# Patient Record
Sex: MALE | Race: BLACK OR AFRICAN AMERICAN | NOT HISPANIC OR LATINO | Employment: FULL TIME | URBAN - METROPOLITAN AREA
[De-identification: names, ages, dates, MRNs, and addresses within clinical notes are randomized per-mention and may not be internally consistent; named-entity substitution may affect disease eponyms.]

---

## 2017-02-03 ENCOUNTER — HISTORICAL (OUTPATIENT)
Dept: ADMINISTRATIVE | Facility: HOSPITAL | Age: 57
End: 2017-02-03

## 2017-02-03 LAB
ALBUMIN SERPL-MCNC: 4.3 G/DL (ref 3.1–4.7)
ALP SERPL-CCNC: 52 IU/L (ref 40–104)
ALT (SGPT): 11 IU/L (ref 3–33)
AST SERPL-CCNC: 15 IU/L (ref 10–40)
BASOPHILS NFR BLD: 0.1 K/UL (ref 0–0.2)
BASOPHILS NFR BLD: 1 %
BILIRUB SERPL-MCNC: 0.9 MG/DL (ref 0.3–1)
BUN SERPL-MCNC: 23 MG/DL (ref 8–20)
CALCIUM SERPL-MCNC: 8.9 MG/DL (ref 7.7–10.4)
CHLORIDE: 109 MMOL/L (ref 98–110)
CO2 SERPL-SCNC: 26.5 MMOL/L (ref 22.8–31.6)
CREATININE: 1.65 MG/DL (ref 0.6–1.4)
EOSINOPHIL NFR BLD: 0.1 K/UL (ref 0–0.7)
EOSINOPHIL NFR BLD: 1.9 %
ERYTHROCYTE [DISTWIDTH] IN BLOOD BY AUTOMATED COUNT: 23.3 % (ref 11.7–14.9)
GLUCOSE: 103 MG/DL (ref 70–99)
GRAN #: 4.2 K/UL (ref 1.4–6.5)
GRAN%: 62.8 %
HCT VFR BLD AUTO: 24 % (ref 39–55)
HGB BLD-MCNC: 8.2 G/DL (ref 14–16)
IMMATURE GRANS (ABS): 0 K/UL (ref 0–1)
IMMATURE GRANULOCYTES: 0.3 %
LYMPH #: 1.5 K/UL (ref 1.2–3.4)
LYMPH%: 21.7 %
MCH RBC QN AUTO: 30.1 PG (ref 25–35)
MCHC RBC AUTO-ENTMCNC: 34.2 G/DL (ref 31–36)
MCV RBC AUTO: 88.2 FL (ref 80–100)
MONO #: 0.8 K/UL (ref 0.1–0.6)
MONO%: 12.3 %
NUCLEATED RBCS: 0 %
PLATELET # BLD AUTO: 492 K/UL (ref 140–440)
PMV BLD AUTO: 10.3 FL (ref 8.8–12.7)
POTASSIUM SERPL-SCNC: 4.6 MMOL/L (ref 3.5–5)
PROT SERPL-MCNC: 7.4 G/DL (ref 6–8.2)
RBC # BLD AUTO: 2.72 M/UL (ref 4.3–5.9)
SODIUM: 141 MMOL/L (ref 134–144)
WBC # BLD AUTO: 6.7 K/UL (ref 5–10)

## 2017-03-07 ENCOUNTER — HISTORICAL (OUTPATIENT)
Dept: ADMINISTRATIVE | Facility: HOSPITAL | Age: 57
End: 2017-03-07

## 2017-03-07 LAB
ALBUMIN SERPL-MCNC: 4 G/DL (ref 3.1–4.7)
ALP SERPL-CCNC: 57 IU/L (ref 40–104)
ALT (SGPT): 12 IU/L (ref 3–33)
AST SERPL-CCNC: 18 IU/L (ref 10–40)
BILIRUB SERPL-MCNC: 1.1 MG/DL (ref 0.3–1)
BUN SERPL-MCNC: 19 MG/DL (ref 8–20)
CALCIUM SERPL-MCNC: 8.6 MG/DL (ref 7.7–10.4)
CHLORIDE: 106 MMOL/L (ref 98–110)
CO2 SERPL-SCNC: 25.8 MMOL/L (ref 22.8–31.6)
CREATININE: 1.5 MG/DL (ref 0.6–1.4)
GLUCOSE: 101 MG/DL (ref 70–99)
HCT VFR BLD AUTO: 22.3 % (ref 39–55)
HGB BLD-MCNC: 7.7 G/DL (ref 14–16)
MCH RBC QN AUTO: 31.6 PG (ref 25–35)
MCHC RBC AUTO-ENTMCNC: 34.5 G/DL (ref 31–36)
MCV RBC AUTO: 91.4 FL (ref 80–100)
NUCLEATED RBCS: 0 %
PLATELET # BLD AUTO: 475 K/UL (ref 140–440)
POTASSIUM SERPL-SCNC: 4.9 MMOL/L (ref 3.5–5)
PROT SERPL-MCNC: 7 G/DL (ref 6–8.2)
RBC # BLD AUTO: 2.44 M/UL (ref 4.3–5.9)
SODIUM: 138 MMOL/L (ref 134–144)
WBC # BLD AUTO: 8.6 K/UL (ref 5–10)

## 2017-03-22 ENCOUNTER — DOCUMENTATION ONLY (OUTPATIENT)
Dept: FAMILY MEDICINE | Facility: CLINIC | Age: 57
End: 2017-03-22

## 2017-03-22 NOTE — PROGRESS NOTES
Pre-Visit Chart Review  For Appointment Scheduled on 3-    Health Maintenance Due   Topic Date Due    Influenza Vaccine  08/01/2016

## 2017-04-14 ENCOUNTER — HISTORICAL (OUTPATIENT)
Dept: ADMINISTRATIVE | Facility: HOSPITAL | Age: 57
End: 2017-04-14

## 2017-04-14 LAB
ALBUMIN SERPL-MCNC: 4.2 G/DL (ref 3.1–4.7)
ALP SERPL-CCNC: 60 IU/L (ref 40–104)
ALT (SGPT): 12 IU/L (ref 3–33)
AST SERPL-CCNC: 18 IU/L (ref 10–40)
BILIRUB SERPL-MCNC: 0.9 MG/DL (ref 0.3–1)
BUN SERPL-MCNC: 22 MG/DL (ref 8–20)
CALCIUM SERPL-MCNC: 8.5 MG/DL (ref 7.7–10.4)
CHLORIDE: 105 MMOL/L (ref 98–110)
CO2 SERPL-SCNC: 27.6 MMOL/L (ref 22.8–31.6)
CREATININE: 1.51 MG/DL (ref 0.6–1.4)
GLUCOSE: 106 MG/DL (ref 70–99)
HCT VFR BLD AUTO: 24.3 % (ref 39–55)
HGB BLD-MCNC: 8.3 G/DL (ref 14–16)
MCH RBC QN AUTO: 31.3 PG (ref 25–35)
MCHC RBC AUTO-ENTMCNC: 34.2 G/DL (ref 31–36)
MCV RBC AUTO: 91.7 FL (ref 80–100)
NUCLEATED RBCS: 1 %
PLATELET # BLD AUTO: 413 K/UL (ref 140–440)
POTASSIUM SERPL-SCNC: 4.9 MMOL/L (ref 3.5–5)
PROT SERPL-MCNC: 7 G/DL (ref 6–8.2)
RBC # BLD AUTO: 2.65 M/UL (ref 4.3–5.9)
SODIUM: 138 MMOL/L (ref 134–144)
WBC # BLD AUTO: 3.5 K/UL (ref 5–10)

## 2017-05-12 LAB
ALBUMIN SERPL-MCNC: 4.1 G/DL (ref 3.1–4.7)
ALP SERPL-CCNC: 53 IU/L (ref 40–104)
ALT (SGPT): 13 IU/L (ref 3–33)
AST SERPL-CCNC: 19 IU/L (ref 10–40)
BILIRUB SERPL-MCNC: 0.9 MG/DL (ref 0.3–1)
BUN SERPL-MCNC: 24 MG/DL (ref 8–20)
CALCIUM SERPL-MCNC: 8.6 MG/DL (ref 7.7–10.4)
CHLORIDE: 108 MMOL/L (ref 98–110)
CO2 SERPL-SCNC: 28.9 MMOL/L (ref 22.8–31.6)
CREATININE: 1.51 MG/DL (ref 0.6–1.4)
GLUCOSE: 106 MG/DL (ref 70–99)
HCT VFR BLD AUTO: 24.5 % (ref 39–55)
HGB BLD-MCNC: 8.3 G/DL (ref 14–16)
MCH RBC QN AUTO: 31.3 PG (ref 25–35)
MCHC RBC AUTO-ENTMCNC: 33.9 G/DL (ref 31–36)
MCV RBC AUTO: 92.5 FL (ref 80–100)
NUCLEATED RBCS: 0 %
PLATELET # BLD AUTO: 483 K/UL (ref 140–440)
POTASSIUM SERPL-SCNC: 5 MMOL/L (ref 3.5–5)
PROT SERPL-MCNC: 7.3 G/DL (ref 6–8.2)
RBC # BLD AUTO: 2.65 M/UL (ref 4.3–5.9)
SODIUM: 143 MMOL/L (ref 134–144)
WBC # BLD AUTO: 5.3 K/UL (ref 5–10)

## 2017-05-12 RX ORDER — NAPROXEN 500 MG/1
TABLET ORAL
Qty: 30 TABLET | Refills: 2 | Status: SHIPPED | OUTPATIENT
Start: 2017-05-12 | End: 2017-07-13 | Stop reason: SDUPTHER

## 2017-05-12 RX ORDER — NAPROXEN 500 MG/1
500 TABLET ORAL 2 TIMES DAILY
COMMUNITY
End: 2017-05-12 | Stop reason: SDUPTHER

## 2017-05-12 RX ORDER — NAPROXEN 500 MG/1
500 TABLET ORAL 2 TIMES DAILY
Qty: 60 TABLET | Refills: 2 | Status: SHIPPED | OUTPATIENT
Start: 2017-05-12 | End: 2017-12-28 | Stop reason: SDUPTHER

## 2017-06-08 LAB
ALBUMIN SERPL-MCNC: 4.2 G/DL (ref 3.1–4.7)
ALP SERPL-CCNC: 53 IU/L (ref 40–104)
ALT (SGPT): 16 IU/L (ref 3–33)
AST SERPL-CCNC: 19 IU/L (ref 10–40)
BILIRUB SERPL-MCNC: 0.9 MG/DL (ref 0.3–1)
BUN SERPL-MCNC: 19 MG/DL (ref 8–20)
CALCIUM SERPL-MCNC: 8.8 MG/DL (ref 7.7–10.4)
CHLORIDE: 107 MMOL/L (ref 98–110)
CO2 SERPL-SCNC: 26.6 MMOL/L (ref 22.8–31.6)
CREATININE: 1.47 MG/DL (ref 0.6–1.4)
GLUCOSE: 98 MG/DL (ref 70–99)
HCT VFR BLD AUTO: 24 % (ref 39–55)
HGB BLD-MCNC: 8.3 G/DL (ref 14–16)
MCH RBC QN AUTO: 31.2 PG (ref 25–35)
MCHC RBC AUTO-ENTMCNC: 34.6 G/DL (ref 31–36)
MCV RBC AUTO: 90.2 FL (ref 80–100)
NUCLEATED RBCS: 0 %
PLATELET # BLD AUTO: 482 K/UL (ref 140–440)
POTASSIUM SERPL-SCNC: 4.5 MMOL/L (ref 3.5–5)
PROT SERPL-MCNC: 7.3 G/DL (ref 6–8.2)
RBC # BLD AUTO: 2.66 M/UL (ref 4.3–5.9)
SODIUM: 141 MMOL/L (ref 134–144)
WBC # BLD AUTO: 7.1 K/UL (ref 5–10)

## 2017-06-29 ENCOUNTER — OFFICE VISIT (OUTPATIENT)
Dept: HEMATOLOGY/ONCOLOGY | Facility: CLINIC | Age: 57
End: 2017-06-29
Payer: MEDICARE

## 2017-06-29 VITALS
HEART RATE: 62 BPM | TEMPERATURE: 98 F | WEIGHT: 209.31 LBS | SYSTOLIC BLOOD PRESSURE: 164 MMHG | RESPIRATION RATE: 18 BRPM | BODY MASS INDEX: 30.91 KG/M2 | DIASTOLIC BLOOD PRESSURE: 89 MMHG

## 2017-06-29 DIAGNOSIS — F10.11 H/O ETOH ABUSE: ICD-10-CM

## 2017-06-29 DIAGNOSIS — Z87.891 FORMER SMOKER: ICD-10-CM

## 2017-06-29 DIAGNOSIS — D57.1 HB-SS DISEASE WITHOUT CRISIS: ICD-10-CM

## 2017-06-29 PROCEDURE — 99213 OFFICE O/P EST LOW 20 MIN: CPT | Mod: ,,, | Performed by: INTERNAL MEDICINE

## 2017-06-29 RX ORDER — PHENOL/SODIUM PHENOLATE
20 AEROSOL, SPRAY (ML) MUCOUS MEMBRANE DAILY
COMMUNITY
Start: 2017-04-05 | End: 2017-10-03 | Stop reason: SDUPTHER

## 2017-06-29 RX ORDER — FLUOXETINE 20 MG/1
TABLET ORAL
COMMUNITY
Start: 2017-04-05 | End: 2017-07-02 | Stop reason: SDUPTHER

## 2017-06-29 NOTE — LETTER
June 29, 2017      Camryn Thomas MD  [address] [address]           [facility]  [address]  [address]  [facility] [address]  Phone: [phone]  Fax: [phone]          Patient: Rick Butler   MR Number: [id number]   YOB: 1960   Date of Visit: 6/29/2017       Dear Dr. Camryn Thomas:    Thank you for referring Rick Butler to me for evaluation. Attached you will find relevant portions of my assessment and plan of care.    If you have questions, please do not hesitate to call me. I look forward to following Rick Butler along with you.    Sincerely,    Jose Tello MD    Enclosure  CC:  No Recipients    If you would like to receive this communication electronically, please contact [email or web address] or [phone] to request more information on [employer] Link access.    For providers and/or their staff who would like to refer a patient to [facility], please contact us through our one-stop-shop provider referral line, [facility], at [phone].    If you feel you have received this communication in error or would no longer like to receive these types of communications, please e-mail [email or web address]

## 2017-06-29 NOTE — PROGRESS NOTES
[facility]            PROGRESS NOTE      Subjective:       Patient ID:   NAME: Rick Butler : 1960     57 y.o. male    Referring Doc: Camryn Thomas MD  Other Physicians:    Chief Complaint:  Sickle cell trait    History of Present Illness:     Patient returns today for a regularly scheduled follow-up visit.  The patient is doing ok with no new issues. He sees Dr Thomas on  for a follow-up; no pain crisis; he has been sober for over a year now; he has been off tobacco too; no CP, SOB, HA's or N/V; occasional fatigue            ROS:   GEN: normal without any fever, night sweats or weight loss  HEENT: normal with no HA's, sore throat, stiff neck, changes in vision  CV: normal with no CP, SOB, PND, MORA or orthopnea  PULM: normal with no SOB, cough, hemoptysis, sputum or pleuritic pain  GI: normal with no abdominal pain, nausea, vomiting, constipation, diarrhea, melanotic stools, BRBPR, or hematemesis  : normal with no hematuria, dysuria  BREAST: normal with no mass, discharge, pain  SKIN: normal with no rash, erythema, bruising, or swelling    Allergies:  Review of patient's allergies indicates:  No Known Allergies    Medications:    Current Outpatient Prescriptions:     fluoxetine 20 MG tablet, Take by mouth., Disp: , Rfl:     naproxen (EC NAPROSYN) 500 MG EC tablet, Take 1 tablet (500 mg total) by mouth 2 (two) times daily., Disp: 60 tablet, Rfl: 2    omeprazole 20 mg TbEC, Take by mouth., Disp: , Rfl:     buPROPion (WELLBUTRIN XL) 150 MG TB24 tablet, Take 2 tablets (300 mg total) by mouth once daily. Start with 1 tablet QD for 3 days then increase to 2 daily, Disp: 60 tablet, Rfl: 2    naproxen (EC NAPROSYN) 500 MG EC tablet, TAKE 1 TABLET TWICE A DAY, Disp: 30 tablet, Rfl: 2    PMHx/PSHx Updates:  See patient's last visit with me on 17.  See H&P on 2011      Pathology:  none        Objective:     Vitals:  Blood pressure (!) 164/89, pulse 62, temperature 98.1 °F (36.7 °C),  temperature source Oral, resp. rate 18, weight 94.9 kg (209 lb 4.8 oz).    Physical Examination:   GEN: no apparent distress, comfortable; AAOx3  HEAD: atraumatic and normocephalic  EYES: no pallor, no icterus, PERRLA  ENT: OMM, no pharyngeal erythema, external ears WNL; no nasal discharge; no thrush  NECK: no masses, thyroid normal, trachea midline, no LAD/LN's, supple  CV: RRR with no murmur; normal pulse; normal S1 and S2; no pedal edema  CHEST: Normal respiratory effort; CTAB; normal breath sounds; no wheeze or crackles  ABDOM: nontender and nondistended; soft; normal bowel sounds; no rebound/guarding  MUSC/Skeletal: ROM normal; no crepitus; joints normal; no deformities or arthropathy  EXTREM: no clubbing, cyanosis, inflammation or swelling  SKIN: no rashes, lesions, ulcers, petechiae or subcutaneous nodules  : no jiang  NEURO: grossly intact; motor/sensory WNL; AAOx3; no tremors  PSYCH: normal mood, affect and behavior  LYMPH: normal cervical, supraclavicular, axillary and groin LN's            Labs:   Lab Results   Component Value Date    WBC 7.1 06/08/2017    HGB 8.3 (L) 06/08/2017    HCT 24.0 (L) 06/08/2017    MCV 90.2 06/08/2017     (H) 06/08/2017    CMP  Sodium   Date Value Ref Range Status   06/08/2017 141 134 - 144 mmol/L      Potassium   Date Value Ref Range Status   06/08/2017 4.5 3.5 - 5.0 mmol/L      Chloride   Date Value Ref Range Status   06/08/2017 107 98 - 110 mmol/L      CO2   Date Value Ref Range Status   06/08/2017 26.6 22.8 - 31.6 mmol/L      Glucose   Date Value Ref Range Status   06/08/2017 98 70 - 99 mg/dL      BUN, Bld   Date Value Ref Range Status   06/08/2017 19 8 - 20 mg/dL      Creatinine   Date Value Ref Range Status   06/08/2017 1.47 (H) 0.60 - 1.40 mg/dL      Calcium   Date Value Ref Range Status   06/08/2017 8.8 7.7 - 10.4 mg/dL      Total Protein   Date Value Ref Range Status   06/08/2017 7.3 6.0 - 8.2 g/dL      Albumin   Date Value Ref Range Status   06/08/2017 4.2 3.1  - 4.7 g/dL      Total Bilirubin   Date Value Ref Range Status   06/08/2017 0.9 0.3 - 1.0 mg/dL      Alkaline Phosphatase   Date Value Ref Range Status   06/08/2017 53 40 - 104 IU/L      AST   Date Value Ref Range Status   06/08/2017 19 10 - 40 IU/L      ALT   Date Value Ref Range Status   06/08/2015 36 10 - 44 U/L Final     Anion Gap   Date Value Ref Range Status   06/08/2015 5 (L) 8 - 16 mmol/L Final     eGFR if    Date Value Ref Range Status   06/08/2015 >60.0 >60 mL/min/1.73 m^2 Final     eGFR if non    Date Value Ref Range Status   06/08/2015 >60.0 >60 mL/min/1.73 m^2 Final     Comment:     Calculation used to obtain the estimated glomerular filtration  rate (eGFR) is the CKD-EPI equation. Since race is unknown   in our information system, the eGFR values for   -American and Non--American patients are given   for each creatinine result.       I have reviewed all available lab results and radiology reports.    Radiology/Diagnostic Studies:        Assessment/Plan:   (1) 57 y.o. male with diagnosis of sickle cell anemia with borderline microcytosis  - latest hgb on 6/8 was 8.3 and within his usual range      (2) Alcohol abuse issues in past - he has been sober for over a year    (3) former smoker    PLAN;  1. Check labs monthly  2. RTC 4-6 months  3. Fax note to Dr Thomas  4. F/u with PCP  5. Encouraged sobriety    I spent over 15 mins with the patient, of which over half was face to face with the patient. Reviewing materials, labs, reports and studies. Making treatment and analytical decisions. Ordering necessary labs, tests and studies.          Discussion:     I have explained all of the above in detail and the patient understands all of the current recommendation(s). I have answered all of their questions to the best of my ability and to their complete satisfaction.   The patient is to continue with the current management plan.    RTC in  4-6  months        Electronically signed by Jose Tello MD

## 2017-07-03 RX ORDER — FLUOXETINE 20 MG/1
TABLET ORAL
Qty: 30 TABLET | Refills: 5 | Status: SHIPPED | OUTPATIENT
Start: 2017-07-03 | End: 2018-08-31

## 2017-07-19 ENCOUNTER — OFFICE VISIT (OUTPATIENT)
Dept: FAMILY MEDICINE | Facility: CLINIC | Age: 57
End: 2017-07-19
Payer: MEDICARE

## 2017-07-19 VITALS
WEIGHT: 213 LBS | SYSTOLIC BLOOD PRESSURE: 139 MMHG | BODY MASS INDEX: 31.55 KG/M2 | OXYGEN SATURATION: 99 % | HEART RATE: 69 BPM | DIASTOLIC BLOOD PRESSURE: 76 MMHG | HEIGHT: 69 IN

## 2017-07-19 DIAGNOSIS — F32.A CHRONIC DEPRESSION: Primary | ICD-10-CM

## 2017-07-19 DIAGNOSIS — G47.00 INSOMNIA, UNSPECIFIED TYPE: ICD-10-CM

## 2017-07-19 PROBLEM — K57.30 DIVERTICULOSIS OF SIGMOID COLON: Status: ACTIVE | Noted: 2017-07-19

## 2017-07-19 PROBLEM — R74.8 ABNORMAL LIVER ENZYMES: Status: ACTIVE | Noted: 2017-07-19

## 2017-07-19 PROBLEM — K70.0 ALCOHOL INDUCED FATTY LIVER: Status: ACTIVE | Noted: 2017-07-19

## 2017-07-19 PROBLEM — R77.0 ABNORMALITY OF ALBUMIN: Status: ACTIVE | Noted: 2017-07-19

## 2017-07-19 PROBLEM — G89.29 CHRONIC LOW BACK PAIN: Status: ACTIVE | Noted: 2017-07-19

## 2017-07-19 PROBLEM — F10.20 CONTINUOUS CHRONIC ALCOHOLISM: Status: ACTIVE | Noted: 2017-07-19

## 2017-07-19 PROBLEM — M54.50 CHRONIC LOW BACK PAIN: Status: ACTIVE | Noted: 2017-07-19

## 2017-07-19 PROBLEM — D64.9 CHRONIC ANEMIA: Status: ACTIVE | Noted: 2017-07-19

## 2017-07-19 PROCEDURE — 99214 OFFICE O/P EST MOD 30 MIN: CPT | Mod: ,,, | Performed by: FAMILY MEDICINE

## 2017-07-19 RX ORDER — TRAZODONE HYDROCHLORIDE 50 MG/1
50 TABLET ORAL NIGHTLY
Qty: 90 TABLET | Refills: 1 | Status: SHIPPED | OUTPATIENT
Start: 2017-07-19 | End: 2017-09-11 | Stop reason: SDUPTHER

## 2017-07-19 NOTE — PROGRESS NOTES
Subjective:       Patient ID:  Rick Butler is a 57 y.o. male with multiple medical diagnoses as listed in the medical history and problem list that presents for Anxiety and Insomnia  .      Chief Complaint: Anxiety and Insomnia        SUBJECTIVE  Rick Butler is a 57 y.o. male who comes in today to discuss poor sleep.  The patient has complaints of difficulty falling asleep once awakened.  Their normal bedtime is 2200.  The patient  has difficulty falling asleep.    They have no hypnagogic hallucinations, sleep paralysis, or restlessness.  The symptoms have been present since several months ago      Anxiety   Presents for follow-up visit. Symptoms include depressed mood and nervous/anxious behavior. Patient reports no chest pain, dizziness, palpitations, shortness of breath or suicidal ideas. Symptoms occur most days. The severity of symptoms is moderate. The quality of sleep is poor. Nighttime awakenings: one to two.     Compliance with medications is %.     Review of Systems   Constitutional: Negative for activity change, appetite change, chills, fatigue and fever.   HENT: Negative for congestion, ear discharge, ear pain, hearing loss and sore throat.    Eyes: Negative for discharge, redness and itching.   Respiratory: Negative for apnea, chest tightness, shortness of breath and wheezing.    Cardiovascular: Negative for chest pain, palpitations and leg swelling.   Gastrointestinal: Negative for abdominal distention and abdominal pain.   Endocrine: Negative for cold intolerance and polydipsia.   Genitourinary: Negative for dysuria.   Musculoskeletal: Negative for arthralgias and gait problem.   Skin: Negative for color change, pallor and rash.   Neurological: Negative for dizziness and headaches.   Hematological: Negative for adenopathy.   Psychiatric/Behavioral: Negative for agitation and suicidal ideas. The patient is nervous/anxious.        Past Medical History:   Diagnosis Date    Depression     Former  smoker 6/29/2017    H/O ETOH abuse 6/29/2017    Sickle cell anemia 6/29/2017     History reviewed. No pertinent surgical history.  Family History   Problem Relation Age of Onset    Arthritis Mother     Depression Mother     Heart disease Mother     Hypertension Mother     Heart attack Father 59     Social History     Social History    Marital status:      Spouse name: N/A    Number of children: N/A    Years of education: N/A     Occupational History    [facility] [employer]     Social History Main Topics    Smoking status: Former Smoker     Quit date: 1/1/1999    Smokeless tobacco: Never Used    Alcohol use No      Comment: quit 6/15/16, in AA    Drug use: No    Sexual activity: Not Asked     Other Topics Concern    None     Social History Narrative    None     Current Outpatient Prescriptions   Medication Sig Dispense Refill    naproxen (EC NAPROSYN) 500 MG EC tablet Take 1 tablet (500 mg total) by mouth 2 (two) times daily. 60 tablet 2    omeprazole 20 mg TbEC Take 20 mg by mouth Daily.       trazodone (DESYREL) 50 MG tablet Take 1 tablet (50 mg total) by mouth every evening. 90 tablet 1     No current facility-administered medications for this visit.      Review of patient's allergies indicates:  No Known Allergies  Objective:      Vitals:    07/19/17 1522   BP: 139/76   Pulse: 69     Physical Exam   Constitutional: He appears well-developed and well-nourished.   HENT:   Head: Normocephalic and atraumatic.   Right Ear: External ear normal.   Left Ear: External ear normal.   Eyes: EOM are normal. Pupils are equal, round, and reactive to light.   Neck: Normal range of motion. No tracheal deviation present. No thyromegaly present.   Cardiovascular: Exam reveals no gallop and no friction rub.    No murmur heard.  Pulmonary/Chest: No respiratory distress. He has no wheezes.   Abdominal: Soft. Bowel sounds are normal. He exhibits no distension. There is no tenderness.   Musculoskeletal:  He exhibits no edema or tenderness.   Neurological: No cranial nerve deficit.   Skin: No rash noted. No erythema.   Psychiatric: His mood appears anxious. He exhibits a depressed mood. He expresses no homicidal and no suicidal ideation. He expresses no suicidal plans and no homicidal plans.       Assessment:       1. Chronic depression    2. Insomnia, unspecified type        Plan:       Chronic depression  Comments:  add trazodone.      Insomnia, unspecified type  -     trazodone (DESYREL) 50 MG tablet; Take 1 tablet (50 mg total) by mouth every evening.  Dispense: 90 tablet; Refill: 1    Patient completed the SERGE questionnaire with score >10 . on SSRI daily and  add trazodone 2/2 insomnia. Patient was encouraged to avoid abrupt cessation of medication and was educated on the potential medication side effects. Exercise was encouraged as an adjunctive treatment. Will follow-up 8 weeks.  Advice pt to go to ER if having SI or HI.     Return in about 2 months (around 9/19/2017) for anxiety, depression and insomnia.      7/19/2017 Camryn Thomas M.D.

## 2017-07-20 RX ORDER — NAPROXEN 500 MG/1
TABLET ORAL
Qty: 30 TABLET | Refills: 2 | Status: SHIPPED | OUTPATIENT
Start: 2017-07-20 | End: 2017-09-23 | Stop reason: SDUPTHER

## 2017-08-01 ENCOUNTER — TELEPHONE (OUTPATIENT)
Dept: HEMATOLOGY/ONCOLOGY | Facility: CLINIC | Age: 57
End: 2017-08-01

## 2017-08-01 LAB
CBC, PLATELET CT, AND DIFF: NORMAL (ref 23.9–?)
HCT VFR BLD AUTO: 23.9 % (ref 39–55)
HGB BLD-MCNC: 8.3 G/DL (ref 14–16)
MCH RBC QN AUTO: 32 PG (ref 25–35)
MCHC RBC AUTO-ENTMCNC: 34.7 G/DL (ref 31–36)
MCV RBC AUTO: 92.3 FL (ref 80–100)
NUCLEATED RBCS: 0 %
PLATELET # BLD AUTO: 482 K/UL (ref 140–440)
RBC # BLD AUTO: 2.59 M/UL (ref 4.3–5.9)
WBC # BLD AUTO: 7 K/UL (ref 5–10)

## 2017-08-01 NOTE — TELEPHONE ENCOUNTER
----- Message from Cheri Johnson sent at 8/1/2017 10:38 AM CDT -----  Patient called in requesting BW results done at [facility]. They results are in the lab tab. Please call patient. Thanks

## 2017-08-01 NOTE — TELEPHONE ENCOUNTER
Spoke  to patient. He does not feel bad so at this time he does not think he needs a transfusion. He will let us know if he starts to feel worse and he will recheck his labs in 1 month as ordered.

## 2017-09-05 ENCOUNTER — TELEPHONE (OUTPATIENT)
Dept: HEMATOLOGY/ONCOLOGY | Facility: CLINIC | Age: 57
End: 2017-09-05

## 2017-09-05 NOTE — TELEPHONE ENCOUNTER
Pt states he feels fine no s/s of anemia. Will have monthly labs drawn and will reassess at that time.

## 2017-09-05 NOTE — TELEPHONE ENCOUNTER
----- Message from Jose Tello MD sent at 9/2/2017  4:57 PM CDT -----  Call patient and see how he is feeling; if he is having fatigue or other symptoms then set him up with 2 units of blood

## 2017-09-05 NOTE — TELEPHONE ENCOUNTER
----- Message from Cheri Johnson sent at 9/5/2017  9:54 AM CDT -----  Patient called in stating that he would like to go ahead with the transfusion. He would like to set it up for Monday earliest possible time. He stated that he will do his BW on Friday that way we can have it back by Monday. Please call patient. Thanks

## 2017-09-05 NOTE — TELEPHONE ENCOUNTER
Spoke with pt states he would like to come in on 9/10 for t&M and transfusion on 9/11/17 will fax order to scheduling. Spoke with poornima pt notified understanding verbalized.

## 2017-09-11 DIAGNOSIS — G47.00 INSOMNIA, UNSPECIFIED TYPE: ICD-10-CM

## 2017-09-11 RX ORDER — TRAZODONE HYDROCHLORIDE 50 MG/1
50 TABLET ORAL NIGHTLY
Qty: 90 TABLET | Refills: 1 | Status: SHIPPED | OUTPATIENT
Start: 2017-09-11 | End: 2017-10-09 | Stop reason: SDUPTHER

## 2017-09-11 NOTE — TELEPHONE ENCOUNTER
Pt. states that Dr. Thomas told him he could take 1-1/2 tabs if 1 tab didn't work so he has been taking 1-1/2 tabs.

## 2017-09-25 RX ORDER — NAPROXEN 500 MG/1
TABLET ORAL
Qty: 30 TABLET | Refills: 2 | Status: SHIPPED | OUTPATIENT
Start: 2017-09-25 | End: 2017-10-09 | Stop reason: SDUPTHER

## 2017-10-03 RX ORDER — OMEPRAZOLE 20 MG/1
CAPSULE, DELAYED RELEASE ORAL
Qty: 30 CAPSULE | Refills: 0 | Status: SHIPPED | OUTPATIENT
Start: 2017-10-03 | End: 2017-11-03 | Stop reason: SDUPTHER

## 2017-10-09 ENCOUNTER — OFFICE VISIT (OUTPATIENT)
Dept: FAMILY MEDICINE | Facility: CLINIC | Age: 57
End: 2017-10-09
Payer: MEDICARE

## 2017-10-09 VITALS
DIASTOLIC BLOOD PRESSURE: 82 MMHG | RESPIRATION RATE: 16 BRPM | BODY MASS INDEX: 32.14 KG/M2 | OXYGEN SATURATION: 98 % | WEIGHT: 217 LBS | HEART RATE: 86 BPM | SYSTOLIC BLOOD PRESSURE: 138 MMHG | HEIGHT: 69 IN

## 2017-10-09 DIAGNOSIS — Z12.5 SCREENING FOR PROSTATE CANCER: ICD-10-CM

## 2017-10-09 DIAGNOSIS — G47.00 INSOMNIA, UNSPECIFIED TYPE: ICD-10-CM

## 2017-10-09 DIAGNOSIS — I10 ESSENTIAL HYPERTENSION: Primary | ICD-10-CM

## 2017-10-09 DIAGNOSIS — D53.9 MACROCYTIC ANEMIA: ICD-10-CM

## 2017-10-09 DIAGNOSIS — E78.5 DYSLIPIDEMIA: ICD-10-CM

## 2017-10-09 PROBLEM — D57.3 SICKLE CELL TRAIT: Status: ACTIVE | Noted: 2017-10-09

## 2017-10-09 PROBLEM — E55.9 UNSPECIFIED VITAMIN D DEFICIENCY: Status: ACTIVE | Noted: 2017-10-09

## 2017-10-09 PROCEDURE — 99214 OFFICE O/P EST MOD 30 MIN: CPT | Mod: ,,, | Performed by: INTERNAL MEDICINE

## 2017-10-09 PROCEDURE — G0008 ADMIN INFLUENZA VIRUS VAC: HCPCS | Mod: ,,, | Performed by: INTERNAL MEDICINE

## 2017-10-09 PROCEDURE — 90686 IIV4 VACC NO PRSV 0.5 ML IM: CPT | Mod: ,,, | Performed by: INTERNAL MEDICINE

## 2017-10-09 RX ORDER — TRAZODONE HYDROCHLORIDE 50 MG/1
50 TABLET ORAL NIGHTLY
Qty: 90 TABLET | Refills: 1 | Status: SHIPPED | OUTPATIENT
Start: 2017-10-09 | End: 2017-12-28 | Stop reason: SDUPTHER

## 2017-10-09 RX ORDER — LOSARTAN POTASSIUM 50 MG/1
50 TABLET ORAL DAILY
Qty: 90 TABLET | Refills: 3 | Status: SHIPPED | OUTPATIENT
Start: 2017-10-09 | End: 2017-12-28 | Stop reason: SDUPTHER

## 2017-10-09 NOTE — PATIENT INSTRUCTIONS
"  Vitamin B-12 and Folate  Does this test have other names?  Cobalamin, Cbl, folic acid, FA  What is this test?  This test measures the levels of vitamin B-12 and folate in your blood.  Your body needs vitamin B-12, also called cobalamin, and folate, also called folic acid, to function normally. Both nutrients play important roles in creating red blood cells and making DNA and RNA to help build cells. B-12 also helps your nervous system function as it should.  B-12 is found in fortified cereals and animal products like fish, meat, eggs, and dairy. It's also in dietary supplements, as cyanocobalamin.  Folate is found naturally in green leafy vegetables, fruits, and beans. It's also added to enriched cereals and grains.  People who have pernicious anemia may have low B-12 levels because this condition prevents them from absorbing B-12. A low folate level can also be the result of a poor diet or alcohol abuse.  Why do I need this test?  You may have this test if you are taking medicines that could interfere with how your body absorbs B-12 or folate. You may also have this test if you have a disease or condition that could lead to B-12 deficiency. Symptoms of B-12 deficiency include:  · Fatigue  · Headaches  · Numbness or "pins and needles" in the hands and feet  · Difficulty walking  · Memory loss  · Difficulty thinking normally  · Changes in mood  Symptoms of having too little folate are diarrhea, weight loss, and other vague symptoms that could be caused by many other conditions.  It's important that women who are pregnant, thinking of becoming pregnant, or breastfeeding have enough folate.  This test measures both vitamin B-12 and folate, but either of these nutrients can be measured separately in different lab tests. Your healthcare provider may order this combined test if you have a condition for which it's important to know both levels.  What other tests might I have along with this test?  Your healthcare " provider may also order these tests:  · Homocysteine and methylmalonate, or methylmalonic acid. These substances can build up in your body if you have a B-12 or folate deficiency.  · Red blood cell  · Pernicious anemia. This includes measuring levels of gastrin, pepsinogen I, pepsinogen II, and antibodies against a substance called intrinsic factor.  What do my test results mean?  Many things may affect your lab test results. These include the method each lab uses to do the test. Even if your test results are different from the normal value, you may not have a problem. To learn what the results mean for you, talk with your healthcare provider.  Results are given in nanograms per milliliter (ng/mL). A normal range for vitamin B-12 is 190 to 900 ng/mL.  If your B-12 results are low, it may mean you have:  · Pernicious anemia  · Stomach issues, such as lack of stomach acid, that make it difficult to absorb the vitamin  · Folate deficiency  Your diet can also lead to B-12 deficiency. Some vegetarians or vegans who don't eat eggs or other dairy products may develop this deficiency.  Your B-12 results may also be higher or lower if you have had recent nuclear medicine studies using radiation. They can mean that you have:  · Severe liver disease  · Chronic granulocytic leukemia  If the test is done on your blood plasma, a normal range for folate is 2 to 10 ng/mL. If the test is done on red blood cells, a normal range is 140 to 960 ng/mL.  If your folate results are low, it may mean you have:  · A diet that doesn't provide enough folate  · Difficulty absorbing the nutrient  · Alcohol abuse  · B-12 deficiency  · Hemolytic anemia  How is this test done?  The test requires a blood sample, which is drawn through a needle from a vein in your arm.  Does this test pose any risks?  Taking a blood sample with a needle carries risks that include bleeding, infection, bruising, or feeling dizzy. When the needle pricks your arm, you may  feel a slight stinging sensation or pain. Afterward, the site may be slightly sore.  What might affect my test results?  Being pregnant can affect your results, as can eating foods that are high or low in folate. Alcohol use can also affect your results. Certain medicines can also affect your results. These include:  · Birth control pills  · Methotrexate  · Trimethoprim  · Phenytoin  · Azulfidine  How do I get ready for this test?  Tell your healthcare provider about health conditions you have, and how much alcohol you drink regularly. In addition, be sure your provider knows about all medicines, herbs, vitamins, and supplements you are taking. This includes medicines that don't need a prescription and any illicit drugs you may use.  © 7908-7217 [employer]. [address] [place] [address], [place] [address]. All rights reserved. This information is not intended as a substitute for professional medical care. Always follow your healthcare professional's instructions.

## 2017-10-09 NOTE — PROGRESS NOTES
Subjective:       Patient ID: Rick Butler is a 57 y.o. male.    Chief Complaint: Hospital Follow Up (Dr Thomas pt; had transfusion at outpt); Hypertension; Anxiety; and Depression    Patient just had a blood transfusion at the beginning of this month with two units PRBC. He is monitored monthly with CBCs by Dr. Mondragon. He has sickle cell trait but it apparently affects him and has caused him to be moderately anemic where he requires a few units of blood a couple times a year.     While he was at the transfusion center his blood pressure was quite high 155/90 and was repeated a few times during the 5-6 hour transfusion scan. He also has been taking at home since that elevation. At home is getting similar readings anywhere from 150 to 160 systolic. An 85 to 100 diastolic.      Review of Systems   Constitutional: Negative for activity change, diaphoresis, fatigue and fever.   HENT: Negative for congestion, ear pain, postnasal drip, sinus pressure, sore throat and trouble swallowing.    Eyes: Negative for pain.   Respiratory: Negative for cough, chest tightness, shortness of breath and wheezing.    Cardiovascular: Negative for chest pain, palpitations and leg swelling.   Gastrointestinal: Negative for abdominal distention, abdominal pain, blood in stool, constipation and diarrhea.   Endocrine: Negative for cold intolerance and heat intolerance.   Genitourinary: Negative for decreased urine volume, difficulty urinating, dysuria, flank pain, frequency and hematuria.   Musculoskeletal: Negative for arthralgias, back pain, joint swelling, myalgias and neck pain.   Skin: Negative for pallor, rash and wound.   Neurological: Negative for tremors, syncope, weakness, light-headedness, numbness and headaches.   Hematological: Negative for adenopathy.   Psychiatric/Behavioral: Negative for confusion, decreased concentration, hallucinations, self-injury, sleep disturbance and suicidal ideas. The patient is not nervous/anxious.         Past Medical History:   Diagnosis Date    Depression     Former smoker 6/29/2017    H/O ETOH abuse 6/29/2017    Sickle cell anemia 6/29/2017       No past surgical history on file.    Family History   Problem Relation Age of Onset    Arthritis Mother     Depression Mother     Heart disease Mother     Hypertension Mother     Heart attack Father 59       Social History     Social History    Marital status:      Spouse name: N/A    Number of children: N/A    Years of education: N/A     Occupational History    [employer]     Social History Main Topics    Smoking status: Former Smoker     Quit date: 1/1/1999    Smokeless tobacco: Never Used    Alcohol use No      Comment: quit 6/15/16, in AA    Drug use: No    Sexual activity: Not Asked     Other Topics Concern    None     Social History Narrative    None       Current Outpatient Prescriptions   Medication Sig Dispense Refill    naproxen (EC NAPROSYN) 500 MG EC tablet Take 1 tablet (500 mg total) by mouth 2 (two) times daily. 60 tablet 2    omeprazole (PRILOSEC) 20 MG capsule TAKE ONE CAPSULE BY MOUTH EVERY DAY 30 capsule 0    trazodone (DESYREL) 50 MG tablet Take 1 tablet (50 mg total) by mouth every evening. 90 tablet 1    losartan (COZAAR) 50 MG tablet Take 1 tablet (50 mg total) by mouth once daily. 90 tablet 3     No current facility-administered medications for this visit.        Review of patient's allergies indicates:  No Known Allergies  Objective:      Physical Exam   Constitutional: He is oriented to person, place, and time. He appears well-developed and well-nourished. No distress.   HENT:   Head: Normocephalic and atraumatic.   Right Ear: External ear normal.   Left Ear: External ear normal.   Nose: Nose normal.   Mouth/Throat: Oropharynx is clear and moist.   Eyes: Conjunctivae and EOM are normal. Right eye exhibits no discharge. Left eye exhibits no discharge. No scleral icterus.   Neck: Normal range of  motion. Neck supple. No JVD present. No tracheal deviation present. No thyromegaly present.   Cardiovascular: Normal rate, regular rhythm, normal heart sounds and intact distal pulses.  Exam reveals no gallop.    No murmur heard.  Pulmonary/Chest: Effort normal and breath sounds normal. No respiratory distress. He has no wheezes. He has no rales.   Abdominal: Soft. Bowel sounds are normal. He exhibits no distension and no mass. There is no tenderness.   Musculoskeletal: Normal range of motion. He exhibits no edema or tenderness.   Lymphadenopathy:     He has no cervical adenopathy.   Neurological: He is alert and oriented to person, place, and time.   Skin: Skin is warm and dry. No rash noted. He is not diaphoretic. No erythema.   Psychiatric: He has a normal mood and affect. His behavior is normal. Judgment and thought content normal.       Lab Results   Component Value Date    WBC 8.5 10/02/2017    HGB 9.3 (L) 10/02/2017    HCT 27.0 (L) 10/02/2017     (H) 10/02/2017    CHOL 118 (L) 06/08/2015    TRIG 26 (L) 06/08/2015    HDL 72 06/08/2015    ALT 36 06/08/2015    AST 21 10/02/2017     10/02/2017    K 4.4 10/02/2017     10/02/2017    CREATININE 1.45 (H) 10/02/2017    BUN 14 10/02/2017    CO2 26.5 10/02/2017    TSH 3.692 06/08/2015    PSA 0.23 06/08/2015       Assessment:       1. Essential hypertension    2. Macrocytic anemia    3. Chronic bilateral low back pain without sciatica    4. Screening for prostate cancer    5. Dyslipidemia    6. Insomnia, unspecified type        Plan:       Essential hypertension  -     losartan (COZAAR) 50 MG tablet; Take 1 tablet (50 mg total) by mouth once daily.  Dispense: 90 tablet; Refill: 3  -     T4, free; Future; Expected date: 10/09/2017  -     TSH; Future; Expected date: 10/09/2017  -     Comprehensive metabolic panel; Future; Expected date: 10/09/2017    Macrocytic anemia  -     Methylmalonic acid, serum; Future; Expected date: 10/09/2017  -      Homocysteine, serum; Future; Expected date: 10/09/2017  -     CBC auto differential; Future; Expected date: 10/09/2017    Chronic bilateral low back pain without sciatica    Screening for prostate cancer  -     PSA, Screening; Future; Expected date: 10/09/2017    Dyslipidemia  -     Lipid panel; Future; Expected date: 10/09/2017    Insomnia, unspecified type  -     trazodone (DESYREL) 50 MG tablet; Take 1 tablet (50 mg total) by mouth every evening.  Dispense: 90 tablet; Refill: 1    Other orders  -     Influenza - Quadrivalent (3 years & older) (PF)

## 2017-11-03 LAB
COMPLEXED PSA SERPL-MCNC: 0.3 NG/ML (ref 0–3)
T4 FREE SP9 P CHAL SERPL-SCNC: 0.85 NG/DL (ref 0.45–1.27)
TSH SERPL DL<=0.005 MIU/L-ACNC: 2.01 ULU/ML (ref 0.3–5.6)

## 2017-11-03 RX ORDER — OMEPRAZOLE 20 MG/1
CAPSULE, DELAYED RELEASE ORAL
Qty: 30 CAPSULE | Refills: 0 | OUTPATIENT
Start: 2017-11-03

## 2017-11-03 RX ORDER — OMEPRAZOLE 20 MG/1
CAPSULE, DELAYED RELEASE ORAL
Qty: 30 CAPSULE | Refills: 4 | Status: SHIPPED | OUTPATIENT
Start: 2017-11-03 | End: 2017-12-28 | Stop reason: SDUPTHER

## 2017-11-05 LAB — HOMOCYSTEINE: 14.5 UMOL/L (ref 0–15)

## 2017-11-13 RX ORDER — NAPROXEN 500 MG/1
TABLET ORAL
Qty: 30 TABLET | Refills: 2 | Status: SHIPPED | OUTPATIENT
Start: 2017-11-13 | End: 2018-01-29

## 2017-11-14 ENCOUNTER — TELEPHONE (OUTPATIENT)
Dept: FAMILY MEDICINE | Facility: CLINIC | Age: 57
End: 2017-11-14

## 2017-12-11 ENCOUNTER — TELEPHONE (OUTPATIENT)
Dept: ADMINISTRATIVE | Facility: HOSPITAL | Age: 57
End: 2017-12-11

## 2017-12-11 NOTE — TELEPHONE ENCOUNTER
Received call from Namrata in [facility] lab. States pt's Hct is 23.9.  aware. No new orders at this time.

## 2017-12-28 DIAGNOSIS — I10 ESSENTIAL HYPERTENSION: ICD-10-CM

## 2017-12-28 DIAGNOSIS — G47.00 INSOMNIA, UNSPECIFIED TYPE: ICD-10-CM

## 2017-12-28 RX ORDER — OMEPRAZOLE 20 MG/1
20 CAPSULE, DELAYED RELEASE ORAL DAILY
Qty: 30 CAPSULE | Refills: 6 | Status: SHIPPED | OUTPATIENT
Start: 2017-12-28 | End: 2018-12-30 | Stop reason: SDUPTHER

## 2017-12-28 RX ORDER — LOSARTAN POTASSIUM 50 MG/1
50 TABLET ORAL DAILY
Qty: 90 TABLET | Refills: 1 | Status: SHIPPED | OUTPATIENT
Start: 2017-12-28 | End: 2018-01-29

## 2017-12-28 RX ORDER — NAPROXEN 500 MG/1
500 TABLET ORAL 2 TIMES DAILY
Qty: 60 TABLET | Refills: 6 | Status: SHIPPED | OUTPATIENT
Start: 2017-12-28 | End: 2018-01-29

## 2017-12-28 RX ORDER — TRAZODONE HYDROCHLORIDE 50 MG/1
50 TABLET ORAL NIGHTLY
Qty: 90 TABLET | Refills: 1 | Status: SHIPPED | OUTPATIENT
Start: 2017-12-28 | End: 2018-01-29

## 2017-12-28 NOTE — TELEPHONE ENCOUNTER
Pt called and states he needs refill on all his medications before the 1st of the year. Please refill and send to pharmacy.  Trazadone he will  when ready.

## 2018-01-29 ENCOUNTER — OFFICE VISIT (OUTPATIENT)
Dept: HEMATOLOGY/ONCOLOGY | Facility: CLINIC | Age: 58
End: 2018-01-29
Payer: COMMERCIAL

## 2018-01-29 ENCOUNTER — OFFICE VISIT (OUTPATIENT)
Dept: FAMILY MEDICINE | Facility: CLINIC | Age: 58
End: 2018-01-29
Payer: COMMERCIAL

## 2018-01-29 VITALS
BODY MASS INDEX: 32.29 KG/M2 | WEIGHT: 218 LBS | SYSTOLIC BLOOD PRESSURE: 147 MMHG | RESPIRATION RATE: 18 BRPM | HEART RATE: 70 BPM | TEMPERATURE: 98 F | DIASTOLIC BLOOD PRESSURE: 93 MMHG | HEIGHT: 69 IN

## 2018-01-29 VITALS
WEIGHT: 217.88 LBS | RESPIRATION RATE: 16 BRPM | SYSTOLIC BLOOD PRESSURE: 150 MMHG | HEIGHT: 69 IN | DIASTOLIC BLOOD PRESSURE: 90 MMHG | OXYGEN SATURATION: 98 % | HEART RATE: 70 BPM | BODY MASS INDEX: 32.27 KG/M2

## 2018-01-29 DIAGNOSIS — D57.1 HB-SS DISEASE WITHOUT CRISIS: ICD-10-CM

## 2018-01-29 DIAGNOSIS — M70.41 PREPATELLAR BURSITIS, RIGHT KNEE: ICD-10-CM

## 2018-01-29 DIAGNOSIS — M54.50 CHRONIC BILATERAL LOW BACK PAIN WITHOUT SCIATICA: Primary | ICD-10-CM

## 2018-01-29 DIAGNOSIS — D53.9 MACROCYTIC ANEMIA: ICD-10-CM

## 2018-01-29 DIAGNOSIS — F10.11 H/O ETOH ABUSE: Primary | ICD-10-CM

## 2018-01-29 DIAGNOSIS — G89.29 CHRONIC BILATERAL LOW BACK PAIN WITHOUT SCIATICA: Primary | ICD-10-CM

## 2018-01-29 DIAGNOSIS — I10 ESSENTIAL HYPERTENSION: ICD-10-CM

## 2018-01-29 DIAGNOSIS — Z87.891 FORMER SMOKER: ICD-10-CM

## 2018-01-29 DIAGNOSIS — M25.561 ACUTE PAIN OF RIGHT KNEE: ICD-10-CM

## 2018-01-29 DIAGNOSIS — D64.9 CHRONIC ANEMIA: ICD-10-CM

## 2018-01-29 DIAGNOSIS — K70.0 ALCOHOL INDUCED FATTY LIVER: ICD-10-CM

## 2018-01-29 DIAGNOSIS — R79.89 ELEVATED SERUM CREATININE: ICD-10-CM

## 2018-01-29 DIAGNOSIS — F51.01 PRIMARY INSOMNIA: ICD-10-CM

## 2018-01-29 DIAGNOSIS — D57.3 SICKLE CELL TRAIT: ICD-10-CM

## 2018-01-29 LAB
ALBUMIN SERPL-MCNC: 4.8 G/DL (ref 3.1–4.7)
ALP SERPL-CCNC: 55 IU/L (ref 40–104)
ALT (SGPT): 13 IU/L (ref 3–33)
AST SERPL-CCNC: 21 IU/L (ref 10–40)
BASOPHILS NFR BLD: 0.1 K/UL (ref 0–0.2)
BASOPHILS NFR BLD: 0.4 %
BILIRUB SERPL-MCNC: 0.9 MG/DL (ref 0.3–1)
BUN SERPL-MCNC: 13 MG/DL (ref 8–20)
CALCIUM SERPL-MCNC: 9.1 MG/DL (ref 7.7–10.4)
CHLORIDE: 100 MMOL/L (ref 98–110)
CO2 SERPL-SCNC: 25.6 MMOL/L (ref 22.8–31.6)
CREATININE: 1.39 MG/DL (ref 0.6–1.4)
EOSINOPHIL NFR BLD: 0 %
EOSINOPHIL NFR BLD: 0 K/UL (ref 0–0.7)
ERYTHROCYTE [DISTWIDTH] IN BLOOD BY AUTOMATED COUNT: 25.4 % (ref 11.7–14.9)
GLUCOSE: 118 MG/DL (ref 70–99)
GRAN #: 15.1 K/UL (ref 1.4–6.5)
GRAN%: 92.4 %
HCT VFR BLD AUTO: 26.8 % (ref 39–55)
HGB BLD-MCNC: 9.2 G/DL (ref 14–16)
IMMATURE GRANS (ABS): 0.3 K/UL (ref 0–1)
IMMATURE GRANULOCYTES: 1.8 %
LYMPH #: 0.8 K/UL (ref 1.2–3.4)
LYMPH%: 4.7 %
MCH RBC QN AUTO: 31.3 PG (ref 25–35)
MCHC RBC AUTO-ENTMCNC: 34.3 G/DL (ref 31–36)
MCV RBC AUTO: 91.2 FL (ref 80–100)
MONO #: 0.1 K/UL (ref 0.1–0.6)
MONO%: 0.7 %
NUCLEATED RBCS: 0 %
PLATELET # BLD AUTO: 671 K/UL (ref 140–440)
PMV BLD AUTO: 10.5 FL (ref 8.8–12.7)
POTASSIUM SERPL-SCNC: 4.3 MMOL/L (ref 3.5–5)
PROT SERPL-MCNC: 8.3 G/DL (ref 6–8.2)
RBC # BLD AUTO: 2.94 M/UL (ref 4.3–5.9)
SODIUM: 135 MMOL/L (ref 134–144)
WBC # BLD AUTO: 16.4 K/UL (ref 5–10)

## 2018-01-29 PROCEDURE — 99215 OFFICE O/P EST HI 40 MIN: CPT | Mod: 25,,, | Performed by: INTERNAL MEDICINE

## 2018-01-29 PROCEDURE — 96372 THER/PROPH/DIAG INJ SC/IM: CPT | Mod: ,,, | Performed by: INTERNAL MEDICINE

## 2018-01-29 PROCEDURE — 99213 OFFICE O/P EST LOW 20 MIN: CPT | Mod: ,,, | Performed by: INTERNAL MEDICINE

## 2018-01-29 RX ORDER — KETOROLAC TROMETHAMINE 30 MG/ML
60 INJECTION, SOLUTION INTRAMUSCULAR; INTRAVENOUS
Status: COMPLETED | OUTPATIENT
Start: 2018-01-29 | End: 2018-01-29

## 2018-01-29 RX ORDER — METHOCARBAMOL 750 MG/1
750 TABLET, FILM COATED ORAL 2 TIMES DAILY PRN
Qty: 60 TABLET | Refills: 1 | Status: SHIPPED | OUTPATIENT
Start: 2018-01-29 | End: 2018-02-20

## 2018-01-29 RX ORDER — LOSARTAN POTASSIUM AND HYDROCHLOROTHIAZIDE 12.5; 1 MG/1; MG/1
1 TABLET ORAL DAILY
Qty: 90 TABLET | Refills: 3 | Status: SHIPPED | OUTPATIENT
Start: 2018-01-29 | End: 2018-08-20

## 2018-01-29 RX ORDER — DEXAMETHASONE SODIUM PHOSPHATE 4 MG/ML
8 INJECTION, SOLUTION INTRA-ARTICULAR; INTRALESIONAL; INTRAMUSCULAR; INTRAVENOUS; SOFT TISSUE ONCE
Status: COMPLETED | OUTPATIENT
Start: 2018-01-29 | End: 2018-01-29

## 2018-01-29 RX ORDER — METHYLPREDNISOLONE 4 MG/1
TABLET ORAL
Qty: 1 PACKAGE | Refills: 0 | Status: SHIPPED | OUTPATIENT
Start: 2018-01-29 | End: 2018-02-19

## 2018-01-29 RX ORDER — TRAZODONE HYDROCHLORIDE 100 MG/1
100 TABLET ORAL NIGHTLY
Qty: 30 TABLET | Refills: 11 | Status: SHIPPED | OUTPATIENT
Start: 2018-01-29 | End: 2018-10-08

## 2018-01-29 RX ADMIN — DEXAMETHASONE SODIUM PHOSPHATE 8 MG: 4 INJECTION, SOLUTION INTRA-ARTICULAR; INTRALESIONAL; INTRAMUSCULAR; INTRAVENOUS; SOFT TISSUE at 10:01

## 2018-01-29 RX ADMIN — KETOROLAC TROMETHAMINE 60 MG: 30 INJECTION, SOLUTION INTRAMUSCULAR; INTRAVENOUS at 10:01

## 2018-01-29 NOTE — PROGRESS NOTES
[facility] Hematology/Oncology            PROGRESS NOTE      Subjective:       Patient ID:   NAME: Rick Butler : 1960     57 y.o. male    Referring Doc: Jesica Cadena  Other Physicians:    Chief Complaint:  Sickle cell trait    History of Present Illness:     Patient returns today for a regularly scheduled follow-up visit.  The patient is doing ok with no new issues. He sees Dr Jesica Cadena today; no pain crisis; he has been sober for over a year now; he has been off tobacco too; no CP, SOB, HA's or N/V; occasional fatigue            ROS:   GEN: normal without any fever, night sweats or weight loss  HEENT: normal with no HA's, sore throat, stiff neck, changes in vision  CV: normal with no CP, SOB, PND, MORA or orthopnea  PULM: normal with no SOB, cough, hemoptysis, sputum or pleuritic pain  GI: normal with no abdominal pain, nausea, vomiting, constipation, diarrhea, melanotic stools, BRBPR, or hematemesis  : normal with no hematuria, dysuria  BREAST: normal with no mass, discharge, pain  SKIN: normal with no rash, erythema, bruising, or swelling    Allergies:  Review of patient's allergies indicates:  No Known Allergies    Medications:    Current Outpatient Prescriptions:     losartan (COZAAR) 50 MG tablet, Take 1 tablet (50 mg total) by mouth once daily., Disp: 90 tablet, Rfl: 1    naproxen (EC NAPROSYN) 500 MG EC tablet, TAKE 1 TABLET TWICE A DAY, Disp: 30 tablet, Rfl: 2    naproxen (EC NAPROSYN) 500 MG EC tablet, Take 1 tablet (500 mg total) by mouth 2 (two) times daily., Disp: 60 tablet, Rfl: 6    omeprazole (PRILOSEC) 20 MG capsule, Take 1 capsule (20 mg total) by mouth once daily., Disp: 30 capsule, Rfl: 6    traZODone (DESYREL) 50 MG tablet, Take 1 tablet (50 mg total) by mouth every evening., Disp: 90 tablet, Rfl: 1    PMHx/PSHx Updates:  See patient's last visit with me on 17.  See H&P on 2011      Pathology:  none        Objective:     Vitals:  Blood pressure (!) 147/93, pulse 70,  "temperature 98.3 °F (36.8 °C), resp. rate 18, height 5' 9" (1.753 m), weight 98.9 kg (218 lb).    Physical Examination:   GEN: no apparent distress, comfortable; AAOx3  HEAD: atraumatic and normocephalic  EYES: no pallor, no icterus, PERRLA  ENT: OMM, no pharyngeal erythema, external ears WNL; no nasal discharge; no thrush  NECK: no masses, thyroid normal, trachea midline, no LAD/LN's, supple  CV: RRR with no murmur; normal pulse; normal S1 and S2; no pedal edema  CHEST: Normal respiratory effort; CTAB; normal breath sounds; no wheeze or crackles  ABDOM: nontender and nondistended; soft; normal bowel sounds; no rebound/guarding  MUSC/Skeletal: ROM normal; no crepitus; joints normal; no deformities or arthropathy  EXTREM: no clubbing, cyanosis, inflammation or swelling  SKIN: no rashes, lesions, ulcers, petechiae or subcutaneous nodules  : no jiang  NEURO: grossly intact; motor/sensory WNL; AAOx3; no tremors  PSYCH: normal mood, affect and behavior  LYMPH: normal cervical, supraclavicular, axillary and groin LN's            Labs:   Lab Results   Component Value Date    WBC 7.8 12/11/2017    HGB 8.2 (L) 12/11/2017    HCT 23.5 (LL) 12/11/2017    MCV 91.4 12/11/2017     (H) 12/11/2017    CMP  Sodium   Date Value Ref Range Status   12/11/2017 141 134 - 144 mmol/L      Potassium   Date Value Ref Range Status   12/11/2017 4.5 3.5 - 5.0 mmol/L      Chloride   Date Value Ref Range Status   12/11/2017 109 98 - 110 mmol/L      CO2   Date Value Ref Range Status   12/11/2017 27.0 22.8 - 31.6 mmol/L      Glucose   Date Value Ref Range Status   12/11/2017 110 (H) 70 - 99 mg/dL      BUN, Bld   Date Value Ref Range Status   12/11/2017 15 8 - 20 mg/dL      Creatinine   Date Value Ref Range Status   12/11/2017 1.47 (H) 0.60 - 1.40 mg/dL      Calcium   Date Value Ref Range Status   12/11/2017 8.6 7.7 - 10.4 mg/dL      Total Protein   Date Value Ref Range Status   12/11/2017 7.2 6.0 - 8.2 g/dL      Albumin   Date Value Ref " Range Status   12/11/2017 4.2 3.1 - 4.7 g/dL      Total Bilirubin   Date Value Ref Range Status   12/11/2017 0.9 0.3 - 1.0 mg/dL      Alkaline Phosphatase   Date Value Ref Range Status   12/11/2017 46 40 - 104 IU/L      AST   Date Value Ref Range Status   12/11/2017 18 10 - 40 IU/L      ALT   Date Value Ref Range Status   06/08/2015 36 10 - 44 U/L Final     Anion Gap   Date Value Ref Range Status   06/08/2015 5 (L) 8 - 16 mmol/L Final     eGFR if    Date Value Ref Range Status   06/08/2015 >60.0 >60 mL/min/1.73 m^2 Final     eGFR if non    Date Value Ref Range Status   06/08/2015 >60.0 >60 mL/min/1.73 m^2 Final     Comment:     Calculation used to obtain the estimated glomerular filtration  rate (eGFR) is the CKD-EPI equation. Since race is unknown   in our information system, the eGFR values for   -American and Non--American patients are given   for each creatinine result.       I have reviewed all available lab results and radiology reports.    Radiology/Diagnostic Studies:        Assessment/Plan:   (1) 57 y.o. male with diagnosis of sickle cell anemia with borderline microcytosis  - latest hgb on 12/11/2017 was 8.2 and within his usual range, but he was feeling bad so he received blood transfusion  - he is due for labs today      (2) Alcohol abuse issues in past - he has been sober for over 2 years now    (3) former smoker    PLAN;  1. Check labs monthly- encouraged compliance  2. RTC 4-6 months  3. Fax note to Dr Thomas  4. F/u with PCP  5. Encouraged sobriety    I spent over 15 mins with the patient, of which over half was face to face with the patient. Reviewing materials, labs, reports and studies. Making treatment and analytical decisions. Ordering necessary labs, tests and studies.          Discussion:     I have explained all of the above in detail and the patient understands all of the current recommendation(s). I have answered all of their questions to the best  of my ability and to their complete satisfaction.   The patient is to continue with the current management plan.    RTC in  4-6 months        Electronically signed by Jose Tello MD

## 2018-01-29 NOTE — LETTER
January 29, 2018      Jesica Fonseca MD  [address]  [address]  [place] [address]           [facility] Hematology Oncology  [address]  [address]  [place] [address]  Phone: [phone]  Fax: [phone]          Patient: Rick Butler   MR Number: [id number]   YOB: 1960   Date of Visit: 1/29/2018       Dear Dr. Jesica Fonseca:    Thank you for referring Rick Butler to me for evaluation. Attached you will find relevant portions of my assessment and plan of care.    If you have questions, please do not hesitate to call me. I look forward to following Rick Butler along with you.    Sincerely,    Jose Tello MD    Enclosure  CC:  No Recipients    If you would like to receive this communication electronically, please contact [email or web address] or [phone] to request more information on [employer] access.    For providers and/or their staff who would like to refer a patient to [facility], please contact us through our one-stop-shop provider referral line, [facility], at [phone].    If you feel you have received this communication in error or would no longer like to receive these types of communications, please e-mail [email or web address]

## 2018-01-29 NOTE — PROGRESS NOTES
Subjective:       Patient ID: Rick Butler is a 57 y.o. male.    Chief Complaint: Follow-up (htn)    57-year-old gentleman who is here for a few issues one is his chronic low back pain that he takes Naprosyn for twice a day and occasionally takes an extra couple ibuprofen as well. 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 His last MRI was years prior. The pain does not radiate down his legs. He used to years ago down the right leg now just settled in his back and when he stops moving most of the sitting or lying down. He is also complaining about more of a subacute/acute knee pain which is uncomfortable for him to actually kneel on the kneecap itself. He hasn't noticed much swelling or discoloration. Again he is taken anti-inflammatories pretty regularly every day. His creatinine is slightly elevated as well and this dates back many years but his GFR is normal.   He also required a blood transfusion for sickle cell trait last month and nurse's notes his blood pressure to be elevated and so he started checking it since then and his average range is 150 systolic and 95 diastolic.      Review of Systems   Constitutional: Negative for activity change, diaphoresis, fatigue and fever.   HENT: Negative for congestion, ear pain, postnasal drip, sinus pressure, sore throat and trouble swallowing.    Eyes: Negative for pain.   Respiratory: Negative for cough, chest tightness, shortness of breath and wheezing.    Cardiovascular: Negative for chest pain, palpitations and leg swelling.   Gastrointestinal: Negative for abdominal distention, abdominal pain, blood in stool, constipation and diarrhea.   Endocrine: Negative for cold intolerance and heat intolerance.   Genitourinary: Negative for decreased urine volume, difficulty urinating, dysuria, flank pain, frequency and hematuria.   Musculoskeletal:  Positive for back pain and joint swelling. Negative for arthralgias, myalgias and neck pain.        As per history of present illness with right knee pain and lumbar pain.   Skin: Negative for pallor, rash and wound.   Neurological: Negative for tremors, syncope, weakness, light-headedness, numbness and headaches.   Hematological: Negative for adenopathy.   Psychiatric/Behavioral: Negative for confusion, decreased concentration, hallucinations, self-injury, sleep disturbance and suicidal ideas. The patient is not nervous/anxious.        Past Medical History:   Diagnosis Date    Depression     Former smoker 6/29/2017    H/O ETOH abuse 6/29/2017    Sickle cell anemia 6/29/2017       No past surgical history on file.    Family History   Problem Relation Age of Onset    Arthritis Mother     Depression Mother     Heart disease Mother     Hypertension Mother     Heart attack Father 59       Social History     Social History    Marital status:      Spouse name: N/A    Number of children: N/A    Years of education: N/A     Occupational History    [employer]     Social History Main Topics    Smoking status: Former Smoker     Quit date: 1/1/1999    Smokeless tobacco: Never Used    Alcohol use No      Comment: quit 6/15/16, in AA    Drug use: No    Sexual activity: Not Asked     Other Topics Concern    None     Social History Narrative    None       Current Outpatient Prescriptions   Medication Sig Dispense Refill    losartan-hydrochlorothiazide 100-12.5 mg (HYZAAR) 100-12.5 mg Tab Take 1 tablet by mouth once daily. 90 tablet 3    methocarbamol (ROBAXIN) 750 MG Tab Take 1 tablet (750 mg total) by mouth 2 (two) times daily as needed. 60 tablet 1    methylPREDNISolone (MEDROL DOSEPACK) 4 mg tablet use as directed 1 Package 0    omeprazole (PRILOSEC) 20 MG capsule Take 1 capsule (20 mg total) by mouth once daily. 30 capsule 6    traZODone (DESYREL) 100 MG tablet Take 1 tablet (100 mg  total) by mouth every evening. 30 tablet 11     No current facility-administered medications for this visit.        Review of patient's allergies indicates:  No Known Allergies  Objective:      Physical Exam   Constitutional: He is oriented to person, place, and time. He appears well-developed and well-nourished. No distress.   HENT:   Head: Normocephalic and atraumatic.   Right Ear: External ear normal.   Left Ear: External ear normal.   Nose: Nose normal.   Mouth/Throat: Oropharynx is clear and moist.   Eyes: Conjunctivae and EOM are normal. Right eye exhibits no discharge. Left eye exhibits no discharge. No scleral icterus.   Neck: Normal range of motion. Neck supple. No JVD present. No tracheal deviation present. No thyromegaly present.   Cardiovascular: Normal rate, regular rhythm, normal heart sounds and intact distal pulses.  Exam reveals no gallop.    No murmur heard.  Pulmonary/Chest: Effort normal and breath sounds normal. No respiratory distress. He has no wheezes. He has no rales.   Abdominal: Soft. Bowel sounds are normal. He exhibits no distension and no mass. There is no tenderness.   Musculoskeletal: He exhibits tenderness. He exhibits no edema.        Right knee: He exhibits swelling.        Lumbar back: He exhibits decreased range of motion, tenderness, pain and spasm. He exhibits no bony tenderness.   Lymphadenopathy:     He has no cervical adenopathy.   Neurological: He is alert and oriented to person, place, and time.   Skin: Skin is warm and dry. No rash noted. He is not diaphoretic. No erythema.   Psychiatric: He has a normal mood and affect. His behavior is normal. Judgment and thought content normal.       Assessment:       1. Chronic bilateral low back pain without sciatica    2. Acute pain of right knee    3. Prepatellar bursitis, right knee    4. Essential hypertension    5. Elevated serum creatinine    6. Primary insomnia    7. Hb-SS disease without crisis        Plan:     Chronic  bilateral low back pain without sciatica  -     MRI Lumbar Spine Without Contrast; Future; Expected date: 01/29/2018  -     X-Ray Lumbar Spine Ap Lateral w/Flex Ext  -     dexamethasone injection 8 mg; Inject 2 mLs (8 mg total) into the muscle once.  -     ketorolac injection 60 mg; Inject 2 mLs (60 mg total) into the muscle one time.  -     methocarbamol (ROBAXIN) 750 MG Tab; Take 1 tablet (750 mg total) by mouth 2 (two) times daily as needed.  Dispense: 60 tablet; Refill: 1  -     Ambulatory referral to Pain Clinic    Acute pain of right knee  -     X-Ray Knee Complete 4 Or More Views Right; Future; Expected date: 01/29/2018  -     dexamethasone injection 8 mg; Inject 2 mLs (8 mg total) into the muscle once.  -     ketorolac injection 60 mg; Inject 2 mLs (60 mg total) into the muscle one time.  -     Ambulatory referral to Orthopedics    Prepatellar bursitis, right knee  -     methylPREDNISolone (MEDROL DOSEPACK) 4 mg tablet; use as directed  Dispense: 1 Package; Refill: 0  -     Ambulatory referral to Orthopedics    Essential hypertension  -     losartan-hydrochlorothiazide 100-12.5 mg (HYZAAR) 100-12.5 mg Tab; Take 1 tablet by mouth once daily.  Dispense: 90 tablet; Refill: 3    Elevated serum creatinine    Primary insomnia  -     traZODone (DESYREL) 100 MG tablet; Take 1 tablet (100 mg total) by mouth every evening.  Dispense: 30 tablet; Refill: 11    Hb-SS disease without crisis    Time spent with the patient was 40 min and 50 percent of that was in face to face contact

## 2018-01-29 NOTE — PATIENT INSTRUCTIONS
Back Care Tips    Caring for your back  These are things you can do to prevent a recurrence of acute back pain and to reduce symptoms from chronic back pain:  · Maintain a healthy weight. If you are overweight, losing weight will help most types of back pain.  · Exercise is an important part of recovery from most types of back pain. The muscles behind and in front of the spine support the back. This means strengthening both the back muscles and the abdominal muscles will provide better support for your spine.   · Swimming and brisk walking are good overall exercises to improve your fitness level.  · Practice safe lifting methods (below).  · Practice good posture when sitting, standing and walking. Avoid prolonged sitting. This puts more stress on the lower back than standing or walking.  · Wear quality shoes with sufficient arch support. Foot and ankle alignment can affect back symptoms. Women should avoid wearing high heels.  · Therapeutic massage can help relax the back muscles without stretching them.  · During the first 24 to 72 hours after an acute injury or flare-up of chronic back pain, apply an ice pack to the painful area for 20 minutes and then remove it for 20 minutes, over a period of 60 to 90 minutes, or several times a day. As a safety precaution, do not use a heating pad at bedtime. Sleeping on a heating pad can lead to skin burns or tissue damage.  · You can alternate ice and heat therapies.  Medications  Talk to your healthcare provider before using medicines, especially if you have other medical problems or are taking other medicines.  · You may use acetaminophen or ibuprofen to control pain, unless your healthcare provider prescribed other pain medicine. If you have chronic conditions like diabetes, liver or kidney disease, stomach ulcers, or gastrointestinal bleeding, or are taking blood thinners, talk with your healthcare provider before taking any medicines.  · Be careful if you are given  prescription pain medicines, narcotics, or medicine for muscle spasm. They can cause drowsiness, affect your coordination, reflexes, and judgment. Do not drive or operate heavy machinery while taking these types of medicines. Take prescription pain medicine only as prescribed by your healthcare provider.  Lumbar stretch  Here is a simple stretching exercise that will help relax muscle spasm and keep your back more limber. If exercise makes your back pain worse, dont do it.  · Lie on your back with your knees bent and both feet on the ground.  · Slowly raise your left knee to your chest as you flatten your lower back against the floor. Hold for 5 seconds.  · Relax and repeat the exercise with your right knee.  · Do 10 of these exercises for each leg.  Safe lifting method  · Dont bend over at the waist to lift an object off the floor.  Instead, bend your knees and hips in a squat.   · Keep your back and head upright  · Hold the object close to your body, directly in front of you.  · Straighten your legs to lift the object.   · Lower the object to the floor in the reverse fashion.  · If you must slide something across the floor, push it.  Posture tips  Sitting  Sit in chairs with straight backs or low-back support. Keep your knees lower than your hips, with your feet flat on the floor.  When driving, sit up straight. Adjust the seat forward so you are not leaning toward the steering wheel.  A small pillow or rolled towel behind your lower back may help if you are driving long distances.   Standing  When standing for long periods, shift most of your weight to one leg at a time. Alternate legs every few minutes.   Sleeping  The best way to sleep is on your side with your knees bent. Put a low pillow under your head to support your neck in a neutral spine position. Avoid thick pillows that bend your neck to one side. Put a pillow between your legs to further relax your lower back. If you sleep on your back, put pillows  under your knees to support your legs in a slightly flexed position. Use a firm mattress. If your mattress sags, replace it, or use a 1/2-inch plywood board under the mattress to add support.  Follow-up care  Follow up with your healthcare provider, or as advised.  If X-rays, a CT scan or an MRI scan were taken, they will be reviewed by a radiologist. You will be notified of any new findings that may affect your care.  Call 911  Seek emergency medical care if any of the following occur:  · Trouble breathing  · Confusion  · Very drowsy  · Fainting or loss of consciousness  · Rapid or very slow heart rate  · Loss of  bowel or bladder control  When to seek medical care  Call your healthcare provider if any of the following occur:  · Pain becomes worse or spreads to your arms or legs  · Weakness or numbness in one or both arms or legs  · Numbness in the groin area  Date Last Reviewed: 6/1/2016  © 8838-1276 [employer]. [address] [place] [address], [place] [address]. All rights reserved. This information is not intended as a substitute for professional medical care. Always follow your healthcare professional's instructions.

## 2018-01-31 ENCOUNTER — TELEPHONE (OUTPATIENT)
Dept: FAMILY MEDICINE | Facility: CLINIC | Age: 58
End: 2018-01-31

## 2018-01-31 NOTE — TELEPHONE ENCOUNTER
----- Message from Jesica Fonseca MD sent at 1/30/2018  9:42 AM CST -----  Osteoarthritis at the two lower lumbar levels - keep ov with dr harmon   Knee looks good, no fractures - dr graves (ortho) saw

## 2018-02-14 ENCOUNTER — TELEPHONE (OUTPATIENT)
Dept: FAMILY MEDICINE | Facility: CLINIC | Age: 58
End: 2018-02-14

## 2018-02-14 ENCOUNTER — TELEPHONE (OUTPATIENT)
Dept: HEMATOLOGY/ONCOLOGY | Facility: CLINIC | Age: 58
End: 2018-02-14

## 2018-02-14 DIAGNOSIS — D72.810 LYMPHOCYTOPENIA: Primary | ICD-10-CM

## 2018-02-14 DIAGNOSIS — D61.82 ANEMIA ASSOCIATED WITH BONE MARROW INFILTRATION: ICD-10-CM

## 2018-02-14 DIAGNOSIS — E83.52 HYPERCALCEMIA: ICD-10-CM

## 2018-02-14 DIAGNOSIS — D75.9 BONE MARROW DISORDER: ICD-10-CM

## 2018-02-14 DIAGNOSIS — R73.9 HYPERGLYCEMIA: ICD-10-CM

## 2018-02-14 NOTE — TELEPHONE ENCOUNTER
----- Message from Jesica Fonseca MD sent at 2/14/2018  7:33 AM CST -----  Abnormal bone marrow signal throughout from thoracolumbar vertebral bodies - we need dr delacruz input (his hematologist) to see if this is related to his sickle cell trait.   In the interim I want to do some additional xrays and blood test. - can we get him in for an office visit asap

## 2018-02-14 NOTE — TELEPHONE ENCOUNTER
Pt notified.  He has appt with you on Tues 2/20 @ 3:40pm    Please place order for labs &  Xrays. Pt to  at 4:30pm today. thx

## 2018-02-14 NOTE — TELEPHONE ENCOUNTER
Notified pt the test reflects something with the bone marrow and may be related to the sickle cell. Pt is doing labs and xrays ordered by Dr Fonseca and has f/u appt on Tues 2/20 at 3:40pm

## 2018-02-14 NOTE — TELEPHONE ENCOUNTER
Jose,can you look at this pt's MRI of the lumbar spine?  I will order an spep, myeloma bone series and leukemia lymphoma screen - any other thought?  And see if you can get him into your office after the tests unless you just want to do a bone marrow?

## 2018-02-14 NOTE — TELEPHONE ENCOUNTER
Ok , thank you candelaria, do you still want me to order labs and a myeloma bone xray series?  He is seeing me on Tuesday

## 2018-02-16 LAB — HBA1C MFR BLD: 5.6 % (ref 3.1–6.5)

## 2018-02-17 LAB
ALBUMIN SERPL-MCNC: 3.9 G/DL (ref 2.9–4.4)
ALBUMIN/GLOB SERPL ELPH: 1.3 {RATIO} (ref 0.7–1.7)
ALPHA 1 GLOBULIN/PROTEIN TOTAL: 0.2 G/DL (ref 0–0.4)
ALPHA 2 GLOBULIN/PROTEIN TOTAL: 0.6 G/DL (ref 0.4–1)
B-GLOBULIN FLD ELPH-MCNC: 0.9 G/DL (ref 0.7–1.3)
GAMMA GLOB FLD ELPH-MCNC: 1.4 G/DL (ref 0.4–1.8)
GLOBULIN SER CALC-MCNC: 3.1 G/DL (ref 2.2–3.9)
Lab: NORMAL
M PROTEIN MFR UR ELPH: NORMAL G/DL
PROT SERPL-MCNC: 7 G/DL (ref 6–8.5)

## 2018-02-19 ENCOUNTER — OFFICE VISIT (OUTPATIENT)
Dept: HEMATOLOGY/ONCOLOGY | Facility: CLINIC | Age: 58
End: 2018-02-19
Payer: COMMERCIAL

## 2018-02-19 VITALS
SYSTOLIC BLOOD PRESSURE: 164 MMHG | TEMPERATURE: 98 F | RESPIRATION RATE: 18 BRPM | WEIGHT: 218.38 LBS | HEIGHT: 69 IN | HEART RATE: 64 BPM | DIASTOLIC BLOOD PRESSURE: 89 MMHG | BODY MASS INDEX: 32.35 KG/M2

## 2018-02-19 DIAGNOSIS — E55.9 VITAMIN D DEFICIENCY: ICD-10-CM

## 2018-02-19 DIAGNOSIS — D53.9 MACROCYTIC ANEMIA: ICD-10-CM

## 2018-02-19 DIAGNOSIS — D64.9 CHRONIC ANEMIA: ICD-10-CM

## 2018-02-19 DIAGNOSIS — D57.1 HB-SS DISEASE WITHOUT CRISIS: ICD-10-CM

## 2018-02-19 DIAGNOSIS — F10.11 H/O ETOH ABUSE: Primary | ICD-10-CM

## 2018-02-19 DIAGNOSIS — D57.3 SICKLE CELL TRAIT: ICD-10-CM

## 2018-02-19 LAB — FLOW CYTOMETRY ANTIBODIES ANALYZED: NORMAL

## 2018-02-19 PROCEDURE — 3008F BODY MASS INDEX DOCD: CPT | Mod: ,,, | Performed by: INTERNAL MEDICINE

## 2018-02-19 PROCEDURE — 99213 OFFICE O/P EST LOW 20 MIN: CPT | Mod: ,,, | Performed by: INTERNAL MEDICINE

## 2018-02-19 NOTE — LETTER
February 19, 2018      Jesica Fonseca MD  [address]  [address]  [place] [address]           [facility]  [address]  [address]  [place] [address]  Phone: [phone]  Fax: [phone]          Patient: Rick Butler   MR Number: [id number]   YOB: 1960   Date of Visit: 2/19/2018       Dear Dr. Jesica Fonseca:    Thank you for referring Rick Butler to me for evaluation. Attached you will find relevant portions of my assessment and plan of care.    If you have questions, please do not hesitate to call me. I look forward to following Rick Butler along with you.    Sincerely,    Jose Tello MD    Enclosure  CC:  No Recipients    If you would like to receive this communication electronically, please contact [email or web address] or [phone] to request more information on [employer] access.    For providers and/or their staff who would like to refer a patient to [facility], please contact us through our one-stop-shop provider referral line, [facility], at [phone].    If you feel you have received this communication in error or would no longer like to receive these types of communications, please e-mail [email or web address]

## 2018-02-19 NOTE — PROGRESS NOTES
[facility]            PROGRESS NOTE      Subjective:       Patient ID:   NAME: Rick Butler : 1960     57 y.o. male    Referring Doc: Jesica Cadena  Other Physicians:    Chief Complaint:  Sickle cell trait    History of Present Illness:     Patient returns today for a regularly scheduled follow-up visit.  The patient is doing ok overall. He sees Dr Jesica Cadena tomorrow; no pain crisis; he has been sober for over a year now; he has been off tobacco too; no CP, SOB, HA's or N/V; occasional fatigue; he has had some chronic back pains and had recent MRI of the back.             ROS:   GEN: normal without any fever, night sweats or weight loss  HEENT: normal with no HA's, sore throat, stiff neck, changes in vision  CV: normal with no CP, SOB, PND, MORA or orthopnea  PULM: normal with no SOB, cough, hemoptysis, sputum or pleuritic pain  GI: normal with no abdominal pain, nausea, vomiting, constipation, diarrhea, melanotic stools, BRBPR, or hematemesis  : normal with no hematuria, dysuria  BREAST: normal with no mass, discharge, pain  SKIN: normal with no rash, erythema, bruising, or swelling    Allergies:  Review of patient's allergies indicates:  No Known Allergies    Medications:    Current Outpatient Prescriptions:     losartan-hydrochlorothiazide 100-12.5 mg (HYZAAR) 100-12.5 mg Tab, Take 1 tablet by mouth once daily., Disp: 90 tablet, Rfl: 3    methocarbamol (ROBAXIN) 750 MG Tab, Take 1 tablet (750 mg total) by mouth 2 (two) times daily as needed., Disp: 60 tablet, Rfl: 1    methylPREDNISolone (MEDROL DOSEPACK) 4 mg tablet, use as directed, Disp: 1 Package, Rfl: 0    omeprazole (PRILOSEC) 20 MG capsule, Take 1 capsule (20 mg total) by mouth once daily., Disp: 30 capsule, Rfl: 6    traZODone (DESYREL) 100 MG tablet, Take 1 tablet (100 mg total) by mouth every evening., Disp: 30 tablet, Rfl: 11    PMHx/PSHx Updates:  See patient's last visit with me on 17.  See H&P on  7/9/2011      Pathology:  none        Objective:     Vitals:  There were no vitals taken for this visit.    Physical Examination:   GEN: no apparent distress, comfortable; AAOx3  HEAD: atraumatic and normocephalic  EYES: no pallor, no icterus, PERRLA  ENT: OMM, no pharyngeal erythema, external ears WNL; no nasal discharge; no thrush  NECK: no masses, thyroid normal, trachea midline, no LAD/LN's, supple  CV: RRR with no murmur; normal pulse; normal S1 and S2; no pedal edema  CHEST: Normal respiratory effort; CTAB; normal breath sounds; no wheeze or crackles  ABDOM: nontender and nondistended; soft; normal bowel sounds; no rebound/guarding  MUSC/Skeletal: ROM normal; no crepitus; joints normal; no deformities or arthropathy  EXTREM: no clubbing, cyanosis, inflammation or swelling  SKIN: no rashes, lesions, ulcers, petechiae or subcutaneous nodules  : no jiang  NEURO: grossly intact; motor/sensory WNL; AAOx3; no tremors  PSYCH: normal mood, affect and behavior  LYMPH: normal cervical, supraclavicular, axillary and groin LN's            Labs:     1/29/2018  Lab Results   Component Value Date    WBC 16.4 (H) 01/29/2018    HGB 9.2 (L) 01/29/2018    HCT 26.8 (L) 01/29/2018    MCV 91.2 01/29/2018     (H) 01/29/2018    CMP  Sodium   Date Value Ref Range Status   01/29/2018 135 134 - 144 mmol/L      Potassium   Date Value Ref Range Status   01/29/2018 4.3 3.5 - 5.0 mmol/L      Chloride   Date Value Ref Range Status   01/29/2018 100 98 - 110 mmol/L      CO2   Date Value Ref Range Status   01/29/2018 25.6 22.8 - 31.6 mmol/L      Glucose   Date Value Ref Range Status   01/29/2018 118 (H) 70 - 99 mg/dL      BUN, Bld   Date Value Ref Range Status   01/29/2018 13 8 - 20 mg/dL      Creatinine   Date Value Ref Range Status   01/29/2018 1.39 0.60 - 1.40 mg/dL      Calcium   Date Value Ref Range Status   01/29/2018 9.1 7.7 - 10.4 mg/dL      Total Protein   Date Value Ref Range Status   02/15/2018 7.0 6.0 - 8.5 g/dL       Albumin   Date Value Ref Range Status   02/15/2018 3.9 2.9 - 4.4 g/dL      Total Bilirubin   Date Value Ref Range Status   01/29/2018 0.9 0.3 - 1.0 mg/dL      Alkaline Phosphatase   Date Value Ref Range Status   01/29/2018 55 40 - 104 IU/L      AST   Date Value Ref Range Status   01/29/2018 21 10 - 40 IU/L      ALT   Date Value Ref Range Status   06/08/2015 36 10 - 44 U/L Final     Anion Gap   Date Value Ref Range Status   06/08/2015 5 (L) 8 - 16 mmol/L Final     eGFR if    Date Value Ref Range Status   06/08/2015 >60.0 >60 mL/min/1.73 m^2 Final     eGFR if non    Date Value Ref Range Status   06/08/2015 >60.0 >60 mL/min/1.73 m^2 Final     Comment:     Calculation used to obtain the estimated glomerular filtration  rate (eGFR) is the CKD-EPI equation. Since race is unknown   in our information system, the eGFR values for   -American and Non--American patients are given   for each creatinine result.       I have reviewed all available lab results and radiology reports.    Radiology/Diagnostic Studies:    2/15/2018 Xray Survey:   IMPRESSION: No significant abnormality seen. No evidence of osteoblastic or  osteoclastic lesions identified    MRI L-spine 2/12/2018  IMPRESSION:    1. Prominent low signal intensity throughout the bone marrow on T1 and  T2-weighted imaging. Marrow infiltrative process including malignancy such as  metastatic disease or lymphoma/leukemia is a consideration along with multiple  myeloma or malignant process. Benign etiology such as osteopetrosis or  regenerative red marrow are also considerations.    2. Multilevel lumbar degenerative changes, most prominent at L4-L5 and L5-S1 as  described.    Assessment/Plan:   (1) 57 y.o. male with diagnosis of sickle cell anemia with borderline microcytosis  - latest hgb on 1/29/2018 and adequate at 9.2 and within his usual range  - he is not symptomatic at this time      (2) Alcohol abuse issues in past -  he has been sober for over 2 years now    (3) former smoker    (4) chronic back issues - recent Xrays and MRI - suspect the findings on the MRI are possibly due to his sickle cell;   - SPEP was negative for M-protein  - PSA was 0.3 in Sept 2017      PLAN;  1. Check labs monthly- encouraged compliance  2. RTC 3-4 months  3. F/U and Fax note to Dr Jesica Fonseca  4. Recommend consideration for neurosurgeon evaluation  5. Encouraged continued sobriety    I spent over 15 mins with the patient, of which over half was face to face with the patient. Reviewing materials, labs, reports and studies. Making treatment and analytical decisions. Ordering necessary labs, tests and studies.          Discussion:     I have explained all of the above in detail and the patient understands all of the current recommendation(s). I have answered all of their questions to the best of my ability and to their complete satisfaction.   The patient is to continue with the current management plan.    RTC in  4-6 months        Electronically signed by Jose Tello MD

## 2018-02-20 ENCOUNTER — OFFICE VISIT (OUTPATIENT)
Dept: FAMILY MEDICINE | Facility: CLINIC | Age: 58
End: 2018-02-20
Payer: COMMERCIAL

## 2018-02-20 VITALS
SYSTOLIC BLOOD PRESSURE: 112 MMHG | HEIGHT: 69 IN | RESPIRATION RATE: 16 BRPM | DIASTOLIC BLOOD PRESSURE: 80 MMHG | HEART RATE: 92 BPM | BODY MASS INDEX: 31.96 KG/M2 | WEIGHT: 215.81 LBS | OXYGEN SATURATION: 98 %

## 2018-02-20 DIAGNOSIS — M47.816 LUMBAR SPONDYLOSIS: Primary | ICD-10-CM

## 2018-02-20 DIAGNOSIS — D57.3 SICKLE CELL TRAIT: ICD-10-CM

## 2018-02-20 DIAGNOSIS — R79.89 ELEVATED SERUM CREATININE: ICD-10-CM

## 2018-02-20 DIAGNOSIS — D57.1 HB-SS DISEASE WITHOUT CRISIS: ICD-10-CM

## 2018-02-20 DIAGNOSIS — E53.8 FOLIC ACID DEFICIENCY: ICD-10-CM

## 2018-02-20 PROCEDURE — 99214 OFFICE O/P EST MOD 30 MIN: CPT | Mod: ,,, | Performed by: INTERNAL MEDICINE

## 2018-02-20 PROCEDURE — 3008F BODY MASS INDEX DOCD: CPT | Mod: ,,, | Performed by: INTERNAL MEDICINE

## 2018-02-20 RX ORDER — FOLIC ACID 1 MG/1
1 TABLET ORAL DAILY
Qty: 90 TABLET | Refills: 3 | Status: SHIPPED | OUTPATIENT
Start: 2018-02-20 | End: 2019-04-01 | Stop reason: SDUPTHER

## 2018-02-20 RX ORDER — TRAMADOL HYDROCHLORIDE 50 MG/1
50 TABLET ORAL NIGHTLY
Qty: 30 TABLET | Refills: 2 | Status: SHIPPED | OUTPATIENT
Start: 2018-02-20 | End: 2018-03-02

## 2018-02-20 RX ORDER — TIZANIDINE 4 MG/1
4 TABLET ORAL 2 TIMES DAILY PRN
Qty: 60 TABLET | Refills: 4 | Status: SHIPPED | OUTPATIENT
Start: 2018-02-20 | End: 2018-03-02

## 2018-02-20 NOTE — PROGRESS NOTES
Subjective:       Patient ID: Rick Butler is a 57 y.o. male.    Chief Complaint: Follow-up (MRI & XRAY RESULTS)    57-year-old here for follow-up on x-rays and lab tests were done in response to an MRI of the lumbar spine for chronic low back pain. In the thoracic spine there was an infiltrative process in the bone marrow that could be consistent with leukemia/lymphoma, metastatic process or other infiltrative diseases. He did see his hematologist today and is a history of sickle cell trait which aren't a little more like sickle cell anemia where he requires approximately 2 transfusions a year. His baseline hemoglobin is about 9.0. The Robaxin muscle relaxer at 750 twice a day is not making him sleepy but is not really helping much. His creatinine has been very minimally elevated and therefore we eliminated nonsteroidal anti-inflammatories. He is sleeping better with trazodone but his back still hurts at night he has an appointment with pain anesthesiology in 2 weeks. His SPEP, leukemia lymphoma screen all negative; however his ESR was elevated at 30.      Review of Systems   Constitutional: Negative for activity change, diaphoresis, fatigue and fever.   HENT: Negative for congestion, ear pain, postnasal drip, sinus pressure, sore throat and trouble swallowing.    Eyes: Negative for pain.   Respiratory: Negative for cough, chest tightness, shortness of breath and wheezing.    Cardiovascular: Negative for chest pain, palpitations and leg swelling.   Gastrointestinal: Negative for abdominal distention, abdominal pain, blood in stool, constipation and diarrhea.   Endocrine: Negative for cold intolerance and heat intolerance.   Genitourinary: Negative for decreased urine volume, difficulty urinating, dysuria, flank pain, frequency and hematuria.   Musculoskeletal: Positive for back pain. Negative for arthralgias, joint swelling, myalgias and neck pain.   Skin: Negative for pallor, rash and wound.   Neurological:  Negative for tremors, syncope, weakness, light-headedness, numbness and headaches.   Hematological: Negative for adenopathy.   Psychiatric/Behavioral: Positive for sleep disturbance. Negative for confusion, decreased concentration, hallucinations, self-injury and suicidal ideas. The patient is not nervous/anxious.        Past Medical History:   Diagnosis Date    Depression     Former smoker 6/29/2017    H/O ETOH abuse 6/29/2017    Sickle cell anemia 6/29/2017       History reviewed. No pertinent surgical history.    Family History   Problem Relation Age of Onset    Arthritis Mother     Depression Mother     Heart disease Mother     Hypertension Mother     Heart attack Father 59       Social History     Social History    Marital status:      Spouse name: N/A    Number of children: N/A    Years of education: N/A     Occupational History    [employer]     Social History Main Topics    Smoking status: Former Smoker     Quit date: 1/1/1999    Smokeless tobacco: Never Used    Alcohol use No      Comment: quit 6/15/16, in AA    Drug use: No    Sexual activity: Not Asked     Other Topics Concern    None     Social History Narrative    None       Current Outpatient Prescriptions   Medication Sig Dispense Refill    losartan-hydrochlorothiazide 100-12.5 mg (HYZAAR) 100-12.5 mg Tab Take 1 tablet by mouth once daily. 90 tablet 3    multivitamin capsule Take 1 capsule by mouth once daily.      omeprazole (PRILOSEC) 20 MG capsule Take 1 capsule (20 mg total) by mouth once daily. 30 capsule 6    traZODone (DESYREL) 100 MG tablet Take 1 tablet (100 mg total) by mouth every evening. 30 tablet 11    folic acid (FOLVITE) 1 MG tablet Take 1 tablet (1 mg total) by mouth once daily. 90 tablet 3    tiZANidine (ZANAFLEX) 4 MG tablet Take 1 tablet (4 mg total) by mouth 2 (two) times daily as needed. 60 tablet 4    traMADol (ULTRAM) 50 mg tablet Take 1 tablet (50 mg total) by mouth every  evening. 30 tablet 2     No current facility-administered medications for this visit.        Review of patient's allergies indicates:  No Known Allergies  Objective:      Physical Exam   Constitutional: He is oriented to person, place, and time. He appears well-developed and well-nourished. No distress.   HENT:   Head: Normocephalic and atraumatic.   Right Ear: External ear normal.   Left Ear: External ear normal.   Nose: Nose normal.   Mouth/Throat: Oropharynx is clear and moist.   Eyes: Conjunctivae and EOM are normal. Right eye exhibits no discharge. Left eye exhibits no discharge. No scleral icterus.   Neck: Normal range of motion. Neck supple. No JVD present. No tracheal deviation present. No thyromegaly present.   Cardiovascular: Normal rate, regular rhythm, normal heart sounds and intact distal pulses.  Exam reveals no gallop.    No murmur heard.  Pulmonary/Chest: Effort normal and breath sounds normal. No respiratory distress. He has no wheezes. He has no rales.   Abdominal: Soft. Bowel sounds are normal. He exhibits no distension and no mass. There is no tenderness.   Musculoskeletal: Normal range of motion. He exhibits no edema or tenderness.   Lymphadenopathy:     He has no cervical adenopathy.   Neurological: He is alert and oriented to person, place, and time.   Skin: Skin is warm and dry. No rash noted. He is not diaphoretic. No erythema.   Psychiatric: He has a normal mood and affect. His behavior is normal. Judgment and thought content normal.       Assessment:       1. Lumbar spondylosis    2. Folic acid deficiency    3. Elevated serum creatinine    4. 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  Dispense: 60 tablet; Refill: 4  -     traMADol (ULTRAM) 50 mg tablet; Take 1 tablet (50 mg total) by mouth every evening.  Dispense: 30 tablet; Refill:  2    Folic acid deficiency  -     folic acid (FOLVITE) 1 MG tablet; Take 1 tablet (1 mg total) by mouth once daily.  Dispense: 90 tablet; Refill: 3    Elevated serum creatinine-despite the fact that his creatinine was normal on last blood draw we will continue to hold ibuprofen or any other anti-inflammatories.    Sickle cell trait-followed by  with every 2 month blood draws.  Hb-SS disease without crisis  Opiates prescribed  Diagnosis:   Estimated length of time:   Risk and benefits discussed.  Non-narcotic alternatives discussed.

## 2018-02-20 NOTE — PATIENT INSTRUCTIONS
Back Care Tips    Caring for your back  These are things you can do to prevent a recurrence of acute back pain and to reduce symptoms from chronic back pain:  · Maintain a healthy weight. If you are overweight, losing weight will help most types of back pain.  · Exercise is an important part of recovery from most types of back pain. The muscles behind and in front of the spine support the back. This means strengthening both the back muscles and the abdominal muscles will provide better support for your spine.   · Swimming and brisk walking are good overall exercises to improve your fitness level.  · Practice safe lifting methods (below).  · Practice good posture when sitting, standing and walking. Avoid prolonged sitting. This puts more stress on the lower back than standing or walking.  · Wear quality shoes with sufficient arch support. Foot and ankle alignment can affect back symptoms. Women should avoid wearing high heels.  · Therapeutic massage can help relax the back muscles without stretching them.  · During the first 24 to 72 hours after an acute injury or flare-up of chronic back pain, apply an ice pack to the painful area for 20 minutes and then remove it for 20 minutes, over a period of 60 to 90 minutes, or several times a day. As a safety precaution, do not use a heating pad at bedtime. Sleeping on a heating pad can lead to skin burns or tissue damage.  · You can alternate ice and heat therapies.  Medications  Talk to your healthcare provider before using medicines, especially if you have other medical problems or are taking other medicines.  · You may use acetaminophen or ibuprofen to control pain, unless your healthcare provider prescribed other pain medicine. If you have chronic conditions like diabetes, liver or kidney disease, stomach ulcers, or gastrointestinal bleeding, or are taking blood thinners, talk with your healthcare provider before taking any medicines.  · Be careful if you are given  prescription pain medicines, narcotics, or medicine for muscle spasm. They can cause drowsiness, affect your coordination, reflexes, and judgment. Do not drive or operate heavy machinery while taking these types of medicines. Take prescription pain medicine only as prescribed by your healthcare provider.  Lumbar stretch  Here is a simple stretching exercise that will help relax muscle spasm and keep your back more limber. If exercise makes your back pain worse, dont do it.  · Lie on your back with your knees bent and both feet on the ground.  · Slowly raise your left knee to your chest as you flatten your lower back against the floor. Hold for 5 seconds.  · Relax and repeat the exercise with your right knee.  · Do 10 of these exercises for each leg.  Safe lifting method  · Dont bend over at the waist to lift an object off the floor.  Instead, bend your knees and hips in a squat.   · Keep your back and head upright  · Hold the object close to your body, directly in front of you.  · Straighten your legs to lift the object.   · Lower the object to the floor in the reverse fashion.  · If you must slide something across the floor, push it.  Posture tips  Sitting  Sit in chairs with straight backs or low-back support. Keep your knees lower than your hips, with your feet flat on the floor.  When driving, sit up straight. Adjust the seat forward so you are not leaning toward the steering wheel.  A small pillow or rolled towel behind your lower back may help if you are driving long distances.   Standing  When standing for long periods, shift most of your weight to one leg at a time. Alternate legs every few minutes.   Sleeping  The best way to sleep is on your side with your knees bent. Put a low pillow under your head to support your neck in a neutral spine position. Avoid thick pillows that bend your neck to one side. Put a pillow between your legs to further relax your lower back. If you sleep on your back, put pillows  under your knees to support your legs in a slightly flexed position. Use a firm mattress. If your mattress sags, replace it, or use a 1/2-inch plywood board under the mattress to add support.  Follow-up care  Follow up with your healthcare provider, or as advised.  If X-rays, a CT scan or an MRI scan were taken, they will be reviewed by a radiologist. You will be notified of any new findings that may affect your care.  Call 911  Seek emergency medical care if any of the following occur:  · Trouble breathing  · Confusion  · Very drowsy  · Fainting or loss of consciousness  · Rapid or very slow heart rate  · Loss of  bowel or bladder control  When to seek medical care  Call your healthcare provider if any of the following occur:  · Pain becomes worse or spreads to your arms or legs  · Weakness or numbness in one or both arms or legs  · Numbness in the groin area  Date Last Reviewed: 6/1/2016  © 0283-0281 [email or web address] [address] [place] [address], [place] [address]. All rights reserved. This information is not intended as a substitute for professional medical care. Always follow your healthcare professional's instructions.

## 2018-03-12 ENCOUNTER — OFFICE VISIT (OUTPATIENT)
Dept: PAIN MEDICINE | Facility: CLINIC | Age: 58
End: 2018-03-12
Payer: COMMERCIAL

## 2018-03-12 VITALS
WEIGHT: 215 LBS | BODY MASS INDEX: 31.84 KG/M2 | HEIGHT: 69 IN | DIASTOLIC BLOOD PRESSURE: 75 MMHG | SYSTOLIC BLOOD PRESSURE: 117 MMHG | HEART RATE: 82 BPM

## 2018-03-12 DIAGNOSIS — M47.896 OTHER SPONDYLOSIS, LUMBAR REGION: Primary | ICD-10-CM

## 2018-03-12 DIAGNOSIS — M51.36 DDD (DEGENERATIVE DISC DISEASE), LUMBAR: ICD-10-CM

## 2018-03-12 PROCEDURE — 99244 OFF/OP CNSLTJ NEW/EST MOD 40: CPT | Mod: S$GLB,,, | Performed by: ANESTHESIOLOGY

## 2018-03-12 PROCEDURE — 99999 PR PBB SHADOW E&M-EST. PATIENT-LVL III: CPT | Mod: PBBFAC,,, | Performed by: ANESTHESIOLOGY

## 2018-03-12 RX ORDER — TRAMADOL HYDROCHLORIDE 50 MG/1
50 TABLET ORAL EVERY 6 HOURS PRN
COMMUNITY
End: 2018-08-20 | Stop reason: SDUPTHER

## 2018-03-12 RX ORDER — TIZANIDINE 4 MG/1
4 TABLET ORAL EVERY 6 HOURS PRN
COMMUNITY
End: 2018-05-29 | Stop reason: SDUPTHER

## 2018-03-12 NOTE — LETTER
March 12, 2018      Jesica Fonseca MD  [address]  [address]  [place] [address]           [place] [facility]  [address] [place] [address] [address]  [place] [address]  Phone: [phone]          Patient: Rick Butler   MR Number: [id number]   YOB: 1960   Date of Visit: 3/12/2018       Dear Dr. Jesica Fonseca:    Thank you for referring Rick Butler to me for evaluation. Attached you will find relevant portions of my assessment and plan of care.    If you have questions, please do not hesitate to call me. I look forward to following Rick Butler along with you.    Sincerely,    Nura Heredia MD    Enclosure  CC:  No Recipients    If you would like to receive this communication electronically, please contact [email or web address] or [phone] to request more information on [employer] access.    For providers and/or their staff who would like to refer a patient to [facility], please contact us through our one-stop-shop provider referral line, [facility], at [phone].    If you feel you have received this communication in error or would no longer like to receive these types of communications, please e-mail [email or web address]

## 2018-03-12 NOTE — PROGRESS NOTES
This note was completed with dictation software and grammatical errors may exist.    Referring Physician: Jesica Miranda MD    PCP: Jesica Miranda MD      CC: low back pain    HPI:   Rick Butler is a 57 y.o. male referred to us for lower back pain.  Pain has been present for many years but has gradually worsened over past 6 months.  No recent traumatic incident.  He presents with constant aching, deep pain in his lower back.  Pain does not radiate.  Pain worsens with prolonged sitting, standing and getting up.  Pain improves with rest.  He has had physical therapy in past with minimal benefit.  He performs daily exercises with mild benefits.  He recently had x-rays and MRIs of his lumbar spine.  He denies any weakness.  No bowel bladder changes.  He rates his pain 4/10 today, 8/10 at worse.    ROS:  CONSTITUTIONAL: No fevers, chills, night sweats, wt. loss, appetite changes  SKIN: no rashes or itching  ENT: No headaches, head trauma, vision changes, or eye pain  LYMPH NODES: None noticed   CV: No chest pain, palpitations.   RESP: No shortness of breath, dyspnea on exertion, cough, wheezing, or hemoptysis  GI: No nausea, emesis, diarrhea, constipation, melena, hematochezia, pain.    : No dysuria, hematuria, urgency, or frequency   HEME: No easy bruising, bleeding problems  PSYCHIATRIC: No depression, anxiety, psychosis, hallucinations.  NEURO: No seizures, memory loss, dizziness or difficulty sleeping  MSK: + History of present illness      Past Medical History:   Diagnosis Date    Depression     Former smoker 6/29/2017    H/O ETOH abuse 6/29/2017    Sickle cell anemia 6/29/2017     History reviewed. No pertinent surgical history.  Family History   Problem Relation Age of Onset    Arthritis Mother     Depression Mother     Heart disease Mother     Hypertension Mother     Heart attack Father 59     Social History     Social History    Marital status:      Spouse name: N/A    Number of  "children: N/A    Years of education: N/A     Occupational History    [employer]     Social History Main Topics    Smoking status: Former Smoker     Quit date: 1/1/1999    Smokeless tobacco: Never Used    Alcohol use No      Comment: quit 6/15/16, in AA    Drug use: No    Sexual activity: Not Asked     Other Topics Concern    None     Social History Narrative    None         Medications/Allergies: See med card    Vitals:    03/12/18 0821   BP: 117/75   Pulse: 82   Weight: 97.5 kg (215 lb)   Height: 5' 9" (1.753 m)   PainSc:   4   PainLoc: Back         Physical exam:    GENERAL: A and O x3, the patient appears well groomed and is in no acute distress.  Skin: No rashes or obvious lesions  HEENT: normocephalic, atraumatic  CARDIOVASCULAR:  Palpable peripheral pulses  LUNGS: easy work of breathing  ABDOMEN: soft, nontender   UPPER EXTREMITIES: Normal alignment, normal range of motion, no atrophy, no skin changes,  hair growth and nail growth normal and equal bilaterally. No swelling, no tenderness.    LOWER EXTREMITIES:  Normal alignment, normal range of motion, no atrophy, no skin changes,  hair growth and nail growth normal and equal bilaterally. No swelling, no tenderness.    LUMBAR SPINE  Lumbar spine: ROM is full with flexion extension and oblique extension with moderate increased pain.    Aris's test causes no increased pain on either side.    Supine straight leg raise is negative bilaterally.    Internal and external rotation of the hip causes no increased pain on either side.  Myofascial exam: No tenderness to palpation across lumbar paraspinous muscles.      MENTAL STATUS: normal orientation, speech, language, and fund of knowledge for social situation.  Emotional state appropriate.    CRANIAL NERVES:  II:  PERRL bilaterally,   III,IV,VI: EOMI.    V:  Facial sensation equal bilaterally  VII:  Facial motor function normal.  VIII:  Hearing equal to finger rub bilaterally  IX/X: Gag " normal, palate symmetric  XI:  Shoulder shrug equal, head turn equal  XII:  Tongue midline without fasciculations      MOTOR: Tone and bulk: normal bilateral upper and lower Strength: normal   Delt Bi Tri WE WF     R 5 5 5 5 5 5   L 5 5 5 5 5 5     IP ADD ABD Quad TA Gas HAM  R 5 5 5 5 5 5 5  L 5 5 5 5 5 5 5    SENSATION: Light touch and pinprick intact bilaterally  REFLEXES: normal, symmetric, nonbrisk.  Toes down, no clonus. No hoffmans.  GAIT: normal rise, base, steps, and arm swing.        Imaging:  MRI L-spine 2/2018  At L3-L4, circumferential disc bulge causes minor central canal without neural  foramen narrowing.    At L4-L5, minor circumferential disc bulge and moderate bilateral facet joint  osteoarthrosis causes no central canal or neural foramen narrowing.    At L5-S1, circumferential disc bulge and mild bilateral facet joint  osteoarthrosis causes no central canal but mild bilateral neural foramen  narrowing.    Assessment:  Patient referred for lower back pain  1. Other spondylosis, lumbar region    2. DDD (degenerative disc disease), lumbar          Plan:  - I have stressed the importance of physical activity and exercise to improve overall health  - Reviewed MRI results  - I believe his low back pain maybe due to facet arthropathy and have recommended lumbar medial branch blocks as a diagnostic procedure.  If successful, would proceed with radiofrequency ablation.  He'll contact us if he wishes to proceed with procedure  - Follow-up as needed      Thank you for referring this interesting patient, and I look forward to continuing to collaborate in his care.

## 2018-03-19 ENCOUNTER — TELEPHONE (OUTPATIENT)
Dept: PAIN MEDICINE | Facility: CLINIC | Age: 58
End: 2018-03-19

## 2018-03-19 NOTE — TELEPHONE ENCOUNTER
----- Message from Erin Corbett sent at 3/19/2018  8:48 AM CDT -----  Contact: self  Patient called regarding scheduling procedure, also wants to make sure insurance company is covered by it. Please contact [phone] (home)

## 2018-03-20 ENCOUNTER — TELEPHONE (OUTPATIENT)
Dept: PAIN MEDICINE | Facility: CLINIC | Age: 58
End: 2018-03-20

## 2018-03-20 NOTE — TELEPHONE ENCOUNTER
----- Message from Stacey Morse sent at 3/20/2018  7:28 AM CDT -----  Contact: self  Patient [phone] is wanting to change his procedure from Monday 04 23 18 to Friday 04 20 18/please call patient to discuss

## 2018-03-21 ENCOUNTER — TELEPHONE (OUTPATIENT)
Dept: PAIN MEDICINE | Facility: CLINIC | Age: 58
End: 2018-03-21

## 2018-03-21 NOTE — TELEPHONE ENCOUNTER
----- Message from Katelyn Loera sent at 3/21/2018 11:43 AM CDT -----  Contact: self  Patient returning missed call. Please call his cell number [phone]

## 2018-04-09 DIAGNOSIS — M47.816 LUMBAR FACET ARTHROPATHY: Primary | ICD-10-CM

## 2018-04-20 ENCOUNTER — SURGERY (OUTPATIENT)
Age: 58
End: 2018-04-20

## 2018-04-20 ENCOUNTER — HOSPITAL ENCOUNTER (OUTPATIENT)
Facility: AMBULARY SURGERY CENTER | Age: 58
Discharge: HOME | End: 2018-04-20
Attending: ANESTHESIOLOGY | Admitting: ANESTHESIOLOGY
Payer: COMMERCIAL

## 2018-04-20 DIAGNOSIS — M47.896 OTHER SPONDYLOSIS, LUMBAR REGION: Primary | ICD-10-CM

## 2018-04-20 PROCEDURE — 64494 INJ PARAVERT F JNT L/S 2 LEV: CPT | Mod: LT | Performed by: ANESTHESIOLOGY

## 2018-04-20 PROCEDURE — 64494 INJ PARAVERT F JNT L/S 2 LEV: CPT | Mod: 50,,, | Performed by: ANESTHESIOLOGY

## 2018-04-20 PROCEDURE — 99152 MOD SED SAME PHYS/QHP 5/>YRS: CPT | Mod: ,,, | Performed by: ANESTHESIOLOGY

## 2018-04-20 PROCEDURE — 64493 INJ PARAVERT F JNT L/S 1 LEV: CPT | Mod: RT | Performed by: ANESTHESIOLOGY

## 2018-04-20 PROCEDURE — 64493 INJ PARAVERT F JNT L/S 1 LEV: CPT | Mod: 50,,, | Performed by: ANESTHESIOLOGY

## 2018-04-20 PROCEDURE — 64495 INJ PARAVERT F JNT L/S 3 LEV: CPT | Mod: LT | Performed by: ANESTHESIOLOGY

## 2018-04-20 RX ORDER — BUPIVACAINE HYDROCHLORIDE 5 MG/ML
INJECTION, SOLUTION EPIDURAL; INTRACAUDAL
Status: DISCONTINUED | OUTPATIENT
Start: 2018-04-20 | End: 2018-04-20 | Stop reason: HOSPADM

## 2018-04-20 RX ORDER — SODIUM CHLORIDE, SODIUM LACTATE, POTASSIUM CHLORIDE, CALCIUM CHLORIDE 600; 310; 30; 20 MG/100ML; MG/100ML; MG/100ML; MG/100ML
INJECTION, SOLUTION INTRAVENOUS ONCE AS NEEDED
Status: COMPLETED | OUTPATIENT
Start: 2018-04-20 | End: 2018-04-20

## 2018-04-20 RX ORDER — MIDAZOLAM HYDROCHLORIDE 2 MG/2ML
INJECTION, SOLUTION INTRAMUSCULAR; INTRAVENOUS
Status: DISCONTINUED | OUTPATIENT
Start: 2018-04-20 | End: 2018-04-20 | Stop reason: HOSPADM

## 2018-04-20 RX ORDER — MIDAZOLAM HYDROCHLORIDE 1 MG/ML
INJECTION INTRAMUSCULAR; INTRAVENOUS
Status: DISCONTINUED
Start: 2018-04-20 | End: 2018-04-20 | Stop reason: HOSPADM

## 2018-04-20 RX ORDER — BUPIVACAINE HYDROCHLORIDE 5 MG/ML
INJECTION, SOLUTION EPIDURAL; INTRACAUDAL
Status: DISCONTINUED
Start: 2018-04-20 | End: 2018-04-20 | Stop reason: HOSPADM

## 2018-04-20 RX ADMIN — MIDAZOLAM HYDROCHLORIDE 2 MG: 2 INJECTION, SOLUTION INTRAMUSCULAR; INTRAVENOUS at 01:04

## 2018-04-20 RX ADMIN — BUPIVACAINE HYDROCHLORIDE 5 ML: 5 INJECTION, SOLUTION EPIDURAL; INTRACAUDAL at 01:04

## 2018-04-20 RX ADMIN — SODIUM CHLORIDE, SODIUM LACTATE, POTASSIUM CHLORIDE, CALCIUM CHLORIDE: 600; 310; 30; 20 INJECTION, SOLUTION INTRAVENOUS at 12:04

## 2018-04-20 NOTE — DISCHARGE SUMMARY
[facility]  Discharge Note  Short Stay    Admit Date: 4/20/2018    Discharge Date and Time: 4/20/2018    Attending Physician: Nura Heredia MD     Discharge Provider: Nura Heredia    Diagnoses:  Active Hospital Problems    Diagnosis  POA    *Other spondylosis, lumbar region [M47.896]  Yes      Resolved Hospital Problems    Diagnosis Date Resolved POA   No resolved problems to display.       Hospital Course: Lumbar MBB  Discharged Condition: Good    Final Diagnoses:   Active Hospital Problems    Diagnosis  POA    *Other spondylosis, lumbar region [M47.896]  Yes      Resolved Hospital Problems    Diagnosis Date Resolved POA   No resolved problems to display.       Disposition: Home or Self Care    Follow up/Patient Instructions:    Medications:  Reconciled Home Medications:      Medication List      CONTINUE taking these medications    folic acid 1 MG tablet  Commonly known as:  FOLVITE  Take 1 tablet (1 mg total) by mouth once daily.     losartan-hydrochlorothiazide 100-12.5 mg 100-12.5 mg Tab  Commonly known as:  HYZAAR  Take 1 tablet by mouth once daily.     multivitamin capsule  Take 1 capsule by mouth once daily.     omeprazole 20 MG capsule  Commonly known as:  PRILOSEC  Take 1 capsule (20 mg total) by mouth once daily.     tiZANidine 4 MG tablet  Commonly known as:  ZANAFLEX  Take 4 mg by mouth every 6 (six) hours as needed.     traMADol 50 mg tablet  Commonly known as:  ULTRAM  Take 50 mg by mouth every 6 (six) hours as needed for Pain.     traZODone 100 MG tablet  Commonly known as:  DESYREL  Take 1 tablet (100 mg total) by mouth every evening.        STOP taking these medications    FLUoxetine 20 MG tablet            Discharge Procedure Orders  Call MD for:  temperature >100.4     Call MD for:  persistent nausea and vomiting or diarrhea     Call MD for:  severe uncontrolled pain     Call MD for:  redness, tenderness, or signs of infection (pain, swelling, redness, odor or green/yellow discharge  around incision site)     Call MD for:  difficulty breathing or increased cough     Call MD for:  severe persistent headache          Follow up with MD in 2-3 weeks    Discharge Procedure Orders (must include Diet, Follow-up, Activity):    Discharge Procedure Orders (must include Diet, Follow-up, Activity)  Call MD for:  temperature >100.4     Call MD for:  persistent nausea and vomiting or diarrhea     Call MD for:  severe uncontrolled pain     Call MD for:  redness, tenderness, or signs of infection (pain, swelling, redness, odor or green/yellow discharge around incision site)     Call MD for:  difficulty breathing or increased cough     Call MD for:  severe persistent headache

## 2018-04-20 NOTE — OP NOTE
PROCEDURE DATE: 4/20/2018    PROCEDURE:  Bilateral L3,4,5 medial branch nerve blocks    DIAGNOSIS:  Other lumbar spondylosis    Post Op diagnosis: Same    PHYSICIAN: Nura Heredia MD    MEDICATIONS INJECTED: 0.5% bupivicaine, 0.5 ml at each level    SEDATION MEDICATIONS:IV Versed    LOCAL ANESTHETIC USED: None    ESTIMATED BLOOD LOSS:  None    COMPLICATIONS:  None    TECHNIQUE: A time out was taken to identify the patient, procedure and side of the procedure. The patient was placed in a prone position, then prepped and draped in the usual sterile fashion using ChloraPrep and sterile towels.  The levels were determined under fluoroscopic guidance and then marked.  A 25-gauge 3.5 inch needle was introduced to the anatomic location of the L3,4,5 medial branch nerves on the bilateral side. The above medication was then injected. The patient tolerated the procedure well.     The patient was monitored after the procedure. Patient was given pain diary to record pain levels at home.     If found to have greater than a 50% recovery and so will be scheduled for a radiofrequency ablation of the corresponding nerves.  Patient was given post procedure and discharge instructions to follow at home.  The patient was discharged in a stable condition.

## 2018-04-20 NOTE — PLAN OF CARE
Patient brought to car via wheelchair. His wife is driving him home. His pain diary was reviewed and sent home with him. He ate an entire blueberry muffin while in recovery.

## 2018-04-20 NOTE — H&P
"CC: low back pain    HPI: The patient is a 58 y.o. male with a history of lumbar spondylosis here for lumbar MBB. There are no major changes in history and physical from 3/12/18 by myself.    Past Medical History:   Diagnosis Date    Depression     Former smoker 6/29/2017    H/O ETOH abuse 6/29/2017    Sickle cell anemia 6/29/2017       History reviewed. No pertinent surgical history.    Family History   Problem Relation Age of Onset    Arthritis Mother     Depression Mother     Heart disease Mother     Hypertension Mother     Heart attack Father 59       Social History     Social History    Marital status:      Spouse name: N/A    Number of children: N/A    Years of education: N/A     Occupational History    [employer] [employer]     Social History Main Topics    Smoking status: Former Smoker     Quit date: 1/1/1999    Smokeless tobacco: Never Used    Alcohol use No      Comment: quit 6/15/16, in AA    Drug use: No    Sexual activity: Not Asked     Other Topics Concern    None     Social History Narrative    None       Current Facility-Administered Medications   Medication Dose Route Frequency Provider Last Rate Last Dose    lactated ringers infusion   Intravenous Once PRN Nura Heredia MD           Review of patient's allergies indicates:  No Known Allergies    Vitals:    04/19/18 0557   Weight: 97.5 kg (215 lb)   Height: 5' 9" (1.753 m)       REVIEW OF SYSTEMS:     GENERAL: No weight loss, malaise or fevers.  HEENT:  No recent changes in vision or hearing  NECK: Negative for lumps, no difficulty with swallowing.  RESPIRATORY: Negative for cough, wheezing or shortness of breath, patient denies any recent URI.  CARDIOVASCULAR: Negative for chest pain, leg swelling or palpitations.  GI: Negative for abdominal discomfort, blood in stools or black stools or change in bowel habits.  MUSCULOSKELETAL: See HPI.  SKIN: Negative for lesions, rash, and itching.  PSYCH: No suicidal or homicidal " ideations, no current mood disturbances.  HEMATOLOGY/LYMPHOLOGY: Negative for prolonged bleeding, bruising easily or swollen nodes. Patient is not currently taking any anti-coagulants  ENDO: No history of diabetes or thyroid dysfunction  NEURO: No history of syncope, paralysis, seizures or tremors.All other reviewed and negative other than HPI.    Physical exam:  Gen: A and O x3, pleasant, well-groomed  Skin: No rashes or obvious lesions  HEENT: PERRLA, no obvious deformities on ears or in canals. No thyroid masses, trachea midline, no palpable lymph nodes in neck, axilla.  CVS: Regular rate and rhythm, normal S1 and S2, no murmurs.  Resp: Clear to auscultation bilaterally.  Abdomen: Soft, NT/ND, normal bowel sounds present.  Musculoskeletal/Neuro: Moving all extremities    Assessment:  Other spondylosis, lumbar region  -     Place in Outpatient; Standing  -     Vital signs; Standing  -     Activity as tolerated; Standing  -     Insert peripheral IV; Standing  -     Verify informed consent; Standing  -     Notify physician ; Standing  -     Notify physician ; Standing  -     Notify physician (specify); Standing  -     Diet NPO; Standing    Other orders  -     lactated ringers infusion; Inject into the vein once as needed (IV Sedation).  -     IP VTE LOW RISK PATIENT; Standing          PLAN: Lumbar MBB      This patient has been cleared for surgery in an ambulatory surgical facility    ASA 3,  Mallampatti Score 3  No history of anesthetic complications  Plan for RN IV sedation

## 2018-04-23 ENCOUNTER — TELEPHONE (OUTPATIENT)
Dept: PAIN MEDICINE | Facility: CLINIC | Age: 58
End: 2018-04-23

## 2018-04-23 VITALS
DIASTOLIC BLOOD PRESSURE: 97 MMHG | TEMPERATURE: 99 F | BODY MASS INDEX: 31.84 KG/M2 | RESPIRATION RATE: 17 BRPM | OXYGEN SATURATION: 100 % | WEIGHT: 215 LBS | SYSTOLIC BLOOD PRESSURE: 167 MMHG | HEIGHT: 69 IN | HEART RATE: 61 BPM

## 2018-05-14 ENCOUNTER — TELEPHONE (OUTPATIENT)
Dept: PAIN MEDICINE | Facility: CLINIC | Age: 58
End: 2018-05-14

## 2018-05-14 DIAGNOSIS — M47.896 OTHER SPONDYLOSIS, LUMBAR REGION: Primary | ICD-10-CM

## 2018-05-14 NOTE — TELEPHONE ENCOUNTER
----- Message from Erin Moya sent at 5/14/2018  9:03 AM CDT -----  Patient is calling to confirm a procedure he states is supposed to be scheduled for 05/25/18, please call [phone]

## 2018-05-21 ENCOUNTER — TELEPHONE (OUTPATIENT)
Dept: HEMATOLOGY/ONCOLOGY | Facility: CLINIC | Age: 58
End: 2018-05-21

## 2018-05-21 NOTE — TELEPHONE ENCOUNTER
"----- Message from Cheri Johnson sent at 5/21/2018  9:25 AM CDT -----  Patient called in stating that he is feeling a bit "whoozy" and he thinks he may need a blood transfusion. He said that the last time he had his BW done was last month. He would like to be transfused for Monday 5/28 if possible? Please advise and contact patient at [phone]. Thanks       Spoke to patient today. He will go get labs drawn today and call me back in the morning for the results and to see if he needs a transfusion.   "

## 2018-05-21 NOTE — TELEPHONE ENCOUNTER
"----- Message from Cheri Johnson sent at 5/21/2018  9:25 AM CDT -----  Patient called in stating that he is feeling a bit "whoozy" and he thinks he may need a blood transfusion. He said that the last time he had his BW done was last month. He would like to be transfused for Monday 5/28 if possible? Please advise and contact patient at [phone]. Thanks   "

## 2018-05-22 ENCOUNTER — TELEPHONE (OUTPATIENT)
Dept: HEMATOLOGY/ONCOLOGY | Facility: CLINIC | Age: 58
End: 2018-05-22

## 2018-05-22 NOTE — TELEPHONE ENCOUNTER
Patient is going to type and cross 5/27 and transfuse 1 unit on 5/28/18. Copy of labs and orders put up front for him to  per his request

## 2018-05-25 ENCOUNTER — HOSPITAL ENCOUNTER (OUTPATIENT)
Facility: AMBULARY SURGERY CENTER | Age: 58
Discharge: HOME | End: 2018-05-25
Attending: ANESTHESIOLOGY | Admitting: ANESTHESIOLOGY
Payer: COMMERCIAL

## 2018-05-25 ENCOUNTER — SURGERY (OUTPATIENT)
Age: 58
End: 2018-05-25

## 2018-05-25 DIAGNOSIS — M47.896 OTHER SPONDYLOSIS, LUMBAR REGION: Primary | ICD-10-CM

## 2018-05-25 PROCEDURE — 99152 MOD SED SAME PHYS/QHP 5/>YRS: CPT | Mod: ,,, | Performed by: ANESTHESIOLOGY

## 2018-05-25 PROCEDURE — 64493 INJ PARAVERT F JNT L/S 1 LEV: CPT | Mod: 50,,, | Performed by: ANESTHESIOLOGY

## 2018-05-25 PROCEDURE — 64493 INJ PARAVERT F JNT L/S 1 LEV: CPT | Mod: RT | Performed by: ANESTHESIOLOGY

## 2018-05-25 PROCEDURE — 64495 INJ PARAVERT F JNT L/S 3 LEV: CPT | Mod: LT | Performed by: ANESTHESIOLOGY

## 2018-05-25 PROCEDURE — 64494 INJ PARAVERT F JNT L/S 2 LEV: CPT | Mod: 50,,, | Performed by: ANESTHESIOLOGY

## 2018-05-25 PROCEDURE — 64494 INJ PARAVERT F JNT L/S 2 LEV: CPT | Mod: LT | Performed by: ANESTHESIOLOGY

## 2018-05-25 RX ORDER — SODIUM CHLORIDE, SODIUM LACTATE, POTASSIUM CHLORIDE, CALCIUM CHLORIDE 600; 310; 30; 20 MG/100ML; MG/100ML; MG/100ML; MG/100ML
INJECTION, SOLUTION INTRAVENOUS ONCE AS NEEDED
Status: COMPLETED | OUTPATIENT
Start: 2018-05-25 | End: 2018-05-25

## 2018-05-25 RX ORDER — BUPIVACAINE HYDROCHLORIDE 5 MG/ML
INJECTION, SOLUTION EPIDURAL; INTRACAUDAL
Status: DISCONTINUED | OUTPATIENT
Start: 2018-05-25 | End: 2018-05-25 | Stop reason: HOSPADM

## 2018-05-25 RX ORDER — MIDAZOLAM HYDROCHLORIDE 2 MG/2ML
INJECTION, SOLUTION INTRAMUSCULAR; INTRAVENOUS
Status: DISCONTINUED | OUTPATIENT
Start: 2018-05-25 | End: 2018-05-25 | Stop reason: HOSPADM

## 2018-05-25 RX ORDER — MIDAZOLAM HYDROCHLORIDE 1 MG/ML
INJECTION INTRAMUSCULAR; INTRAVENOUS
Status: DISCONTINUED
Start: 2018-05-25 | End: 2018-05-25 | Stop reason: HOSPADM

## 2018-05-25 RX ADMIN — MIDAZOLAM HYDROCHLORIDE 2 MG: 2 INJECTION, SOLUTION INTRAMUSCULAR; INTRAVENOUS at 12:05

## 2018-05-25 RX ADMIN — BUPIVACAINE HYDROCHLORIDE 5 ML: 5 INJECTION, SOLUTION EPIDURAL; INTRACAUDAL at 12:05

## 2018-05-25 RX ADMIN — SODIUM CHLORIDE, SODIUM LACTATE, POTASSIUM CHLORIDE, CALCIUM CHLORIDE: 600; 310; 30; 20 INJECTION, SOLUTION INTRAVENOUS at 12:05

## 2018-05-25 NOTE — PLAN OF CARE
Patient sitting in chair and states he is ready to go home; he denies pain, nausea or any weakness. Patient's spouse chairside and states she is ready to take the patient home. Patient's spouse states she is driving the patient home.

## 2018-05-25 NOTE — DISCHARGE INSTRUCTIONS
Recovery After Procedural Sedation (Adult)  You have been given medicine by vein to make you sleep during your surgery. This may have included both a pain medicine and sleeping medicine. Most of the effects have worn off. But you may still have some drowsiness for the next 6 to 8 hours.  Home care  Follow these guidelines when you get home:  · For the next 8 hours, you should be watched by a responsible adult. This person should make sure your condition is not getting worse.  · Don't drink any alcohol for the next 24 hours.  · Don't drive, operate dangerous machinery, or make important business or personal decisions during the next 24 hours.  Note: Your healthcare provider may tell you not to take any medicine by mouth for pain or sleep in the next 4 hours. These medicines may react with the medicines you were given in the hospital. This could cause a much stronger response than usual.  Follow-up care  Follow up with your healthcare provider if you are not alert and back to your usual level of activity within 12 hours.  When to seek medical advice  Call your healthcare provider right away if any of these occur:  · Drowsiness gets worse  · Weakness or dizziness gets worse  · Repeated vomiting  · You can't be awakened   Date Last Reviewed: 10/18/2016  © 5628-7530 [employer]. [address] [place] [address], [place] [address]. All rights reserved. This information is not intended as a substitute for professional medical care. Always follow your healthcare professional's instructions.      Nerve Block  This was a test, not a treatment. Your pain may return.  Keep your pain journal Dr office will call to check your pain levels within 3 days   Perform activities that normally cause you pain during this testing period    Home care instructions  You may apply ice pack to the injection site for 2 days at 20 minute intervals,apply for 20 minutes remove for 20 minutes , NO HEAT for 2 days  You may take a shower but no  tub baths or pool or swimming in any body of water, no saunas or heating pads  for 2 days  Resume Aspirin, Plavix, or Coumadin the day after the procedure unless otherwise instructed.  Do not drive for 24 hr      SEEK  IMMEDIATE MEDICAL HELP FOR:  Severe increase in your usual pain or appearance of new pain  Prolonged  ( more than 8 hours)or increasing weakness or numbness in the legs or arms  Drainage, redness, active bleeding, or increased swelling at the injection site  Temperature over 100.0 degrees F.  Headache that increases when your head is upright and decreases when you lie flat  Shortness of breath, chest pain, or problems breathing

## 2018-05-25 NOTE — H&P
"CC: low back pain    HPI: The patient is a 58 y.o. male with a history of lumbar spondylosis here for lumbar MBB. There are no major changes in history and physical from 3/12/18 by myself.    Past Medical History:   Diagnosis Date    Depression     Former smoker 6/29/2017    H/O ETOH abuse 6/29/2017    Sickle cell anemia 6/29/2017       No past surgical history on file.    Family History   Problem Relation Age of Onset    Arthritis Mother     Depression Mother     Heart disease Mother     Hypertension Mother     Heart attack Father 59       Social History     Social History    Marital status:      Spouse name: N/A    Number of children: N/A    Years of education: N/A     Occupational History    [employer]     Social History Main Topics    Smoking status: Former Smoker     Quit date: 1/1/1999    Smokeless tobacco: Never Used    Alcohol use No      Comment: quit 6/15/16, in AA    Drug use: No    Sexual activity: Not on file     Other Topics Concern    Not on file     Social History Narrative    No narrative on file       Current Facility-Administered Medications   Medication Dose Route Frequency Provider Last Rate Last Dose    lactated ringers infusion   Intravenous Once PRN Nura Heredia MD           Review of patient's allergies indicates:  No Known Allergies    Vitals:    05/23/18 0914   Weight: 97.5 kg (215 lb)   Height: 5' 9" (1.753 m)       REVIEW OF SYSTEMS:     GENERAL: No weight loss, malaise or fevers.  HEENT:  No recent changes in vision or hearing  NECK: Negative for lumps, no difficulty with swallowing.  RESPIRATORY: Negative for cough, wheezing or shortness of breath, patient denies any recent URI.  CARDIOVASCULAR: Negative for chest pain, leg swelling or palpitations.  GI: Negative for abdominal discomfort, blood in stools or black stools or change in bowel habits.  MUSCULOSKELETAL: See HPI.  SKIN: Negative for lesions, rash, and itching.  PSYCH: No suicidal or " homicidal ideations, no current mood disturbances.  HEMATOLOGY/LYMPHOLOGY: Negative for prolonged bleeding, bruising easily or swollen nodes. Patient is not currently taking any anti-coagulants  ENDO: No history of diabetes or thyroid dysfunction  NEURO: No history of syncope, paralysis, seizures or tremors.All other reviewed and negative other than HPI.    Physical exam:  Gen: A and O x3, pleasant, well-groomed  Skin: No rashes or obvious lesions  HEENT: PERRLA, no obvious deformities on ears or in canals. No thyroid masses, trachea midline, no palpable lymph nodes in neck, axilla.  CVS: Regular rate and rhythm, normal S1 and S2, no murmurs.  Resp: Clear to auscultation bilaterally.  Abdomen: Soft, NT/ND, normal bowel sounds present.  Musculoskeletal/Neuro: Moving all extremities    Assessment:  Other spondylosis, lumbar region  -     Case Request Operating Room: BLOCK-NERVE-MEDIAL BRANCH-LUMBAR  -     Place in Outpatient; Standing  -     Vital signs; Standing  -     Activity as tolerated; Standing  -     Insert peripheral IV; Standing  -     Verify informed consent; Standing  -     Notify physician ; Standing  -     Notify physician ; Standing  -     Notify physician (specify); Standing  -     Diet NPO; Standing    Other orders  -     lactated ringers infusion; Inject into the vein once as needed (IV Sedation).  -     IP VTE LOW RISK PATIENT; Standing          PLAN: Lumbar MBB      This patient has been cleared for surgery in an ambulatory surgical facility    ASA 3,  Mallampatti Score 3  No history of anesthetic complications  Plan for RN IV sedation

## 2018-05-25 NOTE — OP NOTE
PROCEDURE DATE: 5/25/2018    PROCEDURE:  Bilateral L3,4,5 medial branch nerve blocks    DIAGNOSIS:  Other lumbar spondylosis    Post Op diagnosis: Same    PHYSICIAN: Nura Heredia MD    MEDICATIONS INJECTED: 0.5% bupivicaine, 0.5 ml at each level    SEDATION MEDICATIONS:RN IV Versed    LOCAL ANESTHETIC USED: None    ESTIMATED BLOOD LOSS:  None    COMPLICATIONS:  None    TECHNIQUE: A time out was taken to identify the patient, procedure and side of the procedure. The patient was placed in a prone position, then prepped and draped in the usual sterile fashion using ChloraPrep and sterile towels.  The levels were determined under fluoroscopic guidance and then marked.  A 25-gauge 3.5 inch needle was introduced to the anatomic location of the L3,4,5 medial branch nerves on the bilateral side. The above medication was then injected. The patient tolerated the procedure well.     The patient was monitored after the procedure. Patient was given pain diary to record pain levels at home.     If found to have greater than a 50% recovery and so will be scheduled for a radiofrequency ablation of the corresponding nerves.  Patient was given post procedure and discharge instructions to follow at home.  The patient was discharged in a stable condition.

## 2018-05-25 NOTE — DISCHARGE SUMMARY
[facility]  Discharge Note  Short Stay    Admit Date: 5/25/2018    Discharge Date and Time: 5/25/2018    Attending Physician: Nura Heredia MD     Discharge Provider: Nura Heredia    Diagnoses:  Active Hospital Problems    Diagnosis  POA    *Other spondylosis, lumbar region [M47.896]  Yes      Resolved Hospital Problems    Diagnosis Date Resolved POA   No resolved problems to display.       Hospital Course: Lumbar MBB  Discharged Condition: Good    Final Diagnoses:   Active Hospital Problems    Diagnosis  POA    *Other spondylosis, lumbar region [M47.896]  Yes      Resolved Hospital Problems    Diagnosis Date Resolved POA   No resolved problems to display.       Disposition: Home or Self Care    Follow up/Patient Instructions:    Medications:  Reconciled Home Medications:      Medication List      CONTINUE taking these medications    folic acid 1 MG tablet  Commonly known as:  FOLVITE  Take 1 tablet (1 mg total) by mouth once daily.     losartan-hydrochlorothiazide 100-12.5 mg 100-12.5 mg Tab  Commonly known as:  HYZAAR  Take 1 tablet by mouth once daily.     multivitamin capsule  Take 1 capsule by mouth once daily.     omeprazole 20 MG capsule  Commonly known as:  PRILOSEC  Take 1 capsule (20 mg total) by mouth once daily.     tiZANidine 4 MG tablet  Commonly known as:  ZANAFLEX  Take 4 mg by mouth every 6 (six) hours as needed.     traMADol 50 mg tablet  Commonly known as:  ULTRAM  Take 50 mg by mouth every 6 (six) hours as needed for Pain.     traZODone 100 MG tablet  Commonly known as:  DESYREL  Take 1 tablet (100 mg total) by mouth every evening.        STOP taking these medications    FLUoxetine 20 MG tablet            Discharge Procedure Orders  Call MD for:  temperature >100.4     Call MD for:  persistent nausea and vomiting or diarrhea     Call MD for:  severe uncontrolled pain     Call MD for:  redness, tenderness, or signs of infection (pain, swelling, redness, odor or green/yellow discharge  around incision site)     Call MD for:  difficulty breathing or increased cough     Call MD for:  severe persistent headache          Follow up with MD in 2-3 weeks    Discharge Procedure Orders (must include Diet, Follow-up, Activity):    Discharge Procedure Orders (must include Diet, Follow-up, Activity)  Call MD for:  temperature >100.4     Call MD for:  persistent nausea and vomiting or diarrhea     Call MD for:  severe uncontrolled pain     Call MD for:  redness, tenderness, or signs of infection (pain, swelling, redness, odor or green/yellow discharge around incision site)     Call MD for:  difficulty breathing or increased cough     Call MD for:  severe persistent headache

## 2018-05-28 ENCOUNTER — TELEPHONE (OUTPATIENT)
Dept: PAIN MEDICINE | Facility: CLINIC | Age: 58
End: 2018-05-28

## 2018-05-28 VITALS
BODY MASS INDEX: 31.84 KG/M2 | WEIGHT: 215 LBS | HEART RATE: 63 BPM | DIASTOLIC BLOOD PRESSURE: 88 MMHG | RESPIRATION RATE: 18 BRPM | HEIGHT: 69 IN | TEMPERATURE: 98 F | OXYGEN SATURATION: 96 % | SYSTOLIC BLOOD PRESSURE: 143 MMHG

## 2018-05-28 NOTE — TELEPHONE ENCOUNTER
Patient reports 80% or greater decrease in pain. Will proceed with RF to corresponding nerves. Patient scheduled on 7/20/18 per his request. Instructions given. Patient accepted and voiced understanding.

## 2018-05-29 NOTE — TELEPHONE ENCOUNTER
Pt last seen by Dr Fonseca on 2/20/18 & f/u due 8/20/18.  Zanaflex last filled by Dr Fonseca on 2/20/18 for Dx: Lumbar Spondylosis  Refill request is for 90 day supply

## 2018-05-31 RX ORDER — TIZANIDINE 4 MG/1
4 TABLET ORAL EVERY 12 HOURS PRN
Qty: 180 TABLET | Refills: 0 | Status: SHIPPED | OUTPATIENT
Start: 2018-05-31 | End: 2019-09-30

## 2018-07-09 DIAGNOSIS — M47.896 OTHER SPONDYLOSIS, LUMBAR REGION: Primary | ICD-10-CM

## 2018-07-18 ENCOUNTER — TELEPHONE (OUTPATIENT)
Dept: PAIN MEDICINE | Facility: CLINIC | Age: 58
End: 2018-07-18

## 2018-07-18 NOTE — TELEPHONE ENCOUNTER
----- Message from Joyce Villegas RT sent at 7/18/2018  3:45 PM CDT -----  Contact: pt    Called pod pt , thinks he had a missed call from your office, thanks.

## 2018-07-18 NOTE — TELEPHONE ENCOUNTER
Called pt. Informed him missed call was from surgery center calling for pre op. Provided pt with phone number to the surgery center.

## 2018-07-20 ENCOUNTER — HOSPITAL ENCOUNTER (OUTPATIENT)
Facility: AMBULARY SURGERY CENTER | Age: 58
Discharge: HOME | End: 2018-07-20
Attending: ANESTHESIOLOGY | Admitting: ANESTHESIOLOGY
Payer: COMMERCIAL

## 2018-07-20 ENCOUNTER — SURGERY (OUTPATIENT)
Age: 58
End: 2018-07-20

## 2018-07-20 DIAGNOSIS — M47.896 OTHER SPONDYLOSIS, LUMBAR REGION: Primary | ICD-10-CM

## 2018-07-20 PROCEDURE — 99152 MOD SED SAME PHYS/QHP 5/>YRS: CPT | Mod: ,,, | Performed by: ANESTHESIOLOGY

## 2018-07-20 PROCEDURE — 64635 DESTROY LUMB/SAC FACET JNT: CPT | Mod: LT | Performed by: ANESTHESIOLOGY

## 2018-07-20 PROCEDURE — 64636 DESTROY L/S FACET JNT ADDL: CPT | Mod: LT | Performed by: ANESTHESIOLOGY

## 2018-07-20 PROCEDURE — 64635 DESTROY LUMB/SAC FACET JNT: CPT | Mod: 50,,, | Performed by: ANESTHESIOLOGY

## 2018-07-20 PROCEDURE — 64636 DESTROY L/S FACET JNT ADDL: CPT | Mod: 50,,, | Performed by: ANESTHESIOLOGY

## 2018-07-20 RX ORDER — LIDOCAINE HYDROCHLORIDE 20 MG/ML
INJECTION, SOLUTION EPIDURAL; INFILTRATION; INTRACAUDAL; PERINEURAL
Status: DISCONTINUED | OUTPATIENT
Start: 2018-07-20 | End: 2018-07-20 | Stop reason: HOSPADM

## 2018-07-20 RX ORDER — METHYLPREDNISOLONE ACETATE 80 MG/ML
INJECTION, SUSPENSION INTRA-ARTICULAR; INTRALESIONAL; INTRAMUSCULAR; SOFT TISSUE
Status: DISCONTINUED | OUTPATIENT
Start: 2018-07-20 | End: 2018-07-20 | Stop reason: HOSPADM

## 2018-07-20 RX ORDER — FENTANYL CITRATE 50 UG/ML
INJECTION, SOLUTION INTRAMUSCULAR; INTRAVENOUS
Status: DISCONTINUED | OUTPATIENT
Start: 2018-07-20 | End: 2018-07-20 | Stop reason: HOSPADM

## 2018-07-20 RX ORDER — FENTANYL CITRATE 50 UG/ML
INJECTION, SOLUTION INTRAMUSCULAR; INTRAVENOUS
Status: DISCONTINUED
Start: 2018-07-20 | End: 2018-07-20 | Stop reason: HOSPADM

## 2018-07-20 RX ORDER — MIDAZOLAM HYDROCHLORIDE 1 MG/ML
INJECTION INTRAMUSCULAR; INTRAVENOUS
Status: DISCONTINUED
Start: 2018-07-20 | End: 2018-07-20 | Stop reason: HOSPADM

## 2018-07-20 RX ORDER — METHYLPREDNISOLONE ACETATE 80 MG/ML
INJECTION, SUSPENSION INTRA-ARTICULAR; INTRALESIONAL; INTRAMUSCULAR; SOFT TISSUE
Status: DISCONTINUED
Start: 2018-07-20 | End: 2018-07-20 | Stop reason: HOSPADM

## 2018-07-20 RX ORDER — BUPIVACAINE HYDROCHLORIDE 2.5 MG/ML
INJECTION, SOLUTION EPIDURAL; INFILTRATION; INTRACAUDAL
Status: DISCONTINUED
Start: 2018-07-20 | End: 2018-07-20 | Stop reason: HOSPADM

## 2018-07-20 RX ORDER — MIDAZOLAM HYDROCHLORIDE 2 MG/2ML
INJECTION, SOLUTION INTRAMUSCULAR; INTRAVENOUS
Status: DISCONTINUED | OUTPATIENT
Start: 2018-07-20 | End: 2018-07-20 | Stop reason: HOSPADM

## 2018-07-20 RX ORDER — SODIUM CHLORIDE, SODIUM LACTATE, POTASSIUM CHLORIDE, CALCIUM CHLORIDE 600; 310; 30; 20 MG/100ML; MG/100ML; MG/100ML; MG/100ML
INJECTION, SOLUTION INTRAVENOUS ONCE AS NEEDED
Status: COMPLETED | OUTPATIENT
Start: 2018-07-20 | End: 2018-07-20

## 2018-07-20 RX ORDER — BUPIVACAINE HYDROCHLORIDE 2.5 MG/ML
INJECTION, SOLUTION EPIDURAL; INFILTRATION; INTRACAUDAL
Status: DISCONTINUED | OUTPATIENT
Start: 2018-07-20 | End: 2018-07-20 | Stop reason: HOSPADM

## 2018-07-20 RX ORDER — LIDOCAINE HYDROCHLORIDE 20 MG/ML
INJECTION, SOLUTION EPIDURAL; INFILTRATION; INTRACAUDAL; PERINEURAL
Status: DISCONTINUED
Start: 2018-07-20 | End: 2018-07-20 | Stop reason: HOSPADM

## 2018-07-20 RX ORDER — LIDOCAINE HYDROCHLORIDE 10 MG/ML
INJECTION, SOLUTION EPIDURAL; INFILTRATION; INTRACAUDAL; PERINEURAL
Status: DISCONTINUED | OUTPATIENT
Start: 2018-07-20 | End: 2018-07-20 | Stop reason: HOSPADM

## 2018-07-20 RX ADMIN — MIDAZOLAM HYDROCHLORIDE 2 MG: 2 INJECTION, SOLUTION INTRAMUSCULAR; INTRAVENOUS at 01:07

## 2018-07-20 RX ADMIN — BUPIVACAINE HYDROCHLORIDE 6 ML: 2.5 INJECTION, SOLUTION EPIDURAL; INFILTRATION; INTRACAUDAL at 01:07

## 2018-07-20 RX ADMIN — METHYLPREDNISOLONE ACETATE 80 MG: 80 INJECTION, SUSPENSION INTRA-ARTICULAR; INTRALESIONAL; INTRAMUSCULAR; SOFT TISSUE at 01:07

## 2018-07-20 RX ADMIN — LIDOCAINE HYDROCHLORIDE 6 ML: 20 INJECTION, SOLUTION EPIDURAL; INFILTRATION; INTRACAUDAL; PERINEURAL at 01:07

## 2018-07-20 RX ADMIN — LIDOCAINE HYDROCHLORIDE 10 ML: 10 INJECTION, SOLUTION EPIDURAL; INFILTRATION; INTRACAUDAL; PERINEURAL at 01:07

## 2018-07-20 RX ADMIN — FENTANYL CITRATE 50 MCG: 50 INJECTION, SOLUTION INTRAMUSCULAR; INTRAVENOUS at 01:07

## 2018-07-20 RX ADMIN — FENTANYL CITRATE 25 MCG: 50 INJECTION, SOLUTION INTRAMUSCULAR; INTRAVENOUS at 01:07

## 2018-07-20 RX ADMIN — SODIUM CHLORIDE, SODIUM LACTATE, POTASSIUM CHLORIDE, CALCIUM CHLORIDE: 600; 310; 30; 20 INJECTION, SOLUTION INTRAVENOUS at 12:07

## 2018-07-20 NOTE — DISCHARGE SUMMARY
[facility]  Discharge Note  Short Stay    Admit Date: 7/20/2018    Discharge Date and Time: 7/20/2018    Attending Physician: Nura Heredia MD     Discharge Provider: Nura Heredia    Diagnoses:  Active Hospital Problems    Diagnosis  POA    *Other spondylosis, lumbar region [M47.896]  Yes      Resolved Hospital Problems    Diagnosis Date Resolved POA   No resolved problems to display.       Hospital Course: Lumbar MB RFA  Discharged Condition: Good    Final Diagnoses:   Active Hospital Problems    Diagnosis  POA    *Other spondylosis, lumbar region [M47.896]  Yes      Resolved Hospital Problems    Diagnosis Date Resolved POA   No resolved problems to display.       Disposition: Home or Self Care    Follow up/Patient Instructions:    Medications:  Reconciled Home Medications:      Medication List      CONTINUE taking these medications    folic acid 1 MG tablet  Commonly known as:  FOLVITE  Take 1 tablet (1 mg total) by mouth once daily.     losartan-hydrochlorothiazide 100-12.5 mg 100-12.5 mg Tab  Commonly known as:  HYZAAR  Take 1 tablet by mouth once daily.     multivitamin capsule  Take 1 capsule by mouth once daily.     omeprazole 20 MG capsule  Commonly known as:  PRILOSEC  Take 1 capsule (20 mg total) by mouth once daily.     tiZANidine 4 MG tablet  Commonly known as:  ZANAFLEX  Take 1 tablet (4 mg total) by mouth every 12 (twelve) hours as needed.     traMADol 50 mg tablet  Commonly known as:  ULTRAM  Take 50 mg by mouth every 6 (six) hours as needed for Pain.     traZODone 100 MG tablet  Commonly known as:  DESYREL  Take 1 tablet (100 mg total) by mouth every evening.        STOP taking these medications    FLUoxetine 20 MG tablet            Discharge Procedure Orders  Call MD for:  temperature >100.4     Call MD for:  persistent nausea and vomiting or diarrhea     Call MD for:  severe uncontrolled pain     Call MD for:  redness, tenderness, or signs of infection (pain, swelling, redness, odor or  green/yellow discharge around incision site)     Call MD for:  difficulty breathing or increased cough     Call MD for:  severe persistent headache          Follow up with MD in 2-3 weeks    Discharge Procedure Orders (must include Diet, Follow-up, Activity):    Discharge Procedure Orders (must include Diet, Follow-up, Activity)  Call MD for:  temperature >100.4     Call MD for:  persistent nausea and vomiting or diarrhea     Call MD for:  severe uncontrolled pain     Call MD for:  redness, tenderness, or signs of infection (pain, swelling, redness, odor or green/yellow discharge around incision site)     Call MD for:  difficulty breathing or increased cough     Call MD for:  severe persistent headache

## 2018-07-20 NOTE — OP NOTE
PROCEDURE DATE: 7/20/2018    PROCEDURE:  Radiofrequency ablation of the L3,4,5 medial branch nerves on the bilateral-side utilizing fluoroscopy    DIAGNOSIS:  Other lumbar spondylosis  Post op Diagnosis: Same    PHYSICIAN: Nura Heredia MD    MEDICATIONS INJECTED:  From a mixture of 6ml of 0.25% bupivicaine and 80mg of methylprednisone,  1ml of this solution was injected at each level.    LOCAL ANESTHETIC USED: Lidocaine 1%, 2 ml given at each site.    SEDATION MEDICATIONS: RN IV sedation    ESTIMATED BLOOD LOSS:  None    COMPLICATIONS:  None    TECHNIQUE:  A time out was taken to identify patient and procedure side prior to starting the procedure. Laying in a prone position, the patient was prepped and draped in the usual sterile fashion using ChloraPrep and sterile towels.  The levels were determined under fluoroscopic guidance and then marked.  Local anesthetic was given by raising a wheal at the skin over each site and then infiltrated approximately 2cm deeper.  A 20-gauge  100 mm RF needle was introduced to the anatomic location of the bilateral L3,4,5 medial branch nerves.  Motor stimulation up to 2 Volts at each level confirmed no motor nerve involvement.  Impedance was less than 800 ohms at each level.  1ml of 2% lidocaine was instilled prior to lesioning.  Ablation was performed per level utilizing radiofrequency generator 80°C for 60 seconds.  Needles were then rotated 90° and a second lesion was performed.  The above noted medication was then injected slowly. The patient tolerated the procedure well.     The patient was monitored after the procedure.  Patient was given post procedure and discharge instructions to follow at home.  The patient was discharged in a stable condition

## 2018-07-20 NOTE — H&P
"CC: low back pain    HPI: The patient is a 58 y.o. male with a history of lumbar spondylosis here for lumbar MB RFa. There are no major changes in history and physical from 3/12/2018 by myself.    Past Medical History:   Diagnosis Date    Depression     Former smoker 6/29/2017    H/O ETOH abuse 6/29/2017    Sickle cell anemia 6/29/2017       Past Surgical History:   Procedure Laterality Date    INJECTION OF ANESTHETIC AGENT AROUND MEDIAL BRANCH NERVES INNERVATING LUMBAR FACET JOINT Bilateral 5/25/2018    Procedure: BLOCK-NERVE-MEDIAL BRANCH-LUMBAR;  Surgeon: Nura Heredia MD;  Location: [facility];  Service: Pain Management;  Laterality: Bilateral;  L3, 4, 5       Family History   Problem Relation Age of Onset    Arthritis Mother     Depression Mother     Heart disease Mother     Hypertension Mother     Heart attack Father 59       Social History     Social History    Marital status:      Spouse name: N/A    Number of children: N/A    Years of education: N/A     Occupational History    [employer]     Social History Main Topics    Smoking status: Former Smoker     Quit date: 1/1/1999    Smokeless tobacco: Never Used    Alcohol use No      Comment: quit 6/15/16, in AA    Drug use: No    Sexual activity: Not on file     Other Topics Concern    Not on file     Social History Narrative    No narrative on file       Current Facility-Administered Medications   Medication Dose Route Frequency Provider Last Rate Last Dose    lactated ringers infusion   Intravenous Once PRN Nura Heredia MD           Review of patient's allergies indicates:  No Known Allergies    Vitals:    07/19/18 0607   Weight: 97.5 kg (215 lb)   Height: 5' 9" (1.753 m)       REVIEW OF SYSTEMS:     GENERAL: No weight loss, malaise or fevers.  HEENT:  No recent changes in vision or hearing  NECK: Negative for lumps, no difficulty with swallowing.  RESPIRATORY: Negative for cough, wheezing or shortness of breath, patient " denies any recent URI.  CARDIOVASCULAR: Negative for chest pain, leg swelling or palpitations.  GI: Negative for abdominal discomfort, blood in stools or black stools or change in bowel habits.  MUSCULOSKELETAL: See HPI.  SKIN: Negative for lesions, rash, and itching.  PSYCH: No suicidal or homicidal ideations, no current mood disturbances.  HEMATOLOGY/LYMPHOLOGY: Negative for prolonged bleeding, bruising easily or swollen nodes. Patient is not currently taking any anti-coagulants  ENDO: No history of diabetes or thyroid dysfunction  NEURO: No history of syncope, paralysis, seizures or tremors.All other reviewed and negative other than HPI.    Physical exam:  Gen: A and O x3, pleasant, well-groomed  Skin: No rashes or obvious lesions  HEENT: PERRLA, no obvious deformities on ears or in canals. No thyroid masses, trachea midline, no palpable lymph nodes in neck, axilla.  CVS: Regular rate and rhythm, normal S1 and S2, no murmurs.  Resp: Clear to auscultation bilaterally.  Abdomen: Soft, NT/ND, normal bowel sounds present.  Musculoskeletal/Neuro: Moving all extremities    Assessment:  Other spondylosis, lumbar region  -     Case Request Operating Room: RADIOFREQUENCY ABLATION, NERVE, MEDIAL BRANCH, LUMBAR, 1 LEVEL  -     Place in Outpatient; Standing  -     Vital signs; Standing  -     Insert peripheral IV; Standing  -     Verify informed consent; Standing  -     Notify physician ; Standing  -     Notify physician ; Standing  -     Notify physician (specify); Standing  -     Diet NPO; Standing    Other orders  -     lactated ringers infusion; Inject into the vein once as needed (IV Sedation).  -     IP VTE LOW RISK PATIENT; Standing          PLAN: Lumbar MB RFA      This patient has been cleared for surgery in an ambulatory surgical facility    ASA 3,  Mallampatti Score 3  No history of anesthetic complications  Plan for RN IV sedation

## 2018-07-20 NOTE — PLAN OF CARE
Dc criteria met. Denies pain. Full strength noted to BLE. Denies numbness.  Home via private vehicle with wife.

## 2018-07-23 VITALS
HEIGHT: 69 IN | RESPIRATION RATE: 18 BRPM | SYSTOLIC BLOOD PRESSURE: 147 MMHG | DIASTOLIC BLOOD PRESSURE: 89 MMHG | TEMPERATURE: 98 F | WEIGHT: 215 LBS | BODY MASS INDEX: 31.84 KG/M2 | HEART RATE: 72 BPM | OXYGEN SATURATION: 98 %

## 2018-08-15 LAB
ALBUMIN SERPL-MCNC: 4.5 G/DL (ref 3.1–4.7)
ALP SERPL-CCNC: 50 IU/L (ref 40–104)
ALT (SGPT): 15 IU/L (ref 3–33)
AST SERPL-CCNC: 23 IU/L (ref 10–40)
BASOPHILS NFR BLD: 0.1 K/UL (ref 0–0.2)
BASOPHILS NFR BLD: 1.3 %
BILIRUB SERPL-MCNC: 0.7 MG/DL (ref 0.3–1)
BUN SERPL-MCNC: 21 MG/DL (ref 8–20)
CALCIUM SERPL-MCNC: 8.8 MG/DL (ref 7.7–10.4)
CHLORIDE: 108 MMOL/L (ref 98–110)
CO2 SERPL-SCNC: 25.3 MMOL/L (ref 22.8–31.6)
CREATININE: 1.63 MG/DL (ref 0.6–1.4)
EOSINOPHIL NFR BLD: 0.1 K/UL (ref 0–0.7)
EOSINOPHIL NFR BLD: 0.9 %
ERYTHROCYTE [DISTWIDTH] IN BLOOD BY AUTOMATED COUNT: 27.7 % (ref 11.7–14.9)
GLUCOSE: 80 MG/DL (ref 70–99)
GRAN #: 8.2 K/UL (ref 1.4–6.5)
GRAN%: 73 %
HCT VFR BLD AUTO: 24.3 % (ref 39–55)
HGB BLD-MCNC: 8.4 G/DL (ref 14–16)
IMMATURE GRANS (ABS): 0.1 K/UL (ref 0–1)
IMMATURE GRANULOCYTES: 0.4 %
LYMPH #: 1.8 K/UL (ref 1.2–3.4)
LYMPH%: 16.4 %
MCH RBC QN AUTO: 32.2 PG (ref 25–35)
MCHC RBC AUTO-ENTMCNC: 34.6 G/DL (ref 31–36)
MCV RBC AUTO: 93.1 FL (ref 80–100)
MONO #: 0.9 K/UL (ref 0.1–0.6)
MONO%: 8 %
NUCLEATED RBCS: 0 %
PLATELET # BLD AUTO: 570 K/UL (ref 140–440)
PMV BLD AUTO: 11.1 FL (ref 8.8–12.7)
POTASSIUM SERPL-SCNC: 3.8 MMOL/L (ref 3.5–5)
PROT SERPL-MCNC: 7.6 G/DL (ref 6–8.2)
RBC # BLD AUTO: 2.61 M/UL (ref 4.3–5.9)
SODIUM: 141 MMOL/L (ref 134–144)
WBC # BLD AUTO: 11.2 K/UL (ref 5–10)

## 2018-08-16 ENCOUNTER — TELEPHONE (OUTPATIENT)
Dept: HEMATOLOGY/ONCOLOGY | Facility: CLINIC | Age: 58
End: 2018-08-16

## 2018-08-16 NOTE — TELEPHONE ENCOUNTER
Sent to janice brown       ----- Message from Jose Tello MD sent at 8/16/2018  9:47 AM CDT -----  Does he have nephrologist? If not, refer to Tg/Javier etc

## 2018-08-19 ENCOUNTER — TELEPHONE (OUTPATIENT)
Dept: FAMILY MEDICINE | Facility: CLINIC | Age: 58
End: 2018-08-19

## 2018-08-19 NOTE — TELEPHONE ENCOUNTER
----- Message from Ryder Velásquez RN sent at 8/16/2018 10:40 AM CDT -----  nubia wanted you to see

## 2018-08-20 ENCOUNTER — OFFICE VISIT (OUTPATIENT)
Dept: HEMATOLOGY/ONCOLOGY | Facility: CLINIC | Age: 58
End: 2018-08-20
Payer: COMMERCIAL

## 2018-08-20 ENCOUNTER — OFFICE VISIT (OUTPATIENT)
Dept: FAMILY MEDICINE | Facility: CLINIC | Age: 58
End: 2018-08-20
Payer: COMMERCIAL

## 2018-08-20 VITALS
RESPIRATION RATE: 16 BRPM | WEIGHT: 217.63 LBS | OXYGEN SATURATION: 98 % | HEART RATE: 74 BPM | DIASTOLIC BLOOD PRESSURE: 80 MMHG | HEIGHT: 69 IN | BODY MASS INDEX: 32.24 KG/M2 | SYSTOLIC BLOOD PRESSURE: 130 MMHG

## 2018-08-20 VITALS
WEIGHT: 218.63 LBS | HEART RATE: 67 BPM | SYSTOLIC BLOOD PRESSURE: 127 MMHG | DIASTOLIC BLOOD PRESSURE: 62 MMHG | TEMPERATURE: 99 F | BODY MASS INDEX: 30.61 KG/M2 | HEIGHT: 71 IN

## 2018-08-20 DIAGNOSIS — N18.3 ACUTE RENAL FAILURE SUPERIMPOSED ON STAGE 3 CHRONIC KIDNEY DISEASE, UNSPECIFIED ACUTE RENAL FAILURE TYPE: Primary | ICD-10-CM

## 2018-08-20 DIAGNOSIS — D57.1 HB-SS DISEASE WITHOUT CRISIS: ICD-10-CM

## 2018-08-20 DIAGNOSIS — M47.896 OTHER SPONDYLOSIS, LUMBAR REGION: ICD-10-CM

## 2018-08-20 DIAGNOSIS — N17.9 ACUTE RENAL FAILURE SUPERIMPOSED ON STAGE 3 CHRONIC KIDNEY DISEASE, UNSPECIFIED ACUTE RENAL FAILURE TYPE: Primary | ICD-10-CM

## 2018-08-20 DIAGNOSIS — E78.5 DYSLIPIDEMIA: ICD-10-CM

## 2018-08-20 DIAGNOSIS — Z87.891 FORMER SMOKER: ICD-10-CM

## 2018-08-20 DIAGNOSIS — D72.829 LEUKOCYTOSIS, UNSPECIFIED TYPE: ICD-10-CM

## 2018-08-20 DIAGNOSIS — R35.1 BPH ASSOCIATED WITH NOCTURIA: ICD-10-CM

## 2018-08-20 DIAGNOSIS — E53.8 FOLIC ACID DEFICIENCY: ICD-10-CM

## 2018-08-20 DIAGNOSIS — F10.11 H/O ETOH ABUSE: Primary | ICD-10-CM

## 2018-08-20 DIAGNOSIS — D64.9 CHRONIC ANEMIA: ICD-10-CM

## 2018-08-20 DIAGNOSIS — Z23 NEED FOR PNEUMOCOCCAL VACCINATION: ICD-10-CM

## 2018-08-20 DIAGNOSIS — N40.1 BPH ASSOCIATED WITH NOCTURIA: ICD-10-CM

## 2018-08-20 DIAGNOSIS — F10.20 CONTINUOUS CHRONIC ALCOHOLISM: ICD-10-CM

## 2018-08-20 DIAGNOSIS — D57.3 SICKLE CELL TRAIT: ICD-10-CM

## 2018-08-20 DIAGNOSIS — D53.9 MACROCYTIC ANEMIA: ICD-10-CM

## 2018-08-20 DIAGNOSIS — Z12.5 SCREENING FOR PROSTATE CANCER: ICD-10-CM

## 2018-08-20 DIAGNOSIS — K70.0 ALCOHOL INDUCED FATTY LIVER: ICD-10-CM

## 2018-08-20 DIAGNOSIS — D69.6 THROMBOCYTOPENIA: ICD-10-CM

## 2018-08-20 DIAGNOSIS — I10 ESSENTIAL HYPERTENSION: ICD-10-CM

## 2018-08-20 PROBLEM — N18.30 ACUTE RENAL FAILURE SUPERIMPOSED ON STAGE 3 CHRONIC KIDNEY DISEASE: Status: ACTIVE | Noted: 2018-08-20

## 2018-08-20 PROBLEM — R74.8 ABNORMAL LIVER ENZYMES: Status: RESOLVED | Noted: 2017-07-19 | Resolved: 2018-08-20

## 2018-08-20 PROCEDURE — 3078F DIAST BP <80 MM HG: CPT | Mod: ,,, | Performed by: INTERNAL MEDICINE

## 2018-08-20 PROCEDURE — 99214 OFFICE O/P EST MOD 30 MIN: CPT | Mod: 25,,, | Performed by: INTERNAL MEDICINE

## 2018-08-20 PROCEDURE — 3008F BODY MASS INDEX DOCD: CPT | Mod: ,,, | Performed by: INTERNAL MEDICINE

## 2018-08-20 PROCEDURE — 3079F DIAST BP 80-89 MM HG: CPT | Mod: ,,, | Performed by: INTERNAL MEDICINE

## 2018-08-20 PROCEDURE — 90732 PPSV23 VACC 2 YRS+ SUBQ/IM: CPT | Mod: ,,, | Performed by: INTERNAL MEDICINE

## 2018-08-20 PROCEDURE — 3075F SYST BP GE 130 - 139MM HG: CPT | Mod: ,,, | Performed by: INTERNAL MEDICINE

## 2018-08-20 PROCEDURE — 90471 IMMUNIZATION ADMIN: CPT | Mod: ,,, | Performed by: INTERNAL MEDICINE

## 2018-08-20 PROCEDURE — 3074F SYST BP LT 130 MM HG: CPT | Mod: ,,, | Performed by: INTERNAL MEDICINE

## 2018-08-20 PROCEDURE — 99213 OFFICE O/P EST LOW 20 MIN: CPT | Mod: ,,, | Performed by: INTERNAL MEDICINE

## 2018-08-20 RX ORDER — TAMSULOSIN HYDROCHLORIDE 0.4 MG/1
0.4 CAPSULE ORAL DAILY
Qty: 30 CAPSULE | Refills: 11 | Status: SHIPPED | OUTPATIENT
Start: 2018-08-20 | End: 2019-08-18 | Stop reason: SDUPTHER

## 2018-08-20 RX ORDER — LOSARTAN POTASSIUM 100 MG/1
100 TABLET ORAL DAILY
Qty: 90 TABLET | Refills: 3 | Status: SHIPPED | OUTPATIENT
Start: 2018-08-20 | End: 2018-11-05

## 2018-08-20 RX ORDER — TRAMADOL HYDROCHLORIDE 50 MG/1
50 TABLET ORAL EVERY 12 HOURS PRN
Qty: 45 TABLET | Refills: 3 | Status: SHIPPED | OUTPATIENT
Start: 2018-08-20 | End: 2019-02-28 | Stop reason: SDUPTHER

## 2018-08-20 NOTE — PROGRESS NOTES
pnuemovax 23 administered without difficulty. Tolerated well, notified pt to wait 15 mins prior to leaving for monitoring for interactions.

## 2018-08-20 NOTE — PATIENT INSTRUCTIONS
Renal Insufficiency  Your kidneys remove waste products and extra water from your body. When your kidneys dont work as they should, waste products build up in your blood. The early stage of this process is called renal insufficiency. If renal insufficiency gets worse, you can develop chronic renal failure. This allows extra water, waste, and toxic substances to build up in your body. This can become life threatening. You may need dialysis or a kidney transplant. The most serious form of renal insufficiency is end-stage renal disease.  Diabetes is the main cause of renal insufficiency.  Other causes include:  · High blood pressure  · Hardening of the arteries  · Lupus  · Inflammation of the blood vessels (vasculitis)  · Viral or bacterial infection  Some over-the-counter (OTC) pain medicines can cause renal failure if you take them for a long time. These include aspirin, ibuprofen, naproxen, and other nonsteroidal anti-inflammatory drugs (NSAIDs).  Home care  Follow these tips when caring for yourself at home:  · If you have diabetes, talk with your healthcare provider about controlling your blood sugar. Ask if you need to make any changes to your diet, lifestyle, or medicines.  · If you have high blood pressure:  ¨ Take your prescribed medicine. Your goal is to lower your blood pressure to less than 130/80, or as recommended by your provider.  ¨ Start a regular exercise program that you enjoy. Check with your healthcare provider to be sure your planned exercise program is right for you.  ¨ Cut back on the amount of salt (sodium) you eat. Your healthcare provider can tell you how much salt each day is safe for you.  · If you are overweight, talk with your healthcare provider about a weight loss plan.  · If you smoke, quit. Smoking makes kidney disease worse. Talk with your healthcare provider about ways to help you quit. For more information,  visit:  ¨ smokefree.gov/sites/default/files/pdf/clearing-the-air-accessible.pdf  ¨ www.smokefree.gov  ¨ www.cancer.org/healthy/stayawayfromtobacco/guidetoquittingsmoking/  · Talk with your healthcare provider about any restrictions you should make in your diet. In general, you should limit the amount of protein, salt, potassium, and phosphorus. Dont drink too many fluids. Dont add salt at the table, and stay away from salty foods. You may need a calcium supplement to help prevent osteoporosis.  · Talk with your healthcare provider about any medicines you are taking to find out if they need to be reduced or stopped.  · Dont take the following OTC medicines, or talk with your healthcare provider before you take them:  ¨ Aspirin, ibuprofen, naproxen, and other NSAIDs. You may be able to use these for a short time to help with fever or pain.  ¨ Laxatives and antacids with magnesium or aluminum  ¨ Phospho soda enemas with phosphorus  ¨ Certain stomach acid-blocking medicine such as cimetidine or ranitidine  ¨ Decongestants with pseudoephedrine  ¨ Herbal supplements  Follow-up care  Follow up with your healthcare provider, or as advised.  Contact one of the following for more information:  · American Association of Kidney Patients www.aakp.org  · National Kidney Foundation www.kidney.org  · American Kidney Fund www.kidneyfund.org  · National Kidney Disease Education Program www.nkdep.nih.gov  Call 911  Call 911 if any of the following occur:  · Severe weakness, dizziness, fainting, drowsiness, or confusion  · Chest pain or shortness of breath  · Heart beating fast, slowly, or irregularly  When to seek medical advice  Call your healthcare provider right away if any of these occur:  · Nausea or vomiting  · Fever of 100.4°F (38°C) or higher, or as directed by your healthcare provider  · Unexpected weight gain or swelling in the legs, ankles, or around your eyes  · You dont urinate as much as normal, or you arent able to  urinate  Date Last Reviewed: 10/1/2016  © 5944-8450 [employer]. [address] [place] [address], [place] [address]. All rights reserved. This information is not intended as a substitute for professional medical care. Always follow your healthcare professional's instructions.

## 2018-08-20 NOTE — PROGRESS NOTES
Subjective:       Patient ID: Rick Butler is a 58 y.o. male.    Chief Complaint: Follow-up (6 month f/u) and Hypertension    58-year-old gentleman who has a history of sickle cell trait and is transfusion dependent. His last transfusion was one unit was 2 months prior. His recent hemoglobin is 8.4. On his recent CBC he also has an elevated white blood cell count without a shift of 11.8. His platelet count is also elevated at 550K. most importantly his hematologist wanted him to see me for an elevated creatinine within normal blood urea nitrogen. The creatinine has been intermittently elevated over the past year it is the highest now at 1.68. Patient has no urinary complaints except he does urinate twice in the evening. He has chronic back pain but more recently had a rhizotomy to his lumbar spine which is given him complete relief with the pain that used to radiate down his back and originated in the lumbar area. He still has upper mid thoracic bilateral back discomfort where his kidneys are located. He has a history of hypertension and is on losartan with 12.5 mg of HCTZ. He does not take any NSAIDs over-the-counter especially now since his low back pain is in remission.      Review of Systems   Constitutional: Negative for activity change, diaphoresis, fatigue and fever.   HENT: Negative for congestion, ear pain, postnasal drip, sinus pressure, sore throat and trouble swallowing.    Eyes: Negative for pain.   Respiratory: Negative for cough, chest tightness, shortness of breath and wheezing.    Cardiovascular: Negative for chest pain, palpitations and leg swelling.   Gastrointestinal: Negative for abdominal distention, abdominal pain, blood in stool, constipation and diarrhea.   Endocrine: Negative for cold intolerance and heat intolerance.   Genitourinary: Negative for decreased urine volume, difficulty urinating, dysuria, flank pain, frequency and hematuria.   Musculoskeletal: Positive for back pain and gait  problem. Negative for arthralgias, joint swelling, myalgias and neck pain.   Skin: Negative for pallor, rash and wound.   Neurological: Negative for tremors, syncope, weakness, light-headedness, numbness and headaches.   Hematological: Negative for adenopathy.   Psychiatric/Behavioral: Negative for confusion, decreased concentration, hallucinations, self-injury, sleep disturbance and suicidal ideas. The patient is not nervous/anxious.        Objective:      Physical Exam   Constitutional: He is oriented to person, place, and time. He appears well-developed and well-nourished. No distress.   HENT:   Head: Normocephalic and atraumatic.   Right Ear: External ear normal.   Left Ear: External ear normal.   Nose: Nose normal.   Mouth/Throat: Oropharynx is clear and moist.   Eyes: Conjunctivae and EOM are normal. Right eye exhibits no discharge. Left eye exhibits no discharge. No scleral icterus.   Neck: Normal range of motion. Neck supple. No JVD present. No tracheal deviation present. No thyromegaly present.   Cardiovascular: Normal rate, regular rhythm, normal heart sounds and intact distal pulses. Exam reveals no gallop.   No murmur heard.  Pulmonary/Chest: Effort normal and breath sounds normal. No respiratory distress. He has no wheezes. He has no rales.   Abdominal: Soft. Bowel sounds are normal. He exhibits no distension and no mass. There is no tenderness.   Musculoskeletal: Normal range of motion. He exhibits no edema or tenderness.   Lymphadenopathy:     He has no cervical adenopathy.   Neurological: He is alert and oriented to person, place, and time.   Skin: Skin is warm and dry. No rash noted. He is not diaphoretic. No erythema.   Psychiatric: He has a normal mood and affect. His behavior is normal. Judgment and thought content normal.       Past Medical History:   Diagnosis Date    Depression     Former smoker 6/29/2017    H/O ETOH abuse 6/29/2017    Sickle cell anemia 6/29/2017       History reviewed.  No pertinent surgical history.    Family History   Problem Relation Age of Onset    Arthritis Mother     Depression Mother     Heart disease Mother     Hypertension Mother     Heart attack Father 59       Social History     Socioeconomic History    Marital status:      Spouse name: None    Number of children: None    Years of education: None    Highest education level: None   Social Needs    Financial resource strain: None    Food insecurity - worry: None    Food insecurity - inability: None    Transportation needs - medical: None    Transportation needs - non-medical: None   Occupational History    Occupation: Prep Cook     Employer: [employer]   Tobacco Use    Smoking status: Former Smoker     Last attempt to quit: 1999     Years since quittin.6    Smokeless tobacco: Never Used   Substance and Sexual Activity    Alcohol use: No     Comment: quit 6/15/16, in AA    Drug use: No    Sexual activity: None   Other Topics Concern    None   Social History Narrative    None       Current Outpatient Medications   Medication Sig Dispense Refill    folic acid (FOLVITE) 1 MG tablet Take 1 tablet (1 mg total) by mouth once daily. 90 tablet 3    multivitamin capsule Take 1 capsule by mouth once daily.      multivitamin capsule Take 1 capsule by mouth once daily.      omeprazole (PRILOSEC) 20 MG capsule Take 1 capsule (20 mg total) by mouth once daily. 30 capsule 6    tiZANidine (ZANAFLEX) 4 MG tablet Take 1 tablet (4 mg total) by mouth every 12 (twelve) hours as needed. 180 tablet 0    traZODone (DESYREL) 100 MG tablet Take 1 tablet (100 mg total) by mouth every evening. 30 tablet 11    losartan (COZAAR) 100 MG tablet Take 1 tablet (100 mg total) by mouth once daily. 90 tablet 3    tamsulosin (FLOMAX) 0.4 mg Cap Take 1 capsule (0.4 mg total) by mouth once daily. 30 capsule 11    traMADol (ULTRAM) 50 mg tablet Take 1 tablet (50 mg total) by mouth every 12 (twelve) hours as needed  for Pain. 45 tablet 3     No current facility-administered medications for this visit.        Review of patient's allergies indicates:  No Known Allergies    Lab Results   Component Value Date    WBC 11.2 (H) 08/15/2018    HGB 8.4 (L) 08/15/2018    HCT 24.3 (L) 08/15/2018     (H) 08/15/2018    CHOL 118 (L) 06/08/2015    TRIG 26 (L) 06/08/2015    HDL 72 06/08/2015    ALT 36 06/08/2015    AST 23 08/15/2018     08/15/2018    K 3.8 08/15/2018     08/15/2018    CREATININE 1.63 (H) 08/15/2018    BUN 21 (H) 08/15/2018    CO2 25.3 08/15/2018    TSH 2.01 11/03/2017    PSA 0.3 11/03/2017    HGBA1C 5.6 02/15/2018     Assessment:       1. Acute renal failure superimposed on stage 3 chronic kidney disease, unspecified acute renal failure type    2. BPH associated with nocturia    3. Folic acid deficiency    4. Chronic anemia    5. Essential hypertension    6. Screening for prostate cancer    7. Sickle cell trait    8. Dyslipidemia    9. Thrombocytopenia    10. Leukocytosis, unspecified type    11. Other spondylosis, lumbar region    12. Need for pneumococcal vaccination        Plan:       Acute renal failure superimposed on stage 3 chronic kidney disease, unspecified acute renal failure type  -     US Retroperitoneal Complete (Kidney and; Future; Expected date: 08/20/2018  -     Urinalysis, Complete with Reflex To Culture; Future; Expected date: 08/21/2018  -     Microalbumin/creatinine urine ratio; Future; Expected date: 08/20/2018    BPH associated with nocturia  -     tamsulosin (FLOMAX) 0.4 mg Cap; Take 1 capsule (0.4 mg total) by mouth once daily.  Dispense: 30 capsule; Refill: 11    Folic acid deficiency-continue folic acid 1 mg    Chronic anemia  -     CBC auto differential; Future; Expected date: 08/20/2018  -     Ferritin; Future; Expected date: 08/20/2018  -     Iron and TIBC; Future; Expected date: 08/20/2018    Essential hypertension  -     losartan (COZAAR) 100 MG tablet; Take 1 tablet (100 mg  total) by mouth once daily.  Dispense: 90 tablet; Refill: 3  -     Comprehensive metabolic panel; Future; Expected date: 08/20/2018  -     TSH; Future; Expected date: 08/20/2018  -     T4, free; Future; Expected date: 08/20/2018    Screening for prostate cancer  -     PSA, Screening; Future; Expected date: 08/20/2018    Sickle cell trait  -     US Abdomen Complete    Dyslipidemia  -     Lipid panel; Future; Expected date: 08/20/2018    Thrombocytopenia  -     US Abdomen Complete    Leukocytosis, unspecified type  -     Manual Differential; Future; Expected date: 08/20/2018  -     US Abdomen Complete    Other spondylosis, lumbar region  -     traMADol (ULTRAM) 50 mg tablet; Take 1 tablet (50 mg total) by mouth every 12 (twelve) hours as needed for Pain.  Dispense: 45 tablet; Refill: 3    Need for pneumococcal vaccination  -     Pneumococcal Polysaccharide Vaccine (23 Valent) (SQ/IM)

## 2018-08-20 NOTE — PROGRESS NOTES
[facility] Hematology/Oncology            PROGRESS NOTE      Subjective:       Patient ID:   NAME: Rick Butler : 1960     58 y.o. male    Referring Doc: Jesica Fonseca  Other Physicians:    Chief Complaint:  Sickle cell trait    History of Present Illness:     Patient returns today for a regularly scheduled follow-up visit.  The patient is doing ok overall. He is followed by Dr Jesica Fonseca and saw her today; no pain crisis; he has been sober for over a year now; he has been off tobacco too; no CP, SOB, HA's or N/V; occasional fatigue; he has had some chronic back pains and saw Dr Heredia and had a nerve burning procedure and has been doing much better          ROS:   GEN: normal without any fever, night sweats or weight loss  HEENT: normal with no HA's, sore throat, stiff neck, changes in vision  CV: normal with no CP, SOB, PND, MORA or orthopnea  PULM: normal with no SOB, cough, hemoptysis, sputum or pleuritic pain  GI: normal with no abdominal pain, nausea, vomiting, constipation, diarrhea, melanotic stools, BRBPR, or hematemesis  : normal with no hematuria, dysuria  BREAST: normal with no mass, discharge, pain  SKIN: normal with no rash, erythema, bruising, or swelling    Allergies:  Review of patient's allergies indicates:  No Known Allergies    Medications:    Current Outpatient Medications:     folic acid (FOLVITE) 1 MG tablet, Take 1 tablet (1 mg total) by mouth once daily., Disp: 90 tablet, Rfl: 3    losartan (COZAAR) 100 MG tablet, Take 1 tablet (100 mg total) by mouth once daily., Disp: 90 tablet, Rfl: 3    multivitamin capsule, Take 1 capsule by mouth once daily., Disp: , Rfl:     multivitamin capsule, Take 1 capsule by mouth once daily., Disp: , Rfl:     omeprazole (PRILOSEC) 20 MG capsule, Take 1 capsule (20 mg total) by mouth once daily., Disp: 30 capsule, Rfl: 6    tamsulosin (FLOMAX) 0.4 mg Cap, Take 1 capsule (0.4 mg total) by mouth once daily., Disp: 30 capsule, Rfl: 11    tiZANidine  "(ZANAFLEX) 4 MG tablet, Take 1 tablet (4 mg total) by mouth every 12 (twelve) hours as needed., Disp: 180 tablet, Rfl: 0    traMADol (ULTRAM) 50 mg tablet, Take 1 tablet (50 mg total) by mouth every 12 (twelve) hours as needed for Pain., Disp: 45 tablet, Rfl: 3    traZODone (DESYREL) 100 MG tablet, Take 1 tablet (100 mg total) by mouth every evening., Disp: 30 tablet, Rfl: 11    PMHx/PSHx Updates:  See patient's last visit with me on 2/19/2018  See H&P on 7/9/2011      Pathology:  none        Objective:     Vitals:  Blood pressure 127/62, pulse 67, temperature 98.5 °F (36.9 °C), height 5' 10.5" (1.791 m), weight 99.2 kg (218 lb 9.6 oz).    Physical Examination:   GEN: no apparent distress, comfortable; AAOx3  HEAD: atraumatic and normocephalic  EYES: no pallor, no icterus, PERRLA  ENT: OMM, no pharyngeal erythema, external ears WNL; no nasal discharge; no thrush  NECK: no masses, thyroid normal, trachea midline, no LAD/LN's, supple  CV: RRR with no murmur; normal pulse; normal S1 and S2; no pedal edema  CHEST: Normal respiratory effort; CTAB; normal breath sounds; no wheeze or crackles  ABDOM: nontender and nondistended; soft; normal bowel sounds; no rebound/guarding  MUSC/Skeletal: ROM normal; no crepitus; joints normal; no deformities or arthropathy  EXTREM: no clubbing, cyanosis, inflammation or swelling  SKIN: no rashes, lesions, ulcers, petechiae or subcutaneous nodules  : no jiang  NEURO: grossly intact; motor/sensory WNL; AAOx3; no tremors  PSYCH: normal mood, affect and behavior  LYMPH: normal cervical, supraclavicular, axillary and groin LN's            Labs:     8/15/2018  Lab Results   Component Value Date    WBC 11.2 (H) 08/15/2018    HGB 8.4 (L) 08/15/2018    HCT 24.3 (L) 08/15/2018    MCV 93.1 08/15/2018     (H) 08/15/2018    CMP  Sodium   Date Value Ref Range Status   08/15/2018 141 134 - 144 mmol/L      Potassium   Date Value Ref Range Status   08/15/2018 3.8 3.5 - 5.0 mmol/L      Chloride "   Date Value Ref Range Status   08/15/2018 108 98 - 110 mmol/L      CO2   Date Value Ref Range Status   08/15/2018 25.3 22.8 - 31.6 mmol/L      Glucose   Date Value Ref Range Status   08/15/2018 80 70 - 99 mg/dL      BUN, Bld   Date Value Ref Range Status   08/15/2018 21 (H) 8 - 20 mg/dL      Creatinine   Date Value Ref Range Status   08/15/2018 1.63 (H) 0.60 - 1.40 mg/dL      Calcium   Date Value Ref Range Status   08/15/2018 8.8 7.7 - 10.4 mg/dL      Total Protein   Date Value Ref Range Status   08/15/2018 7.6 6.0 - 8.2 g/dL      Albumin   Date Value Ref Range Status   08/15/2018 4.5 3.1 - 4.7 g/dL      Total Bilirubin   Date Value Ref Range Status   08/15/2018 0.7 0.3 - 1.0 mg/dL      Alkaline Phosphatase   Date Value Ref Range Status   08/15/2018 50 40 - 104 IU/L      AST   Date Value Ref Range Status   08/15/2018 23 10 - 40 IU/L      ALT   Date Value Ref Range Status   06/08/2015 36 10 - 44 U/L Final     Anion Gap   Date Value Ref Range Status   06/08/2015 5 (L) 8 - 16 mmol/L Final     eGFR if    Date Value Ref Range Status   06/08/2015 >60.0 >60 mL/min/1.73 m^2 Final     eGFR if non    Date Value Ref Range Status   06/08/2015 >60.0 >60 mL/min/1.73 m^2 Final     Comment:     Calculation used to obtain the estimated glomerular filtration  rate (eGFR) is the CKD-EPI equation. Since race is unknown   in our information system, the eGFR values for   -American and Non--American patients are given   for each creatinine result.       I have reviewed all available lab results and radiology reports.    Radiology/Diagnostic Studies:    2/15/2018 Xray Survey:   IMPRESSION: No significant abnormality seen. No evidence of osteoblastic or  osteoclastic lesions identified    MRI L-spine 2/12/2018  IMPRESSION:    1. Prominent low signal intensity throughout the bone marrow on T1 and  T2-weighted imaging. Marrow infiltrative process including malignancy such as  metastatic disease  "or lymphoma/leukemia is a consideration along with multiple  myeloma or malignant process. Benign etiology such as osteopetrosis or  regenerative red marrow are also considerations.    2. Multilevel lumbar degenerative changes, most prominent at L4-L5 and L5-S1 as  described.    Assessment/Plan:   (1) 58 y.o. male with diagnosis of sickle cell anemia with borderline microcytosis  - latest hgb on 8/15/2018 and adequate at 8.4 and within his usual range  - he is not symptomatic at this time      (2) Alcohol abuse issues in past - he has been sober for over 3 years now    (3) former smoker    (4) chronic back issues - recent Xrays and MRI - suspect the findings on the MRI are possibly due to his sickle cell;   - SPEP was previously negative for M-protein  - PSA was 0.3 in Sept 2017  - recommended neurosurgery evaluation previously  - he saw Dr Nura HEREDIA and had a nerve "burning" procedure and his pain has improved    (5) elevated creatinine - Dr Fonseca ordered renal US        1. H/O ETOH abuse     2. Continuous chronic alcoholism     3. 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 Macrocytic anemia     6. Chronic anemia     7. Alcohol induced fatty liver     8. Former smoker       PLAN;  1. Check labs monthly- encouraged compliance  2. RTC 4-6 months  3. F/U and Fax note to Dr Jesica Fonseca, Dr Nura Heredia  4. I previously recommended that he see a Neurosurgery for evaluation of the MRI findings  5. Encouraged continued sobriety    I spent over 15 mins with the patient, of which over half was face to face with the patient. Reviewing materials, labs, reports and studies. Making treatment and analytical decisions. Ordering necessary labs, tests and studies.          Discussion:     I have explained all of the above in detail and the patient understands all of the current recommendation(s). I have answered all of their questions to the best of my ability and to their complete satisfaction.   The patient is to " continue with the current management plan.            Electronically signed by Jose Tello MD

## 2018-08-20 NOTE — LETTER
August 20, 2018      Jesica Fonseca MD  [address]  [address]  [place] [address]           [facility] - Hematology Oncology  [address]  [address]  [place] [address]  Phone: [phone]  Fax: [phone]          Patient: Rick Butler   MR Number: [id number]   YOB: 1960   Date of Visit: 8/20/2018       Dear Dr. Jesica Fonseca:    Thank you for referring Rick Butler to me for evaluation. Attached you will find relevant portions of my assessment and plan of care.    If you have questions, please do not hesitate to call me. I look forward to following Rick Butler along with you.    Sincerely,    Jose Tello MD    Enclosure  CC:  No Recipients    If you would like to receive this communication electronically, please contact [email or web address] or [phone] to request more information on [employer] access.    For providers and/or their staff who would like to refer a patient to [facility], please contact us through our one-stop-shop provider referral line, [facility], at [phone].    If you feel you have received this communication in error or would no longer like to receive these types of communications, please e-mail [email or web address]

## 2018-08-23 ENCOUNTER — TELEPHONE (OUTPATIENT)
Dept: FAMILY MEDICINE | Facility: CLINIC | Age: 58
End: 2018-08-23

## 2018-08-23 NOTE — TELEPHONE ENCOUNTER
Pt states he has not gotten a call from [facility] for the Viagra.  I can't find a script that we should have faxed. Can you write one and I'll fax it. thx.

## 2018-08-28 RX ORDER — SILDENAFIL 25 MG/1
25 TABLET, FILM COATED ORAL DAILY PRN
Qty: 30 TABLET | Refills: 3 | Status: CANCELLED | OUTPATIENT
Start: 2018-08-28

## 2018-08-28 RX ORDER — SILDENAFIL 100 MG/1
100 TABLET, FILM COATED ORAL DAILY PRN
Qty: 10 TABLET | Refills: 6 | Status: SHIPPED | OUTPATIENT
Start: 2018-08-28 | End: 2019-09-30 | Stop reason: SDUPTHER

## 2018-08-28 RX ORDER — SILDENAFIL 25 MG/1
25 TABLET, FILM COATED ORAL DAILY PRN
COMMUNITY
End: 2018-08-28

## 2018-08-28 NOTE — TELEPHONE ENCOUNTER
Pt called and requests prescription for viagra. States you discussed in his last office visit. Not sure which dosage you want to prescribe. I chose the 25mg. You may want to change to the 50mg.  [facility] pharmacy is already set up in his profile for escribing.

## 2018-08-31 ENCOUNTER — OFFICE VISIT (OUTPATIENT)
Dept: PAIN MEDICINE | Facility: CLINIC | Age: 58
End: 2018-08-31
Payer: COMMERCIAL

## 2018-08-31 VITALS
WEIGHT: 218 LBS | DIASTOLIC BLOOD PRESSURE: 88 MMHG | HEART RATE: 66 BPM | HEIGHT: 70 IN | BODY MASS INDEX: 31.21 KG/M2 | SYSTOLIC BLOOD PRESSURE: 152 MMHG

## 2018-08-31 DIAGNOSIS — M51.36 DDD (DEGENERATIVE DISC DISEASE), LUMBAR: ICD-10-CM

## 2018-08-31 DIAGNOSIS — M79.18 MYOFASCIAL PAIN: ICD-10-CM

## 2018-08-31 DIAGNOSIS — M47.816 LUMBAR FACET ARTHROPATHY: Primary | ICD-10-CM

## 2018-08-31 PROCEDURE — 3008F BODY MASS INDEX DOCD: CPT | Mod: CPTII,S$GLB,, | Performed by: PHYSICIAN ASSISTANT

## 2018-08-31 PROCEDURE — 3079F DIAST BP 80-89 MM HG: CPT | Mod: CPTII,S$GLB,, | Performed by: PHYSICIAN ASSISTANT

## 2018-08-31 PROCEDURE — 99214 OFFICE O/P EST MOD 30 MIN: CPT | Mod: S$GLB,,, | Performed by: PHYSICIAN ASSISTANT

## 2018-08-31 PROCEDURE — 99999 PR PBB SHADOW E&M-EST. PATIENT-LVL IV: CPT | Mod: PBBFAC,,, | Performed by: PHYSICIAN ASSISTANT

## 2018-08-31 PROCEDURE — 3077F SYST BP >= 140 MM HG: CPT | Mod: CPTII,S$GLB,, | Performed by: PHYSICIAN ASSISTANT

## 2018-08-31 NOTE — PATIENT INSTRUCTIONS
Exercises To Strengthen Your Lower Back  Strong lower-back and abdominal muscles work together to support your spine. These exercises will help strengthen the muscles of the lower back. It is important that you begin exercising slowly and increase levels gradually.  Always begin any exercise program with stretching. If you feel pain while doing any of these exercises, stop and talk to your doctor about a more specific exercise program that suits your condition better.  Low Back Stretch  · Lie on your back with your knees bent and both feet on the ground.  ·   Slowly raise your left knee to your chest as you flatten your lower back against the floor. Hold for 5 seconds.  · Relax and repeat the exercise with your right knee.  · Do 10 of these exercises for each leg.  · Repeat hugging both knees to your chest at the same time.  Building Lower Back Strength  1. Kneeling Lumbar Extension: Begin on your hands and knees. Simultaneously raise and straighten your right arm and left leg until they are parallel to the ground. Hold for 2 seconds and come back slowly to a starting position. Repeat with left arm and right leg, alternating 10 times.  2. Prone Lumbar Extension: Lie face down, arms extended overhead, palms on the floor. Simultaneously raise your right arm and left leg as high as comfortably possible. Hold for 10 seconds and slowly return to start. Repeat with left arm and right leg, alternating 10 times. Gradually build up to 20 times. (Advanced: Repeat this exercise raising both arms and both legs a few inches off the floor at the same time. Hold for 5 seconds and release.)  3. Pelvic Tilt: Lie on the floor on your back with your knees bent at 90 degrees. Your feet should be flat on the floor. Inhale, exhale, then slowly contract your abdominal muscles bringing your navel toward your spine. Let your pelvis rock back until your lower back is flat on the floor. Hold for 10 seconds while breathing  smoothly.  4. Abdominal Crunch: Perform a Pelvic Tilt (above) flattening your lower back against the floor. Holding the tension in your abdominal muscles, take another breath and raise your shoulder blades off the ground (this is not a full sit-up). Keep your head in line with your body (dont bend your neck forward). Hold for 2 seconds, then slowly lower.      Back Exercises: Abdominal Lift  The Abdominal Lift strengthens your lower abdominal muscles, helping you keep your pelvis and back stable.  · Lie on the floor with both knees bent. Put your feet flat on the floor and your arms by your sides. Tighten your abdominal muscles.  · Lift one bent knee and move it toward your upper body. Keep your abdominal muscles tight and your back flat on the floor. Hold for 10 seconds.  · Repeat 3 times. Then, switch legs.         Back Exercises: Bridge  The Bridge exercise strengthens your abdominal, buttocks, and hamstring muscles. This helps keep your back stable and aligned when you walk.  · Lie on the floor with your back and palms flat. Bend your knees. Keep your feet flat on the floor.  · Contract your abdominal and buttocks muscles. Slowly lift your buttocks off the floor until there is a straight line from your knees to your shoulders.  · Hold for 5  seconds. Repeat 10 times.          Back Exercises: Hip Lift        To start, lie on your back with your knees bent and feet flat on the floor. Dont press your neck or lower back to the floor. Breathe deeply. You should feel comfortable and relaxed in this position.  · Slowly raise your hips upward.  · Tighten your abdomen and buttocks. Be careful not to arch your back.  · Hold for 5 seconds. Lower your hips to the floor.  · Repeat 10 times.  For your safety, check with your healthcare provider before starting an exercise program.       Back Exercises: Hip Rotator Stretch        To start, lie on your back with your knees bent and feet flat on the floor. Dont press your  neck or lower back to the floor. Breathe deeply. You should feel comfortable and relaxed in this position.  · Rest your right ankle on your left knee.  · Place a towel behind your left thigh and use it to pull the knee toward your chest. Feel the stretch in your buttocks.  · Hold for 30-60 seconds. Release.  · Repeat 2 times.  · Switch legs.   For your safety, check with your healthcare provider before starting an exercise program.       Back Exercises: Knee Lift        To start, lie on your back with your knees bent and feet flat on the floor. Dont press your neck or lower back to the floor. Breathe deeply. You should feel comfortable and relaxed in this position.  · Lift one bent knee and move it toward your upper body. Keep your abdominal muscles tight and your back flat on the floor.  · Hold for 10 seconds. Return to start position.  · Repeat 3 times.  · Switch legs.        Back Exercises: Leg Pull        To start, lie on your back with your knees bent and feet flat on the floor. Dont press your neck or lower back to the floor. Breathe deeply. You should feel comfortable and relaxed in this position.  · Pull one knee to your chest.  · Hold for 30-60 seconds. Return to starting position.  · Repeat 2 times.  · Switch legs.  · For a double leg pull, pull both legs to your chest at the same time. Repeat 2 times.  For your safety, check with your healthcare provider before starting an exercise program.       Back Exercises: Leg Reach        Do this exercise on your hands and knees. Keep your knees under your hips and your hands under your shoulders. Keep your spine in a neutral position (not arched or sagging). Be sure to maintain your necks natural curve.  · Extend one leg straight back. Dont arch your back or let your head or body sag.  · Hold for 5 seconds. Return to starting position.  · Repeat 5 times.  · Switch legs.       Back Exercises: Lower Back Rotation  To start, lie on your back with your knees bent  and feet flat on the floor. Dont press your neck or lower back to the floor. Breathe deeply. You should feel comfortable and relaxed in this position.  · Drop both knees to one side. Turn your head to the other side. Keep your shoulders flat on the floor.  · Hold for 20 seconds.  · Slowly switch sides.  · Repeat 2 times.                                   Back Exercises: Lower Back Stretch                        To start, sit in a chair with your feet flat on the floor. Shift your weight slightly forward to avoid rounding your back. Relax, and keep your ears, shoulders, and hips aligned.  · Sit with your feet well apart.  · Bend forward and touch the floor with the backs of your hands. Relax and let your body drop.  · Hold for 20 seconds. Return to starting position.  · Repeat 2 times.       Back Exercises: Partial Curl-Ups        To start, lie on your back with your knees bent and feet flat on the floor. Dont press your neck or lower back to the floor. Breathe deeply. You should feel comfortable and relaxed in this position.  · Cross your arms loosely.  · Tighten your abdomen and curl prison up, keeping your head in line with your shoulders.  · Hold for 5 seconds. Uncurl to lie down.  · Repeat 5 times.       Back Exercises: Pelvic Tilt  To start, lie on your back with your knees bent and feet flat on the floor. Dont press your neck or lower back to the floor. Breathe deeply. You should feel comfortable and relaxed in this position.  · Tighten your abdomen and buttocks, and press your lower back toward the floor. This should be a small, subtle movement.  · Hold for 5 seconds. Release.  · Repeat 5 times.                           Back Exercises: Seated Rotation      To start, sit in a chair with your feet flat on the floor. Shift your weight slightly forward to avoid rounding your back. Relax, and keep your ears, shoulders, and hips aligned.  · Fold your arms, elbows just below shoulder height.  · Turn from the  waist with hips forward. Turn your head last.  · Hold for a count of 5. Return to starting position.  · Repeat 5 times on one side. Then switch sides.          Back Exercises: Side Stretch  To start, sit in a chair with your feet flat on the floor. Shift your weight slightly forward to avoid rounding your back. Relax. Keep your ears, shoulders, and hips aligned.  · Stretch your right arm overhead.  · Slowly bend to the left. Dont twist your torso.  · Hold for 20 seconds. Return to starting position.  · Repeat 2 times. Then, switch to the other side.      © 0533-1712 [employer]. [address] [place] [address], [place] [address]. All rights reserved. This information is not intended as a substitute for professional medical care. Always follow your healthcare professional's instructions.

## 2018-08-31 NOTE — PROGRESS NOTES
Referring Physician: No ref. provider found    PCP: Jesica Miranda MD      CC: low back pain    Interval History:  Rick Butler is a 58 y.o. male with chronic low back pain who presents today for f/u s/p lumbar MB RFA bilaterally at L3, 4, 5. Reports 80% improvment in low back pain. He c/o pain in mid back that occurs after resting. He does not have pain when he is active. Pain occurs after prolonged sitting when he tries to get up. Pain is aching in nature. Denies any LE weakness or b/b changes. Pain today is rated 2/10.  Pt has been seen in the clinic before, however pt is new to me.     History below per Dr. Heredia    HPI:   Rick Butler is a 58 y.o. male referred to us for lower back pain.  Pain has been present for many years but has gradually worsened over past 6 months.  No recent traumatic incident.  He presents with constant aching, deep pain in his lower back.  Pain does not radiate.  Pain worsens with prolonged sitting, standing and getting up.  Pain improves with rest.  He has had physical therapy in past with minimal benefit.  He performs daily exercises with mild benefits.  He recently had x-rays and MRIs of his lumbar spine.  He denies any weakness.  No bowel bladder changes.  He rates his pain 4/10 today, 8/10 at worse.    ROS:  CONSTITUTIONAL: No fevers, chills, night sweats, wt. loss, appetite changes  SKIN: no rashes or itching  ENT: No headaches, head trauma, vision changes, or eye pain  LYMPH NODES: None noticed   CV: No chest pain, palpitations.   RESP: No shortness of breath, dyspnea on exertion, cough, wheezing, or hemoptysis  GI: No nausea, emesis, diarrhea, constipation, melena, hematochezia, pain.    : No dysuria, hematuria, urgency, or frequency   HEME: No easy bruising, bleeding problems  PSYCHIATRIC: No depression, anxiety, psychosis, hallucinations.  NEURO: No seizures, memory loss, dizziness or difficulty sleeping  MSK: + History of present illness      Past Medical History:  "  Diagnosis Date    Depression     Former smoker 2017    H/O ETOH abuse 2017    Sickle cell anemia 2017     History reviewed. No pertinent surgical history.  Family History   Problem Relation Age of Onset    Arthritis Mother     Depression Mother     Heart disease Mother     Hypertension Mother     Heart attack Father 59     Social History     Socioeconomic History    Marital status:      Spouse name: None    Number of children: None    Years of education: None    Highest education level: None   Social Needs    Financial resource strain: None    Food insecurity - worry: None    Food insecurity - inability: None    Transportation needs - medical: None    Transportation needs - non-medical: None   Occupational History    Occupation: Prep Cook     Employer: [employer]   Tobacco Use    Smoking status: Former Smoker     Last attempt to quit: 1999     Years since quittin.6    Smokeless tobacco: Never Used   Substance and Sexual Activity    Alcohol use: No     Comment: quit 6/15/16, in [facility]    Drug use: No    Sexual activity: None   Other Topics Concern    None   Social History Narrative    None         Medications/Allergies: See med card    Vitals:    18 1121   BP: (!) 152/88   Pulse: 66   Weight: 98.9 kg (218 lb)   Height: 5' 10" (1.778 m)   PainSc:   2   PainLoc: Back       Physical exam:    GENERAL: A and O x3, the patient appears well groomed and is in no acute distress.  Skin: No rashes or obvious lesions  HEENT: normocephalic, atraumatic  CARDIOVASCULAR:  RRR  LUNGS: non labored breathing  ABDOMEN: soft, nontender   UPPER EXTREMITIES: Normal alignment, normal range of motion, no atrophy, no skin changes,  hair growth and nail growth normal and equal bilaterally. No swelling, no tenderness.    LOWER EXTREMITIES:  Normal alignment, normal range of motion, no atrophy, no skin changes,  hair growth and nail growth normal and equal bilaterally. No swelling, " no tenderness.    LUMBAR SPINE  Lumbar spine: ROM is full with flexion extension and oblique extension without increased pain.    Aris's test causes no increased pain on either side.    Supine straight leg raise is negative bilaterally.    Internal and external rotation of the hip causes no increased pain on either side.  Myofascial exam: No tenderness to palpation across lumbar paraspinous muscles. TTP right thoracic paraspinous muscles.       MENTAL STATUS: normal orientation, speech, language, and fund of knowledge for social situation.  Emotional state appropriate.    CRANIAL NERVES:  II:  PERRL bilaterally,   III,IV,VI: EOMI.    V:  Facial sensation equal bilaterally  VII:  Facial motor function normal.  VIII:  Hearing equal to finger rub bilaterally  IX/X: Gag normal, palate symmetric  XI:  Shoulder shrug equal, head turn equal  XII:  Tongue midline without fasciculations      MOTOR: Tone and bulk: normal bilateral upper and lower Strength: normal   Delt Bi Tri WE WF     R 5 5 5 5 5 5   L 5 5 5 5 5 5     IP ADD ABD Quad TA Gas HAM  R 5 5 5 5 5 5 5  L 5 5 5 5 5 5 5    SENSATION: Light touch and pinprick intact bilaterally  REFLEXES: normal, symmetric, nonbrisk.  Toes down, no clonus. No hoffmans.  GAIT: normal rise, base, steps, and arm swing.        Imaging:  MRI L-spine 2/2018  At L3-L4, circumferential disc bulge causes minor central canal without neural  foramen narrowing.    At L4-L5, minor circumferential disc bulge and moderate bilateral facet joint  osteoarthrosis causes no central canal or neural foramen narrowing.    At L5-S1, circumferential disc bulge and mild bilateral facet joint  osteoarthrosis causes no central canal but mild bilateral neural foramen  narrowing.    Assessment:  Rick Butler is a 58 y.o. male with back pain  1. Lumbar facet arthropathy    2. DDD (degenerative disc disease), lumbar    3. Myofascial pain      Plan:  1.  I have stressed the importance of physical activity and  exercise to improve overall health. Home exercises provided  2. Monitor progress and repeat lumbar MB RFA as indicated  3. F/u prn

## 2018-09-04 ENCOUNTER — TELEPHONE (OUTPATIENT)
Dept: FAMILY MEDICINE | Facility: CLINIC | Age: 58
End: 2018-09-04

## 2018-09-04 NOTE — TELEPHONE ENCOUNTER
Pt. Called to inform of lab results and if pharmacy called about prescription. LM to call facility back at his earliest convenience.

## 2018-09-04 NOTE — TELEPHONE ENCOUNTER
----- Message from Jesica Fonseca MD sent at 9/1/2018  4:14 PM CDT -----  Mild enlargement of the spleen which is not bad and some fatty liver otherwise negative     Did the pharmacy in [facility] call him for the sidenifil ?

## 2018-09-12 LAB
ALBUMIN SERPL-MCNC: 4.5 G/DL (ref 3.1–4.7)
ALP SERPL-CCNC: 46 IU/L (ref 40–104)
ALT (SGPT): 13 IU/L (ref 3–33)
AST SERPL-CCNC: 17 IU/L (ref 10–40)
BASOPHILS NFR BLD: 0.1 K/UL (ref 0–0.2)
BASOPHILS NFR BLD: 0.9 %
BILIRUB SERPL-MCNC: 1.1 MG/DL (ref 0.3–1)
BUN SERPL-MCNC: 15 MG/DL (ref 8–20)
CALCIUM SERPL-MCNC: 9.3 MG/DL (ref 7.7–10.4)
CHLORIDE: 107 MMOL/L (ref 98–110)
CO2 SERPL-SCNC: 26.6 MMOL/L (ref 22.8–31.6)
CREATININE: 1.37 MG/DL (ref 0.6–1.4)
EOSINOPHIL NFR BLD: 0.2 K/UL (ref 0–0.7)
EOSINOPHIL NFR BLD: 1.5 %
ERYTHROCYTE [DISTWIDTH] IN BLOOD BY AUTOMATED COUNT: 26.8 % (ref 11.7–14.9)
GLUCOSE: 94 MG/DL (ref 70–99)
GRAN #: 7.5 K/UL (ref 1.4–6.5)
GRAN%: 70.1 %
HCT VFR BLD AUTO: 25.6 % (ref 39–55)
HGB BLD-MCNC: 8.7 G/DL (ref 14–16)
IMMATURE GRANS (ABS): 0 K/UL (ref 0–1)
IMMATURE GRANULOCYTES: 0.2 %
LYMPH #: 1.6 K/UL (ref 1.2–3.4)
LYMPH%: 15 %
MCH RBC QN AUTO: 31.8 PG (ref 25–35)
MCHC RBC AUTO-ENTMCNC: 34 G/DL (ref 31–36)
MCV RBC AUTO: 93.4 FL (ref 80–100)
MONO #: 1.3 K/UL (ref 0.1–0.6)
MONO%: 12.3 %
NUCLEATED RBCS: 0 %
PLATELET # BLD AUTO: 540 K/UL (ref 140–440)
PMV BLD AUTO: 10.6 FL (ref 8.8–12.7)
POTASSIUM SERPL-SCNC: 4.4 MMOL/L (ref 3.5–5)
PROT SERPL-MCNC: 7.8 G/DL (ref 6–8.2)
RBC # BLD AUTO: 2.74 M/UL (ref 4.3–5.9)
SODIUM: 139 MMOL/L (ref 134–144)
WBC # BLD AUTO: 10.6 K/UL (ref 5–10)

## 2018-09-13 ENCOUNTER — TELEPHONE (OUTPATIENT)
Dept: FAMILY MEDICINE | Facility: CLINIC | Age: 58
End: 2018-09-13

## 2018-09-13 NOTE — TELEPHONE ENCOUNTER
----- Message from Jesica Fonseca MD sent at 9/12/2018  5:48 PM CDT -----  Kidney function is back to top normal - continue to avoid NSAIDS  Also there are some tests pending but he is still anemia (hemoglobin - 8.7, better than last month) and it could be a combo of iron def and folic acid deficiency. Is he still on folic acid 1 mg ?  Last checked in November of last year. We can order a recheck in november. Keep ov next month 10/8

## 2018-10-05 LAB
% SATURATION: 48 %
ALBUMIN SERPL-MCNC: 4.3 G/DL (ref 3.1–4.7)
ALP SERPL-CCNC: 46 IU/L (ref 40–104)
ALT (SGPT): 13 IU/L (ref 3–33)
AST SERPL-CCNC: 19 IU/L (ref 10–40)
BASOPHILS NFR BLD: 0.1 K/UL (ref 0–0.2)
BASOPHILS NFR BLD: 0.9 %
BILIRUB SERPL-MCNC: 1 MG/DL (ref 0.3–1)
BUN SERPL-MCNC: 13 MG/DL (ref 8–20)
CALCIUM SERPL-MCNC: 8.8 MG/DL (ref 7.7–10.4)
CHLORIDE: 106 MMOL/L (ref 98–110)
CO2 SERPL-SCNC: 28.2 MMOL/L (ref 22.8–31.6)
COMPLEXED PSA SERPL-MCNC: 0.3 NG/ML (ref 0–3)
CREATININE: 1.49 MG/DL (ref 0.6–1.4)
EOSINOPHIL NFR BLD: 0.1 K/UL (ref 0–0.7)
EOSINOPHIL NFR BLD: 1.1 %
ERYTHROCYTE [DISTWIDTH] IN BLOOD BY AUTOMATED COUNT: 27 % (ref 11.7–14.9)
FERRITIN SERPL-MCNC: 1077 NG/ML (ref 37–201)
GLUCOSE: 92 MG/DL (ref 70–99)
GRAN #: 5.6 K/UL (ref 1.4–6.5)
GRAN%: 68.3 %
HCT VFR BLD AUTO: 23.5 % (ref 39–55)
HGB BLD-MCNC: 7.9 G/DL (ref 14–16)
IMMATURE GRANS (ABS): 0 K/UL (ref 0–1)
IMMATURE GRANULOCYTES: 0.4 %
IRON: 88 MCG/DL (ref 32–176)
LYMPH #: 1.6 K/UL (ref 1.2–3.4)
LYMPH%: 20.2 %
MCH RBC QN AUTO: 31.3 PG (ref 25–35)
MCHC RBC AUTO-ENTMCNC: 33.6 G/DL (ref 31–36)
MCV RBC AUTO: 93.3 FL (ref 80–100)
MONO #: 0.7 K/UL (ref 0.1–0.6)
MONO%: 9.1 %
NUCLEATED RBCS: 0 %
PLATELET # BLD AUTO: 544 K/UL (ref 140–440)
PMV BLD AUTO: 10.6 FL (ref 8.8–12.7)
POTASSIUM SERPL-SCNC: 4.7 MMOL/L (ref 3.5–5)
PROT SERPL-MCNC: 7.2 G/DL (ref 6–8.2)
RBC # BLD AUTO: 2.52 M/UL (ref 4.3–5.9)
SODIUM: 141 MMOL/L (ref 134–144)
T4 FREE SP9 P CHAL SERPL-SCNC: 0.69 NG/DL (ref 0.45–1.27)
TOTAL IRON BINDING CAPACITY: 185 MCG/DL (ref 177–435)
TSH SERPL DL<=0.005 MIU/L-ACNC: 1.96 ULU/ML (ref 0.3–5.6)
WBC # BLD AUTO: 8.1 K/UL (ref 5–10)

## 2018-10-08 ENCOUNTER — TELEPHONE (OUTPATIENT)
Dept: FAMILY MEDICINE | Facility: CLINIC | Age: 58
End: 2018-10-08

## 2018-10-08 ENCOUNTER — OFFICE VISIT (OUTPATIENT)
Dept: FAMILY MEDICINE | Facility: CLINIC | Age: 58
End: 2018-10-08
Payer: COMMERCIAL

## 2018-10-08 VITALS
BODY MASS INDEX: 30.92 KG/M2 | SYSTOLIC BLOOD PRESSURE: 134 MMHG | HEART RATE: 71 BPM | RESPIRATION RATE: 16 BRPM | DIASTOLIC BLOOD PRESSURE: 78 MMHG | OXYGEN SATURATION: 98 % | WEIGHT: 216 LBS | TEMPERATURE: 99 F | HEIGHT: 70 IN

## 2018-10-08 DIAGNOSIS — N18.31 CKD STAGE G3A/A1, GFR 45-59 AND ALBUMIN CREATININE RATIO <30 MG/G: ICD-10-CM

## 2018-10-08 DIAGNOSIS — F51.01 PRIMARY INSOMNIA: ICD-10-CM

## 2018-10-08 DIAGNOSIS — D64.9 CHRONIC ANEMIA: ICD-10-CM

## 2018-10-08 DIAGNOSIS — I10 ESSENTIAL HYPERTENSION: Primary | ICD-10-CM

## 2018-10-08 DIAGNOSIS — M54.50 CHRONIC BILATERAL LOW BACK PAIN WITHOUT SCIATICA: ICD-10-CM

## 2018-10-08 DIAGNOSIS — K63.5 POLYP OF COLON, UNSPECIFIED PART OF COLON, UNSPECIFIED TYPE: ICD-10-CM

## 2018-10-08 DIAGNOSIS — D75.839 THROMBOCYTOSIS: ICD-10-CM

## 2018-10-08 DIAGNOSIS — G89.29 CHRONIC BILATERAL LOW BACK PAIN WITHOUT SCIATICA: ICD-10-CM

## 2018-10-08 PROBLEM — D72.829 LEUKOCYTOSIS: Status: RESOLVED | Noted: 2018-08-20 | Resolved: 2018-10-08

## 2018-10-08 PROCEDURE — 3078F DIAST BP <80 MM HG: CPT | Mod: ,,, | Performed by: INTERNAL MEDICINE

## 2018-10-08 PROCEDURE — 3075F SYST BP GE 130 - 139MM HG: CPT | Mod: ,,, | Performed by: INTERNAL MEDICINE

## 2018-10-08 PROCEDURE — 90686 IIV4 VACC NO PRSV 0.5 ML IM: CPT | Mod: ,,, | Performed by: INTERNAL MEDICINE

## 2018-10-08 PROCEDURE — 90471 IMMUNIZATION ADMIN: CPT | Mod: ,,, | Performed by: INTERNAL MEDICINE

## 2018-10-08 PROCEDURE — 3008F BODY MASS INDEX DOCD: CPT | Mod: ,,, | Performed by: INTERNAL MEDICINE

## 2018-10-08 PROCEDURE — 99214 OFFICE O/P EST MOD 30 MIN: CPT | Mod: 25,,, | Performed by: INTERNAL MEDICINE

## 2018-10-08 RX ORDER — TRAZODONE HYDROCHLORIDE 150 MG/1
150 TABLET ORAL NIGHTLY
Qty: 30 TABLET | Refills: 11 | Status: SHIPPED | OUTPATIENT
Start: 2018-10-08 | End: 2019-09-30 | Stop reason: SDUPTHER

## 2018-10-08 NOTE — TELEPHONE ENCOUNTER
Jose    Last hemoglobvin 7.9 on labs I did , he has iron overload too    I will see him today, any suggestions?    janice

## 2018-10-08 NOTE — PATIENT INSTRUCTIONS
Iron (Blood)  Does this test have other names?  Serum Fe  What is this test?  This test measures the level of iron in your blood.  Iron is an essential trace element in your blood. It helps your body make healthy red blood cells. Red blood cells carry oxygen throughout your bloodstream.  Having too little or too much iron can lead to health problems. Too little iron in your body can cause a condition called anemia. When this happens, your blood doesn't have enough iron to make the number of red blood cells needed to provide the amount of oxygen your body needs.  Iron-deficiency anemia is most often caused by blood loss, such as after an injury or surgery, or because of heavy menstrual bleeding.  Too much iron can be caused by hemochromatosis. This is a genetic condition that causes your blood to absorb too much iron.  Why do I need this test?  You may need this test if your healthcare provider suspects that you have too much or too little iron in your blood. Common symptoms of anemia include:  · Headache  · Weakness  · Fatigue  · Irritability  · Trouble exercising because of shortness of breath  Less common symptoms of anemia are brittle nails, restless leg syndrome, and a sore tongue.  Signs and symptoms of too much iron include liver problems, weakness, fatigue, darkening of the skin, and joint pain.  What other tests might I have along with this test?  Your healthcare provider may also order a total iron binding capacity test to measure the level of transferrin in your blood. Transferrin is a protein that carries iron from your digestive system to the cells in your body that need it.  Your healthcare provider may also measure your level of ferritin, another protein that helps store iron in your body. He or she may also order a complete blood count, or CBC, to get the full picture of the parts of your blood.  What do my test results mean?  Many things may affect your lab test results. These include the method  each lab uses to do the test. Even if your test results are different from the normal value, you may not have a problem. To learn what the results mean for you, talk with your healthcare provider.  Results are given in micrograms per deciliter (mcg/dL). Normal ranges of iron in the blood are 60 to 170 mcg/dL.  If your results are lower, it means you may have iron-deficiency anemia. Your healthcare provider will confirm this with other tests.  If your results are higher, it means you may have hemochromatosis.  Too much iron in the blood can also be from taking too many iron supplements or iron-enriched multivitamins.  How is this test done?  The test requires a blood sample, which is drawn through a needle from a vein in your arm.  Does this test pose any risks?  Taking a blood sample with a needle carries risks that include bleeding, infection, bruising, or feeling dizzy. When the needle pricks your arm, you may feel a slight stinging sensation or pain. Afterward, the site may be slightly sore.  What might affect my test results?  Other factors aren't likely to affect your results.  How do I get ready for this test?  You don't need to prepare for this test.  © 3673-8517 [employer]. [address] [place] [address], [place] [address]. All rights reserved. This information is not intended as a substitute for professional medical care. Always follow your healthcare professional's instructions.

## 2018-10-08 NOTE — TELEPHONE ENCOUNTER
----- Message from Anna Marie Mendez sent at 10/5/2018  3:37 PM CDT -----  LAB, [facility], MANUAL DIFF, 10/05/18

## 2018-10-08 NOTE — PROGRESS NOTES
Subjective:       Patient ID: Rick Butler is a 58 y.o. male.    Chief Complaint: Hypertension (lab review) and Insomnia    58-year-old gentleman with chronic anemia who requires recurrent blood transfusions as monitored very closely by hematology also has fairly new onset mild chronic kidney disease from unknown etiology, possibly NSAIDs. His last hemoglobin on recent blood work was 7.9 which is a little lower than his normal. He is asymptomatic at this time and has no symptoms of shortness of breath with exertion, chest pain or even overall worsening of generalized fatigue. He is having a problem sleeping however and he is on trazodone 100 mg and is interested in increased. This time his blood work showed a normal white blood cell count with a relatively normal differential. He also is having much improvement in nocturia on his Flomax. His blood pressure has been well controlled and he checks it at home as well as. He is also due for another colonoscopy for colon polyps. We did discuss iron chelation therapy as his ferritin was over 1000. I personally conferenced with his hematologist.      Review of Systems   Constitutional: Negative for activity change, diaphoresis, fatigue and fever.   HENT: Negative for congestion, ear pain, postnasal drip, sinus pressure, sore throat and trouble swallowing.    Eyes: Negative for pain.   Respiratory: Negative for cough, chest tightness, shortness of breath and wheezing.    Cardiovascular: Negative for chest pain, palpitations and leg swelling.   Gastrointestinal: Negative for abdominal distention, abdominal pain, blood in stool, constipation and diarrhea.   Endocrine: Negative for cold intolerance and heat intolerance.   Genitourinary: Negative for decreased urine volume, difficulty urinating, dysuria, flank pain, frequency and hematuria.   Musculoskeletal: Negative for arthralgias, back pain, joint swelling, myalgias and neck pain.   Skin: Negative for pallor, rash and wound.    Neurological: Negative for tremors, syncope, weakness, light-headedness, numbness and headaches.   Hematological: Negative for adenopathy.   Psychiatric/Behavioral: Negative for confusion, decreased concentration, hallucinations, self-injury, sleep disturbance and suicidal ideas. The patient is not nervous/anxious.        Past Medical History:   Diagnosis Date    Depression     Former smoker 6/29/2017    H/O ETOH abuse 6/29/2017    Sickle cell anemia 6/29/2017       Past Surgical History:   Procedure Laterality Date    BLOCK-NERVE-MEDIAL BRANCH-LUMBAR Bilateral 5/25/2018    Performed by Nura Heredia MD at [facility] OR    BLOCK-NERVE-MEDIAL BRANCH-LUMBAR Bilateral 4/20/2018    Performed by [facility]ia [facility] at [facility] OR    INJECTION OF ANESTHETIC AGENT AROUND MEDIAL BRANCH NERVES INNERVATING LUMBAR FACET JOINT Bilateral 5/25/2018    Procedure: BLOCK-NERVE-MEDIAL BRANCH-LUMBAR;  Surgeon: Nura Heredia MD;  Location: [facility] OR;  Service: Pain Management;  Laterality: Bilateral;  L3, 4, 5    RADIOFREQUENCY ABLATION OF LUMBAR MEDIAL BRANCH NERVE AT SINGLE LEVEL Bilateral 7/20/2018    Procedure: RADIOFREQUENCY ABLATION, NERVE, MEDIAL BRANCH, LUMBAR, 1 LEVEL;  Surgeon: Nura Heredia MD;  Location: [facility] OR;  Service: Pain Management;  Laterality: Bilateral;  L3, 4, 5 - Burned at 80 degrees C.  for 75 seconds x 2 each site    RADIOFREQUENCY ABLATION, NERVE, MEDIAL BRANCH, LUMBAR, 1 LEVEL Bilateral 7/20/2018    Performed by Nura Heredia MD at [facility] OR       Family History   Problem Relation Age of Onset    Arthritis Mother     Depression Mother     Heart disease Mother     Hypertension Mother     Heart attack Father 59       Social History     Socioeconomic History    Marital status:      Spouse name: None    Number of children: None    Years of education: None    Highest education level: None   Social Needs    Financial resource strain: None    Food insecurity - worry: None    Food insecurity - inability: None     Transportation needs - medical: None    Transportation needs - non-medical: None   Occupational History    Occupation: Prep Cook     Employer: [employer]   Tobacco Use    Smoking status: Former Smoker     Last attempt to quit: 1999     Years since quittin.7    Smokeless tobacco: Never Used   Substance and Sexual Activity    Alcohol use: No     Comment: quit 6/15/16, in AA    Drug use: No    Sexual activity: None   Other Topics Concern    None   Social History Narrative    None       Current Outpatient Medications   Medication Sig Dispense Refill    folic acid (FOLVITE) 1 MG tablet Take 1 tablet (1 mg total) by mouth once daily. 90 tablet 3    losartan (COZAAR) 100 MG tablet Take 1 tablet (100 mg total) by mouth once daily. 90 tablet 3    multivitamin capsule Take 1 capsule by mouth once daily.      omeprazole (PRILOSEC) 20 MG capsule Take 1 capsule (20 mg total) by mouth once daily. 30 capsule 6    sildenafil (VIAGRA) 100 MG tablet Take 1 tablet (100 mg total) by mouth daily as needed for Erectile Dysfunction. 10 tablet 6    tamsulosin (FLOMAX) 0.4 mg Cap Take 1 capsule (0.4 mg total) by mouth once daily. 30 capsule 11    tiZANidine (ZANAFLEX) 4 MG tablet Take 1 tablet (4 mg total) by mouth every 12 (twelve) hours as needed. 180 tablet 0    traMADol (ULTRAM) 50 mg tablet Take 1 tablet (50 mg total) by mouth every 12 (twelve) hours as needed for Pain. 45 tablet 3    traZODone (DESYREL) 150 MG tablet Take 1 tablet (150 mg total) by mouth nightly. 30 tablet 11     No current facility-administered medications for this visit.        Review of patient's allergies indicates:  No Known Allergies  Objective:      Physical Exam   Constitutional: He is oriented to person, place, and time. He appears well-developed and well-nourished. No distress.   HENT:   Head: Normocephalic and atraumatic.   Right Ear: External ear normal.   Left Ear: External ear normal.   Nose: Nose normal.   Mouth/Throat:  Oropharynx is clear and moist.   Eyes: Conjunctivae and EOM are normal. Right eye exhibits no discharge. Left eye exhibits no discharge. No scleral icterus.   Neck: Normal range of motion. Neck supple. No JVD present. No tracheal deviation present. No thyromegaly present.   Cardiovascular: Normal rate, regular rhythm, normal heart sounds and intact distal pulses. Exam reveals no gallop.   No murmur heard.  Pulmonary/Chest: Effort normal and breath sounds normal. No respiratory distress. He has no wheezes. He has no rales.   Abdominal: Soft. Bowel sounds are normal. He exhibits no distension and no mass. There is no tenderness.   Musculoskeletal: Normal range of motion. He exhibits no edema or tenderness.   Lymphadenopathy:     He has no cervical adenopathy.   Neurological: He is alert and oriented to person, place, and time.   Skin: Skin is warm and dry. No rash noted. He is not diaphoretic. No erythema.   Psychiatric: He has a normal mood and affect. His behavior is normal. Judgment and thought content normal.        Lab Results   Component Value Date    WBC 8.1 10/05/2018    HGB 7.9 (LL) 10/05/2018    HCT 23.5 (LL) 10/05/2018     (H) 10/05/2018    CHOL 118 (L) 06/08/2015    TRIG 26 (L) 06/08/2015    HDL 72 06/08/2015    ALT 36 06/08/2015    AST 19 10/05/2018     10/05/2018    K 4.7 10/05/2018     10/05/2018    CREATININE 1.49 (H) 10/05/2018    BUN 13 10/05/2018    CO2 28.2 10/05/2018    TSH 1.96 10/05/2018    PSA 0.3 10/05/2018    HGBA1C 5.6 02/15/2018       Assessment:       1. Essential hypertension    2. CKD stage G3a/A1, GFR 45-59 and albumin creatinine ratio <30 mg/g    3. Chronic anemia    4. Primary insomnia    5. Chronic bilateral low back pain without sciatica    6. Polyp of colon, unspecified part of colon, unspecified type    7. Thrombocytosis        Plan:       Essential hypertension-Controlled on above med regimen to include an ARB/ACE    CKD stage G3a/A1, GFR 45-59 and albumin  creatinine ratio <30 mg/g- avoid nsaids - recheck every 4 months  -     Microalbumin/creatinine urine ratio; Future; Expected date: 10/08/2018    Chronic anemia- etiology unknown - form of myelodysplasia. Patient at present not symptomatic as per history of present illness. She does not want to repeat the reticulocyte count until he actually feels symptoms as he kind of knows when he is anemic enough to require a blood transfusion.  White blood cell count has returned to normal but his platelet count continues to be elevated.-thrombocytosis - ESSENSTIAL?  -     CBC auto differential; Future; Expected date: 10/08/2018    Primary insomnia  -     traZODone (DESYREL) 150 MG tablet; Take 1 tablet (150 mg total) by mouth nightly.  Dispense: 30 tablet; Refill: 11    Chronic bilateral low back pain without sciatica-avoid NSAIDs and continue as a trauma    Polyp of colon, unspecified part of colon, unspecified type  -     Ambulatory referral to Gastroenterology    Other orders  -     Influenza - Quadrivalent (3 years & older) (PF)

## 2018-10-09 NOTE — TELEPHONE ENCOUNTER
I saw him today and he is asymptomatic. I gave him another lab sheet to recheck a CBC and he would like to recheck it when he starts to feel bad as he knows when he is anemic and needs a transfusion. I might need your help in setting up the iron chelate or however. Maybe I could call Rosalia someone else in your office to help reset it up and that time comes.  Thanks oJse

## 2018-10-26 LAB
ALBUMIN SERPL-MCNC: 4.3 G/DL (ref 3.1–4.7)
ALP SERPL-CCNC: 54 IU/L (ref 40–104)
ALT (SGPT): 14 IU/L (ref 3–33)
AST SERPL-CCNC: 20 IU/L (ref 10–40)
BASOPHILS NFR BLD: 0.1 K/UL (ref 0–0.2)
BASOPHILS NFR BLD: 1.1 %
BILIRUB SERPL-MCNC: 1 MG/DL (ref 0.3–1)
BUN SERPL-MCNC: 14 MG/DL (ref 8–20)
CALCIUM SERPL-MCNC: 9 MG/DL (ref 7.7–10.4)
CHLORIDE: 105 MMOL/L (ref 98–110)
CO2 SERPL-SCNC: 27 MMOL/L (ref 22.8–31.6)
CREATININE RANDOM URINE: 87 MG/DL
CREATININE: 1.35 MG/DL (ref 0.6–1.4)
EOSINOPHIL NFR BLD: 0.1 K/UL (ref 0–0.7)
EOSINOPHIL NFR BLD: 1.2 %
ERYTHROCYTE [DISTWIDTH] IN BLOOD BY AUTOMATED COUNT: 26.8 % (ref 11.7–14.9)
GLUCOSE: 97 MG/DL (ref 70–99)
GRAN #: 5.1 K/UL (ref 1.4–6.5)
GRAN%: 60.4 %
HCT VFR BLD AUTO: 26.2 % (ref 39–55)
HGB BLD-MCNC: 8.9 G/DL (ref 14–16)
IMMATURE GRANS (ABS): 0 K/UL (ref 0–1)
IMMATURE GRANULOCYTES: 0.2 %
LYMPH #: 2.1 K/UL (ref 1.2–3.4)
LYMPH%: 24.8 %
MCH RBC QN AUTO: 31.2 PG (ref 25–35)
MCHC RBC AUTO-ENTMCNC: 34 G/DL (ref 31–36)
MCV RBC AUTO: 91.9 FL (ref 80–100)
MICROALBUM.,U,RANDOM: 68.6 MCG/ML (ref 0–19.9)
MICROALBUMIN/CREATININE RATIO: 79 (ref 0–30)
MONO #: 1 K/UL (ref 0.1–0.6)
MONO%: 12.3 %
NUCLEATED RBCS: 0 %
PLATELET # BLD AUTO: 570 K/UL (ref 140–440)
PMV BLD AUTO: 10.2 FL (ref 8.8–12.7)
POTASSIUM SERPL-SCNC: 4.4 MMOL/L (ref 3.5–5)
PROT SERPL-MCNC: 7.7 G/DL (ref 6–8.2)
RBC # BLD AUTO: 2.85 M/UL (ref 4.3–5.9)
SODIUM: 137 MMOL/L (ref 134–144)
WBC # BLD AUTO: 8.4 K/UL (ref 5–10)

## 2018-10-29 ENCOUNTER — TELEPHONE (OUTPATIENT)
Dept: HEMATOLOGY/ONCOLOGY | Facility: CLINIC | Age: 58
End: 2018-10-29

## 2018-10-29 NOTE — TELEPHONE ENCOUNTER
Called patient left message that his HGB was 8.9 did not need a transfusion and that nubia wants to hold off as long as he can because the more often he is more likely to have sicle cell crisis

## 2018-10-31 ENCOUNTER — TELEPHONE (OUTPATIENT)
Dept: FAMILY MEDICINE | Facility: CLINIC | Age: 58
End: 2018-10-31

## 2018-10-31 NOTE — TELEPHONE ENCOUNTER
----- Message from Jesica Fonseca MD sent at 10/30/2018  5:39 PM CDT -----  Still spilling protein in his urine - keep ov next week to discuss

## 2018-11-05 ENCOUNTER — OFFICE VISIT (OUTPATIENT)
Dept: FAMILY MEDICINE | Facility: CLINIC | Age: 58
End: 2018-11-05
Payer: COMMERCIAL

## 2018-11-05 VITALS
HEART RATE: 86 BPM | SYSTOLIC BLOOD PRESSURE: 136 MMHG | OXYGEN SATURATION: 98 % | DIASTOLIC BLOOD PRESSURE: 80 MMHG | RESPIRATION RATE: 16 BRPM | WEIGHT: 215.81 LBS | HEIGHT: 70 IN | BODY MASS INDEX: 30.9 KG/M2

## 2018-11-05 DIAGNOSIS — N40.1 BPH ASSOCIATED WITH NOCTURIA: ICD-10-CM

## 2018-11-05 DIAGNOSIS — R80.9 PROTEINURIA, UNSPECIFIED TYPE: ICD-10-CM

## 2018-11-05 DIAGNOSIS — D75.839 THROMBOCYTOSIS: ICD-10-CM

## 2018-11-05 DIAGNOSIS — R35.1 BPH ASSOCIATED WITH NOCTURIA: ICD-10-CM

## 2018-11-05 DIAGNOSIS — G89.29 CHRONIC BILATERAL LOW BACK PAIN WITHOUT SCIATICA: ICD-10-CM

## 2018-11-05 DIAGNOSIS — N18.2 CHRONIC KIDNEY INSUFFICIENCY, STAGE 2 (MILD): Primary | ICD-10-CM

## 2018-11-05 DIAGNOSIS — D64.9 NORMOCHROMIC ANEMIA: ICD-10-CM

## 2018-11-05 DIAGNOSIS — I10 ESSENTIAL HYPERTENSION: ICD-10-CM

## 2018-11-05 DIAGNOSIS — M54.50 CHRONIC BILATERAL LOW BACK PAIN WITHOUT SCIATICA: ICD-10-CM

## 2018-11-05 DIAGNOSIS — D72.821 MONOCYTOSIS: ICD-10-CM

## 2018-11-05 DIAGNOSIS — R93.7 ABNORMAL MRI, SPINE: ICD-10-CM

## 2018-11-05 PROCEDURE — 99214 OFFICE O/P EST MOD 30 MIN: CPT | Mod: ,,, | Performed by: INTERNAL MEDICINE

## 2018-11-05 PROCEDURE — 3008F BODY MASS INDEX DOCD: CPT | Mod: ,,, | Performed by: INTERNAL MEDICINE

## 2018-11-05 PROCEDURE — 3079F DIAST BP 80-89 MM HG: CPT | Mod: ,,, | Performed by: INTERNAL MEDICINE

## 2018-11-05 PROCEDURE — 3075F SYST BP GE 130 - 139MM HG: CPT | Mod: ,,, | Performed by: INTERNAL MEDICINE

## 2018-11-05 RX ORDER — LOSARTAN POTASSIUM 25 MG/1
25 TABLET ORAL DAILY
Qty: 90 TABLET | Refills: 3 | Status: SHIPPED | OUTPATIENT
Start: 2018-11-05 | End: 2019-04-24

## 2018-11-05 NOTE — PATIENT INSTRUCTIONS
BPH (Enlarged Prostate)  The prostate is a gland at the base of the bladder. As some men get older, the prostate may get bigger in size. This problem is called benign prostatic hyperplasia (BPH). BPH puts pressure on the urethra. This is the tube that carries urine from the bladder to the penis. It may interfere with the flow of urine. It may also keep the bladder from emptying fully.    Symptoms of BPH include trouble starting urination and feeling as though the bladder isnt emptying all the way. It also includes a weak urine stream, dribbling and leaking of urine, and frequent and urgent urination (especially at night). BPH can increase the risk of urinary infections. It can also block off urine flow completely. If this occurs, a thin tube (catheter) may be passed into the bladder to help drain urine.  If symptoms are mild, no treatment may be needed right now. If symptoms are more severe, treatment is likely needed. The goal of treatment is to improve urine flow and reduce symptoms. Treatments can include medicine and procedures. Your healthcare provider will discuss treatment options with you as needed.  Home care  The following guidelines will help you care for yourself at home:  · Urinate as soon as you feel the urge. Don't try to hold your urine.  · Don't limit your fluid intake during the day. Drink 6 to 8 glasses of water or liquids a day. This prevents bacteria from building up in the bladder.  · Avoid drinking fluids after dinner to help reduce urination during the night.  · Avoid medicines that can worsen your symptoms. These include certain cold and allergy medicines and antidepressants. Diuretics used for high blood pressure can also worsen symptoms. Talk to your doctor about the medicines you take. Other choices may work better for you.  Prostate cancer screening  BPH does not increase the risk of prostate cancer. But because prostate cancer is a common cancer in men, screening is sometimes  recommended. This may help detect the cancer in its early stages when treatment is most effective. Factors that can increase the risk of prostate cancer include being -American or having a father or brother who had prostate cancer. A high-fat diet may also increase the risk of prostate cancer. Talk to your healthcare provider to see whether you should be screened for prostate cancer.  Follow-up care  Follow up with your healthcare provider, or as advised  To learn more, go to:  · National Kidney & Urologic Diseases Information Clearinghouse  kidney.niddk.nih.gov, 468.458.6259  When to seek medical advice  Call your healthcare provider right away if any of these occur:  · Fever of 100.4°F (38.0°C) or higher, or as advised  · Unable to pass urine for 8 hours  · Increasing pressure or pain in your bladder (lower abdomen)  · Blood in the urine  · Increasing low back pain, not related to injury  · Symptoms of urinary infection (increased urge to urinate, burning when passing urine, foul-smelling urine)  Date Last Reviewed: 7/1/2016 © 2000-2017 [employer]. [address] [place] [address], [place] [address]. All rights reserved. This information is not intended as a substitute for professional medical care. Always follow your healthcare professional's instructions.

## 2018-11-05 NOTE — PROGRESS NOTES
Subjective:       Patient ID: Rick Butler is a 58 y.o. male.    Chief Complaint: Follow-up (3 wk f/u) and Anemia    58-year-old gentleman here for 3 week follow-up after repeat blood work and urine tests. He has intermittent renal insufficiency and symptoms of BPH so on last visit we placed him on Flomax to see if that might improve his frequency and quantity of urination as well as possibly his kidney function. His kidney function shows a GFR of 66 today which is up from 46 a month ago. However his microalbumin to creatinine ratio is elevated. He is also monitored by hematology for anemia which is normocytic if not borderline macrocytic and he has thrombocytosis with elevated iron levels. On recent abdominal ultrasound his kidneys look normal his spleen was mildly enlarged other organs were within normal limits except his liver did show fatty infiltration which was diffuse and similar to ultrasound dating back from 2016. Also the patient misunderstood when I started the Flomax that he should discontinue his losartan which he has done and has not been on it for 3 weeks. He's had no symptoms of headaches but normally does not check his blood pressure and today it is within normal limits. His last hemoglobin was 8.9 and he has a checked every month and this year has had 2 blood transfusions. He really knows when he needs one as he feels very fatigued and weak. He does have low back pain and an abnormal MRI of his lumbar spine with increased mild marrow signal throughout the bone marrow which could indicate an infiltrative process such as malignancy. He also has disc and foraminal disease at L5/S1 and L4/S2 and has completed rhizotomy this summer without much relief in his spine.      Review of Systems   Constitutional: Negative for activity change, diaphoresis, fatigue and fever.   HENT: Negative for congestion, ear pain, postnasal drip, sinus pressure, sore throat and trouble swallowing.    Eyes: Negative for pain.    Respiratory: Negative for cough, chest tightness, shortness of breath and wheezing.    Cardiovascular: Negative for chest pain, palpitations and leg swelling.   Gastrointestinal: Negative for abdominal distention, abdominal pain, blood in stool, constipation and diarrhea.   Endocrine: Negative for cold intolerance and heat intolerance.   Genitourinary: Negative for decreased urine volume, difficulty urinating, dysuria, flank pain, frequency and hematuria.   Musculoskeletal: Positive for back pain. Negative for arthralgias, joint swelling, myalgias and neck pain.   Skin: Negative for pallor, rash and wound.   Neurological: Negative for tremors, syncope, weakness, light-headedness, numbness and headaches.   Hematological: Negative for adenopathy.   Psychiatric/Behavioral: Negative for confusion, decreased concentration, hallucinations, self-injury, sleep disturbance and suicidal ideas. The patient is not nervous/anxious.        Past Medical History:   Diagnosis Date    Depression     Former smoker 6/29/2017    H/O ETOH abuse 6/29/2017    Sickle cell anemia 6/29/2017       Past Surgical History:   Procedure Laterality Date    BLOCK-NERVE-MEDIAL BRANCH-LUMBAR Bilateral 5/25/2018    Performed [facility]ia [facility] at [facility] OR    BLOCK-NERVE-MEDIAL BRANCH-LUMBAR Bilateral 4/20/2018    [facility] at [facility]    INJECTION OF ANESTHETIC AGENT AROUND MEDIAL BRANCH NERVES INNERVATING LUMBAR FACET JOINT Bilateral 5/25/2018    Procedure: BLOCK-NERVE-MEDIAL BRANCH-LUMBAR;  Surgeon: Nura Heredia MD;  Location: [facility] OR;  Service: Pain Management;  Laterality: Bilateral;  L3, 4, 5    RADIOFREQUENCY ABLATION OF LUMBAR MEDIAL BRANCH NERVE AT SINGLE LEVEL Bilateral 7/20/2018    Procedure: RADIOFREQUENCY ABLATION, NERVE, MEDIAL BRANCH, LUMBAR, 1 LEVEL;  Surgeon: Nura Heredia MD;  Location: [facility] OR;  Service: Pain Management;  Laterality: Bilateral;  L3, 4, 5 - Burned at 80 degrees C.  for 75 seconds x 2 each site     RADIOFREQUENCY ABLATION, NERVE, MEDIAL BRANCH, LUMBAR, 1 LEVEL Bilateral 2018    [facility]       Family History   Problem Relation Age of Onset    Arthritis Mother     Depression Mother     Heart disease Mother     Hypertension Mother     Heart attack Father 59       Social History     Socioeconomic History    Marital status:      Spouse name: None    Number of children: None    Years of education: None    Highest education level: None   Social Needs    Financial resource strain: None    Food insecurity - worry: None    Food insecurity - inability: None    Transportation needs - medical: None    Transportation needs - non-medical: None   Occupational History    Occupation: Prep Cook     Employer: [email or web address]   Tobacco Use    Smoking status: Former Smoker     Last attempt to quit: 1999     Years since quittin.8    Smokeless tobacco: Never Used   Substance and Sexual Activity    Alcohol use: No     Comment: quit 6/15/16, in AA    Drug use: No    Sexual activity: None   Other Topics Concern    None   Social History Narrative    None       Current Outpatient Medications   Medication Sig Dispense Refill    folic acid (FOLVITE) 1 MG tablet Take 1 tablet (1 mg total) by mouth once daily. 90 tablet 3    multivitamin capsule Take 1 capsule by mouth once daily.      omeprazole (PRILOSEC) 20 MG capsule Take 1 capsule (20 mg total) by mouth once daily. 30 capsule 6    sildenafil (VIAGRA) 100 MG tablet Take 1 tablet (100 mg total) by mouth daily as needed for Erectile Dysfunction. 10 tablet 6    tamsulosin (FLOMAX) 0.4 mg Cap Take 1 capsule (0.4 mg total) by mouth once daily. 30 capsule 11    tiZANidine (ZANAFLEX) 4 MG tablet Take 1 tablet (4 mg total) by mouth every 12 (twelve) hours as needed. 180 tablet 0    traMADol (ULTRAM) 50 mg tablet Take 1 tablet (50 mg total) by mouth every 12 (twelve) hours as needed for Pain. 45 tablet 3    traZODone  (DESYREL) 150 MG tablet Take 1 tablet (150 mg total) by mouth nightly. 30 tablet 11    losartan (COZAAR) 25 MG tablet Take 1 tablet (25 mg total) by mouth once daily. 90 tablet 3     No current facility-administered medications for this visit.        Review of patient's allergies indicates:  No Known Allergies  Objective:      Physical Exam   Constitutional: He is oriented to person, place, and time. He appears well-developed and well-nourished. No distress.   HENT:   Head: Normocephalic and atraumatic.   Right Ear: External ear normal.   Left Ear: External ear normal.   Nose: Nose normal.   Mouth/Throat: Oropharynx is clear and moist.   Eyes: Conjunctivae and EOM are normal. Right eye exhibits no discharge. Left eye exhibits no discharge. No scleral icterus.   Neck: Normal range of motion. Neck supple. No JVD present. No tracheal deviation present. No thyromegaly present.   Cardiovascular: Normal rate, regular rhythm, normal heart sounds and intact distal pulses. Exam reveals no gallop.   No murmur heard.  Pulmonary/Chest: Effort normal and breath sounds normal. No respiratory distress. He has no wheezes. He has no rales.   Abdominal: Soft. Bowel sounds are normal. He exhibits no distension and no mass. There is no tenderness.   Musculoskeletal: Normal range of motion. He exhibits no edema.        Lumbar back: He exhibits tenderness. He exhibits no bony tenderness, no swelling, no edema and no deformity.   Lymphadenopathy:     He has no cervical adenopathy.   Neurological: He is alert and oriented to person, place, and time.   Skin: Skin is warm and dry. No rash noted. He is not diaphoretic. No erythema.   Psychiatric: He has a normal mood and affect. His behavior is normal. Judgment and thought content normal.       Lab Results   Component Value Date    WBC 8.4 10/26/2018    HGB 8.9 (L) 10/26/2018    HCT 26.2 (L) 10/26/2018     (H) 10/26/2018    CHOL 118 (L) 06/08/2015    TRIG 26 (L) 06/08/2015    HDL 72  06/08/2015    ALT 36 06/08/2015    AST 20 10/26/2018     10/26/2018    K 4.4 10/26/2018     10/26/2018    CREATININE 1.35 10/26/2018    BUN 14 10/26/2018    CO2 27.0 10/26/2018    TSH 1.96 10/05/2018    PSA 0.3 10/05/2018    HGBA1C 5.6 02/15/2018       Assessment:       1. Chronic kidney insufficiency, stage 2 (mild)    2. Proteinuria, unspecified type    3. Normochromic anemia    4. Thrombocytosis    5. Monocytosis    6. Chronic bilateral low back pain without sciatica    7. Essential hypertension    8. Abnormal MRI, spine- 2/18 abn bone marrow signal    9. BPH associated with nocturia        Plan:       Chronic kidney insufficiency, stage 2 (mild)  -     Ambulatory referral to Nephrology    Proteinuria, unspecified type  -     Urinalysis w/reflex to Microscopic  -     Ambulatory referral to Nephrology    Normochromic anemia- SST plus folic def?  Likely CLL  -     Hemoglobinopathy Evaluation; Future; Expected date: 11/05/2018  -     Flow Cytometry Analysis (Peripheral Blood); Future; Expected date: 11/05/2018    Will need to touch base with hematology    Thrombocytosis  -     Hemoglobinopathy Evaluation; Future; Expected date: 11/05/2018  -     Flow Cytometry Analysis (Peripheral Blood); Future; Expected date: 11/05/2018    Monocytosis  -     Flow Cytometry Analysis (Peripheral Blood); Future; Expected date: 11/05/2018    Chronic bilateral low back pain without sciatica with Abnormal MRI, spine- 2/18 abn bone marrow signal  -     Ambulatory referral to Pain Clinic    Essential hypertension  -     losartan (COZAAR) 25 MG tablet; Take 1 tablet (25 mg total) by mouth once daily.  Dispense: 90 tablet; Refill: 3    BPH associated with nocturia improved with flomax

## 2018-11-07 LAB — FLOW CYTOMETRY ANTIBODIES ANALYZED: NORMAL

## 2018-11-08 ENCOUNTER — TELEPHONE (OUTPATIENT)
Dept: FAMILY MEDICINE | Facility: CLINIC | Age: 58
End: 2018-11-08

## 2018-11-08 NOTE — TELEPHONE ENCOUNTER
Please look in opal for his leukemia/lymphoma serum blood screen from 2 days ago and from February 2018. Print them out and put them on my desk please and thank you

## 2018-11-12 ENCOUNTER — TELEPHONE (OUTPATIENT)
Dept: FAMILY MEDICINE | Facility: CLINIC | Age: 58
End: 2018-11-12

## 2018-11-12 NOTE — TELEPHONE ENCOUNTER
----- Message from Jesica Fonseca MD sent at 11/11/2018 11:10 AM CST -----  He definitely has sickle trait and the leukemia/lymphoma screen is negative. We will discuss in full on follow-up next month.

## 2018-11-16 ENCOUNTER — TELEPHONE (OUTPATIENT)
Dept: FAMILY MEDICINE | Facility: CLINIC | Age: 58
End: 2018-11-16

## 2018-11-18 ENCOUNTER — TELEPHONE (OUTPATIENT)
Dept: FAMILY MEDICINE | Facility: CLINIC | Age: 58
End: 2018-11-18

## 2018-11-18 DIAGNOSIS — D64.9 ANEMIA, UNSPECIFIED TYPE: Primary | ICD-10-CM

## 2018-11-18 NOTE — TELEPHONE ENCOUNTER
Jose,   I am unclear why mr perez is so anemic with thrombocytosis without iron deficiency     I ran a hemoglobin electrophoresis again and a leukemia/lymphoma screen and the electrophoresis simply showed the SST as we knew and the LLS was negative    Would a bone marrow help?

## 2018-11-25 NOTE — TELEPHONE ENCOUNTER
Good point  He just had a colonoscopy and does have extensive diverticulosis - I can repeat iron studies and stool cards for occult blood and if he has evidence of bleeding then I am thinking we forego it?    He will see you on 12/17 too    Guadalupe , can you call him to  the lab slip to include stool sample lab ?

## 2018-11-27 LAB — FERRITIN SERPL-MCNC: 1221 NG/ML (ref 37–201)

## 2018-12-02 ENCOUNTER — TELEPHONE (OUTPATIENT)
Dept: FAMILY MEDICINE | Facility: CLINIC | Age: 58
End: 2018-12-02

## 2018-12-02 NOTE — TELEPHONE ENCOUNTER
Iron stores are very high so definitely no need to take iron or he will become overloaded. His stool cards are negative for occult blood and he will follow-up with Dr. Tello on the 17th to discuss a bone marrow

## 2019-01-04 RX ORDER — OMEPRAZOLE 20 MG/1
20 CAPSULE, DELAYED RELEASE ORAL DAILY
Qty: 30 CAPSULE | Refills: 0 | Status: SHIPPED | OUTPATIENT
Start: 2019-01-04 | End: 2019-04-09 | Stop reason: SDUPTHER

## 2019-01-11 ENCOUNTER — TELEPHONE (OUTPATIENT)
Dept: HEMATOLOGY/ONCOLOGY | Facility: CLINIC | Age: 59
End: 2019-01-11

## 2019-01-13 PROBLEM — N18.2 ANEMIA, CHRONIC RENAL FAILURE, STAGE 2 (MILD): Status: ACTIVE | Noted: 2019-01-13

## 2019-01-13 PROBLEM — D63.1 ANEMIA, CHRONIC RENAL FAILURE, STAGE 2 (MILD): Status: ACTIVE | Noted: 2019-01-13

## 2019-01-13 PROBLEM — D64.89 ANEMIA DUE TO MULTIPLE MECHANISMS: Status: ACTIVE | Noted: 2019-01-13

## 2019-01-14 ENCOUNTER — TELEPHONE (OUTPATIENT)
Dept: HEMATOLOGY/ONCOLOGY | Facility: CLINIC | Age: 59
End: 2019-01-14

## 2019-01-14 LAB
ALBUMIN SERPL-MCNC: 4.3 G/DL (ref 3.1–4.7)
ALP SERPL-CCNC: 46 IU/L (ref 40–104)
ALT (SGPT): 10 IU/L (ref 3–33)
AST SERPL-CCNC: 18 IU/L (ref 10–40)
BASOPHILS NFR BLD: 0.1 K/UL (ref 0–0.2)
BASOPHILS NFR BLD: 0.7 %
BILIRUB SERPL-MCNC: 1.4 MG/DL (ref 0.3–1)
BUN SERPL-MCNC: 13 MG/DL (ref 8–20)
CALCIUM SERPL-MCNC: 9.1 MG/DL (ref 7.7–10.4)
CHLORIDE: 105 MMOL/L (ref 98–110)
CO2 SERPL-SCNC: 25.8 MMOL/L (ref 22.8–31.6)
CREATININE: 1.41 MG/DL (ref 0.6–1.4)
EOSINOPHIL NFR BLD: 0.1 K/UL (ref 0–0.7)
EOSINOPHIL NFR BLD: 1.1 %
ERYTHROCYTE [DISTWIDTH] IN BLOOD BY AUTOMATED COUNT: 29 % (ref 11.7–14.9)
GLUCOSE: 96 MG/DL (ref 70–99)
GRAN #: 4.4 K/UL (ref 1.4–6.5)
GRAN%: 61.1 %
HCT VFR BLD AUTO: 18.9 % (ref 39–55)
HGB BLD-MCNC: 6.2 G/DL (ref 14–16)
IMMATURE GRANS (ABS): 0 K/UL (ref 0–1)
IMMATURE GRANULOCYTES: 0.4 %
LYMPH #: 1.7 K/UL (ref 1.2–3.4)
LYMPH%: 23.8 %
MCH RBC QN AUTO: 31.3 PG (ref 25–35)
MCHC RBC AUTO-ENTMCNC: 32.8 G/DL (ref 31–36)
MCV RBC AUTO: 95.5 FL (ref 80–100)
MONO #: 0.9 K/UL (ref 0.1–0.6)
MONO%: 12.9 %
NUCLEATED RBCS: 0 %
PLATELET # BLD AUTO: 551 K/UL (ref 140–440)
PMV BLD AUTO: 10.3 FL (ref 8.8–12.7)
POTASSIUM SERPL-SCNC: 4.5 MMOL/L (ref 3.5–5)
PROT SERPL-MCNC: 7.7 G/DL (ref 6–8.2)
RBC # BLD AUTO: 1.98 M/UL (ref 4.3–5.9)
SODIUM: 140 MMOL/L (ref 134–144)
WBC # BLD AUTO: 7.1 K/UL (ref 5–10)

## 2019-01-15 ENCOUNTER — TELEPHONE (OUTPATIENT)
Dept: HEMATOLOGY/ONCOLOGY | Facility: CLINIC | Age: 59
End: 2019-01-15

## 2019-01-15 NOTE — TELEPHONE ENCOUNTER
Yes blood was set up for patient       ----- Message from Jose Tello MD sent at 1/15/2019  8:19 AM CST -----  Did we set him up with blood?

## 2019-02-16 RX ORDER — OMEPRAZOLE 20 MG/1
CAPSULE, DELAYED RELEASE ORAL
Qty: 30 CAPSULE | Refills: 0 | OUTPATIENT
Start: 2019-02-16

## 2019-02-28 DIAGNOSIS — M47.896 OTHER SPONDYLOSIS, LUMBAR REGION: ICD-10-CM

## 2019-03-06 ENCOUNTER — TELEPHONE (OUTPATIENT)
Dept: FAMILY MEDICINE | Facility: CLINIC | Age: 59
End: 2019-03-06

## 2019-03-06 DIAGNOSIS — D64.9 CHRONIC ANEMIA: Primary | ICD-10-CM

## 2019-03-06 RX ORDER — TRAMADOL HYDROCHLORIDE 50 MG/1
TABLET ORAL
Qty: 45 TABLET | Refills: 2 | Status: SHIPPED | OUTPATIENT
Start: 2019-03-06 | End: 2019-09-30 | Stop reason: SDUPTHER

## 2019-03-07 ENCOUNTER — PATIENT MESSAGE (OUTPATIENT)
Dept: FAMILY MEDICINE | Facility: CLINIC | Age: 59
End: 2019-03-07

## 2019-03-10 NOTE — TELEPHONE ENCOUNTER
Guadalupe, I need a posttransfusion CBC so I will order it with other labs if necessary and the patient to come in for follow-up if needed.

## 2019-03-12 NOTE — TELEPHONE ENCOUNTER
So sad. Let him know if there is anything we can do and of course if he has to cancel , no worries

## 2019-03-12 NOTE — TELEPHONE ENCOUNTER
Spoke with Mr Butler this morning. He is in middle of bringing his daughter home on hospice as she is in end stages of sickle cell.  He made appt with you for Monday, 4/1/19 @ 10:20am and will  lab orders & get done week before the appt.

## 2019-03-25 ENCOUNTER — TELEPHONE (OUTPATIENT)
Dept: FAMILY MEDICINE | Facility: CLINIC | Age: 59
End: 2019-03-25

## 2019-03-25 LAB
% SATURATION: 69 %
ALBUMIN SERPL-MCNC: 4.1 G/DL (ref 3.1–4.7)
ALP SERPL-CCNC: 52 IU/L (ref 40–104)
ALT (SGPT): 12 IU/L (ref 3–33)
AST SERPL-CCNC: 19 IU/L (ref 10–40)
BASOPHILS NFR BLD: 0.1 K/UL (ref 0–0.2)
BASOPHILS NFR BLD: 0.7 %
BILIRUB SERPL-MCNC: 1.2 MG/DL (ref 0.3–1)
BUN SERPL-MCNC: 24 MG/DL (ref 8–20)
CALCIUM SERPL-MCNC: 8.7 MG/DL (ref 7.7–10.4)
CHLORIDE: 105 MMOL/L (ref 98–110)
CO2 SERPL-SCNC: 26.1 MMOL/L (ref 22.8–31.6)
CREATININE: 1.79 MG/DL (ref 0.6–1.4)
EOSINOPHIL NFR BLD: 0.1 K/UL (ref 0–0.7)
EOSINOPHIL NFR BLD: 0.8 %
ERYTHROCYTE [DISTWIDTH] IN BLOOD BY AUTOMATED COUNT: 31.8 % (ref 11.7–14.9)
FERRITIN SERPL-MCNC: 958 NG/ML (ref 37–201)
GLUCOSE: 104 MG/DL (ref 70–99)
GRAN #: 5 K/UL (ref 1.4–6.5)
GRAN%: 67.6 %
HCT VFR BLD AUTO: 15.1 % (ref 39–55)
HGB BLD-MCNC: 5 G/DL (ref 14–16)
IMMATURE GRANS (ABS): 0 K/UL (ref 0–1)
IMMATURE GRANULOCYTES: 0.5 %
IRON: 165 MCG/DL (ref 32–176)
LYMPH #: 1.3 K/UL (ref 1.2–3.4)
LYMPH%: 17.8 %
MCH RBC QN AUTO: 32.1 PG (ref 25–35)
MCHC RBC AUTO-ENTMCNC: 33.1 G/DL (ref 31–36)
MCV RBC AUTO: 96.8 FL (ref 80–100)
MONO #: 0.9 K/UL (ref 0.1–0.6)
MONO%: 12.6 %
NUCLEATED RBCS: 1 %
PLATELET # BLD AUTO: 480 K/UL (ref 140–440)
PMV BLD AUTO: 9.7 FL (ref 8.8–12.7)
POTASSIUM SERPL-SCNC: 4.2 MMOL/L (ref 3.5–5)
PROT SERPL-MCNC: 7.2 G/DL (ref 6–8.2)
RBC # BLD AUTO: 1.56 M/UL (ref 4.3–5.9)
SODIUM: 140 MMOL/L (ref 134–144)
TOTAL IRON BINDING CAPACITY: 240 MCG/DL (ref 177–435)
WBC # BLD AUTO: 7.4 K/UL (ref 5–10)

## 2019-03-25 NOTE — TELEPHONE ENCOUNTER
"Spoke with patient to notify of critical lab H&H.  Advised patient to go to the hospital with critical lab values for blood transfusion immediately.  Patient states he "feel's fine." Patient states he will go to the hospital tonight for treatment.  Called the [facility] ER and spoke with Tyrone to notify of incoming patient.  "

## 2019-03-26 ENCOUNTER — TELEPHONE (OUTPATIENT)
Dept: FAMILY MEDICINE | Facility: CLINIC | Age: 59
End: 2019-03-26

## 2019-03-26 DIAGNOSIS — D61.9 ANEMIA DUE TO BONE MARROW FAILURE, UNSPECIFIED BONE MARROW FAILURE TYPE: Primary | ICD-10-CM

## 2019-03-27 ENCOUNTER — TELEPHONE (OUTPATIENT)
Dept: FAMILY MEDICINE | Facility: CLINIC | Age: 59
End: 2019-03-27

## 2019-03-27 LAB — HOMOCYSTEINE: 16.1 UMOL/L (ref 0–15)

## 2019-04-01 ENCOUNTER — OFFICE VISIT (OUTPATIENT)
Dept: FAMILY MEDICINE | Facility: CLINIC | Age: 59
End: 2019-04-01
Payer: COMMERCIAL

## 2019-04-01 VITALS
SYSTOLIC BLOOD PRESSURE: 124 MMHG | TEMPERATURE: 98 F | HEART RATE: 92 BPM | BODY MASS INDEX: 30.64 KG/M2 | HEIGHT: 70 IN | OXYGEN SATURATION: 97 % | DIASTOLIC BLOOD PRESSURE: 60 MMHG | WEIGHT: 214 LBS | RESPIRATION RATE: 16 BRPM

## 2019-04-01 DIAGNOSIS — E53.8 FOLIC ACID DEFICIENCY: ICD-10-CM

## 2019-04-01 DIAGNOSIS — D57.1 HB-SS DISEASE WITHOUT CRISIS: ICD-10-CM

## 2019-04-01 DIAGNOSIS — N18.30 CKD (CHRONIC KIDNEY DISEASE) STAGE 3, GFR 30-59 ML/MIN: Primary | ICD-10-CM

## 2019-04-01 DIAGNOSIS — I10 ESSENTIAL HYPERTENSION: ICD-10-CM

## 2019-04-01 PROCEDURE — 3074F PR MOST RECENT SYSTOLIC BLOOD PRESSURE < 130 MM HG: ICD-10-PCS | Mod: ,,, | Performed by: INTERNAL MEDICINE

## 2019-04-01 PROCEDURE — 3074F SYST BP LT 130 MM HG: CPT | Mod: ,,, | Performed by: INTERNAL MEDICINE

## 2019-04-01 PROCEDURE — 99214 PR OFFICE/OUTPT VISIT, EST, LEVL IV, 30-39 MIN: ICD-10-PCS | Mod: ,,, | Performed by: INTERNAL MEDICINE

## 2019-04-01 PROCEDURE — 3008F BODY MASS INDEX DOCD: CPT | Mod: ,,, | Performed by: INTERNAL MEDICINE

## 2019-04-01 PROCEDURE — 99214 OFFICE O/P EST MOD 30 MIN: CPT | Mod: ,,, | Performed by: INTERNAL MEDICINE

## 2019-04-01 PROCEDURE — 3008F PR BODY MASS INDEX (BMI) DOCUMENTED: ICD-10-PCS | Mod: ,,, | Performed by: INTERNAL MEDICINE

## 2019-04-01 PROCEDURE — 3078F PR MOST RECENT DIASTOLIC BLOOD PRESSURE < 80 MM HG: ICD-10-PCS | Mod: ,,, | Performed by: INTERNAL MEDICINE

## 2019-04-01 PROCEDURE — 3078F DIAST BP <80 MM HG: CPT | Mod: ,,, | Performed by: INTERNAL MEDICINE

## 2019-04-01 RX ORDER — FOLIC ACID 1 MG/1
2 TABLET ORAL DAILY
Qty: 60 TABLET | Refills: 3 | Status: SHIPPED | OUTPATIENT
Start: 2019-04-01 | End: 2019-04-17

## 2019-04-01 NOTE — PATIENT INSTRUCTIONS
Diet for Chronic Kidney Disease  Following a special diet when you have kidney disease can help you stay as healthy as possible. Your healthcare provider or dietitian should make a special diet plan just for you.    Eating right  Here are some good eating rules to follow:  · Protein. Eating protein is important for your body. But too much protein can put a strain on your kidneys. Eating less protein may slow the progression of chronic kidney disease. Foods high in protein include meat, fish, eggs, cheese, and other dairy products. A registered dietitian can help you plan a diet that has the right amount of protein for you.  · Sodium. Having too much salt in your diet can make your body hold onto (retain) water. Ask your provider or dietitian how much sodium per day you are allowed. This will help you avoid fluid buildup in your body (fluid retention). It can also help control high blood pressure. Learn to read food labels to know how much sodium is in one serving. Foods high in salt include processed meats, canned and boxed foods, sauces, salted chips and snacks, pickled foods, frozen dinners, and restaurant and fast food.  · Fluids. If you have advanced kidney disease, you will need to limit the water and fluids you drink. If you dont, then too much water will build up in your body. The exact amount of fluid you can drink depends on how well your kidneys are working. Ask your provider how much water you can safely drink each day.  · Potassium. In advanced kidney disease, your potassium level can go dangerously high. This affects your heart. It can cause an irregular heartbeat (arrhythmia). Ask your provider or dietitian if you should limit potassium in your diet. Foods high in potassium include dairy products (milk, yogurt, cheese), dried beans, bananas, oranges, potatoes, tomatoes, spinach, cantaloupe, honeydew melon, dried fruits, and nuts.   · Calcium. Calcium is important to build strong bones. But foods  high in calcium are also high in phosphorus, which can take calcium from your bones. Limiting foods high in phosphorus will help keep calcium in your bones. Ask your provider how much calcium you should get each day.  · Phosphorus. In advanced kidney disease, your phosphorus level can go dangerously high. This affects many systems in the body and can damage your heart. Limit your intake of phosphorus-rich foods. These include dried beans and peas, nuts, peanut butter, cocoa, beer, cola drinks, and dairy products.  Date Last Reviewed: 8/1/2016  © 5986-6480 [employer]. [address] [place] [address], [place] [address]. All rights reserved. This information is not intended as a substitute for professional medical care. Always follow your healthcare professional's instructions.

## 2019-04-01 NOTE — PROGRESS NOTES
Subjective:       Patient ID: Rick Butler is a 58 y.o. male.    Chief Complaint: Hospital Follow Up (s/p transfusion)    58-year-old gentleman is here for follow-up after having lab drawn that showed a hemoglobin of 5.0. He has sickle cell trait but over the past couple of years is acting more like sickle cell disease but without the syndrome and arthralgias. He has been under enormous amount of stress lately due to his daughter having to move in with hospice do not only to sickle cell disease but also cardiomyopathy with cirrhosis secondary to the sickle cell disease. She does have a teenage daughter as well. Patient found out about the severity of her illness back in December and stopped getting his monthly CBC and CMP's. 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 With a hemoglobin of 5.0 last week his creatinine gone up to 1.79 with a clearance of 45 cc/min. She has no flank pain or blood in his urine. He did get two units of PRBCs and feels much better this week. He is due to see hematology at the end of the month. We did discuss diet and the fact that he drinks a lot of coffee and skips breakfast. His other lab work was reviewed with the patient as well. Of concern is the sudden drop in H and H in January as compared to his norm. He did have a negative lymphoma/leukemia screen last year and an abdominal ultrasound showed no kidney abnormalities but a mild enlarged spleen and some fatty infiltration.     Review of Systems   Constitutional: Positive for fatigue. Negative for activity change, diaphoresis and fever.   HENT: Negative for congestion, ear pain, postnasal drip, sinus pressure, sore throat and trouble swallowing.    Eyes: Negative for pain.   Respiratory: Negative for cough, chest tightness, shortness of breath and wheezing.    Cardiovascular: Negative for chest pain, palpitations and leg swelling.   Gastrointestinal: Negative for abdominal distention, abdominal pain,  blood in stool, constipation and diarrhea.   Endocrine: Positive for polyuria. Negative for cold intolerance and heat intolerance.   Genitourinary: Negative for decreased urine volume, difficulty urinating, dysuria, flank pain, frequency and hematuria.   Musculoskeletal: Negative for arthralgias, back pain, joint swelling, myalgias and neck pain.   Skin: Negative for pallor, rash and wound.   Neurological: Negative for tremors, syncope, weakness, light-headedness, numbness and headaches.   Hematological: Negative for adenopathy.   Psychiatric/Behavioral: Negative for confusion, decreased concentration, hallucinations, self-injury, sleep disturbance and suicidal ideas. The patient is not nervous/anxious.        Past Medical History:   Diagnosis Date    Anemia due to multiple mechanisms 1/13/2019    Anemia, chronic renal failure, stage 2 (mild) 1/13/2019    Depression     Former smoker 6/29/2017    H/O ETOH abuse 6/29/2017    Sickle cell anemia 6/29/2017       Past Surgical History:   Procedure Laterality Date    BLOCK-NERVE-MEDIAL BRANCH-LUMBAR Bilateral 5/25/2018    [facility]    BLOCK-NERVE-MEDIAL BRANCH-LUMBAR Bilateral 4/20/2018    [facility]    RADIOFREQUENCY ABLATION, NERVE, MEDIAL BRANCH, LUMBAR, 1 LEVEL Bilateral 7/20/2018    Per[facility] at [facility] OR       Family History   Problem Relation Age of Onset    Arthritis Mother     Depression Mother     Heart disease Mother     Hypertension Mother     Heart attack Father 59       Social History     Socioeconomic History    Marital status:      Spouse name: Not on file    Number of children: Not on file    Years of education: Not on file    Highest education level: Not on file   Occupational History    Occupation: Prep Cook     Employer: [employer]   Social Needs    Financial resource strain: Not on file    Food insecurity:     Worry: Not on file     Inability: Not  on file    Transportation needs:     Medical: Not on file     Non-medical: Not on file   Tobacco Use    Smoking status: Former Smoker     Last attempt to quit: 1999     Years since quittin.2    Smokeless tobacco: Never Used   Substance and Sexual Activity    Alcohol use: No     Comment: quit 6/15/16, in AA    Drug use: No    Sexual activity: Not on file   Lifestyle    Physical activity:     Days per week: Not on file     Minutes per session: Not on file    Stress: Not on file   Relationships    Social connections:     Talks on phone: Not on file     Gets together: Not on file     Attends Advent service: Not on file     Active member of club or organization: Not on file     Attends meetings of clubs or organizations: Not on file     Relationship status: Not on file   Other Topics Concern    Not on file   Social History Narrative    Not on file       Current Outpatient Medications   Medication Sig Dispense Refill    folic acid (FOLVITE) 1 MG tablet Take 2 tablets (2 mg total) by mouth once daily. 60 tablet 3    losartan (COZAAR) 25 MG tablet Take 1 tablet (25 mg total) by mouth once daily. 90 tablet 3    multivitamin capsule Take 1 capsule by mouth once daily.      omeprazole (PRILOSEC) 20 MG capsule TAKE 1 CAPSULE (20 MG TOTAL) BY MOUTH ONCE DAILY. 30 capsule 0    sildenafil (VIAGRA) 100 MG tablet Take 1 tablet (100 mg total) by mouth daily as needed for Erectile Dysfunction. 10 tablet 6    tamsulosin (FLOMAX) 0.4 mg Cap Take 1 capsule (0.4 mg total) by mouth once daily. 30 capsule 11    tiZANidine (ZANAFLEX) 4 MG tablet Take 1 tablet (4 mg total) by mouth every 12 (twelve) hours as needed. 180 tablet 0    traMADol (ULTRAM) 50 mg tablet TAKE 1 TABLET BY MOUTH EVERY 12 HOURS AS NEEDED FOR PAIN 45 tablet 2    traZODone (DESYREL) 150 MG tablet Take 1 tablet (150 mg total) by mouth nightly. 30 tablet 11     No current facility-administered medications for this visit.        Review of  patient's allergies indicates:  No Known Allergies  Objective:      Physical Exam   Constitutional: He is oriented to person, place, and time. He appears well-developed and well-nourished. No distress.   HENT:   Head: Normocephalic and atraumatic.   Right Ear: External ear normal.   Left Ear: External ear normal.   Nose: Nose normal.   Mouth/Throat: Oropharynx is clear and moist.   Eyes: Conjunctivae and EOM are normal. Right eye exhibits no discharge. Left eye exhibits no discharge. No scleral icterus.   Neck: Normal range of motion. Neck supple. No JVD present. No tracheal deviation present. No thyromegaly present.   Cardiovascular: Normal rate, regular rhythm, normal heart sounds and intact distal pulses. Exam reveals no gallop.   No murmur heard.  Pulmonary/Chest: Effort normal and breath sounds normal. No respiratory distress. He has no wheezes. He has no rales.   Abdominal: Soft. Bowel sounds are normal. He exhibits no distension and no mass. There is no tenderness.   Musculoskeletal: Normal range of motion. He exhibits no edema or tenderness.   Lymphadenopathy:     He has no cervical adenopathy.   Neurological: He is alert and oriented to person, place, and time.   Skin: Skin is warm and dry. No rash noted. He is not diaphoretic. No erythema.   Psychiatric: He has a normal mood and affect. His behavior is normal. Judgment and thought content normal.       Lab Results   Component Value Date    WBC 7.4 03/25/2019    HGB 5.0 (LL) 03/25/2019    HCT 15.1 (LL) 03/25/2019     (H) 03/25/2019    CHOL 118 (L) 06/08/2015    TRIG 26 (L) 06/08/2015    HDL 72 06/08/2015    ALT 36 06/08/2015    AST 19 03/25/2019     03/25/2019    K 4.2 03/25/2019     03/25/2019    CREATININE 1.79 (H) 03/25/2019    BUN 24 (H) 03/25/2019    CO2 26.1 03/25/2019    TSH 1.96 10/05/2018    PSA 0.3 10/05/2018    HGBA1C 5.6 02/15/2018       Assessment:       1. CKD (chronic kidney disease) stage 3, GFR 30-59 ml/min    2. Folic  acid deficiency    3. Hb-SS disease without crisis    4. Essential hypertension        Plan:       CKD (chronic kidney disease) stage 3, GFR 30-59 ml/min- with proteinuria  -     Ambulatory referral to Nutrition Services  -     Comprehensive metabolic panel; Future; Expected date: 04/01/2019    Folic acid deficiency  -     folic acid (FOLVITE) 1 MG tablet; Take 2 tablets (2 mg total) by mouth once daily.  Dispense: 60 tablet; Refill: 3    Hb-SS disease without crisis  -     Ambulatory referral to Nutrition Services  -     CBC auto differential; Future; Expected date: 04/01/2019  -     Type & Screen; Future; Expected date: 04/01/2019    Essential hypertension   Controlled on above med regimen to include an ARB/ACE

## 2019-04-03 ENCOUNTER — TELEPHONE (OUTPATIENT)
Dept: FAMILY MEDICINE | Facility: CLINIC | Age: 59
End: 2019-04-03

## 2019-04-03 NOTE — TELEPHONE ENCOUNTER
Martin at Dr Mas's office called to notify us that Pt had appt back in January with Dr Mas, cancelled and rescheduled for 4/8/19. He called and stated he could not make that appt.  Pt was rescheduled at Dr Mas's earliest available in June.  Pt was offered sooner appt's with 2 other 's in the office, pt declined and wants only to see Dr Mas.  She just wanted to inform us

## 2019-04-08 RX ORDER — OMEPRAZOLE 20 MG/1
CAPSULE, DELAYED RELEASE ORAL
Qty: 30 CAPSULE | Refills: 0 | OUTPATIENT
Start: 2019-04-08

## 2019-04-09 RX ORDER — OMEPRAZOLE 20 MG/1
20 CAPSULE, DELAYED RELEASE ORAL DAILY
Qty: 90 CAPSULE | Refills: 3 | Status: SHIPPED | OUTPATIENT
Start: 2019-04-09 | End: 2019-09-30 | Stop reason: SDUPTHER

## 2019-04-17 DIAGNOSIS — E53.8 FOLIC ACID DEFICIENCY: ICD-10-CM

## 2019-04-17 RX ORDER — FOLIC ACID 1 MG/1
2 TABLET ORAL DAILY
Qty: 180 TABLET | Refills: 3 | Status: SHIPPED | OUTPATIENT
Start: 2019-04-17 | End: 2019-09-30 | Stop reason: SDUPTHER

## 2019-04-23 LAB
ALBUMIN SERPL-MCNC: 4.4 G/DL (ref 3.1–4.7)
ALP SERPL-CCNC: 44 IU/L (ref 40–104)
ALT (SGPT): 14 IU/L (ref 3–33)
AST SERPL-CCNC: 22 IU/L (ref 10–40)
BASOPHILS NFR BLD: 0.1 K/UL (ref 0–0.2)
BASOPHILS NFR BLD: 0.7 %
BILIRUB SERPL-MCNC: 1.2 MG/DL (ref 0.3–1)
BUN SERPL-MCNC: 31 MG/DL (ref 8–20)
CALCIUM SERPL-MCNC: 8.7 MG/DL (ref 7.7–10.4)
CHLORIDE: 105 MMOL/L (ref 98–110)
CO2 SERPL-SCNC: 23.5 MMOL/L (ref 22.8–31.6)
CREATININE: 2.39 MG/DL (ref 0.6–1.4)
EOSINOPHIL NFR BLD: 0 K/UL (ref 0–0.7)
EOSINOPHIL NFR BLD: 0.4 %
ERYTHROCYTE [DISTWIDTH] IN BLOOD BY AUTOMATED COUNT: 25.2 % (ref 11.7–14.9)
GLUCOSE: 108 MG/DL (ref 70–99)
GRAN #: 5 K/UL (ref 1.4–6.5)
GRAN%: 65.4 %
HCT VFR BLD AUTO: 18.3 % (ref 39–55)
HGB BLD-MCNC: 6.1 G/DL (ref 14–16)
IMMATURE GRANS (ABS): 0 K/UL (ref 0–1)
IMMATURE GRANULOCYTES: 0.3 %
LYMPH #: 1.5 K/UL (ref 1.2–3.4)
LYMPH%: 20.1 %
MCH RBC QN AUTO: 31.8 PG (ref 25–35)
MCHC RBC AUTO-ENTMCNC: 33.3 G/DL (ref 31–36)
MCV RBC AUTO: 95.3 FL (ref 80–100)
MONO #: 1 K/UL (ref 0.1–0.6)
MONO%: 13.1 %
NUCLEATED RBCS: 0 %
PLATELET # BLD AUTO: 600 K/UL (ref 140–440)
PMV BLD AUTO: 10.5 FL (ref 8.8–12.7)
POTASSIUM SERPL-SCNC: 4.4 MMOL/L (ref 3.5–5)
PROT SERPL-MCNC: 7.7 G/DL (ref 6–8.2)
RBC # BLD AUTO: 1.92 M/UL (ref 4.3–5.9)
SODIUM: 139 MMOL/L (ref 134–144)
WBC # BLD AUTO: 7.6 K/UL (ref 5–10)

## 2019-04-24 ENCOUNTER — OFFICE VISIT (OUTPATIENT)
Dept: HEMATOLOGY/ONCOLOGY | Facility: CLINIC | Age: 59
End: 2019-04-24
Payer: COMMERCIAL

## 2019-04-24 ENCOUNTER — TELEPHONE (OUTPATIENT)
Dept: FAMILY MEDICINE | Facility: CLINIC | Age: 59
End: 2019-04-24

## 2019-04-24 ENCOUNTER — OFFICE VISIT (OUTPATIENT)
Dept: FAMILY MEDICINE | Facility: CLINIC | Age: 59
End: 2019-04-24
Payer: COMMERCIAL

## 2019-04-24 VITALS
HEART RATE: 87 BPM | RESPIRATION RATE: 20 BRPM | DIASTOLIC BLOOD PRESSURE: 76 MMHG | SYSTOLIC BLOOD PRESSURE: 131 MMHG | TEMPERATURE: 99 F | WEIGHT: 209.63 LBS | BODY MASS INDEX: 30.07 KG/M2

## 2019-04-24 VITALS
WEIGHT: 209 LBS | HEIGHT: 70 IN | SYSTOLIC BLOOD PRESSURE: 122 MMHG | HEART RATE: 86 BPM | TEMPERATURE: 98 F | DIASTOLIC BLOOD PRESSURE: 60 MMHG | BODY MASS INDEX: 29.92 KG/M2 | OXYGEN SATURATION: 99 % | RESPIRATION RATE: 16 BRPM

## 2019-04-24 DIAGNOSIS — D72.821 MONOCYTOSIS: ICD-10-CM

## 2019-04-24 DIAGNOSIS — D61.9 ANEMIA DUE TO BONE MARROW FAILURE, UNSPECIFIED BONE MARROW FAILURE TYPE: ICD-10-CM

## 2019-04-24 DIAGNOSIS — D75.839 THROMBOCYTOSIS: ICD-10-CM

## 2019-04-24 DIAGNOSIS — Z87.891 FORMER SMOKER: ICD-10-CM

## 2019-04-24 DIAGNOSIS — E53.8 FOLIC ACID DEFICIENCY: ICD-10-CM

## 2019-04-24 DIAGNOSIS — D57.3 SICKLE CELL TRAIT: ICD-10-CM

## 2019-04-24 DIAGNOSIS — N18.2 ANEMIA, CHRONIC RENAL FAILURE, STAGE 2 (MILD): ICD-10-CM

## 2019-04-24 DIAGNOSIS — N18.30 CKD (CHRONIC KIDNEY DISEASE) STAGE 3, GFR 30-59 ML/MIN: Primary | ICD-10-CM

## 2019-04-24 DIAGNOSIS — D64.89 ANEMIA DUE TO MULTIPLE MECHANISMS: ICD-10-CM

## 2019-04-24 DIAGNOSIS — D64.9 NORMOCHROMIC ANEMIA: Primary | ICD-10-CM

## 2019-04-24 DIAGNOSIS — D57.1 HB-SS DISEASE WITHOUT CRISIS: ICD-10-CM

## 2019-04-24 DIAGNOSIS — D64.9 CHRONIC ANEMIA: ICD-10-CM

## 2019-04-24 DIAGNOSIS — D53.9 MACROCYTIC ANEMIA: ICD-10-CM

## 2019-04-24 DIAGNOSIS — N17.0 ACUTE RENAL FAILURE WITH ACUTE TUBULAR NECROSIS SUPERIMPOSED ON STAGE 3 CHRONIC KIDNEY DISEASE: ICD-10-CM

## 2019-04-24 DIAGNOSIS — D63.1 ANEMIA, CHRONIC RENAL FAILURE, STAGE 2 (MILD): ICD-10-CM

## 2019-04-24 DIAGNOSIS — D57.3 SICKLE CELL TRAIT SYNDROME: ICD-10-CM

## 2019-04-24 DIAGNOSIS — E55.9 VITAMIN D DEFICIENCY: ICD-10-CM

## 2019-04-24 DIAGNOSIS — R80.9 PROTEINURIA, UNSPECIFIED TYPE: ICD-10-CM

## 2019-04-24 DIAGNOSIS — N18.30 ACUTE RENAL FAILURE WITH ACUTE TUBULAR NECROSIS SUPERIMPOSED ON STAGE 3 CHRONIC KIDNEY DISEASE: ICD-10-CM

## 2019-04-24 PROBLEM — N28.9 ACUTE KIDNEY INSUFFICIENCY: Status: ACTIVE | Noted: 2019-04-24

## 2019-04-24 LAB
ALBUMIN SERPL-MCNC: 4.3 G/DL (ref 3.1–4.7)
ALP SERPL-CCNC: 41 IU/L (ref 40–104)
ALT (SGPT): 13 IU/L (ref 3–33)
AST SERPL-CCNC: 22 IU/L (ref 10–40)
BASOPHILS NFR BLD: 0 K/UL (ref 0–0.2)
BASOPHILS NFR BLD: 0.6 %
BILIRUB SERPL-MCNC: 1.2 MG/DL (ref 0.3–1)
BUN SERPL-MCNC: 28 MG/DL (ref 8–20)
CALCIUM SERPL-MCNC: 9 MG/DL (ref 7.7–10.4)
CHLORIDE: 104 MMOL/L (ref 98–110)
CO2 SERPL-SCNC: 23.6 MMOL/L (ref 22.8–31.6)
CREATININE RANDOM URINE: 87 MG/DL
CREATININE: 1.74 MG/DL (ref 0.6–1.4)
EOSINOPHIL NFR BLD: 0 K/UL (ref 0–0.7)
EOSINOPHIL NFR BLD: 0.2 %
ERYTHROCYTE [DISTWIDTH] IN BLOOD BY AUTOMATED COUNT: 24.7 % (ref 11.7–14.9)
FOLATE SERPL-MCNC: >24.8 NG/ML (ref 2.2–11.2)
GLUCOSE: 97 MG/DL (ref 70–99)
GRAN #: 4.2 K/UL (ref 1.4–6.5)
GRAN%: 65.1 %
HCT VFR BLD AUTO: 19.6 % (ref 39–55)
HGB BLD-MCNC: 6.4 G/DL (ref 14–16)
IMMATURE GRANS (ABS): 0 K/UL (ref 0–1)
IMMATURE GRANULOCYTES: 0.2 %
LDH SERPL L TO P-CCNC: 152 IU/L (ref 100–194)
LYMPH #: 1.4 K/UL (ref 1.2–3.4)
LYMPH%: 21.7 %
MCH RBC QN AUTO: 32 PG (ref 25–35)
MCHC RBC AUTO-ENTMCNC: 32.7 G/DL (ref 31–36)
MCV RBC AUTO: 98 FL (ref 80–100)
MICROALBUM.,U,RANDOM: 8.8 MCG/ML (ref 0–19.9)
MICROALBUMIN/CREATININE RATIO: 10 (ref 0–30)
MONO #: 0.8 K/UL (ref 0.1–0.6)
MONO%: 12.2 %
NUCLEATED RBCS: 0 %
PLATELET # BLD AUTO: 603 K/UL (ref 140–440)
PMV BLD AUTO: 10.3 FL (ref 8.8–12.7)
POTASSIUM SERPL-SCNC: 4.5 MMOL/L (ref 3.5–5)
PROT SERPL-MCNC: 7.5 G/DL (ref 6–8.2)
RBC # BLD AUTO: 2 M/UL (ref 4.3–5.9)
SODIUM: 140 MMOL/L (ref 134–144)
VITAMIN B12: 725 PG/ML (ref 62–940)
WBC # BLD AUTO: 6.4 K/UL (ref 5–10)

## 2019-04-24 PROCEDURE — 3078F DIAST BP <80 MM HG: CPT | Mod: ,,, | Performed by: INTERNAL MEDICINE

## 2019-04-24 PROCEDURE — 3008F BODY MASS INDEX DOCD: CPT | Mod: ,,, | Performed by: INTERNAL MEDICINE

## 2019-04-24 PROCEDURE — 99213 PR OFFICE/OUTPT VISIT, EST, LEVL III, 20-29 MIN: ICD-10-PCS | Mod: ,,, | Performed by: INTERNAL MEDICINE

## 2019-04-24 PROCEDURE — 3008F PR BODY MASS INDEX (BMI) DOCUMENTED: ICD-10-PCS | Mod: ,,, | Performed by: INTERNAL MEDICINE

## 2019-04-24 PROCEDURE — 3075F PR MOST RECENT SYSTOLIC BLOOD PRESS GE 130-139MM HG: ICD-10-PCS | Mod: ,,, | Performed by: INTERNAL MEDICINE

## 2019-04-24 PROCEDURE — 99214 PR OFFICE/OUTPT VISIT, EST, LEVL IV, 30-39 MIN: ICD-10-PCS | Mod: ,,, | Performed by: INTERNAL MEDICINE

## 2019-04-24 PROCEDURE — 99214 OFFICE O/P EST MOD 30 MIN: CPT | Mod: ,,, | Performed by: INTERNAL MEDICINE

## 2019-04-24 PROCEDURE — 99213 OFFICE O/P EST LOW 20 MIN: CPT | Mod: ,,, | Performed by: INTERNAL MEDICINE

## 2019-04-24 PROCEDURE — 3078F PR MOST RECENT DIASTOLIC BLOOD PRESSURE < 80 MM HG: ICD-10-PCS | Mod: ,,, | Performed by: INTERNAL MEDICINE

## 2019-04-24 PROCEDURE — 3074F SYST BP LT 130 MM HG: CPT | Mod: ,,, | Performed by: INTERNAL MEDICINE

## 2019-04-24 PROCEDURE — 3074F PR MOST RECENT SYSTOLIC BLOOD PRESSURE < 130 MM HG: ICD-10-PCS | Mod: ,,, | Performed by: INTERNAL MEDICINE

## 2019-04-24 PROCEDURE — 3075F SYST BP GE 130 - 139MM HG: CPT | Mod: ,,, | Performed by: INTERNAL MEDICINE

## 2019-04-24 NOTE — PROGRESS NOTES
[facility] Hematology/Oncology            PROGRESS NOTE      Subjective:       Patient ID:   NAME: Rick Butler : 1960     59 y.o. male    Referring Doc: Jesica Fonseca  Other Physicians: Getachew    Chief Complaint:  Sickle cell trait/anemia f/u    History of Present Illness:     Patient returns today for a regularly scheduled follow-up visit.  The patient is doing ok overall. He is followed by Dr Jesica Fonseca and saw her earlier today; no pain crisis; he has been sober for over 3 years now; he has been off tobacco too; no CP, SOB, HA's or N/V; occasional fatigue; he was transfused blood earlier last month; he has some renal issues and is seeing Dr Mas on Monday; he has missed several follow-up visits with my clinic and had not been checking his labs monthly. He has not had any recent pain crisis. He denies having any fatigue or breathing difficulties. He denies any blood in the stool, dark stools or hematuria.               ROS:   GEN: normal without any fever, night sweats or weight loss  HEENT: normal with no HA's, sore throat, stiff neck, changes in vision  CV: normal with no CP, SOB, PND, MORA or orthopnea  PULM: normal with no SOB, cough, hemoptysis, sputum or pleuritic pain  GI: normal with no abdominal pain, nausea, vomiting, constipation, diarrhea, melanotic stools, BRBPR, or hematemesis  : normal with no hematuria, dysuria  BREAST: normal with no mass, discharge, pain  SKIN: normal with no rash, erythema, bruising, or swelling    Allergies:  Review of patient's allergies indicates:  No Known Allergies    Medications:    Current Outpatient Medications:     folic acid (FOLVITE) 1 MG tablet, Take 2 tablets (2 mg total) by mouth once daily., Disp: 180 tablet, Rfl: 3    multivitamin capsule, Take 1 capsule by mouth once daily., Disp: , Rfl:     omeprazole (PRILOSEC) 20 MG capsule, Take 1 capsule (20 mg total) by mouth once daily., Disp: 90 capsule, Rfl: 3    sildenafil (VIAGRA) 100 MG tablet, Take  1 tablet (100 mg total) by mouth daily as needed for Erectile Dysfunction., Disp: 10 tablet, Rfl: 6    tamsulosin (FLOMAX) 0.4 mg Cap, Take 1 capsule (0.4 mg total) by mouth once daily., Disp: 30 capsule, Rfl: 11    tiZANidine (ZANAFLEX) 4 MG tablet, Take 1 tablet (4 mg total) by mouth every 12 (twelve) hours as needed., Disp: 180 tablet, Rfl: 0    traMADol (ULTRAM) 50 mg tablet, TAKE 1 TABLET BY MOUTH EVERY 12 HOURS AS NEEDED FOR PAIN, Disp: 45 tablet, Rfl: 2    traZODone (DESYREL) 150 MG tablet, Take 1 tablet (150 mg total) by mouth nightly., Disp: 30 tablet, Rfl: 11    PMHx/PSHx Updates:  See patient's last visit with me on 8/20/2018  See H&P on 7/9/2011      Pathology:  none        Objective:     Vitals:  Blood pressure 131/76, pulse 87, temperature 98.5 °F (36.9 °C), temperature source Oral, resp. rate 20, weight 95.1 kg (209 lb 9.6 oz).    Physical Examination:   GEN: no apparent distress, comfortable; AAOx3  HEAD: atraumatic and normocephalic  EYES: no pallor, no icterus, PERRLA  ENT: OMM, no pharyngeal erythema, external ears WNL; no nasal discharge; no thrush  NECK: no masses, thyroid normal, trachea midline, no LAD/LN's, supple  CV: RRR with no murmur; normal pulse; normal S1 and S2; no pedal edema  CHEST: Normal respiratory effort; CTAB; normal breath sounds; no wheeze or crackles  ABDOM: nontender and nondistended; soft; normal bowel sounds; no rebound/guarding  MUSC/Skeletal: ROM normal; no crepitus; joints normal; no deformities or arthropathy  EXTREM: no clubbing, cyanosis, inflammation or swelling  SKIN: no rashes, lesions, ulcers, petechiae or subcutaneous nodules  : no jiang  NEURO: grossly intact; motor/sensory WNL; AAOx3; no tremors  PSYCH: normal mood, affect and behavior  LYMPH: normal cervical, supraclavicular, axillary and groin LN's            Labs:     4/23/2019  Lab Results   Component Value Date    WBC 7.6 04/23/2019    HGB 6.1 (LL) 04/23/2019    HCT 18.3 (LL) 04/23/2019    MCV 95.3  04/23/2019     (H) 04/23/2019    CMP  Sodium   Date Value Ref Range Status   04/23/2019 139 134 - 144 mmol/L      Potassium   Date Value Ref Range Status   04/23/2019 4.4 3.5 - 5.0 mmol/L      Chloride   Date Value Ref Range Status   04/23/2019 105 98 - 110 mmol/L      CO2   Date Value Ref Range Status   04/23/2019 23.5 22.8 - 31.6 mmol/L      Glucose   Date Value Ref Range Status   04/23/2019 108 (H) 70 - 99 mg/dL      BUN, Bld   Date Value Ref Range Status   04/23/2019 31 (H) 8 - 20 mg/dL      Creatinine   Date Value Ref Range Status   04/23/2019 2.39 (H) 0.60 - 1.40 mg/dL      Calcium   Date Value Ref Range Status   04/23/2019 8.7 7.7 - 10.4 mg/dL      Total Protein   Date Value Ref Range Status   04/23/2019 7.7 6.0 - 8.2 g/dL      Albumin   Date Value Ref Range Status   04/23/2019 4.4 3.1 - 4.7 g/dL      Total Bilirubin   Date Value Ref Range Status   04/23/2019 1.2 (H) 0.3 - 1.0 mg/dL      Alkaline Phosphatase   Date Value Ref Range Status   04/23/2019 44 40 - 104 IU/L      AST   Date Value Ref Range Status   04/23/2019 22 10 - 40 IU/L      ALT   Date Value Ref Range Status   06/08/2015 36 10 - 44 U/L Final     Anion Gap   Date Value Ref Range Status   06/08/2015 5 (L) 8 - 16 mmol/L Final     eGFR if    Date Value Ref Range Status   06/08/2015 >60.0 >60 mL/min/1.73 m^2 Final     eGFR if non    Date Value Ref Range Status   06/08/2015 >60.0 >60 mL/min/1.73 m^2 Final     Comment:     Calculation used to obtain the estimated glomerular filtration  rate (eGFR) is the CKD-EPI equation. Since race is unknown   in our information system, the eGFR values for   -American and Non--American patients are given   for each creatinine result.         3/25/2019  Lab Results   Component Value Date    IRON 165 03/25/2019    TIBC 240 03/25/2019    FERRITIN 958 (H) 03/25/2019       I have reviewed all available lab results and radiology reports.    Radiology/Diagnostic  "Studies:    2/15/2018 Xray Survey:   IMPRESSION: No significant abnormality seen. No evidence of osteoblastic or  osteoclastic lesions identified    MRI L-spine 2/12/2018  IMPRESSION:    1. Prominent low signal intensity throughout the bone marrow on T1 and  T2-weighted imaging. Marrow infiltrative process including malignancy such as  metastatic disease or lymphoma/leukemia is a consideration along with multiple  myeloma or malignant process. Benign etiology such as osteopetrosis or  regenerative red marrow are also considerations.    2. Multilevel lumbar degenerative changes, most prominent at L4-L5 and L5-S1 as  described.    Assessment/Plan:   (1) 59 y.o. male with diagnosis of sickle cell anemia with borderline microcytosis  - latest hgb was 6.1 and a little lower than his usual range  - however, he is not symptomatic at this time  - he probably has a multifactorial anemia process with underlying sickle cell, anemia of chronic disorders and anemia of chronic renal. I can not rule out an underlying GI bleeding process.   - iron panel is adequate (no deficiency)  - total bilirubin is only minimally elevated    (2) Alcohol abuse issues in past - he has been sober for over 3 years now    (3) former smoker    (4) chronic back issues - recent Xrays and MRI - suspect the findings on the MRI are possibly due to his sickle cell;   - SPEP was previously negative for M-protein  - PSA was 0.3 in Sept 2017  - recommended neurosurgery evaluation previously  - he saw Dr Nura VUONG and had a nerve "burning" procedure and his pain has improved    (5) elevated creatinine - he is seeing Dr Chilel with nephrology this coming Monday        1. Normochromic anemia     2. Hb-SS disease without crisis     3. Sickle cell trait-splenome     4. Thrombocytosis     5. Monocytosis     6. Macrocytic anemia     7. Chronic anemia     8. Anemia, chronic renal failure, stage 2 (mild)     9. Anemia due to multiple mechanisms     10. Anemia due to bone " marrow failure, unspecified bone marrow failure type     11. Vitamin D deficiency     12. Folic acid deficiency     13. Former smoker       PLAN;  1. Hold off on transfusing at this time since he is asymptomatic  2. Check Retic and erytropoetin levels  3. Check stools for blood and refer to GI  4. Check labs every 2 weeks- encouraged compliance  5. To see Dr Mas next week with nephrology  6. F/U and Fax note to Dr Jesica Fonseca, Dr Nura Heredia, Tg, Dennis  7. Encouraged continued sobriety  8. RTC 6-8 weeks    I spent over 15 mins with the patient, of which over half was face to face with the patient. Reviewing materials, labs, reports and studies. Making treatment and analytical decisions. Ordering necessary labs, tests and studies.          Discussion:     I have explained all of the above in detail and the patient understands all of the current recommendation(s). I have answered all of their questions to the best of my ability and to their complete satisfaction.   The patient is to continue with the current management plan.            Electronically signed by Jose Tello MD

## 2019-04-24 NOTE — LETTER
April 24, 2019      No Recipients           [facility] - Hematology Oncology  [address]  [address]  [place] [address]  Phone: [phone]  Fax: [phone]          Patient: Rick Butler   MR Number: [id number]   YOB: 1960   Date of Visit: 4/24/2019       Dear Dr. Camryn Thomas:    Thank you for referring Rick Butler to me for evaluation. Attached you will find relevant portions of my assessment and plan of care.    If you have questions, please do not hesitate to call me. I look forward to following Rick Butler along with you.    Sincerely,    Jose Tello MD    Enclosure  CC:  No Recipients    If you would like to receive this communication electronically, please contact [email or web address] or [phone] to request more information on [employer] Link access.    For providers and/or their staff who would like to refer a patient to [facility], please contact us through our one-stop-shop provider referral line, [facility], at [phone].    If you feel you have received this communication in error or would no longer like to receive these types of communications, please e-mail [email or web address]

## 2019-04-24 NOTE — TELEPHONE ENCOUNTER
----- Message from Jesica Fonseca MD sent at 4/23/2019  6:13 PM CDT -----  Patient's kidneys are failing - he is going to need to go to be admitted - hemoglobin 6.1 as well  Needs to go to the er to get admitted  Can he come in today to discuss?

## 2019-04-24 NOTE — PROGRESS NOTES
Subjective:       Patient ID: Rick Butler is a 59 y.o. male.    Chief Complaint: Follow-up (lab review)    59-year-old gentleman who is here for very abnormal blood work. Patient has a history of chronic severe anemia that was stable up until about 3 months ago when his hemoglobin went down to 6.1 in January. He received 1 unit of blood and then two months later his hemoglobin continued to be low at 5.1. At that time he received 2 units packed red blood cells. His anemia is chronic a posttransfusion H&H is not done. At the time he had 2 units of packed red blood cells his BUN/creatinine had risen to 24 and 1.79. On recheck a month later his hemoglobin remained at 6.0 as BUN/creatinine have gone up further to 31 and 2.39 giving him a creatinine clearance of 34. The patient last summer when his creatinine was 1.44 he had a renal ultrasound which was fairly unremarkable except that he did have some mild splenomegaly and some fatty infiltration of the liver. He also had some microalbuminuria he was placed on losartan 25 mg and remains on that today. He also has been on Flomax for symptoms of BPH. He has chronic low back pain but nothing increased in the past few weeks. He denies any blood in his urine he denies any decrease in the volume of urination or frequency. He also has an elevation in homocystine and has been on folic acid 2 mg for some time now. He has sickle cell trait and this was proven by hemoglobin electrophoresis in the summer and he had a negative leukemia/lymphoma screen. As far as his severe anemia he is essentially asymptomatic.    Review of Systems   Constitutional: Positive for fatigue. Negative for activity change, diaphoresis and fever.   HENT: Negative for congestion, ear pain, postnasal drip, sinus pressure, sore throat and trouble swallowing.    Eyes: Negative for pain.   Respiratory: Negative for cough, chest tightness, shortness of breath and wheezing.    Cardiovascular: Negative for chest  pain, palpitations and leg swelling.   Gastrointestinal: Negative for abdominal distention, abdominal pain, blood in stool, constipation and diarrhea.   Endocrine: Negative for cold intolerance and heat intolerance.   Genitourinary: Negative for decreased urine volume, difficulty urinating, dysuria, flank pain, frequency and hematuria.   Musculoskeletal: Positive for back pain. Negative for arthralgias, joint swelling, myalgias and neck pain.   Skin: Negative for pallor, rash and wound.   Neurological: Negative for tremors, syncope, weakness, light-headedness, numbness and headaches.   Hematological: Negative for adenopathy.   Psychiatric/Behavioral: Negative for confusion, decreased concentration, hallucinations, self-injury, sleep disturbance and suicidal ideas. The patient is not nervous/anxious.        Past Medical History:   Diagnosis Date    Anemia due to multiple mechanisms 1/13/2019    Anemia, chronic renal failure, stage 2 (mild) 1/13/2019    Depression     Former smoker 6/29/2017    H/O ETOH abuse 6/29/2017    Sickle cell anemia 6/29/2017       Past Surgical History:   Procedure Laterality Date    BLOCK-NERVE-MEDIAL BRANCH-LUMBAR Bilateral 5/25/2018    Performed by [facility]ia [facility] at [facility]    BLOCK-NERVE-MEDIAL BRANCH-LUMBAR Bilateral 4/20/2018    Performed [facility]    RADIOFREQUENCY ABLATION, NERVE, MEDIAL BRANCH, LUMBAR, 1 LEVEL Bilateral 7/20/2018    Performed by [facility]ia [facility] at [facility]       Family History   Problem Relation Age of Onset    Arthritis Mother     Depression Mother     Heart disease Mother     Hypertension Mother     Heart attack Father 59       Social History     Socioeconomic History    Marital status:      Spouse name: Not on file    Number of children: Not on file    Years of education: Not on file    Highest education level: Not on file   Occupational History    Occupation: Prep Cook     Employer: [employer]   Social Needs     Financial resource strain: Not on file    Food insecurity:     Worry: Not on file     Inability: Not on file    Transportation needs:     Medical: Not on file     Non-medical: Not on file   Tobacco Use    Smoking status: Former Smoker     Last attempt to quit: 1999     Years since quittin.3    Smokeless tobacco: Never Used   Substance and Sexual Activity    Alcohol use: No     Comment: quit 6/15/16, in AA    Drug use: No    Sexual activity: Yes   Lifestyle    Physical activity:     Days per week: Not on file     Minutes per session: Not on file    Stress: Very much   Relationships    Social connections:     Talks on phone: Not on file     Gets together: Not on file     Attends Hinduism service: Not on file     Active member of club or organization: Not on file     Attends meetings of clubs or organizations: Not on file     Relationship status: Not on file   Other Topics Concern    Not on file   Social History Narrative    Not on file       Current Outpatient Medications   Medication Sig Dispense Refill    folic acid (FOLVITE) 1 MG tablet Take 2 tablets (2 mg total) by mouth once daily. 180 tablet 3    multivitamin capsule Take 1 capsule by mouth once daily.      omeprazole (PRILOSEC) 20 MG capsule Take 1 capsule (20 mg total) by mouth once daily. 90 capsule 3    sildenafil (VIAGRA) 100 MG tablet Take 1 tablet (100 mg total) by mouth daily as needed for Erectile Dysfunction. 10 tablet 6    tamsulosin (FLOMAX) 0.4 mg Cap Take 1 capsule (0.4 mg total) by mouth once daily. 30 capsule 11    tiZANidine (ZANAFLEX) 4 MG tablet Take 1 tablet (4 mg total) by mouth every 12 (twelve) hours as needed. 180 tablet 0    traMADol (ULTRAM) 50 mg tablet TAKE 1 TABLET BY MOUTH EVERY 12 HOURS AS NEEDED FOR PAIN 45 tablet 2    traZODone (DESYREL) 150 MG tablet Take 1 tablet (150 mg total) by mouth nightly. 30 tablet 11     No current facility-administered medications for this visit.        Review of  patient's allergies indicates:  No Known Allergies  Objective:      Physical Exam   Constitutional: He is oriented to person, place, and time. He appears well-developed and well-nourished. No distress.   HENT:   Head: Normocephalic and atraumatic.   Right Ear: External ear normal.   Left Ear: External ear normal.   Nose: Nose normal.   Mouth/Throat: Oropharynx is clear and moist.   Eyes: Conjunctivae and EOM are normal. Right eye exhibits no discharge. Left eye exhibits no discharge. No scleral icterus.   Neck: Normal range of motion. Neck supple. No JVD present. No tracheal deviation present. No thyromegaly present.   Cardiovascular: Normal rate, regular rhythm, normal heart sounds and intact distal pulses. Exam reveals no gallop.   No murmur heard.  Pulmonary/Chest: Effort normal and breath sounds normal. No respiratory distress. He has no wheezes. He has no rales.   Abdominal: Soft. Bowel sounds are normal. He exhibits no distension and no mass. There is no tenderness.   Musculoskeletal: Normal range of motion. He exhibits no edema or tenderness.   Lymphadenopathy:     He has no cervical adenopathy.   Neurological: He is alert and oriented to person, place, and time.   Skin: Skin is warm and dry. No rash noted. He is not diaphoretic. No erythema.   Psychiatric: He has a normal mood and affect. His behavior is normal. Judgment and thought content normal.       Lab Results   Component Value Date    WBC 6.4 04/24/2019    HGB 6.4 (LL) 04/24/2019    HCT 19.6 (LL) 04/24/2019     (H) 04/24/2019    CHOL 118 (L) 06/08/2015    TRIG 26 (L) 06/08/2015    HDL 72 06/08/2015    ALT 36 06/08/2015    AST 22 04/24/2019     04/24/2019    K 4.5 04/24/2019     04/24/2019    CREATININE 1.74 (H) 04/24/2019    BUN 28 (H) 04/24/2019    CO2 23.6 04/24/2019    TSH 1.96 10/05/2018    PSA 0.3 10/05/2018    HGBA1C 5.6 02/15/2018       Assessment:       1. CKD (chronic kidney disease) stage 3, GFR 30-59 ml/min    2.  Proteinuria, unspecified type    3. Chronic anemia    4. Thrombocytosis    5. Anemia due to bone marrow failure, unspecified bone marrow failure type    6. Acute renal failure with acute tubular necrosis superimposed on stage 3 chronic kidney disease        Plan:       CKD (chronic kidney disease) stage 3, GFR 30-59 ml/min  -     Ultrasound renal artery stenosis hypertension limited ([facility] Only); Future  -     US Retroperitoneal Complete (Kidney and; Future; Expected date: 04/24/2019  -     Basic metabolic panel; Future; Expected date: 04/24/2019    Acute renal failure with acute tubular necrosis superimposed on stage 3 chronic kidney disease    Anemia due to bone marrow failure, unspecified bone marrow failure type  -     CBC auto differential; Future; Expected date: 04/24/2019  -     Reticulocytes; Future; Expected date: 04/24/2019    Sickle cell trait syndrome- converted to sickle cell     Thrombocytosis- without iron deficiency ? Need to recheck iron studies; recent colonoscopy negative and no new complaints - leukemia????    Proteinuria, unspecified type  -     Ultrasound renal artery stenosis hypertension limited ([facility] Only); Future  -     US Retroperitoneal Complete (Kidney and; Future; Expected date: 04/24/2019  -     Urinalysis w/reflex to Microscopic; Future; Expected date: 04/24/2019  -     Microalbumin/creatinine urine ratio; Future; Expected date: 04/24/2019  -     Zamora's Stain, Urine Random    Other orders  -     Urinalysis

## 2019-04-29 ENCOUNTER — TELEPHONE (OUTPATIENT)
Dept: FAMILY MEDICINE | Facility: CLINIC | Age: 59
End: 2019-04-29

## 2019-06-26 LAB
ALBUMIN SERPL-MCNC: 4.4 G/DL (ref 3.1–4.7)
ALP SERPL-CCNC: 50 IU/L (ref 40–104)
ALT (SGPT): 14 IU/L (ref 3–33)
AST SERPL-CCNC: 19 IU/L (ref 10–40)
BASOPHILS NFR BLD: 0 K/UL (ref 0–0.2)
BASOPHILS NFR BLD: 0.4 %
BILIRUB SERPL-MCNC: 1.2 MG/DL (ref 0.3–1)
BUN SERPL-MCNC: 23 MG/DL (ref 8–20)
CALCIUM SERPL-MCNC: 9 MG/DL (ref 7.7–10.4)
CHLORIDE: 105 MMOL/L (ref 98–110)
CO2 SERPL-SCNC: 24.3 MMOL/L (ref 22.8–31.6)
CREATININE: 1.62 MG/DL (ref 0.6–1.4)
EOSINOPHIL NFR BLD: 0.1 K/UL (ref 0–0.7)
EOSINOPHIL NFR BLD: 0.5 %
ERYTHROCYTE [DISTWIDTH] IN BLOOD BY AUTOMATED COUNT: 27.9 % (ref 11.7–14.9)
GLUCOSE: 114 MG/DL (ref 70–99)
GRAN #: 8 K/UL (ref 1.4–6.5)
GRAN%: 73.5 %
HCT VFR BLD AUTO: 16.6 % (ref 39–55)
HGB BLD-MCNC: 5.6 G/DL (ref 14–16)
IMMATURE GRANS (ABS): 0.1 K/UL (ref 0–1)
IMMATURE GRANULOCYTES: 0.5 %
LYMPH #: 1.2 K/UL (ref 1.2–3.4)
LYMPH%: 11 %
MCH RBC QN AUTO: 34.8 PG (ref 25–35)
MCHC RBC AUTO-ENTMCNC: 33.7 G/DL (ref 31–36)
MCV RBC AUTO: 103.1 FL (ref 80–100)
MONO #: 1.5 K/UL (ref 0.1–0.6)
MONO%: 14.1 %
NUCLEATED RBCS: 0 %
PLATELET # BLD AUTO: 504 K/UL (ref 140–440)
PMV BLD AUTO: 10 FL (ref 8.8–12.7)
POTASSIUM SERPL-SCNC: 4.6 MMOL/L (ref 3.5–5)
PROT SERPL-MCNC: 7.7 G/DL (ref 6–8.2)
RBC # BLD AUTO: 1.61 M/UL (ref 4.3–5.9)
SODIUM: 138 MMOL/L (ref 134–144)
WBC # BLD AUTO: 10.9 K/UL (ref 5–10)

## 2019-08-18 DIAGNOSIS — R35.1 BPH ASSOCIATED WITH NOCTURIA: ICD-10-CM

## 2019-08-18 DIAGNOSIS — N40.1 BPH ASSOCIATED WITH NOCTURIA: ICD-10-CM

## 2019-08-18 RX ORDER — TAMSULOSIN HYDROCHLORIDE 0.4 MG/1
0.4 CAPSULE ORAL DAILY
Qty: 30 CAPSULE | Refills: 11 | Status: SHIPPED | OUTPATIENT
Start: 2019-08-18 | End: 2019-09-30

## 2019-08-20 ENCOUNTER — LAB VISIT (OUTPATIENT)
Dept: LAB | Facility: HOSPITAL | Age: 59
End: 2019-08-20
Attending: INTERNAL MEDICINE
Payer: COMMERCIAL

## 2019-08-20 ENCOUNTER — TELEPHONE (OUTPATIENT)
Dept: HEMATOLOGY/ONCOLOGY | Facility: CLINIC | Age: 59
End: 2019-08-20

## 2019-08-20 DIAGNOSIS — D63.1 ANEMIA, CHRONIC RENAL FAILURE, STAGE 2 (MILD): ICD-10-CM

## 2019-08-20 DIAGNOSIS — D64.9 NORMOCHROMIC ANEMIA: ICD-10-CM

## 2019-08-20 DIAGNOSIS — D53.9 MACROCYTIC ANEMIA: ICD-10-CM

## 2019-08-20 DIAGNOSIS — N18.2 ANEMIA, CHRONIC RENAL FAILURE, STAGE 2 (MILD): ICD-10-CM

## 2019-08-20 DIAGNOSIS — D75.839 THROMBOCYTOSIS: ICD-10-CM

## 2019-08-20 DIAGNOSIS — D64.9 CHRONIC ANEMIA: ICD-10-CM

## 2019-08-20 DIAGNOSIS — D61.9 ANEMIA DUE TO BONE MARROW FAILURE, UNSPECIFIED BONE MARROW FAILURE TYPE: ICD-10-CM

## 2019-08-20 DIAGNOSIS — D72.821 MONOCYTOSIS: ICD-10-CM

## 2019-08-20 DIAGNOSIS — D57.1 HB-SS DISEASE WITHOUT CRISIS: ICD-10-CM

## 2019-08-20 DIAGNOSIS — D57.3 SICKLE CELL TRAIT: ICD-10-CM

## 2019-08-20 DIAGNOSIS — D64.89 ANEMIA DUE TO MULTIPLE MECHANISMS: ICD-10-CM

## 2019-08-20 LAB
ALBUMIN SERPL BCP-MCNC: 4.2 G/DL (ref 3.5–5.2)
ALP SERPL-CCNC: 41 U/L (ref 55–135)
ALT SERPL W/O P-5'-P-CCNC: 13 U/L (ref 10–44)
ANION GAP SERPL CALC-SCNC: 8 MMOL/L (ref 8–16)
AST SERPL-CCNC: 17 U/L (ref 10–40)
BASOPHILS # BLD AUTO: 0.03 K/UL (ref 0–0.2)
BASOPHILS NFR BLD: 0.5 % (ref 0–1.9)
BILIRUB SERPL-MCNC: 0.8 MG/DL (ref 0.1–1)
BUN SERPL-MCNC: 17 MG/DL (ref 6–20)
CALCIUM SERPL-MCNC: 8.7 MG/DL (ref 8.7–10.5)
CHLORIDE SERPL-SCNC: 107 MMOL/L (ref 95–110)
CO2 SERPL-SCNC: 24 MMOL/L (ref 23–29)
CREAT SERPL-MCNC: 1.2 MG/DL (ref 0.5–1.4)
DIFFERENTIAL METHOD: ABNORMAL
EOSINOPHIL # BLD AUTO: 0.1 K/UL (ref 0–0.5)
EOSINOPHIL NFR BLD: 1.3 % (ref 0–8)
ERYTHROCYTE [DISTWIDTH] IN BLOOD BY AUTOMATED COUNT: 28.3 % (ref 11.5–14.5)
EST. GFR  (AFRICAN AMERICAN): >60 ML/MIN/1.73 M^2
EST. GFR  (NON AFRICAN AMERICAN): >60 ML/MIN/1.73 M^2
GLUCOSE SERPL-MCNC: 88 MG/DL (ref 70–110)
HCT VFR BLD AUTO: 18.8 % (ref 40–54)
HGB BLD-MCNC: 6.1 G/DL (ref 14–18)
IMM GRANULOCYTES # BLD AUTO: 0.02 K/UL (ref 0–0.04)
IMM GRANULOCYTES NFR BLD AUTO: 0.4 % (ref 0–0.5)
LYMPHOCYTES # BLD AUTO: 1.5 K/UL (ref 1–4.8)
LYMPHOCYTES NFR BLD: 27 % (ref 18–48)
MCH RBC QN AUTO: 32.8 PG (ref 27–31)
MCHC RBC AUTO-ENTMCNC: 32.4 G/DL (ref 32–36)
MCV RBC AUTO: 101 FL (ref 82–98)
MONOCYTES # BLD AUTO: 0.6 K/UL (ref 0.3–1)
MONOCYTES NFR BLD: 11.3 % (ref 4–15)
NEUTROPHILS # BLD AUTO: 3.3 K/UL (ref 1.8–7.7)
NEUTROPHILS NFR BLD: 59.5 % (ref 38–73)
NRBC BLD-RTO: 0 /100 WBC
PLATELET # BLD AUTO: 440 K/UL (ref 150–350)
PMV BLD AUTO: 10.3 FL (ref 9.2–12.9)
POTASSIUM SERPL-SCNC: 4 MMOL/L (ref 3.5–5.1)
PROT SERPL-MCNC: 7.2 G/DL (ref 6–8.4)
PTH-INTACT SERPL-MCNC: 139.7 PG/ML (ref 9–77)
RBC # BLD AUTO: 1.86 M/UL (ref 4.6–6.2)
SODIUM SERPL-SCNC: 139 MMOL/L (ref 136–145)
WBC # BLD AUTO: 5.56 K/UL (ref 3.9–12.7)

## 2019-08-20 PROCEDURE — 86038 ANTINUCLEAR ANTIBODIES: CPT

## 2019-08-20 PROCEDURE — 85025 COMPLETE CBC W/AUTO DIFF WBC: CPT

## 2019-08-20 PROCEDURE — 83970 ASSAY OF PARATHORMONE: CPT

## 2019-08-20 PROCEDURE — 36415 COLL VENOUS BLD VENIPUNCTURE: CPT

## 2019-08-20 PROCEDURE — 83516 IMMUNOASSAY NONANTIBODY: CPT

## 2019-08-20 PROCEDURE — 80053 COMPREHEN METABOLIC PANEL: CPT

## 2019-08-20 PROCEDURE — 84165 PROTEIN E-PHORESIS SERUM: CPT

## 2019-08-20 PROCEDURE — 86256 FLUORESCENT ANTIBODY TITER: CPT | Mod: 59

## 2019-08-20 PROCEDURE — 86160 COMPLEMENT ANTIGEN: CPT | Mod: 59

## 2019-08-20 NOTE — TELEPHONE ENCOUNTER
Critical results called in from [facility] labs.    Jesica lehman/ the labs ext. [phone]      Hemaglobin 6.0     Hematocrit 18.8

## 2019-08-22 ENCOUNTER — TELEPHONE (OUTPATIENT)
Dept: HEMATOLOGY/ONCOLOGY | Facility: CLINIC | Age: 59
End: 2019-08-22

## 2019-08-22 ENCOUNTER — LAB VISIT (OUTPATIENT)
Dept: LAB | Facility: HOSPITAL | Age: 59
End: 2019-08-22
Attending: INTERNAL MEDICINE
Payer: COMMERCIAL

## 2019-08-22 DIAGNOSIS — N18.2 ANEMIA, CHRONIC RENAL FAILURE, STAGE 2 (MILD): Primary | ICD-10-CM

## 2019-08-22 DIAGNOSIS — D64.9 NORMOCHROMIC ANEMIA: Primary | ICD-10-CM

## 2019-08-22 DIAGNOSIS — D64.9 ANEMIA, UNSPECIFIED: Primary | ICD-10-CM

## 2019-08-22 DIAGNOSIS — D63.1 ANEMIA, CHRONIC RENAL FAILURE, STAGE 2 (MILD): Primary | ICD-10-CM

## 2019-08-22 DIAGNOSIS — N18.2 ANEMIA, CHRONIC RENAL FAILURE, STAGE 2 (MILD): ICD-10-CM

## 2019-08-22 DIAGNOSIS — D63.1 ANEMIA OF CHRONIC RENAL FAILURE: ICD-10-CM

## 2019-08-22 DIAGNOSIS — N18.2 CHRONIC KIDNEY DISEASE, STAGE II (MILD): ICD-10-CM

## 2019-08-22 DIAGNOSIS — D63.1 ANEMIA, CHRONIC RENAL FAILURE, STAGE 2 (MILD): ICD-10-CM

## 2019-08-22 DIAGNOSIS — N18.9 ANEMIA OF CHRONIC RENAL FAILURE: ICD-10-CM

## 2019-08-22 LAB
ABO + RH BLD: NORMAL
ANA TITR SER IF: NEGATIVE {TITER}
BLD GP AB SCN CELLS X3 SERPL QL: NORMAL
C-ANCA TITR SER IF: NORMAL TITER
C3 SERPL-MCNC: 71 MG/DL (ref 82–167)
C4 SERPL-MCNC: 24 MG/DL (ref 14–44)
P-ANCA ATYPICAL TITR SER IF: NORMAL TITER
P-ANCA TITR SER IF: NORMAL TITER

## 2019-08-22 PROCEDURE — 86850 RBC ANTIBODY SCREEN: CPT

## 2019-08-22 PROCEDURE — 36415 COLL VENOUS BLD VENIPUNCTURE: CPT

## 2019-08-22 RX ORDER — DIPHENHYDRAMINE HYDROCHLORIDE 50 MG/ML
25 INJECTION INTRAMUSCULAR; INTRAVENOUS
Status: CANCELLED | OUTPATIENT
Start: 2019-08-22

## 2019-08-22 RX ORDER — FUROSEMIDE 10 MG/ML
20 INJECTION INTRAMUSCULAR; INTRAVENOUS
Status: CANCELLED | OUTPATIENT
Start: 2019-08-22

## 2019-08-22 RX ORDER — HYDROCODONE BITARTRATE AND ACETAMINOPHEN 500; 5 MG/1; MG/1
TABLET ORAL ONCE
Status: CANCELLED | OUTPATIENT
Start: 2019-08-22 | End: 2019-08-22

## 2019-08-22 RX ORDER — ACETAMINOPHEN 325 MG/1
650 TABLET ORAL
Status: CANCELLED | OUTPATIENT
Start: 2019-08-22

## 2019-08-22 NOTE — TELEPHONE ENCOUNTER
Got patient set up to get blood transfusion   08/23/2019    ----- Message from Jose Tello MD sent at 8/21/2019 10:38 AM CDT -----  Needs two units of blood

## 2019-08-22 NOTE — TELEPHONE ENCOUNTER
Patient has been set up for blood transfusion     ----- Message from Keyonna Galloway sent at 8/21/2019  4:45 PM CDT -----  Pt called in reference to the transfusion..the patient has to be at work for 8am.  If you call earlier, call on the home #.    CB# [phone] cell   Or  home before 8am

## 2019-08-23 ENCOUNTER — INFUSION (OUTPATIENT)
Dept: INFUSION THERAPY | Facility: HOSPITAL | Age: 59
End: 2019-08-23
Attending: INTERNAL MEDICINE
Payer: COMMERCIAL

## 2019-08-23 VITALS
OXYGEN SATURATION: 98 % | RESPIRATION RATE: 16 BRPM | HEART RATE: 89 BPM | DIASTOLIC BLOOD PRESSURE: 91 MMHG | TEMPERATURE: 98 F | SYSTOLIC BLOOD PRESSURE: 139 MMHG

## 2019-08-23 DIAGNOSIS — N18.2 ANEMIA, CHRONIC RENAL FAILURE, STAGE 2 (MILD): ICD-10-CM

## 2019-08-23 DIAGNOSIS — D64.9 NORMOCHROMIC ANEMIA: ICD-10-CM

## 2019-08-23 DIAGNOSIS — D63.1 ANEMIA, CHRONIC RENAL FAILURE, STAGE 2 (MILD): ICD-10-CM

## 2019-08-23 LAB
ALBUMIN SERPL ELPH-MCNC: 3.9 G/DL (ref 2.9–4.4)
ALBUMIN/GLOB SERPL: 1.3 {RATIO} (ref 0.7–1.7)
ALPHA1 GLOB SERPL ELPH-MCNC: 0.2 G/DL (ref 0–0.4)
ALPHA2 GLOB SERPL ELPH-MCNC: 0.5 G/DL (ref 0.4–1)
B-GLOBULIN SERPL ELPH-MCNC: 0.7 G/DL (ref 0.7–1.3)
BLD PROD TYP BPU: NORMAL
BLD PROD TYP BPU: NORMAL
BLOOD UNIT EXPIRATION DATE: NORMAL
BLOOD UNIT EXPIRATION DATE: NORMAL
BLOOD UNIT TYPE CODE: 5100
BLOOD UNIT TYPE CODE: 5100
BLOOD UNIT TYPE: NORMAL
BLOOD UNIT TYPE: NORMAL
CODING SYSTEM: NORMAL
CODING SYSTEM: NORMAL
DISPENSE STATUS: NORMAL
DISPENSE STATUS: NORMAL
GAMMA GLOB SERPL ELPH-MCNC: 1.4 G/DL (ref 0.4–1.8)
GBM IGG SER-ACNC: 4 UNITS (ref 0–20)
GLOBULIN SER CALC-MCNC: 3 G/DL (ref 2.2–3.9)
LABORATORY COMMENT REPORT: NORMAL
M PROTEIN SERPL ELPH-MCNC: NORMAL G/DL
NUM UNITS TRANS PACKED RBC: NORMAL
NUM UNITS TRANS PACKED RBC: NORMAL
PROT SERPL-MCNC: 6.9 G/DL (ref 6–8.5)

## 2019-08-23 PROCEDURE — 25000003 PHARM REV CODE 250: Performed by: INTERNAL MEDICINE

## 2019-08-23 PROCEDURE — 96374 THER/PROPH/DIAG INJ IV PUSH: CPT

## 2019-08-23 PROCEDURE — P9016 RBC LEUKOCYTES REDUCED: HCPCS

## 2019-08-23 PROCEDURE — 36430 TRANSFUSION BLD/BLD COMPNT: CPT

## 2019-08-23 PROCEDURE — 86920 COMPATIBILITY TEST SPIN: CPT

## 2019-08-23 PROCEDURE — 63600175 PHARM REV CODE 636 W HCPCS: Performed by: INTERNAL MEDICINE

## 2019-08-23 RX ORDER — FUROSEMIDE 10 MG/ML
20 INJECTION INTRAMUSCULAR; INTRAVENOUS
Status: DISCONTINUED | OUTPATIENT
Start: 2019-08-23 | End: 2019-08-23

## 2019-08-23 RX ORDER — DIPHENHYDRAMINE HYDROCHLORIDE 50 MG/ML
25 INJECTION INTRAMUSCULAR; INTRAVENOUS
Status: COMPLETED | OUTPATIENT
Start: 2019-08-23 | End: 2019-08-23

## 2019-08-23 RX ORDER — HYDROCODONE BITARTRATE AND ACETAMINOPHEN 500; 5 MG/1; MG/1
TABLET ORAL ONCE
Status: COMPLETED | OUTPATIENT
Start: 2019-08-23 | End: 2019-08-23

## 2019-08-23 RX ORDER — ACETAMINOPHEN 325 MG/1
650 TABLET ORAL
Status: COMPLETED | OUTPATIENT
Start: 2019-08-23 | End: 2019-08-23

## 2019-08-23 RX ORDER — FUROSEMIDE 10 MG/ML
20 INJECTION INTRAMUSCULAR; INTRAVENOUS
Status: DISCONTINUED | OUTPATIENT
Start: 2019-08-23 | End: 2020-08-11

## 2019-08-23 RX ADMIN — DIPHENHYDRAMINE HYDROCHLORIDE 25 MG: 50 INJECTION INTRAMUSCULAR; INTRAVENOUS at 06:08

## 2019-08-23 RX ADMIN — HYDROCODONE BITARTRATE AND ACETAMINOPHEN: 500; 5 TABLET ORAL at 06:08

## 2019-08-23 RX ADMIN — FUROSEMIDE 20 MG: 10 INJECTION, SOLUTION INTRAMUSCULAR; INTRAVENOUS at 09:08

## 2019-08-23 RX ADMIN — ACETAMINOPHEN 650 MG: 325 TABLET ORAL at 06:08

## 2019-08-23 NOTE — PATIENT INSTRUCTIONS
Pt instructed to follow up with Dr. Tello. Instructed to go to ER for any serious symptoms such as chest pain or shortness of breath or to notify Dr. Tello of any problems or concerns.

## 2019-08-24 ENCOUNTER — APPOINTMENT (OUTPATIENT)
Dept: LAB | Facility: HOSPITAL | Age: 59
End: 2019-08-24
Attending: INTERNAL MEDICINE
Payer: COMMERCIAL

## 2019-08-24 LAB
CREAT CL/1.73 SQ M 12H UR+SERPL-ARVRAT: 91 ML/MIN (ref 70–110)
CREAT SERPL-MCNC: 1.2 MG/DL (ref 0.5–1.4)
CREAT UR-MCNC: 45 MG/DL (ref 23–375)
CREATININE, URINE (MG/SPEC): 1575 MG/SPEC
PROT 24H UR-MRATE: NORMAL MG/SPEC (ref 0–100)
PROT UR-MCNC: <6 MG/DL (ref 0–15)
URINE COLLECTION DURATION: 24 HR
URINE COLLECTION DURATION: 24 HR
URINE VOLUME: 3500 ML
URINE VOLUME: 3500 ML

## 2019-08-24 PROCEDURE — 36415 COLL VENOUS BLD VENIPUNCTURE: CPT

## 2019-08-24 PROCEDURE — 82575 CREATININE CLEARANCE TEST: CPT

## 2019-08-24 PROCEDURE — 84156 ASSAY OF PROTEIN URINE: CPT

## 2019-09-05 ENCOUNTER — LAB VISIT (OUTPATIENT)
Dept: LAB | Facility: HOSPITAL | Age: 59
End: 2019-09-05
Attending: INTERNAL MEDICINE
Payer: COMMERCIAL

## 2019-09-05 DIAGNOSIS — D57.1 HB-SS DISEASE WITHOUT CRISIS: ICD-10-CM

## 2019-09-05 DIAGNOSIS — D47.3 THROMBOCYTHEMIA, ESSENTIAL: ICD-10-CM

## 2019-09-05 DIAGNOSIS — D53.9 SIMPLE CHRONIC ANEMIA: ICD-10-CM

## 2019-09-05 DIAGNOSIS — D72.821 REACTIVE MONOCYTOSIS: ICD-10-CM

## 2019-09-05 DIAGNOSIS — D64.9 ANEMIA, UNSPECIFIED: Primary | ICD-10-CM

## 2019-09-05 DIAGNOSIS — D57.3 SICKLE-CELL TRAIT: ICD-10-CM

## 2019-09-05 LAB
ALBUMIN SERPL BCP-MCNC: 4.5 G/DL (ref 3.5–5.2)
ALP SERPL-CCNC: 53 U/L (ref 55–135)
ALT SERPL W/O P-5'-P-CCNC: 16 U/L (ref 10–44)
ANION GAP SERPL CALC-SCNC: 8 MMOL/L (ref 8–16)
AST SERPL-CCNC: 20 U/L (ref 10–40)
BASOPHILS # BLD AUTO: 0.03 K/UL (ref 0–0.2)
BASOPHILS NFR BLD: 0.4 % (ref 0–1.9)
BILIRUB SERPL-MCNC: 1 MG/DL (ref 0.1–1)
BUN SERPL-MCNC: 19 MG/DL (ref 6–20)
CALCIUM SERPL-MCNC: 8.7 MG/DL (ref 8.7–10.5)
CHLORIDE SERPL-SCNC: 107 MMOL/L (ref 95–110)
CO2 SERPL-SCNC: 26 MMOL/L (ref 23–29)
CREAT SERPL-MCNC: 1.5 MG/DL (ref 0.5–1.4)
DIFFERENTIAL METHOD: ABNORMAL
EOSINOPHIL # BLD AUTO: 0 K/UL (ref 0–0.5)
EOSINOPHIL NFR BLD: 0.6 % (ref 0–8)
ERYTHROCYTE [DISTWIDTH] IN BLOOD BY AUTOMATED COUNT: 25.8 % (ref 11.5–14.5)
EST. GFR  (AFRICAN AMERICAN): 58.1 ML/MIN/1.73 M^2
EST. GFR  (NON AFRICAN AMERICAN): 50.2 ML/MIN/1.73 M^2
FOLATE SERPL-MCNC: >24.8 NG/ML (ref 4–24)
GLUCOSE SERPL-MCNC: 94 MG/DL (ref 70–110)
HCT VFR BLD AUTO: 21.2 % (ref 40–54)
HGB BLD-MCNC: 6.9 G/DL (ref 14–18)
IMM GRANULOCYTES # BLD AUTO: 0.01 K/UL (ref 0–0.04)
IMM GRANULOCYTES NFR BLD AUTO: 0.1 % (ref 0–0.5)
LYMPHOCYTES # BLD AUTO: 1.5 K/UL (ref 1–4.8)
LYMPHOCYTES NFR BLD: 20.9 % (ref 18–48)
MCH RBC QN AUTO: 31.5 PG (ref 27–31)
MCHC RBC AUTO-ENTMCNC: 32.5 G/DL (ref 32–36)
MCV RBC AUTO: 97 FL (ref 82–98)
MONOCYTES # BLD AUTO: 1 K/UL (ref 0.3–1)
MONOCYTES NFR BLD: 14.3 % (ref 4–15)
NEUTROPHILS # BLD AUTO: 4.6 K/UL (ref 1.8–7.7)
NEUTROPHILS NFR BLD: 63.7 % (ref 38–73)
NRBC BLD-RTO: 0 /100 WBC
PLATELET # BLD AUTO: 485 K/UL (ref 150–350)
PMV BLD AUTO: 10.7 FL (ref 9.2–12.9)
POTASSIUM SERPL-SCNC: 3.8 MMOL/L (ref 3.5–5.1)
PROT SERPL-MCNC: 7.4 G/DL (ref 6–8.4)
RBC # BLD AUTO: 2.19 M/UL (ref 4.6–6.2)
SODIUM SERPL-SCNC: 141 MMOL/L (ref 136–145)
WBC # BLD AUTO: 7.19 K/UL (ref 3.9–12.7)

## 2019-09-05 PROCEDURE — 82746 ASSAY OF FOLIC ACID SERUM: CPT

## 2019-09-05 PROCEDURE — 36415 COLL VENOUS BLD VENIPUNCTURE: CPT

## 2019-09-05 PROCEDURE — 80053 COMPREHEN METABOLIC PANEL: CPT

## 2019-09-05 PROCEDURE — 85025 COMPLETE CBC W/AUTO DIFF WBC: CPT

## 2019-09-06 ENCOUNTER — TELEPHONE (OUTPATIENT)
Dept: HEMATOLOGY/ONCOLOGY | Facility: CLINIC | Age: 59
End: 2019-09-06

## 2019-09-06 DIAGNOSIS — N18.2 ANEMIA, CHRONIC RENAL FAILURE, STAGE 2 (MILD): Primary | ICD-10-CM

## 2019-09-06 DIAGNOSIS — D63.1 ANEMIA, CHRONIC RENAL FAILURE, STAGE 2 (MILD): Primary | ICD-10-CM

## 2019-09-06 DIAGNOSIS — D64.9 NORMOCHROMIC ANEMIA: Primary | ICD-10-CM

## 2019-09-06 RX ORDER — FUROSEMIDE 10 MG/ML
20 INJECTION INTRAMUSCULAR; INTRAVENOUS
Status: CANCELLED | OUTPATIENT
Start: 2019-09-06

## 2019-09-06 RX ORDER — ACETAMINOPHEN 325 MG/1
650 TABLET ORAL
Status: CANCELLED | OUTPATIENT
Start: 2019-09-06

## 2019-09-06 RX ORDER — HYDROCODONE BITARTRATE AND ACETAMINOPHEN 500; 5 MG/1; MG/1
TABLET ORAL ONCE
Status: CANCELLED | OUTPATIENT
Start: 2019-09-06 | End: 2019-09-06

## 2019-09-06 RX ORDER — DIPHENHYDRAMINE HYDROCHLORIDE 50 MG/ML
25 INJECTION INTRAMUSCULAR; INTRAVENOUS
Status: CANCELLED | OUTPATIENT
Start: 2019-09-06

## 2019-09-06 NOTE — TELEPHONE ENCOUNTER
Spoke with patient about necessary blood transfusion. Pt would like to get a type and screen Monday, and have his transfusion Tuesday 9/10/19.

## 2019-09-06 NOTE — TELEPHONE ENCOUNTER
Pt called back and request type and screen Tuesday 9/10 and PRBC infusion Wednesday 9/11.  Scheduling called and confirmed pt is scheduled.

## 2019-09-12 ENCOUNTER — LAB VISIT (OUTPATIENT)
Dept: LAB | Facility: HOSPITAL | Age: 59
End: 2019-09-12
Attending: INTERNAL MEDICINE
Payer: COMMERCIAL

## 2019-09-12 DIAGNOSIS — D63.1 ANEMIA OF CHRONIC RENAL FAILURE: ICD-10-CM

## 2019-09-12 DIAGNOSIS — N18.2 CHRONIC KIDNEY DISEASE, STAGE II (MILD): Primary | ICD-10-CM

## 2019-09-12 DIAGNOSIS — D64.9 ANEMIA, UNSPECIFIED: ICD-10-CM

## 2019-09-12 DIAGNOSIS — N18.9 ANEMIA OF CHRONIC RENAL FAILURE: ICD-10-CM

## 2019-09-12 LAB
ABO + RH BLD: NORMAL
BLD GP AB SCN CELLS X3 SERPL QL: NORMAL

## 2019-09-12 PROCEDURE — 86901 BLOOD TYPING SEROLOGIC RH(D): CPT

## 2019-09-12 PROCEDURE — 36415 COLL VENOUS BLD VENIPUNCTURE: CPT

## 2019-09-13 ENCOUNTER — INFUSION (OUTPATIENT)
Dept: INFUSION THERAPY | Facility: HOSPITAL | Age: 59
End: 2019-09-13
Attending: INTERNAL MEDICINE
Payer: COMMERCIAL

## 2019-09-13 VITALS
OXYGEN SATURATION: 98 % | HEART RATE: 74 BPM | TEMPERATURE: 98 F | SYSTOLIC BLOOD PRESSURE: 134 MMHG | RESPIRATION RATE: 16 BRPM | DIASTOLIC BLOOD PRESSURE: 80 MMHG

## 2019-09-13 DIAGNOSIS — D64.9 NORMOCHROMIC ANEMIA: ICD-10-CM

## 2019-09-13 LAB
BLD PROD TYP BPU: NORMAL
BLD PROD TYP BPU: NORMAL
BLOOD UNIT EXPIRATION DATE: NORMAL
BLOOD UNIT EXPIRATION DATE: NORMAL
BLOOD UNIT TYPE CODE: 5100
BLOOD UNIT TYPE CODE: 5100
BLOOD UNIT TYPE: NORMAL
BLOOD UNIT TYPE: NORMAL
CODING SYSTEM: NORMAL
CODING SYSTEM: NORMAL
DISPENSE STATUS: NORMAL
DISPENSE STATUS: NORMAL
NUM UNITS TRANS PACKED RBC: NORMAL
NUM UNITS TRANS PACKED RBC: NORMAL

## 2019-09-13 PROCEDURE — 86920 COMPATIBILITY TEST SPIN: CPT

## 2019-09-13 PROCEDURE — 63600175 PHARM REV CODE 636 W HCPCS: Performed by: INTERNAL MEDICINE

## 2019-09-13 PROCEDURE — P9016 RBC LEUKOCYTES REDUCED: HCPCS

## 2019-09-13 PROCEDURE — 36430 TRANSFUSION BLD/BLD COMPNT: CPT

## 2019-09-13 PROCEDURE — 96374 THER/PROPH/DIAG INJ IV PUSH: CPT

## 2019-09-13 PROCEDURE — 25000003 PHARM REV CODE 250: Performed by: INTERNAL MEDICINE

## 2019-09-13 RX ORDER — ACETAMINOPHEN 325 MG/1
650 TABLET ORAL
Status: COMPLETED | OUTPATIENT
Start: 2019-09-13 | End: 2019-09-13

## 2019-09-13 RX ORDER — FUROSEMIDE 10 MG/ML
20 INJECTION INTRAMUSCULAR; INTRAVENOUS
Status: DISCONTINUED | OUTPATIENT
Start: 2019-09-13 | End: 2020-08-11

## 2019-09-13 RX ORDER — FUROSEMIDE 10 MG/ML
20 INJECTION INTRAMUSCULAR; INTRAVENOUS
Status: COMPLETED | OUTPATIENT
Start: 2019-09-13 | End: 2019-09-13

## 2019-09-13 RX ORDER — HYDROCODONE BITARTRATE AND ACETAMINOPHEN 500; 5 MG/1; MG/1
TABLET ORAL ONCE
Status: DISCONTINUED | OUTPATIENT
Start: 2019-09-13 | End: 2020-08-11

## 2019-09-13 RX ORDER — DIPHENHYDRAMINE HYDROCHLORIDE 50 MG/ML
25 INJECTION INTRAMUSCULAR; INTRAVENOUS
Status: COMPLETED | OUTPATIENT
Start: 2019-09-13 | End: 2019-09-13

## 2019-09-13 RX ADMIN — DIPHENHYDRAMINE HYDROCHLORIDE 25 MG: 50 INJECTION INTRAMUSCULAR; INTRAVENOUS at 06:09

## 2019-09-13 RX ADMIN — FUROSEMIDE 20 MG: 10 INJECTION, SOLUTION INTRAMUSCULAR; INTRAVENOUS at 09:09

## 2019-09-13 RX ADMIN — ACETAMINOPHEN 650 MG: 325 TABLET ORAL at 06:09

## 2019-09-25 NOTE — PROGRESS NOTES
[facility] Hematology/Oncology            PROGRESS NOTE      Subjective:       Patient ID:   NAME: Rick Butler : 1960     59 y.o. male    Referring Doc: Jesica Fonseca  Other Physicians: Samuel Collins    Chief Complaint:  Sickle cell trait/anemia f/u    History of Present Illness:     Patient returns today for a regularly scheduled follow-up visit.  The patient is doing ok overall. He is here with his wife. He has some chronic fatigue. He saw Dr Chilel with nephrology since last visit.     no pain crisis; he has been sober for over 3 years now; he has been off tobacco too; no CP, SOB, HA's or N/V; occasional fatigue;      He has not had any recent pain crisis. He denies having any fatigue or breathing difficulties. He denies any blood in the stool, dark stools or hematuria.       He required blood on 2019; he saw Dr Collins and they are doing a scope on Oct 16th.         ROS:   GEN: normal without any fever, night sweats or weight loss  HEENT: normal with no HA's, sore throat, stiff neck, changes in vision  CV: normal with no CP, SOB, PND, MORA or orthopnea  PULM: normal with no SOB, cough, hemoptysis, sputum or pleuritic pain  GI: normal with no abdominal pain, nausea, vomiting, constipation, diarrhea, melanotic stools, BRBPR, or hematemesis  : normal with no hematuria, dysuria  BREAST: normal with no mass, discharge, pain  SKIN: normal with no rash, erythema, bruising, or swelling    Allergies:  Review of patient's allergies indicates:  No Known Allergies    Medications:    Current Outpatient Medications:     folic acid (FOLVITE) 1 MG tablet, Take 2 tablets (2 mg total) by mouth once daily., Disp: 180 tablet, Rfl: 3    multivitamin capsule, Take 1 capsule by mouth once daily., Disp: , Rfl:     omeprazole (PRILOSEC) 20 MG capsule, Take 1 capsule (20 mg total) by mouth once daily., Disp: 90 capsule, Rfl: 3    tamsulosin (FLOMAX) 0.4 mg Cap, TAKE 1 CAPSULE (0.4 MG TOTAL) BY MOUTH ONCE DAILY., Disp: 30  capsule, Rfl: 11    tiZANidine (ZANAFLEX) 4 MG tablet, Take 1 tablet (4 mg total) by mouth every 12 (twelve) hours as needed., Disp: 180 tablet, Rfl: 0    traMADol (ULTRAM) 50 mg tablet, TAKE 1 TABLET BY MOUTH EVERY 12 HOURS AS NEEDED FOR PAIN, Disp: 45 tablet, Rfl: 2    traZODone (DESYREL) 150 MG tablet, Take 1 tablet (150 mg total) by mouth nightly., Disp: 30 tablet, Rfl: 11    sildenafil (VIAGRA) 100 MG tablet, Take 1 tablet (100 mg total) by mouth daily as needed for Erectile Dysfunction., Disp: 10 tablet, Rfl: 6    Current Facility-Administered Medications:     0.9%  NaCl infusion (for blood administration), , Intravenous, Once, Jose Tello MD    furosemide injection 20 mg, 20 mg, Intravenous, PRN, Jose Tello MD    furosemide injection 20 mg, 20 mg, Intravenous, PRN, Jose Tello MD    PMHx/PSHx Updates:  See patient's last visit with me on 4/24/2019  See H&P on 7/9/2011      Pathology:  none        Objective:     Vitals:  Blood pressure 130/73, pulse 90, temperature 99.1 °F (37.3 °C), temperature source Oral, resp. rate 20, weight 94.2 kg (207 lb 11.2 oz).    Physical Examination:   GEN: no apparent distress, comfortable; AAOx3  HEAD: atraumatic and normocephalic  EYES: no pallor, no icterus, PERRLA  ENT: OMM, no pharyngeal erythema, external ears WNL; no nasal discharge; no thrush  NECK: no masses, thyroid normal, trachea midline, no LAD/LN's, supple  CV: RRR with no murmur; normal pulse; normal S1 and S2; no pedal edema  CHEST: Normal respiratory effort; CTAB; normal breath sounds; no wheeze or crackles  ABDOM: nontender and nondistended; soft; normal bowel sounds; no rebound/guarding  MUSC/Skeletal: ROM normal; no crepitus; joints normal; no deformities or arthropathy  EXTREM: no clubbing, cyanosis, inflammation or swelling  SKIN: no rashes, lesions, ulcers, petechiae or subcutaneous nodules  : no jiang  NEURO: grossly intact; motor/sensory WNL; AAOx3; no tremors  PSYCH:  "normal mood, affect and behavior  LYMPH: normal cervical, supraclavicular, axillary and groin LN's            Labs:     No recent labs since getting blood transfusion      I have reviewed all available lab results and radiology reports.    Radiology/Diagnostic Studies:    2/15/2018 Xray Survey:   IMPRESSION: No significant abnormality seen. No evidence of osteoblastic or  osteoclastic lesions identified    MRI L-spine 2/12/2018  IMPRESSION:    1. Prominent low signal intensity throughout the bone marrow on T1 and  T2-weighted imaging. Marrow infiltrative process including malignancy such as  metastatic disease or lymphoma/leukemia is a consideration along with multiple  myeloma or malignant process. Benign etiology such as osteopetrosis or  regenerative red marrow are also considerations.    2. Multilevel lumbar degenerative changes, most prominent at L4-L5 and L5-S1 as  described.    Assessment/Plan:   (1) 59 y.o. male with diagnosis of sickle cell anemia with borderline microcytosis  - latest hgb was 6.9 and he had blood transfusion  - today, he is not symptomatic at this time  - he probably has a multifactorial anemia process with underlying sickle cell, anemia of chronic disorders and anemia of chronic renal. I can not rule out an underlying GI bleeding process.   - iron panel is adequate (no deficiency)  - total bilirubin is only minimally elevated    (2) Alcohol abuse issues in past - he has been sober for over 3 years now    (3) former smoker    (4) chronic back issues - prior Xrays and MRI - suspect the findings on the MRI are possibly due to his sickle cell;   - SPEP was previously negative for M-protein  - PSA was 0.3 in Sept 2017  - recommended neurosurgery evaluation previously  - he saw Dr Nura VUONG and had a nerve "burning" procedure and his pain has improved    (5) CRI - elevated creatinine - he saw Dr Chilel with nephrology          1. H/O ETOH abuse     2. Thrombocytopenia     3. Anemia due to bone " marrow failure, unspecified bone marrow failure type     4. Anemia due to multiple mechanisms     5. Anemia, chronic renal failure, stage 2 (mild)     6. Chronic anemia     7. Macrocytic anemia     8. Monocytosis     9. Hb-SS disease without crisis     10. Normochromic anemia     11. Sickle cell trait-splenome     12. Thrombocytosis     13. Folic acid deficiency       PLAN;  1. Hold off on transfusing at this time as long as he is asymptomatic  2. proceed with GI evaluation and endoscopies  3.  F/u with nephrology as directed by them  4. Check labs every 2 weeks- encouraged compliance  5.  Encouraged continued sobriety    RTC 2 months  Fax note to Dennis Fonseca Tveit    I spent over 15 mins with the patient, of which over half was face to face with the patient. Reviewing materials, labs, reports and studies. Making treatment and analytical decisions. Ordering necessary labs, tests and studies.          Discussion:     I have explained all of the above in detail and the patient understands all of the current recommendation(s). I have answered all of their questions to the best of my ability and to their complete satisfaction.   The patient is to continue with the current management plan.            Electronically signed by Jose Tello MD

## 2019-09-26 ENCOUNTER — OFFICE VISIT (OUTPATIENT)
Dept: HEMATOLOGY/ONCOLOGY | Facility: CLINIC | Age: 59
End: 2019-09-26
Payer: COMMERCIAL

## 2019-09-26 VITALS
SYSTOLIC BLOOD PRESSURE: 130 MMHG | RESPIRATION RATE: 20 BRPM | DIASTOLIC BLOOD PRESSURE: 73 MMHG | HEART RATE: 90 BPM | BODY MASS INDEX: 29.8 KG/M2 | TEMPERATURE: 99 F | WEIGHT: 207.69 LBS

## 2019-09-26 DIAGNOSIS — D64.89 ANEMIA DUE TO MULTIPLE MECHANISMS: ICD-10-CM

## 2019-09-26 DIAGNOSIS — D57.3 SICKLE CELL TRAIT: ICD-10-CM

## 2019-09-26 DIAGNOSIS — D69.6 THROMBOCYTOPENIA: ICD-10-CM

## 2019-09-26 DIAGNOSIS — N18.2 ANEMIA, CHRONIC RENAL FAILURE, STAGE 2 (MILD): ICD-10-CM

## 2019-09-26 DIAGNOSIS — D57.1 HB-SS DISEASE WITHOUT CRISIS: ICD-10-CM

## 2019-09-26 DIAGNOSIS — D75.839 THROMBOCYTOSIS: ICD-10-CM

## 2019-09-26 DIAGNOSIS — D72.821 MONOCYTOSIS: ICD-10-CM

## 2019-09-26 DIAGNOSIS — D53.9 MACROCYTIC ANEMIA: ICD-10-CM

## 2019-09-26 DIAGNOSIS — F10.11 H/O ETOH ABUSE: Primary | ICD-10-CM

## 2019-09-26 DIAGNOSIS — E53.8 FOLIC ACID DEFICIENCY: ICD-10-CM

## 2019-09-26 DIAGNOSIS — D64.9 NORMOCHROMIC ANEMIA: ICD-10-CM

## 2019-09-26 DIAGNOSIS — D63.1 ANEMIA, CHRONIC RENAL FAILURE, STAGE 2 (MILD): ICD-10-CM

## 2019-09-26 DIAGNOSIS — D64.9 CHRONIC ANEMIA: ICD-10-CM

## 2019-09-26 DIAGNOSIS — D61.9 ANEMIA DUE TO BONE MARROW FAILURE, UNSPECIFIED BONE MARROW FAILURE TYPE: ICD-10-CM

## 2019-09-26 PROCEDURE — 99213 PR OFFICE/OUTPT VISIT, EST, LEVL III, 20-29 MIN: ICD-10-PCS | Mod: S$GLB,,, | Performed by: INTERNAL MEDICINE

## 2019-09-26 PROCEDURE — 99213 OFFICE O/P EST LOW 20 MIN: CPT | Mod: S$GLB,,, | Performed by: INTERNAL MEDICINE

## 2019-09-26 PROCEDURE — 3008F PR BODY MASS INDEX (BMI) DOCUMENTED: ICD-10-PCS | Mod: S$GLB,,, | Performed by: INTERNAL MEDICINE

## 2019-09-26 PROCEDURE — 3078F PR MOST RECENT DIASTOLIC BLOOD PRESSURE < 80 MM HG: ICD-10-PCS | Mod: S$GLB,,, | Performed by: INTERNAL MEDICINE

## 2019-09-26 PROCEDURE — 3075F SYST BP GE 130 - 139MM HG: CPT | Mod: S$GLB,,, | Performed by: INTERNAL MEDICINE

## 2019-09-26 PROCEDURE — 3078F DIAST BP <80 MM HG: CPT | Mod: S$GLB,,, | Performed by: INTERNAL MEDICINE

## 2019-09-26 PROCEDURE — 3075F PR MOST RECENT SYSTOLIC BLOOD PRESS GE 130-139MM HG: ICD-10-PCS | Mod: S$GLB,,, | Performed by: INTERNAL MEDICINE

## 2019-09-26 PROCEDURE — 3008F BODY MASS INDEX DOCD: CPT | Mod: S$GLB,,, | Performed by: INTERNAL MEDICINE

## 2019-09-30 ENCOUNTER — OFFICE VISIT (OUTPATIENT)
Dept: FAMILY MEDICINE | Facility: CLINIC | Age: 59
End: 2019-09-30
Payer: COMMERCIAL

## 2019-09-30 ENCOUNTER — LAB VISIT (OUTPATIENT)
Dept: LAB | Facility: HOSPITAL | Age: 59
End: 2019-09-30
Attending: INTERNAL MEDICINE
Payer: COMMERCIAL

## 2019-09-30 VITALS
OXYGEN SATURATION: 98 % | HEART RATE: 83 BPM | BODY MASS INDEX: 29.63 KG/M2 | HEIGHT: 70 IN | RESPIRATION RATE: 17 BRPM | DIASTOLIC BLOOD PRESSURE: 60 MMHG | WEIGHT: 207 LBS | SYSTOLIC BLOOD PRESSURE: 132 MMHG | TEMPERATURE: 99 F

## 2019-09-30 DIAGNOSIS — R53.83 FATIGUE, UNSPECIFIED TYPE: ICD-10-CM

## 2019-09-30 DIAGNOSIS — D57.1 HB-SS DISEASE WITHOUT CRISIS: ICD-10-CM

## 2019-09-30 DIAGNOSIS — D57.3 SICKLE CELL TRAIT: ICD-10-CM

## 2019-09-30 DIAGNOSIS — D64.9 NORMOCHROMIC ANEMIA: ICD-10-CM

## 2019-09-30 DIAGNOSIS — F51.01 PRIMARY INSOMNIA: ICD-10-CM

## 2019-09-30 DIAGNOSIS — M47.896 OTHER SPONDYLOSIS, LUMBAR REGION: ICD-10-CM

## 2019-09-30 DIAGNOSIS — Z12.5 PROSTATE CANCER SCREENING: ICD-10-CM

## 2019-09-30 DIAGNOSIS — D64.9 CHRONIC ANEMIA: ICD-10-CM

## 2019-09-30 DIAGNOSIS — D53.9 MACROCYTIC ANEMIA: ICD-10-CM

## 2019-09-30 DIAGNOSIS — E53.8 FOLIC ACID DEFICIENCY: ICD-10-CM

## 2019-09-30 DIAGNOSIS — D63.1 ANEMIA, CHRONIC RENAL FAILURE, STAGE 2 (MILD): ICD-10-CM

## 2019-09-30 DIAGNOSIS — D75.839 THROMBOCYTOSIS: ICD-10-CM

## 2019-09-30 DIAGNOSIS — N18.2 ANEMIA, CHRONIC RENAL FAILURE, STAGE 2 (MILD): ICD-10-CM

## 2019-09-30 DIAGNOSIS — N18.30 CKD (CHRONIC KIDNEY DISEASE) STAGE 3, GFR 30-59 ML/MIN: ICD-10-CM

## 2019-09-30 DIAGNOSIS — D57.3 SICKLE CELL TRAIT SYNDROME: Primary | ICD-10-CM

## 2019-09-30 DIAGNOSIS — D64.89 ANEMIA DUE TO MULTIPLE MECHANISMS: ICD-10-CM

## 2019-09-30 DIAGNOSIS — D72.821 MONOCYTOSIS: ICD-10-CM

## 2019-09-30 DIAGNOSIS — D61.9 ANEMIA DUE TO BONE MARROW FAILURE, UNSPECIFIED BONE MARROW FAILURE TYPE: ICD-10-CM

## 2019-09-30 LAB
ALBUMIN SERPL BCP-MCNC: 4.2 G/DL (ref 3.5–5.2)
ALP SERPL-CCNC: 49 U/L (ref 55–135)
ALT SERPL W/O P-5'-P-CCNC: 15 U/L (ref 10–44)
ANION GAP SERPL CALC-SCNC: 8 MMOL/L (ref 8–16)
AST SERPL-CCNC: 24 U/L (ref 10–40)
BASOPHILS # BLD AUTO: 0.04 K/UL (ref 0–0.2)
BASOPHILS NFR BLD: 0.5 % (ref 0–1.9)
BILIRUB SERPL-MCNC: 1.1 MG/DL (ref 0.1–1)
BUN SERPL-MCNC: 17 MG/DL (ref 6–20)
CALCIUM SERPL-MCNC: 8.8 MG/DL (ref 8.7–10.5)
CHLORIDE SERPL-SCNC: 107 MMOL/L (ref 95–110)
CO2 SERPL-SCNC: 26 MMOL/L (ref 23–29)
COMPLEXED PSA SERPL-MCNC: 0.28 NG/ML (ref 0–4)
CREAT SERPL-MCNC: 1.4 MG/DL (ref 0.5–1.4)
DIFFERENTIAL METHOD: ABNORMAL
EOSINOPHIL # BLD AUTO: 0.1 K/UL (ref 0–0.5)
EOSINOPHIL NFR BLD: 0.6 % (ref 0–8)
ERYTHROCYTE [DISTWIDTH] IN BLOOD BY AUTOMATED COUNT: 23.9 % (ref 11.5–14.5)
EST. GFR  (AFRICAN AMERICAN): >60 ML/MIN/1.73 M^2
EST. GFR  (NON AFRICAN AMERICAN): 54.6 ML/MIN/1.73 M^2
FERRITIN SERPL-MCNC: 891 NG/ML (ref 20–300)
FOLATE SERPL-MCNC: >24.8 NG/ML (ref 4–24)
GLUCOSE SERPL-MCNC: 101 MG/DL (ref 70–110)
HCT VFR BLD AUTO: 18.7 % (ref 40–54)
HGB BLD-MCNC: 6.1 G/DL (ref 14–18)
IMM GRANULOCYTES # BLD AUTO: 0.04 K/UL (ref 0–0.04)
IMM GRANULOCYTES NFR BLD AUTO: 0.5 % (ref 0–0.5)
IRON SERPL-MCNC: 205 UG/DL (ref 45–160)
LYMPHOCYTES # BLD AUTO: 1.6 K/UL (ref 1–4.8)
LYMPHOCYTES NFR BLD: 19.7 % (ref 18–48)
MCH RBC QN AUTO: 30.7 PG (ref 27–31)
MCHC RBC AUTO-ENTMCNC: 32.6 G/DL (ref 32–36)
MCV RBC AUTO: 94 FL (ref 82–98)
MONOCYTES # BLD AUTO: 1.2 K/UL (ref 0.3–1)
MONOCYTES NFR BLD: 14.6 % (ref 4–15)
NEUTROPHILS # BLD AUTO: 5.1 K/UL (ref 1.8–7.7)
NEUTROPHILS NFR BLD: 64.1 % (ref 38–73)
NRBC BLD-RTO: 0 /100 WBC
PLATELET # BLD AUTO: 567 K/UL (ref 150–350)
PMV BLD AUTO: 9.9 FL (ref 9.2–12.9)
POTASSIUM SERPL-SCNC: 4.4 MMOL/L (ref 3.5–5.1)
PROT SERPL-MCNC: 7.5 G/DL (ref 6–8.4)
RBC # BLD AUTO: 1.99 M/UL (ref 4.6–6.2)
SATURATED IRON: 96 % (ref 20–50)
SODIUM SERPL-SCNC: 141 MMOL/L (ref 136–145)
TOTAL IRON BINDING CAPACITY: 213 UG/DL (ref 250–450)
TRANSFERRIN SERPL-MCNC: 152 MG/DL (ref 200–375)
TSH SERPL DL<=0.005 MIU/L-ACNC: 3.09 UIU/ML (ref 0.34–5.6)
VIT B12 SERPL-MCNC: 792 PG/ML (ref 210–950)
WBC # BLD AUTO: 7.97 K/UL (ref 3.9–12.7)

## 2019-09-30 PROCEDURE — 3008F PR BODY MASS INDEX (BMI) DOCUMENTED: ICD-10-PCS | Mod: S$GLB,,, | Performed by: INTERNAL MEDICINE

## 2019-09-30 PROCEDURE — 82607 VITAMIN B-12: CPT

## 2019-09-30 PROCEDURE — 99214 PR OFFICE/OUTPT VISIT, EST, LEVL IV, 30-39 MIN: ICD-10-PCS | Mod: 25,S$GLB,, | Performed by: INTERNAL MEDICINE

## 2019-09-30 PROCEDURE — 80053 COMPREHEN METABOLIC PANEL: CPT

## 2019-09-30 PROCEDURE — 3075F PR MOST RECENT SYSTOLIC BLOOD PRESS GE 130-139MM HG: ICD-10-PCS | Mod: S$GLB,,, | Performed by: INTERNAL MEDICINE

## 2019-09-30 PROCEDURE — 90472 PNEUMOCOCCAL CONJUGATE VACCINE 13-VALENT LESS THAN 5YO & GREATER THAN: ICD-10-PCS | Mod: S$GLB,,, | Performed by: INTERNAL MEDICINE

## 2019-09-30 PROCEDURE — 90471 IMMUNIZATION ADMIN: CPT | Mod: S$GLB,,, | Performed by: INTERNAL MEDICINE

## 2019-09-30 PROCEDURE — 3078F PR MOST RECENT DIASTOLIC BLOOD PRESSURE < 80 MM HG: ICD-10-PCS | Mod: S$GLB,,, | Performed by: INTERNAL MEDICINE

## 2019-09-30 PROCEDURE — 36415 COLL VENOUS BLD VENIPUNCTURE: CPT

## 2019-09-30 PROCEDURE — 82746 ASSAY OF FOLIC ACID SERUM: CPT

## 2019-09-30 PROCEDURE — 3008F BODY MASS INDEX DOCD: CPT | Mod: S$GLB,,, | Performed by: INTERNAL MEDICINE

## 2019-09-30 PROCEDURE — 83540 ASSAY OF IRON: CPT

## 2019-09-30 PROCEDURE — 84443 ASSAY THYROID STIM HORMONE: CPT

## 2019-09-30 PROCEDURE — 99214 OFFICE O/P EST MOD 30 MIN: CPT | Mod: 25,S$GLB,, | Performed by: INTERNAL MEDICINE

## 2019-09-30 PROCEDURE — 90472 IMMUNIZATION ADMIN EACH ADD: CPT | Mod: S$GLB,,, | Performed by: INTERNAL MEDICINE

## 2019-09-30 PROCEDURE — 84153 ASSAY OF PSA TOTAL: CPT

## 2019-09-30 PROCEDURE — 90471 FLU VACCINE (QUAD) GREATER THAN OR EQUAL TO 3YO PRESERVATIVE FREE IM: ICD-10-PCS | Mod: S$GLB,,, | Performed by: INTERNAL MEDICINE

## 2019-09-30 PROCEDURE — 3078F DIAST BP <80 MM HG: CPT | Mod: S$GLB,,, | Performed by: INTERNAL MEDICINE

## 2019-09-30 PROCEDURE — 90686 IIV4 VACC NO PRSV 0.5 ML IM: CPT | Mod: S$GLB,,, | Performed by: INTERNAL MEDICINE

## 2019-09-30 PROCEDURE — 90670 PNEUMOCOCCAL CONJUGATE VACCINE 13-VALENT LESS THAN 5YO & GREATER THAN: ICD-10-PCS | Mod: S$GLB,,, | Performed by: INTERNAL MEDICINE

## 2019-09-30 PROCEDURE — 82728 ASSAY OF FERRITIN: CPT

## 2019-09-30 PROCEDURE — 90686 FLU VACCINE (QUAD) GREATER THAN OR EQUAL TO 3YO PRESERVATIVE FREE IM: ICD-10-PCS | Mod: S$GLB,,, | Performed by: INTERNAL MEDICINE

## 2019-09-30 PROCEDURE — 3075F SYST BP GE 130 - 139MM HG: CPT | Mod: S$GLB,,, | Performed by: INTERNAL MEDICINE

## 2019-09-30 PROCEDURE — 85025 COMPLETE CBC W/AUTO DIFF WBC: CPT

## 2019-09-30 PROCEDURE — 90670 PCV13 VACCINE IM: CPT | Mod: S$GLB,,, | Performed by: INTERNAL MEDICINE

## 2019-09-30 RX ORDER — FOLIC ACID 1 MG/1
2 TABLET ORAL DAILY
Qty: 180 TABLET | Refills: 3 | Status: SHIPPED | OUTPATIENT
Start: 2019-09-30 | End: 2020-02-11 | Stop reason: SDUPTHER

## 2019-09-30 RX ORDER — OMEPRAZOLE 20 MG/1
20 CAPSULE, DELAYED RELEASE ORAL DAILY
Qty: 90 CAPSULE | Refills: 3 | Status: SHIPPED | OUTPATIENT
Start: 2019-09-30 | End: 2020-02-11 | Stop reason: SDUPTHER

## 2019-09-30 RX ORDER — TRAZODONE HYDROCHLORIDE 150 MG/1
150 TABLET ORAL NIGHTLY
Qty: 90 TABLET | Refills: 2 | Status: SHIPPED | OUTPATIENT
Start: 2019-09-30 | End: 2020-02-11

## 2019-09-30 RX ORDER — SILDENAFIL 100 MG/1
100 TABLET, FILM COATED ORAL DAILY PRN
Qty: 10 TABLET | Refills: 6 | Status: SHIPPED | OUTPATIENT
Start: 2019-09-30 | End: 2023-04-10

## 2019-09-30 RX ORDER — TRAMADOL HYDROCHLORIDE 50 MG/1
TABLET ORAL
Qty: 45 TABLET | Refills: 5 | Status: SHIPPED | OUTPATIENT
Start: 2019-09-30 | End: 2019-10-21 | Stop reason: SDUPTHER

## 2019-10-01 ENCOUNTER — TELEPHONE (OUTPATIENT)
Dept: HEMATOLOGY/ONCOLOGY | Facility: CLINIC | Age: 59
End: 2019-10-01

## 2019-10-01 DIAGNOSIS — D64.9 CHRONIC ANEMIA: Primary | ICD-10-CM

## 2019-10-01 DIAGNOSIS — D63.1 ANEMIA, CHRONIC RENAL FAILURE, STAGE 2 (MILD): ICD-10-CM

## 2019-10-01 DIAGNOSIS — N18.2 ANEMIA, CHRONIC RENAL FAILURE, STAGE 2 (MILD): ICD-10-CM

## 2019-10-01 DIAGNOSIS — D64.89 ANEMIA DUE TO MULTIPLE MECHANISMS: ICD-10-CM

## 2019-10-01 RX ORDER — HYDROCODONE BITARTRATE AND ACETAMINOPHEN 500; 5 MG/1; MG/1
TABLET ORAL ONCE
Status: CANCELLED | OUTPATIENT
Start: 2019-10-01 | End: 2019-10-01

## 2019-10-01 RX ORDER — FUROSEMIDE 10 MG/ML
20 INJECTION INTRAMUSCULAR; INTRAVENOUS
Status: CANCELLED | OUTPATIENT
Start: 2019-10-01

## 2019-10-01 RX ORDER — DIPHENHYDRAMINE HYDROCHLORIDE 50 MG/ML
25 INJECTION INTRAMUSCULAR; INTRAVENOUS
Status: CANCELLED | OUTPATIENT
Start: 2019-10-01

## 2019-10-01 RX ORDER — ACETAMINOPHEN 325 MG/1
650 TABLET ORAL
Status: CANCELLED | OUTPATIENT
Start: 2019-10-01

## 2019-10-01 NOTE — PROGRESS NOTES
Subjective:       Patient ID: Rick Butler is a 59 y.o. male.    Chief Complaint: Follow-up and Hypertension    59-year-old gentleman with severe anemia originally felt to be due to sickle cell trait when he had a rapid decline in hemoglobin about 8 months ago from his baseline of 8 half to 9 down to 5 and half to 6 and other workup was done and he also has some chronic renal insufficiency due to unknown causes.  He was diagnosed with an elevation homocystine.  We did place him on omeprazole a few months ago prophylactically but he has no signs of bright red blood per rectum, melenic stools or any epigastric discomfort.  His hematologist did give him 2 units of blood earlier this month but his hemoglobin still remains in the low 6 area.  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  He is set up to see Gastroenterology for an upper and lower endoscopy to assure there is no secondary source of bleeding is side sickle cell trait which presumably is acting like sickle cell disease and his age of 59.  He did just loses daughter to sickle cell disease in this past year.  A hemoglobin electrophoresis was performed and he does have a sickle cell hemoglobin mild heels in the range of 42% and normal hemoglobin a mild acute olds of approximately 57%.  Therefore by the simple blood tests he still continues with the diagnosis of sickle cell trait.  He also has seen nephrology in these knots feeling excessive amount of protein in the urine and he no longer requires any antihypertensives which was a diagnosis he carried up until last year with the serendipitous drop in his hemoglobin.    Review of Systems   Constitutional: Positive for fatigue. Negative for activity change, diaphoresis and fever.   HENT: Negative for congestion, ear pain, postnasal drip, sinus pressure, sore throat and trouble swallowing.    Eyes: Negative for pain.   Respiratory: Negative for cough, chest tightness, shortness of breath  and wheezing.    Cardiovascular: Negative for chest pain, palpitations and leg swelling.   Gastrointestinal: Negative for abdominal distention, abdominal pain, blood in stool, constipation and diarrhea.   Endocrine: Negative for cold intolerance and heat intolerance.   Genitourinary: Negative for decreased urine volume, difficulty urinating, dysuria, flank pain, frequency and hematuria.   Musculoskeletal: Negative for arthralgias, back pain, joint swelling, myalgias and neck pain.   Skin: Negative for pallor, rash and wound.   Neurological: Negative for tremors, syncope, weakness, light-headedness, numbness and headaches.   Hematological: Negative for adenopathy.   Psychiatric/Behavioral: Negative for confusion, decreased concentration, hallucinations, self-injury, sleep disturbance and suicidal ideas. The patient is not nervous/anxious.        Past Medical History:   Diagnosis Date    Anemia due to multiple mechanisms 1/13/2019    Anemia, chronic renal failure, stage 2 (mild) 1/13/2019    Depression     Former smoker 6/29/2017    H/O ETOH abuse 6/29/2017    Sickle cell anemia 6/29/2017       Past Surgical History:   Procedure Laterality Date    INJECTION OF ANESTHETIC AGENT AROUND MEDIAL BRANCH NERVES INNERVATING LUMBAR FACET JOINT Bilateral 5/25/2018    Procedure: BLOCK-NERVE-MEDIAL BRANCH-LUMBAR;  Surgeon: Nura Heredia MD;  Location: [facility] OR;  Service: Pain Management;  Laterality: Bilateral;  L3, 4, 5    RADIOFREQUENCY ABLATION OF LUMBAR MEDIAL BRANCH NERVE AT SINGLE LEVEL Bilateral 7/20/2018    Procedure: RADIOFREQUENCY ABLATION, NERVE, MEDIAL BRANCH, LUMBAR, 1 LEVEL;  Surgeon: Nura Heredia MD;  Location: [facility] OR;  Service: Pain Management;  Laterality: Bilateral;  L3, 4, 5 - Burned at 80 degrees C.  for 75 seconds x 2 each site       Family History   Problem Relation Age of Onset    Arthritis Mother     Depression Mother     Heart disease Mother     Hypertension Mother     Heart attack Father 59        Social History     Socioeconomic History    Marital status:      Spouse name: Not on file    Number of children: Not on file    Years of education: Not on file    Highest education level: Not on file   Occupational History    Occupation: Prep Cook     Employer: [employer]   Social Needs    Financial resource strain: Not on file    Food insecurity:     Worry: Not on file     Inability: Not on file    Transportation needs:     Medical: Not on file     Non-medical: Not on file   Tobacco Use    Smoking status: Former Smoker     Last attempt to quit: 1999     Years since quittin.7    Smokeless tobacco: Never Used   Substance and Sexual Activity    Alcohol use: No     Comment: quit 6/15/16, in AA    Drug use: No    Sexual activity: Yes     Partners: Female   Lifestyle    Physical activity:     Days per week: Not on file     Minutes per session: Not on file    Stress: Very much   Relationships    Social connections:     Talks on phone: Not on file     Gets together: Not on file     Attends [employer] service: Not on file     Active member of club or organization: Not on file     Attends meetings of clubs or organizations: Not on file     Relationship status: Not on file   Other Topics Concern    Not on file   Social History Narrative    Not on file       Current Outpatient Medications   Medication Sig Dispense Refill    folic acid (FOLVITE) 1 MG tablet Take 2 tablets (2 mg total) by mouth once daily. 180 tablet 3    multivitamin capsule Take 1 capsule by mouth once daily.      omeprazole (PRILOSEC) 20 MG capsule Take 1 capsule (20 mg total) by mouth once daily. 90 capsule 3    sildenafil (VIAGRA) 100 MG tablet Take 1 tablet (100 mg total) by mouth daily as needed for Erectile Dysfunction. 10 tablet 6    traMADol (ULTRAM) 50 mg tablet TAKE 1 TABLET BY MOUTH EVERY 12 HOURS AS NEEDED FOR PAIN 45 tablet 5    traZODone (DESYREL) 150 MG tablet Take 1 tablet (150 mg total) by mouth  nightly. 90 tablet 2     Current Facility-Administered Medications   Medication Dose Route Frequency Provider Last Rate Last Dose    0.9%  NaCl infusion (for blood administration)   Intravenous Once Jose Tello MD        0.9%  NaCl infusion (for blood administration)   Intravenous Once Jose Tello MD        0.9%  NaCl infusion (for blood administration)   Intravenous Once Jose Tello MD        diphenhydrAMINE injection 25 mg  25 mg Intravenous PRN Jose Tello MD        furosemide injection 20 mg  20 mg Intravenous PRN Jose Tello MD        furosemide injection 20 mg  20 mg Intravenous PRN Jose Tello MD        furosemide injection 20 mg  20 mg Intravenous PRN Jose Tello MD        furosemide injection 20 mg  20 mg Intravenous PRN Jose Tello MD   20 mg at 10/04/19 0915       Review of patient's allergies indicates:  No Known Allergies  Objective:      Physical Exam   Constitutional: He is oriented to person, place, and time. He appears well-developed and well-nourished. No distress.   HENT:   Head: Normocephalic and atraumatic.   Right Ear: External ear normal.   Left Ear: External ear normal.   Nose: Nose normal.   Mouth/Throat: Oropharynx is clear and moist.   Eyes: Conjunctivae and EOM are normal. Right eye exhibits no discharge. Left eye exhibits no discharge. No scleral icterus.   Neck: Normal range of motion. Neck supple. No JVD present. No tracheal deviation present. No thyromegaly present.   Cardiovascular: Normal rate, regular rhythm, normal heart sounds and intact distal pulses. Exam reveals no gallop.   No murmur heard.  Pulmonary/Chest: Effort normal and breath sounds normal. No respiratory distress. He has no wheezes. He has no rales.   Abdominal: Soft. Bowel sounds are normal. He exhibits no distension and no mass. There is no tenderness.   Musculoskeletal: Normal range of motion. He exhibits no edema or tenderness.    Lymphadenopathy:     He has no cervical adenopathy.   Neurological: He is alert and oriented to person, place, and time.   Skin: Skin is warm and dry. No rash noted. He is not diaphoretic. No erythema.   Psychiatric: He has a normal mood and affect. His behavior is normal. Judgment and thought content normal.       Lab Results   Component Value Date    WBC 7.97 09/30/2019    HGB 6.1 (LL) 09/30/2019    HCT 18.7 (LL) 09/30/2019     (H) 09/30/2019    CHOL 118 (L) 06/08/2015    TRIG 26 (L) 06/08/2015    HDL 72 06/08/2015    ALT 15 09/30/2019    AST 24 09/30/2019     09/30/2019    K 4.4 09/30/2019     09/30/2019    CREATININE 1.4 09/30/2019    BUN 17 09/30/2019    CO2 26 09/30/2019    TSH 3.090 09/30/2019    PSA 0.28 09/30/2019    HGBA1C 5.6 02/15/2018       Assessment:       1. Sickle cell trait syndrome    2. CKD (chronic kidney disease) stage 3, GFR 30-59 ml/min    3. Other spondylosis, lumbar region    4. Primary insomnia    5. Folic acid deficiency    6. Chronic anemia    7. Fatigue, unspecified type    8. Prostate cancer screening        Plan:       Sickle cell trait syndrome   Noted and with recent hemoglobin electrophoresis done within the year    CKD (chronic kidney disease) stage 3, GFR 30-59 ml/min   Stable; avoid NSAIDS; follows with nephrology    Other spondylosis, lumbar region  -     traMADol (ULTRAM) 50 mg tablet; TAKE 1 TABLET BY MOUTH EVERY 12 HOURS AS NEEDED FOR PAIN  Dispense: 45 tablet; Refill: 5    Primary insomnia  -     traZODone (DESYREL) 150 MG tablet; Take 1 tablet (150 mg total) by mouth nightly.  Dispense: 90 tablet; Refill: 2    Folic acid deficiency  -     folic acid (FOLVITE) 1 MG tablet; Take 2 tablets (2 mg total) by mouth once daily.  Dispense: 180 tablet; Refill: 3    Chronic anemia   -     omeprazole (PRILOSEC) 20 MG capsule; Take 1 capsule (20 mg total) by mouth once daily.  Dispense: 90 capsule; Refill: 3   Is to have complete gastro work up as anemia to the  severe degree is a mystery     Fatigue, unspecified type  -     TSH; Future; Expected date: 09/30/2019    Prostate cancer screening  -     PSA, Screening; Future; Expected date: 09/30/2019    Other orders  -     Influenza - Quadrivalent (PF)  -     Pneumococcal Conjugate Vaccine (13 Valent) (IM)

## 2019-10-03 ENCOUNTER — LAB VISIT (OUTPATIENT)
Dept: LAB | Facility: HOSPITAL | Age: 59
End: 2019-10-03
Attending: INTERNAL MEDICINE
Payer: COMMERCIAL

## 2019-10-03 DIAGNOSIS — D63.1 ANEMIA OF CHRONIC RENAL FAILURE: ICD-10-CM

## 2019-10-03 DIAGNOSIS — D63.1 ANEMIA, CHRONIC RENAL FAILURE, STAGE 2 (MILD): Primary | ICD-10-CM

## 2019-10-03 DIAGNOSIS — N18.9 ANEMIA OF CHRONIC RENAL FAILURE: ICD-10-CM

## 2019-10-03 DIAGNOSIS — N18.2 ANEMIA, CHRONIC RENAL FAILURE, STAGE 2 (MILD): Primary | ICD-10-CM

## 2019-10-03 DIAGNOSIS — D64.9 ANEMIA, UNSPECIFIED: Primary | ICD-10-CM

## 2019-10-03 DIAGNOSIS — N18.2 CHRONIC KIDNEY DISEASE, STAGE II (MILD): ICD-10-CM

## 2019-10-03 LAB
ABO + RH BLD: NORMAL
BLD GP AB SCN CELLS X3 SERPL QL: NORMAL

## 2019-10-03 PROCEDURE — 86850 RBC ANTIBODY SCREEN: CPT

## 2019-10-03 PROCEDURE — 86920 COMPATIBILITY TEST SPIN: CPT

## 2019-10-03 PROCEDURE — 36415 COLL VENOUS BLD VENIPUNCTURE: CPT

## 2019-10-04 ENCOUNTER — INFUSION (OUTPATIENT)
Dept: INFUSION THERAPY | Facility: HOSPITAL | Age: 59
End: 2019-10-04
Attending: INTERNAL MEDICINE
Payer: COMMERCIAL

## 2019-10-04 VITALS
HEART RATE: 74 BPM | RESPIRATION RATE: 16 BRPM | TEMPERATURE: 99 F | DIASTOLIC BLOOD PRESSURE: 87 MMHG | OXYGEN SATURATION: 99 % | SYSTOLIC BLOOD PRESSURE: 142 MMHG

## 2019-10-04 DIAGNOSIS — D64.9 NORMOCHROMIC ANEMIA: ICD-10-CM

## 2019-10-04 DIAGNOSIS — D63.1 ANEMIA, CHRONIC RENAL FAILURE, STAGE 2 (MILD): ICD-10-CM

## 2019-10-04 DIAGNOSIS — N18.2 ANEMIA, CHRONIC RENAL FAILURE, STAGE 2 (MILD): ICD-10-CM

## 2019-10-04 DIAGNOSIS — D64.9 CHRONIC ANEMIA: ICD-10-CM

## 2019-10-04 DIAGNOSIS — D64.89 ANEMIA DUE TO MULTIPLE MECHANISMS: ICD-10-CM

## 2019-10-04 LAB
BLD PROD TYP BPU: NORMAL
BLD PROD TYP BPU: NORMAL
BLOOD UNIT EXPIRATION DATE: NORMAL
BLOOD UNIT EXPIRATION DATE: NORMAL
BLOOD UNIT TYPE CODE: 9500
BLOOD UNIT TYPE CODE: 9500
BLOOD UNIT TYPE: NORMAL
BLOOD UNIT TYPE: NORMAL
CODING SYSTEM: NORMAL
CODING SYSTEM: NORMAL
DISPENSE STATUS: NORMAL
DISPENSE STATUS: NORMAL
NUM UNITS TRANS PACKED RBC: NORMAL
NUM UNITS TRANS PACKED RBC: NORMAL

## 2019-10-04 PROCEDURE — 36430 TRANSFUSION BLD/BLD COMPNT: CPT

## 2019-10-04 PROCEDURE — 63600175 PHARM REV CODE 636 W HCPCS: Performed by: INTERNAL MEDICINE

## 2019-10-04 PROCEDURE — 86985 SPLIT BLOOD OR PRODUCTS: CPT

## 2019-10-04 PROCEDURE — P9040 RBC LEUKOREDUCED IRRADIATED: HCPCS

## 2019-10-04 PROCEDURE — 25000003 PHARM REV CODE 250: Performed by: INTERNAL MEDICINE

## 2019-10-04 PROCEDURE — P9011 BLOOD SPLIT UNIT: HCPCS

## 2019-10-04 RX ORDER — FUROSEMIDE 10 MG/ML
20 INJECTION INTRAMUSCULAR; INTRAVENOUS
Status: DISCONTINUED | OUTPATIENT
Start: 2019-10-04 | End: 2020-08-11

## 2019-10-04 RX ORDER — DIPHENHYDRAMINE HYDROCHLORIDE 50 MG/ML
25 INJECTION INTRAMUSCULAR; INTRAVENOUS
Status: DISCONTINUED | OUTPATIENT
Start: 2019-10-04 | End: 2019-10-04 | Stop reason: SDUPTHER

## 2019-10-04 RX ORDER — HYDROCODONE BITARTRATE AND ACETAMINOPHEN 500; 5 MG/1; MG/1
TABLET ORAL ONCE
Status: DISCONTINUED | OUTPATIENT
Start: 2019-10-04 | End: 2020-08-11

## 2019-10-04 RX ORDER — DIPHENHYDRAMINE HYDROCHLORIDE 50 MG/ML
25 INJECTION INTRAMUSCULAR; INTRAVENOUS
Status: DISCONTINUED | OUTPATIENT
Start: 2019-10-04 | End: 2020-08-11

## 2019-10-04 RX ORDER — DIPHENHYDRAMINE HCL 25 MG
25 CAPSULE ORAL ONCE
Status: COMPLETED | OUTPATIENT
Start: 2019-10-04 | End: 2019-10-04

## 2019-10-04 RX ORDER — ACETAMINOPHEN 325 MG/1
650 TABLET ORAL
Status: COMPLETED | OUTPATIENT
Start: 2019-10-04 | End: 2019-10-04

## 2019-10-04 RX ORDER — ACETAMINOPHEN 325 MG/1
650 TABLET ORAL
Status: DISCONTINUED | OUTPATIENT
Start: 2019-10-04 | End: 2019-10-04 | Stop reason: SDUPTHER

## 2019-10-04 RX ADMIN — DIPHENHYDRAMINE HYDROCHLORIDE 25 MG: 25 CAPSULE ORAL at 06:10

## 2019-10-04 RX ADMIN — ACETAMINOPHEN 650 MG: 325 TABLET ORAL at 06:10

## 2019-10-04 RX ADMIN — FUROSEMIDE 20 MG: 10 INJECTION, SOLUTION INTRAMUSCULAR; INTRAVENOUS at 09:10

## 2019-10-16 ENCOUNTER — HOSPITAL ENCOUNTER (OUTPATIENT)
Facility: HOSPITAL | Age: 59
Discharge: HOME | End: 2019-10-16
Attending: INTERNAL MEDICINE | Admitting: INTERNAL MEDICINE
Payer: COMMERCIAL

## 2019-10-16 VITALS
TEMPERATURE: 98 F | SYSTOLIC BLOOD PRESSURE: 136 MMHG | BODY MASS INDEX: 30.06 KG/M2 | WEIGHT: 210 LBS | RESPIRATION RATE: 18 BRPM | OXYGEN SATURATION: 97 % | HEIGHT: 70 IN | DIASTOLIC BLOOD PRESSURE: 88 MMHG | HEART RATE: 77 BPM

## 2019-10-16 DIAGNOSIS — D64.9 ANEMIA: ICD-10-CM

## 2019-10-16 PROCEDURE — 27200043 HC FORCEPS, BIOPSY: Performed by: INTERNAL MEDICINE

## 2019-10-16 PROCEDURE — 99153 MOD SED SAME PHYS/QHP EA: CPT | Performed by: INTERNAL MEDICINE

## 2019-10-16 PROCEDURE — 44361 SMALL BOWEL ENDOSCOPY/BIOPSY: CPT | Performed by: INTERNAL MEDICINE

## 2019-10-16 PROCEDURE — 43239 EGD BIOPSY SINGLE/MULTIPLE: CPT | Performed by: INTERNAL MEDICINE

## 2019-10-16 PROCEDURE — 63600175 PHARM REV CODE 636 W HCPCS: Performed by: INTERNAL MEDICINE

## 2019-10-16 PROCEDURE — 99152 MOD SED SAME PHYS/QHP 5/>YRS: CPT | Mod: 59 | Performed by: INTERNAL MEDICINE

## 2019-10-16 PROCEDURE — 44360 SMALL BOWEL ENDOSCOPY: CPT | Performed by: INTERNAL MEDICINE

## 2019-10-16 RX ORDER — FENTANYL CITRATE 50 UG/ML
INJECTION, SOLUTION INTRAMUSCULAR; INTRAVENOUS
Status: DISCONTINUED | OUTPATIENT
Start: 2019-10-16 | End: 2019-10-16 | Stop reason: HOSPADM

## 2019-10-16 RX ORDER — SODIUM CHLORIDE 9 MG/ML
INJECTION, SOLUTION INTRAVENOUS CONTINUOUS
Status: DISCONTINUED | OUTPATIENT
Start: 2019-10-16 | End: 2019-10-16 | Stop reason: HOSPADM

## 2019-10-16 RX ORDER — DIAZEPAM 10 MG/2ML
INJECTION INTRAMUSCULAR
Status: DISCONTINUED | OUTPATIENT
Start: 2019-10-16 | End: 2019-10-16 | Stop reason: HOSPADM

## 2019-10-16 RX ORDER — SODIUM CHLORIDE 0.9 % (FLUSH) 0.9 %
2 SYRINGE (ML) INJECTION
Status: DISCONTINUED | OUTPATIENT
Start: 2019-10-16 | End: 2019-10-16 | Stop reason: HOSPADM

## 2019-10-16 RX ORDER — MIDAZOLAM HYDROCHLORIDE 1 MG/ML
INJECTION INTRAMUSCULAR; INTRAVENOUS
Status: DISCONTINUED | OUTPATIENT
Start: 2019-10-16 | End: 2019-10-16 | Stop reason: HOSPADM

## 2019-10-16 RX ADMIN — FENTANYL CITRATE 25 MCG: 50 INJECTION INTRAMUSCULAR; INTRAVENOUS at 08:10

## 2019-10-16 RX ADMIN — DIAZEPAM 5 MG: 5 INJECTION, SOLUTION INTRAMUSCULAR; INTRAVENOUS at 08:10

## 2019-10-16 RX ADMIN — MIDAZOLAM HYDROCHLORIDE 1 MG: 1 INJECTION, SOLUTION INTRAMUSCULAR; INTRAVENOUS at 08:10

## 2019-10-16 RX ADMIN — MIDAZOLAM HYDROCHLORIDE 2 MG: 1 INJECTION, SOLUTION INTRAMUSCULAR; INTRAVENOUS at 08:10

## 2019-10-16 RX ADMIN — SODIUM CHLORIDE: 0.9 INJECTION, SOLUTION INTRAVENOUS at 08:10

## 2019-10-16 RX ADMIN — FENTANYL CITRATE 50 MCG: 50 INJECTION INTRAMUSCULAR; INTRAVENOUS at 08:10

## 2019-10-16 NOTE — [employer] PATIENT INSTRUCTIONS
Discharge Summary/Instructions after an Endoscopic Procedure  Patient Name: Rick Butler  Patient MRN: [id number]  Patient YOB: 1960 Wednesday, October 16, 2019  Rob Collins MD  RESTRICTIONS:  During your procedure today, you received medications for sedation.  These   medications may affect your judgment, balance and coordination.  Therefore,   for 24 hours, you have the following restrictions:   - DO NOT drive a car, operate machinery, make legal/financial decisions,   sign important papers or drink alcohol.    ACTIVITY:  Today: no heavy lifting, straining or running due to procedural   sedation/anesthesia.  The following day: return to full activity including work.  DIET:  Eat and drink normally unless instructed otherwise.     TREATMENT FOR COMMON SIDE EFFECTS:  - Mild abdominal pain, nausea, belching, bloating or excessive gas:  rest,   eat lightly and use a heating pad.  - Sore Throat: treat with throat lozenges and/or gargle with warm salt   water.  - Because air was used during the procedure, expelling large amounts of air   from your rectum or belching is normal.  - If a bowel prep was taken, you may not have a bowel movement for 1-3 days.    This is normal.  SYMPTOMS TO WATCH FOR AND REPORT TO YOUR PHYSICIAN:  1. Abdominal pain or bloating, other than gas cramps.  2. Chest pain.  3. Back pain.  4. Signs of infection such as: chills or fever occurring within 24 hours   after the procedure.  5. Rectal bleeding, which would show as bright red, maroon, or black stools.   (A tablespoon of blood from the rectum is not serious, especially if   hemorrhoids are present.)  6. Vomiting.  7. Weakness or dizziness.  GO DIRECTLY TO THE NEAREST EMERGENCY ROOM IF YOU HAVE ANY OF THE FOLLOWING:      Difficulty breathing              Chills and/or fever over 101 F   Persistent vomiting and/or vomiting blood   Severe abdominal pain   Severe chest pain   Black, tarry stools   Bleeding- more than one  tablespoon   Any other symptom or condition that you feel may need urgent attention  Your doctor recommends these additional instructions:  If any biopsies were taken, your doctors clinic will contact you in 1 to 2   weeks with any results.  - Discharge patient to home (with escort).   - Patient has a contact number available for emergencies.  The signs and   symptoms of potential delayed complications were discussed with the   patient.  Return to normal activities tomorrow.  Written discharge   instructions were provided to the patient.   - Resume previous diet.   -Recommend daily PPI/reflux control  - no evidence of erosive disease  For questions, problems or results please call your physician - Rob Collins MD at Work:  [phone].  [facility], EMERGENCY ROOM PHONE NUMBER: [phone]  IF A COMPLICATION OR EMERGENCY SITUATION ARISES AND YOU ARE UNABLE TO REACH   YOUR PHYSICIAN - GO DIRECTLY TO THE EMERGENCY ROOM.  MD Rob Castro MD  10/16/2019 9:05:26 AM  This report has been verified and signed electronically.  [employer]

## 2019-10-21 DIAGNOSIS — M47.896 OTHER SPONDYLOSIS, LUMBAR REGION: ICD-10-CM

## 2019-10-22 ENCOUNTER — LAB VISIT (OUTPATIENT)
Dept: LAB | Facility: HOSPITAL | Age: 59
End: 2019-10-22
Attending: INTERNAL MEDICINE
Payer: COMMERCIAL

## 2019-10-22 DIAGNOSIS — D63.1 ANEMIA, CHRONIC RENAL FAILURE, STAGE 2 (MILD): ICD-10-CM

## 2019-10-22 DIAGNOSIS — D64.9 CHRONIC ANEMIA: ICD-10-CM

## 2019-10-22 DIAGNOSIS — D57.1 HB-SS DISEASE WITHOUT CRISIS: ICD-10-CM

## 2019-10-22 DIAGNOSIS — D61.9 ANEMIA DUE TO BONE MARROW FAILURE, UNSPECIFIED BONE MARROW FAILURE TYPE: ICD-10-CM

## 2019-10-22 DIAGNOSIS — D64.89 ANEMIA DUE TO MULTIPLE MECHANISMS: ICD-10-CM

## 2019-10-22 DIAGNOSIS — D57.3 SICKLE CELL TRAIT: ICD-10-CM

## 2019-10-22 DIAGNOSIS — N18.2 ANEMIA, CHRONIC RENAL FAILURE, STAGE 2 (MILD): ICD-10-CM

## 2019-10-22 DIAGNOSIS — E53.8 FOLIC ACID DEFICIENCY: ICD-10-CM

## 2019-10-22 DIAGNOSIS — D75.839 THROMBOCYTOSIS: ICD-10-CM

## 2019-10-22 DIAGNOSIS — D64.9 NORMOCHROMIC ANEMIA: ICD-10-CM

## 2019-10-22 DIAGNOSIS — D72.821 MONOCYTOSIS: ICD-10-CM

## 2019-10-22 DIAGNOSIS — D53.9 MACROCYTIC ANEMIA: ICD-10-CM

## 2019-10-22 LAB
ALBUMIN SERPL BCP-MCNC: 4.3 G/DL (ref 3.5–5.2)
ALP SERPL-CCNC: 50 U/L (ref 55–135)
ALT SERPL W/O P-5'-P-CCNC: 14 U/L (ref 10–44)
ANION GAP SERPL CALC-SCNC: 7 MMOL/L (ref 8–16)
AST SERPL-CCNC: 21 U/L (ref 10–40)
BILIRUB SERPL-MCNC: 0.9 MG/DL (ref 0.1–1)
BUN SERPL-MCNC: 22 MG/DL (ref 6–20)
CALCIUM SERPL-MCNC: 8.3 MG/DL (ref 8.7–10.5)
CHLORIDE SERPL-SCNC: 112 MMOL/L (ref 95–110)
CO2 SERPL-SCNC: 23 MMOL/L (ref 23–29)
CREAT SERPL-MCNC: 1.4 MG/DL (ref 0.5–1.4)
EST. GFR  (AFRICAN AMERICAN): >60 ML/MIN/1.73 M^2
EST. GFR  (NON AFRICAN AMERICAN): 54.6 ML/MIN/1.73 M^2
GLUCOSE SERPL-MCNC: 117 MG/DL (ref 70–110)
POTASSIUM SERPL-SCNC: 3.9 MMOL/L (ref 3.5–5.1)
PROT SERPL-MCNC: 7.2 G/DL (ref 6–8.4)
SODIUM SERPL-SCNC: 142 MMOL/L (ref 136–145)

## 2019-10-22 PROCEDURE — 36415 COLL VENOUS BLD VENIPUNCTURE: CPT

## 2019-10-22 PROCEDURE — 80053 COMPREHEN METABOLIC PANEL: CPT

## 2019-10-22 PROCEDURE — 85025 COMPLETE CBC W/AUTO DIFF WBC: CPT

## 2019-10-22 RX ORDER — TRAMADOL HYDROCHLORIDE 50 MG/1
TABLET ORAL
Qty: 45 TABLET | Refills: 5 | Status: SHIPPED | OUTPATIENT
Start: 2019-10-22 | End: 2020-02-11

## 2019-10-22 NOTE — TELEPHONE ENCOUNTER
Pt notified of endoscopy results.  He has not heard from Dr. Collins's office, therefore he has not been treated for the bacteria.

## 2019-10-23 ENCOUNTER — TELEPHONE (OUTPATIENT)
Dept: HEMATOLOGY/ONCOLOGY | Facility: CLINIC | Age: 59
End: 2019-10-23

## 2019-10-23 ENCOUNTER — APPOINTMENT (OUTPATIENT)
Dept: LAB | Facility: HOSPITAL | Age: 59
End: 2019-10-23
Attending: INTERNAL MEDICINE
Payer: COMMERCIAL

## 2019-10-23 DIAGNOSIS — D64.9 CHRONIC ANEMIA: Primary | ICD-10-CM

## 2019-10-23 LAB
BASOPHILS # BLD AUTO: 0.04 K/UL (ref 0–0.2)
BASOPHILS NFR BLD: 0.6 % (ref 0–1.9)
DIFFERENTIAL METHOD: ABNORMAL
EOSINOPHIL # BLD AUTO: 0.1 K/UL (ref 0–0.5)
EOSINOPHIL NFR BLD: 0.9 % (ref 0–8)
ERYTHROCYTE [DISTWIDTH] IN BLOOD BY AUTOMATED COUNT: 24.4 % (ref 11.5–14.5)
HCT VFR BLD AUTO: 18.2 % (ref 40–54)
HGB BLD-MCNC: 6 G/DL (ref 14–18)
IMM GRANULOCYTES # BLD AUTO: 0.03 K/UL (ref 0–0.04)
IMM GRANULOCYTES NFR BLD AUTO: 0.4 % (ref 0–0.5)
LYMPHOCYTES # BLD AUTO: 1.5 K/UL (ref 1–4.8)
LYMPHOCYTES NFR BLD: 21.7 % (ref 18–48)
MCH RBC QN AUTO: 30.5 PG (ref 27–31)
MCHC RBC AUTO-ENTMCNC: 33 G/DL (ref 32–36)
MCV RBC AUTO: 92 FL (ref 82–98)
MONOCYTES # BLD AUTO: 0.8 K/UL (ref 0.3–1)
MONOCYTES NFR BLD: 12.5 % (ref 4–15)
NEUTROPHILS # BLD AUTO: 4.3 K/UL (ref 1.8–7.7)
NEUTROPHILS NFR BLD: 63.9 % (ref 38–73)
NRBC BLD-RTO: 0 /100 WBC
PLATELET # BLD AUTO: 492 K/UL (ref 150–350)
PMV BLD AUTO: 10.3 FL (ref 9.2–12.9)
RBC # BLD AUTO: 1.97 M/UL (ref 4.6–6.2)
WBC # BLD AUTO: 6.72 K/UL (ref 3.9–12.7)

## 2019-10-23 PROCEDURE — 86920 COMPATIBILITY TEST SPIN: CPT

## 2019-10-23 RX ORDER — FUROSEMIDE 10 MG/ML
20 INJECTION INTRAMUSCULAR; INTRAVENOUS DAILY
Status: CANCELLED | OUTPATIENT
Start: 2019-10-25 | End: 2019-10-26

## 2019-10-23 RX ORDER — DIPHENHYDRAMINE HYDROCHLORIDE 50 MG/ML
25 INJECTION INTRAMUSCULAR; INTRAVENOUS ONCE
Status: CANCELLED | OUTPATIENT
Start: 2019-10-25

## 2019-10-23 RX ORDER — HYDROCODONE BITARTRATE AND ACETAMINOPHEN 500; 5 MG/1; MG/1
TABLET ORAL ONCE
Status: CANCELLED | OUTPATIENT
Start: 2019-10-23 | End: 2019-10-23

## 2019-10-23 RX ORDER — ACETAMINOPHEN 325 MG/1
650 TABLET ORAL ONCE
Status: CANCELLED | OUTPATIENT
Start: 2019-10-25

## 2019-10-23 NOTE — TELEPHONE ENCOUNTER
----- Message from Jose Tello MD sent at 10/23/2019 11:54 AM CDT -----  Set up two units of blood if he is feeling bad        Spoke to Rick. He is getting transfusion Friday 10/25/19

## 2019-10-24 ENCOUNTER — LAB VISIT (OUTPATIENT)
Dept: LAB | Facility: HOSPITAL | Age: 59
End: 2019-10-24
Attending: INTERNAL MEDICINE
Payer: COMMERCIAL

## 2019-10-24 DIAGNOSIS — D63.1 ANEMIA OF CHRONIC RENAL FAILURE: ICD-10-CM

## 2019-10-24 DIAGNOSIS — N18.2 CHRONIC KIDNEY DISEASE, STAGE II (MILD): Primary | ICD-10-CM

## 2019-10-24 DIAGNOSIS — N18.9 ANEMIA OF CHRONIC RENAL FAILURE: ICD-10-CM

## 2019-10-24 LAB
ABO + RH BLD: NORMAL
BLD GP AB SCN CELLS X3 SERPL QL: NORMAL

## 2019-10-24 PROCEDURE — 36415 COLL VENOUS BLD VENIPUNCTURE: CPT

## 2019-10-24 PROCEDURE — 86901 BLOOD TYPING SEROLOGIC RH(D): CPT

## 2019-10-25 ENCOUNTER — INFUSION (OUTPATIENT)
Dept: INFUSION THERAPY | Facility: HOSPITAL | Age: 59
End: 2019-10-25
Attending: INTERNAL MEDICINE
Payer: COMMERCIAL

## 2019-10-25 VITALS
SYSTOLIC BLOOD PRESSURE: 140 MMHG | TEMPERATURE: 98 F | HEART RATE: 82 BPM | OXYGEN SATURATION: 98 % | RESPIRATION RATE: 18 BRPM | DIASTOLIC BLOOD PRESSURE: 79 MMHG

## 2019-10-25 DIAGNOSIS — D64.9 CHRONIC ANEMIA: ICD-10-CM

## 2019-10-25 LAB
BLD PROD TYP BPU: NORMAL
BLD PROD TYP BPU: NORMAL
BLOOD UNIT EXPIRATION DATE: NORMAL
BLOOD UNIT EXPIRATION DATE: NORMAL
BLOOD UNIT TYPE CODE: 5100
BLOOD UNIT TYPE CODE: 9500
BLOOD UNIT TYPE: NORMAL
BLOOD UNIT TYPE: NORMAL
CODING SYSTEM: NORMAL
CODING SYSTEM: NORMAL
DISPENSE STATUS: NORMAL
DISPENSE STATUS: NORMAL
NUM UNITS TRANS PACKED RBC: NORMAL
NUM UNITS TRANS PACKED RBC: NORMAL

## 2019-10-25 PROCEDURE — P9016 RBC LEUKOCYTES REDUCED: HCPCS

## 2019-10-25 PROCEDURE — 63600175 PHARM REV CODE 636 W HCPCS: Performed by: INTERNAL MEDICINE

## 2019-10-25 PROCEDURE — 96375 TX/PRO/DX INJ NEW DRUG ADDON: CPT

## 2019-10-25 PROCEDURE — 36430 TRANSFUSION BLD/BLD COMPNT: CPT

## 2019-10-25 PROCEDURE — 96374 THER/PROPH/DIAG INJ IV PUSH: CPT

## 2019-10-25 PROCEDURE — 25000003 PHARM REV CODE 250: Performed by: INTERNAL MEDICINE

## 2019-10-25 RX ORDER — HYDROCODONE BITARTRATE AND ACETAMINOPHEN 500; 5 MG/1; MG/1
TABLET ORAL ONCE
Status: DISCONTINUED | OUTPATIENT
Start: 2019-10-25 | End: 2020-08-11

## 2019-10-25 RX ORDER — ACETAMINOPHEN 325 MG/1
650 TABLET ORAL ONCE
Status: COMPLETED | OUTPATIENT
Start: 2019-10-25 | End: 2019-10-25

## 2019-10-25 RX ORDER — FUROSEMIDE 10 MG/ML
20 INJECTION INTRAMUSCULAR; INTRAVENOUS DAILY
Status: COMPLETED | OUTPATIENT
Start: 2019-10-25 | End: 2019-10-25

## 2019-10-25 RX ORDER — DIPHENHYDRAMINE HYDROCHLORIDE 50 MG/ML
25 INJECTION INTRAMUSCULAR; INTRAVENOUS ONCE
Status: COMPLETED | OUTPATIENT
Start: 2019-10-25 | End: 2019-10-25

## 2019-10-25 RX ADMIN — DIPHENHYDRAMINE HYDROCHLORIDE 25 MG: 50 INJECTION INTRAMUSCULAR; INTRAVENOUS at 06:10

## 2019-10-25 RX ADMIN — FUROSEMIDE 20 MG: 10 INJECTION, SOLUTION INTRAMUSCULAR; INTRAVENOUS at 10:10

## 2019-10-25 RX ADMIN — ACETAMINOPHEN 650 MG: 325 TABLET ORAL at 06:10

## 2019-10-25 NOTE — PROGRESS NOTES
Second unit PRBC's infusing without difficulty; pt awake and watching TV; no complaints; VSS; no apparent distress.

## 2019-11-17 NOTE — PROGRESS NOTES
[facility]  Hematology/Oncology       PROGRESS NOTE      Subjective:       Patient ID:   NAME: Rick Butler : 1960     59 y.o. male    Referring Doc: Jesica Fonseca  Other Physicians: Samuel Collins    Chief Complaint:  Sickle cell trait/anemia f/u    History of Present Illness:     Patient returns today for a regularly scheduled follow-up visit.  The patient is doing ok overall. He is here with his wife. He has some chronic fatigue.     no pain crisis; he has been sober for over 3 years now; he has been off tobacco too; no CP, SOB, HA's or N/V; occasional fatigue;      He has not had any recent pain crisis. He denies having any fatigue or breathing difficulties. He denies any blood in the stool, dark stools or hematuria.       He required blood on 10/23/2019; he previously saw Dr Collins and he had a scope on Oct 16th. He was found to have gastritis and required antibiotics for 10 days.         ROS:   GEN: normal without any fever, night sweats or weight loss  HEENT: normal with no HA's, sore throat, stiff neck, changes in vision  CV: normal with no CP, SOB, PND, MORA or orthopnea  PULM: normal with no SOB, cough, hemoptysis, sputum or pleuritic pain  GI: normal with no abdominal pain, nausea, vomiting, constipation, diarrhea, melanotic stools, BRBPR, or hematemesis  : normal with no hematuria, dysuria  BREAST: normal with no mass, discharge, pain  SKIN: normal with no rash, erythema, bruising, or swelling    Allergies:  Review of patient's allergies indicates:  No Known Allergies    Medications:    Current Outpatient Medications:     folic acid (FOLVITE) 1 MG tablet, Take 2 tablets (2 mg total) by mouth once daily., Disp: 180 tablet, Rfl: 3    multivitamin capsule, Take 1 capsule by mouth once daily., Disp: , Rfl:     omeprazole (PRILOSEC) 20 MG capsule, Take 1 capsule (20 mg total) by mouth once daily., Disp: 90 capsule, Rfl: 3    sildenafil (VIAGRA) 100 MG tablet, Take 1 tablet (100 mg total) by mouth  daily as needed for Erectile Dysfunction., Disp: 10 tablet, Rfl: 6    traMADol (ULTRAM) 50 mg tablet, TAKE 1 TABLET BY MOUTH EVERY 12 HOURS AS NEEDED FOR PAIN\, Disp: 45 tablet, Rfl: 5    traZODone (DESYREL) 150 MG tablet, Take 1 tablet (150 mg total) by mouth nightly., Disp: 90 tablet, Rfl: 2    Current Facility-Administered Medications:     0.9%  NaCl infusion (for blood administration), , Intravenous, Once, Jose Tello MD    0.9%  NaCl infusion (for blood administration), , Intravenous, Once, Jose Tello MD    0.9%  NaCl infusion (for blood administration), , Intravenous, Once, Jose Tello MD    0.9%  NaCl infusion (for blood administration), , Intravenous, Once, Moses Fonseca MD    diphenhydrAMINE injection 25 mg, 25 mg, Intravenous, PRN, Jose Tello MD    furosemide injection 20 mg, 20 mg, Intravenous, PRN, Jose Tello MD    furosemide injection 20 mg, 20 mg, Intravenous, PRN, Jose Tello MD    furosemide injection 20 mg, 20 mg, Intravenous, PRN, Jose Tello MD    furosemide injection 20 mg, 20 mg, Intravenous, PRN, Jose Tello MD, 20 mg at 10/04/19 0915    PMHx/PSHx Updates:  See patient's last visit with me on 9/26/2019  See H&P on 7/9/2011      Pathology:  none        Objective:     Vitals:  Blood pressure 138/76, pulse 85, temperature 98.2 °F (36.8 °C), resp. rate 18, weight 96.5 kg (212 lb 11.2 oz).    Physical Examination:   GEN: no apparent distress, comfortable; AAOx3  HEAD: atraumatic and normocephalic  EYES: no pallor, no icterus, PERRLA  ENT: OMM, no pharyngeal erythema, external ears WNL; no nasal discharge; no thrush  NECK: no masses, thyroid normal, trachea midline, no LAD/LN's, supple  CV: RRR with no murmur; normal pulse; normal S1 and S2; no pedal edema  CHEST: Normal respiratory effort; CTAB; normal breath sounds; no wheeze or crackles  ABDOM: nontender and nondistended; soft; normal bowel sounds; no  rebound/guarding  MUSC/Skeletal: ROM normal; no crepitus; joints normal; no deformities or arthropathy  EXTREM: no clubbing, cyanosis, inflammation or swelling  SKIN: no rashes, lesions, ulcers, petechiae or subcutaneous nodules  : no jiang  NEURO: grossly intact; motor/sensory WNL; AAOx3; no tremors  PSYCH: normal mood, affect and behavior  LYMPH: normal cervical, supraclavicular, axillary and groin LN's            Labs:     No recent labs since getting blood transfusion    10/22/2019   Lab Results   Component Value Date    WBC 6.72 10/22/2019    HGB 6.0 (LL) 10/22/2019    HCT 18.2 (LL) 10/22/2019    MCV 92 10/22/2019     (H) 10/22/2019         I have reviewed all available lab results and radiology reports.    Radiology/Diagnostic Studies:    2/15/2018 Xray Survey:   IMPRESSION: No significant abnormality seen. No evidence of osteoblastic or  osteoclastic lesions identified    MRI L-spine 2/12/2018  IMPRESSION:    1. Prominent low signal intensity throughout the bone marrow on T1 and  T2-weighted imaging. Marrow infiltrative process including malignancy such as  metastatic disease or lymphoma/leukemia is a consideration along with multiple  myeloma or malignant process. Benign etiology such as osteopetrosis or  regenerative red marrow are also considerations.    2. Multilevel lumbar degenerative changes, most prominent at L4-L5 and L5-S1 as  described.    Assessment/Plan:   (1) 59 y.o. male with diagnosis of sickle cell anemia with borderline microcytosis  - latest hgb was 6.0 and he had blood transfusion  - today, he is not symptomatic at this time  - he probably has a multifactorial anemia process with underlying sickle cell, anemia of chronic disorders and anemia of chronic renal. I can not rule out an underlying GI bleeding process.   - iron panel is adequate (no deficiency)  - total bilirubin is only minimally elevated  - he required transfusion again on 10/23    (2) Alcohol abuse issues in past - he  "has been sober for over 3 years now    (3) former smoker    (4) chronic back issues - prior Xrays and MRI - suspect the findings on the MRI are possibly due to his sickle cell;   - SPEP was previously negative for M-protein  - PSA was 0.3 in Sept 2017  - recommended neurosurgery evaluation previously  - he saw Dr Nura VUONG and had a nerve "burning" procedure and his pain has improved    (5) CRI - elevated creatinine - he saw Dr Chilel with nephrology      (6) H pylori gastritis - s/p recent endoscopy with Dr Collins and antibotics x 10 days        1. Anemia, unspecified type     2. Anemia due to multiple mechanisms     3. Anemia, chronic renal failure, stage 2 (mild)     4. Chronic anemia     5. Macrocytic anemia     6. Monocytosis     7. Normochromic anemia     8. Hb-SS disease without crisis     9. Sickle cell trait syndrome     10. Thrombocytosis     11. Vitamin D deficiency     12. Folic acid deficiency     13. Former smoker     14. Thrombocytopenia       PLAN;  1. Hold off on transfusing at this time as long as he is asymptomatic  2.  proceed with GI f/u  3.  F/u with nephrology as directed by them  4. Check labs every 2 weeks- encouraged compliance  5.  Encouraged continued sobriety  6. Discussed getting bone marrow biopsy with the patient and he wants to think about it    RTC 2-3 months  Fax note to Dennis Fonseca Tveit    I spent over 15 mins with the patient, of which over half was face to face with the patient. Reviewing materials, labs, reports and studies. Making treatment and analytical decisions. Ordering necessary labs, tests and studies.          Discussion:     I have explained all of the above in detail and the patient understands all of the current recommendation(s). I have answered all of their questions to the best of my ability and to their complete satisfaction.   The patient is to continue with the current management plan.            Electronically signed by Jose Tello MD      "

## 2019-11-18 ENCOUNTER — OFFICE VISIT (OUTPATIENT)
Dept: HEMATOLOGY/ONCOLOGY | Facility: CLINIC | Age: 59
End: 2019-11-18
Payer: COMMERCIAL

## 2019-11-18 VITALS
RESPIRATION RATE: 18 BRPM | TEMPERATURE: 98 F | SYSTOLIC BLOOD PRESSURE: 138 MMHG | HEART RATE: 85 BPM | WEIGHT: 212.69 LBS | BODY MASS INDEX: 30.52 KG/M2 | DIASTOLIC BLOOD PRESSURE: 76 MMHG

## 2019-11-18 DIAGNOSIS — D64.89 ANEMIA DUE TO MULTIPLE MECHANISMS: ICD-10-CM

## 2019-11-18 DIAGNOSIS — D57.3 SICKLE CELL TRAIT SYNDROME: ICD-10-CM

## 2019-11-18 DIAGNOSIS — D64.9 CHRONIC ANEMIA: ICD-10-CM

## 2019-11-18 DIAGNOSIS — D53.9 MACROCYTIC ANEMIA: ICD-10-CM

## 2019-11-18 DIAGNOSIS — D63.1 ANEMIA, CHRONIC RENAL FAILURE, STAGE 2 (MILD): ICD-10-CM

## 2019-11-18 DIAGNOSIS — D64.9 NORMOCHROMIC ANEMIA: ICD-10-CM

## 2019-11-18 DIAGNOSIS — D75.839 THROMBOCYTOSIS: ICD-10-CM

## 2019-11-18 DIAGNOSIS — D69.6 THROMBOCYTOPENIA: ICD-10-CM

## 2019-11-18 DIAGNOSIS — N18.2 ANEMIA, CHRONIC RENAL FAILURE, STAGE 2 (MILD): ICD-10-CM

## 2019-11-18 DIAGNOSIS — D57.1 HB-SS DISEASE WITHOUT CRISIS: ICD-10-CM

## 2019-11-18 DIAGNOSIS — E53.8 FOLIC ACID DEFICIENCY: ICD-10-CM

## 2019-11-18 DIAGNOSIS — D64.9 ANEMIA, UNSPECIFIED TYPE: Primary | ICD-10-CM

## 2019-11-18 DIAGNOSIS — Z87.891 FORMER SMOKER: ICD-10-CM

## 2019-11-18 DIAGNOSIS — E55.9 VITAMIN D DEFICIENCY: ICD-10-CM

## 2019-11-18 DIAGNOSIS — D72.821 MONOCYTOSIS: ICD-10-CM

## 2019-11-18 PROCEDURE — 3075F PR MOST RECENT SYSTOLIC BLOOD PRESS GE 130-139MM HG: ICD-10-PCS | Mod: S$GLB,,, | Performed by: INTERNAL MEDICINE

## 2019-11-18 PROCEDURE — 3078F DIAST BP <80 MM HG: CPT | Mod: S$GLB,,, | Performed by: INTERNAL MEDICINE

## 2019-11-18 PROCEDURE — 3008F BODY MASS INDEX DOCD: CPT | Mod: S$GLB,,, | Performed by: INTERNAL MEDICINE

## 2019-11-18 PROCEDURE — 3008F PR BODY MASS INDEX (BMI) DOCUMENTED: ICD-10-PCS | Mod: S$GLB,,, | Performed by: INTERNAL MEDICINE

## 2019-11-18 PROCEDURE — 3075F SYST BP GE 130 - 139MM HG: CPT | Mod: S$GLB,,, | Performed by: INTERNAL MEDICINE

## 2019-11-18 PROCEDURE — 99213 PR OFFICE/OUTPT VISIT, EST, LEVL III, 20-29 MIN: ICD-10-PCS | Mod: S$GLB,,, | Performed by: INTERNAL MEDICINE

## 2019-11-18 PROCEDURE — 99213 OFFICE O/P EST LOW 20 MIN: CPT | Mod: S$GLB,,, | Performed by: INTERNAL MEDICINE

## 2019-11-18 PROCEDURE — 3078F PR MOST RECENT DIASTOLIC BLOOD PRESSURE < 80 MM HG: ICD-10-PCS | Mod: S$GLB,,, | Performed by: INTERNAL MEDICINE

## 2019-11-20 ENCOUNTER — TELEPHONE (OUTPATIENT)
Dept: RADIOLOGY | Facility: HOSPITAL | Age: 59
End: 2019-11-20

## 2019-12-19 ENCOUNTER — LAB VISIT (OUTPATIENT)
Dept: LAB | Facility: HOSPITAL | Age: 59
End: 2019-12-19
Attending: INTERNAL MEDICINE
Payer: COMMERCIAL

## 2019-12-19 DIAGNOSIS — D57.3 SICKLE-CELL TRAIT: ICD-10-CM

## 2019-12-19 DIAGNOSIS — D53.9 SIMPLE CHRONIC ANEMIA: ICD-10-CM

## 2019-12-19 DIAGNOSIS — N18.2 CHRONIC KIDNEY DISEASE, STAGE II (MILD): ICD-10-CM

## 2019-12-19 DIAGNOSIS — N18.9 ANEMIA OF CHRONIC RENAL FAILURE: ICD-10-CM

## 2019-12-19 DIAGNOSIS — D72.821 REACTIVE MONOCYTOSIS: ICD-10-CM

## 2019-12-19 DIAGNOSIS — D63.1 ANEMIA OF CHRONIC RENAL FAILURE: ICD-10-CM

## 2019-12-19 DIAGNOSIS — D64.9 ANEMIA, UNSPECIFIED: ICD-10-CM

## 2019-12-19 DIAGNOSIS — D61.9 APLASTIC ANEMIA, UNSPECIFIED: Primary | ICD-10-CM

## 2019-12-19 DIAGNOSIS — D57.1 HB-SS DISEASE WITHOUT CRISIS: ICD-10-CM

## 2019-12-19 LAB
ALBUMIN SERPL BCP-MCNC: 4.4 G/DL (ref 3.5–5.2)
ALP SERPL-CCNC: 44 U/L (ref 55–135)
ALT SERPL W/O P-5'-P-CCNC: 21 U/L (ref 10–44)
ANION GAP SERPL CALC-SCNC: 4 MMOL/L (ref 8–16)
AST SERPL-CCNC: 27 U/L (ref 10–40)
BASOPHILS # BLD AUTO: 0.05 K/UL (ref 0–0.2)
BASOPHILS NFR BLD: 0.8 % (ref 0–1.9)
BILIRUB SERPL-MCNC: 1.2 MG/DL (ref 0.1–1)
BUN SERPL-MCNC: 17 MG/DL (ref 6–20)
CALCIUM SERPL-MCNC: 8.4 MG/DL (ref 8.7–10.5)
CHLORIDE SERPL-SCNC: 105 MMOL/L (ref 95–110)
CO2 SERPL-SCNC: 28 MMOL/L (ref 23–29)
CREAT SERPL-MCNC: 1.7 MG/DL (ref 0.5–1.4)
DIFFERENTIAL METHOD: ABNORMAL
EOSINOPHIL # BLD AUTO: 0.1 K/UL (ref 0–0.5)
EOSINOPHIL NFR BLD: 1.4 % (ref 0–8)
ERYTHROCYTE [DISTWIDTH] IN BLOOD BY AUTOMATED COUNT: 29.1 % (ref 11.5–14.5)
EST. GFR  (AFRICAN AMERICAN): 49.9 ML/MIN/1.73 M^2
EST. GFR  (NON AFRICAN AMERICAN): 43.2 ML/MIN/1.73 M^2
FERRITIN SERPL-MCNC: 674 NG/ML (ref 20–300)
FOLATE SERPL-MCNC: >24.8 NG/ML (ref 4–24)
GLUCOSE SERPL-MCNC: 101 MG/DL (ref 70–110)
HCT VFR BLD AUTO: 15.3 % (ref 40–54)
HGB BLD-MCNC: 5 G/DL (ref 14–18)
IMM GRANULOCYTES # BLD AUTO: 0.04 K/UL (ref 0–0.04)
IMM GRANULOCYTES NFR BLD AUTO: 0.6 % (ref 0–0.5)
IRON SERPL-MCNC: 151 UG/DL (ref 45–160)
LYMPHOCYTES # BLD AUTO: 1.5 K/UL (ref 1–4.8)
LYMPHOCYTES NFR BLD: 22.7 % (ref 18–48)
MCH RBC QN AUTO: 32.3 PG (ref 27–31)
MCHC RBC AUTO-ENTMCNC: 32.7 G/DL (ref 32–36)
MCV RBC AUTO: 99 FL (ref 82–98)
MONOCYTES # BLD AUTO: 0.9 K/UL (ref 0.3–1)
MONOCYTES NFR BLD: 14.1 % (ref 4–15)
NEUTROPHILS # BLD AUTO: 3.9 K/UL (ref 1.8–7.7)
NEUTROPHILS NFR BLD: 60.4 % (ref 38–73)
NRBC BLD-RTO: 1 /100 WBC
PLATELET # BLD AUTO: 556 K/UL (ref 150–350)
PMV BLD AUTO: 10.4 FL (ref 9.2–12.9)
POTASSIUM SERPL-SCNC: 4 MMOL/L (ref 3.5–5.1)
PROT SERPL-MCNC: 7.5 G/DL (ref 6–8.4)
RBC # BLD AUTO: 1.55 M/UL (ref 4.6–6.2)
SATURATED IRON: 72 % (ref 20–50)
SODIUM SERPL-SCNC: 137 MMOL/L (ref 136–145)
TOTAL IRON BINDING CAPACITY: 209 UG/DL (ref 250–450)
TRANSFERRIN SERPL-MCNC: 149 MG/DL (ref 200–375)
VIT B12 SERPL-MCNC: 709 PG/ML (ref 210–950)
WBC # BLD AUTO: 6.39 K/UL (ref 3.9–12.7)

## 2019-12-19 PROCEDURE — 83540 ASSAY OF IRON: CPT

## 2019-12-19 PROCEDURE — 82746 ASSAY OF FOLIC ACID SERUM: CPT

## 2019-12-19 PROCEDURE — 85025 COMPLETE CBC W/AUTO DIFF WBC: CPT

## 2019-12-19 PROCEDURE — 82607 VITAMIN B-12: CPT

## 2019-12-19 PROCEDURE — 36415 COLL VENOUS BLD VENIPUNCTURE: CPT

## 2019-12-19 PROCEDURE — 82728 ASSAY OF FERRITIN: CPT

## 2019-12-19 PROCEDURE — 80053 COMPREHEN METABOLIC PANEL: CPT

## 2019-12-20 ENCOUNTER — TELEPHONE (OUTPATIENT)
Dept: HEMATOLOGY/ONCOLOGY | Facility: CLINIC | Age: 59
End: 2019-12-20

## 2019-12-20 ENCOUNTER — HOSPITAL ENCOUNTER (OUTPATIENT)
Facility: HOSPITAL | Age: 59
Discharge: HOME | End: 2019-12-20
Attending: INTERNAL MEDICINE | Admitting: INTERNAL MEDICINE
Payer: COMMERCIAL

## 2019-12-20 VITALS
TEMPERATURE: 98 F | HEIGHT: 70 IN | WEIGHT: 213 LBS | RESPIRATION RATE: 16 BRPM | SYSTOLIC BLOOD PRESSURE: 125 MMHG | HEART RATE: 75 BPM | BODY MASS INDEX: 30.49 KG/M2 | DIASTOLIC BLOOD PRESSURE: 68 MMHG | OXYGEN SATURATION: 100 %

## 2019-12-20 DIAGNOSIS — D64.89 ANEMIA DUE TO MULTIPLE MECHANISMS: ICD-10-CM

## 2019-12-20 DIAGNOSIS — D72.821 MONOCYTOSIS: ICD-10-CM

## 2019-12-20 DIAGNOSIS — D64.9 CHRONIC ANEMIA: ICD-10-CM

## 2019-12-20 DIAGNOSIS — N18.2 ANEMIA, CHRONIC RENAL FAILURE, STAGE 2 (MILD): ICD-10-CM

## 2019-12-20 DIAGNOSIS — D63.1 ANEMIA, CHRONIC RENAL FAILURE, STAGE 2 (MILD): ICD-10-CM

## 2019-12-20 DIAGNOSIS — D64.9 ANEMIA, UNSPECIFIED TYPE: ICD-10-CM

## 2019-12-20 DIAGNOSIS — D53.9 MACROCYTIC ANEMIA: ICD-10-CM

## 2019-12-20 DIAGNOSIS — D57.3 SICKLE CELL TRAIT SYNDROME: ICD-10-CM

## 2019-12-20 DIAGNOSIS — D64.9 ANEMIA, UNSPECIFIED TYPE: Primary | ICD-10-CM

## 2019-12-20 LAB
ABO + RH BLD: NORMAL
APTT PPP: 128.4 SEC (ref 23.6–33.3)
BASOPHILS # BLD AUTO: 0.06 K/UL (ref 0–0.2)
BASOPHILS NFR BLD: 0.9 % (ref 0–1.9)
BLD GP AB SCN CELLS X3 SERPL QL: NORMAL
BLD PROD TYP BPU: NORMAL
BLD PROD TYP BPU: NORMAL
BLOOD UNIT EXPIRATION DATE: NORMAL
BLOOD UNIT EXPIRATION DATE: NORMAL
BLOOD UNIT TYPE CODE: 9500
BLOOD UNIT TYPE CODE: 9500
BLOOD UNIT TYPE: NORMAL
BLOOD UNIT TYPE: NORMAL
CODING SYSTEM: NORMAL
CODING SYSTEM: NORMAL
DIFFERENTIAL METHOD: ABNORMAL
DISPENSE STATUS: NORMAL
DISPENSE STATUS: NORMAL
EOSINOPHIL # BLD AUTO: 0.1 K/UL (ref 0–0.5)
EOSINOPHIL NFR BLD: 1.7 % (ref 0–8)
ERYTHROCYTE [DISTWIDTH] IN BLOOD BY AUTOMATED COUNT: 29.6 % (ref 11.5–14.5)
HCT VFR BLD AUTO: 16.7 % (ref 40–54)
HGB BLD-MCNC: 5.5 G/DL (ref 14–18)
IMM GRANULOCYTES # BLD AUTO: 0.05 K/UL (ref 0–0.04)
IMM GRANULOCYTES NFR BLD AUTO: 0.8 % (ref 0–0.5)
INR PPP: 1.3
LYMPHOCYTES # BLD AUTO: 1.7 K/UL (ref 1–4.8)
LYMPHOCYTES NFR BLD: 26.6 % (ref 18–48)
MCH RBC QN AUTO: 32.5 PG (ref 27–31)
MCHC RBC AUTO-ENTMCNC: 32.9 G/DL (ref 32–36)
MCV RBC AUTO: 99 FL (ref 82–98)
MONOCYTES # BLD AUTO: 0.7 K/UL (ref 0.3–1)
MONOCYTES NFR BLD: 11.1 % (ref 4–15)
NEUTROPHILS # BLD AUTO: 3.8 K/UL (ref 1.8–7.7)
NEUTROPHILS NFR BLD: 58.9 % (ref 38–73)
NRBC BLD-RTO: 1 /100 WBC
NUM UNITS TRANS PACKED RBC: NORMAL
NUM UNITS TRANS PACKED RBC: NORMAL
PLATELET # BLD AUTO: 574 K/UL (ref 150–350)
PMV BLD AUTO: 10 FL (ref 9.2–12.9)
PROTHROMBIN TIME: 15.9 SEC (ref 10.6–14.8)
RBC # BLD AUTO: 1.69 M/UL (ref 4.6–6.2)
WBC # BLD AUTO: 6.4 K/UL (ref 3.9–12.7)

## 2019-12-20 PROCEDURE — 86850 RBC ANTIBODY SCREEN: CPT

## 2019-12-20 PROCEDURE — 85610 PROTHROMBIN TIME: CPT

## 2019-12-20 PROCEDURE — P9040 RBC LEUKOREDUCED IRRADIATED: HCPCS

## 2019-12-20 PROCEDURE — 63600175 PHARM REV CODE 636 W HCPCS: Performed by: RADIOLOGY

## 2019-12-20 PROCEDURE — 85025 COMPLETE CBC W/AUTO DIFF WBC: CPT

## 2019-12-20 PROCEDURE — 85730 THROMBOPLASTIN TIME PARTIAL: CPT

## 2019-12-20 PROCEDURE — P9016 RBC LEUKOCYTES REDUCED: HCPCS | Mod: 59

## 2019-12-20 PROCEDURE — 86920 COMPATIBILITY TEST SPIN: CPT

## 2019-12-20 PROCEDURE — 63600175 PHARM REV CODE 636 W HCPCS: Performed by: INTERNAL MEDICINE

## 2019-12-20 PROCEDURE — 86902 BLOOD TYPE ANTIGEN DONOR EA: CPT

## 2019-12-20 PROCEDURE — 25000003 PHARM REV CODE 250: Performed by: INTERNAL MEDICINE

## 2019-12-20 PROCEDURE — 36430 TRANSFUSION BLD/BLD COMPNT: CPT | Mod: 59

## 2019-12-20 PROCEDURE — 71000016 HC POSTOP RECOV ADDL HR

## 2019-12-20 PROCEDURE — 71000015 HC POSTOP RECOV 1ST HR

## 2019-12-20 RX ORDER — FUROSEMIDE 10 MG/ML
10 INJECTION INTRAMUSCULAR; INTRAVENOUS ONCE
Status: COMPLETED | OUTPATIENT
Start: 2019-12-20 | End: 2019-12-20

## 2019-12-20 RX ORDER — ACETAMINOPHEN 325 MG/1
325 TABLET ORAL ONCE
Status: CANCELLED | OUTPATIENT
Start: 2019-12-20

## 2019-12-20 RX ORDER — HYDROCODONE BITARTRATE AND ACETAMINOPHEN 500; 5 MG/1; MG/1
TABLET ORAL ONCE
Status: CANCELLED | OUTPATIENT
Start: 2019-12-20 | End: 2019-12-20

## 2019-12-20 RX ORDER — FUROSEMIDE 10 MG/ML
10 INJECTION INTRAMUSCULAR; INTRAVENOUS ONCE
Status: CANCELLED | OUTPATIENT
Start: 2019-12-20

## 2019-12-20 RX ORDER — ACETAMINOPHEN 325 MG/1
325 TABLET ORAL ONCE
Status: COMPLETED | OUTPATIENT
Start: 2019-12-20 | End: 2019-12-20

## 2019-12-20 RX ORDER — HYDROCODONE BITARTRATE AND ACETAMINOPHEN 5; 325 MG/1; MG/1
1 TABLET ORAL EVERY 4 HOURS PRN
Status: CANCELLED | OUTPATIENT
Start: 2019-12-20

## 2019-12-20 RX ORDER — HYDROCODONE BITARTRATE AND ACETAMINOPHEN 500; 5 MG/1; MG/1
TABLET ORAL ONCE
Status: COMPLETED | OUTPATIENT
Start: 2019-12-20 | End: 2019-12-20

## 2019-12-20 RX ORDER — DIPHENHYDRAMINE HCL 25 MG
25 CAPSULE ORAL ONCE
Status: CANCELLED | OUTPATIENT
Start: 2019-12-20

## 2019-12-20 RX ORDER — DIPHENHYDRAMINE HCL 25 MG
25 CAPSULE ORAL ONCE
Status: COMPLETED | OUTPATIENT
Start: 2019-12-20 | End: 2019-12-20

## 2019-12-20 RX ORDER — FENTANYL CITRATE 50 UG/ML
INJECTION, SOLUTION INTRAMUSCULAR; INTRAVENOUS CODE/TRAUMA/SEDATION MEDICATION
Status: DISCONTINUED | OUTPATIENT
Start: 2019-12-20 | End: 2019-12-20 | Stop reason: HOSPADM

## 2019-12-20 RX ORDER — MIDAZOLAM HYDROCHLORIDE 1 MG/ML
INJECTION INTRAMUSCULAR; INTRAVENOUS CODE/TRAUMA/SEDATION MEDICATION
Status: DISCONTINUED | OUTPATIENT
Start: 2019-12-20 | End: 2019-12-20 | Stop reason: HOSPADM

## 2019-12-20 RX ORDER — SODIUM CHLORIDE 9 MG/ML
INJECTION, SOLUTION INTRAVENOUS
Status: DISCONTINUED | OUTPATIENT
Start: 2019-12-20 | End: 2019-12-20 | Stop reason: HOSPADM

## 2019-12-20 RX ADMIN — FENTANYL CITRATE 25 MCG: 50 INJECTION INTRAMUSCULAR; INTRAVENOUS at 08:12

## 2019-12-20 RX ADMIN — MIDAZOLAM HYDROCHLORIDE 2 MG: 1 INJECTION, SOLUTION INTRAMUSCULAR; INTRAVENOUS at 08:12

## 2019-12-20 RX ADMIN — DIPHENHYDRAMINE HCL 25 MG: 25 TABLET ORAL at 10:12

## 2019-12-20 RX ADMIN — FUROSEMIDE 10 MG: 10 INJECTION, SOLUTION INTRAMUSCULAR; INTRAVENOUS at 01:12

## 2019-12-20 RX ADMIN — SODIUM CHLORIDE 500 ML: 0.9 INJECTION, SOLUTION INTRAVENOUS at 08:12

## 2019-12-20 RX ADMIN — ACETAMINOPHEN 325 MG: 325 TABLET ORAL at 10:12

## 2019-12-20 RX ADMIN — FUROSEMIDE 10 MG: 10 INJECTION, SOLUTION INTRAMUSCULAR; INTRAVENOUS at 04:12

## 2019-12-20 RX ADMIN — FENTANYL CITRATE 50 MCG: 50 INJECTION INTRAMUSCULAR; INTRAVENOUS at 08:12

## 2019-12-20 RX ADMIN — SODIUM CHLORIDE: 0.9 INJECTION, SOLUTION INTRAVENOUS at 10:12

## 2019-12-20 NOTE — PLAN OF CARE
Procedure discussed with pt as well as possible blood transfusion today. Pt agreeable to all. Opportunity given for questions.

## 2019-12-20 NOTE — SEDATION DOCUMENTATION
Bone marrow bx completed.  Pt luis well.  A,a,o x3.  Skin w/d/p. resp even and unlabored.  Pt assisted to transport stretcher.  Pt to asu via stretcher with RN.

## 2019-12-20 NOTE — TELEPHONE ENCOUNTER
----- Message from Jose Tello MD sent at 12/20/2019  8:31 AM CST -----  He is getting BM biopsy today - set him up for two units of blood afterwards too

## 2019-12-20 NOTE — TELEPHONE ENCOUNTER
Sent an order for 2 units PRBC's with Tylenol 325 mg PO and Benadry 25 mg IV prior to PRBC's.  Lasix 10 mg IV after each unit of PRBC's.

## 2019-12-23 ENCOUNTER — TELEPHONE (OUTPATIENT)
Dept: HEMATOLOGY/ONCOLOGY | Facility: CLINIC | Age: 59
End: 2019-12-23

## 2019-12-23 NOTE — TELEPHONE ENCOUNTER
Called to find out if the paperwork that was dropped off is for only one day or is it to cover her if she needs to transport her  to his visits and f/u appts.    Nelly called me back to make sure that the dates are good for 6 months intermitted leave.    completed FMLA and placed for Dr. Avery.

## 2020-01-02 ENCOUNTER — TELEPHONE (OUTPATIENT)
Dept: HEMATOLOGY/ONCOLOGY | Facility: CLINIC | Age: 60
End: 2020-01-02

## 2020-01-02 NOTE — TELEPHONE ENCOUNTER
----- Message from Callie Mejia sent at 1/2/2020 10:28 AM CST -----  The patient would like a call back with the results of his bone marrow biopsy. Please call him back at [phone].

## 2020-01-02 NOTE — TELEPHONE ENCOUNTER
Instructed the patient that I would have to get with Dr. Tello about his bone marrow biopsy and let him know tomorrow.

## 2020-01-07 ENCOUNTER — TELEPHONE (OUTPATIENT)
Dept: HEMATOLOGY/ONCOLOGY | Facility: CLINIC | Age: 60
End: 2020-01-07

## 2020-01-07 NOTE — TELEPHONE ENCOUNTER
----- Message from Callie Mejia sent at 1/6/2020  3:17 PM CST -----  The patient would like a call back with the results of his bone marrow biopsy. Please call him back at [phone].

## 2020-01-07 NOTE — TELEPHONE ENCOUNTER
Called the patient and instructed him that Dr. Tello is not seeing anything specific on the bone marrow biopsy. He can make an appointment to speak to him to go over the results.

## 2020-01-22 NOTE — PROGRESS NOTES
[facility]    Hematology/Oncology            PROGRESS NOTE      Subjective:       Patient ID:   NAME: Rick Butler : 1960     59 y.o. male    Referring Doc: Jesica Fonseca  Other Physicians: Samuel Collins    Chief Complaint:  Sickle cell trait/anemia f/u    History of Present Illness:     Patient returns today for a regularly scheduled follow-up visit.  The patient is doing ok overall. He is here with his wife. He has some chronic fatigue.     no pain crisis; he has been sober for over 3 years now; he has been off tobacco too; no CP, SOB, HA's or N/V; occasional fatigue;      He has not had any recent pain crisis. He denies having any fatigue or breathing difficulties. He denies any blood in the stool, dark stools or hematuria.       He required blood again in Dec 2019 and also had bone marrow biopsy on 2019      ROS:   GEN: normal without any fever, night sweats or weight loss  HEENT: normal with no HA's, sore throat, stiff neck, changes in vision  CV: normal with no CP, SOB, PND, MORA or orthopnea  PULM: normal with no SOB, cough, hemoptysis, sputum or pleuritic pain  GI: normal with no abdominal pain, nausea, vomiting, constipation, diarrhea, melanotic stools, BRBPR, or hematemesis  : normal with no hematuria, dysuria  BREAST: normal with no mass, discharge, pain  SKIN: normal with no rash, erythema, bruising, or swelling    Allergies:  Review of patient's allergies indicates:  No Known Allergies    Medications:    Current Outpatient Medications:     folic acid (FOLVITE) 1 MG tablet, Take 2 tablets (2 mg total) by mouth once daily., Disp: 180 tablet, Rfl: 3    multivitamin capsule, Take 1 capsule by mouth once daily., Disp: , Rfl:     omeprazole (PRILOSEC) 20 MG capsule, Take 1 capsule (20 mg total) by mouth once daily., Disp: 90 capsule, Rfl: 3    sildenafil (VIAGRA) 100 MG tablet, Take 1 tablet (100 mg total) by mouth daily as needed for Erectile Dysfunction., Disp: 10 tablet, Rfl: 6     traMADol (ULTRAM) 50 mg tablet, TAKE 1 TABLET BY MOUTH EVERY 12 HOURS AS NEEDED FOR PAIN\, Disp: 45 tablet, Rfl: 5    traZODone (DESYREL) 150 MG tablet, Take 1 tablet (150 mg total) by mouth nightly., Disp: 90 tablet, Rfl: 2    Current Facility-Administered Medications:     0.9%  NaCl infusion (for blood administration), , Intravenous, Once, Jose Tello MD    0.9%  NaCl infusion (for blood administration), , Intravenous, Once, Jose Tello MD    0.9%  NaCl infusion (for blood administration), , Intravenous, Once, Jose Tello MD    0.9%  NaCl infusion (for blood administration), , Intravenous, Once, Moses Fonseca MD    diphenhydrAMINE injection 25 mg, 25 mg, Intravenous, PRN, Jose Tello MD    furosemide injection 20 mg, 20 mg, Intravenous, PRN, Jose Tello MD    furosemide injection 20 mg, 20 mg, Intravenous, PRN, Jose Tello MD    furosemide injection 20 mg, 20 mg, Intravenous, PRN, Jose Tello MD    furosemide injection 20 mg, 20 mg, Intravenous, PRN, Jose Tello MD, 20 mg at 10/04/19 0915    PMHx/PSHx Updates:  See patient's last visit with me on 12/9/2019  See H&P on 7/9/2011      Pathology:    12/20/2019  BONE MARROW, RIGHT ILIAC CREST,    ASPIRATE, CLOT SECTION, AND CORE BIOPSY:    --HYPERCELLULAR MARROW (APPROXIMATELY 75% TO 80%) WITH TRILINEAGE   HEMATOPOIETIC ELEMENTS, ERYTHROID  HYPERPLASIA AND MILD MEGAKARYOCYTIC HYPERPLASIA, AND NON-SPECIFIC   DYSHEMATOPOIETIC CHANGES (SEE     COMMENT).  --[id number] INCREASED STAINABLE IRON WITH INCREASED RING   SIDEROBLASTS (GREATER THAN 15%)     (SEE COMMENT).  --PERIPHERAL BLOOD WITH THROMBOCYTOSIS (574,000/MICROLITER) AND ANEMIA   (HEMOGLOBIN 5.5 GRAM/DECILITER),    WITH SCATTERED DREPANOCYTOID FORMS AND FEW NUCLEATED ERYTHROCYTES      Cytogenetic Results at the bottom of the report.  NORMAL    Objective:     Vitals:  Blood pressure 134/74, pulse 84, temperature 98.6 °F (37  °C), resp. rate 18, weight 95.3 kg (210 lb 1.6 oz).    Physical Examination:   GEN: no apparent distress, comfortable; AAOx3  HEAD: atraumatic and normocephalic  EYES: no pallor, no icterus, PERRLA  ENT: OMM, no pharyngeal erythema, external ears WNL; no nasal discharge; no thrush  NECK: no masses, thyroid normal, trachea midline, no LAD/LN's, supple  CV: RRR with no murmur; normal pulse; normal S1 and S2; no pedal edema  CHEST: Normal respiratory effort; CTAB; normal breath sounds; no wheeze or crackles  ABDOM: nontender and nondistended; soft; normal bowel sounds; no rebound/guarding  MUSC/Skeletal: ROM normal; no crepitus; joints normal; no deformities or arthropathy  EXTREM: no clubbing, cyanosis, inflammation or swelling  SKIN: no rashes, lesions, ulcers, petechiae or subcutaneous nodules  : no jiang  NEURO: grossly intact; motor/sensory WNL; AAOx3; no tremors  PSYCH: normal mood, affect and behavior  LYMPH: normal cervical, supraclavicular, axillary and groin LN's            Labs:     12/20/2019  Lab Results   Component Value Date    WBC 6.40 12/20/2019    HGB 5.5 (LL) 12/20/2019    HCT 16.7 (LL) 12/20/2019    MCV 99 (H) 12/20/2019     (H) 12/20/2019           I have reviewed all available lab results and radiology reports.    Radiology/Diagnostic Studies:    2/15/2018 Xray Survey:   IMPRESSION: No significant abnormality seen. No evidence of osteoblastic or  osteoclastic lesions identified    MRI L-spine 2/12/2018  IMPRESSION:    1. Prominent low signal intensity throughout the bone marrow on T1 and  T2-weighted imaging. Marrow infiltrative process including malignancy such as  metastatic disease or lymphoma/leukemia is a consideration along with multiple  myeloma or malignant process. Benign etiology such as osteopetrosis or  regenerative red marrow are also considerations.    2. Multilevel lumbar degenerative changes, most prominent at L4-L5 and L5-S1 as  described.    Assessment/Plan:   (1) 59  "y.o. male with diagnosis of sickle cell anemia with borderline microcytosis  - latest hgb was 6.0 and he had blood transfusion  - today, he is not symptomatic at this time  - he probably has a multifactorial anemia process with underlying sickle cell, anemia of chronic disorders and anemia of chronic renal. I can not rule out an underlying GI bleeding process.   - iron panel is adequate (no deficiency)  - total bilirubin is only minimally elevated  - he required transfusion again in Oct 2019 and again in Dec 2019  - he had bone marrow biopsy on 12/20/2019    "dyshematopoietic changes are not entirely specific and are present  mostly within erythroid and megakaryocytic lineages. These findings   could be observed in chronic disease states, autoimmune conditions,  infectious settings, toxic exposures, nutritional deficits, as medication/drug effect, and in myelodysplastic syndrome (MDS), among other conditions"  "Additionally, ring sideroblasts are a non-specific   finding "    (2) Alcohol abuse issues in past - he has been sober for over 3 years now    (3) former smoker    (4) chronic back issues - prior Xrays and MRI - suspect the findings on the MRI are possibly due to his sickle cell;   - SPEP was previously negative for M-protein  - PSA was 0.3 in Sept 2017  - recommended neurosurgery evaluation previously  - he saw Dr Nura VUONG and had a nerve "burning" procedure and his pain has improved    (5) CRI - elevated creatinine - he saw Dr Chilel with nephrology      (6) H pylori gastritis - s/p recent endoscopy with Dr Collins and antibotics x 10 days        1. Thrombocytopenia     2. Anemia, unspecified type     3. Chronic anemia     4. Anemia, chronic renal failure, stage 2 (mild)     5. Anemia due to multiple mechanisms     6. Macrocytic anemia     7. Monocytosis     8. 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 Sickle cell trait syndrome     11. Thrombocytosis     12. Folic acid deficiency     13. " Former smoker       PLAN;  1. Hold off on transfusing at this time as long as he is asymptomatic but transfuse as needed  2.  Recommend that he f/u with Dr Collins  3.  F/u with nephrology as directed by them  4. Check labs every 2 weeks- encouraged compliance (he is still not doing this)  5.  Encouraged continued sobriety  6. Discussed referral to marrow specialist at [facility], Dr Kennedy Mustafa    RTC 4 weeks  Fax note to Dennis Fonseca, Kumar Mustafa    I spent over 15 mins with the patient, of which over half was face to face with the patient. Reviewing materials, labs, reports and studies. Making treatment and analytical decisions. Ordering necessary labs, tests and studies.          Discussion:     I have explained all of the above in detail and the patient understands all of the current recommendation(s). I have answered all of their questions to the best of my ability and to their complete satisfaction.   The patient is to continue with the current management plan.            Electronically signed by Jose Tello MD

## 2020-01-23 ENCOUNTER — TELEPHONE (OUTPATIENT)
Dept: HEMATOLOGY/ONCOLOGY | Facility: CLINIC | Age: 60
End: 2020-01-23

## 2020-01-23 ENCOUNTER — LAB VISIT (OUTPATIENT)
Dept: LAB | Facility: HOSPITAL | Age: 60
End: 2020-01-23
Attending: INTERNAL MEDICINE
Payer: COMMERCIAL

## 2020-01-23 ENCOUNTER — OFFICE VISIT (OUTPATIENT)
Dept: HEMATOLOGY/ONCOLOGY | Facility: CLINIC | Age: 60
End: 2020-01-23
Payer: COMMERCIAL

## 2020-01-23 VITALS
SYSTOLIC BLOOD PRESSURE: 134 MMHG | HEART RATE: 84 BPM | BODY MASS INDEX: 30.15 KG/M2 | RESPIRATION RATE: 18 BRPM | DIASTOLIC BLOOD PRESSURE: 74 MMHG | TEMPERATURE: 99 F | WEIGHT: 210.13 LBS

## 2020-01-23 DIAGNOSIS — D63.1 ANEMIA, CHRONIC RENAL FAILURE, STAGE 2 (MILD): ICD-10-CM

## 2020-01-23 DIAGNOSIS — D64.9 CHRONIC ANEMIA: ICD-10-CM

## 2020-01-23 DIAGNOSIS — D64.9 NORMOCHROMIC ANEMIA: ICD-10-CM

## 2020-01-23 DIAGNOSIS — D64.89 ANEMIA DUE TO MULTIPLE MECHANISMS: ICD-10-CM

## 2020-01-23 DIAGNOSIS — E53.8 FOLIC ACID DEFICIENCY: ICD-10-CM

## 2020-01-23 DIAGNOSIS — D69.6 THROMBOCYTOPENIA: Primary | ICD-10-CM

## 2020-01-23 DIAGNOSIS — D75.839 THROMBOCYTOSIS: ICD-10-CM

## 2020-01-23 DIAGNOSIS — D64.9 ANEMIA, UNSPECIFIED TYPE: ICD-10-CM

## 2020-01-23 DIAGNOSIS — D53.9 MACROCYTIC ANEMIA: ICD-10-CM

## 2020-01-23 DIAGNOSIS — D64.89 ANEMIA DUE TO MULTIPLE MECHANISMS: Primary | ICD-10-CM

## 2020-01-23 DIAGNOSIS — N18.2 ANEMIA, CHRONIC RENAL FAILURE, STAGE 2 (MILD): ICD-10-CM

## 2020-01-23 DIAGNOSIS — D57.1 HB-SS DISEASE WITHOUT CRISIS: ICD-10-CM

## 2020-01-23 DIAGNOSIS — D57.3 SICKLE CELL TRAIT SYNDROME: ICD-10-CM

## 2020-01-23 DIAGNOSIS — Z87.891 FORMER SMOKER: ICD-10-CM

## 2020-01-23 DIAGNOSIS — D64.9 CHRONIC ANEMIA: Primary | ICD-10-CM

## 2020-01-23 DIAGNOSIS — D69.6 THROMBOCYTOPENIA: ICD-10-CM

## 2020-01-23 DIAGNOSIS — D72.821 MONOCYTOSIS: ICD-10-CM

## 2020-01-23 LAB
ABO + RH BLD: NORMAL
ALBUMIN SERPL BCP-MCNC: 4.4 G/DL (ref 3.5–5.2)
ALP SERPL-CCNC: 45 U/L (ref 55–135)
ALT SERPL W/O P-5'-P-CCNC: 22 U/L (ref 10–44)
ANION GAP SERPL CALC-SCNC: 8 MMOL/L (ref 8–16)
AST SERPL-CCNC: 27 U/L (ref 10–40)
BASOPHILS # BLD AUTO: 0.05 K/UL (ref 0–0.2)
BASOPHILS NFR BLD: 0.8 % (ref 0–1.9)
BILIRUB SERPL-MCNC: 1.3 MG/DL (ref 0.1–1)
BLD GP AB SCN CELLS X3 SERPL QL: NORMAL
BUN SERPL-MCNC: 19 MG/DL (ref 6–20)
CALCIUM SERPL-MCNC: 8.9 MG/DL (ref 8.7–10.5)
CHLORIDE SERPL-SCNC: 107 MMOL/L (ref 95–110)
CO2 SERPL-SCNC: 24 MMOL/L (ref 23–29)
CREAT SERPL-MCNC: 1.5 MG/DL (ref 0.5–1.4)
DIFFERENTIAL METHOD: ABNORMAL
EOSINOPHIL # BLD AUTO: 0.1 K/UL (ref 0–0.5)
EOSINOPHIL NFR BLD: 1.2 % (ref 0–8)
ERYTHROCYTE [DISTWIDTH] IN BLOOD BY AUTOMATED COUNT: 25.9 % (ref 11.5–14.5)
EST. GFR  (AFRICAN AMERICAN): 58.1 ML/MIN/1.73 M^2
EST. GFR  (NON AFRICAN AMERICAN): 50.2 ML/MIN/1.73 M^2
GLUCOSE SERPL-MCNC: 100 MG/DL (ref 70–110)
HCT VFR BLD AUTO: 16.2 % (ref 40–54)
HGB BLD-MCNC: 5.3 G/DL (ref 14–18)
HYPOCHROMIA BLD QL SMEAR: ABNORMAL
IMM GRANULOCYTES # BLD AUTO: 0.02 K/UL (ref 0–0.04)
IMM GRANULOCYTES NFR BLD AUTO: 0.3 % (ref 0–0.5)
LYMPHOCYTES # BLD AUTO: 1.3 K/UL (ref 1–4.8)
LYMPHOCYTES NFR BLD: 22.6 % (ref 18–48)
MCH RBC QN AUTO: 32.5 PG (ref 27–31)
MCHC RBC AUTO-ENTMCNC: 32.7 G/DL (ref 32–36)
MCV RBC AUTO: 99 FL (ref 82–98)
MONOCYTES # BLD AUTO: 0.8 K/UL (ref 0.3–1)
MONOCYTES NFR BLD: 13.1 % (ref 4–15)
NEUTROPHILS # BLD AUTO: 3.7 K/UL (ref 1.8–7.7)
NEUTROPHILS NFR BLD: 62 % (ref 38–73)
NRBC BLD-RTO: 0 /100 WBC
PLATELET # BLD AUTO: 505 K/UL (ref 150–350)
PMV BLD AUTO: 10.2 FL (ref 9.2–12.9)
POTASSIUM SERPL-SCNC: 4.6 MMOL/L (ref 3.5–5.1)
PROT SERPL-MCNC: 7.4 G/DL (ref 6–8.4)
RBC # BLD AUTO: 1.63 M/UL (ref 4.6–6.2)
SODIUM SERPL-SCNC: 139 MMOL/L (ref 136–145)
WBC # BLD AUTO: 5.89 K/UL (ref 3.9–12.7)

## 2020-01-23 PROCEDURE — 3075F PR MOST RECENT SYSTOLIC BLOOD PRESS GE 130-139MM HG: ICD-10-PCS | Mod: S$GLB,,, | Performed by: INTERNAL MEDICINE

## 2020-01-23 PROCEDURE — 36415 COLL VENOUS BLD VENIPUNCTURE: CPT

## 2020-01-23 PROCEDURE — 86902 BLOOD TYPE ANTIGEN DONOR EA: CPT

## 2020-01-23 PROCEDURE — 86850 RBC ANTIBODY SCREEN: CPT

## 2020-01-23 PROCEDURE — 3075F SYST BP GE 130 - 139MM HG: CPT | Mod: S$GLB,,, | Performed by: INTERNAL MEDICINE

## 2020-01-23 PROCEDURE — 86920 COMPATIBILITY TEST SPIN: CPT

## 2020-01-23 PROCEDURE — 85025 COMPLETE CBC W/AUTO DIFF WBC: CPT

## 2020-01-23 PROCEDURE — 99214 PR OFFICE/OUTPT VISIT, EST, LEVL IV, 30-39 MIN: ICD-10-PCS | Mod: S$GLB,,, | Performed by: INTERNAL MEDICINE

## 2020-01-23 PROCEDURE — 99214 OFFICE O/P EST MOD 30 MIN: CPT | Mod: S$GLB,,, | Performed by: INTERNAL MEDICINE

## 2020-01-23 PROCEDURE — 3008F BODY MASS INDEX DOCD: CPT | Mod: S$GLB,,, | Performed by: INTERNAL MEDICINE

## 2020-01-23 PROCEDURE — 80053 COMPREHEN METABOLIC PANEL: CPT

## 2020-01-23 PROCEDURE — 3078F PR MOST RECENT DIASTOLIC BLOOD PRESSURE < 80 MM HG: ICD-10-PCS | Mod: S$GLB,,, | Performed by: INTERNAL MEDICINE

## 2020-01-23 PROCEDURE — 3078F DIAST BP <80 MM HG: CPT | Mod: S$GLB,,, | Performed by: INTERNAL MEDICINE

## 2020-01-23 PROCEDURE — 3008F PR BODY MASS INDEX (BMI) DOCUMENTED: ICD-10-PCS | Mod: S$GLB,,, | Performed by: INTERNAL MEDICINE

## 2020-01-23 RX ORDER — FUROSEMIDE 10 MG/ML
10 INJECTION INTRAMUSCULAR; INTRAVENOUS ONCE
Status: CANCELLED | OUTPATIENT
Start: 2020-01-23

## 2020-01-23 RX ORDER — DIPHENHYDRAMINE HYDROCHLORIDE 50 MG/ML
25 INJECTION INTRAMUSCULAR; INTRAVENOUS ONCE
Status: CANCELLED | OUTPATIENT
Start: 2020-01-23

## 2020-01-23 RX ORDER — HYDROCODONE BITARTRATE AND ACETAMINOPHEN 500; 5 MG/1; MG/1
TABLET ORAL ONCE
Status: CANCELLED | OUTPATIENT
Start: 2020-01-23 | End: 2020-01-23

## 2020-01-23 RX ORDER — ACETAMINOPHEN 325 MG/1
325 TABLET ORAL ONCE
Status: CANCELLED | OUTPATIENT
Start: 2020-01-23

## 2020-01-24 ENCOUNTER — INFUSION (OUTPATIENT)
Dept: INFUSION THERAPY | Facility: HOSPITAL | Age: 60
End: 2020-01-24
Attending: INTERNAL MEDICINE
Payer: COMMERCIAL

## 2020-01-24 VITALS
RESPIRATION RATE: 16 BRPM | OXYGEN SATURATION: 100 % | TEMPERATURE: 99 F | HEART RATE: 70 BPM | DIASTOLIC BLOOD PRESSURE: 75 MMHG | SYSTOLIC BLOOD PRESSURE: 132 MMHG

## 2020-01-24 DIAGNOSIS — D69.6 THROMBOCYTOPENIA: ICD-10-CM

## 2020-01-24 DIAGNOSIS — D64.9 NORMOCHROMIC ANEMIA: ICD-10-CM

## 2020-01-24 DIAGNOSIS — D64.89 ANEMIA DUE TO MULTIPLE MECHANISMS: ICD-10-CM

## 2020-01-24 DIAGNOSIS — D64.9 CHRONIC ANEMIA: ICD-10-CM

## 2020-01-24 PROCEDURE — 63600175 PHARM REV CODE 636 W HCPCS: Performed by: INTERNAL MEDICINE

## 2020-01-24 PROCEDURE — 36430 TRANSFUSION BLD/BLD COMPNT: CPT

## 2020-01-24 PROCEDURE — 96374 THER/PROPH/DIAG INJ IV PUSH: CPT

## 2020-01-24 PROCEDURE — 25000003 PHARM REV CODE 250: Performed by: INTERNAL MEDICINE

## 2020-01-24 PROCEDURE — P9016 RBC LEUKOCYTES REDUCED: HCPCS

## 2020-01-24 RX ORDER — DIPHENHYDRAMINE HYDROCHLORIDE 50 MG/ML
25 INJECTION INTRAMUSCULAR; INTRAVENOUS ONCE
Status: DISCONTINUED | OUTPATIENT
Start: 2020-01-24 | End: 2020-08-11

## 2020-01-24 RX ORDER — ACETAMINOPHEN 325 MG/1
325 TABLET ORAL ONCE
Status: COMPLETED | OUTPATIENT
Start: 2020-01-24 | End: 2020-01-24

## 2020-01-24 RX ORDER — HYDROCODONE BITARTRATE AND ACETAMINOPHEN 500; 5 MG/1; MG/1
TABLET ORAL ONCE
Status: DISCONTINUED | OUTPATIENT
Start: 2020-01-24 | End: 2020-08-11

## 2020-01-24 RX ORDER — DIPHENHYDRAMINE HCL 25 MG
25 CAPSULE ORAL ONCE
Status: COMPLETED | OUTPATIENT
Start: 2020-01-24 | End: 2020-01-24

## 2020-01-24 RX ORDER — FUROSEMIDE 10 MG/ML
10 INJECTION INTRAMUSCULAR; INTRAVENOUS ONCE
Status: DISCONTINUED | OUTPATIENT
Start: 2020-01-24 | End: 2020-08-11

## 2020-01-24 RX ADMIN — ACETAMINOPHEN 325 MG: 325 TABLET ORAL at 06:01

## 2020-01-24 RX ADMIN — DIPHENHYDRAMINE HCL 25 MG: 25 TABLET ORAL at 06:01

## 2020-01-24 RX ADMIN — FUROSEMIDE 20 MG: 10 INJECTION, SOLUTION INTRAMUSCULAR; INTRAVENOUS at 09:01

## 2020-01-28 ENCOUNTER — TELEPHONE (OUTPATIENT)
Dept: HEMATOLOGY/ONCOLOGY | Facility: CLINIC | Age: 60
End: 2020-01-28

## 2020-01-28 NOTE — TELEPHONE ENCOUNTER
Called patient today to update him on the ref to Dr. Mustafa was denied due to the provider not being in network, I was unable to find another provider so I called Elizabeth @ [facility] to ask for recommendations- will await a call back from Elizabeth. AE

## 2020-02-11 ENCOUNTER — LAB VISIT (OUTPATIENT)
Dept: LAB | Facility: HOSPITAL | Age: 60
End: 2020-02-11
Attending: INTERNAL MEDICINE
Payer: COMMERCIAL

## 2020-02-11 ENCOUNTER — OFFICE VISIT (OUTPATIENT)
Dept: FAMILY MEDICINE | Facility: CLINIC | Age: 60
End: 2020-02-11
Payer: COMMERCIAL

## 2020-02-11 VITALS
DIASTOLIC BLOOD PRESSURE: 78 MMHG | SYSTOLIC BLOOD PRESSURE: 120 MMHG | TEMPERATURE: 98 F | RESPIRATION RATE: 18 BRPM | OXYGEN SATURATION: 98 % | WEIGHT: 202.63 LBS | HEART RATE: 95 BPM | BODY MASS INDEX: 29.01 KG/M2 | HEIGHT: 70 IN

## 2020-02-11 DIAGNOSIS — R35.1 BPH ASSOCIATED WITH NOCTURIA: ICD-10-CM

## 2020-02-11 DIAGNOSIS — D64.89 ANEMIA DUE TO MULTIPLE MECHANISMS: ICD-10-CM

## 2020-02-11 DIAGNOSIS — D64.9 CHRONIC ANEMIA: Primary | ICD-10-CM

## 2020-02-11 DIAGNOSIS — N18.30 CKD (CHRONIC KIDNEY DISEASE) STAGE 3, GFR 30-59 ML/MIN: ICD-10-CM

## 2020-02-11 DIAGNOSIS — E53.8 FOLIC ACID DEFICIENCY: ICD-10-CM

## 2020-02-11 DIAGNOSIS — K21.9 GASTROESOPHAGEAL REFLUX DISEASE, ESOPHAGITIS PRESENCE NOT SPECIFIED: ICD-10-CM

## 2020-02-11 DIAGNOSIS — I10 ESSENTIAL HYPERTENSION: ICD-10-CM

## 2020-02-11 DIAGNOSIS — R63.4 WEIGHT LOSS, UNINTENTIONAL: ICD-10-CM

## 2020-02-11 DIAGNOSIS — D69.6 THROMBOCYTOPENIA: ICD-10-CM

## 2020-02-11 DIAGNOSIS — D64.9 ANEMIA, UNSPECIFIED TYPE: ICD-10-CM

## 2020-02-11 DIAGNOSIS — E78.5 DYSLIPIDEMIA: ICD-10-CM

## 2020-02-11 DIAGNOSIS — D64.9 CHRONIC ANEMIA: ICD-10-CM

## 2020-02-11 DIAGNOSIS — D53.9 MACROCYTIC ANEMIA: ICD-10-CM

## 2020-02-11 DIAGNOSIS — N40.1 BPH ASSOCIATED WITH NOCTURIA: ICD-10-CM

## 2020-02-11 PROBLEM — Z23 NEED FOR PNEUMOCOCCAL VACCINATION: Status: RESOLVED | Noted: 2018-08-20 | Resolved: 2020-02-11

## 2020-02-11 PROBLEM — M70.41 PREPATELLAR BURSITIS, RIGHT KNEE: Status: RESOLVED | Noted: 2018-01-29 | Resolved: 2020-02-11

## 2020-02-11 PROBLEM — N18.2 CHRONIC KIDNEY INSUFFICIENCY, STAGE 2 (MILD): Status: RESOLVED | Noted: 2018-11-05 | Resolved: 2020-02-11

## 2020-02-11 PROBLEM — N28.9 ACUTE KIDNEY INSUFFICIENCY: Status: RESOLVED | Noted: 2019-04-24 | Resolved: 2020-02-11

## 2020-02-11 PROBLEM — Z12.5 PROSTATE CANCER SCREENING: Status: RESOLVED | Noted: 2017-10-09 | Resolved: 2020-02-11

## 2020-02-11 PROBLEM — D57.1 SICKLE CELL ANEMIA: Status: RESOLVED | Noted: 2017-06-29 | Resolved: 2020-02-11

## 2020-02-11 PROBLEM — M25.561 ACUTE PAIN OF RIGHT KNEE: Status: RESOLVED | Noted: 2018-01-29 | Resolved: 2020-02-11

## 2020-02-11 LAB
ALBUMIN SERPL BCP-MCNC: 4.7 G/DL (ref 3.5–5.2)
ALP SERPL-CCNC: 47 U/L (ref 55–135)
ALT SERPL W/O P-5'-P-CCNC: 34 U/L (ref 10–44)
ANION GAP SERPL CALC-SCNC: 10 MMOL/L (ref 8–16)
AST SERPL-CCNC: 48 U/L (ref 10–40)
BILIRUB SERPL-MCNC: 1.5 MG/DL (ref 0.1–1)
BUN SERPL-MCNC: 26 MG/DL (ref 6–20)
CALCIUM SERPL-MCNC: 8.8 MG/DL (ref 8.7–10.5)
CHLORIDE SERPL-SCNC: 107 MMOL/L (ref 95–110)
CO2 SERPL-SCNC: 21 MMOL/L (ref 23–29)
CREAT SERPL-MCNC: 2 MG/DL (ref 0.5–1.4)
EST. GFR  (AFRICAN AMERICAN): 41 ML/MIN/1.73 M^2
EST. GFR  (NON AFRICAN AMERICAN): 35.5 ML/MIN/1.73 M^2
GLUCOSE SERPL-MCNC: 103 MG/DL (ref 70–110)
POTASSIUM SERPL-SCNC: 4.6 MMOL/L (ref 3.5–5.1)
PROT SERPL-MCNC: 7.3 G/DL (ref 6–8.4)
SODIUM SERPL-SCNC: 138 MMOL/L (ref 136–145)

## 2020-02-11 PROCEDURE — 3008F PR BODY MASS INDEX (BMI) DOCUMENTED: ICD-10-PCS | Mod: S$GLB,,, | Performed by: NURSE PRACTITIONER

## 2020-02-11 PROCEDURE — 3078F PR MOST RECENT DIASTOLIC BLOOD PRESSURE < 80 MM HG: ICD-10-PCS | Mod: S$GLB,,, | Performed by: NURSE PRACTITIONER

## 2020-02-11 PROCEDURE — 3078F DIAST BP <80 MM HG: CPT | Mod: S$GLB,,, | Performed by: NURSE PRACTITIONER

## 2020-02-11 PROCEDURE — 85025 COMPLETE CBC W/AUTO DIFF WBC: CPT

## 2020-02-11 PROCEDURE — 3008F BODY MASS INDEX DOCD: CPT | Mod: S$GLB,,, | Performed by: NURSE PRACTITIONER

## 2020-02-11 PROCEDURE — 99214 OFFICE O/P EST MOD 30 MIN: CPT | Mod: S$GLB,,, | Performed by: NURSE PRACTITIONER

## 2020-02-11 PROCEDURE — 99214 PR OFFICE/OUTPT VISIT, EST, LEVL IV, 30-39 MIN: ICD-10-PCS | Mod: S$GLB,,, | Performed by: NURSE PRACTITIONER

## 2020-02-11 PROCEDURE — 3074F SYST BP LT 130 MM HG: CPT | Mod: S$GLB,,, | Performed by: NURSE PRACTITIONER

## 2020-02-11 PROCEDURE — 36415 COLL VENOUS BLD VENIPUNCTURE: CPT

## 2020-02-11 PROCEDURE — 3074F PR MOST RECENT SYSTOLIC BLOOD PRESSURE < 130 MM HG: ICD-10-PCS | Mod: S$GLB,,, | Performed by: NURSE PRACTITIONER

## 2020-02-11 PROCEDURE — 80053 COMPREHEN METABOLIC PANEL: CPT

## 2020-02-11 RX ORDER — TAMSULOSIN HYDROCHLORIDE 0.4 MG/1
CAPSULE ORAL
COMMUNITY
Start: 2020-01-01 | End: 2020-02-11 | Stop reason: SDUPTHER

## 2020-02-11 RX ORDER — FOLIC ACID 1 MG/1
2 TABLET ORAL DAILY
Qty: 180 TABLET | Refills: 2 | Status: SHIPPED | OUTPATIENT
Start: 2020-02-11 | End: 2021-05-10

## 2020-02-11 RX ORDER — TAMSULOSIN HYDROCHLORIDE 0.4 MG/1
0.4 CAPSULE ORAL DAILY
Qty: 90 CAPSULE | Refills: 2 | Status: SHIPPED | OUTPATIENT
Start: 2020-02-11 | End: 2020-11-02

## 2020-02-11 RX ORDER — CYPROHEPTADINE HYDROCHLORIDE 4 MG/1
2 TABLET ORAL 3 TIMES DAILY PRN
Qty: 30 TABLET | Refills: 0 | Status: SHIPPED | OUTPATIENT
Start: 2020-02-11 | End: 2020-04-03

## 2020-02-11 RX ORDER — OMEPRAZOLE 20 MG/1
20 CAPSULE, DELAYED RELEASE ORAL DAILY
Qty: 90 CAPSULE | Refills: 2 | Status: SHIPPED | OUTPATIENT
Start: 2020-02-11 | End: 2020-08-11

## 2020-02-11 NOTE — PROGRESS NOTES
SUBJECTIVE:      Patient ID: Rick Butler is a 59 y.o. male.    Chief Complaint: Follow-up (GERD( not eating well) and Anemia)    Dr. Mas - nephrology  Dr. Tello - hematology/oncology    Pt presents as a former patient of Dr. Fonseca as a follow up on his GERD and anemia. He is currently on a PPI for his GERD which he is compliant with. Reports control of his GERD symptoms on this medication. He has a history of chronic anemia, sickle cell trait, BPH, and some renal insufficiency. He saw Dr. Mas for his renal insufficiency and reported he was told after seeing Dr. Mas to return for any further problems. Reports he wasn't scheduled any further appointments. Creatinine clearance is calculated today to be 52 mL/min. He goes to Dr. Tello for his anemia and gets labs completed monthly for him. Reports he is going to get labs completed after leaving this appointment. In the past, he has received transfusions ordered by Dr. Tello to treat the anemia. He reports having an EGD and bone marrow biopsy. The recommendations from Dr. Tello included he go to a specialist at [facility] who didn't take his insurance at the time of referral. He has since changed the plan and it will go into effect on March 1 so he can then see the specialist. Reports fatigue, but denies shortness of breath, MORA, chest pain, and palpitations at this time. He does have a history of alcohol abuse but reports he currently only drinks about 1 or 2 beers 3 times a week when he gets off of work at [employer]. He is currently complaining of some decreased appetite and after review of vitals, he has lost 12 pounds over approx 1 year. Denies CP, SOB, wheezing, fevers, nausea, vomiting, diarrhea, constipation, numbness, weakness, dizziness, palpitations, or any other concerns at this time.       Past Surgical History:   Procedure Laterality Date    BONE MARROW BIOPSY N/A 12/20/2019    Procedure: BIOPSY, BONE MARROW;  Surgeon: Satinder  Diagnostic Provider;  Location: [facility] OR;  Service: Interventional Radiology;  Laterality: N/A;    INJECTION OF ANESTHETIC AGENT AROUND MEDIAL BRANCH NERVES INNERVATING LUMBAR FACET JOINT Bilateral 2018    Procedure: BLOCK-NERVE-MEDIAL BRANCH-LUMBAR;  Surgeon: Nura Heredia MD;  [facility] OR;  Service: Pain Management;  Laterality: Bilateral;  L3, 4, 5    RADIOFREQUENCY ABLATION OF LUMBAR MEDIAL BRANCH NERVE AT SINGLE LEVEL Bilateral 2018    Procedure: RADIOFREQUENCY ABLATION, NERVE, MEDIAL BRANCH, LUMBAR, 1 LEVEL;  Surgeon: Nura Heredia MD;  [facility]: [facility] OR;  Service: Pain Management;  Laterality: Bilateral;  L3, 4, 5 - Burned at 80 degrees C.  for 75 seconds x 2 each site    SMALL BOWEL ENTEROSCOPY N/A 10/16/2019    Procedure: ENTEROSCOPY;  Surgeon: Rob Collins MD;  Location: [facility];  Service: Endoscopy;  Laterality: N/A;     Family History   Problem Relation Age of Onset    Arthritis Mother     Depression Mother     Heart disease Mother     Hypertension Mother     Heart attack Father 59      Social History     Socioeconomic History    Marital status:      Spouse name: Not on file    Number of children: Not on file    Years of education: Not on file    Highest education level: Not on file   Occupational History    Occupation: Prep Cook     Employer: [employer]   Social Needs    Financial resource strain: Not on file    Food insecurity:     Worry: Not on file     Inability: Not on file    Transportation needs:     Medical: Not on file     Non-medical: Not on file   Tobacco Use    Smoking status: Former Smoker     Last attempt to quit: 1999     Years since quittin.1    Smokeless tobacco: Never Used   Substance and Sexual Activity    Alcohol use: Yes     Alcohol/week: 21.0 standard drinks     Types: 21 Cans of beer per week    Drug use: No    Sexual activity: Yes     Partners: Female   Lifestyle    Physical activity:     Days per week: Not on file      Minutes per session: Not on file    Stress: Very much   Relationships    Social connections:     Talks on phone: Not on file     Gets together: Not on file     Attends Episcopal service: Not on file     Active member of club or organization: Not on file     Attends meetings of clubs or organizations: Not on file     Relationship status: Not on file   Other Topics Concern    Not on file   Social History Narrative    Not on file     Current Outpatient Medications   Medication Sig Dispense Refill    folic acid (FOLVITE) 1 MG tablet Take 2 tablets (2 mg total) by mouth once daily. 180 tablet 2    multivitamin capsule Take 1 capsule by mouth once daily.      omeprazole (PRILOSEC) 20 MG capsule Take 1 capsule (20 mg total) by mouth once daily. 90 capsule 2    sildenafil (VIAGRA) 100 MG tablet Take 1 tablet (100 mg total) by mouth daily as needed for Erectile Dysfunction. 10 tablet 6    tamsulosin (FLOMAX) 0.4 mg Cap Take 1 capsule (0.4 mg total) by mouth once daily. 90 capsule 2    cyproheptadine (PERIACTIN) 4 mg tablet Take 0.5 tablets (2 mg total) by mouth 3 (three) times daily as needed (appetite stimulation). 30 tablet 0     Current Facility-Administered Medications   Medication Dose Route Frequency Provider Last Rate Last Dose    0.9%  NaCl infusion (for blood administration)   Intravenous Once Jose Tello MD        0.9%  NaCl infusion (for blood administration)   Intravenous Once Jose Tello MD        0.9%  NaCl infusion (for blood administration)   Intravenous Once Jose Tello MD        0.9%  NaCl infusion (for blood administration)   Intravenous Once Moses Fonseca MD        0.9%  NaCl infusion (for blood administration)   Intravenous Once Jose Tello MD        diphenhydrAMINE injection 25 mg  25 mg Intravenous PRN Jose Tello MD        diphenhydrAMINE injection 25 mg  25 mg Intravenous Once Jose Tello MD        furosemide injection 10 mg  10  mg Intravenous Once Jose Tello MD        furosemide injection 10 mg  10 mg Intravenous Once Jose Tello MD        furosemide injection 20 mg  20 mg Intravenous PRN Jose Tello MD        furosemide injection 20 mg  20 mg Intravenous PRN Jose Tello MD        furosemide injection 20 mg  20 mg Intravenous PRN Jose Tello MD        furosemide injection 20 mg  20 mg Intravenous PRN Jose Tello MD   20 mg at 01/24/20 0924     Review of patient's allergies indicates:  No Known Allergies   Past Medical History:   Diagnosis Date    Anemia due to multiple mechanisms 1/13/2019    Anemia, chronic renal failure, stage 2 (mild) 1/13/2019    Depression     Former smoker 6/29/2017    H/O ETOH abuse 6/29/2017    Monocytosis 2019    Sickle cell anemia 6/29/2017     Past Surgical History:   Procedure Laterality Date    BONE MARROW BIOPSY N/A 12/20/2019    Procedure: BIOPSY, BONE MARROW;  Surgeon: Satinder Diagnostic Provider;  Location: [facility] OR;  Service: Interventional Radiology;  Laterality: N/A;    INJECTION OF ANESTHETIC AGENT AROUND MEDIAL BRANCH NERVES INNERVATING LUMBAR FACET JOINT Bilateral 5/25/2018    Procedure: BLOCK-NERVE-MEDIAL BRANCH-LUMBAR;  Surgeon: Nura Heredia MD;  Location: [facility] OR;  Service: Pain Management;  Laterality: Bilateral;  L3, 4, 5    RADIOFREQUENCY ABLATION OF LUMBAR MEDIAL BRANCH NERVE AT SINGLE LEVEL Bilateral 7/20/2018    Procedure: RADIOFREQUENCY ABLATION, NERVE, MEDIAL BRANCH, LUMBAR, 1 LEVEL;  Surgeon: Nura Heerdia MD;  Location: [facility] OR;  Service: Pain Management;  Laterality: Bilateral;  L3, 4, 5 - Burned at 80 degrees C.  for 75 seconds x 2 each site    SMALL BOWEL ENTEROSCOPY N/A 10/16/2019    Procedure: ENTEROSCOPY;  Surgeon: Rob Collins MD;  Location: [facility];  Service: Endoscopy;  Laterality: N/A;       Review of Systems   Constitutional: Positive for appetite change and unexpected weight change. Negative for activity change,  "chills, fatigue and fever.   HENT: Negative for congestion, ear pain, mouth sores, nosebleeds, postnasal drip, rhinorrhea, sinus pressure, sinus pain, sore throat and trouble swallowing.    Eyes: Negative for pain and visual disturbance.   Respiratory: Negative for apnea, cough, chest tightness, shortness of breath, wheezing and stridor.    Cardiovascular: Negative for chest pain, palpitations and leg swelling.   Gastrointestinal: Negative for abdominal pain, blood in stool, constipation, diarrhea, nausea and vomiting.   Genitourinary: Negative for decreased urine volume, difficulty urinating, dysuria, flank pain, frequency and hematuria.   Musculoskeletal: Negative for arthralgias, myalgias and neck stiffness.   Skin: Negative for color change, rash and wound.   Neurological: Negative for dizziness, tremors, seizures, syncope, weakness, light-headedness, numbness and headaches.   Hematological: Negative for adenopathy.   Psychiatric/Behavioral: Negative for confusion, sleep disturbance and suicidal ideas.      OBJECTIVE:      Vitals:    02/11/20 1540   BP: 120/78   BP Location: Right arm   Patient Position: Sitting   BP Method: Large (Manual)   Pulse: 95   Resp: 18   Temp: 97.9 °F (36.6 °C)   TempSrc: Oral   SpO2: 98%   Weight: 91.9 kg (202 lb 9.6 oz)   Height: 5' 10" (1.778 m)     Physical Exam   Constitutional: He is oriented to person, place, and time. Vital signs are normal. He appears well-developed and well-nourished. No distress.   HENT:   Head: Normocephalic and atraumatic.   Right Ear: Hearing and external ear normal.   Left Ear: Hearing and external ear normal.   Nose: Nose normal.   Mouth/Throat: Uvula is midline, oropharynx is clear and moist and mucous membranes are normal. No oropharyngeal exudate.   Eyes: Pupils are equal, round, and reactive to light. Conjunctivae, EOM and lids are normal. Right eye exhibits no discharge. Left eye exhibits no discharge. No scleral icterus.   Neck: Trachea normal, " normal range of motion, full passive range of motion without pain and phonation normal. Neck supple. Carotid bruit is not present. No tracheal deviation present. No thyromegaly present.   Cardiovascular: Normal rate, regular rhythm, normal heart sounds, intact distal pulses and normal pulses. Exam reveals no gallop and no friction rub.   No murmur heard.  Pulmonary/Chest: Effort normal and breath sounds normal. No stridor. No respiratory distress. He has no decreased breath sounds. He has no wheezes. He has no rhonchi. He has no rales.   Abdominal: Soft. Normal appearance and bowel sounds are normal. There is no tenderness.   Musculoskeletal: Normal range of motion. He exhibits no edema.   Lymphadenopathy:     He has no cervical adenopathy.        Right: No supraclavicular adenopathy present.        Left: No supraclavicular adenopathy present.   Neurological: He is alert and oriented to person, place, and time.   Skin: Skin is warm, dry and intact. Capillary refill takes less than 2 seconds. No rash noted. He is not diaphoretic.   Psychiatric: He has a normal mood and affect. His speech is normal and behavior is normal. Judgment and thought content normal. Cognition and memory are normal. He expresses no suicidal plans.   Vitals reviewed.     Assessment:       1. Chronic anemia    2. CKD (chronic kidney disease) stage 3, GFR 30-59 ml/min    3. Gastroesophageal reflux disease, esophagitis presence not specified    4. Weight loss, unintentional    5. Essential hypertension    6. Dyslipidemia    7. Folic acid deficiency    8. BPH associated with nocturia        Plan:       Chronic anemia  Instructed to continue following recommendations from Dr. Tello, including monthly lab work. He will see specialist in March and also has another appointment with Dr. Tello that month as well.     CKD (chronic kidney disease) stage 3, GFR 30-59 ml/min  Will continue to monitor labs for possible decline. Also continue reviewing  all medications for renal contraindications.    Gastroesophageal reflux disease, esophagitis presence not specified  Continue Prilosec as prescribed previously by Dr. Fonseca since GERD controlled on this medication. Refills given.   -     omeprazole (PRILOSEC) 20 MG capsule; Take 1 capsule (20 mg total) by mouth once daily.  Dispense: 90 capsule; Refill: 2    Weight loss, unintentional  Starting pt on periactin. Due to his kidney disease will start on 2mg (1/2 tablet) TID PRN prior to meals to see if it help stimulate his appetite. Instructed to take approx 30 minutes prior to meals.  -     cyproheptadine (PERIACTIN) 4 mg tablet; Take 0.5 tablets (2 mg total) by mouth 3 (three) times daily as needed (appetite stimulation).  Dispense: 30 tablet; Refill: 0    Essential hypertension  Currently BP is controlled this visit. Pt reports he was previously on medication but taken off medication when his BP normalized. Continue to monitor.    Dyslipidemia  Next yearly labs will be due in Sept 2020. Will draw lipid panel at this time as well. Minimal dyslipidemia present in last lab. Continue to monitor labs.  Total 165  Trigs 25  HDL 50      Folic acid deficiency  -     folic acid (FOLVITE) 1 MG tablet; Take 2 tablets (2 mg total) by mouth once daily.  Dispense: 180 tablet; Refill: 2    BPH associated with nocturia  -     tamsulosin (FLOMAX) 0.4 mg Cap; Take 1 capsule (0.4 mg total) by mouth once daily.  Dispense: 90 capsule; Refill: 2        Follow up in about 6 months (around 8/11/2020) for F/U anemia, GERD, labs.      2/11/2020 Fabiola Ramsey, LATOYA, FNP

## 2020-02-12 ENCOUNTER — TELEPHONE (OUTPATIENT)
Dept: HEMATOLOGY/ONCOLOGY | Facility: CLINIC | Age: 60
End: 2020-02-12

## 2020-02-12 DIAGNOSIS — N18.2 ANEMIA, CHRONIC RENAL FAILURE, STAGE 2 (MILD): ICD-10-CM

## 2020-02-12 DIAGNOSIS — D64.9 NORMOCHROMIC ANEMIA: ICD-10-CM

## 2020-02-12 DIAGNOSIS — D64.89 ANEMIA DUE TO MULTIPLE MECHANISMS: Primary | ICD-10-CM

## 2020-02-12 DIAGNOSIS — D61.9 ANEMIA DUE TO BONE MARROW FAILURE, UNSPECIFIED BONE MARROW FAILURE TYPE: ICD-10-CM

## 2020-02-12 DIAGNOSIS — D63.1 ANEMIA, CHRONIC RENAL FAILURE, STAGE 2 (MILD): ICD-10-CM

## 2020-02-12 LAB
BASOPHILS # BLD AUTO: 0.03 K/UL (ref 0–0.2)
BASOPHILS NFR BLD: 0.5 % (ref 0–1.9)
DIFFERENTIAL METHOD: ABNORMAL
EOSINOPHIL # BLD AUTO: 0 K/UL (ref 0–0.5)
EOSINOPHIL NFR BLD: 0.4 % (ref 0–8)
ERYTHROCYTE [DISTWIDTH] IN BLOOD BY AUTOMATED COUNT: 22.1 % (ref 11.5–14.5)
HCT VFR BLD AUTO: 18.2 % (ref 40–54)
HGB BLD-MCNC: 6 G/DL (ref 14–18)
IMM GRANULOCYTES # BLD AUTO: 0.02 K/UL (ref 0–0.04)
IMM GRANULOCYTES NFR BLD AUTO: 0.4 % (ref 0–0.5)
LYMPHOCYTES # BLD AUTO: 1.1 K/UL (ref 1–4.8)
LYMPHOCYTES NFR BLD: 20.4 % (ref 18–48)
MCH RBC QN AUTO: 32.1 PG (ref 27–31)
MCHC RBC AUTO-ENTMCNC: 33 G/DL (ref 32–36)
MCV RBC AUTO: 97 FL (ref 82–98)
MONOCYTES # BLD AUTO: 0.8 K/UL (ref 0.3–1)
MONOCYTES NFR BLD: 14.5 % (ref 4–15)
NEUTROPHILS # BLD AUTO: 3.6 K/UL (ref 1.8–7.7)
NEUTROPHILS NFR BLD: 63.8 % (ref 38–73)
NRBC BLD-RTO: 0 /100 WBC
PLATELET # BLD AUTO: 516 K/UL (ref 150–350)
PMV BLD AUTO: 10.4 FL (ref 9.2–12.9)
RBC # BLD AUTO: 1.87 M/UL (ref 4.6–6.2)
WBC # BLD AUTO: 5.58 K/UL (ref 3.9–12.7)

## 2020-02-12 RX ORDER — ACETAMINOPHEN 325 MG/1
325 TABLET ORAL ONCE
Status: CANCELLED | OUTPATIENT
Start: 2020-02-14

## 2020-02-12 RX ORDER — DIPHENHYDRAMINE HYDROCHLORIDE 50 MG/ML
25 INJECTION INTRAMUSCULAR; INTRAVENOUS ONCE
Status: CANCELLED | OUTPATIENT
Start: 2020-02-12

## 2020-02-12 RX ORDER — HYDROCODONE BITARTRATE AND ACETAMINOPHEN 500; 5 MG/1; MG/1
TABLET ORAL ONCE
Status: CANCELLED | OUTPATIENT
Start: 2020-02-12 | End: 2020-02-12

## 2020-02-12 NOTE — TELEPHONE ENCOUNTER
----- Message from Fabiola Pace, Patient Care Assistant sent at 2/12/2020 10:46 AM CST -----  Patient called in to get his lab results. He can be reached at [phone]

## 2020-02-12 NOTE — TELEPHONE ENCOUNTER
Called the patient with his Hgb 6.0.  Instructed him that he needs 2 units of PRBC's.  He requested to be T&M on Thursday and receive his blood on Friday.  Will call scheduling to set it up.

## 2020-02-13 ENCOUNTER — LAB VISIT (OUTPATIENT)
Dept: LAB | Facility: HOSPITAL | Age: 60
End: 2020-02-13
Attending: INTERNAL MEDICINE
Payer: COMMERCIAL

## 2020-02-13 DIAGNOSIS — D63.1 ANEMIA, CHRONIC RENAL FAILURE, STAGE 2 (MILD): ICD-10-CM

## 2020-02-13 DIAGNOSIS — D64.9 NORMOCHROMIC ANEMIA: ICD-10-CM

## 2020-02-13 DIAGNOSIS — D64.89 ANEMIA DUE TO MULTIPLE MECHANISMS: Primary | ICD-10-CM

## 2020-02-13 DIAGNOSIS — N18.2 ANEMIA, CHRONIC RENAL FAILURE, STAGE 2 (MILD): ICD-10-CM

## 2020-02-13 DIAGNOSIS — D61.9 ANEMIA DUE TO BONE MARROW FAILURE, UNSPECIFIED BONE MARROW FAILURE TYPE: ICD-10-CM

## 2020-02-13 DIAGNOSIS — D64.89 ANEMIA DUE TO MULTIPLE MECHANISMS: ICD-10-CM

## 2020-02-13 LAB
ABO + RH BLD: NORMAL
BLD GP AB SCN CELLS X3 SERPL QL: NORMAL

## 2020-02-13 PROCEDURE — 86850 RBC ANTIBODY SCREEN: CPT

## 2020-02-13 PROCEDURE — 36415 COLL VENOUS BLD VENIPUNCTURE: CPT

## 2020-02-13 PROCEDURE — 86920 COMPATIBILITY TEST SPIN: CPT

## 2020-02-14 ENCOUNTER — INFUSION (OUTPATIENT)
Dept: INFUSION THERAPY | Facility: HOSPITAL | Age: 60
End: 2020-02-14
Attending: INTERNAL MEDICINE
Payer: COMMERCIAL

## 2020-02-14 VITALS
OXYGEN SATURATION: 100 % | RESPIRATION RATE: 16 BRPM | SYSTOLIC BLOOD PRESSURE: 142 MMHG | TEMPERATURE: 98 F | HEART RATE: 85 BPM | DIASTOLIC BLOOD PRESSURE: 80 MMHG

## 2020-02-14 DIAGNOSIS — D63.1 ANEMIA, CHRONIC RENAL FAILURE, STAGE 2 (MILD): ICD-10-CM

## 2020-02-14 DIAGNOSIS — D64.9 NORMOCHROMIC ANEMIA: ICD-10-CM

## 2020-02-14 DIAGNOSIS — D61.9 ANEMIA DUE TO BONE MARROW FAILURE, UNSPECIFIED BONE MARROW FAILURE TYPE: ICD-10-CM

## 2020-02-14 DIAGNOSIS — D64.89 ANEMIA DUE TO MULTIPLE MECHANISMS: ICD-10-CM

## 2020-02-14 DIAGNOSIS — N18.2 ANEMIA, CHRONIC RENAL FAILURE, STAGE 2 (MILD): ICD-10-CM

## 2020-02-14 PROCEDURE — 25000003 PHARM REV CODE 250: Performed by: INTERNAL MEDICINE

## 2020-02-14 PROCEDURE — 96375 TX/PRO/DX INJ NEW DRUG ADDON: CPT

## 2020-02-14 PROCEDURE — 96374 THER/PROPH/DIAG INJ IV PUSH: CPT

## 2020-02-14 PROCEDURE — P9016 RBC LEUKOCYTES REDUCED: HCPCS

## 2020-02-14 PROCEDURE — 63600175 PHARM REV CODE 636 W HCPCS: Performed by: INTERNAL MEDICINE

## 2020-02-14 PROCEDURE — 96376 TX/PRO/DX INJ SAME DRUG ADON: CPT

## 2020-02-14 PROCEDURE — 36430 TRANSFUSION BLD/BLD COMPNT: CPT

## 2020-02-14 RX ORDER — FUROSEMIDE 10 MG/ML
10 INJECTION INTRAMUSCULAR; INTRAVENOUS ONCE
Status: COMPLETED | OUTPATIENT
Start: 2020-02-14 | End: 2020-02-14

## 2020-02-14 RX ORDER — DIPHENHYDRAMINE HYDROCHLORIDE 50 MG/ML
25 INJECTION INTRAMUSCULAR; INTRAVENOUS ONCE
Status: COMPLETED | OUTPATIENT
Start: 2020-02-14 | End: 2020-02-14

## 2020-02-14 RX ORDER — ACETAMINOPHEN 325 MG/1
325 TABLET ORAL ONCE
Status: COMPLETED | OUTPATIENT
Start: 2020-02-14 | End: 2020-02-14

## 2020-02-14 RX ORDER — HYDROCODONE BITARTRATE AND ACETAMINOPHEN 500; 5 MG/1; MG/1
TABLET ORAL ONCE
Status: COMPLETED | OUTPATIENT
Start: 2020-02-14 | End: 2020-02-14

## 2020-02-14 RX ADMIN — FUROSEMIDE 10 MG: 10 INJECTION, SOLUTION INTRAMUSCULAR; INTRAVENOUS at 09:02

## 2020-02-14 RX ADMIN — SODIUM CHLORIDE: 0.9 INJECTION, SOLUTION INTRAVENOUS at 05:02

## 2020-02-14 RX ADMIN — FUROSEMIDE 10 MG: 10 INJECTION, SOLUTION INTRAMUSCULAR; INTRAVENOUS at 12:02

## 2020-02-14 RX ADMIN — ACETAMINOPHEN 325 MG: 325 TABLET ORAL at 05:02

## 2020-02-14 RX ADMIN — DIPHENHYDRAMINE HYDROCHLORIDE 25 MG: 50 INJECTION INTRAMUSCULAR; INTRAVENOUS at 05:02

## 2020-02-14 NOTE — PATIENT INSTRUCTIONS
Follow up with Dr. Tello as directed.   Go to ER for any chest pain, shortness of breath or other concerning symptoms.

## 2020-02-19 ENCOUNTER — TELEPHONE (OUTPATIENT)
Dept: HEMATOLOGY/ONCOLOGY | Facility: CLINIC | Age: 60
End: 2020-02-19

## 2020-02-19 NOTE — TELEPHONE ENCOUNTER
Called the patient about his BUN is 26 and creatinine is 2. Instructed the patient that Dr. Tello wants Dr. Mas to monitor his labs for his kidney function. Instructed him that Jinny has made an appointment with Dr. Barajas on March 18, 2020 @ 7808.

## 2020-02-27 ENCOUNTER — LAB VISIT (OUTPATIENT)
Dept: LAB | Facility: HOSPITAL | Age: 60
End: 2020-02-27
Attending: NURSE PRACTITIONER
Payer: COMMERCIAL

## 2020-02-27 DIAGNOSIS — N18.9 CHRONIC KIDNEY DISEASE, UNSPECIFIED: Primary | ICD-10-CM

## 2020-02-27 LAB
ALBUMIN SERPL BCP-MCNC: 4.6 G/DL (ref 3.5–5.2)
ALBUMIN SERPL BCP-MCNC: 4.6 G/DL (ref 3.5–5.2)
ALP SERPL-CCNC: 46 U/L (ref 55–135)
ALT SERPL W/O P-5'-P-CCNC: 25 U/L (ref 10–44)
ANION GAP SERPL CALC-SCNC: 8 MMOL/L (ref 8–16)
ANION GAP SERPL CALC-SCNC: 8 MMOL/L (ref 8–16)
AST SERPL-CCNC: 32 U/L (ref 10–40)
BACTERIA #/AREA URNS HPF: NEGATIVE /HPF
BASOPHILS # BLD AUTO: 0.06 K/UL (ref 0–0.2)
BASOPHILS NFR BLD: 1 % (ref 0–1.9)
BILIRUB SERPL-MCNC: 1.1 MG/DL (ref 0.1–1)
BUN SERPL-MCNC: 24 MG/DL (ref 6–20)
BUN SERPL-MCNC: 24 MG/DL (ref 6–20)
CALCIUM SERPL-MCNC: 8.5 MG/DL (ref 8.7–10.5)
CALCIUM SERPL-MCNC: 8.5 MG/DL (ref 8.7–10.5)
CHLORIDE SERPL-SCNC: 108 MMOL/L (ref 95–110)
CHLORIDE SERPL-SCNC: 108 MMOL/L (ref 95–110)
CO2 SERPL-SCNC: 24 MMOL/L (ref 23–29)
CO2 SERPL-SCNC: 24 MMOL/L (ref 23–29)
CREAT SERPL-MCNC: 1.6 MG/DL (ref 0.5–1.4)
CREAT SERPL-MCNC: 1.6 MG/DL (ref 0.5–1.4)
DIFFERENTIAL METHOD: ABNORMAL
EOSINOPHIL # BLD AUTO: 0.1 K/UL (ref 0–0.5)
EOSINOPHIL NFR BLD: 1.2 % (ref 0–8)
ERYTHROCYTE [DISTWIDTH] IN BLOOD BY AUTOMATED COUNT: 19.4 % (ref 11.5–14.5)
EST. GFR  (AFRICAN AMERICAN): 53.7 ML/MIN/1.73 M^2
EST. GFR  (AFRICAN AMERICAN): 53.7 ML/MIN/1.73 M^2
EST. GFR  (NON AFRICAN AMERICAN): 46.5 ML/MIN/1.73 M^2
EST. GFR  (NON AFRICAN AMERICAN): 46.5 ML/MIN/1.73 M^2
GLUCOSE SERPL-MCNC: 88 MG/DL (ref 70–110)
GLUCOSE SERPL-MCNC: 88 MG/DL (ref 70–110)
HCT VFR BLD AUTO: 19.3 % (ref 40–54)
HGB BLD-MCNC: 6.4 G/DL (ref 14–18)
HYALINE CASTS #/AREA URNS LPF: 1 /LPF
IMM GRANULOCYTES # BLD AUTO: 0.01 K/UL (ref 0–0.04)
IMM GRANULOCYTES NFR BLD AUTO: 0.2 % (ref 0–0.5)
LYMPHOCYTES # BLD AUTO: 1.3 K/UL (ref 1–4.8)
LYMPHOCYTES NFR BLD: 22.4 % (ref 18–48)
MCH RBC QN AUTO: 32.3 PG (ref 27–31)
MCHC RBC AUTO-ENTMCNC: 33.2 G/DL (ref 32–36)
MCV RBC AUTO: 98 FL (ref 82–98)
MICROSCOPIC COMMENT: NORMAL
MONOCYTES # BLD AUTO: 0.8 K/UL (ref 0.3–1)
MONOCYTES NFR BLD: 13.3 % (ref 4–15)
NEUTROPHILS # BLD AUTO: 3.5 K/UL (ref 1.8–7.7)
NEUTROPHILS NFR BLD: 61.9 % (ref 38–73)
NRBC BLD-RTO: 0 /100 WBC
PHOSPHATE SERPL-MCNC: 3.2 MG/DL (ref 2.7–4.5)
PLATELET # BLD AUTO: 425 K/UL (ref 150–350)
PMV BLD AUTO: 10.8 FL (ref 9.2–12.9)
POTASSIUM SERPL-SCNC: 4.5 MMOL/L (ref 3.5–5.1)
POTASSIUM SERPL-SCNC: 4.5 MMOL/L (ref 3.5–5.1)
PROT SERPL-MCNC: 7.3 G/DL (ref 6–8.4)
RBC # BLD AUTO: 1.98 M/UL (ref 4.6–6.2)
RBC #/AREA URNS HPF: 1 /HPF (ref 0–4)
SODIUM SERPL-SCNC: 140 MMOL/L (ref 136–145)
SODIUM SERPL-SCNC: 140 MMOL/L (ref 136–145)
SQUAMOUS #/AREA URNS HPF: 0 /HPF
WBC # BLD AUTO: 5.72 K/UL (ref 3.9–12.7)
WBC #/AREA URNS HPF: 1 /HPF (ref 0–5)

## 2020-02-27 PROCEDURE — 36415 COLL VENOUS BLD VENIPUNCTURE: CPT

## 2020-02-27 PROCEDURE — 85025 COMPLETE CBC W/AUTO DIFF WBC: CPT

## 2020-02-27 PROCEDURE — 84100 ASSAY OF PHOSPHORUS: CPT

## 2020-02-27 PROCEDURE — 80053 COMPREHEN METABOLIC PANEL: CPT

## 2020-02-27 PROCEDURE — 81001 URINALYSIS AUTO W/SCOPE: CPT

## 2020-02-28 ENCOUNTER — TELEPHONE (OUTPATIENT)
Dept: HEMATOLOGY/ONCOLOGY | Facility: CLINIC | Age: 60
End: 2020-02-28

## 2020-02-28 DIAGNOSIS — D61.9 ANEMIA DUE TO BONE MARROW FAILURE, UNSPECIFIED BONE MARROW FAILURE TYPE: ICD-10-CM

## 2020-02-28 DIAGNOSIS — D69.6 THROMBOCYTOPENIA: ICD-10-CM

## 2020-02-28 DIAGNOSIS — D64.89 ANEMIA DUE TO MULTIPLE MECHANISMS: Primary | ICD-10-CM

## 2020-02-28 DIAGNOSIS — D63.1 ANEMIA, CHRONIC RENAL FAILURE, STAGE 2 (MILD): ICD-10-CM

## 2020-02-28 DIAGNOSIS — N18.2 ANEMIA, CHRONIC RENAL FAILURE, STAGE 2 (MILD): ICD-10-CM

## 2020-02-28 DIAGNOSIS — D64.9 NORMOCHROMIC ANEMIA: ICD-10-CM

## 2020-02-28 RX ORDER — ACETAMINOPHEN 325 MG/1
325 TABLET ORAL ONCE
Status: CANCELLED | OUTPATIENT
Start: 2020-02-28

## 2020-02-28 RX ORDER — DIPHENHYDRAMINE HYDROCHLORIDE 50 MG/ML
25 INJECTION INTRAMUSCULAR; INTRAVENOUS ONCE
Status: CANCELLED | OUTPATIENT
Start: 2020-02-28

## 2020-02-28 RX ORDER — HYDROCODONE BITARTRATE AND ACETAMINOPHEN 500; 5 MG/1; MG/1
TABLET ORAL ONCE
Status: CANCELLED | OUTPATIENT
Start: 2020-02-28 | End: 2020-02-28

## 2020-03-05 ENCOUNTER — LAB VISIT (OUTPATIENT)
Dept: LAB | Facility: HOSPITAL | Age: 60
End: 2020-03-05
Attending: INTERNAL MEDICINE
Payer: COMMERCIAL

## 2020-03-05 DIAGNOSIS — D64.89 ANEMIA DUE TO MULTIPLE MECHANISMS: ICD-10-CM

## 2020-03-05 DIAGNOSIS — D64.9 CHRONIC ANEMIA: ICD-10-CM

## 2020-03-05 LAB
ABO + RH BLD: NORMAL
BLD GP AB SCN CELLS X3 SERPL QL: NORMAL

## 2020-03-05 PROCEDURE — 86901 BLOOD TYPING SEROLOGIC RH(D): CPT

## 2020-03-05 PROCEDURE — 36415 COLL VENOUS BLD VENIPUNCTURE: CPT

## 2020-03-06 ENCOUNTER — INFUSION (OUTPATIENT)
Dept: INFUSION THERAPY | Facility: HOSPITAL | Age: 60
End: 2020-03-06
Attending: INTERNAL MEDICINE
Payer: COMMERCIAL

## 2020-03-06 VITALS
HEART RATE: 79 BPM | TEMPERATURE: 98 F | OXYGEN SATURATION: 100 % | RESPIRATION RATE: 16 BRPM | SYSTOLIC BLOOD PRESSURE: 136 MMHG | DIASTOLIC BLOOD PRESSURE: 75 MMHG

## 2020-03-06 DIAGNOSIS — D63.1 ANEMIA, CHRONIC RENAL FAILURE, STAGE 2 (MILD): ICD-10-CM

## 2020-03-06 DIAGNOSIS — N18.2 ANEMIA, CHRONIC RENAL FAILURE, STAGE 2 (MILD): ICD-10-CM

## 2020-03-06 DIAGNOSIS — D69.6 THROMBOCYTOPENIA: ICD-10-CM

## 2020-03-06 DIAGNOSIS — D64.9 NORMOCHROMIC ANEMIA: ICD-10-CM

## 2020-03-06 DIAGNOSIS — D61.9 ANEMIA DUE TO BONE MARROW FAILURE, UNSPECIFIED BONE MARROW FAILURE TYPE: ICD-10-CM

## 2020-03-06 DIAGNOSIS — D64.89 ANEMIA DUE TO MULTIPLE MECHANISMS: ICD-10-CM

## 2020-03-06 PROCEDURE — 96376 TX/PRO/DX INJ SAME DRUG ADON: CPT

## 2020-03-06 PROCEDURE — 36430 TRANSFUSION BLD/BLD COMPNT: CPT

## 2020-03-06 PROCEDURE — 96375 TX/PRO/DX INJ NEW DRUG ADDON: CPT

## 2020-03-06 PROCEDURE — 25000003 PHARM REV CODE 250: Performed by: INTERNAL MEDICINE

## 2020-03-06 PROCEDURE — P9016 RBC LEUKOCYTES REDUCED: HCPCS

## 2020-03-06 PROCEDURE — 63600175 PHARM REV CODE 636 W HCPCS: Performed by: INTERNAL MEDICINE

## 2020-03-06 PROCEDURE — 86920 COMPATIBILITY TEST SPIN: CPT

## 2020-03-06 PROCEDURE — 96374 THER/PROPH/DIAG INJ IV PUSH: CPT

## 2020-03-06 RX ORDER — HYDROCODONE BITARTRATE AND ACETAMINOPHEN 500; 5 MG/1; MG/1
TABLET ORAL ONCE
Status: COMPLETED | OUTPATIENT
Start: 2020-03-06 | End: 2020-03-06

## 2020-03-06 RX ORDER — DIPHENHYDRAMINE HYDROCHLORIDE 50 MG/ML
25 INJECTION INTRAMUSCULAR; INTRAVENOUS ONCE
Status: COMPLETED | OUTPATIENT
Start: 2020-03-06 | End: 2020-03-06

## 2020-03-06 RX ORDER — FUROSEMIDE 10 MG/ML
10 INJECTION INTRAMUSCULAR; INTRAVENOUS ONCE
Status: COMPLETED | OUTPATIENT
Start: 2020-03-06 | End: 2020-03-06

## 2020-03-06 RX ORDER — ACETAMINOPHEN 325 MG/1
325 TABLET ORAL ONCE
Status: COMPLETED | OUTPATIENT
Start: 2020-03-06 | End: 2020-03-06

## 2020-03-06 RX ADMIN — SODIUM CHLORIDE: 0.9 INJECTION, SOLUTION INTRAVENOUS at 06:03

## 2020-03-06 RX ADMIN — ACETAMINOPHEN 325 MG: 325 TABLET ORAL at 05:03

## 2020-03-06 RX ADMIN — FUROSEMIDE 10 MG: 10 INJECTION, SOLUTION INTRAMUSCULAR; INTRAVENOUS at 08:03

## 2020-03-06 RX ADMIN — FUROSEMIDE 10 MG: 10 INJECTION, SOLUTION INTRAMUSCULAR; INTRAVENOUS at 11:03

## 2020-03-06 RX ADMIN — DIPHENHYDRAMINE HYDROCHLORIDE 25 MG: 50 INJECTION INTRAMUSCULAR; INTRAVENOUS at 05:03

## 2020-03-06 NOTE — PATIENT INSTRUCTIONS
Follow up with Dr. Tello as directed. Go to ER for any chest pain, shortness of breath or any other concerning symptoms.

## 2020-03-16 ENCOUNTER — TELEPHONE (OUTPATIENT)
Dept: HEMATOLOGY/ONCOLOGY | Facility: CLINIC | Age: 60
End: 2020-03-16

## 2020-03-16 NOTE — TELEPHONE ENCOUNTER
----- Message from Callie Mejia sent at 3/16/2020 12:14 PM CDT -----  The patient said he is receiving blood transfusions monthly and he wants to know if he should be concerned about being around other people at work. He is having no symptoms at this time. Please call him back at [phone].

## 2020-03-16 NOTE — TELEPHONE ENCOUNTER
Called the patient and he instructed me that he is worried about receiving his blood transfusions.  I instructed him that we are taking precautions and are screening the patients very well.

## 2020-03-23 ENCOUNTER — LAB VISIT (OUTPATIENT)
Dept: LAB | Facility: HOSPITAL | Age: 60
End: 2020-03-23
Attending: INTERNAL MEDICINE
Payer: COMMERCIAL

## 2020-03-23 DIAGNOSIS — D53.9 MACROCYTIC ANEMIA: ICD-10-CM

## 2020-03-23 DIAGNOSIS — D64.89 ANEMIA DUE TO MULTIPLE MECHANISMS: ICD-10-CM

## 2020-03-23 DIAGNOSIS — D64.9 ANEMIA, UNSPECIFIED TYPE: ICD-10-CM

## 2020-03-23 DIAGNOSIS — D64.9 CHRONIC ANEMIA: ICD-10-CM

## 2020-03-23 DIAGNOSIS — D69.6 THROMBOCYTOPENIA: ICD-10-CM

## 2020-03-23 LAB
ALBUMIN SERPL BCP-MCNC: 4.2 G/DL (ref 3.5–5.2)
ALP SERPL-CCNC: 66 U/L (ref 55–135)
ALT SERPL W/O P-5'-P-CCNC: 50 U/L (ref 10–44)
ANION GAP SERPL CALC-SCNC: 6 MMOL/L (ref 8–16)
AST SERPL-CCNC: 82 U/L (ref 10–40)
BASOPHILS # BLD AUTO: 0.05 K/UL (ref 0–0.2)
BASOPHILS NFR BLD: 1.4 % (ref 0–1.9)
BILIRUB SERPL-MCNC: 0.9 MG/DL (ref 0.1–1)
BUN SERPL-MCNC: 24 MG/DL (ref 6–20)
CALCIUM SERPL-MCNC: 8.6 MG/DL (ref 8.7–10.5)
CHLORIDE SERPL-SCNC: 109 MMOL/L (ref 95–110)
CO2 SERPL-SCNC: 25 MMOL/L (ref 23–29)
CREAT SERPL-MCNC: 1.4 MG/DL (ref 0.5–1.4)
DIFFERENTIAL METHOD: ABNORMAL
EOSINOPHIL # BLD AUTO: 0.1 K/UL (ref 0–0.5)
EOSINOPHIL NFR BLD: 2.2 % (ref 0–8)
ERYTHROCYTE [DISTWIDTH] IN BLOOD BY AUTOMATED COUNT: 18.2 % (ref 11.5–14.5)
EST. GFR  (AFRICAN AMERICAN): >60 ML/MIN/1.73 M^2
EST. GFR  (NON AFRICAN AMERICAN): 54.6 ML/MIN/1.73 M^2
GLUCOSE SERPL-MCNC: 113 MG/DL (ref 70–110)
HCT VFR BLD AUTO: 22.7 % (ref 40–54)
HGB BLD-MCNC: 7.4 G/DL (ref 14–18)
IMM GRANULOCYTES # BLD AUTO: 0.01 K/UL (ref 0–0.04)
IMM GRANULOCYTES NFR BLD AUTO: 0.3 % (ref 0–0.5)
LYMPHOCYTES # BLD AUTO: 1.2 K/UL (ref 1–4.8)
LYMPHOCYTES NFR BLD: 34.5 % (ref 18–48)
MCH RBC QN AUTO: 31 PG (ref 27–31)
MCHC RBC AUTO-ENTMCNC: 32.6 G/DL (ref 32–36)
MCV RBC AUTO: 95 FL (ref 82–98)
MONOCYTES # BLD AUTO: 0.5 K/UL (ref 0.3–1)
MONOCYTES NFR BLD: 14.5 % (ref 4–15)
NEUTROPHILS # BLD AUTO: 1.7 K/UL (ref 1.8–7.7)
NEUTROPHILS NFR BLD: 47.1 % (ref 38–73)
NRBC BLD-RTO: 0 /100 WBC
PLATELET # BLD AUTO: 576 K/UL (ref 150–350)
PMV BLD AUTO: 10.4 FL (ref 9.2–12.9)
POTASSIUM SERPL-SCNC: 3.8 MMOL/L (ref 3.5–5.1)
PROT SERPL-MCNC: 7.4 G/DL (ref 6–8.4)
RBC # BLD AUTO: 2.39 M/UL (ref 4.6–6.2)
SODIUM SERPL-SCNC: 140 MMOL/L (ref 136–145)
WBC # BLD AUTO: 3.59 K/UL (ref 3.9–12.7)

## 2020-03-23 PROCEDURE — 80053 COMPREHEN METABOLIC PANEL: CPT

## 2020-03-23 PROCEDURE — 85025 COMPLETE CBC W/AUTO DIFF WBC: CPT

## 2020-03-23 PROCEDURE — 36415 COLL VENOUS BLD VENIPUNCTURE: CPT

## 2020-03-24 ENCOUNTER — TELEPHONE (OUTPATIENT)
Dept: HEMATOLOGY/ONCOLOGY | Facility: CLINIC | Age: 60
End: 2020-03-24

## 2020-03-24 NOTE — TELEPHONE ENCOUNTER
Called the patient and instructed him that his Hgb is 7.4. Patient stated that he is feeling fine and does not want a blood transfusion at this time.  I instructed him that if he starts to feel bad to call me and we will set up a transfusion.

## 2020-03-24 NOTE — TELEPHONE ENCOUNTER
----- Message from Fabiola Pace, Patient Care Assistant sent at 3/23/2020  2:28 PM CDT -----  Patient called in for lab Results. He can be reached at [phone]

## 2020-04-01 ENCOUNTER — LAB VISIT (OUTPATIENT)
Dept: LAB | Facility: HOSPITAL | Age: 60
End: 2020-04-01
Attending: INTERNAL MEDICINE
Payer: COMMERCIAL

## 2020-04-01 ENCOUNTER — TELEPHONE (OUTPATIENT)
Dept: HEMATOLOGY/ONCOLOGY | Facility: CLINIC | Age: 60
End: 2020-04-01

## 2020-04-01 DIAGNOSIS — D69.6 THROMBOCYTOPENIA: ICD-10-CM

## 2020-04-01 DIAGNOSIS — D64.89 ANEMIA DUE TO MULTIPLE MECHANISMS: ICD-10-CM

## 2020-04-01 DIAGNOSIS — D64.89 ANEMIA DUE TO MULTIPLE MECHANISMS: Primary | ICD-10-CM

## 2020-04-01 DIAGNOSIS — D63.1 ANEMIA, CHRONIC RENAL FAILURE, STAGE 2 (MILD): ICD-10-CM

## 2020-04-01 DIAGNOSIS — D64.9 ANEMIA, UNSPECIFIED TYPE: ICD-10-CM

## 2020-04-01 DIAGNOSIS — D64.9 CHRONIC ANEMIA: ICD-10-CM

## 2020-04-01 DIAGNOSIS — N18.2 ANEMIA, CHRONIC RENAL FAILURE, STAGE 2 (MILD): ICD-10-CM

## 2020-04-01 DIAGNOSIS — D53.9 MACROCYTIC ANEMIA: ICD-10-CM

## 2020-04-01 DIAGNOSIS — D64.9 NORMOCHROMIC ANEMIA: ICD-10-CM

## 2020-04-01 DIAGNOSIS — D64.9 ANEMIA, UNSPECIFIED: Primary | ICD-10-CM

## 2020-04-01 LAB
ABO + RH BLD: NORMAL
ALBUMIN SERPL BCP-MCNC: 4.1 G/DL (ref 3.5–5.2)
ALP SERPL-CCNC: 81 U/L (ref 55–135)
ALT SERPL W/O P-5'-P-CCNC: 99 U/L (ref 10–44)
ANION GAP SERPL CALC-SCNC: 14 MMOL/L (ref 8–16)
AST SERPL-CCNC: 168 U/L (ref 10–40)
BASOPHILS # BLD AUTO: 0.05 K/UL (ref 0–0.2)
BASOPHILS NFR BLD: 0.9 % (ref 0–1.9)
BILIRUB SERPL-MCNC: 1 MG/DL (ref 0.1–1)
BLD GP AB SCN CELLS X3 SERPL QL: NORMAL
BUN SERPL-MCNC: 24 MG/DL (ref 6–20)
CALCIUM SERPL-MCNC: 9 MG/DL (ref 8.7–10.5)
CHLORIDE SERPL-SCNC: 105 MMOL/L (ref 95–110)
CO2 SERPL-SCNC: 19 MMOL/L (ref 23–29)
CREAT SERPL-MCNC: 1.3 MG/DL (ref 0.5–1.4)
DIFFERENTIAL METHOD: ABNORMAL
EOSINOPHIL # BLD AUTO: 0.1 K/UL (ref 0–0.5)
EOSINOPHIL NFR BLD: 1.4 % (ref 0–8)
ERYTHROCYTE [DISTWIDTH] IN BLOOD BY AUTOMATED COUNT: 18.6 % (ref 11.5–14.5)
EST. GFR  (AFRICAN AMERICAN): >60 ML/MIN/1.73 M^2
EST. GFR  (NON AFRICAN AMERICAN): 59.7 ML/MIN/1.73 M^2
GLUCOSE SERPL-MCNC: 86 MG/DL (ref 70–110)
HCT VFR BLD AUTO: 18.2 % (ref 40–54)
HGB BLD-MCNC: 6.1 G/DL (ref 14–18)
IMM GRANULOCYTES # BLD AUTO: 0.01 K/UL (ref 0–0.04)
IMM GRANULOCYTES NFR BLD AUTO: 0.2 % (ref 0–0.5)
LYMPHOCYTES # BLD AUTO: 2.1 K/UL (ref 1–4.8)
LYMPHOCYTES NFR BLD: 38.2 % (ref 18–48)
MCH RBC QN AUTO: 31.3 PG (ref 27–31)
MCHC RBC AUTO-ENTMCNC: 33.5 G/DL (ref 32–36)
MCV RBC AUTO: 93 FL (ref 82–98)
MONOCYTES # BLD AUTO: 0.9 K/UL (ref 0.3–1)
MONOCYTES NFR BLD: 15.6 % (ref 4–15)
NEUTROPHILS # BLD AUTO: 2.4 K/UL (ref 1.8–7.7)
NEUTROPHILS NFR BLD: 43.7 % (ref 38–73)
NRBC BLD-RTO: 0 /100 WBC
PLATELET # BLD AUTO: 529 K/UL (ref 150–350)
PMV BLD AUTO: 10.5 FL (ref 9.2–12.9)
POTASSIUM SERPL-SCNC: 4.1 MMOL/L (ref 3.5–5.1)
PROT SERPL-MCNC: 7.6 G/DL (ref 6–8.4)
RBC # BLD AUTO: 1.95 M/UL (ref 4.6–6.2)
SODIUM SERPL-SCNC: 138 MMOL/L (ref 136–145)
WBC # BLD AUTO: 5.57 K/UL (ref 3.9–12.7)

## 2020-04-01 PROCEDURE — 85025 COMPLETE CBC W/AUTO DIFF WBC: CPT

## 2020-04-01 PROCEDURE — 86902 BLOOD TYPE ANTIGEN DONOR EA: CPT

## 2020-04-01 PROCEDURE — 36415 COLL VENOUS BLD VENIPUNCTURE: CPT

## 2020-04-01 PROCEDURE — 86901 BLOOD TYPING SEROLOGIC RH(D): CPT

## 2020-04-01 PROCEDURE — 80053 COMPREHEN METABOLIC PANEL: CPT

## 2020-04-01 RX ORDER — HYDROCODONE BITARTRATE AND ACETAMINOPHEN 500; 5 MG/1; MG/1
TABLET ORAL ONCE
Status: CANCELLED | OUTPATIENT
Start: 2020-04-01 | End: 2020-04-01

## 2020-04-01 RX ORDER — ACETAMINOPHEN 325 MG/1
325 TABLET ORAL ONCE
Status: CANCELLED | OUTPATIENT
Start: 2020-04-01

## 2020-04-01 RX ORDER — DIPHENHYDRAMINE HYDROCHLORIDE 50 MG/ML
25 INJECTION INTRAMUSCULAR; INTRAVENOUS ONCE
Status: CANCELLED | OUTPATIENT
Start: 2020-04-01

## 2020-04-01 RX ORDER — FUROSEMIDE 10 MG/ML
10 INJECTION INTRAMUSCULAR; INTRAVENOUS ONCE
Status: CANCELLED | OUTPATIENT
Start: 2020-04-01

## 2020-04-01 NOTE — TELEPHONE ENCOUNTER
Called the patient and instructed him that his hgb is 6.1.  He wants to receive the 2 units of PRBC's.  Ordered 2 units of PRBC's with 10 mg of lasix after each one.

## 2020-04-01 NOTE — TELEPHONE ENCOUNTER
----- Message from Jose Tello MD sent at 4/1/2020  1:19 PM CDT -----  Set up 1-2 units blood if he feels bad

## 2020-04-02 ENCOUNTER — INFUSION (OUTPATIENT)
Dept: INFUSION THERAPY | Facility: HOSPITAL | Age: 60
End: 2020-04-02
Attending: INTERNAL MEDICINE
Payer: COMMERCIAL

## 2020-04-02 VITALS
DIASTOLIC BLOOD PRESSURE: 95 MMHG | HEART RATE: 94 BPM | SYSTOLIC BLOOD PRESSURE: 122 MMHG | RESPIRATION RATE: 16 BRPM | TEMPERATURE: 99 F | OXYGEN SATURATION: 99 %

## 2020-04-02 DIAGNOSIS — D64.9 NORMOCHROMIC ANEMIA: ICD-10-CM

## 2020-04-02 DIAGNOSIS — F51.01 PRIMARY INSOMNIA: Primary | ICD-10-CM

## 2020-04-02 DIAGNOSIS — N18.2 ANEMIA, CHRONIC RENAL FAILURE, STAGE 2 (MILD): ICD-10-CM

## 2020-04-02 DIAGNOSIS — D63.1 ANEMIA, CHRONIC RENAL FAILURE, STAGE 2 (MILD): ICD-10-CM

## 2020-04-02 DIAGNOSIS — R63.4 WEIGHT LOSS, UNINTENTIONAL: ICD-10-CM

## 2020-04-02 DIAGNOSIS — D64.89 ANEMIA DUE TO MULTIPLE MECHANISMS: ICD-10-CM

## 2020-04-02 PROCEDURE — 96375 TX/PRO/DX INJ NEW DRUG ADDON: CPT

## 2020-04-02 PROCEDURE — 86920 COMPATIBILITY TEST SPIN: CPT

## 2020-04-02 PROCEDURE — P9016 RBC LEUKOCYTES REDUCED: HCPCS

## 2020-04-02 PROCEDURE — 25000003 PHARM REV CODE 250: Performed by: INTERNAL MEDICINE

## 2020-04-02 PROCEDURE — P9040 RBC LEUKOREDUCED IRRADIATED: HCPCS

## 2020-04-02 PROCEDURE — 63600175 PHARM REV CODE 636 W HCPCS: Performed by: INTERNAL MEDICINE

## 2020-04-02 PROCEDURE — 96376 TX/PRO/DX INJ SAME DRUG ADON: CPT

## 2020-04-02 PROCEDURE — 36430 TRANSFUSION BLD/BLD COMPNT: CPT

## 2020-04-02 PROCEDURE — 96374 THER/PROPH/DIAG INJ IV PUSH: CPT

## 2020-04-02 RX ORDER — HYDROCODONE BITARTRATE AND ACETAMINOPHEN 500; 5 MG/1; MG/1
TABLET ORAL
Status: DISCONTINUED | OUTPATIENT
Start: 2020-04-02 | End: 2020-08-11

## 2020-04-02 RX ORDER — DIPHENHYDRAMINE HYDROCHLORIDE 50 MG/ML
25 INJECTION INTRAMUSCULAR; INTRAVENOUS ONCE
Status: COMPLETED | OUTPATIENT
Start: 2020-04-02 | End: 2020-04-02

## 2020-04-02 RX ORDER — ACETAMINOPHEN 325 MG/1
325 TABLET ORAL ONCE
Status: COMPLETED | OUTPATIENT
Start: 2020-04-02 | End: 2020-04-02

## 2020-04-02 RX ORDER — HYDROCODONE BITARTRATE AND ACETAMINOPHEN 500; 5 MG/1; MG/1
TABLET ORAL ONCE
Status: COMPLETED | OUTPATIENT
Start: 2020-04-02 | End: 2020-04-02

## 2020-04-02 RX ORDER — FUROSEMIDE 10 MG/ML
10 INJECTION INTRAMUSCULAR; INTRAVENOUS ONCE
Status: COMPLETED | OUTPATIENT
Start: 2020-04-02 | End: 2020-04-02

## 2020-04-02 RX ADMIN — DIPHENHYDRAMINE HYDROCHLORIDE 25 MG: 50 INJECTION INTRAMUSCULAR; INTRAVENOUS at 06:04

## 2020-04-02 RX ADMIN — FUROSEMIDE 10 MG: 10 INJECTION, SOLUTION INTRAMUSCULAR; INTRAVENOUS at 12:04

## 2020-04-02 RX ADMIN — SODIUM CHLORIDE: 0.9 INJECTION, SOLUTION INTRAVENOUS at 06:04

## 2020-04-02 RX ADMIN — FUROSEMIDE 10 MG: 10 INJECTION, SOLUTION INTRAMUSCULAR; INTRAVENOUS at 09:04

## 2020-04-02 RX ADMIN — ACETAMINOPHEN 325 MG: 325 TABLET ORAL at 06:04

## 2020-04-02 NOTE — PATIENT INSTRUCTIONS
Follow up with Dr. Tello as directed. Go to ER for chest pain, shortness of breath or other concerning symptoms.

## 2020-04-03 RX ORDER — TRAZODONE HYDROCHLORIDE 50 MG/1
50 TABLET ORAL NIGHTLY PRN
Qty: 30 TABLET | Refills: 5 | Status: SHIPPED | OUTPATIENT
Start: 2020-04-03 | End: 2020-07-12

## 2020-04-03 RX ORDER — CYPROHEPTADINE HYDROCHLORIDE 4 MG/1
2 TABLET ORAL 3 TIMES DAILY PRN
Qty: 30 TABLET | Refills: 3 | Status: SHIPPED | OUTPATIENT
Start: 2020-04-03 | End: 2020-06-22

## 2020-04-03 NOTE — TELEPHONE ENCOUNTER
Spoke to patient and gave instructions and recommendations per provider. Patient verbally stated that he understood.        Will request records from Dr. Mas on Monday while in the office.

## 2020-04-03 NOTE — TELEPHONE ENCOUNTER
Patient stated that he had just completed the last of his Trazodone in Feb. Prior to his appointment with you.

## 2020-04-03 NOTE — TELEPHONE ENCOUNTER
Refilling Periactin at same dosage. He is to take 30 minutes prior to meal three times daily for appetite stimulation. Would like to see if more consistent use will help while also taking into consideration his renal insufficiency. Please instruct patient to include smaller meals throughout the day to help him gain weight. Will restart Trazodone at low dose since he stopped the medication a couple months ago to help with insomnia. He can take 30 minutes to 1 hour before bedtime. Please get records from Dr. Mas. He should have had an appointment on 3/18/2020 per one of the notes in Epic.

## 2020-04-03 NOTE — TELEPHONE ENCOUNTER
Please reach out to pt. Is he still taking this medication? Is it working for him? Looks like a computer generated refill request.

## 2020-04-03 NOTE — TELEPHONE ENCOUNTER
Spoke to patient and he stated that he is in need of the medication but states it's not working as well because he's still not hungry. He also asked about getting back on Trazodone.

## 2020-04-03 NOTE — TELEPHONE ENCOUNTER
When was the last time he took the Trazodone? I don't see it on his current list of meds but Dr. Fonseca had him on it at some point.

## 2020-04-08 PROCEDURE — 86902 BLOOD TYPE ANTIGEN DONOR EA: CPT

## 2020-04-15 ENCOUNTER — LAB VISIT (OUTPATIENT)
Dept: LAB | Facility: HOSPITAL | Age: 60
End: 2020-04-15
Attending: INTERNAL MEDICINE
Payer: COMMERCIAL

## 2020-04-15 ENCOUNTER — TELEPHONE (OUTPATIENT)
Dept: HEMATOLOGY/ONCOLOGY | Facility: CLINIC | Age: 60
End: 2020-04-15

## 2020-04-15 DIAGNOSIS — D53.9 MACROCYTIC ANEMIA: ICD-10-CM

## 2020-04-15 DIAGNOSIS — D63.1 ANEMIA, CHRONIC RENAL FAILURE, STAGE 2 (MILD): ICD-10-CM

## 2020-04-15 DIAGNOSIS — D64.89 ANEMIA DUE TO MULTIPLE MECHANISMS: Primary | ICD-10-CM

## 2020-04-15 DIAGNOSIS — N18.9 CHRONIC KIDNEY DISEASE, UNSPECIFIED: Primary | ICD-10-CM

## 2020-04-15 DIAGNOSIS — D61.9 ANEMIA DUE TO BONE MARROW FAILURE, UNSPECIFIED BONE MARROW FAILURE TYPE: ICD-10-CM

## 2020-04-15 DIAGNOSIS — N18.2 ANEMIA, CHRONIC RENAL FAILURE, STAGE 2 (MILD): ICD-10-CM

## 2020-04-15 LAB
ALBUMIN SERPL BCP-MCNC: 4 G/DL (ref 3.5–5.2)
ALBUMIN SERPL BCP-MCNC: 4 G/DL (ref 3.5–5.2)
ALP SERPL-CCNC: 84 U/L (ref 55–135)
ALT SERPL W/O P-5'-P-CCNC: 97 U/L (ref 10–44)
ANION GAP SERPL CALC-SCNC: 9 MMOL/L (ref 8–16)
ANION GAP SERPL CALC-SCNC: 9 MMOL/L (ref 8–16)
AST SERPL-CCNC: 114 U/L (ref 10–40)
BASOPHILS # BLD AUTO: 0.04 K/UL (ref 0–0.2)
BASOPHILS NFR BLD: 1 % (ref 0–1.9)
BILIRUB SERPL-MCNC: 0.7 MG/DL (ref 0.1–1)
BILIRUB UR QL STRIP: NEGATIVE
BUN SERPL-MCNC: 26 MG/DL (ref 6–20)
BUN SERPL-MCNC: 26 MG/DL (ref 6–20)
CALCIUM SERPL-MCNC: 8.8 MG/DL (ref 8.7–10.5)
CALCIUM SERPL-MCNC: 8.8 MG/DL (ref 8.7–10.5)
CHLORIDE SERPL-SCNC: 105 MMOL/L (ref 95–110)
CHLORIDE SERPL-SCNC: 105 MMOL/L (ref 95–110)
CLARITY UR: CLEAR
CO2 SERPL-SCNC: 23 MMOL/L (ref 23–29)
CO2 SERPL-SCNC: 23 MMOL/L (ref 23–29)
COLOR UR: YELLOW
CREAT SERPL-MCNC: 1.8 MG/DL (ref 0.5–1.4)
CREAT SERPL-MCNC: 1.8 MG/DL (ref 0.5–1.4)
DIFFERENTIAL METHOD: ABNORMAL
EOSINOPHIL # BLD AUTO: 0 K/UL (ref 0–0.5)
EOSINOPHIL NFR BLD: 1 % (ref 0–8)
ERYTHROCYTE [DISTWIDTH] IN BLOOD BY AUTOMATED COUNT: 16.8 % (ref 11.5–14.5)
EST. GFR  (AFRICAN AMERICAN): 46.6 ML/MIN/1.73 M^2
EST. GFR  (AFRICAN AMERICAN): 46.6 ML/MIN/1.73 M^2
EST. GFR  (NON AFRICAN AMERICAN): 40.3 ML/MIN/1.73 M^2
EST. GFR  (NON AFRICAN AMERICAN): 40.3 ML/MIN/1.73 M^2
GLUCOSE SERPL-MCNC: 127 MG/DL (ref 70–110)
GLUCOSE SERPL-MCNC: 127 MG/DL (ref 70–110)
GLUCOSE UR QL STRIP: NEGATIVE
HCT VFR BLD AUTO: 19.5 % (ref 40–54)
HGB BLD-MCNC: 6.3 G/DL (ref 14–18)
HGB UR QL STRIP: NEGATIVE
IMM GRANULOCYTES # BLD AUTO: 0.01 K/UL (ref 0–0.04)
IMM GRANULOCYTES NFR BLD AUTO: 0.2 % (ref 0–0.5)
KETONES UR QL STRIP: NEGATIVE
LEUKOCYTE ESTERASE UR QL STRIP: NEGATIVE
LYMPHOCYTES # BLD AUTO: 1.1 K/UL (ref 1–4.8)
LYMPHOCYTES NFR BLD: 26.9 % (ref 18–48)
MCH RBC QN AUTO: 31 PG (ref 27–31)
MCHC RBC AUTO-ENTMCNC: 32.3 G/DL (ref 32–36)
MCV RBC AUTO: 96 FL (ref 82–98)
MONOCYTES # BLD AUTO: 0.7 K/UL (ref 0.3–1)
MONOCYTES NFR BLD: 16.4 % (ref 4–15)
NEUTROPHILS # BLD AUTO: 2.2 K/UL (ref 1.8–7.7)
NEUTROPHILS NFR BLD: 54.5 % (ref 38–73)
NITRITE UR QL STRIP: NEGATIVE
NRBC BLD-RTO: 0 /100 WBC
PH UR STRIP: 5 [PH] (ref 5–8)
PHOSPHATE SERPL-MCNC: 5.5 MG/DL (ref 2.7–4.5)
PLATELET # BLD AUTO: 483 K/UL (ref 150–350)
PMV BLD AUTO: 10.2 FL (ref 9.2–12.9)
POTASSIUM SERPL-SCNC: 4.1 MMOL/L (ref 3.5–5.1)
POTASSIUM SERPL-SCNC: 4.1 MMOL/L (ref 3.5–5.1)
PROT SERPL-MCNC: 7.4 G/DL (ref 6–8.4)
PROT UR QL STRIP: NEGATIVE
RBC # BLD AUTO: 2.03 M/UL (ref 4.6–6.2)
SODIUM SERPL-SCNC: 137 MMOL/L (ref 136–145)
SODIUM SERPL-SCNC: 137 MMOL/L (ref 136–145)
SP GR UR STRIP: 1.01 (ref 1–1.03)
URN SPEC COLLECT METH UR: NORMAL
UROBILINOGEN UR STRIP-ACNC: NEGATIVE EU/DL
WBC # BLD AUTO: 4.02 K/UL (ref 3.9–12.7)

## 2020-04-15 PROCEDURE — 85025 COMPLETE CBC W/AUTO DIFF WBC: CPT

## 2020-04-15 PROCEDURE — 36415 COLL VENOUS BLD VENIPUNCTURE: CPT

## 2020-04-15 PROCEDURE — 80053 COMPREHEN METABOLIC PANEL: CPT

## 2020-04-15 PROCEDURE — 81003 URINALYSIS AUTO W/O SCOPE: CPT

## 2020-04-15 PROCEDURE — 84100 ASSAY OF PHOSPHORUS: CPT

## 2020-04-15 RX ORDER — DIPHENHYDRAMINE HYDROCHLORIDE 50 MG/ML
25 INJECTION INTRAMUSCULAR; INTRAVENOUS ONCE
Status: CANCELLED | OUTPATIENT
Start: 2020-04-15

## 2020-04-15 RX ORDER — FUROSEMIDE 10 MG/ML
10 INJECTION INTRAMUSCULAR; INTRAVENOUS ONCE
Status: CANCELLED | OUTPATIENT
Start: 2020-04-15

## 2020-04-15 RX ORDER — ACETAMINOPHEN 325 MG/1
325 TABLET ORAL ONCE
Status: CANCELLED | OUTPATIENT
Start: 2020-04-15

## 2020-04-15 RX ORDER — HYDROCODONE BITARTRATE AND ACETAMINOPHEN 500; 5 MG/1; MG/1
TABLET ORAL ONCE
Status: CANCELLED | OUTPATIENT
Start: 2020-04-15 | End: 2020-04-15

## 2020-04-15 NOTE — TELEPHONE ENCOUNTER
I called the patient and he instructed me that his blood counts are low.  He wants to get T&M on Monday and receive his blood on Tuesday.  I instructed that the hospital will call him with a time for Tuesday.

## 2020-04-15 NOTE — TELEPHONE ENCOUNTER
----- Message from Fabiola Pace, Patient Care Assistant sent at 4/15/2020 11:58 AM CDT -----  Patient called in stating he need his lab results and he has questions about his labs. He can be reached at [phone]

## 2020-04-20 ENCOUNTER — LAB VISIT (OUTPATIENT)
Dept: LAB | Facility: HOSPITAL | Age: 60
End: 2020-04-20
Attending: INTERNAL MEDICINE
Payer: COMMERCIAL

## 2020-04-20 DIAGNOSIS — D53.9 MACROCYTIC ANEMIA: ICD-10-CM

## 2020-04-20 DIAGNOSIS — D64.89 ANEMIA DUE TO MULTIPLE MECHANISMS: ICD-10-CM

## 2020-04-20 DIAGNOSIS — N18.2 ANEMIA, CHRONIC RENAL FAILURE, STAGE 2 (MILD): ICD-10-CM

## 2020-04-20 DIAGNOSIS — D63.1 ANEMIA, CHRONIC RENAL FAILURE, STAGE 2 (MILD): ICD-10-CM

## 2020-04-20 DIAGNOSIS — D61.9 ANEMIA DUE TO BONE MARROW FAILURE, UNSPECIFIED BONE MARROW FAILURE TYPE: ICD-10-CM

## 2020-04-20 LAB
ABO + RH BLD: NORMAL
BLD GP AB SCN CELLS X3 SERPL QL: NORMAL

## 2020-04-20 PROCEDURE — 86850 RBC ANTIBODY SCREEN: CPT

## 2020-04-20 PROCEDURE — 36415 COLL VENOUS BLD VENIPUNCTURE: CPT

## 2020-04-20 PROCEDURE — 86920 COMPATIBILITY TEST SPIN: CPT

## 2020-04-21 ENCOUNTER — INFUSION (OUTPATIENT)
Dept: INFUSION THERAPY | Facility: HOSPITAL | Age: 60
End: 2020-04-21
Attending: INTERNAL MEDICINE
Payer: COMMERCIAL

## 2020-04-21 VITALS
OXYGEN SATURATION: 100 % | TEMPERATURE: 99 F | DIASTOLIC BLOOD PRESSURE: 80 MMHG | RESPIRATION RATE: 16 BRPM | SYSTOLIC BLOOD PRESSURE: 145 MMHG | HEART RATE: 98 BPM

## 2020-04-21 DIAGNOSIS — D64.9 NORMOCHROMIC ANEMIA: ICD-10-CM

## 2020-04-21 DIAGNOSIS — D53.9 MACROCYTIC ANEMIA: ICD-10-CM

## 2020-04-21 DIAGNOSIS — D61.9 ANEMIA DUE TO BONE MARROW FAILURE, UNSPECIFIED BONE MARROW FAILURE TYPE: ICD-10-CM

## 2020-04-21 DIAGNOSIS — N18.2 ANEMIA, CHRONIC RENAL FAILURE, STAGE 2 (MILD): ICD-10-CM

## 2020-04-21 DIAGNOSIS — D63.1 ANEMIA, CHRONIC RENAL FAILURE, STAGE 2 (MILD): ICD-10-CM

## 2020-04-21 DIAGNOSIS — D64.89 ANEMIA DUE TO MULTIPLE MECHANISMS: ICD-10-CM

## 2020-04-21 PROCEDURE — 63600175 PHARM REV CODE 636 W HCPCS: Performed by: INTERNAL MEDICINE

## 2020-04-21 PROCEDURE — 96374 THER/PROPH/DIAG INJ IV PUSH: CPT

## 2020-04-21 PROCEDURE — 96376 TX/PRO/DX INJ SAME DRUG ADON: CPT

## 2020-04-21 PROCEDURE — P9016 RBC LEUKOCYTES REDUCED: HCPCS

## 2020-04-21 PROCEDURE — 25000003 PHARM REV CODE 250: Performed by: INTERNAL MEDICINE

## 2020-04-21 PROCEDURE — 36430 TRANSFUSION BLD/BLD COMPNT: CPT

## 2020-04-21 PROCEDURE — 96375 TX/PRO/DX INJ NEW DRUG ADDON: CPT

## 2020-04-21 RX ORDER — ACETAMINOPHEN 325 MG/1
325 TABLET ORAL ONCE
Status: COMPLETED | OUTPATIENT
Start: 2020-04-21 | End: 2020-04-21

## 2020-04-21 RX ORDER — FUROSEMIDE 10 MG/ML
10 INJECTION INTRAMUSCULAR; INTRAVENOUS ONCE
Status: COMPLETED | OUTPATIENT
Start: 2020-04-21 | End: 2020-04-21

## 2020-04-21 RX ORDER — HYDROCODONE BITARTRATE AND ACETAMINOPHEN 500; 5 MG/1; MG/1
TABLET ORAL ONCE
Status: COMPLETED | OUTPATIENT
Start: 2020-04-21 | End: 2020-04-21

## 2020-04-21 RX ORDER — DIPHENHYDRAMINE HYDROCHLORIDE 50 MG/ML
25 INJECTION INTRAMUSCULAR; INTRAVENOUS ONCE
Status: COMPLETED | OUTPATIENT
Start: 2020-04-21 | End: 2020-04-21

## 2020-04-21 RX ADMIN — SODIUM CHLORIDE: 0.9 INJECTION, SOLUTION INTRAVENOUS at 06:04

## 2020-04-21 RX ADMIN — ACETAMINOPHEN 325 MG: 325 TABLET ORAL at 06:04

## 2020-04-21 RX ADMIN — FUROSEMIDE 10 MG: 10 INJECTION, SOLUTION INTRAMUSCULAR; INTRAVENOUS at 09:04

## 2020-04-21 RX ADMIN — FUROSEMIDE 10 MG: 10 INJECTION, SOLUTION INTRAMUSCULAR; INTRAVENOUS at 12:04

## 2020-04-21 RX ADMIN — DIPHENHYDRAMINE HYDROCHLORIDE 25 MG: 50 INJECTION INTRAMUSCULAR; INTRAVENOUS at 06:04

## 2020-04-21 NOTE — PATIENT INSTRUCTIONS
Follow up with Dr. Tello as directed. Go to ER for any chest pain or shortness of breath or other concerning symptoms.

## 2020-04-23 ENCOUNTER — TELEPHONE (OUTPATIENT)
Dept: FAMILY MEDICINE | Facility: CLINIC | Age: 60
End: 2020-04-23

## 2020-04-23 DIAGNOSIS — R63.4 UNINTENTIONAL WEIGHT LOSS: Primary | ICD-10-CM

## 2020-04-23 NOTE — TELEPHONE ENCOUNTER
"Pt called stating that he believes Periactin medication is not working, he has continue to lose weight. Pt says he is going to a specialist at [facility] for anemia. Also wanted to know if he needs to come in for an appointment, pt researched online and thinks "THC" will work.   "

## 2020-04-24 NOTE — TELEPHONE ENCOUNTER
"Patient notified and verbalized understanding in regards to being unable to order the "THC" and the referral that was generated for the nutrition to help with the weight loss. Contact information was given to the patient to schedule an appointment  "

## 2020-05-05 DIAGNOSIS — D64.9 CHRONIC ANEMIA: ICD-10-CM

## 2020-05-05 DIAGNOSIS — R63.4 UNINTENTIONAL WEIGHT LOSS: Primary | ICD-10-CM

## 2020-05-07 ENCOUNTER — CLINICAL SUPPORT (OUTPATIENT)
Dept: HEMATOLOGY/ONCOLOGY | Facility: CLINIC | Age: 60
End: 2020-05-07
Payer: COMMERCIAL

## 2020-05-07 DIAGNOSIS — D64.9 CHRONIC ANEMIA: ICD-10-CM

## 2020-05-07 DIAGNOSIS — R63.4 UNINTENTIONAL WEIGHT LOSS: ICD-10-CM

## 2020-05-07 NOTE — PROGRESS NOTES
NUTRITION CONSULT    Received referral from DEVIKA Rosenbaum for Mr. Butler re: unintentional wt loss. Nutrition consult completed over the phone today due to COVID-19 precautions. Pt's rx for Periactin was increased to 2mg TID with meals to help stimulate appetite. Pt reports that he does feel an improvement in his appetite & has begun to put some wt back on. He reports weighing 190# in April- now up to 213# on home scale. He consumes smaller, but more frequent meals throughout the day. He reports eliminating alcoholic beverages from his diet- now consumes either water or green tea throughout the day.    Plan: Encouraged pt to stick to prescribed dosage of appetite stimulant. Continue with smaller, more frequent meals with calorie + protein-dense foods. Encouraged pt continue to limit alcohol intake & focus on ensuring adequate hydration via water or sugar-free electrolyte drinks. Provided RD contact info- encouraged pt to call with any future questions/concerns.

## 2020-05-18 ENCOUNTER — TELEPHONE (OUTPATIENT)
Dept: HEMATOLOGY/ONCOLOGY | Facility: CLINIC | Age: 60
End: 2020-05-18

## 2020-05-19 ENCOUNTER — LAB VISIT (OUTPATIENT)
Dept: LAB | Facility: HOSPITAL | Age: 60
End: 2020-05-19
Attending: INTERNAL MEDICINE
Payer: COMMERCIAL

## 2020-05-19 DIAGNOSIS — N18.9 CHRONIC KIDNEY DISEASE, UNSPECIFIED: ICD-10-CM

## 2020-05-19 DIAGNOSIS — R94.4 NONSPECIFIC ABNORMAL RESULTS OF KIDNEY FUNCTION STUDY: Primary | ICD-10-CM

## 2020-05-19 LAB
ALBUMIN SERPL BCP-MCNC: 4 G/DL (ref 3.5–5.2)
ALP SERPL-CCNC: 55 U/L (ref 55–135)
ALT SERPL W/O P-5'-P-CCNC: 21 U/L (ref 10–44)
ANION GAP SERPL CALC-SCNC: 3 MMOL/L (ref 8–16)
AST SERPL-CCNC: 25 U/L (ref 10–40)
BASOPHILS # BLD AUTO: 0.08 K/UL (ref 0–0.2)
BASOPHILS NFR BLD: 1.7 % (ref 0–1.9)
BILIRUB SERPL-MCNC: 0.8 MG/DL (ref 0.1–1)
BILIRUB UR QL STRIP: NEGATIVE
BUN SERPL-MCNC: 21 MG/DL (ref 6–20)
CALCIUM SERPL-MCNC: 9.1 MG/DL (ref 8.7–10.5)
CHLORIDE SERPL-SCNC: 107 MMOL/L (ref 95–110)
CLARITY UR: CLEAR
CO2 SERPL-SCNC: 30 MMOL/L (ref 23–29)
COLOR UR: YELLOW
CREAT SERPL-MCNC: 1.4 MG/DL (ref 0.5–1.4)
CREAT UR-MCNC: 86 MG/DL (ref 23–375)
DIFFERENTIAL METHOD: ABNORMAL
EOSINOPHIL # BLD AUTO: 0.1 K/UL (ref 0–0.5)
EOSINOPHIL NFR BLD: 2.3 % (ref 0–8)
ERYTHROCYTE [DISTWIDTH] IN BLOOD BY AUTOMATED COUNT: 21.2 % (ref 11.5–14.5)
EST. GFR  (AFRICAN AMERICAN): >60 ML/MIN/1.73 M^2
EST. GFR  (NON AFRICAN AMERICAN): 54.2 ML/MIN/1.73 M^2
GLUCOSE SERPL-MCNC: 88 MG/DL (ref 70–110)
GLUCOSE UR QL STRIP: NEGATIVE
HCT VFR BLD AUTO: 25.8 % (ref 40–54)
HGB BLD-MCNC: 8.2 G/DL (ref 14–18)
HGB UR QL STRIP: NEGATIVE
IMM GRANULOCYTES # BLD AUTO: 0.01 K/UL (ref 0–0.04)
IMM GRANULOCYTES NFR BLD AUTO: 0.2 % (ref 0–0.5)
KETONES UR QL STRIP: NEGATIVE
LEUKOCYTE ESTERASE UR QL STRIP: NEGATIVE
LYMPHOCYTES # BLD AUTO: 1.6 K/UL (ref 1–4.8)
LYMPHOCYTES NFR BLD: 33.5 % (ref 18–48)
MCH RBC QN AUTO: 31.9 PG (ref 27–31)
MCHC RBC AUTO-ENTMCNC: 31.8 G/DL (ref 32–36)
MCV RBC AUTO: 100 FL (ref 82–98)
MONOCYTES # BLD AUTO: 0.7 K/UL (ref 0.3–1)
MONOCYTES NFR BLD: 13.8 % (ref 4–15)
NEUTROPHILS # BLD AUTO: 2.3 K/UL (ref 1.8–7.7)
NEUTROPHILS NFR BLD: 48.5 % (ref 38–73)
NITRITE UR QL STRIP: NEGATIVE
NRBC BLD-RTO: 0 /100 WBC
PH UR STRIP: 7 [PH] (ref 5–8)
PLATELET # BLD AUTO: 432 K/UL (ref 150–350)
PMV BLD AUTO: 10.6 FL (ref 9.2–12.9)
POTASSIUM SERPL-SCNC: 4.4 MMOL/L (ref 3.5–5.1)
PROT SERPL-MCNC: 7.4 G/DL (ref 6–8.4)
PROT UR QL STRIP: NEGATIVE
PROT UR-MCNC: 10 MG/DL (ref 6–15)
PROT/CREAT UR: 0.12 MG/G{CREAT} (ref 0–0.2)
RBC # BLD AUTO: 2.57 M/UL (ref 4.6–6.2)
SODIUM SERPL-SCNC: 140 MMOL/L (ref 136–145)
SP GR UR STRIP: 1.01 (ref 1–1.03)
URN SPEC COLLECT METH UR: ABNORMAL
UROBILINOGEN UR STRIP-ACNC: ABNORMAL EU/DL
WBC # BLD AUTO: 4.8 K/UL (ref 3.9–12.7)

## 2020-05-19 PROCEDURE — 84156 ASSAY OF PROTEIN URINE: CPT

## 2020-05-19 PROCEDURE — 80053 COMPREHEN METABOLIC PANEL: CPT

## 2020-05-19 PROCEDURE — 36415 COLL VENOUS BLD VENIPUNCTURE: CPT

## 2020-05-19 PROCEDURE — 81003 URINALYSIS AUTO W/O SCOPE: CPT

## 2020-05-19 PROCEDURE — 85025 COMPLETE CBC W/AUTO DIFF WBC: CPT

## 2020-05-21 ENCOUNTER — LAB VISIT (OUTPATIENT)
Dept: LAB | Facility: HOSPITAL | Age: 60
End: 2020-05-21
Attending: INTERNAL MEDICINE
Payer: COMMERCIAL

## 2020-05-21 DIAGNOSIS — R94.4 NONSPECIFIC ABNORMAL RESULTS OF KIDNEY FUNCTION STUDY: Primary | ICD-10-CM

## 2020-05-21 DIAGNOSIS — N18.9 CHRONIC KIDNEY DISEASE, UNSPECIFIED: ICD-10-CM

## 2020-05-21 LAB
CREAT CL/1.73 SQ M 12H UR+SERPL-ARVRAT: 83 ML/MIN (ref 70–110)
CREAT SERPL-MCNC: 1.4 MG/DL (ref 0.5–1.4)
CREAT UR-MCNC: 60 MG/DL (ref 23–375)
CREATININE, URINE (MG/SPEC): 1680 MG/SPEC
URINE COLLECTION DURATION: 24 HR
URINE VOLUME: 2800 ML

## 2020-05-21 PROCEDURE — 36415 COLL VENOUS BLD VENIPUNCTURE: CPT

## 2020-05-21 PROCEDURE — 82575 CREATININE CLEARANCE TEST: CPT

## 2020-05-27 ENCOUNTER — TELEPHONE (OUTPATIENT)
Dept: HEMATOLOGY/ONCOLOGY | Facility: CLINIC | Age: 60
End: 2020-05-27

## 2020-05-27 NOTE — TELEPHONE ENCOUNTER
----- Message from Fabiola Pace, Patient Care Assistant sent at 5/27/2020 11:22 AM CDT -----  Lois From [facility] Left a VM  stating she need to speak to someone regarding this patient . She can be reached at [phone]

## 2020-05-27 NOTE — TELEPHONE ENCOUNTER
Called Lois from [facility] and she instructed me that we can keep the PA but we just need to switch from pharmacy to buy and bill the drug.  We can call [facility] @ [phone].  Gave the message to Néstor.

## 2020-05-29 ENCOUNTER — CLINICAL SUPPORT (OUTPATIENT)
Dept: HEMATOLOGY/ONCOLOGY | Facility: CLINIC | Age: 60
End: 2020-05-29
Payer: COMMERCIAL

## 2020-05-29 DIAGNOSIS — D64.9 CHRONIC ANEMIA: ICD-10-CM

## 2020-05-29 DIAGNOSIS — D64.89 ANEMIA DUE TO MULTIPLE MECHANISMS: ICD-10-CM

## 2020-05-29 DIAGNOSIS — D63.1 ANEMIA, CHRONIC RENAL FAILURE, STAGE 2 (MILD): ICD-10-CM

## 2020-05-29 DIAGNOSIS — D64.9 ANEMIA, UNSPECIFIED TYPE: ICD-10-CM

## 2020-05-29 DIAGNOSIS — N18.2 ANEMIA, CHRONIC RENAL FAILURE, STAGE 2 (MILD): ICD-10-CM

## 2020-05-29 PROCEDURE — 96372 THER/PROPH/DIAG INJ SC/IM: CPT | Mod: S$GLB,,, | Performed by: INTERNAL MEDICINE

## 2020-05-29 PROCEDURE — 96372 PR INJECTION,THERAP/PROPH/DIAG2ST, IM OR SUBCUT: ICD-10-PCS | Mod: S$GLB,,, | Performed by: INTERNAL MEDICINE

## 2020-06-01 PROBLEM — D46.1: Status: ACTIVE | Noted: 2020-06-01

## 2020-06-01 NOTE — PROGRESS NOTES
[facility] Hematology/Oncology               PROGRESS NOTE      Subjective:       Patient ID:   NAME: Rick Butler : 1960     60 y.o. male    Referring Doc: Braulio (new PCP)  Other Physicians: Getachew; Alan Mustafa    Chief Complaint:  Sickle cell trait/anemia/RARS  f/u    History of Present Illness:     Patient returns today for a regularly scheduled follow-up visit.  The patient is doing ok overall. He is here by himself today. He has chronic fatigue but is feeling better overall.     no pain crisis; he has been sober for over 3-4 years now; he has been off tobacco too; no CP, SOB, HA's or N/V; occasional fatigue;      He has not had any recent pain crisis. He denies having any fatigue or breathing difficulties. He denies any blood in the stool, dark stools or hematuria.       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 He is now on procrit weekly. He sees Dr Mustafa again on 2020      ROS:   GEN: normal without any fever, night sweats or weight loss  HEENT: normal with no HA's, sore throat, stiff neck, changes in vision  CV: normal with no CP, SOB, PND, MORA or orthopnea  PULM: normal with no SOB, cough, hemoptysis, sputum or pleuritic pain  GI: normal with no abdominal pain, nausea, vomiting, constipation, diarrhea, melanotic stools, BRBPR, or hematemesis  : normal with no hematuria, dysuria  BREAST: normal with no mass, discharge, pain  SKIN: normal with no rash, erythema, bruising, or swelling    Allergies:  Review of patient's allergies indicates:  No Known Allergies    Medications:    Current Outpatient Medications:     cyproheptadine (PERIACTIN) 4 mg tablet, Take 0.5 tablets (2 mg total) by mouth 3 (three) times daily as needed (appetite stimulation)., Disp: 30 tablet, Rfl: 3    folic acid (FOLVITE) 1 MG tablet, Take 2 tablets (2 mg total)  by mouth once daily., Disp: 180 tablet, Rfl: 2    multivitamin capsule, Take 1 capsule by mouth once daily., Disp: , Rfl:     omeprazole (PRILOSEC) 20 MG capsule, Take 1 capsule (20 mg total) by mouth once daily., Disp: 90 capsule, Rfl: 2    sildenafil (VIAGRA) 100 MG tablet, Take 1 tablet (100 mg total) by mouth daily as needed for Erectile Dysfunction., Disp: 10 tablet, Rfl: 6    tamsulosin (FLOMAX) 0.4 mg Cap, Take 1 capsule (0.4 mg total) by mouth once daily., Disp: 90 capsule, Rfl: 2    traZODone (DESYREL) 50 MG tablet, Take 1 tablet (50 mg total) by mouth nightly as needed for Insomnia., Disp: 30 tablet, Rfl: 5    Current Facility-Administered Medications:     0.9%  NaCl infusion (for blood administration), , Intravenous, Once, Jose Tello MD    0.9%  NaCl infusion (for blood administration), , Intravenous, Once, Jose Tello MD    0.9%  NaCl infusion (for blood administration), , Intravenous, Once, Jose Tello MD    0.9%  NaCl infusion (for blood administration), , Intravenous, Once, Moses Fonseca MD    0.9%  NaCl infusion (for blood administration), , Intravenous, Once, Jose Tello MD    0.9%  NaCl infusion (for blood administration), , Intravenous, Q24H PRN, Jose Tello MD    diphenhydrAMINE injection 25 mg, 25 mg, Intravenous, PRN, Jose Tello MD    diphenhydrAMINE injection 25 mg, 25 mg, Intravenous, Once, Jose Tello MD    furosemide injection 10 mg, 10 mg, Intravenous, Once, Jose Tello MD    furosemide injection 10 mg, 10 mg, Intravenous, Once, Jose Tello MD    furosemide injection 20 mg, 20 mg, Intravenous, PRN, Jose Tello MD    furosemide injection 20 mg, 20 mg, Intravenous, PRN, Jose Tello MD    furosemide injection 20 mg, 20 mg, Intravenous, PRN, Jose Tello MD    furosemide injection 20 mg, 20 mg, Intravenous, PRN, Jose Tello MD, 20 mg at 01/24/20 0924    PMHx/PSHx  Updates:  See patient's last visit with me on 1/23/2020  See H&P on 7/9/2011      Pathology:    12/20/2019  BONE MARROW, RIGHT ILIAC CREST,    ASPIRATE, CLOT SECTION, AND CORE BIOPSY:    --HYPERCELLULAR MARROW (APPROXIMATELY 75% TO 80%) WITH TRILINEAGE   HEMATOPOIETIC ELEMENTS, ERYTHROID  HYPERPLASIA AND MILD MEGAKARYOCYTIC HYPERPLASIA, AND NON-SPECIFIC   DYSHEMATOPOIETIC CHANGES (SEE     COMMENT).  --[id number] INCREASED STAINABLE IRON WITH INCREASED RING   SIDEROBLASTS (GREATER THAN 15%)     (SEE COMMENT).  --PERIPHERAL BLOOD WITH THROMBOCYTOSIS (574,000/MICROLITER) AND ANEMIA   (HEMOGLOBIN 5.5 GRAM/DECILITER),    WITH SCATTERED DREPANOCYTOID FORMS AND FEW NUCLEATED ERYTHROCYTES      Cytogenetic Results at the bottom of the report.  NORMAL    Objective:     Vitals:  Blood pressure (!) 140/89, pulse 85, temperature 97.9 °F (36.6 °C), resp. rate 20, weight 99.5 kg (219 lb 4.8 oz).    Physical Examination:   GEN: no apparent distress, comfortable; AAOx3  HEAD: atraumatic and normocephalic  EYES: no pallor, no icterus, PERRLA  ENT: OMM, no pharyngeal erythema, external ears WNL; no nasal discharge; no thrush  NECK: no masses, thyroid normal, trachea midline, no LAD/LN's, supple  CV: RRR with no murmur; normal pulse; normal S1 and S2; no pedal edema  CHEST: Normal respiratory effort; CTAB; normal breath sounds; no wheeze or crackles  ABDOM: nontender and nondistended; soft; normal bowel sounds; no rebound/guarding  MUSC/Skeletal: ROM normal; no crepitus; joints normal; no deformities or arthropathy  EXTREM: no clubbing, cyanosis, inflammation or swelling  SKIN: no rashes, lesions, ulcers, petechiae or subcutaneous nodules  : no jiang  NEURO: grossly intact; motor/sensory WNL; AAOx3; no tremors  PSYCH: normal mood, affect and behavior  LYMPH: normal cervical, supraclavicular, axillary and groin LN's            Labs:     5/19/2020  Lab Results   Component Value Date    WBC 4.80 05/19/2020    HGB 8.2 (L)  05/19/2020    HCT 25.8 (L) 05/19/2020     (H) 05/19/2020     (H) 05/19/2020       CMP  Sodium   Date Value Ref Range Status   05/19/2020 140 136 - 145 mmol/L Final   06/26/2019 138 134 - 144 mmol/L      Potassium   Date Value Ref Range Status   05/19/2020 4.4 3.5 - 5.1 mmol/L Final     Chloride   Date Value Ref Range Status   05/19/2020 107 95 - 110 mmol/L Final   06/26/2019 105 98 - 110 mmol/L      CO2   Date Value Ref Range Status   05/19/2020 30 (H) 23 - 29 mmol/L Final     Glucose   Date Value Ref Range Status   05/19/2020 88 70 - 110 mg/dL Final   06/26/2019 114 (H) 70 - 99 mg/dL      BUN, Bld   Date Value Ref Range Status   05/19/2020 21 (H) 6 - 20 mg/dL Final     Creatinine   Date Value Ref Range Status   05/20/2020 1.4 0.5 - 1.4 mg/dL Final   06/26/2019 1.62 (H) 0.60 - 1.40 mg/dL      Calcium   Date Value Ref Range Status   05/19/2020 9.1 8.7 - 10.5 mg/dL Final     Total Protein   Date Value Ref Range Status   05/19/2020 7.4 6.0 - 8.4 g/dL Final     Albumin   Date Value Ref Range Status   05/19/2020 4.0 3.5 - 5.2 g/dL Final   06/26/2019 4.4 3.1 - 4.7 g/dL      Total Bilirubin   Date Value Ref Range Status   05/19/2020 0.8 0.1 - 1.0 mg/dL Final     Comment:     For infants and newborns, interpretation of results should be based  on gestational age, weight and in agreement with clinical  observations.  Premature Infant recommended reference ranges:  Up to 24 hours.............<8.0 mg/dL  Up to 48 hours............<12.0 mg/dL  3-5 days..................<15.0 mg/dL  6-29 days.................<15.0 mg/dL       Alkaline Phosphatase   Date Value Ref Range Status   05/19/2020 55 55 - 135 U/L Final     AST   Date Value Ref Range Status   05/19/2020 25 10 - 40 U/L Final     ALT   Date Value Ref Range Status   05/19/2020 21 10 - 44 U/L Final     Anion Gap   Date Value Ref Range Status   05/19/2020 3 (L) 8 - 16 mmol/L Final     eGFR if    Date Value Ref Range Status   05/19/2020 >60.0 >60  "mL/min/1.73 m^2 Final     eGFR if non    Date Value Ref Range Status   05/19/2020 54.2 (A) >60 mL/min/1.73 m^2 Final     Comment:     Calculation used to obtain the estimated glomerular filtration  rate (eGFR) is the CKD-EPI equation.                I have reviewed all available lab results and radiology reports.    Radiology/Diagnostic Studies:    2/15/2018 Xray Survey:   IMPRESSION: No significant abnormality seen. No evidence of osteoblastic or  osteoclastic lesions identified    MRI L-spine 2/12/2018  IMPRESSION:    1. Prominent low signal intensity throughout the bone marrow on T1 and  T2-weighted imaging. Marrow infiltrative process including malignancy such as  metastatic disease or lymphoma/leukemia is a consideration along with multiple  myeloma or malignant process. Benign etiology such as osteopetrosis or  regenerative red marrow are also considerations.    2. Multilevel lumbar degenerative changes, most prominent at L4-L5 and L5-S1 as  described.    Assessment/Plan:   (1) 60 y.o. male with diagnosis of sickle cell anemia with borderline microcytosis  - latest hgb was 6.0 and he had blood transfusion  - today, he is not symptomatic at this time  - he probably has a multifactorial anemia process with underlying sickle cell, anemia of chronic disorders and anemia of chronic renal. I can not rule out an underlying GI bleeding process.   - iron panel is adequate (no deficiency)  - total bilirubin is only minimally elevated  - he required transfusion again in Oct 2019 and again in Dec 2019  - he had bone marrow biopsy on 12/20/2019    "dyshematopoietic changes are not entirely specific and are present  mostly within erythroid and megakaryocytic lineages. These findings   could be observed in chronic disease states, autoimmune conditions,  infectious settings, toxic exposures, nutritional deficits, as medication/drug effect, and in myelodysplastic syndrome (MDS), among other " "conditions"  "Additionally, ring sideroblasts are a non-specific   finding "    Patient was referred to see Dr Mustafa at [facility] and had repeat BM biopsy with diagnosis made of RARS  - he is now on procrit per directives of Dr Mustafa    (2) Alcohol abuse issues in past - he has been sober for over 3 years now    (3) former smoker    (4) chronic back issues - prior Xrays and MRI - suspect the findings on the MRI are possibly due to his sickle cell;   - SPEP was previously negative for M-protein  - PSA was 0.3 in Sept 2017  - recommended neurosurgery evaluation previously  - he saw Dr Nura VUONG and had a nerve "burning" procedure and his pain has improved    (5) CRI - elevated creatinine - he is followed Dr Chilel with nephrology      (6) H pylori gastritis - s/p recent endoscopy with Dr Collins and antibotics x 10 days        1. H/O ETOH abuse     2. Anemia due to multiple mechanisms     3. Anemia, unspecified type     4. Chronic anemia     5. Anemia, chronic renal failure, stage 2 (mild)     6. Sickle cell trait syndrome     7. Normochromic anemia     8. Monocytosis     9. Macrocytic anemia     10. Thrombocytosis     11. Former smoker     12. RARS (refractory anemia with ringed sideroblasts)       PLAN;  1. Hold off on transfusing at this time as long as he is asymptomatic but transfuse as needed  2.  Recommend that he f/u with Dr Collins  3.  F/u with nephrology as directed by them  4. Check labs every 2 weeks- encouraged compliance 5. Procrit as per Dr Mustafa's directives  6.  Encouraged continued sobriety  7. F/u with Dr Mustafa on July 2nd       RTC 8 weeks  Fax note to [facility]; [place]    Total Time spent on patient:    I spent over 25 mins of time with the patient. Reviewed results of the recently ordered labs, tests, reports and studies; made directives with regards to the results. Over half of this time was spent couseling and coordinating care, making treatment and analytical decisions; ordering " necessary labs, tests and studies; and discussing treatment options and setting up treatment plan(s) if indicated.            Discussion:       Pathology Discussion:    I reviewed and discussed the pathology report(s) and radiograph reports (if available) in as simple to understand and/or laymen's terms to the best of my ability. I had an indepth conversation with the patient and went over the patient's individual diagnosis based on the information that was currently available. I discussed the TNM staging process with regard to the patient's particular cancer type, and the calculated stage based on the currently available TNM data and literature. I discussed the available prognostic data with regard to the current staging information and how it relates to the prognosis of their particular neoplastic process.          COVID-19 Discussion:    I had long discussion with patient and any applicable family about the COVID-19 coronavirus epidemic and the recommended precautions with regard to cancer and/or hematology patients. I have re-iterated the CDC recommendations for adequate hand washing, use of hand -like products, and coughing into elbow, etc. In addition, especially for our patients who are on chemotherapy and/or our otherwise immunocompromised patients, I have recommended avoidance of crowds, including movie theaters, restaurants, churches, etc. I have recommended avoidance of any sick or symptomatic family members and/or friends. I have also recommended avoidance of any raw and unwashed food products, and general avoidance of food items that have not been prepared by themselves. The patient has been asked to call us immediately with any symptom developments, issues, questions or other general concerns.     I have explained all of the above in detail and the patient understands all of the current recommendation(s). I have answered all of their questions to the best of my ability and to their complete  satisfaction.   The patient is to continue with the current management plan.            Electronically signed by Jose Tello MD

## 2020-06-02 ENCOUNTER — OFFICE VISIT (OUTPATIENT)
Dept: HEMATOLOGY/ONCOLOGY | Facility: CLINIC | Age: 60
End: 2020-06-02
Payer: COMMERCIAL

## 2020-06-02 VITALS
DIASTOLIC BLOOD PRESSURE: 89 MMHG | WEIGHT: 219.31 LBS | HEART RATE: 85 BPM | TEMPERATURE: 98 F | SYSTOLIC BLOOD PRESSURE: 140 MMHG | RESPIRATION RATE: 20 BRPM | BODY MASS INDEX: 31.47 KG/M2

## 2020-06-02 DIAGNOSIS — D63.1 ANEMIA, CHRONIC RENAL FAILURE, STAGE 2 (MILD): ICD-10-CM

## 2020-06-02 DIAGNOSIS — D46.1 RARS (REFRACTORY ANEMIA WITH RINGED SIDEROBLASTS): ICD-10-CM

## 2020-06-02 DIAGNOSIS — D72.821 MONOCYTOSIS: ICD-10-CM

## 2020-06-02 DIAGNOSIS — N18.2 ANEMIA, CHRONIC RENAL FAILURE, STAGE 2 (MILD): ICD-10-CM

## 2020-06-02 DIAGNOSIS — D64.89 ANEMIA DUE TO MULTIPLE MECHANISMS: ICD-10-CM

## 2020-06-02 DIAGNOSIS — D53.9 MACROCYTIC ANEMIA: ICD-10-CM

## 2020-06-02 DIAGNOSIS — D75.839 THROMBOCYTOSIS: ICD-10-CM

## 2020-06-02 DIAGNOSIS — D57.3 SICKLE CELL TRAIT SYNDROME: ICD-10-CM

## 2020-06-02 DIAGNOSIS — D64.9 ANEMIA, UNSPECIFIED TYPE: ICD-10-CM

## 2020-06-02 DIAGNOSIS — Z87.891 FORMER SMOKER: ICD-10-CM

## 2020-06-02 DIAGNOSIS — D64.9 CHRONIC ANEMIA: ICD-10-CM

## 2020-06-02 DIAGNOSIS — D64.9 NORMOCHROMIC ANEMIA: ICD-10-CM

## 2020-06-02 DIAGNOSIS — F10.11 H/O ETOH ABUSE: Primary | ICD-10-CM

## 2020-06-02 PROCEDURE — 99214 PR OFFICE/OUTPT VISIT, EST, LEVL IV, 30-39 MIN: ICD-10-PCS | Mod: S$GLB,,, | Performed by: INTERNAL MEDICINE

## 2020-06-02 PROCEDURE — 3079F PR MOST RECENT DIASTOLIC BLOOD PRESSURE 80-89 MM HG: ICD-10-PCS | Mod: S$GLB,,, | Performed by: INTERNAL MEDICINE

## 2020-06-02 PROCEDURE — 3008F PR BODY MASS INDEX (BMI) DOCUMENTED: ICD-10-PCS | Mod: S$GLB,,, | Performed by: INTERNAL MEDICINE

## 2020-06-02 PROCEDURE — 3079F DIAST BP 80-89 MM HG: CPT | Mod: S$GLB,,, | Performed by: INTERNAL MEDICINE

## 2020-06-02 PROCEDURE — 99214 OFFICE O/P EST MOD 30 MIN: CPT | Mod: S$GLB,,, | Performed by: INTERNAL MEDICINE

## 2020-06-02 PROCEDURE — 3077F SYST BP >= 140 MM HG: CPT | Mod: S$GLB,,, | Performed by: INTERNAL MEDICINE

## 2020-06-02 PROCEDURE — 3008F BODY MASS INDEX DOCD: CPT | Mod: S$GLB,,, | Performed by: INTERNAL MEDICINE

## 2020-06-02 PROCEDURE — 3077F PR MOST RECENT SYSTOLIC BLOOD PRESSURE >= 140 MM HG: ICD-10-PCS | Mod: S$GLB,,, | Performed by: INTERNAL MEDICINE

## 2020-06-04 ENCOUNTER — CLINICAL SUPPORT (OUTPATIENT)
Dept: HEMATOLOGY/ONCOLOGY | Facility: CLINIC | Age: 60
End: 2020-06-04
Payer: COMMERCIAL

## 2020-06-04 DIAGNOSIS — D53.9 MACROCYTIC ANEMIA: ICD-10-CM

## 2020-06-04 DIAGNOSIS — D64.89 ANEMIA DUE TO MULTIPLE MECHANISMS: ICD-10-CM

## 2020-06-04 DIAGNOSIS — D63.1 ANEMIA, CHRONIC RENAL FAILURE, STAGE 2 (MILD): ICD-10-CM

## 2020-06-04 DIAGNOSIS — D64.89 ANEMIA DUE TO MULTIPLE MECHANISMS: Primary | ICD-10-CM

## 2020-06-04 DIAGNOSIS — N18.2 ANEMIA, CHRONIC RENAL FAILURE, STAGE 2 (MILD): ICD-10-CM

## 2020-06-04 PROCEDURE — 96372 PR INJECTION,THERAP/PROPH/DIAG2ST, IM OR SUBCUT: ICD-10-PCS | Mod: S$GLB,,, | Performed by: INTERNAL MEDICINE

## 2020-06-04 PROCEDURE — 96372 THER/PROPH/DIAG INJ SC/IM: CPT | Mod: S$GLB,,, | Performed by: INTERNAL MEDICINE

## 2020-06-04 NOTE — PROGRESS NOTES
Procrit 40,000 units given SQ.  Patient brought his own medication.   Quality 110: Preventive Care And Screening: Influenza Immunization: Influenza Immunization previously received during influenza season Detail Level: Detailed Quality 131: Pain Assessment And Follow-Up: Pain assessment using a standardized tool is documented as negative, no follow-up plan required

## 2020-06-18 ENCOUNTER — CLINICAL SUPPORT (OUTPATIENT)
Dept: HEMATOLOGY/ONCOLOGY | Facility: CLINIC | Age: 60
End: 2020-06-18
Payer: COMMERCIAL

## 2020-06-18 DIAGNOSIS — D64.89 ANEMIA DUE TO MULTIPLE MECHANISMS: ICD-10-CM

## 2020-06-18 DIAGNOSIS — N18.2 ANEMIA, CHRONIC RENAL FAILURE, STAGE 2 (MILD): ICD-10-CM

## 2020-06-18 DIAGNOSIS — D63.1 ANEMIA, CHRONIC RENAL FAILURE, STAGE 2 (MILD): ICD-10-CM

## 2020-06-18 PROCEDURE — 96372 PR INJECTION,THERAP/PROPH/DIAG2ST, IM OR SUBCUT: ICD-10-PCS | Mod: S$GLB,,, | Performed by: INTERNAL MEDICINE

## 2020-06-18 PROCEDURE — 96372 THER/PROPH/DIAG INJ SC/IM: CPT | Mod: S$GLB,,, | Performed by: INTERNAL MEDICINE

## 2020-06-24 ENCOUNTER — LAB VISIT (OUTPATIENT)
Dept: LAB | Facility: HOSPITAL | Age: 60
End: 2020-06-24
Attending: INTERNAL MEDICINE
Payer: COMMERCIAL

## 2020-06-24 DIAGNOSIS — D53.9 MACROCYTIC ANEMIA: ICD-10-CM

## 2020-06-24 DIAGNOSIS — D63.1 ANEMIA, CHRONIC RENAL FAILURE, STAGE 2 (MILD): ICD-10-CM

## 2020-06-24 DIAGNOSIS — D64.89 ANEMIA DUE TO MULTIPLE MECHANISMS: ICD-10-CM

## 2020-06-24 DIAGNOSIS — N18.2 ANEMIA, CHRONIC RENAL FAILURE, STAGE 2 (MILD): ICD-10-CM

## 2020-06-24 LAB
HCT VFR BLD AUTO: 27.2 % (ref 40–54)
HGB BLD-MCNC: 9.1 G/DL (ref 14–18)

## 2020-06-24 PROCEDURE — 36415 COLL VENOUS BLD VENIPUNCTURE: CPT

## 2020-06-24 PROCEDURE — 85018 HEMOGLOBIN: CPT

## 2020-06-24 PROCEDURE — 85014 HEMATOCRIT: CPT

## 2020-06-25 ENCOUNTER — INFUSION (OUTPATIENT)
Dept: INFUSION THERAPY | Facility: HOSPITAL | Age: 60
End: 2020-06-25
Attending: INTERNAL MEDICINE
Payer: COMMERCIAL

## 2020-06-25 VITALS
DIASTOLIC BLOOD PRESSURE: 83 MMHG | HEART RATE: 84 BPM | BODY MASS INDEX: 32.73 KG/M2 | TEMPERATURE: 98 F | RESPIRATION RATE: 18 BRPM | SYSTOLIC BLOOD PRESSURE: 135 MMHG | WEIGHT: 228.13 LBS

## 2020-06-25 DIAGNOSIS — D64.9 NORMOCHROMIC ANEMIA: ICD-10-CM

## 2020-06-25 DIAGNOSIS — D64.9 CHRONIC ANEMIA: ICD-10-CM

## 2020-06-25 DIAGNOSIS — N18.30 CKD (CHRONIC KIDNEY DISEASE) STAGE 3, GFR 30-59 ML/MIN: ICD-10-CM

## 2020-06-25 DIAGNOSIS — D64.89 ANEMIA DUE TO MULTIPLE MECHANISMS: Primary | ICD-10-CM

## 2020-06-25 PROCEDURE — 63600175 PHARM REV CODE 636 W HCPCS: Performed by: INTERNAL MEDICINE

## 2020-06-25 PROCEDURE — 96372 THER/PROPH/DIAG INJ SC/IM: CPT

## 2020-06-25 RX ADMIN — EPOETIN ALFA-EPBX 5200 UNITS: 10000 INJECTION, SOLUTION INTRAVENOUS; SUBCUTANEOUS at 03:06

## 2020-07-02 ENCOUNTER — INFUSION (OUTPATIENT)
Dept: INFUSION THERAPY | Facility: HOSPITAL | Age: 60
End: 2020-07-02
Attending: INTERNAL MEDICINE
Payer: COMMERCIAL

## 2020-07-02 VITALS
DIASTOLIC BLOOD PRESSURE: 83 MMHG | TEMPERATURE: 97 F | SYSTOLIC BLOOD PRESSURE: 137 MMHG | WEIGHT: 229.31 LBS | HEART RATE: 91 BPM | BODY MASS INDEX: 32.9 KG/M2 | RESPIRATION RATE: 18 BRPM

## 2020-07-02 DIAGNOSIS — N18.30 CKD (CHRONIC KIDNEY DISEASE) STAGE 3, GFR 30-59 ML/MIN: ICD-10-CM

## 2020-07-02 DIAGNOSIS — D64.9 CHRONIC ANEMIA: ICD-10-CM

## 2020-07-02 DIAGNOSIS — D64.9 NORMOCHROMIC ANEMIA: ICD-10-CM

## 2020-07-02 DIAGNOSIS — D64.89 ANEMIA DUE TO MULTIPLE MECHANISMS: Primary | ICD-10-CM

## 2020-07-02 PROCEDURE — 63600175 PHARM REV CODE 636 W HCPCS: Mod: JW | Performed by: INTERNAL MEDICINE

## 2020-07-02 PROCEDURE — 96372 THER/PROPH/DIAG INJ SC/IM: CPT

## 2020-07-02 RX ADMIN — EPOETIN ALFA-EPBX 5200 UNITS: 10000 INJECTION, SOLUTION INTRAVENOUS; SUBCUTANEOUS at 03:07

## 2020-07-08 ENCOUNTER — LAB VISIT (OUTPATIENT)
Dept: LAB | Facility: HOSPITAL | Age: 60
End: 2020-07-08
Attending: INTERNAL MEDICINE
Payer: COMMERCIAL

## 2020-07-08 DIAGNOSIS — N18.2 ANEMIA, CHRONIC RENAL FAILURE, STAGE 2 (MILD): ICD-10-CM

## 2020-07-08 DIAGNOSIS — D57.3 SICKLE CELL TRAIT SYNDROME: ICD-10-CM

## 2020-07-08 DIAGNOSIS — D64.89 ANEMIA DUE TO MULTIPLE MECHANISMS: ICD-10-CM

## 2020-07-08 DIAGNOSIS — D64.9 NORMOCHROMIC ANEMIA: ICD-10-CM

## 2020-07-08 DIAGNOSIS — D46.1 RARS (REFRACTORY ANEMIA WITH RINGED SIDEROBLASTS): ICD-10-CM

## 2020-07-08 DIAGNOSIS — D63.1 ANEMIA, CHRONIC RENAL FAILURE, STAGE 2 (MILD): ICD-10-CM

## 2020-07-08 DIAGNOSIS — D64.9 ANEMIA, UNSPECIFIED TYPE: ICD-10-CM

## 2020-07-08 DIAGNOSIS — D64.9 CHRONIC ANEMIA: ICD-10-CM

## 2020-07-08 LAB
ALBUMIN SERPL BCP-MCNC: 4 G/DL (ref 3.5–5.2)
ALP SERPL-CCNC: 55 U/L (ref 55–135)
ALT SERPL W/O P-5'-P-CCNC: 22 U/L (ref 10–44)
ANION GAP SERPL CALC-SCNC: 9 MMOL/L (ref 8–16)
AST SERPL-CCNC: 23 U/L (ref 10–40)
BASOPHILS # BLD AUTO: 0.08 K/UL (ref 0–0.2)
BASOPHILS NFR BLD: 0.7 % (ref 0–1.9)
BILIRUB SERPL-MCNC: 0.7 MG/DL (ref 0.1–1)
BUN SERPL-MCNC: 22 MG/DL (ref 6–20)
CALCIUM SERPL-MCNC: 8.9 MG/DL (ref 8.7–10.5)
CHLORIDE SERPL-SCNC: 107 MMOL/L (ref 95–110)
CO2 SERPL-SCNC: 27 MMOL/L (ref 23–29)
CREAT SERPL-MCNC: 1.4 MG/DL (ref 0.5–1.4)
DIFFERENTIAL METHOD: ABNORMAL
EOSINOPHIL # BLD AUTO: 0.1 K/UL (ref 0–0.5)
EOSINOPHIL NFR BLD: 1.1 % (ref 0–8)
ERYTHROCYTE [DISTWIDTH] IN BLOOD BY AUTOMATED COUNT: 25.2 % (ref 11.5–14.5)
EST. GFR  (AFRICAN AMERICAN): >60 ML/MIN/1.73 M^2
EST. GFR  (NON AFRICAN AMERICAN): 54.2 ML/MIN/1.73 M^2
FERRITIN SERPL-MCNC: 1396 NG/ML (ref 20–300)
GLUCOSE SERPL-MCNC: 112 MG/DL (ref 70–110)
HCT VFR BLD AUTO: 27.3 % (ref 40–54)
HGB BLD-MCNC: 9 G/DL (ref 14–18)
IMM GRANULOCYTES # BLD AUTO: 0.04 K/UL (ref 0–0.04)
IMM GRANULOCYTES NFR BLD AUTO: 0.4 % (ref 0–0.5)
IRON SERPL-MCNC: 60 UG/DL (ref 45–160)
LYMPHOCYTES # BLD AUTO: 1.7 K/UL (ref 1–4.8)
LYMPHOCYTES NFR BLD: 15.4 % (ref 18–48)
MCH RBC QN AUTO: 31.7 PG (ref 27–31)
MCHC RBC AUTO-ENTMCNC: 33 G/DL (ref 32–36)
MCV RBC AUTO: 96 FL (ref 82–98)
MONOCYTES # BLD AUTO: 0.8 K/UL (ref 0.3–1)
MONOCYTES NFR BLD: 7.5 % (ref 4–15)
NEUTROPHILS # BLD AUTO: 8 K/UL (ref 1.8–7.7)
NEUTROPHILS NFR BLD: 74.9 % (ref 38–73)
NRBC BLD-RTO: 0 /100 WBC
PLATELET # BLD AUTO: 411 K/UL (ref 150–350)
PMV BLD AUTO: 10.9 FL (ref 9.2–12.9)
POTASSIUM SERPL-SCNC: 4.2 MMOL/L (ref 3.5–5.1)
PROT SERPL-MCNC: 7.1 G/DL (ref 6–8.4)
RBC # BLD AUTO: 2.84 M/UL (ref 4.6–6.2)
SATURATED IRON: 30 % (ref 20–50)
SODIUM SERPL-SCNC: 143 MMOL/L (ref 136–145)
TOTAL IRON BINDING CAPACITY: 202 UG/DL (ref 250–450)
TRANSFERRIN SERPL-MCNC: 144 MG/DL (ref 200–375)
WBC # BLD AUTO: 10.72 K/UL (ref 3.9–12.7)

## 2020-07-08 PROCEDURE — 80053 COMPREHEN METABOLIC PANEL: CPT

## 2020-07-08 PROCEDURE — 85025 COMPLETE CBC W/AUTO DIFF WBC: CPT

## 2020-07-08 PROCEDURE — 83540 ASSAY OF IRON: CPT

## 2020-07-08 PROCEDURE — 82728 ASSAY OF FERRITIN: CPT

## 2020-07-08 PROCEDURE — 36415 COLL VENOUS BLD VENIPUNCTURE: CPT

## 2020-07-09 ENCOUNTER — INFUSION (OUTPATIENT)
Dept: INFUSION THERAPY | Facility: HOSPITAL | Age: 60
End: 2020-07-09
Attending: INTERNAL MEDICINE
Payer: COMMERCIAL

## 2020-07-09 VITALS
TEMPERATURE: 97 F | RESPIRATION RATE: 16 BRPM | HEART RATE: 87 BPM | WEIGHT: 227.06 LBS | HEIGHT: 71 IN | SYSTOLIC BLOOD PRESSURE: 134 MMHG | DIASTOLIC BLOOD PRESSURE: 73 MMHG | BODY MASS INDEX: 31.79 KG/M2

## 2020-07-09 DIAGNOSIS — D64.89 ANEMIA DUE TO MULTIPLE MECHANISMS: Primary | ICD-10-CM

## 2020-07-09 DIAGNOSIS — D64.9 CHRONIC ANEMIA: ICD-10-CM

## 2020-07-09 DIAGNOSIS — N18.30 CKD (CHRONIC KIDNEY DISEASE) STAGE 3, GFR 30-59 ML/MIN: ICD-10-CM

## 2020-07-09 DIAGNOSIS — D64.9 NORMOCHROMIC ANEMIA: ICD-10-CM

## 2020-07-09 PROCEDURE — 96372 THER/PROPH/DIAG INJ SC/IM: CPT

## 2020-07-09 PROCEDURE — 63600175 PHARM REV CODE 636 W HCPCS: Performed by: INTERNAL MEDICINE

## 2020-07-09 RX ADMIN — EPOETIN ALFA-EPBX 5200 UNITS: 10000 INJECTION, SOLUTION INTRAVENOUS; SUBCUTANEOUS at 03:07

## 2020-07-16 ENCOUNTER — INFUSION (OUTPATIENT)
Dept: INFUSION THERAPY | Facility: HOSPITAL | Age: 60
End: 2020-07-16
Attending: INTERNAL MEDICINE
Payer: COMMERCIAL

## 2020-07-16 VITALS
TEMPERATURE: 98 F | WEIGHT: 231.88 LBS | HEIGHT: 71 IN | BODY MASS INDEX: 32.46 KG/M2 | HEART RATE: 86 BPM | OXYGEN SATURATION: 98 % | DIASTOLIC BLOOD PRESSURE: 79 MMHG | RESPIRATION RATE: 19 BRPM | SYSTOLIC BLOOD PRESSURE: 146 MMHG

## 2020-07-16 DIAGNOSIS — D64.9 NORMOCHROMIC ANEMIA: ICD-10-CM

## 2020-07-16 DIAGNOSIS — D64.9 CHRONIC ANEMIA: ICD-10-CM

## 2020-07-16 DIAGNOSIS — D64.89 ANEMIA DUE TO MULTIPLE MECHANISMS: Primary | ICD-10-CM

## 2020-07-16 DIAGNOSIS — N18.30 CKD (CHRONIC KIDNEY DISEASE) STAGE 3, GFR 30-59 ML/MIN: ICD-10-CM

## 2020-07-16 PROCEDURE — 96372 THER/PROPH/DIAG INJ SC/IM: CPT

## 2020-07-16 PROCEDURE — 63600175 PHARM REV CODE 636 W HCPCS: Performed by: INTERNAL MEDICINE

## 2020-07-16 RX ADMIN — EPOETIN ALFA-EPBX 5200 UNITS: 10000 INJECTION, SOLUTION INTRAVENOUS; SUBCUTANEOUS at 03:07

## 2020-07-22 ENCOUNTER — LAB VISIT (OUTPATIENT)
Dept: LAB | Facility: HOSPITAL | Age: 60
End: 2020-07-22
Attending: INTERNAL MEDICINE
Payer: COMMERCIAL

## 2020-07-22 DIAGNOSIS — N18.2 ANEMIA, CHRONIC RENAL FAILURE, STAGE 2 (MILD): ICD-10-CM

## 2020-07-22 DIAGNOSIS — D64.89 ANEMIA DUE TO MULTIPLE MECHANISMS: ICD-10-CM

## 2020-07-22 DIAGNOSIS — D53.9 MACROCYTIC ANEMIA: ICD-10-CM

## 2020-07-22 DIAGNOSIS — D63.1 ANEMIA, CHRONIC RENAL FAILURE, STAGE 2 (MILD): ICD-10-CM

## 2020-07-22 LAB
HCT VFR BLD AUTO: 27.3 % (ref 40–54)
HGB BLD-MCNC: 8.8 G/DL (ref 14–18)

## 2020-07-22 PROCEDURE — 85014 HEMATOCRIT: CPT

## 2020-07-22 PROCEDURE — 85018 HEMOGLOBIN: CPT

## 2020-07-22 PROCEDURE — 36415 COLL VENOUS BLD VENIPUNCTURE: CPT

## 2020-07-23 DIAGNOSIS — Z03.818 ENCNTR FOR OBS FOR SUSP EXPSR TO OTH BIOLG AGENTS RULED OUT: Primary | ICD-10-CM

## 2020-07-23 NOTE — PROGRESS NOTES
Patient called requesting to be tested for COVID he has a coworker that has tested positive and he is due to come for Procrit today. Orders placed and patient will go to [facility] to have his test and we will reschedule procrit to next week.

## 2020-07-24 ENCOUNTER — APPOINTMENT (OUTPATIENT)
Dept: INFUSION THERAPY | Facility: HOSPITAL | Age: 60
End: 2020-07-24
Attending: NURSE PRACTITIONER
Payer: COMMERCIAL

## 2020-07-24 ENCOUNTER — TELEPHONE (OUTPATIENT)
Dept: HEMATOLOGY/ONCOLOGY | Facility: CLINIC | Age: 60
End: 2020-07-24

## 2020-07-24 DIAGNOSIS — Z03.818 ENCOUNTER FOR OBSERVATION FOR SUSPECTED EXPOSURE TO OTHER BIOLOGICAL AGENTS RULED OUT: Primary | ICD-10-CM

## 2020-07-24 PROCEDURE — U0003 INFECTIOUS AGENT DETECTION BY NUCLEIC ACID (DNA OR RNA); SEVERE ACUTE RESPIRATORY SYNDROME CORONAVIRUS 2 (SARS-COV-2) (CORONAVIRUS DISEASE [COVID-19]), AMPLIFIED PROBE TECHNIQUE, MAKING USE OF HIGH THROUGHPUT TECHNOLOGIES AS DESCRIBED BY CMS-2020-01-R: HCPCS

## 2020-07-24 NOTE — TELEPHONE ENCOUNTER
----- Message from Callie Mejia sent at 7/24/2020 10:29 AM CDT -----  The patient would like to speak to you about his Procrit shots. He said he had his Covid test done today. Please call him back at [phone].

## 2020-07-24 NOTE — TELEPHONE ENCOUNTER
Discussed with the patient he had his covid test today. He had exposure to covid at work and requested to be tested for covid. I notified him I will call if it is positive. He will keep his labs on Wednesday and  appt and procrit on Thursday.

## 2020-07-25 LAB — SARS-COV-2 RNA RESP QL NAA+PROBE: NOT DETECTED

## 2020-07-29 NOTE — PROGRESS NOTES
[facility] Hematology/Oncology               PROGRESS NOTE      Subjective:       Patient ID:   NAME: Rick Butler : 1960     60 y.o. male    Referring Doc: Braulio (new PCP)  Other Physicians: Getachew; Alan Mustafa    Chief Complaint:  Sickle cell trait/anemia/RARS  f/u    History of Present Illness:     Patient returns today for a regularly scheduled follow-up visit.  The patient is doing ok overall. He is here by himself today. He has chronic fatigue but is feeling better overall.     no pain crisis; he has been sober for over 4 years now; he has been off tobacco too; no CP, SOB, HA's or N/V; occasional fatigue;      He has not had any recent pain crisis. He denies having any fatigue or breathing difficulties. He denies any blood in the stool, dark stools or hematuria.       He required blood again in Dec 2019 and also had bone marrow biopsy on 2019    He has since seen Dr Mustafa at 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 He is now on procrit weekly. He sees Dr Mustafa again in 2021.     Discussed covid19 precautions      ROS:   GEN: normal without any fever, night sweats or weight loss  HEENT: normal with no HA's, sore throat, stiff neck, changes in vision  CV: normal with no CP, SOB, PND, MORA or orthopnea  PULM: normal with no SOB, cough, hemoptysis, sputum or pleuritic pain  GI: normal with no abdominal pain, nausea, vomiting, constipation, diarrhea, melanotic stools, BRBPR, or hematemesis  : normal with no hematuria, dysuria  BREAST: normal with no mass, discharge, pain  SKIN: normal with no rash, erythema, bruising, or swelling    Allergies:  Review of patient's allergies indicates:  No Known Allergies    Medications:    Current Outpatient Medications:     cyproheptadine (PERIACTIN) 4 mg tablet, TAKE 1/2 TABLET BY MOUTH 3 (THREE) TIMES DAILY AS NEEDED (APPETITE STIMULATION)., Disp: 30 tablet, Rfl: 3    folic acid (FOLVITE) 1 MG tablet, Take 2 tablets  (2 mg total) by mouth once daily., Disp: 180 tablet, Rfl: 2    multivitamin capsule, Take 1 capsule by mouth once daily., Disp: , Rfl:     omeprazole (PRILOSEC) 20 MG capsule, Take 1 capsule (20 mg total) by mouth once daily., Disp: 90 capsule, Rfl: 2    sildenafil (VIAGRA) 100 MG tablet, Take 1 tablet (100 mg total) by mouth daily as needed for Erectile Dysfunction., Disp: 10 tablet, Rfl: 6    tamsulosin (FLOMAX) 0.4 mg Cap, Take 1 capsule (0.4 mg total) by mouth once daily., Disp: 90 capsule, Rfl: 2    traZODone (DESYREL) 50 MG tablet, TAKE 1 TABLET (50 MG TOTAL) BY MOUTH NIGHTLY AS NEEDED FOR INSOMNIA., Disp: 90 tablet, Rfl: 1    Current Facility-Administered Medications:     0.9%  NaCl infusion (for blood administration), , Intravenous, Once, Jose Tello MD    0.9%  NaCl infusion (for blood administration), , Intravenous, Once, Jose Tello MD    0.9%  NaCl infusion (for blood administration), , Intravenous, Once, Jose Tello MD    0.9%  NaCl infusion (for blood administration), , Intravenous, Once, Moses Fonseca MD    0.9%  NaCl infusion (for blood administration), , Intravenous, Once, Jose Tello MD    0.9%  NaCl infusion (for blood administration), , Intravenous, Q24H PRN, Jose Tello MD    diphenhydrAMINE injection 25 mg, 25 mg, Intravenous, PRN, Jose Tello MD    diphenhydrAMINE injection 25 mg, 25 mg, Intravenous, Once, Jose Tello MD    furosemide injection 10 mg, 10 mg, Intravenous, Once, Jose Tello MD    furosemide injection 10 mg, 10 mg, Intravenous, Once, Jose Tello MD    furosemide injection 20 mg, 20 mg, Intravenous, PRN, Jose Tello MD    furosemide injection 20 mg, 20 mg, Intravenous, PRN, Jose Tello MD    furosemide injection 20 mg, 20 mg, Intravenous, PRN, Jose Tello MD    furosemide injection 20 mg, 20 mg, Intravenous, PRN, Jose Tello MD, 20 mg at 01/24/20  "0924    PMHx/PSHx Updates:  See patient's last visit with me on 6/2/2020  See H&P on 7/9/2011      Pathology:    12/20/2019  BONE MARROW, RIGHT ILIAC CREST,    ASPIRATE, CLOT SECTION, AND CORE BIOPSY:    --HYPERCELLULAR MARROW (APPROXIMATELY 75% TO 80%) WITH TRILINEAGE   HEMATOPOIETIC ELEMENTS, ERYTHROID  HYPERPLASIA AND MILD MEGAKARYOCYTIC HYPERPLASIA, AND NON-SPECIFIC   DYSHEMATOPOIETIC CHANGES (SEE     COMMENT).  --[id number] INCREASED STAINABLE IRON WITH INCREASED RING   SIDEROBLASTS (GREATER THAN 15%)     (SEE COMMENT).  --PERIPHERAL BLOOD WITH THROMBOCYTOSIS (574,000/MICROLITER) AND ANEMIA   (HEMOGLOBIN 5.5 GRAM/DECILITER),    WITH SCATTERED DREPANOCYTOID FORMS AND FEW NUCLEATED ERYTHROCYTES      Cytogenetic Results at the bottom of the report.  NORMAL    Objective:     Vitals:  Blood pressure (!) 129/92, pulse 102, temperature 97.7 °F (36.5 °C), resp. rate 20, height 5' 10.5" (1.791 m), weight 104.8 kg (231 lb 1.6 oz).    Physical Examination:   GEN: no apparent distress, comfortable; AAOx3  HEAD: atraumatic and normocephalic  EYES: no pallor, no icterus, PERRLA  ENT: OMM, no pharyngeal erythema, external ears WNL; no nasal discharge; no thrush  NECK: no masses, thyroid normal, trachea midline, no LAD/LN's, supple  CV: RRR with no murmur; normal pulse; normal S1 and S2; no pedal edema  CHEST: Normal respiratory effort; CTAB; normal breath sounds; no wheeze or crackles  ABDOM: nontender and nondistended; soft; normal bowel sounds; no rebound/guarding  MUSC/Skeletal: ROM normal; no crepitus; joints normal; no deformities or arthropathy  EXTREM: no clubbing, cyanosis, inflammation or swelling  SKIN: no rashes, lesions, ulcers, petechiae or subcutaneous nodules  : no jiang  NEURO: grossly intact; motor/sensory WNL; AAOx3; no tremors  PSYCH: normal mood, affect and behavior  LYMPH: normal cervical, supraclavicular, axillary and groin LN's            Labs:     Lab Results   Component Value Date    WBC " 10.72 07/08/2020    HGB 8.8 (L) 07/22/2020    HCT 27.3 (L) 07/22/2020    MCV 96 07/08/2020     (H) 07/08/2020           CMP  Sodium   Date Value Ref Range Status   07/08/2020 143 136 - 145 mmol/L Final   06/26/2019 138 134 - 144 mmol/L      Potassium   Date Value Ref Range Status   07/08/2020 4.2 3.5 - 5.1 mmol/L Final     Chloride   Date Value Ref Range Status   07/08/2020 107 95 - 110 mmol/L Final   06/26/2019 105 98 - 110 mmol/L      CO2   Date Value Ref Range Status   07/08/2020 27 23 - 29 mmol/L Final     Glucose   Date Value Ref Range Status   07/08/2020 112 (H) 70 - 110 mg/dL Final   06/26/2019 114 (H) 70 - 99 mg/dL      BUN, Bld   Date Value Ref Range Status   07/08/2020 22 (H) 6 - 20 mg/dL Final     Creatinine   Date Value Ref Range Status   07/08/2020 1.4 0.5 - 1.4 mg/dL Final   06/26/2019 1.62 (H) 0.60 - 1.40 mg/dL      Calcium   Date Value Ref Range Status   07/08/2020 8.9 8.7 - 10.5 mg/dL Final     Total Protein   Date Value Ref Range Status   07/08/2020 7.1 6.0 - 8.4 g/dL Final     Albumin   Date Value Ref Range Status   07/08/2020 4.0 3.5 - 5.2 g/dL Final   06/26/2019 4.4 3.1 - 4.7 g/dL      Total Bilirubin   Date Value Ref Range Status   07/08/2020 0.7 0.1 - 1.0 mg/dL Final     Comment:     For infants and newborns, interpretation of results should be based  on gestational age, weight and in agreement with clinical  observations.  Premature Infant recommended reference ranges:  Up to 24 hours.............<8.0 mg/dL  Up to 48 hours............<12.0 mg/dL  3-5 days..................<15.0 mg/dL  6-29 days.................<15.0 mg/dL       Alkaline Phosphatase   Date Value Ref Range Status   07/08/2020 55 55 - 135 U/L Final     AST   Date Value Ref Range Status   07/08/2020 23 10 - 40 U/L Final     ALT   Date Value Ref Range Status   07/08/2020 22 10 - 44 U/L Final     Anion Gap   Date Value Ref Range Status   07/08/2020 9 8 - 16 mmol/L Final     eGFR if    Date Value Ref Range  "Status   07/08/2020 >60.0 >60 mL/min/1.73 m^2 Final     eGFR if non    Date Value Ref Range Status   07/08/2020 54.2 (A) >60 mL/min/1.73 m^2 Final     Comment:     Calculation used to obtain the estimated glomerular filtration  rate (eGFR) is the CKD-EPI equation.                I have reviewed all available lab results and radiology reports.    Radiology/Diagnostic Studies:    2/15/2018 Xray Survey:   IMPRESSION: No significant abnormality seen. No evidence of osteoblastic or  osteoclastic lesions identified    MRI L-spine 2/12/2018  IMPRESSION:    1. Prominent low signal intensity throughout the bone marrow on T1 and  T2-weighted imaging. Marrow infiltrative process including malignancy such as  metastatic disease or lymphoma/leukemia is a consideration along with multiple  myeloma or malignant process. Benign etiology such as osteopetrosis or  regenerative red marrow are also considerations.    2. Multilevel lumbar degenerative changes, most prominent at L4-L5 and L5-S1 as  described.    Assessment/Plan:   (1) 60 y.o. male with diagnosis of sickle cell anemia with borderline microcytosis  - latest hgb was 6.0 and he had blood transfusion  - today, he is not symptomatic at this time  - he probably has a multifactorial anemia process with underlying sickle cell, anemia of chronic disorders and anemia of chronic renal. I can not rule out an underlying GI bleeding process.   - iron panel is adequate (no deficiency)  - total bilirubin is only minimally elevated  - he required transfusion again in Oct 2019 and again in Dec 2019  - he had bone marrow biopsy on 12/20/2019    "dyshematopoietic changes are not entirely specific and are present  mostly within erythroid and megakaryocytic lineages. These findings   could be observed in chronic disease states, autoimmune conditions,  infectious settings, toxic exposures, nutritional deficits, as medication/drug effect, and in myelodysplastic syndrome (MDS), " "among other conditions"  "Additionally, ring sideroblasts are a non-specific   finding "    Patient was referred to see Dr Mustafa at [facility] and had repeat BM biopsy with diagnosis made of RARS  - he is now on procrit per directives of Dr Mustafa    7/30/2020:   he recently saw Dr Mustafa  And sees him again in Jan 2021  - he is doing well with procrit and is feeling much better  - he has not required any transfusions for some time time    (2) Alcohol abuse issues in past - he has been sober for over 3 years now    (3) former smoker    (4) chronic back issues - prior Xrays and MRI - suspect the findings on the MRI are possibly due to his sickle cell;   - SPEP was previously negative for M-protein  - PSA was 0.3 in Sept 2017  - recommended neurosurgery evaluation previously  - he saw Dr Nura VUONG and had a nerve "burning" procedure and his pain has improved    (5) CRI - elevated creatinine - he is followed Dr Chilel with nephrology      (6) H pylori gastritis - s/p recent endoscopy with Dr Collins and antibotics x 10 days        1. Chronic anemia     2. Anemia, chronic renal failure, stage 2 (mild)     3. Anemia due to multiple mechanisms     4. Anemia, unspecified type     5. Sickle cell trait syndrome     6. Thrombocytosis     7. RARS (refractory anemia with ringed sideroblasts)     8. Normochromic anemia     9. Monocytosis     10. Macrocytic anemia     11. Folic acid deficiency     12. Former smoker       PLAN;  1. Continue with the procrit  2.  Recommend that he f/u with Dr Collins  3.  F/u with nephrology as directed by them  4. Check labs every 2 weeks- encouraged compliance 5. Procrit as per Dr Mustafa's directives  6.  Encouraged continued sobriety  7. F/u with Dr Mustafa on Jan 2021       RTC 8 weeks  Fax note to [facility]; [place]    Total Time spent on patient:    I spent over 25 mins of time with the patient. Reviewed results of the recently ordered labs, tests, reports and studies; made directives with " regards to the results. Over half of this time was spent couseling and coordinating care, making treatment and analytical decisions; ordering necessary labs, tests and studies; and discussing treatment options and setting up treatment plan(s) if indicated.            Discussion:       Pathology Discussion:    I reviewed and discussed the pathology report(s) and radiograph reports (if available) in as simple to understand and/or laymen's terms to the best of my ability. I had an indepth conversation with the patient and went over the patient's individual diagnosis based on the information that was currently available. I discussed the TNM staging process with regard to the patient's particular cancer type, and the calculated stage based on the currently available TNM data and literature. I discussed the available prognostic data with regard to the current staging information and how it relates to the prognosis of their particular neoplastic process.          COVID-19 Discussion:    I had long discussion with patient and any applicable family about the COVID-19 coronavirus epidemic and the recommended precautions with regard to cancer and/or hematology patients. I have re-iterated the CDC recommendations for adequate hand washing, use of hand -like products, and coughing into elbow, etc. In addition, especially for our patients who are on chemotherapy and/or our otherwise immunocompromised patients, I have recommended avoidance of crowds, including movie theaters, restaurants, churches, etc. I have recommended avoidance of any sick or symptomatic family members and/or friends. I have also recommended avoidance of any raw and unwashed food products, and general avoidance of food items that have not been prepared by themselves. The patient has been asked to call us immediately with any symptom developments, issues, questions or other general concerns.     I have explained all of the above in detail and the  patient understands all of the current recommendation(s). I have answered all of their questions to the best of my ability and to their complete satisfaction.   The patient is to continue with the current management plan.            Electronically signed by Jose Tello MD

## 2020-07-30 ENCOUNTER — INFUSION (OUTPATIENT)
Dept: INFUSION THERAPY | Facility: HOSPITAL | Age: 60
End: 2020-07-30
Attending: INTERNAL MEDICINE
Payer: COMMERCIAL

## 2020-07-30 ENCOUNTER — OFFICE VISIT (OUTPATIENT)
Dept: HEMATOLOGY/ONCOLOGY | Facility: CLINIC | Age: 60
End: 2020-07-30
Payer: COMMERCIAL

## 2020-07-30 VITALS
DIASTOLIC BLOOD PRESSURE: 92 MMHG | HEART RATE: 102 BPM | BODY MASS INDEX: 32.36 KG/M2 | WEIGHT: 231.13 LBS | TEMPERATURE: 98 F | HEIGHT: 71 IN | RESPIRATION RATE: 20 BRPM | SYSTOLIC BLOOD PRESSURE: 129 MMHG

## 2020-07-30 DIAGNOSIS — D64.9 NORMOCHROMIC ANEMIA: ICD-10-CM

## 2020-07-30 DIAGNOSIS — D53.9 MACROCYTIC ANEMIA: ICD-10-CM

## 2020-07-30 DIAGNOSIS — D63.1 ANEMIA, CHRONIC RENAL FAILURE, STAGE 2 (MILD): ICD-10-CM

## 2020-07-30 DIAGNOSIS — D72.821 MONOCYTOSIS: ICD-10-CM

## 2020-07-30 DIAGNOSIS — D75.839 THROMBOCYTOSIS: ICD-10-CM

## 2020-07-30 DIAGNOSIS — D64.9 ANEMIA, UNSPECIFIED TYPE: ICD-10-CM

## 2020-07-30 DIAGNOSIS — D57.3 SICKLE CELL TRAIT SYNDROME: ICD-10-CM

## 2020-07-30 DIAGNOSIS — Z87.891 FORMER SMOKER: ICD-10-CM

## 2020-07-30 DIAGNOSIS — N18.30 CKD (CHRONIC KIDNEY DISEASE) STAGE 3, GFR 30-59 ML/MIN: ICD-10-CM

## 2020-07-30 DIAGNOSIS — N18.2 ANEMIA, CHRONIC RENAL FAILURE, STAGE 2 (MILD): ICD-10-CM

## 2020-07-30 DIAGNOSIS — E53.8 FOLIC ACID DEFICIENCY: ICD-10-CM

## 2020-07-30 DIAGNOSIS — D64.89 ANEMIA DUE TO MULTIPLE MECHANISMS: ICD-10-CM

## 2020-07-30 DIAGNOSIS — D46.1 RARS (REFRACTORY ANEMIA WITH RINGED SIDEROBLASTS): ICD-10-CM

## 2020-07-30 DIAGNOSIS — D64.9 CHRONIC ANEMIA: ICD-10-CM

## 2020-07-30 DIAGNOSIS — D64.9 CHRONIC ANEMIA: Primary | ICD-10-CM

## 2020-07-30 DIAGNOSIS — D64.89 ANEMIA DUE TO MULTIPLE MECHANISMS: Primary | ICD-10-CM

## 2020-07-30 PROCEDURE — 99214 OFFICE O/P EST MOD 30 MIN: CPT | Mod: S$GLB,,, | Performed by: INTERNAL MEDICINE

## 2020-07-30 PROCEDURE — 3074F PR MOST RECENT SYSTOLIC BLOOD PRESSURE < 130 MM HG: ICD-10-PCS | Mod: S$GLB,,, | Performed by: INTERNAL MEDICINE

## 2020-07-30 PROCEDURE — 3080F DIAST BP >= 90 MM HG: CPT | Mod: S$GLB,,, | Performed by: INTERNAL MEDICINE

## 2020-07-30 PROCEDURE — 63600175 PHARM REV CODE 636 W HCPCS: Performed by: INTERNAL MEDICINE

## 2020-07-30 PROCEDURE — 96372 THER/PROPH/DIAG INJ SC/IM: CPT

## 2020-07-30 PROCEDURE — 3074F SYST BP LT 130 MM HG: CPT | Mod: S$GLB,,, | Performed by: INTERNAL MEDICINE

## 2020-07-30 PROCEDURE — 3008F BODY MASS INDEX DOCD: CPT | Mod: S$GLB,,, | Performed by: INTERNAL MEDICINE

## 2020-07-30 PROCEDURE — 99214 PR OFFICE/OUTPT VISIT, EST, LEVL IV, 30-39 MIN: ICD-10-PCS | Mod: S$GLB,,, | Performed by: INTERNAL MEDICINE

## 2020-07-30 PROCEDURE — 3080F PR MOST RECENT DIASTOLIC BLOOD PRESSURE >= 90 MM HG: ICD-10-PCS | Mod: S$GLB,,, | Performed by: INTERNAL MEDICINE

## 2020-07-30 PROCEDURE — 3008F PR BODY MASS INDEX (BMI) DOCUMENTED: ICD-10-PCS | Mod: S$GLB,,, | Performed by: INTERNAL MEDICINE

## 2020-07-30 RX ADMIN — EPOETIN ALFA-EPBX 5000 UNITS: 3000 INJECTION, SOLUTION INTRAVENOUS; SUBCUTANEOUS at 03:07

## 2020-08-05 ENCOUNTER — LAB VISIT (OUTPATIENT)
Dept: LAB | Facility: HOSPITAL | Age: 60
End: 2020-08-05
Attending: INTERNAL MEDICINE
Payer: COMMERCIAL

## 2020-08-05 DIAGNOSIS — D57.3 SICKLE CELL TRAIT SYNDROME: ICD-10-CM

## 2020-08-05 DIAGNOSIS — D64.9 ANEMIA, UNSPECIFIED TYPE: ICD-10-CM

## 2020-08-05 DIAGNOSIS — D46.1 RARS (REFRACTORY ANEMIA WITH RINGED SIDEROBLASTS): ICD-10-CM

## 2020-08-05 DIAGNOSIS — D63.1 ANEMIA, CHRONIC RENAL FAILURE, STAGE 2 (MILD): ICD-10-CM

## 2020-08-05 DIAGNOSIS — D64.89 ANEMIA DUE TO MULTIPLE MECHANISMS: ICD-10-CM

## 2020-08-05 DIAGNOSIS — D64.9 CHRONIC ANEMIA: ICD-10-CM

## 2020-08-05 DIAGNOSIS — D53.9 MACROCYTIC ANEMIA: ICD-10-CM

## 2020-08-05 DIAGNOSIS — D64.9 NORMOCHROMIC ANEMIA: ICD-10-CM

## 2020-08-05 DIAGNOSIS — N18.2 ANEMIA, CHRONIC RENAL FAILURE, STAGE 2 (MILD): ICD-10-CM

## 2020-08-05 LAB
ALBUMIN SERPL BCP-MCNC: 4.2 G/DL (ref 3.5–5.2)
ALP SERPL-CCNC: 65 U/L (ref 55–135)
ALT SERPL W/O P-5'-P-CCNC: 22 U/L (ref 10–44)
ANION GAP SERPL CALC-SCNC: 7 MMOL/L (ref 8–16)
AST SERPL-CCNC: 21 U/L (ref 10–40)
BASOPHILS # BLD AUTO: 0.08 K/UL (ref 0–0.2)
BASOPHILS NFR BLD: 1.4 % (ref 0–1.9)
BILIRUB SERPL-MCNC: 0.8 MG/DL (ref 0.1–1)
BUN SERPL-MCNC: 24 MG/DL (ref 6–20)
CALCIUM SERPL-MCNC: 8.6 MG/DL (ref 8.7–10.5)
CHLORIDE SERPL-SCNC: 107 MMOL/L (ref 95–110)
CO2 SERPL-SCNC: 27 MMOL/L (ref 23–29)
CREAT SERPL-MCNC: 1.5 MG/DL (ref 0.5–1.4)
DIFFERENTIAL METHOD: ABNORMAL
EOSINOPHIL # BLD AUTO: 0.1 K/UL (ref 0–0.5)
EOSINOPHIL NFR BLD: 2.2 % (ref 0–8)
ERYTHROCYTE [DISTWIDTH] IN BLOOD BY AUTOMATED COUNT: 25.6 % (ref 11.5–14.5)
EST. GFR  (AFRICAN AMERICAN): 57.7 ML/MIN/1.73 M^2
EST. GFR  (NON AFRICAN AMERICAN): 49.9 ML/MIN/1.73 M^2
FERRITIN SERPL-MCNC: 1611 NG/ML (ref 20–300)
GLUCOSE SERPL-MCNC: 122 MG/DL (ref 70–110)
HCT VFR BLD AUTO: 25.8 % (ref 40–54)
HCT VFR BLD AUTO: 25.8 % (ref 40–54)
HGB BLD-MCNC: 8.8 G/DL (ref 14–18)
HGB BLD-MCNC: 8.8 G/DL (ref 14–18)
IMM GRANULOCYTES # BLD AUTO: 0.02 K/UL (ref 0–0.04)
IMM GRANULOCYTES NFR BLD AUTO: 0.3 % (ref 0–0.5)
IRON SERPL-MCNC: 110 UG/DL (ref 45–160)
LYMPHOCYTES # BLD AUTO: 1.9 K/UL (ref 1–4.8)
LYMPHOCYTES NFR BLD: 32 % (ref 18–48)
MCH RBC QN AUTO: 31.5 PG (ref 27–31)
MCHC RBC AUTO-ENTMCNC: 34.1 G/DL (ref 32–36)
MCV RBC AUTO: 93 FL (ref 82–98)
MONOCYTES # BLD AUTO: 0.7 K/UL (ref 0.3–1)
MONOCYTES NFR BLD: 11.6 % (ref 4–15)
NEUTROPHILS # BLD AUTO: 3.1 K/UL (ref 1.8–7.7)
NEUTROPHILS NFR BLD: 52.5 % (ref 38–73)
NRBC BLD-RTO: 0 /100 WBC
PLATELET # BLD AUTO: 429 K/UL (ref 150–350)
PMV BLD AUTO: 11 FL (ref 9.2–12.9)
POTASSIUM SERPL-SCNC: 4 MMOL/L (ref 3.5–5.1)
PROT SERPL-MCNC: 7.3 G/DL (ref 6–8.4)
RBC # BLD AUTO: 2.79 M/UL (ref 4.6–6.2)
SATURATED IRON: 55 % (ref 20–50)
SODIUM SERPL-SCNC: 141 MMOL/L (ref 136–145)
TOTAL IRON BINDING CAPACITY: 199 UG/DL (ref 250–450)
TRANSFERRIN SERPL-MCNC: 142 MG/DL (ref 200–375)
WBC # BLD AUTO: 5.85 K/UL (ref 3.9–12.7)

## 2020-08-05 PROCEDURE — 82728 ASSAY OF FERRITIN: CPT

## 2020-08-05 PROCEDURE — 83540 ASSAY OF IRON: CPT

## 2020-08-05 PROCEDURE — 85025 COMPLETE CBC W/AUTO DIFF WBC: CPT

## 2020-08-05 PROCEDURE — 36415 COLL VENOUS BLD VENIPUNCTURE: CPT

## 2020-08-05 PROCEDURE — 80053 COMPREHEN METABOLIC PANEL: CPT

## 2020-08-06 ENCOUNTER — INFUSION (OUTPATIENT)
Dept: INFUSION THERAPY | Facility: HOSPITAL | Age: 60
End: 2020-08-06
Attending: INTERNAL MEDICINE
Payer: COMMERCIAL

## 2020-08-06 VITALS
SYSTOLIC BLOOD PRESSURE: 146 MMHG | OXYGEN SATURATION: 99 % | DIASTOLIC BLOOD PRESSURE: 88 MMHG | HEART RATE: 69 BPM | RESPIRATION RATE: 18 BRPM | TEMPERATURE: 98 F | BODY MASS INDEX: 33.24 KG/M2 | WEIGHT: 235 LBS

## 2020-08-06 DIAGNOSIS — N18.30 CKD (CHRONIC KIDNEY DISEASE) STAGE 3, GFR 30-59 ML/MIN: ICD-10-CM

## 2020-08-06 DIAGNOSIS — D64.9 CHRONIC ANEMIA: ICD-10-CM

## 2020-08-06 DIAGNOSIS — D64.9 NORMOCHROMIC ANEMIA: ICD-10-CM

## 2020-08-06 DIAGNOSIS — D64.89 ANEMIA DUE TO MULTIPLE MECHANISMS: Primary | ICD-10-CM

## 2020-08-06 PROCEDURE — 96372 THER/PROPH/DIAG INJ SC/IM: CPT

## 2020-08-06 PROCEDURE — 63600175 PHARM REV CODE 636 W HCPCS: Performed by: INTERNAL MEDICINE

## 2020-08-06 RX ADMIN — EPOETIN ALFA-EPBX 5000 UNITS: 3000 INJECTION, SOLUTION INTRAVENOUS; SUBCUTANEOUS at 03:08

## 2020-08-06 NOTE — PLAN OF CARE
Problem: Anemia  Goal: Anemia Symptom Improvement  Outcome: Ongoing, Progressing  Intervention: Monitor and Manage Anemia  Flowsheets (Taken 8/6/2020 1516)  Oral Nutrition Promotion: rest periods promoted  Fatigue Management: frequent rest breaks encouraged

## 2020-08-11 ENCOUNTER — OFFICE VISIT (OUTPATIENT)
Dept: FAMILY MEDICINE | Facility: CLINIC | Age: 60
End: 2020-08-11
Payer: COMMERCIAL

## 2020-08-11 VITALS
TEMPERATURE: 98 F | SYSTOLIC BLOOD PRESSURE: 140 MMHG | DIASTOLIC BLOOD PRESSURE: 90 MMHG | OXYGEN SATURATION: 97 % | BODY MASS INDEX: 33.13 KG/M2 | HEART RATE: 75 BPM | RESPIRATION RATE: 18 BRPM | WEIGHT: 236.63 LBS | HEIGHT: 71 IN

## 2020-08-11 DIAGNOSIS — E53.8 FOLIC ACID DEFICIENCY: ICD-10-CM

## 2020-08-11 DIAGNOSIS — E55.9 VITAMIN D DEFICIENCY: ICD-10-CM

## 2020-08-11 DIAGNOSIS — D46.1 RARS (REFRACTORY ANEMIA WITH RINGED SIDEROBLASTS): ICD-10-CM

## 2020-08-11 DIAGNOSIS — D46.9 MDS (MYELODYSPLASTIC SYNDROME): ICD-10-CM

## 2020-08-11 DIAGNOSIS — I10 ESSENTIAL HYPERTENSION: Primary | ICD-10-CM

## 2020-08-11 DIAGNOSIS — N18.30 CKD (CHRONIC KIDNEY DISEASE) STAGE 3, GFR 30-59 ML/MIN: ICD-10-CM

## 2020-08-11 DIAGNOSIS — E78.5 DYSLIPIDEMIA: ICD-10-CM

## 2020-08-11 DIAGNOSIS — M47.816 LUMBAR SPONDYLOSIS: ICD-10-CM

## 2020-08-11 DIAGNOSIS — F51.01 PRIMARY INSOMNIA: ICD-10-CM

## 2020-08-11 PROBLEM — D63.1 ANEMIA, CHRONIC RENAL FAILURE, STAGE 2 (MILD): Status: RESOLVED | Noted: 2019-01-13 | Resolved: 2020-08-11

## 2020-08-11 PROBLEM — R93.7 ABNORMAL MRI, SPINE: Status: RESOLVED | Noted: 2018-11-05 | Resolved: 2020-08-11

## 2020-08-11 PROBLEM — R77.0 ABNORMALITY OF ALBUMIN: Status: RESOLVED | Noted: 2017-07-19 | Resolved: 2020-08-11

## 2020-08-11 PROBLEM — D69.6 THROMBOCYTOPENIA: Status: RESOLVED | Noted: 2018-08-20 | Resolved: 2020-08-11

## 2020-08-11 PROBLEM — D64.9 ANEMIA: Status: RESOLVED | Noted: 2019-10-16 | Resolved: 2020-08-11

## 2020-08-11 PROBLEM — N18.2 ANEMIA, CHRONIC RENAL FAILURE, STAGE 2 (MILD): Status: RESOLVED | Noted: 2019-01-13 | Resolved: 2020-08-11

## 2020-08-11 PROBLEM — N13.8 BENIGN PROSTATIC HYPERPLASIA WITH URINARY OBSTRUCTION: Status: RESOLVED | Noted: 2018-08-20 | Resolved: 2020-08-11

## 2020-08-11 PROBLEM — R79.89 SERUM CREATININE RAISED: Status: ACTIVE | Noted: 2018-02-20

## 2020-08-11 PROBLEM — N13.8 BENIGN PROSTATIC HYPERPLASIA WITH URINARY OBSTRUCTION: Status: ACTIVE | Noted: 2018-08-20

## 2020-08-11 PROBLEM — N40.1 BENIGN PROSTATIC HYPERPLASIA WITH URINARY OBSTRUCTION: Status: RESOLVED | Noted: 2018-08-20 | Resolved: 2020-08-11

## 2020-08-11 PROBLEM — D53.9 MACROCYTIC ANEMIA: Status: RESOLVED | Noted: 2017-10-09 | Resolved: 2020-08-11

## 2020-08-11 PROBLEM — N40.1 BENIGN PROSTATIC HYPERPLASIA WITH URINARY OBSTRUCTION: Status: ACTIVE | Noted: 2018-08-20

## 2020-08-11 PROBLEM — M47.896 OTHER SPONDYLOSIS, LUMBAR REGION: Status: RESOLVED | Noted: 2018-04-20 | Resolved: 2020-08-11

## 2020-08-11 PROBLEM — R80.9 PROTEINURIA: Status: RESOLVED | Noted: 2018-11-05 | Resolved: 2020-08-11

## 2020-08-11 PROCEDURE — 3008F PR BODY MASS INDEX (BMI) DOCUMENTED: ICD-10-PCS | Mod: S$GLB,,, | Performed by: NURSE PRACTITIONER

## 2020-08-11 PROCEDURE — 99214 PR OFFICE/OUTPT VISIT, EST, LEVL IV, 30-39 MIN: ICD-10-PCS | Mod: S$GLB,,, | Performed by: NURSE PRACTITIONER

## 2020-08-11 PROCEDURE — 3078F DIAST BP <80 MM HG: CPT | Mod: S$GLB,,, | Performed by: NURSE PRACTITIONER

## 2020-08-11 PROCEDURE — 3077F PR MOST RECENT SYSTOLIC BLOOD PRESSURE >= 140 MM HG: ICD-10-PCS | Mod: S$GLB,,, | Performed by: NURSE PRACTITIONER

## 2020-08-11 PROCEDURE — 3008F BODY MASS INDEX DOCD: CPT | Mod: S$GLB,,, | Performed by: NURSE PRACTITIONER

## 2020-08-11 PROCEDURE — 3078F PR MOST RECENT DIASTOLIC BLOOD PRESSURE < 80 MM HG: ICD-10-PCS | Mod: S$GLB,,, | Performed by: NURSE PRACTITIONER

## 2020-08-11 PROCEDURE — 99214 OFFICE O/P EST MOD 30 MIN: CPT | Mod: S$GLB,,, | Performed by: NURSE PRACTITIONER

## 2020-08-11 PROCEDURE — 3077F SYST BP >= 140 MM HG: CPT | Mod: S$GLB,,, | Performed by: NURSE PRACTITIONER

## 2020-08-11 RX ORDER — TRAZODONE HYDROCHLORIDE 100 MG/1
100 TABLET ORAL NIGHTLY PRN
Qty: 90 TABLET | Refills: 2 | Status: SHIPPED | OUTPATIENT
Start: 2020-08-11 | End: 2021-09-13

## 2020-08-11 RX ORDER — PANTOPRAZOLE SODIUM 40 MG/1
40 TABLET, DELAYED RELEASE ORAL DAILY
COMMUNITY
Start: 2020-07-25 | End: 2021-05-10

## 2020-08-11 RX ORDER — AMLODIPINE BESYLATE 5 MG/1
5 TABLET ORAL DAILY
Qty: 90 TABLET | Refills: 2 | Status: SHIPPED | OUTPATIENT
Start: 2020-08-11 | End: 2021-05-10

## 2020-08-11 RX ORDER — NAPROXEN 500 MG/1
500 TABLET ORAL NIGHTLY PRN
Qty: 30 TABLET | Refills: 0 | Status: SHIPPED | OUTPATIENT
Start: 2020-08-11 | End: 2020-09-03

## 2020-08-11 NOTE — PROGRESS NOTES
SUBJECTIVE:      Patient ID: Rick Butler is a 60 y.o. male.    Chief Complaint: Follow-up (6 month f/u GERD )    Dr. Mas - nephrology (next visit November 2020)  Dr. Tello - hematology/oncology (next visit Sept 24, 2020)  Dr. Mustafa - MDS specialist (next visit January 2021- every 6 months)    Pt presents for a 6 month follow up. He is currently on a PPI for his GERD which he is compliant with. Reports control of his GERD symptoms on this medication. He has a history of chronic anemia, sickle cell trait, BPH, and some renal insufficiency. Creatinine clearance is calculated today to be 79 mL/min. He goes to Dr. Tello for his anemia and gets labs completed every other week for his anemia. He was sent to Dr. Mustafa for further evaluation of his chronic anemia and was diagnosed with MDS-RARS. He was started on Procrit and labs have been stable thus far. His last blood transfusion was in March. He will see Dr. Mustafa every 6 months. Denies fatigue, shortness of breath, MORA, chest pain, and palpitations at this time. He reports Dr. Tello noted increased blood pressures on recent visits with him and he suggested discussion with me today. Blood pressure is mildly elevated today. He was previously taking losartan but was discontinued after he was losing weight and his blood pressures became lower. He does not watch his diet for salt currently and does not exercise outside of his activities at work. He continues to work at [employer] twice a week. He decreased his number of shifts at the start of COVID-19. He does report recently after work having some lumbar back pain. Previously he was diagnosed with lumbar spondylosis and he had multilevel disc bulges on his last imaging. He saw Dr. Heredia previously for epidural injections but reports they weren't helpful and wore off after approx a week. He reports the pain is only to his lumbar area and is not present now. It is present when he stops work and relaxes.  He did recently buy new shoes to help with this for work. Denies incontinence of bowel or bladder, numbness, or weakness of the legs. He was previously being treated for decreased appetite and weight loss with Periactin but today he has gained approx 30 pounds since last visit. The Trazodone is helping with his insomnia but he still wakes up once in the middle of the night. Denies CP, SOB, wheezing, fevers, nausea, vomiting, diarrhea, constipation, numbness, weakness, dizziness, palpitations, or any other concerns at this time.       Past Surgical History:   Procedure Laterality Date    BONE MARROW BIOPSY N/A 12/20/2019    Procedure: BIOPSY, BONE MARROW;  Surgeon: Satinder Diagnostic Provider;  Location: [facility] OR;  Service: Interventional Radiology;  Laterality: N/A;    INJECTION OF ANESTHETIC AGENT AROUND MEDIAL BRANCH NERVES INNERVATING LUMBAR FACET JOINT Bilateral 5/25/2018    Procedure: BLOCK-NERVE-MEDIAL BRANCH-LUMBAR;  Surgeon: Nura Heredia MD;  Location: [facility] OR;  Service: Pain Management;  Laterality: Bilateral;  L3, 4, 5    RADIOFREQUENCY ABLATION OF LUMBAR MEDIAL BRANCH NERVE AT SINGLE LEVEL Bilateral 7/20/2018    Procedure: RADIOFREQUENCY ABLATION, NERVE, MEDIAL BRANCH, LUMBAR, 1 LEVEL;  Surgeon: Nura Heredia MD;  Location: [facility] OR;  Service: Pain Management;  Laterality: Bilateral;  L3, 4, 5 - Burned at 80 degrees C.  for 75 seconds x 2 each site    SMALL BOWEL ENTEROSCOPY N/A 10/16/2019    Procedure: ENTEROSCOPY;  Surgeon: Rob Collins MD;  Location: [facility] ENDO;  Service: Endoscopy;  Laterality: N/A;     Family History   Problem Relation Age of Onset    Arthritis Mother     Depression Mother     Heart disease Mother     Hypertension Mother     Heart attack Father 59      Social History     Socioeconomic History    Marital status:      Spouse name: Not on file    Number of children: Not on file    Years of education: Not on file    Highest education level: Not on file   Occupational  History    Occupation: Prep Cook     Employer: [employer]   Social Needs    Financial resource strain: Not on file    Food insecurity     Worry: Not on file     Inability: Not on file    Transportation needs     Medical: Not on file     Non-medical: Not on file   Tobacco Use    Smoking status: Former Smoker     Quit date: 1999     Years since quittin.6    Smokeless tobacco: Never Used   Substance and Sexual Activity    Alcohol use: Yes     Alcohol/week: 21.0 standard drinks     Types: 21 Cans of beer per week    Drug use: No    Sexual activity: Yes     Partners: Female   Lifestyle    Physical activity     Days per week: Not on file     Minutes per session: Not on file    Stress: Very much   Relationships    Social connections     Talks on phone: Not on file     Gets together: Not on file     Attends Orthodoxy service: Not on file     Active member of club or organization: Not on file     Attends meetings of clubs or organizations: Not on file     Relationship status: Not on file   Other Topics Concern    Not on file   Social History Narrative    Not on file     Current Outpatient Medications   Medication Sig Dispense Refill    folic acid (FOLVITE) 1 MG tablet Take 2 tablets (2 mg total) by mouth once daily. 180 tablet 2    multivitamin capsule Take 1 capsule by mouth once daily.      pantoprazole (PROTONIX) 40 MG tablet Take 40 mg by mouth once daily.      sildenafil (VIAGRA) 100 MG tablet Take 1 tablet (100 mg total) by mouth daily as needed for Erectile Dysfunction. 10 tablet 6    tamsulosin (FLOMAX) 0.4 mg Cap Take 1 capsule (0.4 mg total) by mouth once daily. 90 capsule 2    amLODIPine (NORVASC) 5 MG tablet Take 1 tablet (5 mg total) by mouth once daily. 90 tablet 2    naproxen (NAPROSYN) 500 MG tablet Take 1 tablet (500 mg total) by mouth nightly as needed (back pain). Take with food. 30 tablet 0    traZODone (DESYREL) 100 MG tablet Take 1 tablet (100 mg total) by mouth  nightly as needed for Insomnia. 90 tablet 2     No current facility-administered medications for this visit.      Review of patient's allergies indicates:  No Known Allergies   Past Medical History:   Diagnosis Date    Anemia due to multiple mechanisms 1/13/2019    Anemia, chronic renal failure, stage 2 (mild) 1/13/2019    Depression     Former smoker 6/29/2017    H/O ETOH abuse 6/29/2017    Monocytosis 2019    RARS (refractory anemia with ringed sideroblasts) 6/1/2020    Sickle cell anemia 6/29/2017     Past Surgical History:   Procedure Laterality Date    BONE MARROW BIOPSY N/A 12/20/2019    Procedure: BIOPSY, BONE MARROW;  Surgeon: Satinder Diagnostic Provider;  Location: [facility] OR;  Service: Interventional Radiology;  Laterality: N/A;    INJECTION OF ANESTHETIC AGENT AROUND MEDIAL BRANCH NERVES INNERVATING LUMBAR FACET JOINT Bilateral 5/25/2018    Procedure: BLOCK-NERVE-MEDIAL BRANCH-LUMBAR;  Surgeon: Nura Heredia MD;  Location: [facility] OR;  Service: Pain Management;  Laterality: Bilateral;  L3, 4, 5    RADIOFREQUENCY ABLATION OF LUMBAR MEDIAL BRANCH NERVE AT SINGLE LEVEL Bilateral 7/20/2018    Procedure: RADIOFREQUENCY ABLATION, NERVE, MEDIAL BRANCH, LUMBAR, 1 LEVEL;  Surgeon: Nura Heredia MD;  Location: [facility] OR;  Service: Pain Management;  Laterality: Bilateral;  L3, 4, 5 - Burned at 80 degrees C.  for 75 seconds x 2 each site    SMALL BOWEL ENTEROSCOPY N/A 10/16/2019    Procedure: ENTEROSCOPY;  Surgeon: Rob Collins MD;  Location: [facility] ENDO;  Service: Endoscopy;  Laterality: N/A;       Review of Systems   Constitutional: Negative for activity change, appetite change, chills, fatigue, fever and unexpected weight change.   HENT: Negative for congestion, ear pain, mouth sores, nosebleeds, postnasal drip, rhinorrhea, sinus pressure, sinus pain, sneezing, sore throat and trouble swallowing.    Eyes: Negative for pain and visual disturbance.   Respiratory: Negative for apnea, cough, chest tightness, shortness  "of breath, wheezing and stridor.    Cardiovascular: Negative for chest pain, palpitations and leg swelling.   Gastrointestinal: Negative for abdominal pain, blood in stool, constipation, diarrhea, nausea and vomiting.   Genitourinary: Negative for dysuria, flank pain, frequency and hematuria.   Musculoskeletal: Positive for back pain. Negative for arthralgias, myalgias and neck stiffness.   Skin: Negative for color change, rash and wound.   Neurological: Negative for dizziness, tremors, seizures, syncope, weakness, light-headedness, numbness and headaches.   Hematological: Negative for adenopathy.   Psychiatric/Behavioral: Negative for confusion, sleep disturbance and suicidal ideas.      OBJECTIVE:      Vitals:    08/11/20 1531 08/11/20 1623   BP: (!) 146/82 (!) 140/90   BP Location: Left arm Left arm   Patient Position: Sitting Sitting   BP Method: Large (Manual)    Pulse: 75    Resp: 18    Temp: 97.7 °F (36.5 °C)    TempSrc: Oral    SpO2: 97%    Weight: 107.3 kg (236 lb 9.6 oz)    Height: 5' 10.5" (1.791 m)      Physical Exam  Vitals signs reviewed.   Constitutional:       General: He is not in acute distress.     Appearance: Normal appearance. He is well-developed and overweight. He is not diaphoretic.   HENT:      Head: Normocephalic and atraumatic.      Right Ear: Hearing, tympanic membrane, ear canal and external ear normal.      Left Ear: Hearing, tympanic membrane, ear canal and external ear normal.      Nose: Nose normal. No mucosal edema, congestion or rhinorrhea.      Mouth/Throat:      Lips: Pink.      Mouth: Mucous membranes are moist.      Pharynx: Oropharynx is clear. Uvula midline. No pharyngeal swelling, oropharyngeal exudate or posterior oropharyngeal erythema.   Eyes:      General: Lids are normal. No scleral icterus.        Right eye: No discharge.         Left eye: No discharge.      Extraocular Movements: Extraocular movements intact.      Conjunctiva/sclera: Conjunctivae normal.      Pupils: " Pupils are equal, round, and reactive to light.   Neck:      Musculoskeletal: Full passive range of motion without pain, normal range of motion and neck supple.      Thyroid: No thyroid mass or thyromegaly.      Vascular: No carotid bruit.      Trachea: Trachea and phonation normal. No tracheal deviation.   Cardiovascular:      Rate and Rhythm: Normal rate and regular rhythm.      Pulses: Normal pulses.      Heart sounds: Normal heart sounds. No murmur. No friction rub. No gallop.    Pulmonary:      Effort: Pulmonary effort is normal. No respiratory distress.      Breath sounds: Normal breath sounds. No stridor. No decreased breath sounds, wheezing, rhonchi or rales.   Abdominal:      General: Bowel sounds are normal.      Palpations: Abdomen is soft.      Tenderness: There is no abdominal tenderness.   Musculoskeletal: Normal range of motion.      Right lower leg: No edema.      Left lower leg: No edema.   Lymphadenopathy:      Cervical: No cervical adenopathy.      Upper Body:      Right upper body: No supraclavicular adenopathy.      Left upper body: No supraclavicular adenopathy.   Skin:     General: Skin is warm and dry.      Capillary Refill: Capillary refill takes less than 2 seconds.      Findings: No rash.   Neurological:      General: No focal deficit present.      Mental Status: He is alert and oriented to person, place, and time.      GCS: GCS eye subscore is 4. GCS verbal subscore is 5. GCS motor subscore is 6.      Coordination: Coordination is intact.      Gait: Gait is intact.   Psychiatric:         Attention and Perception: Attention and perception normal.         Mood and Affect: Mood and affect normal.         Speech: Speech normal.         Behavior: Behavior normal. Behavior is cooperative.         Thought Content: Thought content normal. Thought content does not include suicidal plan.         Cognition and Memory: Cognition and memory normal.         Judgment: Judgment normal.        Assessment:        1. Essential hypertension    2. MDS (myelodysplastic syndrome)    3. RARS (refractory anemia with ringed sideroblasts)    4. CKD (chronic kidney disease) stage 3, GFR 30-59 ml/min    5. Lumbar spondylosis    6. Dyslipidemia    7. Primary insomnia    8. Folic acid deficiency    9. Vitamin D deficiency    10. Body mass index (bmi) 33.0-33.9, adult        Plan:       Essential hypertension  Starting on amlodipine 5 mg daily. Hypertension lifestyle changes: reduce the amount of salt in your diet; caffeine intake in moderation; lose weight; avoid drinking too much alcohol; exercise at least 30 minutes per day most days of the week. Continue current medications and daily home BP monitoring. F/U in 3 months.    -     amLODIPine (NORVASC) 5 MG tablet; Take 1 tablet (5 mg total) by mouth once daily.  Dispense: 90 tablet; Refill: 2    MDS (myelodysplastic syndrome)  RARS (refractory anemia with ringed sideroblasts)  Continue with care, F/U, and medications per Dr. Tello and Dr. Mustafa. Continue with labs every other week.    CKD (chronic kidney disease) stage 3, GFR 30-59 ml/min  Continue with care, F/U, and medications per Dr. Mas.    Lumbar spondylosis  Instructed on the use of Naproxen and heat for his pain after work. Opting for NSAID because his creatinine clearance is ok and he works only twice weekly. The pain generally occurs after work. Discussed lumbar stretching and exercises as well. If pain continues, possible return appointment with Dr. Heredia.  -     naproxen (NAPROSYN) 500 MG tablet; Take 1 tablet (500 mg total) by mouth nightly as needed (back pain). Take with food.  Dispense: 30 tablet; Refill: 0    Dyslipidemia  Prior history of dyslipidemia. Checking cholesterol. Will treat accordingly.  -     Lipid Panel; Future; Expected date: 08/11/2020    Primary insomnia  Increasing trazodone to 100mg nightly. He will let me know how it works for him.  -     traZODone (DESYREL) 100 MG tablet; Take 1 tablet  (100 mg total) by mouth nightly as needed for Insomnia.  Dispense: 90 tablet; Refill: 2    Folic acid deficiency  -     Folate; Future; Expected date: 08/11/2020    Vitamin D deficiency  -     Vitamin D; Future; Expected date: 08/11/2020    Body mass index (bmi) 33.0-33.9, adult  Discontinued the Periactin at this time because of his increase in weight. The patient is asked to make an attempt to improve diet and exercise patterns to aid in medical management of this problem. Discussed a diet higher in protein and vegetable intake with decreased carb and sugar intake. Discussed the guidelines recommending 150 minutes per week of brisk exercise. Verbalized understanding.      Follow up in about 3 months (around 11/11/2020) for F/U HTN.      8/12/2020 LATOYA Barakat, LUCINAP

## 2020-08-11 NOTE — PATIENT INSTRUCTIONS
Back Care Tips    Caring for your back  These are things you can do to prevent a recurrence of acute back pain and to reduce symptoms from chronic back pain:  · Maintain a healthy weight. If you are overweight, losing weight will help most types of back pain.  · Exercise is an important part of recovery from most types of back pain. The muscles behind and in front of the spine support the back. This means strengthening both the back muscles and the abdominal muscles will provide better support for your spine.   · Swimming and brisk walking are good overall exercises to improve your fitness level.  · Practice safe lifting methods (below).  · Practice good posture when sitting, standing and walking. Avoid prolonged sitting. This puts more stress on the lower back than standing or walking.  · Wear quality shoes with sufficient arch support. Foot and ankle alignment can affect back symptoms. Women should avoid wearing high heels.  · Therapeutic massage can help relax the back muscles without stretching them.  · During the first 24 to 72 hours after an acute injury or flare-up of chronic back pain, apply an ice pack to the painful area for 20 minutes and then remove it for 20 minutes, over a period of 60 to 90 minutes, or several times a day. As a safety precaution, do not use a heating pad at bedtime. Sleeping on a heating pad can lead to skin burns or tissue damage.  · You can alternate ice and heat therapies.  Medications  Talk to your healthcare provider before using medicines, especially if you have other medical problems or are taking other medicines.  · You may use acetaminophen or ibuprofen to control pain, unless your healthcare provider prescribed other pain medicine. If you have chronic conditions like diabetes, liver or kidney disease, stomach ulcers, or gastrointestinal bleeding, or are taking blood thinners, talk with your healthcare provider before taking any medicines.  · Be careful if you are given  prescription pain medicines, narcotics, or medicine for muscle spasm. They can cause drowsiness, affect your coordination, reflexes, and judgment. Do not drive or operate heavy machinery while taking these types of medicines. Take prescription pain medicine only as prescribed by your healthcare provider.  Lumbar stretch  Here is a simple stretching exercise that will help relax muscle spasm and keep your back more limber. If exercise makes your back pain worse, dont do it.  · Lie on your back with your knees bent and both feet on the ground.  · Slowly raise your left knee to your chest as you flatten your lower back against the floor. Hold for 5 seconds.  · Relax and repeat the exercise with your right knee.  · Do 10 of these exercises for each leg.  Safe lifting method  · Dont bend over at the waist to lift an object off the floor.  Instead, bend your knees and hips in a squat.   · Keep your back and head upright  · Hold the object close to your body, directly in front of you.  · Straighten your legs to lift the object.   · Lower the object to the floor in the reverse fashion.  · If you must slide something across the floor, push it.  Posture tips  Sitting  Sit in chairs with straight backs or low-back support. Keep your knees lower than your hips, with your feet flat on the floor.  When driving, sit up straight. Adjust the seat forward so you are not leaning toward the steering wheel.  A small pillow or rolled towel behind your lower back may help if you are driving long distances.   Standing  When standing for long periods, shift most of your weight to one leg at a time. Alternate legs every few minutes.   Sleeping  The best way to sleep is on your side with your knees bent. Put a low pillow under your head to support your neck in a neutral spine position. Avoid thick pillows that bend your neck to one side. Put a pillow between your legs to further relax your lower back. If you sleep on your back, put pillows  under your knees to support your legs in a slightly flexed position. Use a firm mattress. If your mattress sags, replace it, or use a 1/2-inch plywood board under the mattress to add support.  Follow-up care  Follow up with your healthcare provider, or as advised.  If X-rays, a CT scan or an MRI scan were taken, they will be reviewed by a radiologist. You will be notified of any new findings that may affect your care.  Call 911  Seek emergency medical care if any of the following occur:  · Trouble breathing  · Confusion  · Very drowsy  · Fainting or loss of consciousness  · Rapid or very slow heart rate  · Loss of  bowel or bladder control  When to seek medical care  Call your healthcare provider if any of the following occur:  · Pain becomes worse or spreads to your arms or legs  · Weakness or numbness in one or both arms or legs  · Numbness in the groin area  Date Last Reviewed: 6/1/2016  © 6816-3809 [employer]. [address] [place] [address]. All rights reserved. This information is not intended as a substitute for professional medical care. Always follow your healthcare professional's instructions.      Caring for Your Back Throughout the Day  Take care of your back throughout the day. You will likely have fewer back problems if you do. Try to warm up before you move. Shift positions often. Also do your best to form healthy habits.    Warm up for the day  Do a few slow, catlike stretches before starting your day. This simple warmup can soften your disks, stretch your back muscles, and help prevent injuries.  Shift positions often  At work and at home, change positions often. This helps keep your body from getting stiff. Stand up or lean back while you sit. If you can, get up and move every 1/2 hour.  Form healthy habits  Here are some suggestions:   · Keep a healthy weight. When you weigh too much, your back is under excess strain. But losing just a few extra pounds can help a  lot.  · Try not to overeat. Learn about serving sizes. The size of a serving depends on the food and the food group. Many foods list serving sizes on the labels.  · Handle minor aches with cold and heat. Apply cold the first 24 to 48 hours. Use heat after that. Always place a thin cloth between your skin and the source of cold or heat.  · Take medicines as directed. This helps keep pain under control. Always read labels, and call your healthcare provider or pharmacist if you have any questions.  Walk each day  A daily walk keeps your back and thigh muscles stretched and strong. This gives your back better support. Be sure to walk with your spines three curves aligned, by keeping your head, hips, and toes connected by a vertical line.   Date Last Reviewed: 10/18/2015  © 0528-4691 [employer]. [address] [place] [address], [place] [address]. All rights reserved. This information is not intended as a substitute for professional medical care. Always follow your healthcare professional's instructions.

## 2020-08-13 ENCOUNTER — LAB VISIT (OUTPATIENT)
Dept: LAB | Facility: HOSPITAL | Age: 60
End: 2020-08-13
Attending: NURSE PRACTITIONER
Payer: COMMERCIAL

## 2020-08-13 ENCOUNTER — INFUSION (OUTPATIENT)
Dept: INFUSION THERAPY | Facility: HOSPITAL | Age: 60
End: 2020-08-13
Attending: INTERNAL MEDICINE
Payer: COMMERCIAL

## 2020-08-13 VITALS
TEMPERATURE: 97 F | OXYGEN SATURATION: 98 % | SYSTOLIC BLOOD PRESSURE: 151 MMHG | DIASTOLIC BLOOD PRESSURE: 87 MMHG | WEIGHT: 237.31 LBS | BODY MASS INDEX: 33.57 KG/M2 | HEART RATE: 82 BPM | RESPIRATION RATE: 18 BRPM

## 2020-08-13 DIAGNOSIS — E53.8 FOLIC ACID DEFICIENCY: ICD-10-CM

## 2020-08-13 DIAGNOSIS — E55.9 VITAMIN D DEFICIENCY: ICD-10-CM

## 2020-08-13 DIAGNOSIS — D64.89 ANEMIA DUE TO MULTIPLE MECHANISMS: Primary | ICD-10-CM

## 2020-08-13 DIAGNOSIS — E78.5 DYSLIPIDEMIA: ICD-10-CM

## 2020-08-13 DIAGNOSIS — D64.9 CHRONIC ANEMIA: ICD-10-CM

## 2020-08-13 DIAGNOSIS — N18.30 CKD (CHRONIC KIDNEY DISEASE) STAGE 3, GFR 30-59 ML/MIN: ICD-10-CM

## 2020-08-13 DIAGNOSIS — D64.9 NORMOCHROMIC ANEMIA: ICD-10-CM

## 2020-08-13 LAB
25(OH)D3+25(OH)D2 SERPL-MCNC: 38 NG/ML (ref 30–96)
CHOLEST SERPL-MCNC: 152 MG/DL (ref 120–199)
CHOLEST/HDLC SERPL: 3.1 {RATIO} (ref 2–5)
FOLATE SERPL-MCNC: >24.8 NG/ML (ref 4–24)
HDLC SERPL-MCNC: 49 MG/DL (ref 40–75)
HDLC SERPL: 32.2 % (ref 20–50)
LDLC SERPL CALC-MCNC: 93 MG/DL (ref 63–159)
NONHDLC SERPL-MCNC: 103 MG/DL
TRIGL SERPL-MCNC: 50 MG/DL (ref 30–150)

## 2020-08-13 PROCEDURE — 82746 ASSAY OF FOLIC ACID SERUM: CPT

## 2020-08-13 PROCEDURE — 36415 COLL VENOUS BLD VENIPUNCTURE: CPT

## 2020-08-13 PROCEDURE — 63600175 PHARM REV CODE 636 W HCPCS: Performed by: INTERNAL MEDICINE

## 2020-08-13 PROCEDURE — 82306 VITAMIN D 25 HYDROXY: CPT

## 2020-08-13 PROCEDURE — 96372 THER/PROPH/DIAG INJ SC/IM: CPT

## 2020-08-13 PROCEDURE — 80061 LIPID PANEL: CPT

## 2020-08-13 RX ADMIN — EPOETIN ALFA-EPBX 5000 UNITS: 10000 INJECTION, SOLUTION INTRAVENOUS; SUBCUTANEOUS at 03:08

## 2020-08-19 ENCOUNTER — LAB VISIT (OUTPATIENT)
Dept: LAB | Facility: HOSPITAL | Age: 60
End: 2020-08-19
Attending: INTERNAL MEDICINE
Payer: COMMERCIAL

## 2020-08-19 DIAGNOSIS — D64.89 ANEMIA DUE TO MULTIPLE MECHANISMS: ICD-10-CM

## 2020-08-19 DIAGNOSIS — D63.1 ANEMIA, CHRONIC RENAL FAILURE, STAGE 2 (MILD): ICD-10-CM

## 2020-08-19 DIAGNOSIS — D53.9 MACROCYTIC ANEMIA: ICD-10-CM

## 2020-08-19 DIAGNOSIS — N18.2 ANEMIA, CHRONIC RENAL FAILURE, STAGE 2 (MILD): ICD-10-CM

## 2020-08-19 LAB
HCT VFR BLD AUTO: 25.7 % (ref 40–54)
HGB BLD-MCNC: 8.6 G/DL (ref 14–18)

## 2020-08-19 PROCEDURE — 36415 COLL VENOUS BLD VENIPUNCTURE: CPT

## 2020-08-19 PROCEDURE — 85018 HEMOGLOBIN: CPT

## 2020-08-19 PROCEDURE — 85014 HEMATOCRIT: CPT

## 2020-08-20 ENCOUNTER — INFUSION (OUTPATIENT)
Dept: INFUSION THERAPY | Facility: HOSPITAL | Age: 60
End: 2020-08-20
Attending: INTERNAL MEDICINE
Payer: COMMERCIAL

## 2020-08-20 VITALS
SYSTOLIC BLOOD PRESSURE: 136 MMHG | RESPIRATION RATE: 18 BRPM | DIASTOLIC BLOOD PRESSURE: 84 MMHG | HEART RATE: 80 BPM | BODY MASS INDEX: 34.02 KG/M2 | WEIGHT: 240.5 LBS | OXYGEN SATURATION: 99 % | TEMPERATURE: 97 F

## 2020-08-20 DIAGNOSIS — D64.9 NORMOCHROMIC ANEMIA: ICD-10-CM

## 2020-08-20 DIAGNOSIS — D64.89 ANEMIA DUE TO MULTIPLE MECHANISMS: Primary | ICD-10-CM

## 2020-08-20 DIAGNOSIS — N18.30 CKD (CHRONIC KIDNEY DISEASE) STAGE 3, GFR 30-59 ML/MIN: ICD-10-CM

## 2020-08-20 DIAGNOSIS — D64.9 CHRONIC ANEMIA: ICD-10-CM

## 2020-08-20 PROCEDURE — 96372 THER/PROPH/DIAG INJ SC/IM: CPT

## 2020-08-20 PROCEDURE — 63600175 PHARM REV CODE 636 W HCPCS: Mod: JW | Performed by: INTERNAL MEDICINE

## 2020-08-20 RX ADMIN — EPOETIN ALFA-EPBX 5000 UNITS: 10000 INJECTION, SOLUTION INTRAVENOUS; SUBCUTANEOUS at 04:08

## 2020-08-20 NOTE — PLAN OF CARE
Retacrit given today  Problem: Anemia  Goal: Anemia Symptom Improvement  Outcome: Ongoing, Progressing  Intervention: Monitor and Manage Anemia  Flowsheets (Taken 8/20/2020 1601)  Fatigue Management:   fatigue-related activity identified   frequent rest breaks encouraged

## 2020-08-27 ENCOUNTER — INFUSION (OUTPATIENT)
Dept: INFUSION THERAPY | Facility: HOSPITAL | Age: 60
End: 2020-08-27
Attending: INTERNAL MEDICINE
Payer: COMMERCIAL

## 2020-08-27 VITALS
SYSTOLIC BLOOD PRESSURE: 136 MMHG | TEMPERATURE: 98 F | DIASTOLIC BLOOD PRESSURE: 82 MMHG | BODY MASS INDEX: 34.33 KG/M2 | HEART RATE: 76 BPM | WEIGHT: 242.69 LBS | RESPIRATION RATE: 18 BRPM

## 2020-08-27 DIAGNOSIS — D64.9 NORMOCHROMIC ANEMIA: ICD-10-CM

## 2020-08-27 DIAGNOSIS — D64.89 ANEMIA DUE TO MULTIPLE MECHANISMS: Primary | ICD-10-CM

## 2020-08-27 DIAGNOSIS — D64.9 CHRONIC ANEMIA: ICD-10-CM

## 2020-08-27 DIAGNOSIS — N18.30 CKD (CHRONIC KIDNEY DISEASE) STAGE 3, GFR 30-59 ML/MIN: ICD-10-CM

## 2020-08-27 PROCEDURE — 96372 THER/PROPH/DIAG INJ SC/IM: CPT

## 2020-08-27 PROCEDURE — 63600175 PHARM REV CODE 636 W HCPCS: Performed by: INTERNAL MEDICINE

## 2020-08-27 RX ADMIN — EPOETIN ALFA-EPBX 6000 UNITS: 3000 INJECTION, SOLUTION INTRAVENOUS; SUBCUTANEOUS at 03:08

## 2020-08-27 NOTE — PLAN OF CARE
Problem: Fatigue  Goal: Improved Activity Tolerance  Outcome: Ongoing, Progressing  Intervention: Promote Energy Conservation  Flowsheets (Taken 8/27/2020 1620)  Fatigue Management: frequent rest breaks encouraged

## 2020-09-01 ENCOUNTER — TELEPHONE (OUTPATIENT)
Dept: HEMATOLOGY/ONCOLOGY | Facility: CLINIC | Age: 60
End: 2020-09-01

## 2020-09-01 DIAGNOSIS — N18.2 ANEMIA, CHRONIC RENAL FAILURE, STAGE 2 (MILD): Primary | ICD-10-CM

## 2020-09-01 DIAGNOSIS — D63.1 ANEMIA, CHRONIC RENAL FAILURE, STAGE 2 (MILD): Primary | ICD-10-CM

## 2020-09-01 DIAGNOSIS — D53.9 MACROCYTIC ANEMIA: ICD-10-CM

## 2020-09-01 DIAGNOSIS — D46.1 RARS (REFRACTORY ANEMIA WITH RINGED SIDEROBLASTS): ICD-10-CM

## 2020-09-01 NOTE — TELEPHONE ENCOUNTER
Called [facility] ([facility]) because I received a fax from them stating that I needed to call asap.  I spoke with Rika and she is unable to put the authorization through.  She instructed me that she will do some digging and get back with me.  She said that sometimes the plan or ID #'s change and that can cause it to need a new PA.  Awaiting further orders.

## 2020-09-01 NOTE — TELEPHONE ENCOUNTER
----- Message from Fabiola Pace, Patient Care Assistant sent at 8/31/2020 12:10 PM CDT -----  Gisell with [facility] special[facility] pharmacy called in stating she would like to speak to someone regarding this patient  Procrit . She can be reached at [phone]

## 2020-09-02 ENCOUNTER — LAB VISIT (OUTPATIENT)
Dept: LAB | Facility: HOSPITAL | Age: 60
End: 2020-09-02
Attending: INTERNAL MEDICINE
Payer: COMMERCIAL

## 2020-09-02 DIAGNOSIS — D64.9 NORMOCHROMIC ANEMIA: ICD-10-CM

## 2020-09-02 DIAGNOSIS — D64.9 ANEMIA, UNSPECIFIED TYPE: ICD-10-CM

## 2020-09-02 DIAGNOSIS — D57.3 SICKLE CELL TRAIT SYNDROME: ICD-10-CM

## 2020-09-02 DIAGNOSIS — D64.9 CHRONIC ANEMIA: ICD-10-CM

## 2020-09-02 DIAGNOSIS — D46.1 RARS (REFRACTORY ANEMIA WITH RINGED SIDEROBLASTS): ICD-10-CM

## 2020-09-02 DIAGNOSIS — D64.89 ANEMIA DUE TO MULTIPLE MECHANISMS: ICD-10-CM

## 2020-09-02 DIAGNOSIS — N18.2 ANEMIA, CHRONIC RENAL FAILURE, STAGE 2 (MILD): ICD-10-CM

## 2020-09-02 DIAGNOSIS — D53.9 MACROCYTIC ANEMIA: ICD-10-CM

## 2020-09-02 DIAGNOSIS — D63.1 ANEMIA, CHRONIC RENAL FAILURE, STAGE 2 (MILD): ICD-10-CM

## 2020-09-02 LAB
ALBUMIN SERPL BCP-MCNC: 4.3 G/DL (ref 3.5–5.2)
ALP SERPL-CCNC: 64 U/L (ref 55–135)
ALT SERPL W/O P-5'-P-CCNC: 20 U/L (ref 10–44)
ANION GAP SERPL CALC-SCNC: 6 MMOL/L (ref 8–16)
AST SERPL-CCNC: 23 U/L (ref 10–40)
BASOPHILS # BLD AUTO: 0.08 K/UL (ref 0–0.2)
BASOPHILS NFR BLD: 1.1 % (ref 0–1.9)
BILIRUB SERPL-MCNC: 0.8 MG/DL (ref 0.1–1)
BUN SERPL-MCNC: 19 MG/DL (ref 6–20)
CALCIUM SERPL-MCNC: 8.8 MG/DL (ref 8.7–10.5)
CHLORIDE SERPL-SCNC: 108 MMOL/L (ref 95–110)
CO2 SERPL-SCNC: 26 MMOL/L (ref 23–29)
CREAT SERPL-MCNC: 1.5 MG/DL (ref 0.5–1.4)
DIFFERENTIAL METHOD: ABNORMAL
EOSINOPHIL # BLD AUTO: 0.2 K/UL (ref 0–0.5)
EOSINOPHIL NFR BLD: 2.4 % (ref 0–8)
ERYTHROCYTE [DISTWIDTH] IN BLOOD BY AUTOMATED COUNT: 27 % (ref 11.5–14.5)
EST. GFR  (AFRICAN AMERICAN): 57.7 ML/MIN/1.73 M^2
EST. GFR  (NON AFRICAN AMERICAN): 49.9 ML/MIN/1.73 M^2
FERRITIN SERPL-MCNC: 1456 NG/ML (ref 20–300)
GLUCOSE SERPL-MCNC: 112 MG/DL (ref 70–110)
HCT VFR BLD AUTO: 25.7 % (ref 40–54)
HCT VFR BLD AUTO: 25.7 % (ref 40–54)
HGB BLD-MCNC: 8.7 G/DL (ref 14–18)
HGB BLD-MCNC: 8.7 G/DL (ref 14–18)
IMM GRANULOCYTES # BLD AUTO: 0.03 K/UL (ref 0–0.04)
IMM GRANULOCYTES NFR BLD AUTO: 0.4 % (ref 0–0.5)
IRON SERPL-MCNC: 95 UG/DL (ref 45–160)
LYMPHOCYTES # BLD AUTO: 1.6 K/UL (ref 1–4.8)
LYMPHOCYTES NFR BLD: 22.5 % (ref 18–48)
MCH RBC QN AUTO: 30.9 PG (ref 27–31)
MCHC RBC AUTO-ENTMCNC: 33.9 G/DL (ref 32–36)
MCV RBC AUTO: 91 FL (ref 82–98)
MONOCYTES # BLD AUTO: 0.8 K/UL (ref 0.3–1)
MONOCYTES NFR BLD: 10.7 % (ref 4–15)
NEUTROPHILS # BLD AUTO: 4.5 K/UL (ref 1.8–7.7)
NEUTROPHILS NFR BLD: 62.9 % (ref 38–73)
NRBC BLD-RTO: 0 /100 WBC
PLATELET # BLD AUTO: 428 K/UL (ref 150–350)
PMV BLD AUTO: 10.3 FL (ref 9.2–12.9)
POTASSIUM SERPL-SCNC: 4.2 MMOL/L (ref 3.5–5.1)
PROT SERPL-MCNC: 7.3 G/DL (ref 6–8.4)
RBC # BLD AUTO: 2.82 M/UL (ref 4.6–6.2)
SATURATED IRON: 45 % (ref 20–50)
SODIUM SERPL-SCNC: 140 MMOL/L (ref 136–145)
TOTAL IRON BINDING CAPACITY: 211 UG/DL (ref 250–450)
TRANSFERRIN SERPL-MCNC: 151 MG/DL (ref 200–375)
WBC # BLD AUTO: 7.08 K/UL (ref 3.9–12.7)

## 2020-09-02 PROCEDURE — 82728 ASSAY OF FERRITIN: CPT

## 2020-09-02 PROCEDURE — 80053 COMPREHEN METABOLIC PANEL: CPT

## 2020-09-02 PROCEDURE — 85025 COMPLETE CBC W/AUTO DIFF WBC: CPT

## 2020-09-02 PROCEDURE — 36415 COLL VENOUS BLD VENIPUNCTURE: CPT

## 2020-09-02 PROCEDURE — 83540 ASSAY OF IRON: CPT

## 2020-09-03 ENCOUNTER — INFUSION (OUTPATIENT)
Dept: INFUSION THERAPY | Facility: HOSPITAL | Age: 60
End: 2020-09-03
Attending: INTERNAL MEDICINE
Payer: COMMERCIAL

## 2020-09-03 VITALS
SYSTOLIC BLOOD PRESSURE: 141 MMHG | BODY MASS INDEX: 34.63 KG/M2 | TEMPERATURE: 98 F | RESPIRATION RATE: 18 BRPM | HEIGHT: 70 IN | WEIGHT: 241.88 LBS | DIASTOLIC BLOOD PRESSURE: 86 MMHG | HEART RATE: 85 BPM

## 2020-09-03 DIAGNOSIS — D64.89 ANEMIA DUE TO MULTIPLE MECHANISMS: Primary | ICD-10-CM

## 2020-09-03 DIAGNOSIS — D64.9 CHRONIC ANEMIA: ICD-10-CM

## 2020-09-03 DIAGNOSIS — N18.30 CKD (CHRONIC KIDNEY DISEASE) STAGE 3, GFR 30-59 ML/MIN: ICD-10-CM

## 2020-09-03 DIAGNOSIS — D64.9 NORMOCHROMIC ANEMIA: ICD-10-CM

## 2020-09-03 PROCEDURE — 96372 THER/PROPH/DIAG INJ SC/IM: CPT

## 2020-09-03 PROCEDURE — 63600175 PHARM REV CODE 636 W HCPCS: Performed by: INTERNAL MEDICINE

## 2020-09-03 RX ADMIN — EPOETIN ALFA-EPBX 5000 UNITS: 10000 INJECTION, SOLUTION INTRAVENOUS; SUBCUTANEOUS at 04:09

## 2020-09-10 ENCOUNTER — INFUSION (OUTPATIENT)
Dept: INFUSION THERAPY | Facility: HOSPITAL | Age: 60
End: 2020-09-10
Attending: INTERNAL MEDICINE
Payer: COMMERCIAL

## 2020-09-10 VITALS
HEART RATE: 86 BPM | OXYGEN SATURATION: 97 % | BODY MASS INDEX: 34.67 KG/M2 | HEIGHT: 70 IN | RESPIRATION RATE: 20 BRPM | DIASTOLIC BLOOD PRESSURE: 80 MMHG | WEIGHT: 242.19 LBS | SYSTOLIC BLOOD PRESSURE: 124 MMHG | TEMPERATURE: 98 F

## 2020-09-10 DIAGNOSIS — D64.89 ANEMIA DUE TO MULTIPLE MECHANISMS: Primary | ICD-10-CM

## 2020-09-10 DIAGNOSIS — D64.9 CHRONIC ANEMIA: ICD-10-CM

## 2020-09-10 DIAGNOSIS — N18.30 CKD (CHRONIC KIDNEY DISEASE) STAGE 3, GFR 30-59 ML/MIN: ICD-10-CM

## 2020-09-10 DIAGNOSIS — D64.9 NORMOCHROMIC ANEMIA: ICD-10-CM

## 2020-09-10 PROCEDURE — 63600175 PHARM REV CODE 636 W HCPCS: Performed by: INTERNAL MEDICINE

## 2020-09-10 PROCEDURE — 96372 THER/PROPH/DIAG INJ SC/IM: CPT

## 2020-09-10 RX ADMIN — EPOETIN ALFA-EPBX 6000 UNITS: 3000 INJECTION, SOLUTION INTRAVENOUS; SUBCUTANEOUS at 04:09

## 2020-09-10 NOTE — PLAN OF CARE
Problem: Anemia  Goal: Anemia Symptom Improvement  Outcome: Ongoing, Progressing  Intervention: Monitor and Manage Anemia  Flowsheets (Taken 9/10/2020 1621)  Fatigue Management:   paced activity encouraged   fatigue-related activity identified

## 2020-09-14 ENCOUNTER — TELEPHONE (OUTPATIENT)
Dept: HEMATOLOGY/ONCOLOGY | Facility: CLINIC | Age: 60
End: 2020-09-14

## 2020-09-14 NOTE — TELEPHONE ENCOUNTER
Called [facility] @ [phone] and spoke with Leslie.  He is approved for 3 months 9/23/2020 - 12/21/2020.  They will send a confirmation letter.

## 2020-09-16 ENCOUNTER — LAB VISIT (OUTPATIENT)
Dept: LAB | Facility: HOSPITAL | Age: 60
End: 2020-09-16
Attending: INTERNAL MEDICINE
Payer: COMMERCIAL

## 2020-09-16 DIAGNOSIS — D64.89 ANEMIA DUE TO MULTIPLE MECHANISMS: ICD-10-CM

## 2020-09-16 DIAGNOSIS — N18.2 ANEMIA, CHRONIC RENAL FAILURE, STAGE 2 (MILD): ICD-10-CM

## 2020-09-16 DIAGNOSIS — D63.1 ANEMIA, CHRONIC RENAL FAILURE, STAGE 2 (MILD): ICD-10-CM

## 2020-09-16 DIAGNOSIS — D53.9 MACROCYTIC ANEMIA: ICD-10-CM

## 2020-09-16 LAB
HCT VFR BLD AUTO: 24.8 % (ref 40–54)
HGB BLD-MCNC: 8.5 G/DL (ref 14–18)

## 2020-09-16 PROCEDURE — 85018 HEMOGLOBIN: CPT

## 2020-09-16 PROCEDURE — 36415 COLL VENOUS BLD VENIPUNCTURE: CPT

## 2020-09-16 PROCEDURE — 85014 HEMATOCRIT: CPT

## 2020-09-17 ENCOUNTER — INFUSION (OUTPATIENT)
Dept: INFUSION THERAPY | Facility: HOSPITAL | Age: 60
End: 2020-09-17
Attending: INTERNAL MEDICINE
Payer: COMMERCIAL

## 2020-09-17 VITALS
BODY MASS INDEX: 34.84 KG/M2 | RESPIRATION RATE: 18 BRPM | OXYGEN SATURATION: 97 % | TEMPERATURE: 98 F | DIASTOLIC BLOOD PRESSURE: 80 MMHG | HEART RATE: 81 BPM | SYSTOLIC BLOOD PRESSURE: 137 MMHG | WEIGHT: 242.81 LBS

## 2020-09-17 DIAGNOSIS — D64.9 NORMOCHROMIC ANEMIA: ICD-10-CM

## 2020-09-17 DIAGNOSIS — D64.89 ANEMIA DUE TO MULTIPLE MECHANISMS: Primary | ICD-10-CM

## 2020-09-17 DIAGNOSIS — D64.9 CHRONIC ANEMIA: ICD-10-CM

## 2020-09-17 DIAGNOSIS — N18.30 CKD (CHRONIC KIDNEY DISEASE) STAGE 3, GFR 30-59 ML/MIN: ICD-10-CM

## 2020-09-17 PROCEDURE — 96369 SC THER INFUSION UP TO 1 HR: CPT

## 2020-09-17 PROCEDURE — 96370 SC THER INFUSION ADDL HR: CPT

## 2020-09-17 PROCEDURE — 63600175 PHARM REV CODE 636 W HCPCS: Performed by: INTERNAL MEDICINE

## 2020-09-17 RX ADMIN — EPOETIN ALFA-EPBX 6000 UNITS: 3000 INJECTION, SOLUTION INTRAVENOUS; SUBCUTANEOUS at 04:09

## 2020-09-24 ENCOUNTER — OFFICE VISIT (OUTPATIENT)
Dept: HEMATOLOGY/ONCOLOGY | Facility: CLINIC | Age: 60
End: 2020-09-24
Payer: COMMERCIAL

## 2020-09-24 ENCOUNTER — INFUSION (OUTPATIENT)
Dept: INFUSION THERAPY | Facility: HOSPITAL | Age: 60
End: 2020-09-24
Attending: INTERNAL MEDICINE
Payer: COMMERCIAL

## 2020-09-24 VITALS
BODY MASS INDEX: 34.81 KG/M2 | DIASTOLIC BLOOD PRESSURE: 74 MMHG | HEART RATE: 81 BPM | WEIGHT: 242.63 LBS | SYSTOLIC BLOOD PRESSURE: 126 MMHG | RESPIRATION RATE: 18 BRPM | TEMPERATURE: 98 F

## 2020-09-24 VITALS
TEMPERATURE: 98 F | OXYGEN SATURATION: 98 % | HEART RATE: 74 BPM | RESPIRATION RATE: 18 BRPM | SYSTOLIC BLOOD PRESSURE: 135 MMHG | WEIGHT: 242.63 LBS | BODY MASS INDEX: 34.81 KG/M2 | DIASTOLIC BLOOD PRESSURE: 86 MMHG

## 2020-09-24 DIAGNOSIS — D64.9 NORMOCHROMIC ANEMIA: ICD-10-CM

## 2020-09-24 DIAGNOSIS — D64.9 CHRONIC ANEMIA: ICD-10-CM

## 2020-09-24 DIAGNOSIS — D72.821 MONOCYTOSIS: ICD-10-CM

## 2020-09-24 DIAGNOSIS — D64.89 ANEMIA DUE TO MULTIPLE MECHANISMS: ICD-10-CM

## 2020-09-24 DIAGNOSIS — N18.30 CKD (CHRONIC KIDNEY DISEASE) STAGE 3, GFR 30-59 ML/MIN: ICD-10-CM

## 2020-09-24 DIAGNOSIS — E53.8 FOLIC ACID DEFICIENCY: ICD-10-CM

## 2020-09-24 DIAGNOSIS — D64.89 ANEMIA DUE TO MULTIPLE MECHANISMS: Primary | ICD-10-CM

## 2020-09-24 DIAGNOSIS — D46.9 MDS (MYELODYSPLASTIC SYNDROME): ICD-10-CM

## 2020-09-24 DIAGNOSIS — E55.9 VITAMIN D DEFICIENCY: ICD-10-CM

## 2020-09-24 DIAGNOSIS — D46.1 RARS (REFRACTORY ANEMIA WITH RINGED SIDEROBLASTS): Primary | ICD-10-CM

## 2020-09-24 PROCEDURE — 3008F PR BODY MASS INDEX (BMI) DOCUMENTED: ICD-10-PCS | Mod: S$GLB,,, | Performed by: INTERNAL MEDICINE

## 2020-09-24 PROCEDURE — 3078F DIAST BP <80 MM HG: CPT | Mod: S$GLB,,, | Performed by: INTERNAL MEDICINE

## 2020-09-24 PROCEDURE — 99214 PR OFFICE/OUTPT VISIT, EST, LEVL IV, 30-39 MIN: ICD-10-PCS | Mod: S$GLB,,, | Performed by: INTERNAL MEDICINE

## 2020-09-24 PROCEDURE — 99214 OFFICE O/P EST MOD 30 MIN: CPT | Mod: S$GLB,,, | Performed by: INTERNAL MEDICINE

## 2020-09-24 PROCEDURE — 63600175 PHARM REV CODE 636 W HCPCS: Performed by: INTERNAL MEDICINE

## 2020-09-24 PROCEDURE — 3078F PR MOST RECENT DIASTOLIC BLOOD PRESSURE < 80 MM HG: ICD-10-PCS | Mod: S$GLB,,, | Performed by: INTERNAL MEDICINE

## 2020-09-24 PROCEDURE — 3074F PR MOST RECENT SYSTOLIC BLOOD PRESSURE < 130 MM HG: ICD-10-PCS | Mod: S$GLB,,, | Performed by: INTERNAL MEDICINE

## 2020-09-24 PROCEDURE — 3008F BODY MASS INDEX DOCD: CPT | Mod: S$GLB,,, | Performed by: INTERNAL MEDICINE

## 2020-09-24 PROCEDURE — 96372 THER/PROPH/DIAG INJ SC/IM: CPT

## 2020-09-24 PROCEDURE — 3074F SYST BP LT 130 MM HG: CPT | Mod: S$GLB,,, | Performed by: INTERNAL MEDICINE

## 2020-09-24 RX ADMIN — EPOETIN ALFA-EPBX 12000 UNITS: 10000 INJECTION, SOLUTION INTRAVENOUS; SUBCUTANEOUS at 02:09

## 2020-09-24 NOTE — PROGRESS NOTES
[facility]               PROGRESS NOTE      Subjective:       Patient ID:   NAME: Rick Butler : 1960     60 y.o. male    Referring Doc: Braulio (new PCP)  Other Physicians: Getachew; Alan Mustafa    Chief Complaint:  Sickle cell trait/anemia/RARS  f/u    History of Present Illness:     Patient returns today for a regularly scheduled follow-up visit.  The patient is doing ok overall. He is here by himself today. He has chronic fatigue but is feeling better overall.     no pain crisis; he has been sober for over 4 years now; he has been off tobacco too; no CP, SOB, HA's or N/V; occasional fatigue;      He has not had any recent pain crisis. He denies having any fatigue or breathing difficulties. He denies any blood in the stool, dark stools or hematuria.       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 He is now on procrit weekly. He sees Dr Mustafa again in 2021.     Discussed covid19 precautions      ROS:   GEN: normal without any fever, night sweats or weight loss  HEENT: normal with no HA's, sore throat, stiff neck, changes in vision  CV: normal with no CP, SOB, PND, MORA or orthopnea  PULM: normal with no SOB, cough, hemoptysis, sputum or pleuritic pain  GI: normal with no abdominal pain, nausea, vomiting, constipation, diarrhea, melanotic stools, BRBPR, or hematemesis  : normal with no hematuria, dysuria  BREAST: normal with no mass, discharge, pain  SKIN: normal with no rash, erythema, bruising, or swelling    Allergies:  Review of patient's allergies indicates:  No Known Allergies    Medications:    Current Outpatient Medications:     amLODIPine (NORVASC) 5 MG tablet, Take 1 tablet (5 mg total) by mouth once daily., Disp: 90 tablet, Rfl: 2    folic acid (FOLVITE) 1 MG tablet, Take 2 tablets (2 mg total) by mouth once daily.,  Disp: 180 tablet, Rfl: 2    multivitamin capsule, Take 1 capsule by mouth once daily., Disp: , Rfl:     naproxen (NAPROSYN) 500 MG tablet, TAKE 1 TABLET (500 MG TOTAL) BY MOUTH NIGHTLY AS NEEDED (BACK PAIN). TAKE WITH FOOD., Disp: 30 tablet, Rfl: 0    pantoprazole (PROTONIX) 40 MG tablet, Take 40 mg by mouth once daily., Disp: , Rfl:     sildenafil (VIAGRA) 100 MG tablet, Take 1 tablet (100 mg total) by mouth daily as needed for Erectile Dysfunction., Disp: 10 tablet, Rfl: 6    tamsulosin (FLOMAX) 0.4 mg Cap, Take 1 capsule (0.4 mg total) by mouth once daily., Disp: 90 capsule, Rfl: 2    traZODone (DESYREL) 100 MG tablet, Take 1 tablet (100 mg total) by mouth nightly as needed for Insomnia., Disp: 90 tablet, Rfl: 2    PMHx/PSHx Updates:  See patient's last visit with me on 7/30/2020  See H&P on 7/9/2011      Pathology:    12/20/2019  BONE MARROW, RIGHT ILIAC CREST,    ASPIRATE, CLOT SECTION, AND CORE BIOPSY:    --HYPERCELLULAR MARROW (APPROXIMATELY 75% TO 80%) WITH TRILINEAGE   HEMATOPOIETIC ELEMENTS, ERYTHROID  HYPERPLASIA AND MILD MEGAKARYOCYTIC HYPERPLASIA, AND NON-SPECIFIC   DYSHEMATOPOIETIC CHANGES (SEE     COMMENT).  --[id number] INCREASED STAINABLE IRON WITH INCREASED RING   SIDEROBLASTS (GREATER THAN 15%)     (SEE COMMENT).  --PERIPHERAL BLOOD WITH THROMBOCYTOSIS (574,000/MICROLITER) AND ANEMIA   (HEMOGLOBIN 5.5 GRAM/DECILITER),    WITH SCATTERED DREPANOCYTOID FORMS AND FEW NUCLEATED ERYTHROCYTES      Cytogenetic Results at the bottom of the report.  NORMAL    Objective:     Vitals:  Blood pressure 126/74, pulse 81, temperature 97.6 °F (36.4 °C), resp. rate 18, weight 110 kg (242 lb 9.6 oz).    Physical Examination:   GEN: no apparent distress, comfortable; AAOx3  HEAD: atraumatic and normocephalic  EYES: no pallor, no icterus, PERRLA  ENT: OMM, no pharyngeal erythema, external ears WNL; no nasal discharge; no thrush  NECK: no masses, thyroid normal, trachea midline, no LAD/LN's,  supple  CV: RRR with no murmur; normal pulse; normal S1 and S2; no pedal edema  CHEST: Normal respiratory effort; CTAB; normal breath sounds; no wheeze or crackles  ABDOM: nontender and nondistended; soft; normal bowel sounds; no rebound/guarding  MUSC/Skeletal: ROM normal; no crepitus; joints normal; no deformities or arthropathy  EXTREM: no clubbing, cyanosis, inflammation or swelling  SKIN: no rashes, lesions, ulcers, petechiae or subcutaneous nodules  : no jiang  NEURO: grossly intact; motor/sensory WNL; AAOx3; no tremors  PSYCH: normal mood, affect and behavior  LYMPH: normal cervical, supraclavicular, axillary and groin LN's            Labs:     Lab Results   Component Value Date    WBC 7.08 09/02/2020    HGB 8.5 (L) 09/16/2020    HCT 24.8 (L) 09/16/2020    MCV 91 09/02/2020     (H) 09/02/2020           CMP  Sodium   Date Value Ref Range Status   09/02/2020 140 136 - 145 mmol/L Final   06/26/2019 138 134 - 144 mmol/L      Potassium   Date Value Ref Range Status   09/02/2020 4.2 3.5 - 5.1 mmol/L Final     Chloride   Date Value Ref Range Status   09/02/2020 108 95 - 110 mmol/L Final   06/26/2019 105 98 - 110 mmol/L      CO2   Date Value Ref Range Status   09/02/2020 26 23 - 29 mmol/L Final     Glucose   Date Value Ref Range Status   09/02/2020 112 (H) 70 - 110 mg/dL Final   06/26/2019 114 (H) 70 - 99 mg/dL      BUN, Bld   Date Value Ref Range Status   09/02/2020 19 6 - 20 mg/dL Final     Creatinine   Date Value Ref Range Status   09/02/2020 1.5 (H) 0.5 - 1.4 mg/dL Final   06/26/2019 1.62 (H) 0.60 - 1.40 mg/dL      Calcium   Date Value Ref Range Status   09/02/2020 8.8 8.7 - 10.5 mg/dL Final     Total Protein   Date Value Ref Range Status   09/02/2020 7.3 6.0 - 8.4 g/dL Final     Albumin   Date Value Ref Range Status   09/02/2020 4.3 3.5 - 5.2 g/dL Final   06/26/2019 4.4 3.1 - 4.7 g/dL      Total Bilirubin   Date Value Ref Range Status   09/02/2020 0.8 0.1 - 1.0 mg/dL Final     Comment:     For infants  and newborns, interpretation of results should be based  on gestational age, weight and in agreement with clinical  observations.  Premature Infant recommended reference ranges:  Up to 24 hours.............<8.0 mg/dL  Up to 48 hours............<12.0 mg/dL  3-5 days..................<15.0 mg/dL  6-29 days.................<15.0 mg/dL       Alkaline Phosphatase   Date Value Ref Range Status   09/02/2020 64 55 - 135 U/L Final     AST   Date Value Ref Range Status   09/02/2020 23 10 - 40 U/L Final     ALT   Date Value Ref Range Status   09/02/2020 20 10 - 44 U/L Final     Anion Gap   Date Value Ref Range Status   09/02/2020 6 (L) 8 - 16 mmol/L Final     eGFR if    Date Value Ref Range Status   09/02/2020 57.7 (A) >60 mL/min/1.73 m^2 Final     eGFR if non    Date Value Ref Range Status   09/02/2020 49.9 (A) >60 mL/min/1.73 m^2 Final     Comment:     Calculation used to obtain the estimated glomerular filtration  rate (eGFR) is the CKD-EPI equation.                I have reviewed all available lab results and radiology reports.    Radiology/Diagnostic Studies:    2/15/2018 Xray Survey:   IMPRESSION: No significant abnormality seen. No evidence of osteoblastic or  osteoclastic lesions identified    MRI L-spine 2/12/2018  IMPRESSION:    1. Prominent low signal intensity throughout the bone marrow on T1 and  T2-weighted imaging. Marrow infiltrative process including malignancy such as  metastatic disease or lymphoma/leukemia is a consideration along with multiple  myeloma or malignant process. Benign etiology such as osteopetrosis or  regenerative red marrow are also considerations.    2. Multilevel lumbar degenerative changes, most prominent at L4-L5 and L5-S1 as  described.    Assessment/Plan:   (1) 60 y.o. male with diagnosis of sickle cell anemia with borderline microcytosis  - latest hgb was 6.0 and he had blood transfusion  - today, he is not symptomatic at this time  - he probably has a  "multifactorial anemia process with underlying sickle cell, anemia of chronic disorders and anemia of chronic renal. I can not rule out an underlying GI bleeding process.   - iron panel is adequate (no deficiency)  - total bilirubin is only minimally elevated  - he required transfusion again in Oct 2019 and again in Dec 2019  - he had bone marrow biopsy on 12/20/2019    "dyshematopoietic changes are not entirely specific and are present  mostly within erythroid and megakaryocytic lineages. These findings   could be observed in chronic disease states, autoimmune conditions,  infectious settings, toxic exposures, nutritional deficits, as medication/drug effect, and in myelodysplastic syndrome (MDS), among other conditions"  "Additionally, ring sideroblasts are a non-specific   finding "    Patient was referred to see Dr Mustafa at [facility] and had repeat BM biopsy with diagnosis made of RARS  - he is now on procrit per directives of Dr Mustafa    7/30/2020:   he recently saw Dr Mustafa  And sees him again in Jan 2021  - he is doing well with procrit and is feeling much better  - he has not required any transfusions for some time time    9/24/2020:  - latest hgb at 8.5  - will increase the procrit dose  - f/u with Dr Zavala in Jan 2021    (2) Alcohol abuse issues in past - he has been sober for over 3 years now    (3) former smoker    (4) chronic back issues - prior Xrays and MRI - suspect the findings on the MRI are possibly due to his sickle cell;   - SPEP was previously negative for M-protein  - PSA was 0.3 in Sept 2017  - recommended neurosurgery evaluation previously  - he saw Dr Nura VUONG and had a nerve "burning" procedure and his pain has improved    (5) CRI - elevated creatinine - he is followed Dr Chilel with nephrology      (6) H pylori gastritis - s/p recent endoscopy with Dr Collins and antibotics x 10 days        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 Normochromic anemia     3. Monocytosis   "   4. MDS (myelodysplastic syndrome)     5. Anemia due to multiple mechanisms     6. Chronic anemia     7. Folic acid deficiency     8. Vitamin D deficiency       PLAN;  1. Continue with the procrit but will increase dose  2.  Recommend that he f/u with Dr Collins  3.  F/u with nephrology as directed by them  4. Check labs every 2 weeks- encouraged compliance   5. Procrit as per Dr Mustafa's directives   6.  Encouraged continued sobriety  7. F/u with Dr Mustafa on Jan 2021       RTC 8 weeks  Fax note to [facility]; [place]    Total Time spent on patient:    I spent over 25 mins of time with the patient. Reviewed results of the recently ordered labs, tests, reports and studies; made directives with regards to the results. Over half of this time was spent couseling and coordinating care, making treatment and analytical decisions; ordering necessary labs, tests and studies; and discussing treatment options and setting up treatment plan(s) if indicated.            Discussion:       Pathology Discussion:    I reviewed and discussed the pathology report(s) and radiograph reports (if available) in as simple to understand and/or laymen's terms to the best of my ability. I had an indepth conversation with the patient and went over the patient's individual diagnosis based on the information that was currently available. I discussed the TNM staging process with regard to the patient's particular cancer type, and the calculated stage based on the currently available TNM data and literature. I discussed the available prognostic data with regard to the current staging information and how it relates to the prognosis of their particular neoplastic process.          COVID-19 Discussion:    I had long discussion with patient and any applicable family about the COVID-19 coronavirus epidemic and the recommended precautions with regard to cancer and/or hematology patients. I have re-iterated the CDC recommendations for adequate  hand washing, use of hand -like products, and coughing into elbow, etc. In addition, especially for our patients who are on chemotherapy and/or our otherwise immunocompromised patients, I have recommended avoidance of crowds, including movie theaters, restaurants, churches, etc. I have recommended avoidance of any sick or symptomatic family members and/or friends. I have also recommended avoidance of any raw and unwashed food products, and general avoidance of food items that have not been prepared by themselves. The patient has been asked to call us immediately with any symptom developments, issues, questions or other general concerns.     I have explained all of the above in detail and the patient understands all of the current recommendation(s). I have answered all of their questions to the best of my ability and to their complete satisfaction.   The patient is to continue with the current management plan.            Electronically signed by Jose Tello MD

## 2020-09-30 ENCOUNTER — LAB VISIT (OUTPATIENT)
Dept: LAB | Facility: HOSPITAL | Age: 60
End: 2020-09-30
Attending: INTERNAL MEDICINE
Payer: COMMERCIAL

## 2020-09-30 DIAGNOSIS — D46.1 RARS (REFRACTORY ANEMIA WITH RINGED SIDEROBLASTS): ICD-10-CM

## 2020-09-30 DIAGNOSIS — D63.1 ANEMIA, CHRONIC RENAL FAILURE, STAGE 2 (MILD): ICD-10-CM

## 2020-09-30 DIAGNOSIS — D64.9 NORMOCHROMIC ANEMIA: ICD-10-CM

## 2020-09-30 DIAGNOSIS — D64.89 ANEMIA DUE TO MULTIPLE MECHANISMS: ICD-10-CM

## 2020-09-30 DIAGNOSIS — D53.9 MACROCYTIC ANEMIA: ICD-10-CM

## 2020-09-30 DIAGNOSIS — N18.2 ANEMIA, CHRONIC RENAL FAILURE, STAGE 2 (MILD): ICD-10-CM

## 2020-09-30 DIAGNOSIS — D57.3 SICKLE CELL TRAIT SYNDROME: ICD-10-CM

## 2020-09-30 DIAGNOSIS — D64.9 ANEMIA, UNSPECIFIED TYPE: ICD-10-CM

## 2020-09-30 DIAGNOSIS — D64.9 CHRONIC ANEMIA: ICD-10-CM

## 2020-09-30 LAB
FERRITIN SERPL-MCNC: 1350 NG/ML (ref 20–300)
HCT VFR BLD AUTO: 24.9 % (ref 40–54)
HGB BLD-MCNC: 8.6 G/DL (ref 14–18)
IRON SERPL-MCNC: 97 UG/DL (ref 45–160)
SATURATED IRON: 51 % (ref 20–50)
TOTAL IRON BINDING CAPACITY: 192 UG/DL (ref 250–450)
TRANSFERRIN SERPL-MCNC: 137 MG/DL (ref 200–375)

## 2020-09-30 PROCEDURE — 82728 ASSAY OF FERRITIN: CPT

## 2020-09-30 PROCEDURE — 85018 HEMOGLOBIN: CPT

## 2020-09-30 PROCEDURE — 83540 ASSAY OF IRON: CPT

## 2020-09-30 PROCEDURE — 85014 HEMATOCRIT: CPT

## 2020-09-30 PROCEDURE — 36415 COLL VENOUS BLD VENIPUNCTURE: CPT

## 2020-10-01 ENCOUNTER — INFUSION (OUTPATIENT)
Dept: INFUSION THERAPY | Facility: HOSPITAL | Age: 60
End: 2020-10-01
Attending: INTERNAL MEDICINE
Payer: COMMERCIAL

## 2020-10-01 VITALS
DIASTOLIC BLOOD PRESSURE: 80 MMHG | BODY MASS INDEX: 34.84 KG/M2 | WEIGHT: 243.38 LBS | RESPIRATION RATE: 20 BRPM | HEART RATE: 81 BPM | TEMPERATURE: 98 F | HEIGHT: 70 IN | SYSTOLIC BLOOD PRESSURE: 130 MMHG | OXYGEN SATURATION: 97 %

## 2020-10-01 DIAGNOSIS — D64.9 NORMOCHROMIC ANEMIA: ICD-10-CM

## 2020-10-01 DIAGNOSIS — D64.89 ANEMIA DUE TO MULTIPLE MECHANISMS: Primary | ICD-10-CM

## 2020-10-01 DIAGNOSIS — N18.30 CKD (CHRONIC KIDNEY DISEASE) STAGE 3, GFR 30-59 ML/MIN: ICD-10-CM

## 2020-10-01 DIAGNOSIS — D64.9 CHRONIC ANEMIA: ICD-10-CM

## 2020-10-01 PROCEDURE — 63600175 PHARM REV CODE 636 W HCPCS: Performed by: INTERNAL MEDICINE

## 2020-10-01 PROCEDURE — 96372 THER/PROPH/DIAG INJ SC/IM: CPT

## 2020-10-01 RX ADMIN — EPOETIN ALFA-EPBX 10000 UNITS: 10000 INJECTION, SOLUTION INTRAVENOUS; SUBCUTANEOUS at 04:10

## 2020-10-01 NOTE — PLAN OF CARE
Problem: Anemia  Goal: Anemia Symptom Improvement  Outcome: Ongoing, Progressing  Intervention: Monitor and Manage Anemia  Flowsheets (Taken 10/1/2020 1632)  Fatigue Management:   paced activity encouraged   frequent rest breaks encouraged

## 2020-10-08 ENCOUNTER — INFUSION (OUTPATIENT)
Dept: INFUSION THERAPY | Facility: HOSPITAL | Age: 60
End: 2020-10-08
Attending: INTERNAL MEDICINE
Payer: COMMERCIAL

## 2020-10-08 VITALS
WEIGHT: 244.31 LBS | BODY MASS INDEX: 35.05 KG/M2 | OXYGEN SATURATION: 95 % | TEMPERATURE: 98 F | DIASTOLIC BLOOD PRESSURE: 82 MMHG | RESPIRATION RATE: 18 BRPM | SYSTOLIC BLOOD PRESSURE: 137 MMHG | HEART RATE: 80 BPM

## 2020-10-08 DIAGNOSIS — D64.9 CHRONIC ANEMIA: ICD-10-CM

## 2020-10-08 DIAGNOSIS — N18.30 STAGE 3 CHRONIC KIDNEY DISEASE, UNSPECIFIED WHETHER STAGE 3A OR 3B CKD: ICD-10-CM

## 2020-10-08 DIAGNOSIS — D64.89 ANEMIA DUE TO MULTIPLE MECHANISMS: Primary | ICD-10-CM

## 2020-10-08 DIAGNOSIS — D64.9 NORMOCHROMIC ANEMIA: ICD-10-CM

## 2020-10-08 PROCEDURE — 63600175 PHARM REV CODE 636 W HCPCS: Performed by: INTERNAL MEDICINE

## 2020-10-08 PROCEDURE — 96372 THER/PROPH/DIAG INJ SC/IM: CPT

## 2020-10-08 RX ADMIN — EPOETIN ALFA-EPBX 10000 UNITS: 10000 INJECTION, SOLUTION INTRAVENOUS; SUBCUTANEOUS at 04:10

## 2020-10-14 ENCOUNTER — LAB VISIT (OUTPATIENT)
Dept: LAB | Facility: HOSPITAL | Age: 60
End: 2020-10-14
Attending: INTERNAL MEDICINE
Payer: COMMERCIAL

## 2020-10-14 DIAGNOSIS — D64.89 ANEMIA DUE TO MULTIPLE MECHANISMS: ICD-10-CM

## 2020-10-14 DIAGNOSIS — D63.1 ANEMIA, CHRONIC RENAL FAILURE, STAGE 2 (MILD): ICD-10-CM

## 2020-10-14 DIAGNOSIS — N18.2 ANEMIA, CHRONIC RENAL FAILURE, STAGE 2 (MILD): ICD-10-CM

## 2020-10-14 DIAGNOSIS — D53.9 MACROCYTIC ANEMIA: ICD-10-CM

## 2020-10-14 LAB
HCT VFR BLD AUTO: 25.9 % (ref 40–54)
HGB BLD-MCNC: 8.8 G/DL (ref 14–18)

## 2020-10-14 PROCEDURE — 36415 COLL VENOUS BLD VENIPUNCTURE: CPT

## 2020-10-14 PROCEDURE — 85014 HEMATOCRIT: CPT

## 2020-10-14 PROCEDURE — 85018 HEMOGLOBIN: CPT

## 2020-10-15 ENCOUNTER — INFUSION (OUTPATIENT)
Dept: INFUSION THERAPY | Facility: HOSPITAL | Age: 60
End: 2020-10-15
Attending: INTERNAL MEDICINE
Payer: COMMERCIAL

## 2020-10-15 VITALS
WEIGHT: 244 LBS | TEMPERATURE: 98 F | SYSTOLIC BLOOD PRESSURE: 138 MMHG | DIASTOLIC BLOOD PRESSURE: 80 MMHG | HEART RATE: 81 BPM | BODY MASS INDEX: 35.01 KG/M2 | RESPIRATION RATE: 17 BRPM

## 2020-10-15 DIAGNOSIS — D64.9 CHRONIC ANEMIA: ICD-10-CM

## 2020-10-15 DIAGNOSIS — D64.89 ANEMIA DUE TO MULTIPLE MECHANISMS: Primary | ICD-10-CM

## 2020-10-15 DIAGNOSIS — D64.9 NORMOCHROMIC ANEMIA: ICD-10-CM

## 2020-10-15 DIAGNOSIS — N18.30 STAGE 3 CHRONIC KIDNEY DISEASE, UNSPECIFIED WHETHER STAGE 3A OR 3B CKD: ICD-10-CM

## 2020-10-15 PROCEDURE — 63600175 PHARM REV CODE 636 W HCPCS: Mod: JW | Performed by: INTERNAL MEDICINE

## 2020-10-15 PROCEDURE — 96372 THER/PROPH/DIAG INJ SC/IM: CPT

## 2020-10-15 RX ADMIN — EPOETIN ALFA-EPBX 12000 UNITS: 10000 INJECTION, SOLUTION INTRAVENOUS; SUBCUTANEOUS at 04:10

## 2020-10-22 ENCOUNTER — INFUSION (OUTPATIENT)
Dept: INFUSION THERAPY | Facility: HOSPITAL | Age: 60
End: 2020-10-22
Attending: INTERNAL MEDICINE
Payer: COMMERCIAL

## 2020-10-22 VITALS
HEART RATE: 83 BPM | WEIGHT: 244.5 LBS | TEMPERATURE: 98 F | DIASTOLIC BLOOD PRESSURE: 81 MMHG | HEIGHT: 70 IN | RESPIRATION RATE: 18 BRPM | SYSTOLIC BLOOD PRESSURE: 136 MMHG | BODY MASS INDEX: 35 KG/M2 | OXYGEN SATURATION: 96 %

## 2020-10-22 DIAGNOSIS — D64.9 CHRONIC ANEMIA: ICD-10-CM

## 2020-10-22 DIAGNOSIS — D64.9 NORMOCHROMIC ANEMIA: ICD-10-CM

## 2020-10-22 DIAGNOSIS — D64.89 ANEMIA DUE TO MULTIPLE MECHANISMS: Primary | ICD-10-CM

## 2020-10-22 DIAGNOSIS — N18.30 STAGE 3 CHRONIC KIDNEY DISEASE, UNSPECIFIED WHETHER STAGE 3A OR 3B CKD: ICD-10-CM

## 2020-10-22 PROCEDURE — 96372 THER/PROPH/DIAG INJ SC/IM: CPT

## 2020-10-22 PROCEDURE — 63600175 PHARM REV CODE 636 W HCPCS: Performed by: INTERNAL MEDICINE

## 2020-10-22 RX ADMIN — EPOETIN ALFA-EPBX 12000 UNITS: 10000 INJECTION, SOLUTION INTRAVENOUS; SUBCUTANEOUS at 04:10

## 2020-10-22 NOTE — PLAN OF CARE
Problem: Anemia  Goal: Anemia Symptom Improvement  Outcome: Ongoing, Progressing  Intervention: Monitor and Manage Anemia  Flowsheets (Taken 10/22/2020 1613)  Fatigue Management:   frequent rest breaks encouraged   paced activity encouraged

## 2020-10-27 ENCOUNTER — LAB VISIT (OUTPATIENT)
Dept: LAB | Facility: HOSPITAL | Age: 60
End: 2020-10-27
Attending: INTERNAL MEDICINE
Payer: COMMERCIAL

## 2020-10-27 DIAGNOSIS — D72.821 MONOCYTOSIS: ICD-10-CM

## 2020-10-27 DIAGNOSIS — D46.9 MDS (MYELODYSPLASTIC SYNDROME): ICD-10-CM

## 2020-10-27 DIAGNOSIS — D46.1 RARS (REFRACTORY ANEMIA WITH RINGED SIDEROBLASTS): ICD-10-CM

## 2020-10-27 DIAGNOSIS — D64.9 NORMOCHROMIC ANEMIA: ICD-10-CM

## 2020-10-27 LAB
ALBUMIN SERPL BCP-MCNC: 4.4 G/DL (ref 3.5–5.2)
ALP SERPL-CCNC: 70 U/L (ref 55–135)
ALT SERPL W/O P-5'-P-CCNC: 18 U/L (ref 10–44)
ANION GAP SERPL CALC-SCNC: 7 MMOL/L (ref 8–16)
ANISOCYTOSIS BLD QL SMEAR: ABNORMAL
AST SERPL-CCNC: 21 U/L (ref 10–40)
BASOPHILS # BLD AUTO: 0.11 K/UL (ref 0–0.2)
BASOPHILS NFR BLD: 1.3 % (ref 0–1.9)
BILIRUB SERPL-MCNC: 0.9 MG/DL (ref 0.1–1)
BUN SERPL-MCNC: 26 MG/DL (ref 6–20)
CALCIUM SERPL-MCNC: 9 MG/DL (ref 8.7–10.5)
CHLORIDE SERPL-SCNC: 108 MMOL/L (ref 95–110)
CO2 SERPL-SCNC: 27 MMOL/L (ref 23–29)
CREAT SERPL-MCNC: 1.6 MG/DL (ref 0.5–1.4)
DACRYOCYTES BLD QL SMEAR: ABNORMAL
DIFFERENTIAL METHOD: ABNORMAL
EOSINOPHIL # BLD AUTO: 0.2 K/UL (ref 0–0.5)
EOSINOPHIL NFR BLD: 1.8 % (ref 0–8)
ERYTHROCYTE [DISTWIDTH] IN BLOOD BY AUTOMATED COUNT: 28 % (ref 11.5–14.5)
EST. GFR  (AFRICAN AMERICAN): 53.3 ML/MIN/1.73 M^2
EST. GFR  (NON AFRICAN AMERICAN): 46.1 ML/MIN/1.73 M^2
GLUCOSE SERPL-MCNC: 131 MG/DL (ref 70–110)
HCT VFR BLD AUTO: 27.1 % (ref 40–54)
HGB BLD-MCNC: 9.3 G/DL (ref 14–18)
HYPOCHROMIA BLD QL SMEAR: ABNORMAL
IMM GRANULOCYTES # BLD AUTO: 0.08 K/UL (ref 0–0.04)
IMM GRANULOCYTES NFR BLD AUTO: 1 % (ref 0–0.5)
LYMPHOCYTES # BLD AUTO: 1.6 K/UL (ref 1–4.8)
LYMPHOCYTES NFR BLD: 19.2 % (ref 18–48)
MCH RBC QN AUTO: 31.4 PG (ref 27–31)
MCHC RBC AUTO-ENTMCNC: 34.3 G/DL (ref 32–36)
MCV RBC AUTO: 92 FL (ref 82–98)
MONOCYTES # BLD AUTO: 0.8 K/UL (ref 0.3–1)
MONOCYTES NFR BLD: 10.1 % (ref 4–15)
NEUTROPHILS # BLD AUTO: 5.5 K/UL (ref 1.8–7.7)
NEUTROPHILS NFR BLD: 66.6 % (ref 38–73)
NRBC BLD-RTO: 0 /100 WBC
OVALOCYTES BLD QL SMEAR: ABNORMAL
PLATELET # BLD AUTO: 483 K/UL (ref 150–350)
PLATELET BLD QL SMEAR: ABNORMAL
PMV BLD AUTO: 10.8 FL (ref 9.2–12.9)
POIKILOCYTOSIS BLD QL SMEAR: ABNORMAL
POTASSIUM SERPL-SCNC: 4.3 MMOL/L (ref 3.5–5.1)
PROT SERPL-MCNC: 7.5 G/DL (ref 6–8.4)
RBC # BLD AUTO: 2.96 M/UL (ref 4.6–6.2)
SCHISTOCYTES BLD QL SMEAR: ABNORMAL
SODIUM SERPL-SCNC: 142 MMOL/L (ref 136–145)
TARGETS BLD QL SMEAR: ABNORMAL
WBC # BLD AUTO: 8.19 K/UL (ref 3.9–12.7)

## 2020-10-27 PROCEDURE — 36415 COLL VENOUS BLD VENIPUNCTURE: CPT

## 2020-10-27 PROCEDURE — 80053 COMPREHEN METABOLIC PANEL: CPT

## 2020-10-27 PROCEDURE — 85025 COMPLETE CBC W/AUTO DIFF WBC: CPT

## 2020-10-30 ENCOUNTER — INFUSION (OUTPATIENT)
Dept: INFUSION THERAPY | Facility: HOSPITAL | Age: 60
End: 2020-10-30
Attending: INTERNAL MEDICINE
Payer: COMMERCIAL

## 2020-10-30 VITALS
TEMPERATURE: 98 F | RESPIRATION RATE: 20 BRPM | BODY MASS INDEX: 34.41 KG/M2 | OXYGEN SATURATION: 97 % | HEIGHT: 70 IN | WEIGHT: 240.38 LBS | SYSTOLIC BLOOD PRESSURE: 116 MMHG | HEART RATE: 84 BPM | DIASTOLIC BLOOD PRESSURE: 73 MMHG

## 2020-10-30 DIAGNOSIS — D64.9 NORMOCHROMIC ANEMIA: ICD-10-CM

## 2020-10-30 DIAGNOSIS — N18.30 STAGE 3 CHRONIC KIDNEY DISEASE, UNSPECIFIED WHETHER STAGE 3A OR 3B CKD: ICD-10-CM

## 2020-10-30 DIAGNOSIS — D64.89 ANEMIA DUE TO MULTIPLE MECHANISMS: Primary | ICD-10-CM

## 2020-10-30 DIAGNOSIS — D64.9 CHRONIC ANEMIA: ICD-10-CM

## 2020-10-30 PROCEDURE — 63600175 PHARM REV CODE 636 W HCPCS: Mod: JW | Performed by: INTERNAL MEDICINE

## 2020-10-30 PROCEDURE — 96372 THER/PROPH/DIAG INJ SC/IM: CPT

## 2020-10-30 RX ADMIN — EPOETIN ALFA-EPBX 12000 UNITS: 10000 INJECTION, SOLUTION INTRAVENOUS; SUBCUTANEOUS at 04:10

## 2020-10-30 NOTE — PLAN OF CARE
Problem: Anemia  Goal: Anemia Symptom Improvement  Outcome: Ongoing, Progressing  Intervention: Monitor and Manage Anemia  Flowsheets (Taken 10/30/2020 5776)  Fatigue Management:   activity schedule adjusted   activity assistance provided   fatigue-related activity identified   frequent rest breaks encouraged

## 2020-11-05 ENCOUNTER — INFUSION (OUTPATIENT)
Dept: INFUSION THERAPY | Facility: HOSPITAL | Age: 60
End: 2020-11-05
Attending: INTERNAL MEDICINE
Payer: COMMERCIAL

## 2020-11-05 VITALS
TEMPERATURE: 98 F | SYSTOLIC BLOOD PRESSURE: 131 MMHG | HEIGHT: 70 IN | DIASTOLIC BLOOD PRESSURE: 79 MMHG | WEIGHT: 244.81 LBS | OXYGEN SATURATION: 98 % | RESPIRATION RATE: 20 BRPM | HEART RATE: 82 BPM | BODY MASS INDEX: 35.05 KG/M2

## 2020-11-05 DIAGNOSIS — D64.9 CHRONIC ANEMIA: ICD-10-CM

## 2020-11-05 DIAGNOSIS — N18.30 STAGE 3 CHRONIC KIDNEY DISEASE, UNSPECIFIED WHETHER STAGE 3A OR 3B CKD: ICD-10-CM

## 2020-11-05 DIAGNOSIS — D64.89 ANEMIA DUE TO MULTIPLE MECHANISMS: Primary | ICD-10-CM

## 2020-11-05 DIAGNOSIS — D64.9 NORMOCHROMIC ANEMIA: ICD-10-CM

## 2020-11-05 PROCEDURE — 96372 THER/PROPH/DIAG INJ SC/IM: CPT

## 2020-11-05 PROCEDURE — 63600175 PHARM REV CODE 636 W HCPCS: Mod: JW | Performed by: INTERNAL MEDICINE

## 2020-11-05 RX ADMIN — EPOETIN ALFA-EPBX 12000 UNITS: 10000 INJECTION, SOLUTION INTRAVENOUS; SUBCUTANEOUS at 04:11

## 2020-11-05 NOTE — PLAN OF CARE
Problem: Anemia  Goal: Anemia Symptom Improvement  Outcome: Ongoing, Progressing  Intervention: Monitor and Manage Anemia  Flowsheets (Taken 11/5/2020 7251)  Fatigue Management:   activity schedule adjusted   activity assistance provided   fatigue-related activity identified   frequent rest breaks encouraged

## 2020-11-10 ENCOUNTER — OFFICE VISIT (OUTPATIENT)
Dept: FAMILY MEDICINE | Facility: CLINIC | Age: 60
End: 2020-11-10
Payer: COMMERCIAL

## 2020-11-10 VITALS
HEART RATE: 83 BPM | WEIGHT: 244.63 LBS | RESPIRATION RATE: 20 BRPM | DIASTOLIC BLOOD PRESSURE: 78 MMHG | BODY MASS INDEX: 35.02 KG/M2 | OXYGEN SATURATION: 95 % | HEIGHT: 70 IN | SYSTOLIC BLOOD PRESSURE: 134 MMHG | TEMPERATURE: 98 F

## 2020-11-10 DIAGNOSIS — I10 ESSENTIAL HYPERTENSION: Primary | ICD-10-CM

## 2020-11-10 DIAGNOSIS — Z23 NEED FOR INFLUENZA VACCINATION: ICD-10-CM

## 2020-11-10 DIAGNOSIS — R73.9 HYPERGLYCEMIA: ICD-10-CM

## 2020-11-10 DIAGNOSIS — M47.816 LUMBAR SPONDYLOSIS: ICD-10-CM

## 2020-11-10 DIAGNOSIS — Z01.00 ENCOUNTER FOR VISION SCREENING: ICD-10-CM

## 2020-11-10 PROCEDURE — 3008F PR BODY MASS INDEX (BMI) DOCUMENTED: ICD-10-PCS | Mod: S$GLB,,, | Performed by: NURSE PRACTITIONER

## 2020-11-10 PROCEDURE — 90686 IIV4 VACC NO PRSV 0.5 ML IM: CPT | Mod: S$GLB,,, | Performed by: NURSE PRACTITIONER

## 2020-11-10 PROCEDURE — 3075F PR MOST RECENT SYSTOLIC BLOOD PRESS GE 130-139MM HG: ICD-10-PCS | Mod: S$GLB,,, | Performed by: NURSE PRACTITIONER

## 2020-11-10 PROCEDURE — 3078F PR MOST RECENT DIASTOLIC BLOOD PRESSURE < 80 MM HG: ICD-10-PCS | Mod: S$GLB,,, | Performed by: NURSE PRACTITIONER

## 2020-11-10 PROCEDURE — 90471 FLU VACCINE (QUAD) GREATER THAN OR EQUAL TO 3YO PRESERVATIVE FREE IM: ICD-10-PCS | Mod: S$GLB,,, | Performed by: NURSE PRACTITIONER

## 2020-11-10 PROCEDURE — 3075F SYST BP GE 130 - 139MM HG: CPT | Mod: S$GLB,,, | Performed by: NURSE PRACTITIONER

## 2020-11-10 PROCEDURE — 99214 OFFICE O/P EST MOD 30 MIN: CPT | Mod: 25,S$GLB,, | Performed by: NURSE PRACTITIONER

## 2020-11-10 PROCEDURE — 90471 IMMUNIZATION ADMIN: CPT | Mod: S$GLB,,, | Performed by: NURSE PRACTITIONER

## 2020-11-10 PROCEDURE — 90686 FLU VACCINE (QUAD) GREATER THAN OR EQUAL TO 3YO PRESERVATIVE FREE IM: ICD-10-PCS | Mod: S$GLB,,, | Performed by: NURSE PRACTITIONER

## 2020-11-10 PROCEDURE — 3008F BODY MASS INDEX DOCD: CPT | Mod: S$GLB,,, | Performed by: NURSE PRACTITIONER

## 2020-11-10 PROCEDURE — 99214 PR OFFICE/OUTPT VISIT, EST, LEVL IV, 30-39 MIN: ICD-10-PCS | Mod: 25,S$GLB,, | Performed by: NURSE PRACTITIONER

## 2020-11-10 PROCEDURE — 3078F DIAST BP <80 MM HG: CPT | Mod: S$GLB,,, | Performed by: NURSE PRACTITIONER

## 2020-11-10 RX ORDER — LIDOCAINE 50 MG/G
1 PATCH TOPICAL DAILY PRN
Qty: 90 PATCH | Refills: 1 | Status: SHIPPED | OUTPATIENT
Start: 2020-11-10 | End: 2021-02-08

## 2020-11-10 NOTE — PROGRESS NOTES
SUBJECTIVE:      Patient ID: Rick Butler is a 60 y.o. male.    Chief Complaint: Follow-up (3 month f/u HTN)    Pt presents for F/U HTN. His BP is controlled on recheck after restarting his medication and he has been doing well. Denies CP, SOB, palpitations, or peripheral edema. He continues to follow closely with hematology oncology and reports he is getting Procrit injections and labs weekly. His H/H has been improving gradually. He has been using the Naproxen prescribed as needed at his last visit daily due to lumbar back pain. We discussed the implications this can have for his kidneys and his blood counts. He reports when he is up and moving he is good, but the back pain begins to his lumbar back after he rests for a while. Denies incontinence or radiation to his legs. We reviewed his most recent labs and some hyperglycemia was noted which he believes he was fasting for the labs. He reports [facility] will be [facility] He is overdue for his eye exam. He would like to receive the flu vaccine today. Denies CP, SOB, wheezing, fevers, nausea, vomiting, diarrhea, constipation, numbness, weakness, dizziness, palpitations, or any other concerns at this time.    Hypertension  This is a chronic problem. The current episode started more than 1 year ago. The problem has been gradually improving since onset. The problem is controlled. Pertinent negatives include no anxiety, blurred vision, chest pain, headaches, malaise/fatigue, neck pain, orthopnea, palpitations, peripheral edema, PND, shortness of breath or sweats. Agents associated with hypertension include NSAIDs. Risk factors for coronary artery disease include family history, male gender, obesity and sedentary lifestyle. Past treatments include calcium channel blockers. The current treatment provides moderate improvement. Compliance problems include exercise and diet.  Hypertensive end-organ damage includes  kidney disease. There is no history of angina, CAD/MI, CVA, heart failure, left ventricular hypertrophy, PVD or retinopathy. There is no history of chronic renal disease, coarctation of the aorta, hyperaldosteronism, hypercortisolism, hyperparathyroidism, a hypertension causing med, pheochromocytoma, renovascular disease, sleep apnea or a thyroid problem.       Past Surgical History:   Procedure Laterality Date    BONE MARROW BIOPSY N/A 12/20/2019    Procedure: BIOPSY, BONE MARROW;  Surgeon: [facility] Diagnostic Provider;  Location: [facility];  Service: Interventional Radiology;  Laterality: N/A;    INJECTION OF ANESTHETIC AGENT AROUND MEDIAL BRANCH NERVES INNERVATING LUMBAR FACET JOINT Bilateral 5/25/2018    Procedure: BLOCK-NERVE-MEDIAL BRANCH-LUMBAR;  Surgeon: Nura Heredia MD;  Location: [facility] OR;  Service: Pain Management;  Laterality: Bilateral;  L3, 4, 5    RADIOFREQUENCY ABLATION OF LUMBAR MEDIAL BRANCH NERVE AT SINGLE LEVEL Bilateral 7/20/2018    Procedure: RADIOFREQUENCY ABLATION, NERVE, MEDIAL BRANCH, LUMBAR, 1 LEVEL;  Surgeon: Nura Heredia MD;  Location: [facility] OR;  Service: Pain Management;  Laterality: Bilateral;  L3, 4, 5 - Burned at 80 degrees C.  for 75 seconds x 2 each site    SMALL BOWEL ENTEROSCOPY N/A 10/16/2019    Procedure: ENTEROSCOPY;  Surgeon: Rob Collins MD;  Location: [facility];  Service: Endoscopy;  Laterality: N/A;     Family History   Problem Relation Age of Onset    Arthritis Mother     Depression Mother     Heart disease Mother     Hypertension Mother     Heart attack Father 59      Social History     Socioeconomic History    Marital status:      Spouse name: Not on file    Number of children: Not on file    Years of education: Not on file    Highest education level: Not on file   Occupational History    Occupation: Prep Cook     Employer: [employer]   Social Needs    Financial resource strain: Not on file    Food insecurity     Worry: Not on file     Inability: Not  on file    Transportation needs     Medical: Not on file     Non-medical: Not on file   Tobacco Use    Smoking status: Former Smoker     Quit date: 1999     Years since quittin.8    Smokeless tobacco: Never Used   Substance and Sexual Activity    Alcohol use: Yes     Alcohol/week: 21.0 standard drinks     Types: 21 Cans of beer per week    Drug use: No    Sexual activity: Yes     Partners: Female   Lifestyle    Physical activity     Days per week: Not on file     Minutes per session: Not on file    Stress: Very much   Relationships    Social connections     Talks on phone: Not on file     Gets together: Not on file     Attends Confucianist service: Not on file     Active member of club or organization: Not on file     Attends meetings of clubs or organizations: Not on file     Relationship status: Not on file   Other Topics Concern    Not on file   Social History Narrative    Not on file     Current Outpatient Medications   Medication Sig Dispense Refill    amLODIPine (NORVASC) 5 MG tablet Take 1 tablet (5 mg total) by mouth once daily. 90 tablet 2    folic acid (FOLVITE) 1 MG tablet Take 2 tablets (2 mg total) by mouth once daily. 180 tablet 2    multivitamin capsule Take 1 capsule by mouth once daily.      pantoprazole (PROTONIX) 40 MG tablet Take 40 mg by mouth once daily.      sildenafil (VIAGRA) 100 MG tablet Take 1 tablet (100 mg total) by mouth daily as needed for Erectile Dysfunction. 10 tablet 6    tamsulosin (FLOMAX) 0.4 mg Cap TAKE 1 CAPSULE BY MOUTH EVERY DAY 90 capsule 2    traZODone (DESYREL) 100 MG tablet Take 1 tablet (100 mg total) by mouth nightly as needed for Insomnia. 90 tablet 2    lidocaine (LIDODERM) 5 % Place 1 patch onto the skin daily as needed. Remove & Discard patch within 12 hours. 90 patch 1     No current facility-administered medications for this visit.      Review of patient's allergies indicates:  No Known Allergies   Past Medical History:   Diagnosis Date     Anemia due to multiple mechanisms 1/13/2019    Anemia, chronic renal failure, stage 2 (mild) 1/13/2019    Depression     Former smoker 6/29/2017    H/O ETOH abuse 6/29/2017    Monocytosis 2019    RARS (refractory anemia with ringed sideroblasts) 6/1/2020    Sickle cell anemia 6/29/2017     Past Surgical History:   Procedure Laterality Date    BONE MARROW BIOPSY N/A 12/20/2019    Procedure: BIOPSY, BONE MARROW;  Surgeon: [facility] Diagnostic Provider;  Location: [facility] OR;  Service: Interventional Radiology;  Laterality: N/A;    INJECTION OF ANESTHETIC AGENT AROUND MEDIAL BRANCH NERVES INNERVATING LUMBAR FACET JOINT Bilateral 5/25/2018    Procedure: BLOCK-NERVE-MEDIAL BRANCH-LUMBAR;  Surgeon: Nura Heredia MD;  Location: [facility] OR;  Service: Pain Management;  Laterality: Bilateral;  L3, 4, 5    RADIOFREQUENCY ABLATION OF LUMBAR MEDIAL BRANCH NERVE AT SINGLE LEVEL Bilateral 7/20/2018    Procedure: RADIOFREQUENCY ABLATION, NERVE, MEDIAL BRANCH, LUMBAR, 1 LEVEL;  Surgeon: Nura Heredia MD;  Location: [facility] OR;  Service: Pain Management;  Laterality: Bilateral;  L3, 4, 5 - Burned at 80 degrees C.  for 75 seconds x 2 each site    SMALL BOWEL ENTEROSCOPY N/A 10/16/2019    Procedure: ENTEROSCOPY;  Surgeon: Rob Collins MD;  Location: [facility] ENDO;  Service: Endoscopy;  Laterality: N/A;       Review of Systems   Constitutional: Negative for activity change, appetite change, chills, fatigue, fever, malaise/fatigue and unexpected weight change.   HENT: Negative for congestion, ear pain, mouth sores, nosebleeds, postnasal drip, rhinorrhea, sinus pressure, sinus pain, sneezing, sore throat and trouble swallowing.    Eyes: Negative for blurred vision, pain and visual disturbance.   Respiratory: Negative for apnea, cough, chest tightness, shortness of breath, wheezing and stridor.    Cardiovascular: Negative for chest pain, palpitations, orthopnea, leg swelling and PND.   Gastrointestinal: Negative for abdominal pain, blood in  "stool, constipation, diarrhea, nausea and vomiting.   Genitourinary: Negative for dysuria, flank pain, frequency and hematuria.   Musculoskeletal: Positive for back pain. Negative for arthralgias, myalgias, neck pain and neck stiffness.   Skin: Negative for color change, rash and wound.   Neurological: Negative for dizziness, tremors, seizures, syncope, weakness, light-headedness, numbness and headaches.   Hematological: Negative for adenopathy.   Psychiatric/Behavioral: Negative for confusion, dysphoric mood, sleep disturbance and suicidal ideas. The patient is not nervous/anxious.       OBJECTIVE:      Vitals:    11/10/20 1537 11/10/20 1600   BP: (!) 142/88 134/78   BP Location: Right arm Right arm   Patient Position: Sitting Sitting   BP Method: Large (Manual)    Pulse: 83    Resp: 20    Temp: 97.7 °F (36.5 °C)    TempSrc: Temporal    SpO2: 95%    Weight: 110.9 kg (244 lb 9.6 oz)    Height: 5' 9.5" (1.765 m)      Physical Exam  Vitals signs reviewed.   Constitutional:       General: He is not in acute distress.     Appearance: Normal appearance. He is well-developed. He is obese. He is not diaphoretic.   HENT:      Head: Normocephalic and atraumatic.      Right Ear: Hearing, tympanic membrane, ear canal and external ear normal.      Left Ear: Hearing, tympanic membrane, ear canal and external ear normal.      Nose: Nose normal. No mucosal edema, congestion or rhinorrhea.      Mouth/Throat:      Lips: Pink.      Mouth: Mucous membranes are moist.      Dentition: Has dentures.      Pharynx: Oropharynx is clear. Uvula midline. No pharyngeal swelling, oropharyngeal exudate or posterior oropharyngeal erythema.   Eyes:      General: Lids are normal. No scleral icterus.        Right eye: No discharge.         Left eye: No discharge.      Extraocular Movements: Extraocular movements intact.      Conjunctiva/sclera: Conjunctivae normal.      Pupils: Pupils are equal, round, and reactive to light.   Neck:      " Musculoskeletal: Full passive range of motion without pain, normal range of motion and neck supple.      Thyroid: No thyroid mass or thyromegaly.      Vascular: No carotid bruit.      Trachea: Trachea and phonation normal. No tracheal deviation.   Cardiovascular:      Rate and Rhythm: Normal rate and regular rhythm.      Pulses: Normal pulses.      Heart sounds: Normal heart sounds. No murmur. No friction rub. No gallop.    Pulmonary:      Effort: Pulmonary effort is normal. No respiratory distress.      Breath sounds: Normal breath sounds. No stridor. No decreased breath sounds, wheezing, rhonchi or rales.   Abdominal:      General: Bowel sounds are normal.      Palpations: Abdomen is soft.      Tenderness: There is no abdominal tenderness.   Musculoskeletal: Normal range of motion.      Right lower leg: No edema.      Left lower leg: No edema.   Lymphadenopathy:      Cervical: No cervical adenopathy.      Upper Body:      Right upper body: No supraclavicular adenopathy.      Left upper body: No supraclavicular adenopathy.   Skin:     General: Skin is warm and dry.      Capillary Refill: Capillary refill takes less than 2 seconds.      Findings: No rash.   Neurological:      General: No focal deficit present.      Mental Status: He is alert and oriented to person, place, and time.      Coordination: Coordination is intact.      Gait: Gait is intact.   Psychiatric:         Attention and Perception: Attention and perception normal.         Mood and Affect: Mood and affect normal.         Speech: Speech normal.         Behavior: Behavior normal. Behavior is cooperative.         Thought Content: Thought content normal. Thought content does not include suicidal plan.         Cognition and Memory: Cognition and memory normal.         Judgment: Judgment normal.        Assessment:       1. Essential hypertension    2. Hyperglycemia    3. Lumbar spondylosis    4. Need for influenza vaccination    5. Encounter for vision  screening        Plan:       Essential hypertension  Diagnosis is stable on current regimen of amlodipine. Continue current medications.     Hyperglycemia  Will recheck fasting CMP to assess for hyperglycemia and if present will check HgbA1c.   -     Comprehensive Metabolic Panel; Future; Expected date: 11/10/2020    Lumbar spondylosis  Discontinuing naproxen over concern for his kidney function and blood counts since he evolved to using it once a day rather than 3 days per week as we had discussed. Start Lidoderm patches to his back and can leave on for 12 hours and then remove. Verbalized understanding.  -     lidocaine (LIDODERM) 5 %; Place 1 patch onto the skin daily as needed. Remove & Discard patch within 12 hours.  Dispense: 90 patch; Refill: 1    Need for influenza vaccination  -     Influenza - Quadrivalent *Preferred* (6 months+) (PF)    Encounter for vision screening  -     Ambulatory referral/consult to Optometry; Future; Expected date: 11/17/2020        Follow up in about 6 months (around 5/10/2021) for F/U HTN.      11/10/2020 LATOYA Barakat, FNP

## 2020-11-10 NOTE — PATIENT INSTRUCTIONS
Back Pain (Acute or Chronic)    Back pain is one of the most common problems. The good news is that most people feel better in 1 to 2 weeks, and most of the rest in 1 to 2 months. Most people can remain active.  People experience and describe pain differently; not everyone is the same.  · The pain can be sharp, stabbing, shooting, aching, cramping or burning.  · Movement, standing, bending, lifting, sitting, or walking may worsen pain.  · It can be localized to one spot or area, or it can be more generalized.  · It can spread or radiate upwards, to the front, or go down your arms or legs (sciatica).  · It can cause muscle spasm.  Most of the time, mechanical problems with the muscles or spine cause the pain. Mechanical problems are usually caused by an injury to the muscles or ligaments. While illness can cause back pain, it is usually not caused by a serious illness. Mechanical problems include:   · Physical activity such as sports, exercise, work, or normal activity  · Overexertion, lifting, pushing, pulling incorrectly or too aggressively  · Sudden twisting, bending, or stretching from an accident, or accidental movement  · Poor posture  · Stretching or moving wrong, without noticing pain at the time  · Poor coordination, lack of regular exercise (check with your doctor about this)  · Spinal disc disease or arthritis  · Stress  Pain can also be related to pregnancy, or illness like appendicitis, bladder or kidney infections, pelvic infections, and many other things.  Acute back pain usually gets better in 1 to 2 weeks. Back pain related to disk disease, arthritis in the spinal joints or spinal stenosis (narrowing of the spinal canal) can become chronic and last for months or years.  Unless you had a physical injury (for example, a car accident or fall) X-rays are usually not needed for the initial evaluation of back pain. If pain continues and does not respond to medical treatment, X-rays and other tests may be  needed.  Home care  Try these home care recommendations:  · When in bed, try to find a position of comfort. A firm mattress is best. Try lying flat on your back with pillows under your knees. You can also try lying on your side with your knees bent up towards your chest and a pillow between your knees.  · At first, do not try to stretch out the sore spots. If there is a strain, it is not like the good soreness you get after exercising without an injury. In this case, stretching may make it worse.  · Avoid prolong sitting, long car rides, or travel. This puts more stress on the lower back than standing or walking.  · During the first 24 to 72 hours after an acute injury or flare up of chronic back pain, apply an ice pack to the painful area for 20 minutes and then remove it for 20 minutes. Do this over a period of 60 to 90 minutes or several times a day. This will reduce swelling and pain. Wrap the ice pack in a thin towel or plastic to protect your skin.  · You can start with ice, then switch to heat. Heat (hot shower, hot bath, or heating pad) reduces pain and works well for muscle spasms. Heat can be applied to the painful area for 20 minutes then remove it for 20 minutes. Do this over a period of 60 to 90 minutes or several times a day. Do not sleep on a heating pad. It can lead to skin burns or tissue damage.  · You can alternate ice and heat therapy. Talk with your doctor about the best treatment for your back pain.  · Therapeutic massage can help relax the back muscles without stretching them.  · Be aware of safe lifting methods and do not lift anything without stretching first.  Medicines  Talk to your doctor before using medicine, especially if you have other medical problems or are taking other medicines.  · You may use over-the-counter medicine as directed on the bottle to control pain, unless another pain medicine was prescribed. If you have chronic conditions like diabetes, liver or kidney disease,  stomach ulcers, or gastrointestinal bleeding, or are taking blood thinners, talk to your doctor before taking any medicine.  · Be careful if you are given a prescription medicines, narcotics, or medicine for muscle spasms. They can cause drowsiness, affect your coordination, reflexes, and judgement. Do not drive or operate heavy machinery.  Follow-up care  Follow up with your healthcare provider, or as advised.   A radiologist will review any X-rays that were taken. Your provide will notify you of any new findings that may affect your care.  Call 911  Call emergency services if any of the following occur:  · Trouble breathing  · Confusion  · Very drowsy or trouble awakening  · Fainting or loss of consciousness  · Rapid or very slow heart rate  · Loss of bowel or bladder control  When to seek medical advice  Call your healthcare provider right away if any of these occur:   · Pain becomes worse or spreads to your legs  · Weakness or numbness in one or both legs  · Numbness in the groin or genital area  Date Last Reviewed: 7/1/2016  © 3008-7234 [employer]. [address] [place] [address], [place] [address]. All rights reserved. This information is not intended as a substitute for professional medical care. Always follow your healthcare professional's instructions.

## 2020-11-11 ENCOUNTER — TELEPHONE (OUTPATIENT)
Dept: FAMILY MEDICINE | Facility: CLINIC | Age: 60
End: 2020-11-11

## 2020-11-11 ENCOUNTER — LAB VISIT (OUTPATIENT)
Dept: LAB | Facility: HOSPITAL | Age: 60
End: 2020-11-11
Attending: INTERNAL MEDICINE
Payer: COMMERCIAL

## 2020-11-11 DIAGNOSIS — D64.89 ANEMIA DUE TO MULTIPLE MECHANISMS: ICD-10-CM

## 2020-11-11 DIAGNOSIS — D53.9 MACROCYTIC ANEMIA: ICD-10-CM

## 2020-11-11 DIAGNOSIS — D63.1 ANEMIA, CHRONIC RENAL FAILURE, STAGE 2 (MILD): ICD-10-CM

## 2020-11-11 DIAGNOSIS — N18.2 ANEMIA, CHRONIC RENAL FAILURE, STAGE 2 (MILD): ICD-10-CM

## 2020-11-11 DIAGNOSIS — R73.9 HYPERGLYCEMIA: ICD-10-CM

## 2020-11-11 LAB
ALBUMIN SERPL BCP-MCNC: 4.2 G/DL (ref 3.5–5.2)
ALP SERPL-CCNC: 68 U/L (ref 55–135)
ALT SERPL W/O P-5'-P-CCNC: 20 U/L (ref 10–44)
ANION GAP SERPL CALC-SCNC: 6 MMOL/L (ref 8–16)
AST SERPL-CCNC: 21 U/L (ref 10–40)
BILIRUB SERPL-MCNC: 1 MG/DL (ref 0.1–1)
BUN SERPL-MCNC: 21 MG/DL (ref 6–20)
CALCIUM SERPL-MCNC: 9.1 MG/DL (ref 8.7–10.5)
CHLORIDE SERPL-SCNC: 110 MMOL/L (ref 95–110)
CO2 SERPL-SCNC: 26 MMOL/L (ref 23–29)
CREAT SERPL-MCNC: 1.6 MG/DL (ref 0.5–1.4)
EST. GFR  (AFRICAN AMERICAN): 53.3 ML/MIN/1.73 M^2
EST. GFR  (NON AFRICAN AMERICAN): 46.1 ML/MIN/1.73 M^2
GLUCOSE SERPL-MCNC: 109 MG/DL (ref 70–110)
HCT VFR BLD AUTO: 26.1 % (ref 40–54)
HGB BLD-MCNC: 9.1 G/DL (ref 14–18)
POTASSIUM SERPL-SCNC: 4.4 MMOL/L (ref 3.5–5.1)
PROT SERPL-MCNC: 7.5 G/DL (ref 6–8.4)
SODIUM SERPL-SCNC: 142 MMOL/L (ref 136–145)

## 2020-11-11 PROCEDURE — 85018 HEMOGLOBIN: CPT

## 2020-11-11 PROCEDURE — 36415 COLL VENOUS BLD VENIPUNCTURE: CPT

## 2020-11-11 PROCEDURE — 85014 HEMATOCRIT: CPT

## 2020-11-11 PROCEDURE — 80053 COMPREHEN METABOLIC PANEL: CPT

## 2020-11-11 NOTE — PROGRESS NOTES
Please call patient. Glucose is improved when fasting. Kidney function remains decreased at this time. Please continue with regular follow up with nephrology as scheduled. Please send labs to nephrology.

## 2020-11-11 NOTE — TELEPHONE ENCOUNTER
Spoke to patient and gave lab results and recommendations per the provider. Patient verbally stated that he understood.          Labs faxed to  Dr. Mas's office.

## 2020-11-11 NOTE — TELEPHONE ENCOUNTER
----- Message from DEVIKA Brunner sent at 11/11/2020  4:28 PM CST -----  Please call patient. Glucose is improved when fasting. Kidney function remains decreased at this time. Please continue with regular follow up with nephrology as scheduled. Please send labs to nephrology.

## 2020-11-12 ENCOUNTER — INFUSION (OUTPATIENT)
Dept: INFUSION THERAPY | Facility: HOSPITAL | Age: 60
End: 2020-11-12
Attending: INTERNAL MEDICINE
Payer: COMMERCIAL

## 2020-11-12 ENCOUNTER — TELEPHONE (OUTPATIENT)
Dept: FAMILY MEDICINE | Facility: CLINIC | Age: 60
End: 2020-11-12

## 2020-11-12 VITALS
RESPIRATION RATE: 18 BRPM | DIASTOLIC BLOOD PRESSURE: 80 MMHG | TEMPERATURE: 98 F | BODY MASS INDEX: 35.57 KG/M2 | WEIGHT: 244.38 LBS | OXYGEN SATURATION: 96 % | SYSTOLIC BLOOD PRESSURE: 153 MMHG | HEART RATE: 80 BPM

## 2020-11-12 DIAGNOSIS — D64.9 CHRONIC ANEMIA: ICD-10-CM

## 2020-11-12 DIAGNOSIS — N18.30 STAGE 3 CHRONIC KIDNEY DISEASE, UNSPECIFIED WHETHER STAGE 3A OR 3B CKD: ICD-10-CM

## 2020-11-12 DIAGNOSIS — D64.89 ANEMIA DUE TO MULTIPLE MECHANISMS: Primary | ICD-10-CM

## 2020-11-12 DIAGNOSIS — D64.9 NORMOCHROMIC ANEMIA: ICD-10-CM

## 2020-11-12 PROCEDURE — 63600175 PHARM REV CODE 636 W HCPCS: Performed by: INTERNAL MEDICINE

## 2020-11-12 PROCEDURE — 96372 THER/PROPH/DIAG INJ SC/IM: CPT

## 2020-11-12 RX ADMIN — EPOETIN ALFA-EPBX 12000 UNITS: 10000 INJECTION, SOLUTION INTRAVENOUS; SUBCUTANEOUS at 04:11

## 2020-11-12 NOTE — TELEPHONE ENCOUNTER
.The following medication needs a prior authorization:     Medication Name: Lidoderm     Dosage: 5%    Frequency: Daily     Directions for use: Place 1 patch onto the skin daily as needed. Remove & Discard patch within 12 hours    Diagnosis: Lumbar spondylosis [M47.816]    Is the request for a reauthorization? No    Is the patient currently stable on therapy? New RX    Please list all therapeutic alternatives previously used with start/end dates and outcome: Tramadol ( not effective), Zanaflex ( not effective), Naproxen ( not effective), Robaxin ( not effective),

## 2020-11-12 NOTE — PLAN OF CARE
Problem: Anemia  Goal: Anemia Symptom Improvement  Outcome: Ongoing, Progressing  Intervention: Monitor and Manage Anemia  Flowsheets (Taken 11/12/2020 7068)  Oral Nutrition Promotion: rest periods promoted  Fatigue Management: frequent rest breaks encouraged

## 2020-11-18 NOTE — PROGRESS NOTES
"        [facility] Hematology/Oncology                 PROGRESS NOTE      Subjective:       Patient ID:   NAME: Rick Butler : 1960     60 y.o. male    Referring Doc: Braulio (new PCP)  Other Physicians: Getachew; Alan Mustafa    Chief Complaint:  Sickle cell trait/anemia/RARS  f/u    History of Present Illness:     Patient returns today for a regularly scheduled follow-up visit.  The patient is doing ok overall. He is here by himself today. He has been feeling "really good"     no pain crisis; he has been sober for over 4 years now; he has been off tobacco too; no CP, SOB, HA's or N/V; occasional fatigue;      He has not had any recent pain crisis. He denies having any fatigue or breathing difficulties. He denies any blood in the stool, dark stools or hematuria.       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 He is now on procrit weekly. He sees Dr Mustafa again in 2021.     Discussed covid19 precautions      ROS:   GEN: normal without any fever, night sweats or weight loss  HEENT: normal with no HA's, sore throat, stiff neck, changes in vision  CV: normal with no CP, SOB, PND, MORA or orthopnea  PULM: normal with no SOB, cough, hemoptysis, sputum or pleuritic pain  GI: normal with no abdominal pain, nausea, vomiting, constipation, diarrhea, melanotic stools, BRBPR, or hematemesis  : normal with no hematuria, dysuria  BREAST: normal with no mass, discharge, pain  SKIN: normal with no rash, erythema, bruising, or swelling    Allergies:  Review of patient's allergies indicates:  No Known Allergies    Medications:    Current Outpatient Medications:     amLODIPine (NORVASC) 5 MG tablet, Take 1 tablet (5 mg total) by mouth once daily., Disp: 90 tablet, Rfl: 2    folic acid (FOLVITE) 1 MG tablet, Take 2 tablets (2 mg total) by mouth once daily., Disp: 180 tablet, Rfl: 2    lidocaine (LIDODERM) 5 %, Place 1 patch onto the skin daily as needed. " Remove & Discard patch within 12 hours., Disp: 90 patch, Rfl: 1    multivitamin capsule, Take 1 capsule by mouth once daily., Disp: , Rfl:     pantoprazole (PROTONIX) 40 MG tablet, Take 40 mg by mouth once daily., Disp: , Rfl:     sildenafil (VIAGRA) 100 MG tablet, Take 1 tablet (100 mg total) by mouth daily as needed for Erectile Dysfunction., Disp: 10 tablet, Rfl: 6    tamsulosin (FLOMAX) 0.4 mg Cap, TAKE 1 CAPSULE BY MOUTH EVERY DAY, Disp: 90 capsule, Rfl: 2    traZODone (DESYREL) 100 MG tablet, Take 1 tablet (100 mg total) by mouth nightly as needed for Insomnia., Disp: 90 tablet, Rfl: 2    PMHx/PSHx Updates:  See patient's last visit with me on 9/24/2020  See H&P on 7/9/2011      Pathology:    12/20/2019  BONE MARROW, RIGHT ILIAC CREST,    ASPIRATE, CLOT SECTION, AND CORE BIOPSY:    --HYPERCELLULAR MARROW (APPROXIMATELY 75% TO 80%) WITH TRILINEAGE   HEMATOPOIETIC ELEMENTS, ERYTHROID  HYPERPLASIA AND MILD MEGAKARYOCYTIC HYPERPLASIA, AND NON-SPECIFIC   DYSHEMATOPOIETIC CHANGES (SEE     COMMENT).  --[id number] INCREASED STAINABLE IRON WITH INCREASED RING   SIDEROBLASTS (GREATER THAN 15%)     (SEE COMMENT).  --PERIPHERAL BLOOD WITH THROMBOCYTOSIS (574,000/MICROLITER) AND ANEMIA   (HEMOGLOBIN 5.5 GRAM/DECILITER),    WITH SCATTERED DREPANOCYTOID FORMS AND FEW NUCLEATED ERYTHROCYTES      Cytogenetic Results at the bottom of the report.  NORMAL    Objective:     Vitals:  Blood pressure 137/81, pulse 81, temperature 97 °F (36.1 °C), resp. rate 18, weight 110.7 kg (244 lb).    Physical Examination:   GEN: no apparent distress, comfortable; AAOx3  HEAD: atraumatic and normocephalic  EYES: no pallor, no icterus, PERRLA  ENT: OMM, no pharyngeal erythema, external ears WNL; no nasal discharge; no thrush  NECK: no masses, thyroid normal, trachea midline, no LAD/LN's, supple  CV: RRR with no murmur; normal pulse; normal S1 and S2; no pedal edema  CHEST: Normal respiratory effort; CTAB; normal breath sounds; no  wheeze or crackles  ABDOM: nontender and nondistended; soft; normal bowel sounds; no rebound/guarding  MUSC/Skeletal: ROM normal; no crepitus; joints normal; no deformities or arthropathy  EXTREM: no clubbing, cyanosis, inflammation or swelling  SKIN: no rashes, lesions, ulcers, petechiae or subcutaneous nodules  : no jiang  NEURO: grossly intact; motor/sensory WNL; AAOx3; no tremors  PSYCH: normal mood, affect and behavior  LYMPH: normal cervical, supraclavicular, axillary and groin LN's            Labs:     Lab Results   Component Value Date    WBC 8.19 10/27/2020    HGB 9.1 (L) 11/11/2020    HCT 26.1 (L) 11/11/2020    MCV 92 10/27/2020     (H) 10/27/2020           CMP  Sodium   Date Value Ref Range Status   11/11/2020 142 136 - 145 mmol/L Final   06/26/2019 138 134 - 144 mmol/L      Potassium   Date Value Ref Range Status   11/11/2020 4.4 3.5 - 5.1 mmol/L Final     Chloride   Date Value Ref Range Status   11/11/2020 110 95 - 110 mmol/L Final   06/26/2019 105 98 - 110 mmol/L      CO2   Date Value Ref Range Status   11/11/2020 26 23 - 29 mmol/L Final     Glucose   Date Value Ref Range Status   11/11/2020 109 70 - 110 mg/dL Final   06/26/2019 114 (H) 70 - 99 mg/dL      BUN   Date Value Ref Range Status   11/11/2020 21 (H) 6 - 20 mg/dL Final     Creatinine   Date Value Ref Range Status   11/11/2020 1.6 (H) 0.5 - 1.4 mg/dL Final   06/26/2019 1.62 (H) 0.60 - 1.40 mg/dL      Calcium   Date Value Ref Range Status   11/11/2020 9.1 8.7 - 10.5 mg/dL Final     Total Protein   Date Value Ref Range Status   11/11/2020 7.5 6.0 - 8.4 g/dL Final     Albumin   Date Value Ref Range Status   11/11/2020 4.2 3.5 - 5.2 g/dL Final   06/26/2019 4.4 3.1 - 4.7 g/dL      Total Bilirubin   Date Value Ref Range Status   11/11/2020 1.0 0.1 - 1.0 mg/dL Final     Comment:     For infants and newborns, interpretation of results should be based  on gestational age, weight and in agreement with clinical  observations.  Premature Infant  recommended reference ranges:  Up to 24 hours.............<8.0 mg/dL  Up to 48 hours............<12.0 mg/dL  3-5 days..................<15.0 mg/dL  6-29 days.................<15.0 mg/dL       Alkaline Phosphatase   Date Value Ref Range Status   11/11/2020 68 55 - 135 U/L Final     AST   Date Value Ref Range Status   11/11/2020 21 10 - 40 U/L Final     ALT   Date Value Ref Range Status   11/11/2020 20 10 - 44 U/L Final     Anion Gap   Date Value Ref Range Status   11/11/2020 6 (L) 8 - 16 mmol/L Final     eGFR if    Date Value Ref Range Status   11/11/2020 53.3 (A) >60 mL/min/1.73 m^2 Final     eGFR if non    Date Value Ref Range Status   11/11/2020 46.1 (A) >60 mL/min/1.73 m^2 Final     Comment:     Calculation used to obtain the estimated glomerular filtration  rate (eGFR) is the CKD-EPI equation.                I have reviewed all available lab results and radiology reports.    Radiology/Diagnostic Studies:    2/15/2018 Xray Survey:   IMPRESSION: No significant abnormality seen. No evidence of osteoblastic or  osteoclastic lesions identified    MRI L-spine 2/12/2018  IMPRESSION:    1. Prominent low signal intensity throughout the bone marrow on T1 and  T2-weighted imaging. Marrow infiltrative process including malignancy such as  metastatic disease or lymphoma/leukemia is a consideration along with multiple  myeloma or malignant process. Benign etiology such as osteopetrosis or  regenerative red marrow are also considerations.    2. Multilevel lumbar degenerative changes, most prominent at L4-L5 and L5-S1 as  described.    Assessment/Plan:   (1) 60 y.o. male with diagnosis of sickle cell anemia with borderline microcytosis  - latest hgb was 6.0 and he had blood transfusion  - today, he is not symptomatic at this time  - he probably has a multifactorial anemia process with underlying sickle cell, anemia of chronic disorders and anemia of chronic renal. I can not rule out an underlying  "GI bleeding process.   - iron panel is adequate (no deficiency)  - total bilirubin is only minimally elevated  - he required transfusion again in Oct 2019 and again in Dec 2019  - he had bone marrow biopsy on 12/20/2019    "dyshematopoietic changes are not entirely specific and are present  mostly within erythroid and megakaryocytic lineages. These findings   could be observed in chronic disease states, autoimmune conditions,  infectious settings, toxic exposures, nutritional deficits, as medication/drug effect, and in myelodysplastic syndrome (MDS), among other conditions"  "Additionally, ring sideroblasts are a non-specific   finding "    Patient was referred to see Dr Mustafa at [facility] and had repeat BM biopsy with diagnosis made of RARS  - he is now on procrit per directives of Dr Mustafa    7/30/2020:   he recently saw Dr Mustafa  And sees him again in Jan 2021  - he is doing well with procrit and is feeling much better  - he has not required any transfusions for some time time    9/24/2020:  - latest hgb at 8.5  - will increase the procrit dose  - f/u with Dr Zavala in Jan 2021 11/19/2020:  - patient doing well and feels "great"  - hgb up to 9.1; continued on the procrit  - f/u with Dr Mustafa in Jan 2021    (2) Alcohol abuse issues in past - he has been sober for over 3 years now    (3) former smoker    (4) chronic back issues - prior Xrays and MRI - suspect the findings on the MRI are possibly due to his sickle cell;   - SPEP was previously negative for M-protein  - PSA was 0.3 in Sept 2017  - recommended neurosurgery evaluation previously  - he saw Dr Nura VUONG and had a nerve "burning" procedure and his pain has improved    (5) CRI - elevated creatinine - he is followed Dr Chilel with nephrology      (6) H pylori gastritis - s/p recent endoscopy with Dr Collins and antibotics x 10 days        1. MDS (myelodysplastic syndrome)     2. Anemia due to multiple mechanisms     3. Chronic anemia     4. Monocytosis   "   5. Normochromic anemia     6. RARS (refractory anemia with ringed sideroblasts)     7. Thrombocytosis     8. Sickle cell trait syndrome     9. Former smoker       PLAN;  1. Continue with the procrit at thel increased dose  2.  Recommend that he f/u with Dr Collins  3.  F/u with nephrology as directed by them - due on Nov 30th to see Dr Mas  4. Check labs every 2 weeks- encouraged compliance   5. Procrit as per Dr Mustafa's directives   6.  Encouraged continued sobriety  7. F/u with Dr Mustafa on Jan 2021       RTC 8 weeks  Fax note to [facility]ve[facility]; [place]    Total Time spent on patient:    I spent over 25 mins of time with the patient. Reviewed results of the recently ordered labs, tests, reports and studies; made directives with regards to the results. Over half of this time was spent couseling and coordinating care, making treatment and analytical decisions; ordering necessary labs, tests and studies; and discussing treatment options and setting up treatment plan(s) if indicated.            Discussion:       Pathology Discussion:    I reviewed and discussed the pathology report(s) and radiograph reports (if available) in as simple to understand and/or laymen's terms to the best of my ability. I had an indepth conversation with the patient and went over the patient's individual diagnosis based on the information that was currently available. I discussed the TNM staging process with regard to the patient's particular cancer type, and the calculated stage based on the currently available TNM data and literature. I discussed the available prognostic data with regard to the current staging information and how it relates to the prognosis of their particular neoplastic process.          COVID-19 Discussion:    I had long discussion with patient and any applicable family about the COVID-19 coronavirus epidemic and the recommended precautions with regard to cancer and/or hematology patients. I have re-iterated  the CDC recommendations for adequate hand washing, use of hand -like products, and coughing into elbow, etc. In addition, especially for our patients who are on chemotherapy and/or our otherwise immunocompromised patients, I have recommended avoidance of crowds, including movie theaters, restaurants, churches, etc. I have recommended avoidance of any sick or symptomatic family members and/or friends. I have also recommended avoidance of any raw and unwashed food products, and general avoidance of food items that have not been prepared by themselves. The patient has been asked to call us immediately with any symptom developments, issues, questions or other general concerns.     I have explained all of the above in detail and the patient understands all of the current recommendation(s). I have answered all of their questions to the best of my ability and to their complete satisfaction.   The patient is to continue with the current management plan.            Electronically signed by Jose Tello MD

## 2020-11-19 ENCOUNTER — OFFICE VISIT (OUTPATIENT)
Dept: HEMATOLOGY/ONCOLOGY | Facility: CLINIC | Age: 60
End: 2020-11-19
Payer: COMMERCIAL

## 2020-11-19 ENCOUNTER — INFUSION (OUTPATIENT)
Dept: INFUSION THERAPY | Facility: HOSPITAL | Age: 60
End: 2020-11-19
Attending: INTERNAL MEDICINE
Payer: COMMERCIAL

## 2020-11-19 VITALS
TEMPERATURE: 97 F | HEART RATE: 81 BPM | BODY MASS INDEX: 35.52 KG/M2 | TEMPERATURE: 97 F | BODY MASS INDEX: 34.96 KG/M2 | SYSTOLIC BLOOD PRESSURE: 137 MMHG | WEIGHT: 244 LBS | DIASTOLIC BLOOD PRESSURE: 81 MMHG | HEIGHT: 70 IN | DIASTOLIC BLOOD PRESSURE: 79 MMHG | WEIGHT: 244.19 LBS | HEART RATE: 81 BPM | RESPIRATION RATE: 18 BRPM | SYSTOLIC BLOOD PRESSURE: 117 MMHG | RESPIRATION RATE: 20 BRPM

## 2020-11-19 DIAGNOSIS — D57.3 SICKLE CELL TRAIT SYNDROME: ICD-10-CM

## 2020-11-19 DIAGNOSIS — D64.9 NORMOCHROMIC ANEMIA: ICD-10-CM

## 2020-11-19 DIAGNOSIS — N18.30 STAGE 3 CHRONIC KIDNEY DISEASE, UNSPECIFIED WHETHER STAGE 3A OR 3B CKD: ICD-10-CM

## 2020-11-19 DIAGNOSIS — Z87.891 FORMER SMOKER: ICD-10-CM

## 2020-11-19 DIAGNOSIS — D72.821 MONOCYTOSIS: ICD-10-CM

## 2020-11-19 DIAGNOSIS — D46.1 RARS (REFRACTORY ANEMIA WITH RINGED SIDEROBLASTS): ICD-10-CM

## 2020-11-19 DIAGNOSIS — D64.9 CHRONIC ANEMIA: ICD-10-CM

## 2020-11-19 DIAGNOSIS — D64.89 ANEMIA DUE TO MULTIPLE MECHANISMS: ICD-10-CM

## 2020-11-19 DIAGNOSIS — D46.9 MDS (MYELODYSPLASTIC SYNDROME): Primary | ICD-10-CM

## 2020-11-19 DIAGNOSIS — D64.89 ANEMIA DUE TO MULTIPLE MECHANISMS: Primary | ICD-10-CM

## 2020-11-19 DIAGNOSIS — D75.839 THROMBOCYTOSIS: ICD-10-CM

## 2020-11-19 PROCEDURE — 99214 OFFICE O/P EST MOD 30 MIN: CPT | Mod: S$GLB,,, | Performed by: INTERNAL MEDICINE

## 2020-11-19 PROCEDURE — 3075F SYST BP GE 130 - 139MM HG: CPT | Mod: S$GLB,,, | Performed by: INTERNAL MEDICINE

## 2020-11-19 PROCEDURE — 99214 PR OFFICE/OUTPT VISIT, EST, LEVL IV, 30-39 MIN: ICD-10-PCS | Mod: S$GLB,,, | Performed by: INTERNAL MEDICINE

## 2020-11-19 PROCEDURE — 96372 THER/PROPH/DIAG INJ SC/IM: CPT

## 2020-11-19 PROCEDURE — 3079F DIAST BP 80-89 MM HG: CPT | Mod: S$GLB,,, | Performed by: INTERNAL MEDICINE

## 2020-11-19 PROCEDURE — 3079F PR MOST RECENT DIASTOLIC BLOOD PRESSURE 80-89 MM HG: ICD-10-PCS | Mod: S$GLB,,, | Performed by: INTERNAL MEDICINE

## 2020-11-19 PROCEDURE — 1126F PR PAIN SEVERITY QUANTIFIED, NO PAIN PRESENT: ICD-10-PCS | Mod: S$GLB,,, | Performed by: INTERNAL MEDICINE

## 2020-11-19 PROCEDURE — 1126F AMNT PAIN NOTED NONE PRSNT: CPT | Mod: S$GLB,,, | Performed by: INTERNAL MEDICINE

## 2020-11-19 PROCEDURE — 3075F PR MOST RECENT SYSTOLIC BLOOD PRESS GE 130-139MM HG: ICD-10-PCS | Mod: S$GLB,,, | Performed by: INTERNAL MEDICINE

## 2020-11-19 PROCEDURE — 3008F BODY MASS INDEX DOCD: CPT | Mod: S$GLB,,, | Performed by: INTERNAL MEDICINE

## 2020-11-19 PROCEDURE — 3008F PR BODY MASS INDEX (BMI) DOCUMENTED: ICD-10-PCS | Mod: S$GLB,,, | Performed by: INTERNAL MEDICINE

## 2020-11-19 PROCEDURE — 63600175 PHARM REV CODE 636 W HCPCS: Performed by: INTERNAL MEDICINE

## 2020-11-19 RX ADMIN — EPOETIN ALFA-EPBX 12000 UNITS: 10000 INJECTION, SOLUTION INTRAVENOUS; SUBCUTANEOUS at 03:11

## 2020-11-19 NOTE — PLAN OF CARE
Problem: Activity Intolerance  Goal: Enhanced Capacity and Energy  Outcome: Ongoing, Progressing  Intervention: Optimize Activity Tolerance  Flowsheets (Taken 11/19/2020 1524)  Self-Care Promotion: independence encouraged  Activity Management: ambulated - L4  Environmental Support:   rest periods encouraged   calm environment promoted   distractions minimized

## 2020-11-25 ENCOUNTER — LAB VISIT (OUTPATIENT)
Dept: LAB | Facility: HOSPITAL | Age: 60
End: 2020-11-25
Attending: INTERNAL MEDICINE
Payer: COMMERCIAL

## 2020-11-25 DIAGNOSIS — D72.821 MONOCYTOSIS: ICD-10-CM

## 2020-11-25 DIAGNOSIS — D46.1 RARS (REFRACTORY ANEMIA WITH RINGED SIDEROBLASTS): ICD-10-CM

## 2020-11-25 DIAGNOSIS — D64.89 ANEMIA DUE TO MULTIPLE MECHANISMS: ICD-10-CM

## 2020-11-25 DIAGNOSIS — D64.9 NORMOCHROMIC ANEMIA: ICD-10-CM

## 2020-11-25 DIAGNOSIS — D46.9 MDS (MYELODYSPLASTIC SYNDROME): ICD-10-CM

## 2020-11-25 DIAGNOSIS — D46.9 MDS (MYELODYSPLASTIC SYNDROME): Primary | ICD-10-CM

## 2020-11-25 LAB
ALBUMIN SERPL BCP-MCNC: 4.3 G/DL (ref 3.5–5.2)
ALP SERPL-CCNC: 66 U/L (ref 55–135)
ALT SERPL W/O P-5'-P-CCNC: 21 U/L (ref 10–44)
ANION GAP SERPL CALC-SCNC: 7 MMOL/L (ref 8–16)
AST SERPL-CCNC: 21 U/L (ref 10–40)
BASOPHILS # BLD AUTO: 0.09 K/UL (ref 0–0.2)
BASOPHILS NFR BLD: 1.2 % (ref 0–1.9)
BILIRUB SERPL-MCNC: 0.8 MG/DL (ref 0.1–1)
BUN SERPL-MCNC: 19 MG/DL (ref 6–20)
CALCIUM SERPL-MCNC: 8.7 MG/DL (ref 8.7–10.5)
CHLORIDE SERPL-SCNC: 105 MMOL/L (ref 95–110)
CO2 SERPL-SCNC: 26 MMOL/L (ref 23–29)
CREAT SERPL-MCNC: 1.6 MG/DL (ref 0.5–1.4)
DIFFERENTIAL METHOD: ABNORMAL
EOSINOPHIL # BLD AUTO: 0.1 K/UL (ref 0–0.5)
EOSINOPHIL NFR BLD: 1.5 % (ref 0–8)
ERYTHROCYTE [DISTWIDTH] IN BLOOD BY AUTOMATED COUNT: 27.4 % (ref 11.5–14.5)
EST. GFR  (AFRICAN AMERICAN): 53.3 ML/MIN/1.73 M^2
EST. GFR  (NON AFRICAN AMERICAN): 46.1 ML/MIN/1.73 M^2
FERRITIN SERPL-MCNC: 1498 NG/ML (ref 20–300)
GLUCOSE SERPL-MCNC: 165 MG/DL (ref 70–110)
HCT VFR BLD AUTO: 26.1 % (ref 40–54)
HGB BLD-MCNC: 8.9 G/DL (ref 14–18)
IMM GRANULOCYTES # BLD AUTO: 0.02 K/UL (ref 0–0.04)
IMM GRANULOCYTES NFR BLD AUTO: 0.3 % (ref 0–0.5)
IRON SERPL-MCNC: 62 UG/DL (ref 45–160)
LYMPHOCYTES # BLD AUTO: 1.6 K/UL (ref 1–4.8)
LYMPHOCYTES NFR BLD: 21 % (ref 18–48)
MCH RBC QN AUTO: 31.3 PG (ref 27–31)
MCHC RBC AUTO-ENTMCNC: 34.1 G/DL (ref 32–36)
MCV RBC AUTO: 92 FL (ref 82–98)
MONOCYTES # BLD AUTO: 0.8 K/UL (ref 0.3–1)
MONOCYTES NFR BLD: 10.4 % (ref 4–15)
NEUTROPHILS # BLD AUTO: 4.9 K/UL (ref 1.8–7.7)
NEUTROPHILS NFR BLD: 65.6 % (ref 38–73)
NRBC BLD-RTO: 0 /100 WBC
PLATELET # BLD AUTO: 476 K/UL (ref 150–350)
PMV BLD AUTO: 10.7 FL (ref 9.2–12.9)
POTASSIUM SERPL-SCNC: 4.2 MMOL/L (ref 3.5–5.1)
PROT SERPL-MCNC: 7.6 G/DL (ref 6–8.4)
RBC # BLD AUTO: 2.84 M/UL (ref 4.6–6.2)
SATURATED IRON: 31 % (ref 20–50)
SODIUM SERPL-SCNC: 138 MMOL/L (ref 136–145)
TOTAL IRON BINDING CAPACITY: 200 UG/DL (ref 250–450)
TRANSFERRIN SERPL-MCNC: 143 MG/DL (ref 200–375)
WBC # BLD AUTO: 7.48 K/UL (ref 3.9–12.7)

## 2020-11-25 PROCEDURE — 36415 COLL VENOUS BLD VENIPUNCTURE: CPT

## 2020-11-25 PROCEDURE — 82728 ASSAY OF FERRITIN: CPT

## 2020-11-25 PROCEDURE — 80053 COMPREHEN METABOLIC PANEL: CPT

## 2020-11-25 PROCEDURE — 83540 ASSAY OF IRON: CPT

## 2020-11-25 PROCEDURE — 85025 COMPLETE CBC W/AUTO DIFF WBC: CPT

## 2020-11-27 ENCOUNTER — INFUSION (OUTPATIENT)
Dept: INFUSION THERAPY | Facility: HOSPITAL | Age: 60
End: 2020-11-27
Attending: INTERNAL MEDICINE
Payer: COMMERCIAL

## 2020-11-27 VITALS
RESPIRATION RATE: 18 BRPM | HEART RATE: 75 BPM | WEIGHT: 241.63 LBS | OXYGEN SATURATION: 100 % | SYSTOLIC BLOOD PRESSURE: 143 MMHG | TEMPERATURE: 98 F | BODY MASS INDEX: 35.12 KG/M2 | DIASTOLIC BLOOD PRESSURE: 82 MMHG

## 2020-11-27 DIAGNOSIS — D64.9 NORMOCHROMIC ANEMIA: ICD-10-CM

## 2020-11-27 DIAGNOSIS — N18.30 STAGE 3 CHRONIC KIDNEY DISEASE, UNSPECIFIED WHETHER STAGE 3A OR 3B CKD: ICD-10-CM

## 2020-11-27 DIAGNOSIS — D64.89 ANEMIA DUE TO MULTIPLE MECHANISMS: Primary | ICD-10-CM

## 2020-11-27 DIAGNOSIS — D64.9 CHRONIC ANEMIA: ICD-10-CM

## 2020-11-27 PROCEDURE — 96372 THER/PROPH/DIAG INJ SC/IM: CPT

## 2020-11-27 PROCEDURE — 63600175 PHARM REV CODE 636 W HCPCS: Performed by: INTERNAL MEDICINE

## 2020-11-27 RX ADMIN — EPOETIN ALFA-EPBX 12000 UNITS: 10000 INJECTION, SOLUTION INTRAVENOUS; SUBCUTANEOUS at 09:11

## 2020-12-03 ENCOUNTER — INFUSION (OUTPATIENT)
Dept: INFUSION THERAPY | Facility: HOSPITAL | Age: 60
End: 2020-12-03
Attending: INTERNAL MEDICINE
Payer: COMMERCIAL

## 2020-12-03 VITALS
RESPIRATION RATE: 20 BRPM | HEIGHT: 70 IN | TEMPERATURE: 98 F | OXYGEN SATURATION: 96 % | WEIGHT: 244.19 LBS | BODY MASS INDEX: 34.96 KG/M2 | SYSTOLIC BLOOD PRESSURE: 136 MMHG | DIASTOLIC BLOOD PRESSURE: 82 MMHG | HEART RATE: 89 BPM

## 2020-12-03 DIAGNOSIS — N18.30 STAGE 3 CHRONIC KIDNEY DISEASE, UNSPECIFIED WHETHER STAGE 3A OR 3B CKD: ICD-10-CM

## 2020-12-03 DIAGNOSIS — D64.9 NORMOCHROMIC ANEMIA: ICD-10-CM

## 2020-12-03 DIAGNOSIS — D64.9 CHRONIC ANEMIA: ICD-10-CM

## 2020-12-03 DIAGNOSIS — D64.89 ANEMIA DUE TO MULTIPLE MECHANISMS: Primary | ICD-10-CM

## 2020-12-03 PROCEDURE — 96372 THER/PROPH/DIAG INJ SC/IM: CPT

## 2020-12-03 PROCEDURE — 63600175 PHARM REV CODE 636 W HCPCS: Performed by: INTERNAL MEDICINE

## 2020-12-03 RX ADMIN — EPOETIN ALFA-EPBX 12000 UNITS: 10000 INJECTION, SOLUTION INTRAVENOUS; SUBCUTANEOUS at 04:12

## 2020-12-03 NOTE — PLAN OF CARE
Problem: Anemia  Goal: Anemia Symptom Improvement  Outcome: Ongoing, Progressing  Intervention: Monitor and Manage Anemia  Flowsheets (Taken 12/3/2020 1614)  Fatigue Management:   activity schedule adjusted   activity assistance provided   fatigue-related activity identified   frequent rest breaks encouraged   paced activity encouraged

## 2020-12-09 ENCOUNTER — LAB VISIT (OUTPATIENT)
Dept: LAB | Facility: HOSPITAL | Age: 60
End: 2020-12-09
Attending: INTERNAL MEDICINE
Payer: COMMERCIAL

## 2020-12-09 DIAGNOSIS — D46.9 MDS (MYELODYSPLASTIC SYNDROME): ICD-10-CM

## 2020-12-09 DIAGNOSIS — D64.89 ANEMIA DUE TO MULTIPLE MECHANISMS: ICD-10-CM

## 2020-12-09 LAB
HCT VFR BLD AUTO: 27.4 % (ref 40–54)
HGB BLD-MCNC: 9.1 G/DL (ref 14–18)

## 2020-12-09 PROCEDURE — 85018 HEMOGLOBIN: CPT

## 2020-12-09 PROCEDURE — 85014 HEMATOCRIT: CPT

## 2020-12-09 PROCEDURE — 36415 COLL VENOUS BLD VENIPUNCTURE: CPT

## 2020-12-10 ENCOUNTER — INFUSION (OUTPATIENT)
Dept: INFUSION THERAPY | Facility: HOSPITAL | Age: 60
End: 2020-12-10
Attending: INTERNAL MEDICINE
Payer: COMMERCIAL

## 2020-12-10 ENCOUNTER — LAB VISIT (OUTPATIENT)
Dept: LAB | Facility: HOSPITAL | Age: 60
End: 2020-12-10
Attending: INTERNAL MEDICINE
Payer: COMMERCIAL

## 2020-12-10 VITALS
BODY MASS INDEX: 35.01 KG/M2 | WEIGHT: 240.5 LBS | OXYGEN SATURATION: 96 % | TEMPERATURE: 98 F | HEART RATE: 78 BPM | RESPIRATION RATE: 18 BRPM | SYSTOLIC BLOOD PRESSURE: 139 MMHG | DIASTOLIC BLOOD PRESSURE: 78 MMHG

## 2020-12-10 DIAGNOSIS — N18.9 CHRONIC KIDNEY DISEASE, UNSPECIFIED: Primary | ICD-10-CM

## 2020-12-10 DIAGNOSIS — N18.30 STAGE 3 CHRONIC KIDNEY DISEASE, UNSPECIFIED WHETHER STAGE 3A OR 3B CKD: ICD-10-CM

## 2020-12-10 DIAGNOSIS — D64.9 CHRONIC ANEMIA: ICD-10-CM

## 2020-12-10 DIAGNOSIS — D64.89 ANEMIA DUE TO MULTIPLE MECHANISMS: Primary | ICD-10-CM

## 2020-12-10 DIAGNOSIS — D64.9 NORMOCHROMIC ANEMIA: ICD-10-CM

## 2020-12-10 LAB
ALBUMIN SERPL BCP-MCNC: 4.7 G/DL (ref 3.5–5.2)
ANION GAP SERPL CALC-SCNC: 7 MMOL/L (ref 8–16)
BASOPHILS # BLD AUTO: 0.1 K/UL (ref 0–0.2)
BASOPHILS NFR BLD: 1 % (ref 0–1.9)
BUN SERPL-MCNC: 28 MG/DL (ref 6–20)
CALCIUM SERPL-MCNC: 8.5 MG/DL (ref 8.7–10.5)
CHLORIDE SERPL-SCNC: 107 MMOL/L (ref 95–110)
CO2 SERPL-SCNC: 25 MMOL/L (ref 23–29)
CREAT SERPL-MCNC: 1.7 MG/DL (ref 0.5–1.4)
DIFFERENTIAL METHOD: ABNORMAL
EOSINOPHIL # BLD AUTO: 0.1 K/UL (ref 0–0.5)
EOSINOPHIL NFR BLD: 1.4 % (ref 0–8)
ERYTHROCYTE [DISTWIDTH] IN BLOOD BY AUTOMATED COUNT: 28 % (ref 11.5–14.5)
EST. GFR  (AFRICAN AMERICAN): 49.6 ML/MIN/1.73 M^2
EST. GFR  (NON AFRICAN AMERICAN): 42.9 ML/MIN/1.73 M^2
GLUCOSE SERPL-MCNC: 95 MG/DL (ref 70–110)
HCT VFR BLD AUTO: 25.9 % (ref 40–54)
HGB BLD-MCNC: 8.5 G/DL (ref 14–18)
IMM GRANULOCYTES # BLD AUTO: 0.04 K/UL (ref 0–0.04)
IMM GRANULOCYTES NFR BLD AUTO: 0.4 % (ref 0–0.5)
LYMPHOCYTES # BLD AUTO: 1.8 K/UL (ref 1–4.8)
LYMPHOCYTES NFR BLD: 18.9 % (ref 18–48)
MCH RBC QN AUTO: 30.5 PG (ref 27–31)
MCHC RBC AUTO-ENTMCNC: 32.8 G/DL (ref 32–36)
MCV RBC AUTO: 93 FL (ref 82–98)
MONOCYTES # BLD AUTO: 0.8 K/UL (ref 0.3–1)
MONOCYTES NFR BLD: 8.6 % (ref 4–15)
NEUTROPHILS # BLD AUTO: 6.8 K/UL (ref 1.8–7.7)
NEUTROPHILS NFR BLD: 69.7 % (ref 38–73)
NRBC BLD-RTO: 0 /100 WBC
PHOSPHATE SERPL-MCNC: 3.6 MG/DL (ref 2.7–4.5)
PLATELET # BLD AUTO: 495 K/UL (ref 150–350)
PMV BLD AUTO: 10.7 FL (ref 9.2–12.9)
POTASSIUM SERPL-SCNC: 4.3 MMOL/L (ref 3.5–5.1)
RBC # BLD AUTO: 2.79 M/UL (ref 4.6–6.2)
SODIUM SERPL-SCNC: 139 MMOL/L (ref 136–145)
WBC # BLD AUTO: 9.69 K/UL (ref 3.9–12.7)

## 2020-12-10 PROCEDURE — 80069 RENAL FUNCTION PANEL: CPT

## 2020-12-10 PROCEDURE — 63600175 PHARM REV CODE 636 W HCPCS: Performed by: INTERNAL MEDICINE

## 2020-12-10 PROCEDURE — 36415 COLL VENOUS BLD VENIPUNCTURE: CPT

## 2020-12-10 PROCEDURE — 96372 THER/PROPH/DIAG INJ SC/IM: CPT

## 2020-12-10 PROCEDURE — 85025 COMPLETE CBC W/AUTO DIFF WBC: CPT

## 2020-12-10 RX ADMIN — EPOETIN ALFA-EPBX 12000 UNITS: 10000 INJECTION, SOLUTION INTRAVENOUS; SUBCUTANEOUS at 04:12

## 2020-12-10 NOTE — PLAN OF CARE
Problem: Anemia  Goal: Anemia Symptom Improvement  Outcome: Ongoing, Progressing  Intervention: Monitor and Manage Anemia  Flowsheets (Taken 12/10/2020 1616)  Oral Nutrition Promotion: rest periods promoted  Fatigue Management: frequent rest breaks encouraged

## 2020-12-17 ENCOUNTER — INFUSION (OUTPATIENT)
Dept: INFUSION THERAPY | Facility: HOSPITAL | Age: 60
End: 2020-12-17
Attending: INTERNAL MEDICINE
Payer: COMMERCIAL

## 2020-12-17 VITALS
TEMPERATURE: 98 F | HEART RATE: 78 BPM | HEIGHT: 70 IN | DIASTOLIC BLOOD PRESSURE: 84 MMHG | RESPIRATION RATE: 20 BRPM | BODY MASS INDEX: 34.46 KG/M2 | OXYGEN SATURATION: 98 % | SYSTOLIC BLOOD PRESSURE: 127 MMHG | WEIGHT: 240.69 LBS

## 2020-12-17 DIAGNOSIS — D64.9 CHRONIC ANEMIA: ICD-10-CM

## 2020-12-17 DIAGNOSIS — D64.9 NORMOCHROMIC ANEMIA: ICD-10-CM

## 2020-12-17 DIAGNOSIS — N18.30 STAGE 3 CHRONIC KIDNEY DISEASE, UNSPECIFIED WHETHER STAGE 3A OR 3B CKD: ICD-10-CM

## 2020-12-17 DIAGNOSIS — D64.89 ANEMIA DUE TO MULTIPLE MECHANISMS: Primary | ICD-10-CM

## 2020-12-17 PROCEDURE — 63600175 PHARM REV CODE 636 W HCPCS: Performed by: INTERNAL MEDICINE

## 2020-12-17 PROCEDURE — 96372 THER/PROPH/DIAG INJ SC/IM: CPT

## 2020-12-17 RX ADMIN — EPOETIN ALFA-EPBX 12000 UNITS: 10000 INJECTION, SOLUTION INTRAVENOUS; SUBCUTANEOUS at 04:12

## 2020-12-17 NOTE — PLAN OF CARE
Problem: Anemia  Goal: Anemia Symptom Improvement  Outcome: Ongoing, Progressing  Intervention: Monitor and Manage Anemia  Flowsheets (Taken 12/17/2020 1610)  Fatigue Management:   activity schedule adjusted   activity assistance provided   fatigue-related activity identified   frequent rest breaks encouraged   paced activity encouraged

## 2020-12-17 NOTE — PROGRESS NOTES
Retacrit dose rounded from 39041 units to 69367 mg per rounding protocol; original order 100 units/kg

## 2020-12-23 ENCOUNTER — LAB VISIT (OUTPATIENT)
Dept: LAB | Facility: HOSPITAL | Age: 60
End: 2020-12-23
Attending: INTERNAL MEDICINE
Payer: COMMERCIAL

## 2020-12-23 DIAGNOSIS — D46.9 MDS (MYELODYSPLASTIC SYNDROME): ICD-10-CM

## 2020-12-23 DIAGNOSIS — D64.89 ANEMIA DUE TO MULTIPLE MECHANISMS: ICD-10-CM

## 2020-12-23 LAB
HCT VFR BLD AUTO: 25.8 % (ref 40–54)
HGB BLD-MCNC: 8.6 G/DL (ref 14–18)

## 2020-12-23 PROCEDURE — 36415 COLL VENOUS BLD VENIPUNCTURE: CPT

## 2020-12-23 PROCEDURE — 85014 HEMATOCRIT: CPT

## 2020-12-23 PROCEDURE — 85018 HEMOGLOBIN: CPT

## 2020-12-24 ENCOUNTER — INFUSION (OUTPATIENT)
Dept: INFUSION THERAPY | Facility: HOSPITAL | Age: 60
End: 2020-12-24
Attending: INTERNAL MEDICINE
Payer: COMMERCIAL

## 2020-12-24 VITALS
OXYGEN SATURATION: 98 % | DIASTOLIC BLOOD PRESSURE: 74 MMHG | BODY MASS INDEX: 34.74 KG/M2 | SYSTOLIC BLOOD PRESSURE: 142 MMHG | TEMPERATURE: 98 F | HEART RATE: 74 BPM | WEIGHT: 242.63 LBS | RESPIRATION RATE: 18 BRPM | HEIGHT: 70 IN

## 2020-12-24 DIAGNOSIS — N18.30 STAGE 3 CHRONIC KIDNEY DISEASE, UNSPECIFIED WHETHER STAGE 3A OR 3B CKD: ICD-10-CM

## 2020-12-24 DIAGNOSIS — D64.9 NORMOCHROMIC ANEMIA: ICD-10-CM

## 2020-12-24 DIAGNOSIS — D64.89 ANEMIA DUE TO MULTIPLE MECHANISMS: Primary | ICD-10-CM

## 2020-12-24 DIAGNOSIS — D64.9 CHRONIC ANEMIA: ICD-10-CM

## 2020-12-24 PROCEDURE — 63600175 PHARM REV CODE 636 W HCPCS: Performed by: INTERNAL MEDICINE

## 2020-12-24 PROCEDURE — 96372 THER/PROPH/DIAG INJ SC/IM: CPT

## 2020-12-24 RX ADMIN — EPOETIN ALFA-EPBX 12000 UNITS: 10000 INJECTION, SOLUTION INTRAVENOUS; SUBCUTANEOUS at 08:12

## 2020-12-24 NOTE — PLAN OF CARE
Problem: Anemia  Goal: Anemia Symptom Improvement  Outcome: Ongoing, Progressing  Intervention: Monitor and Manage Anemia  Flowsheets (Taken 12/24/2020 0874)  Fatigue Management:   frequent rest breaks encouraged   paced activity encouraged

## 2020-12-28 ENCOUNTER — TELEPHONE (OUTPATIENT)
Dept: HEMATOLOGY/ONCOLOGY | Facility: CLINIC | Age: 60
End: 2020-12-28

## 2020-12-28 NOTE — TELEPHONE ENCOUNTER
Called the patient and instructed him that we send the request to our medical records department and they should send the records to [facility].

## 2020-12-28 NOTE — TELEPHONE ENCOUNTER
----- Message from Fabiola Pace, Patient Care Assistant sent at 12/23/2020 10:34 AM CST -----  Patient called in stating he was checking the Status of his Medical Records request from [facility]. # [phone]

## 2020-12-31 ENCOUNTER — INFUSION (OUTPATIENT)
Dept: INFUSION THERAPY | Facility: HOSPITAL | Age: 60
End: 2020-12-31
Attending: INTERNAL MEDICINE
Payer: COMMERCIAL

## 2020-12-31 VITALS
OXYGEN SATURATION: 99 % | SYSTOLIC BLOOD PRESSURE: 132 MMHG | BODY MASS INDEX: 34.95 KG/M2 | RESPIRATION RATE: 18 BRPM | HEART RATE: 73 BPM | DIASTOLIC BLOOD PRESSURE: 81 MMHG | TEMPERATURE: 97 F | WEIGHT: 240.13 LBS

## 2020-12-31 DIAGNOSIS — D64.89 ANEMIA DUE TO MULTIPLE MECHANISMS: Primary | ICD-10-CM

## 2020-12-31 DIAGNOSIS — D64.9 NORMOCHROMIC ANEMIA: ICD-10-CM

## 2020-12-31 DIAGNOSIS — D64.9 CHRONIC ANEMIA: ICD-10-CM

## 2020-12-31 DIAGNOSIS — N18.30 STAGE 3 CHRONIC KIDNEY DISEASE, UNSPECIFIED WHETHER STAGE 3A OR 3B CKD: ICD-10-CM

## 2020-12-31 PROCEDURE — 63600175 PHARM REV CODE 636 W HCPCS: Performed by: INTERNAL MEDICINE

## 2020-12-31 PROCEDURE — 96372 THER/PROPH/DIAG INJ SC/IM: CPT

## 2020-12-31 RX ADMIN — EPOETIN ALFA-EPBX 12000 UNITS: 10000 INJECTION, SOLUTION INTRAVENOUS; SUBCUTANEOUS at 08:12

## 2021-01-06 ENCOUNTER — LAB VISIT (OUTPATIENT)
Dept: LAB | Facility: HOSPITAL | Age: 61
End: 2021-01-06
Attending: INTERNAL MEDICINE
Payer: COMMERCIAL

## 2021-01-06 DIAGNOSIS — D72.821 MONOCYTOSIS: ICD-10-CM

## 2021-01-06 DIAGNOSIS — D64.9 NORMOCHROMIC ANEMIA: ICD-10-CM

## 2021-01-06 DIAGNOSIS — D46.9 MDS (MYELODYSPLASTIC SYNDROME): ICD-10-CM

## 2021-01-06 DIAGNOSIS — D46.1 RARS (REFRACTORY ANEMIA WITH RINGED SIDEROBLASTS): ICD-10-CM

## 2021-01-06 LAB
ALBUMIN SERPL BCP-MCNC: 4.3 G/DL (ref 3.5–5.2)
ALP SERPL-CCNC: 75 U/L (ref 55–135)
ALT SERPL W/O P-5'-P-CCNC: 25 U/L (ref 10–44)
ANION GAP SERPL CALC-SCNC: 9 MMOL/L (ref 8–16)
AST SERPL-CCNC: 24 U/L (ref 10–40)
BASOPHILS # BLD AUTO: 0.08 K/UL (ref 0–0.2)
BASOPHILS NFR BLD: 0.8 % (ref 0–1.9)
BILIRUB SERPL-MCNC: 0.8 MG/DL (ref 0.1–1)
BUN SERPL-MCNC: 28 MG/DL (ref 6–20)
CALCIUM SERPL-MCNC: 9 MG/DL (ref 8.7–10.5)
CHLORIDE SERPL-SCNC: 107 MMOL/L (ref 95–110)
CO2 SERPL-SCNC: 26 MMOL/L (ref 23–29)
CREAT SERPL-MCNC: 1.5 MG/DL (ref 0.5–1.4)
DIFFERENTIAL METHOD: ABNORMAL
EOSINOPHIL # BLD AUTO: 0.1 K/UL (ref 0–0.5)
EOSINOPHIL NFR BLD: 1 % (ref 0–8)
ERYTHROCYTE [DISTWIDTH] IN BLOOD BY AUTOMATED COUNT: 27.2 % (ref 11.5–14.5)
EST. GFR  (AFRICAN AMERICAN): 57.7 ML/MIN/1.73 M^2
EST. GFR  (NON AFRICAN AMERICAN): 49.9 ML/MIN/1.73 M^2
GLUCOSE SERPL-MCNC: 122 MG/DL (ref 70–110)
HCT VFR BLD AUTO: 27.2 % (ref 40–54)
HGB BLD-MCNC: 9.1 G/DL (ref 14–18)
IMM GRANULOCYTES # BLD AUTO: 0.04 K/UL (ref 0–0.04)
IMM GRANULOCYTES NFR BLD AUTO: 0.4 % (ref 0–0.5)
LYMPHOCYTES # BLD AUTO: 1.7 K/UL (ref 1–4.8)
LYMPHOCYTES NFR BLD: 16.7 % (ref 18–48)
MCH RBC QN AUTO: 31.1 PG (ref 27–31)
MCHC RBC AUTO-ENTMCNC: 33.5 G/DL (ref 32–36)
MCV RBC AUTO: 93 FL (ref 82–98)
MONOCYTES # BLD AUTO: 0.9 K/UL (ref 0.3–1)
MONOCYTES NFR BLD: 8.9 % (ref 4–15)
NEUTROPHILS # BLD AUTO: 7.4 K/UL (ref 1.8–7.7)
NEUTROPHILS NFR BLD: 72.2 % (ref 38–73)
NRBC BLD-RTO: 0 /100 WBC
PLATELET # BLD AUTO: 510 K/UL (ref 150–350)
PMV BLD AUTO: 10.5 FL (ref 9.2–12.9)
POTASSIUM SERPL-SCNC: 4.5 MMOL/L (ref 3.5–5.1)
PROT SERPL-MCNC: 7.9 G/DL (ref 6–8.4)
RBC # BLD AUTO: 2.93 M/UL (ref 4.6–6.2)
SODIUM SERPL-SCNC: 142 MMOL/L (ref 136–145)
WBC # BLD AUTO: 10.18 K/UL (ref 3.9–12.7)

## 2021-01-06 PROCEDURE — 80053 COMPREHEN METABOLIC PANEL: CPT

## 2021-01-06 PROCEDURE — 85025 COMPLETE CBC W/AUTO DIFF WBC: CPT

## 2021-01-06 PROCEDURE — 36415 COLL VENOUS BLD VENIPUNCTURE: CPT

## 2021-01-07 ENCOUNTER — INFUSION (OUTPATIENT)
Dept: INFUSION THERAPY | Facility: HOSPITAL | Age: 61
End: 2021-01-07
Attending: INTERNAL MEDICINE
Payer: COMMERCIAL

## 2021-01-07 VITALS
SYSTOLIC BLOOD PRESSURE: 131 MMHG | BODY MASS INDEX: 34.89 KG/M2 | TEMPERATURE: 99 F | WEIGHT: 239.69 LBS | OXYGEN SATURATION: 99 % | RESPIRATION RATE: 18 BRPM | HEART RATE: 80 BPM | DIASTOLIC BLOOD PRESSURE: 79 MMHG

## 2021-01-07 DIAGNOSIS — D64.9 CHRONIC ANEMIA: ICD-10-CM

## 2021-01-07 DIAGNOSIS — D64.89 ANEMIA DUE TO MULTIPLE MECHANISMS: Primary | ICD-10-CM

## 2021-01-07 DIAGNOSIS — D64.9 NORMOCHROMIC ANEMIA: ICD-10-CM

## 2021-01-07 DIAGNOSIS — N18.30 STAGE 3 CHRONIC KIDNEY DISEASE, UNSPECIFIED WHETHER STAGE 3A OR 3B CKD: ICD-10-CM

## 2021-01-07 PROCEDURE — 96372 THER/PROPH/DIAG INJ SC/IM: CPT

## 2021-01-07 PROCEDURE — 63600175 PHARM REV CODE 636 W HCPCS: Performed by: INTERNAL MEDICINE

## 2021-01-07 RX ADMIN — EPOETIN ALFA-EPBX 12000 UNITS: 10000 INJECTION, SOLUTION INTRAVENOUS; SUBCUTANEOUS at 04:01

## 2021-01-14 ENCOUNTER — INFUSION (OUTPATIENT)
Dept: INFUSION THERAPY | Facility: HOSPITAL | Age: 61
End: 2021-01-14
Attending: INTERNAL MEDICINE
Payer: COMMERCIAL

## 2021-01-14 VITALS
SYSTOLIC BLOOD PRESSURE: 128 MMHG | HEART RATE: 81 BPM | DIASTOLIC BLOOD PRESSURE: 79 MMHG | HEIGHT: 70 IN | TEMPERATURE: 98 F | BODY MASS INDEX: 34.69 KG/M2 | RESPIRATION RATE: 20 BRPM | WEIGHT: 242.31 LBS

## 2021-01-14 DIAGNOSIS — D64.89 ANEMIA DUE TO MULTIPLE MECHANISMS: Primary | ICD-10-CM

## 2021-01-14 DIAGNOSIS — D64.9 CHRONIC ANEMIA: ICD-10-CM

## 2021-01-14 DIAGNOSIS — N18.30 STAGE 3 CHRONIC KIDNEY DISEASE, UNSPECIFIED WHETHER STAGE 3A OR 3B CKD: ICD-10-CM

## 2021-01-14 DIAGNOSIS — D64.9 NORMOCHROMIC ANEMIA: ICD-10-CM

## 2021-01-14 PROCEDURE — 96372 THER/PROPH/DIAG INJ SC/IM: CPT

## 2021-01-14 PROCEDURE — 63600175 PHARM REV CODE 636 W HCPCS: Performed by: INTERNAL MEDICINE

## 2021-01-14 RX ADMIN — EPOETIN ALFA-EPBX 12000 UNITS: 10000 INJECTION, SOLUTION INTRAVENOUS; SUBCUTANEOUS at 04:01

## 2021-01-20 ENCOUNTER — LAB VISIT (OUTPATIENT)
Dept: LAB | Facility: HOSPITAL | Age: 61
End: 2021-01-20
Attending: INTERNAL MEDICINE
Payer: COMMERCIAL

## 2021-01-20 DIAGNOSIS — D64.9 NORMOCHROMIC ANEMIA: ICD-10-CM

## 2021-01-20 DIAGNOSIS — D72.821 MONOCYTOSIS: ICD-10-CM

## 2021-01-20 DIAGNOSIS — D57.3 SICKLE CELL TRAIT SYNDROME: ICD-10-CM

## 2021-01-20 DIAGNOSIS — D46.9 MDS (MYELODYSPLASTIC SYNDROME): ICD-10-CM

## 2021-01-20 DIAGNOSIS — D64.9 ANEMIA, UNSPECIFIED TYPE: ICD-10-CM

## 2021-01-20 DIAGNOSIS — D64.9 CHRONIC ANEMIA: ICD-10-CM

## 2021-01-20 DIAGNOSIS — D46.1 RARS (REFRACTORY ANEMIA WITH RINGED SIDEROBLASTS): ICD-10-CM

## 2021-01-20 DIAGNOSIS — D63.1 ANEMIA, CHRONIC RENAL FAILURE, STAGE 2 (MILD): ICD-10-CM

## 2021-01-20 DIAGNOSIS — N18.2 ANEMIA, CHRONIC RENAL FAILURE, STAGE 2 (MILD): ICD-10-CM

## 2021-01-20 DIAGNOSIS — D64.89 ANEMIA DUE TO MULTIPLE MECHANISMS: ICD-10-CM

## 2021-01-20 LAB
ALBUMIN SERPL BCP-MCNC: 4.1 G/DL (ref 3.5–5.2)
ALP SERPL-CCNC: 69 U/L (ref 55–135)
ALT SERPL W/O P-5'-P-CCNC: 20 U/L (ref 10–44)
ANION GAP SERPL CALC-SCNC: 8 MMOL/L (ref 8–16)
AST SERPL-CCNC: 20 U/L (ref 10–40)
BASOPHILS # BLD AUTO: 0.08 K/UL (ref 0–0.2)
BASOPHILS NFR BLD: 0.9 % (ref 0–1.9)
BILIRUB SERPL-MCNC: 0.7 MG/DL (ref 0.1–1)
BUN SERPL-MCNC: 23 MG/DL (ref 6–20)
CALCIUM SERPL-MCNC: 9.1 MG/DL (ref 8.7–10.5)
CHLORIDE SERPL-SCNC: 109 MMOL/L (ref 95–110)
CO2 SERPL-SCNC: 25 MMOL/L (ref 23–29)
CREAT SERPL-MCNC: 1.4 MG/DL (ref 0.5–1.4)
DIFFERENTIAL METHOD: ABNORMAL
EOSINOPHIL # BLD AUTO: 0.1 K/UL (ref 0–0.5)
EOSINOPHIL NFR BLD: 1.2 % (ref 0–8)
ERYTHROCYTE [DISTWIDTH] IN BLOOD BY AUTOMATED COUNT: 27.2 % (ref 11.5–14.5)
EST. GFR  (AFRICAN AMERICAN): >60 ML/MIN/1.73 M^2
EST. GFR  (NON AFRICAN AMERICAN): 54.2 ML/MIN/1.73 M^2
FERRITIN SERPL-MCNC: 1589 NG/ML (ref 20–300)
GLUCOSE SERPL-MCNC: 109 MG/DL (ref 70–110)
HCT VFR BLD AUTO: 26.2 % (ref 40–54)
HGB BLD-MCNC: 9 G/DL (ref 14–18)
IMM GRANULOCYTES # BLD AUTO: 0.03 K/UL (ref 0–0.04)
IMM GRANULOCYTES NFR BLD AUTO: 0.3 % (ref 0–0.5)
IRON SERPL-MCNC: 67 UG/DL (ref 45–160)
LYMPHOCYTES # BLD AUTO: 1.6 K/UL (ref 1–4.8)
LYMPHOCYTES NFR BLD: 17.8 % (ref 18–48)
MCH RBC QN AUTO: 31.6 PG (ref 27–31)
MCHC RBC AUTO-ENTMCNC: 34.4 G/DL (ref 32–36)
MCV RBC AUTO: 92 FL (ref 82–98)
MONOCYTES # BLD AUTO: 1 K/UL (ref 0.3–1)
MONOCYTES NFR BLD: 10.9 % (ref 4–15)
NEUTROPHILS # BLD AUTO: 6.1 K/UL (ref 1.8–7.7)
NEUTROPHILS NFR BLD: 68.9 % (ref 38–73)
NRBC BLD-RTO: 0 /100 WBC
PLATELET # BLD AUTO: 470 K/UL (ref 150–350)
PMV BLD AUTO: 10.4 FL (ref 9.2–12.9)
POTASSIUM SERPL-SCNC: 4.4 MMOL/L (ref 3.5–5.1)
PROT SERPL-MCNC: 7.6 G/DL (ref 6–8.4)
RBC # BLD AUTO: 2.85 M/UL (ref 4.6–6.2)
SATURATED IRON: 30 % (ref 20–50)
SODIUM SERPL-SCNC: 142 MMOL/L (ref 136–145)
TOTAL IRON BINDING CAPACITY: 223 UG/DL (ref 250–450)
TRANSFERRIN SERPL-MCNC: 159 MG/DL (ref 200–375)
WBC # BLD AUTO: 8.92 K/UL (ref 3.9–12.7)

## 2021-01-20 PROCEDURE — 85025 COMPLETE CBC W/AUTO DIFF WBC: CPT

## 2021-01-20 PROCEDURE — 36415 COLL VENOUS BLD VENIPUNCTURE: CPT

## 2021-01-20 PROCEDURE — 83540 ASSAY OF IRON: CPT

## 2021-01-20 PROCEDURE — 82728 ASSAY OF FERRITIN: CPT

## 2021-01-20 PROCEDURE — 80053 COMPREHEN METABOLIC PANEL: CPT

## 2021-01-21 ENCOUNTER — INFUSION (OUTPATIENT)
Dept: INFUSION THERAPY | Facility: HOSPITAL | Age: 61
End: 2021-01-21
Attending: INTERNAL MEDICINE
Payer: COMMERCIAL

## 2021-01-21 VITALS
DIASTOLIC BLOOD PRESSURE: 81 MMHG | HEART RATE: 86 BPM | BODY MASS INDEX: 34.52 KG/M2 | SYSTOLIC BLOOD PRESSURE: 129 MMHG | HEIGHT: 70 IN | TEMPERATURE: 98 F | RESPIRATION RATE: 17 BRPM | WEIGHT: 241.13 LBS

## 2021-01-21 DIAGNOSIS — D64.9 CHRONIC ANEMIA: ICD-10-CM

## 2021-01-21 DIAGNOSIS — D64.9 NORMOCHROMIC ANEMIA: ICD-10-CM

## 2021-01-21 DIAGNOSIS — N18.30 STAGE 3 CHRONIC KIDNEY DISEASE, UNSPECIFIED WHETHER STAGE 3A OR 3B CKD: ICD-10-CM

## 2021-01-21 DIAGNOSIS — D64.89 ANEMIA DUE TO MULTIPLE MECHANISMS: Primary | ICD-10-CM

## 2021-01-21 PROCEDURE — 96372 THER/PROPH/DIAG INJ SC/IM: CPT

## 2021-01-21 PROCEDURE — 63600175 PHARM REV CODE 636 W HCPCS: Performed by: INTERNAL MEDICINE

## 2021-01-21 RX ADMIN — EPOETIN ALFA-EPBX 12000 UNITS: 10000 INJECTION, SOLUTION INTRAVENOUS; SUBCUTANEOUS at 04:01

## 2021-01-28 ENCOUNTER — INFUSION (OUTPATIENT)
Dept: INFUSION THERAPY | Facility: HOSPITAL | Age: 61
End: 2021-01-28
Attending: INTERNAL MEDICINE
Payer: COMMERCIAL

## 2021-01-28 VITALS
WEIGHT: 239.31 LBS | SYSTOLIC BLOOD PRESSURE: 135 MMHG | BODY MASS INDEX: 34.83 KG/M2 | RESPIRATION RATE: 18 BRPM | DIASTOLIC BLOOD PRESSURE: 81 MMHG | TEMPERATURE: 98 F | HEART RATE: 80 BPM

## 2021-01-28 DIAGNOSIS — D64.89 ANEMIA DUE TO MULTIPLE MECHANISMS: Primary | ICD-10-CM

## 2021-01-28 DIAGNOSIS — N18.30 STAGE 3 CHRONIC KIDNEY DISEASE, UNSPECIFIED WHETHER STAGE 3A OR 3B CKD: ICD-10-CM

## 2021-01-28 DIAGNOSIS — D64.9 CHRONIC ANEMIA: ICD-10-CM

## 2021-01-28 DIAGNOSIS — D64.9 NORMOCHROMIC ANEMIA: ICD-10-CM

## 2021-01-28 PROCEDURE — 63600175 PHARM REV CODE 636 W HCPCS: Performed by: INTERNAL MEDICINE

## 2021-01-28 PROCEDURE — 96372 THER/PROPH/DIAG INJ SC/IM: CPT

## 2021-01-28 RX ADMIN — EPOETIN ALFA-EPBX 12000 UNITS: 10000 INJECTION, SOLUTION INTRAVENOUS; SUBCUTANEOUS at 04:01

## 2021-02-03 ENCOUNTER — LAB VISIT (OUTPATIENT)
Dept: LAB | Facility: HOSPITAL | Age: 61
End: 2021-02-03
Attending: INTERNAL MEDICINE
Payer: COMMERCIAL

## 2021-02-03 DIAGNOSIS — D64.9 NORMOCHROMIC ANEMIA: ICD-10-CM

## 2021-02-03 DIAGNOSIS — D57.3 SICKLE CELL TRAIT SYNDROME: ICD-10-CM

## 2021-02-03 DIAGNOSIS — N18.2 ANEMIA, CHRONIC RENAL FAILURE, STAGE 2 (MILD): ICD-10-CM

## 2021-02-03 DIAGNOSIS — D64.89 ANEMIA DUE TO MULTIPLE MECHANISMS: ICD-10-CM

## 2021-02-03 DIAGNOSIS — D63.1 ANEMIA, CHRONIC RENAL FAILURE, STAGE 2 (MILD): ICD-10-CM

## 2021-02-03 DIAGNOSIS — D64.9 ANEMIA, UNSPECIFIED TYPE: ICD-10-CM

## 2021-02-03 DIAGNOSIS — D64.9 CHRONIC ANEMIA: ICD-10-CM

## 2021-02-03 DIAGNOSIS — D46.1 RARS (REFRACTORY ANEMIA WITH RINGED SIDEROBLASTS): ICD-10-CM

## 2021-02-03 LAB
BASOPHILS # BLD AUTO: 0.11 K/UL (ref 0–0.2)
BASOPHILS NFR BLD: 1.1 % (ref 0–1.9)
DIFFERENTIAL METHOD: ABNORMAL
EOSINOPHIL # BLD AUTO: 0.2 K/UL (ref 0–0.5)
EOSINOPHIL NFR BLD: 1.5 % (ref 0–8)
ERYTHROCYTE [DISTWIDTH] IN BLOOD BY AUTOMATED COUNT: 27 % (ref 11.5–14.5)
HCT VFR BLD AUTO: 26.5 % (ref 40–54)
HGB BLD-MCNC: 9.2 G/DL (ref 14–18)
IMM GRANULOCYTES # BLD AUTO: 0.03 K/UL (ref 0–0.04)
IMM GRANULOCYTES NFR BLD AUTO: 0.3 % (ref 0–0.5)
LYMPHOCYTES # BLD AUTO: 1.8 K/UL (ref 1–4.8)
LYMPHOCYTES NFR BLD: 17.6 % (ref 18–48)
MCH RBC QN AUTO: 31.7 PG (ref 27–31)
MCHC RBC AUTO-ENTMCNC: 34.7 G/DL (ref 32–36)
MCV RBC AUTO: 91 FL (ref 82–98)
MONOCYTES # BLD AUTO: 1 K/UL (ref 0.3–1)
MONOCYTES NFR BLD: 10.3 % (ref 4–15)
NEUTROPHILS # BLD AUTO: 6.9 K/UL (ref 1.8–7.7)
NEUTROPHILS NFR BLD: 69.2 % (ref 38–73)
NRBC BLD-RTO: 0 /100 WBC
PLATELET # BLD AUTO: 496 K/UL (ref 150–350)
PMV BLD AUTO: 10.5 FL (ref 9.2–12.9)
RBC # BLD AUTO: 2.9 M/UL (ref 4.6–6.2)
WBC # BLD AUTO: 9.98 K/UL (ref 3.9–12.7)

## 2021-02-03 PROCEDURE — 36415 COLL VENOUS BLD VENIPUNCTURE: CPT

## 2021-02-03 PROCEDURE — 85025 COMPLETE CBC W/AUTO DIFF WBC: CPT

## 2021-02-04 ENCOUNTER — INFUSION (OUTPATIENT)
Dept: INFUSION THERAPY | Facility: HOSPITAL | Age: 61
End: 2021-02-04
Attending: INTERNAL MEDICINE
Payer: COMMERCIAL

## 2021-02-04 VITALS
BODY MASS INDEX: 34.96 KG/M2 | SYSTOLIC BLOOD PRESSURE: 127 MMHG | RESPIRATION RATE: 18 BRPM | HEART RATE: 76 BPM | DIASTOLIC BLOOD PRESSURE: 82 MMHG | OXYGEN SATURATION: 95 % | TEMPERATURE: 98 F | WEIGHT: 240.19 LBS

## 2021-02-04 DIAGNOSIS — D64.9 CHRONIC ANEMIA: ICD-10-CM

## 2021-02-04 DIAGNOSIS — N18.30 STAGE 3 CHRONIC KIDNEY DISEASE, UNSPECIFIED WHETHER STAGE 3A OR 3B CKD: ICD-10-CM

## 2021-02-04 DIAGNOSIS — D64.89 ANEMIA DUE TO MULTIPLE MECHANISMS: Primary | ICD-10-CM

## 2021-02-04 DIAGNOSIS — D64.9 NORMOCHROMIC ANEMIA: ICD-10-CM

## 2021-02-04 PROCEDURE — 63600175 PHARM REV CODE 636 W HCPCS: Performed by: INTERNAL MEDICINE

## 2021-02-04 PROCEDURE — 96372 THER/PROPH/DIAG INJ SC/IM: CPT

## 2021-02-04 RX ADMIN — EPOETIN ALFA-EPBX 12000 UNITS: 10000 INJECTION, SOLUTION INTRAVENOUS; SUBCUTANEOUS at 04:02

## 2021-02-11 ENCOUNTER — INFUSION (OUTPATIENT)
Dept: INFUSION THERAPY | Facility: HOSPITAL | Age: 61
End: 2021-02-11
Attending: INTERNAL MEDICINE
Payer: COMMERCIAL

## 2021-02-11 VITALS
BODY MASS INDEX: 34.93 KG/M2 | RESPIRATION RATE: 18 BRPM | WEIGHT: 240 LBS | SYSTOLIC BLOOD PRESSURE: 133 MMHG | TEMPERATURE: 98 F | DIASTOLIC BLOOD PRESSURE: 78 MMHG | HEART RATE: 72 BPM

## 2021-02-11 DIAGNOSIS — D64.9 CHRONIC ANEMIA: ICD-10-CM

## 2021-02-11 DIAGNOSIS — D64.89 ANEMIA DUE TO MULTIPLE MECHANISMS: Primary | ICD-10-CM

## 2021-02-11 DIAGNOSIS — N18.30 STAGE 3 CHRONIC KIDNEY DISEASE, UNSPECIFIED WHETHER STAGE 3A OR 3B CKD: ICD-10-CM

## 2021-02-11 DIAGNOSIS — D64.9 NORMOCHROMIC ANEMIA: ICD-10-CM

## 2021-02-11 PROCEDURE — 63600175 PHARM REV CODE 636 W HCPCS: Performed by: INTERNAL MEDICINE

## 2021-02-11 PROCEDURE — 96372 THER/PROPH/DIAG INJ SC/IM: CPT

## 2021-02-11 RX ADMIN — EPOETIN ALFA-EPBX 12000 UNITS: 10000 INJECTION, SOLUTION INTRAVENOUS; SUBCUTANEOUS at 04:02

## 2021-02-17 ENCOUNTER — LAB VISIT (OUTPATIENT)
Dept: LAB | Facility: HOSPITAL | Age: 61
End: 2021-02-17
Attending: INTERNAL MEDICINE
Payer: COMMERCIAL

## 2021-02-17 DIAGNOSIS — D46.9 MDS (MYELODYSPLASTIC SYNDROME): ICD-10-CM

## 2021-02-17 DIAGNOSIS — D64.89 ANEMIA DUE TO MULTIPLE MECHANISMS: ICD-10-CM

## 2021-02-17 LAB
HCT VFR BLD AUTO: 27.5 % (ref 40–54)
HGB BLD-MCNC: 9 G/DL (ref 14–18)

## 2021-02-17 PROCEDURE — 85014 HEMATOCRIT: CPT

## 2021-02-17 PROCEDURE — 36415 COLL VENOUS BLD VENIPUNCTURE: CPT

## 2021-02-17 PROCEDURE — 85018 HEMOGLOBIN: CPT

## 2021-02-18 ENCOUNTER — INFUSION (OUTPATIENT)
Dept: INFUSION THERAPY | Facility: HOSPITAL | Age: 61
End: 2021-02-18
Attending: INTERNAL MEDICINE
Payer: COMMERCIAL

## 2021-02-18 VITALS
WEIGHT: 239.75 LBS | SYSTOLIC BLOOD PRESSURE: 142 MMHG | DIASTOLIC BLOOD PRESSURE: 79 MMHG | TEMPERATURE: 98 F | OXYGEN SATURATION: 96 % | BODY MASS INDEX: 34.9 KG/M2 | HEART RATE: 77 BPM | RESPIRATION RATE: 18 BRPM

## 2021-02-18 DIAGNOSIS — D64.9 NORMOCHROMIC ANEMIA: ICD-10-CM

## 2021-02-18 DIAGNOSIS — N18.30 STAGE 3 CHRONIC KIDNEY DISEASE, UNSPECIFIED WHETHER STAGE 3A OR 3B CKD: ICD-10-CM

## 2021-02-18 DIAGNOSIS — D64.89 ANEMIA DUE TO MULTIPLE MECHANISMS: Primary | ICD-10-CM

## 2021-02-18 DIAGNOSIS — D64.9 CHRONIC ANEMIA: ICD-10-CM

## 2021-02-18 PROCEDURE — 63600175 PHARM REV CODE 636 W HCPCS: Performed by: INTERNAL MEDICINE

## 2021-02-18 PROCEDURE — 96372 THER/PROPH/DIAG INJ SC/IM: CPT

## 2021-02-18 RX ADMIN — EPOETIN ALFA-EPBX 12000 UNITS: 10000 INJECTION, SOLUTION INTRAVENOUS; SUBCUTANEOUS at 04:02

## 2021-02-25 ENCOUNTER — INFUSION (OUTPATIENT)
Dept: INFUSION THERAPY | Facility: HOSPITAL | Age: 61
End: 2021-02-25
Attending: INTERNAL MEDICINE
Payer: COMMERCIAL

## 2021-02-25 VITALS
TEMPERATURE: 98 F | DIASTOLIC BLOOD PRESSURE: 79 MMHG | OXYGEN SATURATION: 97 % | HEART RATE: 78 BPM | WEIGHT: 240.5 LBS | SYSTOLIC BLOOD PRESSURE: 122 MMHG | BODY MASS INDEX: 35.01 KG/M2 | RESPIRATION RATE: 18 BRPM

## 2021-02-25 DIAGNOSIS — D64.89 ANEMIA DUE TO MULTIPLE MECHANISMS: Primary | ICD-10-CM

## 2021-02-25 DIAGNOSIS — D64.9 NORMOCHROMIC ANEMIA: ICD-10-CM

## 2021-02-25 DIAGNOSIS — N18.30 STAGE 3 CHRONIC KIDNEY DISEASE, UNSPECIFIED WHETHER STAGE 3A OR 3B CKD: ICD-10-CM

## 2021-02-25 DIAGNOSIS — D64.9 CHRONIC ANEMIA: ICD-10-CM

## 2021-02-25 PROCEDURE — 63600175 PHARM REV CODE 636 W HCPCS: Performed by: INTERNAL MEDICINE

## 2021-02-25 PROCEDURE — 96372 THER/PROPH/DIAG INJ SC/IM: CPT

## 2021-02-25 RX ADMIN — EPOETIN ALFA-EPBX 12000 UNITS: 10000 INJECTION, SOLUTION INTRAVENOUS; SUBCUTANEOUS at 04:02

## 2021-03-03 ENCOUNTER — LAB VISIT (OUTPATIENT)
Dept: LAB | Facility: HOSPITAL | Age: 61
End: 2021-03-03
Attending: INTERNAL MEDICINE
Payer: COMMERCIAL

## 2021-03-03 DIAGNOSIS — D72.821 MONOCYTOSIS: ICD-10-CM

## 2021-03-03 DIAGNOSIS — D64.9 CHRONIC ANEMIA: ICD-10-CM

## 2021-03-03 DIAGNOSIS — N18.2 ANEMIA, CHRONIC RENAL FAILURE, STAGE 2 (MILD): ICD-10-CM

## 2021-03-03 DIAGNOSIS — D46.1 RARS (REFRACTORY ANEMIA WITH RINGED SIDEROBLASTS): ICD-10-CM

## 2021-03-03 DIAGNOSIS — D63.1 ANEMIA, CHRONIC RENAL FAILURE, STAGE 2 (MILD): ICD-10-CM

## 2021-03-03 DIAGNOSIS — D46.9 MDS (MYELODYSPLASTIC SYNDROME): ICD-10-CM

## 2021-03-03 DIAGNOSIS — D64.9 ANEMIA, UNSPECIFIED TYPE: ICD-10-CM

## 2021-03-03 DIAGNOSIS — D64.89 ANEMIA DUE TO MULTIPLE MECHANISMS: ICD-10-CM

## 2021-03-03 DIAGNOSIS — D57.3 SICKLE CELL TRAIT SYNDROME: ICD-10-CM

## 2021-03-03 DIAGNOSIS — D64.9 NORMOCHROMIC ANEMIA: ICD-10-CM

## 2021-03-03 LAB
ALBUMIN SERPL BCP-MCNC: 4.2 G/DL (ref 3.5–5.2)
ALP SERPL-CCNC: 62 U/L (ref 55–135)
ALT SERPL W/O P-5'-P-CCNC: 18 U/L (ref 10–44)
ANION GAP SERPL CALC-SCNC: 9 MMOL/L (ref 8–16)
AST SERPL-CCNC: 19 U/L (ref 10–40)
BASOPHILS # BLD AUTO: 0.09 K/UL (ref 0–0.2)
BASOPHILS NFR BLD: 0.9 % (ref 0–1.9)
BILIRUB SERPL-MCNC: 1 MG/DL (ref 0.1–1)
BUN SERPL-MCNC: 26 MG/DL (ref 6–20)
CALCIUM SERPL-MCNC: 8.8 MG/DL (ref 8.7–10.5)
CHLORIDE SERPL-SCNC: 109 MMOL/L (ref 95–110)
CO2 SERPL-SCNC: 24 MMOL/L (ref 23–29)
CREAT SERPL-MCNC: 1.6 MG/DL (ref 0.5–1.4)
DIFFERENTIAL METHOD: ABNORMAL
EOSINOPHIL # BLD AUTO: 0.1 K/UL (ref 0–0.5)
EOSINOPHIL NFR BLD: 1 % (ref 0–8)
ERYTHROCYTE [DISTWIDTH] IN BLOOD BY AUTOMATED COUNT: 27.2 % (ref 11.5–14.5)
EST. GFR  (AFRICAN AMERICAN): 53.3 ML/MIN/1.73 M^2
EST. GFR  (NON AFRICAN AMERICAN): 46.1 ML/MIN/1.73 M^2
FERRITIN SERPL-MCNC: 1400 NG/ML (ref 20–300)
GLUCOSE SERPL-MCNC: 113 MG/DL (ref 70–110)
HCT VFR BLD AUTO: 26.7 % (ref 40–54)
HGB BLD-MCNC: 9.1 G/DL (ref 14–18)
IMM GRANULOCYTES # BLD AUTO: 0.06 K/UL (ref 0–0.04)
IMM GRANULOCYTES NFR BLD AUTO: 0.6 % (ref 0–0.5)
IRON SERPL-MCNC: 67 UG/DL (ref 45–160)
LYMPHOCYTES # BLD AUTO: 1.8 K/UL (ref 1–4.8)
LYMPHOCYTES NFR BLD: 17.6 % (ref 18–48)
MCH RBC QN AUTO: 31.8 PG (ref 27–31)
MCHC RBC AUTO-ENTMCNC: 34.1 G/DL (ref 32–36)
MCV RBC AUTO: 93 FL (ref 82–98)
MONOCYTES # BLD AUTO: 0.8 K/UL (ref 0.3–1)
MONOCYTES NFR BLD: 8.1 % (ref 4–15)
NEUTROPHILS # BLD AUTO: 7.2 K/UL (ref 1.8–7.7)
NEUTROPHILS NFR BLD: 71.8 % (ref 38–73)
NRBC BLD-RTO: 0 /100 WBC
PLATELET # BLD AUTO: 474 K/UL (ref 150–350)
PMV BLD AUTO: 10.7 FL (ref 9.2–12.9)
POTASSIUM SERPL-SCNC: 4.3 MMOL/L (ref 3.5–5.1)
PROT SERPL-MCNC: 7.5 G/DL (ref 6–8.4)
RBC # BLD AUTO: 2.86 M/UL (ref 4.6–6.2)
SATURATED IRON: 29 % (ref 20–50)
SODIUM SERPL-SCNC: 142 MMOL/L (ref 136–145)
TOTAL IRON BINDING CAPACITY: 228 UG/DL (ref 250–450)
TRANSFERRIN SERPL-MCNC: 163 MG/DL (ref 200–375)
WBC # BLD AUTO: 10 K/UL (ref 3.9–12.7)

## 2021-03-03 PROCEDURE — 36415 COLL VENOUS BLD VENIPUNCTURE: CPT

## 2021-03-03 PROCEDURE — 85025 COMPLETE CBC W/AUTO DIFF WBC: CPT

## 2021-03-03 PROCEDURE — 82728 ASSAY OF FERRITIN: CPT | Performed by: INTERNAL MEDICINE

## 2021-03-03 PROCEDURE — 83540 ASSAY OF IRON: CPT | Performed by: INTERNAL MEDICINE

## 2021-03-03 PROCEDURE — 80053 COMPREHEN METABOLIC PANEL: CPT | Performed by: INTERNAL MEDICINE

## 2021-03-04 ENCOUNTER — OFFICE VISIT (OUTPATIENT)
Dept: HEMATOLOGY/ONCOLOGY | Facility: CLINIC | Age: 61
End: 2021-03-04
Payer: COMMERCIAL

## 2021-03-04 ENCOUNTER — INFUSION (OUTPATIENT)
Dept: INFUSION THERAPY | Facility: HOSPITAL | Age: 61
End: 2021-03-04
Attending: INTERNAL MEDICINE
Payer: COMMERCIAL

## 2021-03-04 VITALS
HEART RATE: 80 BPM | RESPIRATION RATE: 18 BRPM | DIASTOLIC BLOOD PRESSURE: 78 MMHG | SYSTOLIC BLOOD PRESSURE: 145 MMHG | TEMPERATURE: 98 F | WEIGHT: 239.88 LBS | BODY MASS INDEX: 34.92 KG/M2

## 2021-03-04 VITALS
BODY MASS INDEX: 34.93 KG/M2 | WEIGHT: 240 LBS | RESPIRATION RATE: 18 BRPM | SYSTOLIC BLOOD PRESSURE: 132 MMHG | HEART RATE: 68 BPM | DIASTOLIC BLOOD PRESSURE: 78 MMHG | TEMPERATURE: 98 F

## 2021-03-04 DIAGNOSIS — N18.30 STAGE 3 CHRONIC KIDNEY DISEASE, UNSPECIFIED WHETHER STAGE 3A OR 3B CKD: ICD-10-CM

## 2021-03-04 DIAGNOSIS — D64.89 ANEMIA DUE TO MULTIPLE MECHANISMS: Primary | ICD-10-CM

## 2021-03-04 DIAGNOSIS — D72.821 MONOCYTOSIS: ICD-10-CM

## 2021-03-04 DIAGNOSIS — D64.9 CHRONIC ANEMIA: ICD-10-CM

## 2021-03-04 DIAGNOSIS — D64.9 NORMOCHROMIC ANEMIA: ICD-10-CM

## 2021-03-04 DIAGNOSIS — Z87.891 FORMER SMOKER: ICD-10-CM

## 2021-03-04 DIAGNOSIS — D46.9 MDS (MYELODYSPLASTIC SYNDROME): Primary | ICD-10-CM

## 2021-03-04 DIAGNOSIS — D75.839 THROMBOCYTOSIS: ICD-10-CM

## 2021-03-04 DIAGNOSIS — D46.1 RARS (REFRACTORY ANEMIA WITH RINGED SIDEROBLASTS): ICD-10-CM

## 2021-03-04 DIAGNOSIS — D64.89 ANEMIA DUE TO MULTIPLE MECHANISMS: ICD-10-CM

## 2021-03-04 DIAGNOSIS — D57.3 SICKLE CELL TRAIT SYNDROME: ICD-10-CM

## 2021-03-04 PROCEDURE — 99214 OFFICE O/P EST MOD 30 MIN: CPT | Mod: S$GLB,,, | Performed by: INTERNAL MEDICINE

## 2021-03-04 PROCEDURE — 3008F PR BODY MASS INDEX (BMI) DOCUMENTED: ICD-10-PCS | Mod: S$GLB,,, | Performed by: INTERNAL MEDICINE

## 2021-03-04 PROCEDURE — 96372 THER/PROPH/DIAG INJ SC/IM: CPT

## 2021-03-04 PROCEDURE — 3078F PR MOST RECENT DIASTOLIC BLOOD PRESSURE < 80 MM HG: ICD-10-PCS | Mod: S$GLB,,, | Performed by: INTERNAL MEDICINE

## 2021-03-04 PROCEDURE — 1126F PR PAIN SEVERITY QUANTIFIED, NO PAIN PRESENT: ICD-10-PCS | Mod: S$GLB,,, | Performed by: INTERNAL MEDICINE

## 2021-03-04 PROCEDURE — 3077F SYST BP >= 140 MM HG: CPT | Mod: S$GLB,,, | Performed by: INTERNAL MEDICINE

## 2021-03-04 PROCEDURE — 1126F AMNT PAIN NOTED NONE PRSNT: CPT | Mod: S$GLB,,, | Performed by: INTERNAL MEDICINE

## 2021-03-04 PROCEDURE — 3077F PR MOST RECENT SYSTOLIC BLOOD PRESSURE >= 140 MM HG: ICD-10-PCS | Mod: S$GLB,,, | Performed by: INTERNAL MEDICINE

## 2021-03-04 PROCEDURE — 3078F DIAST BP <80 MM HG: CPT | Mod: S$GLB,,, | Performed by: INTERNAL MEDICINE

## 2021-03-04 PROCEDURE — 99214 PR OFFICE/OUTPT VISIT, EST, LEVL IV, 30-39 MIN: ICD-10-PCS | Mod: S$GLB,,, | Performed by: INTERNAL MEDICINE

## 2021-03-04 PROCEDURE — 3008F BODY MASS INDEX DOCD: CPT | Mod: S$GLB,,, | Performed by: INTERNAL MEDICINE

## 2021-03-04 PROCEDURE — 63600175 PHARM REV CODE 636 W HCPCS: Performed by: INTERNAL MEDICINE

## 2021-03-04 RX ADMIN — EPOETIN ALFA-EPBX 12000 UNITS: 10000 INJECTION, SOLUTION INTRAVENOUS; SUBCUTANEOUS at 03:03

## 2021-03-05 ENCOUNTER — OFFICE VISIT (OUTPATIENT)
Dept: OPTOMETRY | Facility: CLINIC | Age: 61
End: 2021-03-05
Payer: COMMERCIAL

## 2021-03-05 DIAGNOSIS — H52.7 REFRACTIVE ERROR: ICD-10-CM

## 2021-03-05 DIAGNOSIS — H25.13 NUCLEAR SCLEROSIS, BILATERAL: Primary | ICD-10-CM

## 2021-03-05 DIAGNOSIS — Z01.00 ENCOUNTER FOR VISION SCREENING: ICD-10-CM

## 2021-03-05 PROCEDURE — 92004 COMPRE OPH EXAM NEW PT 1/>: CPT | Mod: S$GLB,,, | Performed by: OPTOMETRIST

## 2021-03-05 PROCEDURE — 99999 PR PBB SHADOW E&M-EST. PATIENT-LVL III: CPT | Mod: PBBFAC,,, | Performed by: OPTOMETRIST

## 2021-03-05 PROCEDURE — 99999 PR PBB SHADOW E&M-EST. PATIENT-LVL III: ICD-10-PCS | Mod: PBBFAC,,, | Performed by: OPTOMETRIST

## 2021-03-05 PROCEDURE — 1126F AMNT PAIN NOTED NONE PRSNT: CPT | Mod: S$GLB,,, | Performed by: OPTOMETRIST

## 2021-03-05 PROCEDURE — 92004 PR EYE EXAM, NEW PATIENT,COMPREHESV: ICD-10-PCS | Mod: S$GLB,,, | Performed by: OPTOMETRIST

## 2021-03-05 PROCEDURE — 1126F PR PAIN SEVERITY QUANTIFIED, NO PAIN PRESENT: ICD-10-PCS | Mod: S$GLB,,, | Performed by: OPTOMETRIST

## 2021-03-05 RX ORDER — OMEPRAZOLE 20 MG/1
20 CAPSULE, DELAYED RELEASE ORAL
COMMUNITY
End: 2021-11-03

## 2021-03-11 ENCOUNTER — INFUSION (OUTPATIENT)
Dept: INFUSION THERAPY | Facility: HOSPITAL | Age: 61
End: 2021-03-11
Attending: INTERNAL MEDICINE
Payer: COMMERCIAL

## 2021-03-11 VITALS
DIASTOLIC BLOOD PRESSURE: 82 MMHG | TEMPERATURE: 98 F | RESPIRATION RATE: 18 BRPM | HEART RATE: 80 BPM | OXYGEN SATURATION: 96 % | BODY MASS INDEX: 34.95 KG/M2 | WEIGHT: 240.13 LBS | SYSTOLIC BLOOD PRESSURE: 128 MMHG

## 2021-03-11 DIAGNOSIS — D64.9 NORMOCHROMIC ANEMIA: ICD-10-CM

## 2021-03-11 DIAGNOSIS — D64.9 CHRONIC ANEMIA: ICD-10-CM

## 2021-03-11 DIAGNOSIS — N18.30 STAGE 3 CHRONIC KIDNEY DISEASE, UNSPECIFIED WHETHER STAGE 3A OR 3B CKD: ICD-10-CM

## 2021-03-11 DIAGNOSIS — D64.89 ANEMIA DUE TO MULTIPLE MECHANISMS: Primary | ICD-10-CM

## 2021-03-11 PROCEDURE — 96372 THER/PROPH/DIAG INJ SC/IM: CPT

## 2021-03-11 PROCEDURE — 63600175 PHARM REV CODE 636 W HCPCS: Performed by: INTERNAL MEDICINE

## 2021-03-11 RX ADMIN — EPOETIN ALFA-EPBX 12000 UNITS: 10000 INJECTION, SOLUTION INTRAVENOUS; SUBCUTANEOUS at 04:03

## 2021-03-17 ENCOUNTER — LAB VISIT (OUTPATIENT)
Dept: LAB | Facility: HOSPITAL | Age: 61
End: 2021-03-17
Attending: INTERNAL MEDICINE
Payer: COMMERCIAL

## 2021-03-17 DIAGNOSIS — D46.1 RARS (REFRACTORY ANEMIA WITH RINGED SIDEROBLASTS): ICD-10-CM

## 2021-03-17 DIAGNOSIS — D64.9 NORMOCHROMIC ANEMIA: ICD-10-CM

## 2021-03-17 DIAGNOSIS — D46.9 MDS (MYELODYSPLASTIC SYNDROME): ICD-10-CM

## 2021-03-17 DIAGNOSIS — D72.821 MONOCYTOSIS: ICD-10-CM

## 2021-03-17 LAB
ALBUMIN SERPL BCP-MCNC: 4.3 G/DL (ref 3.5–5.2)
ALP SERPL-CCNC: 71 U/L (ref 55–135)
ALT SERPL W/O P-5'-P-CCNC: 18 U/L (ref 10–44)
ANION GAP SERPL CALC-SCNC: 6 MMOL/L (ref 8–16)
AST SERPL-CCNC: 19 U/L (ref 10–40)
BASOPHILS # BLD AUTO: 0.08 K/UL (ref 0–0.2)
BASOPHILS NFR BLD: 0.9 % (ref 0–1.9)
BILIRUB SERPL-MCNC: 0.8 MG/DL (ref 0.1–1)
BUN SERPL-MCNC: 26 MG/DL (ref 6–20)
CALCIUM SERPL-MCNC: 8.8 MG/DL (ref 8.7–10.5)
CHLORIDE SERPL-SCNC: 109 MMOL/L (ref 95–110)
CO2 SERPL-SCNC: 27 MMOL/L (ref 23–29)
CREAT SERPL-MCNC: 1.4 MG/DL (ref 0.5–1.4)
DIFFERENTIAL METHOD: ABNORMAL
EOSINOPHIL # BLD AUTO: 0.2 K/UL (ref 0–0.5)
EOSINOPHIL NFR BLD: 1.7 % (ref 0–8)
ERYTHROCYTE [DISTWIDTH] IN BLOOD BY AUTOMATED COUNT: 27.4 % (ref 11.5–14.5)
EST. GFR  (AFRICAN AMERICAN): >60 ML/MIN/1.73 M^2
EST. GFR  (NON AFRICAN AMERICAN): 54.2 ML/MIN/1.73 M^2
FERRITIN SERPL-MCNC: 1440 NG/ML (ref 20–300)
GLUCOSE SERPL-MCNC: 83 MG/DL (ref 70–110)
HCT VFR BLD AUTO: 26.8 % (ref 40–54)
HGB BLD-MCNC: 9.1 G/DL (ref 14–18)
IMM GRANULOCYTES # BLD AUTO: 0.03 K/UL (ref 0–0.04)
IMM GRANULOCYTES NFR BLD AUTO: 0.3 % (ref 0–0.5)
IRON SERPL-MCNC: 75 UG/DL (ref 45–160)
LYMPHOCYTES # BLD AUTO: 1.7 K/UL (ref 1–4.8)
LYMPHOCYTES NFR BLD: 18.5 % (ref 18–48)
MCH RBC QN AUTO: 31.5 PG (ref 27–31)
MCHC RBC AUTO-ENTMCNC: 34 G/DL (ref 32–36)
MCV RBC AUTO: 93 FL (ref 82–98)
MONOCYTES # BLD AUTO: 1 K/UL (ref 0.3–1)
MONOCYTES NFR BLD: 11.4 % (ref 4–15)
NEUTROPHILS # BLD AUTO: 6 K/UL (ref 1.8–7.7)
NEUTROPHILS NFR BLD: 67.2 % (ref 38–73)
NRBC BLD-RTO: 0 /100 WBC
PLATELET # BLD AUTO: 469 K/UL (ref 150–350)
PMV BLD AUTO: 10.8 FL (ref 9.2–12.9)
POTASSIUM SERPL-SCNC: 4.2 MMOL/L (ref 3.5–5.1)
PROT SERPL-MCNC: 7.5 G/DL (ref 6–8.4)
RBC # BLD AUTO: 2.89 M/UL (ref 4.6–6.2)
SATURATED IRON: 32 % (ref 20–50)
SODIUM SERPL-SCNC: 142 MMOL/L (ref 136–145)
TOTAL IRON BINDING CAPACITY: 234 UG/DL (ref 250–450)
TRANSFERRIN SERPL-MCNC: 167 MG/DL (ref 200–375)
WBC # BLD AUTO: 8.92 K/UL (ref 3.9–12.7)

## 2021-03-17 PROCEDURE — 36415 COLL VENOUS BLD VENIPUNCTURE: CPT | Performed by: INTERNAL MEDICINE

## 2021-03-17 PROCEDURE — 82728 ASSAY OF FERRITIN: CPT | Performed by: INTERNAL MEDICINE

## 2021-03-17 PROCEDURE — 83540 ASSAY OF IRON: CPT | Performed by: INTERNAL MEDICINE

## 2021-03-17 PROCEDURE — 85025 COMPLETE CBC W/AUTO DIFF WBC: CPT | Performed by: INTERNAL MEDICINE

## 2021-03-17 PROCEDURE — 80053 COMPREHEN METABOLIC PANEL: CPT | Performed by: INTERNAL MEDICINE

## 2021-03-18 ENCOUNTER — INFUSION (OUTPATIENT)
Dept: INFUSION THERAPY | Facility: HOSPITAL | Age: 61
End: 2021-03-18
Attending: INTERNAL MEDICINE
Payer: COMMERCIAL

## 2021-03-18 VITALS
WEIGHT: 239.75 LBS | RESPIRATION RATE: 18 BRPM | HEART RATE: 83 BPM | BODY MASS INDEX: 34.32 KG/M2 | HEIGHT: 70 IN | TEMPERATURE: 98 F | DIASTOLIC BLOOD PRESSURE: 84 MMHG | SYSTOLIC BLOOD PRESSURE: 135 MMHG

## 2021-03-18 DIAGNOSIS — D64.9 NORMOCHROMIC ANEMIA: ICD-10-CM

## 2021-03-18 DIAGNOSIS — N18.30 STAGE 3 CHRONIC KIDNEY DISEASE, UNSPECIFIED WHETHER STAGE 3A OR 3B CKD: ICD-10-CM

## 2021-03-18 DIAGNOSIS — D64.9 CHRONIC ANEMIA: ICD-10-CM

## 2021-03-18 DIAGNOSIS — D64.89 ANEMIA DUE TO MULTIPLE MECHANISMS: Primary | ICD-10-CM

## 2021-03-18 PROCEDURE — 63600175 PHARM REV CODE 636 W HCPCS: Performed by: INTERNAL MEDICINE

## 2021-03-18 PROCEDURE — 96372 THER/PROPH/DIAG INJ SC/IM: CPT

## 2021-03-18 RX ADMIN — EPOETIN ALFA-EPBX 12000 UNITS: 4000 INJECTION, SOLUTION INTRAVENOUS; SUBCUTANEOUS at 04:03

## 2021-03-25 ENCOUNTER — INFUSION (OUTPATIENT)
Dept: INFUSION THERAPY | Facility: HOSPITAL | Age: 61
End: 2021-03-25
Attending: INTERNAL MEDICINE
Payer: COMMERCIAL

## 2021-03-25 VITALS
BODY MASS INDEX: 34.24 KG/M2 | WEIGHT: 238.63 LBS | SYSTOLIC BLOOD PRESSURE: 124 MMHG | TEMPERATURE: 98 F | DIASTOLIC BLOOD PRESSURE: 82 MMHG | HEART RATE: 80 BPM | RESPIRATION RATE: 18 BRPM

## 2021-03-25 DIAGNOSIS — D64.9 CHRONIC ANEMIA: ICD-10-CM

## 2021-03-25 DIAGNOSIS — D64.89 ANEMIA DUE TO MULTIPLE MECHANISMS: Primary | ICD-10-CM

## 2021-03-25 DIAGNOSIS — D64.9 NORMOCHROMIC ANEMIA: ICD-10-CM

## 2021-03-25 DIAGNOSIS — N18.30 STAGE 3 CHRONIC KIDNEY DISEASE, UNSPECIFIED WHETHER STAGE 3A OR 3B CKD: ICD-10-CM

## 2021-03-25 PROCEDURE — 63600175 PHARM REV CODE 636 W HCPCS: Performed by: INTERNAL MEDICINE

## 2021-03-25 PROCEDURE — 96372 THER/PROPH/DIAG INJ SC/IM: CPT

## 2021-03-25 RX ADMIN — EPOETIN ALFA-EPBX 12000 UNITS: 4000 INJECTION, SOLUTION INTRAVENOUS; SUBCUTANEOUS at 04:03

## 2021-03-31 ENCOUNTER — LAB VISIT (OUTPATIENT)
Dept: LAB | Facility: HOSPITAL | Age: 61
End: 2021-03-31
Attending: INTERNAL MEDICINE
Payer: COMMERCIAL

## 2021-03-31 DIAGNOSIS — D46.9 MDS (MYELODYSPLASTIC SYNDROME): ICD-10-CM

## 2021-03-31 DIAGNOSIS — D64.89 ANEMIA DUE TO MULTIPLE MECHANISMS: ICD-10-CM

## 2021-03-31 LAB
HCT VFR BLD AUTO: 26.8 % (ref 40–54)
HGB BLD-MCNC: 9.2 G/DL (ref 14–18)

## 2021-03-31 PROCEDURE — 85018 HEMOGLOBIN: CPT | Performed by: INTERNAL MEDICINE

## 2021-03-31 PROCEDURE — 85014 HEMATOCRIT: CPT | Performed by: INTERNAL MEDICINE

## 2021-03-31 PROCEDURE — 36415 COLL VENOUS BLD VENIPUNCTURE: CPT | Performed by: INTERNAL MEDICINE

## 2021-04-01 ENCOUNTER — INFUSION (OUTPATIENT)
Dept: INFUSION THERAPY | Facility: HOSPITAL | Age: 61
End: 2021-04-01
Attending: INTERNAL MEDICINE
Payer: COMMERCIAL

## 2021-04-01 VITALS
BODY MASS INDEX: 34.29 KG/M2 | HEART RATE: 85 BPM | TEMPERATURE: 98 F | WEIGHT: 239.5 LBS | OXYGEN SATURATION: 96 % | RESPIRATION RATE: 18 BRPM | DIASTOLIC BLOOD PRESSURE: 77 MMHG | HEIGHT: 70 IN | SYSTOLIC BLOOD PRESSURE: 138 MMHG

## 2021-04-01 DIAGNOSIS — D64.9 CHRONIC ANEMIA: ICD-10-CM

## 2021-04-01 DIAGNOSIS — D64.9 NORMOCHROMIC ANEMIA: ICD-10-CM

## 2021-04-01 DIAGNOSIS — D64.89 ANEMIA DUE TO MULTIPLE MECHANISMS: Primary | ICD-10-CM

## 2021-04-01 DIAGNOSIS — N18.30 STAGE 3 CHRONIC KIDNEY DISEASE, UNSPECIFIED WHETHER STAGE 3A OR 3B CKD: ICD-10-CM

## 2021-04-01 PROCEDURE — 63600175 PHARM REV CODE 636 W HCPCS: Performed by: INTERNAL MEDICINE

## 2021-04-01 PROCEDURE — 96372 THER/PROPH/DIAG INJ SC/IM: CPT

## 2021-04-01 RX ADMIN — EPOETIN ALFA-EPBX 12000 UNITS: 10000 INJECTION, SOLUTION INTRAVENOUS; SUBCUTANEOUS at 04:04

## 2021-04-08 ENCOUNTER — INFUSION (OUTPATIENT)
Dept: INFUSION THERAPY | Facility: HOSPITAL | Age: 61
End: 2021-04-08
Attending: INTERNAL MEDICINE
Payer: COMMERCIAL

## 2021-04-08 VITALS
DIASTOLIC BLOOD PRESSURE: 77 MMHG | BODY MASS INDEX: 34.36 KG/M2 | HEART RATE: 81 BPM | WEIGHT: 239.5 LBS | OXYGEN SATURATION: 96 % | SYSTOLIC BLOOD PRESSURE: 133 MMHG | RESPIRATION RATE: 18 BRPM | TEMPERATURE: 98 F

## 2021-04-08 DIAGNOSIS — D64.9 NORMOCHROMIC ANEMIA: ICD-10-CM

## 2021-04-08 DIAGNOSIS — N18.30 STAGE 3 CHRONIC KIDNEY DISEASE, UNSPECIFIED WHETHER STAGE 3A OR 3B CKD: ICD-10-CM

## 2021-04-08 DIAGNOSIS — D64.89 ANEMIA DUE TO MULTIPLE MECHANISMS: Primary | ICD-10-CM

## 2021-04-08 DIAGNOSIS — D64.9 CHRONIC ANEMIA: ICD-10-CM

## 2021-04-08 PROCEDURE — 96372 THER/PROPH/DIAG INJ SC/IM: CPT

## 2021-04-08 PROCEDURE — 63600175 PHARM REV CODE 636 W HCPCS: Performed by: INTERNAL MEDICINE

## 2021-04-08 RX ADMIN — EPOETIN ALFA-EPBX 12000 UNITS: 10000 INJECTION, SOLUTION INTRAVENOUS; SUBCUTANEOUS at 04:04

## 2021-04-14 ENCOUNTER — LAB VISIT (OUTPATIENT)
Dept: LAB | Facility: HOSPITAL | Age: 61
End: 2021-04-14
Attending: INTERNAL MEDICINE
Payer: COMMERCIAL

## 2021-04-14 DIAGNOSIS — D72.821 MONOCYTOSIS: ICD-10-CM

## 2021-04-14 DIAGNOSIS — N18.2 ANEMIA, CHRONIC RENAL FAILURE, STAGE 2 (MILD): ICD-10-CM

## 2021-04-14 DIAGNOSIS — D46.1 RARS (REFRACTORY ANEMIA WITH RINGED SIDEROBLASTS): ICD-10-CM

## 2021-04-14 DIAGNOSIS — D46.9 MDS (MYELODYSPLASTIC SYNDROME): ICD-10-CM

## 2021-04-14 DIAGNOSIS — D64.9 NORMOCHROMIC ANEMIA: ICD-10-CM

## 2021-04-14 DIAGNOSIS — D64.9 ANEMIA, UNSPECIFIED TYPE: ICD-10-CM

## 2021-04-14 DIAGNOSIS — D64.89 ANEMIA DUE TO MULTIPLE MECHANISMS: ICD-10-CM

## 2021-04-14 DIAGNOSIS — D64.9 CHRONIC ANEMIA: ICD-10-CM

## 2021-04-14 DIAGNOSIS — D57.3 SICKLE CELL TRAIT SYNDROME: ICD-10-CM

## 2021-04-14 DIAGNOSIS — D63.1 ANEMIA, CHRONIC RENAL FAILURE, STAGE 2 (MILD): ICD-10-CM

## 2021-04-14 LAB
ALBUMIN SERPL BCP-MCNC: 4.3 G/DL (ref 3.5–5.2)
ALP SERPL-CCNC: 65 U/L (ref 55–135)
ALT SERPL W/O P-5'-P-CCNC: 22 U/L (ref 10–44)
ANION GAP SERPL CALC-SCNC: 9 MMOL/L (ref 8–16)
AST SERPL-CCNC: 20 U/L (ref 10–40)
BASOPHILS # BLD AUTO: 0.11 K/UL (ref 0–0.2)
BASOPHILS NFR BLD: 1.2 % (ref 0–1.9)
BILIRUB SERPL-MCNC: 0.6 MG/DL (ref 0.1–1)
BUN SERPL-MCNC: 23 MG/DL (ref 6–20)
CALCIUM SERPL-MCNC: 8.8 MG/DL (ref 8.7–10.5)
CHLORIDE SERPL-SCNC: 106 MMOL/L (ref 95–110)
CO2 SERPL-SCNC: 27 MMOL/L (ref 23–29)
CREAT SERPL-MCNC: 1.3 MG/DL (ref 0.5–1.4)
DIFFERENTIAL METHOD: ABNORMAL
EOSINOPHIL # BLD AUTO: 0.2 K/UL (ref 0–0.5)
EOSINOPHIL NFR BLD: 1.7 % (ref 0–8)
ERYTHROCYTE [DISTWIDTH] IN BLOOD BY AUTOMATED COUNT: 27.1 % (ref 11.5–14.5)
EST. GFR  (AFRICAN AMERICAN): >60 ML/MIN/1.73 M^2
EST. GFR  (NON AFRICAN AMERICAN): 59.3 ML/MIN/1.73 M^2
FERRITIN SERPL-MCNC: 1355 NG/ML (ref 20–300)
GLUCOSE SERPL-MCNC: 129 MG/DL (ref 70–110)
HCT VFR BLD AUTO: 27.4 % (ref 40–54)
HGB BLD-MCNC: 9.4 G/DL (ref 14–18)
IMM GRANULOCYTES # BLD AUTO: 0.03 K/UL (ref 0–0.04)
IMM GRANULOCYTES NFR BLD AUTO: 0.3 % (ref 0–0.5)
IRON SERPL-MCNC: 63 UG/DL (ref 45–160)
LYMPHOCYTES # BLD AUTO: 1.8 K/UL (ref 1–4.8)
LYMPHOCYTES NFR BLD: 19.8 % (ref 18–48)
MCH RBC QN AUTO: 31.4 PG (ref 27–31)
MCHC RBC AUTO-ENTMCNC: 34.3 G/DL (ref 32–36)
MCV RBC AUTO: 92 FL (ref 82–98)
MONOCYTES # BLD AUTO: 1 K/UL (ref 0.3–1)
MONOCYTES NFR BLD: 10.9 % (ref 4–15)
NEUTROPHILS # BLD AUTO: 6.1 K/UL (ref 1.8–7.7)
NEUTROPHILS NFR BLD: 66.1 % (ref 38–73)
NRBC BLD-RTO: 0 /100 WBC
PLATELET # BLD AUTO: 464 K/UL (ref 150–450)
PMV BLD AUTO: 9.4 FL (ref 9.2–12.9)
POTASSIUM SERPL-SCNC: 4.3 MMOL/L (ref 3.5–5.1)
PROT SERPL-MCNC: 7.6 G/DL (ref 6–8.4)
RBC # BLD AUTO: 2.99 M/UL (ref 4.6–6.2)
SATURATED IRON: 28 % (ref 20–50)
SODIUM SERPL-SCNC: 142 MMOL/L (ref 136–145)
TOTAL IRON BINDING CAPACITY: 223 UG/DL (ref 250–450)
TRANSFERRIN SERPL-MCNC: 159 MG/DL (ref 200–375)
WBC # BLD AUTO: 9.26 K/UL (ref 3.9–12.7)

## 2021-04-14 PROCEDURE — 83540 ASSAY OF IRON: CPT | Performed by: INTERNAL MEDICINE

## 2021-04-14 PROCEDURE — 82728 ASSAY OF FERRITIN: CPT | Performed by: INTERNAL MEDICINE

## 2021-04-14 PROCEDURE — 85025 COMPLETE CBC W/AUTO DIFF WBC: CPT | Performed by: INTERNAL MEDICINE

## 2021-04-14 PROCEDURE — 80053 COMPREHEN METABOLIC PANEL: CPT | Performed by: INTERNAL MEDICINE

## 2021-04-14 PROCEDURE — 36415 COLL VENOUS BLD VENIPUNCTURE: CPT | Performed by: INTERNAL MEDICINE

## 2021-04-15 ENCOUNTER — INFUSION (OUTPATIENT)
Dept: INFUSION THERAPY | Facility: HOSPITAL | Age: 61
End: 2021-04-15
Attending: INTERNAL MEDICINE
Payer: COMMERCIAL

## 2021-04-15 VITALS
HEIGHT: 70 IN | DIASTOLIC BLOOD PRESSURE: 80 MMHG | RESPIRATION RATE: 20 BRPM | BODY MASS INDEX: 34.5 KG/M2 | TEMPERATURE: 99 F | WEIGHT: 241 LBS | SYSTOLIC BLOOD PRESSURE: 132 MMHG | HEART RATE: 78 BPM

## 2021-04-15 DIAGNOSIS — D64.89 ANEMIA DUE TO MULTIPLE MECHANISMS: Primary | ICD-10-CM

## 2021-04-15 DIAGNOSIS — D64.9 NORMOCHROMIC ANEMIA: ICD-10-CM

## 2021-04-15 DIAGNOSIS — N18.30 STAGE 3 CHRONIC KIDNEY DISEASE, UNSPECIFIED WHETHER STAGE 3A OR 3B CKD: ICD-10-CM

## 2021-04-15 DIAGNOSIS — D64.9 CHRONIC ANEMIA: ICD-10-CM

## 2021-04-15 PROCEDURE — 96372 THER/PROPH/DIAG INJ SC/IM: CPT

## 2021-04-15 PROCEDURE — 63600175 PHARM REV CODE 636 W HCPCS: Performed by: INTERNAL MEDICINE

## 2021-04-15 RX ADMIN — EPOETIN ALFA-EPBX 12000 UNITS: 10000 INJECTION, SOLUTION INTRAVENOUS; SUBCUTANEOUS at 04:04

## 2021-04-22 ENCOUNTER — INFUSION (OUTPATIENT)
Dept: INFUSION THERAPY | Facility: HOSPITAL | Age: 61
End: 2021-04-22
Attending: INTERNAL MEDICINE
Payer: COMMERCIAL

## 2021-04-22 VITALS
TEMPERATURE: 99 F | WEIGHT: 239.88 LBS | HEART RATE: 80 BPM | RESPIRATION RATE: 18 BRPM | SYSTOLIC BLOOD PRESSURE: 133 MMHG | OXYGEN SATURATION: 96 % | BODY MASS INDEX: 34.42 KG/M2 | DIASTOLIC BLOOD PRESSURE: 81 MMHG

## 2021-04-22 DIAGNOSIS — D64.9 NORMOCHROMIC ANEMIA: ICD-10-CM

## 2021-04-22 DIAGNOSIS — N18.30 STAGE 3 CHRONIC KIDNEY DISEASE, UNSPECIFIED WHETHER STAGE 3A OR 3B CKD: ICD-10-CM

## 2021-04-22 DIAGNOSIS — D64.89 ANEMIA DUE TO MULTIPLE MECHANISMS: Primary | ICD-10-CM

## 2021-04-22 DIAGNOSIS — D64.9 CHRONIC ANEMIA: ICD-10-CM

## 2021-04-22 PROCEDURE — 96372 THER/PROPH/DIAG INJ SC/IM: CPT

## 2021-04-22 PROCEDURE — 63600175 PHARM REV CODE 636 W HCPCS: Performed by: INTERNAL MEDICINE

## 2021-04-22 RX ADMIN — EPOETIN ALFA-EPBX 12000 UNITS: 10000 INJECTION, SOLUTION INTRAVENOUS; SUBCUTANEOUS at 04:04

## 2021-04-28 ENCOUNTER — LAB VISIT (OUTPATIENT)
Dept: LAB | Facility: HOSPITAL | Age: 61
End: 2021-04-28
Attending: INTERNAL MEDICINE
Payer: COMMERCIAL

## 2021-04-28 DIAGNOSIS — D46.9 MDS (MYELODYSPLASTIC SYNDROME): ICD-10-CM

## 2021-04-28 DIAGNOSIS — D64.89 ANEMIA DUE TO MULTIPLE MECHANISMS: ICD-10-CM

## 2021-04-28 LAB
HCT VFR BLD AUTO: 25.6 % (ref 40–54)
HGB BLD-MCNC: 8.7 G/DL (ref 14–18)

## 2021-04-28 PROCEDURE — 85018 HEMOGLOBIN: CPT | Performed by: INTERNAL MEDICINE

## 2021-04-28 PROCEDURE — 85014 HEMATOCRIT: CPT | Performed by: INTERNAL MEDICINE

## 2021-04-28 PROCEDURE — 36415 COLL VENOUS BLD VENIPUNCTURE: CPT | Performed by: INTERNAL MEDICINE

## 2021-04-29 ENCOUNTER — INFUSION (OUTPATIENT)
Dept: INFUSION THERAPY | Facility: HOSPITAL | Age: 61
End: 2021-04-29
Attending: INTERNAL MEDICINE
Payer: COMMERCIAL

## 2021-04-29 VITALS
DIASTOLIC BLOOD PRESSURE: 79 MMHG | OXYGEN SATURATION: 95 % | TEMPERATURE: 98 F | BODY MASS INDEX: 34.72 KG/M2 | RESPIRATION RATE: 18 BRPM | WEIGHT: 242 LBS | HEART RATE: 82 BPM | SYSTOLIC BLOOD PRESSURE: 135 MMHG

## 2021-04-29 DIAGNOSIS — D64.9 CHRONIC ANEMIA: ICD-10-CM

## 2021-04-29 DIAGNOSIS — N18.30 STAGE 3 CHRONIC KIDNEY DISEASE, UNSPECIFIED WHETHER STAGE 3A OR 3B CKD: ICD-10-CM

## 2021-04-29 DIAGNOSIS — D64.89 ANEMIA DUE TO MULTIPLE MECHANISMS: Primary | ICD-10-CM

## 2021-04-29 DIAGNOSIS — D64.9 NORMOCHROMIC ANEMIA: ICD-10-CM

## 2021-04-29 PROCEDURE — 63600175 PHARM REV CODE 636 W HCPCS: Performed by: INTERNAL MEDICINE

## 2021-04-29 PROCEDURE — 96372 THER/PROPH/DIAG INJ SC/IM: CPT

## 2021-04-29 RX ADMIN — EPOETIN ALFA-EPBX 12000 UNITS: 10000 INJECTION, SOLUTION INTRAVENOUS; SUBCUTANEOUS at 04:04

## 2021-05-06 ENCOUNTER — INFUSION (OUTPATIENT)
Dept: INFUSION THERAPY | Facility: HOSPITAL | Age: 61
End: 2021-05-06
Attending: INTERNAL MEDICINE
Payer: COMMERCIAL

## 2021-05-06 VITALS
RESPIRATION RATE: 18 BRPM | BODY MASS INDEX: 34.71 KG/M2 | WEIGHT: 241.88 LBS | HEART RATE: 77 BPM | TEMPERATURE: 99 F | OXYGEN SATURATION: 95 % | SYSTOLIC BLOOD PRESSURE: 133 MMHG | DIASTOLIC BLOOD PRESSURE: 78 MMHG

## 2021-05-06 DIAGNOSIS — N18.30 STAGE 3 CHRONIC KIDNEY DISEASE, UNSPECIFIED WHETHER STAGE 3A OR 3B CKD: ICD-10-CM

## 2021-05-06 DIAGNOSIS — D64.89 ANEMIA DUE TO MULTIPLE MECHANISMS: Primary | ICD-10-CM

## 2021-05-06 DIAGNOSIS — D64.9 NORMOCHROMIC ANEMIA: ICD-10-CM

## 2021-05-06 DIAGNOSIS — D64.9 CHRONIC ANEMIA: ICD-10-CM

## 2021-05-06 PROCEDURE — 63600175 PHARM REV CODE 636 W HCPCS: Performed by: INTERNAL MEDICINE

## 2021-05-06 PROCEDURE — 96372 THER/PROPH/DIAG INJ SC/IM: CPT

## 2021-05-06 RX ADMIN — EPOETIN ALFA-EPBX 12000 UNITS: 10000 INJECTION, SOLUTION INTRAVENOUS; SUBCUTANEOUS at 04:05

## 2021-05-10 ENCOUNTER — OFFICE VISIT (OUTPATIENT)
Dept: FAMILY MEDICINE | Facility: CLINIC | Age: 61
End: 2021-05-10
Payer: COMMERCIAL

## 2021-05-10 VITALS
DIASTOLIC BLOOD PRESSURE: 72 MMHG | WEIGHT: 239.88 LBS | HEIGHT: 70 IN | SYSTOLIC BLOOD PRESSURE: 130 MMHG | BODY MASS INDEX: 34.34 KG/M2 | TEMPERATURE: 98 F | HEART RATE: 75 BPM | OXYGEN SATURATION: 98 % | RESPIRATION RATE: 18 BRPM

## 2021-05-10 DIAGNOSIS — I10 ESSENTIAL HYPERTENSION: Primary | ICD-10-CM

## 2021-05-10 DIAGNOSIS — M62.830 SPASM OF THORACIC BACK MUSCLE: ICD-10-CM

## 2021-05-10 DIAGNOSIS — Z12.5 PROSTATE CANCER SCREENING: ICD-10-CM

## 2021-05-10 DIAGNOSIS — N18.30 STAGE 3 CHRONIC KIDNEY DISEASE, UNSPECIFIED WHETHER STAGE 3A OR 3B CKD: ICD-10-CM

## 2021-05-10 DIAGNOSIS — E55.9 VITAMIN D DEFICIENCY: ICD-10-CM

## 2021-05-10 DIAGNOSIS — R73.01 ELEVATED FASTING GLUCOSE: ICD-10-CM

## 2021-05-10 DIAGNOSIS — E78.5 DYSLIPIDEMIA: ICD-10-CM

## 2021-05-10 DIAGNOSIS — D46.9 MDS (MYELODYSPLASTIC SYNDROME): ICD-10-CM

## 2021-05-10 LAB — HBA1C MFR BLD: 5.4 %

## 2021-05-10 PROCEDURE — 83036 HEMOGLOBIN GLYCOSYLATED A1C: CPT | Mod: QW,S$GLB,, | Performed by: NURSE PRACTITIONER

## 2021-05-10 PROCEDURE — 83036 POCT HEMOGLOBIN A1C: ICD-10-PCS | Mod: QW,S$GLB,, | Performed by: NURSE PRACTITIONER

## 2021-05-10 PROCEDURE — 3075F PR MOST RECENT SYSTOLIC BLOOD PRESS GE 130-139MM HG: ICD-10-PCS | Mod: S$GLB,,, | Performed by: NURSE PRACTITIONER

## 2021-05-10 PROCEDURE — 3078F PR MOST RECENT DIASTOLIC BLOOD PRESSURE < 80 MM HG: ICD-10-PCS | Mod: S$GLB,,, | Performed by: NURSE PRACTITIONER

## 2021-05-10 PROCEDURE — 99214 OFFICE O/P EST MOD 30 MIN: CPT | Mod: S$GLB,,, | Performed by: NURSE PRACTITIONER

## 2021-05-10 PROCEDURE — 99214 PR OFFICE/OUTPT VISIT, EST, LEVL IV, 30-39 MIN: ICD-10-PCS | Mod: S$GLB,,, | Performed by: NURSE PRACTITIONER

## 2021-05-10 PROCEDURE — 1125F AMNT PAIN NOTED PAIN PRSNT: CPT | Mod: S$GLB,,, | Performed by: NURSE PRACTITIONER

## 2021-05-10 PROCEDURE — 3078F DIAST BP <80 MM HG: CPT | Mod: S$GLB,,, | Performed by: NURSE PRACTITIONER

## 2021-05-10 PROCEDURE — 3008F BODY MASS INDEX DOCD: CPT | Mod: S$GLB,,, | Performed by: NURSE PRACTITIONER

## 2021-05-10 PROCEDURE — 3008F PR BODY MASS INDEX (BMI) DOCUMENTED: ICD-10-PCS | Mod: S$GLB,,, | Performed by: NURSE PRACTITIONER

## 2021-05-10 PROCEDURE — 1125F PR PAIN SEVERITY QUANTIFIED, PAIN PRESENT: ICD-10-PCS | Mod: S$GLB,,, | Performed by: NURSE PRACTITIONER

## 2021-05-10 PROCEDURE — 3075F SYST BP GE 130 - 139MM HG: CPT | Mod: S$GLB,,, | Performed by: NURSE PRACTITIONER

## 2021-05-10 RX ORDER — METHOCARBAMOL 500 MG/1
TABLET, FILM COATED ORAL
Qty: 40 TABLET | Refills: 0 | Status: SHIPPED | OUTPATIENT
Start: 2021-05-10 | End: 2021-10-04

## 2021-05-12 ENCOUNTER — LAB VISIT (OUTPATIENT)
Dept: LAB | Facility: HOSPITAL | Age: 61
End: 2021-05-12
Attending: INTERNAL MEDICINE
Payer: COMMERCIAL

## 2021-05-12 ENCOUNTER — TELEPHONE (OUTPATIENT)
Dept: FAMILY MEDICINE | Facility: CLINIC | Age: 61
End: 2021-05-12

## 2021-05-12 DIAGNOSIS — D46.1 RARS (REFRACTORY ANEMIA WITH RINGED SIDEROBLASTS): ICD-10-CM

## 2021-05-12 DIAGNOSIS — D46.9 MDS (MYELODYSPLASTIC SYNDROME): ICD-10-CM

## 2021-05-12 DIAGNOSIS — D57.3 SICKLE CELL TRAIT SYNDROME: ICD-10-CM

## 2021-05-12 DIAGNOSIS — I10 ESSENTIAL HYPERTENSION: ICD-10-CM

## 2021-05-12 DIAGNOSIS — D64.9 NORMOCHROMIC ANEMIA: ICD-10-CM

## 2021-05-12 DIAGNOSIS — D64.89 ANEMIA DUE TO MULTIPLE MECHANISMS: ICD-10-CM

## 2021-05-12 DIAGNOSIS — N18.2 ANEMIA, CHRONIC RENAL FAILURE, STAGE 2 (MILD): ICD-10-CM

## 2021-05-12 DIAGNOSIS — D64.9 ANEMIA, UNSPECIFIED TYPE: ICD-10-CM

## 2021-05-12 DIAGNOSIS — D63.1 ANEMIA, CHRONIC RENAL FAILURE, STAGE 2 (MILD): ICD-10-CM

## 2021-05-12 DIAGNOSIS — D64.9 CHRONIC ANEMIA: ICD-10-CM

## 2021-05-12 LAB
ALBUMIN SERPL BCP-MCNC: 4.2 G/DL (ref 3.5–5.2)
ALBUMIN/CREAT UR: 261.8 UG/MG (ref 0–30)
ALP SERPL-CCNC: 63 U/L (ref 55–135)
ALT SERPL W/O P-5'-P-CCNC: 17 U/L (ref 10–44)
ANION GAP SERPL CALC-SCNC: 8 MMOL/L (ref 8–16)
AST SERPL-CCNC: 19 U/L (ref 10–40)
BASOPHILS # BLD AUTO: 0.09 K/UL (ref 0–0.2)
BASOPHILS NFR BLD: 1 % (ref 0–1.9)
BILIRUB SERPL-MCNC: 0.9 MG/DL (ref 0.1–1)
BUN SERPL-MCNC: 26 MG/DL (ref 8–23)
CALCIUM SERPL-MCNC: 8.6 MG/DL (ref 8.7–10.5)
CHLORIDE SERPL-SCNC: 109 MMOL/L (ref 95–110)
CO2 SERPL-SCNC: 26 MMOL/L (ref 23–29)
CREAT SERPL-MCNC: 1.5 MG/DL (ref 0.5–1.4)
CREAT UR-MCNC: 76 MG/DL (ref 23–375)
DIFFERENTIAL METHOD: ABNORMAL
EOSINOPHIL # BLD AUTO: 0.1 K/UL (ref 0–0.5)
EOSINOPHIL NFR BLD: 1.6 % (ref 0–8)
ERYTHROCYTE [DISTWIDTH] IN BLOOD BY AUTOMATED COUNT: 27.5 % (ref 11.5–14.5)
EST. GFR  (AFRICAN AMERICAN): 57.3 ML/MIN/1.73 M^2
EST. GFR  (NON AFRICAN AMERICAN): 49.5 ML/MIN/1.73 M^2
GLUCOSE SERPL-MCNC: 112 MG/DL (ref 70–110)
HCT VFR BLD AUTO: 26.1 % (ref 40–54)
HCT VFR BLD AUTO: 26.1 % (ref 40–54)
HGB BLD-MCNC: 8.9 G/DL (ref 14–18)
HGB BLD-MCNC: 8.9 G/DL (ref 14–18)
IMM GRANULOCYTES # BLD AUTO: 0.06 K/UL (ref 0–0.04)
IMM GRANULOCYTES NFR BLD AUTO: 0.7 % (ref 0–0.5)
LYMPHOCYTES # BLD AUTO: 1.6 K/UL (ref 1–4.8)
LYMPHOCYTES NFR BLD: 18.8 % (ref 18–48)
MCH RBC QN AUTO: 31.3 PG (ref 27–31)
MCHC RBC AUTO-ENTMCNC: 34.1 G/DL (ref 32–36)
MCV RBC AUTO: 92 FL (ref 82–98)
MICROALBUMIN UR DL<=1MG/L-MCNC: 199 UG/ML
MONOCYTES # BLD AUTO: 0.9 K/UL (ref 0.3–1)
MONOCYTES NFR BLD: 10 % (ref 4–15)
NEUTROPHILS # BLD AUTO: 5.9 K/UL (ref 1.8–7.7)
NEUTROPHILS NFR BLD: 67.9 % (ref 38–73)
NRBC BLD-RTO: 0 /100 WBC
PLATELET # BLD AUTO: 485 K/UL (ref 150–450)
PMV BLD AUTO: 10 FL (ref 9.2–12.9)
POTASSIUM SERPL-SCNC: 4.8 MMOL/L (ref 3.5–5.1)
PROT SERPL-MCNC: 7.6 G/DL (ref 6–8.4)
RBC # BLD AUTO: 2.84 M/UL (ref 4.6–6.2)
SODIUM SERPL-SCNC: 143 MMOL/L (ref 136–145)
WBC # BLD AUTO: 8.63 K/UL (ref 3.9–12.7)

## 2021-05-12 PROCEDURE — 80053 COMPREHEN METABOLIC PANEL: CPT | Performed by: INTERNAL MEDICINE

## 2021-05-12 PROCEDURE — 82570 ASSAY OF URINE CREATININE: CPT | Performed by: NURSE PRACTITIONER

## 2021-05-12 PROCEDURE — 85025 COMPLETE CBC W/AUTO DIFF WBC: CPT | Performed by: INTERNAL MEDICINE

## 2021-05-12 PROCEDURE — 82043 UR ALBUMIN QUANTITATIVE: CPT | Performed by: NURSE PRACTITIONER

## 2021-05-13 ENCOUNTER — INFUSION (OUTPATIENT)
Dept: INFUSION THERAPY | Facility: HOSPITAL | Age: 61
End: 2021-05-13
Attending: INTERNAL MEDICINE
Payer: COMMERCIAL

## 2021-05-13 VITALS
TEMPERATURE: 98 F | HEIGHT: 70 IN | DIASTOLIC BLOOD PRESSURE: 76 MMHG | BODY MASS INDEX: 34.56 KG/M2 | HEART RATE: 75 BPM | SYSTOLIC BLOOD PRESSURE: 135 MMHG | WEIGHT: 241.38 LBS | RESPIRATION RATE: 18 BRPM | OXYGEN SATURATION: 95 %

## 2021-05-13 DIAGNOSIS — D64.89 ANEMIA DUE TO MULTIPLE MECHANISMS: Primary | ICD-10-CM

## 2021-05-13 DIAGNOSIS — D64.9 CHRONIC ANEMIA: ICD-10-CM

## 2021-05-13 DIAGNOSIS — N18.30 STAGE 3 CHRONIC KIDNEY DISEASE, UNSPECIFIED WHETHER STAGE 3A OR 3B CKD: ICD-10-CM

## 2021-05-13 DIAGNOSIS — D64.9 NORMOCHROMIC ANEMIA: ICD-10-CM

## 2021-05-13 PROCEDURE — 63600175 PHARM REV CODE 636 W HCPCS: Performed by: INTERNAL MEDICINE

## 2021-05-13 PROCEDURE — 96372 THER/PROPH/DIAG INJ SC/IM: CPT

## 2021-05-13 RX ADMIN — EPOETIN ALFA-EPBX 12000 UNITS: 10000 INJECTION, SOLUTION INTRAVENOUS; SUBCUTANEOUS at 04:05

## 2021-05-20 ENCOUNTER — INFUSION (OUTPATIENT)
Dept: INFUSION THERAPY | Facility: HOSPITAL | Age: 61
End: 2021-05-20
Attending: INTERNAL MEDICINE
Payer: COMMERCIAL

## 2021-05-20 VITALS
SYSTOLIC BLOOD PRESSURE: 142 MMHG | DIASTOLIC BLOOD PRESSURE: 83 MMHG | WEIGHT: 242.13 LBS | TEMPERATURE: 98 F | BODY MASS INDEX: 34.74 KG/M2 | RESPIRATION RATE: 18 BRPM | HEART RATE: 72 BPM | OXYGEN SATURATION: 95 %

## 2021-05-20 DIAGNOSIS — D64.89 ANEMIA DUE TO MULTIPLE MECHANISMS: Primary | ICD-10-CM

## 2021-05-20 DIAGNOSIS — D64.9 CHRONIC ANEMIA: ICD-10-CM

## 2021-05-20 DIAGNOSIS — D64.9 NORMOCHROMIC ANEMIA: ICD-10-CM

## 2021-05-20 DIAGNOSIS — N18.30 STAGE 3 CHRONIC KIDNEY DISEASE, UNSPECIFIED WHETHER STAGE 3A OR 3B CKD: ICD-10-CM

## 2021-05-20 PROCEDURE — 96372 THER/PROPH/DIAG INJ SC/IM: CPT

## 2021-05-20 PROCEDURE — 63600175 PHARM REV CODE 636 W HCPCS: Mod: JW | Performed by: INTERNAL MEDICINE

## 2021-05-20 RX ADMIN — EPOETIN ALFA-EPBX 12000 UNITS: 10000 INJECTION, SOLUTION INTRAVENOUS; SUBCUTANEOUS at 04:05

## 2021-05-26 ENCOUNTER — LAB VISIT (OUTPATIENT)
Dept: LAB | Facility: HOSPITAL | Age: 61
End: 2021-05-26
Attending: INTERNAL MEDICINE
Payer: COMMERCIAL

## 2021-05-26 DIAGNOSIS — D64.9 NORMOCHROMIC ANEMIA: ICD-10-CM

## 2021-05-26 DIAGNOSIS — D64.9 CHRONIC ANEMIA: ICD-10-CM

## 2021-05-26 DIAGNOSIS — D46.9 MDS (MYELODYSPLASTIC SYNDROME): Primary | ICD-10-CM

## 2021-05-26 DIAGNOSIS — D63.1 ANEMIA, CHRONIC RENAL FAILURE, STAGE 2 (MILD): ICD-10-CM

## 2021-05-26 DIAGNOSIS — D46.9 MDS (MYELODYSPLASTIC SYNDROME): ICD-10-CM

## 2021-05-26 DIAGNOSIS — D64.89 ANEMIA DUE TO MULTIPLE MECHANISMS: ICD-10-CM

## 2021-05-26 DIAGNOSIS — D64.9 ANEMIA, UNSPECIFIED TYPE: ICD-10-CM

## 2021-05-26 DIAGNOSIS — D46.1 RARS (REFRACTORY ANEMIA WITH RINGED SIDEROBLASTS): ICD-10-CM

## 2021-05-26 DIAGNOSIS — D57.3 SICKLE CELL TRAIT SYNDROME: ICD-10-CM

## 2021-05-26 DIAGNOSIS — N18.2 ANEMIA, CHRONIC RENAL FAILURE, STAGE 2 (MILD): ICD-10-CM

## 2021-05-26 LAB
HCT VFR BLD AUTO: 28.6 % (ref 40–54)
HGB BLD-MCNC: 9.6 G/DL (ref 14–18)

## 2021-05-26 PROCEDURE — 85014 HEMATOCRIT: CPT | Performed by: INTERNAL MEDICINE

## 2021-05-26 PROCEDURE — 85018 HEMOGLOBIN: CPT | Performed by: INTERNAL MEDICINE

## 2021-05-26 PROCEDURE — 36415 COLL VENOUS BLD VENIPUNCTURE: CPT | Performed by: INTERNAL MEDICINE

## 2021-05-27 ENCOUNTER — INFUSION (OUTPATIENT)
Dept: INFUSION THERAPY | Facility: HOSPITAL | Age: 61
End: 2021-05-27
Attending: INTERNAL MEDICINE
Payer: COMMERCIAL

## 2021-05-27 VITALS
BODY MASS INDEX: 34.41 KG/M2 | SYSTOLIC BLOOD PRESSURE: 122 MMHG | WEIGHT: 240.38 LBS | HEIGHT: 70 IN | HEART RATE: 82 BPM | RESPIRATION RATE: 17 BRPM | DIASTOLIC BLOOD PRESSURE: 77 MMHG

## 2021-05-27 DIAGNOSIS — N18.30 STAGE 3 CHRONIC KIDNEY DISEASE, UNSPECIFIED WHETHER STAGE 3A OR 3B CKD: ICD-10-CM

## 2021-05-27 DIAGNOSIS — D64.9 CHRONIC ANEMIA: ICD-10-CM

## 2021-05-27 DIAGNOSIS — D64.89 ANEMIA DUE TO MULTIPLE MECHANISMS: Primary | ICD-10-CM

## 2021-05-27 DIAGNOSIS — D64.9 NORMOCHROMIC ANEMIA: ICD-10-CM

## 2021-05-27 PROCEDURE — 96372 THER/PROPH/DIAG INJ SC/IM: CPT

## 2021-05-27 PROCEDURE — 63600175 PHARM REV CODE 636 W HCPCS: Performed by: INTERNAL MEDICINE

## 2021-05-27 RX ADMIN — EPOETIN ALFA-EPBX 12000 UNITS: 10000 INJECTION, SOLUTION INTRAVENOUS; SUBCUTANEOUS at 04:05

## 2021-06-03 ENCOUNTER — INFUSION (OUTPATIENT)
Dept: INFUSION THERAPY | Facility: HOSPITAL | Age: 61
End: 2021-06-03
Attending: INTERNAL MEDICINE
Payer: COMMERCIAL

## 2021-06-03 VITALS
OXYGEN SATURATION: 96 % | SYSTOLIC BLOOD PRESSURE: 125 MMHG | DIASTOLIC BLOOD PRESSURE: 75 MMHG | HEART RATE: 89 BPM | WEIGHT: 240.19 LBS | HEIGHT: 70 IN | TEMPERATURE: 97 F | BODY MASS INDEX: 34.39 KG/M2 | RESPIRATION RATE: 18 BRPM

## 2021-06-03 DIAGNOSIS — N18.30 STAGE 3 CHRONIC KIDNEY DISEASE, UNSPECIFIED WHETHER STAGE 3A OR 3B CKD: ICD-10-CM

## 2021-06-03 DIAGNOSIS — D64.89 ANEMIA DUE TO MULTIPLE MECHANISMS: Primary | ICD-10-CM

## 2021-06-03 DIAGNOSIS — D64.9 CHRONIC ANEMIA: ICD-10-CM

## 2021-06-03 DIAGNOSIS — D64.9 NORMOCHROMIC ANEMIA: ICD-10-CM

## 2021-06-03 PROCEDURE — 96372 THER/PROPH/DIAG INJ SC/IM: CPT

## 2021-06-03 PROCEDURE — 63600175 PHARM REV CODE 636 W HCPCS: Performed by: INTERNAL MEDICINE

## 2021-06-03 RX ADMIN — EPOETIN ALFA-EPBX 12000 UNITS: 10000 INJECTION, SOLUTION INTRAVENOUS; SUBCUTANEOUS at 04:06

## 2021-06-09 ENCOUNTER — LAB VISIT (OUTPATIENT)
Dept: LAB | Facility: HOSPITAL | Age: 61
End: 2021-06-09
Attending: INTERNAL MEDICINE
Payer: COMMERCIAL

## 2021-06-09 DIAGNOSIS — D64.9 CHRONIC ANEMIA: ICD-10-CM

## 2021-06-09 DIAGNOSIS — D64.9 NORMOCHROMIC ANEMIA: ICD-10-CM

## 2021-06-09 DIAGNOSIS — D57.3 SICKLE CELL TRAIT SYNDROME: ICD-10-CM

## 2021-06-09 DIAGNOSIS — D64.89 ANEMIA DUE TO MULTIPLE MECHANISMS: ICD-10-CM

## 2021-06-09 DIAGNOSIS — D46.1 RARS (REFRACTORY ANEMIA WITH RINGED SIDEROBLASTS): ICD-10-CM

## 2021-06-09 DIAGNOSIS — N18.9 CHRONIC KIDNEY DISEASE, UNSPECIFIED: Primary | ICD-10-CM

## 2021-06-09 DIAGNOSIS — D46.9 MDS (MYELODYSPLASTIC SYNDROME): ICD-10-CM

## 2021-06-09 DIAGNOSIS — N18.2 ANEMIA, CHRONIC RENAL FAILURE, STAGE 2 (MILD): ICD-10-CM

## 2021-06-09 DIAGNOSIS — D63.1 ANEMIA, CHRONIC RENAL FAILURE, STAGE 2 (MILD): ICD-10-CM

## 2021-06-09 DIAGNOSIS — D64.9 ANEMIA, UNSPECIFIED TYPE: ICD-10-CM

## 2021-06-09 LAB
ALBUMIN SERPL BCP-MCNC: 4.4 G/DL (ref 3.5–5.2)
ANION GAP SERPL CALC-SCNC: 9 MMOL/L (ref 8–16)
BILIRUB UR QL STRIP: NEGATIVE
BUN SERPL-MCNC: 19 MG/DL (ref 8–23)
CALCIUM SERPL-MCNC: 8.8 MG/DL (ref 8.7–10.5)
CHLORIDE SERPL-SCNC: 106 MMOL/L (ref 95–110)
CLARITY UR: CLEAR
CO2 SERPL-SCNC: 25 MMOL/L (ref 23–29)
COLOR UR: YELLOW
CREAT SERPL-MCNC: 1.4 MG/DL (ref 0.5–1.4)
CREAT UR-MCNC: 84 MG/DL (ref 23–375)
EST. GFR  (AFRICAN AMERICAN): >60 ML/MIN/1.73 M^2
EST. GFR  (NON AFRICAN AMERICAN): 53.8 ML/MIN/1.73 M^2
GLUCOSE SERPL-MCNC: 111 MG/DL (ref 70–110)
GLUCOSE UR QL STRIP: NEGATIVE
HCT VFR BLD AUTO: 26.3 % (ref 40–54)
HGB BLD-MCNC: 9 G/DL (ref 14–18)
HGB UR QL STRIP: NEGATIVE
KETONES UR QL STRIP: NEGATIVE
LEUKOCYTE ESTERASE UR QL STRIP: NEGATIVE
NITRITE UR QL STRIP: NEGATIVE
PH UR STRIP: 7 [PH] (ref 5–8)
PHOSPHATE SERPL-MCNC: 3.2 MG/DL (ref 2.7–4.5)
POTASSIUM SERPL-SCNC: 4.2 MMOL/L (ref 3.5–5.1)
PROT UR QL STRIP: ABNORMAL
PROT UR-MCNC: 30 MG/DL (ref 6–15)
SODIUM SERPL-SCNC: 140 MMOL/L (ref 136–145)
SP GR UR STRIP: 1.01 (ref 1–1.03)
URN SPEC COLLECT METH UR: ABNORMAL
UROBILINOGEN UR STRIP-ACNC: NEGATIVE EU/DL

## 2021-06-09 PROCEDURE — 85018 HEMOGLOBIN: CPT | Performed by: INTERNAL MEDICINE

## 2021-06-09 PROCEDURE — 81003 URINALYSIS AUTO W/O SCOPE: CPT | Performed by: INTERNAL MEDICINE

## 2021-06-09 PROCEDURE — 85014 HEMATOCRIT: CPT | Performed by: INTERNAL MEDICINE

## 2021-06-09 PROCEDURE — 84156 ASSAY OF PROTEIN URINE: CPT | Performed by: INTERNAL MEDICINE

## 2021-06-09 PROCEDURE — 82570 ASSAY OF URINE CREATININE: CPT | Performed by: INTERNAL MEDICINE

## 2021-06-09 PROCEDURE — 80069 RENAL FUNCTION PANEL: CPT | Performed by: INTERNAL MEDICINE

## 2021-06-09 PROCEDURE — 36415 COLL VENOUS BLD VENIPUNCTURE: CPT | Performed by: INTERNAL MEDICINE

## 2021-06-10 ENCOUNTER — INFUSION (OUTPATIENT)
Dept: INFUSION THERAPY | Facility: HOSPITAL | Age: 61
End: 2021-06-10
Attending: INTERNAL MEDICINE
Payer: COMMERCIAL

## 2021-06-10 ENCOUNTER — OFFICE VISIT (OUTPATIENT)
Dept: HEMATOLOGY/ONCOLOGY | Facility: CLINIC | Age: 61
End: 2021-06-10
Payer: COMMERCIAL

## 2021-06-10 VITALS
WEIGHT: 239.38 LBS | RESPIRATION RATE: 18 BRPM | DIASTOLIC BLOOD PRESSURE: 80 MMHG | OXYGEN SATURATION: 98 % | BODY MASS INDEX: 34.27 KG/M2 | HEART RATE: 70 BPM | TEMPERATURE: 98 F | SYSTOLIC BLOOD PRESSURE: 132 MMHG | HEIGHT: 70 IN

## 2021-06-10 VITALS
WEIGHT: 239 LBS | BODY MASS INDEX: 34.22 KG/M2 | DIASTOLIC BLOOD PRESSURE: 78 MMHG | RESPIRATION RATE: 18 BRPM | HEART RATE: 76 BPM | SYSTOLIC BLOOD PRESSURE: 122 MMHG | HEIGHT: 70 IN

## 2021-06-10 DIAGNOSIS — D64.89 ANEMIA DUE TO MULTIPLE MECHANISMS: Primary | ICD-10-CM

## 2021-06-10 DIAGNOSIS — D72.821 MONOCYTOSIS: ICD-10-CM

## 2021-06-10 DIAGNOSIS — D64.9 NORMOCHROMIC ANEMIA: ICD-10-CM

## 2021-06-10 DIAGNOSIS — D46.1 RARS (REFRACTORY ANEMIA WITH RINGED SIDEROBLASTS): Primary | ICD-10-CM

## 2021-06-10 DIAGNOSIS — E53.8 FOLIC ACID DEFICIENCY: ICD-10-CM

## 2021-06-10 DIAGNOSIS — Z87.891 FORMER SMOKER: ICD-10-CM

## 2021-06-10 DIAGNOSIS — D64.89 ANEMIA DUE TO MULTIPLE MECHANISMS: ICD-10-CM

## 2021-06-10 DIAGNOSIS — D57.3 SICKLE CELL TRAIT SYNDROME: ICD-10-CM

## 2021-06-10 DIAGNOSIS — D75.839 THROMBOCYTOSIS: ICD-10-CM

## 2021-06-10 DIAGNOSIS — D64.9 CHRONIC ANEMIA: ICD-10-CM

## 2021-06-10 DIAGNOSIS — D46.9 MDS (MYELODYSPLASTIC SYNDROME): ICD-10-CM

## 2021-06-10 DIAGNOSIS — N18.30 STAGE 3 CHRONIC KIDNEY DISEASE, UNSPECIFIED WHETHER STAGE 3A OR 3B CKD: ICD-10-CM

## 2021-06-10 PROCEDURE — 99214 PR OFFICE/OUTPT VISIT, EST, LEVL IV, 30-39 MIN: ICD-10-PCS | Mod: S$GLB,,, | Performed by: INTERNAL MEDICINE

## 2021-06-10 PROCEDURE — 3008F PR BODY MASS INDEX (BMI) DOCUMENTED: ICD-10-PCS | Mod: S$GLB,,, | Performed by: INTERNAL MEDICINE

## 2021-06-10 PROCEDURE — 96372 THER/PROPH/DIAG INJ SC/IM: CPT

## 2021-06-10 PROCEDURE — 1126F PR PAIN SEVERITY QUANTIFIED, NO PAIN PRESENT: ICD-10-PCS | Mod: S$GLB,,, | Performed by: INTERNAL MEDICINE

## 2021-06-10 PROCEDURE — 3008F BODY MASS INDEX DOCD: CPT | Mod: S$GLB,,, | Performed by: INTERNAL MEDICINE

## 2021-06-10 PROCEDURE — 1126F AMNT PAIN NOTED NONE PRSNT: CPT | Mod: S$GLB,,, | Performed by: INTERNAL MEDICINE

## 2021-06-10 PROCEDURE — 99214 OFFICE O/P EST MOD 30 MIN: CPT | Mod: S$GLB,,, | Performed by: INTERNAL MEDICINE

## 2021-06-10 PROCEDURE — 63600175 PHARM REV CODE 636 W HCPCS: Performed by: INTERNAL MEDICINE

## 2021-06-10 RX ADMIN — EPOETIN ALFA-EPBX 12000 UNITS: 10000 INJECTION, SOLUTION INTRAVENOUS; SUBCUTANEOUS at 04:06

## 2021-06-11 DIAGNOSIS — D46.9 MDS (MYELODYSPLASTIC SYNDROME): Primary | ICD-10-CM

## 2021-06-15 ENCOUNTER — TELEPHONE (OUTPATIENT)
Dept: HEMATOLOGY/ONCOLOGY | Facility: CLINIC | Age: 61
End: 2021-06-15

## 2021-06-17 ENCOUNTER — INFUSION (OUTPATIENT)
Dept: INFUSION THERAPY | Facility: HOSPITAL | Age: 61
End: 2021-06-17
Attending: INTERNAL MEDICINE
Payer: COMMERCIAL

## 2021-06-17 VITALS
RESPIRATION RATE: 18 BRPM | TEMPERATURE: 98 F | HEART RATE: 79 BPM | OXYGEN SATURATION: 95 % | BODY MASS INDEX: 34.51 KG/M2 | DIASTOLIC BLOOD PRESSURE: 81 MMHG | SYSTOLIC BLOOD PRESSURE: 125 MMHG | WEIGHT: 240.5 LBS

## 2021-06-17 DIAGNOSIS — N18.30 STAGE 3 CHRONIC KIDNEY DISEASE, UNSPECIFIED WHETHER STAGE 3A OR 3B CKD: ICD-10-CM

## 2021-06-17 DIAGNOSIS — D64.9 NORMOCHROMIC ANEMIA: ICD-10-CM

## 2021-06-17 DIAGNOSIS — D46.9 MDS (MYELODYSPLASTIC SYNDROME): Primary | ICD-10-CM

## 2021-06-17 DIAGNOSIS — D64.89 ANEMIA DUE TO MULTIPLE MECHANISMS: ICD-10-CM

## 2021-06-17 DIAGNOSIS — D64.9 CHRONIC ANEMIA: ICD-10-CM

## 2021-06-17 PROCEDURE — 96372 THER/PROPH/DIAG INJ SC/IM: CPT

## 2021-06-17 PROCEDURE — 63600175 PHARM REV CODE 636 W HCPCS: Mod: TB | Performed by: NURSE PRACTITIONER

## 2021-06-17 RX ADMIN — EPOETIN ALFA-EPBX 20000 UNITS: 20000 INJECTION, SOLUTION INTRAVENOUS; SUBCUTANEOUS at 04:06

## 2021-06-23 ENCOUNTER — LAB VISIT (OUTPATIENT)
Dept: LAB | Facility: HOSPITAL | Age: 61
End: 2021-06-23
Attending: INTERNAL MEDICINE
Payer: COMMERCIAL

## 2021-06-23 DIAGNOSIS — D64.9 NORMOCHROMIC ANEMIA: ICD-10-CM

## 2021-06-23 DIAGNOSIS — D46.9 MDS (MYELODYSPLASTIC SYNDROME): ICD-10-CM

## 2021-06-23 DIAGNOSIS — D64.89 ANEMIA DUE TO MULTIPLE MECHANISMS: ICD-10-CM

## 2021-06-23 DIAGNOSIS — N18.2 ANEMIA, CHRONIC RENAL FAILURE, STAGE 2 (MILD): ICD-10-CM

## 2021-06-23 DIAGNOSIS — D64.9 ANEMIA, UNSPECIFIED TYPE: ICD-10-CM

## 2021-06-23 DIAGNOSIS — D46.1 RARS (REFRACTORY ANEMIA WITH RINGED SIDEROBLASTS): ICD-10-CM

## 2021-06-23 DIAGNOSIS — D64.9 CHRONIC ANEMIA: ICD-10-CM

## 2021-06-23 DIAGNOSIS — D57.3 SICKLE CELL TRAIT SYNDROME: ICD-10-CM

## 2021-06-23 DIAGNOSIS — D63.1 ANEMIA, CHRONIC RENAL FAILURE, STAGE 2 (MILD): ICD-10-CM

## 2021-06-23 LAB
ALBUMIN SERPL BCP-MCNC: 4.4 G/DL (ref 3.5–5.2)
ALP SERPL-CCNC: 62 U/L (ref 55–135)
ALT SERPL W/O P-5'-P-CCNC: 17 U/L (ref 10–44)
ANION GAP SERPL CALC-SCNC: 8 MMOL/L (ref 8–16)
AST SERPL-CCNC: 20 U/L (ref 10–40)
BILIRUB SERPL-MCNC: 1.1 MG/DL (ref 0.1–1)
BUN SERPL-MCNC: 26 MG/DL (ref 8–23)
CALCIUM SERPL-MCNC: 9 MG/DL (ref 8.7–10.5)
CHLORIDE SERPL-SCNC: 111 MMOL/L (ref 95–110)
CO2 SERPL-SCNC: 27 MMOL/L (ref 23–29)
CREAT SERPL-MCNC: 1.6 MG/DL (ref 0.5–1.4)
EST. GFR  (AFRICAN AMERICAN): 53 ML/MIN/1.73 M^2
EST. GFR  (NON AFRICAN AMERICAN): 45.8 ML/MIN/1.73 M^2
GLUCOSE SERPL-MCNC: 90 MG/DL (ref 70–110)
HGB BLD-MCNC: 9.1 G/DL (ref 14–18)
POTASSIUM SERPL-SCNC: 4.4 MMOL/L (ref 3.5–5.1)
PROT SERPL-MCNC: 7.3 G/DL (ref 6–8.4)
SODIUM SERPL-SCNC: 146 MMOL/L (ref 136–145)

## 2021-06-23 PROCEDURE — 85018 HEMOGLOBIN: CPT | Performed by: INTERNAL MEDICINE

## 2021-06-23 PROCEDURE — 80053 COMPREHEN METABOLIC PANEL: CPT | Performed by: INTERNAL MEDICINE

## 2021-06-23 PROCEDURE — 36415 COLL VENOUS BLD VENIPUNCTURE: CPT | Performed by: INTERNAL MEDICINE

## 2021-06-24 ENCOUNTER — INFUSION (OUTPATIENT)
Dept: INFUSION THERAPY | Facility: HOSPITAL | Age: 61
End: 2021-06-24
Attending: INTERNAL MEDICINE
Payer: COMMERCIAL

## 2021-06-24 VITALS
SYSTOLIC BLOOD PRESSURE: 125 MMHG | HEART RATE: 74 BPM | WEIGHT: 239 LBS | HEIGHT: 70 IN | RESPIRATION RATE: 18 BRPM | DIASTOLIC BLOOD PRESSURE: 73 MMHG | TEMPERATURE: 98 F | BODY MASS INDEX: 34.22 KG/M2

## 2021-06-24 DIAGNOSIS — D46.9 MDS (MYELODYSPLASTIC SYNDROME): Primary | ICD-10-CM

## 2021-06-24 DIAGNOSIS — D64.9 CHRONIC ANEMIA: ICD-10-CM

## 2021-06-24 DIAGNOSIS — D64.89 ANEMIA DUE TO MULTIPLE MECHANISMS: ICD-10-CM

## 2021-06-24 DIAGNOSIS — N18.30 STAGE 3 CHRONIC KIDNEY DISEASE, UNSPECIFIED WHETHER STAGE 3A OR 3B CKD: ICD-10-CM

## 2021-06-24 DIAGNOSIS — D64.9 NORMOCHROMIC ANEMIA: ICD-10-CM

## 2021-06-24 PROCEDURE — 96372 THER/PROPH/DIAG INJ SC/IM: CPT

## 2021-06-24 PROCEDURE — 63600175 PHARM REV CODE 636 W HCPCS: Mod: TB | Performed by: NURSE PRACTITIONER

## 2021-06-24 RX ADMIN — EPOETIN ALFA-EPBX 20000 UNITS: 20000 INJECTION, SOLUTION INTRAVENOUS; SUBCUTANEOUS at 04:06

## 2021-07-01 ENCOUNTER — INFUSION (OUTPATIENT)
Dept: INFUSION THERAPY | Facility: HOSPITAL | Age: 61
End: 2021-07-01
Attending: INTERNAL MEDICINE
Payer: COMMERCIAL

## 2021-07-01 VITALS
WEIGHT: 234.31 LBS | RESPIRATION RATE: 18 BRPM | DIASTOLIC BLOOD PRESSURE: 78 MMHG | HEIGHT: 70 IN | OXYGEN SATURATION: 95 % | SYSTOLIC BLOOD PRESSURE: 130 MMHG | HEART RATE: 79 BPM | BODY MASS INDEX: 33.54 KG/M2

## 2021-07-01 DIAGNOSIS — D64.9 NORMOCHROMIC ANEMIA: ICD-10-CM

## 2021-07-01 DIAGNOSIS — D46.9 MDS (MYELODYSPLASTIC SYNDROME): Primary | ICD-10-CM

## 2021-07-01 DIAGNOSIS — N18.30 STAGE 3 CHRONIC KIDNEY DISEASE, UNSPECIFIED WHETHER STAGE 3A OR 3B CKD: ICD-10-CM

## 2021-07-01 DIAGNOSIS — D64.89 ANEMIA DUE TO MULTIPLE MECHANISMS: ICD-10-CM

## 2021-07-01 DIAGNOSIS — D64.9 CHRONIC ANEMIA: ICD-10-CM

## 2021-07-01 PROCEDURE — 63600175 PHARM REV CODE 636 W HCPCS: Mod: TB | Performed by: NURSE PRACTITIONER

## 2021-07-01 PROCEDURE — 96372 THER/PROPH/DIAG INJ SC/IM: CPT

## 2021-07-01 RX ADMIN — EPOETIN ALFA-EPBX 20000 UNITS: 20000 INJECTION, SOLUTION INTRAVENOUS; SUBCUTANEOUS at 04:07

## 2021-07-07 ENCOUNTER — LAB VISIT (OUTPATIENT)
Dept: LAB | Facility: HOSPITAL | Age: 61
End: 2021-07-07
Attending: INTERNAL MEDICINE
Payer: COMMERCIAL

## 2021-07-07 DIAGNOSIS — D72.821 MONOCYTOSIS: ICD-10-CM

## 2021-07-07 DIAGNOSIS — D46.1 RARS (REFRACTORY ANEMIA WITH RINGED SIDEROBLASTS): ICD-10-CM

## 2021-07-07 DIAGNOSIS — D46.9 MDS (MYELODYSPLASTIC SYNDROME): ICD-10-CM

## 2021-07-07 DIAGNOSIS — D64.9 NORMOCHROMIC ANEMIA: ICD-10-CM

## 2021-07-07 LAB
FERRITIN SERPL-MCNC: 1332 NG/ML (ref 20–300)
HCT VFR BLD AUTO: 27.5 % (ref 40–54)
HGB BLD-MCNC: 9.3 G/DL (ref 14–18)
IRON SERPL-MCNC: 54 UG/DL (ref 45–160)
SATURATED IRON: 24 % (ref 20–50)
TOTAL IRON BINDING CAPACITY: 225 UG/DL (ref 250–450)
TRANSFERRIN SERPL-MCNC: 161 MG/DL (ref 200–375)

## 2021-07-07 PROCEDURE — 82728 ASSAY OF FERRITIN: CPT | Performed by: INTERNAL MEDICINE

## 2021-07-07 PROCEDURE — 36415 COLL VENOUS BLD VENIPUNCTURE: CPT | Performed by: INTERNAL MEDICINE

## 2021-07-07 PROCEDURE — 83540 ASSAY OF IRON: CPT | Performed by: INTERNAL MEDICINE

## 2021-07-07 PROCEDURE — 85018 HEMOGLOBIN: CPT | Performed by: INTERNAL MEDICINE

## 2021-07-07 PROCEDURE — 85014 HEMATOCRIT: CPT | Performed by: INTERNAL MEDICINE

## 2021-07-08 ENCOUNTER — INFUSION (OUTPATIENT)
Dept: INFUSION THERAPY | Facility: HOSPITAL | Age: 61
End: 2021-07-08
Attending: INTERNAL MEDICINE
Payer: COMMERCIAL

## 2021-07-08 VITALS
RESPIRATION RATE: 16 BRPM | DIASTOLIC BLOOD PRESSURE: 76 MMHG | WEIGHT: 236.13 LBS | SYSTOLIC BLOOD PRESSURE: 119 MMHG | HEART RATE: 91 BPM | HEIGHT: 70 IN | BODY MASS INDEX: 33.81 KG/M2 | TEMPERATURE: 98 F

## 2021-07-08 DIAGNOSIS — N18.30 STAGE 3 CHRONIC KIDNEY DISEASE, UNSPECIFIED WHETHER STAGE 3A OR 3B CKD: ICD-10-CM

## 2021-07-08 DIAGNOSIS — D64.89 ANEMIA DUE TO MULTIPLE MECHANISMS: ICD-10-CM

## 2021-07-08 DIAGNOSIS — D46.9 MDS (MYELODYSPLASTIC SYNDROME): Primary | ICD-10-CM

## 2021-07-08 DIAGNOSIS — D64.9 CHRONIC ANEMIA: ICD-10-CM

## 2021-07-08 DIAGNOSIS — D64.9 NORMOCHROMIC ANEMIA: ICD-10-CM

## 2021-07-08 PROCEDURE — 63600175 PHARM REV CODE 636 W HCPCS: Mod: TB | Performed by: NURSE PRACTITIONER

## 2021-07-08 PROCEDURE — 96372 THER/PROPH/DIAG INJ SC/IM: CPT

## 2021-07-08 RX ADMIN — EPOETIN ALFA-EPBX 20000 UNITS: 20000 INJECTION, SOLUTION INTRAVENOUS; SUBCUTANEOUS at 04:07

## 2021-07-15 ENCOUNTER — INFUSION (OUTPATIENT)
Dept: INFUSION THERAPY | Facility: HOSPITAL | Age: 61
End: 2021-07-15
Attending: INTERNAL MEDICINE
Payer: COMMERCIAL

## 2021-07-15 VITALS
OXYGEN SATURATION: 96 % | WEIGHT: 239.13 LBS | BODY MASS INDEX: 34.23 KG/M2 | HEART RATE: 82 BPM | RESPIRATION RATE: 18 BRPM | SYSTOLIC BLOOD PRESSURE: 132 MMHG | DIASTOLIC BLOOD PRESSURE: 76 MMHG | TEMPERATURE: 98 F | HEIGHT: 70 IN

## 2021-07-15 DIAGNOSIS — D46.9 MDS (MYELODYSPLASTIC SYNDROME): Primary | ICD-10-CM

## 2021-07-15 DIAGNOSIS — D64.89 ANEMIA DUE TO MULTIPLE MECHANISMS: ICD-10-CM

## 2021-07-15 DIAGNOSIS — D64.9 NORMOCHROMIC ANEMIA: ICD-10-CM

## 2021-07-15 DIAGNOSIS — N18.30 STAGE 3 CHRONIC KIDNEY DISEASE, UNSPECIFIED WHETHER STAGE 3A OR 3B CKD: ICD-10-CM

## 2021-07-15 DIAGNOSIS — D64.9 CHRONIC ANEMIA: ICD-10-CM

## 2021-07-15 PROCEDURE — 63600175 PHARM REV CODE 636 W HCPCS: Mod: TB | Performed by: NURSE PRACTITIONER

## 2021-07-15 PROCEDURE — 96372 THER/PROPH/DIAG INJ SC/IM: CPT

## 2021-07-15 RX ADMIN — EPOETIN ALFA-EPBX 20000 UNITS: 20000 INJECTION, SOLUTION INTRAVENOUS; SUBCUTANEOUS at 04:07

## 2021-07-21 ENCOUNTER — LAB VISIT (OUTPATIENT)
Dept: LAB | Facility: HOSPITAL | Age: 61
End: 2021-07-21
Attending: INTERNAL MEDICINE
Payer: COMMERCIAL

## 2021-07-21 ENCOUNTER — HOSPITAL ENCOUNTER (EMERGENCY)
Facility: HOSPITAL | Age: 61
Discharge: HOME | End: 2021-07-21
Attending: EMERGENCY MEDICINE
Payer: COMMERCIAL

## 2021-07-21 VITALS
RESPIRATION RATE: 17 BRPM | HEART RATE: 65 BPM | BODY MASS INDEX: 33.64 KG/M2 | WEIGHT: 235 LBS | SYSTOLIC BLOOD PRESSURE: 145 MMHG | HEIGHT: 70 IN | OXYGEN SATURATION: 99 % | TEMPERATURE: 98 F | DIASTOLIC BLOOD PRESSURE: 89 MMHG

## 2021-07-21 DIAGNOSIS — D46.9 MDS (MYELODYSPLASTIC SYNDROME): Primary | ICD-10-CM

## 2021-07-21 DIAGNOSIS — D46.9 MDS (MYELODYSPLASTIC SYNDROME): ICD-10-CM

## 2021-07-21 DIAGNOSIS — T22.20XA SECOND DEGREE BURN OF ARM, INITIAL ENCOUNTER: Primary | ICD-10-CM

## 2021-07-21 DIAGNOSIS — D46.1 RARS (REFRACTORY ANEMIA WITH RINGED SIDEROBLASTS): ICD-10-CM

## 2021-07-21 LAB
HCT VFR BLD AUTO: 28.4 % (ref 40–54)
HGB BLD-MCNC: 9.6 G/DL (ref 14–18)

## 2021-07-21 PROCEDURE — 36415 COLL VENOUS BLD VENIPUNCTURE: CPT | Performed by: INTERNAL MEDICINE

## 2021-07-21 PROCEDURE — 85014 HEMATOCRIT: CPT | Performed by: INTERNAL MEDICINE

## 2021-07-21 PROCEDURE — 99282 EMERGENCY DEPT VISIT SF MDM: CPT

## 2021-07-21 PROCEDURE — 85018 HEMOGLOBIN: CPT | Performed by: INTERNAL MEDICINE

## 2021-07-22 ENCOUNTER — INFUSION (OUTPATIENT)
Dept: INFUSION THERAPY | Facility: HOSPITAL | Age: 61
End: 2021-07-22
Attending: INTERNAL MEDICINE
Payer: COMMERCIAL

## 2021-07-22 ENCOUNTER — OFFICE VISIT (OUTPATIENT)
Dept: WOUND CARE | Facility: HOSPITAL | Age: 61
End: 2021-07-22
Attending: FAMILY MEDICINE

## 2021-07-22 VITALS
TEMPERATURE: 97 F | HEART RATE: 78 BPM | DIASTOLIC BLOOD PRESSURE: 86 MMHG | SYSTOLIC BLOOD PRESSURE: 141 MMHG | RESPIRATION RATE: 18 BRPM

## 2021-07-22 VITALS
OXYGEN SATURATION: 99 % | RESPIRATION RATE: 18 BRPM | SYSTOLIC BLOOD PRESSURE: 141 MMHG | TEMPERATURE: 98 F | WEIGHT: 234.63 LBS | DIASTOLIC BLOOD PRESSURE: 86 MMHG | BODY MASS INDEX: 33.59 KG/M2 | HEIGHT: 70 IN | HEART RATE: 78 BPM

## 2021-07-22 DIAGNOSIS — N18.30 STAGE 3 CHRONIC KIDNEY DISEASE, UNSPECIFIED WHETHER STAGE 3A OR 3B CKD: ICD-10-CM

## 2021-07-22 DIAGNOSIS — D46.9 MDS (MYELODYSPLASTIC SYNDROME): Primary | ICD-10-CM

## 2021-07-22 DIAGNOSIS — D64.9 NORMOCHROMIC ANEMIA: ICD-10-CM

## 2021-07-22 DIAGNOSIS — D64.9 CHRONIC ANEMIA: ICD-10-CM

## 2021-07-22 DIAGNOSIS — T22.211A PARTIAL THICKNESS BURN OF RIGHT FOREARM, INITIAL ENCOUNTER: Primary | ICD-10-CM

## 2021-07-22 DIAGNOSIS — D64.89 ANEMIA DUE TO MULTIPLE MECHANISMS: ICD-10-CM

## 2021-07-22 DIAGNOSIS — T31.0 BURN ANY DEGREE INVOLVING LESS THAN 10 PERCENT OF BODY SURFACE: ICD-10-CM

## 2021-07-22 DIAGNOSIS — T22.20XA BURN OF LEFT ARM, SECOND DEGREE, INITIAL ENCOUNTER: ICD-10-CM

## 2021-07-22 PROCEDURE — 96372 THER/PROPH/DIAG INJ SC/IM: CPT

## 2021-07-22 PROCEDURE — 16020 DRESS/DEBRID P-THICK BURN S: CPT | Mod: ,,, | Performed by: FAMILY MEDICINE

## 2021-07-22 PROCEDURE — 99214 OFFICE O/P EST MOD 30 MIN: CPT | Mod: PN | Performed by: FAMILY MEDICINE

## 2021-07-22 PROCEDURE — 99203 PR OFFICE/OUTPT VISIT, NEW, LEVL III, 30-44 MIN: ICD-10-PCS | Mod: 25,,, | Performed by: FAMILY MEDICINE

## 2021-07-22 PROCEDURE — 63600175 PHARM REV CODE 636 W HCPCS: Mod: TB | Performed by: NURSE PRACTITIONER

## 2021-07-22 PROCEDURE — 99203 OFFICE O/P NEW LOW 30 MIN: CPT | Mod: 25,,, | Performed by: FAMILY MEDICINE

## 2021-07-22 PROCEDURE — 16020 PR DRESS/DEBRID SMALL BURN 2 ANES: ICD-10-PCS | Mod: ,,, | Performed by: FAMILY MEDICINE

## 2021-07-22 RX ADMIN — EPOETIN ALFA-EPBX 20000 UNITS: 20000 INJECTION, SOLUTION INTRAVENOUS; SUBCUTANEOUS at 01:07

## 2021-07-29 ENCOUNTER — INFUSION (OUTPATIENT)
Dept: INFUSION THERAPY | Facility: HOSPITAL | Age: 61
End: 2021-07-29
Attending: INTERNAL MEDICINE
Payer: COMMERCIAL

## 2021-07-29 VITALS
WEIGHT: 234.19 LBS | DIASTOLIC BLOOD PRESSURE: 85 MMHG | SYSTOLIC BLOOD PRESSURE: 142 MMHG | HEART RATE: 83 BPM | RESPIRATION RATE: 18 BRPM | BODY MASS INDEX: 33.6 KG/M2 | OXYGEN SATURATION: 96 % | TEMPERATURE: 99 F

## 2021-07-29 DIAGNOSIS — N18.30 STAGE 3 CHRONIC KIDNEY DISEASE, UNSPECIFIED WHETHER STAGE 3A OR 3B CKD: ICD-10-CM

## 2021-07-29 DIAGNOSIS — D64.9 CHRONIC ANEMIA: ICD-10-CM

## 2021-07-29 DIAGNOSIS — D64.89 ANEMIA DUE TO MULTIPLE MECHANISMS: ICD-10-CM

## 2021-07-29 DIAGNOSIS — D64.9 NORMOCHROMIC ANEMIA: ICD-10-CM

## 2021-07-29 DIAGNOSIS — D46.9 MDS (MYELODYSPLASTIC SYNDROME): Primary | ICD-10-CM

## 2021-07-29 PROCEDURE — 96372 THER/PROPH/DIAG INJ SC/IM: CPT

## 2021-07-29 PROCEDURE — 63600175 PHARM REV CODE 636 W HCPCS: Mod: TB | Performed by: NURSE PRACTITIONER

## 2021-07-29 RX ADMIN — EPOETIN ALFA-EPBX 20000 UNITS: 20000 INJECTION, SOLUTION INTRAVENOUS; SUBCUTANEOUS at 02:07

## 2021-08-02 ENCOUNTER — OFFICE VISIT (OUTPATIENT)
Dept: WOUND CARE | Facility: HOSPITAL | Age: 61
End: 2021-08-02
Attending: FAMILY MEDICINE

## 2021-08-02 VITALS
HEART RATE: 83 BPM | RESPIRATION RATE: 18 BRPM | DIASTOLIC BLOOD PRESSURE: 89 MMHG | TEMPERATURE: 98 F | SYSTOLIC BLOOD PRESSURE: 140 MMHG

## 2021-08-02 DIAGNOSIS — T31.0 BURN ANY DEGREE INVOLVING LESS THAN 10 PERCENT OF BODY SURFACE: ICD-10-CM

## 2021-08-02 DIAGNOSIS — T22.20XA BURN OF LEFT ARM, SECOND DEGREE, INITIAL ENCOUNTER: ICD-10-CM

## 2021-08-02 DIAGNOSIS — T22.20XD: ICD-10-CM

## 2021-08-02 DIAGNOSIS — T22.211D PARTIAL THICKNESS BURN OF RIGHT FOREARM, SUBSEQUENT ENCOUNTER: Primary | ICD-10-CM

## 2021-08-02 DIAGNOSIS — T22.211A PARTIAL THICKNESS BURN OF RIGHT FOREARM, INITIAL ENCOUNTER: ICD-10-CM

## 2021-08-02 PROCEDURE — 99499 NO LOS: ICD-10-PCS | Mod: ,,, | Performed by: FAMILY MEDICINE

## 2021-08-02 PROCEDURE — 99499 UNLISTED E&M SERVICE: CPT | Mod: ,,, | Performed by: FAMILY MEDICINE

## 2021-08-02 PROCEDURE — 16020 DRESS/DEBRID P-THICK BURN S: CPT | Mod: ,,, | Performed by: FAMILY MEDICINE

## 2021-08-02 PROCEDURE — 99213 OFFICE O/P EST LOW 20 MIN: CPT | Mod: PN | Performed by: FAMILY MEDICINE

## 2021-08-02 PROCEDURE — 16020 PR DRESS/DEBRID SMALL BURN 2 ANES: ICD-10-PCS | Mod: ,,, | Performed by: FAMILY MEDICINE

## 2021-08-04 ENCOUNTER — LAB VISIT (OUTPATIENT)
Dept: LAB | Facility: HOSPITAL | Age: 61
End: 2021-08-04
Attending: NURSE PRACTITIONER
Payer: COMMERCIAL

## 2021-08-04 DIAGNOSIS — D46.9 MDS (MYELODYSPLASTIC SYNDROME): ICD-10-CM

## 2021-08-04 LAB
ALBUMIN SERPL BCP-MCNC: 4.1 G/DL (ref 3.5–5.2)
ALP SERPL-CCNC: 64 U/L (ref 55–135)
ALT SERPL W/O P-5'-P-CCNC: 18 U/L (ref 10–44)
ANION GAP SERPL CALC-SCNC: 6 MMOL/L (ref 8–16)
AST SERPL-CCNC: 18 U/L (ref 10–40)
BASOPHILS # BLD AUTO: 0.11 K/UL (ref 0–0.2)
BASOPHILS NFR BLD: 1.2 % (ref 0–1.9)
BILIRUB SERPL-MCNC: 0.7 MG/DL (ref 0.1–1)
BUN SERPL-MCNC: 26 MG/DL (ref 8–23)
CALCIUM SERPL-MCNC: 8.8 MG/DL (ref 8.7–10.5)
CHLORIDE SERPL-SCNC: 108 MMOL/L (ref 95–110)
CO2 SERPL-SCNC: 27 MMOL/L (ref 23–29)
CREAT SERPL-MCNC: 1.7 MG/DL (ref 0.5–1.4)
DIFFERENTIAL METHOD: ABNORMAL
EOSINOPHIL # BLD AUTO: 0.2 K/UL (ref 0–0.5)
EOSINOPHIL NFR BLD: 1.7 % (ref 0–8)
ERYTHROCYTE [DISTWIDTH] IN BLOOD BY AUTOMATED COUNT: 27.3 % (ref 11.5–14.5)
EST. GFR  (AFRICAN AMERICAN): 49.2 ML/MIN/1.73 M^2
EST. GFR  (NON AFRICAN AMERICAN): 42.6 ML/MIN/1.73 M^2
FERRITIN SERPL-MCNC: 1528 NG/ML (ref 20–300)
GLUCOSE SERPL-MCNC: 92 MG/DL (ref 70–110)
HCT VFR BLD AUTO: 27.9 % (ref 40–54)
HGB BLD-MCNC: 9.4 G/DL (ref 14–18)
IMM GRANULOCYTES # BLD AUTO: 0.04 K/UL (ref 0–0.04)
IMM GRANULOCYTES NFR BLD AUTO: 0.4 % (ref 0–0.5)
IRON SERPL-MCNC: 64 UG/DL (ref 45–160)
LYMPHOCYTES # BLD AUTO: 2 K/UL (ref 1–4.8)
LYMPHOCYTES NFR BLD: 21.3 % (ref 18–48)
MCH RBC QN AUTO: 30.9 PG (ref 27–31)
MCHC RBC AUTO-ENTMCNC: 33.7 G/DL (ref 32–36)
MCV RBC AUTO: 92 FL (ref 82–98)
MONOCYTES # BLD AUTO: 1.1 K/UL (ref 0.3–1)
MONOCYTES NFR BLD: 11 % (ref 4–15)
NEUTROPHILS # BLD AUTO: 6.2 K/UL (ref 1.8–7.7)
NEUTROPHILS NFR BLD: 64.4 % (ref 38–73)
NRBC BLD-RTO: 0 /100 WBC
PLATELET # BLD AUTO: 546 K/UL (ref 150–450)
PMV BLD AUTO: 9.8 FL (ref 9.2–12.9)
POTASSIUM SERPL-SCNC: 4.4 MMOL/L (ref 3.5–5.1)
PROT SERPL-MCNC: 7.5 G/DL (ref 6–8.4)
RBC # BLD AUTO: 3.04 M/UL (ref 4.6–6.2)
SATURATED IRON: 27 % (ref 20–50)
SODIUM SERPL-SCNC: 141 MMOL/L (ref 136–145)
TOTAL IRON BINDING CAPACITY: 235 UG/DL (ref 250–450)
TRANSFERRIN SERPL-MCNC: 168 MG/DL (ref 200–375)
WBC # BLD AUTO: 9.55 K/UL (ref 3.9–12.7)

## 2021-08-04 PROCEDURE — 80053 COMPREHEN METABOLIC PANEL: CPT | Performed by: NURSE PRACTITIONER

## 2021-08-04 PROCEDURE — 82728 ASSAY OF FERRITIN: CPT | Performed by: NURSE PRACTITIONER

## 2021-08-04 PROCEDURE — 85025 COMPLETE CBC W/AUTO DIFF WBC: CPT | Performed by: NURSE PRACTITIONER

## 2021-08-04 PROCEDURE — 36415 COLL VENOUS BLD VENIPUNCTURE: CPT | Performed by: NURSE PRACTITIONER

## 2021-08-04 PROCEDURE — 83540 ASSAY OF IRON: CPT | Performed by: NURSE PRACTITIONER

## 2021-08-05 ENCOUNTER — INFUSION (OUTPATIENT)
Dept: INFUSION THERAPY | Facility: HOSPITAL | Age: 61
End: 2021-08-05
Attending: INTERNAL MEDICINE
Payer: COMMERCIAL

## 2021-08-05 VITALS
HEIGHT: 70 IN | BODY MASS INDEX: 33.93 KG/M2 | WEIGHT: 237 LBS | DIASTOLIC BLOOD PRESSURE: 78 MMHG | HEART RATE: 76 BPM | TEMPERATURE: 98 F | SYSTOLIC BLOOD PRESSURE: 132 MMHG | OXYGEN SATURATION: 95 % | RESPIRATION RATE: 18 BRPM

## 2021-08-05 DIAGNOSIS — D64.9 CHRONIC ANEMIA: ICD-10-CM

## 2021-08-05 DIAGNOSIS — N18.30 STAGE 3 CHRONIC KIDNEY DISEASE, UNSPECIFIED WHETHER STAGE 3A OR 3B CKD: ICD-10-CM

## 2021-08-05 DIAGNOSIS — D64.9 NORMOCHROMIC ANEMIA: ICD-10-CM

## 2021-08-05 DIAGNOSIS — D64.89 ANEMIA DUE TO MULTIPLE MECHANISMS: ICD-10-CM

## 2021-08-05 DIAGNOSIS — D46.9 MDS (MYELODYSPLASTIC SYNDROME): Primary | ICD-10-CM

## 2021-08-05 PROCEDURE — 96372 THER/PROPH/DIAG INJ SC/IM: CPT

## 2021-08-05 PROCEDURE — 63600175 PHARM REV CODE 636 W HCPCS: Mod: TB | Performed by: NURSE PRACTITIONER

## 2021-08-05 RX ADMIN — EPOETIN ALFA-EPBX 20000 UNITS: 20000 INJECTION, SOLUTION INTRAVENOUS; SUBCUTANEOUS at 11:08

## 2021-08-09 ENCOUNTER — OFFICE VISIT (OUTPATIENT)
Dept: WOUND CARE | Facility: HOSPITAL | Age: 61
End: 2021-08-09
Attending: FAMILY MEDICINE
Payer: COMMERCIAL

## 2021-08-09 VITALS
SYSTOLIC BLOOD PRESSURE: 143 MMHG | TEMPERATURE: 98 F | DIASTOLIC BLOOD PRESSURE: 89 MMHG | RESPIRATION RATE: 20 BRPM | HEART RATE: 87 BPM

## 2021-08-09 DIAGNOSIS — T22.211D PARTIAL THICKNESS BURN OF RIGHT FOREARM, SUBSEQUENT ENCOUNTER: Primary | ICD-10-CM

## 2021-08-09 DIAGNOSIS — T31.0 BURN ANY DEGREE INVOLVING LESS THAN 10 PERCENT OF BODY SURFACE: ICD-10-CM

## 2021-08-09 PROCEDURE — 3079F DIAST BP 80-89 MM HG: CPT | Mod: CPTII,,, | Performed by: FAMILY MEDICINE

## 2021-08-09 PROCEDURE — 3044F PR MOST RECENT HEMOGLOBIN A1C LEVEL <7.0%: ICD-10-PCS | Mod: CPTII,,, | Performed by: FAMILY MEDICINE

## 2021-08-09 PROCEDURE — 1126F AMNT PAIN NOTED NONE PRSNT: CPT | Mod: CPTII,,, | Performed by: FAMILY MEDICINE

## 2021-08-09 PROCEDURE — 1160F PR REVIEW ALL MEDS BY PRESCRIBER/CLIN PHARMACIST DOCUMENTED: ICD-10-PCS | Mod: CPTII,,, | Performed by: FAMILY MEDICINE

## 2021-08-09 PROCEDURE — 1159F PR MEDICATION LIST DOCUMENTED IN MEDICAL RECORD: ICD-10-PCS | Mod: CPTII,,, | Performed by: FAMILY MEDICINE

## 2021-08-09 PROCEDURE — 3077F SYST BP >= 140 MM HG: CPT | Mod: CPTII,,, | Performed by: FAMILY MEDICINE

## 2021-08-09 PROCEDURE — 16020 DRESS/DEBRID P-THICK BURN S: CPT | Mod: ,,, | Performed by: FAMILY MEDICINE

## 2021-08-09 PROCEDURE — 99499 NO LOS: ICD-10-PCS | Mod: ,,, | Performed by: FAMILY MEDICINE

## 2021-08-09 PROCEDURE — 3044F HG A1C LEVEL LT 7.0%: CPT | Mod: CPTII,,, | Performed by: FAMILY MEDICINE

## 2021-08-09 PROCEDURE — 99499 UNLISTED E&M SERVICE: CPT | Mod: ,,, | Performed by: FAMILY MEDICINE

## 2021-08-09 PROCEDURE — 1126F PR PAIN SEVERITY QUANTIFIED, NO PAIN PRESENT: ICD-10-PCS | Mod: CPTII,,, | Performed by: FAMILY MEDICINE

## 2021-08-09 PROCEDURE — 1160F RVW MEDS BY RX/DR IN RCRD: CPT | Mod: CPTII,,, | Performed by: FAMILY MEDICINE

## 2021-08-09 PROCEDURE — 1159F MED LIST DOCD IN RCRD: CPT | Mod: CPTII,,, | Performed by: FAMILY MEDICINE

## 2021-08-09 PROCEDURE — 99213 OFFICE O/P EST LOW 20 MIN: CPT | Mod: PN | Performed by: FAMILY MEDICINE

## 2021-08-09 PROCEDURE — 3077F PR MOST RECENT SYSTOLIC BLOOD PRESSURE >= 140 MM HG: ICD-10-PCS | Mod: CPTII,,, | Performed by: FAMILY MEDICINE

## 2021-08-09 PROCEDURE — 3079F PR MOST RECENT DIASTOLIC BLOOD PRESSURE 80-89 MM HG: ICD-10-PCS | Mod: CPTII,,, | Performed by: FAMILY MEDICINE

## 2021-08-09 PROCEDURE — 16020 PR DRESS/DEBRID SMALL BURN 2 ANES: ICD-10-PCS | Mod: ,,, | Performed by: FAMILY MEDICINE

## 2021-08-12 ENCOUNTER — INFUSION (OUTPATIENT)
Dept: INFUSION THERAPY | Facility: HOSPITAL | Age: 61
End: 2021-08-12
Attending: INTERNAL MEDICINE
Payer: COMMERCIAL

## 2021-08-12 VITALS
BODY MASS INDEX: 34.58 KG/M2 | TEMPERATURE: 98 F | RESPIRATION RATE: 18 BRPM | SYSTOLIC BLOOD PRESSURE: 122 MMHG | DIASTOLIC BLOOD PRESSURE: 73 MMHG | WEIGHT: 241 LBS | OXYGEN SATURATION: 95 % | HEART RATE: 83 BPM

## 2021-08-12 DIAGNOSIS — D64.89 ANEMIA DUE TO MULTIPLE MECHANISMS: ICD-10-CM

## 2021-08-12 DIAGNOSIS — N18.30 STAGE 3 CHRONIC KIDNEY DISEASE, UNSPECIFIED WHETHER STAGE 3A OR 3B CKD: ICD-10-CM

## 2021-08-12 DIAGNOSIS — D46.9 MDS (MYELODYSPLASTIC SYNDROME): Primary | ICD-10-CM

## 2021-08-12 DIAGNOSIS — D64.9 NORMOCHROMIC ANEMIA: ICD-10-CM

## 2021-08-12 DIAGNOSIS — D64.9 CHRONIC ANEMIA: ICD-10-CM

## 2021-08-12 PROCEDURE — 63600175 PHARM REV CODE 636 W HCPCS: Mod: TB | Performed by: NURSE PRACTITIONER

## 2021-08-12 PROCEDURE — 96372 THER/PROPH/DIAG INJ SC/IM: CPT

## 2021-08-12 RX ADMIN — EPOETIN ALFA-EPBX 20000 UNITS: 20000 INJECTION, SOLUTION INTRAVENOUS; SUBCUTANEOUS at 04:08

## 2021-08-18 ENCOUNTER — LAB VISIT (OUTPATIENT)
Dept: LAB | Facility: HOSPITAL | Age: 61
End: 2021-08-18
Attending: NURSE PRACTITIONER
Payer: COMMERCIAL

## 2021-08-18 DIAGNOSIS — D46.9 MDS (MYELODYSPLASTIC SYNDROME): ICD-10-CM

## 2021-08-18 LAB
ALBUMIN SERPL BCP-MCNC: 4.1 G/DL (ref 3.5–5.2)
ALP SERPL-CCNC: 60 U/L (ref 55–135)
ALT SERPL W/O P-5'-P-CCNC: 17 U/L (ref 10–44)
ANION GAP SERPL CALC-SCNC: 5 MMOL/L (ref 8–16)
AST SERPL-CCNC: 18 U/L (ref 10–40)
BASOPHILS # BLD AUTO: 0.09 K/UL (ref 0–0.2)
BASOPHILS NFR BLD: 1 % (ref 0–1.9)
BILIRUB SERPL-MCNC: 0.9 MG/DL (ref 0.1–1)
BUN SERPL-MCNC: 25 MG/DL (ref 8–23)
CALCIUM SERPL-MCNC: 8.6 MG/DL (ref 8.7–10.5)
CHLORIDE SERPL-SCNC: 110 MMOL/L (ref 95–110)
CO2 SERPL-SCNC: 28 MMOL/L (ref 23–29)
CREAT SERPL-MCNC: 1.5 MG/DL (ref 0.5–1.4)
DIFFERENTIAL METHOD: ABNORMAL
EOSINOPHIL # BLD AUTO: 0.1 K/UL (ref 0–0.5)
EOSINOPHIL NFR BLD: 1.5 % (ref 0–8)
ERYTHROCYTE [DISTWIDTH] IN BLOOD BY AUTOMATED COUNT: 26.8 % (ref 11.5–14.5)
EST. GFR  (AFRICAN AMERICAN): 57.3 ML/MIN/1.73 M^2
EST. GFR  (NON AFRICAN AMERICAN): 49.5 ML/MIN/1.73 M^2
GLUCOSE SERPL-MCNC: 121 MG/DL (ref 70–110)
HCT VFR BLD AUTO: 26.9 % (ref 40–54)
HGB BLD-MCNC: 9.5 G/DL (ref 14–18)
IMM GRANULOCYTES # BLD AUTO: 0.04 K/UL (ref 0–0.04)
IMM GRANULOCYTES NFR BLD AUTO: 0.5 % (ref 0–0.5)
LYMPHOCYTES # BLD AUTO: 1.8 K/UL (ref 1–4.8)
LYMPHOCYTES NFR BLD: 20.1 % (ref 18–48)
MCH RBC QN AUTO: 32.1 PG (ref 27–31)
MCHC RBC AUTO-ENTMCNC: 35.3 G/DL (ref 32–36)
MCV RBC AUTO: 91 FL (ref 82–98)
MONOCYTES # BLD AUTO: 0.8 K/UL (ref 0.3–1)
MONOCYTES NFR BLD: 9.6 % (ref 4–15)
NEUTROPHILS # BLD AUTO: 5.9 K/UL (ref 1.8–7.7)
NEUTROPHILS NFR BLD: 67.3 % (ref 38–73)
NRBC BLD-RTO: 0 /100 WBC
PLATELET # BLD AUTO: 452 K/UL (ref 150–450)
PMV BLD AUTO: 9.9 FL (ref 9.2–12.9)
POTASSIUM SERPL-SCNC: 4.6 MMOL/L (ref 3.5–5.1)
PROT SERPL-MCNC: 7.4 G/DL (ref 6–8.4)
RBC # BLD AUTO: 2.96 M/UL (ref 4.6–6.2)
SODIUM SERPL-SCNC: 143 MMOL/L (ref 136–145)
WBC # BLD AUTO: 8.72 K/UL (ref 3.9–12.7)

## 2021-08-18 PROCEDURE — 80053 COMPREHEN METABOLIC PANEL: CPT | Performed by: NURSE PRACTITIONER

## 2021-08-18 PROCEDURE — 36415 COLL VENOUS BLD VENIPUNCTURE: CPT | Performed by: NURSE PRACTITIONER

## 2021-08-18 PROCEDURE — 85025 COMPLETE CBC W/AUTO DIFF WBC: CPT | Performed by: NURSE PRACTITIONER

## 2021-08-19 ENCOUNTER — INFUSION (OUTPATIENT)
Dept: INFUSION THERAPY | Facility: HOSPITAL | Age: 61
End: 2021-08-19
Attending: INTERNAL MEDICINE
Payer: COMMERCIAL

## 2021-08-19 VITALS
BODY MASS INDEX: 34.4 KG/M2 | DIASTOLIC BLOOD PRESSURE: 81 MMHG | HEIGHT: 70 IN | WEIGHT: 240.31 LBS | OXYGEN SATURATION: 97 % | HEART RATE: 81 BPM | TEMPERATURE: 98 F | SYSTOLIC BLOOD PRESSURE: 129 MMHG

## 2021-08-19 DIAGNOSIS — D64.89 ANEMIA DUE TO MULTIPLE MECHANISMS: ICD-10-CM

## 2021-08-19 DIAGNOSIS — D64.9 CHRONIC ANEMIA: ICD-10-CM

## 2021-08-19 DIAGNOSIS — D46.9 MDS (MYELODYSPLASTIC SYNDROME): Primary | ICD-10-CM

## 2021-08-19 DIAGNOSIS — D64.9 NORMOCHROMIC ANEMIA: ICD-10-CM

## 2021-08-19 DIAGNOSIS — N18.30 STAGE 3 CHRONIC KIDNEY DISEASE, UNSPECIFIED WHETHER STAGE 3A OR 3B CKD: ICD-10-CM

## 2021-08-19 PROCEDURE — 96372 THER/PROPH/DIAG INJ SC/IM: CPT

## 2021-08-19 PROCEDURE — 63600175 PHARM REV CODE 636 W HCPCS: Mod: TB | Performed by: NURSE PRACTITIONER

## 2021-08-19 RX ADMIN — EPOETIN ALFA-EPBX 20000 UNITS: 20000 INJECTION, SOLUTION INTRAVENOUS; SUBCUTANEOUS at 04:08

## 2021-08-26 ENCOUNTER — INFUSION (OUTPATIENT)
Dept: INFUSION THERAPY | Facility: HOSPITAL | Age: 61
End: 2021-08-26
Attending: INTERNAL MEDICINE
Payer: COMMERCIAL

## 2021-08-26 VITALS
RESPIRATION RATE: 18 BRPM | DIASTOLIC BLOOD PRESSURE: 73 MMHG | HEART RATE: 75 BPM | BODY MASS INDEX: 34.38 KG/M2 | WEIGHT: 239.63 LBS | SYSTOLIC BLOOD PRESSURE: 129 MMHG | TEMPERATURE: 97 F | OXYGEN SATURATION: 96 %

## 2021-08-26 DIAGNOSIS — N18.30 STAGE 3 CHRONIC KIDNEY DISEASE, UNSPECIFIED WHETHER STAGE 3A OR 3B CKD: ICD-10-CM

## 2021-08-26 DIAGNOSIS — D64.9 NORMOCHROMIC ANEMIA: ICD-10-CM

## 2021-08-26 DIAGNOSIS — D64.89 ANEMIA DUE TO MULTIPLE MECHANISMS: ICD-10-CM

## 2021-08-26 DIAGNOSIS — D46.9 MDS (MYELODYSPLASTIC SYNDROME): Primary | ICD-10-CM

## 2021-08-26 DIAGNOSIS — D64.9 CHRONIC ANEMIA: ICD-10-CM

## 2021-08-26 PROCEDURE — 96372 THER/PROPH/DIAG INJ SC/IM: CPT

## 2021-08-26 PROCEDURE — 63600175 PHARM REV CODE 636 W HCPCS: Mod: TB | Performed by: NURSE PRACTITIONER

## 2021-08-26 RX ADMIN — EPOETIN ALFA-EPBX 20000 UNITS: 20000 INJECTION, SOLUTION INTRAVENOUS; SUBCUTANEOUS at 04:08

## 2021-09-08 ENCOUNTER — LAB VISIT (OUTPATIENT)
Dept: LAB | Facility: HOSPITAL | Age: 61
End: 2021-09-08
Attending: NURSE PRACTITIONER
Payer: COMMERCIAL

## 2021-09-08 DIAGNOSIS — D46.9 MDS (MYELODYSPLASTIC SYNDROME): ICD-10-CM

## 2021-09-08 LAB
ALBUMIN SERPL BCP-MCNC: 4.2 G/DL (ref 3.5–5.2)
ALP SERPL-CCNC: 62 U/L (ref 55–135)
ALT SERPL W/O P-5'-P-CCNC: 19 U/L (ref 10–44)
ANION GAP SERPL CALC-SCNC: 8 MMOL/L (ref 8–16)
AST SERPL-CCNC: 21 U/L (ref 10–40)
BASOPHILS # BLD AUTO: 0.05 K/UL (ref 0–0.2)
BASOPHILS NFR BLD: 0.8 % (ref 0–1.9)
BILIRUB SERPL-MCNC: 1.1 MG/DL (ref 0.1–1)
BUN SERPL-MCNC: 27 MG/DL (ref 8–23)
CALCIUM SERPL-MCNC: 8.9 MG/DL (ref 8.7–10.5)
CHLORIDE SERPL-SCNC: 109 MMOL/L (ref 95–110)
CO2 SERPL-SCNC: 27 MMOL/L (ref 23–29)
CREAT SERPL-MCNC: 1.5 MG/DL (ref 0.5–1.4)
DIFFERENTIAL METHOD: ABNORMAL
EOSINOPHIL # BLD AUTO: 0.1 K/UL (ref 0–0.5)
EOSINOPHIL NFR BLD: 1.4 % (ref 0–8)
ERYTHROCYTE [DISTWIDTH] IN BLOOD BY AUTOMATED COUNT: 26.7 % (ref 11.5–14.5)
EST. GFR  (AFRICAN AMERICAN): 57.3 ML/MIN/1.73 M^2
EST. GFR  (NON AFRICAN AMERICAN): 49.5 ML/MIN/1.73 M^2
FERRITIN SERPL-MCNC: 1458 NG/ML (ref 20–300)
GLUCOSE SERPL-MCNC: 109 MG/DL (ref 70–110)
HCT VFR BLD AUTO: 26.1 % (ref 40–54)
HGB BLD-MCNC: 9.1 G/DL (ref 14–18)
IMM GRANULOCYTES # BLD AUTO: 0.01 K/UL (ref 0–0.04)
IMM GRANULOCYTES NFR BLD AUTO: 0.2 % (ref 0–0.5)
IRON SERPL-MCNC: 173 UG/DL (ref 45–160)
LYMPHOCYTES # BLD AUTO: 1.3 K/UL (ref 1–4.8)
LYMPHOCYTES NFR BLD: 19.8 % (ref 18–48)
MCH RBC QN AUTO: 32 PG (ref 27–31)
MCHC RBC AUTO-ENTMCNC: 34.9 G/DL (ref 32–36)
MCV RBC AUTO: 92 FL (ref 82–98)
MONOCYTES # BLD AUTO: 0.7 K/UL (ref 0.3–1)
MONOCYTES NFR BLD: 10.8 % (ref 4–15)
NEUTROPHILS # BLD AUTO: 4.4 K/UL (ref 1.8–7.7)
NEUTROPHILS NFR BLD: 67 % (ref 38–73)
NRBC BLD-RTO: 0 /100 WBC
PLATELET # BLD AUTO: 380 K/UL (ref 150–450)
PMV BLD AUTO: 11.4 FL (ref 9.2–12.9)
POTASSIUM SERPL-SCNC: 4.6 MMOL/L (ref 3.5–5.1)
PROT SERPL-MCNC: 7.2 G/DL (ref 6–8.4)
RBC # BLD AUTO: 2.84 M/UL (ref 4.6–6.2)
SATURATED IRON: 87 % (ref 20–50)
SODIUM SERPL-SCNC: 144 MMOL/L (ref 136–145)
TOTAL IRON BINDING CAPACITY: 199 UG/DL (ref 250–450)
TRANSFERRIN SERPL-MCNC: 142 MG/DL (ref 200–375)
WBC # BLD AUTO: 6.58 K/UL (ref 3.9–12.7)

## 2021-09-08 PROCEDURE — 36415 COLL VENOUS BLD VENIPUNCTURE: CPT | Performed by: NURSE PRACTITIONER

## 2021-09-08 PROCEDURE — 82728 ASSAY OF FERRITIN: CPT | Performed by: NURSE PRACTITIONER

## 2021-09-08 PROCEDURE — 83540 ASSAY OF IRON: CPT | Performed by: NURSE PRACTITIONER

## 2021-09-08 PROCEDURE — 85025 COMPLETE CBC W/AUTO DIFF WBC: CPT | Performed by: NURSE PRACTITIONER

## 2021-09-08 PROCEDURE — 80053 COMPREHEN METABOLIC PANEL: CPT | Performed by: NURSE PRACTITIONER

## 2021-09-09 ENCOUNTER — INFUSION (OUTPATIENT)
Dept: INFUSION THERAPY | Facility: HOSPITAL | Age: 61
End: 2021-09-09
Attending: INTERNAL MEDICINE
Payer: COMMERCIAL

## 2021-09-09 VITALS
TEMPERATURE: 98 F | DIASTOLIC BLOOD PRESSURE: 76 MMHG | RESPIRATION RATE: 16 BRPM | WEIGHT: 236.38 LBS | HEART RATE: 84 BPM | OXYGEN SATURATION: 95 % | BODY MASS INDEX: 33.84 KG/M2 | SYSTOLIC BLOOD PRESSURE: 123 MMHG | HEIGHT: 70 IN

## 2021-09-09 DIAGNOSIS — D64.9 NORMOCHROMIC ANEMIA: ICD-10-CM

## 2021-09-09 DIAGNOSIS — D64.89 ANEMIA DUE TO MULTIPLE MECHANISMS: ICD-10-CM

## 2021-09-09 DIAGNOSIS — D46.9 MDS (MYELODYSPLASTIC SYNDROME): Primary | ICD-10-CM

## 2021-09-09 DIAGNOSIS — D64.9 CHRONIC ANEMIA: ICD-10-CM

## 2021-09-09 DIAGNOSIS — N18.30 STAGE 3 CHRONIC KIDNEY DISEASE, UNSPECIFIED WHETHER STAGE 3A OR 3B CKD: ICD-10-CM

## 2021-09-09 PROCEDURE — 96372 THER/PROPH/DIAG INJ SC/IM: CPT

## 2021-09-09 PROCEDURE — 63600175 PHARM REV CODE 636 W HCPCS: Mod: TB | Performed by: NURSE PRACTITIONER

## 2021-09-09 RX ADMIN — EPOETIN ALFA-EPBX 20000 UNITS: 20000 INJECTION, SOLUTION INTRAVENOUS; SUBCUTANEOUS at 04:09

## 2021-09-16 ENCOUNTER — INFUSION (OUTPATIENT)
Dept: INFUSION THERAPY | Facility: HOSPITAL | Age: 61
End: 2021-09-16
Attending: INTERNAL MEDICINE
Payer: COMMERCIAL

## 2021-09-16 VITALS
RESPIRATION RATE: 16 BRPM | DIASTOLIC BLOOD PRESSURE: 77 MMHG | HEART RATE: 81 BPM | SYSTOLIC BLOOD PRESSURE: 144 MMHG | WEIGHT: 239.81 LBS | HEIGHT: 70 IN | TEMPERATURE: 98 F | BODY MASS INDEX: 34.33 KG/M2

## 2021-09-16 DIAGNOSIS — D46.9 MDS (MYELODYSPLASTIC SYNDROME): Primary | ICD-10-CM

## 2021-09-16 DIAGNOSIS — D64.9 NORMOCHROMIC ANEMIA: ICD-10-CM

## 2021-09-16 DIAGNOSIS — D64.9 CHRONIC ANEMIA: ICD-10-CM

## 2021-09-16 DIAGNOSIS — N18.30 STAGE 3 CHRONIC KIDNEY DISEASE, UNSPECIFIED WHETHER STAGE 3A OR 3B CKD: ICD-10-CM

## 2021-09-16 DIAGNOSIS — D64.89 ANEMIA DUE TO MULTIPLE MECHANISMS: ICD-10-CM

## 2021-09-16 PROCEDURE — 96372 THER/PROPH/DIAG INJ SC/IM: CPT

## 2021-09-16 PROCEDURE — 63600175 PHARM REV CODE 636 W HCPCS: Mod: TB | Performed by: NURSE PRACTITIONER

## 2021-09-16 RX ADMIN — EPOETIN ALFA-EPBX 20000 UNITS: 20000 INJECTION, SOLUTION INTRAVENOUS; SUBCUTANEOUS at 02:09

## 2021-09-22 ENCOUNTER — LAB VISIT (OUTPATIENT)
Dept: LAB | Facility: HOSPITAL | Age: 61
End: 2021-09-22
Attending: NURSE PRACTITIONER
Payer: COMMERCIAL

## 2021-09-22 DIAGNOSIS — D46.9 MDS (MYELODYSPLASTIC SYNDROME): ICD-10-CM

## 2021-09-22 LAB
ALBUMIN SERPL BCP-MCNC: 4.3 G/DL (ref 3.5–5.2)
ALP SERPL-CCNC: 60 U/L (ref 55–135)
ALT SERPL W/O P-5'-P-CCNC: 15 U/L (ref 10–44)
ANION GAP SERPL CALC-SCNC: 7 MMOL/L (ref 8–16)
AST SERPL-CCNC: 17 U/L (ref 10–40)
BASOPHILS # BLD AUTO: 0.07 K/UL (ref 0–0.2)
BASOPHILS NFR BLD: 1 % (ref 0–1.9)
BILIRUB SERPL-MCNC: 0.9 MG/DL (ref 0.1–1)
BUN SERPL-MCNC: 21 MG/DL (ref 8–23)
CALCIUM SERPL-MCNC: 8.6 MG/DL (ref 8.7–10.5)
CHLORIDE SERPL-SCNC: 107 MMOL/L (ref 95–110)
CO2 SERPL-SCNC: 27 MMOL/L (ref 23–29)
CREAT SERPL-MCNC: 1.5 MG/DL (ref 0.5–1.4)
DIFFERENTIAL METHOD: ABNORMAL
EOSINOPHIL # BLD AUTO: 0.1 K/UL (ref 0–0.5)
EOSINOPHIL NFR BLD: 1.4 % (ref 0–8)
ERYTHROCYTE [DISTWIDTH] IN BLOOD BY AUTOMATED COUNT: 27.4 % (ref 11.5–14.5)
EST. GFR  (AFRICAN AMERICAN): 57.3 ML/MIN/1.73 M^2
EST. GFR  (NON AFRICAN AMERICAN): 49.5 ML/MIN/1.73 M^2
GLUCOSE SERPL-MCNC: 135 MG/DL (ref 70–110)
HCT VFR BLD AUTO: 27.2 % (ref 40–54)
HGB BLD-MCNC: 9.3 G/DL (ref 14–18)
IMM GRANULOCYTES # BLD AUTO: 0.03 K/UL (ref 0–0.04)
IMM GRANULOCYTES NFR BLD AUTO: 0.4 % (ref 0–0.5)
LYMPHOCYTES # BLD AUTO: 1.5 K/UL (ref 1–4.8)
LYMPHOCYTES NFR BLD: 21 % (ref 18–48)
MCH RBC QN AUTO: 31.4 PG (ref 27–31)
MCHC RBC AUTO-ENTMCNC: 34.2 G/DL (ref 32–36)
MCV RBC AUTO: 92 FL (ref 82–98)
MONOCYTES # BLD AUTO: 0.7 K/UL (ref 0.3–1)
MONOCYTES NFR BLD: 9 % (ref 4–15)
NEUTROPHILS # BLD AUTO: 4.9 K/UL (ref 1.8–7.7)
NEUTROPHILS NFR BLD: 67.2 % (ref 38–73)
NRBC BLD-RTO: 0 /100 WBC
PLATELET # BLD AUTO: 444 K/UL (ref 150–450)
PMV BLD AUTO: 10.6 FL (ref 9.2–12.9)
POTASSIUM SERPL-SCNC: 4.5 MMOL/L (ref 3.5–5.1)
PROT SERPL-MCNC: 7.5 G/DL (ref 6–8.4)
RBC # BLD AUTO: 2.96 M/UL (ref 4.6–6.2)
SODIUM SERPL-SCNC: 141 MMOL/L (ref 136–145)
WBC # BLD AUTO: 7.23 K/UL (ref 3.9–12.7)

## 2021-09-22 PROCEDURE — 36415 COLL VENOUS BLD VENIPUNCTURE: CPT | Performed by: NURSE PRACTITIONER

## 2021-09-22 PROCEDURE — 80053 COMPREHEN METABOLIC PANEL: CPT | Performed by: NURSE PRACTITIONER

## 2021-09-22 PROCEDURE — 85025 COMPLETE CBC W/AUTO DIFF WBC: CPT | Performed by: NURSE PRACTITIONER

## 2021-09-23 ENCOUNTER — INFUSION (OUTPATIENT)
Dept: INFUSION THERAPY | Facility: HOSPITAL | Age: 61
End: 2021-09-23
Attending: INTERNAL MEDICINE
Payer: COMMERCIAL

## 2021-09-23 VITALS
OXYGEN SATURATION: 96 % | RESPIRATION RATE: 18 BRPM | WEIGHT: 237.13 LBS | DIASTOLIC BLOOD PRESSURE: 85 MMHG | TEMPERATURE: 97 F | BODY MASS INDEX: 34.02 KG/M2 | HEART RATE: 78 BPM | SYSTOLIC BLOOD PRESSURE: 130 MMHG

## 2021-09-23 DIAGNOSIS — D64.89 ANEMIA DUE TO MULTIPLE MECHANISMS: ICD-10-CM

## 2021-09-23 DIAGNOSIS — D64.9 CHRONIC ANEMIA: ICD-10-CM

## 2021-09-23 DIAGNOSIS — D64.9 NORMOCHROMIC ANEMIA: ICD-10-CM

## 2021-09-23 DIAGNOSIS — D46.9 MDS (MYELODYSPLASTIC SYNDROME): Primary | ICD-10-CM

## 2021-09-23 DIAGNOSIS — N18.30 STAGE 3 CHRONIC KIDNEY DISEASE, UNSPECIFIED WHETHER STAGE 3A OR 3B CKD: ICD-10-CM

## 2021-09-23 PROCEDURE — 63600175 PHARM REV CODE 636 W HCPCS: Mod: TB | Performed by: NURSE PRACTITIONER

## 2021-09-23 PROCEDURE — 96372 THER/PROPH/DIAG INJ SC/IM: CPT

## 2021-09-23 RX ADMIN — EPOETIN ALFA-EPBX 20000 UNITS: 20000 INJECTION, SOLUTION INTRAVENOUS; SUBCUTANEOUS at 04:09

## 2021-09-30 ENCOUNTER — INFUSION (OUTPATIENT)
Dept: INFUSION THERAPY | Facility: HOSPITAL | Age: 61
End: 2021-09-30
Attending: INTERNAL MEDICINE
Payer: COMMERCIAL

## 2021-09-30 VITALS
OXYGEN SATURATION: 96 % | RESPIRATION RATE: 18 BRPM | BODY MASS INDEX: 33.95 KG/M2 | TEMPERATURE: 98 F | DIASTOLIC BLOOD PRESSURE: 77 MMHG | HEART RATE: 79 BPM | WEIGHT: 236.63 LBS | SYSTOLIC BLOOD PRESSURE: 131 MMHG

## 2021-09-30 DIAGNOSIS — D46.9 MDS (MYELODYSPLASTIC SYNDROME): Primary | ICD-10-CM

## 2021-09-30 DIAGNOSIS — N18.30 STAGE 3 CHRONIC KIDNEY DISEASE, UNSPECIFIED WHETHER STAGE 3A OR 3B CKD: ICD-10-CM

## 2021-09-30 DIAGNOSIS — D64.9 CHRONIC ANEMIA: ICD-10-CM

## 2021-09-30 DIAGNOSIS — D64.9 NORMOCHROMIC ANEMIA: ICD-10-CM

## 2021-09-30 DIAGNOSIS — D64.89 ANEMIA DUE TO MULTIPLE MECHANISMS: ICD-10-CM

## 2021-09-30 PROCEDURE — 63600175 PHARM REV CODE 636 W HCPCS: Mod: TB | Performed by: NURSE PRACTITIONER

## 2021-09-30 PROCEDURE — 96372 THER/PROPH/DIAG INJ SC/IM: CPT

## 2021-09-30 RX ADMIN — EPOETIN ALFA-EPBX 20000 UNITS: 20000 INJECTION, SOLUTION INTRAVENOUS; SUBCUTANEOUS at 04:09

## 2021-10-04 ENCOUNTER — OFFICE VISIT (OUTPATIENT)
Dept: FAMILY MEDICINE | Facility: CLINIC | Age: 61
End: 2021-10-04
Payer: COMMERCIAL

## 2021-10-04 VITALS
WEIGHT: 232 LBS | DIASTOLIC BLOOD PRESSURE: 84 MMHG | OXYGEN SATURATION: 99 % | RESPIRATION RATE: 18 BRPM | BODY MASS INDEX: 33.21 KG/M2 | SYSTOLIC BLOOD PRESSURE: 130 MMHG | HEART RATE: 94 BPM | TEMPERATURE: 98 F | HEIGHT: 70 IN

## 2021-10-04 DIAGNOSIS — M25.561 ACUTE PAIN OF RIGHT KNEE: Primary | ICD-10-CM

## 2021-10-04 DIAGNOSIS — E55.9 VITAMIN D DEFICIENCY: ICD-10-CM

## 2021-10-04 DIAGNOSIS — Z23 NEED FOR INFLUENZA VACCINATION: ICD-10-CM

## 2021-10-04 DIAGNOSIS — D46.9 MDS (MYELODYSPLASTIC SYNDROME): ICD-10-CM

## 2021-10-04 DIAGNOSIS — E78.5 DYSLIPIDEMIA: ICD-10-CM

## 2021-10-04 DIAGNOSIS — R19.5 DARK STOOLS: ICD-10-CM

## 2021-10-04 DIAGNOSIS — I10 ESSENTIAL HYPERTENSION: ICD-10-CM

## 2021-10-04 DIAGNOSIS — N18.30 STAGE 3 CHRONIC KIDNEY DISEASE, UNSPECIFIED WHETHER STAGE 3A OR 3B CKD: ICD-10-CM

## 2021-10-04 DIAGNOSIS — R73.01 ELEVATED FASTING GLUCOSE: ICD-10-CM

## 2021-10-04 DIAGNOSIS — Z11.4 SCREENING FOR HIV WITHOUT PRESENCE OF RISK FACTORS: ICD-10-CM

## 2021-10-04 PROCEDURE — 90686 IIV4 VACC NO PRSV 0.5 ML IM: CPT | Mod: S$GLB,,, | Performed by: NURSE PRACTITIONER

## 2021-10-04 PROCEDURE — 99214 PR OFFICE/OUTPT VISIT, EST, LEVL IV, 30-39 MIN: ICD-10-PCS | Mod: 25,S$GLB,, | Performed by: NURSE PRACTITIONER

## 2021-10-04 PROCEDURE — 90686 FLU VACCINE (QUAD) GREATER THAN OR EQUAL TO 3YO PRESERVATIVE FREE IM: ICD-10-PCS | Mod: S$GLB,,, | Performed by: NURSE PRACTITIONER

## 2021-10-04 PROCEDURE — 3044F HG A1C LEVEL LT 7.0%: CPT | Mod: S$GLB,,, | Performed by: NURSE PRACTITIONER

## 2021-10-04 PROCEDURE — 3060F PR POS MICROALBUMINURIA RESULT DOCUMENTED/REVIEW: ICD-10-PCS | Mod: S$GLB,,, | Performed by: NURSE PRACTITIONER

## 2021-10-04 PROCEDURE — 99214 OFFICE O/P EST MOD 30 MIN: CPT | Mod: 25,S$GLB,, | Performed by: NURSE PRACTITIONER

## 2021-10-04 PROCEDURE — 3079F PR MOST RECENT DIASTOLIC BLOOD PRESSURE 80-89 MM HG: ICD-10-PCS | Mod: S$GLB,,, | Performed by: NURSE PRACTITIONER

## 2021-10-04 PROCEDURE — 3008F BODY MASS INDEX DOCD: CPT | Mod: S$GLB,,, | Performed by: NURSE PRACTITIONER

## 2021-10-04 PROCEDURE — 3079F DIAST BP 80-89 MM HG: CPT | Mod: S$GLB,,, | Performed by: NURSE PRACTITIONER

## 2021-10-04 PROCEDURE — 3060F POS MICROALBUMINURIA REV: CPT | Mod: S$GLB,,, | Performed by: NURSE PRACTITIONER

## 2021-10-04 PROCEDURE — 1160F RVW MEDS BY RX/DR IN RCRD: CPT | Mod: S$GLB,,, | Performed by: NURSE PRACTITIONER

## 2021-10-04 PROCEDURE — 3066F PR DOCUMENTATION OF TREATMENT FOR NEPHROPATHY: ICD-10-PCS | Mod: S$GLB,,, | Performed by: NURSE PRACTITIONER

## 2021-10-04 PROCEDURE — 3044F PR MOST RECENT HEMOGLOBIN A1C LEVEL <7.0%: ICD-10-PCS | Mod: S$GLB,,, | Performed by: NURSE PRACTITIONER

## 2021-10-04 PROCEDURE — 3075F SYST BP GE 130 - 139MM HG: CPT | Mod: S$GLB,,, | Performed by: NURSE PRACTITIONER

## 2021-10-04 PROCEDURE — 1160F PR REVIEW ALL MEDS BY PRESCRIBER/CLIN PHARMACIST DOCUMENTED: ICD-10-PCS | Mod: S$GLB,,, | Performed by: NURSE PRACTITIONER

## 2021-10-04 PROCEDURE — 3075F PR MOST RECENT SYSTOLIC BLOOD PRESS GE 130-139MM HG: ICD-10-PCS | Mod: S$GLB,,, | Performed by: NURSE PRACTITIONER

## 2021-10-04 PROCEDURE — 3008F PR BODY MASS INDEX (BMI) DOCUMENTED: ICD-10-PCS | Mod: S$GLB,,, | Performed by: NURSE PRACTITIONER

## 2021-10-04 PROCEDURE — 3066F NEPHROPATHY DOC TX: CPT | Mod: S$GLB,,, | Performed by: NURSE PRACTITIONER

## 2021-10-04 PROCEDURE — 90471 IMMUNIZATION ADMIN: CPT | Mod: S$GLB,,, | Performed by: NURSE PRACTITIONER

## 2021-10-04 PROCEDURE — 90471 FLU VACCINE (QUAD) GREATER THAN OR EQUAL TO 3YO PRESERVATIVE FREE IM: ICD-10-PCS | Mod: S$GLB,,, | Performed by: NURSE PRACTITIONER

## 2021-10-04 RX ORDER — TRAMADOL HYDROCHLORIDE 50 MG/1
50 TABLET ORAL EVERY 8 HOURS PRN
Qty: 21 TABLET | Refills: 0 | Status: SHIPPED | OUTPATIENT
Start: 2021-10-04 | End: 2021-10-11

## 2021-10-04 RX ORDER — CAPSAICIN 0.75 MG/G
CREAM TOPICAL 3 TIMES DAILY PRN
Qty: 57 G | Refills: 0 | Status: SHIPPED | OUTPATIENT
Start: 2021-10-04 | End: 2021-11-03

## 2021-10-06 ENCOUNTER — LAB VISIT (OUTPATIENT)
Dept: LAB | Facility: HOSPITAL | Age: 61
End: 2021-10-06
Attending: NURSE PRACTITIONER
Payer: COMMERCIAL

## 2021-10-06 DIAGNOSIS — D46.9 MDS (MYELODYSPLASTIC SYNDROME): ICD-10-CM

## 2021-10-06 LAB
ALBUMIN SERPL BCP-MCNC: 4.2 G/DL (ref 3.5–5.2)
ALP SERPL-CCNC: 60 U/L (ref 55–135)
ALT SERPL W/O P-5'-P-CCNC: 16 U/L (ref 10–44)
ANION GAP SERPL CALC-SCNC: 9 MMOL/L (ref 8–16)
AST SERPL-CCNC: 20 U/L (ref 10–40)
BASOPHILS # BLD AUTO: 0.08 K/UL (ref 0–0.2)
BASOPHILS NFR BLD: 1.1 % (ref 0–1.9)
BILIRUB SERPL-MCNC: 0.9 MG/DL (ref 0.1–1)
BUN SERPL-MCNC: 27 MG/DL (ref 8–23)
CALCIUM SERPL-MCNC: 9 MG/DL (ref 8.7–10.5)
CHLORIDE SERPL-SCNC: 111 MMOL/L (ref 95–110)
CO2 SERPL-SCNC: 26 MMOL/L (ref 23–29)
CREAT SERPL-MCNC: 1.6 MG/DL (ref 0.5–1.4)
DIFFERENTIAL METHOD: ABNORMAL
EOSINOPHIL # BLD AUTO: 0.1 K/UL (ref 0–0.5)
EOSINOPHIL NFR BLD: 1.3 % (ref 0–8)
ERYTHROCYTE [DISTWIDTH] IN BLOOD BY AUTOMATED COUNT: 27.2 % (ref 11.5–14.5)
EST. GFR  (AFRICAN AMERICAN): 53 ML/MIN/1.73 M^2
EST. GFR  (NON AFRICAN AMERICAN): 45.8 ML/MIN/1.73 M^2
FERRITIN SERPL-MCNC: 1477 NG/ML (ref 20–300)
GLUCOSE SERPL-MCNC: 132 MG/DL (ref 70–110)
HCT VFR BLD AUTO: 25.4 % (ref 40–54)
HGB BLD-MCNC: 8.6 G/DL (ref 14–18)
IMM GRANULOCYTES # BLD AUTO: 0.03 K/UL (ref 0–0.04)
IMM GRANULOCYTES NFR BLD AUTO: 0.4 % (ref 0–0.5)
IRON SERPL-MCNC: 90 UG/DL (ref 45–160)
LYMPHOCYTES # BLD AUTO: 1.4 K/UL (ref 1–4.8)
LYMPHOCYTES NFR BLD: 18.8 % (ref 18–48)
MCH RBC QN AUTO: 31 PG (ref 27–31)
MCHC RBC AUTO-ENTMCNC: 33.9 G/DL (ref 32–36)
MCV RBC AUTO: 92 FL (ref 82–98)
MONOCYTES # BLD AUTO: 0.8 K/UL (ref 0.3–1)
MONOCYTES NFR BLD: 10.4 % (ref 4–15)
NEUTROPHILS # BLD AUTO: 5.2 K/UL (ref 1.8–7.7)
NEUTROPHILS NFR BLD: 68 % (ref 38–73)
NRBC BLD-RTO: 0 /100 WBC
PLATELET # BLD AUTO: 429 K/UL (ref 150–450)
PMV BLD AUTO: 10.7 FL (ref 9.2–12.9)
POTASSIUM SERPL-SCNC: 4.4 MMOL/L (ref 3.5–5.1)
PROT SERPL-MCNC: 7.4 G/DL (ref 6–8.4)
RBC # BLD AUTO: 2.77 M/UL (ref 4.6–6.2)
SATURATED IRON: 41 % (ref 20–50)
SODIUM SERPL-SCNC: 146 MMOL/L (ref 136–145)
TOTAL IRON BINDING CAPACITY: 220 UG/DL (ref 250–450)
TRANSFERRIN SERPL-MCNC: 157 MG/DL (ref 200–375)
WBC # BLD AUTO: 7.61 K/UL (ref 3.9–12.7)

## 2021-10-06 PROCEDURE — 80053 COMPREHEN METABOLIC PANEL: CPT | Performed by: NURSE PRACTITIONER

## 2021-10-06 PROCEDURE — 82728 ASSAY OF FERRITIN: CPT | Performed by: NURSE PRACTITIONER

## 2021-10-06 PROCEDURE — 84466 ASSAY OF TRANSFERRIN: CPT | Performed by: NURSE PRACTITIONER

## 2021-10-06 PROCEDURE — 36415 COLL VENOUS BLD VENIPUNCTURE: CPT | Performed by: NURSE PRACTITIONER

## 2021-10-06 PROCEDURE — 85025 COMPLETE CBC W/AUTO DIFF WBC: CPT | Performed by: NURSE PRACTITIONER

## 2021-10-07 ENCOUNTER — INFUSION (OUTPATIENT)
Dept: INFUSION THERAPY | Facility: HOSPITAL | Age: 61
End: 2021-10-07
Attending: INTERNAL MEDICINE
Payer: COMMERCIAL

## 2021-10-07 VITALS
HEART RATE: 81 BPM | TEMPERATURE: 98 F | WEIGHT: 235.63 LBS | SYSTOLIC BLOOD PRESSURE: 134 MMHG | RESPIRATION RATE: 18 BRPM | DIASTOLIC BLOOD PRESSURE: 76 MMHG | OXYGEN SATURATION: 97 % | BODY MASS INDEX: 33.81 KG/M2

## 2021-10-07 DIAGNOSIS — D64.9 CHRONIC ANEMIA: ICD-10-CM

## 2021-10-07 DIAGNOSIS — N18.30 STAGE 3 CHRONIC KIDNEY DISEASE, UNSPECIFIED WHETHER STAGE 3A OR 3B CKD: ICD-10-CM

## 2021-10-07 DIAGNOSIS — D46.9 MDS (MYELODYSPLASTIC SYNDROME): Primary | ICD-10-CM

## 2021-10-07 DIAGNOSIS — D64.89 ANEMIA DUE TO MULTIPLE MECHANISMS: ICD-10-CM

## 2021-10-07 DIAGNOSIS — D64.9 NORMOCHROMIC ANEMIA: ICD-10-CM

## 2021-10-07 PROCEDURE — 96372 THER/PROPH/DIAG INJ SC/IM: CPT

## 2021-10-07 PROCEDURE — 63600175 PHARM REV CODE 636 W HCPCS: Mod: JG | Performed by: NURSE PRACTITIONER

## 2021-10-07 RX ADMIN — EPOETIN ALFA-EPBX 20000 UNITS: 20000 INJECTION, SOLUTION INTRAVENOUS; SUBCUTANEOUS at 04:10

## 2021-10-14 ENCOUNTER — INFUSION (OUTPATIENT)
Dept: INFUSION THERAPY | Facility: HOSPITAL | Age: 61
End: 2021-10-14
Attending: INTERNAL MEDICINE
Payer: COMMERCIAL

## 2021-10-14 ENCOUNTER — OFFICE VISIT (OUTPATIENT)
Dept: HEMATOLOGY/ONCOLOGY | Facility: CLINIC | Age: 61
End: 2021-10-14
Payer: COMMERCIAL

## 2021-10-14 VITALS
HEART RATE: 78 BPM | DIASTOLIC BLOOD PRESSURE: 75 MMHG | WEIGHT: 233 LBS | HEIGHT: 70 IN | BODY MASS INDEX: 33.36 KG/M2 | RESPIRATION RATE: 18 BRPM | SYSTOLIC BLOOD PRESSURE: 118 MMHG

## 2021-10-14 DIAGNOSIS — N18.30 STAGE 3 CHRONIC KIDNEY DISEASE, UNSPECIFIED WHETHER STAGE 3A OR 3B CKD: ICD-10-CM

## 2021-10-14 DIAGNOSIS — D46.9 MDS (MYELODYSPLASTIC SYNDROME): ICD-10-CM

## 2021-10-14 DIAGNOSIS — D64.89 ANEMIA DUE TO MULTIPLE MECHANISMS: ICD-10-CM

## 2021-10-14 DIAGNOSIS — D72.821 MONOCYTOSIS: ICD-10-CM

## 2021-10-14 DIAGNOSIS — D46.9 MDS (MYELODYSPLASTIC SYNDROME): Primary | ICD-10-CM

## 2021-10-14 DIAGNOSIS — D64.9 NORMOCHROMIC ANEMIA: ICD-10-CM

## 2021-10-14 DIAGNOSIS — D64.9 CHRONIC ANEMIA: ICD-10-CM

## 2021-10-14 DIAGNOSIS — D57.3 SICKLE CELL TRAIT SYNDROME: ICD-10-CM

## 2021-10-14 DIAGNOSIS — D46.1 RARS (REFRACTORY ANEMIA WITH RINGED SIDEROBLASTS): Primary | ICD-10-CM

## 2021-10-14 DIAGNOSIS — D75.839 THROMBOCYTOSIS: ICD-10-CM

## 2021-10-14 PROCEDURE — 1160F PR REVIEW ALL MEDS BY PRESCRIBER/CLIN PHARMACIST DOCUMENTED: ICD-10-PCS | Mod: S$GLB,,, | Performed by: INTERNAL MEDICINE

## 2021-10-14 PROCEDURE — 3066F NEPHROPATHY DOC TX: CPT | Mod: S$GLB,,, | Performed by: INTERNAL MEDICINE

## 2021-10-14 PROCEDURE — 99214 OFFICE O/P EST MOD 30 MIN: CPT | Mod: S$GLB,,, | Performed by: INTERNAL MEDICINE

## 2021-10-14 PROCEDURE — 3044F PR MOST RECENT HEMOGLOBIN A1C LEVEL <7.0%: ICD-10-PCS | Mod: S$GLB,,, | Performed by: INTERNAL MEDICINE

## 2021-10-14 PROCEDURE — 3078F PR MOST RECENT DIASTOLIC BLOOD PRESSURE < 80 MM HG: ICD-10-PCS | Mod: S$GLB,,, | Performed by: INTERNAL MEDICINE

## 2021-10-14 PROCEDURE — 3060F PR POS MICROALBUMINURIA RESULT DOCUMENTED/REVIEW: ICD-10-PCS | Mod: S$GLB,,, | Performed by: INTERNAL MEDICINE

## 2021-10-14 PROCEDURE — 1160F RVW MEDS BY RX/DR IN RCRD: CPT | Mod: S$GLB,,, | Performed by: INTERNAL MEDICINE

## 2021-10-14 PROCEDURE — 3074F PR MOST RECENT SYSTOLIC BLOOD PRESSURE < 130 MM HG: ICD-10-PCS | Mod: S$GLB,,, | Performed by: INTERNAL MEDICINE

## 2021-10-14 PROCEDURE — 3066F PR DOCUMENTATION OF TREATMENT FOR NEPHROPATHY: ICD-10-PCS | Mod: S$GLB,,, | Performed by: INTERNAL MEDICINE

## 2021-10-14 PROCEDURE — 3008F PR BODY MASS INDEX (BMI) DOCUMENTED: ICD-10-PCS | Mod: S$GLB,,, | Performed by: INTERNAL MEDICINE

## 2021-10-14 PROCEDURE — 3044F HG A1C LEVEL LT 7.0%: CPT | Mod: S$GLB,,, | Performed by: INTERNAL MEDICINE

## 2021-10-14 PROCEDURE — 99214 PR OFFICE/OUTPT VISIT, EST, LEVL IV, 30-39 MIN: ICD-10-PCS | Mod: S$GLB,,, | Performed by: INTERNAL MEDICINE

## 2021-10-14 PROCEDURE — 3078F DIAST BP <80 MM HG: CPT | Mod: S$GLB,,, | Performed by: INTERNAL MEDICINE

## 2021-10-14 PROCEDURE — 3060F POS MICROALBUMINURIA REV: CPT | Mod: S$GLB,,, | Performed by: INTERNAL MEDICINE

## 2021-10-14 PROCEDURE — 96372 THER/PROPH/DIAG INJ SC/IM: CPT

## 2021-10-14 PROCEDURE — 3074F SYST BP LT 130 MM HG: CPT | Mod: S$GLB,,, | Performed by: INTERNAL MEDICINE

## 2021-10-14 PROCEDURE — 3008F BODY MASS INDEX DOCD: CPT | Mod: S$GLB,,, | Performed by: INTERNAL MEDICINE

## 2021-10-14 PROCEDURE — 63600175 PHARM REV CODE 636 W HCPCS: Mod: JG | Performed by: NURSE PRACTITIONER

## 2021-10-14 RX ADMIN — EPOETIN ALFA-EPBX 20000 UNITS: 20000 INJECTION, SOLUTION INTRAVENOUS; SUBCUTANEOUS at 02:10

## 2021-10-19 ENCOUNTER — OFFICE VISIT (OUTPATIENT)
Dept: ORTHOPEDICS | Facility: CLINIC | Age: 61
End: 2021-10-19
Payer: COMMERCIAL

## 2021-10-19 VITALS
HEIGHT: 70 IN | OXYGEN SATURATION: 99 % | DIASTOLIC BLOOD PRESSURE: 78 MMHG | SYSTOLIC BLOOD PRESSURE: 130 MMHG | BODY MASS INDEX: 33.36 KG/M2 | HEART RATE: 80 BPM | WEIGHT: 233 LBS

## 2021-10-19 DIAGNOSIS — M17.11 PRIMARY OSTEOARTHRITIS OF RIGHT KNEE: ICD-10-CM

## 2021-10-19 PROCEDURE — 99203 OFFICE O/P NEW LOW 30 MIN: CPT | Mod: 25,S$GLB,, | Performed by: ORTHOPAEDIC SURGERY

## 2021-10-19 PROCEDURE — 3075F PR MOST RECENT SYSTOLIC BLOOD PRESS GE 130-139MM HG: ICD-10-PCS | Mod: S$GLB,,, | Performed by: ORTHOPAEDIC SURGERY

## 2021-10-19 PROCEDURE — 3044F HG A1C LEVEL LT 7.0%: CPT | Mod: S$GLB,,, | Performed by: ORTHOPAEDIC SURGERY

## 2021-10-19 PROCEDURE — 3060F POS MICROALBUMINURIA REV: CPT | Mod: S$GLB,,, | Performed by: ORTHOPAEDIC SURGERY

## 2021-10-19 PROCEDURE — 3078F DIAST BP <80 MM HG: CPT | Mod: S$GLB,,, | Performed by: ORTHOPAEDIC SURGERY

## 2021-10-19 PROCEDURE — 3075F SYST BP GE 130 - 139MM HG: CPT | Mod: S$GLB,,, | Performed by: ORTHOPAEDIC SURGERY

## 2021-10-19 PROCEDURE — 3078F PR MOST RECENT DIASTOLIC BLOOD PRESSURE < 80 MM HG: ICD-10-PCS | Mod: S$GLB,,, | Performed by: ORTHOPAEDIC SURGERY

## 2021-10-19 PROCEDURE — 99203 PR OFFICE/OUTPT VISIT, NEW, LEVL III, 30-44 MIN: ICD-10-PCS | Mod: 25,S$GLB,, | Performed by: ORTHOPAEDIC SURGERY

## 2021-10-19 PROCEDURE — 3066F NEPHROPATHY DOC TX: CPT | Mod: S$GLB,,, | Performed by: ORTHOPAEDIC SURGERY

## 2021-10-19 PROCEDURE — 1160F PR REVIEW ALL MEDS BY PRESCRIBER/CLIN PHARMACIST DOCUMENTED: ICD-10-PCS | Mod: S$GLB,,, | Performed by: ORTHOPAEDIC SURGERY

## 2021-10-19 PROCEDURE — 1160F RVW MEDS BY RX/DR IN RCRD: CPT | Mod: S$GLB,,, | Performed by: ORTHOPAEDIC SURGERY

## 2021-10-19 PROCEDURE — 20610 LARGE JOINT ASPIRATION/INJECTION: R KNEE: ICD-10-PCS | Mod: RT,S$GLB,, | Performed by: ORTHOPAEDIC SURGERY

## 2021-10-19 PROCEDURE — 3008F BODY MASS INDEX DOCD: CPT | Mod: S$GLB,,, | Performed by: ORTHOPAEDIC SURGERY

## 2021-10-19 PROCEDURE — 3008F PR BODY MASS INDEX (BMI) DOCUMENTED: ICD-10-PCS | Mod: S$GLB,,, | Performed by: ORTHOPAEDIC SURGERY

## 2021-10-19 PROCEDURE — 3044F PR MOST RECENT HEMOGLOBIN A1C LEVEL <7.0%: ICD-10-PCS | Mod: S$GLB,,, | Performed by: ORTHOPAEDIC SURGERY

## 2021-10-19 PROCEDURE — 20610 DRAIN/INJ JOINT/BURSA W/O US: CPT | Mod: RT,S$GLB,, | Performed by: ORTHOPAEDIC SURGERY

## 2021-10-19 PROCEDURE — 3066F PR DOCUMENTATION OF TREATMENT FOR NEPHROPATHY: ICD-10-PCS | Mod: S$GLB,,, | Performed by: ORTHOPAEDIC SURGERY

## 2021-10-19 PROCEDURE — 3060F PR POS MICROALBUMINURIA RESULT DOCUMENTED/REVIEW: ICD-10-PCS | Mod: S$GLB,,, | Performed by: ORTHOPAEDIC SURGERY

## 2021-10-19 RX ORDER — TRAMADOL HYDROCHLORIDE 50 MG/1
50 TABLET ORAL EVERY 6 HOURS
COMMUNITY
End: 2021-12-20

## 2021-10-19 RX ADMIN — TRIAMCINOLONE ACETONIDE 40 MG: 40 INJECTION, SUSPENSION INTRA-ARTICULAR; INTRAMUSCULAR at 02:10

## 2021-10-20 ENCOUNTER — LAB VISIT (OUTPATIENT)
Dept: LAB | Facility: HOSPITAL | Age: 61
End: 2021-10-20
Attending: NURSE PRACTITIONER
Payer: COMMERCIAL

## 2021-10-20 ENCOUNTER — TELEPHONE (OUTPATIENT)
Dept: HEMATOLOGY/ONCOLOGY | Facility: CLINIC | Age: 61
End: 2021-10-20

## 2021-10-20 DIAGNOSIS — D57.3 SICKLE CELL TRAIT SYNDROME: Primary | ICD-10-CM

## 2021-10-20 DIAGNOSIS — D46.9 MDS (MYELODYSPLASTIC SYNDROME): ICD-10-CM

## 2021-10-20 LAB
ALBUMIN SERPL BCP-MCNC: 4.4 G/DL (ref 3.5–5.2)
ALP SERPL-CCNC: 59 U/L (ref 55–135)
ALT SERPL W/O P-5'-P-CCNC: 17 U/L (ref 10–44)
ANION GAP SERPL CALC-SCNC: 7 MMOL/L (ref 8–16)
AST SERPL-CCNC: 17 U/L (ref 10–40)
BASOPHILS # BLD AUTO: 0.07 K/UL (ref 0–0.2)
BASOPHILS NFR BLD: 0.7 % (ref 0–1.9)
BILIRUB SERPL-MCNC: 0.7 MG/DL (ref 0.1–1)
BUN SERPL-MCNC: 27 MG/DL (ref 8–23)
CALCIUM SERPL-MCNC: 9.4 MG/DL (ref 8.7–10.5)
CHLORIDE SERPL-SCNC: 112 MMOL/L (ref 95–110)
CO2 SERPL-SCNC: 26 MMOL/L (ref 23–29)
CREAT SERPL-MCNC: 1.5 MG/DL (ref 0.5–1.4)
DIFFERENTIAL METHOD: ABNORMAL
EOSINOPHIL # BLD AUTO: 0.1 K/UL (ref 0–0.5)
EOSINOPHIL NFR BLD: 0.6 % (ref 0–8)
ERYTHROCYTE [DISTWIDTH] IN BLOOD BY AUTOMATED COUNT: 27.6 % (ref 11.5–14.5)
EST. GFR  (AFRICAN AMERICAN): 57.3 ML/MIN/1.73 M^2
EST. GFR  (NON AFRICAN AMERICAN): 49.5 ML/MIN/1.73 M^2
GLUCOSE SERPL-MCNC: 139 MG/DL (ref 70–110)
HCT VFR BLD AUTO: 26.8 % (ref 40–54)
HGB BLD-MCNC: 9.1 G/DL (ref 14–18)
IMM GRANULOCYTES # BLD AUTO: 0.03 K/UL (ref 0–0.04)
IMM GRANULOCYTES NFR BLD AUTO: 0.3 % (ref 0–0.5)
LYMPHOCYTES # BLD AUTO: 1.1 K/UL (ref 1–4.8)
LYMPHOCYTES NFR BLD: 11.2 % (ref 18–48)
MCH RBC QN AUTO: 31.2 PG (ref 27–31)
MCHC RBC AUTO-ENTMCNC: 34 G/DL (ref 32–36)
MCV RBC AUTO: 92 FL (ref 82–98)
MONOCYTES # BLD AUTO: 0.5 K/UL (ref 0.3–1)
MONOCYTES NFR BLD: 5.3 % (ref 4–15)
NEUTROPHILS # BLD AUTO: 8.2 K/UL (ref 1.8–7.7)
NEUTROPHILS NFR BLD: 81.9 % (ref 38–73)
NRBC BLD-RTO: 0 /100 WBC
PLATELET # BLD AUTO: 514 K/UL (ref 150–450)
PMV BLD AUTO: 10.7 FL (ref 9.2–12.9)
POTASSIUM SERPL-SCNC: 4.7 MMOL/L (ref 3.5–5.1)
PROT SERPL-MCNC: 7.6 G/DL (ref 6–8.4)
RBC # BLD AUTO: 2.92 M/UL (ref 4.6–6.2)
SODIUM SERPL-SCNC: 145 MMOL/L (ref 136–145)
WBC # BLD AUTO: 9.98 K/UL (ref 3.9–12.7)

## 2021-10-20 PROCEDURE — 36415 COLL VENOUS BLD VENIPUNCTURE: CPT | Performed by: NURSE PRACTITIONER

## 2021-10-20 PROCEDURE — 85025 COMPLETE CBC W/AUTO DIFF WBC: CPT | Performed by: NURSE PRACTITIONER

## 2021-10-20 PROCEDURE — 80053 COMPREHEN METABOLIC PANEL: CPT | Performed by: NURSE PRACTITIONER

## 2021-10-21 ENCOUNTER — INFUSION (OUTPATIENT)
Dept: INFUSION THERAPY | Facility: HOSPITAL | Age: 61
End: 2021-10-21
Attending: INTERNAL MEDICINE
Payer: COMMERCIAL

## 2021-10-21 VITALS
WEIGHT: 233.5 LBS | BODY MASS INDEX: 33.5 KG/M2 | SYSTOLIC BLOOD PRESSURE: 146 MMHG | HEART RATE: 80 BPM | TEMPERATURE: 99 F | RESPIRATION RATE: 18 BRPM | DIASTOLIC BLOOD PRESSURE: 84 MMHG | OXYGEN SATURATION: 95 %

## 2021-10-21 DIAGNOSIS — D46.9 MDS (MYELODYSPLASTIC SYNDROME): Primary | ICD-10-CM

## 2021-10-21 DIAGNOSIS — D64.9 NORMOCHROMIC ANEMIA: ICD-10-CM

## 2021-10-21 DIAGNOSIS — D64.9 CHRONIC ANEMIA: ICD-10-CM

## 2021-10-21 DIAGNOSIS — D64.89 ANEMIA DUE TO MULTIPLE MECHANISMS: ICD-10-CM

## 2021-10-21 DIAGNOSIS — N18.30 STAGE 3 CHRONIC KIDNEY DISEASE, UNSPECIFIED WHETHER STAGE 3A OR 3B CKD: ICD-10-CM

## 2021-10-21 PROCEDURE — 96372 THER/PROPH/DIAG INJ SC/IM: CPT

## 2021-10-21 PROCEDURE — 63600175 PHARM REV CODE 636 W HCPCS: Mod: JG | Performed by: NURSE PRACTITIONER

## 2021-10-21 RX ORDER — TRIAMCINOLONE ACETONIDE 40 MG/ML
40 INJECTION, SUSPENSION INTRA-ARTICULAR; INTRAMUSCULAR
Status: DISCONTINUED | OUTPATIENT
Start: 2021-10-19 | End: 2021-10-21 | Stop reason: HOSPADM

## 2021-10-21 RX ADMIN — EPOETIN ALFA-EPBX 20000 UNITS: 20000 INJECTION, SOLUTION INTRAVENOUS; SUBCUTANEOUS at 04:10

## 2021-10-28 ENCOUNTER — INFUSION (OUTPATIENT)
Dept: INFUSION THERAPY | Facility: HOSPITAL | Age: 61
End: 2021-10-28
Attending: INTERNAL MEDICINE
Payer: COMMERCIAL

## 2021-10-28 VITALS
HEART RATE: 77 BPM | SYSTOLIC BLOOD PRESSURE: 143 MMHG | RESPIRATION RATE: 18 BRPM | TEMPERATURE: 98 F | WEIGHT: 232.19 LBS | DIASTOLIC BLOOD PRESSURE: 86 MMHG | BODY MASS INDEX: 33.32 KG/M2 | OXYGEN SATURATION: 95 %

## 2021-10-28 DIAGNOSIS — D64.9 CHRONIC ANEMIA: ICD-10-CM

## 2021-10-28 DIAGNOSIS — D64.89 ANEMIA DUE TO MULTIPLE MECHANISMS: ICD-10-CM

## 2021-10-28 DIAGNOSIS — N18.30 STAGE 3 CHRONIC KIDNEY DISEASE, UNSPECIFIED WHETHER STAGE 3A OR 3B CKD: ICD-10-CM

## 2021-10-28 DIAGNOSIS — D46.9 MDS (MYELODYSPLASTIC SYNDROME): Primary | ICD-10-CM

## 2021-10-28 DIAGNOSIS — D64.9 NORMOCHROMIC ANEMIA: ICD-10-CM

## 2021-10-28 PROCEDURE — 63600175 PHARM REV CODE 636 W HCPCS: Mod: JG | Performed by: NURSE PRACTITIONER

## 2021-10-28 PROCEDURE — 96372 THER/PROPH/DIAG INJ SC/IM: CPT

## 2021-10-28 RX ADMIN — EPOETIN ALFA-EPBX 20000 UNITS: 20000 INJECTION, SOLUTION INTRAVENOUS; SUBCUTANEOUS at 04:10

## 2021-11-01 ENCOUNTER — TELEPHONE (OUTPATIENT)
Dept: ORTHOPEDICS | Facility: CLINIC | Age: 61
End: 2021-11-01
Payer: COMMERCIAL

## 2021-11-01 DIAGNOSIS — M17.11 PRIMARY OSTEOARTHRITIS OF RIGHT KNEE: Primary | ICD-10-CM

## 2021-11-01 DIAGNOSIS — M23.303 DERANGEMENT OF MEDIAL MENISCUS OF RIGHT KNEE: ICD-10-CM

## 2021-11-03 ENCOUNTER — HOSPITAL ENCOUNTER (OUTPATIENT)
Dept: PREADMISSION TESTING | Facility: HOSPITAL | Age: 61
Discharge: HOME | End: 2021-11-03
Attending: INTERNAL MEDICINE
Payer: COMMERCIAL

## 2021-11-03 VITALS
HEIGHT: 70 IN | TEMPERATURE: 98 F | HEART RATE: 76 BPM | WEIGHT: 231 LBS | SYSTOLIC BLOOD PRESSURE: 125 MMHG | DIASTOLIC BLOOD PRESSURE: 76 MMHG | OXYGEN SATURATION: 99 % | BODY MASS INDEX: 33.07 KG/M2 | RESPIRATION RATE: 18 BRPM

## 2021-11-03 DIAGNOSIS — Z01.818 PRE-OP TESTING: Primary | ICD-10-CM

## 2021-11-03 DIAGNOSIS — M23.303 DERANGEMENT OF MEDIAL MENISCUS OF RIGHT KNEE: ICD-10-CM

## 2021-11-03 DIAGNOSIS — M17.11 PRIMARY OSTEOARTHRITIS OF RIGHT KNEE: ICD-10-CM

## 2021-11-03 LAB — SARS-COV-2 RDRP RESP QL NAA+PROBE: NEGATIVE

## 2021-11-03 PROCEDURE — 93010 EKG 12-LEAD: ICD-10-PCS | Mod: ,,, | Performed by: INTERNAL MEDICINE

## 2021-11-03 PROCEDURE — U0002 COVID-19 LAB TEST NON-CDC: HCPCS | Performed by: INTERNAL MEDICINE

## 2021-11-03 PROCEDURE — 93010 ELECTROCARDIOGRAM REPORT: CPT | Mod: ,,, | Performed by: INTERNAL MEDICINE

## 2021-11-03 PROCEDURE — 93005 ELECTROCARDIOGRAM TRACING: CPT | Performed by: INTERNAL MEDICINE

## 2021-11-03 RX ORDER — IBUPROFEN 100 MG/5ML
1000 SUSPENSION, ORAL (FINAL DOSE FORM) ORAL DAILY
COMMUNITY

## 2021-11-04 ENCOUNTER — INFUSION (OUTPATIENT)
Dept: INFUSION THERAPY | Facility: HOSPITAL | Age: 61
End: 2021-11-04
Attending: INTERNAL MEDICINE
Payer: COMMERCIAL

## 2021-11-04 VITALS
BODY MASS INDEX: 33.04 KG/M2 | SYSTOLIC BLOOD PRESSURE: 131 MMHG | WEIGHT: 230.31 LBS | DIASTOLIC BLOOD PRESSURE: 81 MMHG | RESPIRATION RATE: 18 BRPM | OXYGEN SATURATION: 97 % | HEART RATE: 74 BPM | TEMPERATURE: 98 F

## 2021-11-04 DIAGNOSIS — D46.9 MDS (MYELODYSPLASTIC SYNDROME): Primary | ICD-10-CM

## 2021-11-04 DIAGNOSIS — D64.9 NORMOCHROMIC ANEMIA: ICD-10-CM

## 2021-11-04 DIAGNOSIS — N18.30 STAGE 3 CHRONIC KIDNEY DISEASE, UNSPECIFIED WHETHER STAGE 3A OR 3B CKD: ICD-10-CM

## 2021-11-04 DIAGNOSIS — D64.9 CHRONIC ANEMIA: ICD-10-CM

## 2021-11-04 DIAGNOSIS — D64.89 ANEMIA DUE TO MULTIPLE MECHANISMS: ICD-10-CM

## 2021-11-04 PROCEDURE — 63600175 PHARM REV CODE 636 W HCPCS: Mod: JG | Performed by: NURSE PRACTITIONER

## 2021-11-04 PROCEDURE — 96372 THER/PROPH/DIAG INJ SC/IM: CPT

## 2021-11-04 RX ADMIN — EPOETIN ALFA-EPBX 20000 UNITS: 20000 INJECTION, SOLUTION INTRAVENOUS; SUBCUTANEOUS at 02:11

## 2021-11-05 ENCOUNTER — HOSPITAL ENCOUNTER (OUTPATIENT)
Facility: HOSPITAL | Age: 61
Discharge: HOME | End: 2021-11-05
Attending: INTERNAL MEDICINE | Admitting: INTERNAL MEDICINE
Payer: COMMERCIAL

## 2021-11-05 ENCOUNTER — ANESTHESIA (OUTPATIENT)
Dept: SURGERY | Facility: HOSPITAL | Age: 61
End: 2021-11-05
Payer: COMMERCIAL

## 2021-11-05 ENCOUNTER — ANESTHESIA EVENT (OUTPATIENT)
Dept: SURGERY | Facility: HOSPITAL | Age: 61
End: 2021-11-05
Payer: COMMERCIAL

## 2021-11-05 VITALS
RESPIRATION RATE: 13 BRPM | DIASTOLIC BLOOD PRESSURE: 73 MMHG | HEART RATE: 71 BPM | SYSTOLIC BLOOD PRESSURE: 125 MMHG | TEMPERATURE: 97 F | OXYGEN SATURATION: 99 %

## 2021-11-05 DIAGNOSIS — K22.70 BARRETT ESOPHAGUS: ICD-10-CM

## 2021-11-05 PROCEDURE — 27000675 HC TUBING MICRODRIP: Performed by: NURSE ANESTHETIST, CERTIFIED REGISTERED

## 2021-11-05 PROCEDURE — 43239 EGD BIOPSY SINGLE/MULTIPLE: CPT | Performed by: INTERNAL MEDICINE

## 2021-11-05 PROCEDURE — 63600175 PHARM REV CODE 636 W HCPCS: Performed by: NURSE ANESTHETIST, CERTIFIED REGISTERED

## 2021-11-05 PROCEDURE — 27000671 HC TUBING MICROBORE EXT: Performed by: NURSE ANESTHETIST, CERTIFIED REGISTERED

## 2021-11-05 PROCEDURE — 25000003 PHARM REV CODE 250: Performed by: NURSE ANESTHETIST, CERTIFIED REGISTERED

## 2021-11-05 PROCEDURE — 27000284 HC CANNULA NASAL: Performed by: NURSE ANESTHETIST, CERTIFIED REGISTERED

## 2021-11-05 PROCEDURE — 27200043 HC FORCEPS, BIOPSY: Performed by: INTERNAL MEDICINE

## 2021-11-05 PROCEDURE — 37000009 HC ANESTHESIA EA ADD 15 MINS: Performed by: INTERNAL MEDICINE

## 2021-11-05 PROCEDURE — 27202103: Performed by: NURSE ANESTHETIST, CERTIFIED REGISTERED

## 2021-11-05 PROCEDURE — 45378 DIAGNOSTIC COLONOSCOPY: CPT | Performed by: INTERNAL MEDICINE

## 2021-11-05 PROCEDURE — 37000008 HC ANESTHESIA 1ST 15 MINUTES: Performed by: INTERNAL MEDICINE

## 2021-11-05 RX ORDER — FENTANYL CITRATE 50 UG/ML
INJECTION, SOLUTION INTRAMUSCULAR; INTRAVENOUS
Status: DISCONTINUED | OUTPATIENT
Start: 2021-11-05 | End: 2021-11-05

## 2021-11-05 RX ORDER — PROPOFOL 10 MG/ML
VIAL (ML) INTRAVENOUS
Status: DISCONTINUED | OUTPATIENT
Start: 2021-11-05 | End: 2021-11-05

## 2021-11-05 RX ADMIN — SODIUM CHLORIDE: 0.9 INJECTION, SOLUTION INTRAVENOUS at 10:11

## 2021-11-05 RX ADMIN — PROPOFOL 50 MG: 10 INJECTION, EMULSION INTRAVENOUS at 10:11

## 2021-11-05 RX ADMIN — FENTANYL CITRATE 25 MCG: 50 INJECTION INTRAMUSCULAR; INTRAVENOUS at 10:11

## 2021-11-05 RX ADMIN — PROPOFOL 50 MG: 10 INJECTION, EMULSION INTRAVENOUS at 09:11

## 2021-11-05 RX ADMIN — SODIUM CHLORIDE: 0.9 INJECTION, SOLUTION INTRAVENOUS at 09:11

## 2021-11-11 ENCOUNTER — INFUSION (OUTPATIENT)
Dept: INFUSION THERAPY | Facility: HOSPITAL | Age: 61
End: 2021-11-11
Attending: INTERNAL MEDICINE
Payer: COMMERCIAL

## 2021-11-11 VITALS
OXYGEN SATURATION: 97 % | BODY MASS INDEX: 33.12 KG/M2 | WEIGHT: 231.38 LBS | TEMPERATURE: 98 F | HEART RATE: 79 BPM | RESPIRATION RATE: 18 BRPM | DIASTOLIC BLOOD PRESSURE: 82 MMHG | HEIGHT: 70 IN | SYSTOLIC BLOOD PRESSURE: 136 MMHG

## 2021-11-11 DIAGNOSIS — D64.9 NORMOCHROMIC ANEMIA: ICD-10-CM

## 2021-11-11 DIAGNOSIS — D46.9 MDS (MYELODYSPLASTIC SYNDROME): Primary | ICD-10-CM

## 2021-11-11 DIAGNOSIS — N18.30 STAGE 3 CHRONIC KIDNEY DISEASE, UNSPECIFIED WHETHER STAGE 3A OR 3B CKD: ICD-10-CM

## 2021-11-11 DIAGNOSIS — D64.89 ANEMIA DUE TO MULTIPLE MECHANISMS: ICD-10-CM

## 2021-11-11 DIAGNOSIS — D64.9 CHRONIC ANEMIA: ICD-10-CM

## 2021-11-11 PROCEDURE — 96372 THER/PROPH/DIAG INJ SC/IM: CPT

## 2021-11-11 PROCEDURE — 63600175 PHARM REV CODE 636 W HCPCS: Mod: JG | Performed by: NURSE PRACTITIONER

## 2021-11-11 RX ADMIN — EPOETIN ALFA-EPBX 20000 UNITS: 20000 INJECTION, SOLUTION INTRAVENOUS; SUBCUTANEOUS at 04:11

## 2021-11-16 ENCOUNTER — TELEPHONE (OUTPATIENT)
Dept: RADIOLOGY | Facility: CLINIC | Age: 61
End: 2021-11-16
Payer: COMMERCIAL

## 2021-11-16 ENCOUNTER — OFFICE VISIT (OUTPATIENT)
Dept: ORTHOPEDICS | Facility: CLINIC | Age: 61
End: 2021-11-16
Payer: COMMERCIAL

## 2021-11-16 VITALS — WEIGHT: 231 LBS | BODY MASS INDEX: 33.07 KG/M2 | HEIGHT: 70 IN

## 2021-11-16 DIAGNOSIS — M17.11 PRIMARY OSTEOARTHRITIS OF RIGHT KNEE: ICD-10-CM

## 2021-11-16 DIAGNOSIS — M23.303 DERANGEMENT OF MEDIAL MENISCUS OF RIGHT KNEE: Primary | ICD-10-CM

## 2021-11-16 PROCEDURE — 99213 PR OFFICE/OUTPT VISIT, EST, LEVL III, 20-29 MIN: ICD-10-PCS | Mod: S$GLB,,, | Performed by: ORTHOPAEDIC SURGERY

## 2021-11-16 PROCEDURE — 3008F PR BODY MASS INDEX (BMI) DOCUMENTED: ICD-10-PCS | Mod: S$GLB,,, | Performed by: ORTHOPAEDIC SURGERY

## 2021-11-16 PROCEDURE — 99213 OFFICE O/P EST LOW 20 MIN: CPT | Mod: S$GLB,,, | Performed by: ORTHOPAEDIC SURGERY

## 2021-11-16 PROCEDURE — 3066F NEPHROPATHY DOC TX: CPT | Mod: S$GLB,,, | Performed by: ORTHOPAEDIC SURGERY

## 2021-11-16 PROCEDURE — 3060F PR POS MICROALBUMINURIA RESULT DOCUMENTED/REVIEW: ICD-10-PCS | Mod: S$GLB,,, | Performed by: ORTHOPAEDIC SURGERY

## 2021-11-16 PROCEDURE — 3066F PR DOCUMENTATION OF TREATMENT FOR NEPHROPATHY: ICD-10-PCS | Mod: S$GLB,,, | Performed by: ORTHOPAEDIC SURGERY

## 2021-11-16 PROCEDURE — 3044F HG A1C LEVEL LT 7.0%: CPT | Mod: S$GLB,,, | Performed by: ORTHOPAEDIC SURGERY

## 2021-11-16 PROCEDURE — 3008F BODY MASS INDEX DOCD: CPT | Mod: S$GLB,,, | Performed by: ORTHOPAEDIC SURGERY

## 2021-11-16 PROCEDURE — 3044F PR MOST RECENT HEMOGLOBIN A1C LEVEL <7.0%: ICD-10-PCS | Mod: S$GLB,,, | Performed by: ORTHOPAEDIC SURGERY

## 2021-11-16 PROCEDURE — 3060F POS MICROALBUMINURIA REV: CPT | Mod: S$GLB,,, | Performed by: ORTHOPAEDIC SURGERY

## 2021-11-17 ENCOUNTER — LAB VISIT (OUTPATIENT)
Dept: LAB | Facility: HOSPITAL | Age: 61
End: 2021-11-17
Attending: NURSE PRACTITIONER
Payer: COMMERCIAL

## 2021-11-17 DIAGNOSIS — D46.9 MDS (MYELODYSPLASTIC SYNDROME): ICD-10-CM

## 2021-11-17 LAB
ALBUMIN SERPL BCP-MCNC: 4.3 G/DL (ref 3.5–5.2)
ALP SERPL-CCNC: 55 U/L (ref 55–135)
ALT SERPL W/O P-5'-P-CCNC: 17 U/L (ref 10–44)
ANION GAP SERPL CALC-SCNC: 6 MMOL/L (ref 8–16)
AST SERPL-CCNC: 14 U/L (ref 10–40)
BASOPHILS # BLD AUTO: 0.08 K/UL (ref 0–0.2)
BASOPHILS NFR BLD: 1 % (ref 0–1.9)
BILIRUB SERPL-MCNC: 0.5 MG/DL (ref 0.1–1)
BUN SERPL-MCNC: 23 MG/DL (ref 8–23)
CALCIUM SERPL-MCNC: 8.7 MG/DL (ref 8.7–10.5)
CHLORIDE SERPL-SCNC: 108 MMOL/L (ref 95–110)
CO2 SERPL-SCNC: 28 MMOL/L (ref 23–29)
CREAT SERPL-MCNC: 1.7 MG/DL (ref 0.5–1.4)
DIFFERENTIAL METHOD: ABNORMAL
EOSINOPHIL # BLD AUTO: 0.1 K/UL (ref 0–0.5)
EOSINOPHIL NFR BLD: 1 % (ref 0–8)
ERYTHROCYTE [DISTWIDTH] IN BLOOD BY AUTOMATED COUNT: 27.6 % (ref 11.5–14.5)
EST. GFR  (AFRICAN AMERICAN): 49.2 ML/MIN/1.73 M^2
EST. GFR  (NON AFRICAN AMERICAN): 42.6 ML/MIN/1.73 M^2
FERRITIN SERPL-MCNC: 1448 NG/ML (ref 20–300)
GLUCOSE SERPL-MCNC: 132 MG/DL (ref 70–110)
HCT VFR BLD AUTO: 26.5 % (ref 40–54)
HGB BLD-MCNC: 9 G/DL (ref 14–18)
IMM GRANULOCYTES # BLD AUTO: 0.03 K/UL (ref 0–0.04)
IMM GRANULOCYTES NFR BLD AUTO: 0.4 % (ref 0–0.5)
IRON SERPL-MCNC: 75 UG/DL (ref 45–160)
LYMPHOCYTES # BLD AUTO: 1.3 K/UL (ref 1–4.8)
LYMPHOCYTES NFR BLD: 16.1 % (ref 18–48)
MCH RBC QN AUTO: 31.5 PG (ref 27–31)
MCHC RBC AUTO-ENTMCNC: 34 G/DL (ref 32–36)
MCV RBC AUTO: 93 FL (ref 82–98)
MONOCYTES # BLD AUTO: 0.7 K/UL (ref 0.3–1)
MONOCYTES NFR BLD: 8.7 % (ref 4–15)
NEUTROPHILS # BLD AUTO: 6 K/UL (ref 1.8–7.7)
NEUTROPHILS NFR BLD: 72.8 % (ref 38–73)
NRBC BLD-RTO: 0 /100 WBC
PLATELET # BLD AUTO: 472 K/UL (ref 150–450)
PMV BLD AUTO: 10.5 FL (ref 9.2–12.9)
POTASSIUM SERPL-SCNC: 4.6 MMOL/L (ref 3.5–5.1)
PROT SERPL-MCNC: 7.4 G/DL (ref 6–8.4)
RBC # BLD AUTO: 2.86 M/UL (ref 4.6–6.2)
SATURATED IRON: 35 % (ref 20–50)
SODIUM SERPL-SCNC: 142 MMOL/L (ref 136–145)
TOTAL IRON BINDING CAPACITY: 214 UG/DL (ref 250–450)
TRANSFERRIN SERPL-MCNC: 153 MG/DL (ref 200–375)
WBC # BLD AUTO: 8.26 K/UL (ref 3.9–12.7)

## 2021-11-17 PROCEDURE — 85025 COMPLETE CBC W/AUTO DIFF WBC: CPT | Performed by: NURSE PRACTITIONER

## 2021-11-17 PROCEDURE — 84466 ASSAY OF TRANSFERRIN: CPT | Performed by: NURSE PRACTITIONER

## 2021-11-17 PROCEDURE — 82728 ASSAY OF FERRITIN: CPT | Performed by: NURSE PRACTITIONER

## 2021-11-17 PROCEDURE — 80053 COMPREHEN METABOLIC PANEL: CPT | Performed by: NURSE PRACTITIONER

## 2021-11-17 PROCEDURE — 36415 COLL VENOUS BLD VENIPUNCTURE: CPT | Performed by: NURSE PRACTITIONER

## 2021-11-18 ENCOUNTER — INFUSION (OUTPATIENT)
Dept: INFUSION THERAPY | Facility: HOSPITAL | Age: 61
End: 2021-11-18
Attending: INTERNAL MEDICINE
Payer: COMMERCIAL

## 2021-11-18 VITALS
SYSTOLIC BLOOD PRESSURE: 146 MMHG | RESPIRATION RATE: 18 BRPM | BODY MASS INDEX: 33.4 KG/M2 | WEIGHT: 233.31 LBS | TEMPERATURE: 98 F | HEART RATE: 72 BPM | OXYGEN SATURATION: 98 % | HEIGHT: 70 IN | DIASTOLIC BLOOD PRESSURE: 81 MMHG

## 2021-11-18 DIAGNOSIS — D46.9 MDS (MYELODYSPLASTIC SYNDROME): Primary | ICD-10-CM

## 2021-11-18 DIAGNOSIS — D64.9 CHRONIC ANEMIA: ICD-10-CM

## 2021-11-18 DIAGNOSIS — D64.89 ANEMIA DUE TO MULTIPLE MECHANISMS: ICD-10-CM

## 2021-11-18 DIAGNOSIS — N18.30 STAGE 3 CHRONIC KIDNEY DISEASE, UNSPECIFIED WHETHER STAGE 3A OR 3B CKD: ICD-10-CM

## 2021-11-18 DIAGNOSIS — D64.9 NORMOCHROMIC ANEMIA: ICD-10-CM

## 2021-11-18 PROCEDURE — 96372 THER/PROPH/DIAG INJ SC/IM: CPT

## 2021-11-18 PROCEDURE — 63600175 PHARM REV CODE 636 W HCPCS: Mod: JG | Performed by: NURSE PRACTITIONER

## 2021-11-18 RX ADMIN — EPOETIN ALFA-EPBX 20000 UNITS: 20000 INJECTION, SOLUTION INTRAVENOUS; SUBCUTANEOUS at 03:11

## 2021-11-24 ENCOUNTER — TELEPHONE (OUTPATIENT)
Dept: HEMATOLOGY/ONCOLOGY | Facility: CLINIC | Age: 61
End: 2021-11-24
Payer: COMMERCIAL

## 2021-11-24 ENCOUNTER — INFUSION (OUTPATIENT)
Dept: INFUSION THERAPY | Facility: HOSPITAL | Age: 61
End: 2021-11-24
Attending: INTERNAL MEDICINE
Payer: COMMERCIAL

## 2021-11-24 VITALS
TEMPERATURE: 98 F | HEART RATE: 72 BPM | HEIGHT: 70 IN | SYSTOLIC BLOOD PRESSURE: 126 MMHG | DIASTOLIC BLOOD PRESSURE: 79 MMHG | WEIGHT: 233.31 LBS | BODY MASS INDEX: 33.4 KG/M2 | RESPIRATION RATE: 16 BRPM

## 2021-11-24 DIAGNOSIS — D64.89 ANEMIA DUE TO MULTIPLE MECHANISMS: ICD-10-CM

## 2021-11-24 DIAGNOSIS — N18.30 STAGE 3 CHRONIC KIDNEY DISEASE, UNSPECIFIED WHETHER STAGE 3A OR 3B CKD: ICD-10-CM

## 2021-11-24 DIAGNOSIS — D46.9 MDS (MYELODYSPLASTIC SYNDROME): Primary | ICD-10-CM

## 2021-11-24 DIAGNOSIS — D64.9 NORMOCHROMIC ANEMIA: ICD-10-CM

## 2021-11-24 DIAGNOSIS — D64.9 CHRONIC ANEMIA: ICD-10-CM

## 2021-11-24 PROCEDURE — 96372 THER/PROPH/DIAG INJ SC/IM: CPT

## 2021-11-24 PROCEDURE — 63600175 PHARM REV CODE 636 W HCPCS: Mod: JG | Performed by: NURSE PRACTITIONER

## 2021-11-24 RX ADMIN — EPOETIN ALFA-EPBX 20000 UNITS: 20000 INJECTION, SOLUTION INTRAVENOUS; SUBCUTANEOUS at 04:11

## 2021-11-29 ENCOUNTER — OFFICE VISIT (OUTPATIENT)
Dept: ORTHOPEDICS | Facility: CLINIC | Age: 61
End: 2021-11-29
Payer: COMMERCIAL

## 2021-11-29 VITALS — WEIGHT: 233.31 LBS | BODY MASS INDEX: 33.4 KG/M2 | HEIGHT: 70 IN

## 2021-11-29 DIAGNOSIS — S83.241A ACUTE MEDIAL MENISCAL TEAR, RIGHT, INITIAL ENCOUNTER: Primary | ICD-10-CM

## 2021-11-29 DIAGNOSIS — M17.11 PRIMARY OSTEOARTHRITIS OF RIGHT KNEE: ICD-10-CM

## 2021-11-29 PROCEDURE — 3066F NEPHROPATHY DOC TX: CPT | Mod: S$GLB,,, | Performed by: PHYSICIAN ASSISTANT

## 2021-11-29 PROCEDURE — 3066F PR DOCUMENTATION OF TREATMENT FOR NEPHROPATHY: ICD-10-PCS | Mod: S$GLB,,, | Performed by: PHYSICIAN ASSISTANT

## 2021-11-29 PROCEDURE — 3060F PR POS MICROALBUMINURIA RESULT DOCUMENTED/REVIEW: ICD-10-PCS | Mod: S$GLB,,, | Performed by: PHYSICIAN ASSISTANT

## 2021-11-29 PROCEDURE — 99213 PR OFFICE/OUTPT VISIT, EST, LEVL III, 20-29 MIN: ICD-10-PCS | Mod: S$GLB,,, | Performed by: PHYSICIAN ASSISTANT

## 2021-11-29 PROCEDURE — 99213 OFFICE O/P EST LOW 20 MIN: CPT | Mod: S$GLB,,, | Performed by: PHYSICIAN ASSISTANT

## 2021-11-29 PROCEDURE — 3060F POS MICROALBUMINURIA REV: CPT | Mod: S$GLB,,, | Performed by: PHYSICIAN ASSISTANT

## 2021-11-29 RX ORDER — DRONABINOL 5 MG/1
CAPSULE ORAL
COMMUNITY
Start: 2021-11-19 | End: 2022-04-11

## 2021-11-29 RX ORDER — FAMOTIDINE 20 MG/1
TABLET, FILM COATED ORAL
COMMUNITY

## 2021-12-01 ENCOUNTER — LAB VISIT (OUTPATIENT)
Dept: LAB | Facility: HOSPITAL | Age: 61
End: 2021-12-01
Attending: INTERNAL MEDICINE
Payer: COMMERCIAL

## 2021-12-01 DIAGNOSIS — D57.3 SICKLE CELL TRAIT SYNDROME: ICD-10-CM

## 2021-12-01 LAB
ALBUMIN SERPL BCP-MCNC: 4.3 G/DL (ref 3.5–5.2)
ALP SERPL-CCNC: 56 U/L (ref 55–135)
ALT SERPL W/O P-5'-P-CCNC: 19 U/L (ref 10–44)
ANION GAP SERPL CALC-SCNC: 5 MMOL/L (ref 8–16)
AST SERPL-CCNC: 18 U/L (ref 10–40)
BASOPHILS # BLD AUTO: 0.09 K/UL (ref 0–0.2)
BASOPHILS NFR BLD: 1.1 % (ref 0–1.9)
BILIRUB SERPL-MCNC: 0.8 MG/DL (ref 0.1–1)
BUN SERPL-MCNC: 26 MG/DL (ref 8–23)
CALCIUM SERPL-MCNC: 9 MG/DL (ref 8.7–10.5)
CHLORIDE SERPL-SCNC: 109 MMOL/L (ref 95–110)
CO2 SERPL-SCNC: 29 MMOL/L (ref 23–29)
CREAT SERPL-MCNC: 1.6 MG/DL (ref 0.5–1.4)
DIFFERENTIAL METHOD: ABNORMAL
EOSINOPHIL # BLD AUTO: 0.1 K/UL (ref 0–0.5)
EOSINOPHIL NFR BLD: 1.1 % (ref 0–8)
ERYTHROCYTE [DISTWIDTH] IN BLOOD BY AUTOMATED COUNT: 27.7 % (ref 11.5–14.5)
EST. GFR  (AFRICAN AMERICAN): 53 ML/MIN/1.73 M^2
EST. GFR  (NON AFRICAN AMERICAN): 45.8 ML/MIN/1.73 M^2
GLUCOSE SERPL-MCNC: 111 MG/DL (ref 70–110)
HCT VFR BLD AUTO: 27 % (ref 40–54)
HGB BLD-MCNC: 9.2 G/DL (ref 14–18)
IMM GRANULOCYTES # BLD AUTO: 0.08 K/UL (ref 0–0.04)
IMM GRANULOCYTES NFR BLD AUTO: 0.9 % (ref 0–0.5)
LYMPHOCYTES # BLD AUTO: 1.8 K/UL (ref 1–4.8)
LYMPHOCYTES NFR BLD: 20.9 % (ref 18–48)
MCH RBC QN AUTO: 31.7 PG (ref 27–31)
MCHC RBC AUTO-ENTMCNC: 34.1 G/DL (ref 32–36)
MCV RBC AUTO: 93 FL (ref 82–98)
MONOCYTES # BLD AUTO: 0.9 K/UL (ref 0.3–1)
MONOCYTES NFR BLD: 11 % (ref 4–15)
NEUTROPHILS # BLD AUTO: 5.5 K/UL (ref 1.8–7.7)
NEUTROPHILS NFR BLD: 65 % (ref 38–73)
NRBC BLD-RTO: 0 /100 WBC
PLATELET # BLD AUTO: 481 K/UL (ref 150–450)
PMV BLD AUTO: 10.3 FL (ref 9.2–12.9)
POTASSIUM SERPL-SCNC: 4.4 MMOL/L (ref 3.5–5.1)
PROT SERPL-MCNC: 7.4 G/DL (ref 6–8.4)
RBC # BLD AUTO: 2.9 M/UL (ref 4.6–6.2)
SODIUM SERPL-SCNC: 143 MMOL/L (ref 136–145)
WBC # BLD AUTO: 8.44 K/UL (ref 3.9–12.7)

## 2021-12-01 PROCEDURE — 85025 COMPLETE CBC W/AUTO DIFF WBC: CPT | Performed by: INTERNAL MEDICINE

## 2021-12-01 PROCEDURE — 80053 COMPREHEN METABOLIC PANEL: CPT | Performed by: INTERNAL MEDICINE

## 2021-12-01 PROCEDURE — 36415 COLL VENOUS BLD VENIPUNCTURE: CPT | Performed by: INTERNAL MEDICINE

## 2021-12-02 ENCOUNTER — INFUSION (OUTPATIENT)
Dept: INFUSION THERAPY | Facility: HOSPITAL | Age: 61
End: 2021-12-02
Attending: INTERNAL MEDICINE
Payer: COMMERCIAL

## 2021-12-02 VITALS
HEART RATE: 78 BPM | OXYGEN SATURATION: 98 % | WEIGHT: 234.38 LBS | BODY MASS INDEX: 33.63 KG/M2 | RESPIRATION RATE: 18 BRPM | DIASTOLIC BLOOD PRESSURE: 76 MMHG | TEMPERATURE: 98 F | SYSTOLIC BLOOD PRESSURE: 146 MMHG

## 2021-12-02 DIAGNOSIS — D46.9 MDS (MYELODYSPLASTIC SYNDROME): Primary | ICD-10-CM

## 2021-12-02 DIAGNOSIS — D64.89 ANEMIA DUE TO MULTIPLE MECHANISMS: ICD-10-CM

## 2021-12-02 DIAGNOSIS — N18.30 STAGE 3 CHRONIC KIDNEY DISEASE, UNSPECIFIED WHETHER STAGE 3A OR 3B CKD: ICD-10-CM

## 2021-12-02 DIAGNOSIS — D64.9 CHRONIC ANEMIA: ICD-10-CM

## 2021-12-02 DIAGNOSIS — D64.9 NORMOCHROMIC ANEMIA: ICD-10-CM

## 2021-12-02 PROCEDURE — 96372 THER/PROPH/DIAG INJ SC/IM: CPT

## 2021-12-02 PROCEDURE — 63600175 PHARM REV CODE 636 W HCPCS: Mod: JG | Performed by: NURSE PRACTITIONER

## 2021-12-02 RX ADMIN — EPOETIN ALFA-EPBX 20000 UNITS: 20000 INJECTION, SOLUTION INTRAVENOUS; SUBCUTANEOUS at 04:12

## 2021-12-06 ENCOUNTER — LAB VISIT (OUTPATIENT)
Dept: LAB | Facility: HOSPITAL | Age: 61
End: 2021-12-06
Attending: INTERNAL MEDICINE
Payer: COMMERCIAL

## 2021-12-06 DIAGNOSIS — R80.9 PROTEINURIA: Primary | ICD-10-CM

## 2021-12-06 LAB
ALBUMIN SERPL BCP-MCNC: 4.3 G/DL (ref 3.5–5.2)
ANION GAP SERPL CALC-SCNC: 9 MMOL/L (ref 8–16)
BACTERIA #/AREA URNS HPF: NEGATIVE /HPF
BASOPHILS # BLD AUTO: 0.08 K/UL (ref 0–0.2)
BASOPHILS NFR BLD: 0.9 % (ref 0–1.9)
BUN SERPL-MCNC: 21 MG/DL (ref 8–23)
CALCIUM SERPL-MCNC: 8.9 MG/DL (ref 8.7–10.5)
CHLORIDE SERPL-SCNC: 106 MMOL/L (ref 95–110)
CO2 SERPL-SCNC: 27 MMOL/L (ref 23–29)
CREAT SERPL-MCNC: 1.5 MG/DL (ref 0.5–1.4)
CREAT UR-MCNC: 85 MG/DL (ref 23–375)
DIFFERENTIAL METHOD: ABNORMAL
EOSINOPHIL # BLD AUTO: 0.2 K/UL (ref 0–0.5)
EOSINOPHIL NFR BLD: 1.7 % (ref 0–8)
ERYTHROCYTE [DISTWIDTH] IN BLOOD BY AUTOMATED COUNT: 27.8 % (ref 11.5–14.5)
EST. GFR  (AFRICAN AMERICAN): 57.3 ML/MIN/1.73 M^2
EST. GFR  (NON AFRICAN AMERICAN): 49.5 ML/MIN/1.73 M^2
GLUCOSE SERPL-MCNC: 131 MG/DL (ref 70–110)
HCT VFR BLD AUTO: 29 % (ref 40–54)
HGB BLD-MCNC: 9.7 G/DL (ref 14–18)
HYALINE CASTS #/AREA URNS LPF: 1 /LPF
IMM GRANULOCYTES # BLD AUTO: 0.06 K/UL (ref 0–0.04)
IMM GRANULOCYTES NFR BLD AUTO: 0.7 % (ref 0–0.5)
LYMPHOCYTES # BLD AUTO: 1.5 K/UL (ref 1–4.8)
LYMPHOCYTES NFR BLD: 17.3 % (ref 18–48)
MCH RBC QN AUTO: 31.6 PG (ref 27–31)
MCHC RBC AUTO-ENTMCNC: 33.4 G/DL (ref 32–36)
MCV RBC AUTO: 95 FL (ref 82–98)
MICROSCOPIC COMMENT: NORMAL
MONOCYTES # BLD AUTO: 0.6 K/UL (ref 0.3–1)
MONOCYTES NFR BLD: 6.5 % (ref 4–15)
NEUTROPHILS # BLD AUTO: 6.4 K/UL (ref 1.8–7.7)
NEUTROPHILS NFR BLD: 72.9 % (ref 38–73)
NRBC BLD-RTO: 1 /100 WBC
PHOSPHATE SERPL-MCNC: 3 MG/DL (ref 2.7–4.5)
PLATELET # BLD AUTO: 457 K/UL (ref 150–450)
PMV BLD AUTO: 10.6 FL (ref 9.2–12.9)
POTASSIUM SERPL-SCNC: 4.4 MMOL/L (ref 3.5–5.1)
PROT UR-MCNC: 38 MG/DL (ref 6–15)
RBC # BLD AUTO: 3.07 M/UL (ref 4.6–6.2)
RBC #/AREA URNS HPF: 0 /HPF (ref 0–4)
SODIUM SERPL-SCNC: 142 MMOL/L (ref 136–145)
SQUAMOUS #/AREA URNS HPF: 0 /HPF
WBC # BLD AUTO: 8.8 K/UL (ref 3.9–12.7)
WBC #/AREA URNS HPF: 0 /HPF (ref 0–5)

## 2021-12-06 PROCEDURE — 36415 COLL VENOUS BLD VENIPUNCTURE: CPT | Performed by: INTERNAL MEDICINE

## 2021-12-06 PROCEDURE — 81001 URINALYSIS AUTO W/SCOPE: CPT | Performed by: INTERNAL MEDICINE

## 2021-12-06 PROCEDURE — 80069 RENAL FUNCTION PANEL: CPT | Performed by: INTERNAL MEDICINE

## 2021-12-06 PROCEDURE — 84156 ASSAY OF PROTEIN URINE: CPT | Performed by: INTERNAL MEDICINE

## 2021-12-06 PROCEDURE — 85025 COMPLETE CBC W/AUTO DIFF WBC: CPT | Performed by: INTERNAL MEDICINE

## 2021-12-06 PROCEDURE — 82570 ASSAY OF URINE CREATININE: CPT | Performed by: INTERNAL MEDICINE

## 2021-12-09 ENCOUNTER — INFUSION (OUTPATIENT)
Dept: INFUSION THERAPY | Facility: HOSPITAL | Age: 61
End: 2021-12-09
Attending: INTERNAL MEDICINE
Payer: COMMERCIAL

## 2021-12-09 VITALS
DIASTOLIC BLOOD PRESSURE: 77 MMHG | WEIGHT: 235.69 LBS | OXYGEN SATURATION: 95 % | SYSTOLIC BLOOD PRESSURE: 135 MMHG | TEMPERATURE: 98 F | RESPIRATION RATE: 18 BRPM | BODY MASS INDEX: 33.82 KG/M2 | HEART RATE: 62 BPM

## 2021-12-09 DIAGNOSIS — D64.9 CHRONIC ANEMIA: ICD-10-CM

## 2021-12-09 DIAGNOSIS — D64.9 NORMOCHROMIC ANEMIA: ICD-10-CM

## 2021-12-09 DIAGNOSIS — D46.9 MDS (MYELODYSPLASTIC SYNDROME): Primary | ICD-10-CM

## 2021-12-09 DIAGNOSIS — D64.89 ANEMIA DUE TO MULTIPLE MECHANISMS: ICD-10-CM

## 2021-12-09 DIAGNOSIS — N18.30 STAGE 3 CHRONIC KIDNEY DISEASE, UNSPECIFIED WHETHER STAGE 3A OR 3B CKD: ICD-10-CM

## 2021-12-09 PROCEDURE — 96372 THER/PROPH/DIAG INJ SC/IM: CPT

## 2021-12-09 PROCEDURE — 63600175 PHARM REV CODE 636 W HCPCS: Mod: JG | Performed by: NURSE PRACTITIONER

## 2021-12-09 RX ADMIN — EPOETIN ALFA-EPBX 20000 UNITS: 20000 INJECTION, SOLUTION INTRAVENOUS; SUBCUTANEOUS at 04:12

## 2021-12-15 ENCOUNTER — LAB VISIT (OUTPATIENT)
Dept: LAB | Facility: HOSPITAL | Age: 61
End: 2021-12-15
Attending: NURSE PRACTITIONER
Payer: COMMERCIAL

## 2021-12-15 DIAGNOSIS — D46.9 MDS (MYELODYSPLASTIC SYNDROME): ICD-10-CM

## 2021-12-15 LAB
ALBUMIN SERPL BCP-MCNC: 4 G/DL (ref 3.5–5.2)
ALP SERPL-CCNC: 63 U/L (ref 55–135)
ALT SERPL W/O P-5'-P-CCNC: 19 U/L (ref 10–44)
ANION GAP SERPL CALC-SCNC: 8 MMOL/L (ref 8–16)
AST SERPL-CCNC: 19 U/L (ref 10–40)
BASOPHILS # BLD AUTO: 0.09 K/UL (ref 0–0.2)
BASOPHILS NFR BLD: 1.1 % (ref 0–1.9)
BILIRUB SERPL-MCNC: 0.8 MG/DL (ref 0.1–1)
BUN SERPL-MCNC: 20 MG/DL (ref 8–23)
CALCIUM SERPL-MCNC: 8.5 MG/DL (ref 8.7–10.5)
CHLORIDE SERPL-SCNC: 108 MMOL/L (ref 95–110)
CO2 SERPL-SCNC: 27 MMOL/L (ref 23–29)
CREAT SERPL-MCNC: 1.5 MG/DL (ref 0.5–1.4)
DIFFERENTIAL METHOD: ABNORMAL
EOSINOPHIL # BLD AUTO: 0.1 K/UL (ref 0–0.5)
EOSINOPHIL NFR BLD: 1.2 % (ref 0–8)
ERYTHROCYTE [DISTWIDTH] IN BLOOD BY AUTOMATED COUNT: 27.8 % (ref 11.5–14.5)
EST. GFR  (AFRICAN AMERICAN): 57.3 ML/MIN/1.73 M^2
EST. GFR  (NON AFRICAN AMERICAN): 49.5 ML/MIN/1.73 M^2
FERRITIN SERPL-MCNC: 1553 NG/ML (ref 20–300)
GLUCOSE SERPL-MCNC: 137 MG/DL (ref 70–110)
HCT VFR BLD AUTO: 27 % (ref 40–54)
HGB BLD-MCNC: 9.1 G/DL (ref 14–18)
IMM GRANULOCYTES # BLD AUTO: 0.06 K/UL (ref 0–0.04)
IMM GRANULOCYTES NFR BLD AUTO: 0.7 % (ref 0–0.5)
IRON SERPL-MCNC: 57 UG/DL (ref 45–160)
LYMPHOCYTES # BLD AUTO: 1.7 K/UL (ref 1–4.8)
LYMPHOCYTES NFR BLD: 19.7 % (ref 18–48)
MCH RBC QN AUTO: 31.5 PG (ref 27–31)
MCHC RBC AUTO-ENTMCNC: 33.7 G/DL (ref 32–36)
MCV RBC AUTO: 93 FL (ref 82–98)
MONOCYTES # BLD AUTO: 0.7 K/UL (ref 0.3–1)
MONOCYTES NFR BLD: 8.9 % (ref 4–15)
NEUTROPHILS # BLD AUTO: 5.7 K/UL (ref 1.8–7.7)
NEUTROPHILS NFR BLD: 68.4 % (ref 38–73)
NRBC BLD-RTO: 0 /100 WBC
PLATELET # BLD AUTO: 513 K/UL (ref 150–450)
PMV BLD AUTO: 10.2 FL (ref 9.2–12.9)
POTASSIUM SERPL-SCNC: 4.3 MMOL/L (ref 3.5–5.1)
PROT SERPL-MCNC: 7.3 G/DL (ref 6–8.4)
RBC # BLD AUTO: 2.89 M/UL (ref 4.6–6.2)
SATURATED IRON: 26 % (ref 20–50)
SODIUM SERPL-SCNC: 143 MMOL/L (ref 136–145)
TOTAL IRON BINDING CAPACITY: 217 UG/DL (ref 250–450)
TRANSFERRIN SERPL-MCNC: 155 MG/DL (ref 200–375)
WBC # BLD AUTO: 8.36 K/UL (ref 3.9–12.7)

## 2021-12-15 PROCEDURE — 36415 COLL VENOUS BLD VENIPUNCTURE: CPT | Performed by: NURSE PRACTITIONER

## 2021-12-15 PROCEDURE — 82728 ASSAY OF FERRITIN: CPT | Performed by: NURSE PRACTITIONER

## 2021-12-15 PROCEDURE — 80053 COMPREHEN METABOLIC PANEL: CPT | Performed by: NURSE PRACTITIONER

## 2021-12-15 PROCEDURE — 84466 ASSAY OF TRANSFERRIN: CPT | Performed by: NURSE PRACTITIONER

## 2021-12-15 PROCEDURE — 85025 COMPLETE CBC W/AUTO DIFF WBC: CPT | Performed by: NURSE PRACTITIONER

## 2021-12-16 ENCOUNTER — INFUSION (OUTPATIENT)
Dept: INFUSION THERAPY | Facility: HOSPITAL | Age: 61
End: 2021-12-16
Attending: INTERNAL MEDICINE
Payer: COMMERCIAL

## 2021-12-16 VITALS
TEMPERATURE: 98 F | HEART RATE: 77 BPM | SYSTOLIC BLOOD PRESSURE: 132 MMHG | OXYGEN SATURATION: 96 % | WEIGHT: 237.81 LBS | RESPIRATION RATE: 18 BRPM | BODY MASS INDEX: 34.12 KG/M2 | DIASTOLIC BLOOD PRESSURE: 87 MMHG

## 2021-12-16 DIAGNOSIS — N18.30 STAGE 3 CHRONIC KIDNEY DISEASE, UNSPECIFIED WHETHER STAGE 3A OR 3B CKD: ICD-10-CM

## 2021-12-16 DIAGNOSIS — D64.9 CHRONIC ANEMIA: ICD-10-CM

## 2021-12-16 DIAGNOSIS — D64.9 NORMOCHROMIC ANEMIA: ICD-10-CM

## 2021-12-16 DIAGNOSIS — D46.9 MDS (MYELODYSPLASTIC SYNDROME): Primary | ICD-10-CM

## 2021-12-16 DIAGNOSIS — D64.89 ANEMIA DUE TO MULTIPLE MECHANISMS: ICD-10-CM

## 2021-12-16 PROCEDURE — 96372 THER/PROPH/DIAG INJ SC/IM: CPT

## 2021-12-16 PROCEDURE — 63600175 PHARM REV CODE 636 W HCPCS: Mod: JG | Performed by: NURSE PRACTITIONER

## 2021-12-16 RX ADMIN — EPOETIN ALFA-EPBX 20000 UNITS: 20000 INJECTION, SOLUTION INTRAVENOUS; SUBCUTANEOUS at 04:12

## 2021-12-20 ENCOUNTER — HOSPITAL ENCOUNTER (OUTPATIENT)
Dept: PREADMISSION TESTING | Facility: HOSPITAL | Age: 61
Discharge: HOME | End: 2021-12-20
Attending: ORTHOPAEDIC SURGERY
Payer: COMMERCIAL

## 2021-12-20 ENCOUNTER — HOSPITAL ENCOUNTER (OUTPATIENT)
Dept: RADIOLOGY | Facility: HOSPITAL | Age: 61
Discharge: HOME | End: 2021-12-20
Attending: ORTHOPAEDIC SURGERY
Payer: COMMERCIAL

## 2021-12-20 VITALS
HEART RATE: 67 BPM | SYSTOLIC BLOOD PRESSURE: 143 MMHG | HEIGHT: 70 IN | OXYGEN SATURATION: 100 % | RESPIRATION RATE: 18 BRPM | WEIGHT: 234 LBS | BODY MASS INDEX: 33.5 KG/M2 | TEMPERATURE: 98 F | DIASTOLIC BLOOD PRESSURE: 82 MMHG

## 2021-12-20 DIAGNOSIS — Z01.818 PRE-OP TESTING: Primary | ICD-10-CM

## 2021-12-20 DIAGNOSIS — M17.11 PRIMARY OSTEOARTHRITIS OF RIGHT KNEE: ICD-10-CM

## 2021-12-20 DIAGNOSIS — S83.241A ACUTE MEDIAL MENISCAL TEAR, RIGHT, INITIAL ENCOUNTER: ICD-10-CM

## 2021-12-20 LAB — SARS-COV-2 RDRP RESP QL NAA+PROBE: NEGATIVE

## 2021-12-20 PROCEDURE — U0002 COVID-19 LAB TEST NON-CDC: HCPCS | Performed by: ORTHOPAEDIC SURGERY

## 2021-12-20 PROCEDURE — 71046 X-RAY EXAM CHEST 2 VIEWS: CPT | Mod: TC

## 2021-12-22 ENCOUNTER — ANESTHESIA (OUTPATIENT)
Dept: SURGERY | Facility: HOSPITAL | Age: 61
End: 2021-12-22
Payer: COMMERCIAL

## 2021-12-22 ENCOUNTER — ANESTHESIA EVENT (OUTPATIENT)
Dept: SURGERY | Facility: HOSPITAL | Age: 61
End: 2021-12-22
Payer: COMMERCIAL

## 2021-12-22 ENCOUNTER — HOSPITAL ENCOUNTER (OUTPATIENT)
Facility: HOSPITAL | Age: 61
Discharge: HOME | End: 2021-12-22
Attending: ORTHOPAEDIC SURGERY | Admitting: ORTHOPAEDIC SURGERY
Payer: COMMERCIAL

## 2021-12-22 VITALS
DIASTOLIC BLOOD PRESSURE: 84 MMHG | OXYGEN SATURATION: 100 % | TEMPERATURE: 98 F | RESPIRATION RATE: 16 BRPM | BODY MASS INDEX: 33.5 KG/M2 | WEIGHT: 234 LBS | HEART RATE: 60 BPM | SYSTOLIC BLOOD PRESSURE: 151 MMHG | HEIGHT: 70 IN

## 2021-12-22 DIAGNOSIS — S83.241A ACUTE MEDIAL MENISCAL TEAR, RIGHT, INITIAL ENCOUNTER: Primary | ICD-10-CM

## 2021-12-22 DIAGNOSIS — M17.11 PRIMARY OSTEOARTHRITIS OF RIGHT KNEE: ICD-10-CM

## 2021-12-22 PROCEDURE — 27201107 HC STYLET, STANDARD: Performed by: ANESTHESIOLOGY

## 2021-12-22 PROCEDURE — 27202105 HC BIS BILATERAL SENSOR: Performed by: ANESTHESIOLOGY

## 2021-12-22 PROCEDURE — 71000015 HC POSTOP RECOV 1ST HR: Performed by: ORTHOPAEDIC SURGERY

## 2021-12-22 PROCEDURE — 71000033 HC RECOVERY, INTIAL HOUR: Performed by: ORTHOPAEDIC SURGERY

## 2021-12-22 PROCEDURE — 27000673 HC TUBING BLOOD Y: Performed by: ANESTHESIOLOGY

## 2021-12-22 PROCEDURE — 37000009 HC ANESTHESIA EA ADD 15 MINS: Performed by: ORTHOPAEDIC SURGERY

## 2021-12-22 PROCEDURE — 63600175 PHARM REV CODE 636 W HCPCS: Performed by: ORTHOPAEDIC SURGERY

## 2021-12-22 PROCEDURE — 27000671 HC TUBING MICROBORE EXT: Performed by: ANESTHESIOLOGY

## 2021-12-22 PROCEDURE — 27201423 OPTIME MED/SURG SUP & DEVICES STERILE SUPPLY: Performed by: ORTHOPAEDIC SURGERY

## 2021-12-22 PROCEDURE — 36000711: Performed by: ORTHOPAEDIC SURGERY

## 2021-12-22 PROCEDURE — 63600175 PHARM REV CODE 636 W HCPCS: Performed by: NURSE ANESTHETIST, CERTIFIED REGISTERED

## 2021-12-22 PROCEDURE — 36000710: Performed by: ORTHOPAEDIC SURGERY

## 2021-12-22 PROCEDURE — 27000653 HC CATH, IV CATHLN: Performed by: ANESTHESIOLOGY

## 2021-12-22 PROCEDURE — 25000003 PHARM REV CODE 250: Performed by: STUDENT IN AN ORGANIZED HEALTH CARE EDUCATION/TRAINING PROGRAM

## 2021-12-22 PROCEDURE — 37000008 HC ANESTHESIA 1ST 15 MINUTES: Performed by: ORTHOPAEDIC SURGERY

## 2021-12-22 PROCEDURE — 25000003 PHARM REV CODE 250: Performed by: NURSE ANESTHETIST, CERTIFIED REGISTERED

## 2021-12-22 PROCEDURE — 25000003 PHARM REV CODE 250: Performed by: ORTHOPAEDIC SURGERY

## 2021-12-22 RX ORDER — OXYCODONE HYDROCHLORIDE 5 MG/1
5 TABLET ORAL
Status: DISCONTINUED | OUTPATIENT
Start: 2021-12-22 | End: 2021-12-22 | Stop reason: HOSPADM

## 2021-12-22 RX ORDER — ONDANSETRON 2 MG/ML
INJECTION INTRAMUSCULAR; INTRAVENOUS
Status: DISCONTINUED | OUTPATIENT
Start: 2021-12-22 | End: 2021-12-22

## 2021-12-22 RX ORDER — FENTANYL CITRATE 50 UG/ML
INJECTION, SOLUTION INTRAMUSCULAR; INTRAVENOUS
Status: DISCONTINUED | OUTPATIENT
Start: 2021-12-22 | End: 2021-12-22

## 2021-12-22 RX ORDER — MUPIROCIN 20 MG/G
1 OINTMENT TOPICAL 2 TIMES DAILY
Status: CANCELLED | OUTPATIENT
Start: 2021-12-22 | End: 2021-12-27

## 2021-12-22 RX ORDER — CEFAZOLIN SODIUM 2 G/50ML
2 SOLUTION INTRAVENOUS
Status: COMPLETED | OUTPATIENT
Start: 2021-12-22 | End: 2021-12-22

## 2021-12-22 RX ORDER — OXYCODONE AND ACETAMINOPHEN 7.5; 325 MG/1; MG/1
1 TABLET ORAL EVERY 4 HOURS PRN
Qty: 28 TABLET | Refills: 0 | Status: SHIPPED | OUTPATIENT
Start: 2021-12-22 | End: 2022-04-11

## 2021-12-22 RX ORDER — ACETAMINOPHEN 10 MG/ML
INJECTION, SOLUTION INTRAVENOUS
Status: DISCONTINUED | OUTPATIENT
Start: 2021-12-22 | End: 2021-12-22

## 2021-12-22 RX ORDER — HYDROCODONE BITARTRATE AND ACETAMINOPHEN 5; 325 MG/1; MG/1
1 TABLET ORAL EVERY 4 HOURS PRN
Status: CANCELLED | OUTPATIENT
Start: 2021-12-22

## 2021-12-22 RX ORDER — HYDROMORPHONE HYDROCHLORIDE 1 MG/ML
0.2 INJECTION, SOLUTION INTRAMUSCULAR; INTRAVENOUS; SUBCUTANEOUS EVERY 5 MIN PRN
Status: DISCONTINUED | OUTPATIENT
Start: 2021-12-22 | End: 2021-12-22 | Stop reason: HOSPADM

## 2021-12-22 RX ORDER — BUPIVACAINE HYDROCHLORIDE AND EPINEPHRINE 5; 5 MG/ML; UG/ML
INJECTION, SOLUTION EPIDURAL; INTRACAUDAL; PERINEURAL
Status: DISCONTINUED | OUTPATIENT
Start: 2021-12-22 | End: 2021-12-22 | Stop reason: HOSPADM

## 2021-12-22 RX ORDER — SUCCINYLCHOLINE CHLORIDE 20 MG/ML
INJECTION INTRAMUSCULAR; INTRAVENOUS
Status: DISCONTINUED | OUTPATIENT
Start: 2021-12-22 | End: 2021-12-22

## 2021-12-22 RX ORDER — LIDOCAINE HYDROCHLORIDE 10 MG/ML
INJECTION, SOLUTION EPIDURAL; INFILTRATION; INTRACAUDAL; PERINEURAL
Status: DISCONTINUED | OUTPATIENT
Start: 2021-12-22 | End: 2021-12-22

## 2021-12-22 RX ORDER — SODIUM CHLORIDE 0.9 % (FLUSH) 0.9 %
10 SYRINGE (ML) INJECTION
Status: DISCONTINUED | OUTPATIENT
Start: 2021-12-22 | End: 2021-12-22 | Stop reason: HOSPADM

## 2021-12-22 RX ORDER — ONDANSETRON 2 MG/ML
4 INJECTION INTRAMUSCULAR; INTRAVENOUS DAILY PRN
Status: DISCONTINUED | OUTPATIENT
Start: 2021-12-22 | End: 2021-12-22 | Stop reason: HOSPADM

## 2021-12-22 RX ORDER — METHYLPREDNISOLONE ACETATE 80 MG/ML
INJECTION, SUSPENSION INTRA-ARTICULAR; INTRALESIONAL; INTRAMUSCULAR; SOFT TISSUE
Status: DISCONTINUED | OUTPATIENT
Start: 2021-12-22 | End: 2021-12-22 | Stop reason: HOSPADM

## 2021-12-22 RX ORDER — DIPHENHYDRAMINE HYDROCHLORIDE 50 MG/ML
12.5 INJECTION INTRAMUSCULAR; INTRAVENOUS
Status: DISCONTINUED | OUTPATIENT
Start: 2021-12-22 | End: 2021-12-22 | Stop reason: HOSPADM

## 2021-12-22 RX ORDER — PROPOFOL 10 MG/ML
VIAL (ML) INTRAVENOUS
Status: DISCONTINUED | OUTPATIENT
Start: 2021-12-22 | End: 2021-12-22

## 2021-12-22 RX ORDER — MEPERIDINE HYDROCHLORIDE 50 MG/ML
12.5 INJECTION INTRAMUSCULAR; INTRAVENOUS; SUBCUTANEOUS EVERY 10 MIN PRN
Status: DISCONTINUED | OUTPATIENT
Start: 2021-12-22 | End: 2021-12-22 | Stop reason: HOSPADM

## 2021-12-22 RX ORDER — MIDAZOLAM HYDROCHLORIDE 1 MG/ML
INJECTION INTRAMUSCULAR; INTRAVENOUS
Status: DISCONTINUED | OUTPATIENT
Start: 2021-12-22 | End: 2021-12-22

## 2021-12-22 RX ORDER — SODIUM CHLORIDE, SODIUM LACTATE, POTASSIUM CHLORIDE, CALCIUM CHLORIDE 600; 310; 30; 20 MG/100ML; MG/100ML; MG/100ML; MG/100ML
INJECTION, SOLUTION INTRAVENOUS CONTINUOUS PRN
Status: DISCONTINUED | OUTPATIENT
Start: 2021-12-22 | End: 2021-12-22

## 2021-12-22 RX ORDER — ROCURONIUM BROMIDE 10 MG/ML
INJECTION, SOLUTION INTRAVENOUS
Status: DISCONTINUED | OUTPATIENT
Start: 2021-12-22 | End: 2021-12-22

## 2021-12-22 RX ADMIN — ONDANSETRON 4 MG: 2 INJECTION INTRAMUSCULAR; INTRAVENOUS at 07:12

## 2021-12-22 RX ADMIN — SODIUM CHLORIDE, SODIUM LACTATE, POTASSIUM CHLORIDE, AND CALCIUM CHLORIDE: .6; .31; .03; .02 INJECTION, SOLUTION INTRAVENOUS at 06:12

## 2021-12-22 RX ADMIN — FENTANYL CITRATE 100 MCG: 50 INJECTION INTRAMUSCULAR; INTRAVENOUS at 07:12

## 2021-12-22 RX ADMIN — ROCURONIUM BROMIDE 25 MG: 10 INJECTION, SOLUTION INTRAVENOUS at 07:12

## 2021-12-22 RX ADMIN — PROPOFOL 150 MG: 10 INJECTION, EMULSION INTRAVENOUS at 07:12

## 2021-12-22 RX ADMIN — ROCURONIUM BROMIDE 5 MG: 10 INJECTION, SOLUTION INTRAVENOUS at 07:12

## 2021-12-22 RX ADMIN — CEFAZOLIN SODIUM 2 G: 2 SOLUTION INTRAVENOUS at 07:12

## 2021-12-22 RX ADMIN — SUCCINYLCHOLINE CHLORIDE 160 MG: 20 INJECTION, SOLUTION INTRAMUSCULAR; INTRAVENOUS at 07:12

## 2021-12-22 RX ADMIN — MIDAZOLAM HYDROCHLORIDE 1 MG: 1 INJECTION, SOLUTION INTRAMUSCULAR; INTRAVENOUS at 07:12

## 2021-12-22 RX ADMIN — ACETAMINOPHEN 1000 MG: 10 INJECTION, SOLUTION INTRAVENOUS at 07:12

## 2021-12-22 RX ADMIN — OXYCODONE HYDROCHLORIDE 5 MG: 5 TABLET ORAL at 08:12

## 2021-12-22 RX ADMIN — SUGAMMADEX 200 MG: 100 INJECTION, SOLUTION INTRAVENOUS at 07:12

## 2021-12-22 RX ADMIN — LIDOCAINE HYDROCHLORIDE 50 MG: 10 INJECTION, SOLUTION EPIDURAL; INFILTRATION; INTRACAUDAL; PERINEURAL at 07:12

## 2021-12-23 ENCOUNTER — INFUSION (OUTPATIENT)
Dept: INFUSION THERAPY | Facility: HOSPITAL | Age: 61
End: 2021-12-23
Attending: INTERNAL MEDICINE
Payer: COMMERCIAL

## 2021-12-23 VITALS
HEART RATE: 80 BPM | SYSTOLIC BLOOD PRESSURE: 145 MMHG | BODY MASS INDEX: 33.58 KG/M2 | HEIGHT: 70 IN | DIASTOLIC BLOOD PRESSURE: 82 MMHG | TEMPERATURE: 98 F | RESPIRATION RATE: 16 BRPM

## 2021-12-23 DIAGNOSIS — D64.89 ANEMIA DUE TO MULTIPLE MECHANISMS: ICD-10-CM

## 2021-12-23 DIAGNOSIS — N18.30 STAGE 3 CHRONIC KIDNEY DISEASE, UNSPECIFIED WHETHER STAGE 3A OR 3B CKD: ICD-10-CM

## 2021-12-23 DIAGNOSIS — D64.9 NORMOCHROMIC ANEMIA: ICD-10-CM

## 2021-12-23 DIAGNOSIS — D46.9 MDS (MYELODYSPLASTIC SYNDROME): Primary | ICD-10-CM

## 2021-12-23 DIAGNOSIS — D64.9 CHRONIC ANEMIA: ICD-10-CM

## 2021-12-23 PROCEDURE — 63600175 PHARM REV CODE 636 W HCPCS: Mod: JG | Performed by: NURSE PRACTITIONER

## 2021-12-23 PROCEDURE — 96372 THER/PROPH/DIAG INJ SC/IM: CPT

## 2021-12-23 RX ADMIN — EPOETIN ALFA-EPBX 20000 UNITS: 20000 INJECTION, SOLUTION INTRAVENOUS; SUBCUTANEOUS at 11:12

## 2021-12-29 ENCOUNTER — LAB VISIT (OUTPATIENT)
Dept: LAB | Facility: HOSPITAL | Age: 61
End: 2021-12-29
Attending: INTERNAL MEDICINE
Payer: COMMERCIAL

## 2021-12-29 DIAGNOSIS — D57.3 SICKLE CELL TRAIT SYNDROME: ICD-10-CM

## 2021-12-29 LAB
ALBUMIN SERPL BCP-MCNC: 4.2 G/DL (ref 3.5–5.2)
ALP SERPL-CCNC: 58 U/L (ref 55–135)
ALT SERPL W/O P-5'-P-CCNC: 20 U/L (ref 10–44)
ANION GAP SERPL CALC-SCNC: 6 MMOL/L (ref 8–16)
AST SERPL-CCNC: 23 U/L (ref 10–40)
BASOPHILS # BLD AUTO: 0.11 K/UL (ref 0–0.2)
BASOPHILS NFR BLD: 0.9 % (ref 0–1.9)
BILIRUB SERPL-MCNC: 0.8 MG/DL (ref 0.1–1)
BUN SERPL-MCNC: 24 MG/DL (ref 8–23)
CALCIUM SERPL-MCNC: 9 MG/DL (ref 8.7–10.5)
CHLORIDE SERPL-SCNC: 106 MMOL/L (ref 95–110)
CO2 SERPL-SCNC: 29 MMOL/L (ref 23–29)
CREAT SERPL-MCNC: 1.4 MG/DL (ref 0.5–1.4)
DIFFERENTIAL METHOD: ABNORMAL
EOSINOPHIL # BLD AUTO: 0.1 K/UL (ref 0–0.5)
EOSINOPHIL NFR BLD: 0.6 % (ref 0–8)
ERYTHROCYTE [DISTWIDTH] IN BLOOD BY AUTOMATED COUNT: 27.2 % (ref 11.5–14.5)
EST. GFR  (AFRICAN AMERICAN): >60 ML/MIN/1.73 M^2
EST. GFR  (NON AFRICAN AMERICAN): 53.8 ML/MIN/1.73 M^2
GLUCOSE SERPL-MCNC: 112 MG/DL (ref 70–110)
HCT VFR BLD AUTO: 26.9 % (ref 40–54)
HGB BLD-MCNC: 9.3 G/DL (ref 14–18)
IMM GRANULOCYTES # BLD AUTO: 0.13 K/UL (ref 0–0.04)
IMM GRANULOCYTES NFR BLD AUTO: 1.1 % (ref 0–0.5)
LYMPHOCYTES # BLD AUTO: 1.5 K/UL (ref 1–4.8)
LYMPHOCYTES NFR BLD: 11.9 % (ref 18–48)
MCH RBC QN AUTO: 31.8 PG (ref 27–31)
MCHC RBC AUTO-ENTMCNC: 34.6 G/DL (ref 32–36)
MCV RBC AUTO: 92 FL (ref 82–98)
MONOCYTES # BLD AUTO: 1.2 K/UL (ref 0.3–1)
MONOCYTES NFR BLD: 9.5 % (ref 4–15)
NEUTROPHILS # BLD AUTO: 9.4 K/UL (ref 1.8–7.7)
NEUTROPHILS NFR BLD: 76 % (ref 38–73)
NRBC BLD-RTO: 0 /100 WBC
PLATELET # BLD AUTO: 502 K/UL (ref 150–450)
PMV BLD AUTO: 9.8 FL (ref 9.2–12.9)
POTASSIUM SERPL-SCNC: 4.4 MMOL/L (ref 3.5–5.1)
PROT SERPL-MCNC: 7.4 G/DL (ref 6–8.4)
RBC # BLD AUTO: 2.92 M/UL (ref 4.6–6.2)
SODIUM SERPL-SCNC: 141 MMOL/L (ref 136–145)
WBC # BLD AUTO: 12.34 K/UL (ref 3.9–12.7)

## 2021-12-29 PROCEDURE — 85025 COMPLETE CBC W/AUTO DIFF WBC: CPT | Performed by: INTERNAL MEDICINE

## 2021-12-29 PROCEDURE — 80053 COMPREHEN METABOLIC PANEL: CPT | Performed by: INTERNAL MEDICINE

## 2021-12-29 PROCEDURE — 36415 COLL VENOUS BLD VENIPUNCTURE: CPT | Performed by: INTERNAL MEDICINE

## 2021-12-30 ENCOUNTER — INFUSION (OUTPATIENT)
Dept: INFUSION THERAPY | Facility: HOSPITAL | Age: 61
End: 2021-12-30
Attending: INTERNAL MEDICINE
Payer: COMMERCIAL

## 2021-12-30 VITALS
HEART RATE: 77 BPM | DIASTOLIC BLOOD PRESSURE: 81 MMHG | RESPIRATION RATE: 18 BRPM | WEIGHT: 236.38 LBS | OXYGEN SATURATION: 98 % | TEMPERATURE: 98 F | BODY MASS INDEX: 33.92 KG/M2 | SYSTOLIC BLOOD PRESSURE: 152 MMHG

## 2021-12-30 DIAGNOSIS — D46.9 MDS (MYELODYSPLASTIC SYNDROME): Primary | ICD-10-CM

## 2021-12-30 DIAGNOSIS — D64.9 NORMOCHROMIC ANEMIA: ICD-10-CM

## 2021-12-30 DIAGNOSIS — D64.89 ANEMIA DUE TO MULTIPLE MECHANISMS: ICD-10-CM

## 2021-12-30 DIAGNOSIS — D64.9 CHRONIC ANEMIA: ICD-10-CM

## 2021-12-30 DIAGNOSIS — N18.30 STAGE 3 CHRONIC KIDNEY DISEASE, UNSPECIFIED WHETHER STAGE 3A OR 3B CKD: ICD-10-CM

## 2021-12-30 PROCEDURE — 63600175 PHARM REV CODE 636 W HCPCS: Mod: JG | Performed by: NURSE PRACTITIONER

## 2021-12-30 PROCEDURE — 96372 THER/PROPH/DIAG INJ SC/IM: CPT

## 2021-12-30 RX ADMIN — EPOETIN ALFA-EPBX 20000 UNITS: 20000 INJECTION, SOLUTION INTRAVENOUS; SUBCUTANEOUS at 09:12

## 2022-01-05 ENCOUNTER — OFFICE VISIT (OUTPATIENT)
Dept: ORTHOPEDICS | Facility: CLINIC | Age: 62
End: 2022-01-05
Payer: COMMERCIAL

## 2022-01-05 VITALS
DIASTOLIC BLOOD PRESSURE: 86 MMHG | WEIGHT: 234 LBS | SYSTOLIC BLOOD PRESSURE: 150 MMHG | BODY MASS INDEX: 33.5 KG/M2 | HEIGHT: 70 IN

## 2022-01-05 DIAGNOSIS — Z98.890 S/P RIGHT KNEE ARTHROSCOPY: Primary | ICD-10-CM

## 2022-01-05 PROCEDURE — 99024 POSTOP FOLLOW-UP VISIT: CPT | Mod: S$GLB,,, | Performed by: PHYSICIAN ASSISTANT

## 2022-01-05 PROCEDURE — 1160F RVW MEDS BY RX/DR IN RCRD: CPT | Mod: S$GLB,,, | Performed by: PHYSICIAN ASSISTANT

## 2022-01-05 PROCEDURE — 3077F PR MOST RECENT SYSTOLIC BLOOD PRESSURE >= 140 MM HG: ICD-10-PCS | Mod: S$GLB,,, | Performed by: PHYSICIAN ASSISTANT

## 2022-01-05 PROCEDURE — 3077F SYST BP >= 140 MM HG: CPT | Mod: S$GLB,,, | Performed by: PHYSICIAN ASSISTANT

## 2022-01-05 PROCEDURE — 3008F BODY MASS INDEX DOCD: CPT | Mod: S$GLB,,, | Performed by: PHYSICIAN ASSISTANT

## 2022-01-05 PROCEDURE — 1160F PR REVIEW ALL MEDS BY PRESCRIBER/CLIN PHARMACIST DOCUMENTED: ICD-10-PCS | Mod: S$GLB,,, | Performed by: PHYSICIAN ASSISTANT

## 2022-01-05 PROCEDURE — 99024 PR POST-OP FOLLOW-UP VISIT: ICD-10-PCS | Mod: S$GLB,,, | Performed by: PHYSICIAN ASSISTANT

## 2022-01-05 PROCEDURE — 3008F PR BODY MASS INDEX (BMI) DOCUMENTED: ICD-10-PCS | Mod: S$GLB,,, | Performed by: PHYSICIAN ASSISTANT

## 2022-01-05 PROCEDURE — 3079F DIAST BP 80-89 MM HG: CPT | Mod: S$GLB,,, | Performed by: PHYSICIAN ASSISTANT

## 2022-01-05 PROCEDURE — 3079F PR MOST RECENT DIASTOLIC BLOOD PRESSURE 80-89 MM HG: ICD-10-PCS | Mod: S$GLB,,, | Performed by: PHYSICIAN ASSISTANT

## 2022-01-05 RX ORDER — MELOXICAM 15 MG/1
15 TABLET ORAL DAILY
Qty: 30 TABLET | Refills: 2 | Status: SHIPPED | OUTPATIENT
Start: 2022-01-05 | End: 2022-04-11

## 2022-01-05 RX ORDER — HYDROCODONE BITARTRATE AND ACETAMINOPHEN 7.5; 325 MG/1; MG/1
1 TABLET ORAL EVERY 8 HOURS PRN
Qty: 21 TABLET | Refills: 0 | Status: SHIPPED | OUTPATIENT
Start: 2022-01-05 | End: 2022-01-12

## 2022-01-05 NOTE — LETTER
January 5, 2022      [facility]  [address]  [place] [address]  Phone: [phone]  Fax: [phone]       Patient: Rick Butler   YOB: 1960  Date of Visit: 01/05/2022    To Whom It May Concern:    Alma Butler  was at [facility] on 01/05/2022. The patient may return to work on 01/10/2022. He need to be on light duty for two week. If you have any questions or concerns, or if I can be of further assistance, please do not hesitate to contact me.    Sincerely,    Fam Rubalcava PA-C

## 2022-01-05 NOTE — PROGRESS NOTES
[facility]  [address]  [place] [address]  Phone: [phone]   [phone]    Patient's PCP:DEVIKA Barakat  Referring Provider: No ref. provider found    POST-OP Note:    Subjective:        Chief Complaint:   Chief Complaint   Patient presents with    Right Knee - Post-op Evaluation     Had right knee scope on 12/22/21,little sore, unable to stand for prolonged periods of time       Past Medical History:   Diagnosis Date    Anemia due to multiple mechanisms 1/13/2019    Anemia, chronic renal failure, stage 2 (mild) 1/13/2019    Depression     Former smoker 6/29/2017    H/O ETOH abuse 6/29/2017    Monocytosis 2019    Myelodysplasia (myelodysplastic syndrome)     Myelodysplasia (myelodysplastic syndrome)     RARS (refractory anemia with ringed sideroblasts) 6/1/2020    Sickle cell trait        Past Surgical History:   Procedure Laterality Date    BONE MARROW BIOPSY N/A 12/20/2019    Procedure: BIOPSY, BONE MARROW;  Surgeon: Satinder Diagnostic Provider;  Location: [facility] OR;  Service: Interventional Radiology;  Laterality: N/A;    COLONOSCOPY N/A 11/5/2021    Procedure: COLONOSCOPY;  Surgeon: Rob Collins MD;  Location: [facility] ENDO;  Service: Endoscopy;  Laterality: N/A;    ESOPHAGOGASTRODUODENOSCOPY N/A 11/5/2021    Procedure: EGD (ESOPHAGOGASTRODUODENOSCOPY);  Surgeon: Rob Collins MD;  Location: [facility] ENDO;  Service: Endoscopy;  Laterality: N/A;    INJECTION OF ANESTHETIC AGENT AROUND MEDIAL BRANCH NERVES INNERVATING LUMBAR FACET JOINT Bilateral 5/25/2018    Procedure: BLOCK-NERVE-MEDIAL BRANCH-LUMBAR;  Surgeon: Nura Heredia MD;  Location: [facility] OR;  Service: Pain Management;  Laterality: Bilateral;  L3, 4, 5    KNEE ARTHROSCOPY W/ MENISCECTOMY Right 12/22/2021    Procedure: ARTHROSCOPY, KNEE, WITH MENISCECTOMY;  Surgeon: Sebastian Green MD;  Location: [facility] OR;  Service: Orthopedics;  Laterality: Right;  partial medial meniscectomy    RADIOFREQUENCY ABLATION OF  LUMBAR MEDIAL BRANCH NERVE AT SINGLE LEVEL Bilateral 7/20/2018    Procedure: RADIOFREQUENCY ABLATION, NERVE, MEDIAL BRANCH, LUMBAR, 1 LEVEL;  Surgeon: Nura Heredia MD;  Location: [facility] OR;  Service: Pain Management;  Laterality: Bilateral;  L3, 4, 5 - Burned at 80 degrees C.  for 75 seconds x 2 each site    SMALL BOWEL ENTEROSCOPY N/A 10/16/2019    Procedure: ENTEROSCOPY;  Surgeon: Rob Collins MD;  Location: [facility] ENDO;  Service: Endoscopy;  Laterality: N/A;       Current Outpatient Medications   Medication Sig    amLODIPine (NORVASC) 5 MG tablet TAKE 1 TABLET BY MOUTH EVERY DAY    ascorbic acid, vitamin C, (VITAMIN C) 1000 MG tablet Take 1,000 mg by mouth once daily.    dronabinoL (MARINOL) 5 MG capsule     epoetin tray (PROCRIT INJ) Thursday    famotidine (PEPCID) 20 MG tablet famotidine 20 mg tablet   Take 1 tablet twice a day by oral route.    folic acid (FOLVITE) 1 MG tablet TAKE 2 TABLETS (2 MG TOTAL) BY MOUTH ONCE DAILY.    multivitamin capsule Take 1 capsule by mouth once daily.    oxyCODONE-acetaminophen (PERCOCET) 7.5-325 mg per tablet Take 1 tablet by mouth every 4 (four) hours as needed for Pain.    tamsulosin (FLOMAX) 0.4 mg Cap TAKE 1 CAPSULE BY MOUTH EVERY DAY    traZODone (DESYREL) 100 MG tablet TAKE 1 TABLET (100 MG TOTAL) BY MOUTH NIGHTLY AS NEEDED FOR INSOMNIA.    HYDROcodone-acetaminophen (NORCO) 7.5-325 mg per tablet Take 1 tablet by mouth every 8 (eight) hours as needed for Pain.    meloxicam (MOBIC) 15 MG tablet Take 1 tablet (15 mg total) by mouth once daily.    sildenafil (VIAGRA) 100 MG tablet Take 1 tablet (100 mg total) by mouth daily as needed for Erectile Dysfunction.     No current facility-administered medications for this visit.       Review of patient's allergies indicates:  No Known Allergies    Family History   Problem Relation Age of Onset    Arthritis Mother     Depression Mother     Heart disease Mother     Hypertension Mother     Heart attack Father 59        Social History     Socioeconomic History    Marital status:    Occupational History    Occupation: Prep Cook     Employer: [employer]   Tobacco Use    Smoking status: Former Smoker     Quit date: 1996     Years since quittin.0    Smokeless tobacco: Never Used   Substance and Sexual Activity    Alcohol use: Not Currently     Alcohol/week: 21.0 standard drinks     Types: 21 Cans of beer per week     Comment: Sober 5 years    Drug use: Not Currently     Types: Marijuana    Sexual activity: Yes     Partners: Female       History of present illness:  Mr. Butler is here for follow-up from his right knee arthroscopy partial medial meniscectomy.  He is 2 weeks postop.  He is here for wound check and suture removal.      Review of Systems:    Musculoskeletal:  See HPI       Objective:        Physical Examination:    Vital Signs:   Vitals:    22 0906   BP: (!) 150/86       Body mass index is 33.58 kg/m².    This a well-developed, well nourished patient in no acute distress.  They are alert and oriented and cooperative to examination.        Right knee exam:  Skin right knee is clean dry and intact.  There is no erythema or ecchymosis.  There are no signs or symptoms of infection.  Patient is neurovascular intact throughout right lower extremity.  Right calf is soft and nontender.  Two arthroscopic portals are healing well without wound dehiscence or drainage.  Patient does have an effusion about the right knee.  Right knee active range of motion 0-95 degrees.  Right knee is stable to varus and valgus stresses.    Pertinent New Results:     XRAY Report / Interpretation:  None today       Assessment:       1. S/P right knee arthroscopy      Plan:     S/P right knee arthroscopy  -     meloxicam (MOBIC) 15 MG tablet; Take 1 tablet (15 mg total) by mouth once daily.  Dispense: 30 tablet; Refill: 2  -     HYDROcodone-acetaminophen (NORCO) 7.5-325 mg per tablet; Take 1 tablet by mouth every 8  (eight) hours as needed for Pain.  Dispense: 21 tablet; Refill: 0        Follow up in about 4 weeks (around 2/2/2022) for PO Right Knee Scope 12/22/21 tues/thurs.    Sutures removed from his right knee today.  He tolerated this well.  I will place him on Mobic 15 mg to be taken by mouth once a day with food.  Also provide a refill of his pain medication.  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  He is not quite ready to return to work.  We will have him return on Monday January 10, 2022 and a light duty capacity for 2 weeks in the resume regular duties 2 weeks thereafter.  Will have follow-up with us in 4 weeks see how he is progressing from the surgery.  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  We will reassess him at that time.      Fam Rubalcava, ROBYNS, PA-C    This note was created using [employer] voice recognition software that occasionally misinterprets words or phrases.

## 2022-01-06 ENCOUNTER — INFUSION (OUTPATIENT)
Dept: INFUSION THERAPY | Facility: HOSPITAL | Age: 62
End: 2022-01-06
Attending: INTERNAL MEDICINE
Payer: COMMERCIAL

## 2022-01-06 VITALS
SYSTOLIC BLOOD PRESSURE: 131 MMHG | OXYGEN SATURATION: 98 % | HEART RATE: 68 BPM | TEMPERATURE: 98 F | BODY MASS INDEX: 33.9 KG/M2 | DIASTOLIC BLOOD PRESSURE: 82 MMHG | RESPIRATION RATE: 16 BRPM | HEIGHT: 70 IN | WEIGHT: 236.81 LBS

## 2022-01-06 DIAGNOSIS — D64.89 ANEMIA DUE TO MULTIPLE MECHANISMS: ICD-10-CM

## 2022-01-06 DIAGNOSIS — D64.9 CHRONIC ANEMIA: ICD-10-CM

## 2022-01-06 DIAGNOSIS — N18.30 STAGE 3 CHRONIC KIDNEY DISEASE, UNSPECIFIED WHETHER STAGE 3A OR 3B CKD: ICD-10-CM

## 2022-01-06 DIAGNOSIS — D46.9 MDS (MYELODYSPLASTIC SYNDROME): Primary | ICD-10-CM

## 2022-01-06 DIAGNOSIS — D64.9 NORMOCHROMIC ANEMIA: ICD-10-CM

## 2022-01-06 PROCEDURE — 63600175 PHARM REV CODE 636 W HCPCS: Mod: JG | Performed by: NURSE PRACTITIONER

## 2022-01-06 PROCEDURE — 96372 THER/PROPH/DIAG INJ SC/IM: CPT

## 2022-01-06 RX ADMIN — EPOETIN ALFA-EPBX 20000 UNITS: 20000 INJECTION, SOLUTION INTRAVENOUS; SUBCUTANEOUS at 03:01

## 2022-01-12 ENCOUNTER — LAB VISIT (OUTPATIENT)
Dept: LAB | Facility: HOSPITAL | Age: 62
End: 2022-01-12
Attending: NURSE PRACTITIONER
Payer: COMMERCIAL

## 2022-01-12 DIAGNOSIS — D46.9 MDS (MYELODYSPLASTIC SYNDROME): ICD-10-CM

## 2022-01-12 LAB
ALBUMIN SERPL BCP-MCNC: 4.1 G/DL (ref 3.5–5.2)
ALP SERPL-CCNC: 55 U/L (ref 55–135)
ALT SERPL W/O P-5'-P-CCNC: 18 U/L (ref 10–44)
ANION GAP SERPL CALC-SCNC: 6 MMOL/L (ref 8–16)
AST SERPL-CCNC: 19 U/L (ref 10–40)
BASOPHILS # BLD AUTO: 0.08 K/UL (ref 0–0.2)
BASOPHILS NFR BLD: 1 % (ref 0–1.9)
BILIRUB SERPL-MCNC: 0.9 MG/DL (ref 0.1–1)
BUN SERPL-MCNC: 22 MG/DL (ref 8–23)
CALCIUM SERPL-MCNC: 8.5 MG/DL (ref 8.7–10.5)
CHLORIDE SERPL-SCNC: 108 MMOL/L (ref 95–110)
CO2 SERPL-SCNC: 27 MMOL/L (ref 23–29)
CREAT SERPL-MCNC: 1.7 MG/DL (ref 0.5–1.4)
DIFFERENTIAL METHOD: ABNORMAL
EOSINOPHIL # BLD AUTO: 0.1 K/UL (ref 0–0.5)
EOSINOPHIL NFR BLD: 1.2 % (ref 0–8)
ERYTHROCYTE [DISTWIDTH] IN BLOOD BY AUTOMATED COUNT: 26.9 % (ref 11.5–14.5)
EST. GFR  (AFRICAN AMERICAN): 49.2 ML/MIN/1.73 M^2
EST. GFR  (NON AFRICAN AMERICAN): 42.6 ML/MIN/1.73 M^2
FERRITIN SERPL-MCNC: 1479 NG/ML (ref 20–300)
GLUCOSE SERPL-MCNC: 121 MG/DL (ref 70–110)
HCT VFR BLD AUTO: 27.1 % (ref 40–54)
HGB BLD-MCNC: 9.3 G/DL (ref 14–18)
IMM GRANULOCYTES # BLD AUTO: 0.02 K/UL (ref 0–0.04)
IMM GRANULOCYTES NFR BLD AUTO: 0.2 % (ref 0–0.5)
IRON SERPL-MCNC: 58 UG/DL (ref 45–160)
LYMPHOCYTES # BLD AUTO: 1.4 K/UL (ref 1–4.8)
LYMPHOCYTES NFR BLD: 17.5 % (ref 18–48)
MCH RBC QN AUTO: 32 PG (ref 27–31)
MCHC RBC AUTO-ENTMCNC: 34.3 G/DL (ref 32–36)
MCV RBC AUTO: 93 FL (ref 82–98)
MONOCYTES # BLD AUTO: 0.8 K/UL (ref 0.3–1)
MONOCYTES NFR BLD: 10.3 % (ref 4–15)
NEUTROPHILS # BLD AUTO: 5.7 K/UL (ref 1.8–7.7)
NEUTROPHILS NFR BLD: 69.8 % (ref 38–73)
NRBC BLD-RTO: 0 /100 WBC
PLATELET # BLD AUTO: 452 K/UL (ref 150–450)
PMV BLD AUTO: 9.9 FL (ref 9.2–12.9)
POTASSIUM SERPL-SCNC: 4.4 MMOL/L (ref 3.5–5.1)
PROT SERPL-MCNC: 7.3 G/DL (ref 6–8.4)
RBC # BLD AUTO: 2.91 M/UL (ref 4.6–6.2)
SATURATED IRON: 27 % (ref 20–50)
SODIUM SERPL-SCNC: 141 MMOL/L (ref 136–145)
TOTAL IRON BINDING CAPACITY: 213 UG/DL (ref 250–450)
TRANSFERRIN SERPL-MCNC: 152 MG/DL (ref 200–375)
WBC # BLD AUTO: 8.18 K/UL (ref 3.9–12.7)

## 2022-01-12 PROCEDURE — 83540 ASSAY OF IRON: CPT | Performed by: NURSE PRACTITIONER

## 2022-01-12 PROCEDURE — 85025 COMPLETE CBC W/AUTO DIFF WBC: CPT | Performed by: NURSE PRACTITIONER

## 2022-01-12 PROCEDURE — 82728 ASSAY OF FERRITIN: CPT | Performed by: NURSE PRACTITIONER

## 2022-01-12 PROCEDURE — 80053 COMPREHEN METABOLIC PANEL: CPT | Performed by: NURSE PRACTITIONER

## 2022-01-12 PROCEDURE — 36415 COLL VENOUS BLD VENIPUNCTURE: CPT | Performed by: NURSE PRACTITIONER

## 2022-01-13 ENCOUNTER — INFUSION (OUTPATIENT)
Dept: INFUSION THERAPY | Facility: HOSPITAL | Age: 62
End: 2022-01-13
Attending: INTERNAL MEDICINE
Payer: COMMERCIAL

## 2022-01-13 ENCOUNTER — OFFICE VISIT (OUTPATIENT)
Dept: HEMATOLOGY/ONCOLOGY | Facility: CLINIC | Age: 62
End: 2022-01-13
Payer: COMMERCIAL

## 2022-01-13 VITALS
BODY MASS INDEX: 34.12 KG/M2 | HEIGHT: 70 IN | TEMPERATURE: 98 F | DIASTOLIC BLOOD PRESSURE: 92 MMHG | HEART RATE: 76 BPM | WEIGHT: 238.31 LBS | SYSTOLIC BLOOD PRESSURE: 151 MMHG | RESPIRATION RATE: 18 BRPM

## 2022-01-13 VITALS
HEIGHT: 70 IN | DIASTOLIC BLOOD PRESSURE: 78 MMHG | OXYGEN SATURATION: 99 % | HEART RATE: 70 BPM | TEMPERATURE: 98 F | SYSTOLIC BLOOD PRESSURE: 129 MMHG | BODY MASS INDEX: 34.09 KG/M2 | RESPIRATION RATE: 16 BRPM | WEIGHT: 238.13 LBS

## 2022-01-13 DIAGNOSIS — D46.1 RARS (REFRACTORY ANEMIA WITH RINGED SIDEROBLASTS): Primary | ICD-10-CM

## 2022-01-13 DIAGNOSIS — D64.89 ANEMIA DUE TO MULTIPLE MECHANISMS: ICD-10-CM

## 2022-01-13 DIAGNOSIS — D64.9 CHRONIC ANEMIA: ICD-10-CM

## 2022-01-13 DIAGNOSIS — D64.9 NORMOCHROMIC ANEMIA: ICD-10-CM

## 2022-01-13 DIAGNOSIS — D57.3 SICKLE CELL TRAIT SYNDROME: ICD-10-CM

## 2022-01-13 DIAGNOSIS — D46.9 MDS (MYELODYSPLASTIC SYNDROME): Primary | ICD-10-CM

## 2022-01-13 DIAGNOSIS — D75.839 THROMBOCYTOSIS: ICD-10-CM

## 2022-01-13 DIAGNOSIS — N18.30 STAGE 3 CHRONIC KIDNEY DISEASE, UNSPECIFIED WHETHER STAGE 3A OR 3B CKD: ICD-10-CM

## 2022-01-13 DIAGNOSIS — D46.9 MDS (MYELODYSPLASTIC SYNDROME): ICD-10-CM

## 2022-01-13 DIAGNOSIS — D72.821 MONOCYTOSIS: ICD-10-CM

## 2022-01-13 PROCEDURE — 3008F BODY MASS INDEX DOCD: CPT | Mod: S$GLB,,, | Performed by: INTERNAL MEDICINE

## 2022-01-13 PROCEDURE — 99214 OFFICE O/P EST MOD 30 MIN: CPT | Mod: S$GLB,,, | Performed by: INTERNAL MEDICINE

## 2022-01-13 PROCEDURE — 3077F SYST BP >= 140 MM HG: CPT | Mod: S$GLB,,, | Performed by: INTERNAL MEDICINE

## 2022-01-13 PROCEDURE — 1160F RVW MEDS BY RX/DR IN RCRD: CPT | Mod: S$GLB,,, | Performed by: INTERNAL MEDICINE

## 2022-01-13 PROCEDURE — 96372 THER/PROPH/DIAG INJ SC/IM: CPT

## 2022-01-13 PROCEDURE — 3077F PR MOST RECENT SYSTOLIC BLOOD PRESSURE >= 140 MM HG: ICD-10-PCS | Mod: S$GLB,,, | Performed by: INTERNAL MEDICINE

## 2022-01-13 PROCEDURE — 3080F PR MOST RECENT DIASTOLIC BLOOD PRESSURE >= 90 MM HG: ICD-10-PCS | Mod: S$GLB,,, | Performed by: INTERNAL MEDICINE

## 2022-01-13 PROCEDURE — 3008F PR BODY MASS INDEX (BMI) DOCUMENTED: ICD-10-PCS | Mod: S$GLB,,, | Performed by: INTERNAL MEDICINE

## 2022-01-13 PROCEDURE — 3080F DIAST BP >= 90 MM HG: CPT | Mod: S$GLB,,, | Performed by: INTERNAL MEDICINE

## 2022-01-13 PROCEDURE — 63600175 PHARM REV CODE 636 W HCPCS: Mod: JG | Performed by: NURSE PRACTITIONER

## 2022-01-13 PROCEDURE — 99214 PR OFFICE/OUTPT VISIT, EST, LEVL IV, 30-39 MIN: ICD-10-PCS | Mod: S$GLB,,, | Performed by: INTERNAL MEDICINE

## 2022-01-13 PROCEDURE — 1160F PR REVIEW ALL MEDS BY PRESCRIBER/CLIN PHARMACIST DOCUMENTED: ICD-10-PCS | Mod: S$GLB,,, | Performed by: INTERNAL MEDICINE

## 2022-01-13 RX ADMIN — EPOETIN ALFA-EPBX 20000 UNITS: 20000 INJECTION, SOLUTION INTRAVENOUS; SUBCUTANEOUS at 11:01

## 2022-01-13 NOTE — PLAN OF CARE
Problem: Activity Intolerance  Goal: Enhanced Capacity and Energy  Outcome: Ongoing, Progressing  Intervention: Optimize Activity Tolerance  Flowsheets (Taken 1/13/2022 1056)  Self-Care Promotion: independence encouraged  Activity Management:   Ambulated -L4   Ambulated in fuentes - L4   Ambulated in room - L4   Up in chair - L1  Environmental Support:   calm environment promoted   distractions minimized   rest periods encouraged

## 2022-01-13 NOTE — PROGRESS NOTES
"        [facility] Hematology/Oncology                 PROGRESS NOTE      Subjective:       Patient ID:   NAME: Rick Butler : 1960     61 y.o. male    Referring Doc: Braulio (new PCP)  Other Physicians: Getachew; Alan Mustafa    Chief Complaint:  Sickle cell trait/anemia/RARS  f/u    History of Present Illness:     Patient returns today for a regularly scheduled follow-up visit.  The patient is doing ok overall. He is here by himself today. He has been feeling "really good"     no pain crisis; he has been sober for over  5-6 years now; he has been off tobacco too; no CP, SOB, HA's or N/V; occasional fatigue; some right knee issues; poor appetite     He has not had any recent pain crisis. He denies having any fatigue or breathing difficulties. He denies any blood in the stool, dark stools or hematuria.       He has been under the care of Dr Mustafa at [facility] for RARS- MDS. He is now on procrit weekly. He sees Dr Mustafa again in 2022      Recent repair of right knee with meniscus tear 3 weeks ago    Discussed covid19 precautions - he had his vaccinations      ROS:   GEN: normal without any fever, night sweats or weight loss  HEENT: normal with no HA's, sore throat, stiff neck, changes in vision  CV: normal with no CP, SOB, PND, MORA or orthopnea  PULM: normal with no SOB, cough, hemoptysis, sputum or pleuritic pain  GI: normal with no abdominal pain, nausea, vomiting, constipation, diarrhea, melanotic stools, BRBPR, or hematemesis  : normal with no hematuria, dysuria  BREAST: normal with no mass, discharge, pain  SKIN: normal with no rash, erythema, bruising, or swelling    Allergies:  Review of patient's allergies indicates:  No Known Allergies    Medications:    Current Outpatient Medications:     amLODIPine (NORVASC) 5 MG tablet, TAKE 1 TABLET BY MOUTH EVERY DAY, Disp: 90 tablet, Rfl: 2    ascorbic acid, vitamin C, (VITAMIN C) 1000 MG tablet, Take 1,000 mg by mouth once daily., Disp: , Rfl:     dronabinoL " "(MARINOL) 5 MG capsule, , Disp: , Rfl:     epoetin tray (PROCRIT INJ), Thursday, Disp: , Rfl:     famotidine (PEPCID) 20 MG tablet, famotidine 20 mg tablet  Take 1 tablet twice a day by oral route., Disp: , Rfl:     folic acid (FOLVITE) 1 MG tablet, TAKE 2 TABLETS (2 MG TOTAL) BY MOUTH ONCE DAILY., Disp: 180 tablet, Rfl: 2    meloxicam (MOBIC) 15 MG tablet, Take 1 tablet (15 mg total) by mouth once daily., Disp: 30 tablet, Rfl: 2    multivitamin capsule, Take 1 capsule by mouth once daily., Disp: , Rfl:     oxyCODONE-acetaminophen (PERCOCET) 7.5-325 mg per tablet, Take 1 tablet by mouth every 4 (four) hours as needed for Pain., Disp: 28 tablet, Rfl: 0    tamsulosin (FLOMAX) 0.4 mg Cap, TAKE 1 CAPSULE BY MOUTH EVERY DAY, Disp: 90 capsule, Rfl: 2    traZODone (DESYREL) 100 MG tablet, TAKE 1 TABLET (100 MG TOTAL) BY MOUTH NIGHTLY AS NEEDED FOR INSOMNIA., Disp: 90 tablet, Rfl: 2    sildenafil (VIAGRA) 100 MG tablet, Take 1 tablet (100 mg total) by mouth daily as needed for Erectile Dysfunction., Disp: 10 tablet, Rfl: 6    PMHx/PSHx Updates:  See patient's last visit with me on 10/14/2021  See H&P on 7/9/2011      Pathology:    12/20/2019  BONE MARROW, RIGHT ILIAC CREST,    ASPIRATE, CLOT SECTION, AND CORE BIOPSY:    --HYPERCELLULAR MARROW (APPROXIMATELY 75% TO 80%) WITH TRILINEAGE   HEMATOPOIETIC ELEMENTS, ERYTHROID  HYPERPLASIA AND MILD MEGAKARYOCYTIC HYPERPLASIA, AND NON-SPECIFIC   DYSHEMATOPOIETIC CHANGES (SEE     COMMENT).  --[id number] INCREASED STAINABLE IRON WITH INCREASED RING   SIDEROBLASTS (GREATER THAN 15%)     (SEE COMMENT).  --PERIPHERAL BLOOD WITH THROMBOCYTOSIS (574,000/MICROLITER) AND ANEMIA   (HEMOGLOBIN 5.5 GRAM/DECILITER),    WITH SCATTERED DREPANOCYTOID FORMS AND FEW NUCLEATED ERYTHROCYTES      Cytogenetic Results at the bottom of the report.  NORMAL    Objective:     Vitals:  Blood pressure (!) 151/92, pulse 76, temperature 97.8 °F (36.6 °C), resp. rate 18, height 5' 10" (1.778 " m), weight 108.1 kg (238 lb 4.8 oz).    Physical Examination:   GEN: no apparent distress, comfortable; AAOx3  HEAD: atraumatic and normocephalic  EYES: no pallor, no icterus, PERRLA  ENT: OMM, no pharyngeal erythema, external ears WNL; no nasal discharge; no thrush  NECK: no masses, thyroid normal, trachea midline, no LAD/LN's, supple  CV: RRR with no murmur; normal pulse; normal S1 and S2; no pedal edema  CHEST: Normal respiratory effort; CTAB; normal breath sounds; no wheeze or crackles  ABDOM: nontender and nondistended; soft; normal bowel sounds; no rebound/guarding  MUSC/Skeletal: ROM normal; no crepitus; joints normal; no deformities or arthropathy  EXTREM: no clubbing, cyanosis, inflammation or swelling  SKIN: no rashes, lesions, ulcers, petechiae or subcutaneous nodules  : no jiang  NEURO: grossly intact; motor/sensory WNL; AAOx3; no tremors  PSYCH: normal mood, affect and behavior  LYMPH: normal cervical, supraclavicular, axillary and groin LN's            Labs:     Lab Results   Component Value Date    WBC 8.18 01/12/2022    HGB 9.3 (L) 01/12/2022    HCT 27.1 (L) 01/12/2022    MCV 93 01/12/2022     (H) 01/12/2022           CMP  Sodium   Date Value Ref Range Status   01/12/2022 141 136 - 145 mmol/L Final   06/26/2019 138 134 - 144 mmol/L      Potassium   Date Value Ref Range Status   01/12/2022 4.4 3.5 - 5.1 mmol/L Final     Chloride   Date Value Ref Range Status   01/12/2022 108 95 - 110 mmol/L Final   06/26/2019 105 98 - 110 mmol/L      CO2   Date Value Ref Range Status   01/12/2022 27 23 - 29 mmol/L Final     Glucose   Date Value Ref Range Status   01/12/2022 121 (H) 70 - 110 mg/dL Final   06/26/2019 114 (H) 70 - 99 mg/dL      BUN   Date Value Ref Range Status   01/12/2022 22 8 - 23 mg/dL Final     Creatinine   Date Value Ref Range Status   01/12/2022 1.7 (H) 0.5 - 1.4 mg/dL Final   06/26/2019 1.62 (H) 0.60 - 1.40 mg/dL      Calcium   Date Value Ref Range Status   01/12/2022 8.5 (L) 8.7 - 10.5  mg/dL Final     Total Protein   Date Value Ref Range Status   01/12/2022 7.3 6.0 - 8.4 g/dL Final     Albumin   Date Value Ref Range Status   01/12/2022 4.1 3.5 - 5.2 g/dL Final   06/26/2019 4.4 3.1 - 4.7 g/dL      Total Bilirubin   Date Value Ref Range Status   01/12/2022 0.9 0.1 - 1.0 mg/dL Final     Comment:     For infants and newborns, interpretation of results should be based  on gestational age, weight and in agreement with clinical  observations.    Premature Infant recommended reference ranges:  Up to 24 hours.............<8.0 mg/dL  Up to 48 hours............<12.0 mg/dL  3-5 days..................<15.0 mg/dL  6-29 days.................<15.0 mg/dL       Alkaline Phosphatase   Date Value Ref Range Status   01/12/2022 55 55 - 135 U/L Final     AST   Date Value Ref Range Status   01/12/2022 19 10 - 40 U/L Final     ALT   Date Value Ref Range Status   01/12/2022 18 10 - 44 U/L Final     Anion Gap   Date Value Ref Range Status   01/12/2022 6 (L) 8 - 16 mmol/L Final     eGFR if    Date Value Ref Range Status   01/12/2022 49.2 (A) >60 mL/min/1.73 m^2 Final     eGFR if non    Date Value Ref Range Status   01/12/2022 42.6 (A) >60 mL/min/1.73 m^2 Final     Comment:     Calculation used to obtain the estimated glomerular filtration  rate (eGFR) is the CKD-EPI equation.                I have reviewed all available lab results and radiology reports.    Radiology/Diagnostic Studies:    2/15/2018 Xray Survey:   IMPRESSION: No significant abnormality seen. No evidence of osteoblastic or  osteoclastic lesions identified    MRI L-spine 2/12/2018  IMPRESSION:    1. Prominent low signal intensity throughout the bone marrow on T1 and  T2-weighted imaging. Marrow infiltrative process including malignancy such as  metastatic disease or lymphoma/leukemia is a consideration along with multiple  myeloma or malignant process. Benign etiology such as osteopetrosis or  regenerative red marrow are also  "considerations.    2. Multilevel lumbar degenerative changes, most prominent at L4-L5 and L5-S1 as  described.    Assessment/Plan:   (1) 61 y.o. male with diagnosis of sickle cell anemia with borderline microcytosis  - latest hgb was 6.0 and he had blood transfusion  - today, he is not symptomatic at this time  - he probably has a multifactorial anemia process with underlying sickle cell, anemia of chronic disorders and anemia of chronic renal. I can not rule out an underlying GI bleeding process.   - iron panel is adequate (no deficiency)  - total bilirubin is only minimally elevated  - he required transfusion again in Oct 2019 and again in Dec 2019  - he had bone marrow biopsy on 12/20/2019    "dyshematopoietic changes are not entirely specific and are present  mostly within erythroid and megakaryocytic lineages. These findings   could be observed in chronic disease states, autoimmune conditions,  infectious settings, toxic exposures, nutritional deficits, as medication/drug effect, and in myelodysplastic syndrome (MDS), among other conditions"  "Additionally, ring sideroblasts are a non-specific   finding "    Patient was referred to see Dr Mustafa at [facility] and had repeat BM biopsy with diagnosis made of RARS  - he is now on procrit per directives of Dr Mustafa    7/30/2020:   he recently saw Dr Mustafa  And sees him again in Jan 2021  - he is doing well with procrit and is feeling much better  - he has not required any transfusions for some time time    9/24/2020:  - latest hgb at 8.5  - will increase the procrit dose  - f/u with Dr Zavala in Jan 2021 11/19/2020:  - patient doing well and feels "great"  - hgb up to 9.1; continued on the procrit  - f/u with Dr Mustafa in Jan 2021    3/4/2021:  - he saw Dr Mustafa in jan 2021 and sees him again in June/July 2021  - latest hgb at 9.1 and stable and platelets are 474,000; wbc at 10.0    6/10/2021:  - latest hgb at 9.0  - plats 485,000  - on weekly procrit  - seeing " "Dr Mustafa again tomorrow    10/14/2021:  - hgb at 8.6 but he recently had been taking naproxen and had some BRBPR - which since resolved  - he saw Dr Beyer with GI and he is having colonsocopy on Nov 5th along with EGD  - plats 429,000  - he sees Dr Mustafa again in Nov 19th   - he sees Dr Mas again in Dec 2021    1/13/2022:  - He has been under the care of Dr Mustafa at [facility] for RARS- MDS. He is now on procrit weekly. He sees Dr Mustafa again in March 2022    - hgb 9.3  - he had scope with Dr Collins in nov 2021 which was good per patient and they plan to repeat in 5 yrs    (2) Alcohol abuse issues in past - he has been sober for over 3 years now    (3) former smoker    (4) chronic back issues - prior Xrays and MRI - suspect the findings on the MRI are possibly due to his sickle cell;   - SPEP was previously negative for M-protein  - PSA was 0.3 in Sept 2017  - recommended neurosurgery evaluation previously  - he saw Dr Nura VUONG and had a nerve "burning" procedure and his pain has improved    (5) CRI - elevated creatinine - he is followed Dr Chilel with nephrology      (6) H pylori gastritis - s/p recent endoscopy with Dr Collins and antibotics x 10 days        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 Sickle cell trait syndrome     3. Thrombocytosis     4. Normochromic anemia     5. Monocytosis     6. MDS (myelodysplastic syndrome)     7. Chronic anemia     8. Anemia due to multiple mechanisms       PLAN;  1. Continue with the procrit    2. Planned f/u in March 2022 with Dr Mustafa at [facility]  3.  F/u with nephrology as directed by them -   Dr Mas    4. Check labs every 2 weeks- encouraged compliance   5. Procrit as per Dr Mustafa's directives   6.  Encouraged continued sobriety          RTC 3-4 months  Fax note to Dennis Ramsey Tveit; [place]    Total Time spent on patient:    I spent over 25 mins of time with the patient. Reviewed results of the recently ordered labs, tests, reports and " studies; made directives with regards to the results. Over half of this time was spent couseling and coordinating care, making treatment and analytical decisions; ordering necessary labs, tests and studies; and discussing treatment options and setting up treatment plan(s) if indicated.            Discussion:       Pathology Discussion:    I reviewed and discussed the pathology report(s) and radiograph reports (if available) in as simple to understand and/or laymen's terms to the best of my ability. I had an indepth conversation with the patient and went over the patient's individual diagnosis based on the information that was currently available. I discussed the TNM staging process with regard to the patient's particular cancer type, and the calculated stage based on the currently available TNM data and literature. I discussed the available prognostic data with regard to the current staging information and how it relates to the prognosis of their particular neoplastic process.          COVID-19 Discussion:    I had long discussion with patient and any applicable family about the COVID-19 coronavirus epidemic and the recommended precautions with regard to cancer and/or hematology patients. I have re-iterated the CDC recommendations for adequate hand washing, use of hand -like products, and coughing into elbow, etc. In addition, especially for our patients who are on chemotherapy and/or our otherwise immunocompromised patients, I have recommended avoidance of crowds, including movie theaters, restaurants, churches, etc. I have recommended avoidance of any sick or symptomatic family members and/or friends. I have also recommended avoidance of any raw and unwashed food products, and general avoidance of food items that have not been prepared by themselves. The patient has been asked to call us immediately with any symptom developments, issues, questions or other general concerns.     I have explained all of the  above in detail and the patient understands all of the current recommendation(s). I have answered all of their questions to the best of my ability and to their complete satisfaction.   The patient is to continue with the current management plan.            Electronically signed by Jose Tello MD

## 2022-01-20 ENCOUNTER — INFUSION (OUTPATIENT)
Dept: INFUSION THERAPY | Facility: HOSPITAL | Age: 62
End: 2022-01-20
Attending: INTERNAL MEDICINE
Payer: COMMERCIAL

## 2022-01-20 VITALS
DIASTOLIC BLOOD PRESSURE: 83 MMHG | BODY MASS INDEX: 34.24 KG/M2 | SYSTOLIC BLOOD PRESSURE: 134 MMHG | TEMPERATURE: 98 F | OXYGEN SATURATION: 98 % | RESPIRATION RATE: 18 BRPM | HEART RATE: 79 BPM | WEIGHT: 238.63 LBS

## 2022-01-20 DIAGNOSIS — N18.30 STAGE 3 CHRONIC KIDNEY DISEASE, UNSPECIFIED WHETHER STAGE 3A OR 3B CKD: ICD-10-CM

## 2022-01-20 DIAGNOSIS — D46.9 MDS (MYELODYSPLASTIC SYNDROME): Primary | ICD-10-CM

## 2022-01-20 DIAGNOSIS — D64.9 NORMOCHROMIC ANEMIA: ICD-10-CM

## 2022-01-20 DIAGNOSIS — D64.9 CHRONIC ANEMIA: ICD-10-CM

## 2022-01-20 DIAGNOSIS — D64.89 ANEMIA DUE TO MULTIPLE MECHANISMS: ICD-10-CM

## 2022-01-20 PROCEDURE — 96372 THER/PROPH/DIAG INJ SC/IM: CPT

## 2022-01-20 PROCEDURE — 63600175 PHARM REV CODE 636 W HCPCS: Mod: JG | Performed by: NURSE PRACTITIONER

## 2022-01-20 RX ADMIN — EPOETIN ALFA-EPBX 20000 UNITS: 20000 INJECTION, SOLUTION INTRAVENOUS; SUBCUTANEOUS at 04:01

## 2022-01-26 ENCOUNTER — LAB VISIT (OUTPATIENT)
Dept: LAB | Facility: HOSPITAL | Age: 62
End: 2022-01-26
Attending: INTERNAL MEDICINE
Payer: COMMERCIAL

## 2022-01-26 DIAGNOSIS — D57.3 SICKLE CELL TRAIT SYNDROME: ICD-10-CM

## 2022-01-26 LAB
ALBUMIN SERPL BCP-MCNC: 4.4 G/DL (ref 3.5–5.2)
ALP SERPL-CCNC: 62 U/L (ref 55–135)
ALT SERPL W/O P-5'-P-CCNC: 17 U/L (ref 10–44)
ANION GAP SERPL CALC-SCNC: 8 MMOL/L (ref 8–16)
AST SERPL-CCNC: 19 U/L (ref 10–40)
BASOPHILS # BLD AUTO: 0.07 K/UL (ref 0–0.2)
BASOPHILS NFR BLD: 0.9 % (ref 0–1.9)
BILIRUB SERPL-MCNC: 0.7 MG/DL (ref 0.1–1)
BUN SERPL-MCNC: 22 MG/DL (ref 8–23)
CALCIUM SERPL-MCNC: 9 MG/DL (ref 8.7–10.5)
CHLORIDE SERPL-SCNC: 109 MMOL/L (ref 95–110)
CO2 SERPL-SCNC: 26 MMOL/L (ref 23–29)
CREAT SERPL-MCNC: 1.8 MG/DL (ref 0.5–1.4)
DIFFERENTIAL METHOD: ABNORMAL
EOSINOPHIL # BLD AUTO: 0.1 K/UL (ref 0–0.5)
EOSINOPHIL NFR BLD: 1.2 % (ref 0–8)
ERYTHROCYTE [DISTWIDTH] IN BLOOD BY AUTOMATED COUNT: 26.9 % (ref 11.5–14.5)
EST. GFR  (AFRICAN AMERICAN): 45.9 ML/MIN/1.73 M^2
EST. GFR  (NON AFRICAN AMERICAN): 39.7 ML/MIN/1.73 M^2
GLUCOSE SERPL-MCNC: 107 MG/DL (ref 70–110)
HCT VFR BLD AUTO: 28.8 % (ref 40–54)
HGB BLD-MCNC: 9.7 G/DL (ref 14–18)
IMM GRANULOCYTES # BLD AUTO: 0.03 K/UL (ref 0–0.04)
IMM GRANULOCYTES NFR BLD AUTO: 0.4 % (ref 0–0.5)
LYMPHOCYTES # BLD AUTO: 1.5 K/UL (ref 1–4.8)
LYMPHOCYTES NFR BLD: 20.2 % (ref 18–48)
MCH RBC QN AUTO: 31.4 PG (ref 27–31)
MCHC RBC AUTO-ENTMCNC: 33.7 G/DL (ref 32–36)
MCV RBC AUTO: 93 FL (ref 82–98)
MONOCYTES # BLD AUTO: 0.8 K/UL (ref 0.3–1)
MONOCYTES NFR BLD: 10.3 % (ref 4–15)
NEUTROPHILS # BLD AUTO: 5 K/UL (ref 1.8–7.7)
NEUTROPHILS NFR BLD: 67 % (ref 38–73)
NRBC BLD-RTO: 0 /100 WBC
PLATELET # BLD AUTO: 505 K/UL (ref 150–450)
PMV BLD AUTO: 10.4 FL (ref 9.2–12.9)
POTASSIUM SERPL-SCNC: 4.5 MMOL/L (ref 3.5–5.1)
PROT SERPL-MCNC: 7.5 G/DL (ref 6–8.4)
RBC # BLD AUTO: 3.09 M/UL (ref 4.6–6.2)
SODIUM SERPL-SCNC: 143 MMOL/L (ref 136–145)
WBC # BLD AUTO: 7.47 K/UL (ref 3.9–12.7)

## 2022-01-26 PROCEDURE — 80053 COMPREHEN METABOLIC PANEL: CPT | Performed by: INTERNAL MEDICINE

## 2022-01-26 PROCEDURE — 85025 COMPLETE CBC W/AUTO DIFF WBC: CPT | Performed by: INTERNAL MEDICINE

## 2022-01-26 PROCEDURE — 36415 COLL VENOUS BLD VENIPUNCTURE: CPT | Performed by: INTERNAL MEDICINE

## 2022-01-27 ENCOUNTER — INFUSION (OUTPATIENT)
Dept: INFUSION THERAPY | Facility: HOSPITAL | Age: 62
End: 2022-01-27
Attending: INTERNAL MEDICINE
Payer: COMMERCIAL

## 2022-01-27 VITALS
SYSTOLIC BLOOD PRESSURE: 156 MMHG | RESPIRATION RATE: 18 BRPM | HEART RATE: 76 BPM | TEMPERATURE: 98 F | OXYGEN SATURATION: 98 % | BODY MASS INDEX: 34.21 KG/M2 | WEIGHT: 238.38 LBS | DIASTOLIC BLOOD PRESSURE: 84 MMHG

## 2022-01-27 DIAGNOSIS — N18.30 STAGE 3 CHRONIC KIDNEY DISEASE, UNSPECIFIED WHETHER STAGE 3A OR 3B CKD: ICD-10-CM

## 2022-01-27 DIAGNOSIS — D64.9 NORMOCHROMIC ANEMIA: ICD-10-CM

## 2022-01-27 DIAGNOSIS — D64.89 ANEMIA DUE TO MULTIPLE MECHANISMS: ICD-10-CM

## 2022-01-27 DIAGNOSIS — D46.9 MDS (MYELODYSPLASTIC SYNDROME): Primary | ICD-10-CM

## 2022-01-27 DIAGNOSIS — D64.9 CHRONIC ANEMIA: ICD-10-CM

## 2022-01-27 PROCEDURE — 63600175 PHARM REV CODE 636 W HCPCS: Mod: JG | Performed by: NURSE PRACTITIONER

## 2022-01-27 PROCEDURE — 96372 THER/PROPH/DIAG INJ SC/IM: CPT

## 2022-01-27 RX ADMIN — EPOETIN ALFA-EPBX 20000 UNITS: 20000 INJECTION, SOLUTION INTRAVENOUS; SUBCUTANEOUS at 01:01

## 2022-01-27 NOTE — PLAN OF CARE
Problem: Anemia  Goal: Anemia Symptom Improvement  Outcome: Ongoing, Progressing  Intervention: Monitor and Manage Anemia  Flowsheets (Taken 1/27/2022 1320)  Oral Nutrition Promotion: rest periods promoted  Safety Promotion/Fall Prevention: medications reviewed  Fatigue Management: frequent rest breaks encouraged

## 2022-02-03 ENCOUNTER — INFUSION (OUTPATIENT)
Dept: INFUSION THERAPY | Facility: HOSPITAL | Age: 62
End: 2022-02-03
Attending: INTERNAL MEDICINE
Payer: COMMERCIAL

## 2022-02-03 VITALS
HEIGHT: 70 IN | TEMPERATURE: 97 F | OXYGEN SATURATION: 98 % | HEART RATE: 72 BPM | RESPIRATION RATE: 18 BRPM | BODY MASS INDEX: 34.26 KG/M2 | DIASTOLIC BLOOD PRESSURE: 89 MMHG | SYSTOLIC BLOOD PRESSURE: 162 MMHG | WEIGHT: 239.31 LBS

## 2022-02-03 DIAGNOSIS — D64.9 CHRONIC ANEMIA: ICD-10-CM

## 2022-02-03 DIAGNOSIS — D64.89 ANEMIA DUE TO MULTIPLE MECHANISMS: ICD-10-CM

## 2022-02-03 DIAGNOSIS — D46.9 MDS (MYELODYSPLASTIC SYNDROME): Primary | ICD-10-CM

## 2022-02-03 DIAGNOSIS — N18.30 STAGE 3 CHRONIC KIDNEY DISEASE, UNSPECIFIED WHETHER STAGE 3A OR 3B CKD: ICD-10-CM

## 2022-02-03 DIAGNOSIS — D64.9 NORMOCHROMIC ANEMIA: ICD-10-CM

## 2022-02-03 PROCEDURE — 96372 THER/PROPH/DIAG INJ SC/IM: CPT

## 2022-02-03 PROCEDURE — 63600175 PHARM REV CODE 636 W HCPCS: Mod: JG | Performed by: NURSE PRACTITIONER

## 2022-02-03 RX ADMIN — EPOETIN ALFA-EPBX 20000 UNITS: 20000 INJECTION, SOLUTION INTRAVENOUS; SUBCUTANEOUS at 01:02

## 2022-02-03 NOTE — PLAN OF CARE
Problem: Anemia  Goal: Anemia Symptom Improvement  Outcome: Ongoing, Progressing  Intervention: Monitor and Manage Anemia  Flowsheets (Taken 2/3/2022 2952)  Fatigue Management:   frequent rest breaks encouraged   fatigue-related activity identified   paced activity encouraged

## 2022-02-04 NOTE — TELEPHONE ENCOUNTER
----- Message from Laurita Carlos sent at 3/26/2019  3:54 PM CDT -----  Contact: Rick  Please disregard! Sorry!   Pt educated on handwashing and infection control.  Pt verbalized understanding.

## 2022-02-08 ENCOUNTER — OFFICE VISIT (OUTPATIENT)
Dept: ORTHOPEDICS | Facility: CLINIC | Age: 62
End: 2022-02-08
Payer: COMMERCIAL

## 2022-02-08 VITALS — WEIGHT: 239 LBS | HEIGHT: 70 IN | BODY MASS INDEX: 34.22 KG/M2

## 2022-02-08 DIAGNOSIS — M17.11 PRIMARY OSTEOARTHRITIS OF RIGHT KNEE: ICD-10-CM

## 2022-02-08 DIAGNOSIS — Z98.890 S/P RIGHT KNEE ARTHROSCOPY: Primary | ICD-10-CM

## 2022-02-08 PROCEDURE — 20610 LARGE JOINT ASPIRATION/INJECTION: R KNEE: ICD-10-PCS | Mod: 58,RT,S$GLB, | Performed by: ORTHOPAEDIC SURGERY

## 2022-02-08 PROCEDURE — 99024 PR POST-OP FOLLOW-UP VISIT: ICD-10-PCS | Mod: 25,S$GLB,, | Performed by: ORTHOPAEDIC SURGERY

## 2022-02-08 PROCEDURE — 3008F BODY MASS INDEX DOCD: CPT | Mod: S$GLB,,, | Performed by: ORTHOPAEDIC SURGERY

## 2022-02-08 PROCEDURE — 99024 POSTOP FOLLOW-UP VISIT: CPT | Mod: 25,S$GLB,, | Performed by: ORTHOPAEDIC SURGERY

## 2022-02-08 PROCEDURE — 3008F PR BODY MASS INDEX (BMI) DOCUMENTED: ICD-10-PCS | Mod: S$GLB,,, | Performed by: ORTHOPAEDIC SURGERY

## 2022-02-08 PROCEDURE — 1160F RVW MEDS BY RX/DR IN RCRD: CPT | Mod: S$GLB,,, | Performed by: ORTHOPAEDIC SURGERY

## 2022-02-08 PROCEDURE — 1160F PR REVIEW ALL MEDS BY PRESCRIBER/CLIN PHARMACIST DOCUMENTED: ICD-10-PCS | Mod: S$GLB,,, | Performed by: ORTHOPAEDIC SURGERY

## 2022-02-08 PROCEDURE — 20610 DRAIN/INJ JOINT/BURSA W/O US: CPT | Mod: 58,RT,S$GLB, | Performed by: ORTHOPAEDIC SURGERY

## 2022-02-08 RX ORDER — PANTOPRAZOLE SODIUM 40 MG/1
TABLET, DELAYED RELEASE ORAL
COMMUNITY
Start: 2022-01-11

## 2022-02-08 RX ORDER — TRIAMCINOLONE ACETONIDE 40 MG/ML
40 INJECTION, SUSPENSION INTRA-ARTICULAR; INTRAMUSCULAR
Status: DISCONTINUED | OUTPATIENT
Start: 2022-02-08 | End: 2022-02-08 | Stop reason: HOSPADM

## 2022-02-08 RX ORDER — TRAMADOL HYDROCHLORIDE 50 MG/1
50 TABLET ORAL EVERY 8 HOURS PRN
Qty: 21 TABLET | Refills: 0 | Status: SHIPPED | OUTPATIENT
Start: 2022-02-08 | End: 2022-02-15

## 2022-02-08 RX ADMIN — TRIAMCINOLONE ACETONIDE 40 MG: 40 INJECTION, SUSPENSION INTRA-ARTICULAR; INTRAMUSCULAR at 08:02

## 2022-02-08 NOTE — PROGRESS NOTES
[facility] POST-OP NOTE    Subjective:           Chief Complaint:   Chief Complaint   Patient presents with    Right Knee - Post-op Evaluation     Right knee scope 12/22/2021, patient is in severe pain, constant swelling, trouble walking, he is requesting a higher does of pain medicine.       Past Medical History:   Diagnosis Date    Anemia due to multiple mechanisms 1/13/2019    Anemia, chronic renal failure, stage 2 (mild) 1/13/2019    Depression     Former smoker 6/29/2017    H/O ETOH abuse 6/29/2017    Monocytosis 2019    Myelodysplasia (myelodysplastic syndrome)     Myelodysplasia (myelodysplastic syndrome)     RARS (refractory anemia with ringed sideroblasts) 6/1/2020    Sickle cell trait        Past Surgical History:   Procedure Laterality Date    BONE MARROW BIOPSY N/A 12/20/2019    Procedure: BIOPSY, BONE MARROW;  [facility];  Location: [facility];  Service: Interventional Radiology;  Laterality: N/A;    COLONOSCOPY N/A 11/5/2021    Procedure: COLONOSCOPY;  Surgeon: Rob Collins MD;  Location: [facility];  Service: Endoscopy;  Laterality: N/A;    ESOPHAGOGASTRODUODENOSCOPY N/A 11/5/2021    Procedure: EGD (ESOPHAGOGASTRODUODENOSCOPY);  Surgeon: Rob Collins MD;  Location: [facility];  Service: Endoscopy;  Laterality: N/A;    INJECTION OF ANESTHETIC AGENT AROUND MEDIAL BRANCH NERVES INNERVATING LUMBAR FACET JOINT Bilateral 5/25/2018    Procedure: BLOCK-NERVE-MEDIAL BRANCH-LUMBAR;  Surgeon: Nura Heredia MD;  [facility];  Service: Pain Management;  Laterality: Bilateral;  L3, 4, 5    KNEE ARTHROSCOPY W/ MENISCECTOMY Right 12/22/2021    Procedure: ARTHROSCOPY, KNEE, WITH MENISCECTOMY;  Surgeon: Sebastian Green MD;  Location: [facility];  Service: Orthopedics;  Laterality: Right;  partial medial meniscectomy    RADIOFREQUENCY ABLATION OF LUMBAR MEDIAL BRANCH NERVE AT SINGLE LEVEL Bilateral 7/20/2018    Procedure: RADIOFREQUENCY ABLATION, NERVE, MEDIAL BRANCH,  LUMBAR, 1 LEVEL;  Surgeon: Nura Heredia MD;  Location[facility];  Service: Pain Management;  Laterality: Bilateral;  L3, 4, 5 - Burned at 80 degrees C.  for 75 seconds x 2 each site    SMALL BOWEL ENTEROSCOPY N/A 10/16/2019    Procedure: ENTEROSCOPY;  Surgeon: Rob Collins MD;  Location: [facility];  Service: Endoscopy;  Laterality: N/A;       Current Outpatient Medications   Medication Sig    amLODIPine (NORVASC) 5 MG tablet TAKE 1 TABLET BY MOUTH EVERY DAY    ascorbic acid, vitamin C, (VITAMIN C) 1000 MG tablet Take 1,000 mg by mouth once daily.    dronabinoL (MARINOL) 5 MG capsule     epoetin tray (PROCRIT INJ) Thursday    famotidine (PEPCID) 20 MG tablet famotidine 20 mg tablet   Take 1 tablet twice a day by oral route.    folic acid (FOLVITE) 1 MG tablet TAKE 2 TABLETS (2 MG TOTAL) BY MOUTH ONCE DAILY.    meloxicam (MOBIC) 15 MG tablet Take 1 tablet (15 mg total) by mouth once daily.    multivitamin capsule Take 1 capsule by mouth once daily.    oxyCODONE-acetaminophen (PERCOCET) 7.5-325 mg per tablet Take 1 tablet by mouth every 4 (four) hours as needed for Pain.    pantoprazole (PROTONIX) 40 MG tablet     sildenafil (VIAGRA) 100 MG tablet Take 1 tablet (100 mg total) by mouth daily as needed for Erectile Dysfunction.    tamsulosin (FLOMAX) 0.4 mg Cap TAKE 1 CAPSULE BY MOUTH EVERY DAY    traZODone (DESYREL) 100 MG tablet TAKE 1 TABLET (100 MG TOTAL) BY MOUTH NIGHTLY AS NEEDED FOR INSOMNIA.     No current facility-administered medications for this visit.       Review of patient's allergies indicates:  No Known Allergies    Family History   Problem Relation Age of Onset    Arthritis Mother     Depression Mother     Heart disease Mother     Hypertension Mother     Heart attack Father 59       Social History     Socioeconomic History    Marital status:    Occupational History    Occupation: Prep Cook     Employer: [employer]   Tobacco Use    Smoking status: Former Smoker     Quit  date: 1996     Years since quittin.1    Smokeless tobacco: Never Used   Substance and Sexual Activity    Alcohol use: Not Currently     Alcohol/week: 21.0 standard drinks     Types: 21 Cans of beer per week     Comment: Sober 5 years    Drug use: Not Currently     Types: Marijuana    Sexual activity: Yes     Partners: Female       History of present illness:  Mr. Butler is here for follow-up from his right knee arthroscopy.  He is 7 weeks postop.  He continues to have pain and swelling about the right knee.      Review of Systems:    Musculoskeletal:  See HPI      Objective:        Physical Examination:    Vital Signs:  There were no vitals filed for this visit.    Body mass index is 34.29 kg/m².    This a well-developed, well nourished patient in no acute distress.  They are alert and oriented and cooperative to examination.        Right knee exam:  Skin to right knee is clean dry and intact.  Two arthroscopic portals are well healed without wound dehiscence or drainage.  There is no erythema or ecchymosis.  No signs or symptoms of infection.  Patient does have an effusion of the right knee.  Right calf is soft and nontender.  Right knee active range of motion is approximately 0-100 degrees.  Right knee is stable to varus and valgus stresses.  Patient can weightbear as tolerated his right lower extremity.    Pertinent New Results:    XRAY Report / Interpretation:   None today    Assessment/Plan:      1.  Status post right knee arthroscopy.    I injected his right knee today with Kenalog and lidocaine.  He tolerated this well.  Advised to continue his Mobic 15 mg once a day with food.  We will also give a prescription for tramadol 50 mg to be taken as needed for pain every 8 hours.  We will see him back in 6 weeks see how he is progressing.  We did have a discussion today about the severity of the osteoarthritis proven arthroscopy in his right knee and the high likelihood that he will need a total knee  "arthroplasty at some point in the future.    Fam Rubalcava, Physician Assistant, served in the capacity as a "scribe" for this patient encounter.  A "face-to-face" encounter occurred with Dr. Sebastian Green on this date.  The treatment plan and medical decision-making is outlined above. Patient was seen and examined with a chaperone.     This note was created using Dragon voice recognition software that occasionally misinterpreted phrases or words.        "

## 2022-02-08 NOTE — PROCEDURES
Large Joint Aspiration/Injection: R knee    Date/Time: 2/8/2022 8:00 AM  Performed by: Sebastian Green MD  Authorized by: Sebastian Green MD     Consent Done?:  Yes (Verbal)  Indications:  Pain  Site marked: the procedure site was marked    Timeout: prior to procedure the correct patient, procedure, and site was verified    Prep: patient was prepped and draped in usual sterile fashion      Local anesthesia used?: Yes    Local anesthetic:  Lidocaine 1% without epinephrine    Details:  Needle Size:  25 G  Ultrasonic Guidance for needle placement?: No    Location:  Knee  Site:  R knee  Medications:  40 mg triamcinolone acetonide 40 mg/mL  Patient tolerance:  Patient tolerated the procedure well with no immediate complications

## 2022-02-09 ENCOUNTER — LAB VISIT (OUTPATIENT)
Dept: LAB | Facility: HOSPITAL | Age: 62
End: 2022-02-09
Attending: NURSE PRACTITIONER
Payer: COMMERCIAL

## 2022-02-09 DIAGNOSIS — D57.3 SICKLE CELL TRAIT SYNDROME: ICD-10-CM

## 2022-02-09 DIAGNOSIS — D46.9 MDS (MYELODYSPLASTIC SYNDROME): ICD-10-CM

## 2022-02-09 LAB
ALBUMIN SERPL BCP-MCNC: 4.4 G/DL (ref 3.5–5.2)
ALP SERPL-CCNC: 58 U/L (ref 55–135)
ALT SERPL W/O P-5'-P-CCNC: 14 U/L (ref 10–44)
ANION GAP SERPL CALC-SCNC: 6 MMOL/L (ref 8–16)
AST SERPL-CCNC: 16 U/L (ref 10–40)
BASOPHILS # BLD AUTO: 0.09 K/UL (ref 0–0.2)
BASOPHILS NFR BLD: 1.1 % (ref 0–1.9)
BILIRUB SERPL-MCNC: 0.9 MG/DL (ref 0.1–1)
BUN SERPL-MCNC: 23 MG/DL (ref 8–23)
CALCIUM SERPL-MCNC: 9 MG/DL (ref 8.7–10.5)
CHLORIDE SERPL-SCNC: 108 MMOL/L (ref 95–110)
CO2 SERPL-SCNC: 28 MMOL/L (ref 23–29)
CREAT SERPL-MCNC: 1.5 MG/DL (ref 0.5–1.4)
DIFFERENTIAL METHOD: ABNORMAL
EOSINOPHIL # BLD AUTO: 0.1 K/UL (ref 0–0.5)
EOSINOPHIL NFR BLD: 1.1 % (ref 0–8)
ERYTHROCYTE [DISTWIDTH] IN BLOOD BY AUTOMATED COUNT: 27.4 % (ref 11.5–14.5)
EST. GFR  (AFRICAN AMERICAN): 57.3 ML/MIN/1.73 M^2
EST. GFR  (NON AFRICAN AMERICAN): 49.5 ML/MIN/1.73 M^2
FERRITIN SERPL-MCNC: 1368 NG/ML (ref 20–300)
GLUCOSE SERPL-MCNC: 108 MG/DL (ref 70–110)
HCT VFR BLD AUTO: 27.3 % (ref 40–54)
HGB BLD-MCNC: 9.2 G/DL (ref 14–18)
IMM GRANULOCYTES # BLD AUTO: 0.02 K/UL (ref 0–0.04)
IMM GRANULOCYTES NFR BLD AUTO: 0.2 % (ref 0–0.5)
IRON SERPL-MCNC: 74 UG/DL (ref 45–160)
LYMPHOCYTES # BLD AUTO: 1.5 K/UL (ref 1–4.8)
LYMPHOCYTES NFR BLD: 17.8 % (ref 18–48)
MCH RBC QN AUTO: 31.7 PG (ref 27–31)
MCHC RBC AUTO-ENTMCNC: 33.7 G/DL (ref 32–36)
MCV RBC AUTO: 94 FL (ref 82–98)
MONOCYTES # BLD AUTO: 0.9 K/UL (ref 0.3–1)
MONOCYTES NFR BLD: 11 % (ref 4–15)
NEUTROPHILS # BLD AUTO: 5.6 K/UL (ref 1.8–7.7)
NEUTROPHILS NFR BLD: 68.8 % (ref 38–73)
NRBC BLD-RTO: 0 /100 WBC
PLATELET # BLD AUTO: 467 K/UL (ref 150–450)
PMV BLD AUTO: 10.3 FL (ref 9.2–12.9)
POTASSIUM SERPL-SCNC: 4.3 MMOL/L (ref 3.5–5.1)
PROT SERPL-MCNC: 7.1 G/DL (ref 6–8.4)
RBC # BLD AUTO: 2.9 M/UL (ref 4.6–6.2)
SATURATED IRON: 38 % (ref 20–50)
SODIUM SERPL-SCNC: 142 MMOL/L (ref 136–145)
TOTAL IRON BINDING CAPACITY: 197 UG/DL (ref 250–450)
TRANSFERRIN SERPL-MCNC: 141 MG/DL (ref 200–375)
WBC # BLD AUTO: 8.2 K/UL (ref 3.9–12.7)

## 2022-02-09 PROCEDURE — 84466 ASSAY OF TRANSFERRIN: CPT | Performed by: NURSE PRACTITIONER

## 2022-02-09 PROCEDURE — 36415 COLL VENOUS BLD VENIPUNCTURE: CPT | Performed by: NURSE PRACTITIONER

## 2022-02-09 PROCEDURE — 80053 COMPREHEN METABOLIC PANEL: CPT | Performed by: INTERNAL MEDICINE

## 2022-02-09 PROCEDURE — 85025 COMPLETE CBC W/AUTO DIFF WBC: CPT | Performed by: INTERNAL MEDICINE

## 2022-02-09 PROCEDURE — 82728 ASSAY OF FERRITIN: CPT | Performed by: NURSE PRACTITIONER

## 2022-02-10 ENCOUNTER — INFUSION (OUTPATIENT)
Dept: INFUSION THERAPY | Facility: HOSPITAL | Age: 62
End: 2022-02-10
Attending: INTERNAL MEDICINE
Payer: COMMERCIAL

## 2022-02-10 VITALS — TEMPERATURE: 98 F | HEIGHT: 70 IN | RESPIRATION RATE: 18 BRPM | WEIGHT: 238.31 LBS | BODY MASS INDEX: 34.12 KG/M2

## 2022-02-10 DIAGNOSIS — D64.9 CHRONIC ANEMIA: ICD-10-CM

## 2022-02-10 DIAGNOSIS — D64.89 ANEMIA DUE TO MULTIPLE MECHANISMS: ICD-10-CM

## 2022-02-10 DIAGNOSIS — D46.9 MDS (MYELODYSPLASTIC SYNDROME): Primary | ICD-10-CM

## 2022-02-10 DIAGNOSIS — N18.30 STAGE 3 CHRONIC KIDNEY DISEASE, UNSPECIFIED WHETHER STAGE 3A OR 3B CKD: ICD-10-CM

## 2022-02-10 DIAGNOSIS — D64.9 NORMOCHROMIC ANEMIA: ICD-10-CM

## 2022-02-10 PROCEDURE — 63600175 PHARM REV CODE 636 W HCPCS: Mod: JG | Performed by: NURSE PRACTITIONER

## 2022-02-10 PROCEDURE — 96372 THER/PROPH/DIAG INJ SC/IM: CPT

## 2022-02-10 RX ADMIN — EPOETIN ALFA-EPBX 20000 UNITS: 20000 INJECTION, SOLUTION INTRAVENOUS; SUBCUTANEOUS at 03:02

## 2022-02-10 NOTE — PLAN OF CARE
Problem: Anemia  Goal: Anemia Symptom Improvement  Outcome: Ongoing, Progressing  Intervention: Monitor and Manage Anemia  Flowsheets (Taken 2/10/2022 1533)  Fatigue Management:   frequent rest breaks encouraged   fatigue-related activity identified   paced activity encouraged

## 2022-02-17 ENCOUNTER — INFUSION (OUTPATIENT)
Dept: INFUSION THERAPY | Facility: HOSPITAL | Age: 62
End: 2022-02-17
Attending: INTERNAL MEDICINE
Payer: COMMERCIAL

## 2022-02-17 VITALS
BODY MASS INDEX: 33.57 KG/M2 | TEMPERATURE: 98 F | WEIGHT: 234.5 LBS | DIASTOLIC BLOOD PRESSURE: 76 MMHG | HEIGHT: 70 IN | RESPIRATION RATE: 18 BRPM | OXYGEN SATURATION: 96 % | HEART RATE: 83 BPM | SYSTOLIC BLOOD PRESSURE: 145 MMHG

## 2022-02-17 DIAGNOSIS — D46.9 MDS (MYELODYSPLASTIC SYNDROME): Primary | ICD-10-CM

## 2022-02-17 DIAGNOSIS — N18.30 STAGE 3 CHRONIC KIDNEY DISEASE, UNSPECIFIED WHETHER STAGE 3A OR 3B CKD: ICD-10-CM

## 2022-02-17 DIAGNOSIS — D64.89 ANEMIA DUE TO MULTIPLE MECHANISMS: ICD-10-CM

## 2022-02-17 DIAGNOSIS — D64.9 NORMOCHROMIC ANEMIA: ICD-10-CM

## 2022-02-17 DIAGNOSIS — D64.9 CHRONIC ANEMIA: ICD-10-CM

## 2022-02-17 PROCEDURE — 63600175 PHARM REV CODE 636 W HCPCS: Mod: JG | Performed by: NURSE PRACTITIONER

## 2022-02-17 PROCEDURE — 96372 THER/PROPH/DIAG INJ SC/IM: CPT

## 2022-02-17 RX ADMIN — EPOETIN ALFA-EPBX 20000 UNITS: 20000 INJECTION, SOLUTION INTRAVENOUS; SUBCUTANEOUS at 04:02

## 2022-02-17 NOTE — PLAN OF CARE
Problem: Fatigue  Goal: Improved Activity Tolerance  Outcome: Ongoing, Progressing  Intervention: Promote Improved Energy  Flowsheets (Taken 2/17/2022 9696)  Fatigue Management: frequent rest breaks encouraged  Sleep/Rest Enhancement:   regular sleep/rest pattern promoted   relaxation techniques promoted  Activity Management: Ambulated -L4

## 2022-02-23 ENCOUNTER — LAB VISIT (OUTPATIENT)
Dept: LAB | Facility: HOSPITAL | Age: 62
End: 2022-02-23
Attending: INTERNAL MEDICINE
Payer: COMMERCIAL

## 2022-02-23 DIAGNOSIS — D57.3 SICKLE CELL TRAIT SYNDROME: ICD-10-CM

## 2022-02-23 LAB
ALBUMIN SERPL BCP-MCNC: 4.4 G/DL (ref 3.5–5.2)
ALP SERPL-CCNC: 59 U/L (ref 55–135)
ALT SERPL W/O P-5'-P-CCNC: 16 U/L (ref 10–44)
ANION GAP SERPL CALC-SCNC: 7 MMOL/L (ref 8–16)
AST SERPL-CCNC: 21 U/L (ref 10–40)
BASOPHILS # BLD AUTO: 0.08 K/UL (ref 0–0.2)
BASOPHILS NFR BLD: 0.8 % (ref 0–1.9)
BILIRUB SERPL-MCNC: 0.9 MG/DL (ref 0.1–1)
BUN SERPL-MCNC: 22 MG/DL (ref 8–23)
CALCIUM SERPL-MCNC: 8.9 MG/DL (ref 8.7–10.5)
CHLORIDE SERPL-SCNC: 108 MMOL/L (ref 95–110)
CO2 SERPL-SCNC: 27 MMOL/L (ref 23–29)
CREAT SERPL-MCNC: 1.8 MG/DL (ref 0.5–1.4)
DIFFERENTIAL METHOD: ABNORMAL
EOSINOPHIL # BLD AUTO: 0.1 K/UL (ref 0–0.5)
EOSINOPHIL NFR BLD: 1.5 % (ref 0–8)
ERYTHROCYTE [DISTWIDTH] IN BLOOD BY AUTOMATED COUNT: 26.7 % (ref 11.5–14.5)
EST. GFR  (AFRICAN AMERICAN): 45.9 ML/MIN/1.73 M^2
EST. GFR  (NON AFRICAN AMERICAN): 39.7 ML/MIN/1.73 M^2
GLUCOSE SERPL-MCNC: 135 MG/DL (ref 70–110)
HCT VFR BLD AUTO: 27.9 % (ref 40–54)
HGB BLD-MCNC: 9.6 G/DL (ref 14–18)
IMM GRANULOCYTES # BLD AUTO: 0.1 K/UL (ref 0–0.04)
IMM GRANULOCYTES NFR BLD AUTO: 1 % (ref 0–0.5)
LYMPHOCYTES # BLD AUTO: 1.5 K/UL (ref 1–4.8)
LYMPHOCYTES NFR BLD: 16 % (ref 18–48)
MCH RBC QN AUTO: 31.6 PG (ref 27–31)
MCHC RBC AUTO-ENTMCNC: 34.4 G/DL (ref 32–36)
MCV RBC AUTO: 92 FL (ref 82–98)
MONOCYTES # BLD AUTO: 1 K/UL (ref 0.3–1)
MONOCYTES NFR BLD: 10.2 % (ref 4–15)
NEUTROPHILS # BLD AUTO: 6.7 K/UL (ref 1.8–7.7)
NEUTROPHILS NFR BLD: 70.5 % (ref 38–73)
NRBC BLD-RTO: 0 /100 WBC
PLATELET # BLD AUTO: 521 K/UL (ref 150–450)
PMV BLD AUTO: 10.5 FL (ref 9.2–12.9)
POTASSIUM SERPL-SCNC: 4.4 MMOL/L (ref 3.5–5.1)
PROT SERPL-MCNC: 7.6 G/DL (ref 6–8.4)
RBC # BLD AUTO: 3.04 M/UL (ref 4.6–6.2)
SODIUM SERPL-SCNC: 142 MMOL/L (ref 136–145)
WBC # BLD AUTO: 9.55 K/UL (ref 3.9–12.7)

## 2022-02-23 PROCEDURE — 36415 COLL VENOUS BLD VENIPUNCTURE: CPT | Performed by: INTERNAL MEDICINE

## 2022-02-23 PROCEDURE — 80053 COMPREHEN METABOLIC PANEL: CPT | Performed by: INTERNAL MEDICINE

## 2022-02-23 PROCEDURE — 85025 COMPLETE CBC W/AUTO DIFF WBC: CPT | Performed by: INTERNAL MEDICINE

## 2022-02-24 ENCOUNTER — INFUSION (OUTPATIENT)
Dept: INFUSION THERAPY | Facility: HOSPITAL | Age: 62
End: 2022-02-24
Attending: INTERNAL MEDICINE
Payer: COMMERCIAL

## 2022-02-24 VITALS
SYSTOLIC BLOOD PRESSURE: 120 MMHG | BODY MASS INDEX: 33.36 KG/M2 | RESPIRATION RATE: 17 BRPM | DIASTOLIC BLOOD PRESSURE: 77 MMHG | TEMPERATURE: 98 F | WEIGHT: 233 LBS | HEART RATE: 83 BPM | HEIGHT: 70 IN

## 2022-02-24 DIAGNOSIS — D64.89 ANEMIA DUE TO MULTIPLE MECHANISMS: ICD-10-CM

## 2022-02-24 DIAGNOSIS — D46.9 MDS (MYELODYSPLASTIC SYNDROME): Primary | ICD-10-CM

## 2022-02-24 DIAGNOSIS — N18.30 STAGE 3 CHRONIC KIDNEY DISEASE, UNSPECIFIED WHETHER STAGE 3A OR 3B CKD: ICD-10-CM

## 2022-02-24 DIAGNOSIS — D64.9 CHRONIC ANEMIA: ICD-10-CM

## 2022-02-24 DIAGNOSIS — D64.9 NORMOCHROMIC ANEMIA: ICD-10-CM

## 2022-02-24 PROCEDURE — 63600175 PHARM REV CODE 636 W HCPCS: Mod: JG | Performed by: NURSE PRACTITIONER

## 2022-02-24 PROCEDURE — 96372 THER/PROPH/DIAG INJ SC/IM: CPT

## 2022-02-24 RX ADMIN — EPOETIN ALFA-EPBX 20000 UNITS: 20000 INJECTION, SOLUTION INTRAVENOUS; SUBCUTANEOUS at 03:02

## 2022-02-24 NOTE — PLAN OF CARE
Problem: Fatigue  Goal: Improved Activity Tolerance  Intervention: Promote Improved Energy  Flowsheets (Taken 2/24/2022 1530)  Fatigue Management: fatigue-related activity identified  Activity Management: Ambulated -L4

## 2022-03-03 ENCOUNTER — INFUSION (OUTPATIENT)
Dept: INFUSION THERAPY | Facility: HOSPITAL | Age: 62
End: 2022-03-03
Attending: INTERNAL MEDICINE
Payer: COMMERCIAL

## 2022-03-03 VITALS
BODY MASS INDEX: 33.49 KG/M2 | SYSTOLIC BLOOD PRESSURE: 143 MMHG | RESPIRATION RATE: 17 BRPM | OXYGEN SATURATION: 99 % | DIASTOLIC BLOOD PRESSURE: 84 MMHG | TEMPERATURE: 98 F | HEART RATE: 74 BPM | WEIGHT: 233.38 LBS

## 2022-03-03 DIAGNOSIS — D64.89 ANEMIA DUE TO MULTIPLE MECHANISMS: ICD-10-CM

## 2022-03-03 DIAGNOSIS — D64.9 NORMOCHROMIC ANEMIA: ICD-10-CM

## 2022-03-03 DIAGNOSIS — D64.9 CHRONIC ANEMIA: ICD-10-CM

## 2022-03-03 DIAGNOSIS — N18.30 STAGE 3 CHRONIC KIDNEY DISEASE, UNSPECIFIED WHETHER STAGE 3A OR 3B CKD: ICD-10-CM

## 2022-03-03 DIAGNOSIS — D46.9 MDS (MYELODYSPLASTIC SYNDROME): Primary | ICD-10-CM

## 2022-03-03 PROCEDURE — 63600175 PHARM REV CODE 636 W HCPCS: Mod: JG | Performed by: NURSE PRACTITIONER

## 2022-03-03 PROCEDURE — 96372 THER/PROPH/DIAG INJ SC/IM: CPT

## 2022-03-03 RX ADMIN — EPOETIN ALFA-EPBX 20000 UNITS: 20000 INJECTION, SOLUTION INTRAVENOUS; SUBCUTANEOUS at 04:03

## 2022-03-09 ENCOUNTER — LAB VISIT (OUTPATIENT)
Dept: LAB | Facility: HOSPITAL | Age: 62
End: 2022-03-09
Attending: INTERNAL MEDICINE
Payer: COMMERCIAL

## 2022-03-09 DIAGNOSIS — D57.3 SICKLE CELL TRAIT SYNDROME: ICD-10-CM

## 2022-03-09 DIAGNOSIS — D46.9 MDS (MYELODYSPLASTIC SYNDROME): ICD-10-CM

## 2022-03-09 LAB
ALBUMIN SERPL BCP-MCNC: 4.3 G/DL (ref 3.5–5.2)
ALP SERPL-CCNC: 61 U/L (ref 55–135)
ALT SERPL W/O P-5'-P-CCNC: 17 U/L (ref 10–44)
ANION GAP SERPL CALC-SCNC: 10 MMOL/L (ref 8–16)
AST SERPL-CCNC: 17 U/L (ref 10–40)
BASOPHILS # BLD AUTO: 0.08 K/UL (ref 0–0.2)
BASOPHILS NFR BLD: 1 % (ref 0–1.9)
BILIRUB SERPL-MCNC: 0.6 MG/DL (ref 0.1–1)
BUN SERPL-MCNC: 22 MG/DL (ref 8–23)
CALCIUM SERPL-MCNC: 8.8 MG/DL (ref 8.7–10.5)
CHLORIDE SERPL-SCNC: 106 MMOL/L (ref 95–110)
CO2 SERPL-SCNC: 26 MMOL/L (ref 23–29)
CREAT SERPL-MCNC: 1.8 MG/DL (ref 0.5–1.4)
DIFFERENTIAL METHOD: ABNORMAL
EOSINOPHIL # BLD AUTO: 0.1 K/UL (ref 0–0.5)
EOSINOPHIL NFR BLD: 1.3 % (ref 0–8)
ERYTHROCYTE [DISTWIDTH] IN BLOOD BY AUTOMATED COUNT: 26.8 % (ref 11.5–14.5)
EST. GFR  (AFRICAN AMERICAN): 45.9 ML/MIN/1.73 M^2
EST. GFR  (NON AFRICAN AMERICAN): 39.7 ML/MIN/1.73 M^2
FERRITIN SERPL-MCNC: 1570 NG/ML (ref 20–300)
GLUCOSE SERPL-MCNC: 148 MG/DL (ref 70–110)
HCT VFR BLD AUTO: 26.9 % (ref 40–54)
HGB BLD-MCNC: 9.2 G/DL (ref 14–18)
IMM GRANULOCYTES # BLD AUTO: 0.03 K/UL (ref 0–0.04)
IMM GRANULOCYTES NFR BLD AUTO: 0.4 % (ref 0–0.5)
IRON SERPL-MCNC: 65 UG/DL (ref 45–160)
LYMPHOCYTES # BLD AUTO: 1.5 K/UL (ref 1–4.8)
LYMPHOCYTES NFR BLD: 17.7 % (ref 18–48)
MCH RBC QN AUTO: 31.4 PG (ref 27–31)
MCHC RBC AUTO-ENTMCNC: 34.2 G/DL (ref 32–36)
MCV RBC AUTO: 92 FL (ref 82–98)
MONOCYTES # BLD AUTO: 0.9 K/UL (ref 0.3–1)
MONOCYTES NFR BLD: 10.4 % (ref 4–15)
NEUTROPHILS # BLD AUTO: 5.7 K/UL (ref 1.8–7.7)
NEUTROPHILS NFR BLD: 69.2 % (ref 38–73)
NRBC BLD-RTO: 0 /100 WBC
PLATELET # BLD AUTO: 472 K/UL (ref 150–450)
PMV BLD AUTO: 10.6 FL (ref 9.2–12.9)
POTASSIUM SERPL-SCNC: 4.5 MMOL/L (ref 3.5–5.1)
PROT SERPL-MCNC: 7.3 G/DL (ref 6–8.4)
RBC # BLD AUTO: 2.93 M/UL (ref 4.6–6.2)
SATURATED IRON: 31 % (ref 20–50)
SODIUM SERPL-SCNC: 142 MMOL/L (ref 136–145)
TOTAL IRON BINDING CAPACITY: 209 UG/DL (ref 250–450)
TRANSFERRIN SERPL-MCNC: 149 MG/DL (ref 200–375)
WBC # BLD AUTO: 8.25 K/UL (ref 3.9–12.7)

## 2022-03-09 PROCEDURE — 85025 COMPLETE CBC W/AUTO DIFF WBC: CPT | Performed by: INTERNAL MEDICINE

## 2022-03-09 PROCEDURE — 84466 ASSAY OF TRANSFERRIN: CPT | Performed by: NURSE PRACTITIONER

## 2022-03-09 PROCEDURE — 82728 ASSAY OF FERRITIN: CPT | Performed by: NURSE PRACTITIONER

## 2022-03-09 PROCEDURE — 80053 COMPREHEN METABOLIC PANEL: CPT | Performed by: INTERNAL MEDICINE

## 2022-03-09 PROCEDURE — 36415 COLL VENOUS BLD VENIPUNCTURE: CPT | Performed by: NURSE PRACTITIONER

## 2022-03-10 ENCOUNTER — INFUSION (OUTPATIENT)
Dept: INFUSION THERAPY | Facility: HOSPITAL | Age: 62
End: 2022-03-10
Attending: INTERNAL MEDICINE
Payer: COMMERCIAL

## 2022-03-10 VITALS
HEIGHT: 70 IN | RESPIRATION RATE: 18 BRPM | TEMPERATURE: 98 F | DIASTOLIC BLOOD PRESSURE: 82 MMHG | OXYGEN SATURATION: 98 % | HEART RATE: 72 BPM | SYSTOLIC BLOOD PRESSURE: 135 MMHG | WEIGHT: 234.38 LBS | BODY MASS INDEX: 33.55 KG/M2

## 2022-03-10 DIAGNOSIS — N18.30 STAGE 3 CHRONIC KIDNEY DISEASE, UNSPECIFIED WHETHER STAGE 3A OR 3B CKD: ICD-10-CM

## 2022-03-10 DIAGNOSIS — D64.9 CHRONIC ANEMIA: ICD-10-CM

## 2022-03-10 DIAGNOSIS — D46.9 MDS (MYELODYSPLASTIC SYNDROME): Primary | ICD-10-CM

## 2022-03-10 DIAGNOSIS — D64.9 NORMOCHROMIC ANEMIA: ICD-10-CM

## 2022-03-10 DIAGNOSIS — D64.89 ANEMIA DUE TO MULTIPLE MECHANISMS: ICD-10-CM

## 2022-03-10 PROCEDURE — 96372 THER/PROPH/DIAG INJ SC/IM: CPT

## 2022-03-10 PROCEDURE — 63600175 PHARM REV CODE 636 W HCPCS: Mod: JG | Performed by: NURSE PRACTITIONER

## 2022-03-10 RX ADMIN — EPOETIN ALFA-EPBX 20000 UNITS: 20000 INJECTION, SOLUTION INTRAVENOUS; SUBCUTANEOUS at 04:03

## 2022-03-17 ENCOUNTER — INFUSION (OUTPATIENT)
Dept: INFUSION THERAPY | Facility: HOSPITAL | Age: 62
End: 2022-03-17
Attending: INTERNAL MEDICINE
Payer: COMMERCIAL

## 2022-03-17 VITALS
RESPIRATION RATE: 16 BRPM | WEIGHT: 233.5 LBS | SYSTOLIC BLOOD PRESSURE: 135 MMHG | DIASTOLIC BLOOD PRESSURE: 77 MMHG | HEART RATE: 82 BPM | TEMPERATURE: 98 F | HEIGHT: 70 IN | BODY MASS INDEX: 33.43 KG/M2

## 2022-03-17 DIAGNOSIS — D64.89 ANEMIA DUE TO MULTIPLE MECHANISMS: ICD-10-CM

## 2022-03-17 DIAGNOSIS — D64.9 CHRONIC ANEMIA: ICD-10-CM

## 2022-03-17 DIAGNOSIS — N18.30 STAGE 3 CHRONIC KIDNEY DISEASE, UNSPECIFIED WHETHER STAGE 3A OR 3B CKD: ICD-10-CM

## 2022-03-17 DIAGNOSIS — D64.9 NORMOCHROMIC ANEMIA: ICD-10-CM

## 2022-03-17 DIAGNOSIS — D46.9 MDS (MYELODYSPLASTIC SYNDROME): Primary | ICD-10-CM

## 2022-03-17 PROCEDURE — 96372 THER/PROPH/DIAG INJ SC/IM: CPT

## 2022-03-17 PROCEDURE — 63600175 PHARM REV CODE 636 W HCPCS: Mod: JG | Performed by: NURSE PRACTITIONER

## 2022-03-17 RX ADMIN — EPOETIN ALFA-EPBX 20000 UNITS: 20000 INJECTION, SOLUTION INTRAVENOUS; SUBCUTANEOUS at 04:03

## 2022-03-17 NOTE — PLAN OF CARE
Problem: Activity Intolerance  Goal: Enhanced Capacity and Energy  Outcome: Ongoing, Progressing  Intervention: Optimize Activity Tolerance  Flowsheets (Taken 3/17/2022 1601)  Self-Care Promotion: independence encouraged  Activity Management:   Ambulated -L4   Ambulated in fuentes - L4   Ambulated in room - L4   Up in chair - L3  Environmental Support:   calm environment promoted   distractions minimized   rest periods encouraged

## 2022-03-22 ENCOUNTER — OFFICE VISIT (OUTPATIENT)
Dept: ORTHOPEDICS | Facility: CLINIC | Age: 62
End: 2022-03-22
Payer: COMMERCIAL

## 2022-03-22 VITALS — BODY MASS INDEX: 33.36 KG/M2 | WEIGHT: 233 LBS | HEIGHT: 70 IN

## 2022-03-22 DIAGNOSIS — Z98.890 S/P RIGHT KNEE ARTHROSCOPY: Primary | ICD-10-CM

## 2022-03-22 PROCEDURE — 99024 POSTOP FOLLOW-UP VISIT: CPT | Mod: POP,,, | Performed by: ORTHOPAEDIC SURGERY

## 2022-03-22 PROCEDURE — 99024 PR POST-OP FOLLOW-UP VISIT: ICD-10-PCS | Mod: POP,,, | Performed by: ORTHOPAEDIC SURGERY

## 2022-03-22 PROCEDURE — 3008F PR BODY MASS INDEX (BMI) DOCUMENTED: ICD-10-PCS | Mod: S$GLB,,, | Performed by: ORTHOPAEDIC SURGERY

## 2022-03-22 PROCEDURE — 3008F BODY MASS INDEX DOCD: CPT | Mod: S$GLB,,, | Performed by: ORTHOPAEDIC SURGERY

## 2022-03-22 PROCEDURE — 1160F PR REVIEW ALL MEDS BY PRESCRIBER/CLIN PHARMACIST DOCUMENTED: ICD-10-PCS | Mod: S$GLB,,, | Performed by: ORTHOPAEDIC SURGERY

## 2022-03-22 PROCEDURE — 1160F RVW MEDS BY RX/DR IN RCRD: CPT | Mod: S$GLB,,, | Performed by: ORTHOPAEDIC SURGERY

## 2022-03-22 RX ORDER — TRIAMCINOLONE ACETONIDE 40 MG/ML
40 INJECTION, SUSPENSION INTRA-ARTICULAR; INTRAMUSCULAR
Status: DISCONTINUED | OUTPATIENT
Start: 2022-03-22 | End: 2022-03-22 | Stop reason: HOSPADM

## 2022-03-22 RX ADMIN — TRIAMCINOLONE ACETONIDE 40 MG: 40 INJECTION, SUSPENSION INTRA-ARTICULAR; INTRAMUSCULAR at 01:03

## 2022-03-22 NOTE — PROCEDURES
Large Joint Aspiration/Injection: R knee    Date/Time: 3/22/2022 1:15 PM  Performed by: Sebastian Green MD  Authorized by: Sebastian Green MD     Consent Done?:  Yes (Verbal)  Indications:  Pain  Site marked: the procedure site was marked    Timeout: prior to procedure the correct patient, procedure, and site was verified    Prep: patient was prepped and draped in usual sterile fashion      Local anesthesia used?: Yes    Local anesthetic:  Lidocaine 1% without epinephrine    Details:  Needle Size:  25 G  Ultrasonic Guidance for needle placement?: No    Location:  Knee  Site:  R knee  Medications:  40 mg triamcinolone acetonide 40 mg/mL  Patient tolerance:  Patient tolerated the procedure well with no immediate complications

## 2022-03-22 NOTE — PROGRESS NOTES
[facility] POST-OP NOTE    Subjective:           Chief Complaint:   Chief Complaint   Patient presents with    Right Knee - Post-op Evaluation     Right knee scope 12/22/21, patient has pain when he sits for a while, trouble going up stairs.       Past Medical History:   Diagnosis Date    Anemia due to multiple mechanisms 1/13/2019    Anemia, chronic renal failure, stage 2 (mild) 1/13/2019    Depression     Former smoker 6/29/2017    H/O ETOH abuse 6/29/2017    Monocytosis 2019    Myelodysplasia (myelodysplastic syndrome)     Myelodysplasia (myelodysplastic syndrome)     RARS (refractory anemia with ringed sideroblasts) 6/1/2020    Sickle cell trait        Past Surgical History:   Procedure Laterality Date    BONE MARROW BIOPSY N/A 12/20/2019    Procedure: BIOPSY, BONE MARROW;  Surgeon: [facility] Diagnostic Provider;  Location: [facility];  Service: Interventional Radiology;  Laterality: N/A;    COLONOSCOPY N/A 11/5/2021    Procedure: COLONOSCOPY;  Surgeon: Rob Collins MD;  Location: [facility];  Service: Endoscopy;  Laterality: N/A;    ESOPHAGOGASTRODUODENOSCOPY N/A 11/5/2021    Procedure: EGD (ESOPHAGOGASTRODUODENOSCOPY);  Surgeon: Rob Collins MD;  Location: [facility] ENDO;  Service: Endoscopy;  Laterality: N/A;    INJECTION OF ANESTHETIC AGENT AROUND MEDIAL BRANCH NERVES INNERVATING LUMBAR FACET JOINT Bilateral 5/25/2018    Procedure: BLOCK-NERVE-MEDIAL BRANCH-LUMBAR;  Surgeon: Nura Heredia MD;  Location: [facility];  Service: Pain Management;  Laterality: Bilateral;  L3, 4, 5    KNEE ARTHROSCOPY W/ MENISCECTOMY Right 12/22/2021    Procedure: ARTHROSCOPY, KNEE, WITH MENISCECTOMY;  Surgeon: Sebastian Green MD;  Location: [facility];  Service: Orthopedics;  Laterality: Right;  partial medial meniscectomy    RADIOFREQUENCY ABLATION OF LUMBAR MEDIAL BRANCH NERVE AT SINGLE LEVEL Bilateral 7/20/2018    Procedure: RADIOFREQUENCY ABLATION, NERVE, MEDIAL BRANCH, LUMBAR, 1 LEVEL;  Surgeon: Nura Heredia MD;   Location: [facility] OR;  Service: Pain Management;  Laterality: Bilateral;  L3, 4, 5 - Burned at 80 degrees C.  for 75 seconds x 2 each site    SMALL BOWEL ENTEROSCOPY N/A 10/16/2019    Procedure: ENTEROSCOPY;  Surgeon: Rob Collins MD;  Location: [facility] ENDO;  Service: Endoscopy;  Laterality: N/A;       Current Outpatient Medications   Medication Sig    amLODIPine (NORVASC) 5 MG tablet TAKE 1 TABLET BY MOUTH EVERY DAY    ascorbic acid, vitamin C, (VITAMIN C) 1000 MG tablet Take 1,000 mg by mouth once daily.    dronabinoL (MARINOL) 5 MG capsule     epoetin tray (PROCRIT INJ) Thursday    famotidine (PEPCID) 20 MG tablet famotidine 20 mg tablet   Take 1 tablet twice a day by oral route.    folic acid (FOLVITE) 1 MG tablet TAKE 2 TABLETS (2 MG TOTAL) BY MOUTH ONCE DAILY.    meloxicam (MOBIC) 15 MG tablet Take 1 tablet (15 mg total) by mouth once daily.    multivitamin capsule Take 1 capsule by mouth once daily.    oxyCODONE-acetaminophen (PERCOCET) 7.5-325 mg per tablet Take 1 tablet by mouth every 4 (four) hours as needed for Pain.    pantoprazole (PROTONIX) 40 MG tablet     sildenafil (VIAGRA) 100 MG tablet Take 1 tablet (100 mg total) by mouth daily as needed for Erectile Dysfunction.    tamsulosin (FLOMAX) 0.4 mg Cap TAKE 1 CAPSULE BY MOUTH EVERY DAY    traZODone (DESYREL) 100 MG tablet TAKE 1 TABLET (100 MG TOTAL) BY MOUTH NIGHTLY AS NEEDED FOR INSOMNIA.     No current facility-administered medications for this visit.       Review of patient's allergies indicates:  No Known Allergies    Family History   Problem Relation Age of Onset    Arthritis Mother     Depression Mother     Heart disease Mother     Hypertension Mother     Heart attack Father 59       Social History     Socioeconomic History    Marital status:    Occupational History    Occupation: Prep Cook     Employer: [employer]   Tobacco Use    Smoking status: Former Smoker     Quit date: 1996     Years since quittin.2     Smokeless tobacco: Never Used   Substance and Sexual Activity    Alcohol use: Not Currently     Alcohol/week: 21.0 standard drinks     Types: 21 Cans of beer per week     Comment: Sober 5 years    Drug use: Not Currently     Types: Marijuana    Sexual activity: Yes     Partners: Female       History of present illness:  Patient returns for follow-up arthroscopy of the knee December 22nd.  We saw him initially postoperative bleed in early January.  He states his pain has returned and is as bad if not worse than it was preoperatively.  He gets intermittent swelling, has difficulty standing for prolonged periods of time as well as going up and down stairs.    Intraoperative Findings:  The patellofemoral joint was noted to have grade 3 chondromalacia on the trochlea.  The ACL and PCL were intact.  The lateral compartment demonstrated grade 2 softening throughout.  The medial compartment demonstrated grade 3 chondromalacia throughout the medial femoral condyle.  There was a shredded posterior horn medial meniscal tear.     Description of the Findings of the Procedure:  Patient was placed on the operating table supine position.  The knee was prepped and draped in the usual sterile manner for surgery.  Inferomedial and inferolateral portals were established and diagnostic arthroscopy was undertaken.  The findings of those stated above.  At this point I turned my attention to the medial meniscus.  There series of Rosemarie and vitals were utilized and 75% of the posterior horn of medial meniscus was excised and balanced into the anterior horn.  I now copiously irrigated out the knee by using a shaver to debride any loose fragments that had been created during arthroscopy.  We removed the arthroscopic equipment.  The portals were closed with nylon stitches.  Sterile dressings were applied and the patient was noted to be in stable condition      Review of Systems:    Musculoskeletal:  See HPI      Objective:     "    Physical Examination:    Vital Signs:  There were no vitals filed for this visit.    Body mass index is 33.43 kg/m².    This a well-developed, well nourished patient in no acute distress.  They are alert and oriented and cooperative to examination.        Examination of the right knee, there are 2 well-healed surgical portals.  No evidence of wound failure dehiscence or infection no ecchymosis.  There is a mild joint effusion noted.  Normal range of motion 0-110 degrees.  No significant joint line pain or tenderness.  He does have crepitus and some pain with grind testing.  Knee is otherwise stable.    Pertinent New Results:    XRAY Report / Interpretation:       Assessment/Plan:      Status post arthroscopy of the right knee for medial meniscal tear with history of significant tricompartmental arthritis.  Patient is not ready to undergo knee replacement surgery at this time.  We have reinjected the knee today with Kenalog and lidocaine.  We will see if he gets a little bit more lengthy relief with this injection.  Follow up p.r.n..    Julian Dumont, Physician Assistant, served in the capacity as a "scribe" for this patient encounter.  A "face-to-face" encounter occurred with Dr. Sebastian Green on this date.  The treatment plan and medical decision-making is outlined above. Patient was seen and examined with a chaperone.     This note was created using Dragon voice recognition software that occasionally misinterpreted phrases or words.        "

## 2022-03-23 ENCOUNTER — LAB VISIT (OUTPATIENT)
Dept: LAB | Facility: HOSPITAL | Age: 62
End: 2022-03-23
Attending: INTERNAL MEDICINE
Payer: COMMERCIAL

## 2022-03-23 DIAGNOSIS — D57.3 SICKLE CELL TRAIT SYNDROME: ICD-10-CM

## 2022-03-23 LAB
ALBUMIN SERPL BCP-MCNC: 4.3 G/DL (ref 3.5–5.2)
ALP SERPL-CCNC: 58 U/L (ref 55–135)
ALT SERPL W/O P-5'-P-CCNC: 15 U/L (ref 10–44)
ANION GAP SERPL CALC-SCNC: 9 MMOL/L (ref 8–16)
AST SERPL-CCNC: 16 U/L (ref 10–40)
BASOPHILS # BLD AUTO: 0.07 K/UL (ref 0–0.2)
BASOPHILS NFR BLD: 0.7 % (ref 0–1.9)
BILIRUB SERPL-MCNC: 0.9 MG/DL (ref 0.1–1)
BUN SERPL-MCNC: 24 MG/DL (ref 8–23)
CALCIUM SERPL-MCNC: 9 MG/DL (ref 8.7–10.5)
CHLORIDE SERPL-SCNC: 107 MMOL/L (ref 95–110)
CO2 SERPL-SCNC: 26 MMOL/L (ref 23–29)
CREAT SERPL-MCNC: 1.7 MG/DL (ref 0.5–1.4)
DIFFERENTIAL METHOD: ABNORMAL
EOSINOPHIL # BLD AUTO: 0.1 K/UL (ref 0–0.5)
EOSINOPHIL NFR BLD: 0.7 % (ref 0–8)
ERYTHROCYTE [DISTWIDTH] IN BLOOD BY AUTOMATED COUNT: 27 % (ref 11.5–14.5)
EST. GFR  (AFRICAN AMERICAN): 49.2 ML/MIN/1.73 M^2
EST. GFR  (NON AFRICAN AMERICAN): 42.6 ML/MIN/1.73 M^2
GLUCOSE SERPL-MCNC: 131 MG/DL (ref 70–110)
HCT VFR BLD AUTO: 26.8 % (ref 40–54)
HGB BLD-MCNC: 9.1 G/DL (ref 14–18)
IMM GRANULOCYTES # BLD AUTO: 0.06 K/UL (ref 0–0.04)
IMM GRANULOCYTES NFR BLD AUTO: 0.6 % (ref 0–0.5)
LYMPHOCYTES # BLD AUTO: 1.3 K/UL (ref 1–4.8)
LYMPHOCYTES NFR BLD: 13.2 % (ref 18–48)
MCH RBC QN AUTO: 31.4 PG (ref 27–31)
MCHC RBC AUTO-ENTMCNC: 34 G/DL (ref 32–36)
MCV RBC AUTO: 92 FL (ref 82–98)
MONOCYTES # BLD AUTO: 0.8 K/UL (ref 0.3–1)
MONOCYTES NFR BLD: 7.7 % (ref 4–15)
NEUTROPHILS # BLD AUTO: 7.6 K/UL (ref 1.8–7.7)
NEUTROPHILS NFR BLD: 77.1 % (ref 38–73)
NRBC BLD-RTO: 0 /100 WBC
PLATELET # BLD AUTO: 498 K/UL (ref 150–450)
PMV BLD AUTO: 10.7 FL (ref 9.2–12.9)
POTASSIUM SERPL-SCNC: 4.3 MMOL/L (ref 3.5–5.1)
PROT SERPL-MCNC: 7.3 G/DL (ref 6–8.4)
RBC # BLD AUTO: 2.9 M/UL (ref 4.6–6.2)
SODIUM SERPL-SCNC: 142 MMOL/L (ref 136–145)
WBC # BLD AUTO: 9.91 K/UL (ref 3.9–12.7)

## 2022-03-23 PROCEDURE — 85025 COMPLETE CBC W/AUTO DIFF WBC: CPT | Performed by: INTERNAL MEDICINE

## 2022-03-23 PROCEDURE — 36415 COLL VENOUS BLD VENIPUNCTURE: CPT | Performed by: INTERNAL MEDICINE

## 2022-03-23 PROCEDURE — 80053 COMPREHEN METABOLIC PANEL: CPT | Performed by: INTERNAL MEDICINE

## 2022-03-24 ENCOUNTER — INFUSION (OUTPATIENT)
Dept: INFUSION THERAPY | Facility: HOSPITAL | Age: 62
End: 2022-03-24
Attending: INTERNAL MEDICINE
Payer: COMMERCIAL

## 2022-03-24 VITALS
HEIGHT: 70 IN | WEIGHT: 233.19 LBS | DIASTOLIC BLOOD PRESSURE: 79 MMHG | RESPIRATION RATE: 18 BRPM | HEART RATE: 73 BPM | TEMPERATURE: 97 F | SYSTOLIC BLOOD PRESSURE: 146 MMHG | OXYGEN SATURATION: 98 % | BODY MASS INDEX: 33.38 KG/M2

## 2022-03-24 DIAGNOSIS — D64.9 CHRONIC ANEMIA: ICD-10-CM

## 2022-03-24 DIAGNOSIS — D64.9 NORMOCHROMIC ANEMIA: ICD-10-CM

## 2022-03-24 DIAGNOSIS — D46.9 MDS (MYELODYSPLASTIC SYNDROME): Primary | ICD-10-CM

## 2022-03-24 DIAGNOSIS — N18.30 STAGE 3 CHRONIC KIDNEY DISEASE, UNSPECIFIED WHETHER STAGE 3A OR 3B CKD: ICD-10-CM

## 2022-03-24 DIAGNOSIS — D64.89 ANEMIA DUE TO MULTIPLE MECHANISMS: ICD-10-CM

## 2022-03-24 PROCEDURE — 63600175 PHARM REV CODE 636 W HCPCS: Mod: JG | Performed by: NURSE PRACTITIONER

## 2022-03-24 PROCEDURE — 96372 THER/PROPH/DIAG INJ SC/IM: CPT

## 2022-03-24 RX ADMIN — EPOETIN ALFA-EPBX 20000 UNITS: 20000 INJECTION, SOLUTION INTRAVENOUS; SUBCUTANEOUS at 04:03

## 2022-03-24 NOTE — PLAN OF CARE
Problem: Anemia  Goal: Anemia Symptom Improvement  Outcome: Ongoing, Progressing  Intervention: Monitor and Manage Anemia  Flowsheets (Taken 3/24/2022 1605)  Oral Nutrition Promotion:   rest periods promoted   safe use of adaptive equipment encouraged  Fatigue Management:   frequent rest breaks encouraged   paced activity encouraged   fatigue-related activity identified

## 2022-03-30 ENCOUNTER — LAB VISIT (OUTPATIENT)
Dept: LAB | Facility: HOSPITAL | Age: 62
End: 2022-03-30
Attending: NURSE PRACTITIONER
Payer: COMMERCIAL

## 2022-03-30 DIAGNOSIS — Z11.4 SCREENING FOR HIV WITHOUT PRESENCE OF RISK FACTORS: ICD-10-CM

## 2022-03-30 DIAGNOSIS — N18.30 STAGE 3 CHRONIC KIDNEY DISEASE, UNSPECIFIED WHETHER STAGE 3A OR 3B CKD: ICD-10-CM

## 2022-03-30 DIAGNOSIS — E78.5 DYSLIPIDEMIA: ICD-10-CM

## 2022-03-30 DIAGNOSIS — I10 ESSENTIAL HYPERTENSION: ICD-10-CM

## 2022-03-30 DIAGNOSIS — D46.9 MDS (MYELODYSPLASTIC SYNDROME): ICD-10-CM

## 2022-03-30 DIAGNOSIS — R73.01 ELEVATED FASTING GLUCOSE: ICD-10-CM

## 2022-03-30 DIAGNOSIS — E55.9 VITAMIN D DEFICIENCY: ICD-10-CM

## 2022-03-30 LAB
25(OH)D3+25(OH)D2 SERPL-MCNC: 47 NG/ML (ref 30–96)
ALBUMIN SERPL BCP-MCNC: 4.4 G/DL (ref 3.5–5.2)
ALP SERPL-CCNC: 59 U/L (ref 55–135)
ALT SERPL W/O P-5'-P-CCNC: 17 U/L (ref 10–44)
ANION GAP SERPL CALC-SCNC: 8 MMOL/L (ref 8–16)
AST SERPL-CCNC: 19 U/L (ref 10–40)
BASOPHILS # BLD AUTO: 0.08 K/UL (ref 0–0.2)
BASOPHILS NFR BLD: 0.8 % (ref 0–1.9)
BILIRUB SERPL-MCNC: 0.6 MG/DL (ref 0.1–1)
BUN SERPL-MCNC: 26 MG/DL (ref 8–23)
CALCIUM SERPL-MCNC: 9.2 MG/DL (ref 8.7–10.5)
CHLORIDE SERPL-SCNC: 107 MMOL/L (ref 95–110)
CHOLEST SERPL-MCNC: 163 MG/DL (ref 120–199)
CHOLEST/HDLC SERPL: 3.1 {RATIO} (ref 2–5)
CO2 SERPL-SCNC: 27 MMOL/L (ref 23–29)
CREAT SERPL-MCNC: 1.9 MG/DL (ref 0.5–1.4)
DIFFERENTIAL METHOD: ABNORMAL
EOSINOPHIL # BLD AUTO: 0.1 K/UL (ref 0–0.5)
EOSINOPHIL NFR BLD: 1.2 % (ref 0–8)
ERYTHROCYTE [DISTWIDTH] IN BLOOD BY AUTOMATED COUNT: 27.2 % (ref 11.5–14.5)
EST. GFR  (AFRICAN AMERICAN): 43 ML/MIN/1.73 M^2
EST. GFR  (NON AFRICAN AMERICAN): 37.2 ML/MIN/1.73 M^2
ESTIMATED AVG GLUCOSE: 128 MG/DL (ref 68–131)
GLUCOSE SERPL-MCNC: 130 MG/DL (ref 70–110)
HBA1C MFR BLD: 6.1 % (ref 4.5–6.2)
HCT VFR BLD AUTO: 26.8 % (ref 40–54)
HDLC SERPL-MCNC: 52 MG/DL (ref 40–75)
HDLC SERPL: 31.9 % (ref 20–50)
HGB BLD-MCNC: 9.1 G/DL (ref 14–18)
IMM GRANULOCYTES # BLD AUTO: 0.08 K/UL (ref 0–0.04)
IMM GRANULOCYTES NFR BLD AUTO: 0.8 % (ref 0–0.5)
LDLC SERPL CALC-MCNC: 104.2 MG/DL (ref 63–159)
LYMPHOCYTES # BLD AUTO: 1.6 K/UL (ref 1–4.8)
LYMPHOCYTES NFR BLD: 15.8 % (ref 18–48)
MCH RBC QN AUTO: 31.5 PG (ref 27–31)
MCHC RBC AUTO-ENTMCNC: 34 G/DL (ref 32–36)
MCV RBC AUTO: 93 FL (ref 82–98)
MONOCYTES # BLD AUTO: 1 K/UL (ref 0.3–1)
MONOCYTES NFR BLD: 9.3 % (ref 4–15)
NEUTROPHILS # BLD AUTO: 7.4 K/UL (ref 1.8–7.7)
NEUTROPHILS NFR BLD: 72.1 % (ref 38–73)
NONHDLC SERPL-MCNC: 111 MG/DL
NRBC BLD-RTO: 0 /100 WBC
PLATELET # BLD AUTO: 571 K/UL (ref 150–450)
PMV BLD AUTO: 10 FL (ref 9.2–12.9)
POTASSIUM SERPL-SCNC: 4.5 MMOL/L (ref 3.5–5.1)
PROT SERPL-MCNC: 7.2 G/DL (ref 6–8.4)
RBC # BLD AUTO: 2.89 M/UL (ref 4.6–6.2)
SODIUM SERPL-SCNC: 142 MMOL/L (ref 136–145)
TRIGL SERPL-MCNC: 34 MG/DL (ref 30–150)
TSH SERPL DL<=0.005 MIU/L-ACNC: 2.54 UIU/ML (ref 0.34–5.6)
WBC # BLD AUTO: 10.31 K/UL (ref 3.9–12.7)

## 2022-03-30 PROCEDURE — 83036 HEMOGLOBIN GLYCOSYLATED A1C: CPT | Performed by: NURSE PRACTITIONER

## 2022-03-30 PROCEDURE — 87389 HIV-1 AG W/HIV-1&-2 AB AG IA: CPT | Performed by: NURSE PRACTITIONER

## 2022-03-30 PROCEDURE — 36415 COLL VENOUS BLD VENIPUNCTURE: CPT | Performed by: NURSE PRACTITIONER

## 2022-03-30 PROCEDURE — 80061 LIPID PANEL: CPT | Performed by: NURSE PRACTITIONER

## 2022-03-30 PROCEDURE — 85025 COMPLETE CBC W/AUTO DIFF WBC: CPT | Performed by: NURSE PRACTITIONER

## 2022-03-30 PROCEDURE — 80053 COMPREHEN METABOLIC PANEL: CPT | Performed by: NURSE PRACTITIONER

## 2022-03-30 PROCEDURE — 82306 VITAMIN D 25 HYDROXY: CPT | Performed by: NURSE PRACTITIONER

## 2022-03-30 PROCEDURE — 84443 ASSAY THYROID STIM HORMONE: CPT | Performed by: NURSE PRACTITIONER

## 2022-03-31 ENCOUNTER — INFUSION (OUTPATIENT)
Dept: INFUSION THERAPY | Facility: HOSPITAL | Age: 62
End: 2022-03-31
Attending: INTERNAL MEDICINE
Payer: COMMERCIAL

## 2022-03-31 VITALS
SYSTOLIC BLOOD PRESSURE: 143 MMHG | RESPIRATION RATE: 18 BRPM | WEIGHT: 234.31 LBS | HEART RATE: 80 BPM | OXYGEN SATURATION: 97 % | TEMPERATURE: 98 F | BODY MASS INDEX: 33.62 KG/M2 | DIASTOLIC BLOOD PRESSURE: 76 MMHG

## 2022-03-31 DIAGNOSIS — N18.30 STAGE 3 CHRONIC KIDNEY DISEASE, UNSPECIFIED WHETHER STAGE 3A OR 3B CKD: ICD-10-CM

## 2022-03-31 DIAGNOSIS — D64.9 NORMOCHROMIC ANEMIA: ICD-10-CM

## 2022-03-31 DIAGNOSIS — D64.89 ANEMIA DUE TO MULTIPLE MECHANISMS: ICD-10-CM

## 2022-03-31 DIAGNOSIS — D46.9 MDS (MYELODYSPLASTIC SYNDROME): Primary | ICD-10-CM

## 2022-03-31 DIAGNOSIS — D64.9 CHRONIC ANEMIA: ICD-10-CM

## 2022-03-31 PROCEDURE — 63600175 PHARM REV CODE 636 W HCPCS: Mod: JG | Performed by: NURSE PRACTITIONER

## 2022-03-31 PROCEDURE — 96372 THER/PROPH/DIAG INJ SC/IM: CPT

## 2022-03-31 RX ADMIN — EPOETIN ALFA-EPBX 20000 UNITS: 20000 INJECTION, SOLUTION INTRAVENOUS; SUBCUTANEOUS at 04:03

## 2022-03-31 NOTE — PLAN OF CARE
Problem: Anemia  Goal: Anemia Symptom Improvement  Outcome: Ongoing, Progressing  Intervention: Monitor and Manage Anemia  Flowsheets (Taken 3/31/2022 1608)  Oral Nutrition Promotion: rest periods promoted  Safety Promotion/Fall Prevention: medications reviewed  Fatigue Management: frequent rest breaks encouraged

## 2022-04-01 ENCOUNTER — TELEPHONE (OUTPATIENT)
Dept: FAMILY MEDICINE | Facility: CLINIC | Age: 62
End: 2022-04-01
Payer: COMMERCIAL

## 2022-04-01 LAB — HIV 1+2 AB+HIV1 P24 AG SERPL QL IA: NON REACTIVE

## 2022-04-01 NOTE — TELEPHONE ENCOUNTER
Pt notified of results, advised to follow up as scheduled.    ----- Message from DEVIKA Brunner sent at 4/1/2022 11:35 AM CDT -----  Please call patient. Labs look ok. A1c prediabetic- decrease sugars and carbs in the diet. Vitamin D is normal. Thyroid is normal. CMP is showing elevated creatinine and reduced kidney function. Please make sure he is following up with his nephrologist. Anemia is present but stable. Continue follow up with heme onc. Cholesterol looks good.

## 2022-04-01 NOTE — PROGRESS NOTES
Please call patient. Labs look ok. A1c prediabetic- decrease sugars and carbs in the diet. Vitamin D is normal. Thyroid is normal. CMP is showing elevated creatinine and reduced kidney function. Please make sure he is following up with his nephrologist. Anemia is present but stable. Continue follow up with heme onc. Cholesterol looks good.

## 2022-04-06 ENCOUNTER — LAB VISIT (OUTPATIENT)
Dept: LAB | Facility: HOSPITAL | Age: 62
End: 2022-04-06
Attending: NURSE PRACTITIONER
Payer: COMMERCIAL

## 2022-04-06 DIAGNOSIS — D57.3 SICKLE CELL TRAIT SYNDROME: ICD-10-CM

## 2022-04-06 DIAGNOSIS — D46.9 MDS (MYELODYSPLASTIC SYNDROME): ICD-10-CM

## 2022-04-06 LAB
ALBUMIN SERPL BCP-MCNC: 4.5 G/DL (ref 3.5–5.2)
ALP SERPL-CCNC: 59 U/L (ref 55–135)
ALT SERPL W/O P-5'-P-CCNC: 18 U/L (ref 10–44)
ANION GAP SERPL CALC-SCNC: 7 MMOL/L (ref 8–16)
AST SERPL-CCNC: 19 U/L (ref 10–40)
BASOPHILS # BLD AUTO: 0.07 K/UL (ref 0–0.2)
BASOPHILS NFR BLD: 0.7 % (ref 0–1.9)
BILIRUB SERPL-MCNC: 1 MG/DL (ref 0.1–1)
BUN SERPL-MCNC: 33 MG/DL (ref 8–23)
CALCIUM SERPL-MCNC: 9 MG/DL (ref 8.7–10.5)
CHLORIDE SERPL-SCNC: 110 MMOL/L (ref 95–110)
CO2 SERPL-SCNC: 24 MMOL/L (ref 23–29)
CREAT SERPL-MCNC: 1.8 MG/DL (ref 0.5–1.4)
DIFFERENTIAL METHOD: ABNORMAL
EOSINOPHIL # BLD AUTO: 0.1 K/UL (ref 0–0.5)
EOSINOPHIL NFR BLD: 0.8 % (ref 0–8)
ERYTHROCYTE [DISTWIDTH] IN BLOOD BY AUTOMATED COUNT: 26.3 % (ref 11.5–14.5)
EST. GFR  (AFRICAN AMERICAN): 45.9 ML/MIN/1.73 M^2
EST. GFR  (NON AFRICAN AMERICAN): 39.7 ML/MIN/1.73 M^2
FERRITIN SERPL-MCNC: 1323 NG/ML (ref 20–300)
GLUCOSE SERPL-MCNC: 174 MG/DL (ref 70–110)
HCT VFR BLD AUTO: 27.7 % (ref 40–54)
HGB BLD-MCNC: 9.6 G/DL (ref 14–18)
IMM GRANULOCYTES # BLD AUTO: 0.04 K/UL (ref 0–0.04)
IMM GRANULOCYTES NFR BLD AUTO: 0.4 % (ref 0–0.5)
IRON SERPL-MCNC: 87 UG/DL (ref 45–160)
LYMPHOCYTES # BLD AUTO: 1.3 K/UL (ref 1–4.8)
LYMPHOCYTES NFR BLD: 13.2 % (ref 18–48)
MCH RBC QN AUTO: 31.6 PG (ref 27–31)
MCHC RBC AUTO-ENTMCNC: 34.7 G/DL (ref 32–36)
MCV RBC AUTO: 91 FL (ref 82–98)
MONOCYTES # BLD AUTO: 0.8 K/UL (ref 0.3–1)
MONOCYTES NFR BLD: 8.1 % (ref 4–15)
NEUTROPHILS # BLD AUTO: 7.6 K/UL (ref 1.8–7.7)
NEUTROPHILS NFR BLD: 76.8 % (ref 38–73)
NRBC BLD-RTO: 0 /100 WBC
PLATELET # BLD AUTO: 485 K/UL (ref 150–450)
PMV BLD AUTO: 10.7 FL (ref 9.2–12.9)
POTASSIUM SERPL-SCNC: 4.2 MMOL/L (ref 3.5–5.1)
PROT SERPL-MCNC: 7.5 G/DL (ref 6–8.4)
RBC # BLD AUTO: 3.04 M/UL (ref 4.6–6.2)
SATURATED IRON: 43 % (ref 20–50)
SODIUM SERPL-SCNC: 141 MMOL/L (ref 136–145)
TOTAL IRON BINDING CAPACITY: 202 UG/DL (ref 250–450)
TRANSFERRIN SERPL-MCNC: 144 MG/DL (ref 200–375)
WBC # BLD AUTO: 9.89 K/UL (ref 3.9–12.7)

## 2022-04-06 PROCEDURE — 36415 COLL VENOUS BLD VENIPUNCTURE: CPT | Performed by: NURSE PRACTITIONER

## 2022-04-06 PROCEDURE — 82728 ASSAY OF FERRITIN: CPT | Performed by: NURSE PRACTITIONER

## 2022-04-06 PROCEDURE — 84466 ASSAY OF TRANSFERRIN: CPT | Performed by: NURSE PRACTITIONER

## 2022-04-06 PROCEDURE — 80053 COMPREHEN METABOLIC PANEL: CPT | Performed by: INTERNAL MEDICINE

## 2022-04-06 PROCEDURE — 85025 COMPLETE CBC W/AUTO DIFF WBC: CPT | Performed by: INTERNAL MEDICINE

## 2022-04-07 ENCOUNTER — INFUSION (OUTPATIENT)
Dept: INFUSION THERAPY | Facility: HOSPITAL | Age: 62
End: 2022-04-07
Attending: INTERNAL MEDICINE
Payer: COMMERCIAL

## 2022-04-07 VITALS
WEIGHT: 228.5 LBS | RESPIRATION RATE: 18 BRPM | OXYGEN SATURATION: 99 % | HEART RATE: 68 BPM | DIASTOLIC BLOOD PRESSURE: 77 MMHG | TEMPERATURE: 98 F | SYSTOLIC BLOOD PRESSURE: 135 MMHG | BODY MASS INDEX: 32.79 KG/M2

## 2022-04-07 DIAGNOSIS — D64.89 ANEMIA DUE TO MULTIPLE MECHANISMS: ICD-10-CM

## 2022-04-07 DIAGNOSIS — D64.9 CHRONIC ANEMIA: ICD-10-CM

## 2022-04-07 DIAGNOSIS — D64.9 NORMOCHROMIC ANEMIA: ICD-10-CM

## 2022-04-07 DIAGNOSIS — N18.30 STAGE 3 CHRONIC KIDNEY DISEASE, UNSPECIFIED WHETHER STAGE 3A OR 3B CKD: ICD-10-CM

## 2022-04-07 DIAGNOSIS — D46.9 MDS (MYELODYSPLASTIC SYNDROME): Primary | ICD-10-CM

## 2022-04-07 PROCEDURE — 63600175 PHARM REV CODE 636 W HCPCS: Mod: JG | Performed by: NURSE PRACTITIONER

## 2022-04-07 PROCEDURE — 96372 THER/PROPH/DIAG INJ SC/IM: CPT

## 2022-04-07 RX ADMIN — EPOETIN ALFA-EPBX 20000 UNITS: 20000 INJECTION, SOLUTION INTRAVENOUS; SUBCUTANEOUS at 04:04

## 2022-04-07 NOTE — PLAN OF CARE
Problem: Anemia  Goal: Anemia Symptom Improvement  Outcome: Ongoing, Progressing  Intervention: Monitor and Manage Anemia  Flowsheets (Taken 4/7/2022 1611)  Oral Nutrition Promotion: rest periods promoted  Safety Promotion/Fall Prevention: medications reviewed  Fatigue Management: frequent rest breaks encouraged

## 2022-04-11 ENCOUNTER — OFFICE VISIT (OUTPATIENT)
Dept: FAMILY MEDICINE | Facility: CLINIC | Age: 62
End: 2022-04-11
Payer: COMMERCIAL

## 2022-04-11 VITALS
DIASTOLIC BLOOD PRESSURE: 72 MMHG | TEMPERATURE: 98 F | BODY MASS INDEX: 32.78 KG/M2 | HEART RATE: 91 BPM | HEIGHT: 70 IN | SYSTOLIC BLOOD PRESSURE: 115 MMHG | WEIGHT: 229 LBS | RESPIRATION RATE: 18 BRPM | OXYGEN SATURATION: 98 %

## 2022-04-11 DIAGNOSIS — D46.9 MDS (MYELODYSPLASTIC SYNDROME): ICD-10-CM

## 2022-04-11 DIAGNOSIS — E78.5 DYSLIPIDEMIA: ICD-10-CM

## 2022-04-11 DIAGNOSIS — N18.32 STAGE 3B CHRONIC KIDNEY DISEASE: ICD-10-CM

## 2022-04-11 DIAGNOSIS — R73.03 PREDIABETES: ICD-10-CM

## 2022-04-11 DIAGNOSIS — E55.9 VITAMIN D DEFICIENCY: ICD-10-CM

## 2022-04-11 DIAGNOSIS — I10 ESSENTIAL HYPERTENSION: Primary | ICD-10-CM

## 2022-04-11 PROCEDURE — 3066F NEPHROPATHY DOC TX: CPT | Mod: S$GLB,,, | Performed by: NURSE PRACTITIONER

## 2022-04-11 PROCEDURE — 99213 OFFICE O/P EST LOW 20 MIN: CPT | Mod: S$GLB,,, | Performed by: NURSE PRACTITIONER

## 2022-04-11 PROCEDURE — 3078F PR MOST RECENT DIASTOLIC BLOOD PRESSURE < 80 MM HG: ICD-10-PCS | Mod: S$GLB,,, | Performed by: NURSE PRACTITIONER

## 2022-04-11 PROCEDURE — 3044F HG A1C LEVEL LT 7.0%: CPT | Mod: S$GLB,,, | Performed by: NURSE PRACTITIONER

## 2022-04-11 PROCEDURE — 3044F PR MOST RECENT HEMOGLOBIN A1C LEVEL <7.0%: ICD-10-PCS | Mod: S$GLB,,, | Performed by: NURSE PRACTITIONER

## 2022-04-11 PROCEDURE — 3060F POS MICROALBUMINURIA REV: CPT | Mod: S$GLB,,, | Performed by: NURSE PRACTITIONER

## 2022-04-11 PROCEDURE — 1160F PR REVIEW ALL MEDS BY PRESCRIBER/CLIN PHARMACIST DOCUMENTED: ICD-10-PCS | Mod: S$GLB,,, | Performed by: NURSE PRACTITIONER

## 2022-04-11 PROCEDURE — 3008F PR BODY MASS INDEX (BMI) DOCUMENTED: ICD-10-PCS | Mod: S$GLB,,, | Performed by: NURSE PRACTITIONER

## 2022-04-11 PROCEDURE — 3008F BODY MASS INDEX DOCD: CPT | Mod: S$GLB,,, | Performed by: NURSE PRACTITIONER

## 2022-04-11 PROCEDURE — 3066F PR DOCUMENTATION OF TREATMENT FOR NEPHROPATHY: ICD-10-PCS | Mod: S$GLB,,, | Performed by: NURSE PRACTITIONER

## 2022-04-11 PROCEDURE — 3078F DIAST BP <80 MM HG: CPT | Mod: S$GLB,,, | Performed by: NURSE PRACTITIONER

## 2022-04-11 PROCEDURE — 99213 PR OFFICE/OUTPT VISIT, EST, LEVL III, 20-29 MIN: ICD-10-PCS | Mod: S$GLB,,, | Performed by: NURSE PRACTITIONER

## 2022-04-11 PROCEDURE — 3074F SYST BP LT 130 MM HG: CPT | Mod: S$GLB,,, | Performed by: NURSE PRACTITIONER

## 2022-04-11 PROCEDURE — 1160F RVW MEDS BY RX/DR IN RCRD: CPT | Mod: S$GLB,,, | Performed by: NURSE PRACTITIONER

## 2022-04-11 PROCEDURE — 3060F PR POS MICROALBUMINURIA RESULT DOCUMENTED/REVIEW: ICD-10-PCS | Mod: S$GLB,,, | Performed by: NURSE PRACTITIONER

## 2022-04-11 PROCEDURE — 3074F PR MOST RECENT SYSTOLIC BLOOD PRESSURE < 130 MM HG: ICD-10-PCS | Mod: S$GLB,,, | Performed by: NURSE PRACTITIONER

## 2022-04-12 NOTE — PROGRESS NOTES
SUBJECTIVE:      Patient ID: Rick Butler is a 61 y.o. male.    Chief Complaint: Hypertension    Pt presents for F/U HTN, HLD, and prediabetes. His BP is controlled today and he has been doing well. Denies CP, SOB, palpitations, or peripheral edema. He continues to follow closely with hematology oncology for his MDS and continues to get Procrit injections and labs weekly. His H/H continues to be low but stable. He goes to the [facility] every Wednesday for labs. He continues to see nephrology for his kidney disease, as well, as reports an upcoming appointment with Dr. Mas. We reviewed his labs together which showed normal cholesterol, prediabetes, CKD, and elevated microalbuminuria. He would like education on diet and exercise for prediabetes. He currently does not exercise outside of work but has begun watching his diet since getting his lab results. Otherwise doing well. Denies CP, SOB, wheezing, fevers, nausea, vomiting, diarrhea, constipation, numbness, weakness, dizziness, palpitations, or any other concerns at this time.    Hypertension  This is a chronic problem. The current episode started more than 1 year ago. The problem is unchanged. The problem is controlled. Pertinent negatives include no anxiety, blurred vision, chest pain, headaches, malaise/fatigue, neck pain, orthopnea, palpitations, peripheral edema, PND, shortness of breath or sweats. There are no associated agents to hypertension. Risk factors for coronary artery disease include dyslipidemia, obesity and male gender. Past treatments include calcium channel blockers. The current treatment provides significant improvement. Compliance problems include diet and exercise.  Hypertensive end-organ damage includes kidney disease. There is no history of angina, CAD/MI, CVA, heart failure, left ventricular hypertrophy, PVD or retinopathy. There is no history of chronic renal disease.       Past Surgical History:   Procedure Laterality Date     BONE MARROW BIOPSY N/A 2019    Procedure: BIOPSY, BONE MARROW;  Surgeon: [facility];  Location: [facility] OR;  Service: Interventional Radiology;  Laterality: N/A;    COLONOSCOPY N/A 2021    Procedure: COLONOSCOPY;  Surgeon: Rob Collins MD;  Location: [facility] ENDO;  Service: Endoscopy;  Laterality: N/A;    ESOPHAGOGASTRODUODENOSCOPY N/A 2021    Procedure: EGD (ESOPHAGOGASTRODUODENOSCOPY);  Surgeon: Rob Collins MD;  Location: [facility] ENDO;  Service: Endoscopy;  Laterality: N/A;    INJECTION OF ANESTHETIC AGENT AROUND MEDIAL BRANCH NERVES INNERVATING LUMBAR FACET JOINT Bilateral 2018    Procedure: BLOCK-NERVE-MEDIAL BRANCH-LUMBAR;  Surgeon: Nura Heredia MD;  Location: [facility];  Service: Pain Management;  Laterality: Bilateral;  L3, 4, 5    KNEE ARTHROSCOPY W/ MENISCECTOMY Right 2021    Procedure: ARTHROSCOPY, KNEE, WITH MENISCECTOMY;  Surgeon: Sebastian Green MD;  Location: [facility] OR;  Service: Orthopedics;  Laterality: Right;  partial medial meniscectomy    RADIOFREQUENCY ABLATION OF LUMBAR MEDIAL BRANCH NERVE AT SINGLE LEVEL Bilateral 2018    Procedure: RADIOFREQUENCY ABLATION, NERVE, MEDIAL BRANCH, LUMBAR, 1 LEVEL;  Surgeon: Nura Heredia MD;  Location: [facility];  Service: Pain Management;  Laterality: Bilateral;  L3, 4, 5 - Burned at 80 degrees C.  for 75 seconds x 2 each site    SMALL BOWEL ENTEROSCOPY N/A 10/16/2019    Procedure: ENTEROSCOPY;  Surgeon: Rob Collins MD;  Location: [facility];  Service: Endoscopy;  Laterality: N/A;     Family History   Problem Relation Age of Onset    Arthritis Mother     Depression Mother     Heart disease Mother     Hypertension Mother     Heart attack Father 59      Social History     Socioeconomic History    Marital status:    Occupational History    Occupation: Prep Cook     Employer: [employer]   Tobacco Use    Smoking status: Former Smoker     Quit date: 1996     Years since quittin.2    Smokeless  tobacco: Never Used   Substance and Sexual Activity    Alcohol use: Not Currently     Alcohol/week: 21.0 standard drinks     Types: 21 Cans of beer per week     Comment: Sober 5 years    Drug use: Not Currently     Types: Marijuana    Sexual activity: Yes     Partners: Female     Current Outpatient Medications   Medication Sig Dispense Refill    amLODIPine (NORVASC) 5 MG tablet TAKE 1 TABLET BY MOUTH EVERY DAY 90 tablet 0    ascorbic acid, vitamin C, (VITAMIN C) 1000 MG tablet Take 1,000 mg by mouth once daily.      epoetin tray (PROCRIT INJ) Thursday      folic acid (FOLVITE) 1 MG tablet TAKE 2 TABLETS (2 MG TOTAL) BY MOUTH ONCE DAILY. 180 tablet 2    multivitamin capsule Take 1 capsule by mouth once daily.      pantoprazole (PROTONIX) 40 MG tablet       sildenafil (VIAGRA) 100 MG tablet Take 1 tablet (100 mg total) by mouth daily as needed for Erectile Dysfunction. 10 tablet 6    tamsulosin (FLOMAX) 0.4 mg Cap TAKE 1 CAPSULE BY MOUTH EVERY DAY 90 capsule 2    traZODone (DESYREL) 100 MG tablet TAKE 1 TABLET (100 MG TOTAL) BY MOUTH NIGHTLY AS NEEDED FOR INSOMNIA. 90 tablet 2    famotidine (PEPCID) 20 MG tablet famotidine 20 mg tablet   Take 1 tablet twice a day by oral route.       No current facility-administered medications for this visit.     Review of patient's allergies indicates:  No Known Allergies   Past Medical History:   Diagnosis Date    Anemia due to multiple mechanisms 1/13/2019    Anemia, chronic renal failure, stage 2 (mild) 1/13/2019    Depression     Former smoker 6/29/2017    H/O ETOH abuse 6/29/2017    Monocytosis 2019    Myelodysplasia (myelodysplastic syndrome)     Myelodysplasia (myelodysplastic syndrome)     RARS (refractory anemia with ringed sideroblasts) 6/1/2020    Sickle cell trait      Past Surgical History:   Procedure Laterality Date    BONE MARROW BIOPSY N/A 12/20/2019    Procedure: BIOPSY, BONE MARROW;  Surgeon: [facility] Diagnostic Provider;  Location: [facility] OR;   Service: Interventional Radiology;  Laterality: N/A;    COLONOSCOPY N/A 11/5/2021    Procedure: COLONOSCOPY;  Surgeon: Rob Collins MD;  Location: [facility] ENDO;  Service: Endoscopy;  Laterality: N/A;    ESOPHAGOGASTRODUODENOSCOPY N/A 11/5/2021    Procedure: EGD (ESOPHAGOGASTRODUODENOSCOPY);  Surgeon: Rob Collins MD;  Location: [facility] ENDO;  Service: Endoscopy;  Laterality: N/A;    INJECTION OF ANESTHETIC AGENT AROUND MEDIAL BRANCH NERVES INNERVATING LUMBAR FACET JOINT Bilateral 5/25/2018    Procedure: BLOCK-NERVE-MEDIAL BRANCH-LUMBAR;  Surgeon: Nura Heredia MD;  [facility]  Service: Pain Management;  Laterality: Bilateral;  L3, 4, 5    KNEE ARTHROSCOPY W/ MENISCECTOMY Right 12/22/2021    Procedure: ARTHROSCOPY, KNEE, WITH MENISCECTOMY;  Surgeon: Sebastian Green MD;  Location: [facility] OR;  Service: Orthopedics;  Laterality: Right;  partial medial meniscectomy    RADIOFREQUENCY ABLATION OF LUMBAR MEDIAL BRANCH NERVE AT SINGLE LEVEL Bilateral 7/20/2018    Procedure: RADIOFREQUENCY ABLATION, NERVE, MEDIAL BRANCH, LUMBAR, 1 LEVEL;  Surgeon: Nura Heredia MD;  Location: [facility] OR;  Service: Pain Management;  Laterality: Bilateral;  L3, 4, 5 - Burned at 80 degrees C.  for 75 seconds x 2 each site    SMALL BOWEL ENTEROSCOPY N/A 10/16/2019    Procedure: ENTEROSCOPY;  Surgeon: Rob Collins MD;  Location: [facility];  Service: Endoscopy;  Laterality: N/A;       Review of Systems   Constitutional: Negative for activity change, appetite change, chills, fatigue, fever, malaise/fatigue and unexpected weight change.   HENT: Negative for congestion, ear pain, mouth sores, nosebleeds, postnasal drip, rhinorrhea, sinus pressure, sinus pain, sneezing, sore throat and trouble swallowing.    Eyes: Negative for blurred vision, pain and visual disturbance.   Respiratory: Negative for apnea, cough, chest tightness, shortness of breath, wheezing and stridor.    Cardiovascular: Negative for chest pain, palpitations, orthopnea, leg  "swelling and PND.   Gastrointestinal: Negative for abdominal pain, blood in stool, constipation, diarrhea, nausea and vomiting.   Genitourinary: Negative for dysuria, flank pain, frequency and hematuria.   Musculoskeletal: Negative for arthralgias, myalgias, neck pain and neck stiffness.   Skin: Negative for color change, rash and wound.   Neurological: Negative for dizziness, tremors, focal weakness, seizures, syncope, weakness, light-headedness, numbness and headaches.   Hematological: Negative for adenopathy.   Psychiatric/Behavioral: Negative for confusion, dysphoric mood, sleep disturbance and suicidal ideas. The patient is not nervous/anxious.       OBJECTIVE:      Vitals:    04/11/22 1604 04/11/22 1643   BP: 136/74 115/72   BP Location: Left arm Left arm   Patient Position: Sitting Sitting   BP Method: X-Large (Manual)    Pulse: 91    Resp: 18    Temp: 98.3 °F (36.8 °C)    TempSrc: Oral    SpO2: 98%    Weight: 103.9 kg (229 lb)    Height: 5' 10" (1.778 m)      Physical Exam  Vitals reviewed.   Constitutional:       General: He is not in acute distress.     Appearance: Normal appearance. He is well-developed. He is obese. He is not diaphoretic.   HENT:      Head: Normocephalic and atraumatic.      Right Ear: Hearing, tympanic membrane, ear canal and external ear normal.      Left Ear: Hearing, tympanic membrane, ear canal and external ear normal.      Nose: Nose normal. No mucosal edema, congestion or rhinorrhea.      Mouth/Throat:      Lips: Pink.      Mouth: Mucous membranes are moist.      Pharynx: Oropharynx is clear. Uvula midline. No pharyngeal swelling, oropharyngeal exudate or posterior oropharyngeal erythema.   Eyes:      General: Lids are normal. No scleral icterus.        Right eye: No discharge.         Left eye: No discharge.      Extraocular Movements: Extraocular movements intact.      Conjunctiva/sclera: Conjunctivae normal.      Pupils: Pupils are equal, round, and reactive to light.   Neck: "      Thyroid: No thyroid mass or thyromegaly.      Vascular: No carotid bruit.      Trachea: Trachea and phonation normal. No tracheal deviation.   Cardiovascular:      Rate and Rhythm: Normal rate and regular rhythm.      Pulses: Normal pulses.      Heart sounds: Normal heart sounds. No murmur heard.    No friction rub. No gallop.   Pulmonary:      Effort: Pulmonary effort is normal. No respiratory distress.      Breath sounds: Normal breath sounds. No stridor. No decreased breath sounds, wheezing, rhonchi or rales.   Chest:   Breasts:      Right: No supraclavicular adenopathy.      Left: No supraclavicular adenopathy.       Abdominal:      General: Bowel sounds are normal.      Palpations: Abdomen is soft.      Tenderness: There is no abdominal tenderness.   Musculoskeletal:         General: Normal range of motion.      Cervical back: Full passive range of motion without pain, normal range of motion and neck supple.      Right lower leg: No edema.      Left lower leg: No edema.   Lymphadenopathy:      Cervical: No cervical adenopathy.      Upper Body:      Right upper body: No supraclavicular adenopathy.      Left upper body: No supraclavicular adenopathy.   Skin:     General: Skin is warm and dry.      Capillary Refill: Capillary refill takes less than 2 seconds.      Findings: No rash.   Neurological:      General: No focal deficit present.      Mental Status: He is alert and oriented to person, place, and time.      Coordination: Coordination is intact.      Gait: Gait is intact.   Psychiatric:         Attention and Perception: Attention and perception normal.         Mood and Affect: Mood and affect normal.         Speech: Speech normal.         Behavior: Behavior normal. Behavior is cooperative.         Thought Content: Thought content normal. Thought content does not include suicidal plan.         Cognition and Memory: Cognition and memory normal.         Judgment: Judgment normal.        Assessment:        1. Essential hypertension    2. MDS (myelodysplastic syndrome)    3. Stage 3b chronic kidney disease    4. Dyslipidemia    5. Prediabetes    6. Vitamin D deficiency        Plan:       Essential hypertension  Diagnosis is stable on current regimen. Continue current medications.    MDS (myelodysplastic syndrome)  Continue F/U and treatment plan per heme onc.    Stage 3b chronic kidney disease  Stable at this time. Continue F/U with nephrology.    Dyslipidemia  Stable at this time. Encouraged diet and exercise.    Prediabetes  Encouraged exercise and diet. Stay away from sweets and high carb foods such as pasta, rice, bread, etc. Discussed lifestyle modifications: low glycemic index diet, exercise (30-45 min) daily, and weight loss. Nutrition referral placed as requested.  -     Ambulatory referral/consult to Nutrition Services; Future; Expected date: 04/19/2022    Vitamin D deficiency  Stable at this time.      Follow up in about 6 months (around 10/11/2022) for F/U prediabetes.      4/12/2022 LATOYA Barakat, FNP

## 2022-04-14 ENCOUNTER — INFUSION (OUTPATIENT)
Dept: INFUSION THERAPY | Facility: HOSPITAL | Age: 62
End: 2022-04-14
Attending: INTERNAL MEDICINE
Payer: COMMERCIAL

## 2022-04-14 VITALS
OXYGEN SATURATION: 97 % | HEIGHT: 70 IN | WEIGHT: 227.81 LBS | BODY MASS INDEX: 32.61 KG/M2 | HEART RATE: 92 BPM | RESPIRATION RATE: 18 BRPM | DIASTOLIC BLOOD PRESSURE: 76 MMHG | SYSTOLIC BLOOD PRESSURE: 105 MMHG | TEMPERATURE: 98 F

## 2022-04-14 DIAGNOSIS — D46.9 MDS (MYELODYSPLASTIC SYNDROME): Primary | ICD-10-CM

## 2022-04-14 DIAGNOSIS — D64.9 CHRONIC ANEMIA: ICD-10-CM

## 2022-04-14 DIAGNOSIS — D64.89 ANEMIA DUE TO MULTIPLE MECHANISMS: ICD-10-CM

## 2022-04-14 DIAGNOSIS — D64.9 NORMOCHROMIC ANEMIA: ICD-10-CM

## 2022-04-14 DIAGNOSIS — N18.30 STAGE 3 CHRONIC KIDNEY DISEASE, UNSPECIFIED WHETHER STAGE 3A OR 3B CKD: ICD-10-CM

## 2022-04-14 PROCEDURE — 96372 THER/PROPH/DIAG INJ SC/IM: CPT

## 2022-04-14 PROCEDURE — 63600175 PHARM REV CODE 636 W HCPCS: Mod: JG | Performed by: NURSE PRACTITIONER

## 2022-04-14 RX ADMIN — EPOETIN ALFA-EPBX 20000 UNITS: 20000 INJECTION, SOLUTION INTRAVENOUS; SUBCUTANEOUS at 04:04

## 2022-04-14 NOTE — PLAN OF CARE
Problem: Fatigue  Goal: Improved Activity Tolerance  Outcome: Ongoing, Progressing  Intervention: Promote Improved Energy  Flowsheets (Taken 4/14/2022 1605)  Fatigue Management: frequent rest breaks encouraged  Sleep/Rest Enhancement:   regular sleep/rest pattern promoted   relaxation techniques promoted  Activity Management: Ambulated -L4

## 2022-04-20 ENCOUNTER — LAB VISIT (OUTPATIENT)
Dept: LAB | Facility: HOSPITAL | Age: 62
End: 2022-04-20
Attending: INTERNAL MEDICINE
Payer: COMMERCIAL

## 2022-04-20 DIAGNOSIS — D57.3 SICKLE CELL TRAIT SYNDROME: ICD-10-CM

## 2022-04-20 LAB
ALBUMIN SERPL BCP-MCNC: 4.3 G/DL (ref 3.5–5.2)
ALP SERPL-CCNC: 54 U/L (ref 55–135)
ALT SERPL W/O P-5'-P-CCNC: 18 U/L (ref 10–44)
ANION GAP SERPL CALC-SCNC: 6 MMOL/L (ref 8–16)
AST SERPL-CCNC: 16 U/L (ref 10–40)
BASOPHILS # BLD AUTO: 0.09 K/UL (ref 0–0.2)
BASOPHILS NFR BLD: 1.2 % (ref 0–1.9)
BILIRUB SERPL-MCNC: 1 MG/DL (ref 0.1–1)
BUN SERPL-MCNC: 35 MG/DL (ref 8–23)
CALCIUM SERPL-MCNC: 9.1 MG/DL (ref 8.7–10.5)
CHLORIDE SERPL-SCNC: 108 MMOL/L (ref 95–110)
CO2 SERPL-SCNC: 27 MMOL/L (ref 23–29)
CREAT SERPL-MCNC: 1.7 MG/DL (ref 0.5–1.4)
DIFFERENTIAL METHOD: ABNORMAL
EOSINOPHIL # BLD AUTO: 0.1 K/UL (ref 0–0.5)
EOSINOPHIL NFR BLD: 0.9 % (ref 0–8)
ERYTHROCYTE [DISTWIDTH] IN BLOOD BY AUTOMATED COUNT: 26.5 % (ref 11.5–14.5)
EST. GFR  (AFRICAN AMERICAN): 48.9 ML/MIN/1.73 M^2
EST. GFR  (NON AFRICAN AMERICAN): 42.3 ML/MIN/1.73 M^2
GLUCOSE SERPL-MCNC: 131 MG/DL (ref 70–110)
HCT VFR BLD AUTO: 26.5 % (ref 40–54)
HGB BLD-MCNC: 8.9 G/DL (ref 14–18)
IMM GRANULOCYTES # BLD AUTO: 0.02 K/UL (ref 0–0.04)
IMM GRANULOCYTES NFR BLD AUTO: 0.3 % (ref 0–0.5)
LYMPHOCYTES # BLD AUTO: 1.4 K/UL (ref 1–4.8)
LYMPHOCYTES NFR BLD: 17.3 % (ref 18–48)
MCH RBC QN AUTO: 31 PG (ref 27–31)
MCHC RBC AUTO-ENTMCNC: 33.6 G/DL (ref 32–36)
MCV RBC AUTO: 92 FL (ref 82–98)
MONOCYTES # BLD AUTO: 0.7 K/UL (ref 0.3–1)
MONOCYTES NFR BLD: 9.1 % (ref 4–15)
NEUTROPHILS # BLD AUTO: 5.6 K/UL (ref 1.8–7.7)
NEUTROPHILS NFR BLD: 71.2 % (ref 38–73)
NRBC BLD-RTO: 0 /100 WBC
PLATELET # BLD AUTO: 474 K/UL (ref 150–450)
PMV BLD AUTO: 10.9 FL (ref 9.2–12.9)
POTASSIUM SERPL-SCNC: 4.1 MMOL/L (ref 3.5–5.1)
PROT SERPL-MCNC: 7.3 G/DL (ref 6–8.4)
RBC # BLD AUTO: 2.87 M/UL (ref 4.6–6.2)
SODIUM SERPL-SCNC: 141 MMOL/L (ref 136–145)
WBC # BLD AUTO: 7.8 K/UL (ref 3.9–12.7)

## 2022-04-20 PROCEDURE — 36415 COLL VENOUS BLD VENIPUNCTURE: CPT | Performed by: INTERNAL MEDICINE

## 2022-04-20 PROCEDURE — 80053 COMPREHEN METABOLIC PANEL: CPT | Performed by: INTERNAL MEDICINE

## 2022-04-20 PROCEDURE — 85025 COMPLETE CBC W/AUTO DIFF WBC: CPT | Performed by: INTERNAL MEDICINE

## 2022-04-21 ENCOUNTER — INFUSION (OUTPATIENT)
Dept: INFUSION THERAPY | Facility: HOSPITAL | Age: 62
End: 2022-04-21
Attending: INTERNAL MEDICINE
Payer: COMMERCIAL

## 2022-04-21 VITALS
HEART RATE: 80 BPM | BODY MASS INDEX: 32.51 KG/M2 | SYSTOLIC BLOOD PRESSURE: 127 MMHG | RESPIRATION RATE: 16 BRPM | DIASTOLIC BLOOD PRESSURE: 70 MMHG | TEMPERATURE: 98 F | HEIGHT: 70 IN | WEIGHT: 227.13 LBS

## 2022-04-21 DIAGNOSIS — D64.9 CHRONIC ANEMIA: ICD-10-CM

## 2022-04-21 DIAGNOSIS — N18.30 STAGE 3 CHRONIC KIDNEY DISEASE, UNSPECIFIED WHETHER STAGE 3A OR 3B CKD: ICD-10-CM

## 2022-04-21 DIAGNOSIS — D46.9 MDS (MYELODYSPLASTIC SYNDROME): Primary | ICD-10-CM

## 2022-04-21 DIAGNOSIS — D64.9 NORMOCHROMIC ANEMIA: ICD-10-CM

## 2022-04-21 DIAGNOSIS — D64.89 ANEMIA DUE TO MULTIPLE MECHANISMS: ICD-10-CM

## 2022-04-21 PROCEDURE — 96372 THER/PROPH/DIAG INJ SC/IM: CPT

## 2022-04-21 PROCEDURE — 63600175 PHARM REV CODE 636 W HCPCS: Mod: JG | Performed by: NURSE PRACTITIONER

## 2022-04-21 RX ADMIN — EPOETIN ALFA-EPBX 20000 UNITS: 20000 INJECTION, SOLUTION INTRAVENOUS; SUBCUTANEOUS at 04:04

## 2022-04-21 NOTE — PLAN OF CARE
Problem: Activity Intolerance  Goal: Enhanced Capacity and Energy  Outcome: Ongoing, Progressing  Intervention: Optimize Activity Tolerance  Flowsheets (Taken 4/21/2022 1620)  Self-Care Promotion: independence encouraged  Activity Management:   Ambulated -L4   Ambulated in fuentes - L4   Ambulated in room - L4   Up in chair - L3  Environmental Support:   distractions minimized   calm environment promoted

## 2022-04-28 ENCOUNTER — INFUSION (OUTPATIENT)
Dept: INFUSION THERAPY | Facility: HOSPITAL | Age: 62
End: 2022-04-28
Attending: INTERNAL MEDICINE
Payer: COMMERCIAL

## 2022-04-28 DIAGNOSIS — D64.89 ANEMIA DUE TO MULTIPLE MECHANISMS: ICD-10-CM

## 2022-04-28 DIAGNOSIS — D46.9 MDS (MYELODYSPLASTIC SYNDROME): Primary | ICD-10-CM

## 2022-04-28 DIAGNOSIS — D64.9 CHRONIC ANEMIA: ICD-10-CM

## 2022-04-28 DIAGNOSIS — N18.30 STAGE 3 CHRONIC KIDNEY DISEASE, UNSPECIFIED WHETHER STAGE 3A OR 3B CKD: ICD-10-CM

## 2022-04-28 DIAGNOSIS — D64.9 NORMOCHROMIC ANEMIA: ICD-10-CM

## 2022-04-28 PROCEDURE — 96372 THER/PROPH/DIAG INJ SC/IM: CPT

## 2022-04-28 PROCEDURE — 63600175 PHARM REV CODE 636 W HCPCS: Mod: JG | Performed by: NURSE PRACTITIONER

## 2022-04-28 RX ADMIN — EPOETIN ALFA-EPBX 20000 UNITS: 20000 INJECTION, SOLUTION INTRAVENOUS; SUBCUTANEOUS at 04:04

## 2022-04-28 NOTE — PLAN OF CARE
Problem: Fatigue  Goal: Improved Activity Tolerance  Outcome: Ongoing, Progressing  Intervention: Promote Improved Energy  Flowsheets (Taken 4/28/2022 1612)  Fatigue Management:   activity schedule adjusted   fatigue-related activity identified  Sleep/Rest Enhancement: noise level reduced  Activity Management:   Ambulated -L4   Ambulated in fuentes - L4   Ambulated in room - L4   Up in chair - L3

## 2022-05-04 ENCOUNTER — LAB VISIT (OUTPATIENT)
Dept: LAB | Facility: HOSPITAL | Age: 62
End: 2022-05-04
Attending: NURSE PRACTITIONER
Payer: COMMERCIAL

## 2022-05-04 DIAGNOSIS — D57.3 SICKLE CELL TRAIT SYNDROME: ICD-10-CM

## 2022-05-04 DIAGNOSIS — D46.9 MDS (MYELODYSPLASTIC SYNDROME): ICD-10-CM

## 2022-05-04 LAB
ALBUMIN SERPL BCP-MCNC: 4.2 G/DL (ref 3.5–5.2)
ALP SERPL-CCNC: 52 U/L (ref 55–135)
ALT SERPL W/O P-5'-P-CCNC: 18 U/L (ref 10–44)
ANION GAP SERPL CALC-SCNC: 5 MMOL/L (ref 8–16)
AST SERPL-CCNC: 21 U/L (ref 10–40)
BASOPHILS # BLD AUTO: 0.1 K/UL (ref 0–0.2)
BASOPHILS NFR BLD: 1.4 % (ref 0–1.9)
BILIRUB SERPL-MCNC: 0.7 MG/DL (ref 0.1–1)
BUN SERPL-MCNC: 29 MG/DL (ref 8–23)
CALCIUM SERPL-MCNC: 8.7 MG/DL (ref 8.7–10.5)
CHLORIDE SERPL-SCNC: 105 MMOL/L (ref 95–110)
CO2 SERPL-SCNC: 29 MMOL/L (ref 23–29)
CREAT SERPL-MCNC: 1.8 MG/DL (ref 0.5–1.4)
DIFFERENTIAL METHOD: ABNORMAL
EOSINOPHIL # BLD AUTO: 0.1 K/UL (ref 0–0.5)
EOSINOPHIL NFR BLD: 1.2 % (ref 0–8)
ERYTHROCYTE [DISTWIDTH] IN BLOOD BY AUTOMATED COUNT: 26.7 % (ref 11.5–14.5)
EST. GFR  (AFRICAN AMERICAN): 45.6 ML/MIN/1.73 M^2
EST. GFR  (NON AFRICAN AMERICAN): 39.5 ML/MIN/1.73 M^2
FERRITIN SERPL-MCNC: 1459 NG/ML (ref 20–300)
GLUCOSE SERPL-MCNC: 153 MG/DL (ref 70–110)
HCT VFR BLD AUTO: 25.7 % (ref 40–54)
HGB BLD-MCNC: 9 G/DL (ref 14–18)
IMM GRANULOCYTES # BLD AUTO: 0.03 K/UL (ref 0–0.04)
IMM GRANULOCYTES NFR BLD AUTO: 0.4 % (ref 0–0.5)
IRON SERPL-MCNC: 78 UG/DL (ref 45–160)
LYMPHOCYTES # BLD AUTO: 1.3 K/UL (ref 1–4.8)
LYMPHOCYTES NFR BLD: 18.3 % (ref 18–48)
MCH RBC QN AUTO: 32.4 PG (ref 27–31)
MCHC RBC AUTO-ENTMCNC: 35 G/DL (ref 32–36)
MCV RBC AUTO: 92 FL (ref 82–98)
MONOCYTES # BLD AUTO: 0.7 K/UL (ref 0.3–1)
MONOCYTES NFR BLD: 9 % (ref 4–15)
NEUTROPHILS # BLD AUTO: 5.1 K/UL (ref 1.8–7.7)
NEUTROPHILS NFR BLD: 69.7 % (ref 38–73)
NRBC BLD-RTO: 0 /100 WBC
PLATELET # BLD AUTO: 485 K/UL (ref 150–450)
PMV BLD AUTO: 10.8 FL (ref 9.2–12.9)
POTASSIUM SERPL-SCNC: 4.2 MMOL/L (ref 3.5–5.1)
PROT SERPL-MCNC: 6.8 G/DL (ref 6–8.4)
RBC # BLD AUTO: 2.78 M/UL (ref 4.6–6.2)
SATURATED IRON: 45 % (ref 20–50)
SODIUM SERPL-SCNC: 139 MMOL/L (ref 136–145)
TOTAL IRON BINDING CAPACITY: 175 UG/DL (ref 250–450)
TRANSFERRIN SERPL-MCNC: 125 MG/DL (ref 200–375)
WBC # BLD AUTO: 7.25 K/UL (ref 3.9–12.7)

## 2022-05-04 PROCEDURE — 36415 COLL VENOUS BLD VENIPUNCTURE: CPT | Performed by: NURSE PRACTITIONER

## 2022-05-04 PROCEDURE — 85025 COMPLETE CBC W/AUTO DIFF WBC: CPT | Performed by: INTERNAL MEDICINE

## 2022-05-04 PROCEDURE — 82728 ASSAY OF FERRITIN: CPT | Performed by: NURSE PRACTITIONER

## 2022-05-04 PROCEDURE — 84466 ASSAY OF TRANSFERRIN: CPT | Performed by: NURSE PRACTITIONER

## 2022-05-04 PROCEDURE — 80053 COMPREHEN METABOLIC PANEL: CPT | Performed by: INTERNAL MEDICINE

## 2022-05-05 ENCOUNTER — INFUSION (OUTPATIENT)
Dept: INFUSION THERAPY | Facility: HOSPITAL | Age: 62
End: 2022-05-05
Attending: INTERNAL MEDICINE
Payer: COMMERCIAL

## 2022-05-05 VITALS
HEIGHT: 70 IN | HEART RATE: 76 BPM | BODY MASS INDEX: 32.69 KG/M2 | TEMPERATURE: 98 F | DIASTOLIC BLOOD PRESSURE: 77 MMHG | WEIGHT: 228.31 LBS | OXYGEN SATURATION: 95 % | SYSTOLIC BLOOD PRESSURE: 124 MMHG | RESPIRATION RATE: 18 BRPM

## 2022-05-05 DIAGNOSIS — D64.89 ANEMIA DUE TO MULTIPLE MECHANISMS: ICD-10-CM

## 2022-05-05 DIAGNOSIS — D64.9 CHRONIC ANEMIA: ICD-10-CM

## 2022-05-05 DIAGNOSIS — D64.9 NORMOCHROMIC ANEMIA: ICD-10-CM

## 2022-05-05 DIAGNOSIS — N18.30 STAGE 3 CHRONIC KIDNEY DISEASE, UNSPECIFIED WHETHER STAGE 3A OR 3B CKD: ICD-10-CM

## 2022-05-05 DIAGNOSIS — D46.9 MDS (MYELODYSPLASTIC SYNDROME): Primary | ICD-10-CM

## 2022-05-05 PROCEDURE — 96372 THER/PROPH/DIAG INJ SC/IM: CPT

## 2022-05-05 PROCEDURE — 63600175 PHARM REV CODE 636 W HCPCS: Mod: JG | Performed by: NURSE PRACTITIONER

## 2022-05-05 RX ADMIN — EPOETIN ALFA-EPBX 20000 UNITS: 20000 INJECTION, SOLUTION INTRAVENOUS; SUBCUTANEOUS at 04:05

## 2022-05-11 NOTE — PROGRESS NOTES
"        [facility] Hematology/Oncology                 PROGRESS NOTE      Subjective:       Patient ID:   NAME: Rick Butler : 1960     62 y.o. male    Referring Doc: Braulio (new PCP)  Other Physicians: Getachew; Alan Mustafa    Chief Complaint:  Sickle cell trait/anemia/RARS  f/u    History of Present Illness:     Patient returns today for a regularly scheduled follow-up visit.  The patient is doing ok overall. He is here by himself today. He has been feeling "really good"     no pain crisis; he has been sober for over  5-6 years now; he has been off tobacco too; no CP, SOB, HA's or N/V;        He has not had any recent pain crisis. He denies having any fatigue or breathing difficulties. He denies any blood in the stool, dark stools or hematuria.       He has been under the care of Dr Mustafa at [facility] for RARS- MDS. He is now on procrit weekly. He saw Dr Mustafa again this past Friday. Dr Hilario plans to take over his care.      No current knee issues    Discussed covid19 precautions - he had his vaccinations      ROS:   GEN: normal without any fever, night sweats or weight loss  HEENT: normal with no HA's, sore throat, stiff neck, changes in vision  CV: normal with no CP, SOB, PND, MORA or orthopnea  PULM: normal with no SOB, cough, hemoptysis, sputum or pleuritic pain  GI: normal with no abdominal pain, nausea, vomiting, constipation, diarrhea, melanotic stools, BRBPR, or hematemesis  : normal with no hematuria, dysuria  BREAST: normal with no mass, discharge, pain  SKIN: normal with no rash, erythema, bruising, or swelling    Allergies:  Review of patient's allergies indicates:  No Known Allergies    Medications:    Current Outpatient Medications:     amLODIPine (NORVASC) 5 MG tablet, TAKE 1 TABLET BY MOUTH EVERY DAY, Disp: 90 tablet, Rfl: 0    ascorbic acid, vitamin C, (VITAMIN C) 1000 MG tablet, Take 1,000 mg by mouth once daily., Disp: , Rfl:     epoetin tray (PROCRIT INJ), Thursday, Disp: , Rfl:     " "famotidine (PEPCID) 20 MG tablet, famotidine 20 mg tablet  Take 1 tablet twice a day by oral route., Disp: , Rfl:     folic acid (FOLVITE) 1 MG tablet, TAKE 2 TABLETS (2 MG TOTAL) BY MOUTH ONCE DAILY., Disp: 180 tablet, Rfl: 2    multivitamin capsule, Take 1 capsule by mouth once daily., Disp: , Rfl:     pantoprazole (PROTONIX) 40 MG tablet, , Disp: , Rfl:     tamsulosin (FLOMAX) 0.4 mg Cap, TAKE 1 CAPSULE BY MOUTH EVERY DAY, Disp: 90 capsule, Rfl: 2    traZODone (DESYREL) 100 MG tablet, TAKE 1 TABLET (100 MG TOTAL) BY MOUTH NIGHTLY AS NEEDED FOR INSOMNIA., Disp: 90 tablet, Rfl: 2    sildenafil (VIAGRA) 100 MG tablet, Take 1 tablet (100 mg total) by mouth daily as needed for Erectile Dysfunction., Disp: 10 tablet, Rfl: 6    PMHx/PSHx Updates:  See patient's last visit with me on 1/13/2022  See H&P on 7/9/2011      Pathology:    12/20/2019  BONE MARROW, RIGHT ILIAC CREST,    ASPIRATE, CLOT SECTION, AND CORE BIOPSY:    --HYPERCELLULAR MARROW (APPROXIMATELY 75% TO 80%) WITH TRILINEAGE   HEMATOPOIETIC ELEMENTS, ERYTHROID  HYPERPLASIA AND MILD MEGAKARYOCYTIC HYPERPLASIA, AND NON-SPECIFIC   DYSHEMATOPOIETIC CHANGES (SEE     COMMENT).  --[id number] INCREASED STAINABLE IRON WITH INCREASED RING   SIDEROBLASTS (GREATER THAN 15%)     (SEE COMMENT).  --PERIPHERAL BLOOD WITH THROMBOCYTOSIS (574,000/MICROLITER) AND ANEMIA   (HEMOGLOBIN 5.5 GRAM/DECILITER),    WITH SCATTERED DREPANOCYTOID FORMS AND FEW NUCLEATED ERYTHROCYTES      Cytogenetic Results at the bottom of the report.  NORMAL    Objective:     Vitals:  Blood pressure 121/74, pulse 80, resp. rate 18, height 5' 10" (1.778 m), weight 102.1 kg (225 lb).    Physical Examination:   GEN: no apparent distress, comfortable; AAOx3  HEAD: atraumatic and normocephalic  EYES: no pallor, no icterus, PERRLA  ENT: OMM, no pharyngeal erythema, external ears WNL; no nasal discharge; no thrush  NECK: no masses, thyroid normal, trachea midline, no LAD/LN's, supple  CV: RRR " with no murmur; normal pulse; normal S1 and S2; no pedal edema  CHEST: Normal respiratory effort; CTAB; normal breath sounds; no wheeze or crackles  ABDOM: nontender and nondistended; soft; normal bowel sounds; no rebound/guarding  MUSC/Skeletal: ROM normal; no crepitus; joints normal; no deformities or arthropathy  EXTREM: no clubbing, cyanosis, inflammation or swelling  SKIN: no rashes, lesions, ulcers, petechiae or subcutaneous nodules  : no jiang  NEURO: grossly intact; motor/sensory WNL; AAOx3; no tremors  PSYCH: normal mood, affect and behavior  LYMPH: normal cervical, supraclavicular, axillary and groin LN's            Labs:     Lab Results   Component Value Date    WBC 7.25 05/04/2022    HGB 9.0 (L) 05/04/2022    HCT 25.7 (L) 05/04/2022    MCV 92 05/04/2022     (H) 05/04/2022           CMP  Sodium   Date Value Ref Range Status   05/04/2022 139 136 - 145 mmol/L Final   06/26/2019 138 134 - 144 mmol/L      Potassium   Date Value Ref Range Status   05/04/2022 4.2 3.5 - 5.1 mmol/L Final     Chloride   Date Value Ref Range Status   05/04/2022 105 95 - 110 mmol/L Final   06/26/2019 105 98 - 110 mmol/L      CO2   Date Value Ref Range Status   05/04/2022 29 23 - 29 mmol/L Final     Glucose   Date Value Ref Range Status   05/04/2022 153 (H) 70 - 110 mg/dL Final   06/26/2019 114 (H) 70 - 99 mg/dL      BUN   Date Value Ref Range Status   05/04/2022 29 (H) 8 - 23 mg/dL Final     Creatinine   Date Value Ref Range Status   05/04/2022 1.8 (H) 0.5 - 1.4 mg/dL Final   06/26/2019 1.62 (H) 0.60 - 1.40 mg/dL      Calcium   Date Value Ref Range Status   05/04/2022 8.7 8.7 - 10.5 mg/dL Final     Total Protein   Date Value Ref Range Status   05/04/2022 6.8 6.0 - 8.4 g/dL Final     Albumin   Date Value Ref Range Status   05/04/2022 4.2 3.5 - 5.2 g/dL Final   06/26/2019 4.4 3.1 - 4.7 g/dL      Total Bilirubin   Date Value Ref Range Status   05/04/2022 0.7 0.1 - 1.0 mg/dL Final     Comment:     For infants and newborns,  interpretation of results should be based  on gestational age, weight and in agreement with clinical  observations.    Premature Infant recommended reference ranges:  Up to 24 hours.............<8.0 mg/dL  Up to 48 hours............<12.0 mg/dL  3-5 days..................<15.0 mg/dL  6-29 days.................<15.0 mg/dL       Alkaline Phosphatase   Date Value Ref Range Status   05/04/2022 52 (L) 55 - 135 U/L Final     AST   Date Value Ref Range Status   05/04/2022 21 10 - 40 U/L Final     ALT   Date Value Ref Range Status   05/04/2022 18 10 - 44 U/L Final     Anion Gap   Date Value Ref Range Status   05/04/2022 5 (L) 8 - 16 mmol/L Final     eGFR if    Date Value Ref Range Status   05/04/2022 45.6 (A) >60 mL/min/1.73 m^2 Final     eGFR if non    Date Value Ref Range Status   05/04/2022 39.5 (A) >60 mL/min/1.73 m^2 Final     Comment:     Calculation used to obtain the estimated glomerular filtration  rate (eGFR) is the CKD-EPI equation.                I have reviewed all available lab results and radiology reports.    Radiology/Diagnostic Studies:    2/15/2018 Xray Survey:   IMPRESSION: No significant abnormality seen. No evidence of osteoblastic or  osteoclastic lesions identified    MRI L-spine 2/12/2018  IMPRESSION:    1. Prominent low signal intensity throughout the bone marrow on T1 and  T2-weighted imaging. Marrow infiltrative process including malignancy such as  metastatic disease or lymphoma/leukemia is a consideration along with multiple  myeloma or malignant process. Benign etiology such as osteopetrosis or  regenerative red marrow are also considerations.    2. Multilevel lumbar degenerative changes, most prominent at L4-L5 and L5-S1 as  described.    Assessment/Plan:   (1) 62 y.o. male with diagnosis of sickle cell anemia with borderline microcytosis  - latest hgb was 6.0 and he had blood transfusion  - today, he is not symptomatic at this time  - he probably has a  "multifactorial anemia process with underlying sickle cell, anemia of chronic disorders and anemia of chronic renal. I can not rule out an underlying GI bleeding process.   - iron panel is adequate (no deficiency)  - total bilirubin is only minimally elevated  - he required transfusion again in Oct 2019 and again in Dec 2019  - he had bone marrow biopsy on 12/20/2019    "dyshematopoietic changes are not entirely specific and are present  mostly within erythroid and megakaryocytic lineages. These findings   could be observed in chronic disease states, autoimmune conditions,  infectious settings, toxic exposures, nutritional deficits, as medication/drug effect, and in myelodysplastic syndrome (MDS), among other conditions"  "Additionally, ring sideroblasts are a non-specific   finding "    Patient was referred to see Dr Mustafa at [facility] and had repeat BM biopsy with diagnosis made of RARS  - he is now on procrit per directives of Dr Mustafa    7/30/2020:   he recently saw Dr Mustafa  And sees him again in Jan 2021  - he is doing well with procrit and is feeling much better  - he has not required any transfusions for some time time    9/24/2020:  - latest hgb at 8.5  - will increase the procrit dose  - f/u with Dr Zavala in Jan 2021 11/19/2020:  - patient doing well and feels "great"  - hgb up to 9.1; continued on the procrit  - f/u with Dr Mustafa in Jan 2021    3/4/2021:  - he saw Dr Mustafa in jan 2021 and sees him again in June/July 2021  - latest hgb at 9.1 and stable and platelets are 474,000; wbc at 10.0    6/10/2021:  - latest hgb at 9.0  - plats 485,000  - on weekly procrit  - seeing Dr Mustafa again tomorrow    10/14/2021:  - hgb at 8.6 but he recently had been taking naproxen and had some BRBPR - which since resolved  - he saw Dr Beyer with GI and he is having colonsocopy on Nov 5th along with EGD  - plats 429,000  - he sees Dr Mustafa again in Nov 19th   - he sees Dr Mas again in Dec 2021    1/13/2022:  - " "He has been under the care of Dr Mustafa at [facility] for RARS- MDS. He is now on procrit weekly. He sees Dr Mustafa again in March 2022    - hgb 9.3  - he had scope with Dr Collins in nov 2021 which was good per patient and they plan to repeat in 5 yrs    5/12/2022:  - hgb at 9.0  - plats 485  - wbc 7.25  - he is on procrit weekly  - saw Dr Mustafa this past Fridday and since he is leaving, he will start seeing Dr Hilario instead in 3 months    (2) Alcohol abuse issues in past - he has been sober for over 3 years now    (3) former smoker    (4) chronic back issues - prior Xrays and MRI - suspect the findings on the MRI are possibly due to his sickle cell;   - SPEP was previously negative for M-protein  - PSA was 0.3 in Sept 2017  - recommended neurosurgery evaluation previously  - he saw Dr Nura VUONG and had a nerve "burning" procedure and his pain has improved    (5) CRI - elevated creatinine - he is followed Dr Chilel with nephrology      (6) H pylori gastritis - s/p recent endoscopy with Dr Collins and antibotics x 10 days        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 Sickle cell trait syndrome     3. Thrombocytosis     4. MDS (myelodysplastic syndrome)     5. Monocytosis     6. Normochromic anemia     7. Anemia due to multiple mechanisms     8. Chronic anemia     9. Former smoker       PLAN;  1. Continue with the procrit    2. Planned f/u in July 2022 with Dr Hilario at [facility]  3.  F/u with nephrology as directed by them -   Dr Mas    4. Check labs every 2 weeks- encouraged compliance   5. Procrit as per Dr Mustafa's directives   6.  Encouraged continued sobriety; diet per Braulio          RTC 3-4 months  Fax note to Dennis Ramsey, Tg; Ramsey/Sulaiman    Total Time spent on patient:    I spent over 25 mins of time with the patient. Reviewed results of the recently ordered labs, tests, reports and studies; made directives with regards to the results. Over half of this time was spent couseling and " coordinating care, making treatment and analytical decisions; ordering necessary labs, tests and studies; and discussing treatment options and setting up treatment plan(s) if indicated.            Discussion:       Pathology Discussion:    I reviewed and discussed the pathology report(s) and radiograph reports (if available) in as simple to understand and/or laymen's terms to the best of my ability. I had an indepth conversation with the patient and went over the patient's individual diagnosis based on the information that was currently available. I discussed the TNM staging process with regard to the patient's particular cancer type, and the calculated stage based on the currently available TNM data and literature. I discussed the available prognostic data with regard to the current staging information and how it relates to the prognosis of their particular neoplastic process.          COVID-19 Discussion:    I had long discussion with patient and any applicable family about the COVID-19 coronavirus epidemic and the recommended precautions with regard to cancer and/or hematology patients. I have re-iterated the CDC recommendations for adequate hand washing, use of hand -like products, and coughing into elbow, etc. In addition, especially for our patients who are on chemotherapy and/or our otherwise immunocompromised patients, I have recommended avoidance of crowds, including movie theaters, restaurants, churches, etc. I have recommended avoidance of any sick or symptomatic family members and/or friends. I have also recommended avoidance of any raw and unwashed food products, and general avoidance of food items that have not been prepared by themselves. The patient has been asked to call us immediately with any symptom developments, issues, questions or other general concerns.     I have explained all of the above in detail and the patient understands all of the current recommendation(s). I have answered  all of their questions to the best of my ability and to their complete satisfaction.   The patient is to continue with the current management plan.            Electronically signed by Jose Tello MD

## 2022-05-12 ENCOUNTER — INFUSION (OUTPATIENT)
Dept: INFUSION THERAPY | Facility: HOSPITAL | Age: 62
End: 2022-05-12
Attending: INTERNAL MEDICINE
Payer: COMMERCIAL

## 2022-05-12 ENCOUNTER — OFFICE VISIT (OUTPATIENT)
Dept: HEMATOLOGY/ONCOLOGY | Facility: CLINIC | Age: 62
End: 2022-05-12
Payer: COMMERCIAL

## 2022-05-12 VITALS
HEART RATE: 80 BPM | DIASTOLIC BLOOD PRESSURE: 75 MMHG | DIASTOLIC BLOOD PRESSURE: 74 MMHG | BODY MASS INDEX: 32.26 KG/M2 | RESPIRATION RATE: 18 BRPM | BODY MASS INDEX: 32.21 KG/M2 | TEMPERATURE: 98 F | RESPIRATION RATE: 18 BRPM | HEIGHT: 70 IN | HEART RATE: 73 BPM | WEIGHT: 225 LBS | SYSTOLIC BLOOD PRESSURE: 121 MMHG | WEIGHT: 225.31 LBS | HEIGHT: 70 IN | SYSTOLIC BLOOD PRESSURE: 123 MMHG

## 2022-05-12 DIAGNOSIS — D64.9 CHRONIC ANEMIA: ICD-10-CM

## 2022-05-12 DIAGNOSIS — N18.30 STAGE 3 CHRONIC KIDNEY DISEASE, UNSPECIFIED WHETHER STAGE 3A OR 3B CKD: ICD-10-CM

## 2022-05-12 DIAGNOSIS — D46.1 RARS (REFRACTORY ANEMIA WITH RINGED SIDEROBLASTS): Primary | ICD-10-CM

## 2022-05-12 DIAGNOSIS — D57.3 SICKLE CELL TRAIT SYNDROME: ICD-10-CM

## 2022-05-12 DIAGNOSIS — D46.9 MDS (MYELODYSPLASTIC SYNDROME): Primary | ICD-10-CM

## 2022-05-12 DIAGNOSIS — D64.9 NORMOCHROMIC ANEMIA: ICD-10-CM

## 2022-05-12 DIAGNOSIS — Z87.891 FORMER SMOKER: ICD-10-CM

## 2022-05-12 DIAGNOSIS — D72.821 MONOCYTOSIS: ICD-10-CM

## 2022-05-12 DIAGNOSIS — D64.89 ANEMIA DUE TO MULTIPLE MECHANISMS: ICD-10-CM

## 2022-05-12 DIAGNOSIS — D75.839 THROMBOCYTOSIS: ICD-10-CM

## 2022-05-12 DIAGNOSIS — D46.9 MDS (MYELODYSPLASTIC SYNDROME): ICD-10-CM

## 2022-05-12 PROCEDURE — 3044F PR MOST RECENT HEMOGLOBIN A1C LEVEL <7.0%: ICD-10-PCS | Mod: CPTII,S$GLB,, | Performed by: INTERNAL MEDICINE

## 2022-05-12 PROCEDURE — 3060F PR POS MICROALBUMINURIA RESULT DOCUMENTED/REVIEW: ICD-10-PCS | Mod: CPTII,S$GLB,, | Performed by: INTERNAL MEDICINE

## 2022-05-12 PROCEDURE — 3074F SYST BP LT 130 MM HG: CPT | Mod: CPTII,S$GLB,, | Performed by: INTERNAL MEDICINE

## 2022-05-12 PROCEDURE — 3078F PR MOST RECENT DIASTOLIC BLOOD PRESSURE < 80 MM HG: ICD-10-PCS | Mod: CPTII,S$GLB,, | Performed by: INTERNAL MEDICINE

## 2022-05-12 PROCEDURE — 3008F PR BODY MASS INDEX (BMI) DOCUMENTED: ICD-10-PCS | Mod: CPTII,S$GLB,, | Performed by: INTERNAL MEDICINE

## 2022-05-12 PROCEDURE — 3008F BODY MASS INDEX DOCD: CPT | Mod: CPTII,S$GLB,, | Performed by: INTERNAL MEDICINE

## 2022-05-12 PROCEDURE — 3044F HG A1C LEVEL LT 7.0%: CPT | Mod: CPTII,S$GLB,, | Performed by: INTERNAL MEDICINE

## 2022-05-12 PROCEDURE — 3066F NEPHROPATHY DOC TX: CPT | Mod: CPTII,S$GLB,, | Performed by: INTERNAL MEDICINE

## 2022-05-12 PROCEDURE — 1160F PR REVIEW ALL MEDS BY PRESCRIBER/CLIN PHARMACIST DOCUMENTED: ICD-10-PCS | Mod: CPTII,S$GLB,, | Performed by: INTERNAL MEDICINE

## 2022-05-12 PROCEDURE — 3078F DIAST BP <80 MM HG: CPT | Mod: CPTII,S$GLB,, | Performed by: INTERNAL MEDICINE

## 2022-05-12 PROCEDURE — 3066F PR DOCUMENTATION OF TREATMENT FOR NEPHROPATHY: ICD-10-PCS | Mod: CPTII,S$GLB,, | Performed by: INTERNAL MEDICINE

## 2022-05-12 PROCEDURE — 3060F POS MICROALBUMINURIA REV: CPT | Mod: CPTII,S$GLB,, | Performed by: INTERNAL MEDICINE

## 2022-05-12 PROCEDURE — 1159F PR MEDICATION LIST DOCUMENTED IN MEDICAL RECORD: ICD-10-PCS | Mod: CPTII,S$GLB,, | Performed by: INTERNAL MEDICINE

## 2022-05-12 PROCEDURE — 3074F PR MOST RECENT SYSTOLIC BLOOD PRESSURE < 130 MM HG: ICD-10-PCS | Mod: CPTII,S$GLB,, | Performed by: INTERNAL MEDICINE

## 2022-05-12 PROCEDURE — 99214 PR OFFICE/OUTPT VISIT, EST, LEVL IV, 30-39 MIN: ICD-10-PCS | Mod: S$GLB,,, | Performed by: INTERNAL MEDICINE

## 2022-05-12 PROCEDURE — 63600175 PHARM REV CODE 636 W HCPCS: Mod: JG | Performed by: NURSE PRACTITIONER

## 2022-05-12 PROCEDURE — 1160F RVW MEDS BY RX/DR IN RCRD: CPT | Mod: CPTII,S$GLB,, | Performed by: INTERNAL MEDICINE

## 2022-05-12 PROCEDURE — 99214 OFFICE O/P EST MOD 30 MIN: CPT | Mod: S$GLB,,, | Performed by: INTERNAL MEDICINE

## 2022-05-12 PROCEDURE — 96372 THER/PROPH/DIAG INJ SC/IM: CPT

## 2022-05-12 PROCEDURE — 1159F MED LIST DOCD IN RCRD: CPT | Mod: CPTII,S$GLB,, | Performed by: INTERNAL MEDICINE

## 2022-05-12 RX ADMIN — EPOETIN ALFA-EPBX 20000 UNITS: 20000 INJECTION, SOLUTION INTRAVENOUS; SUBCUTANEOUS at 04:05

## 2022-05-12 NOTE — PLAN OF CARE
Problem: Anemia  Goal: Anemia Symptom Improvement  5/12/2022 1607 by Gisell Castillo RN  Outcome: Met  5/12/2022 1606 by Gisell Castillo RN  Outcome: Ongoing, Progressing

## 2022-05-18 ENCOUNTER — LAB VISIT (OUTPATIENT)
Dept: LAB | Facility: HOSPITAL | Age: 62
End: 2022-05-18
Attending: INTERNAL MEDICINE
Payer: COMMERCIAL

## 2022-05-18 DIAGNOSIS — D57.3 SICKLE CELL TRAIT SYNDROME: ICD-10-CM

## 2022-05-18 LAB
ALBUMIN SERPL BCP-MCNC: 4.2 G/DL (ref 3.5–5.2)
ALP SERPL-CCNC: 53 U/L (ref 55–135)
ALT SERPL W/O P-5'-P-CCNC: 17 U/L (ref 10–44)
ANION GAP SERPL CALC-SCNC: 8 MMOL/L (ref 8–16)
AST SERPL-CCNC: 19 U/L (ref 10–40)
BASOPHILS # BLD AUTO: 0.08 K/UL (ref 0–0.2)
BASOPHILS NFR BLD: 1 % (ref 0–1.9)
BILIRUB SERPL-MCNC: 0.9 MG/DL (ref 0.1–1)
BUN SERPL-MCNC: 27 MG/DL (ref 8–23)
CALCIUM SERPL-MCNC: 9 MG/DL (ref 8.7–10.5)
CHLORIDE SERPL-SCNC: 109 MMOL/L (ref 95–110)
CO2 SERPL-SCNC: 24 MMOL/L (ref 23–29)
CREAT SERPL-MCNC: 1.8 MG/DL (ref 0.5–1.4)
DIFFERENTIAL METHOD: ABNORMAL
EOSINOPHIL # BLD AUTO: 0.1 K/UL (ref 0–0.5)
EOSINOPHIL NFR BLD: 1 % (ref 0–8)
ERYTHROCYTE [DISTWIDTH] IN BLOOD BY AUTOMATED COUNT: 26.1 % (ref 11.5–14.5)
EST. GFR  (AFRICAN AMERICAN): 45.6 ML/MIN/1.73 M^2
EST. GFR  (NON AFRICAN AMERICAN): 39.5 ML/MIN/1.73 M^2
GLUCOSE SERPL-MCNC: 102 MG/DL (ref 70–110)
HCT VFR BLD AUTO: 25.7 % (ref 40–54)
HGB BLD-MCNC: 9 G/DL (ref 14–18)
IMM GRANULOCYTES # BLD AUTO: 0.03 K/UL (ref 0–0.04)
IMM GRANULOCYTES NFR BLD AUTO: 0.4 % (ref 0–0.5)
LYMPHOCYTES # BLD AUTO: 1.3 K/UL (ref 1–4.8)
LYMPHOCYTES NFR BLD: 17.5 % (ref 18–48)
MCH RBC QN AUTO: 32.4 PG (ref 27–31)
MCHC RBC AUTO-ENTMCNC: 35 G/DL (ref 32–36)
MCV RBC AUTO: 92 FL (ref 82–98)
MONOCYTES # BLD AUTO: 0.8 K/UL (ref 0.3–1)
MONOCYTES NFR BLD: 10.8 % (ref 4–15)
NEUTROPHILS # BLD AUTO: 5.3 K/UL (ref 1.8–7.7)
NEUTROPHILS NFR BLD: 69.3 % (ref 38–73)
NRBC BLD-RTO: 0 /100 WBC
PLATELET # BLD AUTO: 481 K/UL (ref 150–450)
PMV BLD AUTO: 10.7 FL (ref 9.2–12.9)
POTASSIUM SERPL-SCNC: 4.4 MMOL/L (ref 3.5–5.1)
PROT SERPL-MCNC: 7 G/DL (ref 6–8.4)
RBC # BLD AUTO: 2.78 M/UL (ref 4.6–6.2)
SODIUM SERPL-SCNC: 141 MMOL/L (ref 136–145)
WBC # BLD AUTO: 7.62 K/UL (ref 3.9–12.7)

## 2022-05-18 PROCEDURE — 85025 COMPLETE CBC W/AUTO DIFF WBC: CPT | Performed by: INTERNAL MEDICINE

## 2022-05-18 PROCEDURE — 36415 COLL VENOUS BLD VENIPUNCTURE: CPT | Performed by: INTERNAL MEDICINE

## 2022-05-18 PROCEDURE — 80053 COMPREHEN METABOLIC PANEL: CPT | Performed by: INTERNAL MEDICINE

## 2022-05-19 ENCOUNTER — INFUSION (OUTPATIENT)
Dept: INFUSION THERAPY | Facility: HOSPITAL | Age: 62
End: 2022-05-19
Attending: INTERNAL MEDICINE
Payer: COMMERCIAL

## 2022-05-19 VITALS
DIASTOLIC BLOOD PRESSURE: 72 MMHG | OXYGEN SATURATION: 97 % | WEIGHT: 223.69 LBS | HEART RATE: 82 BPM | HEIGHT: 70 IN | BODY MASS INDEX: 32.02 KG/M2 | TEMPERATURE: 98 F | RESPIRATION RATE: 18 BRPM | SYSTOLIC BLOOD PRESSURE: 133 MMHG

## 2022-05-19 DIAGNOSIS — N18.30 STAGE 3 CHRONIC KIDNEY DISEASE, UNSPECIFIED WHETHER STAGE 3A OR 3B CKD: ICD-10-CM

## 2022-05-19 DIAGNOSIS — D64.9 CHRONIC ANEMIA: ICD-10-CM

## 2022-05-19 DIAGNOSIS — D64.89 ANEMIA DUE TO MULTIPLE MECHANISMS: ICD-10-CM

## 2022-05-19 DIAGNOSIS — D64.9 NORMOCHROMIC ANEMIA: ICD-10-CM

## 2022-05-19 DIAGNOSIS — D46.9 MDS (MYELODYSPLASTIC SYNDROME): Primary | ICD-10-CM

## 2022-05-19 PROCEDURE — 63600175 PHARM REV CODE 636 W HCPCS: Mod: JG | Performed by: NURSE PRACTITIONER

## 2022-05-19 PROCEDURE — 96372 THER/PROPH/DIAG INJ SC/IM: CPT

## 2022-05-19 RX ADMIN — EPOETIN ALFA-EPBX 20000 UNITS: 20000 INJECTION, SOLUTION INTRAVENOUS; SUBCUTANEOUS at 04:05

## 2022-05-19 NOTE — PLAN OF CARE
Problem: Anemia  Goal: Anemia Symptom Improvement  Outcome: Ongoing, Progressing  Intervention: Monitor and Manage Anemia  Flowsheets (Taken 5/19/2022 1616)  Oral Nutrition Promotion: rest periods promoted  Safety Promotion/Fall Prevention: medications reviewed  Fatigue Management: frequent rest breaks encouraged

## 2022-05-26 ENCOUNTER — INFUSION (OUTPATIENT)
Dept: INFUSION THERAPY | Facility: HOSPITAL | Age: 62
End: 2022-05-26
Attending: INTERNAL MEDICINE
Payer: COMMERCIAL

## 2022-05-26 VITALS
BODY MASS INDEX: 32.05 KG/M2 | WEIGHT: 223.38 LBS | RESPIRATION RATE: 18 BRPM | SYSTOLIC BLOOD PRESSURE: 123 MMHG | HEART RATE: 71 BPM | OXYGEN SATURATION: 98 % | TEMPERATURE: 99 F | DIASTOLIC BLOOD PRESSURE: 73 MMHG

## 2022-05-26 DIAGNOSIS — N18.30 STAGE 3 CHRONIC KIDNEY DISEASE, UNSPECIFIED WHETHER STAGE 3A OR 3B CKD: ICD-10-CM

## 2022-05-26 DIAGNOSIS — D46.9 MDS (MYELODYSPLASTIC SYNDROME): Primary | ICD-10-CM

## 2022-05-26 DIAGNOSIS — D64.89 ANEMIA DUE TO MULTIPLE MECHANISMS: ICD-10-CM

## 2022-05-26 DIAGNOSIS — D64.9 CHRONIC ANEMIA: ICD-10-CM

## 2022-05-26 DIAGNOSIS — D64.9 NORMOCHROMIC ANEMIA: ICD-10-CM

## 2022-05-26 PROCEDURE — 63600175 PHARM REV CODE 636 W HCPCS: Mod: JG | Performed by: NURSE PRACTITIONER

## 2022-05-26 PROCEDURE — 96372 THER/PROPH/DIAG INJ SC/IM: CPT

## 2022-05-26 RX ADMIN — EPOETIN ALFA-EPBX 20000 UNITS: 20000 INJECTION, SOLUTION INTRAVENOUS; SUBCUTANEOUS at 04:05

## 2022-06-01 ENCOUNTER — LAB VISIT (OUTPATIENT)
Dept: LAB | Facility: HOSPITAL | Age: 62
End: 2022-06-01
Attending: NURSE PRACTITIONER
Payer: COMMERCIAL

## 2022-06-01 DIAGNOSIS — D46.9 MDS (MYELODYSPLASTIC SYNDROME): ICD-10-CM

## 2022-06-01 LAB
ALBUMIN SERPL BCP-MCNC: 4.5 G/DL (ref 3.5–5.2)
ALP SERPL-CCNC: 53 U/L (ref 55–135)
ALT SERPL W/O P-5'-P-CCNC: 16 U/L (ref 10–44)
ANION GAP SERPL CALC-SCNC: 8 MMOL/L (ref 8–16)
AST SERPL-CCNC: 17 U/L (ref 10–40)
BASOPHILS # BLD AUTO: 0.09 K/UL (ref 0–0.2)
BASOPHILS NFR BLD: 1.2 % (ref 0–1.9)
BILIRUB SERPL-MCNC: 0.8 MG/DL (ref 0.1–1)
BUN SERPL-MCNC: 27 MG/DL (ref 8–23)
CALCIUM SERPL-MCNC: 9 MG/DL (ref 8.7–10.5)
CHLORIDE SERPL-SCNC: 104 MMOL/L (ref 95–110)
CO2 SERPL-SCNC: 25 MMOL/L (ref 23–29)
CREAT SERPL-MCNC: 1.7 MG/DL (ref 0.5–1.4)
DIFFERENTIAL METHOD: ABNORMAL
EOSINOPHIL # BLD AUTO: 0.1 K/UL (ref 0–0.5)
EOSINOPHIL NFR BLD: 1.3 % (ref 0–8)
ERYTHROCYTE [DISTWIDTH] IN BLOOD BY AUTOMATED COUNT: 26.3 % (ref 11.5–14.5)
EST. GFR  (AFRICAN AMERICAN): 48.9 ML/MIN/1.73 M^2
EST. GFR  (NON AFRICAN AMERICAN): 42.3 ML/MIN/1.73 M^2
FERRITIN SERPL-MCNC: 1847 NG/ML (ref 20–300)
GLUCOSE SERPL-MCNC: 107 MG/DL (ref 70–110)
HCT VFR BLD AUTO: 25.9 % (ref 40–54)
HGB BLD-MCNC: 9 G/DL (ref 14–18)
IMM GRANULOCYTES # BLD AUTO: 0.02 K/UL (ref 0–0.04)
IMM GRANULOCYTES NFR BLD AUTO: 0.3 % (ref 0–0.5)
IRON SERPL-MCNC: 57 UG/DL (ref 45–160)
LYMPHOCYTES # BLD AUTO: 1.4 K/UL (ref 1–4.8)
LYMPHOCYTES NFR BLD: 18.2 % (ref 18–48)
MCH RBC QN AUTO: 32 PG (ref 27–31)
MCHC RBC AUTO-ENTMCNC: 34.7 G/DL (ref 32–36)
MCV RBC AUTO: 92 FL (ref 82–98)
MONOCYTES # BLD AUTO: 0.8 K/UL (ref 0.3–1)
MONOCYTES NFR BLD: 10.3 % (ref 4–15)
NEUTROPHILS # BLD AUTO: 5.3 K/UL (ref 1.8–7.7)
NEUTROPHILS NFR BLD: 68.7 % (ref 38–73)
NRBC BLD-RTO: 0 /100 WBC
PLATELET # BLD AUTO: 432 K/UL (ref 150–450)
PMV BLD AUTO: 9.1 FL (ref 9.2–12.9)
POTASSIUM SERPL-SCNC: 4.1 MMOL/L (ref 3.5–5.1)
PROT SERPL-MCNC: 7.3 G/DL (ref 6–8.4)
RBC # BLD AUTO: 2.81 M/UL (ref 4.6–6.2)
SATURATED IRON: 31 % (ref 20–50)
SODIUM SERPL-SCNC: 137 MMOL/L (ref 136–145)
TOTAL IRON BINDING CAPACITY: 185 UG/DL (ref 250–450)
TRANSFERRIN SERPL-MCNC: 132 MG/DL (ref 200–375)
WBC # BLD AUTO: 7.65 K/UL (ref 3.9–12.7)

## 2022-06-01 PROCEDURE — 80053 COMPREHEN METABOLIC PANEL: CPT | Performed by: NURSE PRACTITIONER

## 2022-06-01 PROCEDURE — 82728 ASSAY OF FERRITIN: CPT | Performed by: NURSE PRACTITIONER

## 2022-06-01 PROCEDURE — 36415 COLL VENOUS BLD VENIPUNCTURE: CPT | Performed by: NURSE PRACTITIONER

## 2022-06-01 PROCEDURE — 85025 COMPLETE CBC W/AUTO DIFF WBC: CPT | Performed by: NURSE PRACTITIONER

## 2022-06-01 PROCEDURE — 84466 ASSAY OF TRANSFERRIN: CPT | Performed by: NURSE PRACTITIONER

## 2022-06-02 ENCOUNTER — INFUSION (OUTPATIENT)
Dept: INFUSION THERAPY | Facility: HOSPITAL | Age: 62
End: 2022-06-02
Attending: INTERNAL MEDICINE
Payer: COMMERCIAL

## 2022-06-02 VITALS
TEMPERATURE: 98 F | HEART RATE: 75 BPM | SYSTOLIC BLOOD PRESSURE: 129 MMHG | OXYGEN SATURATION: 97 % | WEIGHT: 220.5 LBS | RESPIRATION RATE: 18 BRPM | BODY MASS INDEX: 31.64 KG/M2 | DIASTOLIC BLOOD PRESSURE: 83 MMHG

## 2022-06-02 DIAGNOSIS — D46.9 MDS (MYELODYSPLASTIC SYNDROME): Primary | ICD-10-CM

## 2022-06-02 DIAGNOSIS — D64.9 CHRONIC ANEMIA: ICD-10-CM

## 2022-06-02 DIAGNOSIS — N18.30 STAGE 3 CHRONIC KIDNEY DISEASE, UNSPECIFIED WHETHER STAGE 3A OR 3B CKD: ICD-10-CM

## 2022-06-02 DIAGNOSIS — D64.9 NORMOCHROMIC ANEMIA: ICD-10-CM

## 2022-06-02 DIAGNOSIS — D64.89 ANEMIA DUE TO MULTIPLE MECHANISMS: ICD-10-CM

## 2022-06-02 PROCEDURE — 63600175 PHARM REV CODE 636 W HCPCS: Mod: JG | Performed by: NURSE PRACTITIONER

## 2022-06-02 PROCEDURE — 96372 THER/PROPH/DIAG INJ SC/IM: CPT

## 2022-06-02 RX ADMIN — EPOETIN ALFA-EPBX 20000 UNITS: 20000 INJECTION, SOLUTION INTRAVENOUS; SUBCUTANEOUS at 04:06

## 2022-06-09 ENCOUNTER — INFUSION (OUTPATIENT)
Dept: INFUSION THERAPY | Facility: HOSPITAL | Age: 62
End: 2022-06-09
Attending: INTERNAL MEDICINE
Payer: COMMERCIAL

## 2022-06-09 VITALS
WEIGHT: 222.13 LBS | HEART RATE: 73 BPM | DIASTOLIC BLOOD PRESSURE: 72 MMHG | TEMPERATURE: 98 F | SYSTOLIC BLOOD PRESSURE: 126 MMHG | BODY MASS INDEX: 31.87 KG/M2 | OXYGEN SATURATION: 98 %

## 2022-06-09 DIAGNOSIS — D46.9 MDS (MYELODYSPLASTIC SYNDROME): Primary | ICD-10-CM

## 2022-06-09 DIAGNOSIS — D64.9 NORMOCHROMIC ANEMIA: ICD-10-CM

## 2022-06-09 DIAGNOSIS — D64.9 CHRONIC ANEMIA: ICD-10-CM

## 2022-06-09 DIAGNOSIS — N18.30 STAGE 3 CHRONIC KIDNEY DISEASE, UNSPECIFIED WHETHER STAGE 3A OR 3B CKD: ICD-10-CM

## 2022-06-09 DIAGNOSIS — D64.89 ANEMIA DUE TO MULTIPLE MECHANISMS: ICD-10-CM

## 2022-06-09 PROCEDURE — 96372 THER/PROPH/DIAG INJ SC/IM: CPT

## 2022-06-09 PROCEDURE — 63600175 PHARM REV CODE 636 W HCPCS: Mod: JG | Performed by: NURSE PRACTITIONER

## 2022-06-09 RX ADMIN — EPOETIN ALFA-EPBX 20000 UNITS: 20000 INJECTION, SOLUTION INTRAVENOUS; SUBCUTANEOUS at 04:06

## 2022-06-15 ENCOUNTER — LAB VISIT (OUTPATIENT)
Dept: LAB | Facility: HOSPITAL | Age: 62
End: 2022-06-15
Attending: NURSE PRACTITIONER
Payer: COMMERCIAL

## 2022-06-15 DIAGNOSIS — D46.9 MDS (MYELODYSPLASTIC SYNDROME): ICD-10-CM

## 2022-06-15 LAB
BASOPHILS # BLD AUTO: 0.09 K/UL (ref 0–0.2)
BASOPHILS NFR BLD: 1.2 % (ref 0–1.9)
DIFFERENTIAL METHOD: ABNORMAL
EOSINOPHIL # BLD AUTO: 0.1 K/UL (ref 0–0.5)
EOSINOPHIL NFR BLD: 1.7 % (ref 0–8)
ERYTHROCYTE [DISTWIDTH] IN BLOOD BY AUTOMATED COUNT: 26.8 % (ref 11.5–14.5)
HCT VFR BLD AUTO: 24.9 % (ref 40–54)
HGB BLD-MCNC: 8.6 G/DL (ref 14–18)
IMM GRANULOCYTES # BLD AUTO: 0.02 K/UL (ref 0–0.04)
IMM GRANULOCYTES NFR BLD AUTO: 0.3 % (ref 0–0.5)
LYMPHOCYTES # BLD AUTO: 1.3 K/UL (ref 1–4.8)
LYMPHOCYTES NFR BLD: 17.5 % (ref 18–48)
MCH RBC QN AUTO: 31.9 PG (ref 27–31)
MCHC RBC AUTO-ENTMCNC: 34.5 G/DL (ref 32–36)
MCV RBC AUTO: 92 FL (ref 82–98)
MONOCYTES # BLD AUTO: 0.8 K/UL (ref 0.3–1)
MONOCYTES NFR BLD: 10.1 % (ref 4–15)
NEUTROPHILS # BLD AUTO: 5.3 K/UL (ref 1.8–7.7)
NEUTROPHILS NFR BLD: 69.2 % (ref 38–73)
NRBC BLD-RTO: 0 /100 WBC
PLATELET # BLD AUTO: 472 K/UL (ref 150–450)
PMV BLD AUTO: 10.8 FL (ref 9.2–12.9)
RBC # BLD AUTO: 2.7 M/UL (ref 4.6–6.2)
WBC # BLD AUTO: 7.64 K/UL (ref 3.9–12.7)

## 2022-06-15 PROCEDURE — 36415 COLL VENOUS BLD VENIPUNCTURE: CPT | Performed by: NURSE PRACTITIONER

## 2022-06-15 PROCEDURE — 85025 COMPLETE CBC W/AUTO DIFF WBC: CPT | Performed by: NURSE PRACTITIONER

## 2022-06-16 ENCOUNTER — INFUSION (OUTPATIENT)
Dept: INFUSION THERAPY | Facility: HOSPITAL | Age: 62
End: 2022-06-16
Attending: INTERNAL MEDICINE
Payer: COMMERCIAL

## 2022-06-16 VITALS
HEIGHT: 70 IN | BODY MASS INDEX: 32.09 KG/M2 | DIASTOLIC BLOOD PRESSURE: 80 MMHG | OXYGEN SATURATION: 99 % | TEMPERATURE: 98 F | WEIGHT: 224.13 LBS | RESPIRATION RATE: 17 BRPM | SYSTOLIC BLOOD PRESSURE: 142 MMHG | HEART RATE: 76 BPM

## 2022-06-16 DIAGNOSIS — D46.9 MDS (MYELODYSPLASTIC SYNDROME): Primary | ICD-10-CM

## 2022-06-16 DIAGNOSIS — N18.30 STAGE 3 CHRONIC KIDNEY DISEASE, UNSPECIFIED WHETHER STAGE 3A OR 3B CKD: ICD-10-CM

## 2022-06-16 DIAGNOSIS — D64.89 ANEMIA DUE TO MULTIPLE MECHANISMS: ICD-10-CM

## 2022-06-16 DIAGNOSIS — D64.9 NORMOCHROMIC ANEMIA: ICD-10-CM

## 2022-06-16 DIAGNOSIS — D64.9 CHRONIC ANEMIA: ICD-10-CM

## 2022-06-16 PROCEDURE — 63600175 PHARM REV CODE 636 W HCPCS: Mod: JG | Performed by: INTERNAL MEDICINE

## 2022-06-16 PROCEDURE — 96372 THER/PROPH/DIAG INJ SC/IM: CPT

## 2022-06-16 RX ADMIN — EPOETIN ALFA-EPBX 20000 UNITS: 20000 INJECTION, SOLUTION INTRAVENOUS; SUBCUTANEOUS at 04:06

## 2022-06-16 NOTE — PLAN OF CARE
Problem: Fatigue  Goal: Improved Activity Tolerance  Intervention: Promote Improved Energy  Flowsheets (Taken 6/16/2022 1612)  Fatigue Management:   frequent rest breaks encouraged   fatigue-related activity identified  Activity Management: Ambulated -L4

## 2022-06-23 ENCOUNTER — INFUSION (OUTPATIENT)
Dept: INFUSION THERAPY | Facility: HOSPITAL | Age: 62
End: 2022-06-23
Attending: INTERNAL MEDICINE
Payer: COMMERCIAL

## 2022-06-23 VITALS
DIASTOLIC BLOOD PRESSURE: 77 MMHG | WEIGHT: 222.31 LBS | TEMPERATURE: 97 F | SYSTOLIC BLOOD PRESSURE: 128 MMHG | HEART RATE: 73 BPM | RESPIRATION RATE: 18 BRPM | OXYGEN SATURATION: 97 % | BODY MASS INDEX: 31.9 KG/M2

## 2022-06-23 DIAGNOSIS — D64.89 ANEMIA DUE TO MULTIPLE MECHANISMS: ICD-10-CM

## 2022-06-23 DIAGNOSIS — D64.9 CHRONIC ANEMIA: ICD-10-CM

## 2022-06-23 DIAGNOSIS — N18.30 STAGE 3 CHRONIC KIDNEY DISEASE, UNSPECIFIED WHETHER STAGE 3A OR 3B CKD: ICD-10-CM

## 2022-06-23 DIAGNOSIS — D46.9 MDS (MYELODYSPLASTIC SYNDROME): Primary | ICD-10-CM

## 2022-06-23 DIAGNOSIS — D64.9 NORMOCHROMIC ANEMIA: ICD-10-CM

## 2022-06-23 PROCEDURE — 96372 THER/PROPH/DIAG INJ SC/IM: CPT

## 2022-06-23 PROCEDURE — 63600175 PHARM REV CODE 636 W HCPCS: Mod: JG | Performed by: INTERNAL MEDICINE

## 2022-06-23 RX ADMIN — EPOETIN ALFA-EPBX 20000 UNITS: 20000 INJECTION, SOLUTION INTRAVENOUS; SUBCUTANEOUS at 04:06

## 2022-06-29 ENCOUNTER — LAB VISIT (OUTPATIENT)
Dept: LAB | Facility: HOSPITAL | Age: 62
End: 2022-06-29
Attending: NURSE PRACTITIONER
Payer: COMMERCIAL

## 2022-06-29 DIAGNOSIS — D46.9 MDS (MYELODYSPLASTIC SYNDROME): ICD-10-CM

## 2022-06-29 LAB
ALBUMIN SERPL BCP-MCNC: 4.2 G/DL (ref 3.5–5.2)
ALP SERPL-CCNC: 58 U/L (ref 55–135)
ALT SERPL W/O P-5'-P-CCNC: 18 U/L (ref 10–44)
ANION GAP SERPL CALC-SCNC: 6 MMOL/L (ref 8–16)
AST SERPL-CCNC: 19 U/L (ref 10–40)
BASOPHILS # BLD AUTO: 0.09 K/UL (ref 0–0.2)
BASOPHILS NFR BLD: 1.1 % (ref 0–1.9)
BILIRUB SERPL-MCNC: 0.7 MG/DL (ref 0.1–1)
BUN SERPL-MCNC: 30 MG/DL (ref 8–23)
CALCIUM SERPL-MCNC: 8.8 MG/DL (ref 8.7–10.5)
CHLORIDE SERPL-SCNC: 108 MMOL/L (ref 95–110)
CO2 SERPL-SCNC: 26 MMOL/L (ref 23–29)
CREAT SERPL-MCNC: 1.8 MG/DL (ref 0.5–1.4)
DIFFERENTIAL METHOD: ABNORMAL
EOSINOPHIL # BLD AUTO: 0.1 K/UL (ref 0–0.5)
EOSINOPHIL NFR BLD: 1.4 % (ref 0–8)
ERYTHROCYTE [DISTWIDTH] IN BLOOD BY AUTOMATED COUNT: 26.5 % (ref 11.5–14.5)
EST. GFR  (AFRICAN AMERICAN): 45.6 ML/MIN/1.73 M^2
EST. GFR  (NON AFRICAN AMERICAN): 39.5 ML/MIN/1.73 M^2
FERRITIN SERPL-MCNC: 1421 NG/ML (ref 20–300)
GLUCOSE SERPL-MCNC: 129 MG/DL (ref 70–110)
HCT VFR BLD AUTO: 25.2 % (ref 40–54)
HGB BLD-MCNC: 8.8 G/DL (ref 14–18)
IMM GRANULOCYTES # BLD AUTO: 0.02 K/UL (ref 0–0.04)
IMM GRANULOCYTES NFR BLD AUTO: 0.2 % (ref 0–0.5)
IRON SERPL-MCNC: 53 UG/DL (ref 45–160)
LYMPHOCYTES # BLD AUTO: 1.3 K/UL (ref 1–4.8)
LYMPHOCYTES NFR BLD: 15.2 % (ref 18–48)
MCH RBC QN AUTO: 31.9 PG (ref 27–31)
MCHC RBC AUTO-ENTMCNC: 34.9 G/DL (ref 32–36)
MCV RBC AUTO: 91 FL (ref 82–98)
MONOCYTES # BLD AUTO: 0.9 K/UL (ref 0.3–1)
MONOCYTES NFR BLD: 10.3 % (ref 4–15)
NEUTROPHILS # BLD AUTO: 6 K/UL (ref 1.8–7.7)
NEUTROPHILS NFR BLD: 71.8 % (ref 38–73)
NRBC BLD-RTO: 0 /100 WBC
PLATELET # BLD AUTO: 467 K/UL (ref 150–450)
PMV BLD AUTO: 10.2 FL (ref 9.2–12.9)
POTASSIUM SERPL-SCNC: 4.2 MMOL/L (ref 3.5–5.1)
PROT SERPL-MCNC: 7.2 G/DL (ref 6–8.4)
RBC # BLD AUTO: 2.76 M/UL (ref 4.6–6.2)
SATURATED IRON: 28 % (ref 20–50)
SODIUM SERPL-SCNC: 140 MMOL/L (ref 136–145)
TOTAL IRON BINDING CAPACITY: 190 UG/DL (ref 250–450)
TRANSFERRIN SERPL-MCNC: 136 MG/DL (ref 200–375)
WBC # BLD AUTO: 8.33 K/UL (ref 3.9–12.7)

## 2022-06-29 PROCEDURE — 80053 COMPREHEN METABOLIC PANEL: CPT | Performed by: NURSE PRACTITIONER

## 2022-06-29 PROCEDURE — 84466 ASSAY OF TRANSFERRIN: CPT | Performed by: NURSE PRACTITIONER

## 2022-06-29 PROCEDURE — 82728 ASSAY OF FERRITIN: CPT | Performed by: NURSE PRACTITIONER

## 2022-06-29 PROCEDURE — 85025 COMPLETE CBC W/AUTO DIFF WBC: CPT | Performed by: NURSE PRACTITIONER

## 2022-06-29 PROCEDURE — 36415 COLL VENOUS BLD VENIPUNCTURE: CPT | Performed by: NURSE PRACTITIONER

## 2022-06-30 ENCOUNTER — INFUSION (OUTPATIENT)
Dept: INFUSION THERAPY | Facility: HOSPITAL | Age: 62
End: 2022-06-30
Attending: INTERNAL MEDICINE
Payer: COMMERCIAL

## 2022-06-30 VITALS
WEIGHT: 221 LBS | DIASTOLIC BLOOD PRESSURE: 75 MMHG | OXYGEN SATURATION: 98 % | HEART RATE: 70 BPM | TEMPERATURE: 98 F | BODY MASS INDEX: 31.71 KG/M2 | SYSTOLIC BLOOD PRESSURE: 120 MMHG | RESPIRATION RATE: 18 BRPM

## 2022-06-30 DIAGNOSIS — D46.9 MDS (MYELODYSPLASTIC SYNDROME): Primary | ICD-10-CM

## 2022-06-30 DIAGNOSIS — N18.30 STAGE 3 CHRONIC KIDNEY DISEASE, UNSPECIFIED WHETHER STAGE 3A OR 3B CKD: ICD-10-CM

## 2022-06-30 DIAGNOSIS — D64.9 CHRONIC ANEMIA: ICD-10-CM

## 2022-06-30 DIAGNOSIS — D64.89 ANEMIA DUE TO MULTIPLE MECHANISMS: ICD-10-CM

## 2022-06-30 DIAGNOSIS — D64.9 NORMOCHROMIC ANEMIA: ICD-10-CM

## 2022-06-30 PROCEDURE — 96372 THER/PROPH/DIAG INJ SC/IM: CPT

## 2022-06-30 PROCEDURE — 63600175 PHARM REV CODE 636 W HCPCS: Mod: JG | Performed by: INTERNAL MEDICINE

## 2022-06-30 RX ADMIN — EPOETIN ALFA-EPBX 20000 UNITS: 20000 INJECTION, SOLUTION INTRAVENOUS; SUBCUTANEOUS at 04:06

## 2022-07-07 ENCOUNTER — INFUSION (OUTPATIENT)
Dept: INFUSION THERAPY | Facility: HOSPITAL | Age: 62
End: 2022-07-07
Attending: INTERNAL MEDICINE
Payer: COMMERCIAL

## 2022-07-07 VITALS
DIASTOLIC BLOOD PRESSURE: 68 MMHG | SYSTOLIC BLOOD PRESSURE: 149 MMHG | OXYGEN SATURATION: 97 % | BODY MASS INDEX: 31.67 KG/M2 | HEART RATE: 73 BPM | TEMPERATURE: 98 F | RESPIRATION RATE: 17 BRPM | WEIGHT: 221.19 LBS | HEIGHT: 70 IN

## 2022-07-07 DIAGNOSIS — D64.89 ANEMIA DUE TO MULTIPLE MECHANISMS: ICD-10-CM

## 2022-07-07 DIAGNOSIS — D46.9 MDS (MYELODYSPLASTIC SYNDROME): Primary | ICD-10-CM

## 2022-07-07 DIAGNOSIS — D64.9 NORMOCHROMIC ANEMIA: ICD-10-CM

## 2022-07-07 DIAGNOSIS — N18.30 STAGE 3 CHRONIC KIDNEY DISEASE, UNSPECIFIED WHETHER STAGE 3A OR 3B CKD: ICD-10-CM

## 2022-07-07 DIAGNOSIS — D64.9 CHRONIC ANEMIA: ICD-10-CM

## 2022-07-07 PROCEDURE — 63600175 PHARM REV CODE 636 W HCPCS: Mod: JG | Performed by: INTERNAL MEDICINE

## 2022-07-07 PROCEDURE — 96372 THER/PROPH/DIAG INJ SC/IM: CPT

## 2022-07-07 RX ADMIN — EPOETIN ALFA-EPBX 20000 UNITS: 20000 INJECTION, SOLUTION INTRAVENOUS; SUBCUTANEOUS at 04:07

## 2022-07-07 NOTE — PLAN OF CARE
Problem: Fatigue  Goal: Improved Activity Tolerance  Intervention: Promote Improved Energy  Flowsheets (Taken 7/7/2022 1607)  Fatigue Management:   frequent rest breaks encouraged   fatigue-related activity identified  Activity Management: Ambulated -L4

## 2022-07-07 NOTE — PLAN OF CARE
Problem: Fatigue  Goal: Improved Activity Tolerance  Intervention: Promote Improved Energy  Flowsheets (Taken 7/7/2022 1600)  Fatigue Management:   frequent rest breaks encouraged   fatigue-related activity identified  Activity Management: Ambulated -L4

## 2022-07-13 ENCOUNTER — LAB VISIT (OUTPATIENT)
Dept: LAB | Facility: HOSPITAL | Age: 62
End: 2022-07-13
Attending: INTERNAL MEDICINE
Payer: COMMERCIAL

## 2022-07-13 DIAGNOSIS — D57.3 SICKLE CELL TRAIT SYNDROME: ICD-10-CM

## 2022-07-13 LAB
ALBUMIN SERPL BCP-MCNC: 4.2 G/DL (ref 3.5–5.2)
ALP SERPL-CCNC: 52 U/L (ref 55–135)
ALT SERPL W/O P-5'-P-CCNC: 18 U/L (ref 10–44)
ANION GAP SERPL CALC-SCNC: 5 MMOL/L (ref 8–16)
AST SERPL-CCNC: 19 U/L (ref 10–40)
BASOPHILS # BLD AUTO: 0.1 K/UL (ref 0–0.2)
BASOPHILS NFR BLD: 1.3 % (ref 0–1.9)
BILIRUB SERPL-MCNC: 1 MG/DL (ref 0.1–1)
BUN SERPL-MCNC: 26 MG/DL (ref 8–23)
CALCIUM SERPL-MCNC: 8.7 MG/DL (ref 8.7–10.5)
CHLORIDE SERPL-SCNC: 104 MMOL/L (ref 95–110)
CO2 SERPL-SCNC: 28 MMOL/L (ref 23–29)
CREAT SERPL-MCNC: 1.8 MG/DL (ref 0.5–1.4)
DIFFERENTIAL METHOD: ABNORMAL
EOSINOPHIL # BLD AUTO: 0.1 K/UL (ref 0–0.5)
EOSINOPHIL NFR BLD: 1.6 % (ref 0–8)
ERYTHROCYTE [DISTWIDTH] IN BLOOD BY AUTOMATED COUNT: 26.7 % (ref 11.5–14.5)
EST. GFR  (AFRICAN AMERICAN): 45.6 ML/MIN/1.73 M^2
EST. GFR  (NON AFRICAN AMERICAN): 39.5 ML/MIN/1.73 M^2
GLUCOSE SERPL-MCNC: 145 MG/DL (ref 70–110)
HCT VFR BLD AUTO: 25.5 % (ref 40–54)
HGB BLD-MCNC: 8.9 G/DL (ref 14–18)
IMM GRANULOCYTES # BLD AUTO: 0.03 K/UL (ref 0–0.04)
IMM GRANULOCYTES NFR BLD AUTO: 0.4 % (ref 0–0.5)
LYMPHOCYTES # BLD AUTO: 1.4 K/UL (ref 1–4.8)
LYMPHOCYTES NFR BLD: 17.8 % (ref 18–48)
MCH RBC QN AUTO: 32 PG (ref 27–31)
MCHC RBC AUTO-ENTMCNC: 34.9 G/DL (ref 32–36)
MCV RBC AUTO: 92 FL (ref 82–98)
MONOCYTES # BLD AUTO: 0.8 K/UL (ref 0.3–1)
MONOCYTES NFR BLD: 9.8 % (ref 4–15)
NEUTROPHILS # BLD AUTO: 5.5 K/UL (ref 1.8–7.7)
NEUTROPHILS NFR BLD: 69.1 % (ref 38–73)
NRBC BLD-RTO: 0 /100 WBC
PLATELET # BLD AUTO: 475 K/UL (ref 150–450)
PMV BLD AUTO: 10.2 FL (ref 9.2–12.9)
POTASSIUM SERPL-SCNC: 4.3 MMOL/L (ref 3.5–5.1)
PROT SERPL-MCNC: 7.2 G/DL (ref 6–8.4)
RBC # BLD AUTO: 2.78 M/UL (ref 4.6–6.2)
SODIUM SERPL-SCNC: 137 MMOL/L (ref 136–145)
WBC # BLD AUTO: 7.92 K/UL (ref 3.9–12.7)

## 2022-07-13 PROCEDURE — 80053 COMPREHEN METABOLIC PANEL: CPT | Performed by: INTERNAL MEDICINE

## 2022-07-13 PROCEDURE — 85025 COMPLETE CBC W/AUTO DIFF WBC: CPT | Performed by: INTERNAL MEDICINE

## 2022-07-13 PROCEDURE — 36415 COLL VENOUS BLD VENIPUNCTURE: CPT | Performed by: INTERNAL MEDICINE

## 2022-07-14 ENCOUNTER — INFUSION (OUTPATIENT)
Dept: INFUSION THERAPY | Facility: HOSPITAL | Age: 62
End: 2022-07-14
Attending: INTERNAL MEDICINE
Payer: COMMERCIAL

## 2022-07-14 VITALS
WEIGHT: 219.5 LBS | RESPIRATION RATE: 18 BRPM | TEMPERATURE: 98 F | SYSTOLIC BLOOD PRESSURE: 138 MMHG | DIASTOLIC BLOOD PRESSURE: 75 MMHG | HEART RATE: 74 BPM | BODY MASS INDEX: 31.42 KG/M2 | OXYGEN SATURATION: 98 % | HEIGHT: 70 IN

## 2022-07-14 DIAGNOSIS — D64.9 NORMOCHROMIC ANEMIA: ICD-10-CM

## 2022-07-14 DIAGNOSIS — D46.9 MDS (MYELODYSPLASTIC SYNDROME): Primary | ICD-10-CM

## 2022-07-14 DIAGNOSIS — N18.30 STAGE 3 CHRONIC KIDNEY DISEASE, UNSPECIFIED WHETHER STAGE 3A OR 3B CKD: ICD-10-CM

## 2022-07-14 DIAGNOSIS — D64.89 ANEMIA DUE TO MULTIPLE MECHANISMS: ICD-10-CM

## 2022-07-14 DIAGNOSIS — D64.9 CHRONIC ANEMIA: ICD-10-CM

## 2022-07-14 PROCEDURE — 63600175 PHARM REV CODE 636 W HCPCS: Mod: JG | Performed by: INTERNAL MEDICINE

## 2022-07-14 PROCEDURE — 96372 THER/PROPH/DIAG INJ SC/IM: CPT

## 2022-07-14 RX ADMIN — EPOETIN ALFA-EPBX 20000 UNITS: 20000 INJECTION, SOLUTION INTRAVENOUS; SUBCUTANEOUS at 04:07

## 2022-07-14 NOTE — PLAN OF CARE
Problem: Anemia  Goal: Anemia Symptom Improvement  Outcome: Ongoing, Progressing  Intervention: Monitor and Manage Anemia  Flowsheets (Taken 7/14/2022 1611)  Fatigue Management:   frequent rest breaks encouraged   paced activity encouraged

## 2022-07-21 ENCOUNTER — INFUSION (OUTPATIENT)
Dept: INFUSION THERAPY | Facility: HOSPITAL | Age: 62
End: 2022-07-21
Attending: INTERNAL MEDICINE
Payer: COMMERCIAL

## 2022-07-21 VITALS
SYSTOLIC BLOOD PRESSURE: 159 MMHG | HEART RATE: 77 BPM | TEMPERATURE: 98 F | DIASTOLIC BLOOD PRESSURE: 79 MMHG | WEIGHT: 222.19 LBS | OXYGEN SATURATION: 98 % | RESPIRATION RATE: 18 BRPM | BODY MASS INDEX: 31.81 KG/M2 | HEIGHT: 70 IN

## 2022-07-21 DIAGNOSIS — D64.9 NORMOCHROMIC ANEMIA: ICD-10-CM

## 2022-07-21 DIAGNOSIS — D46.9 MDS (MYELODYSPLASTIC SYNDROME): Primary | ICD-10-CM

## 2022-07-21 DIAGNOSIS — N18.30 STAGE 3 CHRONIC KIDNEY DISEASE, UNSPECIFIED WHETHER STAGE 3A OR 3B CKD: ICD-10-CM

## 2022-07-21 DIAGNOSIS — D64.89 ANEMIA DUE TO MULTIPLE MECHANISMS: ICD-10-CM

## 2022-07-21 DIAGNOSIS — D64.9 CHRONIC ANEMIA: ICD-10-CM

## 2022-07-21 PROCEDURE — 63600175 PHARM REV CODE 636 W HCPCS: Mod: JG | Performed by: INTERNAL MEDICINE

## 2022-07-21 PROCEDURE — 96372 THER/PROPH/DIAG INJ SC/IM: CPT

## 2022-07-21 RX ADMIN — EPOETIN ALFA-EPBX 20000 UNITS: 20000 INJECTION, SOLUTION INTRAVENOUS; SUBCUTANEOUS at 04:07

## 2022-07-21 NOTE — PLAN OF CARE
Problem: Fatigue  Goal: Improved Activity Tolerance  7/21/2022 1606 by Dana Green, RN  Outcome: Ongoing, Progressing  7/21/2022 1606 by Dana Green, RN  Outcome: Ongoing, Progressing  Intervention: Promote Improved Energy  Flowsheets (Taken 7/21/2022 1606)  Fatigue Management:   paced activity encouraged   frequent rest breaks encouraged

## 2022-07-28 ENCOUNTER — INFUSION (OUTPATIENT)
Dept: INFUSION THERAPY | Facility: HOSPITAL | Age: 62
End: 2022-07-28
Attending: INTERNAL MEDICINE
Payer: COMMERCIAL

## 2022-07-28 VITALS
BODY MASS INDEX: 31.59 KG/M2 | WEIGHT: 220.63 LBS | SYSTOLIC BLOOD PRESSURE: 117 MMHG | RESPIRATION RATE: 18 BRPM | TEMPERATURE: 98 F | OXYGEN SATURATION: 98 % | HEART RATE: 73 BPM | HEIGHT: 70 IN | DIASTOLIC BLOOD PRESSURE: 74 MMHG

## 2022-07-28 DIAGNOSIS — D64.9 CHRONIC ANEMIA: ICD-10-CM

## 2022-07-28 DIAGNOSIS — D46.9 MDS (MYELODYSPLASTIC SYNDROME): Primary | ICD-10-CM

## 2022-07-28 DIAGNOSIS — D64.9 NORMOCHROMIC ANEMIA: ICD-10-CM

## 2022-07-28 DIAGNOSIS — N18.30 STAGE 3 CHRONIC KIDNEY DISEASE, UNSPECIFIED WHETHER STAGE 3A OR 3B CKD: ICD-10-CM

## 2022-07-28 DIAGNOSIS — D64.89 ANEMIA DUE TO MULTIPLE MECHANISMS: ICD-10-CM

## 2022-07-28 PROCEDURE — 96372 THER/PROPH/DIAG INJ SC/IM: CPT

## 2022-07-28 PROCEDURE — 63600175 PHARM REV CODE 636 W HCPCS: Mod: JG | Performed by: INTERNAL MEDICINE

## 2022-07-28 RX ADMIN — EPOETIN ALFA-EPBX 20000 UNITS: 20000 INJECTION, SOLUTION INTRAVENOUS; SUBCUTANEOUS at 04:07

## 2022-07-28 NOTE — PLAN OF CARE
Problem: Fatigue  Goal: Improved Activity Tolerance  Outcome: Ongoing, Progressing  Intervention: Promote Improved Energy  Flowsheets (Taken 7/28/2022 160)  Fatigue Management: frequent rest breaks encouraged  Sleep/Rest Enhancement:   regular sleep/rest pattern promoted   relaxation techniques promoted  Activity Management: Ambulated -L4

## 2022-08-04 ENCOUNTER — INFUSION (OUTPATIENT)
Dept: INFUSION THERAPY | Facility: HOSPITAL | Age: 62
End: 2022-08-04
Attending: INTERNAL MEDICINE
Payer: COMMERCIAL

## 2022-08-04 VITALS
RESPIRATION RATE: 18 BRPM | OXYGEN SATURATION: 97 % | HEIGHT: 70 IN | WEIGHT: 222.69 LBS | TEMPERATURE: 98 F | HEART RATE: 81 BPM | SYSTOLIC BLOOD PRESSURE: 120 MMHG | BODY MASS INDEX: 31.88 KG/M2 | DIASTOLIC BLOOD PRESSURE: 72 MMHG

## 2022-08-04 DIAGNOSIS — D64.89 ANEMIA DUE TO MULTIPLE MECHANISMS: ICD-10-CM

## 2022-08-04 DIAGNOSIS — D64.9 NORMOCHROMIC ANEMIA: ICD-10-CM

## 2022-08-04 DIAGNOSIS — D64.9 CHRONIC ANEMIA: ICD-10-CM

## 2022-08-04 DIAGNOSIS — D46.9 MDS (MYELODYSPLASTIC SYNDROME): Primary | ICD-10-CM

## 2022-08-04 DIAGNOSIS — N18.30 STAGE 3 CHRONIC KIDNEY DISEASE, UNSPECIFIED WHETHER STAGE 3A OR 3B CKD: ICD-10-CM

## 2022-08-04 PROCEDURE — 63600175 PHARM REV CODE 636 W HCPCS: Mod: JG | Performed by: INTERNAL MEDICINE

## 2022-08-04 PROCEDURE — 96372 THER/PROPH/DIAG INJ SC/IM: CPT

## 2022-08-04 RX ADMIN — EPOETIN ALFA-EPBX 20000 UNITS: 20000 INJECTION, SOLUTION INTRAVENOUS; SUBCUTANEOUS at 04:08

## 2022-08-04 NOTE — PLAN OF CARE
Problem: Anemia  Goal: Anemia Symptom Improvement  Outcome: Ongoing, Progressing  Intervention: Monitor and Manage Anemia  Flowsheets (Taken 8/4/2022 1602)  Oral Nutrition Promotion: rest periods promoted  Safety Promotion/Fall Prevention: medications reviewed  Fatigue Management: frequent rest breaks encouraged

## 2022-08-10 ENCOUNTER — LAB VISIT (OUTPATIENT)
Dept: LAB | Facility: HOSPITAL | Age: 62
End: 2022-08-10
Attending: INTERNAL MEDICINE
Payer: COMMERCIAL

## 2022-08-10 DIAGNOSIS — D57.3 SICKLE CELL TRAIT SYNDROME: ICD-10-CM

## 2022-08-10 LAB
ALBUMIN SERPL BCP-MCNC: 4.3 G/DL (ref 3.5–5.2)
ALP SERPL-CCNC: 49 U/L (ref 55–135)
ALT SERPL W/O P-5'-P-CCNC: 16 U/L (ref 10–44)
ANION GAP SERPL CALC-SCNC: 8 MMOL/L (ref 8–16)
AST SERPL-CCNC: 19 U/L (ref 10–40)
BASOPHILS # BLD AUTO: 0.07 K/UL (ref 0–0.2)
BASOPHILS NFR BLD: 1 % (ref 0–1.9)
BILIRUB SERPL-MCNC: 1 MG/DL (ref 0.1–1)
BUN SERPL-MCNC: 23 MG/DL (ref 8–23)
CALCIUM SERPL-MCNC: 8.5 MG/DL (ref 8.7–10.5)
CHLORIDE SERPL-SCNC: 104 MMOL/L (ref 95–110)
CO2 SERPL-SCNC: 25 MMOL/L (ref 23–29)
CREAT SERPL-MCNC: 1.7 MG/DL (ref 0.5–1.4)
DIFFERENTIAL METHOD: ABNORMAL
EOSINOPHIL # BLD AUTO: 0.1 K/UL (ref 0–0.5)
EOSINOPHIL NFR BLD: 1.8 % (ref 0–8)
ERYTHROCYTE [DISTWIDTH] IN BLOOD BY AUTOMATED COUNT: 26.7 % (ref 11.5–14.5)
EST. GFR  (NO RACE VARIABLE): 45 ML/MIN/1.73 M^2
GLUCOSE SERPL-MCNC: 102 MG/DL (ref 70–110)
HCT VFR BLD AUTO: 25.4 % (ref 40–54)
HGB BLD-MCNC: 8.8 G/DL (ref 14–18)
IMM GRANULOCYTES # BLD AUTO: 0.02 K/UL (ref 0–0.04)
IMM GRANULOCYTES NFR BLD AUTO: 0.3 % (ref 0–0.5)
LYMPHOCYTES # BLD AUTO: 1.4 K/UL (ref 1–4.8)
LYMPHOCYTES NFR BLD: 20.2 % (ref 18–48)
MCH RBC QN AUTO: 31.3 PG (ref 27–31)
MCHC RBC AUTO-ENTMCNC: 34.6 G/DL (ref 32–36)
MCV RBC AUTO: 90 FL (ref 82–98)
MONOCYTES # BLD AUTO: 0.9 K/UL (ref 0.3–1)
MONOCYTES NFR BLD: 12.2 % (ref 4–15)
NEUTROPHILS # BLD AUTO: 4.6 K/UL (ref 1.8–7.7)
NEUTROPHILS NFR BLD: 64.5 % (ref 38–73)
NRBC BLD-RTO: 0 /100 WBC
PLATELET # BLD AUTO: 491 K/UL (ref 150–450)
PMV BLD AUTO: 10.6 FL (ref 9.2–12.9)
POTASSIUM SERPL-SCNC: 4.1 MMOL/L (ref 3.5–5.1)
PROT SERPL-MCNC: 6.8 G/DL (ref 6–8.4)
RBC # BLD AUTO: 2.81 M/UL (ref 4.6–6.2)
SODIUM SERPL-SCNC: 137 MMOL/L (ref 136–145)
WBC # BLD AUTO: 7.12 K/UL (ref 3.9–12.7)

## 2022-08-10 PROCEDURE — 80053 COMPREHEN METABOLIC PANEL: CPT | Performed by: INTERNAL MEDICINE

## 2022-08-10 PROCEDURE — 85025 COMPLETE CBC W/AUTO DIFF WBC: CPT | Performed by: INTERNAL MEDICINE

## 2022-08-10 PROCEDURE — 36415 COLL VENOUS BLD VENIPUNCTURE: CPT | Performed by: INTERNAL MEDICINE

## 2022-08-11 ENCOUNTER — INFUSION (OUTPATIENT)
Dept: INFUSION THERAPY | Facility: HOSPITAL | Age: 62
End: 2022-08-11
Attending: INTERNAL MEDICINE
Payer: COMMERCIAL

## 2022-08-11 VITALS
HEART RATE: 67 BPM | DIASTOLIC BLOOD PRESSURE: 77 MMHG | BODY MASS INDEX: 31.55 KG/M2 | OXYGEN SATURATION: 99 % | RESPIRATION RATE: 18 BRPM | HEIGHT: 70 IN | TEMPERATURE: 97 F | SYSTOLIC BLOOD PRESSURE: 135 MMHG | WEIGHT: 220.38 LBS

## 2022-08-11 DIAGNOSIS — N18.30 STAGE 3 CHRONIC KIDNEY DISEASE, UNSPECIFIED WHETHER STAGE 3A OR 3B CKD: ICD-10-CM

## 2022-08-11 DIAGNOSIS — D46.9 MDS (MYELODYSPLASTIC SYNDROME): Primary | ICD-10-CM

## 2022-08-11 DIAGNOSIS — D64.89 ANEMIA DUE TO MULTIPLE MECHANISMS: ICD-10-CM

## 2022-08-11 DIAGNOSIS — D64.9 NORMOCHROMIC ANEMIA: ICD-10-CM

## 2022-08-11 DIAGNOSIS — D64.9 CHRONIC ANEMIA: ICD-10-CM

## 2022-08-11 PROCEDURE — 96372 THER/PROPH/DIAG INJ SC/IM: CPT

## 2022-08-11 PROCEDURE — 63600175 PHARM REV CODE 636 W HCPCS: Mod: JG | Performed by: INTERNAL MEDICINE

## 2022-08-11 RX ADMIN — EPOETIN ALFA-EPBX 20000 UNITS: 20000 INJECTION, SOLUTION INTRAVENOUS; SUBCUTANEOUS at 04:08

## 2022-08-11 NOTE — PLAN OF CARE
Problem: Anemia  Goal: Anemia Symptom Improvement  Outcome: Ongoing, Progressing  Intervention: Monitor and Manage Anemia  Flowsheets (Taken 8/11/2022 1606)  Oral Nutrition Promotion: rest periods promoted  Safety Promotion/Fall Prevention: medications reviewed  Fatigue Management: frequent rest breaks encouraged

## 2022-08-17 PROBLEM — R93.89 ABNORMAL CHEST CT: Status: ACTIVE | Noted: 2022-08-17

## 2022-08-17 PROBLEM — R91.8 LUNG MASS: Status: ACTIVE | Noted: 2022-08-17

## 2022-08-17 NOTE — PROGRESS NOTES
[facility] Hematology/Oncology                 PROGRESS NOTE      Subjective:       Patient ID:   NAME: Rick Butler : 1960     62 y.o. male    Referring Doc: Braulio (new PCP)  Other Physicians: Getachew; Alan Mustafa    Chief Complaint:  Sickle cell trait/anemia/RARS  f/u    History of Present Illness:     Patient returns today for a regularly scheduled follow-up visit.  The patient is doing ok overall. He is here by himself today.     He saw Dr Hilario at [facility] recently and plans to see her again in 3 months    no pain crisis; he has been sober for over 6 years now; he has been off tobacco too; no CP, SOB, HA's or N/V;        He has not had any recent pain crisis. He denies having any fatigue or breathing difficulties. He denies any blood in the stool, dark stools or hematuria. He does have some fatigue      He had been under the care of Dr Mustafa at [facility] for RARS- MDS. He is now on procrit weekly. He is now seeing Dr Hilario at [facility]    No current knee issues    Discussed covid19 precautions - he had his vaccinations      ROS:   GEN: normal without any fever, night sweats or weight loss  HEENT: normal with no HA's, sore throat, stiff neck, changes in vision  CV: normal with no CP, SOB, PND, MORA or orthopnea  PULM: normal with no SOB, cough, hemoptysis, sputum or pleuritic pain  GI: normal with no abdominal pain, nausea, vomiting, constipation, diarrhea, melanotic stools, BRBPR, or hematemesis  : normal with no hematuria, dysuria  BREAST: normal with no mass, discharge, pain  SKIN: normal with no rash, erythema, bruising, or swelling    Allergies:  Review of patient's allergies indicates:  No Known Allergies    Medications:    Current Outpatient Medications:     amLODIPine (NORVASC) 5 MG tablet, TAKE 1 TABLET BY MOUTH EVERY DAY, Disp: 90 tablet, Rfl: 2    ascorbic acid, vitamin C, (VITAMIN C) 1000 MG tablet, Take 1,000 mg by mouth once daily., Disp: , Rfl:     epoetin tray (PROCRIT INJ), Thursday, Disp:  ", Rfl:     famotidine (PEPCID) 20 MG tablet, famotidine 20 mg tablet  Take 1 tablet twice a day by oral route., Disp: , Rfl:     folic acid (FOLVITE) 1 MG tablet, TAKE 2 TABLETS (2 MG TOTAL) BY MOUTH ONCE DAILY., Disp: 180 tablet, Rfl: 2    multivitamin capsule, Take 1 capsule by mouth once daily., Disp: , Rfl:     pantoprazole (PROTONIX) 40 MG tablet, , Disp: , Rfl:     tamsulosin (FLOMAX) 0.4 mg Cap, TAKE 1 CAPSULE BY MOUTH EVERY DAY, Disp: 90 capsule, Rfl: 2    traZODone (DESYREL) 100 MG tablet, TAKE 1 TABLET (100 MG TOTAL) BY MOUTH NIGHTLY AS NEEDED FOR INSOMNIA., Disp: 90 tablet, Rfl: 2    sildenafil (VIAGRA) 100 MG tablet, Take 1 tablet (100 mg total) by mouth daily as needed for Erectile Dysfunction., Disp: 10 tablet, Rfl: 6    PMHx/PSHx Updates:  See patient's last visit with me on 5/12/2022  See H&P on 7/9/2011      Pathology:    12/20/2019  BONE MARROW, RIGHT ILIAC CREST,    ASPIRATE, CLOT SECTION, AND CORE BIOPSY:    --HYPERCELLULAR MARROW (APPROXIMATELY 75% TO 80%) WITH TRILINEAGE   HEMATOPOIETIC ELEMENTS, ERYTHROID  HYPERPLASIA AND MILD MEGAKARYOCYTIC HYPERPLASIA, AND NON-SPECIFIC   DYSHEMATOPOIETIC CHANGES (SEE     COMMENT).  --[id number] INCREASED STAINABLE IRON WITH INCREASED RING   SIDEROBLASTS (GREATER THAN 15%)     (SEE COMMENT).  --PERIPHERAL BLOOD WITH THROMBOCYTOSIS (574,000/MICROLITER) AND ANEMIA   (HEMOGLOBIN 5.5 GRAM/DECILITER),    WITH SCATTERED DREPANOCYTOID FORMS AND FEW NUCLEATED ERYTHROCYTES      Cytogenetic Results at the bottom of the report.  NORMAL    Objective:     Vitals:  Blood pressure 133/66, pulse 68, temperature 98.8 °F (37.1 °C), resp. rate 18, height 5' 10" (1.778 m), weight 99.3 kg (219 lb).    Physical Examination:   GEN: no apparent distress, comfortable; AAOx3  HEAD: atraumatic and normocephalic  EYES: no pallor, no icterus, PERRLA  ENT: OMM, no pharyngeal erythema, external ears WNL; no nasal discharge; no thrush  NECK: no masses, thyroid normal, " trachea midline, no LAD/LN's, supple  CV: RRR with no murmur; normal pulse; normal S1 and S2; no pedal edema  CHEST: Normal respiratory effort; CTAB; normal breath sounds; no wheeze or crackles  ABDOM: nontender and nondistended; soft; normal bowel sounds; no rebound/guarding  MUSC/Skeletal: ROM normal; no crepitus; joints normal; no deformities or arthropathy  EXTREM: no clubbing, cyanosis, inflammation or swelling  SKIN: no rashes, lesions, ulcers, petechiae or subcutaneous nodules  : no jiang  NEURO: grossly intact; motor/sensory WNL; AAOx3; no tremors  PSYCH: normal mood, affect and behavior  LYMPH: normal cervical, supraclavicular, axillary and groin LN's            Labs:     Lab Results   Component Value Date    WBC 7.12 08/10/2022    HGB 8.8 (L) 08/10/2022    HCT 25.4 (L) 08/10/2022    MCV 90 08/10/2022     (H) 08/10/2022           CMP  Sodium   Date Value Ref Range Status   08/10/2022 137 136 - 145 mmol/L Final   06/26/2019 138 134 - 144 mmol/L      Potassium   Date Value Ref Range Status   08/10/2022 4.1 3.5 - 5.1 mmol/L Final     Chloride   Date Value Ref Range Status   08/10/2022 104 95 - 110 mmol/L Final   06/26/2019 105 98 - 110 mmol/L      CO2   Date Value Ref Range Status   08/10/2022 25 23 - 29 mmol/L Final     Glucose   Date Value Ref Range Status   08/10/2022 102 70 - 110 mg/dL Final   06/26/2019 114 (H) 70 - 99 mg/dL      BUN   Date Value Ref Range Status   08/10/2022 23 8 - 23 mg/dL Final     Creatinine   Date Value Ref Range Status   08/10/2022 1.7 (H) 0.5 - 1.4 mg/dL Final   06/26/2019 1.62 (H) 0.60 - 1.40 mg/dL      Calcium   Date Value Ref Range Status   08/10/2022 8.5 (L) 8.7 - 10.5 mg/dL Final     Total Protein   Date Value Ref Range Status   08/10/2022 6.8 6.0 - 8.4 g/dL Final     Albumin   Date Value Ref Range Status   08/10/2022 4.3 3.5 - 5.2 g/dL Final   06/26/2019 4.4 3.1 - 4.7 g/dL      Total Bilirubin   Date Value Ref Range Status   08/10/2022 1.0 0.1 - 1.0 mg/dL Final      Comment:     For infants and newborns, interpretation of results should be based  on gestational age, weight and in agreement with clinical  observations.    Premature Infant recommended reference ranges:  Up to 24 hours.............<8.0 mg/dL  Up to 48 hours............<12.0 mg/dL  3-5 days..................<15.0 mg/dL  6-29 days.................<15.0 mg/dL       Alkaline Phosphatase   Date Value Ref Range Status   08/10/2022 49 (L) 55 - 135 U/L Final     AST   Date Value Ref Range Status   08/10/2022 19 10 - 40 U/L Final     ALT   Date Value Ref Range Status   08/10/2022 16 10 - 44 U/L Final     Anion Gap   Date Value Ref Range Status   08/10/2022 8 8 - 16 mmol/L Final     eGFR if    Date Value Ref Range Status   07/13/2022 45.6 (A) >60 mL/min/1.73 m^2 Final     eGFR if non    Date Value Ref Range Status   07/13/2022 39.5 (A) >60 mL/min/1.73 m^2 Final     Comment:     Calculation used to obtain the estimated glomerular filtration  rate (eGFR) is the CKD-EPI equation.          Lab Results   Component Value Date    IRON 53 06/29/2022    TIBC 190 (L) 06/29/2022    FERRITIN 1,421 (H) 06/29/2022           I have reviewed all available lab results and radiology reports.    Radiology/Diagnostic Studies:    2/15/2018 Xray Survey:   IMPRESSION: No significant abnormality seen. No evidence of osteoblastic or  osteoclastic lesions identified    MRI L-spine 2/12/2018  IMPRESSION:    1. Prominent low signal intensity throughout the bone marrow on T1 and  T2-weighted imaging. Marrow infiltrative process including malignancy such as  metastatic disease or lymphoma/leukemia is a consideration along with multiple  myeloma or malignant process. Benign etiology such as osteopetrosis or  regenerative red marrow are also considerations.    2. Multilevel lumbar degenerative changes, most prominent at L4-L5 and L5-S1 as  described.    Assessment/Plan:   (1) 62 y.o. male with diagnosis of sickle cell  "anemia with borderline microcytosis  - latest hgb was 6.0 and he had blood transfusion  - today, he is not symptomatic at this time  - he probably has a multifactorial anemia process with underlying sickle cell, anemia of chronic disorders and anemia of chronic renal. I can not rule out an underlying GI bleeding process.   - iron panel is adequate (no deficiency)  - total bilirubin is only minimally elevated  - he required transfusion again in Oct 2019 and again in Dec 2019  - he had bone marrow biopsy on 12/20/2019    "dyshematopoietic changes are not entirely specific and are present  mostly within erythroid and megakaryocytic lineages. These findings   could be observed in chronic disease states, autoimmune conditions,  infectious settings, toxic exposures, nutritional deficits, as medication/drug effect, and in myelodysplastic syndrome (MDS), among other conditions"  "Additionally, ring sideroblasts are a non-specific   finding "    Patient was referred to see Dr Mustafa at [facility] and had repeat BM biopsy with diagnosis made of RARS  - he is now on procrit per directives of Dr Mustafa    7/30/2020:   he recently saw Dr Mustafa  And sees him again in Jan 2021  - he is doing well with procrit and is feeling much better  - he has not required any transfusions for some time time    9/24/2020:  - latest hgb at 8.5  - will increase the procrit dose  - f/u with Dr Zavala in Jan 2021 11/19/2020:  - patient doing well and feels "great"  - hgb up to 9.1; continued on the procrit  - f/u with Dr Mustafa in Jan 2021    3/4/2021:  - he saw Dr Mustafa in jan 2021 and sees him again in June/July 2021  - latest hgb at 9.1 and stable and platelets are 474,000; wbc at 10.0    6/10/2021:  - latest hgb at 9.0  - plats 485,000  - on weekly procrit  - seeing Dr Mustafa again tomorrow    10/14/2021:  - hgb at 8.6 but he recently had been taking naproxen and had some BRBPR - which since resolved  - he saw Dr Beyer with GI and he is " "having colonsocopy on Nov 5th along with EGD  - plats 429,000  - he sees Dr Mustafa again in Nov 19th   - he sees Dr Mas again in Dec 2021    1/13/2022:  - He has been under the care of Dr Mustafa at [facility] for RARS- MDS. He is now on procrit weekly. He sees Dr Mustafa again in March 2022    - hgb 9.3  - he had scope with Dr Collins in nov 2021 which was good per patient and they plan to repeat in 5 yrs    5/12/2022:  - hgb at 9.0  - plats 485  - wbc 7.25  - he is on procrit weekly  - saw Dr Mustafa this past Fridday and since he is leaving, he will start seeing Dr Hilario instead in 3 months    8/18/2022:  - latest hgb a little lower at 8.8  - he has been on the procrit  - he saw Dr Hilario recently at [facility] and plans to see her again in 3 months    (2) Alcohol abuse issues in past - he has been sober for over 3 years now    (3) New findings on radiography with abnormal chest CT and lung mass  - former smoker    (4) chronic back issues - prior Xrays and MRI - suspect the findings on the MRI are possibly due to his sickle cell;   - SPEP was previously negative for M-protein  - PSA was 0.3 in Sept 2017  - recommended neurosurgery evaluation previously  - he saw Dr Nura VUONG and had a nerve "burning" procedure and his pain has improved    (5) CRI - elevated creatinine - he is followed Dr Chilel with nephrology      (6) H pylori gastritis - s/p recent endoscopy with Dr Collins and antibotics x 10 days        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 Sickle cell trait syndrome     3. Thrombocytosis     4. MDS (myelodysplastic syndrome)     5. Monocytosis     6. Normochromic anemia     7. Former smoker       PLAN;  1. Continue with the procrit    2. Planned f/u in 3 months with Dr Hilario at [facility]  3.  F/u with nephrology as directed by them -   Dr Mas    4. Check labs every 2 weeks- encouraged compliance   5. Procrit as per Dr Mustafa's directives   6.  Encouraged continued sobriety; diet per Nastasi          RTC " 3-4 months  Fax note to [facility] Ramsey Tveit; Ramsey/Sulaiman    Total Time spent on patient:    I spent over 25 mins of time with the patient. Reviewed results of the recently ordered labs, tests, reports and studies; made directives with regards to the results. Over half of this time was spent couseling and coordinating care, making treatment and analytical decisions; ordering necessary labs, tests and studies; and discussing treatment options and setting up treatment plan(s) if indicated.            Discussion:       Pathology Discussion:    I reviewed and discussed the pathology report(s) and radiograph reports (if available) in as simple to understand and/or laymen's terms to the best of my ability. I had an indepth conversation with the patient and went over the patient's individual diagnosis based on the information that was currently available. I discussed the TNM staging process with regard to the patient's particular cancer type, and the calculated stage based on the currently available TNM data and literature. I discussed the available prognostic data with regard to the current staging information and how it relates to the prognosis of their particular neoplastic process.          COVID-19 Discussion:    I had long discussion with patient and any applicable family about the COVID-19 coronavirus epidemic and the recommended precautions with regard to cancer and/or hematology patients. I have re-iterated the CDC recommendations for adequate hand washing, use of hand -like products, and coughing into elbow, etc. In addition, especially for our patients who are on chemotherapy and/or our otherwise immunocompromised patients, I have recommended avoidance of crowds, including movie theaters, restaurants, churches, etc. I have recommended avoidance of any sick or symptomatic family members and/or friends. I have also recommended avoidance of any raw and unwashed food products, and general avoidance of  food items that have not been prepared by themselves. The patient has been asked to call us immediately with any symptom developments, issues, questions or other general concerns.     I have explained all of the above in detail and the patient understands all of the current recommendation(s). I have answered all of their questions to the best of my ability and to their complete satisfaction.   The patient is to continue with the current management plan.            Electronically signed by Jose Tello MD

## 2022-08-18 ENCOUNTER — OFFICE VISIT (OUTPATIENT)
Dept: HEMATOLOGY/ONCOLOGY | Facility: CLINIC | Age: 62
End: 2022-08-18
Payer: COMMERCIAL

## 2022-08-18 ENCOUNTER — INFUSION (OUTPATIENT)
Dept: INFUSION THERAPY | Facility: HOSPITAL | Age: 62
End: 2022-08-18
Attending: INTERNAL MEDICINE
Payer: COMMERCIAL

## 2022-08-18 VITALS
BODY MASS INDEX: 31.35 KG/M2 | RESPIRATION RATE: 18 BRPM | WEIGHT: 219.06 LBS | SYSTOLIC BLOOD PRESSURE: 133 MMHG | HEART RATE: 68 BPM | BODY MASS INDEX: 31.36 KG/M2 | DIASTOLIC BLOOD PRESSURE: 66 MMHG | RESPIRATION RATE: 17 BRPM | TEMPERATURE: 99 F | HEIGHT: 70 IN | SYSTOLIC BLOOD PRESSURE: 133 MMHG | WEIGHT: 219 LBS | HEIGHT: 70 IN | HEART RATE: 68 BPM | TEMPERATURE: 99 F | DIASTOLIC BLOOD PRESSURE: 66 MMHG

## 2022-08-18 DIAGNOSIS — D46.9 MDS (MYELODYSPLASTIC SYNDROME): Primary | ICD-10-CM

## 2022-08-18 DIAGNOSIS — D46.9 MDS (MYELODYSPLASTIC SYNDROME): ICD-10-CM

## 2022-08-18 DIAGNOSIS — D46.1 RARS (REFRACTORY ANEMIA WITH RINGED SIDEROBLASTS): Primary | ICD-10-CM

## 2022-08-18 DIAGNOSIS — Z87.891 FORMER SMOKER: ICD-10-CM

## 2022-08-18 DIAGNOSIS — D64.89 ANEMIA DUE TO MULTIPLE MECHANISMS: ICD-10-CM

## 2022-08-18 DIAGNOSIS — N18.30 STAGE 3 CHRONIC KIDNEY DISEASE, UNSPECIFIED WHETHER STAGE 3A OR 3B CKD: ICD-10-CM

## 2022-08-18 DIAGNOSIS — D64.9 NORMOCHROMIC ANEMIA: ICD-10-CM

## 2022-08-18 DIAGNOSIS — D57.3 SICKLE CELL TRAIT SYNDROME: ICD-10-CM

## 2022-08-18 DIAGNOSIS — D64.9 CHRONIC ANEMIA: ICD-10-CM

## 2022-08-18 DIAGNOSIS — D75.839 THROMBOCYTOSIS: ICD-10-CM

## 2022-08-18 DIAGNOSIS — D72.821 MONOCYTOSIS: ICD-10-CM

## 2022-08-18 PROBLEM — R93.89 ABNORMAL CHEST CT: Status: RESOLVED | Noted: 2022-08-17 | Resolved: 2022-08-18

## 2022-08-18 PROBLEM — R91.8 LUNG MASS: Status: RESOLVED | Noted: 2022-08-17 | Resolved: 2022-08-18

## 2022-08-18 PROCEDURE — 3060F PR POS MICROALBUMINURIA RESULT DOCUMENTED/REVIEW: ICD-10-PCS | Mod: CPTII,S$GLB,, | Performed by: INTERNAL MEDICINE

## 2022-08-18 PROCEDURE — 63600175 PHARM REV CODE 636 W HCPCS: Mod: JG | Performed by: INTERNAL MEDICINE

## 2022-08-18 PROCEDURE — 3060F POS MICROALBUMINURIA REV: CPT | Mod: CPTII,S$GLB,, | Performed by: INTERNAL MEDICINE

## 2022-08-18 PROCEDURE — 3066F PR DOCUMENTATION OF TREATMENT FOR NEPHROPATHY: ICD-10-PCS | Mod: CPTII,S$GLB,, | Performed by: INTERNAL MEDICINE

## 2022-08-18 PROCEDURE — 3075F SYST BP GE 130 - 139MM HG: CPT | Mod: CPTII,S$GLB,, | Performed by: INTERNAL MEDICINE

## 2022-08-18 PROCEDURE — 1159F MED LIST DOCD IN RCRD: CPT | Mod: CPTII,S$GLB,, | Performed by: INTERNAL MEDICINE

## 2022-08-18 PROCEDURE — 3066F NEPHROPATHY DOC TX: CPT | Mod: CPTII,S$GLB,, | Performed by: INTERNAL MEDICINE

## 2022-08-18 PROCEDURE — 1160F PR REVIEW ALL MEDS BY PRESCRIBER/CLIN PHARMACIST DOCUMENTED: ICD-10-PCS | Mod: CPTII,S$GLB,, | Performed by: INTERNAL MEDICINE

## 2022-08-18 PROCEDURE — 99214 OFFICE O/P EST MOD 30 MIN: CPT | Mod: S$GLB,,, | Performed by: INTERNAL MEDICINE

## 2022-08-18 PROCEDURE — 1159F PR MEDICATION LIST DOCUMENTED IN MEDICAL RECORD: ICD-10-PCS | Mod: CPTII,S$GLB,, | Performed by: INTERNAL MEDICINE

## 2022-08-18 PROCEDURE — 3008F PR BODY MASS INDEX (BMI) DOCUMENTED: ICD-10-PCS | Mod: CPTII,S$GLB,, | Performed by: INTERNAL MEDICINE

## 2022-08-18 PROCEDURE — 3078F DIAST BP <80 MM HG: CPT | Mod: CPTII,S$GLB,, | Performed by: INTERNAL MEDICINE

## 2022-08-18 PROCEDURE — 3044F HG A1C LEVEL LT 7.0%: CPT | Mod: CPTII,S$GLB,, | Performed by: INTERNAL MEDICINE

## 2022-08-18 PROCEDURE — 99214 PR OFFICE/OUTPT VISIT, EST, LEVL IV, 30-39 MIN: ICD-10-PCS | Mod: S$GLB,,, | Performed by: INTERNAL MEDICINE

## 2022-08-18 PROCEDURE — 3044F PR MOST RECENT HEMOGLOBIN A1C LEVEL <7.0%: ICD-10-PCS | Mod: CPTII,S$GLB,, | Performed by: INTERNAL MEDICINE

## 2022-08-18 PROCEDURE — 3078F PR MOST RECENT DIASTOLIC BLOOD PRESSURE < 80 MM HG: ICD-10-PCS | Mod: CPTII,S$GLB,, | Performed by: INTERNAL MEDICINE

## 2022-08-18 PROCEDURE — 96372 THER/PROPH/DIAG INJ SC/IM: CPT

## 2022-08-18 PROCEDURE — 3075F PR MOST RECENT SYSTOLIC BLOOD PRESS GE 130-139MM HG: ICD-10-PCS | Mod: CPTII,S$GLB,, | Performed by: INTERNAL MEDICINE

## 2022-08-18 PROCEDURE — 1160F RVW MEDS BY RX/DR IN RCRD: CPT | Mod: CPTII,S$GLB,, | Performed by: INTERNAL MEDICINE

## 2022-08-18 PROCEDURE — 3008F BODY MASS INDEX DOCD: CPT | Mod: CPTII,S$GLB,, | Performed by: INTERNAL MEDICINE

## 2022-08-18 RX ADMIN — EPOETIN ALFA-EPBX 20000 UNITS: 10000 INJECTION, SOLUTION INTRAVENOUS; SUBCUTANEOUS at 03:08

## 2022-08-18 NOTE — PLAN OF CARE
Problem: Fatigue  Goal: Improved Activity Tolerance  Intervention: Promote Improved Energy  Flowsheets (Taken 8/18/2022 1539)  Fatigue Management: frequent rest breaks encouraged  Activity Management: Ambulated -L4

## 2022-08-18 NOTE — PLAN OF CARE
Problem: Fatigue  Goal: Improved Activity Tolerance  Intervention: Promote Improved Energy  Flowsheets (Taken 8/18/2022 1532)  Fatigue Management: frequent rest breaks encouraged  Activity Management: Ambulated -L4

## 2022-08-24 ENCOUNTER — LAB VISIT (OUTPATIENT)
Dept: LAB | Facility: HOSPITAL | Age: 62
End: 2022-08-24
Attending: INTERNAL MEDICINE
Payer: COMMERCIAL

## 2022-08-24 DIAGNOSIS — D57.3 SICKLE CELL TRAIT SYNDROME: ICD-10-CM

## 2022-08-24 LAB
ALBUMIN SERPL BCP-MCNC: 4.5 G/DL (ref 3.5–5.2)
ALP SERPL-CCNC: 58 U/L (ref 55–135)
ALT SERPL W/O P-5'-P-CCNC: 14 U/L (ref 10–44)
ANION GAP SERPL CALC-SCNC: 3 MMOL/L (ref 8–16)
AST SERPL-CCNC: 17 U/L (ref 10–40)
BASOPHILS # BLD AUTO: 0.07 K/UL (ref 0–0.2)
BASOPHILS NFR BLD: 0.9 % (ref 0–1.9)
BILIRUB SERPL-MCNC: 0.9 MG/DL (ref 0.1–1)
BUN SERPL-MCNC: 28 MG/DL (ref 8–23)
CALCIUM SERPL-MCNC: 9.3 MG/DL (ref 8.7–10.5)
CHLORIDE SERPL-SCNC: 108 MMOL/L (ref 95–110)
CO2 SERPL-SCNC: 29 MMOL/L (ref 23–29)
CREAT SERPL-MCNC: 1.8 MG/DL (ref 0.5–1.4)
DIFFERENTIAL METHOD: ABNORMAL
EOSINOPHIL # BLD AUTO: 0.1 K/UL (ref 0–0.5)
EOSINOPHIL NFR BLD: 1.7 % (ref 0–8)
ERYTHROCYTE [DISTWIDTH] IN BLOOD BY AUTOMATED COUNT: 27.8 % (ref 11.5–14.5)
EST. GFR  (NO RACE VARIABLE): 42 ML/MIN/1.73 M^2
GLUCOSE SERPL-MCNC: 139 MG/DL (ref 70–110)
HCT VFR BLD AUTO: 27.1 % (ref 40–54)
HGB BLD-MCNC: 9.2 G/DL (ref 14–18)
IMM GRANULOCYTES # BLD AUTO: 0.02 K/UL (ref 0–0.04)
IMM GRANULOCYTES NFR BLD AUTO: 0.2 % (ref 0–0.5)
LYMPHOCYTES # BLD AUTO: 1.8 K/UL (ref 1–4.8)
LYMPHOCYTES NFR BLD: 22.3 % (ref 18–48)
MCH RBC QN AUTO: 31.8 PG (ref 27–31)
MCHC RBC AUTO-ENTMCNC: 33.9 G/DL (ref 32–36)
MCV RBC AUTO: 94 FL (ref 82–98)
MONOCYTES # BLD AUTO: 0.8 K/UL (ref 0.3–1)
MONOCYTES NFR BLD: 10.1 % (ref 4–15)
NEUTROPHILS # BLD AUTO: 5.2 K/UL (ref 1.8–7.7)
NEUTROPHILS NFR BLD: 64.8 % (ref 38–73)
NRBC BLD-RTO: 0 /100 WBC
PLATELET # BLD AUTO: 486 K/UL (ref 150–450)
PMV BLD AUTO: 10.1 FL (ref 9.2–12.9)
POTASSIUM SERPL-SCNC: 4.6 MMOL/L (ref 3.5–5.1)
PROT SERPL-MCNC: 7.4 G/DL (ref 6–8.4)
RBC # BLD AUTO: 2.89 M/UL (ref 4.6–6.2)
SODIUM SERPL-SCNC: 140 MMOL/L (ref 136–145)
WBC # BLD AUTO: 8.03 K/UL (ref 3.9–12.7)

## 2022-08-24 PROCEDURE — 85025 COMPLETE CBC W/AUTO DIFF WBC: CPT | Performed by: INTERNAL MEDICINE

## 2022-08-24 PROCEDURE — 80053 COMPREHEN METABOLIC PANEL: CPT | Performed by: INTERNAL MEDICINE

## 2022-08-24 PROCEDURE — 36415 COLL VENOUS BLD VENIPUNCTURE: CPT | Performed by: INTERNAL MEDICINE

## 2022-08-25 ENCOUNTER — INFUSION (OUTPATIENT)
Dept: INFUSION THERAPY | Facility: HOSPITAL | Age: 62
End: 2022-08-25
Attending: INTERNAL MEDICINE
Payer: COMMERCIAL

## 2022-08-25 VITALS
DIASTOLIC BLOOD PRESSURE: 70 MMHG | OXYGEN SATURATION: 98 % | BODY MASS INDEX: 31.38 KG/M2 | WEIGHT: 219.19 LBS | HEIGHT: 70 IN | HEART RATE: 83 BPM | RESPIRATION RATE: 18 BRPM | SYSTOLIC BLOOD PRESSURE: 120 MMHG | TEMPERATURE: 98 F

## 2022-08-25 DIAGNOSIS — D46.9 MDS (MYELODYSPLASTIC SYNDROME): Primary | ICD-10-CM

## 2022-08-25 DIAGNOSIS — N18.30 STAGE 3 CHRONIC KIDNEY DISEASE, UNSPECIFIED WHETHER STAGE 3A OR 3B CKD: ICD-10-CM

## 2022-08-25 DIAGNOSIS — D64.9 NORMOCHROMIC ANEMIA: ICD-10-CM

## 2022-08-25 DIAGNOSIS — D64.9 CHRONIC ANEMIA: ICD-10-CM

## 2022-08-25 DIAGNOSIS — D64.89 ANEMIA DUE TO MULTIPLE MECHANISMS: ICD-10-CM

## 2022-08-25 PROCEDURE — 63600175 PHARM REV CODE 636 W HCPCS: Mod: JG | Performed by: INTERNAL MEDICINE

## 2022-08-25 PROCEDURE — 96372 THER/PROPH/DIAG INJ SC/IM: CPT

## 2022-08-25 RX ADMIN — EPOETIN ALFA-EPBX 20000 UNITS: 10000 INJECTION, SOLUTION INTRAVENOUS; SUBCUTANEOUS at 04:08

## 2022-08-25 NOTE — PLAN OF CARE
Problem: Activity Intolerance  Goal: Enhanced Capacity and Energy  Outcome: Ongoing, Progressing  Intervention: Optimize Activity Tolerance  Flowsheets (Taken 8/25/2022 1606)  Self-Care Promotion: independence encouraged  Activity Management:   Ambulated -L4   Up in chair - L3  Environmental Support:   calm environment promoted   rest periods encouraged

## 2022-09-01 ENCOUNTER — INFUSION (OUTPATIENT)
Dept: INFUSION THERAPY | Facility: HOSPITAL | Age: 62
End: 2022-09-01
Attending: INTERNAL MEDICINE
Payer: COMMERCIAL

## 2022-09-01 VITALS
SYSTOLIC BLOOD PRESSURE: 145 MMHG | BODY MASS INDEX: 31.16 KG/M2 | HEIGHT: 70 IN | DIASTOLIC BLOOD PRESSURE: 76 MMHG | OXYGEN SATURATION: 97 % | TEMPERATURE: 98 F | WEIGHT: 217.69 LBS | HEART RATE: 74 BPM | RESPIRATION RATE: 18 BRPM

## 2022-09-01 DIAGNOSIS — D64.9 CHRONIC ANEMIA: ICD-10-CM

## 2022-09-01 DIAGNOSIS — N18.30 STAGE 3 CHRONIC KIDNEY DISEASE, UNSPECIFIED WHETHER STAGE 3A OR 3B CKD: ICD-10-CM

## 2022-09-01 DIAGNOSIS — D64.9 NORMOCHROMIC ANEMIA: ICD-10-CM

## 2022-09-01 DIAGNOSIS — D64.89 ANEMIA DUE TO MULTIPLE MECHANISMS: ICD-10-CM

## 2022-09-01 DIAGNOSIS — D46.9 MDS (MYELODYSPLASTIC SYNDROME): Primary | ICD-10-CM

## 2022-09-01 PROCEDURE — 96372 THER/PROPH/DIAG INJ SC/IM: CPT

## 2022-09-01 PROCEDURE — 63600175 PHARM REV CODE 636 W HCPCS: Mod: JG | Performed by: INTERNAL MEDICINE

## 2022-09-01 RX ADMIN — EPOETIN ALFA-EPBX 20000 UNITS: 10000 INJECTION, SOLUTION INTRAVENOUS; SUBCUTANEOUS at 04:09

## 2022-09-01 NOTE — PLAN OF CARE
Problem: Activity Intolerance  Goal: Enhanced Capacity and Energy  Outcome: Ongoing, Progressing  Intervention: Optimize Activity Tolerance  Flowsheets (Taken 9/1/2022 1604)  Self-Care Promotion: independence encouraged  Activity Management:   Ambulated -L4   Up in chair - L3  Environmental Support:   calm environment promoted   rest periods encouraged

## 2022-09-07 ENCOUNTER — LAB VISIT (OUTPATIENT)
Dept: LAB | Facility: HOSPITAL | Age: 62
End: 2022-09-07
Attending: INTERNAL MEDICINE
Payer: COMMERCIAL

## 2022-09-07 DIAGNOSIS — D57.3 SICKLE CELL TRAIT SYNDROME: ICD-10-CM

## 2022-09-07 LAB
ALBUMIN SERPL BCP-MCNC: 4.4 G/DL (ref 3.5–5.2)
ALP SERPL-CCNC: 53 U/L (ref 55–135)
ALT SERPL W/O P-5'-P-CCNC: 16 U/L (ref 10–44)
ANION GAP SERPL CALC-SCNC: 4 MMOL/L (ref 8–16)
AST SERPL-CCNC: 18 U/L (ref 10–40)
BASOPHILS # BLD AUTO: 0.09 K/UL (ref 0–0.2)
BASOPHILS NFR BLD: 1.1 % (ref 0–1.9)
BILIRUB SERPL-MCNC: 0.9 MG/DL (ref 0.1–1)
BUN SERPL-MCNC: 28 MG/DL (ref 8–23)
CALCIUM SERPL-MCNC: 9 MG/DL (ref 8.7–10.5)
CHLORIDE SERPL-SCNC: 111 MMOL/L (ref 95–110)
CO2 SERPL-SCNC: 27 MMOL/L (ref 23–29)
CREAT SERPL-MCNC: 1.9 MG/DL (ref 0.5–1.4)
DIFFERENTIAL METHOD: ABNORMAL
EOSINOPHIL # BLD AUTO: 0.2 K/UL (ref 0–0.5)
EOSINOPHIL NFR BLD: 1.9 % (ref 0–8)
ERYTHROCYTE [DISTWIDTH] IN BLOOD BY AUTOMATED COUNT: 27.3 % (ref 11.5–14.5)
EST. GFR  (NO RACE VARIABLE): 39.4 ML/MIN/1.73 M^2
GLUCOSE SERPL-MCNC: 144 MG/DL (ref 70–110)
HCT VFR BLD AUTO: 25.4 % (ref 40–54)
HGB BLD-MCNC: 8.9 G/DL (ref 14–18)
IMM GRANULOCYTES # BLD AUTO: 0.04 K/UL (ref 0–0.04)
IMM GRANULOCYTES NFR BLD AUTO: 0.5 % (ref 0–0.5)
LYMPHOCYTES # BLD AUTO: 1.5 K/UL (ref 1–4.8)
LYMPHOCYTES NFR BLD: 19.2 % (ref 18–48)
MCH RBC QN AUTO: 31.7 PG (ref 27–31)
MCHC RBC AUTO-ENTMCNC: 35 G/DL (ref 32–36)
MCV RBC AUTO: 90 FL (ref 82–98)
MONOCYTES # BLD AUTO: 0.8 K/UL (ref 0.3–1)
MONOCYTES NFR BLD: 10.5 % (ref 4–15)
NEUTROPHILS # BLD AUTO: 5.3 K/UL (ref 1.8–7.7)
NEUTROPHILS NFR BLD: 66.8 % (ref 38–73)
NRBC BLD-RTO: 0 /100 WBC
PLATELET # BLD AUTO: 532 K/UL (ref 150–450)
PMV BLD AUTO: 10.6 FL (ref 9.2–12.9)
POTASSIUM SERPL-SCNC: 4.3 MMOL/L (ref 3.5–5.1)
PROT SERPL-MCNC: 7.3 G/DL (ref 6–8.4)
RBC # BLD AUTO: 2.81 M/UL (ref 4.6–6.2)
SODIUM SERPL-SCNC: 142 MMOL/L (ref 136–145)
WBC # BLD AUTO: 7.97 K/UL (ref 3.9–12.7)

## 2022-09-07 PROCEDURE — 80053 COMPREHEN METABOLIC PANEL: CPT | Performed by: INTERNAL MEDICINE

## 2022-09-07 PROCEDURE — 85025 COMPLETE CBC W/AUTO DIFF WBC: CPT | Performed by: INTERNAL MEDICINE

## 2022-09-07 PROCEDURE — 36415 COLL VENOUS BLD VENIPUNCTURE: CPT | Performed by: INTERNAL MEDICINE

## 2022-09-08 ENCOUNTER — INFUSION (OUTPATIENT)
Dept: INFUSION THERAPY | Facility: HOSPITAL | Age: 62
End: 2022-09-08
Attending: INTERNAL MEDICINE
Payer: COMMERCIAL

## 2022-09-08 VITALS
TEMPERATURE: 97 F | BODY MASS INDEX: 31.21 KG/M2 | HEIGHT: 70 IN | RESPIRATION RATE: 17 BRPM | HEART RATE: 76 BPM | DIASTOLIC BLOOD PRESSURE: 82 MMHG | SYSTOLIC BLOOD PRESSURE: 142 MMHG | OXYGEN SATURATION: 97 % | WEIGHT: 218 LBS

## 2022-09-08 DIAGNOSIS — D64.9 CHRONIC ANEMIA: ICD-10-CM

## 2022-09-08 DIAGNOSIS — D46.9 MDS (MYELODYSPLASTIC SYNDROME): Primary | ICD-10-CM

## 2022-09-08 DIAGNOSIS — D64.9 NORMOCHROMIC ANEMIA: ICD-10-CM

## 2022-09-08 DIAGNOSIS — N18.30 STAGE 3 CHRONIC KIDNEY DISEASE, UNSPECIFIED WHETHER STAGE 3A OR 3B CKD: ICD-10-CM

## 2022-09-08 DIAGNOSIS — D64.89 ANEMIA DUE TO MULTIPLE MECHANISMS: ICD-10-CM

## 2022-09-08 PROCEDURE — 96372 THER/PROPH/DIAG INJ SC/IM: CPT

## 2022-09-08 PROCEDURE — 63600175 PHARM REV CODE 636 W HCPCS: Mod: JG | Performed by: INTERNAL MEDICINE

## 2022-09-08 RX ADMIN — EPOETIN ALFA-EPBX 20000 UNITS: 10000 INJECTION, SOLUTION INTRAVENOUS; SUBCUTANEOUS at 03:09

## 2022-09-08 NOTE — PLAN OF CARE
Problem: Fatigue  Goal: Improved Activity Tolerance  Intervention: Promote Improved Energy  Flowsheets (Taken 9/8/2022 2440)  Fatigue Management: frequent rest breaks encouraged  Activity Management: Ambulated -L4

## 2022-09-15 ENCOUNTER — INFUSION (OUTPATIENT)
Dept: INFUSION THERAPY | Facility: HOSPITAL | Age: 62
End: 2022-09-15
Attending: INTERNAL MEDICINE
Payer: COMMERCIAL

## 2022-09-15 VITALS
HEART RATE: 69 BPM | BODY MASS INDEX: 30.88 KG/M2 | RESPIRATION RATE: 18 BRPM | HEIGHT: 70 IN | DIASTOLIC BLOOD PRESSURE: 78 MMHG | SYSTOLIC BLOOD PRESSURE: 132 MMHG | WEIGHT: 215.69 LBS | OXYGEN SATURATION: 98 % | TEMPERATURE: 98 F

## 2022-09-15 DIAGNOSIS — D64.89 ANEMIA DUE TO MULTIPLE MECHANISMS: ICD-10-CM

## 2022-09-15 DIAGNOSIS — D64.9 NORMOCHROMIC ANEMIA: ICD-10-CM

## 2022-09-15 DIAGNOSIS — D64.9 CHRONIC ANEMIA: ICD-10-CM

## 2022-09-15 DIAGNOSIS — D46.9 MDS (MYELODYSPLASTIC SYNDROME): Primary | ICD-10-CM

## 2022-09-15 DIAGNOSIS — N18.30 STAGE 3 CHRONIC KIDNEY DISEASE, UNSPECIFIED WHETHER STAGE 3A OR 3B CKD: ICD-10-CM

## 2022-09-15 PROCEDURE — 63600175 PHARM REV CODE 636 W HCPCS: Mod: JG | Performed by: INTERNAL MEDICINE

## 2022-09-15 PROCEDURE — 96372 THER/PROPH/DIAG INJ SC/IM: CPT

## 2022-09-15 RX ADMIN — EPOETIN ALFA-EPBX 20000 UNITS: 10000 INJECTION, SOLUTION INTRAVENOUS; SUBCUTANEOUS at 04:09

## 2022-09-15 NOTE — PLAN OF CARE
Problem: Fatigue  Goal: Improved Activity Tolerance  Outcome: Ongoing, Progressing  Intervention: Promote Improved Energy  Flowsheets (Taken 9/15/2022 2895)  Fatigue Management:   frequent rest breaks encouraged   fatigue-related activity identified   paced activity encouraged  Sleep/Rest Enhancement:   regular sleep/rest pattern promoted   relaxation techniques promoted

## 2022-09-21 ENCOUNTER — LAB VISIT (OUTPATIENT)
Dept: LAB | Facility: HOSPITAL | Age: 62
End: 2022-09-21
Attending: INTERNAL MEDICINE
Payer: COMMERCIAL

## 2022-09-21 DIAGNOSIS — D57.3 SICKLE CELL TRAIT SYNDROME: ICD-10-CM

## 2022-09-21 LAB
ALBUMIN SERPL BCP-MCNC: 4.5 G/DL (ref 3.5–5.2)
ALP SERPL-CCNC: 60 U/L (ref 55–135)
ALT SERPL W/O P-5'-P-CCNC: 17 U/L (ref 10–44)
ANION GAP SERPL CALC-SCNC: 2 MMOL/L (ref 8–16)
AST SERPL-CCNC: 17 U/L (ref 10–40)
BASOPHILS # BLD AUTO: 0.09 K/UL (ref 0–0.2)
BASOPHILS NFR BLD: 1.1 % (ref 0–1.9)
BILIRUB SERPL-MCNC: 0.8 MG/DL (ref 0.1–1)
BUN SERPL-MCNC: 22 MG/DL (ref 8–23)
CALCIUM SERPL-MCNC: 8.6 MG/DL (ref 8.7–10.5)
CHLORIDE SERPL-SCNC: 109 MMOL/L (ref 95–110)
CO2 SERPL-SCNC: 26 MMOL/L (ref 23–29)
CREAT SERPL-MCNC: 1.7 MG/DL (ref 0.5–1.4)
DIFFERENTIAL METHOD: ABNORMAL
EOSINOPHIL # BLD AUTO: 0.1 K/UL (ref 0–0.5)
EOSINOPHIL NFR BLD: 1.7 % (ref 0–8)
ERYTHROCYTE [DISTWIDTH] IN BLOOD BY AUTOMATED COUNT: 27.3 % (ref 11.5–14.5)
EST. GFR  (NO RACE VARIABLE): 45 ML/MIN/1.73 M^2
GLUCOSE SERPL-MCNC: 119 MG/DL (ref 70–110)
HCT VFR BLD AUTO: 26.6 % (ref 40–54)
HGB BLD-MCNC: 9.3 G/DL (ref 14–18)
IMM GRANULOCYTES # BLD AUTO: 0.03 K/UL (ref 0–0.04)
IMM GRANULOCYTES NFR BLD AUTO: 0.4 % (ref 0–0.5)
LYMPHOCYTES # BLD AUTO: 1.6 K/UL (ref 1–4.8)
LYMPHOCYTES NFR BLD: 19.7 % (ref 18–48)
MCH RBC QN AUTO: 31.7 PG (ref 27–31)
MCHC RBC AUTO-ENTMCNC: 35 G/DL (ref 32–36)
MCV RBC AUTO: 91 FL (ref 82–98)
MONOCYTES # BLD AUTO: 0.7 K/UL (ref 0.3–1)
MONOCYTES NFR BLD: 9.1 % (ref 4–15)
NEUTROPHILS # BLD AUTO: 5.5 K/UL (ref 1.8–7.7)
NEUTROPHILS NFR BLD: 68 % (ref 38–73)
NRBC BLD-RTO: 0 /100 WBC
PLATELET # BLD AUTO: 511 K/UL (ref 150–450)
PMV BLD AUTO: 10.2 FL (ref 9.2–12.9)
POTASSIUM SERPL-SCNC: 4 MMOL/L (ref 3.5–5.1)
PROT SERPL-MCNC: 7.4 G/DL (ref 6–8.4)
RBC # BLD AUTO: 2.93 M/UL (ref 4.6–6.2)
SODIUM SERPL-SCNC: 137 MMOL/L (ref 136–145)
WBC # BLD AUTO: 8.14 K/UL (ref 3.9–12.7)

## 2022-09-21 PROCEDURE — 36415 COLL VENOUS BLD VENIPUNCTURE: CPT | Performed by: INTERNAL MEDICINE

## 2022-09-21 PROCEDURE — 80053 COMPREHEN METABOLIC PANEL: CPT | Performed by: INTERNAL MEDICINE

## 2022-09-21 PROCEDURE — 85025 COMPLETE CBC W/AUTO DIFF WBC: CPT | Performed by: INTERNAL MEDICINE

## 2022-09-22 ENCOUNTER — INFUSION (OUTPATIENT)
Dept: INFUSION THERAPY | Facility: HOSPITAL | Age: 62
End: 2022-09-22
Attending: INTERNAL MEDICINE
Payer: COMMERCIAL

## 2022-09-22 VITALS
WEIGHT: 217.81 LBS | HEART RATE: 90 BPM | TEMPERATURE: 98 F | BODY MASS INDEX: 31.18 KG/M2 | RESPIRATION RATE: 18 BRPM | OXYGEN SATURATION: 98 % | SYSTOLIC BLOOD PRESSURE: 117 MMHG | DIASTOLIC BLOOD PRESSURE: 72 MMHG | HEIGHT: 70 IN

## 2022-09-22 DIAGNOSIS — D64.9 NORMOCHROMIC ANEMIA: ICD-10-CM

## 2022-09-22 DIAGNOSIS — D64.9 CHRONIC ANEMIA: ICD-10-CM

## 2022-09-22 DIAGNOSIS — D64.89 ANEMIA DUE TO MULTIPLE MECHANISMS: ICD-10-CM

## 2022-09-22 DIAGNOSIS — D46.9 MDS (MYELODYSPLASTIC SYNDROME): Primary | ICD-10-CM

## 2022-09-22 DIAGNOSIS — N18.30 STAGE 3 CHRONIC KIDNEY DISEASE, UNSPECIFIED WHETHER STAGE 3A OR 3B CKD: ICD-10-CM

## 2022-09-22 PROCEDURE — 63600175 PHARM REV CODE 636 W HCPCS: Mod: JG | Performed by: INTERNAL MEDICINE

## 2022-09-22 PROCEDURE — 96372 THER/PROPH/DIAG INJ SC/IM: CPT

## 2022-09-22 RX ADMIN — EPOETIN ALFA-EPBX 20000 UNITS: 10000 INJECTION, SOLUTION INTRAVENOUS; SUBCUTANEOUS at 04:09

## 2022-09-22 NOTE — PLAN OF CARE
Problem: Fatigue  Goal: Improved Activity Tolerance  Outcome: Ongoing, Progressing  Intervention: Promote Improved Energy  Flowsheets (Taken 9/22/2022 1605)  Fatigue Management: frequent rest breaks encouraged  Sleep/Rest Enhancement:   regular sleep/rest pattern promoted   relaxation techniques promoted  Activity Management: Ambulated -L4

## 2022-09-29 ENCOUNTER — INFUSION (OUTPATIENT)
Dept: INFUSION THERAPY | Facility: HOSPITAL | Age: 62
End: 2022-09-29
Attending: INTERNAL MEDICINE
Payer: COMMERCIAL

## 2022-09-29 VITALS
TEMPERATURE: 97 F | HEIGHT: 70 IN | RESPIRATION RATE: 18 BRPM | SYSTOLIC BLOOD PRESSURE: 128 MMHG | DIASTOLIC BLOOD PRESSURE: 74 MMHG | HEART RATE: 72 BPM | BODY MASS INDEX: 31.14 KG/M2 | WEIGHT: 217.5 LBS | OXYGEN SATURATION: 97 %

## 2022-09-29 DIAGNOSIS — D64.9 NORMOCHROMIC ANEMIA: ICD-10-CM

## 2022-09-29 DIAGNOSIS — D64.9 CHRONIC ANEMIA: ICD-10-CM

## 2022-09-29 DIAGNOSIS — N18.30 STAGE 3 CHRONIC KIDNEY DISEASE, UNSPECIFIED WHETHER STAGE 3A OR 3B CKD: ICD-10-CM

## 2022-09-29 DIAGNOSIS — D46.9 MDS (MYELODYSPLASTIC SYNDROME): Primary | ICD-10-CM

## 2022-09-29 DIAGNOSIS — D64.89 ANEMIA DUE TO MULTIPLE MECHANISMS: ICD-10-CM

## 2022-09-29 PROCEDURE — 96372 THER/PROPH/DIAG INJ SC/IM: CPT

## 2022-09-29 PROCEDURE — 63600175 PHARM REV CODE 636 W HCPCS: Mod: JG | Performed by: INTERNAL MEDICINE

## 2022-09-29 RX ADMIN — EPOETIN ALFA-EPBX 20000 UNITS: 10000 INJECTION, SOLUTION INTRAVENOUS; SUBCUTANEOUS at 04:09

## 2022-09-29 NOTE — PLAN OF CARE
Problem: Anemia  Goal: Anemia Symptom Improvement  Outcome: Ongoing, Progressing  Intervention: Monitor and Manage Anemia  Flowsheets (Taken 3/10/2022 1602)  Oral Nutrition Promotion:   rest periods promoted   safe use of adaptive equipment encouraged  Fatigue Management:   frequent rest breaks encouraged   fatigue-related activity identified   paced activity encouraged      1.9

## 2022-09-29 NOTE — PLAN OF CARE
Problem: Fatigue  Goal: Improved Activity Tolerance  Outcome: Ongoing, Progressing  Intervention: Promote Improved Energy  Flowsheets (Taken 9/29/2022 1602)  Fatigue Management:   frequent rest breaks encouraged   fatigue-related activity identified   paced activity encouraged  Sleep/Rest Enhancement:   regular sleep/rest pattern promoted   relaxation techniques promoted

## 2022-10-05 ENCOUNTER — LAB VISIT (OUTPATIENT)
Dept: LAB | Facility: HOSPITAL | Age: 62
End: 2022-10-05
Attending: INTERNAL MEDICINE
Payer: COMMERCIAL

## 2022-10-05 DIAGNOSIS — D57.3 SICKLE CELL TRAIT SYNDROME: ICD-10-CM

## 2022-10-05 LAB
ALBUMIN SERPL BCP-MCNC: 4.4 G/DL (ref 3.5–5.2)
ALP SERPL-CCNC: 55 U/L (ref 55–135)
ALT SERPL W/O P-5'-P-CCNC: 16 U/L (ref 10–44)
ANION GAP SERPL CALC-SCNC: 5 MMOL/L (ref 8–16)
AST SERPL-CCNC: 20 U/L (ref 10–40)
BASOPHILS # BLD AUTO: 0.09 K/UL (ref 0–0.2)
BASOPHILS NFR BLD: 1.2 % (ref 0–1.9)
BILIRUB SERPL-MCNC: 1.2 MG/DL (ref 0.1–1)
BUN SERPL-MCNC: 26 MG/DL (ref 8–23)
CALCIUM SERPL-MCNC: 8.8 MG/DL (ref 8.7–10.5)
CHLORIDE SERPL-SCNC: 106 MMOL/L (ref 95–110)
CO2 SERPL-SCNC: 26 MMOL/L (ref 23–29)
CREAT SERPL-MCNC: 1.7 MG/DL (ref 0.5–1.4)
DIFFERENTIAL METHOD: ABNORMAL
EOSINOPHIL # BLD AUTO: 0.1 K/UL (ref 0–0.5)
EOSINOPHIL NFR BLD: 1.7 % (ref 0–8)
ERYTHROCYTE [DISTWIDTH] IN BLOOD BY AUTOMATED COUNT: 27.4 % (ref 11.5–14.5)
EST. GFR  (NO RACE VARIABLE): 45 ML/MIN/1.73 M^2
GLUCOSE SERPL-MCNC: 129 MG/DL (ref 70–110)
HCT VFR BLD AUTO: 25.7 % (ref 40–54)
HGB BLD-MCNC: 9 G/DL (ref 14–18)
IMM GRANULOCYTES # BLD AUTO: 0.03 K/UL (ref 0–0.04)
IMM GRANULOCYTES NFR BLD AUTO: 0.4 % (ref 0–0.5)
LYMPHOCYTES # BLD AUTO: 1.4 K/UL (ref 1–4.8)
LYMPHOCYTES NFR BLD: 18.4 % (ref 18–48)
MCH RBC QN AUTO: 31.6 PG (ref 27–31)
MCHC RBC AUTO-ENTMCNC: 35 G/DL (ref 32–36)
MCV RBC AUTO: 90 FL (ref 82–98)
MONOCYTES # BLD AUTO: 0.8 K/UL (ref 0.3–1)
MONOCYTES NFR BLD: 10.6 % (ref 4–15)
NEUTROPHILS # BLD AUTO: 5.3 K/UL (ref 1.8–7.7)
NEUTROPHILS NFR BLD: 67.7 % (ref 38–73)
NRBC BLD-RTO: 0 /100 WBC
PLATELET # BLD AUTO: 490 K/UL (ref 150–450)
PMV BLD AUTO: 9.8 FL (ref 9.2–12.9)
POTASSIUM SERPL-SCNC: 4.2 MMOL/L (ref 3.5–5.1)
PROT SERPL-MCNC: 7.3 G/DL (ref 6–8.4)
RBC # BLD AUTO: 2.85 M/UL (ref 4.6–6.2)
SODIUM SERPL-SCNC: 137 MMOL/L (ref 136–145)
WBC # BLD AUTO: 7.82 K/UL (ref 3.9–12.7)

## 2022-10-05 PROCEDURE — 85025 COMPLETE CBC W/AUTO DIFF WBC: CPT | Performed by: INTERNAL MEDICINE

## 2022-10-05 PROCEDURE — 36415 COLL VENOUS BLD VENIPUNCTURE: CPT | Performed by: INTERNAL MEDICINE

## 2022-10-05 PROCEDURE — 80053 COMPREHEN METABOLIC PANEL: CPT | Performed by: INTERNAL MEDICINE

## 2022-10-06 ENCOUNTER — INFUSION (OUTPATIENT)
Dept: INFUSION THERAPY | Facility: HOSPITAL | Age: 62
End: 2022-10-06
Attending: INTERNAL MEDICINE
Payer: COMMERCIAL

## 2022-10-06 VITALS
HEIGHT: 70 IN | SYSTOLIC BLOOD PRESSURE: 124 MMHG | OXYGEN SATURATION: 97 % | DIASTOLIC BLOOD PRESSURE: 74 MMHG | HEART RATE: 79 BPM | WEIGHT: 216.69 LBS | BODY MASS INDEX: 31.02 KG/M2 | RESPIRATION RATE: 18 BRPM | TEMPERATURE: 98 F

## 2022-10-06 DIAGNOSIS — N18.30 STAGE 3 CHRONIC KIDNEY DISEASE, UNSPECIFIED WHETHER STAGE 3A OR 3B CKD: ICD-10-CM

## 2022-10-06 DIAGNOSIS — D64.9 CHRONIC ANEMIA: ICD-10-CM

## 2022-10-06 DIAGNOSIS — D64.9 NORMOCHROMIC ANEMIA: ICD-10-CM

## 2022-10-06 DIAGNOSIS — D64.89 ANEMIA DUE TO MULTIPLE MECHANISMS: ICD-10-CM

## 2022-10-06 DIAGNOSIS — D46.9 MDS (MYELODYSPLASTIC SYNDROME): Primary | ICD-10-CM

## 2022-10-06 PROCEDURE — 96372 THER/PROPH/DIAG INJ SC/IM: CPT

## 2022-10-06 PROCEDURE — 63600175 PHARM REV CODE 636 W HCPCS: Mod: JG | Performed by: INTERNAL MEDICINE

## 2022-10-06 RX ADMIN — EPOETIN ALFA-EPBX 20000 UNITS: 10000 INJECTION, SOLUTION INTRAVENOUS; SUBCUTANEOUS at 04:10

## 2022-10-10 ENCOUNTER — OFFICE VISIT (OUTPATIENT)
Dept: FAMILY MEDICINE | Facility: CLINIC | Age: 62
End: 2022-10-10
Payer: COMMERCIAL

## 2022-10-10 VITALS
DIASTOLIC BLOOD PRESSURE: 68 MMHG | BODY MASS INDEX: 30.78 KG/M2 | RESPIRATION RATE: 18 BRPM | SYSTOLIC BLOOD PRESSURE: 128 MMHG | HEART RATE: 88 BPM | OXYGEN SATURATION: 98 % | HEIGHT: 70 IN | WEIGHT: 215 LBS | TEMPERATURE: 99 F

## 2022-10-10 DIAGNOSIS — R35.1 BPH ASSOCIATED WITH NOCTURIA: ICD-10-CM

## 2022-10-10 DIAGNOSIS — M72.2 PLANTAR FASCIITIS: ICD-10-CM

## 2022-10-10 DIAGNOSIS — R53.82 CHRONIC FATIGUE: ICD-10-CM

## 2022-10-10 DIAGNOSIS — R73.03 PREDIABETES: ICD-10-CM

## 2022-10-10 DIAGNOSIS — N18.32 STAGE 3B CHRONIC KIDNEY DISEASE: ICD-10-CM

## 2022-10-10 DIAGNOSIS — E78.5 DYSLIPIDEMIA: ICD-10-CM

## 2022-10-10 DIAGNOSIS — F51.01 PRIMARY INSOMNIA: ICD-10-CM

## 2022-10-10 DIAGNOSIS — E53.8 FOLIC ACID DEFICIENCY: ICD-10-CM

## 2022-10-10 DIAGNOSIS — I10 ESSENTIAL HYPERTENSION: Primary | ICD-10-CM

## 2022-10-10 DIAGNOSIS — E55.9 VITAMIN D DEFICIENCY: ICD-10-CM

## 2022-10-10 DIAGNOSIS — N40.1 BPH ASSOCIATED WITH NOCTURIA: ICD-10-CM

## 2022-10-10 DIAGNOSIS — D46.9 MDS (MYELODYSPLASTIC SYNDROME): ICD-10-CM

## 2022-10-10 DIAGNOSIS — Z23 FLU VACCINE NEED: ICD-10-CM

## 2022-10-10 LAB — HBA1C MFR BLD: 5.3 %

## 2022-10-10 PROCEDURE — 3074F PR MOST RECENT SYSTOLIC BLOOD PRESSURE < 130 MM HG: ICD-10-PCS | Mod: CPTII,S$GLB,, | Performed by: NURSE PRACTITIONER

## 2022-10-10 PROCEDURE — 90686 FLU VACCINE (QUAD) GREATER THAN OR EQUAL TO 3YO PRESERVATIVE FREE IM: ICD-10-PCS | Mod: S$GLB,,, | Performed by: NURSE PRACTITIONER

## 2022-10-10 PROCEDURE — 3044F HG A1C LEVEL LT 7.0%: CPT | Mod: CPTII,S$GLB,, | Performed by: NURSE PRACTITIONER

## 2022-10-10 PROCEDURE — 83036 POCT HEMOGLOBIN A1C: ICD-10-PCS | Mod: QW,S$GLB,, | Performed by: NURSE PRACTITIONER

## 2022-10-10 PROCEDURE — 3074F SYST BP LT 130 MM HG: CPT | Mod: CPTII,S$GLB,, | Performed by: NURSE PRACTITIONER

## 2022-10-10 PROCEDURE — 3060F PR POS MICROALBUMINURIA RESULT DOCUMENTED/REVIEW: ICD-10-PCS | Mod: CPTII,S$GLB,, | Performed by: NURSE PRACTITIONER

## 2022-10-10 PROCEDURE — 1159F PR MEDICATION LIST DOCUMENTED IN MEDICAL RECORD: ICD-10-PCS | Mod: CPTII,S$GLB,, | Performed by: NURSE PRACTITIONER

## 2022-10-10 PROCEDURE — 99214 PR OFFICE/OUTPT VISIT, EST, LEVL IV, 30-39 MIN: ICD-10-PCS | Mod: 25,S$GLB,, | Performed by: NURSE PRACTITIONER

## 2022-10-10 PROCEDURE — 1160F PR REVIEW ALL MEDS BY PRESCRIBER/CLIN PHARMACIST DOCUMENTED: ICD-10-PCS | Mod: CPTII,S$GLB,, | Performed by: NURSE PRACTITIONER

## 2022-10-10 PROCEDURE — 3008F PR BODY MASS INDEX (BMI) DOCUMENTED: ICD-10-PCS | Mod: CPTII,S$GLB,, | Performed by: NURSE PRACTITIONER

## 2022-10-10 PROCEDURE — 3060F POS MICROALBUMINURIA REV: CPT | Mod: CPTII,S$GLB,, | Performed by: NURSE PRACTITIONER

## 2022-10-10 PROCEDURE — 3044F PR MOST RECENT HEMOGLOBIN A1C LEVEL <7.0%: ICD-10-PCS | Mod: CPTII,S$GLB,, | Performed by: NURSE PRACTITIONER

## 2022-10-10 PROCEDURE — 90686 IIV4 VACC NO PRSV 0.5 ML IM: CPT | Mod: S$GLB,,, | Performed by: NURSE PRACTITIONER

## 2022-10-10 PROCEDURE — 1160F RVW MEDS BY RX/DR IN RCRD: CPT | Mod: CPTII,S$GLB,, | Performed by: NURSE PRACTITIONER

## 2022-10-10 PROCEDURE — 3066F NEPHROPATHY DOC TX: CPT | Mod: CPTII,S$GLB,, | Performed by: NURSE PRACTITIONER

## 2022-10-10 PROCEDURE — 3078F DIAST BP <80 MM HG: CPT | Mod: CPTII,S$GLB,, | Performed by: NURSE PRACTITIONER

## 2022-10-10 PROCEDURE — 90471 FLU VACCINE (QUAD) GREATER THAN OR EQUAL TO 3YO PRESERVATIVE FREE IM: ICD-10-PCS | Mod: S$GLB,,, | Performed by: NURSE PRACTITIONER

## 2022-10-10 PROCEDURE — 83036 HEMOGLOBIN GLYCOSYLATED A1C: CPT | Mod: QW,S$GLB,, | Performed by: NURSE PRACTITIONER

## 2022-10-10 PROCEDURE — 3078F PR MOST RECENT DIASTOLIC BLOOD PRESSURE < 80 MM HG: ICD-10-PCS | Mod: CPTII,S$GLB,, | Performed by: NURSE PRACTITIONER

## 2022-10-10 PROCEDURE — 1159F MED LIST DOCD IN RCRD: CPT | Mod: CPTII,S$GLB,, | Performed by: NURSE PRACTITIONER

## 2022-10-10 PROCEDURE — 3066F PR DOCUMENTATION OF TREATMENT FOR NEPHROPATHY: ICD-10-PCS | Mod: CPTII,S$GLB,, | Performed by: NURSE PRACTITIONER

## 2022-10-10 PROCEDURE — 3008F BODY MASS INDEX DOCD: CPT | Mod: CPTII,S$GLB,, | Performed by: NURSE PRACTITIONER

## 2022-10-10 PROCEDURE — 99214 OFFICE O/P EST MOD 30 MIN: CPT | Mod: 25,S$GLB,, | Performed by: NURSE PRACTITIONER

## 2022-10-10 PROCEDURE — 90471 IMMUNIZATION ADMIN: CPT | Mod: S$GLB,,, | Performed by: NURSE PRACTITIONER

## 2022-10-10 RX ORDER — TAMSULOSIN HYDROCHLORIDE 0.4 MG/1
1 CAPSULE ORAL DAILY
Qty: 90 CAPSULE | Refills: 4 | Status: SHIPPED | OUTPATIENT
Start: 2022-10-10 | End: 2023-11-03 | Stop reason: SDUPTHER

## 2022-10-10 RX ORDER — FOLIC ACID 1 MG/1
2 TABLET ORAL DAILY
Qty: 180 TABLET | Refills: 4 | Status: SHIPPED | OUTPATIENT
Start: 2022-10-10 | End: 2024-01-12 | Stop reason: SDUPTHER

## 2022-10-10 RX ORDER — AMLODIPINE BESYLATE 5 MG/1
5 TABLET ORAL DAILY
Qty: 90 TABLET | Refills: 4 | Status: SHIPPED | OUTPATIENT
Start: 2022-10-10 | End: 2023-10-24 | Stop reason: SDUPTHER

## 2022-10-10 RX ORDER — TRAZODONE HYDROCHLORIDE 100 MG/1
100 TABLET ORAL NIGHTLY PRN
Qty: 90 TABLET | Refills: 4 | Status: SHIPPED | OUTPATIENT
Start: 2022-10-10 | End: 2023-10-16 | Stop reason: SDUPTHER

## 2022-10-10 NOTE — PROGRESS NOTES
SUBJECTIVE:      Patient ID: Rick Butler is a 62 y.o. male.    Chief Complaint: Pre-diabetes and Fatigue    Pt presents for F/U prediabetes. His BP is controlled today and he has been doing well. Denies CP, SOB, palpitations, or peripheral edema. He continues to follow closely with hematology oncology for his MDS and continues to get Procrit injections and labs weekly. His H/H continues to be low but stable. He goes to the [facility] every week for labs. He continues to see nephrology for his kidney disease, as well, with Dr. Mas. We reviewed his labs together which showed stable anemia and CKD. His A1c has improved today to 5.3. He has been watching his diet. He currently does not exercise outside of work reporting he has been having increased fatigue. Labs have been stable besides the chronic anemia he has and this was discussed today. He has a list of herbal supplements he would like to discuss possibly adding: ashwaghanda, rhodiola, and  ginseng. He would like to add these to help with his stamina and fatigue. He does report some foot pain today as well reporting L heel pain consistently on a daily basis. He wears rubber shoes to work as he has to have slip-resistant shoes. He attempted to add inserts but these aren't helping as of yet. He also attempted to go to the [employer] store but reports the inserts were too expensive. He reports pain when he ambulates after waking up to the heel and soles of his feet. He reports the pain improves when he gets moving and walks around more. However, this pain recurs after he is on his feet all day at work. Otherwise doing well. Denies CP, SOB, wheezing, fevers, nausea, vomiting, diarrhea, constipation, numbness, weakness, dizziness, palpitations, or any other concerns at this time.    Fatigue  This is a chronic problem. The current episode started more than 1 year ago. The problem occurs daily. The problem has been unchanged. Associated symptoms include  arthralgias and fatigue. Pertinent negatives include no abdominal pain, anorexia, change in bowel habit, chest pain, chills, congestion, coughing, diaphoresis, fever, headaches, joint swelling, myalgias, nausea, neck pain, numbness, rash, sore throat, swollen glands, urinary symptoms, vertigo, visual change, vomiting or weakness. Nothing aggravates the symptoms. He has tried rest for the symptoms. The treatment provided no relief.   Past Surgical History:   Procedure Laterality Date    BONE MARROW BIOPSY N/A 12/20/2019    Procedure: BIOPSY, BONE MARROW;  Surgeon: [facility] Diagnostic Provider;  Location: [facility] OR;  Service: Interventional Radiology;  Laterality: N/A;    COLONOSCOPY N/A 11/5/2021    Procedure: COLONOSCOPY;  Surgeon: Rob Collins MD;  Location: [facility] ENDO;  Service: Endoscopy;  Laterality: N/A;    ESOPHAGOGASTRODUODENOSCOPY N/A 11/5/2021    Procedure: EGD (ESOPHAGOGASTRODUODENOSCOPY);  Surgeon: Rob Collins MD;  Location: [facility] ENDO;  Service: Endoscopy;  Laterality: N/A;    INJECTION OF ANESTHETIC AGENT AROUND MEDIAL BRANCH NERVES INNERVATING LUMBAR FACET JOINT Bilateral 5/25/2018    Procedure: BLOCK-NERVE-MEDIAL BRANCH-LUMBAR;  Surgeon: Nura Heredia MD;  Location: [facility];  Service: Pain Management;  Laterality: Bilateral;  L3, 4, 5    KNEE ARTHROSCOPY W/ MENISCECTOMY Right 12/22/2021    Procedure: ARTHROSCOPY, KNEE, WITH MENISCECTOMY;  Surgeon: Sebastian Green MD;  Location: [facility] OR;  Service: Orthopedics;  Laterality: Right;  partial medial meniscectomy    RADIOFREQUENCY ABLATION OF LUMBAR MEDIAL BRANCH NERVE AT SINGLE LEVEL Bilateral 7/20/2018    Procedure: RADIOFREQUENCY ABLATION, NERVE, MEDIAL BRANCH, LUMBAR, 1 LEVEL;  Surgeon: Nura Heredia MD;  Location: [facility] OR;  Service: Pain Management;  Laterality: Bilateral;  L3, 4, 5 - Burned at 80 degrees C.  for 75 seconds x 2 each site    SMALL BOWEL ENTEROSCOPY N/A 10/16/2019    Procedure: ENTEROSCOPY;  Surgeon: Rob Collins MD;  Location: [facility];   Service: Endoscopy;  Laterality: N/A;     Family History   Problem Relation Age of Onset    Arthritis Mother     Depression Mother     Heart disease Mother     Hypertension Mother     Heart attack Father 59      Social History     Socioeconomic History    Marital status:    Occupational History    Occupation: Prep Cook     Employer: [employer]   Tobacco Use    Smoking status: Former     Types: Cigarettes     Quit date: 1996     Years since quittin.7    Smokeless tobacco: Never   Substance and Sexual Activity    Alcohol use: Not Currently     Alcohol/week: 21.0 standard drinks     Types: 21 Cans of beer per week     Comment: Sober 5 years    Drug use: Not Currently     Types: Marijuana    Sexual activity: Yes     Partners: Female     Current Outpatient Medications   Medication Sig Dispense Refill    ascorbic acid, vitamin C, (VITAMIN C) 1000 MG tablet Take 1,000 mg by mouth once daily.      epoetin tray (PROCRIT INJ) Thursday      famotidine (PEPCID) 20 MG tablet famotidine 20 mg tablet   Take 1 tablet twice a day by oral route.      multivitamin capsule Take 1 capsule by mouth once daily.      pantoprazole (PROTONIX) 40 MG tablet       sildenafil (VIAGRA) 100 MG tablet Take 1 tablet (100 mg total) by mouth daily as needed for Erectile Dysfunction. 10 tablet 6    amLODIPine (NORVASC) 5 MG tablet Take 1 tablet (5 mg total) by mouth once daily. 90 tablet 4    folic acid (FOLVITE) 1 MG tablet Take 2 tablets (2 mg total) by mouth once daily. 180 tablet 4    tamsulosin (FLOMAX) 0.4 mg Cap Take 1 capsule (0.4 mg total) by mouth once daily. 90 capsule 4    traZODone (DESYREL) 100 MG tablet Take 1 tablet (100 mg total) by mouth nightly as needed for Insomnia. 90 tablet 4     No current facility-administered medications for this visit.     Review of patient's allergies indicates:  No Known Allergies   Past Medical History:   Diagnosis Date    Abnormal chest CT (new) 2022    Anemia due to multiple  mechanisms 1/13/2019    Anemia, chronic renal failure, stage 2 (mild) 1/13/2019    Depression     Former smoker 6/29/2017    H/O ETOH abuse 6/29/2017    Lung mass (new) 8/17/2022    Monocytosis 2019    Myelodysplasia (myelodysplastic syndrome)     Myelodysplasia (myelodysplastic syndrome)     RARS (refractory anemia with ringed sideroblasts) 6/1/2020    Sickle cell trait      Past Surgical History:   Procedure Laterality Date    BONE MARROW BIOPSY N/A 12/20/2019    Procedure: BIOPSY, BONE MARROW;  Surgeon: [facility] Diagnostic Provider;  Location: [facility] OR;  Service: Interventional Radiology;  Laterality: N/A;    COLONOSCOPY N/A 11/5/2021    Procedure: COLONOSCOPY;  Surgeon: Rob Collins MD;  Location: [facility] ENDO;  Service: Endoscopy;  Laterality: N/A;    ESOPHAGOGASTRODUODENOSCOPY N/A 11/5/2021    Procedure: EGD (ESOPHAGOGASTRODUODENOSCOPY);  Surgeon: Rob Collins MD;  Location: [facility] ENDO;  Service: Endoscopy;  Laterality: N/A;    INJECTION OF ANESTHETIC AGENT AROUND MEDIAL BRANCH NERVES INNERVATING LUMBAR FACET JOINT Bilateral 5/25/2018    Procedure: BLOCK-NERVE-MEDIAL BRANCH-LUMBAR;  Surgeon: Nura Heredia MD;  Location: [facility] OR;  Service: Pain Management;  Laterality: Bilateral;  L3, 4, 5    KNEE ARTHROSCOPY W/ MENISCECTOMY Right 12/22/2021    Procedure: ARTHROSCOPY, KNEE, WITH MENISCECTOMY;  Surgeon: Sebastian Green MD;  Location: [facility] OR;  Service: Orthopedics;  Laterality: Right;  partial medial meniscectomy    RADIOFREQUENCY ABLATION OF LUMBAR MEDIAL BRANCH NERVE AT SINGLE LEVEL Bilateral 7/20/2018    Procedure: RADIOFREQUENCY ABLATION, NERVE, MEDIAL BRANCH, LUMBAR, 1 LEVEL;  Surgeon: Nura Heredia MD;  Location: [facility] OR;  Service: Pain Management;  Laterality: Bilateral;  L3, 4, 5 - Burned at 80 degrees C.  for 75 seconds x 2 each site    SMALL BOWEL ENTEROSCOPY N/A 10/16/2019    Procedure: ENTEROSCOPY;  Surgeon: Rob Collins MD;  Location: [facility] ENDO;  Service: Endoscopy;  Laterality: N/A;       Review of  "Systems   Constitutional:  Positive for fatigue. Negative for activity change, appetite change, chills, diaphoresis, fever and unexpected weight change.   HENT:  Negative for congestion, ear pain, mouth sores, nosebleeds, postnasal drip, rhinorrhea, sinus pressure, sinus pain, sneezing, sore throat and trouble swallowing.    Eyes:  Negative for pain and visual disturbance.   Respiratory:  Negative for apnea, cough, chest tightness, shortness of breath, wheezing and stridor.    Cardiovascular:  Negative for chest pain, palpitations and leg swelling.   Gastrointestinal:  Negative for abdominal pain, anorexia, blood in stool, change in bowel habit, constipation, diarrhea, nausea and vomiting.   Genitourinary:  Negative for dysuria, flank pain, frequency and hematuria.   Musculoskeletal:  Positive for arthralgias. Negative for joint swelling, myalgias, neck pain and neck stiffness.   Skin:  Negative for color change, rash and wound.   Neurological:  Negative for dizziness, vertigo, tremors, seizures, syncope, weakness, light-headedness, numbness and headaches.   Hematological:  Negative for adenopathy.   Psychiatric/Behavioral:  Negative for confusion, dysphoric mood, sleep disturbance and suicidal ideas. The patient is not nervous/anxious.     OBJECTIVE:      Vitals:    10/10/22 1608   BP: 128/68   BP Location: Left arm   Patient Position: Sitting   BP Method: Large (Manual)   Pulse: 88   Resp: 18   Temp: 98.5 °F (36.9 °C)   TempSrc: Oral   SpO2: 98%   Weight: 97.5 kg (215 lb)   Height: 5' 10" (1.778 m)     Physical Exam  Vitals reviewed.   Constitutional:       General: He is not in acute distress.     Appearance: Normal appearance. He is well-developed. He is obese. He is not diaphoretic.   HENT:      Head: Normocephalic and atraumatic.      Right Ear: Hearing, tympanic membrane, ear canal and external ear normal.      Left Ear: Hearing, tympanic membrane, ear canal and external ear normal.      Nose: Nose normal. No " mucosal edema, congestion or rhinorrhea.      Mouth/Throat:      Lips: Pink.      Mouth: Mucous membranes are moist.      Pharynx: Oropharynx is clear. Uvula midline. No pharyngeal swelling, oropharyngeal exudate or posterior oropharyngeal erythema.   Eyes:      General: Lids are normal. No scleral icterus.        Right eye: No discharge.         Left eye: No discharge.      Extraocular Movements: Extraocular movements intact.      Conjunctiva/sclera: Conjunctivae normal.      Pupils: Pupils are equal, round, and reactive to light.   Neck:      Thyroid: No thyroid mass or thyromegaly.      Vascular: No carotid bruit.      Trachea: Trachea and phonation normal. No tracheal deviation.   Cardiovascular:      Rate and Rhythm: Normal rate and regular rhythm.      Pulses: Normal pulses.      Heart sounds: Normal heart sounds. No murmur heard.    No friction rub. No gallop.   Pulmonary:      Effort: Pulmonary effort is normal. No respiratory distress.      Breath sounds: Normal breath sounds. No stridor. No decreased breath sounds, wheezing, rhonchi or rales.   Abdominal:      General: Abdomen is protuberant. Bowel sounds are normal.      Palpations: Abdomen is soft.      Tenderness: There is no abdominal tenderness.   Musculoskeletal:         General: Normal range of motion.      Cervical back: Full passive range of motion without pain, normal range of motion and neck supple.      Right lower leg: No edema.      Left lower leg: No edema.      Right foot: Normal range of motion. No deformity, bunion, Charcot foot, foot drop or prominent metatarsal heads.      Left foot: Normal range of motion. Tenderness present. No swelling, deformity, bunion, Charcot foot, foot drop, prominent metatarsal heads, laceration or crepitus.        Feet:    Lymphadenopathy:      Cervical: No cervical adenopathy.      Upper Body:      Right upper body: No supraclavicular adenopathy.      Left upper body: No supraclavicular adenopathy.   Skin:      General: Skin is warm and dry.      Capillary Refill: Capillary refill takes less than 2 seconds.      Findings: No rash.   Neurological:      General: No focal deficit present.      Mental Status: He is alert and oriented to person, place, and time.      Coordination: Coordination is intact.      Gait: Gait is intact.   Psychiatric:         Attention and Perception: Attention and perception normal.         Mood and Affect: Mood and affect normal.         Speech: Speech normal.         Behavior: Behavior normal. Behavior is cooperative.         Thought Content: Thought content normal. Thought content does not include suicidal plan.         Cognition and Memory: Cognition and memory normal.         Judgment: Judgment normal.      Assessment:       1. Essential hypertension    2. MDS (myelodysplastic syndrome)    3. Stage 3b chronic kidney disease    4. Dyslipidemia    5. Prediabetes    6. Chronic fatigue    7. Folic acid deficiency    8. BPH associated with nocturia    9. Plantar fasciitis    10. Vitamin D deficiency    11. Primary insomnia    12. Flu vaccine need        Plan:       Essential hypertension  Diagnosis is stable on current regimen. Continue current medications. Refills given as previously prescribed.   -     amLODIPine (NORVASC) 5 MG tablet; Take 1 tablet (5 mg total) by mouth once daily.  Dispense: 90 tablet; Refill: 4  -     Comprehensive Metabolic Panel; Future; Expected date: 10/10/2022  -     TSH; Future; Expected date: 10/10/2022    MDS (myelodysplastic syndrome)  Continue F/U, medications, and treatment plan per heme onc and MDS specialist. Continues on Procrit through the [facility] and anemia is significantly present but stable.  -     CBC Auto Differential; Future; Expected date: 10/10/2022    Stage 3b chronic kidney disease  Continue F/U and treatment plan per nephrology. Stable at this time.    Dyslipidemia  -     Lipid Panel; Future; Expected date: 10/10/2022    Prediabetes  Improved at  this time with dietary interventions. Will continue to monitor.  -     Hemoglobin A1C, POCT  -     Comprehensive Metabolic Panel; Future; Expected date: 10/10/2022  -     Hemoglobin A1C; Future; Expected date: 10/10/2022    Chronic fatigue  Discussed this is likely related to his chronic fatigue and blood cancer diagnosis. Recommended adequate sleep with low impact exercise and proper diet. He came with a list of three herbal supplements to help this issue: ashwaghanda,  ginseng, and rhodiola. The only one he was given approval on was the ashwaghanda as it doesn't appear to cause any issues with his chronic comorbidities. The other two can cause issues with platelet count and bleeding issues so he was advised against those in relation to his MDS.    Folic acid deficiency  -     folic acid (FOLVITE) 1 MG tablet; Take 2 tablets (2 mg total) by mouth once daily.  Dispense: 180 tablet; Refill: 4    BPH associated with nocturia  Stable at this time with decreased nocturia. Continue current medications.  -     tamsulosin (FLOMAX) 0.4 mg Cap; Take 1 capsule (0.4 mg total) by mouth once daily.  Dispense: 90 capsule; Refill: 4  -     Urinalysis; Future; Expected date: 10/10/2022    Plantar fasciitis  Discussed Tylenol Arthritis/ice PRN, stretching exercises, proper fitting shoes, good arch support and cushioning of the shoe. If pain worsens or continues, possible podiatry referral.      Vitamin D deficiency  -     Vitamin D; Future; Expected date: 10/10/2022    Primary insomnia  -     traZODone (DESYREL) 100 MG tablet; Take 1 tablet (100 mg total) by mouth nightly as needed for Insomnia.  Dispense: 90 tablet; Refill: 4    Flu vaccine need  -     Influenza - Quadrivalent *Preferred* (6 months+) (PF)      Follow up in about 6 months (around 4/10/2023) for Annual Well Check.      10/11/2022 LATOYA Barakat, FNP

## 2022-10-13 ENCOUNTER — INFUSION (OUTPATIENT)
Dept: INFUSION THERAPY | Facility: HOSPITAL | Age: 62
End: 2022-10-13
Attending: INTERNAL MEDICINE
Payer: COMMERCIAL

## 2022-10-13 VITALS
OXYGEN SATURATION: 97 % | HEART RATE: 1 BPM | SYSTOLIC BLOOD PRESSURE: 115 MMHG | HEIGHT: 70 IN | TEMPERATURE: 99 F | RESPIRATION RATE: 18 BRPM | BODY MASS INDEX: 31.02 KG/M2 | WEIGHT: 216.69 LBS | DIASTOLIC BLOOD PRESSURE: 70 MMHG

## 2022-10-13 DIAGNOSIS — D64.89 ANEMIA DUE TO MULTIPLE MECHANISMS: ICD-10-CM

## 2022-10-13 DIAGNOSIS — N18.30 STAGE 3 CHRONIC KIDNEY DISEASE, UNSPECIFIED WHETHER STAGE 3A OR 3B CKD: ICD-10-CM

## 2022-10-13 DIAGNOSIS — D64.9 NORMOCHROMIC ANEMIA: ICD-10-CM

## 2022-10-13 DIAGNOSIS — D46.9 MDS (MYELODYSPLASTIC SYNDROME): Primary | ICD-10-CM

## 2022-10-13 DIAGNOSIS — D64.9 CHRONIC ANEMIA: ICD-10-CM

## 2022-10-13 PROCEDURE — 96372 THER/PROPH/DIAG INJ SC/IM: CPT

## 2022-10-13 PROCEDURE — 63600175 PHARM REV CODE 636 W HCPCS: Mod: JG | Performed by: INTERNAL MEDICINE

## 2022-10-13 RX ADMIN — EPOETIN ALFA-EPBX 20000 UNITS: 10000 INJECTION, SOLUTION INTRAVENOUS; SUBCUTANEOUS at 04:10

## 2022-10-13 NOTE — PLAN OF CARE
Problem: Fatigue  Goal: Improved Activity Tolerance  Outcome: Ongoing, Progressing  Intervention: Promote Improved Energy  Flowsheets (Taken 10/13/2022 0699)  Fatigue Management:   frequent rest breaks encouraged   fatigue-related activity identified   paced activity encouraged  Sleep/Rest Enhancement:   regular sleep/rest pattern promoted   relaxation techniques promoted

## 2022-10-19 ENCOUNTER — LAB VISIT (OUTPATIENT)
Dept: LAB | Facility: HOSPITAL | Age: 62
End: 2022-10-19
Attending: INTERNAL MEDICINE
Payer: COMMERCIAL

## 2022-10-19 DIAGNOSIS — D57.3 SICKLE CELL TRAIT SYNDROME: ICD-10-CM

## 2022-10-19 LAB
ALBUMIN SERPL BCP-MCNC: 4.4 G/DL (ref 3.5–5.2)
ALP SERPL-CCNC: 59 U/L (ref 55–135)
ALT SERPL W/O P-5'-P-CCNC: 15 U/L (ref 10–44)
ANION GAP SERPL CALC-SCNC: 5 MMOL/L (ref 8–16)
AST SERPL-CCNC: 19 U/L (ref 10–40)
BASOPHILS # BLD AUTO: 0.07 K/UL (ref 0–0.2)
BASOPHILS NFR BLD: 1 % (ref 0–1.9)
BILIRUB SERPL-MCNC: 0.9 MG/DL (ref 0.1–1)
BUN SERPL-MCNC: 21 MG/DL (ref 8–23)
CALCIUM SERPL-MCNC: 8.9 MG/DL (ref 8.7–10.5)
CHLORIDE SERPL-SCNC: 106 MMOL/L (ref 95–110)
CO2 SERPL-SCNC: 28 MMOL/L (ref 23–29)
CREAT SERPL-MCNC: 1.9 MG/DL (ref 0.5–1.4)
DIFFERENTIAL METHOD: ABNORMAL
EOSINOPHIL # BLD AUTO: 0.2 K/UL (ref 0–0.5)
EOSINOPHIL NFR BLD: 2.2 % (ref 0–8)
ERYTHROCYTE [DISTWIDTH] IN BLOOD BY AUTOMATED COUNT: 27.9 % (ref 11.5–14.5)
EST. GFR  (NO RACE VARIABLE): 39.4 ML/MIN/1.73 M^2
GLUCOSE SERPL-MCNC: 132 MG/DL (ref 70–110)
HCT VFR BLD AUTO: 25.3 % (ref 40–54)
HGB BLD-MCNC: 8.9 G/DL (ref 14–18)
IMM GRANULOCYTES # BLD AUTO: 0.05 K/UL (ref 0–0.04)
IMM GRANULOCYTES NFR BLD AUTO: 0.7 % (ref 0–0.5)
LYMPHOCYTES # BLD AUTO: 1.6 K/UL (ref 1–4.8)
LYMPHOCYTES NFR BLD: 23.4 % (ref 18–48)
MCH RBC QN AUTO: 31.9 PG (ref 27–31)
MCHC RBC AUTO-ENTMCNC: 35.2 G/DL (ref 32–36)
MCV RBC AUTO: 91 FL (ref 82–98)
MONOCYTES # BLD AUTO: 0.7 K/UL (ref 0.3–1)
MONOCYTES NFR BLD: 10.2 % (ref 4–15)
NEUTROPHILS # BLD AUTO: 4.2 K/UL (ref 1.8–7.7)
NEUTROPHILS NFR BLD: 62.5 % (ref 38–73)
NRBC BLD-RTO: 0 /100 WBC
PLATELET # BLD AUTO: 513 K/UL (ref 150–450)
PMV BLD AUTO: 10.6 FL (ref 9.2–12.9)
POTASSIUM SERPL-SCNC: 4.3 MMOL/L (ref 3.5–5.1)
PROT SERPL-MCNC: 7.2 G/DL (ref 6–8.4)
RBC # BLD AUTO: 2.79 M/UL (ref 4.6–6.2)
SODIUM SERPL-SCNC: 139 MMOL/L (ref 136–145)
WBC # BLD AUTO: 6.76 K/UL (ref 3.9–12.7)

## 2022-10-19 PROCEDURE — 85025 COMPLETE CBC W/AUTO DIFF WBC: CPT | Performed by: INTERNAL MEDICINE

## 2022-10-19 PROCEDURE — 80053 COMPREHEN METABOLIC PANEL: CPT | Performed by: INTERNAL MEDICINE

## 2022-10-19 PROCEDURE — 36415 COLL VENOUS BLD VENIPUNCTURE: CPT | Performed by: INTERNAL MEDICINE

## 2022-10-20 ENCOUNTER — INFUSION (OUTPATIENT)
Dept: INFUSION THERAPY | Facility: HOSPITAL | Age: 62
End: 2022-10-20
Attending: INTERNAL MEDICINE
Payer: COMMERCIAL

## 2022-10-20 VITALS
OXYGEN SATURATION: 95 % | SYSTOLIC BLOOD PRESSURE: 126 MMHG | TEMPERATURE: 98 F | BODY MASS INDEX: 31.05 KG/M2 | DIASTOLIC BLOOD PRESSURE: 73 MMHG | RESPIRATION RATE: 18 BRPM | HEART RATE: 76 BPM | HEIGHT: 70 IN | WEIGHT: 216.88 LBS

## 2022-10-20 DIAGNOSIS — N18.30 STAGE 3 CHRONIC KIDNEY DISEASE, UNSPECIFIED WHETHER STAGE 3A OR 3B CKD: ICD-10-CM

## 2022-10-20 DIAGNOSIS — D64.9 NORMOCHROMIC ANEMIA: ICD-10-CM

## 2022-10-20 DIAGNOSIS — D64.9 CHRONIC ANEMIA: ICD-10-CM

## 2022-10-20 DIAGNOSIS — D46.9 MDS (MYELODYSPLASTIC SYNDROME): Primary | ICD-10-CM

## 2022-10-20 DIAGNOSIS — D64.89 ANEMIA DUE TO MULTIPLE MECHANISMS: ICD-10-CM

## 2022-10-20 PROCEDURE — 96372 THER/PROPH/DIAG INJ SC/IM: CPT

## 2022-10-20 PROCEDURE — 63600175 PHARM REV CODE 636 W HCPCS: Mod: JG | Performed by: INTERNAL MEDICINE

## 2022-10-20 RX ADMIN — EPOETIN ALFA-EPBX 20000 UNITS: 20000 INJECTION, SOLUTION INTRAVENOUS; SUBCUTANEOUS at 04:10

## 2022-10-20 NOTE — PLAN OF CARE
Problem: Fatigue  Goal: Improved Activity Tolerance  Outcome: Ongoing, Progressing  Intervention: Promote Improved Energy  Flowsheets (Taken 10/20/2022 1600)  Fatigue Management:   frequent rest breaks encouraged   fatigue-related activity identified   paced activity encouraged  Sleep/Rest Enhancement:   regular sleep/rest pattern promoted   relaxation techniques promoted

## 2022-10-27 ENCOUNTER — INFUSION (OUTPATIENT)
Dept: INFUSION THERAPY | Facility: HOSPITAL | Age: 62
End: 2022-10-27
Attending: INTERNAL MEDICINE
Payer: COMMERCIAL

## 2022-10-27 VITALS
RESPIRATION RATE: 16 BRPM | OXYGEN SATURATION: 97 % | BODY MASS INDEX: 29.31 KG/M2 | DIASTOLIC BLOOD PRESSURE: 71 MMHG | TEMPERATURE: 98 F | WEIGHT: 204.69 LBS | SYSTOLIC BLOOD PRESSURE: 123 MMHG | HEIGHT: 70 IN | HEART RATE: 88 BPM

## 2022-10-27 DIAGNOSIS — D64.89 ANEMIA DUE TO MULTIPLE MECHANISMS: ICD-10-CM

## 2022-10-27 DIAGNOSIS — D64.9 NORMOCHROMIC ANEMIA: ICD-10-CM

## 2022-10-27 DIAGNOSIS — D64.9 CHRONIC ANEMIA: ICD-10-CM

## 2022-10-27 DIAGNOSIS — N18.30 STAGE 3 CHRONIC KIDNEY DISEASE, UNSPECIFIED WHETHER STAGE 3A OR 3B CKD: ICD-10-CM

## 2022-10-27 DIAGNOSIS — D46.9 MDS (MYELODYSPLASTIC SYNDROME): Primary | ICD-10-CM

## 2022-10-27 PROCEDURE — 63600175 PHARM REV CODE 636 W HCPCS: Mod: JG | Performed by: INTERNAL MEDICINE

## 2022-10-27 PROCEDURE — 96372 THER/PROPH/DIAG INJ SC/IM: CPT

## 2022-10-27 RX ADMIN — EPOETIN ALFA-EPBX 20000 UNITS: 20000 INJECTION, SOLUTION INTRAVENOUS; SUBCUTANEOUS at 11:10

## 2022-10-27 NOTE — PLAN OF CARE
Problem: Fatigue  Goal: Improved Activity Tolerance  Outcome: Ongoing, Progressing  Intervention: Promote Improved Energy  Flowsheets (Taken 10/27/2022 1103)  Fatigue Management: frequent rest breaks encouraged  Sleep/Rest Enhancement:   regular sleep/rest pattern promoted   relaxation techniques promoted  Activity Management: Ambulated -L4

## 2022-11-02 ENCOUNTER — LAB VISIT (OUTPATIENT)
Dept: LAB | Facility: HOSPITAL | Age: 62
End: 2022-11-02
Attending: INTERNAL MEDICINE
Payer: COMMERCIAL

## 2022-11-02 DIAGNOSIS — D57.3 SICKLE CELL TRAIT SYNDROME: ICD-10-CM

## 2022-11-02 LAB
ALBUMIN SERPL BCP-MCNC: 4.1 G/DL (ref 3.5–5.2)
ALP SERPL-CCNC: 53 U/L (ref 55–135)
ALT SERPL W/O P-5'-P-CCNC: 18 U/L (ref 10–44)
ANION GAP SERPL CALC-SCNC: 4 MMOL/L (ref 8–16)
AST SERPL-CCNC: 19 U/L (ref 10–40)
BASOPHILS # BLD AUTO: 0.08 K/UL (ref 0–0.2)
BASOPHILS NFR BLD: 1.1 % (ref 0–1.9)
BILIRUB SERPL-MCNC: 0.8 MG/DL (ref 0.1–1)
BUN SERPL-MCNC: 25 MG/DL (ref 8–23)
CALCIUM SERPL-MCNC: 8.8 MG/DL (ref 8.7–10.5)
CHLORIDE SERPL-SCNC: 107 MMOL/L (ref 95–110)
CO2 SERPL-SCNC: 28 MMOL/L (ref 23–29)
CREAT SERPL-MCNC: 1.8 MG/DL (ref 0.5–1.4)
DIFFERENTIAL METHOD: ABNORMAL
EOSINOPHIL # BLD AUTO: 0.2 K/UL (ref 0–0.5)
EOSINOPHIL NFR BLD: 2.4 % (ref 0–8)
ERYTHROCYTE [DISTWIDTH] IN BLOOD BY AUTOMATED COUNT: 27.7 % (ref 11.5–14.5)
EST. GFR  (NO RACE VARIABLE): 42 ML/MIN/1.73 M^2
GLUCOSE SERPL-MCNC: 131 MG/DL (ref 70–110)
HCT VFR BLD AUTO: 24.9 % (ref 40–54)
HGB BLD-MCNC: 8.5 G/DL (ref 14–18)
IMM GRANULOCYTES # BLD AUTO: 0.05 K/UL (ref 0–0.04)
IMM GRANULOCYTES NFR BLD AUTO: 0.7 % (ref 0–0.5)
LYMPHOCYTES # BLD AUTO: 1.7 K/UL (ref 1–4.8)
LYMPHOCYTES NFR BLD: 24.1 % (ref 18–48)
MCH RBC QN AUTO: 31.3 PG (ref 27–31)
MCHC RBC AUTO-ENTMCNC: 34.1 G/DL (ref 32–36)
MCV RBC AUTO: 92 FL (ref 82–98)
MONOCYTES # BLD AUTO: 0.7 K/UL (ref 0.3–1)
MONOCYTES NFR BLD: 10.2 % (ref 4–15)
NEUTROPHILS # BLD AUTO: 4.4 K/UL (ref 1.8–7.7)
NEUTROPHILS NFR BLD: 61.5 % (ref 38–73)
NRBC BLD-RTO: 0 /100 WBC
PLATELET # BLD AUTO: 631 K/UL (ref 150–450)
PMV BLD AUTO: 10.1 FL (ref 9.2–12.9)
POTASSIUM SERPL-SCNC: 4.5 MMOL/L (ref 3.5–5.1)
PROT SERPL-MCNC: 7.3 G/DL (ref 6–8.4)
RBC # BLD AUTO: 2.72 M/UL (ref 4.6–6.2)
SODIUM SERPL-SCNC: 139 MMOL/L (ref 136–145)
WBC # BLD AUTO: 7.22 K/UL (ref 3.9–12.7)

## 2022-11-02 PROCEDURE — 36415 COLL VENOUS BLD VENIPUNCTURE: CPT | Performed by: INTERNAL MEDICINE

## 2022-11-02 PROCEDURE — 85025 COMPLETE CBC W/AUTO DIFF WBC: CPT | Performed by: INTERNAL MEDICINE

## 2022-11-02 PROCEDURE — 80053 COMPREHEN METABOLIC PANEL: CPT | Performed by: INTERNAL MEDICINE

## 2022-11-03 ENCOUNTER — INFUSION (OUTPATIENT)
Dept: INFUSION THERAPY | Facility: HOSPITAL | Age: 62
End: 2022-11-03
Attending: INTERNAL MEDICINE
Payer: COMMERCIAL

## 2022-11-03 VITALS
WEIGHT: 211.88 LBS | DIASTOLIC BLOOD PRESSURE: 77 MMHG | HEART RATE: 72 BPM | TEMPERATURE: 98 F | OXYGEN SATURATION: 96 % | SYSTOLIC BLOOD PRESSURE: 129 MMHG | BODY MASS INDEX: 30.4 KG/M2 | RESPIRATION RATE: 17 BRPM

## 2022-11-03 DIAGNOSIS — D64.9 CHRONIC ANEMIA: ICD-10-CM

## 2022-11-03 DIAGNOSIS — D64.89 ANEMIA DUE TO MULTIPLE MECHANISMS: ICD-10-CM

## 2022-11-03 DIAGNOSIS — D46.9 MDS (MYELODYSPLASTIC SYNDROME): Primary | ICD-10-CM

## 2022-11-03 DIAGNOSIS — N18.30 STAGE 3 CHRONIC KIDNEY DISEASE, UNSPECIFIED WHETHER STAGE 3A OR 3B CKD: ICD-10-CM

## 2022-11-03 DIAGNOSIS — D64.9 NORMOCHROMIC ANEMIA: ICD-10-CM

## 2022-11-03 PROCEDURE — 63600175 PHARM REV CODE 636 W HCPCS: Mod: JG | Performed by: INTERNAL MEDICINE

## 2022-11-03 PROCEDURE — 96372 THER/PROPH/DIAG INJ SC/IM: CPT

## 2022-11-03 RX ADMIN — EPOETIN ALFA-EPBX 20000 UNITS: 20000 INJECTION, SOLUTION INTRAVENOUS; SUBCUTANEOUS at 04:11

## 2022-11-03 NOTE — PLAN OF CARE
Problem: Anemia  Goal: Anemia Symptom Improvement  Outcome: Ongoing, Progressing  Intervention: Monitor and Manage Anemia  Flowsheets (Taken 11/3/2022 1601)  Oral Nutrition Promotion: rest periods promoted  Safety Promotion/Fall Prevention: medications reviewed  Fatigue Management:   frequent rest breaks encouraged   paced activity encouraged

## 2022-11-04 ENCOUNTER — TELEPHONE (OUTPATIENT)
Dept: HEMATOLOGY/ONCOLOGY | Facility: CLINIC | Age: 62
End: 2022-11-04

## 2022-11-04 DIAGNOSIS — D64.9 NORMOCHROMIC ANEMIA: ICD-10-CM

## 2022-11-04 DIAGNOSIS — D57.3 SICKLE CELL TRAIT SYNDROME: Primary | ICD-10-CM

## 2022-11-10 ENCOUNTER — INFUSION (OUTPATIENT)
Dept: INFUSION THERAPY | Facility: HOSPITAL | Age: 62
End: 2022-11-10
Attending: INTERNAL MEDICINE
Payer: COMMERCIAL

## 2022-11-10 VITALS
HEART RATE: 75 BPM | DIASTOLIC BLOOD PRESSURE: 65 MMHG | RESPIRATION RATE: 16 BRPM | TEMPERATURE: 98 F | OXYGEN SATURATION: 96 % | HEIGHT: 70 IN | BODY MASS INDEX: 30.81 KG/M2 | SYSTOLIC BLOOD PRESSURE: 128 MMHG | WEIGHT: 215.19 LBS

## 2022-11-10 DIAGNOSIS — N18.30 STAGE 3 CHRONIC KIDNEY DISEASE, UNSPECIFIED WHETHER STAGE 3A OR 3B CKD: ICD-10-CM

## 2022-11-10 DIAGNOSIS — D64.89 ANEMIA DUE TO MULTIPLE MECHANISMS: ICD-10-CM

## 2022-11-10 DIAGNOSIS — D64.9 NORMOCHROMIC ANEMIA: ICD-10-CM

## 2022-11-10 DIAGNOSIS — D46.9 MDS (MYELODYSPLASTIC SYNDROME): Primary | ICD-10-CM

## 2022-11-10 DIAGNOSIS — D64.9 CHRONIC ANEMIA: ICD-10-CM

## 2022-11-10 PROCEDURE — 96372 THER/PROPH/DIAG INJ SC/IM: CPT

## 2022-11-10 PROCEDURE — 63600175 PHARM REV CODE 636 W HCPCS: Mod: JG | Performed by: INTERNAL MEDICINE

## 2022-11-10 RX ADMIN — EPOETIN ALFA-EPBX 20000 UNITS: 20000 INJECTION, SOLUTION INTRAVENOUS; SUBCUTANEOUS at 04:11

## 2022-11-10 NOTE — PLAN OF CARE
Problem: Fatigue  Goal: Improved Activity Tolerance  Outcome: Ongoing, Progressing  Intervention: Promote Improved Energy  Flowsheets (Taken 11/10/2022 1602)  Fatigue Management: frequent rest breaks encouraged  Sleep/Rest Enhancement:   regular sleep/rest pattern promoted   relaxation techniques promoted  Activity Management: Ambulated -L4

## 2022-11-16 ENCOUNTER — LAB VISIT (OUTPATIENT)
Dept: LAB | Facility: HOSPITAL | Age: 62
End: 2022-11-16
Attending: INTERNAL MEDICINE
Payer: COMMERCIAL

## 2022-11-16 DIAGNOSIS — D57.3 SICKLE CELL TRAIT SYNDROME: ICD-10-CM

## 2022-11-16 DIAGNOSIS — D64.9 NORMOCHROMIC ANEMIA: ICD-10-CM

## 2022-11-16 LAB
ALBUMIN SERPL BCP-MCNC: 4.1 G/DL (ref 3.5–5.2)
ALP SERPL-CCNC: 51 U/L (ref 55–135)
ALT SERPL W/O P-5'-P-CCNC: 14 U/L (ref 10–44)
ANION GAP SERPL CALC-SCNC: 4 MMOL/L (ref 8–16)
AST SERPL-CCNC: 18 U/L (ref 10–40)
BASOPHILS # BLD AUTO: 0.06 K/UL (ref 0–0.2)
BASOPHILS NFR BLD: 1.1 % (ref 0–1.9)
BILIRUB SERPL-MCNC: 1.1 MG/DL (ref 0.1–1)
BUN SERPL-MCNC: 28 MG/DL (ref 8–23)
CALCIUM SERPL-MCNC: 8.7 MG/DL (ref 8.7–10.5)
CHLORIDE SERPL-SCNC: 109 MMOL/L (ref 95–110)
CO2 SERPL-SCNC: 27 MMOL/L (ref 23–29)
CREAT SERPL-MCNC: 2 MG/DL (ref 0.5–1.4)
DIFFERENTIAL METHOD: ABNORMAL
EOSINOPHIL # BLD AUTO: 0.1 K/UL (ref 0–0.5)
EOSINOPHIL NFR BLD: 2 % (ref 0–8)
ERYTHROCYTE [DISTWIDTH] IN BLOOD BY AUTOMATED COUNT: 28 % (ref 11.5–14.5)
EST. GFR  (NO RACE VARIABLE): 37 ML/MIN/1.73 M^2
FERRITIN SERPL-MCNC: 1294 NG/ML (ref 20–300)
GLUCOSE SERPL-MCNC: 124 MG/DL (ref 70–110)
HCT VFR BLD AUTO: 23.8 % (ref 40–54)
HGB BLD-MCNC: 8.3 G/DL (ref 14–18)
IMM GRANULOCYTES # BLD AUTO: 0.02 K/UL (ref 0–0.04)
IMM GRANULOCYTES NFR BLD AUTO: 0.4 % (ref 0–0.5)
IRON SERPL-MCNC: 113 UG/DL (ref 45–160)
LYMPHOCYTES # BLD AUTO: 1.3 K/UL (ref 1–4.8)
LYMPHOCYTES NFR BLD: 23.8 % (ref 18–48)
MCH RBC QN AUTO: 31.6 PG (ref 27–31)
MCHC RBC AUTO-ENTMCNC: 34.9 G/DL (ref 32–36)
MCV RBC AUTO: 91 FL (ref 82–98)
MONOCYTES # BLD AUTO: 0.7 K/UL (ref 0.3–1)
MONOCYTES NFR BLD: 12.3 % (ref 4–15)
NEUTROPHILS # BLD AUTO: 3.4 K/UL (ref 1.8–7.7)
NEUTROPHILS NFR BLD: 60.4 % (ref 38–73)
NRBC BLD-RTO: 0 /100 WBC
PLATELET # BLD AUTO: 501 K/UL (ref 150–450)
PMV BLD AUTO: 10 FL (ref 9.2–12.9)
POTASSIUM SERPL-SCNC: 4.6 MMOL/L (ref 3.5–5.1)
PROT SERPL-MCNC: 7.4 G/DL (ref 6–8.4)
RBC # BLD AUTO: 2.63 M/UL (ref 4.6–6.2)
SATURATED IRON: 62 % (ref 20–50)
SODIUM SERPL-SCNC: 140 MMOL/L (ref 136–145)
TOTAL IRON BINDING CAPACITY: 183 UG/DL (ref 250–450)
TRANSFERRIN SERPL-MCNC: 131 MG/DL (ref 200–375)
WBC # BLD AUTO: 5.54 K/UL (ref 3.9–12.7)

## 2022-11-16 PROCEDURE — 82728 ASSAY OF FERRITIN: CPT | Performed by: INTERNAL MEDICINE

## 2022-11-16 PROCEDURE — 85025 COMPLETE CBC W/AUTO DIFF WBC: CPT | Performed by: INTERNAL MEDICINE

## 2022-11-16 PROCEDURE — 80053 COMPREHEN METABOLIC PANEL: CPT | Performed by: INTERNAL MEDICINE

## 2022-11-16 PROCEDURE — 84466 ASSAY OF TRANSFERRIN: CPT | Performed by: INTERNAL MEDICINE

## 2022-11-16 PROCEDURE — 36415 COLL VENOUS BLD VENIPUNCTURE: CPT | Performed by: INTERNAL MEDICINE

## 2022-11-17 ENCOUNTER — INFUSION (OUTPATIENT)
Dept: INFUSION THERAPY | Facility: HOSPITAL | Age: 62
End: 2022-11-17
Attending: INTERNAL MEDICINE
Payer: COMMERCIAL

## 2022-11-17 VITALS
OXYGEN SATURATION: 100 % | BODY MASS INDEX: 30.8 KG/M2 | RESPIRATION RATE: 18 BRPM | WEIGHT: 215.13 LBS | TEMPERATURE: 98 F | HEIGHT: 70 IN | HEART RATE: 72 BPM | DIASTOLIC BLOOD PRESSURE: 79 MMHG | SYSTOLIC BLOOD PRESSURE: 137 MMHG

## 2022-11-17 DIAGNOSIS — D46.9 MDS (MYELODYSPLASTIC SYNDROME): Primary | ICD-10-CM

## 2022-11-17 DIAGNOSIS — N18.30 STAGE 3 CHRONIC KIDNEY DISEASE, UNSPECIFIED WHETHER STAGE 3A OR 3B CKD: ICD-10-CM

## 2022-11-17 DIAGNOSIS — D64.89 ANEMIA DUE TO MULTIPLE MECHANISMS: ICD-10-CM

## 2022-11-17 DIAGNOSIS — D64.9 NORMOCHROMIC ANEMIA: ICD-10-CM

## 2022-11-17 DIAGNOSIS — D64.9 CHRONIC ANEMIA: ICD-10-CM

## 2022-11-17 PROCEDURE — 96372 THER/PROPH/DIAG INJ SC/IM: CPT

## 2022-11-17 PROCEDURE — 63600175 PHARM REV CODE 636 W HCPCS: Mod: JG | Performed by: INTERNAL MEDICINE

## 2022-11-17 RX ADMIN — EPOETIN ALFA-EPBX 20000 UNITS: 20000 INJECTION, SOLUTION INTRAVENOUS; SUBCUTANEOUS at 04:11

## 2022-11-18 ENCOUNTER — TELEPHONE (OUTPATIENT)
Dept: HEMATOLOGY/ONCOLOGY | Facility: CLINIC | Age: 62
End: 2022-11-18

## 2022-11-18 NOTE — TELEPHONE ENCOUNTER
Message reviewed with Dr. Tello, per his verbal orders I requested that Pratima increase dose to 40,000 units weekly. Patient made aware that dose will be changed. Verbalized understanding.

## 2022-11-18 NOTE — TELEPHONE ENCOUNTER
----- Message from Malissa Dewey RN sent at 11/18/2022 12:36 PM CST -----  Message reviewed with Dr. Tello who would like to increase dose to 40,000 units weekly, can you please order the new dose.   ----- Message -----  From: Юлия Donovan  Sent: 11/18/2022   9:58 AM CST  To: [facility] Staff    Pt called to let us know that his blood work is showing that his count is dropping. Does doc want to increase his Procrit injection?  [phone]

## 2022-11-23 ENCOUNTER — INFUSION (OUTPATIENT)
Dept: INFUSION THERAPY | Facility: HOSPITAL | Age: 62
End: 2022-11-23
Attending: INTERNAL MEDICINE
Payer: COMMERCIAL

## 2022-11-23 VITALS
OXYGEN SATURATION: 100 % | WEIGHT: 212 LBS | DIASTOLIC BLOOD PRESSURE: 80 MMHG | SYSTOLIC BLOOD PRESSURE: 149 MMHG | HEART RATE: 70 BPM | TEMPERATURE: 98 F | RESPIRATION RATE: 15 BRPM | HEIGHT: 70 IN | BODY MASS INDEX: 30.35 KG/M2

## 2022-11-23 DIAGNOSIS — D64.9 NORMOCHROMIC ANEMIA: ICD-10-CM

## 2022-11-23 DIAGNOSIS — N18.30 STAGE 3 CHRONIC KIDNEY DISEASE, UNSPECIFIED WHETHER STAGE 3A OR 3B CKD: ICD-10-CM

## 2022-11-23 DIAGNOSIS — D64.89 ANEMIA DUE TO MULTIPLE MECHANISMS: ICD-10-CM

## 2022-11-23 DIAGNOSIS — D64.9 CHRONIC ANEMIA: ICD-10-CM

## 2022-11-23 DIAGNOSIS — D46.9 MDS (MYELODYSPLASTIC SYNDROME): Primary | ICD-10-CM

## 2022-11-23 PROCEDURE — 96372 THER/PROPH/DIAG INJ SC/IM: CPT

## 2022-11-23 PROCEDURE — 63600175 PHARM REV CODE 636 W HCPCS: Mod: JG | Performed by: NURSE PRACTITIONER

## 2022-11-23 RX ADMIN — EPOETIN ALFA-EPBX 40000 UNITS: 40000 INJECTION, SOLUTION INTRAVENOUS; SUBCUTANEOUS at 08:11

## 2022-11-23 NOTE — PLAN OF CARE
Problem: Fatigue  Goal: Improved Activity Tolerance  Outcome: Ongoing, Progressing  Intervention: Promote Improved Energy  Flowsheets (Taken 11/23/2022 0841)  Fatigue Management:   fatigue-related activity identified   frequent rest breaks encouraged   paced activity encouraged  Sleep/Rest Enhancement: relaxation techniques promoted  Activity Management: Ambulated -L4

## 2022-11-30 ENCOUNTER — LAB VISIT (OUTPATIENT)
Dept: LAB | Facility: HOSPITAL | Age: 62
End: 2022-11-30
Attending: INTERNAL MEDICINE
Payer: COMMERCIAL

## 2022-11-30 DIAGNOSIS — D57.3 SICKLE CELL TRAIT SYNDROME: ICD-10-CM

## 2022-11-30 DIAGNOSIS — D64.9 NORMOCHROMIC ANEMIA: ICD-10-CM

## 2022-11-30 LAB
ALBUMIN SERPL BCP-MCNC: 4.3 G/DL (ref 3.5–5.2)
ALP SERPL-CCNC: 57 U/L (ref 55–135)
ALT SERPL W/O P-5'-P-CCNC: 16 U/L (ref 10–44)
ANION GAP SERPL CALC-SCNC: 4 MMOL/L (ref 8–16)
AST SERPL-CCNC: 18 U/L (ref 10–40)
BILIRUB SERPL-MCNC: 1 MG/DL (ref 0.1–1)
BUN SERPL-MCNC: 27 MG/DL (ref 8–23)
CALCIUM SERPL-MCNC: 8.4 MG/DL (ref 8.7–10.5)
CHLORIDE SERPL-SCNC: 106 MMOL/L (ref 95–110)
CO2 SERPL-SCNC: 27 MMOL/L (ref 23–29)
CREAT SERPL-MCNC: 1.8 MG/DL (ref 0.5–1.4)
EST. GFR  (NO RACE VARIABLE): 42 ML/MIN/1.73 M^2
GLUCOSE SERPL-MCNC: 134 MG/DL (ref 70–110)
POTASSIUM SERPL-SCNC: 4.3 MMOL/L (ref 3.5–5.1)
PROT SERPL-MCNC: 7.5 G/DL (ref 6–8.4)
SODIUM SERPL-SCNC: 137 MMOL/L (ref 136–145)

## 2022-11-30 PROCEDURE — 80053 COMPREHEN METABOLIC PANEL: CPT | Performed by: INTERNAL MEDICINE

## 2022-12-01 ENCOUNTER — INFUSION (OUTPATIENT)
Dept: INFUSION THERAPY | Facility: HOSPITAL | Age: 62
End: 2022-12-01
Attending: INTERNAL MEDICINE
Payer: COMMERCIAL

## 2022-12-01 VITALS
RESPIRATION RATE: 18 BRPM | SYSTOLIC BLOOD PRESSURE: 138 MMHG | OXYGEN SATURATION: 100 % | HEART RATE: 74 BPM | DIASTOLIC BLOOD PRESSURE: 75 MMHG | WEIGHT: 214.38 LBS | HEIGHT: 70 IN | TEMPERATURE: 98 F | BODY MASS INDEX: 30.69 KG/M2

## 2022-12-01 DIAGNOSIS — D64.89 ANEMIA DUE TO MULTIPLE MECHANISMS: ICD-10-CM

## 2022-12-01 DIAGNOSIS — N18.30 STAGE 3 CHRONIC KIDNEY DISEASE, UNSPECIFIED WHETHER STAGE 3A OR 3B CKD: ICD-10-CM

## 2022-12-01 DIAGNOSIS — D64.9 NORMOCHROMIC ANEMIA: ICD-10-CM

## 2022-12-01 DIAGNOSIS — D64.9 CHRONIC ANEMIA: ICD-10-CM

## 2022-12-01 DIAGNOSIS — D46.9 MDS (MYELODYSPLASTIC SYNDROME): Primary | ICD-10-CM

## 2022-12-01 PROCEDURE — 63600175 PHARM REV CODE 636 W HCPCS: Mod: JG | Performed by: NURSE PRACTITIONER

## 2022-12-01 PROCEDURE — 96372 THER/PROPH/DIAG INJ SC/IM: CPT

## 2022-12-01 RX ADMIN — EPOETIN ALFA-EPBX 40000 UNITS: 40000 INJECTION, SOLUTION INTRAVENOUS; SUBCUTANEOUS at 04:12

## 2022-12-01 NOTE — PLAN OF CARE
Problem: Fatigue  Goal: Improved Activity Tolerance  Outcome: Ongoing, Progressing  Intervention: Promote Improved Energy  Flowsheets (Taken 12/1/2022 1607)  Fatigue Management:   fatigue-related activity identified   paced activity encouraged   frequent rest breaks encouraged  Sleep/Rest Enhancement:   noise level reduced   relaxation techniques promoted   regular sleep/rest pattern promoted

## 2022-12-06 NOTE — TELEPHONE ENCOUNTER
Patient called the office today and instructed me that he saw Dr. Mustafa and he is recommending that the patient receive Procrit.  I instructed the patient that I had to see what dose they want and I will order it.  I instructed him that we will have to get authorization and then we will set it up.    Dr. Mustafa wants the patient to have Procrit 40,000 units SQ weekly.   5

## 2022-12-08 ENCOUNTER — INFUSION (OUTPATIENT)
Dept: INFUSION THERAPY | Facility: HOSPITAL | Age: 62
End: 2022-12-08
Attending: INTERNAL MEDICINE
Payer: COMMERCIAL

## 2022-12-08 VITALS
HEART RATE: 76 BPM | TEMPERATURE: 97 F | SYSTOLIC BLOOD PRESSURE: 132 MMHG | HEIGHT: 70 IN | RESPIRATION RATE: 18 BRPM | WEIGHT: 214.63 LBS | DIASTOLIC BLOOD PRESSURE: 76 MMHG | BODY MASS INDEX: 30.73 KG/M2

## 2022-12-08 DIAGNOSIS — D64.9 NORMOCHROMIC ANEMIA: ICD-10-CM

## 2022-12-08 DIAGNOSIS — D64.9 CHRONIC ANEMIA: ICD-10-CM

## 2022-12-08 DIAGNOSIS — D64.89 ANEMIA DUE TO MULTIPLE MECHANISMS: ICD-10-CM

## 2022-12-08 DIAGNOSIS — D46.9 MDS (MYELODYSPLASTIC SYNDROME): Primary | ICD-10-CM

## 2022-12-08 DIAGNOSIS — N18.30 STAGE 3 CHRONIC KIDNEY DISEASE, UNSPECIFIED WHETHER STAGE 3A OR 3B CKD: ICD-10-CM

## 2022-12-08 PROCEDURE — 63600175 PHARM REV CODE 636 W HCPCS: Mod: JG | Performed by: NURSE PRACTITIONER

## 2022-12-08 PROCEDURE — 96372 THER/PROPH/DIAG INJ SC/IM: CPT

## 2022-12-08 RX ADMIN — EPOETIN ALFA-EPBX 40000 UNITS: 40000 INJECTION, SOLUTION INTRAVENOUS; SUBCUTANEOUS at 04:12

## 2022-12-08 NOTE — PLAN OF CARE
Problem: Fatigue  Goal: Improved Activity Tolerance  Outcome: Ongoing, Progressing  Intervention: Promote Improved Energy  Flowsheets (Taken 12/8/2022 7992)  Fatigue Management:   fatigue-related activity identified   frequent rest breaks encouraged   paced activity encouraged  Sleep/Rest Enhancement:   noise level reduced   relaxation techniques promoted   regular sleep/rest pattern promoted

## 2022-12-14 ENCOUNTER — LAB VISIT (OUTPATIENT)
Dept: LAB | Facility: HOSPITAL | Age: 62
End: 2022-12-14
Attending: INTERNAL MEDICINE
Payer: COMMERCIAL

## 2022-12-14 DIAGNOSIS — D57.3 SICKLE CELL TRAIT SYNDROME: ICD-10-CM

## 2022-12-14 DIAGNOSIS — D64.9 NORMOCHROMIC ANEMIA: ICD-10-CM

## 2022-12-14 LAB
ALBUMIN SERPL BCP-MCNC: 4.3 G/DL (ref 3.5–5.2)
ALP SERPL-CCNC: 54 U/L (ref 55–135)
ALT SERPL W/O P-5'-P-CCNC: 14 U/L (ref 10–44)
ANION GAP SERPL CALC-SCNC: 7 MMOL/L (ref 8–16)
AST SERPL-CCNC: 15 U/L (ref 10–40)
BASOPHILS # BLD AUTO: 0.09 K/UL (ref 0–0.2)
BASOPHILS NFR BLD: 1.5 % (ref 0–1.9)
BILIRUB SERPL-MCNC: 0.7 MG/DL (ref 0.1–1)
BUN SERPL-MCNC: 27 MG/DL (ref 8–23)
CALCIUM SERPL-MCNC: 8.9 MG/DL (ref 8.7–10.5)
CHLORIDE SERPL-SCNC: 106 MMOL/L (ref 95–110)
CO2 SERPL-SCNC: 26 MMOL/L (ref 23–29)
CREAT SERPL-MCNC: 1.8 MG/DL (ref 0.5–1.4)
DIFFERENTIAL METHOD: ABNORMAL
EOSINOPHIL # BLD AUTO: 0.1 K/UL (ref 0–0.5)
EOSINOPHIL NFR BLD: 1.9 % (ref 0–8)
ERYTHROCYTE [DISTWIDTH] IN BLOOD BY AUTOMATED COUNT: 28.8 % (ref 11.5–14.5)
EST. GFR  (NO RACE VARIABLE): 42 ML/MIN/1.73 M^2
FERRITIN SERPL-MCNC: 1191 NG/ML (ref 20–300)
GLUCOSE SERPL-MCNC: 109 MG/DL (ref 70–110)
HCT VFR BLD AUTO: 24.8 % (ref 40–54)
HGB BLD-MCNC: 8.5 G/DL (ref 14–18)
IMM GRANULOCYTES # BLD AUTO: 0.02 K/UL (ref 0–0.04)
IMM GRANULOCYTES NFR BLD AUTO: 0.3 % (ref 0–0.5)
IRON SERPL-MCNC: 59 UG/DL (ref 45–160)
LYMPHOCYTES # BLD AUTO: 1.4 K/UL (ref 1–4.8)
LYMPHOCYTES NFR BLD: 23.6 % (ref 18–48)
MCH RBC QN AUTO: 32 PG (ref 27–31)
MCHC RBC AUTO-ENTMCNC: 34.3 G/DL (ref 32–36)
MCV RBC AUTO: 93 FL (ref 82–98)
MONOCYTES # BLD AUTO: 0.7 K/UL (ref 0.3–1)
MONOCYTES NFR BLD: 11.8 % (ref 4–15)
NEUTROPHILS # BLD AUTO: 3.6 K/UL (ref 1.8–7.7)
NEUTROPHILS NFR BLD: 60.9 % (ref 38–73)
NRBC BLD-RTO: 0 /100 WBC
PLATELET # BLD AUTO: 512 K/UL (ref 150–450)
PMV BLD AUTO: 10.1 FL (ref 9.2–12.9)
POTASSIUM SERPL-SCNC: 4.3 MMOL/L (ref 3.5–5.1)
PROT SERPL-MCNC: 7.8 G/DL (ref 6–8.4)
RBC # BLD AUTO: 2.66 M/UL (ref 4.6–6.2)
SATURATED IRON: 33 % (ref 20–50)
SODIUM SERPL-SCNC: 139 MMOL/L (ref 136–145)
TOTAL IRON BINDING CAPACITY: 178 UG/DL (ref 250–450)
TRANSFERRIN SERPL-MCNC: 127 MG/DL (ref 200–375)
WBC # BLD AUTO: 5.84 K/UL (ref 3.9–12.7)

## 2022-12-14 PROCEDURE — 80053 COMPREHEN METABOLIC PANEL: CPT | Performed by: INTERNAL MEDICINE

## 2022-12-14 PROCEDURE — 84466 ASSAY OF TRANSFERRIN: CPT | Performed by: INTERNAL MEDICINE

## 2022-12-14 PROCEDURE — 36415 COLL VENOUS BLD VENIPUNCTURE: CPT | Performed by: INTERNAL MEDICINE

## 2022-12-14 PROCEDURE — 82728 ASSAY OF FERRITIN: CPT | Performed by: INTERNAL MEDICINE

## 2022-12-14 PROCEDURE — 85025 COMPLETE CBC W/AUTO DIFF WBC: CPT | Performed by: INTERNAL MEDICINE

## 2022-12-15 ENCOUNTER — INFUSION (OUTPATIENT)
Dept: INFUSION THERAPY | Facility: HOSPITAL | Age: 62
End: 2022-12-15
Attending: INTERNAL MEDICINE
Payer: COMMERCIAL

## 2022-12-15 VITALS
WEIGHT: 214.88 LBS | HEIGHT: 70 IN | HEART RATE: 74 BPM | BODY MASS INDEX: 30.76 KG/M2 | RESPIRATION RATE: 18 BRPM | TEMPERATURE: 98 F | SYSTOLIC BLOOD PRESSURE: 135 MMHG | DIASTOLIC BLOOD PRESSURE: 75 MMHG | OXYGEN SATURATION: 97 %

## 2022-12-15 DIAGNOSIS — N18.30 STAGE 3 CHRONIC KIDNEY DISEASE, UNSPECIFIED WHETHER STAGE 3A OR 3B CKD: ICD-10-CM

## 2022-12-15 DIAGNOSIS — D46.9 MDS (MYELODYSPLASTIC SYNDROME): Primary | ICD-10-CM

## 2022-12-15 DIAGNOSIS — D64.9 NORMOCHROMIC ANEMIA: ICD-10-CM

## 2022-12-15 DIAGNOSIS — D64.9 CHRONIC ANEMIA: ICD-10-CM

## 2022-12-15 DIAGNOSIS — D64.89 ANEMIA DUE TO MULTIPLE MECHANISMS: ICD-10-CM

## 2022-12-15 PROCEDURE — 63600175 PHARM REV CODE 636 W HCPCS: Mod: JG | Performed by: NURSE PRACTITIONER

## 2022-12-15 PROCEDURE — 96372 THER/PROPH/DIAG INJ SC/IM: CPT

## 2022-12-15 RX ADMIN — EPOETIN ALFA-EPBX 40000 UNITS: 40000 INJECTION, SOLUTION INTRAVENOUS; SUBCUTANEOUS at 04:12

## 2022-12-15 NOTE — PLAN OF CARE
Problem: Anemia  Goal: Anemia Symptom Improvement  Outcome: Ongoing, Progressing  Intervention: Monitor and Manage Anemia  Flowsheets (Taken 12/15/2022 7660)  Oral Nutrition Promotion: rest periods promoted  Fatigue Management: activity schedule adjusted

## 2022-12-22 ENCOUNTER — INFUSION (OUTPATIENT)
Dept: INFUSION THERAPY | Facility: HOSPITAL | Age: 62
End: 2022-12-22
Attending: INTERNAL MEDICINE
Payer: COMMERCIAL

## 2022-12-22 VITALS
TEMPERATURE: 98 F | OXYGEN SATURATION: 100 % | SYSTOLIC BLOOD PRESSURE: 148 MMHG | HEIGHT: 70 IN | RESPIRATION RATE: 18 BRPM | BODY MASS INDEX: 31.07 KG/M2 | WEIGHT: 217 LBS | DIASTOLIC BLOOD PRESSURE: 82 MMHG | HEART RATE: 71 BPM

## 2022-12-22 DIAGNOSIS — D64.9 NORMOCHROMIC ANEMIA: ICD-10-CM

## 2022-12-22 DIAGNOSIS — D46.9 MDS (MYELODYSPLASTIC SYNDROME): Primary | ICD-10-CM

## 2022-12-22 DIAGNOSIS — D64.9 CHRONIC ANEMIA: ICD-10-CM

## 2022-12-22 DIAGNOSIS — D64.89 ANEMIA DUE TO MULTIPLE MECHANISMS: ICD-10-CM

## 2022-12-22 DIAGNOSIS — N18.30 STAGE 3 CHRONIC KIDNEY DISEASE, UNSPECIFIED WHETHER STAGE 3A OR 3B CKD: ICD-10-CM

## 2022-12-22 PROCEDURE — 96372 THER/PROPH/DIAG INJ SC/IM: CPT

## 2022-12-22 PROCEDURE — 63600175 PHARM REV CODE 636 W HCPCS: Mod: JG | Performed by: NURSE PRACTITIONER

## 2022-12-22 RX ADMIN — EPOETIN ALFA-EPBX 40000 UNITS: 40000 INJECTION, SOLUTION INTRAVENOUS; SUBCUTANEOUS at 04:12

## 2022-12-22 NOTE — PLAN OF CARE
Problem: Anemia  Goal: Anemia Symptom Improvement  Outcome: Ongoing, Progressing  Intervention: Monitor and Manage Anemia  Flowsheets (Taken 12/22/2022 6226)  Oral Nutrition Promotion: rest periods promoted  Safety Promotion/Fall Prevention: medications reviewed  Fatigue Management: frequent rest breaks encouraged

## 2022-12-28 ENCOUNTER — LAB VISIT (OUTPATIENT)
Dept: LAB | Facility: HOSPITAL | Age: 62
End: 2022-12-28
Attending: INTERNAL MEDICINE
Payer: COMMERCIAL

## 2022-12-28 DIAGNOSIS — D57.3 SICKLE CELL TRAIT SYNDROME: ICD-10-CM

## 2022-12-28 DIAGNOSIS — D64.9 NORMOCHROMIC ANEMIA: ICD-10-CM

## 2022-12-28 LAB
ALBUMIN SERPL BCP-MCNC: 4.3 G/DL (ref 3.5–5.2)
ALP SERPL-CCNC: 54 U/L (ref 55–135)
ALT SERPL W/O P-5'-P-CCNC: 15 U/L (ref 10–44)
ANION GAP SERPL CALC-SCNC: 6 MMOL/L (ref 8–16)
AST SERPL-CCNC: 21 U/L (ref 10–40)
BASOPHILS # BLD AUTO: 0.09 K/UL (ref 0–0.2)
BASOPHILS NFR BLD: 1.5 % (ref 0–1.9)
BILIRUB SERPL-MCNC: 0.9 MG/DL (ref 0.1–1)
BUN SERPL-MCNC: 33 MG/DL (ref 8–23)
CALCIUM SERPL-MCNC: 8.8 MG/DL (ref 8.7–10.5)
CHLORIDE SERPL-SCNC: 109 MMOL/L (ref 95–110)
CO2 SERPL-SCNC: 26 MMOL/L (ref 23–29)
CREAT SERPL-MCNC: 1.8 MG/DL (ref 0.5–1.4)
DIFFERENTIAL METHOD: ABNORMAL
EOSINOPHIL # BLD AUTO: 0.1 K/UL (ref 0–0.5)
EOSINOPHIL NFR BLD: 2.3 % (ref 0–8)
ERYTHROCYTE [DISTWIDTH] IN BLOOD BY AUTOMATED COUNT: 28.9 % (ref 11.5–14.5)
EST. GFR  (NO RACE VARIABLE): 42 ML/MIN/1.73 M^2
GLUCOSE SERPL-MCNC: 123 MG/DL (ref 70–110)
HCT VFR BLD AUTO: 25.8 % (ref 40–54)
HGB BLD-MCNC: 8.9 G/DL (ref 14–18)
IMM GRANULOCYTES # BLD AUTO: 0.02 K/UL (ref 0–0.04)
IMM GRANULOCYTES NFR BLD AUTO: 0.3 % (ref 0–0.5)
LYMPHOCYTES # BLD AUTO: 1.6 K/UL (ref 1–4.8)
LYMPHOCYTES NFR BLD: 25.7 % (ref 18–48)
MCH RBC QN AUTO: 32.6 PG (ref 27–31)
MCHC RBC AUTO-ENTMCNC: 34.5 G/DL (ref 32–36)
MCV RBC AUTO: 95 FL (ref 82–98)
MONOCYTES # BLD AUTO: 0.8 K/UL (ref 0.3–1)
MONOCYTES NFR BLD: 12.4 % (ref 4–15)
NEUTROPHILS # BLD AUTO: 3.5 K/UL (ref 1.8–7.7)
NEUTROPHILS NFR BLD: 57.8 % (ref 38–73)
NRBC BLD-RTO: 0 /100 WBC
PLATELET # BLD AUTO: 545 K/UL (ref 150–450)
PMV BLD AUTO: 10 FL (ref 9.2–12.9)
POTASSIUM SERPL-SCNC: 4.5 MMOL/L (ref 3.5–5.1)
PROT SERPL-MCNC: 7.3 G/DL (ref 6–8.4)
RBC # BLD AUTO: 2.73 M/UL (ref 4.6–6.2)
SODIUM SERPL-SCNC: 141 MMOL/L (ref 136–145)
WBC # BLD AUTO: 6.03 K/UL (ref 3.9–12.7)

## 2022-12-28 PROCEDURE — 80053 COMPREHEN METABOLIC PANEL: CPT | Performed by: INTERNAL MEDICINE

## 2022-12-28 PROCEDURE — 36415 COLL VENOUS BLD VENIPUNCTURE: CPT | Performed by: INTERNAL MEDICINE

## 2022-12-28 PROCEDURE — 85025 COMPLETE CBC W/AUTO DIFF WBC: CPT | Performed by: INTERNAL MEDICINE

## 2022-12-29 ENCOUNTER — INFUSION (OUTPATIENT)
Dept: INFUSION THERAPY | Facility: HOSPITAL | Age: 62
End: 2022-12-29
Attending: INTERNAL MEDICINE
Payer: COMMERCIAL

## 2022-12-29 VITALS
OXYGEN SATURATION: 99 % | BODY MASS INDEX: 30.81 KG/M2 | DIASTOLIC BLOOD PRESSURE: 77 MMHG | HEART RATE: 76 BPM | TEMPERATURE: 98 F | SYSTOLIC BLOOD PRESSURE: 132 MMHG | WEIGHT: 214.69 LBS | RESPIRATION RATE: 17 BRPM

## 2022-12-29 DIAGNOSIS — D64.9 CHRONIC ANEMIA: ICD-10-CM

## 2022-12-29 DIAGNOSIS — N18.30 STAGE 3 CHRONIC KIDNEY DISEASE, UNSPECIFIED WHETHER STAGE 3A OR 3B CKD: ICD-10-CM

## 2022-12-29 DIAGNOSIS — D64.89 ANEMIA DUE TO MULTIPLE MECHANISMS: ICD-10-CM

## 2022-12-29 DIAGNOSIS — D64.9 NORMOCHROMIC ANEMIA: ICD-10-CM

## 2022-12-29 DIAGNOSIS — D46.9 MDS (MYELODYSPLASTIC SYNDROME): Primary | ICD-10-CM

## 2022-12-29 PROCEDURE — 63600175 PHARM REV CODE 636 W HCPCS: Mod: JG | Performed by: NURSE PRACTITIONER

## 2022-12-29 PROCEDURE — 96372 THER/PROPH/DIAG INJ SC/IM: CPT

## 2022-12-29 RX ADMIN — EPOETIN ALFA-EPBX 40000 UNITS: 40000 INJECTION, SOLUTION INTRAVENOUS; SUBCUTANEOUS at 04:12

## 2022-12-29 NOTE — PLAN OF CARE
Problem: Anemia  Goal: Anemia Symptom Improvement  Outcome: Ongoing, Progressing  Intervention: Monitor and Manage Anemia  Flowsheets (Taken 12/29/2022 1603)  Fatigue Management:   activity schedule adjusted   frequent rest breaks encouraged   paced activity encouraged   fatigue-related activity identified

## 2023-01-05 ENCOUNTER — INFUSION (OUTPATIENT)
Dept: INFUSION THERAPY | Facility: HOSPITAL | Age: 63
End: 2023-01-05
Attending: INTERNAL MEDICINE
Payer: COMMERCIAL

## 2023-01-05 VITALS
SYSTOLIC BLOOD PRESSURE: 144 MMHG | WEIGHT: 214.38 LBS | RESPIRATION RATE: 18 BRPM | DIASTOLIC BLOOD PRESSURE: 73 MMHG | HEART RATE: 77 BPM | BODY MASS INDEX: 30.69 KG/M2 | OXYGEN SATURATION: 100 % | HEIGHT: 70 IN | TEMPERATURE: 98 F

## 2023-01-05 DIAGNOSIS — D64.9 NORMOCHROMIC ANEMIA: ICD-10-CM

## 2023-01-05 DIAGNOSIS — D64.9 CHRONIC ANEMIA: ICD-10-CM

## 2023-01-05 DIAGNOSIS — N18.30 STAGE 3 CHRONIC KIDNEY DISEASE, UNSPECIFIED WHETHER STAGE 3A OR 3B CKD: ICD-10-CM

## 2023-01-05 DIAGNOSIS — D64.89 ANEMIA DUE TO MULTIPLE MECHANISMS: ICD-10-CM

## 2023-01-05 DIAGNOSIS — D46.9 MDS (MYELODYSPLASTIC SYNDROME): Primary | ICD-10-CM

## 2023-01-05 PROCEDURE — 96372 THER/PROPH/DIAG INJ SC/IM: CPT

## 2023-01-05 PROCEDURE — 63600175 PHARM REV CODE 636 W HCPCS: Mod: JG | Performed by: NURSE PRACTITIONER

## 2023-01-05 RX ADMIN — EPOETIN ALFA-EPBX 40000 UNITS: 40000 INJECTION, SOLUTION INTRAVENOUS; SUBCUTANEOUS at 04:01

## 2023-01-05 NOTE — PLAN OF CARE
Problem: Anemia  Goal: Anemia Symptom Improvement  Outcome: Ongoing, Progressing  Intervention: Monitor and Manage Anemia  Flowsheets (Taken 1/5/2023 1606)  Oral Nutrition Promotion: rest periods promoted  Safety Promotion/Fall Prevention: medications reviewed  Fatigue Management: frequent rest breaks encouraged

## 2023-01-11 ENCOUNTER — LAB VISIT (OUTPATIENT)
Dept: LAB | Facility: HOSPITAL | Age: 63
End: 2023-01-11
Attending: INTERNAL MEDICINE
Payer: COMMERCIAL

## 2023-01-11 DIAGNOSIS — D57.3 SICKLE CELL TRAIT SYNDROME: ICD-10-CM

## 2023-01-11 DIAGNOSIS — D64.9 NORMOCHROMIC ANEMIA: ICD-10-CM

## 2023-01-11 LAB
ALBUMIN SERPL BCP-MCNC: 4.1 G/DL (ref 3.5–5.2)
ALP SERPL-CCNC: 54 U/L (ref 55–135)
ALT SERPL W/O P-5'-P-CCNC: 16 U/L (ref 10–44)
ANION GAP SERPL CALC-SCNC: 7 MMOL/L (ref 8–16)
AST SERPL-CCNC: 19 U/L (ref 10–40)
BASOPHILS # BLD AUTO: 0.09 K/UL (ref 0–0.2)
BASOPHILS NFR BLD: 1.4 % (ref 0–1.9)
BILIRUB SERPL-MCNC: 0.7 MG/DL (ref 0.1–1)
BUN SERPL-MCNC: 31 MG/DL (ref 8–23)
CALCIUM SERPL-MCNC: 9.1 MG/DL (ref 8.7–10.5)
CHLORIDE SERPL-SCNC: 106 MMOL/L (ref 95–110)
CO2 SERPL-SCNC: 27 MMOL/L (ref 23–29)
CREAT SERPL-MCNC: 1.8 MG/DL (ref 0.5–1.4)
DIFFERENTIAL METHOD: ABNORMAL
EOSINOPHIL # BLD AUTO: 0.1 K/UL (ref 0–0.5)
EOSINOPHIL NFR BLD: 2.2 % (ref 0–8)
ERYTHROCYTE [DISTWIDTH] IN BLOOD BY AUTOMATED COUNT: 27.9 % (ref 11.5–14.5)
EST. GFR  (NO RACE VARIABLE): 42 ML/MIN/1.73 M^2
FERRITIN SERPL-MCNC: 1197 NG/ML (ref 20–300)
GLUCOSE SERPL-MCNC: 108 MG/DL (ref 70–110)
HCT VFR BLD AUTO: 25.7 % (ref 40–54)
HGB BLD-MCNC: 8.8 G/DL (ref 14–18)
IMM GRANULOCYTES # BLD AUTO: 0.02 K/UL (ref 0–0.04)
IMM GRANULOCYTES NFR BLD AUTO: 0.3 % (ref 0–0.5)
IRON SERPL-MCNC: 57 UG/DL (ref 45–160)
LYMPHOCYTES # BLD AUTO: 1.5 K/UL (ref 1–4.8)
LYMPHOCYTES NFR BLD: 22.8 % (ref 18–48)
MCH RBC QN AUTO: 32.4 PG (ref 27–31)
MCHC RBC AUTO-ENTMCNC: 34.2 G/DL (ref 32–36)
MCV RBC AUTO: 95 FL (ref 82–98)
MONOCYTES # BLD AUTO: 0.8 K/UL (ref 0.3–1)
MONOCYTES NFR BLD: 12.1 % (ref 4–15)
NEUTROPHILS # BLD AUTO: 3.9 K/UL (ref 1.8–7.7)
NEUTROPHILS NFR BLD: 61.2 % (ref 38–73)
NRBC BLD-RTO: 0 /100 WBC
PLATELET # BLD AUTO: 531 K/UL (ref 150–450)
PMV BLD AUTO: 10.3 FL (ref 9.2–12.9)
POTASSIUM SERPL-SCNC: 4.3 MMOL/L (ref 3.5–5.1)
PROT SERPL-MCNC: 7.4 G/DL (ref 6–8.4)
RBC # BLD AUTO: 2.72 M/UL (ref 4.6–6.2)
SATURATED IRON: 31 % (ref 20–50)
SODIUM SERPL-SCNC: 140 MMOL/L (ref 136–145)
TOTAL IRON BINDING CAPACITY: 181 UG/DL (ref 250–450)
TRANSFERRIN SERPL-MCNC: 129 MG/DL (ref 200–375)
WBC # BLD AUTO: 6.35 K/UL (ref 3.9–12.7)

## 2023-01-11 PROCEDURE — 84466 ASSAY OF TRANSFERRIN: CPT | Performed by: INTERNAL MEDICINE

## 2023-01-11 PROCEDURE — 36415 COLL VENOUS BLD VENIPUNCTURE: CPT | Performed by: INTERNAL MEDICINE

## 2023-01-11 PROCEDURE — 82728 ASSAY OF FERRITIN: CPT | Performed by: INTERNAL MEDICINE

## 2023-01-11 PROCEDURE — 80053 COMPREHEN METABOLIC PANEL: CPT | Performed by: INTERNAL MEDICINE

## 2023-01-11 PROCEDURE — 85025 COMPLETE CBC W/AUTO DIFF WBC: CPT | Performed by: INTERNAL MEDICINE

## 2023-01-12 ENCOUNTER — INFUSION (OUTPATIENT)
Dept: INFUSION THERAPY | Facility: HOSPITAL | Age: 63
End: 2023-01-12
Attending: INTERNAL MEDICINE
Payer: COMMERCIAL

## 2023-01-12 VITALS
OXYGEN SATURATION: 99 % | HEART RATE: 64 BPM | WEIGHT: 214.19 LBS | HEIGHT: 70 IN | SYSTOLIC BLOOD PRESSURE: 132 MMHG | BODY MASS INDEX: 30.66 KG/M2 | DIASTOLIC BLOOD PRESSURE: 81 MMHG | TEMPERATURE: 98 F | RESPIRATION RATE: 18 BRPM

## 2023-01-12 DIAGNOSIS — D64.9 CHRONIC ANEMIA: ICD-10-CM

## 2023-01-12 DIAGNOSIS — D46.9 MDS (MYELODYSPLASTIC SYNDROME): Primary | ICD-10-CM

## 2023-01-12 DIAGNOSIS — D64.89 ANEMIA DUE TO MULTIPLE MECHANISMS: ICD-10-CM

## 2023-01-12 DIAGNOSIS — N18.30 STAGE 3 CHRONIC KIDNEY DISEASE, UNSPECIFIED WHETHER STAGE 3A OR 3B CKD: ICD-10-CM

## 2023-01-12 DIAGNOSIS — D64.9 NORMOCHROMIC ANEMIA: ICD-10-CM

## 2023-01-12 PROCEDURE — 63600175 PHARM REV CODE 636 W HCPCS: Mod: JG | Performed by: NURSE PRACTITIONER

## 2023-01-12 PROCEDURE — 96372 THER/PROPH/DIAG INJ SC/IM: CPT

## 2023-01-12 RX ADMIN — EPOETIN ALFA-EPBX 40000 UNITS: 40000 INJECTION, SOLUTION INTRAVENOUS; SUBCUTANEOUS at 04:01

## 2023-01-12 NOTE — PLAN OF CARE
Problem: Anemia  Goal: Anemia Symptom Improvement  Outcome: Ongoing, Progressing  Intervention: Monitor and Manage Anemia  Flowsheets (Taken 1/12/2023 1607)  Oral Nutrition Promotion: rest periods promoted  Safety Promotion/Fall Prevention: medications reviewed  Fatigue Management: frequent rest breaks encouraged

## 2023-01-19 ENCOUNTER — INFUSION (OUTPATIENT)
Dept: INFUSION THERAPY | Facility: HOSPITAL | Age: 63
End: 2023-01-19
Attending: INTERNAL MEDICINE
Payer: COMMERCIAL

## 2023-01-19 VITALS
TEMPERATURE: 98 F | DIASTOLIC BLOOD PRESSURE: 72 MMHG | WEIGHT: 213.38 LBS | HEIGHT: 70 IN | RESPIRATION RATE: 18 BRPM | BODY MASS INDEX: 30.55 KG/M2 | OXYGEN SATURATION: 100 % | HEART RATE: 75 BPM | SYSTOLIC BLOOD PRESSURE: 135 MMHG

## 2023-01-19 DIAGNOSIS — D64.89 ANEMIA DUE TO MULTIPLE MECHANISMS: ICD-10-CM

## 2023-01-19 DIAGNOSIS — N18.30 STAGE 3 CHRONIC KIDNEY DISEASE, UNSPECIFIED WHETHER STAGE 3A OR 3B CKD: ICD-10-CM

## 2023-01-19 DIAGNOSIS — D64.9 NORMOCHROMIC ANEMIA: ICD-10-CM

## 2023-01-19 DIAGNOSIS — D64.9 CHRONIC ANEMIA: ICD-10-CM

## 2023-01-19 DIAGNOSIS — D46.9 MDS (MYELODYSPLASTIC SYNDROME): Primary | ICD-10-CM

## 2023-01-19 PROCEDURE — 63600175 PHARM REV CODE 636 W HCPCS: Mod: JG | Performed by: NURSE PRACTITIONER

## 2023-01-19 PROCEDURE — 96372 THER/PROPH/DIAG INJ SC/IM: CPT

## 2023-01-19 RX ADMIN — EPOETIN ALFA-EPBX 40000 UNITS: 40000 INJECTION, SOLUTION INTRAVENOUS; SUBCUTANEOUS at 04:01

## 2023-01-19 NOTE — PLAN OF CARE
Problem: Anemia  Goal: Anemia Symptom Improvement  Outcome: Ongoing, Progressing  Intervention: Monitor and Manage Anemia  Flowsheets (Taken 1/19/2023 3552)  Oral Nutrition Promotion: rest periods promoted  Safety Promotion/Fall Prevention: medications reviewed  Fatigue Management: frequent rest breaks encouraged

## 2023-01-25 ENCOUNTER — LAB VISIT (OUTPATIENT)
Dept: LAB | Facility: HOSPITAL | Age: 63
End: 2023-01-25
Attending: INTERNAL MEDICINE
Payer: COMMERCIAL

## 2023-01-25 DIAGNOSIS — D64.9 NORMOCHROMIC ANEMIA: ICD-10-CM

## 2023-01-25 DIAGNOSIS — D57.3 SICKLE CELL TRAIT SYNDROME: ICD-10-CM

## 2023-01-25 LAB
ALBUMIN SERPL BCP-MCNC: 4.2 G/DL (ref 3.5–5.2)
ALP SERPL-CCNC: 56 U/L (ref 55–135)
ALT SERPL W/O P-5'-P-CCNC: 16 U/L (ref 10–44)
ANION GAP SERPL CALC-SCNC: 4 MMOL/L (ref 8–16)
AST SERPL-CCNC: 19 U/L (ref 10–40)
BASOPHILS # BLD AUTO: 0.09 K/UL (ref 0–0.2)
BASOPHILS NFR BLD: 1.4 % (ref 0–1.9)
BILIRUB SERPL-MCNC: 0.8 MG/DL (ref 0.1–1)
BUN SERPL-MCNC: 28 MG/DL (ref 8–23)
CALCIUM SERPL-MCNC: 9.1 MG/DL (ref 8.7–10.5)
CHLORIDE SERPL-SCNC: 108 MMOL/L (ref 95–110)
CO2 SERPL-SCNC: 28 MMOL/L (ref 23–29)
CREAT SERPL-MCNC: 1.8 MG/DL (ref 0.5–1.4)
DIFFERENTIAL METHOD: ABNORMAL
EOSINOPHIL # BLD AUTO: 0.1 K/UL (ref 0–0.5)
EOSINOPHIL NFR BLD: 2.2 % (ref 0–8)
ERYTHROCYTE [DISTWIDTH] IN BLOOD BY AUTOMATED COUNT: 28.6 % (ref 11.5–14.5)
EST. GFR  (NO RACE VARIABLE): 42 ML/MIN/1.73 M^2
GLUCOSE SERPL-MCNC: 146 MG/DL (ref 70–110)
HCT VFR BLD AUTO: 27.3 % (ref 40–54)
HGB BLD-MCNC: 9.4 G/DL (ref 14–18)
IMM GRANULOCYTES # BLD AUTO: 0.01 K/UL (ref 0–0.04)
IMM GRANULOCYTES NFR BLD AUTO: 0.2 % (ref 0–0.5)
LYMPHOCYTES # BLD AUTO: 1.5 K/UL (ref 1–4.8)
LYMPHOCYTES NFR BLD: 24.7 % (ref 18–48)
MCH RBC QN AUTO: 32.4 PG (ref 27–31)
MCHC RBC AUTO-ENTMCNC: 34.4 G/DL (ref 32–36)
MCV RBC AUTO: 94 FL (ref 82–98)
MONOCYTES # BLD AUTO: 0.6 K/UL (ref 0.3–1)
MONOCYTES NFR BLD: 9.6 % (ref 4–15)
NEUTROPHILS # BLD AUTO: 3.9 K/UL (ref 1.8–7.7)
NEUTROPHILS NFR BLD: 61.9 % (ref 38–73)
NRBC BLD-RTO: 0 /100 WBC
PLATELET # BLD AUTO: 519 K/UL (ref 150–450)
PMV BLD AUTO: 10 FL (ref 9.2–12.9)
POTASSIUM SERPL-SCNC: 4.3 MMOL/L (ref 3.5–5.1)
PROT SERPL-MCNC: 7.5 G/DL (ref 6–8.4)
RBC # BLD AUTO: 2.9 M/UL (ref 4.6–6.2)
SODIUM SERPL-SCNC: 140 MMOL/L (ref 136–145)
WBC # BLD AUTO: 6.24 K/UL (ref 3.9–12.7)

## 2023-01-25 PROCEDURE — 80053 COMPREHEN METABOLIC PANEL: CPT | Performed by: INTERNAL MEDICINE

## 2023-01-25 PROCEDURE — 36415 COLL VENOUS BLD VENIPUNCTURE: CPT | Performed by: INTERNAL MEDICINE

## 2023-01-25 PROCEDURE — 85025 COMPLETE CBC W/AUTO DIFF WBC: CPT | Performed by: INTERNAL MEDICINE

## 2023-01-25 NOTE — PROGRESS NOTES
[facility] Hematology/Oncology                 PROGRESS NOTE      Subjective:       Patient ID:   NAME: Rick Butler : 1960     62 y.o. male    Referring Doc: Braulio (new PCP)  Other Physicians: Getachew; Alan Mustafa    Chief Complaint:  Sickle cell trait/anemia/RARS  f/u    History of Present Illness:     Patient returns today for a regularly scheduled follow-up visit.  The patient is doing ok overall. He is here by himself today.     He saw Dr Hilario at [facility] again recently and they are considering another medication; he plans to see her again in 2023    no pain crisis; he has been sober for several years now; he has been off tobacco too; no CP, SOB, HA's or N/V;        He has not had any recent pain crisis. He denies having any fatigue or breathing difficulties. He denies any blood in the stool, dark stools or hematuria. He does have some fatigue        Discussed covid19 precautions - he had his vaccinations      ROS:   GEN: normal without any fever, night sweats or weight loss  HEENT: normal with no HA's, sore throat, stiff neck, changes in vision  CV: normal with no CP, SOB, PND, MORA or orthopnea  PULM: normal with no SOB, cough, hemoptysis, sputum or pleuritic pain  GI: normal with no abdominal pain, nausea, vomiting, constipation, diarrhea, melanotic stools, BRBPR, or hematemesis  : normal with no hematuria, dysuria  BREAST: normal with no mass, discharge, pain  SKIN: normal with no rash, erythema, bruising, or swelling    Allergies:  Review of patient's allergies indicates:  No Known Allergies    Medications:    Current Outpatient Medications:     amLODIPine (NORVASC) 5 MG tablet, Take 1 tablet (5 mg total) by mouth once daily., Disp: 90 tablet, Rfl: 4    ascorbic acid, vitamin C, (VITAMIN C) 1000 MG tablet, Take 1,000 mg by mouth once daily., Disp: , Rfl:     epoetin tray (PROCRIT INJ), Thursday, Disp: , Rfl:     famotidine (PEPCID) 20 MG tablet, famotidine 20 mg tablet  Take 1 tablet twice  a day by oral route., Disp: , Rfl:     folic acid (FOLVITE) 1 MG tablet, Take 2 tablets (2 mg total) by mouth once daily., Disp: 180 tablet, Rfl: 4    multivitamin capsule, Take 1 capsule by mouth once daily., Disp: , Rfl:     pantoprazole (PROTONIX) 40 MG tablet, , Disp: , Rfl:     sertraline (ZOLOFT) 25 MG tablet, Take 25 mg by mouth once daily., Disp: , Rfl:     sildenafil (VIAGRA) 100 MG tablet, Take 1 tablet (100 mg total) by mouth daily as needed for Erectile Dysfunction., Disp: 10 tablet, Rfl: 6    tamsulosin (FLOMAX) 0.4 mg Cap, Take 1 capsule (0.4 mg total) by mouth once daily., Disp: 90 capsule, Rfl: 4    traZODone (DESYREL) 100 MG tablet, Take 1 tablet (100 mg total) by mouth nightly as needed for Insomnia., Disp: 90 tablet, Rfl: 4    PMHx/PSHx Updates:  See patient's last visit with me on 8/18/2022  See H&P on 7/9/2011      Pathology:    12/20/2019  BONE MARROW, RIGHT ILIAC CREST,    ASPIRATE, CLOT SECTION, AND CORE BIOPSY:    --HYPERCELLULAR MARROW (APPROXIMATELY 75% TO 80%) WITH TRILINEAGE   HEMATOPOIETIC ELEMENTS, ERYTHROID  HYPERPLASIA AND MILD MEGAKARYOCYTIC HYPERPLASIA, AND NON-SPECIFIC   DYSHEMATOPOIETIC CHANGES (SEE     COMMENT).  --[id number] INCREASED STAINABLE IRON WITH INCREASED RING   SIDEROBLASTS (GREATER THAN 15%)     (SEE COMMENT).  --PERIPHERAL BLOOD WITH THROMBOCYTOSIS (574,000/MICROLITER) AND ANEMIA   (HEMOGLOBIN 5.5 GRAM/DECILITER),    WITH SCATTERED DREPANOCYTOID FORMS AND FEW NUCLEATED ERYTHROCYTES      Cytogenetic Results at the bottom of the report.  NORMAL    Objective:     Vitals:  Blood pressure 128/77, temperature (!) 80 °F (26.7 °C), weight 96.6 kg (213 lb).    Physical Examination:   GEN: no apparent distress, comfortable; AAOx3  HEAD: atraumatic and normocephalic  EYES: no pallor, no icterus, PERRLA  ENT: OMM, no pharyngeal erythema, external ears WNL; no nasal discharge; no thrush  NECK: no masses, thyroid normal, trachea midline, no LAD/LN's, supple  CV: RRR  with no murmur; normal pulse; normal S1 and S2; no pedal edema  CHEST: Normal respiratory effort; CTAB; normal breath sounds; no wheeze or crackles  ABDOM: nontender and nondistended; soft; normal bowel sounds; no rebound/guarding  MUSC/Skeletal: ROM normal; no crepitus; joints normal; no deformities or arthropathy  EXTREM: no clubbing, cyanosis, inflammation or swelling  SKIN: no rashes, lesions, ulcers, petechiae or subcutaneous nodules  : no jiang  NEURO: grossly intact; motor/sensory WNL; AAOx3; no tremors  PSYCH: normal mood, affect and behavior  LYMPH: normal cervical, supraclavicular, axillary and groin LN's            Labs:     Lab Results   Component Value Date    WBC 6.24 01/25/2023    HGB 9.4 (L) 01/25/2023    HCT 27.3 (L) 01/25/2023    MCV 94 01/25/2023     (H) 01/25/2023           CMP  Sodium   Date Value Ref Range Status   01/25/2023 140 136 - 145 mmol/L Final   06/26/2019 138 134 - 144 mmol/L      Potassium   Date Value Ref Range Status   01/25/2023 4.3 3.5 - 5.1 mmol/L Final     Chloride   Date Value Ref Range Status   01/25/2023 108 95 - 110 mmol/L Final   06/26/2019 105 98 - 110 mmol/L      CO2   Date Value Ref Range Status   01/25/2023 28 23 - 29 mmol/L Final     Glucose   Date Value Ref Range Status   01/25/2023 146 (H) 70 - 110 mg/dL Final   06/26/2019 114 (H) 70 - 99 mg/dL      BUN   Date Value Ref Range Status   01/25/2023 28 (H) 8 - 23 mg/dL Final     Creatinine   Date Value Ref Range Status   01/25/2023 1.8 (H) 0.5 - 1.4 mg/dL Final   06/26/2019 1.62 (H) 0.60 - 1.40 mg/dL      Calcium   Date Value Ref Range Status   01/25/2023 9.1 8.7 - 10.5 mg/dL Final     Total Protein   Date Value Ref Range Status   01/25/2023 7.5 6.0 - 8.4 g/dL Final     Albumin   Date Value Ref Range Status   01/25/2023 4.2 3.5 - 5.2 g/dL Final   06/26/2019 4.4 3.1 - 4.7 g/dL      Total Bilirubin   Date Value Ref Range Status   01/25/2023 0.8 0.1 - 1.0 mg/dL Final     Comment:     For infants and newborns,  interpretation of results should be based  on gestational age, weight and in agreement with clinical  observations.    Premature Infant recommended reference ranges:  Up to 24 hours.............<8.0 mg/dL  Up to 48 hours............<12.0 mg/dL  3-5 days..................<15.0 mg/dL  6-29 days.................<15.0 mg/dL       Alkaline Phosphatase   Date Value Ref Range Status   01/25/2023 56 55 - 135 U/L Final     AST   Date Value Ref Range Status   01/25/2023 19 10 - 40 U/L Final     ALT   Date Value Ref Range Status   01/25/2023 16 10 - 44 U/L Final     Anion Gap   Date Value Ref Range Status   01/25/2023 4 (L) 8 - 16 mmol/L Final     eGFR if    Date Value Ref Range Status   07/13/2022 45.6 (A) >60 mL/min/1.73 m^2 Final     eGFR if non    Date Value Ref Range Status   07/13/2022 39.5 (A) >60 mL/min/1.73 m^2 Final     Comment:     Calculation used to obtain the estimated glomerular filtration  rate (eGFR) is the CKD-EPI equation.          Lab Results   Component Value Date    IRON 57 01/11/2023    TIBC 181 (L) 01/11/2023    FERRITIN 1,197 (H) 01/11/2023           I have reviewed all available lab results and radiology reports.    Radiology/Diagnostic Studies:    2/15/2018 Xray Survey:   IMPRESSION: No significant abnormality seen. No evidence of osteoblastic or  osteoclastic lesions identified    MRI L-spine 2/12/2018  IMPRESSION:    1. Prominent low signal intensity throughout the bone marrow on T1 and  T2-weighted imaging. Marrow infiltrative process including malignancy such as  metastatic disease or lymphoma/leukemia is a consideration along with multiple  myeloma or malignant process. Benign etiology such as osteopetrosis or  regenerative red marrow are also considerations.    2. Multilevel lumbar degenerative changes, most prominent at L4-L5 and L5-S1 as  described.    Assessment/Plan:   (1) 62 y.o. male with diagnosis of sickle cell anemia with borderline microcytosis  -  "latest hgb was 6.0 and he had blood transfusion  - today, he is not symptomatic at this time  - he probably has a multifactorial anemia process with underlying sickle cell, anemia of chronic disorders and anemia of chronic renal. I can not rule out an underlying GI bleeding process.   - iron panel is adequate (no deficiency)  - total bilirubin is only minimally elevated  - he required transfusion again in Oct 2019 and again in Dec 2019  - he had bone marrow biopsy on 12/20/2019    "dyshematopoietic changes are not entirely specific and are present  mostly within erythroid and megakaryocytic lineages. These findings   could be observed in chronic disease states, autoimmune conditions,  infectious settings, toxic exposures, nutritional deficits, as medication/drug effect, and in myelodysplastic syndrome (MDS), among other conditions"  "Additionally, ring sideroblasts are a non-specific   finding "    Patient was referred to see Dr Mustafa at [facility] and had repeat BM biopsy with diagnosis made of RARS  - he is now on procrit per directives of Dr Mustafa    7/30/2020:   he recently saw Dr Mustafa  And sees him again in Jan 2021  - he is doing well with procrit and is feeling much better  - he has not required any transfusions for some time time    9/24/2020:  - latest hgb at 8.5  - will increase the procrit dose  - f/u with Dr Zavala in Jan 2021 11/19/2020:  - patient doing well and feels "great"  - hgb up to 9.1; continued on the procrit  - f/u with Dr Mustafa in Jan 2021    3/4/2021:  - he saw Dr Mustafa in jan 2021 and sees him again in June/July 2021  - latest hgb at 9.1 and stable and platelets are 474,000; wbc at 10.0    6/10/2021:  - latest hgb at 9.0  - plats 485,000  - on weekly procrit  - seeing Dr Mustafa again tomorrow    10/14/2021:  - hgb at 8.6 but he recently had been taking naproxen and had some BRBPR - which since resolved  - he saw Dr Beyer with GI and he is having colonsocopy on Nov 5th along with " "EGD  - plats 429,000  - he sees Dr Mustafa again in Nov 19th   - he sees Dr Mas again in Dec 2021    1/13/2022:  - He has been under the care of Dr Mustafa at [facility] for RARS- MDS. He is now on procrit weekly. He sees Dr Mustafa again in March 2022    - hgb 9.3  - he had scope with Dr Collins in nov 2021 which was good per patient and they plan to repeat in 5 yrs    5/12/2022:  - hgb at 9.0  - plats 485  - wbc 7.25  - he is on procrit weekly  - saw Dr Mustafa this past Fridday and since he is leaving, he will start seeing Dr Hilario instead in 3 months    8/18/2022:  - latest hgb a little lower at 8.8  - he has been on the procrit  - he saw Dr Hilario recently at [facility] and plans to see her again in 3 months    1/26/2023:  - He saw Dr Hilario at [facility] again recently this past Tuesday and they are considering another medication; he plans to see her again in April 2023  - Dr Hilario requested he have an US of spleen - will order here in [place]  - he is also on appetite stimulant  - hgb at 9.4 and plats at 500k    (2) Alcohol abuse issues in past - he has been sober for over 3 years now    (3) New findings on radiography with abnormal chest CT and lung mass  - former smoker    (4) chronic back issues - prior Xrays and MRI - suspect the findings on the MRI are possibly due to his sickle cell;   - SPEP was previously negative for M-protein  - PSA was 0.3 in Sept 2017  - recommended neurosurgery evaluation previously  - he saw Dr Nura VUONG and had a nerve "burning" procedure and his pain has improved    (5) CRI - elevated creatinine - he is followed Dr Chilel with nephrology      (6) H pylori gastritis - s/p recent endoscopy with Dr Collins and antibotics x 10 days        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 Sickle cell trait syndrome        3. Thrombocytosis        4. Normochromic anemia        5. Monocytosis        6. MDS (myelodysplastic syndrome)        7. Anemia due to multiple mechanisms        8. Chronic " anemia        9. Folic acid deficiency          PLAN;  1. Continue with the procrit    2. Planned f/u in April 2023 with [place]  3.  F/u with nephrology as directed by them -   Dr Mas    4. Check labs every 2 weeks  5. order US of spleen   6.  Encouraged continued sobriety; [facility]  Fax note to Dennis Ramsey Tveit; Ramsey/[place]    Total Time spent on patient:    I spent over 25 mins of time with the patient. Reviewed results of the recently ordered labs, tests, reports and studies; made directives with regards to the results. Over half of this time was spent couseling and coordinating care, making treatment and analytical decisions; ordering necessary labs, tests and studies; and discussing treatment options and setting up treatment plan(s) if indicated.            Discussion:       Pathology Discussion:    I reviewed and discussed the pathology report(s) and radiograph reports (if available) in as simple to understand and/or laymen's terms to the best of my ability. I had an indepth conversation with the patient and went over the patient's individual diagnosis based on the information that was currently available. I discussed the TNM staging process with regard to the patient's particular cancer type, and the calculated stage based on the currently available TNM data and literature. I discussed the available prognostic data with regard to the current staging information and how it relates to the prognosis of their particular neoplastic process.          COVID-19 Discussion:    I had long discussion with patient and any applicable family about the COVID-19 coronavirus epidemic and the recommended precautions with regard to cancer and/or hematology patients. I have re-iterated the CDC recommendations for adequate hand washing, use of hand -like products, and coughing into elbow, etc. In addition, especially for our patients who are on chemotherapy and/or our  otherwise immunocompromised patients, I have recommended avoidance of crowds, including movie theaters, restaurants, churches, etc. I have recommended avoidance of any sick or symptomatic family members and/or friends. I have also recommended avoidance of any raw and unwashed food products, and general avoidance of food items that have not been prepared by themselves. The patient has been asked to call us immediately with any symptom developments, issues, questions or other general concerns.     I have explained all of the above in detail and the patient understands all of the current recommendation(s). I have answered all of their questions to the best of my ability and to their complete satisfaction.   The patient is to continue with the current management plan.            Electronically signed by Jose Tello MD

## 2023-01-26 ENCOUNTER — OFFICE VISIT (OUTPATIENT)
Dept: HEMATOLOGY/ONCOLOGY | Facility: CLINIC | Age: 63
End: 2023-01-26
Payer: COMMERCIAL

## 2023-01-26 ENCOUNTER — INFUSION (OUTPATIENT)
Dept: INFUSION THERAPY | Facility: HOSPITAL | Age: 63
End: 2023-01-26
Attending: INTERNAL MEDICINE
Payer: COMMERCIAL

## 2023-01-26 VITALS
OXYGEN SATURATION: 99 % | WEIGHT: 213 LBS | TEMPERATURE: 98 F | BODY MASS INDEX: 30.49 KG/M2 | SYSTOLIC BLOOD PRESSURE: 128 MMHG | RESPIRATION RATE: 17 BRPM | HEART RATE: 78 BPM | HEIGHT: 70 IN | DIASTOLIC BLOOD PRESSURE: 77 MMHG

## 2023-01-26 VITALS
SYSTOLIC BLOOD PRESSURE: 128 MMHG | DIASTOLIC BLOOD PRESSURE: 77 MMHG | TEMPERATURE: 80 F | WEIGHT: 213 LBS | BODY MASS INDEX: 30.56 KG/M2

## 2023-01-26 DIAGNOSIS — D64.9 NORMOCHROMIC ANEMIA: ICD-10-CM

## 2023-01-26 DIAGNOSIS — D72.821 MONOCYTOSIS: ICD-10-CM

## 2023-01-26 DIAGNOSIS — D64.9 CHRONIC ANEMIA: ICD-10-CM

## 2023-01-26 DIAGNOSIS — N18.30 STAGE 3 CHRONIC KIDNEY DISEASE, UNSPECIFIED WHETHER STAGE 3A OR 3B CKD: ICD-10-CM

## 2023-01-26 DIAGNOSIS — D57.3 SICKLE CELL TRAIT SYNDROME: ICD-10-CM

## 2023-01-26 DIAGNOSIS — D64.89 ANEMIA DUE TO MULTIPLE MECHANISMS: ICD-10-CM

## 2023-01-26 DIAGNOSIS — D46.9 MDS (MYELODYSPLASTIC SYNDROME): Primary | ICD-10-CM

## 2023-01-26 DIAGNOSIS — D75.839 THROMBOCYTOSIS: ICD-10-CM

## 2023-01-26 DIAGNOSIS — D46.1 RARS (REFRACTORY ANEMIA WITH RINGED SIDEROBLASTS): Primary | ICD-10-CM

## 2023-01-26 DIAGNOSIS — E53.8 FOLIC ACID DEFICIENCY: ICD-10-CM

## 2023-01-26 DIAGNOSIS — D46.9 MDS (MYELODYSPLASTIC SYNDROME): ICD-10-CM

## 2023-01-26 PROCEDURE — 96372 THER/PROPH/DIAG INJ SC/IM: CPT

## 2023-01-26 PROCEDURE — 1159F MED LIST DOCD IN RCRD: CPT | Mod: CPTII,S$GLB,, | Performed by: INTERNAL MEDICINE

## 2023-01-26 PROCEDURE — 3074F SYST BP LT 130 MM HG: CPT | Mod: CPTII,S$GLB,, | Performed by: INTERNAL MEDICINE

## 2023-01-26 PROCEDURE — 99214 PR OFFICE/OUTPT VISIT, EST, LEVL IV, 30-39 MIN: ICD-10-PCS | Mod: S$GLB,,, | Performed by: INTERNAL MEDICINE

## 2023-01-26 PROCEDURE — 3078F PR MOST RECENT DIASTOLIC BLOOD PRESSURE < 80 MM HG: ICD-10-PCS | Mod: CPTII,S$GLB,, | Performed by: INTERNAL MEDICINE

## 2023-01-26 PROCEDURE — 1160F RVW MEDS BY RX/DR IN RCRD: CPT | Mod: CPTII,S$GLB,, | Performed by: INTERNAL MEDICINE

## 2023-01-26 PROCEDURE — 1160F PR REVIEW ALL MEDS BY PRESCRIBER/CLIN PHARMACIST DOCUMENTED: ICD-10-PCS | Mod: CPTII,S$GLB,, | Performed by: INTERNAL MEDICINE

## 2023-01-26 PROCEDURE — 3008F PR BODY MASS INDEX (BMI) DOCUMENTED: ICD-10-PCS | Mod: CPTII,S$GLB,, | Performed by: INTERNAL MEDICINE

## 2023-01-26 PROCEDURE — 3008F BODY MASS INDEX DOCD: CPT | Mod: CPTII,S$GLB,, | Performed by: INTERNAL MEDICINE

## 2023-01-26 PROCEDURE — 63600175 PHARM REV CODE 636 W HCPCS: Mod: JG | Performed by: NURSE PRACTITIONER

## 2023-01-26 PROCEDURE — 3074F PR MOST RECENT SYSTOLIC BLOOD PRESSURE < 130 MM HG: ICD-10-PCS | Mod: CPTII,S$GLB,, | Performed by: INTERNAL MEDICINE

## 2023-01-26 PROCEDURE — 99214 OFFICE O/P EST MOD 30 MIN: CPT | Mod: S$GLB,,, | Performed by: INTERNAL MEDICINE

## 2023-01-26 PROCEDURE — 3078F DIAST BP <80 MM HG: CPT | Mod: CPTII,S$GLB,, | Performed by: INTERNAL MEDICINE

## 2023-01-26 PROCEDURE — 1159F PR MEDICATION LIST DOCUMENTED IN MEDICAL RECORD: ICD-10-PCS | Mod: CPTII,S$GLB,, | Performed by: INTERNAL MEDICINE

## 2023-01-26 RX ORDER — SERTRALINE HYDROCHLORIDE 25 MG/1
25 TABLET, FILM COATED ORAL DAILY
COMMUNITY

## 2023-01-26 RX ADMIN — EPOETIN ALFA-EPBX 40000 UNITS: 40000 INJECTION, SOLUTION INTRAVENOUS; SUBCUTANEOUS at 03:01

## 2023-01-26 NOTE — PLAN OF CARE
Problem: Anemia  Goal: Anemia Symptom Improvement  Outcome: Ongoing, Progressing  Intervention: Monitor and Manage Anemia  Flowsheets (Taken 1/26/2023 1523)  Fatigue Management:   frequent rest breaks encouraged   activity schedule adjusted   paced activity encouraged   fatigue-related activity identified

## 2023-02-02 ENCOUNTER — INFUSION (OUTPATIENT)
Dept: INFUSION THERAPY | Facility: HOSPITAL | Age: 63
End: 2023-02-02
Attending: INTERNAL MEDICINE
Payer: COMMERCIAL

## 2023-02-02 VITALS
WEIGHT: 215.13 LBS | DIASTOLIC BLOOD PRESSURE: 73 MMHG | OXYGEN SATURATION: 100 % | SYSTOLIC BLOOD PRESSURE: 115 MMHG | HEIGHT: 70 IN | BODY MASS INDEX: 30.8 KG/M2 | RESPIRATION RATE: 16 BRPM | HEART RATE: 79 BPM | TEMPERATURE: 98 F

## 2023-02-02 DIAGNOSIS — N18.30 STAGE 3 CHRONIC KIDNEY DISEASE, UNSPECIFIED WHETHER STAGE 3A OR 3B CKD: ICD-10-CM

## 2023-02-02 DIAGNOSIS — D64.89 ANEMIA DUE TO MULTIPLE MECHANISMS: ICD-10-CM

## 2023-02-02 DIAGNOSIS — D64.9 NORMOCHROMIC ANEMIA: ICD-10-CM

## 2023-02-02 DIAGNOSIS — D46.9 MDS (MYELODYSPLASTIC SYNDROME): Primary | ICD-10-CM

## 2023-02-02 DIAGNOSIS — D64.9 CHRONIC ANEMIA: ICD-10-CM

## 2023-02-02 PROCEDURE — 96372 THER/PROPH/DIAG INJ SC/IM: CPT

## 2023-02-02 PROCEDURE — 63600175 PHARM REV CODE 636 W HCPCS: Mod: JG | Performed by: NURSE PRACTITIONER

## 2023-02-02 RX ADMIN — EPOETIN ALFA-EPBX 40000 UNITS: 40000 INJECTION, SOLUTION INTRAVENOUS; SUBCUTANEOUS at 03:02

## 2023-02-02 NOTE — PLAN OF CARE
Problem: Fatigue  Goal: Improved Activity Tolerance  Outcome: Ongoing, Progressing  Intervention: Promote Improved Energy  Flowsheets (Taken 2/2/2023 1025)  Fatigue Management: frequent rest breaks encouraged  Sleep/Rest Enhancement: regular sleep/rest pattern promoted  Activity Management: Ambulated -L4

## 2023-02-08 ENCOUNTER — HOSPITAL ENCOUNTER (OUTPATIENT)
Dept: RADIOLOGY | Facility: HOSPITAL | Age: 63
Discharge: HOME | End: 2023-02-08
Attending: INTERNAL MEDICINE
Payer: COMMERCIAL

## 2023-02-08 DIAGNOSIS — D46.9 MDS (MYELODYSPLASTIC SYNDROME): ICD-10-CM

## 2023-02-08 DIAGNOSIS — D46.1 RARS (REFRACTORY ANEMIA WITH RINGED SIDEROBLASTS): ICD-10-CM

## 2023-02-08 PROCEDURE — 76700 US EXAM ABDOM COMPLETE: CPT | Mod: TC,PO

## 2023-02-09 ENCOUNTER — INFUSION (OUTPATIENT)
Dept: INFUSION THERAPY | Facility: HOSPITAL | Age: 63
End: 2023-02-09
Attending: INTERNAL MEDICINE
Payer: COMMERCIAL

## 2023-02-09 VITALS
HEART RATE: 57 BPM | TEMPERATURE: 98 F | RESPIRATION RATE: 17 BRPM | OXYGEN SATURATION: 100 % | DIASTOLIC BLOOD PRESSURE: 83 MMHG | SYSTOLIC BLOOD PRESSURE: 145 MMHG | WEIGHT: 215.81 LBS | HEIGHT: 70 IN | BODY MASS INDEX: 30.9 KG/M2

## 2023-02-09 DIAGNOSIS — D46.9 MDS (MYELODYSPLASTIC SYNDROME): Primary | ICD-10-CM

## 2023-02-09 DIAGNOSIS — D64.89 ANEMIA DUE TO MULTIPLE MECHANISMS: ICD-10-CM

## 2023-02-09 DIAGNOSIS — N18.30 STAGE 3 CHRONIC KIDNEY DISEASE, UNSPECIFIED WHETHER STAGE 3A OR 3B CKD: ICD-10-CM

## 2023-02-09 DIAGNOSIS — D64.9 CHRONIC ANEMIA: ICD-10-CM

## 2023-02-09 DIAGNOSIS — D64.9 NORMOCHROMIC ANEMIA: ICD-10-CM

## 2023-02-09 PROCEDURE — 96372 THER/PROPH/DIAG INJ SC/IM: CPT

## 2023-02-09 PROCEDURE — 63600175 PHARM REV CODE 636 W HCPCS: Mod: JG | Performed by: NURSE PRACTITIONER

## 2023-02-09 RX ADMIN — EPOETIN ALFA-EPBX 40000 UNITS: 40000 INJECTION, SOLUTION INTRAVENOUS; SUBCUTANEOUS at 04:02

## 2023-02-09 NOTE — PLAN OF CARE
Problem: Fatigue  Goal: Improved Activity Tolerance  Outcome: Ongoing, Progressing  Intervention: Promote Improved Energy  Flowsheets (Taken 2/9/2023 0513)  Fatigue Management:   fatigue-related activity identified   frequent rest breaks encouraged   paced activity encouraged  Sleep/Rest Enhancement: relaxation techniques promoted  Activity Management: Ambulated -L4

## 2023-02-16 ENCOUNTER — INFUSION (OUTPATIENT)
Dept: INFUSION THERAPY | Facility: HOSPITAL | Age: 63
End: 2023-02-16
Attending: INTERNAL MEDICINE
Payer: COMMERCIAL

## 2023-02-16 VITALS
BODY MASS INDEX: 31.05 KG/M2 | RESPIRATION RATE: 18 BRPM | TEMPERATURE: 98 F | HEIGHT: 70 IN | SYSTOLIC BLOOD PRESSURE: 142 MMHG | WEIGHT: 216.88 LBS | HEART RATE: 73 BPM | DIASTOLIC BLOOD PRESSURE: 78 MMHG | OXYGEN SATURATION: 98 %

## 2023-02-16 DIAGNOSIS — N18.30 STAGE 3 CHRONIC KIDNEY DISEASE, UNSPECIFIED WHETHER STAGE 3A OR 3B CKD: ICD-10-CM

## 2023-02-16 DIAGNOSIS — D64.9 NORMOCHROMIC ANEMIA: ICD-10-CM

## 2023-02-16 DIAGNOSIS — D64.9 CHRONIC ANEMIA: ICD-10-CM

## 2023-02-16 DIAGNOSIS — D64.89 ANEMIA DUE TO MULTIPLE MECHANISMS: ICD-10-CM

## 2023-02-16 DIAGNOSIS — D46.9 MDS (MYELODYSPLASTIC SYNDROME): Primary | ICD-10-CM

## 2023-02-16 PROCEDURE — 96372 THER/PROPH/DIAG INJ SC/IM: CPT

## 2023-02-16 PROCEDURE — 63600175 PHARM REV CODE 636 W HCPCS: Mod: JG | Performed by: NURSE PRACTITIONER

## 2023-02-16 RX ADMIN — EPOETIN ALFA-EPBX 40000 UNITS: 40000 INJECTION, SOLUTION INTRAVENOUS; SUBCUTANEOUS at 04:02

## 2023-02-16 NOTE — PLAN OF CARE
Problem: Anemia  Goal: Anemia Symptom Improvement  Outcome: Ongoing, Progressing  Intervention: Monitor and Manage Anemia  Flowsheets (Taken 2/16/2023 1805)  Oral Nutrition Promotion: rest periods promoted  Safety Promotion/Fall Prevention: medications reviewed  Fatigue Management: frequent rest breaks encouraged

## 2023-02-22 ENCOUNTER — LAB VISIT (OUTPATIENT)
Dept: LAB | Facility: HOSPITAL | Age: 63
End: 2023-02-22
Attending: INTERNAL MEDICINE
Payer: COMMERCIAL

## 2023-02-22 DIAGNOSIS — D64.9 NORMOCHROMIC ANEMIA: ICD-10-CM

## 2023-02-22 DIAGNOSIS — D57.3 SICKLE CELL TRAIT SYNDROME: ICD-10-CM

## 2023-02-22 LAB
ALBUMIN SERPL BCP-MCNC: 4.5 G/DL (ref 3.5–5.2)
ALP SERPL-CCNC: 64 U/L (ref 55–135)
ALT SERPL W/O P-5'-P-CCNC: 17 U/L (ref 10–44)
ANION GAP SERPL CALC-SCNC: 5 MMOL/L (ref 8–16)
AST SERPL-CCNC: 20 U/L (ref 10–40)
BASOPHILS # BLD AUTO: 0.11 K/UL (ref 0–0.2)
BASOPHILS NFR BLD: 1.6 % (ref 0–1.9)
BILIRUB SERPL-MCNC: 0.6 MG/DL (ref 0.1–1)
BUN SERPL-MCNC: 30 MG/DL (ref 8–23)
CALCIUM SERPL-MCNC: 8.9 MG/DL (ref 8.7–10.5)
CHLORIDE SERPL-SCNC: 109 MMOL/L (ref 95–110)
CO2 SERPL-SCNC: 26 MMOL/L (ref 23–29)
CREAT SERPL-MCNC: 1.8 MG/DL (ref 0.5–1.4)
DIFFERENTIAL METHOD: ABNORMAL
EOSINOPHIL # BLD AUTO: 0.2 K/UL (ref 0–0.5)
EOSINOPHIL NFR BLD: 2.4 % (ref 0–8)
ERYTHROCYTE [DISTWIDTH] IN BLOOD BY AUTOMATED COUNT: 28.3 % (ref 11.5–14.5)
EST. GFR  (NO RACE VARIABLE): 42 ML/MIN/1.73 M^2
GLUCOSE SERPL-MCNC: 100 MG/DL (ref 70–110)
HCT VFR BLD AUTO: 29.2 % (ref 40–54)
HGB BLD-MCNC: 9.8 G/DL (ref 14–18)
IMM GRANULOCYTES # BLD AUTO: 0.03 K/UL (ref 0–0.04)
IMM GRANULOCYTES NFR BLD AUTO: 0.4 % (ref 0–0.5)
LYMPHOCYTES # BLD AUTO: 1.6 K/UL (ref 1–4.8)
LYMPHOCYTES NFR BLD: 23.7 % (ref 18–48)
MCH RBC QN AUTO: 31.4 PG (ref 27–31)
MCHC RBC AUTO-ENTMCNC: 33.6 G/DL (ref 32–36)
MCV RBC AUTO: 94 FL (ref 82–98)
MONOCYTES # BLD AUTO: 0.8 K/UL (ref 0.3–1)
MONOCYTES NFR BLD: 12.1 % (ref 4–15)
NEUTROPHILS # BLD AUTO: 4 K/UL (ref 1.8–7.7)
NEUTROPHILS NFR BLD: 59.8 % (ref 38–73)
NRBC BLD-RTO: 0 /100 WBC
PLATELET # BLD AUTO: 525 K/UL (ref 150–450)
PMV BLD AUTO: 10.5 FL (ref 9.2–12.9)
POTASSIUM SERPL-SCNC: 4.1 MMOL/L (ref 3.5–5.1)
PROT SERPL-MCNC: 7.9 G/DL (ref 6–8.4)
RBC # BLD AUTO: 3.12 M/UL (ref 4.6–6.2)
SODIUM SERPL-SCNC: 140 MMOL/L (ref 136–145)
WBC # BLD AUTO: 6.76 K/UL (ref 3.9–12.7)

## 2023-02-22 PROCEDURE — 36415 COLL VENOUS BLD VENIPUNCTURE: CPT | Performed by: INTERNAL MEDICINE

## 2023-02-22 PROCEDURE — 80053 COMPREHEN METABOLIC PANEL: CPT | Performed by: INTERNAL MEDICINE

## 2023-02-22 PROCEDURE — 85025 COMPLETE CBC W/AUTO DIFF WBC: CPT | Performed by: INTERNAL MEDICINE

## 2023-02-23 ENCOUNTER — INFUSION (OUTPATIENT)
Dept: INFUSION THERAPY | Facility: HOSPITAL | Age: 63
End: 2023-02-23
Attending: INTERNAL MEDICINE
Payer: COMMERCIAL

## 2023-02-23 VITALS
OXYGEN SATURATION: 99 % | TEMPERATURE: 98 F | SYSTOLIC BLOOD PRESSURE: 149 MMHG | BODY MASS INDEX: 31.3 KG/M2 | HEIGHT: 70 IN | RESPIRATION RATE: 18 BRPM | HEART RATE: 65 BPM | DIASTOLIC BLOOD PRESSURE: 80 MMHG | WEIGHT: 218.63 LBS

## 2023-02-23 DIAGNOSIS — D64.89 ANEMIA DUE TO MULTIPLE MECHANISMS: ICD-10-CM

## 2023-02-23 DIAGNOSIS — D64.9 NORMOCHROMIC ANEMIA: ICD-10-CM

## 2023-02-23 DIAGNOSIS — D46.9 MDS (MYELODYSPLASTIC SYNDROME): Primary | ICD-10-CM

## 2023-02-23 DIAGNOSIS — N18.30 STAGE 3 CHRONIC KIDNEY DISEASE, UNSPECIFIED WHETHER STAGE 3A OR 3B CKD: ICD-10-CM

## 2023-02-23 DIAGNOSIS — D64.9 CHRONIC ANEMIA: ICD-10-CM

## 2023-02-23 PROCEDURE — 96372 THER/PROPH/DIAG INJ SC/IM: CPT

## 2023-02-23 PROCEDURE — 63600175 PHARM REV CODE 636 W HCPCS: Mod: JG | Performed by: NURSE PRACTITIONER

## 2023-02-23 RX ADMIN — EPOETIN ALFA-EPBX 40000 UNITS: 40000 INJECTION, SOLUTION INTRAVENOUS; SUBCUTANEOUS at 04:02

## 2023-02-23 NOTE — PLAN OF CARE
Problem: Fatigue  Goal: Improved Activity Tolerance  Outcome: Ongoing, Progressing  Intervention: Promote Improved Energy  Flowsheets (Taken 2/23/2023 1603)  Activity Management: Ambulated -L4

## 2023-03-02 ENCOUNTER — INFUSION (OUTPATIENT)
Dept: INFUSION THERAPY | Facility: HOSPITAL | Age: 63
End: 2023-03-02
Attending: INTERNAL MEDICINE
Payer: COMMERCIAL

## 2023-03-02 VITALS
TEMPERATURE: 98 F | RESPIRATION RATE: 18 BRPM | HEIGHT: 70 IN | HEART RATE: 67 BPM | OXYGEN SATURATION: 100 % | BODY MASS INDEX: 31.01 KG/M2 | WEIGHT: 216.63 LBS | DIASTOLIC BLOOD PRESSURE: 78 MMHG | SYSTOLIC BLOOD PRESSURE: 132 MMHG

## 2023-03-02 DIAGNOSIS — D64.89 ANEMIA DUE TO MULTIPLE MECHANISMS: ICD-10-CM

## 2023-03-02 DIAGNOSIS — N18.30 STAGE 3 CHRONIC KIDNEY DISEASE, UNSPECIFIED WHETHER STAGE 3A OR 3B CKD: ICD-10-CM

## 2023-03-02 DIAGNOSIS — D64.9 CHRONIC ANEMIA: ICD-10-CM

## 2023-03-02 DIAGNOSIS — D46.9 MDS (MYELODYSPLASTIC SYNDROME): Primary | ICD-10-CM

## 2023-03-02 DIAGNOSIS — D64.9 NORMOCHROMIC ANEMIA: ICD-10-CM

## 2023-03-02 PROCEDURE — 63600175 PHARM REV CODE 636 W HCPCS: Mod: JZ,JG | Performed by: NURSE PRACTITIONER

## 2023-03-02 PROCEDURE — 96372 THER/PROPH/DIAG INJ SC/IM: CPT

## 2023-03-02 RX ADMIN — EPOETIN ALFA-EPBX 40000 UNITS: 40000 INJECTION, SOLUTION INTRAVENOUS; SUBCUTANEOUS at 01:03

## 2023-03-02 NOTE — PLAN OF CARE
Problem: Fatigue  Goal: Improved Activity Tolerance  Outcome: Ongoing, Progressing  Intervention: Promote Improved Energy  Flowsheets (Taken 3/2/2023 1321)  Sleep/Rest Enhancement: regular sleep/rest pattern promoted

## 2023-03-08 ENCOUNTER — LAB VISIT (OUTPATIENT)
Dept: LAB | Facility: HOSPITAL | Age: 63
End: 2023-03-08
Attending: INTERNAL MEDICINE
Payer: COMMERCIAL

## 2023-03-08 DIAGNOSIS — D64.9 NORMOCHROMIC ANEMIA: ICD-10-CM

## 2023-03-08 DIAGNOSIS — D57.3 SICKLE CELL TRAIT SYNDROME: ICD-10-CM

## 2023-03-08 LAB
ALBUMIN SERPL BCP-MCNC: 4.4 G/DL (ref 3.5–5.2)
ALP SERPL-CCNC: 59 U/L (ref 55–135)
ALT SERPL W/O P-5'-P-CCNC: 16 U/L (ref 10–44)
ANION GAP SERPL CALC-SCNC: 5 MMOL/L (ref 8–16)
AST SERPL-CCNC: 20 U/L (ref 10–40)
BASOPHILS # BLD AUTO: 0.13 K/UL (ref 0–0.2)
BASOPHILS NFR BLD: 2 % (ref 0–1.9)
BILIRUB SERPL-MCNC: 0.7 MG/DL (ref 0.1–1)
BUN SERPL-MCNC: 34 MG/DL (ref 8–23)
CALCIUM SERPL-MCNC: 8.8 MG/DL (ref 8.7–10.5)
CHLORIDE SERPL-SCNC: 108 MMOL/L (ref 95–110)
CO2 SERPL-SCNC: 27 MMOL/L (ref 23–29)
CREAT SERPL-MCNC: 1.8 MG/DL (ref 0.5–1.4)
DIFFERENTIAL METHOD: ABNORMAL
EOSINOPHIL # BLD AUTO: 0.2 K/UL (ref 0–0.5)
EOSINOPHIL NFR BLD: 2.8 % (ref 0–8)
ERYTHROCYTE [DISTWIDTH] IN BLOOD BY AUTOMATED COUNT: 27.9 % (ref 11.5–14.5)
EST. GFR  (NO RACE VARIABLE): 42 ML/MIN/1.73 M^2
FERRITIN SERPL-MCNC: 1269 NG/ML (ref 20–300)
GLUCOSE SERPL-MCNC: 113 MG/DL (ref 70–110)
HCT VFR BLD AUTO: 29 % (ref 40–54)
HGB BLD-MCNC: 9.8 G/DL (ref 14–18)
IMM GRANULOCYTES # BLD AUTO: 0.02 K/UL (ref 0–0.04)
IMM GRANULOCYTES NFR BLD AUTO: 0.3 % (ref 0–0.5)
IRON SERPL-MCNC: 111 UG/DL (ref 45–160)
LYMPHOCYTES # BLD AUTO: 1.7 K/UL (ref 1–4.8)
LYMPHOCYTES NFR BLD: 26.7 % (ref 18–48)
MCH RBC QN AUTO: 31.3 PG (ref 27–31)
MCHC RBC AUTO-ENTMCNC: 33.8 G/DL (ref 32–36)
MCV RBC AUTO: 93 FL (ref 82–98)
MONOCYTES # BLD AUTO: 0.8 K/UL (ref 0.3–1)
MONOCYTES NFR BLD: 12.4 % (ref 4–15)
NEUTROPHILS # BLD AUTO: 3.6 K/UL (ref 1.8–7.7)
NEUTROPHILS NFR BLD: 55.8 % (ref 38–73)
NRBC BLD-RTO: 0 /100 WBC
PLATELET # BLD AUTO: 473 K/UL (ref 150–450)
PMV BLD AUTO: 10.5 FL (ref 9.2–12.9)
POTASSIUM SERPL-SCNC: 4.2 MMOL/L (ref 3.5–5.1)
PROT SERPL-MCNC: 7.4 G/DL (ref 6–8.4)
RBC # BLD AUTO: 3.13 M/UL (ref 4.6–6.2)
SATURATED IRON: 59 % (ref 20–50)
SODIUM SERPL-SCNC: 140 MMOL/L (ref 136–145)
TOTAL IRON BINDING CAPACITY: 189 UG/DL (ref 250–450)
TRANSFERRIN SERPL-MCNC: 135 MG/DL (ref 200–375)
WBC # BLD AUTO: 6.37 K/UL (ref 3.9–12.7)

## 2023-03-08 PROCEDURE — 80053 COMPREHEN METABOLIC PANEL: CPT | Performed by: INTERNAL MEDICINE

## 2023-03-08 PROCEDURE — 82728 ASSAY OF FERRITIN: CPT | Performed by: INTERNAL MEDICINE

## 2023-03-08 PROCEDURE — 36415 COLL VENOUS BLD VENIPUNCTURE: CPT | Performed by: INTERNAL MEDICINE

## 2023-03-08 PROCEDURE — 85025 COMPLETE CBC W/AUTO DIFF WBC: CPT | Performed by: INTERNAL MEDICINE

## 2023-03-08 PROCEDURE — 84466 ASSAY OF TRANSFERRIN: CPT | Performed by: INTERNAL MEDICINE

## 2023-03-09 ENCOUNTER — INFUSION (OUTPATIENT)
Dept: INFUSION THERAPY | Facility: HOSPITAL | Age: 63
End: 2023-03-09
Attending: INTERNAL MEDICINE
Payer: COMMERCIAL

## 2023-03-09 VITALS
HEIGHT: 70 IN | BODY MASS INDEX: 31.61 KG/M2 | HEART RATE: 72 BPM | WEIGHT: 220.81 LBS | OXYGEN SATURATION: 98 % | DIASTOLIC BLOOD PRESSURE: 77 MMHG | SYSTOLIC BLOOD PRESSURE: 128 MMHG | RESPIRATION RATE: 18 BRPM

## 2023-03-09 DIAGNOSIS — D46.9 MDS (MYELODYSPLASTIC SYNDROME): Primary | ICD-10-CM

## 2023-03-09 DIAGNOSIS — D64.9 NORMOCHROMIC ANEMIA: ICD-10-CM

## 2023-03-09 DIAGNOSIS — D64.9 CHRONIC ANEMIA: ICD-10-CM

## 2023-03-09 DIAGNOSIS — D64.89 ANEMIA DUE TO MULTIPLE MECHANISMS: ICD-10-CM

## 2023-03-09 DIAGNOSIS — N18.30 STAGE 3 CHRONIC KIDNEY DISEASE, UNSPECIFIED WHETHER STAGE 3A OR 3B CKD: ICD-10-CM

## 2023-03-09 PROCEDURE — 63600175 PHARM REV CODE 636 W HCPCS: Mod: JZ,JG | Performed by: NURSE PRACTITIONER

## 2023-03-09 PROCEDURE — 96372 THER/PROPH/DIAG INJ SC/IM: CPT

## 2023-03-09 RX ADMIN — EPOETIN ALFA-EPBX 40000 UNITS: 40000 INJECTION, SOLUTION INTRAVENOUS; SUBCUTANEOUS at 04:03

## 2023-03-09 NOTE — PLAN OF CARE
Problem: Anemia  Goal: Anemia Symptom Improvement  Outcome: Ongoing, Progressing  Intervention: Monitor and Manage Anemia  Flowsheets (Taken 3/9/2023 1605)  Oral Nutrition Promotion: rest periods promoted  Safety Promotion/Fall Prevention: medications reviewed  Fatigue Management: frequent rest breaks encouraged

## 2023-03-16 ENCOUNTER — INFUSION (OUTPATIENT)
Dept: INFUSION THERAPY | Facility: HOSPITAL | Age: 63
End: 2023-03-16
Attending: INTERNAL MEDICINE
Payer: COMMERCIAL

## 2023-03-16 VITALS
BODY MASS INDEX: 31.75 KG/M2 | HEART RATE: 74 BPM | TEMPERATURE: 98 F | RESPIRATION RATE: 16 BRPM | WEIGHT: 221.81 LBS | DIASTOLIC BLOOD PRESSURE: 76 MMHG | OXYGEN SATURATION: 97 % | SYSTOLIC BLOOD PRESSURE: 134 MMHG | HEIGHT: 70 IN

## 2023-03-16 DIAGNOSIS — N18.30 STAGE 3 CHRONIC KIDNEY DISEASE, UNSPECIFIED WHETHER STAGE 3A OR 3B CKD: ICD-10-CM

## 2023-03-16 DIAGNOSIS — D64.89 ANEMIA DUE TO MULTIPLE MECHANISMS: ICD-10-CM

## 2023-03-16 DIAGNOSIS — D64.9 CHRONIC ANEMIA: ICD-10-CM

## 2023-03-16 DIAGNOSIS — D46.9 MDS (MYELODYSPLASTIC SYNDROME): Primary | ICD-10-CM

## 2023-03-16 DIAGNOSIS — D64.9 NORMOCHROMIC ANEMIA: ICD-10-CM

## 2023-03-16 PROCEDURE — 96372 THER/PROPH/DIAG INJ SC/IM: CPT

## 2023-03-16 PROCEDURE — 63600175 PHARM REV CODE 636 W HCPCS: Mod: JZ,JG | Performed by: NURSE PRACTITIONER

## 2023-03-16 RX ADMIN — EPOETIN ALFA-EPBX 40000 UNITS: 40000 INJECTION, SOLUTION INTRAVENOUS; SUBCUTANEOUS at 03:03

## 2023-03-16 NOTE — PLAN OF CARE
Problem: Fatigue  Goal: Improved Activity Tolerance  Outcome: Ongoing, Progressing  Intervention: Promote Improved Energy  Flowsheets (Taken 3/16/2023 3345)  Fatigue Management: frequent rest breaks encouraged  Sleep/Rest Enhancement:   regular sleep/rest pattern promoted   relaxation techniques promoted  Activity Management: Ambulated -L4

## 2023-03-22 ENCOUNTER — LAB VISIT (OUTPATIENT)
Dept: LAB | Facility: HOSPITAL | Age: 63
End: 2023-03-22
Attending: INTERNAL MEDICINE
Payer: COMMERCIAL

## 2023-03-22 ENCOUNTER — TELEPHONE (OUTPATIENT)
Dept: INFUSION THERAPY | Facility: HOSPITAL | Age: 63
End: 2023-03-22

## 2023-03-22 DIAGNOSIS — D64.9 NORMOCHROMIC ANEMIA: ICD-10-CM

## 2023-03-22 DIAGNOSIS — D57.3 SICKLE CELL TRAIT SYNDROME: ICD-10-CM

## 2023-03-22 LAB
BASOPHILS # BLD AUTO: 0.08 K/UL (ref 0–0.2)
BASOPHILS NFR BLD: 1.2 % (ref 0–1.9)
DIFFERENTIAL METHOD: ABNORMAL
EOSINOPHIL # BLD AUTO: 0.2 K/UL (ref 0–0.5)
EOSINOPHIL NFR BLD: 2.7 % (ref 0–8)
ERYTHROCYTE [DISTWIDTH] IN BLOOD BY AUTOMATED COUNT: 27.3 % (ref 11.5–14.5)
HCT VFR BLD AUTO: 29.9 % (ref 40–54)
HGB BLD-MCNC: 10.1 G/DL (ref 14–18)
IMM GRANULOCYTES # BLD AUTO: 0.02 K/UL (ref 0–0.04)
IMM GRANULOCYTES NFR BLD AUTO: 0.3 % (ref 0–0.5)
LYMPHOCYTES # BLD AUTO: 1.6 K/UL (ref 1–4.8)
LYMPHOCYTES NFR BLD: 24.8 % (ref 18–48)
MCH RBC QN AUTO: 30.6 PG (ref 27–31)
MCHC RBC AUTO-ENTMCNC: 33.8 G/DL (ref 32–36)
MCV RBC AUTO: 91 FL (ref 82–98)
MONOCYTES # BLD AUTO: 0.8 K/UL (ref 0.3–1)
MONOCYTES NFR BLD: 12 % (ref 4–15)
NEUTROPHILS # BLD AUTO: 3.8 K/UL (ref 1.8–7.7)
NEUTROPHILS NFR BLD: 59 % (ref 38–73)
NRBC BLD-RTO: 0 /100 WBC
PLATELET # BLD AUTO: 488 K/UL (ref 150–450)
PMV BLD AUTO: 10.3 FL (ref 9.2–12.9)
RBC # BLD AUTO: 3.3 M/UL (ref 4.6–6.2)
WBC # BLD AUTO: 6.41 K/UL (ref 3.9–12.7)

## 2023-03-22 PROCEDURE — 36415 COLL VENOUS BLD VENIPUNCTURE: CPT | Performed by: INTERNAL MEDICINE

## 2023-03-22 PROCEDURE — 85025 COMPLETE CBC W/AUTO DIFF WBC: CPT | Performed by: INTERNAL MEDICINE

## 2023-03-22 NOTE — TELEPHONE ENCOUNTER
Patient canceled for retacrit injection on 3/23 and 3/30. Hgb 10.1 Scheduled to return on 4/6 pending labs. Verbalized understanding.

## 2023-04-05 ENCOUNTER — LAB VISIT (OUTPATIENT)
Dept: LAB | Facility: HOSPITAL | Age: 63
End: 2023-04-05
Attending: INTERNAL MEDICINE
Payer: COMMERCIAL

## 2023-04-05 DIAGNOSIS — D57.3 SICKLE CELL TRAIT SYNDROME: ICD-10-CM

## 2023-04-05 DIAGNOSIS — D64.9 NORMOCHROMIC ANEMIA: ICD-10-CM

## 2023-04-05 LAB
ALBUMIN SERPL BCP-MCNC: 4.2 G/DL (ref 3.5–5.2)
ALP SERPL-CCNC: 60 U/L (ref 55–135)
ALT SERPL W/O P-5'-P-CCNC: 19 U/L (ref 10–44)
ANION GAP SERPL CALC-SCNC: 6 MMOL/L (ref 8–16)
AST SERPL-CCNC: 23 U/L (ref 10–40)
BASOPHILS # BLD AUTO: 0.1 K/UL (ref 0–0.2)
BASOPHILS NFR BLD: 1.6 % (ref 0–1.9)
BILIRUB SERPL-MCNC: 0.5 MG/DL (ref 0.1–1)
BUN SERPL-MCNC: 34 MG/DL (ref 8–23)
CALCIUM SERPL-MCNC: 8.7 MG/DL (ref 8.7–10.5)
CHLORIDE SERPL-SCNC: 108 MMOL/L (ref 95–110)
CO2 SERPL-SCNC: 26 MMOL/L (ref 23–29)
CREAT SERPL-MCNC: 2 MG/DL (ref 0.5–1.4)
DIFFERENTIAL METHOD: ABNORMAL
EOSINOPHIL # BLD AUTO: 0.2 K/UL (ref 0–0.5)
EOSINOPHIL NFR BLD: 3.1 % (ref 0–8)
ERYTHROCYTE [DISTWIDTH] IN BLOOD BY AUTOMATED COUNT: 27.3 % (ref 11.5–14.5)
EST. GFR  (NO RACE VARIABLE): 37 ML/MIN/1.73 M^2
FERRITIN SERPL-MCNC: 1483 NG/ML (ref 20–300)
GLUCOSE SERPL-MCNC: 104 MG/DL (ref 70–110)
HCT VFR BLD AUTO: 26.9 % (ref 40–54)
HGB BLD-MCNC: 9.1 G/DL (ref 14–18)
IMM GRANULOCYTES # BLD AUTO: 0.01 K/UL (ref 0–0.04)
IMM GRANULOCYTES NFR BLD AUTO: 0.2 % (ref 0–0.5)
IRON SERPL-MCNC: 173 UG/DL (ref 45–160)
LYMPHOCYTES # BLD AUTO: 1.6 K/UL (ref 1–4.8)
LYMPHOCYTES NFR BLD: 26.6 % (ref 18–48)
MCH RBC QN AUTO: 30.7 PG (ref 27–31)
MCHC RBC AUTO-ENTMCNC: 33.8 G/DL (ref 32–36)
MCV RBC AUTO: 91 FL (ref 82–98)
MONOCYTES # BLD AUTO: 0.7 K/UL (ref 0.3–1)
MONOCYTES NFR BLD: 11 % (ref 4–15)
NEUTROPHILS # BLD AUTO: 3.5 K/UL (ref 1.8–7.7)
NEUTROPHILS NFR BLD: 57.5 % (ref 38–73)
NRBC BLD-RTO: 0 /100 WBC
PLATELET # BLD AUTO: 370 K/UL (ref 150–450)
PMV BLD AUTO: 11.1 FL (ref 9.2–12.9)
POTASSIUM SERPL-SCNC: 4.4 MMOL/L (ref 3.5–5.1)
PROT SERPL-MCNC: 7.6 G/DL (ref 6–8.4)
RBC # BLD AUTO: 2.96 M/UL (ref 4.6–6.2)
SATURATED IRON: 87 % (ref 20–50)
SODIUM SERPL-SCNC: 140 MMOL/L (ref 136–145)
TOTAL IRON BINDING CAPACITY: 199 UG/DL (ref 250–450)
TRANSFERRIN SERPL-MCNC: 142 MG/DL (ref 200–375)
WBC # BLD AUTO: 6.08 K/UL (ref 3.9–12.7)

## 2023-04-05 PROCEDURE — 80053 COMPREHEN METABOLIC PANEL: CPT | Performed by: INTERNAL MEDICINE

## 2023-04-05 PROCEDURE — 84466 ASSAY OF TRANSFERRIN: CPT | Performed by: INTERNAL MEDICINE

## 2023-04-05 PROCEDURE — 82728 ASSAY OF FERRITIN: CPT | Performed by: INTERNAL MEDICINE

## 2023-04-05 PROCEDURE — 36415 COLL VENOUS BLD VENIPUNCTURE: CPT | Performed by: INTERNAL MEDICINE

## 2023-04-05 PROCEDURE — 85025 COMPLETE CBC W/AUTO DIFF WBC: CPT | Performed by: INTERNAL MEDICINE

## 2023-04-06 ENCOUNTER — INFUSION (OUTPATIENT)
Dept: INFUSION THERAPY | Facility: HOSPITAL | Age: 63
End: 2023-04-06
Attending: INTERNAL MEDICINE
Payer: COMMERCIAL

## 2023-04-06 VITALS
HEART RATE: 72 BPM | HEIGHT: 70 IN | TEMPERATURE: 98 F | OXYGEN SATURATION: 96 % | WEIGHT: 221.81 LBS | BODY MASS INDEX: 31.75 KG/M2 | RESPIRATION RATE: 18 BRPM | SYSTOLIC BLOOD PRESSURE: 121 MMHG | DIASTOLIC BLOOD PRESSURE: 72 MMHG

## 2023-04-06 DIAGNOSIS — D64.89 ANEMIA DUE TO MULTIPLE MECHANISMS: ICD-10-CM

## 2023-04-06 DIAGNOSIS — D64.9 NORMOCHROMIC ANEMIA: ICD-10-CM

## 2023-04-06 DIAGNOSIS — D64.9 CHRONIC ANEMIA: ICD-10-CM

## 2023-04-06 DIAGNOSIS — D46.9 MDS (MYELODYSPLASTIC SYNDROME): Primary | ICD-10-CM

## 2023-04-06 DIAGNOSIS — N18.30 STAGE 3 CHRONIC KIDNEY DISEASE, UNSPECIFIED WHETHER STAGE 3A OR 3B CKD: ICD-10-CM

## 2023-04-06 PROCEDURE — 63600175 PHARM REV CODE 636 W HCPCS: Mod: JZ,JG | Performed by: NURSE PRACTITIONER

## 2023-04-06 PROCEDURE — 96372 THER/PROPH/DIAG INJ SC/IM: CPT

## 2023-04-06 RX ADMIN — EPOETIN ALFA-EPBX 40000 UNITS: 40000 INJECTION, SOLUTION INTRAVENOUS; SUBCUTANEOUS at 04:04

## 2023-04-06 NOTE — PLAN OF CARE
Problem: Fatigue  Goal: Improved Activity Tolerance  Intervention: Promote Improved Energy  Flowsheets (Taken 4/6/2023 1608)  Activity Management: Ambulated -L4

## 2023-04-06 NOTE — PLAN OF CARE
Problem: Fatigue  Goal: Improved Activity Tolerance  4/6/2023 1607 by Madeline Pereira RN  Outcome: Ongoing, Progressing  4/6/2023 1606 by Madeline Pereira RN  Outcome: Ongoing, Progressing  Intervention: Promote Improved Energy  4/6/2023 1607 by Madeline Pereira RN  Flowsheets (Taken 4/6/2023 1607)  Fatigue Management: fatigue-related activity identified  Sleep/Rest Enhancement: noise level reduced  4/6/2023 1606 by Madeline Pereira RN  Flowsheets (Taken 4/6/2023 1606)  Fatigue Management: fatigue-related activity identified  Sleep/Rest Enhancement: noise level reduced

## 2023-04-10 ENCOUNTER — OFFICE VISIT (OUTPATIENT)
Dept: FAMILY MEDICINE | Facility: CLINIC | Age: 63
End: 2023-04-10
Payer: COMMERCIAL

## 2023-04-10 VITALS
OXYGEN SATURATION: 97 % | DIASTOLIC BLOOD PRESSURE: 74 MMHG | BODY MASS INDEX: 31.45 KG/M2 | WEIGHT: 219.69 LBS | TEMPERATURE: 98 F | SYSTOLIC BLOOD PRESSURE: 124 MMHG | HEART RATE: 83 BPM | HEIGHT: 70 IN

## 2023-04-10 DIAGNOSIS — N40.1 BPH ASSOCIATED WITH NOCTURIA: ICD-10-CM

## 2023-04-10 DIAGNOSIS — D46.9 MDS (MYELODYSPLASTIC SYNDROME): ICD-10-CM

## 2023-04-10 DIAGNOSIS — Z12.5 SCREENING FOR PROSTATE CANCER: ICD-10-CM

## 2023-04-10 DIAGNOSIS — R35.1 BPH ASSOCIATED WITH NOCTURIA: ICD-10-CM

## 2023-04-10 DIAGNOSIS — E78.5 DYSLIPIDEMIA: ICD-10-CM

## 2023-04-10 DIAGNOSIS — I10 ESSENTIAL HYPERTENSION: Primary | ICD-10-CM

## 2023-04-10 DIAGNOSIS — N18.32 STAGE 3B CHRONIC KIDNEY DISEASE: ICD-10-CM

## 2023-04-10 PROCEDURE — 99214 OFFICE O/P EST MOD 30 MIN: CPT | Mod: S$GLB,,, | Performed by: NURSE PRACTITIONER

## 2023-04-10 PROCEDURE — 1160F RVW MEDS BY RX/DR IN RCRD: CPT | Mod: CPTII,S$GLB,, | Performed by: NURSE PRACTITIONER

## 2023-04-10 PROCEDURE — 1160F PR REVIEW ALL MEDS BY PRESCRIBER/CLIN PHARMACIST DOCUMENTED: ICD-10-PCS | Mod: CPTII,S$GLB,, | Performed by: NURSE PRACTITIONER

## 2023-04-10 PROCEDURE — 3044F HG A1C LEVEL LT 7.0%: CPT | Mod: CPTII,S$GLB,, | Performed by: NURSE PRACTITIONER

## 2023-04-10 PROCEDURE — 3074F PR MOST RECENT SYSTOLIC BLOOD PRESSURE < 130 MM HG: ICD-10-PCS | Mod: CPTII,S$GLB,, | Performed by: NURSE PRACTITIONER

## 2023-04-10 PROCEDURE — 3044F PR MOST RECENT HEMOGLOBIN A1C LEVEL <7.0%: ICD-10-PCS | Mod: CPTII,S$GLB,, | Performed by: NURSE PRACTITIONER

## 2023-04-10 PROCEDURE — 1159F MED LIST DOCD IN RCRD: CPT | Mod: CPTII,S$GLB,, | Performed by: NURSE PRACTITIONER

## 2023-04-10 PROCEDURE — 3078F DIAST BP <80 MM HG: CPT | Mod: CPTII,S$GLB,, | Performed by: NURSE PRACTITIONER

## 2023-04-10 PROCEDURE — 1159F PR MEDICATION LIST DOCUMENTED IN MEDICAL RECORD: ICD-10-PCS | Mod: CPTII,S$GLB,, | Performed by: NURSE PRACTITIONER

## 2023-04-10 PROCEDURE — 3008F PR BODY MASS INDEX (BMI) DOCUMENTED: ICD-10-PCS | Mod: CPTII,S$GLB,, | Performed by: NURSE PRACTITIONER

## 2023-04-10 PROCEDURE — 3008F BODY MASS INDEX DOCD: CPT | Mod: CPTII,S$GLB,, | Performed by: NURSE PRACTITIONER

## 2023-04-10 PROCEDURE — 3074F SYST BP LT 130 MM HG: CPT | Mod: CPTII,S$GLB,, | Performed by: NURSE PRACTITIONER

## 2023-04-10 PROCEDURE — 3078F PR MOST RECENT DIASTOLIC BLOOD PRESSURE < 80 MM HG: ICD-10-PCS | Mod: CPTII,S$GLB,, | Performed by: NURSE PRACTITIONER

## 2023-04-10 PROCEDURE — 99214 PR OFFICE/OUTPT VISIT, EST, LEVL IV, 30-39 MIN: ICD-10-PCS | Mod: S$GLB,,, | Performed by: NURSE PRACTITIONER

## 2023-04-10 RX ORDER — ATORVASTATIN CALCIUM 10 MG/1
10 TABLET, FILM COATED ORAL NIGHTLY
Qty: 30 TABLET | Refills: 2 | Status: SHIPPED | OUTPATIENT
Start: 2023-04-10 | End: 2023-06-06

## 2023-04-10 NOTE — PROGRESS NOTES
SUBJECTIVE:      Patient ID: Rick Butler is a 62 y.o. male.    Chief Complaint: lab review    62-year-old male with a history of anemia, depression, ETOH abuse, MDS, sickle cell trait, hypertension, CKD, dyslipidemia, and BPH presents to the clinic for lab review.  He is a former patient of DEVIKA Rosenbaum.      He has been sober for several years now and is no longer smoking.     He is followed by Hematology/Oncology, Dr. Tello for MDS, anemia, and sickle cell trait, on Procrit injections.  He is also followed by Dr. Hilario at [facility] for MDS.    He is followed by Dr. Mas for CKD.  Patient's kidney function is slightly worse than previous, creatinine 2.0 and eGFR 37.0.    Blood pressure is stable with Amlodipine 5 mg. Reports compliance and denies side effects.     Cholesterol stable, .0, HDL 49, triglycerides 20, and total cholesterol 155.  He is not on statin therapy.  ASCVD 10 year risk score 12.8%.      Vitamin-D, TSH, and A1c were within normal limits.  He is doing well today and offers no new complaints.       Family History   Problem Relation Age of Onset    Arthritis Mother     Depression Mother     Heart disease Mother     Hypertension Mother     Heart attack Father 59      Social History     Socioeconomic History    Marital status:    Occupational History    Occupation: Prep Cook     Employer: [employer]   Tobacco Use    Smoking status: Former     Types: Cigarettes     Quit date: 1996     Years since quittin.2    Smokeless tobacco: Never   Substance and Sexual Activity    Alcohol use: Not Currently     Alcohol/week: 21.0 standard drinks     Types: 21 Cans of beer per week     Comment: Sober 5 years    Drug use: Not Currently     Types: Marijuana    Sexual activity: Yes     Partners: Female     Current Outpatient Medications   Medication Sig Dispense Refill    amLODIPine (NORVASC) 5 MG tablet Take 1 tablet (5 mg total) by mouth once daily. 90 tablet 4    ascorbic acid,  vitamin C, (VITAMIN C) 1000 MG tablet Take 1,000 mg by mouth once daily.      epoetin tray (PROCRIT INJ) Thursday      famotidine (PEPCID) 20 MG tablet famotidine 20 mg tablet   Take 1 tablet twice a day by oral route.      folic acid (FOLVITE) 1 MG tablet Take 2 tablets (2 mg total) by mouth once daily. 180 tablet 4    multivitamin capsule Take 1 capsule by mouth once daily.      pantoprazole (PROTONIX) 40 MG tablet       sildenafil (VIAGRA) 100 MG tablet Take 1 tablet (100 mg total) by mouth daily as needed for Erectile Dysfunction. 10 tablet 6    tamsulosin (FLOMAX) 0.4 mg Cap Take 1 capsule (0.4 mg total) by mouth once daily. 90 capsule 4    traZODone (DESYREL) 100 MG tablet Take 1 tablet (100 mg total) by mouth nightly as needed for Insomnia. 90 tablet 4    atorvastatin (LIPITOR) 10 MG tablet Take 1 tablet (10 mg total) by mouth every evening. 30 tablet 2    sertraline (ZOLOFT) 25 MG tablet Take 25 mg by mouth once daily.       No current facility-administered medications for this visit.     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    Sickle cell trait      Past Surgical History:   Procedure Laterality Date    BONE MARROW BIOPSY N/A 12/20/2019    Procedure: BIOPSY, BONE MARROW;  Surgeon: Satinder Diagnostic Provider;  Location: [facility] OR;  Service: Interventional Radiology;  Laterality: N/A;    COLONOSCOPY N/A 11/5/2021    Procedure: COLONOSCOPY;  Surgeon: Rob Collins MD;  Location: [facility] ENDO;  Service: Endoscopy;  Laterality: N/A;     ESOPHAGOGASTRODUODENOSCOPY N/A 11/5/2021    Procedure: EGD (ESOPHAGOGASTRODUODENOSCOPY);  Surgeon: Rob Collins MD;  Location: [facility] ENDO;  Service: Endoscopy;  Laterality: N/A;    INJECTION OF ANESTHETIC AGENT AROUND MEDIAL BRANCH NERVES INNERVATING LUMBAR FACET JOINT Bilateral 5/25/2018    Procedure: BLOCK-NERVE-MEDIAL BRANCH-LUMBAR;  Surgeon: Nura Heredia MD;  Location: [facility] OR;  Service: Pain Management;  Laterality: Bilateral;  L3, 4, 5    KNEE ARTHROSCOPY W/ MENISCECTOMY Right 12/22/2021    Procedure: ARTHROSCOPY, KNEE, WITH MENISCECTOMY;  Surgeon: Sebastian Green MD;  Location: [facility] OR;  Service: Orthopedics;  Laterality: Right;  partial medial meniscectomy    RADIOFREQUENCY ABLATION OF LUMBAR MEDIAL BRANCH NERVE AT SINGLE LEVEL Bilateral 7/20/2018    Procedure: RADIOFREQUENCY ABLATION, NERVE, MEDIAL BRANCH, LUMBAR, 1 LEVEL;  Surgeon: Nura Heredia MD;  Location: [facility] OR;  Service: Pain Management;  Laterality: Bilateral;  L3, 4, 5 - Burned at 80 degrees C.  for 75 seconds x 2 each site    SMALL BOWEL ENTEROSCOPY N/A 10/16/2019    Procedure: ENTEROSCOPY;  Surgeon: Rob Collins MD;  Location: [facility] ENDO;  Service: Endoscopy;  Laterality: N/A;       Review of Systems   Constitutional:  Negative for activity change, appetite change, chills, diaphoresis, fatigue, fever and unexpected weight change.   HENT:  Negative for congestion, ear pain, sinus pressure, sore throat, trouble swallowing and voice change.    Eyes:  Negative for pain, discharge and visual disturbance.   Respiratory:  Negative for cough, chest tightness, shortness of breath and wheezing.    Cardiovascular:  Negative for chest pain and palpitations.   Gastrointestinal:  Negative for abdominal pain, constipation, diarrhea, nausea and vomiting.   Genitourinary:  Negative for difficulty urinating, flank pain, frequency and urgency.   Musculoskeletal:  Negative for back pain and joint swelling.   Skin:  Negative for color change and rash.  "  Neurological:  Negative for dizziness, seizures, syncope, weakness, numbness and headaches.   Hematological:  Negative for adenopathy.   Psychiatric/Behavioral:  Negative for dysphoric mood and sleep disturbance. The patient is not nervous/anxious.     OBJECTIVE:      Vitals:    04/10/23 1554   BP: 124/74   BP Location: Left arm   Patient Position: Sitting   BP Method: Medium (Manual)   Pulse: 83   Temp: 98.1 °F (36.7 °C)   TempSrc: Oral   SpO2: 97%   Weight: 99.7 kg (219 lb 11.2 oz)   Height: 5' 10" (1.778 m)     Physical Exam  Vitals and nursing note reviewed.   Constitutional:       General: He is awake. He is not in acute distress.     Appearance: Normal appearance. He is obese. He is not ill-appearing, toxic-appearing or diaphoretic.   HENT:      Head: Normocephalic and atraumatic.      Right Ear: Tympanic membrane, ear canal and external ear normal.      Left Ear: Tympanic membrane, ear canal and external ear normal.      Nose: Nose normal.   Eyes:      General: Lids are normal. Gaze aligned appropriately.      Conjunctiva/sclera: Conjunctivae normal.      Right eye: Right conjunctiva is not injected.      Left eye: Left conjunctiva is not injected.      Pupils: Pupils are equal, round, and reactive to light.   Cardiovascular:      Rate and Rhythm: Normal rate and regular rhythm.      Pulses: Normal pulses.      Heart sounds: Normal heart sounds, S1 normal and S2 normal. No murmur heard.    No friction rub. No gallop.   Pulmonary:      Effort: Pulmonary effort is normal. No respiratory distress.      Breath sounds: Normal breath sounds. No stridor. No decreased breath sounds, wheezing, rhonchi or rales.   Chest:      Chest wall: No tenderness.   Abdominal:      Palpations: Abdomen is soft.      Tenderness: There is no abdominal tenderness.   Musculoskeletal:      Cervical back: Neck supple.      Right lower leg: No edema.      Left lower leg: No edema.   Lymphadenopathy:      Cervical: No cervical " adenopathy.   Skin:     General: Skin is warm and dry.      Capillary Refill: Capillary refill takes less than 2 seconds.      Findings: No erythema or rash.   Neurological:      Mental Status: He is alert and oriented to person, place, and time. Mental status is at baseline.   Psychiatric:         Attention and Perception: Attention normal.         Mood and Affect: Mood normal.         Speech: Speech normal.         Behavior: Behavior normal. Behavior is cooperative.         Thought Content: Thought content normal.         Judgment: Judgment normal.      Lab Visit on 04/05/2023   Component Date Value Ref Range Status    WBC 04/05/2023 6.08  3.90 - 12.70 K/uL Final    RBC 04/05/2023 2.96 (L)  4.60 - 6.20 M/uL Final    Hemoglobin 04/05/2023 9.1 (L)  14.0 - 18.0 g/dL Final    Hematocrit 04/05/2023 26.9 (L)  40.0 - 54.0 % Final    MCV 04/05/2023 91  82 - 98 fL Final    MCH 04/05/2023 30.7  27.0 - 31.0 pg Final    MCHC 04/05/2023 33.8  32.0 - 36.0 g/dL Final    RDW 04/05/2023 27.3 (H)  11.5 - 14.5 % Final    Platelets 04/05/2023 370  150 - 450 K/uL Final    MPV 04/05/2023 11.1  9.2 - 12.9 fL Final    Immature Granulocytes 04/05/2023 0.2  0.0 - 0.5 % Final    Gran # (ANC) 04/05/2023 3.5  1.8 - 7.7 K/uL Final    Immature Grans (Abs) 04/05/2023 0.01  0.00 - 0.04 K/uL Final    Comment: Mild elevation in immature granulocytes is non specific and   can be seen in a variety of conditions including stress response,   acute inflammation, trauma and pregnancy. Correlation with other   laboratory and clinical findings is essential.      Lymph # 04/05/2023 1.6  1.0 - 4.8 K/uL Final    Mono # 04/05/2023 0.7  0.3 - 1.0 K/uL Final    Eos # 04/05/2023 0.2  0.0 - 0.5 K/uL Final    Baso # 04/05/2023 0.10  0.00 - 0.20 K/uL Final    nRBC 04/05/2023 0  0 /100 WBC Final    Gran % 04/05/2023 57.5  38.0 - 73.0 % Final    Lymph % 04/05/2023 26.6  18.0 - 48.0 % Final    Mono % 04/05/2023 11.0  4.0 - 15.0 % Final    Eosinophil % 04/05/2023 3.1   0.0 - 8.0 % Final    Basophil % 04/05/2023 1.6  0.0 - 1.9 % Final    Differential Method 04/05/2023 Automated   Final    Sodium 04/05/2023 140  136 - 145 mmol/L Final    Potassium 04/05/2023 4.4  3.5 - 5.1 mmol/L Final    Chloride 04/05/2023 108  95 - 110 mmol/L Final    CO2 04/05/2023 26  23 - 29 mmol/L Final    Glucose 04/05/2023 104  70 - 110 mg/dL Final    BUN 04/05/2023 34 (H)  8 - 23 mg/dL Final    Creatinine 04/05/2023 2.0 (H)  0.5 - 1.4 mg/dL Final    Calcium 04/05/2023 8.7  8.7 - 10.5 mg/dL Final    Total Protein 04/05/2023 7.6  6.0 - 8.4 g/dL Final    Albumin 04/05/2023 4.2  3.5 - 5.2 g/dL Final    Total Bilirubin 04/05/2023 0.5  0.1 - 1.0 mg/dL Final    Comment: For infants and newborns, interpretation of results should be based  on gestational age, weight and in agreement with clinical  observations.    Premature Infant recommended reference ranges:  Up to 24 hours.............<8.0 mg/dL  Up to 48 hours............<12.0 mg/dL  3-5 days..................<15.0 mg/dL  6-29 days.................<15.0 mg/dL      Alkaline Phosphatase 04/05/2023 60  55 - 135 U/L Final    AST 04/05/2023 23  10 - 40 U/L Final    ALT 04/05/2023 19  10 - 44 U/L Final    Anion Gap 04/05/2023 6 (L)  8 - 16 mmol/L Final    eGFR 04/05/2023 37.0 (A)  >60 mL/min/1.73 m^2 Final    Iron 04/05/2023 173 (H)  45 - 160 ug/dL Final    Transferrin 04/05/2023 142 (L)  200 - 375 mg/dL Final    TIBC 04/05/2023 199 (L)  250 - 450 ug/dL Final    Saturated Iron 04/05/2023 87 (H)  20 - 50 % Final    Ferritin 04/05/2023 1,483 (H)  20.0 - 300.0 ng/mL Final   Lab Visit on 03/22/2023   Component Date Value Ref Range Status    Sodium 03/22/2023 140  136 - 145 mmol/L Final    Potassium 03/22/2023 4.3  3.5 - 5.1 mmol/L Final    Chloride 03/22/2023 106  95 - 110 mmol/L Final    CO2 03/22/2023 28  23 - 29 mmol/L Final    Glucose 03/22/2023 105  70 - 110 mg/dL Final    BUN 03/22/2023 25 (H)  8 - 23 mg/dL Final    Creatinine 03/22/2023 1.6 (H)  0.5 - 1.4 mg/dL  Final    Calcium 03/22/2023 9.1  8.7 - 10.5 mg/dL Final    Total Protein 03/22/2023 7.4  6.0 - 8.4 g/dL Final    Albumin 03/22/2023 4.2  3.5 - 5.2 g/dL Final    Total Bilirubin 03/22/2023 0.8  0.1 - 1.0 mg/dL Final    Comment: For infants and newborns, interpretation of results should be based  on gestational age, weight and in agreement with clinical  observations.    Premature Infant recommended reference ranges:  Up to 24 hours.............<8.0 mg/dL  Up to 48 hours............<12.0 mg/dL  3-5 days..................<15.0 mg/dL  6-29 days.................<15.0 mg/dL      Alkaline Phosphatase 03/22/2023 60  55 - 135 U/L Final    AST 03/22/2023 21  10 - 40 U/L Final    ALT 03/22/2023 17  10 - 44 U/L Final    Anion Gap 03/22/2023 6 (L)  8 - 16 mmol/L Final    eGFR 03/22/2023 48.4 (A)  >60 mL/min/1.73 m^2 Final    Cholesterol 03/22/2023 155  120 - 199 mg/dL Final    Comment: The National Cholesterol Education Program (NCEP) has set the  following guidelines (reference ranges) for Cholesterol:  Optimal.....................<200 mg/dL  Borderline High.............200-239 mg/dL  High........................> or = 240 mg/dL      Triglycerides 03/22/2023 20 (L)  30 - 150 mg/dL Final    Comment: The National Cholesterol Education Program (NCEP) has set the  following guidelines (reference values) for triglycerides:  Normal......................<150 mg/dL  Borderline High.............150-199 mg/dL  High........................200-499 mg/dL      HDL 03/22/2023 49  40 - 75 mg/dL Final    Comment: The National Cholesterol Education Program (NCEP) has set the  following guidelines (reference values) for HDL Cholesterol:  Low...............<40 mg/dL  Optimal...........>60 mg/dL      LDL Cholesterol 03/22/2023 102.0  63.0 - 159.0 mg/dL Final    Comment: The National Cholesterol Education Program (NCEP) has set the  following guidelines (reference values) for LDL Cholesterol:  Optimal.......................<130 mg/dL  Borderline  High...............130-159 mg/dL  High..........................160-189 mg/dL  Very High.....................>190 mg/dL      HDL/Cholesterol Ratio 03/22/2023 31.6  20.0 - 50.0 % Final    Total Cholesterol/HDL Ratio 03/22/2023 3.2  2.0 - 5.0 Final    Non-HDL Cholesterol 03/22/2023 106  mg/dL Final    Comment: Risk category and Non-HDL cholesterol goals:  Coronary heart disease (CHD)or equivalent (10-year risk of CHD >20%):  Non-HDL cholesterol goal     <130 mg/dL  Two or more CHD risk factors and 10-year risk of CHD <= 20%:  Non-HDL cholesterol goal     <160 mg/dL  0 to 1 CHD risk factor:  Non-HDL cholesterol goal     <190 mg/dL      TSH 03/22/2023 3.290  0.340 - 5.600 uIU/mL Final    Specimen UA 03/22/2023 Urine, Clean Catch   Final    Color, UA 03/22/2023 Yellow  Yellow, Straw, Dana Final    Appearance, UA 03/22/2023 Clear  Clear Final    pH, UA 03/22/2023 7.0  5.0 - 8.0 Final    Specific Gravity, UA 03/22/2023 1.010  1.005 - 1.030 Final    Protein, UA 03/22/2023 1+ (A)  Negative Final    Comment: Recommend a 24 hour urine protein or a urine   protein/creatinine ratio if globulin induced proteinuria is  clinically suspected.      Glucose, UA 03/22/2023 Negative  Negative Final    Ketones, UA 03/22/2023 Negative  Negative Final    Bilirubin (UA) 03/22/2023 Negative  Negative Final    Occult Blood UA 03/22/2023 Negative  Negative Final    Nitrite, UA 03/22/2023 Negative  Negative Final    Urobilinogen, UA 03/22/2023 Negative  Negative EU/dL Final    Leukocytes, UA 03/22/2023 Negative  Negative Final    Hemoglobin A1C 03/22/2023 5.4  4.5 - 6.2 % Final    Comment: According to ADA guidelines, hemoglobin A1C <7.0% represents  optimal control in non-pregnant diabetic patients.  Different  metrics may apply to specific populations.   Standards of Medical Care in Diabetes - 2016.    For the purpose of screening for the presence of diabetes:  <5.7%     Consistent with the absence of diabetes  5.7-6.4%  Consistent with  increasing risk for diabetes   (prediabetes)  >or=6.5%  Consistent with diabetes    Currently no consensus exists for use of hemoglobin A1C  for diagnosis of diabetes for children.      Estimated Avg Glucose 03/22/2023 108  68 - 131 mg/dL Final    Vit D, 25-Hydroxy 03/22/2023 47  30 - 96 ng/mL Final    Comment: Vitamin D deficiency.........<10 ng/mL                              Vitamin D insufficiency......10-29 ng/mL       Vitamin D sufficiency........> or equal to 30 ng/mL  Vitamin D toxicity............>100 ng/mL      RBC, UA 03/22/2023 0  0 - 4 /hpf Final    WBC, UA 03/22/2023 0  0 - 5 /hpf Final    Bacteria 03/22/2023 Negative  None-Occ /hpf Final    Squam Epithel, UA 03/22/2023 0  /hpf Final    Hyaline Casts, UA 03/22/2023 0.00 (A)  0-1/lpf /lpf Final    Microscopic Comment 03/22/2023 SEE COMMENT   Final    Comment: Other formed elements not mentioned in the report are not   present in the microscopic examination.      Lab Visit on 03/22/2023   Component Date Value Ref Range Status    WBC 03/22/2023 6.41  3.90 - 12.70 K/uL Final    RBC 03/22/2023 3.30 (L)  4.60 - 6.20 M/uL Final    Hemoglobin 03/22/2023 10.1 (L)  14.0 - 18.0 g/dL Final    Hematocrit 03/22/2023 29.9 (L)  40.0 - 54.0 % Final    MCV 03/22/2023 91  82 - 98 fL Final    MCH 03/22/2023 30.6  27.0 - 31.0 pg Final    MCHC 03/22/2023 33.8  32.0 - 36.0 g/dL Final    RDW 03/22/2023 27.3 (H)  11.5 - 14.5 % Final    Platelets 03/22/2023 488 (H)  150 - 450 K/uL Final    MPV 03/22/2023 10.3  9.2 - 12.9 fL Final    Immature Granulocytes 03/22/2023 0.3  0.0 - 0.5 % Final    Gran # (ANC) 03/22/2023 3.8  1.8 - 7.7 K/uL Final    Immature Grans (Abs) 03/22/2023 0.02  0.00 - 0.04 K/uL Final    Comment: Mild elevation in immature granulocytes is non specific and   can be seen in a variety of conditions including stress response,   acute inflammation, trauma and pregnancy. Correlation with other   laboratory and clinical findings is essential.      Lymph # 03/22/2023  1.6  1.0 - 4.8 K/uL Final    Mono # 03/22/2023 0.8  0.3 - 1.0 K/uL Final    Eos # 03/22/2023 0.2  0.0 - 0.5 K/uL Final    Baso # 03/22/2023 0.08  0.00 - 0.20 K/uL Final    nRBC 03/22/2023 0  0 /100 WBC Final    Gran % 03/22/2023 59.0  38.0 - 73.0 % Final    Lymph % 03/22/2023 24.8  18.0 - 48.0 % Final    Mono % 03/22/2023 12.0  4.0 - 15.0 % Final    Eosinophil % 03/22/2023 2.7  0.0 - 8.0 % Final    Basophil % 03/22/2023 1.2  0.0 - 1.9 % Final    Differential Method 03/22/2023 Automated   Final     Assessment:       1. Essential hypertension    2. Dyslipidemia    3. Stage 3b chronic kidney disease    4. MDS (myelodysplastic syndrome)    5. BPH associated with nocturia    6. Screening for prostate cancer        Plan:       Essential hypertension  Stable, continue amlodipine 5 mg. Reduce the amount of salt in your diet; Lose weight; Exercise at least 30 minutes per day most days of the week.    -     Lipid Panel; Future; Expected date: 10/10/2023  -     Microalbumin/Creatinine Ratio, Urine; Future; Expected date: 10/10/2023  -     Urinalysis; Future; Expected date: 10/10/2023    Dyslipidemia  Start Lipitor 10 mg for an ASCVD 10 year risk score of 12.8%.  Side effects of new medication discussed with patient; understanding voiced. Limit red meat, butter, fried foods, cheese, and other foods that have a lot of saturated fat. Consume more: lean meats, fish, fruits, vegetables, whole grains, beans, lentils, and nuts.    -     atorvastatin (LIPITOR) 10 MG tablet; Take 1 tablet (10 mg total) by mouth every evening.  Dispense: 30 tablet; Refill: 2  -     Lipid Panel; Future; Expected date: 10/10/2023    Stage 3b chronic kidney disease  Slightly worse than previous. Continue BP control.  Increase fluids.  Follow-up with Nephrology as scheduled.   -     Microalbumin/Creatinine Ratio, Urine; Future; Expected date: 10/10/2023  -     Urinalysis; Future; Expected date: 10/10/2023    MDS (myelodysplastic syndrome)  Continue Procrit,  managed by Hem/Onc.     BPH associated with nocturia  Stable with Flomax, symptoms have improving.  No LUTS at this time.   -     PSA, Screening; Future; Expected date: 10/10/2023    Screening for prostate cancer  -     PSA, Screening; Future; Expected date: 10/10/2023    This note was created using [employer] voice recognition software that occasionally misinterprets phrases or words.     I spent a total of 30 minutes on the day of the visit.This includes face to face time and non-face to face time preparing to see the patient (eg, review of tests), obtaining and/or reviewing separately obtained history, documenting clinical information in the electronic or other health record, independently interpreting results and communicating results to the patient/family/caregiver, or care coordinator.    Follow up in about 6 months (around 10/10/2023) for HTN, HLD.            4/10/2023 LATOYA Smith, LUCINAP

## 2023-04-14 ENCOUNTER — INFUSION (OUTPATIENT)
Dept: INFUSION THERAPY | Facility: HOSPITAL | Age: 63
End: 2023-04-14
Attending: INTERNAL MEDICINE
Payer: COMMERCIAL

## 2023-04-14 VITALS
RESPIRATION RATE: 15 BRPM | SYSTOLIC BLOOD PRESSURE: 145 MMHG | WEIGHT: 218.5 LBS | OXYGEN SATURATION: 100 % | DIASTOLIC BLOOD PRESSURE: 84 MMHG | BODY MASS INDEX: 31.28 KG/M2 | HEART RATE: 78 BPM | TEMPERATURE: 97 F | HEIGHT: 70 IN

## 2023-04-14 DIAGNOSIS — D64.89 ANEMIA DUE TO MULTIPLE MECHANISMS: ICD-10-CM

## 2023-04-14 DIAGNOSIS — D64.9 CHRONIC ANEMIA: ICD-10-CM

## 2023-04-14 DIAGNOSIS — D64.9 NORMOCHROMIC ANEMIA: ICD-10-CM

## 2023-04-14 DIAGNOSIS — D46.9 MDS (MYELODYSPLASTIC SYNDROME): Primary | ICD-10-CM

## 2023-04-14 DIAGNOSIS — N18.30 STAGE 3 CHRONIC KIDNEY DISEASE, UNSPECIFIED WHETHER STAGE 3A OR 3B CKD: ICD-10-CM

## 2023-04-14 PROCEDURE — 63600175 PHARM REV CODE 636 W HCPCS: Mod: JZ,JG | Performed by: NURSE PRACTITIONER

## 2023-04-14 PROCEDURE — 96372 THER/PROPH/DIAG INJ SC/IM: CPT

## 2023-04-14 RX ADMIN — EPOETIN ALFA-EPBX 40000 UNITS: 40000 INJECTION, SOLUTION INTRAVENOUS; SUBCUTANEOUS at 03:04

## 2023-04-19 ENCOUNTER — LAB VISIT (OUTPATIENT)
Dept: LAB | Facility: HOSPITAL | Age: 63
End: 2023-04-19
Attending: INTERNAL MEDICINE
Payer: COMMERCIAL

## 2023-04-19 DIAGNOSIS — D64.9 NORMOCHROMIC ANEMIA: ICD-10-CM

## 2023-04-19 DIAGNOSIS — D57.3 SICKLE CELL TRAIT SYNDROME: ICD-10-CM

## 2023-04-19 LAB
ALBUMIN SERPL BCP-MCNC: 4.5 G/DL (ref 3.5–5.2)
ALP SERPL-CCNC: 62 U/L (ref 55–135)
ALT SERPL W/O P-5'-P-CCNC: 22 U/L (ref 10–44)
ANION GAP SERPL CALC-SCNC: 9 MMOL/L (ref 8–16)
AST SERPL-CCNC: 22 U/L (ref 10–40)
BASOPHILS # BLD AUTO: 0.1 K/UL (ref 0–0.2)
BASOPHILS NFR BLD: 1.4 % (ref 0–1.9)
BILIRUB SERPL-MCNC: 0.7 MG/DL (ref 0.1–1)
BUN SERPL-MCNC: 24 MG/DL (ref 8–23)
CALCIUM SERPL-MCNC: 9 MG/DL (ref 8.7–10.5)
CHLORIDE SERPL-SCNC: 104 MMOL/L (ref 95–110)
CO2 SERPL-SCNC: 27 MMOL/L (ref 23–29)
CREAT SERPL-MCNC: 1.6 MG/DL (ref 0.5–1.4)
DIFFERENTIAL METHOD: ABNORMAL
EOSINOPHIL # BLD AUTO: 0.2 K/UL (ref 0–0.5)
EOSINOPHIL NFR BLD: 2.4 % (ref 0–8)
ERYTHROCYTE [DISTWIDTH] IN BLOOD BY AUTOMATED COUNT: 27.2 % (ref 11.5–14.5)
EST. GFR  (NO RACE VARIABLE): 48.1 ML/MIN/1.73 M^2
GLUCOSE SERPL-MCNC: 109 MG/DL (ref 70–110)
HCT VFR BLD AUTO: 28.6 % (ref 40–54)
HGB BLD-MCNC: 9.7 G/DL (ref 14–18)
IMM GRANULOCYTES # BLD AUTO: 0.04 K/UL (ref 0–0.04)
IMM GRANULOCYTES NFR BLD AUTO: 0.6 % (ref 0–0.5)
LYMPHOCYTES # BLD AUTO: 1.8 K/UL (ref 1–4.8)
LYMPHOCYTES NFR BLD: 25.6 % (ref 18–48)
MCH RBC QN AUTO: 30.9 PG (ref 27–31)
MCHC RBC AUTO-ENTMCNC: 33.9 G/DL (ref 32–36)
MCV RBC AUTO: 91 FL (ref 82–98)
MONOCYTES # BLD AUTO: 0.9 K/UL (ref 0.3–1)
MONOCYTES NFR BLD: 12 % (ref 4–15)
NEUTROPHILS # BLD AUTO: 4.1 K/UL (ref 1.8–7.7)
NEUTROPHILS NFR BLD: 58 % (ref 38–73)
NRBC BLD-RTO: 0 /100 WBC
PLATELET # BLD AUTO: 440 K/UL (ref 150–450)
PMV BLD AUTO: 10.1 FL (ref 9.2–12.9)
POTASSIUM SERPL-SCNC: 4.4 MMOL/L (ref 3.5–5.1)
PROT SERPL-MCNC: 7.7 G/DL (ref 6–8.4)
RBC # BLD AUTO: 3.14 M/UL (ref 4.6–6.2)
SODIUM SERPL-SCNC: 140 MMOL/L (ref 136–145)
WBC # BLD AUTO: 7.08 K/UL (ref 3.9–12.7)

## 2023-04-19 PROCEDURE — 80053 COMPREHEN METABOLIC PANEL: CPT | Performed by: INTERNAL MEDICINE

## 2023-04-19 PROCEDURE — 36415 COLL VENOUS BLD VENIPUNCTURE: CPT | Performed by: INTERNAL MEDICINE

## 2023-04-19 PROCEDURE — 85025 COMPLETE CBC W/AUTO DIFF WBC: CPT | Performed by: INTERNAL MEDICINE

## 2023-04-20 ENCOUNTER — INFUSION (OUTPATIENT)
Dept: INFUSION THERAPY | Facility: HOSPITAL | Age: 63
End: 2023-04-20
Attending: INTERNAL MEDICINE
Payer: COMMERCIAL

## 2023-04-20 VITALS
WEIGHT: 222.69 LBS | BODY MASS INDEX: 31.88 KG/M2 | SYSTOLIC BLOOD PRESSURE: 125 MMHG | OXYGEN SATURATION: 96 % | RESPIRATION RATE: 18 BRPM | TEMPERATURE: 98 F | DIASTOLIC BLOOD PRESSURE: 63 MMHG | HEIGHT: 70 IN | HEART RATE: 80 BPM

## 2023-04-20 DIAGNOSIS — D64.9 CHRONIC ANEMIA: ICD-10-CM

## 2023-04-20 DIAGNOSIS — D64.89 ANEMIA DUE TO MULTIPLE MECHANISMS: ICD-10-CM

## 2023-04-20 DIAGNOSIS — D64.9 NORMOCHROMIC ANEMIA: ICD-10-CM

## 2023-04-20 DIAGNOSIS — N18.30 STAGE 3 CHRONIC KIDNEY DISEASE, UNSPECIFIED WHETHER STAGE 3A OR 3B CKD: ICD-10-CM

## 2023-04-20 DIAGNOSIS — D46.9 MDS (MYELODYSPLASTIC SYNDROME): Primary | ICD-10-CM

## 2023-04-20 PROCEDURE — 63600175 PHARM REV CODE 636 W HCPCS: Mod: JZ,JG | Performed by: NURSE PRACTITIONER

## 2023-04-20 PROCEDURE — 96372 THER/PROPH/DIAG INJ SC/IM: CPT

## 2023-04-20 RX ADMIN — EPOETIN ALFA-EPBX 40000 UNITS: 40000 INJECTION, SOLUTION INTRAVENOUS; SUBCUTANEOUS at 03:04

## 2023-04-27 ENCOUNTER — INFUSION (OUTPATIENT)
Dept: INFUSION THERAPY | Facility: HOSPITAL | Age: 63
End: 2023-04-27
Attending: INTERNAL MEDICINE
Payer: COMMERCIAL

## 2023-04-27 VITALS
SYSTOLIC BLOOD PRESSURE: 132 MMHG | TEMPERATURE: 98 F | HEIGHT: 70 IN | WEIGHT: 223.5 LBS | HEART RATE: 77 BPM | RESPIRATION RATE: 18 BRPM | DIASTOLIC BLOOD PRESSURE: 76 MMHG | OXYGEN SATURATION: 98 % | BODY MASS INDEX: 32 KG/M2

## 2023-04-27 DIAGNOSIS — D64.9 NORMOCHROMIC ANEMIA: ICD-10-CM

## 2023-04-27 DIAGNOSIS — N18.30 STAGE 3 CHRONIC KIDNEY DISEASE, UNSPECIFIED WHETHER STAGE 3A OR 3B CKD: ICD-10-CM

## 2023-04-27 DIAGNOSIS — D46.9 MDS (MYELODYSPLASTIC SYNDROME): Primary | ICD-10-CM

## 2023-04-27 DIAGNOSIS — D64.89 ANEMIA DUE TO MULTIPLE MECHANISMS: ICD-10-CM

## 2023-04-27 DIAGNOSIS — D64.9 CHRONIC ANEMIA: ICD-10-CM

## 2023-04-27 PROCEDURE — 96372 THER/PROPH/DIAG INJ SC/IM: CPT

## 2023-04-27 PROCEDURE — 63600175 PHARM REV CODE 636 W HCPCS: Mod: JZ,JG | Performed by: NURSE PRACTITIONER

## 2023-04-27 RX ADMIN — EPOETIN ALFA-EPBX 40000 UNITS: 40000 INJECTION, SOLUTION INTRAVENOUS; SUBCUTANEOUS at 04:04

## 2023-05-03 ENCOUNTER — LAB VISIT (OUTPATIENT)
Dept: LAB | Facility: HOSPITAL | Age: 63
End: 2023-05-03
Attending: INTERNAL MEDICINE
Payer: COMMERCIAL

## 2023-05-03 DIAGNOSIS — D64.9 NORMOCHROMIC ANEMIA: ICD-10-CM

## 2023-05-03 DIAGNOSIS — D57.3 SICKLE CELL TRAIT SYNDROME: ICD-10-CM

## 2023-05-03 LAB
ALBUMIN SERPL BCP-MCNC: 4.3 G/DL (ref 3.5–5.2)
ALP SERPL-CCNC: 61 U/L (ref 55–135)
ALT SERPL W/O P-5'-P-CCNC: 22 U/L (ref 10–44)
ANION GAP SERPL CALC-SCNC: 3 MMOL/L (ref 8–16)
AST SERPL-CCNC: 26 U/L (ref 10–40)
BASOPHILS # BLD AUTO: 0.1 K/UL (ref 0–0.2)
BASOPHILS NFR BLD: 1.7 % (ref 0–1.9)
BILIRUB SERPL-MCNC: 0.8 MG/DL (ref 0.1–1)
BUN SERPL-MCNC: 34 MG/DL (ref 8–23)
CALCIUM SERPL-MCNC: 9 MG/DL (ref 8.7–10.5)
CHLORIDE SERPL-SCNC: 113 MMOL/L (ref 95–110)
CO2 SERPL-SCNC: 26 MMOL/L (ref 23–29)
CREAT SERPL-MCNC: 1.8 MG/DL (ref 0.5–1.4)
DIFFERENTIAL METHOD: ABNORMAL
EOSINOPHIL # BLD AUTO: 0.2 K/UL (ref 0–0.5)
EOSINOPHIL NFR BLD: 2.7 % (ref 0–8)
ERYTHROCYTE [DISTWIDTH] IN BLOOD BY AUTOMATED COUNT: 27.3 % (ref 11.5–14.5)
EST. GFR  (NO RACE VARIABLE): 41.8 ML/MIN/1.73 M^2
FERRITIN SERPL-MCNC: 1316 NG/ML (ref 20–300)
GLUCOSE SERPL-MCNC: 100 MG/DL (ref 70–110)
HCT VFR BLD AUTO: 28.8 % (ref 40–54)
HGB BLD-MCNC: 9.6 G/DL (ref 14–18)
IMM GRANULOCYTES # BLD AUTO: 0.04 K/UL (ref 0–0.04)
IMM GRANULOCYTES NFR BLD AUTO: 0.7 % (ref 0–0.5)
IRON SERPL-MCNC: 179 UG/DL (ref 45–160)
LYMPHOCYTES # BLD AUTO: 1.6 K/UL (ref 1–4.8)
LYMPHOCYTES NFR BLD: 26.5 % (ref 18–48)
MCH RBC QN AUTO: 30.5 PG (ref 27–31)
MCHC RBC AUTO-ENTMCNC: 33.3 G/DL (ref 32–36)
MCV RBC AUTO: 91 FL (ref 82–98)
MONOCYTES # BLD AUTO: 0.8 K/UL (ref 0.3–1)
MONOCYTES NFR BLD: 12.9 % (ref 4–15)
NEUTROPHILS # BLD AUTO: 3.3 K/UL (ref 1.8–7.7)
NEUTROPHILS NFR BLD: 55.5 % (ref 38–73)
NRBC BLD-RTO: 0 /100 WBC
PLATELET # BLD AUTO: 466 K/UL (ref 150–450)
PMV BLD AUTO: 10.6 FL (ref 9.2–12.9)
POTASSIUM SERPL-SCNC: 4.2 MMOL/L (ref 3.5–5.1)
PROT SERPL-MCNC: 7.6 G/DL (ref 6–8.4)
RBC # BLD AUTO: 3.15 M/UL (ref 4.6–6.2)
SATURATED IRON: 92 % (ref 20–50)
SODIUM SERPL-SCNC: 142 MMOL/L (ref 136–145)
TOTAL IRON BINDING CAPACITY: 195 UG/DL (ref 250–450)
TRANSFERRIN SERPL-MCNC: 139 MG/DL (ref 200–375)
WBC # BLD AUTO: 5.89 K/UL (ref 3.9–12.7)

## 2023-05-03 PROCEDURE — 36415 COLL VENOUS BLD VENIPUNCTURE: CPT | Performed by: INTERNAL MEDICINE

## 2023-05-03 PROCEDURE — 80053 COMPREHEN METABOLIC PANEL: CPT | Performed by: INTERNAL MEDICINE

## 2023-05-03 PROCEDURE — 84466 ASSAY OF TRANSFERRIN: CPT | Performed by: INTERNAL MEDICINE

## 2023-05-03 PROCEDURE — 82728 ASSAY OF FERRITIN: CPT | Performed by: INTERNAL MEDICINE

## 2023-05-03 PROCEDURE — 85025 COMPLETE CBC W/AUTO DIFF WBC: CPT | Performed by: INTERNAL MEDICINE

## 2023-05-04 ENCOUNTER — INFUSION (OUTPATIENT)
Dept: INFUSION THERAPY | Facility: HOSPITAL | Age: 63
End: 2023-05-04
Attending: INTERNAL MEDICINE
Payer: COMMERCIAL

## 2023-05-04 VITALS
RESPIRATION RATE: 18 BRPM | WEIGHT: 224.31 LBS | HEART RATE: 69 BPM | BODY MASS INDEX: 32.11 KG/M2 | DIASTOLIC BLOOD PRESSURE: 71 MMHG | SYSTOLIC BLOOD PRESSURE: 126 MMHG | TEMPERATURE: 98 F | HEIGHT: 70 IN

## 2023-05-04 DIAGNOSIS — D46.9 MDS (MYELODYSPLASTIC SYNDROME): Primary | ICD-10-CM

## 2023-05-04 DIAGNOSIS — N18.30 STAGE 3 CHRONIC KIDNEY DISEASE, UNSPECIFIED WHETHER STAGE 3A OR 3B CKD: ICD-10-CM

## 2023-05-04 DIAGNOSIS — D64.9 CHRONIC ANEMIA: ICD-10-CM

## 2023-05-04 DIAGNOSIS — D64.89 ANEMIA DUE TO MULTIPLE MECHANISMS: ICD-10-CM

## 2023-05-04 DIAGNOSIS — D64.9 NORMOCHROMIC ANEMIA: ICD-10-CM

## 2023-05-04 PROCEDURE — 96372 THER/PROPH/DIAG INJ SC/IM: CPT

## 2023-05-04 PROCEDURE — 63600175 PHARM REV CODE 636 W HCPCS: Mod: JZ,JG | Performed by: NURSE PRACTITIONER

## 2023-05-04 RX ADMIN — EPOETIN ALFA-EPBX 40000 UNITS: 40000 INJECTION, SOLUTION INTRAVENOUS; SUBCUTANEOUS at 04:05

## 2023-05-10 NOTE — PROGRESS NOTES
"        [facility] Hematology/Oncology                 PROGRESS NOTE      Subjective:       Patient ID:   NAME: Rick Butler : 1960     63 y.o. male    Referring Doc: Chetna (new PCP)  Other Physicians: Getachew; Alan Mustafa    Chief Complaint:  Sickle cell trait/anemia/RARS  f/u    History of Present Illness:     Patient returns today for a regularly scheduled follow-up visit.  The patient is doing ok overall. He is here by himself today.     He saw Dr Hilario at [facility] again recently this past Tuesday and they are considering another medication; he plans to see her again in Aug 2023    no pain crisis;  breathing ok; no CP, SOB, HA's or N/V;        He has not had any recent pain crisis. He denies having any breathing difficulties. He denies any blood in the stool, dark stools or hematuria. He does have some fatigue but energy levels are "pretty good" currently      He saw new PCP Chetna on 4/10/2023    Discussed covid19 precautions - he had his vaccinations      ROS:   GEN: normal without any fever, night sweats or weight loss  HEENT: normal with no HA's, sore throat, stiff neck, changes in vision  CV: normal with no CP, SOB, PND, MORA or orthopnea  PULM: normal with no SOB, cough, hemoptysis, sputum or pleuritic pain  GI: normal with no abdominal pain, nausea, vomiting, constipation, diarrhea, melanotic stools, BRBPR, or hematemesis  : normal with no hematuria, dysuria  BREAST: normal with no mass, discharge, pain  SKIN: normal with no rash, erythema, bruising, or swelling    Allergies:  Review of patient's allergies indicates:  No Known Allergies    Medications:    Current Outpatient Medications:     amLODIPine (NORVASC) 5 MG tablet, Take 1 tablet (5 mg total) by mouth once daily., Disp: 90 tablet, Rfl: 4    ascorbic acid, vitamin C, (VITAMIN C) 1000 MG tablet, Take 1,000 mg by mouth once daily., Disp: , Rfl:     atorvastatin (LIPITOR) 10 MG tablet, Take 1 tablet (10 mg total) by mouth every evening., Disp: 30 " "tablet, Rfl: 2    epoetin tray (PROCRIT INJ), Thursday, Disp: , Rfl:     famotidine (PEPCID) 20 MG tablet, famotidine 20 mg tablet  Take 1 tablet twice a day by oral route., Disp: , Rfl:     folic acid (FOLVITE) 1 MG tablet, Take 2 tablets (2 mg total) by mouth once daily., Disp: 180 tablet, Rfl: 4    multivitamin capsule, Take 1 capsule by mouth once daily., Disp: , Rfl:     pantoprazole (PROTONIX) 40 MG tablet, , Disp: , Rfl:     sertraline (ZOLOFT) 25 MG tablet, Take 25 mg by mouth once daily., Disp: , Rfl:     tamsulosin (FLOMAX) 0.4 mg Cap, Take 1 capsule (0.4 mg total) by mouth once daily., Disp: 90 capsule, Rfl: 4    traZODone (DESYREL) 100 MG tablet, Take 1 tablet (100 mg total) by mouth nightly as needed for Insomnia., Disp: 90 tablet, Rfl: 4    sildenafil (VIAGRA) 100 MG tablet, Take 1 tablet (100 mg total) by mouth daily as needed for Erectile Dysfunction., Disp: 10 tablet, Rfl: 6    PMHx/PSHx Updates:  See patient's last visit with me on 1/26/2023  See H&P on 7/9/2011      Pathology:    12/20/2019  BONE MARROW, RIGHT ILIAC CREST,    ASPIRATE, CLOT SECTION, AND CORE BIOPSY:    --HYPERCELLULAR MARROW (APPROXIMATELY 75% TO 80%) WITH TRILINEAGE   HEMATOPOIETIC ELEMENTS, ERYTHROID  HYPERPLASIA AND MILD MEGAKARYOCYTIC HYPERPLASIA, AND NON-SPECIFIC   DYSHEMATOPOIETIC CHANGES (SEE     COMMENT).  --[id number] INCREASED STAINABLE IRON WITH INCREASED RING   SIDEROBLASTS (GREATER THAN 15%)     (SEE COMMENT).  --PERIPHERAL BLOOD WITH THROMBOCYTOSIS (574,000/MICROLITER) AND ANEMIA   (HEMOGLOBIN 5.5 GRAM/DECILITER),    WITH SCATTERED DREPANOCYTOID FORMS AND FEW NUCLEATED ERYTHROCYTES      Cytogenetic Results at the bottom of the report.  NORMAL    Objective:     Vitals:  Blood pressure 137/79, pulse 76, temperature 98.4 °F (36.9 °C), resp. rate 18, height 5' 10" (1.778 m), weight 102.1 kg (225 lb).    Physical Examination:   GEN: no apparent distress, comfortable; AAOx3  HEAD: atraumatic and " normocephalic  EYES: no pallor, no icterus, PERRLA  ENT: OMM, no pharyngeal erythema, external ears WNL; no nasal discharge; no thrush  NECK: no masses, thyroid normal, trachea midline, no LAD/LN's, supple  CV: RRR with no murmur; normal pulse; normal S1 and S2; no pedal edema  CHEST: Normal respiratory effort; CTAB; normal breath sounds; no wheeze or crackles  ABDOM: nontender and nondistended; soft; normal bowel sounds; no rebound/guarding  MUSC/Skeletal: ROM normal; no crepitus; joints normal; no deformities or arthropathy  EXTREM: no clubbing, cyanosis, inflammation or swelling  SKIN: no rashes, lesions, ulcers, petechiae or subcutaneous nodules  : no jiang  NEURO: grossly intact; motor/sensory WNL; AAOx3; no tremors  PSYCH: normal mood, affect and behavior  LYMPH: normal cervical, supraclavicular, axillary and groin LN's            Labs:     Lab Results   Component Value Date    WBC 5.89 05/03/2023    HGB 9.6 (L) 05/03/2023    HCT 28.8 (L) 05/03/2023    MCV 91 05/03/2023     (H) 05/03/2023           CMP  Sodium   Date Value Ref Range Status   05/03/2023 142 136 - 145 mmol/L Final   06/26/2019 138 134 - 144 mmol/L      Potassium   Date Value Ref Range Status   05/03/2023 4.2 3.5 - 5.1 mmol/L Final     Chloride   Date Value Ref Range Status   05/03/2023 113 (H) 95 - 110 mmol/L Final   06/26/2019 105 98 - 110 mmol/L      CO2   Date Value Ref Range Status   05/03/2023 26 23 - 29 mmol/L Final     Glucose   Date Value Ref Range Status   05/03/2023 100 70 - 110 mg/dL Final   06/26/2019 114 (H) 70 - 99 mg/dL      BUN   Date Value Ref Range Status   05/03/2023 34 (H) 8 - 23 mg/dL Final     Creatinine   Date Value Ref Range Status   05/03/2023 1.8 (H) 0.5 - 1.4 mg/dL Final   06/26/2019 1.62 (H) 0.60 - 1.40 mg/dL      Calcium   Date Value Ref Range Status   05/03/2023 9.0 8.7 - 10.5 mg/dL Final     Total Protein   Date Value Ref Range Status   05/03/2023 7.6 6.0 - 8.4 g/dL Final     Albumin   Date Value Ref Range  Status   05/03/2023 4.3 3.5 - 5.2 g/dL Final   06/26/2019 4.4 3.1 - 4.7 g/dL      Total Bilirubin   Date Value Ref Range Status   05/03/2023 0.8 0.1 - 1.0 mg/dL Final     Comment:     For infants and newborns, interpretation of results should be based  on gestational age, weight and in agreement with clinical  observations.    Premature Infant recommended reference ranges:  Up to 24 hours.............<8.0 mg/dL  Up to 48 hours............<12.0 mg/dL  3-5 days..................<15.0 mg/dL  6-29 days.................<15.0 mg/dL       Alkaline Phosphatase   Date Value Ref Range Status   05/03/2023 61 55 - 135 U/L Final     AST   Date Value Ref Range Status   05/03/2023 26 10 - 40 U/L Final     ALT   Date Value Ref Range Status   05/03/2023 22 10 - 44 U/L Final     Anion Gap   Date Value Ref Range Status   05/03/2023 3 (L) 8 - 16 mmol/L Corrected     Comment:     CORRECTED RESULT; previously reported as 4 on 05/03/2023 at 12:06.     eGFR if    Date Value Ref Range Status   07/13/2022 45.6 (A) >60 mL/min/1.73 m^2 Final     eGFR if non    Date Value Ref Range Status   07/13/2022 39.5 (A) >60 mL/min/1.73 m^2 Final     Comment:     Calculation used to obtain the estimated glomerular filtration  rate (eGFR) is the CKD-EPI equation.          Lab Results   Component Value Date    IRON 179 (H) 05/03/2023    TIBC 195 (L) 05/03/2023    FERRITIN 1,316 (H) 05/03/2023           I have reviewed all available lab results and radiology reports.    Radiology/Diagnostic Studies:    US 2/8/2023:    IMPRESSION:  1. Dilation of the common bile duct at up to 10 mm, unchanged when compared to 2016.  2. Nonvisualization of the pancreas because of overlying bowel gas.  3. No other significant findings.  spleen is normal in size and appearance      2/15/2018 Xray Survey:   IMPRESSION: No significant abnormality seen. No evidence of osteoblastic or  osteoclastic lesions identified    MRI L-spine  "2/12/2018  IMPRESSION:    1. Prominent low signal intensity throughout the bone marrow on T1 and  T2-weighted imaging. Marrow infiltrative process including malignancy such as  metastatic disease or lymphoma/leukemia is a consideration along with multiple  myeloma or malignant process. Benign etiology such as osteopetrosis or  regenerative red marrow are also considerations.    2. Multilevel lumbar degenerative changes, most prominent at L4-L5 and L5-S1 as  described.    Assessment/Plan:   (1) 63 y.o. male with diagnosis of sickle cell anemia with borderline microcytosis  - latest hgb was 6.0 and he had blood transfusion  - today, he is not symptomatic at this time  - he probably has a multifactorial anemia process with underlying sickle cell, anemia of chronic disorders and anemia of chronic renal. I can not rule out an underlying GI bleeding process.   - iron panel is adequate (no deficiency)  - total bilirubin is only minimally elevated  - he required transfusion again in Oct 2019 and again in Dec 2019  - he had bone marrow biopsy on 12/20/2019    "dyshematopoietic changes are not entirely specific and are present  mostly within erythroid and megakaryocytic lineages. These findings   could be observed in chronic disease states, autoimmune conditions,  infectious settings, toxic exposures, nutritional deficits, as medication/drug effect, and in myelodysplastic syndrome (MDS), among other conditions"  "Additionally, ring sideroblasts are a non-specific   finding "    Patient was referred to see Dr Mustafa at [facility] and had repeat BM biopsy with diagnosis made of RARS  - he is now on procrit per directives of Dr Mustafa    7/30/2020:   he recently saw Dr Mustafa  And sees him again in Jan 2021  - he is doing well with procrit and is feeling much better  - he has not required any transfusions for some time time    9/24/2020:  - latest hgb at 8.5  - will increase the procrit dose  - f/u with Dr Zavala in Jan " "2021 11/19/2020:  - patient doing well and feels "great"  - hgb up to 9.1; continued on the procrit  - f/u with Dr Mustafa in Jan 2021    3/4/2021:  - he saw Dr Mustafa in jan 2021 and sees him again in June/July 2021  - latest hgb at 9.1 and stable and platelets are 474,000; wbc at 10.0    6/10/2021:  - latest hgb at 9.0  - plats 485,000  - on weekly procrit  - seeing Dr Mustafa again tomorrow    10/14/2021:  - hgb at 8.6 but he recently had been taking naproxen and had some BRBPR - which since resolved  - he saw Dr Beyer with GI and he is having colonsocopy on Nov 5th along with EGD  - plats 429,000  - he sees Dr Mustafa again in Nov 19th   - he sees Dr Mas again in Dec 2021    1/13/2022:  - He has been under the care of Dr Mustafa at [facility] for RARS- MDS. He is now on procrit weekly. He sees Dr Mustafa again in March 2022    - hgb 9.3  - he had scope with Dr Collins in nov 2021 which was good per patient and they plan to repeat in 5 yrs    5/12/2022:  - hgb at 9.0  - plats 485  - wbc 7.25  - he is on procrit weekly  - saw Dr Mustafa this past Fridday and since he is leaving, he will start seeing Dr Hilario instead in 3 months    8/18/2022:  - latest hgb a little lower at 8.8  - he has been on the procrit  - he saw Dr Hilario recently at [facility] and plans to see her again in 3 months    1/26/2023:  - He saw Dr Hilario at [facility] again recently this past Tuesday and they are considering another medication; he plans to see her again in April 2023  - Dr Hilario requested he have an US of spleen - will order here in [place]  - he is also on appetite stimulant  - hgb at 9.4 and plats at 500k    5/11/2023:  - hgb at 9.6 and plats 466,000  - He saw Dr Hilario at [facility] again recently this past Tuesday and they are considering another medication; he plans to see her again in Aug 2023    (2) Alcohol abuse issues in past - he has been sober for over 3 years now    (3) New findings on radiography with abnormal chest CT and lung mass  - " "former smoker    (4) chronic back issues - prior Xrays and MRI - suspect the findings on the MRI are possibly due to his sickle cell;   - SPEP was previously negative for M-protein  - PSA was 0.3 in Sept 2017  - recommended neurosurgery evaluation previously  - he saw Dr Nura VUONG and had a nerve "burning" procedure and his pain has improved    (5) CRI - elevated creatinine - he is followed Dr Chilel with nephrology      (6) H pylori gastritis - s/p recent endoscopy with Dr Collins and antibotics x 10 days        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 Sickle cell trait syndrome        3. Thrombocytosis        4. H/O ETOH abuse        5. Anemia due to multiple mechanisms        6. Chronic anemia        7. MDS (myelodysplastic syndrome)        8. Monocytosis        9. Normochromic anemia        10. Folic acid deficiency        11. Former smoker          PLAN;  1. Continue with the procrit    2. Planned f/u in Aug 2023 with Dr Hilario at [facility] - need her latest notes and check to see what she is switching him to  3.  F/u with nephrology as directed by them -   Dr Mas    4. Check labs every 2 weeks             RTC 4 weeks  Fax note to [facility]; Safa    Total Time spent on patient:    I spent over 25 mins of time with the patient. Reviewed results of the recently ordered labs, tests, reports and studies; made directives with regards to the results. Over half of this time was spent couseling and coordinating care, making treatment and analytical decisions; ordering necessary labs, tests and studies; and discussing treatment options and setting up treatment plan(s) if indicated.            Discussion:       Pathology Discussion:    I reviewed and discussed the pathology report(s) and radiograph reports (if available) in as simple to understand and/or laymen's terms to the best of my ability. I had an indepth conversation with the patient and went over the patient's individual diagnosis " based on the information that was currently available. I discussed the TNM staging process with regard to the patient's particular cancer type, and the calculated stage based on the currently available TNM data and literature. I discussed the available prognostic data with regard to the current staging information and how it relates to the prognosis of their particular neoplastic process.          COVID-19 Discussion:    I had long discussion with patient and any applicable family about the COVID-19 coronavirus epidemic and the recommended precautions with regard to cancer and/or hematology patients. I have re-iterated the CDC recommendations for adequate hand washing, use of hand -like products, and coughing into elbow, etc. In addition, especially for our patients who are on chemotherapy and/or our otherwise immunocompromised patients, I have recommended avoidance of crowds, including movie theaters, restaurants, churches, etc. I have recommended avoidance of any sick or symptomatic family members and/or friends. I have also recommended avoidance of any raw and unwashed food products, and general avoidance of food items that have not been prepared by themselves. The patient has been asked to call us immediately with any symptom developments, issues, questions or other general concerns.     I have explained all of the above in detail and the patient understands all of the current recommendation(s). I have answered all of their questions to the best of my ability and to their complete satisfaction.   The patient is to continue with the current management plan.            Electronically signed by Jose Tello MD

## 2023-05-11 ENCOUNTER — OFFICE VISIT (OUTPATIENT)
Dept: HEMATOLOGY/ONCOLOGY | Facility: CLINIC | Age: 63
End: 2023-05-11
Payer: COMMERCIAL

## 2023-05-11 ENCOUNTER — INFUSION (OUTPATIENT)
Dept: INFUSION THERAPY | Facility: HOSPITAL | Age: 63
End: 2023-05-11
Attending: INTERNAL MEDICINE
Payer: COMMERCIAL

## 2023-05-11 VITALS
HEIGHT: 70 IN | SYSTOLIC BLOOD PRESSURE: 137 MMHG | WEIGHT: 225 LBS | HEIGHT: 70 IN | DIASTOLIC BLOOD PRESSURE: 79 MMHG | BODY MASS INDEX: 32.21 KG/M2 | BODY MASS INDEX: 32.21 KG/M2 | TEMPERATURE: 98 F | RESPIRATION RATE: 18 BRPM | HEART RATE: 76 BPM | TEMPERATURE: 98 F | HEART RATE: 76 BPM | WEIGHT: 225 LBS | SYSTOLIC BLOOD PRESSURE: 137 MMHG | RESPIRATION RATE: 18 BRPM | DIASTOLIC BLOOD PRESSURE: 79 MMHG

## 2023-05-11 DIAGNOSIS — D64.89 ANEMIA DUE TO MULTIPLE MECHANISMS: ICD-10-CM

## 2023-05-11 DIAGNOSIS — D64.9 NORMOCHROMIC ANEMIA: ICD-10-CM

## 2023-05-11 DIAGNOSIS — D57.3 SICKLE CELL TRAIT SYNDROME: ICD-10-CM

## 2023-05-11 DIAGNOSIS — D64.9 CHRONIC ANEMIA: ICD-10-CM

## 2023-05-11 DIAGNOSIS — Z87.891 FORMER SMOKER: ICD-10-CM

## 2023-05-11 DIAGNOSIS — D75.839 THROMBOCYTOSIS: ICD-10-CM

## 2023-05-11 DIAGNOSIS — E53.8 FOLIC ACID DEFICIENCY: ICD-10-CM

## 2023-05-11 DIAGNOSIS — D46.9 MDS (MYELODYSPLASTIC SYNDROME): ICD-10-CM

## 2023-05-11 DIAGNOSIS — D72.821 MONOCYTOSIS: ICD-10-CM

## 2023-05-11 DIAGNOSIS — F10.11 H/O ETOH ABUSE: ICD-10-CM

## 2023-05-11 DIAGNOSIS — D46.9 MDS (MYELODYSPLASTIC SYNDROME): Primary | ICD-10-CM

## 2023-05-11 DIAGNOSIS — D46.1 RARS (REFRACTORY ANEMIA WITH RINGED SIDEROBLASTS): Primary | ICD-10-CM

## 2023-05-11 DIAGNOSIS — N18.30 STAGE 3 CHRONIC KIDNEY DISEASE, UNSPECIFIED WHETHER STAGE 3A OR 3B CKD: ICD-10-CM

## 2023-05-11 PROCEDURE — 1159F PR MEDICATION LIST DOCUMENTED IN MEDICAL RECORD: ICD-10-PCS | Mod: CPTII,S$GLB,, | Performed by: INTERNAL MEDICINE

## 2023-05-11 PROCEDURE — 96372 THER/PROPH/DIAG INJ SC/IM: CPT

## 2023-05-11 PROCEDURE — 3075F PR MOST RECENT SYSTOLIC BLOOD PRESS GE 130-139MM HG: ICD-10-PCS | Mod: CPTII,S$GLB,, | Performed by: INTERNAL MEDICINE

## 2023-05-11 PROCEDURE — 99214 PR OFFICE/OUTPT VISIT, EST, LEVL IV, 30-39 MIN: ICD-10-PCS | Mod: S$GLB,,, | Performed by: INTERNAL MEDICINE

## 2023-05-11 PROCEDURE — 3044F HG A1C LEVEL LT 7.0%: CPT | Mod: CPTII,S$GLB,, | Performed by: INTERNAL MEDICINE

## 2023-05-11 PROCEDURE — 3008F PR BODY MASS INDEX (BMI) DOCUMENTED: ICD-10-PCS | Mod: CPTII,S$GLB,, | Performed by: INTERNAL MEDICINE

## 2023-05-11 PROCEDURE — 3008F BODY MASS INDEX DOCD: CPT | Mod: CPTII,S$GLB,, | Performed by: INTERNAL MEDICINE

## 2023-05-11 PROCEDURE — 3075F SYST BP GE 130 - 139MM HG: CPT | Mod: CPTII,S$GLB,, | Performed by: INTERNAL MEDICINE

## 2023-05-11 PROCEDURE — 1159F MED LIST DOCD IN RCRD: CPT | Mod: CPTII,S$GLB,, | Performed by: INTERNAL MEDICINE

## 2023-05-11 PROCEDURE — 1160F PR REVIEW ALL MEDS BY PRESCRIBER/CLIN PHARMACIST DOCUMENTED: ICD-10-PCS | Mod: CPTII,S$GLB,, | Performed by: INTERNAL MEDICINE

## 2023-05-11 PROCEDURE — 63600175 PHARM REV CODE 636 W HCPCS: Mod: JZ,JG | Performed by: NURSE PRACTITIONER

## 2023-05-11 PROCEDURE — 3044F PR MOST RECENT HEMOGLOBIN A1C LEVEL <7.0%: ICD-10-PCS | Mod: CPTII,S$GLB,, | Performed by: INTERNAL MEDICINE

## 2023-05-11 PROCEDURE — 3078F PR MOST RECENT DIASTOLIC BLOOD PRESSURE < 80 MM HG: ICD-10-PCS | Mod: CPTII,S$GLB,, | Performed by: INTERNAL MEDICINE

## 2023-05-11 PROCEDURE — 3078F DIAST BP <80 MM HG: CPT | Mod: CPTII,S$GLB,, | Performed by: INTERNAL MEDICINE

## 2023-05-11 PROCEDURE — 1160F RVW MEDS BY RX/DR IN RCRD: CPT | Mod: CPTII,S$GLB,, | Performed by: INTERNAL MEDICINE

## 2023-05-11 PROCEDURE — 99214 OFFICE O/P EST MOD 30 MIN: CPT | Mod: S$GLB,,, | Performed by: INTERNAL MEDICINE

## 2023-05-11 RX ADMIN — EPOETIN ALFA-EPBX 40000 UNITS: 40000 INJECTION, SOLUTION INTRAVENOUS; SUBCUTANEOUS at 03:05

## 2023-05-11 NOTE — PLAN OF CARE
Problem: Anemia  Goal: Anemia Symptom Improvement  Outcome: Ongoing, Progressing  Intervention: Monitor and Manage Anemia  Flowsheets (Taken 5/11/2023 1538)  Oral Nutrition Promotion:   safe use of adaptive equipment encouraged   rest periods promoted  Safety Promotion/Fall Prevention: medications reviewed  Fatigue Management:   fatigue-related activity identified   frequent rest breaks encouraged   paced activity encouraged

## 2023-05-17 ENCOUNTER — LAB VISIT (OUTPATIENT)
Dept: LAB | Facility: HOSPITAL | Age: 63
End: 2023-05-17
Attending: INTERNAL MEDICINE
Payer: COMMERCIAL

## 2023-05-17 DIAGNOSIS — D64.9 NORMOCHROMIC ANEMIA: ICD-10-CM

## 2023-05-17 DIAGNOSIS — D57.3 SICKLE CELL TRAIT SYNDROME: ICD-10-CM

## 2023-05-17 LAB
ALBUMIN SERPL BCP-MCNC: 4.5 G/DL (ref 3.5–5.2)
ALP SERPL-CCNC: 65 U/L (ref 55–135)
ALT SERPL W/O P-5'-P-CCNC: 18 U/L (ref 10–44)
ANION GAP SERPL CALC-SCNC: 3 MMOL/L (ref 8–16)
AST SERPL-CCNC: 22 U/L (ref 10–40)
BASOPHILS # BLD AUTO: 0.12 K/UL (ref 0–0.2)
BASOPHILS NFR BLD: 2 % (ref 0–1.9)
BILIRUB SERPL-MCNC: 0.6 MG/DL (ref 0.1–1)
BUN SERPL-MCNC: 38 MG/DL (ref 8–23)
CALCIUM SERPL-MCNC: 8.7 MG/DL (ref 8.7–10.5)
CHLORIDE SERPL-SCNC: 113 MMOL/L (ref 95–110)
CO2 SERPL-SCNC: 27 MMOL/L (ref 23–29)
CREAT SERPL-MCNC: 2 MG/DL (ref 0.5–1.4)
DIFFERENTIAL METHOD: ABNORMAL
EOSINOPHIL # BLD AUTO: 0.1 K/UL (ref 0–0.5)
EOSINOPHIL NFR BLD: 2 % (ref 0–8)
ERYTHROCYTE [DISTWIDTH] IN BLOOD BY AUTOMATED COUNT: 27.1 % (ref 11.5–14.5)
EST. GFR  (NO RACE VARIABLE): 36.8 ML/MIN/1.73 M^2
GLUCOSE SERPL-MCNC: 104 MG/DL (ref 70–110)
HCT VFR BLD AUTO: 29.1 % (ref 40–54)
HGB BLD-MCNC: 9.7 G/DL (ref 14–18)
IMM GRANULOCYTES # BLD AUTO: 0.02 K/UL (ref 0–0.04)
IMM GRANULOCYTES NFR BLD AUTO: 0.3 % (ref 0–0.5)
LYMPHOCYTES # BLD AUTO: 1.5 K/UL (ref 1–4.8)
LYMPHOCYTES NFR BLD: 25.4 % (ref 18–48)
MCH RBC QN AUTO: 30.5 PG (ref 27–31)
MCHC RBC AUTO-ENTMCNC: 33.3 G/DL (ref 32–36)
MCV RBC AUTO: 92 FL (ref 82–98)
MONOCYTES # BLD AUTO: 0.8 K/UL (ref 0.3–1)
MONOCYTES NFR BLD: 12.8 % (ref 4–15)
NEUTROPHILS # BLD AUTO: 3.4 K/UL (ref 1.8–7.7)
NEUTROPHILS NFR BLD: 57.5 % (ref 38–73)
NRBC BLD-RTO: 0 /100 WBC
PLATELET # BLD AUTO: 486 K/UL (ref 150–450)
PMV BLD AUTO: 10.9 FL (ref 9.2–12.9)
POTASSIUM SERPL-SCNC: 4.2 MMOL/L (ref 3.5–5.1)
PROT SERPL-MCNC: 7.6 G/DL (ref 6–8.4)
RBC # BLD AUTO: 3.18 M/UL (ref 4.6–6.2)
SODIUM SERPL-SCNC: 143 MMOL/L (ref 136–145)
WBC # BLD AUTO: 5.86 K/UL (ref 3.9–12.7)

## 2023-05-17 PROCEDURE — 80053 COMPREHEN METABOLIC PANEL: CPT | Performed by: INTERNAL MEDICINE

## 2023-05-17 PROCEDURE — 36415 COLL VENOUS BLD VENIPUNCTURE: CPT | Performed by: INTERNAL MEDICINE

## 2023-05-17 PROCEDURE — 85025 COMPLETE CBC W/AUTO DIFF WBC: CPT | Performed by: INTERNAL MEDICINE

## 2023-05-18 ENCOUNTER — INFUSION (OUTPATIENT)
Dept: INFUSION THERAPY | Facility: HOSPITAL | Age: 63
End: 2023-05-18
Attending: INTERNAL MEDICINE
Payer: COMMERCIAL

## 2023-05-18 VITALS
OXYGEN SATURATION: 97 % | SYSTOLIC BLOOD PRESSURE: 126 MMHG | HEIGHT: 70 IN | DIASTOLIC BLOOD PRESSURE: 76 MMHG | RESPIRATION RATE: 18 BRPM | WEIGHT: 225.63 LBS | TEMPERATURE: 98 F | HEART RATE: 75 BPM | BODY MASS INDEX: 32.3 KG/M2

## 2023-05-18 DIAGNOSIS — N18.30 STAGE 3 CHRONIC KIDNEY DISEASE, UNSPECIFIED WHETHER STAGE 3A OR 3B CKD: ICD-10-CM

## 2023-05-18 DIAGNOSIS — D64.9 NORMOCHROMIC ANEMIA: ICD-10-CM

## 2023-05-18 DIAGNOSIS — D64.9 CHRONIC ANEMIA: ICD-10-CM

## 2023-05-18 DIAGNOSIS — D64.89 ANEMIA DUE TO MULTIPLE MECHANISMS: ICD-10-CM

## 2023-05-18 DIAGNOSIS — D46.9 MDS (MYELODYSPLASTIC SYNDROME): Primary | ICD-10-CM

## 2023-05-18 PROCEDURE — 63600175 PHARM REV CODE 636 W HCPCS: Mod: JZ,JG | Performed by: NURSE PRACTITIONER

## 2023-05-18 PROCEDURE — 96372 THER/PROPH/DIAG INJ SC/IM: CPT

## 2023-05-18 RX ADMIN — EPOETIN ALFA-EPBX 40000 UNITS: 40000 INJECTION, SOLUTION INTRAVENOUS; SUBCUTANEOUS at 04:05

## 2023-05-18 NOTE — PLAN OF CARE
Problem: Anemia  Goal: Anemia Symptom Improvement  Outcome: Ongoing, Progressing  Intervention: Monitor and Manage Anemia  Flowsheets (Taken 5/18/2023 1607)  Oral Nutrition Promotion: rest periods promoted  Safety Promotion/Fall Prevention: medications reviewed  Fatigue Management: frequent rest breaks encouraged

## 2023-05-19 ENCOUNTER — TELEPHONE (OUTPATIENT)
Dept: HEMATOLOGY/ONCOLOGY | Facility: CLINIC | Age: 63
End: 2023-05-19

## 2023-05-19 NOTE — TELEPHONE ENCOUNTER
Talked to patient about lab results.   He said he is going to increase his hydration.   Labs sent to Dr. TELLO.

## 2023-05-19 NOTE — TELEPHONE ENCOUNTER
----- Message from Callie Mejia sent at 5/19/2023  1:07 PM CDT -----  The patient was calling Malissa grayson. Please call him at [phone].

## 2023-05-19 NOTE — TELEPHONE ENCOUNTER
----- Message from Jose Tello MD sent at 5/18/2023 11:24 AM CDT -----  Creative is up a little - make sure he is staying hydrated and fax labs to [facility] too

## 2023-05-25 ENCOUNTER — TELEPHONE (OUTPATIENT)
Dept: INFUSION THERAPY | Facility: HOSPITAL | Age: 63
End: 2023-05-25

## 2023-05-26 ENCOUNTER — INFUSION (OUTPATIENT)
Dept: INFUSION THERAPY | Facility: HOSPITAL | Age: 63
End: 2023-05-26
Attending: INTERNAL MEDICINE
Payer: COMMERCIAL

## 2023-05-26 VITALS
BODY MASS INDEX: 32.35 KG/M2 | DIASTOLIC BLOOD PRESSURE: 74 MMHG | SYSTOLIC BLOOD PRESSURE: 126 MMHG | HEIGHT: 70 IN | WEIGHT: 226 LBS | HEART RATE: 73 BPM | RESPIRATION RATE: 18 BRPM | TEMPERATURE: 98 F

## 2023-05-26 DIAGNOSIS — N18.30 STAGE 3 CHRONIC KIDNEY DISEASE, UNSPECIFIED WHETHER STAGE 3A OR 3B CKD: ICD-10-CM

## 2023-05-26 DIAGNOSIS — D64.9 NORMOCHROMIC ANEMIA: ICD-10-CM

## 2023-05-26 DIAGNOSIS — D64.89 ANEMIA DUE TO MULTIPLE MECHANISMS: ICD-10-CM

## 2023-05-26 DIAGNOSIS — D46.9 MDS (MYELODYSPLASTIC SYNDROME): Primary | ICD-10-CM

## 2023-05-26 DIAGNOSIS — D64.9 CHRONIC ANEMIA: ICD-10-CM

## 2023-05-26 PROCEDURE — 63600175 PHARM REV CODE 636 W HCPCS: Mod: JZ,JG | Performed by: NURSE PRACTITIONER

## 2023-05-26 PROCEDURE — 96372 THER/PROPH/DIAG INJ SC/IM: CPT

## 2023-05-26 RX ADMIN — EPOETIN ALFA-EPBX 40000 UNITS: 40000 INJECTION, SOLUTION INTRAVENOUS; SUBCUTANEOUS at 04:05

## 2023-05-26 NOTE — PLAN OF CARE
Problem: Anemia  Goal: Anemia Symptom Improvement  5/26/2023 1623 by Gisell Castillo RN  Outcome: Met  5/26/2023 1623 by Gisell Castillo RN  Outcome: Ongoing, Progressing

## 2023-05-31 ENCOUNTER — TELEPHONE (OUTPATIENT)
Dept: INFUSION THERAPY | Facility: HOSPITAL | Age: 63
End: 2023-05-31

## 2023-05-31 ENCOUNTER — LAB VISIT (OUTPATIENT)
Dept: LAB | Facility: HOSPITAL | Age: 63
End: 2023-05-31
Attending: INTERNAL MEDICINE
Payer: COMMERCIAL

## 2023-05-31 DIAGNOSIS — D64.9 NORMOCHROMIC ANEMIA: ICD-10-CM

## 2023-05-31 DIAGNOSIS — D57.3 SICKLE CELL TRAIT SYNDROME: ICD-10-CM

## 2023-05-31 LAB
ALBUMIN SERPL BCP-MCNC: 4.3 G/DL (ref 3.5–5.2)
ALP SERPL-CCNC: 68 U/L (ref 55–135)
ALT SERPL W/O P-5'-P-CCNC: 21 U/L (ref 10–44)
ANION GAP SERPL CALC-SCNC: 5 MMOL/L (ref 8–16)
AST SERPL-CCNC: 20 U/L (ref 10–40)
BILIRUB SERPL-MCNC: 0.8 MG/DL (ref 0.1–1)
BUN SERPL-MCNC: 30 MG/DL (ref 8–23)
CALCIUM SERPL-MCNC: 8.8 MG/DL (ref 8.7–10.5)
CHLORIDE SERPL-SCNC: 109 MMOL/L (ref 95–110)
CO2 SERPL-SCNC: 27 MMOL/L (ref 23–29)
CREAT SERPL-MCNC: 1.8 MG/DL (ref 0.5–1.4)
EST. GFR  (NO RACE VARIABLE): 41.8 ML/MIN/1.73 M^2
FERRITIN SERPL-MCNC: 1471 NG/ML (ref 20–300)
GLUCOSE SERPL-MCNC: 106 MG/DL (ref 70–110)
IRON SERPL-MCNC: 139 UG/DL (ref 45–160)
POTASSIUM SERPL-SCNC: 4.3 MMOL/L (ref 3.5–5.1)
PROT SERPL-MCNC: 7.5 G/DL (ref 6–8.4)
SATURATED IRON: 70 % (ref 20–50)
SODIUM SERPL-SCNC: 141 MMOL/L (ref 136–145)
TOTAL IRON BINDING CAPACITY: 199 UG/DL (ref 250–450)
TRANSFERRIN SERPL-MCNC: 142 MG/DL (ref 200–375)

## 2023-05-31 PROCEDURE — 36415 COLL VENOUS BLD VENIPUNCTURE: CPT | Performed by: INTERNAL MEDICINE

## 2023-05-31 PROCEDURE — 82728 ASSAY OF FERRITIN: CPT | Performed by: INTERNAL MEDICINE

## 2023-05-31 PROCEDURE — 84466 ASSAY OF TRANSFERRIN: CPT | Performed by: INTERNAL MEDICINE

## 2023-05-31 PROCEDURE — 80053 COMPREHEN METABOLIC PANEL: CPT | Performed by: INTERNAL MEDICINE

## 2023-05-31 NOTE — TELEPHONE ENCOUNTER
Spoke with pt regarding appt on 6/1/23. Hgb 10.1 and not needing retacrit shot. Will need new labs prior to next appt. Pt voiced understanding.

## 2023-06-06 DIAGNOSIS — E78.5 DYSLIPIDEMIA: ICD-10-CM

## 2023-06-06 RX ORDER — ATORVASTATIN CALCIUM 10 MG/1
TABLET, FILM COATED ORAL
Qty: 90 TABLET | Refills: 1 | Status: SHIPPED | OUTPATIENT
Start: 2023-06-06 | End: 2023-10-24 | Stop reason: SDUPTHER

## 2023-06-07 ENCOUNTER — TELEPHONE (OUTPATIENT)
Dept: INFUSION THERAPY | Facility: HOSPITAL | Age: 63
End: 2023-06-07

## 2023-06-07 NOTE — TELEPHONE ENCOUNTER
Patient canceled for retacrit injection. Hgb 10.1. Scheduled to return on 6/15/23. Verbalized understanding.

## 2023-06-14 ENCOUNTER — LAB VISIT (OUTPATIENT)
Dept: LAB | Facility: HOSPITAL | Age: 63
End: 2023-06-14
Attending: INTERNAL MEDICINE
Payer: COMMERCIAL

## 2023-06-14 DIAGNOSIS — D64.9 NORMOCHROMIC ANEMIA: ICD-10-CM

## 2023-06-14 DIAGNOSIS — D57.3 SICKLE CELL TRAIT SYNDROME: ICD-10-CM

## 2023-06-14 LAB
ALBUMIN SERPL BCP-MCNC: 4.3 G/DL (ref 3.5–5.2)
ALP SERPL-CCNC: 65 U/L (ref 55–135)
ALT SERPL W/O P-5'-P-CCNC: 24 U/L (ref 10–44)
ANION GAP SERPL CALC-SCNC: 6 MMOL/L (ref 8–16)
AST SERPL-CCNC: 26 U/L (ref 10–40)
BASOPHILS # BLD AUTO: 0.09 K/UL (ref 0–0.2)
BASOPHILS NFR BLD: 1.3 % (ref 0–1.9)
BILIRUB SERPL-MCNC: 0.8 MG/DL (ref 0.1–1)
BUN SERPL-MCNC: 28 MG/DL (ref 8–23)
CALCIUM SERPL-MCNC: 8.8 MG/DL (ref 8.7–10.5)
CHLORIDE SERPL-SCNC: 108 MMOL/L (ref 95–110)
CO2 SERPL-SCNC: 27 MMOL/L (ref 23–29)
CREAT SERPL-MCNC: 2.1 MG/DL (ref 0.5–1.4)
DIFFERENTIAL METHOD: ABNORMAL
EOSINOPHIL # BLD AUTO: 0.2 K/UL (ref 0–0.5)
EOSINOPHIL NFR BLD: 2.8 % (ref 0–8)
ERYTHROCYTE [DISTWIDTH] IN BLOOD BY AUTOMATED COUNT: 26.4 % (ref 11.5–14.5)
EST. GFR  (NO RACE VARIABLE): 34.7 ML/MIN/1.73 M^2
GLUCOSE SERPL-MCNC: 98 MG/DL (ref 70–110)
HCT VFR BLD AUTO: 28 % (ref 40–54)
HGB BLD-MCNC: 9.4 G/DL (ref 14–18)
IMM GRANULOCYTES # BLD AUTO: 0.02 K/UL (ref 0–0.04)
IMM GRANULOCYTES NFR BLD AUTO: 0.3 % (ref 0–0.5)
LYMPHOCYTES # BLD AUTO: 1.6 K/UL (ref 1–4.8)
LYMPHOCYTES NFR BLD: 23.9 % (ref 18–48)
MCH RBC QN AUTO: 30.3 PG (ref 27–31)
MCHC RBC AUTO-ENTMCNC: 33.6 G/DL (ref 32–36)
MCV RBC AUTO: 90 FL (ref 82–98)
MONOCYTES # BLD AUTO: 0.8 K/UL (ref 0.3–1)
MONOCYTES NFR BLD: 11.2 % (ref 4–15)
NEUTROPHILS # BLD AUTO: 4.1 K/UL (ref 1.8–7.7)
NEUTROPHILS NFR BLD: 60.5 % (ref 38–73)
NRBC BLD-RTO: 0 /100 WBC
PLATELET # BLD AUTO: 360 K/UL (ref 150–450)
PMV BLD AUTO: 11.4 FL (ref 9.2–12.9)
POTASSIUM SERPL-SCNC: 4.4 MMOL/L (ref 3.5–5.1)
PROT SERPL-MCNC: 7.8 G/DL (ref 6–8.4)
RBC # BLD AUTO: 3.1 M/UL (ref 4.6–6.2)
SODIUM SERPL-SCNC: 141 MMOL/L (ref 136–145)
WBC # BLD AUTO: 6.81 K/UL (ref 3.9–12.7)

## 2023-06-14 PROCEDURE — 85025 COMPLETE CBC W/AUTO DIFF WBC: CPT | Performed by: INTERNAL MEDICINE

## 2023-06-14 PROCEDURE — 80053 COMPREHEN METABOLIC PANEL: CPT | Performed by: INTERNAL MEDICINE

## 2023-06-14 PROCEDURE — 36415 COLL VENOUS BLD VENIPUNCTURE: CPT | Performed by: INTERNAL MEDICINE

## 2023-06-15 ENCOUNTER — INFUSION (OUTPATIENT)
Dept: INFUSION THERAPY | Facility: HOSPITAL | Age: 63
End: 2023-06-15
Attending: INTERNAL MEDICINE
Payer: COMMERCIAL

## 2023-06-15 VITALS
RESPIRATION RATE: 17 BRPM | WEIGHT: 225 LBS | BODY MASS INDEX: 32.21 KG/M2 | TEMPERATURE: 98 F | HEIGHT: 70 IN | DIASTOLIC BLOOD PRESSURE: 75 MMHG | HEART RATE: 78 BPM | OXYGEN SATURATION: 96 % | SYSTOLIC BLOOD PRESSURE: 117 MMHG

## 2023-06-15 DIAGNOSIS — D64.9 NORMOCHROMIC ANEMIA: ICD-10-CM

## 2023-06-15 DIAGNOSIS — D64.89 ANEMIA DUE TO MULTIPLE MECHANISMS: ICD-10-CM

## 2023-06-15 DIAGNOSIS — N18.30 STAGE 3 CHRONIC KIDNEY DISEASE, UNSPECIFIED WHETHER STAGE 3A OR 3B CKD: ICD-10-CM

## 2023-06-15 DIAGNOSIS — D46.9 MDS (MYELODYSPLASTIC SYNDROME): Primary | ICD-10-CM

## 2023-06-15 DIAGNOSIS — D64.9 CHRONIC ANEMIA: ICD-10-CM

## 2023-06-15 PROCEDURE — 96372 THER/PROPH/DIAG INJ SC/IM: CPT

## 2023-06-15 PROCEDURE — 63600175 PHARM REV CODE 636 W HCPCS: Mod: JZ,JG | Performed by: NURSE PRACTITIONER

## 2023-06-15 RX ADMIN — EPOETIN ALFA-EPBX 40000 UNITS: 40000 INJECTION, SOLUTION INTRAVENOUS; SUBCUTANEOUS at 04:06

## 2023-06-15 NOTE — PLAN OF CARE
Problem: Anemia  Goal: Anemia Symptom Improvement  Outcome: Ongoing, Progressing  Intervention: Monitor and Manage Anemia  Flowsheets (Taken 6/15/2023 1620)  Oral Nutrition Promotion: safe use of adaptive equipment encouraged  Safety Promotion/Fall Prevention: assistive device/personal item within reach  Fatigue Management: activity schedule adjusted

## 2023-06-22 ENCOUNTER — INFUSION (OUTPATIENT)
Dept: INFUSION THERAPY | Facility: HOSPITAL | Age: 63
End: 2023-06-22
Attending: INTERNAL MEDICINE
Payer: COMMERCIAL

## 2023-06-22 ENCOUNTER — OFFICE VISIT (OUTPATIENT)
Dept: HEMATOLOGY/ONCOLOGY | Facility: CLINIC | Age: 63
End: 2023-06-22
Payer: COMMERCIAL

## 2023-06-22 VITALS
DIASTOLIC BLOOD PRESSURE: 72 MMHG | TEMPERATURE: 98 F | BODY MASS INDEX: 32.5 KG/M2 | TEMPERATURE: 98 F | HEIGHT: 70 IN | RESPIRATION RATE: 16 BRPM | SYSTOLIC BLOOD PRESSURE: 136 MMHG | SYSTOLIC BLOOD PRESSURE: 136 MMHG | BODY MASS INDEX: 32.52 KG/M2 | RESPIRATION RATE: 16 BRPM | WEIGHT: 227 LBS | HEART RATE: 74 BPM | WEIGHT: 227.19 LBS | DIASTOLIC BLOOD PRESSURE: 72 MMHG | HEIGHT: 70 IN | HEART RATE: 74 BPM

## 2023-06-22 DIAGNOSIS — D46.1 RARS (REFRACTORY ANEMIA WITH RINGED SIDEROBLASTS): ICD-10-CM

## 2023-06-22 DIAGNOSIS — D46.9 MDS (MYELODYSPLASTIC SYNDROME): Primary | ICD-10-CM

## 2023-06-22 DIAGNOSIS — N18.30 STAGE 3 CHRONIC KIDNEY DISEASE, UNSPECIFIED WHETHER STAGE 3A OR 3B CKD: ICD-10-CM

## 2023-06-22 DIAGNOSIS — D64.9 CHRONIC ANEMIA: ICD-10-CM

## 2023-06-22 DIAGNOSIS — D64.89 ANEMIA DUE TO MULTIPLE MECHANISMS: ICD-10-CM

## 2023-06-22 DIAGNOSIS — D64.9 NORMOCHROMIC ANEMIA: ICD-10-CM

## 2023-06-22 PROCEDURE — 1159F PR MEDICATION LIST DOCUMENTED IN MEDICAL RECORD: ICD-10-PCS | Mod: CPTII,S$GLB,, | Performed by: NURSE PRACTITIONER

## 2023-06-22 PROCEDURE — 3008F PR BODY MASS INDEX (BMI) DOCUMENTED: ICD-10-PCS | Mod: CPTII,S$GLB,, | Performed by: NURSE PRACTITIONER

## 2023-06-22 PROCEDURE — 3078F DIAST BP <80 MM HG: CPT | Mod: CPTII,S$GLB,, | Performed by: NURSE PRACTITIONER

## 2023-06-22 PROCEDURE — 99214 PR OFFICE/OUTPT VISIT, EST, LEVL IV, 30-39 MIN: ICD-10-PCS | Mod: S$GLB,,, | Performed by: NURSE PRACTITIONER

## 2023-06-22 PROCEDURE — 99214 OFFICE O/P EST MOD 30 MIN: CPT | Mod: S$GLB,,, | Performed by: NURSE PRACTITIONER

## 2023-06-22 PROCEDURE — 3008F BODY MASS INDEX DOCD: CPT | Mod: CPTII,S$GLB,, | Performed by: NURSE PRACTITIONER

## 2023-06-22 PROCEDURE — 96372 THER/PROPH/DIAG INJ SC/IM: CPT

## 2023-06-22 PROCEDURE — 63600175 PHARM REV CODE 636 W HCPCS: Mod: JZ,JG | Performed by: NURSE PRACTITIONER

## 2023-06-22 PROCEDURE — 3078F PR MOST RECENT DIASTOLIC BLOOD PRESSURE < 80 MM HG: ICD-10-PCS | Mod: CPTII,S$GLB,, | Performed by: NURSE PRACTITIONER

## 2023-06-22 PROCEDURE — 3044F HG A1C LEVEL LT 7.0%: CPT | Mod: CPTII,S$GLB,, | Performed by: NURSE PRACTITIONER

## 2023-06-22 PROCEDURE — 1159F MED LIST DOCD IN RCRD: CPT | Mod: CPTII,S$GLB,, | Performed by: NURSE PRACTITIONER

## 2023-06-22 PROCEDURE — 3075F PR MOST RECENT SYSTOLIC BLOOD PRESS GE 130-139MM HG: ICD-10-PCS | Mod: CPTII,S$GLB,, | Performed by: NURSE PRACTITIONER

## 2023-06-22 PROCEDURE — 3075F SYST BP GE 130 - 139MM HG: CPT | Mod: CPTII,S$GLB,, | Performed by: NURSE PRACTITIONER

## 2023-06-22 PROCEDURE — 3044F PR MOST RECENT HEMOGLOBIN A1C LEVEL <7.0%: ICD-10-PCS | Mod: CPTII,S$GLB,, | Performed by: NURSE PRACTITIONER

## 2023-06-22 RX ADMIN — EPOETIN ALFA-EPBX 40000 UNITS: 40000 INJECTION, SOLUTION INTRAVENOUS; SUBCUTANEOUS at 03:06

## 2023-06-22 NOTE — PROGRESS NOTES
"          [facility] Hematology/Oncology                 PROGRESS NOTE      Subjective:       Patient ID:   NAME: Rick Butler : 1960     63 y.o. male    Referring Doc: Chetna (new PCP)  Other Physicians: Getachew; Alan Mustafa    Chief Complaint:  Sickle cell trait/anemia/RARS  f/u    History of Present Illness:     Patient returns today for a regularly scheduled follow-up visit.  The patient is doing ok overall. He is here by himself today.     He saw Dr Hilario at [facility] again recently last month and she was considering switching to reblozyl if hemoglobin stays below 10.     no pain crisis;  breathing ok; no CP, SOB, HA's or N/V;     He currently feels "good."     He saw new PCP Chetna on 4/10/2023 and he sees him again in October.   Discussed covid19 precautions - he had his vaccinations      ROS:   GEN: normal without any fever, night sweats or weight loss  HEENT: normal with no HA's, sore throat, stiff neck, changes in vision  CV: normal with no CP, SOB, PND, MORA or orthopnea  PULM: normal with no SOB, cough, hemoptysis, sputum or pleuritic pain  GI: normal with no abdominal pain, nausea, vomiting, constipation, diarrhea, melanotic stools, BRBPR, or hematemesis  : normal with no hematuria, dysuria  BREAST: normal with no mass, discharge, pain  SKIN: normal with no rash, erythema, bruising, or swelling    Allergies:  Review of patient's allergies indicates:  No Known Allergies    Medications:    Current Outpatient Medications:     amLODIPine (NORVASC) 5 MG tablet, Take 1 tablet (5 mg total) by mouth once daily., Disp: 90 tablet, Rfl: 4    ascorbic acid, vitamin C, (VITAMIN C) 1000 MG tablet, Take 1,000 mg by mouth once daily., Disp: , Rfl:     atorvastatin (LIPITOR) 10 MG tablet, TAKE 1 TABLET BY MOUTH EVERY DAY IN THE EVENING, Disp: 90 tablet, Rfl: 1    epoetin tray (PROCRIT INJ), Thursday, Disp: , Rfl:     famotidine (PEPCID) 20 MG tablet, famotidine 20 mg tablet  Take 1 tablet twice a day by oral route., " "Disp: , Rfl:     folic acid (FOLVITE) 1 MG tablet, Take 2 tablets (2 mg total) by mouth once daily., Disp: 180 tablet, Rfl: 4    multivitamin capsule, Take 1 capsule by mouth once daily., Disp: , Rfl:     pantoprazole (PROTONIX) 40 MG tablet, , Disp: , Rfl:     sertraline (ZOLOFT) 25 MG tablet, Take 25 mg by mouth once daily., Disp: , Rfl:     tamsulosin (FLOMAX) 0.4 mg Cap, Take 1 capsule (0.4 mg total) by mouth once daily., Disp: 90 capsule, Rfl: 4    traZODone (DESYREL) 100 MG tablet, Take 1 tablet (100 mg total) by mouth nightly as needed for Insomnia., Disp: 90 tablet, Rfl: 4    sildenafil (VIAGRA) 100 MG tablet, Take 1 tablet (100 mg total) by mouth daily as needed for Erectile Dysfunction., Disp: 10 tablet, Rfl: 6    PMHx/PSHx Updates:  See patient's last visit with Dr. Tello on 5/11/2023  See H&P on 7/9/2011      Pathology:    12/20/2019  BONE MARROW, RIGHT ILIAC CREST,    ASPIRATE, CLOT SECTION, AND CORE BIOPSY:    --HYPERCELLULAR MARROW (APPROXIMATELY 75% TO 80%) WITH TRILINEAGE   HEMATOPOIETIC ELEMENTS, ERYTHROID  HYPERPLASIA AND MILD MEGAKARYOCYTIC HYPERPLASIA, AND NON-SPECIFIC   DYSHEMATOPOIETIC CHANGES (SEE     COMMENT).  --[id number] INCREASED STAINABLE IRON WITH INCREASED RING   SIDEROBLASTS (GREATER THAN 15%)     (SEE COMMENT).  --PERIPHERAL BLOOD WITH THROMBOCYTOSIS (574,000/MICROLITER) AND ANEMIA   (HEMOGLOBIN 5.5 GRAM/DECILITER),    WITH SCATTERED DREPANOCYTOID FORMS AND FEW NUCLEATED ERYTHROCYTES      Cytogenetic Results at the bottom of the report.  NORMAL    Objective:     Vitals:  Blood pressure 136/72, pulse 74, temperature 97.9 °F (36.6 °C), resp. rate 16, height 5' 10" (1.778 m), weight 103.1 kg (227 lb 3.2 oz).    Physical Examination:   GEN: no apparent distress, comfortable; AAOx3  HEAD: atraumatic and normocephalic  EYES: no pallor, no icterus, PERRLA  ENT: OMM, no pharyngeal erythema, external ears WNL; no nasal discharge; no thrush  NECK: no masses, thyroid normal, " trachea midline, no LAD/LN's, supple  CV: RRR with no murmur; normal pulse; normal S1 and S2; no pedal edema  CHEST: Normal respiratory effort; CTAB; normal breath sounds; no wheeze or crackles  ABDOM: nontender and nondistended; soft; normal bowel sounds; no rebound/guarding  MUSC/Skeletal: ROM normal; no crepitus; joints normal; no deformities or arthropathy  EXTREM: no clubbing, cyanosis, inflammation or swelling  SKIN: no rashes, lesions, ulcers, petechiae or subcutaneous nodules  : no jiang  NEURO: grossly intact; motor/sensory WNL; AAOx3; no tremors  PSYCH: normal mood, affect and behavior  LYMPH: normal cervical, supraclavicular, axillary and groin LN's            Labs:     Lab Results   Component Value Date    WBC 6.81 06/14/2023    HGB 9.4 (L) 06/14/2023    HCT 28.0 (L) 06/14/2023    MCV 90 06/14/2023     06/14/2023           CMP  Sodium   Date Value Ref Range Status   06/14/2023 141 136 - 145 mmol/L Final   06/26/2019 138 134 - 144 mmol/L      Potassium   Date Value Ref Range Status   06/14/2023 4.4 3.5 - 5.1 mmol/L Final     Chloride   Date Value Ref Range Status   06/14/2023 108 95 - 110 mmol/L Final   06/26/2019 105 98 - 110 mmol/L      CO2   Date Value Ref Range Status   06/14/2023 27 23 - 29 mmol/L Final     Glucose   Date Value Ref Range Status   06/14/2023 98 70 - 110 mg/dL Final   06/26/2019 114 (H) 70 - 99 mg/dL      BUN   Date Value Ref Range Status   06/14/2023 28 (H) 8 - 23 mg/dL Final     Creatinine   Date Value Ref Range Status   06/14/2023 2.1 (H) 0.5 - 1.4 mg/dL Final   06/26/2019 1.62 (H) 0.60 - 1.40 mg/dL      Calcium   Date Value Ref Range Status   06/14/2023 8.8 8.7 - 10.5 mg/dL Final     Total Protein   Date Value Ref Range Status   06/14/2023 7.8 6.0 - 8.4 g/dL Final     Albumin   Date Value Ref Range Status   06/14/2023 4.3 3.5 - 5.2 g/dL Final   06/26/2019 4.4 3.1 - 4.7 g/dL      Total Bilirubin   Date Value Ref Range Status   06/14/2023 0.8 0.1 - 1.0 mg/dL Final      Comment:     For infants and newborns, interpretation of results should be based  on gestational age, weight and in agreement with clinical  observations.    Premature Infant recommended reference ranges:  Up to 24 hours.............<8.0 mg/dL  Up to 48 hours............<12.0 mg/dL  3-5 days..................<15.0 mg/dL  6-29 days.................<15.0 mg/dL       Alkaline Phosphatase   Date Value Ref Range Status   06/14/2023 65 55 - 135 U/L Final     AST   Date Value Ref Range Status   06/14/2023 26 10 - 40 U/L Final     ALT   Date Value Ref Range Status   06/14/2023 24 10 - 44 U/L Final     Anion Gap   Date Value Ref Range Status   06/14/2023 6 (L) 8 - 16 mmol/L Final     eGFR if    Date Value Ref Range Status   07/13/2022 45.6 (A) >60 mL/min/1.73 m^2 Final     eGFR if non    Date Value Ref Range Status   07/13/2022 39.5 (A) >60 mL/min/1.73 m^2 Final     Comment:     Calculation used to obtain the estimated glomerular filtration  rate (eGFR) is the CKD-EPI equation.              I have reviewed all available lab results and radiology reports.    Radiology/Diagnostic Studies:    US 2/8/2023:    IMPRESSION:  1. Dilation of the common bile duct at up to 10 mm, unchanged when compared to 2016.  2. Nonvisualization of the pancreas because of overlying bowel gas.  3. No other significant findings.  spleen is normal in size and appearance      2/15/2018 Xray Survey:   IMPRESSION: No significant abnormality seen. No evidence of osteoblastic or  osteoclastic lesions identified    MRI L-spine 2/12/2018  IMPRESSION:    1. Prominent low signal intensity throughout the bone marrow on T1 and  T2-weighted imaging. Marrow infiltrative process including malignancy such as  metastatic disease or lymphoma/leukemia is a consideration along with multiple  myeloma or malignant process. Benign etiology such as osteopetrosis or  regenerative red marrow are also considerations.    2. Multilevel lumbar  "degenerative changes, most prominent at L4-L5 and L5-S1 as  described.    Assessment/Plan:   (1) 63 y.o. male with diagnosis of sickle cell anemia with borderline microcytosis  - latest hgb was 6.0 and he had blood transfusion  - today, he is not symptomatic at this time  - he probably has a multifactorial anemia process with underlying sickle cell, anemia of chronic disorders and anemia of chronic renal. I can not rule out an underlying GI bleeding process.   - iron panel is adequate (no deficiency)  - total bilirubin is only minimally elevated  - he required transfusion again in Oct 2019 and again in Dec 2019  - he had bone marrow biopsy on 12/20/2019    "dyshematopoietic changes are not entirely specific and are present  mostly within erythroid and megakaryocytic lineages. These findings   could be observed in chronic disease states, autoimmune conditions,  infectious settings, toxic exposures, nutritional deficits, as medication/drug effect, and in myelodysplastic syndrome (MDS), among other conditions"  "Additionally, ring sideroblasts are a non-specific   finding "    Patient was referred to see Dr Mustafa at [facility] and had repeat BM biopsy with diagnosis made of RARS  - he is now on procrit per directives of Dr Mustafa    7/30/2020:   he recently saw Dr Mustafa  And sees him again in Jan 2021  - he is doing well with procrit and is feeling much better  - he has not required any transfusions for some time time    9/24/2020:  - latest hgb at 8.5  - will increase the procrit dose  - f/u with Dr Zavala in Jan 2021 11/19/2020:  - patient doing well and feels "great"  - hgb up to 9.1; continued on the procrit  - f/u with Dr Mustafa in Jan 2021    3/4/2021:  - he saw Dr Mustafa in jan 2021 and sees him again in June/July 2021  - latest hgb at 9.1 and stable and platelets are 474,000; wbc at 10.0    6/10/2021:  - latest hgb at 9.0  - plats 485,000  - on weekly procrit  - seeing Dr Mustafa again " tomorrow    10/14/2021:  - hgb at 8.6 but he recently had been taking naproxen and had some BRBPR - which since resolved  - he saw Dr Beyer with GI and he is having colonsocopy on Nov 5th along with EGD  - plats 429,000  - he sees Dr Mustafa again in Nov 19th   - he sees Dr Mas again in Dec 2021    1/13/2022:  - He has been under the care of Dr Mustafa at [facility] for RARS- MDS. He is now on procrit weekly. He sees Dr Mustafa again in March 2022    - hgb 9.3  - he had scope with Dr Collins in nov 2021 which was good per patient and they plan to repeat in 5 yrs    5/12/2022:  - hgb at 9.0  - plats 485  - wbc 7.25  - he is on procrit weekly  - saw Dr Mustafa this past Fridday and since he is leaving, he will start seeing Dr Hilario instead in 3 months    8/18/2022:  - latest hgb a little lower at 8.8  - he has been on the procrit  - he saw Dr Hilario recently at [facility] and plans to see her again in 3 months    1/26/2023:  - He saw Dr Hilario at [facility] again recently this past Tuesday and they are considering another medication; he plans to see her again in April 2023  - Dr Hilario requested he have an US of spleen - will order here in [place]  - he is also on appetite stimulant  - hgb at 9.4 and plats at 500k    5/11/2023:  - hgb at 9.6 and plats 466,000  - He saw Dr Hilario at [facility] again recently this past Tuesday and they are considering another medication; he plans to see her again in Aug 2023    6/22/2023:   - discussed switching to reblozyl   - information given to patient   - will reach out to Dr. ROSENTHAL to see if she is ready to switch the patient over   - continue to follow up with Dr. Mas.     (2) Alcohol abuse issues in past - he has been sober for over 3 years now    (3) New findings on radiography with abnormal chest CT and lung mass  - former smoker    (4) chronic back issues - prior Xrays and MRI - suspect the findings on the MRI are possibly due to his sickle cell;   - SPEP was previously negative for M-protein  - PSA  "was 0.3 in Sept 2017  - recommended neurosurgery evaluation previously  - he saw Dr Nura VUONG and had a nerve "burning" procedure and his pain has improved    (5) CRI - elevated creatinine - he is followed Dr Chilel with nephrology      (6) H pylori gastritis - s/p recent endoscopy with Dr Collins and antibotics x 10 days    1. 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 Continue with the procrit - will talk to Dr. Hilario about switching to Reblozyl   2. Planned f/u in Aug 2023 with Dr Hilario at [facility] - will talk to her about switching to Reblozyl   3.  F/u with nephrology as directed by them -   Dr Mas  - just saw nephrology with a good report   4. Check labs every 2 weeks             RTC 4 weeks with Dr. Tello  Fax note to Dennis Basilio Tveit;  [place]            Discussion:       Pathology Discussion:    I reviewed and discussed the pathology report(s) and radiograph reports (if available) in as simple to understand and/or laymen's terms to the best of my ability. I had an indepth conversation with the patient and went over the patient's individual diagnosis based on the information that was currently available. I discussed the TNM staging process with regard to the patient's particular cancer type, and the calculated stage based on the currently available TNM data and literature. I discussed the available prognostic data with regard to the current staging information and how it relates to the prognosis of their particular neoplastic process.          COVID-19 Discussion:    I had long discussion with patient and any applicable family about the COVID-19 coronavirus epidemic and the recommended precautions with regard to cancer and/or hematology patients. I have re-iterated the CDC recommendations for adequate hand washing, use of hand -like products, and coughing into elbow, etc. In addition, especially for our patients who are on " chemotherapy and/or our otherwise immunocompromised patients, I have recommended avoidance of crowds, including movie theaters, restaurants, churches, etc. I have recommended avoidance of any sick or symptomatic family members and/or friends. I have also recommended avoidance of any raw and unwashed food products, and general avoidance of food items that have not been prepared by themselves. The patient has been asked to call us immediately with any symptom developments, issues, questions or other general concerns.     I have explained all of the above in detail and the patient understands all of the current recommendation(s). I have answered all of their questions to the best of my ability and to their complete satisfaction.   The patient is to continue with the current management plan.            Electronically signed by Pratima Stringer, MSN,APRN,AGNP-C

## 2023-06-28 ENCOUNTER — LAB VISIT (OUTPATIENT)
Dept: LAB | Facility: HOSPITAL | Age: 63
End: 2023-06-28
Attending: INTERNAL MEDICINE
Payer: COMMERCIAL

## 2023-06-28 DIAGNOSIS — D57.3 SICKLE CELL TRAIT SYNDROME: ICD-10-CM

## 2023-06-28 DIAGNOSIS — D64.9 NORMOCHROMIC ANEMIA: ICD-10-CM

## 2023-06-28 LAB
ALBUMIN SERPL BCP-MCNC: 4.3 G/DL (ref 3.5–5.2)
ALP SERPL-CCNC: 62 U/L (ref 55–135)
ALT SERPL W/O P-5'-P-CCNC: 19 U/L (ref 10–44)
ANION GAP SERPL CALC-SCNC: 7 MMOL/L (ref 8–16)
AST SERPL-CCNC: 20 U/L (ref 10–40)
BASOPHILS # BLD AUTO: 0.11 K/UL (ref 0–0.2)
BASOPHILS NFR BLD: 1.4 % (ref 0–1.9)
BILIRUB SERPL-MCNC: 0.6 MG/DL (ref 0.1–1)
BUN SERPL-MCNC: 31 MG/DL (ref 8–23)
CALCIUM SERPL-MCNC: 8.8 MG/DL (ref 8.7–10.5)
CHLORIDE SERPL-SCNC: 108 MMOL/L (ref 95–110)
CO2 SERPL-SCNC: 26 MMOL/L (ref 23–29)
CREAT SERPL-MCNC: 1.9 MG/DL (ref 0.5–1.4)
DIFFERENTIAL METHOD: ABNORMAL
EOSINOPHIL # BLD AUTO: 0.2 K/UL (ref 0–0.5)
EOSINOPHIL NFR BLD: 2.3 % (ref 0–8)
ERYTHROCYTE [DISTWIDTH] IN BLOOD BY AUTOMATED COUNT: 26.9 % (ref 11.5–14.5)
EST. GFR  (NO RACE VARIABLE): 39.1 ML/MIN/1.73 M^2
FERRITIN SERPL-MCNC: 1337 NG/ML (ref 20–300)
GLUCOSE SERPL-MCNC: 100 MG/DL (ref 70–110)
HCT VFR BLD AUTO: 28.7 % (ref 40–54)
HGB BLD-MCNC: 9.6 G/DL (ref 14–18)
IMM GRANULOCYTES # BLD AUTO: 0.02 K/UL (ref 0–0.04)
IMM GRANULOCYTES NFR BLD AUTO: 0.3 % (ref 0–0.5)
IRON SERPL-MCNC: 71 UG/DL (ref 45–160)
LYMPHOCYTES # BLD AUTO: 1.6 K/UL (ref 1–4.8)
LYMPHOCYTES NFR BLD: 20.6 % (ref 18–48)
MCH RBC QN AUTO: 30.2 PG (ref 27–31)
MCHC RBC AUTO-ENTMCNC: 33.4 G/DL (ref 32–36)
MCV RBC AUTO: 90 FL (ref 82–98)
MONOCYTES # BLD AUTO: 0.9 K/UL (ref 0.3–1)
MONOCYTES NFR BLD: 11.1 % (ref 4–15)
NEUTROPHILS # BLD AUTO: 5 K/UL (ref 1.8–7.7)
NEUTROPHILS NFR BLD: 64.3 % (ref 38–73)
NRBC BLD-RTO: 0 /100 WBC
PLATELET # BLD AUTO: 482 K/UL (ref 150–450)
PMV BLD AUTO: 10.4 FL (ref 9.2–12.9)
POTASSIUM SERPL-SCNC: 4.3 MMOL/L (ref 3.5–5.1)
PROT SERPL-MCNC: 7.6 G/DL (ref 6–8.4)
RBC # BLD AUTO: 3.18 M/UL (ref 4.6–6.2)
SATURATED IRON: 37 % (ref 20–50)
SODIUM SERPL-SCNC: 141 MMOL/L (ref 136–145)
TOTAL IRON BINDING CAPACITY: 193 UG/DL (ref 250–450)
TRANSFERRIN SERPL-MCNC: 138 MG/DL (ref 200–375)
WBC # BLD AUTO: 7.76 K/UL (ref 3.9–12.7)

## 2023-06-28 PROCEDURE — 36415 COLL VENOUS BLD VENIPUNCTURE: CPT | Performed by: INTERNAL MEDICINE

## 2023-06-28 PROCEDURE — 84466 ASSAY OF TRANSFERRIN: CPT | Performed by: INTERNAL MEDICINE

## 2023-06-28 PROCEDURE — 82728 ASSAY OF FERRITIN: CPT | Performed by: INTERNAL MEDICINE

## 2023-06-28 PROCEDURE — 80053 COMPREHEN METABOLIC PANEL: CPT | Performed by: INTERNAL MEDICINE

## 2023-06-28 PROCEDURE — 85025 COMPLETE CBC W/AUTO DIFF WBC: CPT | Performed by: INTERNAL MEDICINE

## 2023-06-29 ENCOUNTER — INFUSION (OUTPATIENT)
Dept: INFUSION THERAPY | Facility: HOSPITAL | Age: 63
End: 2023-06-29
Attending: INTERNAL MEDICINE
Payer: COMMERCIAL

## 2023-06-29 VITALS
TEMPERATURE: 98 F | HEART RATE: 74 BPM | HEIGHT: 70 IN | RESPIRATION RATE: 17 BRPM | DIASTOLIC BLOOD PRESSURE: 76 MMHG | WEIGHT: 225.38 LBS | SYSTOLIC BLOOD PRESSURE: 120 MMHG | BODY MASS INDEX: 32.27 KG/M2 | OXYGEN SATURATION: 98 %

## 2023-06-29 DIAGNOSIS — D64.9 CHRONIC ANEMIA: ICD-10-CM

## 2023-06-29 DIAGNOSIS — N18.30 STAGE 3 CHRONIC KIDNEY DISEASE, UNSPECIFIED WHETHER STAGE 3A OR 3B CKD: ICD-10-CM

## 2023-06-29 DIAGNOSIS — D64.9 NORMOCHROMIC ANEMIA: ICD-10-CM

## 2023-06-29 DIAGNOSIS — D46.9 MDS (MYELODYSPLASTIC SYNDROME): Primary | ICD-10-CM

## 2023-06-29 DIAGNOSIS — D64.89 ANEMIA DUE TO MULTIPLE MECHANISMS: ICD-10-CM

## 2023-06-29 PROCEDURE — 96372 THER/PROPH/DIAG INJ SC/IM: CPT

## 2023-06-29 PROCEDURE — 63600175 PHARM REV CODE 636 W HCPCS: Mod: JZ,JG | Performed by: NURSE PRACTITIONER

## 2023-06-29 RX ADMIN — EPOETIN ALFA-EPBX 40000 UNITS: 40000 INJECTION, SOLUTION INTRAVENOUS; SUBCUTANEOUS at 04:06

## 2023-06-29 NOTE — PLAN OF CARE
Problem: Anemia  Goal: Anemia Symptom Improvement  Outcome: Ongoing, Progressing  Intervention: Monitor and Manage Anemia  Flowsheets (Taken 6/29/2023 1605)  Oral Nutrition Promotion:   safe use of adaptive equipment encouraged   social interaction promoted  Safety Promotion/Fall Prevention: medications reviewed  Fatigue Management:   fatigue-related activity identified   frequent rest breaks encouraged   paced activity encouraged

## 2023-07-07 ENCOUNTER — INFUSION (OUTPATIENT)
Dept: INFUSION THERAPY | Facility: HOSPITAL | Age: 63
End: 2023-07-07
Attending: INTERNAL MEDICINE
Payer: COMMERCIAL

## 2023-07-07 VITALS
RESPIRATION RATE: 16 BRPM | OXYGEN SATURATION: 99 % | WEIGHT: 218.88 LBS | DIASTOLIC BLOOD PRESSURE: 80 MMHG | TEMPERATURE: 98 F | BODY MASS INDEX: 31.33 KG/M2 | HEIGHT: 70 IN | SYSTOLIC BLOOD PRESSURE: 119 MMHG | HEART RATE: 78 BPM

## 2023-07-07 DIAGNOSIS — D64.9 CHRONIC ANEMIA: ICD-10-CM

## 2023-07-07 DIAGNOSIS — N18.30 STAGE 3 CHRONIC KIDNEY DISEASE, UNSPECIFIED WHETHER STAGE 3A OR 3B CKD: ICD-10-CM

## 2023-07-07 DIAGNOSIS — D46.9 MDS (MYELODYSPLASTIC SYNDROME): Primary | ICD-10-CM

## 2023-07-07 DIAGNOSIS — D64.9 NORMOCHROMIC ANEMIA: ICD-10-CM

## 2023-07-07 DIAGNOSIS — D64.89 ANEMIA DUE TO MULTIPLE MECHANISMS: ICD-10-CM

## 2023-07-07 PROCEDURE — 96372 THER/PROPH/DIAG INJ SC/IM: CPT

## 2023-07-07 PROCEDURE — 63600175 PHARM REV CODE 636 W HCPCS: Mod: JZ,JG | Performed by: NURSE PRACTITIONER

## 2023-07-07 RX ADMIN — EPOETIN ALFA-EPBX 40000 UNITS: 40000 INJECTION, SOLUTION INTRAVENOUS; SUBCUTANEOUS at 03:07

## 2023-07-07 NOTE — PLAN OF CARE
Problem: Anemia  Goal: Anemia Symptom Improvement  Outcome: Ongoing, Progressing  Intervention: Monitor and Manage Anemia  Flowsheets (Taken 7/7/2023 4402)  Fatigue Management:   activity schedule adjusted   frequent rest breaks encouraged   paced activity encouraged   fatigue-related activity identified   activity assistance provided

## 2023-07-10 ENCOUNTER — TELEPHONE (OUTPATIENT)
Dept: FAMILY MEDICINE | Facility: CLINIC | Age: 63
End: 2023-07-10

## 2023-07-12 ENCOUNTER — LAB VISIT (OUTPATIENT)
Dept: LAB | Facility: HOSPITAL | Age: 63
End: 2023-07-12
Attending: INTERNAL MEDICINE
Payer: COMMERCIAL

## 2023-07-12 DIAGNOSIS — D64.9 NORMOCHROMIC ANEMIA: ICD-10-CM

## 2023-07-12 DIAGNOSIS — D57.3 SICKLE CELL TRAIT SYNDROME: ICD-10-CM

## 2023-07-12 LAB
ALBUMIN SERPL BCP-MCNC: 4.2 G/DL (ref 3.5–5.2)
ALP SERPL-CCNC: 53 U/L (ref 55–135)
ALT SERPL W/O P-5'-P-CCNC: 18 U/L (ref 10–44)
ANION GAP SERPL CALC-SCNC: 2 MMOL/L (ref 8–16)
AST SERPL-CCNC: 16 U/L (ref 10–40)
BASOPHILS # BLD AUTO: 0.08 K/UL (ref 0–0.2)
BASOPHILS NFR BLD: 1.3 % (ref 0–1.9)
BILIRUB SERPL-MCNC: 0.9 MG/DL (ref 0.1–1)
BUN SERPL-MCNC: 31 MG/DL (ref 8–23)
CALCIUM SERPL-MCNC: 8.5 MG/DL (ref 8.7–10.5)
CHLORIDE SERPL-SCNC: 107 MMOL/L (ref 95–110)
CO2 SERPL-SCNC: 28 MMOL/L (ref 23–29)
CREAT SERPL-MCNC: 2.2 MG/DL (ref 0.5–1.4)
DIFFERENTIAL METHOD: ABNORMAL
EOSINOPHIL # BLD AUTO: 0.2 K/UL (ref 0–0.5)
EOSINOPHIL NFR BLD: 2.9 % (ref 0–8)
ERYTHROCYTE [DISTWIDTH] IN BLOOD BY AUTOMATED COUNT: 26.7 % (ref 11.5–14.5)
EST. GFR  (NO RACE VARIABLE): 32.8 ML/MIN/1.73 M^2
GLUCOSE SERPL-MCNC: 102 MG/DL (ref 70–110)
HCT VFR BLD AUTO: 28.9 % (ref 40–54)
HGB BLD-MCNC: 9.6 G/DL (ref 14–18)
IMM GRANULOCYTES # BLD AUTO: 0.01 K/UL (ref 0–0.04)
IMM GRANULOCYTES NFR BLD AUTO: 0.2 % (ref 0–0.5)
LYMPHOCYTES # BLD AUTO: 1.6 K/UL (ref 1–4.8)
LYMPHOCYTES NFR BLD: 25.9 % (ref 18–48)
MCH RBC QN AUTO: 30 PG (ref 27–31)
MCHC RBC AUTO-ENTMCNC: 33.2 G/DL (ref 32–36)
MCV RBC AUTO: 90 FL (ref 82–98)
MONOCYTES # BLD AUTO: 0.8 K/UL (ref 0.3–1)
MONOCYTES NFR BLD: 13.2 % (ref 4–15)
NEUTROPHILS # BLD AUTO: 3.5 K/UL (ref 1.8–7.7)
NEUTROPHILS NFR BLD: 56.5 % (ref 38–73)
NRBC BLD-RTO: 0 /100 WBC
PLATELET # BLD AUTO: 471 K/UL (ref 150–450)
PMV BLD AUTO: 11.1 FL (ref 9.2–12.9)
POTASSIUM SERPL-SCNC: 4.3 MMOL/L (ref 3.5–5.1)
PROT SERPL-MCNC: 7.3 G/DL (ref 6–8.4)
RBC # BLD AUTO: 3.2 M/UL (ref 4.6–6.2)
SODIUM SERPL-SCNC: 137 MMOL/L (ref 136–145)
WBC # BLD AUTO: 6.14 K/UL (ref 3.9–12.7)

## 2023-07-12 PROCEDURE — 36415 COLL VENOUS BLD VENIPUNCTURE: CPT | Performed by: INTERNAL MEDICINE

## 2023-07-12 PROCEDURE — 85025 COMPLETE CBC W/AUTO DIFF WBC: CPT | Performed by: INTERNAL MEDICINE

## 2023-07-12 PROCEDURE — 80053 COMPREHEN METABOLIC PANEL: CPT | Performed by: INTERNAL MEDICINE

## 2023-07-13 ENCOUNTER — INFUSION (OUTPATIENT)
Dept: INFUSION THERAPY | Facility: HOSPITAL | Age: 63
End: 2023-07-13
Attending: INTERNAL MEDICINE
Payer: COMMERCIAL

## 2023-07-13 ENCOUNTER — TELEPHONE (OUTPATIENT)
Dept: HEMATOLOGY/ONCOLOGY | Facility: CLINIC | Age: 63
End: 2023-07-13

## 2023-07-13 VITALS
DIASTOLIC BLOOD PRESSURE: 73 MMHG | OXYGEN SATURATION: 97 % | HEIGHT: 70 IN | RESPIRATION RATE: 18 BRPM | WEIGHT: 224.31 LBS | BODY MASS INDEX: 32.11 KG/M2 | SYSTOLIC BLOOD PRESSURE: 117 MMHG | HEART RATE: 70 BPM | TEMPERATURE: 97 F

## 2023-07-13 DIAGNOSIS — D46.9 MDS (MYELODYSPLASTIC SYNDROME): Primary | ICD-10-CM

## 2023-07-13 DIAGNOSIS — N18.30 STAGE 3 CHRONIC KIDNEY DISEASE, UNSPECIFIED WHETHER STAGE 3A OR 3B CKD: ICD-10-CM

## 2023-07-13 DIAGNOSIS — D64.89 ANEMIA DUE TO MULTIPLE MECHANISMS: ICD-10-CM

## 2023-07-13 DIAGNOSIS — D64.9 NORMOCHROMIC ANEMIA: ICD-10-CM

## 2023-07-13 DIAGNOSIS — D64.9 CHRONIC ANEMIA: ICD-10-CM

## 2023-07-13 PROCEDURE — 63600175 PHARM REV CODE 636 W HCPCS: Mod: EC,JG | Performed by: NURSE PRACTITIONER

## 2023-07-13 PROCEDURE — 96372 THER/PROPH/DIAG INJ SC/IM: CPT

## 2023-07-13 RX ADMIN — EPOETIN ALFA-EPBX 40000 UNITS: 40000 INJECTION, SOLUTION INTRAVENOUS; SUBCUTANEOUS at 03:07

## 2023-07-13 NOTE — TELEPHONE ENCOUNTER
Dr Tello reviewed patient's labs and per his verbal order, called patient to confirm that he was still seeing Dr Mas for nephrology and to encourage him to stay hydrated. Patient reports he is still seeing Dr Mas and will follow up regarding his labs, he also reports he is drinking water all day and keeping himself hydrated.

## 2023-07-20 ENCOUNTER — TELEPHONE (OUTPATIENT)
Dept: HEMATOLOGY/ONCOLOGY | Facility: CLINIC | Age: 63
End: 2023-07-20

## 2023-07-20 ENCOUNTER — INFUSION (OUTPATIENT)
Dept: INFUSION THERAPY | Facility: HOSPITAL | Age: 63
End: 2023-07-20
Attending: INTERNAL MEDICINE
Payer: COMMERCIAL

## 2023-07-20 VITALS
SYSTOLIC BLOOD PRESSURE: 125 MMHG | DIASTOLIC BLOOD PRESSURE: 76 MMHG | WEIGHT: 226.31 LBS | TEMPERATURE: 98 F | RESPIRATION RATE: 17 BRPM | OXYGEN SATURATION: 96 % | HEART RATE: 75 BPM | BODY MASS INDEX: 32.4 KG/M2 | HEIGHT: 70 IN

## 2023-07-20 DIAGNOSIS — D64.9 NORMOCHROMIC ANEMIA: ICD-10-CM

## 2023-07-20 DIAGNOSIS — D64.9 CHRONIC ANEMIA: ICD-10-CM

## 2023-07-20 DIAGNOSIS — D46.9 MDS (MYELODYSPLASTIC SYNDROME): Primary | ICD-10-CM

## 2023-07-20 DIAGNOSIS — D64.89 ANEMIA DUE TO MULTIPLE MECHANISMS: ICD-10-CM

## 2023-07-20 DIAGNOSIS — N18.30 STAGE 3 CHRONIC KIDNEY DISEASE, UNSPECIFIED WHETHER STAGE 3A OR 3B CKD: ICD-10-CM

## 2023-07-20 PROCEDURE — 96372 THER/PROPH/DIAG INJ SC/IM: CPT

## 2023-07-20 PROCEDURE — 63600175 PHARM REV CODE 636 W HCPCS: Mod: EC,JG | Performed by: NURSE PRACTITIONER

## 2023-07-20 RX ADMIN — EPOETIN ALFA-EPBX 40000 UNITS: 40000 INJECTION, SOLUTION INTRAVENOUS; SUBCUTANEOUS at 04:07

## 2023-07-20 NOTE — TELEPHONE ENCOUNTER
----- Message from Malissa Dewey RN sent at 7/20/2023 10:22 AM CDT -----  I tried to explain to him that his insurance will not cover unless he has had so many transfusion in a certain amount of time, he does not understand this as he says the retacrit is not getting his blood levels high enough. Can you please call to explain further.  ----- Message -----  From: Pratima Stringer NP  Sent: 7/20/2023   8:24 AM CDT  To: Malissa Dewey RN    Yes just tell him, to continue the retacrit bc insurance wont approve new drug    ----- Message -----  From: Malissa Dewey RN  Sent: 7/18/2023  12:53 PM CDT  To: Pratima Stringer NP    Do you know anything about this? I see mention of Rebloyzl in the notes, not sure if this is what he is referring to. His labs look to be stable   ----- Message -----  From: Callie Mejia  Sent: 7/18/2023  10:11 AM CDT  To: Elisha [facility] Staff    The patient said he is supposed to be put on a new medication and that his insurance will not approve it because they said he needs to have 3 transfusions before he can be approved for it. # [phone]

## 2023-07-20 NOTE — PLAN OF CARE
Problem: Activity Intolerance  Goal: Enhanced Capacity and Energy  Outcome: Ongoing, Progressing  Intervention: Optimize Activity Tolerance  Flowsheets (Taken 7/20/2023 1608)  Self-Care Promotion: independence encouraged  Activity Management: Ambulated -L4

## 2023-07-26 ENCOUNTER — LAB VISIT (OUTPATIENT)
Dept: LAB | Facility: HOSPITAL | Age: 63
End: 2023-07-26
Attending: INTERNAL MEDICINE
Payer: COMMERCIAL

## 2023-07-26 ENCOUNTER — OFFICE VISIT (OUTPATIENT)
Dept: FAMILY MEDICINE | Facility: CLINIC | Age: 63
End: 2023-07-26
Payer: COMMERCIAL

## 2023-07-26 VITALS
SYSTOLIC BLOOD PRESSURE: 128 MMHG | TEMPERATURE: 98 F | WEIGHT: 224 LBS | HEART RATE: 74 BPM | DIASTOLIC BLOOD PRESSURE: 76 MMHG | OXYGEN SATURATION: 98 % | HEIGHT: 70 IN | BODY MASS INDEX: 32.07 KG/M2

## 2023-07-26 DIAGNOSIS — D46.9 MDS (MYELODYSPLASTIC SYNDROME): ICD-10-CM

## 2023-07-26 DIAGNOSIS — E78.5 DYSLIPIDEMIA: ICD-10-CM

## 2023-07-26 DIAGNOSIS — N18.32 STAGE 3B CHRONIC KIDNEY DISEASE: ICD-10-CM

## 2023-07-26 DIAGNOSIS — I10 ESSENTIAL HYPERTENSION: ICD-10-CM

## 2023-07-26 DIAGNOSIS — R07.9 CHEST PAIN, UNSPECIFIED TYPE: Primary | ICD-10-CM

## 2023-07-26 DIAGNOSIS — D64.9 NORMOCHROMIC ANEMIA: ICD-10-CM

## 2023-07-26 DIAGNOSIS — D64.89 ANEMIA DUE TO MULTIPLE MECHANISMS: ICD-10-CM

## 2023-07-26 DIAGNOSIS — D57.3 SICKLE CELL TRAIT SYNDROME: ICD-10-CM

## 2023-07-26 LAB
ALBUMIN SERPL BCP-MCNC: 4.2 G/DL (ref 3.5–5.2)
ALP SERPL-CCNC: 61 U/L (ref 55–135)
ALT SERPL W/O P-5'-P-CCNC: 17 U/L (ref 10–44)
ANION GAP SERPL CALC-SCNC: 5 MMOL/L (ref 8–16)
AST SERPL-CCNC: 20 U/L (ref 10–40)
BASOPHILS # BLD AUTO: 0.11 K/UL (ref 0–0.2)
BASOPHILS NFR BLD: 1.6 % (ref 0–1.9)
BILIRUB SERPL-MCNC: 0.5 MG/DL (ref 0.1–1)
BUN SERPL-MCNC: 28 MG/DL (ref 8–23)
CALCIUM SERPL-MCNC: 8.8 MG/DL (ref 8.7–10.5)
CHLORIDE SERPL-SCNC: 109 MMOL/L (ref 95–110)
CO2 SERPL-SCNC: 27 MMOL/L (ref 23–29)
CREAT SERPL-MCNC: 1.9 MG/DL (ref 0.5–1.4)
DIFFERENTIAL METHOD: ABNORMAL
EOSINOPHIL # BLD AUTO: 0.2 K/UL (ref 0–0.5)
EOSINOPHIL NFR BLD: 2.5 % (ref 0–8)
ERYTHROCYTE [DISTWIDTH] IN BLOOD BY AUTOMATED COUNT: 27.7 % (ref 11.5–14.5)
EST. GFR  (NO RACE VARIABLE): 39.1 ML/MIN/1.73 M^2
FERRITIN SERPL-MCNC: 1293 NG/ML (ref 20–300)
GLUCOSE SERPL-MCNC: 109 MG/DL (ref 70–110)
HCT VFR BLD AUTO: 30.7 % (ref 40–54)
HGB BLD-MCNC: 10.2 G/DL (ref 14–18)
IMM GRANULOCYTES # BLD AUTO: 0.03 K/UL (ref 0–0.04)
IMM GRANULOCYTES NFR BLD AUTO: 0.4 % (ref 0–0.5)
IRON SERPL-MCNC: 106 UG/DL (ref 45–160)
LYMPHOCYTES # BLD AUTO: 1.6 K/UL (ref 1–4.8)
LYMPHOCYTES NFR BLD: 23.9 % (ref 18–48)
MCH RBC QN AUTO: 30.4 PG (ref 27–31)
MCHC RBC AUTO-ENTMCNC: 33.2 G/DL (ref 32–36)
MCV RBC AUTO: 91 FL (ref 82–98)
MONOCYTES # BLD AUTO: 0.7 K/UL (ref 0.3–1)
MONOCYTES NFR BLD: 10.2 % (ref 4–15)
NEUTROPHILS # BLD AUTO: 4.1 K/UL (ref 1.8–7.7)
NEUTROPHILS NFR BLD: 61.4 % (ref 38–73)
NRBC BLD-RTO: 0 /100 WBC
PLATELET # BLD AUTO: 506 K/UL (ref 150–450)
PMV BLD AUTO: 10.3 FL (ref 9.2–12.9)
POTASSIUM SERPL-SCNC: 4.4 MMOL/L (ref 3.5–5.1)
PROT SERPL-MCNC: 7.4 G/DL (ref 6–8.4)
RBC # BLD AUTO: 3.36 M/UL (ref 4.6–6.2)
SATURATED IRON: 56 % (ref 20–50)
SODIUM SERPL-SCNC: 141 MMOL/L (ref 136–145)
TOTAL IRON BINDING CAPACITY: 190 UG/DL (ref 250–450)
TRANSFERRIN SERPL-MCNC: 136 MG/DL (ref 200–375)
WBC # BLD AUTO: 6.75 K/UL (ref 3.9–12.7)

## 2023-07-26 PROCEDURE — 84466 ASSAY OF TRANSFERRIN: CPT | Performed by: INTERNAL MEDICINE

## 2023-07-26 PROCEDURE — 3074F SYST BP LT 130 MM HG: CPT | Mod: CPTII,S$GLB,, | Performed by: NURSE PRACTITIONER

## 2023-07-26 PROCEDURE — 82728 ASSAY OF FERRITIN: CPT | Performed by: INTERNAL MEDICINE

## 2023-07-26 PROCEDURE — 1159F PR MEDICATION LIST DOCUMENTED IN MEDICAL RECORD: ICD-10-PCS | Mod: CPTII,S$GLB,, | Performed by: NURSE PRACTITIONER

## 2023-07-26 PROCEDURE — 3074F PR MOST RECENT SYSTOLIC BLOOD PRESSURE < 130 MM HG: ICD-10-PCS | Mod: CPTII,S$GLB,, | Performed by: NURSE PRACTITIONER

## 2023-07-26 PROCEDURE — 3044F HG A1C LEVEL LT 7.0%: CPT | Mod: CPTII,S$GLB,, | Performed by: NURSE PRACTITIONER

## 2023-07-26 PROCEDURE — 3044F PR MOST RECENT HEMOGLOBIN A1C LEVEL <7.0%: ICD-10-PCS | Mod: CPTII,S$GLB,, | Performed by: NURSE PRACTITIONER

## 2023-07-26 PROCEDURE — 3008F BODY MASS INDEX DOCD: CPT | Mod: CPTII,S$GLB,, | Performed by: NURSE PRACTITIONER

## 2023-07-26 PROCEDURE — 99214 PR OFFICE/OUTPT VISIT, EST, LEVL IV, 30-39 MIN: ICD-10-PCS | Mod: S$GLB,,, | Performed by: NURSE PRACTITIONER

## 2023-07-26 PROCEDURE — 1160F PR REVIEW ALL MEDS BY PRESCRIBER/CLIN PHARMACIST DOCUMENTED: ICD-10-PCS | Mod: CPTII,S$GLB,, | Performed by: NURSE PRACTITIONER

## 2023-07-26 PROCEDURE — 1159F MED LIST DOCD IN RCRD: CPT | Mod: CPTII,S$GLB,, | Performed by: NURSE PRACTITIONER

## 2023-07-26 PROCEDURE — 99214 OFFICE O/P EST MOD 30 MIN: CPT | Mod: S$GLB,,, | Performed by: NURSE PRACTITIONER

## 2023-07-26 PROCEDURE — 3078F PR MOST RECENT DIASTOLIC BLOOD PRESSURE < 80 MM HG: ICD-10-PCS | Mod: CPTII,S$GLB,, | Performed by: NURSE PRACTITIONER

## 2023-07-26 PROCEDURE — 1160F RVW MEDS BY RX/DR IN RCRD: CPT | Mod: CPTII,S$GLB,, | Performed by: NURSE PRACTITIONER

## 2023-07-26 PROCEDURE — 85025 COMPLETE CBC W/AUTO DIFF WBC: CPT | Performed by: INTERNAL MEDICINE

## 2023-07-26 PROCEDURE — 3078F DIAST BP <80 MM HG: CPT | Mod: CPTII,S$GLB,, | Performed by: NURSE PRACTITIONER

## 2023-07-26 PROCEDURE — 3008F PR BODY MASS INDEX (BMI) DOCUMENTED: ICD-10-PCS | Mod: CPTII,S$GLB,, | Performed by: NURSE PRACTITIONER

## 2023-07-26 PROCEDURE — 80053 COMPREHEN METABOLIC PANEL: CPT | Performed by: INTERNAL MEDICINE

## 2023-07-26 PROCEDURE — 36415 COLL VENOUS BLD VENIPUNCTURE: CPT | Performed by: INTERNAL MEDICINE

## 2023-07-26 NOTE — PROGRESS NOTES
SUBJECTIVE:      Patient ID: Rick Butler is a 63 y.o. male.    Chief Complaint: Chest Pain (Pt states when he came off vacation he started experience chest pains he said the stress of driving could have caused it but wants to get checked out)    63-year-old male presents to the clinic with complaints of chest pain.  He reports sharp chest pain while driving on the .  Chest pain was intermittent for 10 min.  The pain was nonradiating. This was the first time he had chest pain and has not returned since.  Mother passed away from CHF in her late 60's and father passed away from a heart attach in his late 50's.  He does have HTN, HLD, anemia, sickle cell trait, MDS, and CKD. He receives weekly Procrit injections for hgb <10.  Anemia is stable per labs completed yesterday.  He does have elevated ferratin and platelets. CKD appears at baseline.  Denies SOB, palpitations, nausea, and diaphoresis.        Family History   Problem Relation Age of Onset    Arthritis Mother     Depression Mother     Heart disease Mother     Hypertension Mother     Heart attack Father 59      Social History     Socioeconomic History    Marital status:    Occupational History    Occupation: Prep Cook     Employer: [employer]   Tobacco Use    Smoking status: Former     Types: Cigarettes     Quit date: 1996     Years since quittin.5    Smokeless tobacco: Never   Substance and Sexual Activity    Alcohol use: Not Currently     Alcohol/week: 21.0 standard drinks     Types: 21 Cans of beer per week     Comment: Sober 5 years    Drug use: Not Currently     Types: Marijuana    Sexual activity: Yes     Partners: Female     Current Outpatient Medications   Medication Sig Dispense Refill    amLODIPine (NORVASC) 5 MG tablet Take 1 tablet (5 mg total) by mouth once daily. 90 tablet 4    ascorbic acid, vitamin C, (VITAMIN C) 1000 MG tablet Take 1,000 mg by mouth once daily.      atorvastatin (LIPITOR) 10 MG tablet TAKE 1  TABLET BY MOUTH EVERY DAY IN THE EVENING 90 tablet 1    epoetin tray (PROCRIT INJ) Thursday      famotidine (PEPCID) 20 MG tablet famotidine 20 mg tablet   Take 1 tablet twice a day by oral route.      folic acid (FOLVITE) 1 MG tablet Take 2 tablets (2 mg total) by mouth once daily. 180 tablet 4    multivitamin capsule Take 1 capsule by mouth once daily.      pantoprazole (PROTONIX) 40 MG tablet       sertraline (ZOLOFT) 25 MG tablet Take 25 mg by mouth once daily.      tamsulosin (FLOMAX) 0.4 mg Cap Take 1 capsule (0.4 mg total) by mouth once daily. 90 capsule 4    traZODone (DESYREL) 100 MG tablet Take 1 tablet (100 mg total) by mouth nightly as needed for Insomnia. 90 tablet 4    sildenafil (VIAGRA) 100 MG tablet Take 1 tablet (100 mg total) by mouth daily as needed for Erectile Dysfunction. 10 tablet 6     No current facility-administered medications for this visit.     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    Sickle cell trait      Past Surgical History:   Procedure Laterality Date    BONE MARROW BIOPSY N/A 12/20/2019    Procedure: BIOPSY, BONE MARROW;  Surgeon: Satinder Diagnostic Provider;  Location: [facility] OR;  Service: Interventional Radiology;  Laterality: N/A;    COLONOSCOPY N/A 11/5/2021    Procedure: COLONOSCOPY;  Surgeon: Rob Collins MD;  Location: [facility] ENDO;  Service: Endoscopy;  Laterality: N/A;    ESOPHAGOGASTRODUODENOSCOPY N/A 11/5/2021    Procedure: EGD (ESOPHAGOGASTRODUODENOSCOPY);  Surgeon: Rob Collins MD;  Location: [facility]  ENDO;  Service: Endoscopy;  Laterality: N/A;    INJECTION OF ANESTHETIC AGENT AROUND MEDIAL BRANCH NERVES INNERVATING LUMBAR FACET JOINT Bilateral 5/25/2018    Procedure: BLOCK-NERVE-MEDIAL BRANCH-LUMBAR;  Surgeon: Nura Heredia MD;  Location: [facility] OR;  Service: Pain Management;  Laterality: Bilateral;  L3, 4, 5    KNEE ARTHROSCOPY W/ MENISCECTOMY Right 12/22/2021    Procedure: ARTHROSCOPY, KNEE, WITH MENISCECTOMY;  Surgeon: Sebastian Green MD;  Location: [facility] OR;  Service: Orthopedics;  Laterality: Right;  partial medial meniscectomy    RADIOFREQUENCY ABLATION OF LUMBAR MEDIAL BRANCH NERVE AT SINGLE LEVEL Bilateral 7/20/2018    Procedure: RADIOFREQUENCY ABLATION, NERVE, MEDIAL BRANCH, LUMBAR, 1 LEVEL;  Surgeon: Nura Heredia MD;  Location: [facility] OR;  Service: Pain Management;  Laterality: Bilateral;  L3, 4, 5 - Burned at 80 degrees C.  for 75 seconds x 2 each site    SMALL BOWEL ENTEROSCOPY N/A 10/16/2019    Procedure: ENTEROSCOPY;  Surgeon: Rob Collins MD;  Location: [facility] ENDO;  Service: Endoscopy;  Laterality: N/A;       Review of Systems   Constitutional:  Negative for activity change, appetite change, chills, diaphoresis, fatigue, fever and unexpected weight change.   HENT:  Negative for congestion, ear pain, sinus pressure, sore throat, trouble swallowing and voice change.    Eyes:  Negative for pain, discharge and visual disturbance.   Respiratory:  Negative for cough, chest tightness, shortness of breath and wheezing.    Cardiovascular:  Positive for chest pain. Negative for palpitations.   Gastrointestinal:  Negative for abdominal pain, constipation, diarrhea, nausea and vomiting.   Genitourinary:  Negative for difficulty urinating, flank pain, frequency and urgency.   Musculoskeletal:  Negative for back pain and joint swelling.   Skin:  Negative for color change and rash.   Neurological:  Negative for dizziness, seizures, syncope, weakness, numbness and headaches.   Hematological:  Negative for adenopathy.  "  Psychiatric/Behavioral:  Negative for dysphoric mood and sleep disturbance. The patient is not nervous/anxious.     OBJECTIVE:      Vitals:    07/26/23 1547   BP: 128/76   BP Location: Left arm   Patient Position: Sitting   BP Method: Medium (Manual)   Pulse: 74   Temp: 98.4 °F (36.9 °C)   TempSrc: Oral   SpO2: 98%   Weight: 101.6 kg (224 lb)   Height: 5' 10" (1.778 m)     Physical Exam  Vitals and nursing note reviewed.   Constitutional:       General: He is awake. He is not in acute distress.     Appearance: Normal appearance. He is obese. He is not ill-appearing, toxic-appearing or diaphoretic.   HENT:      Head: Normocephalic and atraumatic.      Nose: Nose normal.   Eyes:      General: Lids are normal. Gaze aligned appropriately.      Conjunctiva/sclera: Conjunctivae normal.      Right eye: Right conjunctiva is not injected.      Left eye: Left conjunctiva is not injected.      Pupils: Pupils are equal, round, and reactive to light.   Cardiovascular:      Rate and Rhythm: Normal rate and regular rhythm.      Pulses: Normal pulses.      Heart sounds: Normal heart sounds, S1 normal and S2 normal. No murmur heard.    No friction rub. No gallop.   Pulmonary:      Effort: Pulmonary effort is normal. No respiratory distress.      Breath sounds: Normal breath sounds. No stridor. No decreased breath sounds, wheezing, rhonchi or rales.   Chest:      Chest wall: No tenderness.   Musculoskeletal:      Cervical back: Neck supple.      Right lower leg: No edema.      Left lower leg: No edema.   Lymphadenopathy:      Cervical: No cervical adenopathy.   Skin:     General: Skin is warm and dry.      Capillary Refill: Capillary refill takes less than 2 seconds.      Findings: No erythema or rash.   Neurological:      Mental Status: He is alert and oriented to person, place, and time. Mental status is at baseline.   Psychiatric:         Attention and Perception: Attention normal.         Mood and Affect: Mood normal.         " Speech: Speech normal.         Behavior: Behavior normal. Behavior is cooperative.         Thought Content: Thought content normal.         Judgment: Judgment normal.      Assessment:       1. Chest pain, unspecified type    2. Essential hypertension    3. Dyslipidemia    4. Stage 3b chronic kidney disease    5. MDS (myelodysplastic syndrome)    6. Anemia due to multiple mechanisms        Plan:       Chest pain, unspecified type  The chest pain has not returned. Unsure the cause at the time.  Given his past medical history and family history I have referred him to Cardiology for further eval. He is asymptomatic at this time.  ER precautions discussed.   -     Ambulatory referral/consult to Cardiology; Future; Expected date: 08/02/2023    Essential hypertension  Stable with amlodipine 5 mg. Reduce the amount of salt in your diet; Lose weight; Avoid drinking too much alcohol; Exercise at least 30 minutes per day most days of the week.    -     Ambulatory referral/consult to Cardiology; Future; Expected date: 08/02/2023    Dyslipidemia  Continue Lipitor 10 mg.  Limit red meat, butter, fried foods, cheese, and other foods that have a lot of saturated fat. Consume more: lean meats, fish, fruits, vegetables, whole grains, beans, lentils, and nuts.    -     Ambulatory referral/consult to Cardiology; Future; Expected date: 08/02/2023    Stage 3b chronic kidney disease  Appears at baseline, managed by Nephrology.     MDS (myelodysplastic syndrome)  Managed by Heme/Onc.    Anemia due to multiple mechanisms  Stable with Procrit, managed by Heme/Onc.     This note was created using [employer] voice recognition software that occasionally misinterprets phrases or words.     I spent a total of 30 minutes on the day of the visit.This includes face to face time and non-face to face time preparing to see the patient (eg, review of tests), obtaining and/or reviewing separately obtained history, documenting clinical information in the  electronic or other health record, independently interpreting results and communicating results to the patient/family/caregiver, or care coordinator.    Follow up for Keep follow-up in October.      7/26/2023 LATOYA Smith, DEVIKA

## 2023-07-27 ENCOUNTER — TELEPHONE (OUTPATIENT)
Dept: FAMILY MEDICINE | Facility: CLINIC | Age: 63
End: 2023-07-27
Payer: COMMERCIAL

## 2023-08-02 PROBLEM — D63.1 ANEMIA, CHRONIC RENAL FAILURE, STAGE 3 (MODERATE): Status: ACTIVE | Noted: 2023-08-02

## 2023-08-02 PROBLEM — N18.30 ANEMIA, CHRONIC RENAL FAILURE, STAGE 3 (MODERATE): Status: ACTIVE | Noted: 2023-08-02

## 2023-08-02 NOTE — PROGRESS NOTES
[facility] Hematology/Oncology               PROGRESS NOTE - Follow-up Visit      Subjective:       Patient ID:   NAME: Rick Butler : 1960     63 y.o. male    Referring Doc: Chetna (new PCP)  Other Physicians: Getachew; Alan Mustafa    Chief Complaint:  Sickle cell trait/anemia/RARS  f/u    History of Present Illness:     Patient returns today for a regularly scheduled follow-up visit.  The patient is doing ok overall. He is here by himself today.     His insurance will not approve of him getting the Reblozyl     He sees Dr Hilario again next Tuesday and we will await her directives.             He has not had any recent pain crisis. He denies having any breathing difficulties. He denies any blood in the stool, dark stools or hematuria; breathing ok; no CP, SOB, HA's or N/V;       He saw Chetna on ; he is seeing cardiology on     Discussed covid19 precautions - he had his vaccinations      ROS:   GEN: normal without any fever, night sweats or weight loss  HEENT: normal with no HA's, sore throat, stiff neck, changes in vision  CV: normal with no CP, SOB, PND, MORA or orthopnea  PULM: normal with no SOB, cough, hemoptysis, sputum or pleuritic pain  GI: normal with no abdominal pain, nausea, vomiting, constipation, diarrhea, melanotic stools, BRBPR, or hematemesis  : normal with no hematuria, dysuria  BREAST: normal with no mass, discharge, pain  SKIN: normal with no rash, erythema, bruising, or swelling    Allergies:  Review of patient's allergies indicates:  No Known Allergies    Medications:    Current Outpatient Medications:     amLODIPine (NORVASC) 5 MG tablet, Take 1 tablet (5 mg total) by mouth once daily., Disp: 90 tablet, Rfl: 4    ascorbic acid, vitamin C, (VITAMIN C) 1000 MG tablet, Take 1,000 mg by mouth once daily., Disp: , Rfl:     atorvastatin (LIPITOR) 10 MG tablet, TAKE 1 TABLET BY MOUTH EVERY DAY IN THE EVENING, Disp: 90 tablet, Rfl: 1    epoetin tray (PROCRIT INJ), Thursday, Disp: , Rfl:  "    famotidine (PEPCID) 20 MG tablet, famotidine 20 mg tablet  Take 1 tablet twice a day by oral route., Disp: , Rfl:     folic acid (FOLVITE) 1 MG tablet, Take 2 tablets (2 mg total) by mouth once daily., Disp: 180 tablet, Rfl: 4    multivitamin capsule, Take 1 capsule by mouth once daily., Disp: , Rfl:     pantoprazole (PROTONIX) 40 MG tablet, , Disp: , Rfl:     tamsulosin (FLOMAX) 0.4 mg Cap, Take 1 capsule (0.4 mg total) by mouth once daily., Disp: 90 capsule, Rfl: 4    traZODone (DESYREL) 100 MG tablet, Take 1 tablet (100 mg total) by mouth nightly as needed for Insomnia., Disp: 90 tablet, Rfl: 4    sertraline (ZOLOFT) 25 MG tablet, Take 25 mg by mouth once daily., Disp: , Rfl:     sildenafil (VIAGRA) 100 MG tablet, Take 1 tablet (100 mg total) by mouth daily as needed for Erectile Dysfunction., Disp: 10 tablet, Rfl: 6    PMHx/PSHx Updates:  See patient's last visit with me on 5/11/2023  See H&P on 7/9/2011      Pathology:    12/20/2019  BONE MARROW, RIGHT ILIAC CREST,    ASPIRATE, CLOT SECTION, AND CORE BIOPSY:    --HYPERCELLULAR MARROW (APPROXIMATELY 75% TO 80%) WITH TRILINEAGE   HEMATOPOIETIC ELEMENTS, ERYTHROID  HYPERPLASIA AND MILD MEGAKARYOCYTIC HYPERPLASIA, AND NON-SPECIFIC   DYSHEMATOPOIETIC CHANGES (SEE     COMMENT).  --[id number] INCREASED STAINABLE IRON WITH INCREASED RING   SIDEROBLASTS (GREATER THAN 15%)     (SEE COMMENT).  --PERIPHERAL BLOOD WITH THROMBOCYTOSIS (574,000/MICROLITER) AND ANEMIA   (HEMOGLOBIN 5.5 GRAM/DECILITER),    WITH SCATTERED DREPANOCYTOID FORMS AND FEW NUCLEATED ERYTHROCYTES      Cytogenetic Results at the bottom of the report.  NORMAL    Objective:     Vitals:  Blood pressure 130/75, pulse 80, temperature 98.2 °F (36.8 °C), resp. rate 18, height 5' 10" (1.778 m), weight 102.6 kg (226 lb 3.2 oz).    Physical Examination:   GEN: no apparent distress, comfortable; AAOx3  HEAD: atraumatic and normocephalic  EYES: no pallor, no icterus, PERRLA  ENT: OMM, no pharyngeal " erythema, external ears WNL; no nasal discharge; no thrush  NECK: no masses, thyroid normal, trachea midline, no LAD/LN's, supple  CV: RRR with no murmur; normal pulse; normal S1 and S2; no pedal edema  CHEST: Normal respiratory effort; CTAB; normal breath sounds; no wheeze or crackles  ABDOM: nontender and nondistended; soft; normal bowel sounds; no rebound/guarding  MUSC/Skeletal: ROM normal; no crepitus; joints normal; no deformities or arthropathy  EXTREM: no clubbing, cyanosis, inflammation or swelling  SKIN: no rashes, lesions, ulcers, petechiae or subcutaneous nodules  : no jiang  NEURO: grossly intact; motor/sensory WNL; AAOx3; no tremors  PSYCH: normal mood, affect and behavior  LYMPH: normal cervical, supraclavicular, axillary and groin LN's            Labs:     Lab Results   Component Value Date    WBC 6.75 07/26/2023    HGB 10.2 (L) 07/26/2023    HCT 30.7 (L) 07/26/2023    MCV 91 07/26/2023     (H) 07/26/2023           CMP  Sodium   Date Value Ref Range Status   07/26/2023 141 136 - 145 mmol/L Final   06/26/2019 138 134 - 144 mmol/L      Potassium   Date Value Ref Range Status   07/26/2023 4.4 3.5 - 5.1 mmol/L Final     Chloride   Date Value Ref Range Status   07/26/2023 109 95 - 110 mmol/L Final   06/26/2019 105 98 - 110 mmol/L      CO2   Date Value Ref Range Status   07/26/2023 27 23 - 29 mmol/L Final     Glucose   Date Value Ref Range Status   07/26/2023 109 70 - 110 mg/dL Final   06/26/2019 114 (H) 70 - 99 mg/dL      BUN   Date Value Ref Range Status   07/26/2023 28 (H) 8 - 23 mg/dL Final     Creatinine   Date Value Ref Range Status   07/26/2023 1.9 (H) 0.5 - 1.4 mg/dL Final   06/26/2019 1.62 (H) 0.60 - 1.40 mg/dL      Calcium   Date Value Ref Range Status   07/26/2023 8.8 8.7 - 10.5 mg/dL Final     Total Protein   Date Value Ref Range Status   07/26/2023 7.4 6.0 - 8.4 g/dL Final     Albumin   Date Value Ref Range Status   07/26/2023 4.2 3.5 - 5.2 g/dL Final   06/26/2019 4.4 3.1 - 4.7 g/dL       Total Bilirubin   Date Value Ref Range Status   07/26/2023 0.5 0.1 - 1.0 mg/dL Final     Comment:     For infants and newborns, interpretation of results should be based  on gestational age, weight and in agreement with clinical  observations.    Premature Infant recommended reference ranges:  Up to 24 hours.............<8.0 mg/dL  Up to 48 hours............<12.0 mg/dL  3-5 days..................<15.0 mg/dL  6-29 days.................<15.0 mg/dL       Alkaline Phosphatase   Date Value Ref Range Status   07/26/2023 61 55 - 135 U/L Final     AST   Date Value Ref Range Status   07/26/2023 20 10 - 40 U/L Final     ALT   Date Value Ref Range Status   07/26/2023 17 10 - 44 U/L Final     Anion Gap   Date Value Ref Range Status   07/26/2023 5 (L) 8 - 16 mmol/L Final     eGFR if    Date Value Ref Range Status   07/13/2022 45.6 (A) >60 mL/min/1.73 m^2 Final     eGFR if non    Date Value Ref Range Status   07/13/2022 39.5 (A) >60 mL/min/1.73 m^2 Final     Comment:     Calculation used to obtain the estimated glomerular filtration  rate (eGFR) is the CKD-EPI equation.          Lab Results   Component Value Date    IRON 106 07/26/2023    TIBC 190 (L) 07/26/2023    FERRITIN 1,293 (H) 07/26/2023           I have reviewed all available lab results and radiology reports.    Radiology/Diagnostic Studies:    US 2/8/2023:    IMPRESSION:  1. Dilation of the common bile duct at up to 10 mm, unchanged when compared to 2016.  2. Nonvisualization of the pancreas because of overlying bowel gas.  3. No other significant findings.  spleen is normal in size and appearance      2/15/2018 Xray Survey:   IMPRESSION: No significant abnormality seen. No evidence of osteoblastic or  osteoclastic lesions identified    MRI L-spine 2/12/2018  IMPRESSION:    1. Prominent low signal intensity throughout the bone marrow on T1 and  T2-weighted imaging. Marrow infiltrative process including malignancy such as  metastatic  "disease or lymphoma/leukemia is a consideration along with multiple  myeloma or malignant process. Benign etiology such as osteopetrosis or  regenerative red marrow are also considerations.    2. Multilevel lumbar degenerative changes, most prominent at L4-L5 and L5-S1 as  described.    Assessment/Plan:   (1) 63 y.o. male with diagnosis of sickle cell anemia with borderline microcytosis  - latest hgb was 6.0 and he had blood transfusion  - today, he is not symptomatic at this time  - he probably has a multifactorial anemia process with underlying sickle cell, anemia of chronic disorders and anemia of chronic renal. I can not rule out an underlying GI bleeding process.   - iron panel is adequate (no deficiency)  - total bilirubin is only minimally elevated  - he required transfusion again in Oct 2019 and again in Dec 2019  - he had bone marrow biopsy on 12/20/2019    "dyshematopoietic changes are not entirely specific and are present  mostly within erythroid and megakaryocytic lineages. These findings   could be observed in chronic disease states, autoimmune conditions,  infectious settings, toxic exposures, nutritional deficits, as medication/drug effect, and in myelodysplastic syndrome (MDS), among other conditions"  "Additionally, ring sideroblasts are a non-specific   finding "    Patient was referred to see Dr Mustafa at [facility] and had repeat BM biopsy with diagnosis made of RARS  - he is now on procrit per directives of Dr Mustafa    7/30/2020:   he recently saw Dr Mustafa  And sees him again in Jan 2021  - he is doing well with procrit and is feeling much better  - he has not required any transfusions for some time time    9/24/2020:  - latest hgb at 8.5  - will increase the procrit dose  - f/u with Dr Zavala in Jan 2021 11/19/2020:  - patient doing well and feels "great"  - hgb up to 9.1; continued on the procrit  - f/u with Dr Mustafa in Jan 2021    3/4/2021:  - he saw Dr Mustafa in jan 2021 and sees him again " in June/July 2021  - latest hgb at 9.1 and stable and platelets are 474,000; wbc at 10.0    6/10/2021:  - latest hgb at 9.0  - plats 485,000  - on weekly procrit  - seeing Dr Mustafa again tomorrow    10/14/2021:  - hgb at 8.6 but he recently had been taking naproxen and had some BRBPR - which since resolved  - he saw Dr Beyer with GI and he is having colonsocopy on Nov 5th along with EGD  - plats 429,000  - he sees Dr Mustafa again in Nov 19th   - he sees Dr Mas again in Dec 2021    1/13/2022:  - He has been under the care of Dr Mustafa at [facility] for RARS- MDS. He is now on procrit weekly. He sees Dr Mustafa again in March 2022    - hgb 9.3  - he had scope with Dr Collins in nov 2021 which was good per patient and they plan to repeat in 5 yrs    5/12/2022:  - hgb at 9.0  - plats 485  - wbc 7.25  - he is on procrit weekly  - saw Dr Mustafa this past Fridday and since he is leaving, he will start seeing Dr Hilario instead in 3 months    8/18/2022:  - latest hgb a little lower at 8.8  - he has been on the procrit  - he saw Dr Hilario recently at [facility] and plans to see her again in 3 months    1/26/2023:  - He saw Dr Hilario at [facility] again recently this past Tuesday and they are considering another medication; he plans to see her again in April 2023  - Dr Hilario requested he have an US of spleen - will order here in [place]  - he is also on appetite stimulant  - hgb at 9.4 and plats at 500k    5/11/2023:  - hgb at 9.6 and plats 466,000  - He saw Dr Hilario at [facility] again recently this past Tuesday and they are considering another medication; he plans to see her again in Aug 2023    8/3/2023:  - His insurance will not approve of him getting the Reblozyl   - He sees Dr Hilario again next Tuesday and we will await her directives.     (2) Alcohol abuse issues in past - he has been sober for over 3 years now    (3) New findings on radiography with abnormal chest CT and lung mass  - former smoker    (4) chronic back issues - prior Xrays  "and MRI - suspect the findings on the MRI are possibly due to his sickle cell;   - SPEP was previously negative for M-protein  - PSA was 0.3 in Sept 2017  - recommended neurosurgery evaluation previously  - he saw Dr Nura VUONG and had a nerve "burning" procedure and his pain has improved    (5) CRI - elevated creatinine - he is followed Dr Chilel with nephrology      (6) H pylori gastritis - s/p recent endoscopy with Dr Collins and antibotics x 10 days        1. MDS (myelodysplastic syndrome)        2. Monocytosis        3. Normochromic anemia        4. RARS (refractory anemia with ringed sideroblasts)        5. Sickle cell trait syndrome        6. Thrombocytosis        7. Chronic anemia        8. Anemia due to multiple mechanisms        9. Former smoker        10. H/O ETOH abuse        11. Anemia, chronic renal failure, stage 3 (moderate)          PLAN;  1. Continue with the procrit    2. Planned f/u with Dr Hilario at [facility] this coming Tuesday - await her recs and directives  3.  F/u with nephrology as directed by them -   Dr Mas    4. Check labs every 2 weeks             RTC 4 weeks  Fax note to [facility] Bas[facility] Tveit; Safa    Total Time spent on patient:    I spent over 25 mins of time with the patient. Reviewed results of the recently ordered labs, tests, reports and studies; made directives with regards to the results. Over half of this time was spent couseling and coordinating care, making treatment and analytical decisions; ordering necessary labs, tests and studies; and discussing treatment options and setting up treatment plan(s) if indicated.            Discussion:       Pathology Discussion:    I reviewed and discussed the pathology report(s) and radiograph reports (if available) in as simple to understand and/or laymen's terms to the best of my ability. I had an indepth conversation with the patient and went over the patient's individual diagnosis based on the information that was currently available. I " discussed the TNM staging process with regard to the patient's particular cancer type, and the calculated stage based on the currently available TNM data and literature. I discussed the available prognostic data with regard to the current staging information and how it relates to the prognosis of their particular neoplastic process.          COVID-19 Discussion:    I had long discussion with patient and any applicable family about the COVID-19 coronavirus epidemic and the recommended precautions with regard to cancer and/or hematology patients. I have re-iterated the CDC recommendations for adequate hand washing, use of hand -like products, and coughing into elbow, etc. In addition, especially for our patients who are on chemotherapy and/or our otherwise immunocompromised patients, I have recommended avoidance of crowds, including movie theaters, restaurants, churches, etc. I have recommended avoidance of any sick or symptomatic family members and/or friends. I have also recommended avoidance of any raw and unwashed food products, and general avoidance of food items that have not been prepared by themselves. The patient has been asked to call us immediately with any symptom developments, issues, questions or other general concerns.     I have explained all of the above in detail and the patient understands all of the current recommendation(s). I have answered all of their questions to the best of my ability and to their complete satisfaction.   The patient is to continue with the current management plan.            Electronically signed by Jose Tello MD

## 2023-08-03 ENCOUNTER — OFFICE VISIT (OUTPATIENT)
Dept: HEMATOLOGY/ONCOLOGY | Facility: CLINIC | Age: 63
End: 2023-08-03
Payer: COMMERCIAL

## 2023-08-03 VITALS
HEART RATE: 80 BPM | HEIGHT: 70 IN | SYSTOLIC BLOOD PRESSURE: 130 MMHG | WEIGHT: 226.19 LBS | RESPIRATION RATE: 18 BRPM | BODY MASS INDEX: 32.38 KG/M2 | TEMPERATURE: 98 F | DIASTOLIC BLOOD PRESSURE: 75 MMHG

## 2023-08-03 DIAGNOSIS — D46.1 RARS (REFRACTORY ANEMIA WITH RINGED SIDEROBLASTS): ICD-10-CM

## 2023-08-03 DIAGNOSIS — D57.3 SICKLE CELL TRAIT SYNDROME: ICD-10-CM

## 2023-08-03 DIAGNOSIS — D63.1 ANEMIA, CHRONIC RENAL FAILURE, STAGE 3 (MODERATE): ICD-10-CM

## 2023-08-03 DIAGNOSIS — D64.9 CHRONIC ANEMIA: ICD-10-CM

## 2023-08-03 DIAGNOSIS — D64.89 ANEMIA DUE TO MULTIPLE MECHANISMS: ICD-10-CM

## 2023-08-03 DIAGNOSIS — N18.30 ANEMIA, CHRONIC RENAL FAILURE, STAGE 3 (MODERATE): ICD-10-CM

## 2023-08-03 DIAGNOSIS — D72.821 MONOCYTOSIS: ICD-10-CM

## 2023-08-03 DIAGNOSIS — D46.9 MDS (MYELODYSPLASTIC SYNDROME): Primary | ICD-10-CM

## 2023-08-03 DIAGNOSIS — Z87.891 FORMER SMOKER: ICD-10-CM

## 2023-08-03 DIAGNOSIS — D64.9 NORMOCHROMIC ANEMIA: ICD-10-CM

## 2023-08-03 DIAGNOSIS — D75.839 THROMBOCYTOSIS: ICD-10-CM

## 2023-08-03 DIAGNOSIS — F10.11 H/O ETOH ABUSE: ICD-10-CM

## 2023-08-03 PROCEDURE — 3008F PR BODY MASS INDEX (BMI) DOCUMENTED: ICD-10-PCS | Mod: CPTII,S$GLB,, | Performed by: INTERNAL MEDICINE

## 2023-08-03 PROCEDURE — 1159F MED LIST DOCD IN RCRD: CPT | Mod: CPTII,S$GLB,, | Performed by: INTERNAL MEDICINE

## 2023-08-03 PROCEDURE — 1160F PR REVIEW ALL MEDS BY PRESCRIBER/CLIN PHARMACIST DOCUMENTED: ICD-10-PCS | Mod: CPTII,S$GLB,, | Performed by: INTERNAL MEDICINE

## 2023-08-03 PROCEDURE — 1160F RVW MEDS BY RX/DR IN RCRD: CPT | Mod: CPTII,S$GLB,, | Performed by: INTERNAL MEDICINE

## 2023-08-03 PROCEDURE — 99214 PR OFFICE/OUTPT VISIT, EST, LEVL IV, 30-39 MIN: ICD-10-PCS | Mod: S$GLB,,, | Performed by: INTERNAL MEDICINE

## 2023-08-03 PROCEDURE — 3075F PR MOST RECENT SYSTOLIC BLOOD PRESS GE 130-139MM HG: ICD-10-PCS | Mod: CPTII,S$GLB,, | Performed by: INTERNAL MEDICINE

## 2023-08-03 PROCEDURE — 3044F PR MOST RECENT HEMOGLOBIN A1C LEVEL <7.0%: ICD-10-PCS | Mod: CPTII,S$GLB,, | Performed by: INTERNAL MEDICINE

## 2023-08-03 PROCEDURE — 3008F BODY MASS INDEX DOCD: CPT | Mod: CPTII,S$GLB,, | Performed by: INTERNAL MEDICINE

## 2023-08-03 PROCEDURE — 1159F PR MEDICATION LIST DOCUMENTED IN MEDICAL RECORD: ICD-10-PCS | Mod: CPTII,S$GLB,, | Performed by: INTERNAL MEDICINE

## 2023-08-03 PROCEDURE — 3078F PR MOST RECENT DIASTOLIC BLOOD PRESSURE < 80 MM HG: ICD-10-PCS | Mod: CPTII,S$GLB,, | Performed by: INTERNAL MEDICINE

## 2023-08-03 PROCEDURE — 3044F HG A1C LEVEL LT 7.0%: CPT | Mod: CPTII,S$GLB,, | Performed by: INTERNAL MEDICINE

## 2023-08-03 PROCEDURE — 3075F SYST BP GE 130 - 139MM HG: CPT | Mod: CPTII,S$GLB,, | Performed by: INTERNAL MEDICINE

## 2023-08-03 PROCEDURE — 99214 OFFICE O/P EST MOD 30 MIN: CPT | Mod: S$GLB,,, | Performed by: INTERNAL MEDICINE

## 2023-08-03 PROCEDURE — 3078F DIAST BP <80 MM HG: CPT | Mod: CPTII,S$GLB,, | Performed by: INTERNAL MEDICINE

## 2023-08-09 ENCOUNTER — LAB VISIT (OUTPATIENT)
Dept: LAB | Facility: HOSPITAL | Age: 63
End: 2023-08-09
Attending: INTERNAL MEDICINE
Payer: COMMERCIAL

## 2023-08-09 DIAGNOSIS — D64.9 NORMOCHROMIC ANEMIA: ICD-10-CM

## 2023-08-09 DIAGNOSIS — D57.3 SICKLE CELL TRAIT SYNDROME: ICD-10-CM

## 2023-08-09 LAB
ALBUMIN SERPL BCP-MCNC: 4.2 G/DL (ref 3.5–5.2)
ALP SERPL-CCNC: 58 U/L (ref 55–135)
ALT SERPL W/O P-5'-P-CCNC: 21 U/L (ref 10–44)
ANION GAP SERPL CALC-SCNC: 4 MMOL/L (ref 8–16)
AST SERPL-CCNC: 23 U/L (ref 10–40)
BASOPHILS # BLD AUTO: 0.11 K/UL (ref 0–0.2)
BASOPHILS NFR BLD: 1.8 % (ref 0–1.9)
BILIRUB SERPL-MCNC: 0.5 MG/DL (ref 0.1–1)
BUN SERPL-MCNC: 29 MG/DL (ref 8–23)
CALCIUM SERPL-MCNC: 8.7 MG/DL (ref 8.7–10.5)
CHLORIDE SERPL-SCNC: 111 MMOL/L (ref 95–110)
CO2 SERPL-SCNC: 28 MMOL/L (ref 23–29)
CREAT SERPL-MCNC: 1.9 MG/DL (ref 0.5–1.4)
DIFFERENTIAL METHOD: ABNORMAL
EOSINOPHIL # BLD AUTO: 0.2 K/UL (ref 0–0.5)
EOSINOPHIL NFR BLD: 2.5 % (ref 0–8)
ERYTHROCYTE [DISTWIDTH] IN BLOOD BY AUTOMATED COUNT: 27.9 % (ref 11.5–14.5)
EST. GFR  (NO RACE VARIABLE): 39.1 ML/MIN/1.73 M^2
GLUCOSE SERPL-MCNC: 109 MG/DL (ref 70–110)
HCT VFR BLD AUTO: 28 % (ref 40–54)
HGB BLD-MCNC: 9.4 G/DL (ref 14–18)
IMM GRANULOCYTES # BLD AUTO: 0.01 K/UL (ref 0–0.04)
IMM GRANULOCYTES NFR BLD AUTO: 0.2 % (ref 0–0.5)
LYMPHOCYTES # BLD AUTO: 1.4 K/UL (ref 1–4.8)
LYMPHOCYTES NFR BLD: 22.4 % (ref 18–48)
MCH RBC QN AUTO: 30.1 PG (ref 27–31)
MCHC RBC AUTO-ENTMCNC: 33.6 G/DL (ref 32–36)
MCV RBC AUTO: 90 FL (ref 82–98)
MONOCYTES # BLD AUTO: 0.6 K/UL (ref 0.3–1)
MONOCYTES NFR BLD: 9.4 % (ref 4–15)
NEUTROPHILS # BLD AUTO: 3.9 K/UL (ref 1.8–7.7)
NEUTROPHILS NFR BLD: 63.7 % (ref 38–73)
NRBC BLD-RTO: 0 /100 WBC
PLATELET # BLD AUTO: 346 K/UL (ref 150–450)
PMV BLD AUTO: 10.2 FL (ref 9.2–12.9)
POTASSIUM SERPL-SCNC: 4.2 MMOL/L (ref 3.5–5.1)
PROT SERPL-MCNC: 7.5 G/DL (ref 6–8.4)
RBC # BLD AUTO: 3.12 M/UL (ref 4.6–6.2)
SCHISTOCYTES BLD QL SMEAR: ABNORMAL
SODIUM SERPL-SCNC: 143 MMOL/L (ref 136–145)
WBC # BLD AUTO: 6.04 K/UL (ref 3.9–12.7)

## 2023-08-09 PROCEDURE — 85025 COMPLETE CBC W/AUTO DIFF WBC: CPT | Performed by: INTERNAL MEDICINE

## 2023-08-09 PROCEDURE — 36415 COLL VENOUS BLD VENIPUNCTURE: CPT | Performed by: INTERNAL MEDICINE

## 2023-08-09 PROCEDURE — 80053 COMPREHEN METABOLIC PANEL: CPT | Performed by: INTERNAL MEDICINE

## 2023-08-10 ENCOUNTER — INFUSION (OUTPATIENT)
Dept: INFUSION THERAPY | Facility: HOSPITAL | Age: 63
End: 2023-08-10
Attending: INTERNAL MEDICINE
Payer: COMMERCIAL

## 2023-08-10 VITALS
OXYGEN SATURATION: 96 % | BODY MASS INDEX: 32.64 KG/M2 | HEIGHT: 70 IN | RESPIRATION RATE: 18 BRPM | WEIGHT: 228 LBS | TEMPERATURE: 98 F | SYSTOLIC BLOOD PRESSURE: 131 MMHG | HEART RATE: 71 BPM | DIASTOLIC BLOOD PRESSURE: 80 MMHG

## 2023-08-10 DIAGNOSIS — D64.9 CHRONIC ANEMIA: ICD-10-CM

## 2023-08-10 DIAGNOSIS — D64.89 ANEMIA DUE TO MULTIPLE MECHANISMS: ICD-10-CM

## 2023-08-10 DIAGNOSIS — D64.9 NORMOCHROMIC ANEMIA: ICD-10-CM

## 2023-08-10 DIAGNOSIS — D46.9 MDS (MYELODYSPLASTIC SYNDROME): Primary | ICD-10-CM

## 2023-08-10 DIAGNOSIS — N18.30 STAGE 3 CHRONIC KIDNEY DISEASE, UNSPECIFIED WHETHER STAGE 3A OR 3B CKD: ICD-10-CM

## 2023-08-10 PROCEDURE — 63600175 PHARM REV CODE 636 W HCPCS: Mod: JZ,EC,JG | Performed by: NURSE PRACTITIONER

## 2023-08-10 PROCEDURE — 96372 THER/PROPH/DIAG INJ SC/IM: CPT

## 2023-08-10 RX ADMIN — EPOETIN ALFA-EPBX 40000 UNITS: 20000 INJECTION, SOLUTION INTRAVENOUS; SUBCUTANEOUS at 04:08

## 2023-08-17 ENCOUNTER — INFUSION (OUTPATIENT)
Dept: INFUSION THERAPY | Facility: HOSPITAL | Age: 63
End: 2023-08-17
Attending: INTERNAL MEDICINE
Payer: COMMERCIAL

## 2023-08-17 VITALS
SYSTOLIC BLOOD PRESSURE: 122 MMHG | HEIGHT: 70 IN | DIASTOLIC BLOOD PRESSURE: 71 MMHG | HEART RATE: 79 BPM | RESPIRATION RATE: 16 BRPM | TEMPERATURE: 98 F | OXYGEN SATURATION: 97 % | BODY MASS INDEX: 32.45 KG/M2 | WEIGHT: 226.63 LBS

## 2023-08-17 DIAGNOSIS — D64.9 NORMOCHROMIC ANEMIA: ICD-10-CM

## 2023-08-17 DIAGNOSIS — D64.89 ANEMIA DUE TO MULTIPLE MECHANISMS: ICD-10-CM

## 2023-08-17 DIAGNOSIS — D64.9 CHRONIC ANEMIA: ICD-10-CM

## 2023-08-17 DIAGNOSIS — N18.30 STAGE 3 CHRONIC KIDNEY DISEASE, UNSPECIFIED WHETHER STAGE 3A OR 3B CKD: ICD-10-CM

## 2023-08-17 DIAGNOSIS — D46.9 MDS (MYELODYSPLASTIC SYNDROME): Primary | ICD-10-CM

## 2023-08-17 PROCEDURE — 96372 THER/PROPH/DIAG INJ SC/IM: CPT

## 2023-08-17 PROCEDURE — 63600175 PHARM REV CODE 636 W HCPCS: Mod: JZ,EC,JG | Performed by: NURSE PRACTITIONER

## 2023-08-17 RX ADMIN — EPOETIN ALFA-EPBX 40000 UNITS: 40000 INJECTION, SOLUTION INTRAVENOUS; SUBCUTANEOUS at 04:08

## 2023-08-17 NOTE — PLAN OF CARE
Problem: Anemia  Goal: Anemia Symptom Improvement  Outcome: Ongoing, Progressing  Intervention: Monitor and Manage Anemia  Flowsheets (Taken 8/17/2023 1602)  Fatigue Management:   activity schedule adjusted   paced activity encouraged   frequent rest breaks encouraged   fatigue-related activity identified

## 2023-08-23 ENCOUNTER — LAB VISIT (OUTPATIENT)
Dept: LAB | Facility: HOSPITAL | Age: 63
End: 2023-08-23
Attending: INTERNAL MEDICINE
Payer: COMMERCIAL

## 2023-08-23 DIAGNOSIS — D64.9 NORMOCHROMIC ANEMIA: ICD-10-CM

## 2023-08-23 DIAGNOSIS — D57.3 SICKLE CELL TRAIT SYNDROME: ICD-10-CM

## 2023-08-23 LAB
ALBUMIN SERPL BCP-MCNC: 4.6 G/DL (ref 3.5–5.2)
ALP SERPL-CCNC: 65 U/L (ref 55–135)
ALT SERPL W/O P-5'-P-CCNC: 19 U/L (ref 10–44)
ANION GAP SERPL CALC-SCNC: 7 MMOL/L (ref 8–16)
AST SERPL-CCNC: 20 U/L (ref 10–40)
BASOPHILS # BLD AUTO: 0.12 K/UL (ref 0–0.2)
BASOPHILS NFR BLD: 1.3 % (ref 0–1.9)
BILIRUB SERPL-MCNC: 1 MG/DL (ref 0.1–1)
BUN SERPL-MCNC: 27 MG/DL (ref 8–23)
CALCIUM SERPL-MCNC: 8.8 MG/DL (ref 8.7–10.5)
CHLORIDE SERPL-SCNC: 107 MMOL/L (ref 95–110)
CO2 SERPL-SCNC: 24 MMOL/L (ref 23–29)
CREAT SERPL-MCNC: 2 MG/DL (ref 0.5–1.4)
DIFFERENTIAL METHOD: ABNORMAL
EOSINOPHIL # BLD AUTO: 0.1 K/UL (ref 0–0.5)
EOSINOPHIL NFR BLD: 1.5 % (ref 0–8)
ERYTHROCYTE [DISTWIDTH] IN BLOOD BY AUTOMATED COUNT: 27.9 % (ref 11.5–14.5)
EST. GFR  (NO RACE VARIABLE): 36.8 ML/MIN/1.73 M^2
FERRITIN SERPL-MCNC: 1425 NG/ML (ref 20–300)
GLUCOSE SERPL-MCNC: 86 MG/DL (ref 70–110)
HCT VFR BLD AUTO: 27.6 % (ref 40–54)
HGB BLD-MCNC: 9.3 G/DL (ref 14–18)
IMM GRANULOCYTES # BLD AUTO: 0.06 K/UL (ref 0–0.04)
IMM GRANULOCYTES NFR BLD AUTO: 0.6 % (ref 0–0.5)
IRON SERPL-MCNC: 43 UG/DL (ref 45–160)
LYMPHOCYTES # BLD AUTO: 1.8 K/UL (ref 1–4.8)
LYMPHOCYTES NFR BLD: 19.1 % (ref 18–48)
MCH RBC QN AUTO: 29.9 PG (ref 27–31)
MCHC RBC AUTO-ENTMCNC: 33.7 G/DL (ref 32–36)
MCV RBC AUTO: 89 FL (ref 82–98)
MONOCYTES # BLD AUTO: 0.8 K/UL (ref 0.3–1)
MONOCYTES NFR BLD: 8.9 % (ref 4–15)
NEUTROPHILS # BLD AUTO: 6.4 K/UL (ref 1.8–7.7)
NEUTROPHILS NFR BLD: 68.6 % (ref 38–73)
NRBC BLD-RTO: 0 /100 WBC
PLATELET # BLD AUTO: 467 K/UL (ref 150–450)
PMV BLD AUTO: 9 FL (ref 9.2–12.9)
POTASSIUM SERPL-SCNC: 4.7 MMOL/L (ref 3.5–5.1)
PROT SERPL-MCNC: 8 G/DL (ref 6–8.4)
RBC # BLD AUTO: 3.11 M/UL (ref 4.6–6.2)
SATURATED IRON: 20 % (ref 20–50)
SODIUM SERPL-SCNC: 138 MMOL/L (ref 136–145)
TOTAL IRON BINDING CAPACITY: 211 UG/DL (ref 250–450)
TRANSFERRIN SERPL-MCNC: 151 MG/DL (ref 200–375)
WBC # BLD AUTO: 9.32 K/UL (ref 3.9–12.7)

## 2023-08-23 PROCEDURE — 80053 COMPREHEN METABOLIC PANEL: CPT | Performed by: INTERNAL MEDICINE

## 2023-08-23 PROCEDURE — 84466 ASSAY OF TRANSFERRIN: CPT | Performed by: INTERNAL MEDICINE

## 2023-08-23 PROCEDURE — 83540 ASSAY OF IRON: CPT | Performed by: INTERNAL MEDICINE

## 2023-08-23 PROCEDURE — 36415 COLL VENOUS BLD VENIPUNCTURE: CPT | Performed by: INTERNAL MEDICINE

## 2023-08-23 PROCEDURE — 85025 COMPLETE CBC W/AUTO DIFF WBC: CPT | Performed by: INTERNAL MEDICINE

## 2023-08-23 PROCEDURE — 82728 ASSAY OF FERRITIN: CPT | Performed by: INTERNAL MEDICINE

## 2023-08-24 ENCOUNTER — INFUSION (OUTPATIENT)
Dept: INFUSION THERAPY | Facility: HOSPITAL | Age: 63
End: 2023-08-24
Attending: INTERNAL MEDICINE
Payer: COMMERCIAL

## 2023-08-24 VITALS
BODY MASS INDEX: 31.92 KG/M2 | DIASTOLIC BLOOD PRESSURE: 75 MMHG | TEMPERATURE: 98 F | SYSTOLIC BLOOD PRESSURE: 135 MMHG | RESPIRATION RATE: 16 BRPM | HEART RATE: 77 BPM | OXYGEN SATURATION: 97 % | HEIGHT: 70 IN | WEIGHT: 223 LBS

## 2023-08-24 DIAGNOSIS — D64.9 NORMOCHROMIC ANEMIA: ICD-10-CM

## 2023-08-24 DIAGNOSIS — D46.9 MDS (MYELODYSPLASTIC SYNDROME): Primary | ICD-10-CM

## 2023-08-24 DIAGNOSIS — N18.30 STAGE 3 CHRONIC KIDNEY DISEASE, UNSPECIFIED WHETHER STAGE 3A OR 3B CKD: ICD-10-CM

## 2023-08-24 DIAGNOSIS — D64.89 ANEMIA DUE TO MULTIPLE MECHANISMS: ICD-10-CM

## 2023-08-24 DIAGNOSIS — D64.9 CHRONIC ANEMIA: ICD-10-CM

## 2023-08-24 PROCEDURE — 63600175 PHARM REV CODE 636 W HCPCS: Mod: JZ,EC,JG | Performed by: INTERNAL MEDICINE

## 2023-08-24 PROCEDURE — 96372 THER/PROPH/DIAG INJ SC/IM: CPT

## 2023-08-24 RX ADMIN — EPOETIN ALFA-EPBX 40000 UNITS: 40000 INJECTION, SOLUTION INTRAVENOUS; SUBCUTANEOUS at 04:08

## 2023-08-24 NOTE — PLAN OF CARE
Problem: Anemia  Goal: Anemia Symptom Improvement  Outcome: Ongoing, Progressing  Intervention: Monitor and Manage Anemia  Flowsheets (Taken 8/24/2023 1619)  Fatigue Management: frequent rest breaks encouraged

## 2023-08-24 NOTE — PLAN OF CARE
Problem: Anemia  Goal: Anemia Symptom Improvement  Outcome: Ongoing, Progressing  Intervention: Monitor and Manage Anemia  Flowsheets (Taken 8/24/2023 1603)  Oral Nutrition Promotion:   rest periods promoted   safe use of adaptive equipment encouraged  Safety Promotion/Fall Prevention: medications reviewed  Fatigue Management:   fatigue-related activity identified   frequent rest breaks encouraged   paced activity encouraged

## 2023-08-29 ENCOUNTER — OFFICE VISIT (OUTPATIENT)
Dept: CARDIOLOGY | Facility: CLINIC | Age: 63
End: 2023-08-29
Payer: COMMERCIAL

## 2023-08-29 VITALS
HEART RATE: 72 BPM | WEIGHT: 227.06 LBS | HEIGHT: 70 IN | OXYGEN SATURATION: 98 % | DIASTOLIC BLOOD PRESSURE: 64 MMHG | BODY MASS INDEX: 32.51 KG/M2 | SYSTOLIC BLOOD PRESSURE: 116 MMHG

## 2023-08-29 DIAGNOSIS — N18.32 STAGE 3B CHRONIC KIDNEY DISEASE: ICD-10-CM

## 2023-08-29 DIAGNOSIS — I10 ESSENTIAL HYPERTENSION: ICD-10-CM

## 2023-08-29 DIAGNOSIS — R07.9 CHEST PAIN, UNSPECIFIED TYPE: ICD-10-CM

## 2023-08-29 DIAGNOSIS — R06.02 SHORTNESS OF BREATH: Primary | ICD-10-CM

## 2023-08-29 DIAGNOSIS — E78.5 DYSLIPIDEMIA: ICD-10-CM

## 2023-08-29 PROCEDURE — 3008F PR BODY MASS INDEX (BMI) DOCUMENTED: ICD-10-PCS | Mod: CPTII,S$GLB,, | Performed by: INTERNAL MEDICINE

## 2023-08-29 PROCEDURE — 93000 EKG 12-LEAD: ICD-10-PCS | Mod: S$GLB,,, | Performed by: GENERAL PRACTICE

## 2023-08-29 PROCEDURE — 93000 ELECTROCARDIOGRAM COMPLETE: CPT | Mod: S$GLB,,, | Performed by: GENERAL PRACTICE

## 2023-08-29 PROCEDURE — 3074F SYST BP LT 130 MM HG: CPT | Mod: CPTII,S$GLB,, | Performed by: INTERNAL MEDICINE

## 2023-08-29 PROCEDURE — 1159F PR MEDICATION LIST DOCUMENTED IN MEDICAL RECORD: ICD-10-PCS | Mod: CPTII,S$GLB,, | Performed by: INTERNAL MEDICINE

## 2023-08-29 PROCEDURE — 3044F HG A1C LEVEL LT 7.0%: CPT | Mod: CPTII,S$GLB,, | Performed by: INTERNAL MEDICINE

## 2023-08-29 PROCEDURE — 99999 PR PBB SHADOW E&M-EST. PATIENT-LVL IV: CPT | Mod: PBBFAC,,, | Performed by: INTERNAL MEDICINE

## 2023-08-29 PROCEDURE — 3044F PR MOST RECENT HEMOGLOBIN A1C LEVEL <7.0%: ICD-10-PCS | Mod: CPTII,S$GLB,, | Performed by: INTERNAL MEDICINE

## 2023-08-29 PROCEDURE — 3074F PR MOST RECENT SYSTOLIC BLOOD PRESSURE < 130 MM HG: ICD-10-PCS | Mod: CPTII,S$GLB,, | Performed by: INTERNAL MEDICINE

## 2023-08-29 PROCEDURE — 3008F BODY MASS INDEX DOCD: CPT | Mod: CPTII,S$GLB,, | Performed by: INTERNAL MEDICINE

## 2023-08-29 PROCEDURE — 3078F DIAST BP <80 MM HG: CPT | Mod: CPTII,S$GLB,, | Performed by: INTERNAL MEDICINE

## 2023-08-29 PROCEDURE — 3078F PR MOST RECENT DIASTOLIC BLOOD PRESSURE < 80 MM HG: ICD-10-PCS | Mod: CPTII,S$GLB,, | Performed by: INTERNAL MEDICINE

## 2023-08-29 PROCEDURE — 99204 OFFICE O/P NEW MOD 45 MIN: CPT | Mod: S$GLB,,, | Performed by: INTERNAL MEDICINE

## 2023-08-29 PROCEDURE — 99204 PR OFFICE/OUTPT VISIT, NEW, LEVL IV, 45-59 MIN: ICD-10-PCS | Mod: S$GLB,,, | Performed by: INTERNAL MEDICINE

## 2023-08-29 PROCEDURE — 1159F MED LIST DOCD IN RCRD: CPT | Mod: CPTII,S$GLB,, | Performed by: INTERNAL MEDICINE

## 2023-08-29 PROCEDURE — 99999 PR PBB SHADOW E&M-EST. PATIENT-LVL IV: ICD-10-PCS | Mod: PBBFAC,,, | Performed by: INTERNAL MEDICINE

## 2023-08-29 NOTE — PROGRESS NOTES
[facility]    Subjective:     Patient ID:  Rick Butler is a 63 y.o. male patient here for evaluation Hypertension (Patient had chest pain , but went away .)      HPI:  63-year-old male referred for evaluation of chest pain which he suffered while driving back from [place], reports that he would have sharp chest pain that would grab his chest and then gradually ease off, wall while sitting down and driving.  Has been back for a while and has been walking around without any exertional chest pain.  Does report that he occasionally gets short of breath if he overdoes things.  But not with usual activities of daily life.  Does have CKD with a creatinine of around 2.    Review of Systems   All other systems reviewed and are negative.       Past Medical History:   Diagnosis Date    Abnormal chest CT (new) 8/17/2022    Anemia due to multiple mechanisms 1/13/2019    Anemia, chronic renal failure, stage 2 (mild) 1/13/2019    Anemia, chronic renal failure, stage 3 (moderate) 8/2/2023    Depression     Former smoker 6/29/2017    H/O ETOH abuse 6/29/2017    Lung mass (new) 8/17/2022    Monocytosis 2019    Myelodysplasia (myelodysplastic syndrome)     Myelodysplasia (myelodysplastic syndrome)     RARS (refractory anemia with ringed sideroblasts) 6/1/2020    Sickle cell trait        Past Surgical History:   Procedure Laterality Date    BONE MARROW BIOPSY N/A 12/20/2019    Procedure: BIOPSY, BONE MARROW;  Surgeon: Satinder Diagnostic Provider;  Location: [facility] OR;  Service: Interventional Radiology;  Laterality: N/A;    COLONOSCOPY N/A 11/5/2021    Procedure: COLONOSCOPY;  Surgeon: Rob Collins MD;  Location: [facility] ENDO;  Service: Endoscopy;  Laterality: N/A;    ESOPHAGOGASTRODUODENOSCOPY N/A 11/5/2021    Procedure: EGD (ESOPHAGOGASTRODUODENOSCOPY);  Surgeon: Rob Collins MD;  Location: [facility] ENDO;  Service: Endoscopy;  Laterality: N/A;    INJECTION OF ANESTHETIC AGENT  AROUND MEDIAL BRANCH NERVES INNERVATING LUMBAR FACET JOINT Bilateral 2018    Procedure: BLOCK-NERVE-MEDIAL BRANCH-LUMBAR;  Surgeon: Nura Heredia MD;  Location: [facility] OR;  Service: Pain Management;  Laterality: Bilateral;  L3, 4, 5    KNEE ARTHROSCOPY W/ MENISCECTOMY Right 2021    Procedure: ARTHROSCOPY, KNEE, WITH MENISCECTOMY;  Surgeon: Sebastian Green MD;  Location: [facility] OR;  Service: Orthopedics;  Laterality: Right;  partial medial meniscectomy    RADIOFREQUENCY ABLATION OF LUMBAR MEDIAL BRANCH NERVE AT SINGLE LEVEL Bilateral 2018    Procedure: RADIOFREQUENCY ABLATION, NERVE, MEDIAL BRANCH, LUMBAR, 1 LEVEL;  Surgeon: Nura Heredia MD;  Location: [facility] OR;  Service: Pain Management;  Laterality: Bilateral;  L3, 4, 5 - Burned at 80 degrees C.  for 75 seconds x 2 each site    SMALL BOWEL ENTEROSCOPY N/A 10/16/2019    Procedure: ENTEROSCOPY;  Surgeon: Rob Collins MD;  Location: [facility] ENDO;  Service: Endoscopy;  Laterality: N/A;       Family History   Problem Relation Age of Onset    Arthritis Mother     Depression Mother     Heart disease Mother     Hypertension Mother     Heart attack Father 59       Social History     Socioeconomic History    Marital status:    Occupational History    Occupation: Prep Cook     Employer: [employer]   Tobacco Use    Smoking status: Former     Current packs/day: 0.00     Types: Cigarettes     Quit date: 1996     Years since quittin.6    Smokeless tobacco: Never   Substance and Sexual Activity    Alcohol use: Not Currently     Alcohol/week: 21.0 standard drinks of alcohol     Types: 21 Cans of beer per week     Comment: Sober 5 years    Drug use: Not Currently     Types: Marijuana    Sexual activity: Yes     Partners: Female     Social Determinants of Health     Stress: Stress Concern Present (2019)    [employer] - Occupational Stress Questionnaire     Feeling of Stress : Very much       Current Outpatient Medications    Medication Sig Dispense Refill    amLODIPine (NORVASC) 5 MG tablet Take 1 tablet (5 mg total) by mouth once daily. 90 tablet 4    ascorbic acid, vitamin C, (VITAMIN C) 1000 MG tablet Take 1,000 mg by mouth once daily.      atorvastatin (LIPITOR) 10 MG tablet TAKE 1 TABLET BY MOUTH EVERY DAY IN THE EVENING 90 tablet 1    epoetin tray (PROCRIT INJ) Thursday      folic acid (FOLVITE) 1 MG tablet Take 2 tablets (2 mg total) by mouth once daily. 180 tablet 4    multivitamin capsule Take 1 capsule by mouth once daily.      pantoprazole (PROTONIX) 40 MG tablet       sertraline (ZOLOFT) 25 MG tablet Take 25 mg by mouth once daily.      tamsulosin (FLOMAX) 0.4 mg Cap Take 1 capsule (0.4 mg total) by mouth once daily. 90 capsule 4    traZODone (DESYREL) 100 MG tablet Take 1 tablet (100 mg total) by mouth nightly as needed for Insomnia. 90 tablet 4    famotidine (PEPCID) 20 MG tablet famotidine 20 mg tablet   Take 1 tablet twice a day by oral route.      sildenafil (VIAGRA) 100 MG tablet Take 1 tablet (100 mg total) by mouth daily as needed for Erectile Dysfunction. 10 tablet 6     No current facility-administered medications for this visit.       Review of patient's allergies indicates:  No Known Allergies      Objective:        Vitals:    08/29/23 1524   BP: 116/64   Pulse: 72       Physical Exam  Vitals reviewed.   Constitutional:       Appearance: Normal appearance.   HENT:      Mouth/Throat:      Mouth: Mucous membranes are moist.   Eyes:      Extraocular Movements: Extraocular movements intact.      Pupils: Pupils are equal, round, and reactive to light.   Cardiovascular:      Rate and Rhythm: Normal rate and regular rhythm.      Pulses: Normal pulses.      Heart sounds: Normal heart sounds. No murmur heard.     No gallop.   Pulmonary:      Effort: Pulmonary effort is normal.      Breath sounds: Normal breath sounds.   Abdominal:      General: Bowel sounds are normal.      Palpations: Abdomen is soft.    Musculoskeletal:         General: Normal range of motion.      Cervical back: Normal range of motion.   Skin:     General: Skin is warm and dry.   Neurological:      General: No focal deficit present.      Mental Status: He is alert and oriented to person, place, and time.   Psychiatric:         Mood and Affect: Mood normal.         LIPIDS - LAST 2   Lab Results   Component Value Date    CHOL 155 03/22/2023    CHOL 163 03/30/2022    HDL 49 03/22/2023    HDL 52 03/30/2022    LDLCALC 102.0 03/22/2023    LDLCALC 104.2 03/30/2022    TRIG 20 (L) 03/22/2023    TRIG 34 03/30/2022    CHOLHDL 31.6 03/22/2023    CHOLHDL 31.9 03/30/2022       CBC - LAST 2  Lab Results   Component Value Date    WBC 9.32 08/23/2023    WBC 6.04 08/09/2023    RBC 3.11 (L) 08/23/2023    RBC 3.12 (L) 08/09/2023    HGB 9.3 (L) 08/23/2023    HGB 9.4 (L) 08/09/2023    HCT 27.6 (L) 08/23/2023    HCT 28.0 (L) 08/09/2023    MCV 89 08/23/2023    MCV 90 08/09/2023    MCH 29.9 08/23/2023    MCH 30.1 08/09/2023    MCHC 33.7 08/23/2023    MCHC 33.6 08/09/2023    RDW 27.9 (H) 08/23/2023    RDW 27.9 (H) 08/09/2023     (H) 08/23/2023     08/09/2023    MPV 9.0 (L) 08/23/2023    MPV 10.2 08/09/2023    GRAN 6.4 08/23/2023    GRAN 68.6 08/23/2023    LYMPH 1.8 08/23/2023    LYMPH 19.1 08/23/2023    MONO 0.8 08/23/2023    MONO 8.9 08/23/2023    BASO 0.12 08/23/2023    BASO 0.11 08/09/2023    NRBC 0 08/23/2023    NRBC 0 08/09/2023       CHEMISTRY & LIVER FUNCTION - LAST 2  Lab Results   Component Value Date     08/23/2023     08/09/2023    K 4.7 08/23/2023    K 4.2 08/09/2023     08/23/2023     (H) 08/09/2023    CO2 24 08/23/2023    CO2 28 08/09/2023    ANIONGAP 7 (L) 08/23/2023    ANIONGAP 4 (L) 08/09/2023    BUN 27 (H) 08/23/2023    BUN 29 (H) 08/09/2023    CREATININE 2.0 (H) 08/23/2023    CREATININE 1.9 (H) 08/09/2023    GLU 86 08/23/2023     08/09/2023    CALCIUM 8.8 08/23/2023    CALCIUM 8.7 08/09/2023    ALBUMIN 4.6  08/23/2023    ALBUMIN 4.2 08/09/2023    PROT 8.0 08/23/2023    PROT 7.5 08/09/2023    ALKPHOS 65 08/23/2023    ALKPHOS 58 08/09/2023    ALT 19 08/23/2023    ALT 21 08/09/2023    AST 20 08/23/2023    AST 23 08/09/2023    BILITOT 1.0 08/23/2023    BILITOT 0.5 08/09/2023        CARDIAC PROFILE - LAST 2  Lab Results   Component Value Date     04/24/2019        COAGULATION - LAST 2  Lab Results   Component Value Date    LABPT 15.9 (H) 12/20/2019    INR 1.3 12/20/2019    APTT 128.4 (H) 12/20/2019       ENDOCRINE & PSA - LAST 2  Lab Results   Component Value Date    HGBA1C 5.4 03/22/2023    HGBA1C 6.1 03/30/2022    TSH 3.290 03/22/2023    TSH 2.540 03/30/2022    PSA 0.26 05/12/2021    PSA 0.28 09/30/2019        ECHOCARDIOGRAM RESULTS  No results found for this or any previous visit.      CURRENT/PREVIOUS VISIT EKG  Results for orders placed or performed during the hospital encounter of 11/03/21   EKG 12-lead    Collection Time: 11/03/21  9:58 AM    Narrative    Test Reason : Z01.818,    Vent. Rate : 066 BPM     Atrial Rate : 066 BPM     P-R Int : 186 ms          QRS Dur : 092 ms      QT Int : 422 ms       P-R-T Axes : 081 074 059 degrees     QTc Int : 442 ms    Normal sinus rhythm  Minimal voltage criteria for LVH, may be normal variant  Borderline Abnormal ECG  No previous ECGs available  Confirmed by Kiley SOMERS, [facility] ([id number]) on 11/4/2021 9:05:40 AM    Referred By: ADELA ARNDT           Confirmed By:Urban Cao MD     No valid procedures specified.   No results found for this or any previous visit.    No valid procedures specified.        Assessment:       1. Shortness of breath    2. Chest pain, unspecified type    3. Essential hypertension    4. Dyslipidemia    5. Stage 3b chronic kidney disease           Plan:       Shortness of breath  -     Nuclear Stress - Cardiology Interpreted; Future  -     Echo; Future    Chest pain, unspecified type  -     Ambulatory referral/consult to Cardiology  -      Nuclear Stress - Cardiology Interpreted; Future  -     Echo; Future    Essential hypertension  -     Ambulatory referral/consult to Cardiology  -     IN OFFICE EKG 12-LEAD (to [facility])    Dyslipidemia  -     Ambulatory referral/consult to Cardiology    Stage 3b chronic kidney disease    Symptoms are atypical for angina and not on consistent basis but given multiple risk factors including hypertension and dyslipidemia, will evaluate further with stress test and echocardiogram.  Would only proceed to angiography if significant abnormalities on stress test given advanced CKD.  Hypertension is well controlled, continue current therapy.  Last LDL was around 100, can continue with current doses of statin, will consider increasing it at next visit if LDL continues to be above 100.    Follow up in about 6 weeks (around 10/10/2023) for f/u stress test, echo.          MD [facility]  Rosa

## 2023-08-31 ENCOUNTER — INFUSION (OUTPATIENT)
Dept: INFUSION THERAPY | Facility: HOSPITAL | Age: 63
End: 2023-08-31
Attending: INTERNAL MEDICINE
Payer: COMMERCIAL

## 2023-08-31 VITALS
DIASTOLIC BLOOD PRESSURE: 73 MMHG | RESPIRATION RATE: 18 BRPM | OXYGEN SATURATION: 96 % | SYSTOLIC BLOOD PRESSURE: 125 MMHG | WEIGHT: 227.5 LBS | HEART RATE: 79 BPM | BODY MASS INDEX: 32.57 KG/M2 | TEMPERATURE: 98 F | HEIGHT: 70 IN

## 2023-08-31 DIAGNOSIS — N18.30 STAGE 3 CHRONIC KIDNEY DISEASE, UNSPECIFIED WHETHER STAGE 3A OR 3B CKD: ICD-10-CM

## 2023-08-31 DIAGNOSIS — D46.9 MDS (MYELODYSPLASTIC SYNDROME): Primary | ICD-10-CM

## 2023-08-31 DIAGNOSIS — D64.9 NORMOCHROMIC ANEMIA: ICD-10-CM

## 2023-08-31 DIAGNOSIS — D64.9 CHRONIC ANEMIA: ICD-10-CM

## 2023-08-31 DIAGNOSIS — D64.89 ANEMIA DUE TO MULTIPLE MECHANISMS: ICD-10-CM

## 2023-08-31 PROCEDURE — 63600175 PHARM REV CODE 636 W HCPCS: Mod: JZ,EC,JG | Performed by: INTERNAL MEDICINE

## 2023-08-31 PROCEDURE — 96372 THER/PROPH/DIAG INJ SC/IM: CPT

## 2023-08-31 RX ADMIN — EPOETIN ALFA-EPBX 40000 UNITS: 40000 INJECTION, SOLUTION INTRAVENOUS; SUBCUTANEOUS at 04:08

## 2023-09-06 ENCOUNTER — LAB VISIT (OUTPATIENT)
Dept: LAB | Facility: HOSPITAL | Age: 63
End: 2023-09-06
Attending: INTERNAL MEDICINE
Payer: COMMERCIAL

## 2023-09-06 DIAGNOSIS — D57.3 SICKLE CELL TRAIT SYNDROME: ICD-10-CM

## 2023-09-06 DIAGNOSIS — D64.9 NORMOCHROMIC ANEMIA: ICD-10-CM

## 2023-09-06 LAB
ALBUMIN SERPL BCP-MCNC: 4.1 G/DL (ref 3.5–5.2)
ALP SERPL-CCNC: 57 U/L (ref 55–135)
ALT SERPL W/O P-5'-P-CCNC: 16 U/L (ref 10–44)
ANION GAP SERPL CALC-SCNC: 5 MMOL/L (ref 8–16)
AST SERPL-CCNC: 19 U/L (ref 10–40)
BASOPHILS # BLD AUTO: 0.08 K/UL (ref 0–0.2)
BASOPHILS NFR BLD: 1.3 % (ref 0–1.9)
BILIRUB SERPL-MCNC: 1 MG/DL (ref 0.1–1)
BUN SERPL-MCNC: 28 MG/DL (ref 8–23)
CALCIUM SERPL-MCNC: 8.7 MG/DL (ref 8.7–10.5)
CHLORIDE SERPL-SCNC: 108 MMOL/L (ref 95–110)
CO2 SERPL-SCNC: 27 MMOL/L (ref 23–29)
CREAT SERPL-MCNC: 2 MG/DL (ref 0.5–1.4)
DIFFERENTIAL METHOD: ABNORMAL
EOSINOPHIL # BLD AUTO: 0.1 K/UL (ref 0–0.5)
EOSINOPHIL NFR BLD: 2.2 % (ref 0–8)
ERYTHROCYTE [DISTWIDTH] IN BLOOD BY AUTOMATED COUNT: 28.7 % (ref 11.5–14.5)
EST. GFR  (NO RACE VARIABLE): 36.8 ML/MIN/1.73 M^2
GLUCOSE SERPL-MCNC: 105 MG/DL (ref 70–110)
HCT VFR BLD AUTO: 31 % (ref 40–54)
HGB BLD-MCNC: 10.1 G/DL (ref 14–18)
IMM GRANULOCYTES # BLD AUTO: 0.01 K/UL (ref 0–0.04)
IMM GRANULOCYTES NFR BLD AUTO: 0.2 % (ref 0–0.5)
LYMPHOCYTES # BLD AUTO: 1.4 K/UL (ref 1–4.8)
LYMPHOCYTES NFR BLD: 21.2 % (ref 18–48)
MCH RBC QN AUTO: 30.1 PG (ref 27–31)
MCHC RBC AUTO-ENTMCNC: 32.6 G/DL (ref 32–36)
MCV RBC AUTO: 92 FL (ref 82–98)
MONOCYTES # BLD AUTO: 0.7 K/UL (ref 0.3–1)
MONOCYTES NFR BLD: 11.2 % (ref 4–15)
NEUTROPHILS # BLD AUTO: 4.1 K/UL (ref 1.8–7.7)
NEUTROPHILS NFR BLD: 63.9 % (ref 38–73)
NRBC BLD-RTO: 0 /100 WBC
PLATELET # BLD AUTO: 454 K/UL (ref 150–450)
PMV BLD AUTO: 10.4 FL (ref 9.2–12.9)
POTASSIUM SERPL-SCNC: 4.4 MMOL/L (ref 3.5–5.1)
PROT SERPL-MCNC: 7.4 G/DL (ref 6–8.4)
RBC # BLD AUTO: 3.36 M/UL (ref 4.6–6.2)
SODIUM SERPL-SCNC: 140 MMOL/L (ref 136–145)
WBC # BLD AUTO: 6.36 K/UL (ref 3.9–12.7)

## 2023-09-06 PROCEDURE — 80053 COMPREHEN METABOLIC PANEL: CPT | Performed by: INTERNAL MEDICINE

## 2023-09-06 PROCEDURE — 85025 COMPLETE CBC W/AUTO DIFF WBC: CPT | Performed by: INTERNAL MEDICINE

## 2023-09-06 PROCEDURE — 36415 COLL VENOUS BLD VENIPUNCTURE: CPT | Performed by: INTERNAL MEDICINE

## 2023-09-07 ENCOUNTER — INFUSION (OUTPATIENT)
Dept: INFUSION THERAPY | Facility: HOSPITAL | Age: 63
End: 2023-09-07
Attending: INTERNAL MEDICINE
Payer: COMMERCIAL

## 2023-09-07 VITALS
HEIGHT: 70 IN | WEIGHT: 227.13 LBS | HEART RATE: 79 BPM | DIASTOLIC BLOOD PRESSURE: 75 MMHG | SYSTOLIC BLOOD PRESSURE: 148 MMHG | BODY MASS INDEX: 32.51 KG/M2 | OXYGEN SATURATION: 98 % | TEMPERATURE: 98 F | RESPIRATION RATE: 16 BRPM

## 2023-09-07 DIAGNOSIS — N18.30 STAGE 3 CHRONIC KIDNEY DISEASE, UNSPECIFIED WHETHER STAGE 3A OR 3B CKD: ICD-10-CM

## 2023-09-07 DIAGNOSIS — D64.9 CHRONIC ANEMIA: ICD-10-CM

## 2023-09-07 DIAGNOSIS — D46.9 MDS (MYELODYSPLASTIC SYNDROME): Primary | ICD-10-CM

## 2023-09-07 DIAGNOSIS — D64.9 NORMOCHROMIC ANEMIA: ICD-10-CM

## 2023-09-07 DIAGNOSIS — D64.89 ANEMIA DUE TO MULTIPLE MECHANISMS: ICD-10-CM

## 2023-09-07 PROCEDURE — 63600175 PHARM REV CODE 636 W HCPCS: Mod: JZ,EC,JG | Performed by: INTERNAL MEDICINE

## 2023-09-07 PROCEDURE — 96372 THER/PROPH/DIAG INJ SC/IM: CPT

## 2023-09-07 RX ADMIN — EPOETIN ALFA-EPBX 40000 UNITS: 20000 INJECTION, SOLUTION INTRAVENOUS; SUBCUTANEOUS at 04:09

## 2023-09-07 NOTE — PLAN OF CARE
Problem: Fall Injury Risk  Goal: Absence of Fall and Fall-Related Injury  Outcome: Ongoing, Progressing  Intervention: Identify and Manage Contributors  Flowsheets (Taken 9/7/2023 1607)  Self-Care Promotion: safe use of adaptive equipment encouraged  Medication Review/Management: medications reviewed  Intervention: Promote Injury-Free Environment  Flowsheets (Taken 9/7/2023 1607)  Safety Promotion/Fall Prevention: assistive device/personal item within reach

## 2023-09-12 NOTE — PROGRESS NOTES
[facility] Hematology/Oncology               PROGRESS NOTE - Follow-up Visit      Subjective:       Patient ID:   NAME: Rick Butler : 1960     63 y.o. male    Referring Doc: Chetna (new PCP)  Other Physicians: Getachew; Alan Mustafa    Chief Complaint:  Sickle cell trait/anemia/RARS  f/u    History of Present Illness:     Patient returns today for a regularly scheduled follow-up visit.  The patient is doing ok overall. He is here by himself today.     The Reblozyl has since been approved by his insurance - will set it up    He sees Dr Hilario in 2024.             He has not had any recent pain crisis. He denies having any breathing difficulties. He denies any blood in the stool, dark stools or hematuria; breathing ok; no CP, SOB, HA's or N/V;       He saw Chetna on ; he is seeing cardiology on     Discussed covid19 precautions - he had his vaccinations      ROS:   GEN: normal without any fever, night sweats or weight loss  HEENT: normal with no HA's, sore throat, stiff neck, changes in vision  CV: normal with no CP, SOB, PND, MORA or orthopnea  PULM: normal with no SOB, cough, hemoptysis, sputum or pleuritic pain  GI: normal with no abdominal pain, nausea, vomiting, constipation, diarrhea, melanotic stools, BRBPR, or hematemesis  : normal with no hematuria, dysuria  BREAST: normal with no mass, discharge, pain  SKIN: normal with no rash, erythema, bruising, or swelling    Allergies:  Review of patient's allergies indicates:  No Known Allergies    Medications:    Current Outpatient Medications:     amLODIPine (NORVASC) 5 MG tablet, Take 1 tablet (5 mg total) by mouth once daily., Disp: 90 tablet, Rfl: 4    ascorbic acid, vitamin C, (VITAMIN C) 1000 MG tablet, Take 1,000 mg by mouth once daily., Disp: , Rfl:     atorvastatin (LIPITOR) 10 MG tablet, TAKE 1 TABLET BY MOUTH EVERY DAY IN THE EVENING, Disp: 90 tablet, Rfl: 1    epoetin tray (PROCRIT INJ), Thursday, Disp: , Rfl:     famotidine (PEPCID) 20 MG  "tablet, famotidine 20 mg tablet  Take 1 tablet twice a day by oral route., Disp: , Rfl:     folic acid (FOLVITE) 1 MG tablet, Take 2 tablets (2 mg total) by mouth once daily., Disp: 180 tablet, Rfl: 4    multivitamin capsule, Take 1 capsule by mouth once daily., Disp: , Rfl:     pantoprazole (PROTONIX) 40 MG tablet, , Disp: , Rfl:     sertraline (ZOLOFT) 25 MG tablet, Take 25 mg by mouth once daily., Disp: , Rfl:     tamsulosin (FLOMAX) 0.4 mg Cap, Take 1 capsule (0.4 mg total) by mouth once daily., Disp: 90 capsule, Rfl: 4    traZODone (DESYREL) 100 MG tablet, Take 1 tablet (100 mg total) by mouth nightly as needed for Insomnia., Disp: 90 tablet, Rfl: 4    sildenafil (VIAGRA) 100 MG tablet, Take 1 tablet (100 mg total) by mouth daily as needed for Erectile Dysfunction., Disp: 10 tablet, Rfl: 6    PMHx/PSHx Updates:  See patient's last visit with me on 8/3/2023  See H&P on 7/9/2011      Pathology:    12/20/2019  BONE MARROW, RIGHT ILIAC CREST,    ASPIRATE, CLOT SECTION, AND CORE BIOPSY:    --HYPERCELLULAR MARROW (APPROXIMATELY 75% TO 80%) WITH TRILINEAGE   HEMATOPOIETIC ELEMENTS, ERYTHROID  HYPERPLASIA AND MILD MEGAKARYOCYTIC HYPERPLASIA, AND NON-SPECIFIC   DYSHEMATOPOIETIC CHANGES (SEE     COMMENT).  --PBHCJONZZI-SV-DQUEDKCH INCREASED STAINABLE IRON WITH INCREASED RING   SIDEROBLASTS (GREATER THAN 15%)     (SEE COMMENT).  --PERIPHERAL BLOOD WITH THROMBOCYTOSIS (574,000/MICROLITER) AND ANEMIA   (HEMOGLOBIN 5.5 GRAM/DECILITER),    WITH SCATTERED DREPANOCYTOID FORMS AND FEW NUCLEATED ERYTHROCYTES      Cytogenetic Results at the bottom of the report.  NORMAL    Objective:     Vitals:  Blood pressure 124/66, pulse 76, temperature 98 °F (36.7 °C), resp. rate 16, height 5' 10" (1.778 m), weight 102.3 kg (225 lb 8 oz).    Physical Examination:   GEN: no apparent distress, comfortable; AAOx3  HEAD: atraumatic and normocephalic  EYES: no pallor, no icterus, PERRLA  ENT: OMM, no pharyngeal erythema, external ears WNL; no " nasal discharge; no thrush  NECK: no masses, thyroid normal, trachea midline, no LAD/LN's, supple  CV: RRR with no murmur; normal pulse; normal S1 and S2; no pedal edema  CHEST: Normal respiratory effort; CTAB; normal breath sounds; no wheeze or crackles  ABDOM: nontender and nondistended; soft; normal bowel sounds; no rebound/guarding  MUSC/Skeletal: ROM normal; no crepitus; joints normal; no deformities or arthropathy  EXTREM: no clubbing, cyanosis, inflammation or swelling  SKIN: no rashes, lesions, ulcers, petechiae or subcutaneous nodules  : no jiang  NEURO: grossly intact; motor/sensory WNL; AAOx3; no tremors  PSYCH: normal mood, affect and behavior  LYMPH: normal cervical, supraclavicular, axillary and groin LN's            Labs:     Lab Results   Component Value Date    WBC 6.36 09/06/2023    HGB 10.1 (L) 09/06/2023    HCT 31.0 (L) 09/06/2023    MCV 92 09/06/2023     (H) 09/06/2023           CMP  Sodium   Date Value Ref Range Status   09/06/2023 140 136 - 145 mmol/L Final   06/26/2019 138 134 - 144 mmol/L      Potassium   Date Value Ref Range Status   09/06/2023 4.4 3.5 - 5.1 mmol/L Final     Chloride   Date Value Ref Range Status   09/06/2023 108 95 - 110 mmol/L Final   06/26/2019 105 98 - 110 mmol/L      CO2   Date Value Ref Range Status   09/06/2023 27 23 - 29 mmol/L Final     Glucose   Date Value Ref Range Status   09/06/2023 105 70 - 110 mg/dL Final   06/26/2019 114 (H) 70 - 99 mg/dL      BUN   Date Value Ref Range Status   09/06/2023 28 (H) 8 - 23 mg/dL Final     Creatinine   Date Value Ref Range Status   09/06/2023 2.0 (H) 0.5 - 1.4 mg/dL Final   06/26/2019 1.62 (H) 0.60 - 1.40 mg/dL      Calcium   Date Value Ref Range Status   09/06/2023 8.7 8.7 - 10.5 mg/dL Final     Total Protein   Date Value Ref Range Status   09/06/2023 7.4 6.0 - 8.4 g/dL Final     Albumin   Date Value Ref Range Status   09/06/2023 4.1 3.5 - 5.2 g/dL Final   06/26/2019 4.4 3.1 - 4.7 g/dL      Total Bilirubin   Date Value  Ref Range Status   09/06/2023 1.0 0.1 - 1.0 mg/dL Final     Comment:     For infants and newborns, interpretation of results should be based  on gestational age, weight and in agreement with clinical  observations.    Premature Infant recommended reference ranges:  Up to 24 hours.............<8.0 mg/dL  Up to 48 hours............<12.0 mg/dL  3-5 days..................<15.0 mg/dL  6-29 days.................<15.0 mg/dL       Alkaline Phosphatase   Date Value Ref Range Status   09/06/2023 57 55 - 135 U/L Final     AST   Date Value Ref Range Status   09/06/2023 19 10 - 40 U/L Final     ALT   Date Value Ref Range Status   09/06/2023 16 10 - 44 U/L Final     Anion Gap   Date Value Ref Range Status   09/06/2023 5 (L) 8 - 16 mmol/L Final     eGFR if    Date Value Ref Range Status   07/13/2022 45.6 (A) >60 mL/min/1.73 m^2 Final     eGFR if non    Date Value Ref Range Status   07/13/2022 39.5 (A) >60 mL/min/1.73 m^2 Final     Comment:     Calculation used to obtain the estimated glomerular filtration  rate (eGFR) is the CKD-EPI equation.          Lab Results   Component Value Date    IRON 43 (L) 08/23/2023    TIBC 211 (L) 08/23/2023    FERRITIN 1,425 (H) 08/23/2023           I have reviewed all available lab results and radiology reports.    Radiology/Diagnostic Studies:    US 2/8/2023:    IMPRESSION:  1. Dilation of the common bile duct at up to 10 mm, unchanged when compared to 2016.  2. Nonvisualization of the pancreas because of overlying bowel gas.  3. No other significant findings.  spleen is normal in size and appearance      2/15/2018 Xray Survey:   IMPRESSION: No significant abnormality seen. No evidence of osteoblastic or  osteoclastic lesions identified    MRI L-spine 2/12/2018  IMPRESSION:    1. Prominent low signal intensity throughout the bone marrow on T1 and  T2-weighted imaging. Marrow infiltrative process including malignancy such as  metastatic disease or lymphoma/leukemia is  "a consideration along with multiple  myeloma or malignant process. Benign etiology such as osteopetrosis or  regenerative red marrow are also considerations.    2. Multilevel lumbar degenerative changes, most prominent at L4-L5 and L5-S1 as  described.    Assessment/Plan:   (1) 63 y.o. male with diagnosis of sickle cell anemia with borderline microcytosis  - latest hgb was 6.0 and he had blood transfusion  - today, he is not symptomatic at this time  - he probably has a multifactorial anemia process with underlying sickle cell, anemia of chronic disorders and anemia of chronic renal. I can not rule out an underlying GI bleeding process.   - iron panel is adequate (no deficiency)  - total bilirubin is only minimally elevated  - he required transfusion again in Oct 2019 and again in Dec 2019  - he had bone marrow biopsy on 12/20/2019    "dyshematopoietic changes are not entirely specific and are present  mostly within erythroid and megakaryocytic lineages. These findings   could be observed in chronic disease states, autoimmune conditions,  infectious settings, toxic exposures, nutritional deficits, as medication/drug effect, and in myelodysplastic syndrome (MDS), among other conditions"  "Additionally, ring sideroblasts are a non-specific   finding "    Patient was referred to see Dr Mustafa at [facility] and had repeat BM biopsy with diagnosis made of RARS  - he is now on procrit per directives of Dr Mustafa    7/30/2020:   he recently saw Dr Mustafa  And sees him again in Jan 2021  - he is doing well with procrit and is feeling much better  - he has not required any transfusions for some time time    9/24/2020:  - latest hgb at 8.5  - will increase the procrit dose  - f/u with Dr Zavala in Jan 2021 11/19/2020:  - patient doing well and feels "great"  - hgb up to 9.1; continued on the procrit  - f/u with Dr Mustafa in Jan 2021    3/4/2021:  - he saw Dr Mustafa in jan 2021 and sees him again in June/July 2021  - latest hgb " at 9.1 and stable and platelets are 474,000; wbc at 10.0    6/10/2021:  - latest hgb at 9.0  - plats 485,000  - on weekly procrit  - seeing Dr Mustafa again tomorrow    10/14/2021:  - hgb at 8.6 but he recently had been taking naproxen and had some BRBPR - which since resolved  - he saw Dr Beyer with GI and he is having colonsocopy on Nov 5th along with EGD  - plats 429,000  - he sees Dr Mustafa again in Nov 19th   - he sees Dr Mas again in Dec 2021    1/13/2022:  - He has been under the care of Dr Mustafa at [facility] for RARS- MDS. He is now on procrit weekly. He sees Dr Mustafa again in March 2022    - hgb 9.3  - he had scope with Dr Collins in nov 2021 which was good per patient and they plan to repeat in 5 yrs    5/12/2022:  - hgb at 9.0  - plats 485  - wbc 7.25  - he is on procrit weekly  - saw Dr Mustafa this past Fridday and since he is leaving, he will start seeing Dr Hilario instead in 3 months    8/18/2022:  - latest hgb a little lower at 8.8  - he has been on the procrit  - he saw Dr Hilario recently at [facility] and plans to see her again in 3 months    1/26/2023:  - He saw Dr Hilario at [facility] again recently this past Tuesday and they are considering another medication; he plans to see her again in April 2023  - Dr Hilario requested he have an US of spleen - will order here in [place]  - he is also on appetite stimulant  - hgb at 9.4 and plats at 500k    5/11/2023:  - hgb at 9.6 and plats 466,000  - He saw Dr Hilario at [facility] again recently this past Tuesday and they are considering another medication; he plans to see her again in Aug 2023    8/3/2023:  - His insurance will not approve of him getting the Reblozyl   - He sees Dr Hilario again next Tuesday and we will await her directives.     9/14/2023:  - the Rebozyl has been approved - will set up  - latest hgb at 10.1  - seeing Dr Hilario again in Jan 2024    (2) Alcohol abuse issues in past - he has been sober for over 3 years now    (3) New findings on radiography with  "abnormal chest CT and lung mass  - former smoker    (4) chronic back issues - prior Xrays and MRI - suspect the findings on the MRI are possibly due to his sickle cell;   - SPEP was previously negative for M-protein  - PSA was 0.3 in Sept 2017  - recommended neurosurgery evaluation previously  - he saw Dr Nura VUONG and had a nerve "burning" procedure and his pain has improved    (5) CRI - elevated creatinine - he is followed Dr Chilel with nephrology      (6) H pylori gastritis - s/p recent endoscopy with Dr Collins and antibotics x 10 days    (7) CP   9/14/2023:  - seeing Dr lovett with cardiology and planning stress test in near future        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 Sickle cell trait syndrome        3. Thrombocytosis        4. H/O ETOH abuse        5. Monocytosis        6. MDS (myelodysplastic syndrome)        7. Normochromic anemia        8. Anemia due to multiple mechanisms        9. Anemia, chronic renal failure, stage 3 (moderate)        10. Chronic anemia        11. Former smoker          PLAN;  1. Continue with the procrit  and set up the Rebozyl  2. Planned f/u with Dr Hilario again in Jan 2024  3. Proceed with stress test and cardiology f/u  4.  F/u with nephrology as directed by them -   Dr Mas    5. Check labs every 2 weeks             RTC 8 weeks  Fax note to [facility]veit;  Lewis Hilario    Total Time spent on patient:    I spent over 25 mins of time with the patient. Reviewed results of the recently ordered labs, tests, reports and studies; made directives with regards to the results. Over half of this time was spent couseling and coordinating care, making treatment and analytical decisions; ordering necessary labs, tests and studies; and discussing treatment options and setting up treatment plan(s) if indicated.            Discussion:       Pathology Discussion:    I reviewed and discussed the pathology report(s) and radiograph reports (if available) in as " simple to understand and/or laymen's terms to the best of my ability. I had an indepth conversation with the patient and went over the patient's individual diagnosis based on the information that was currently available. I discussed the TNM staging process with regard to the patient's particular cancer type, and the calculated stage based on the currently available TNM data and literature. I discussed the available prognostic data with regard to the current staging information and how it relates to the prognosis of their particular neoplastic process.          COVID-19 Discussion:    I had long discussion with patient and any applicable family about the COVID-19 coronavirus epidemic and the recommended precautions with regard to cancer and/or hematology patients. I have re-iterated the CDC recommendations for adequate hand washing, use of hand -like products, and coughing into elbow, etc. In addition, especially for our patients who are on chemotherapy and/or our otherwise immunocompromised patients, I have recommended avoidance of crowds, including movie theaters, restaurants, churches, etc. I have recommended avoidance of any sick or symptomatic family members and/or friends. I have also recommended avoidance of any raw and unwashed food products, and general avoidance of food items that have not been prepared by themselves. The patient has been asked to call us immediately with any symptom developments, issues, questions or other general concerns.     I have explained all of the above in detail and the patient understands all of the current recommendation(s). I have answered all of their questions to the best of my ability and to their complete satisfaction.   The patient is to continue with the current management plan.            Electronically signed by Jose Tello MD

## 2023-09-14 ENCOUNTER — INFUSION (OUTPATIENT)
Dept: INFUSION THERAPY | Facility: HOSPITAL | Age: 63
End: 2023-09-14
Attending: INTERNAL MEDICINE
Payer: COMMERCIAL

## 2023-09-14 ENCOUNTER — OFFICE VISIT (OUTPATIENT)
Dept: HEMATOLOGY/ONCOLOGY | Facility: CLINIC | Age: 63
End: 2023-09-14
Payer: COMMERCIAL

## 2023-09-14 VITALS
TEMPERATURE: 98 F | RESPIRATION RATE: 16 BRPM | TEMPERATURE: 98 F | HEART RATE: 76 BPM | HEIGHT: 70 IN | BODY MASS INDEX: 32.28 KG/M2 | DIASTOLIC BLOOD PRESSURE: 66 MMHG | HEART RATE: 76 BPM | BODY MASS INDEX: 32.28 KG/M2 | SYSTOLIC BLOOD PRESSURE: 124 MMHG | SYSTOLIC BLOOD PRESSURE: 124 MMHG | WEIGHT: 225.5 LBS | DIASTOLIC BLOOD PRESSURE: 66 MMHG | WEIGHT: 225.5 LBS | HEIGHT: 70 IN | RESPIRATION RATE: 16 BRPM

## 2023-09-14 DIAGNOSIS — N18.30 STAGE 3 CHRONIC KIDNEY DISEASE, UNSPECIFIED WHETHER STAGE 3A OR 3B CKD: ICD-10-CM

## 2023-09-14 DIAGNOSIS — D46.1 RARS (REFRACTORY ANEMIA WITH RINGED SIDEROBLASTS): Primary | ICD-10-CM

## 2023-09-14 DIAGNOSIS — D75.839 THROMBOCYTOSIS: ICD-10-CM

## 2023-09-14 DIAGNOSIS — F10.11 H/O ETOH ABUSE: ICD-10-CM

## 2023-09-14 DIAGNOSIS — D63.1 ANEMIA, CHRONIC RENAL FAILURE, STAGE 3 (MODERATE): ICD-10-CM

## 2023-09-14 DIAGNOSIS — D46.9 MDS (MYELODYSPLASTIC SYNDROME): Primary | ICD-10-CM

## 2023-09-14 DIAGNOSIS — D64.9 NORMOCHROMIC ANEMIA: ICD-10-CM

## 2023-09-14 DIAGNOSIS — D64.89 ANEMIA DUE TO MULTIPLE MECHANISMS: ICD-10-CM

## 2023-09-14 DIAGNOSIS — D64.9 CHRONIC ANEMIA: ICD-10-CM

## 2023-09-14 DIAGNOSIS — D72.821 MONOCYTOSIS: ICD-10-CM

## 2023-09-14 DIAGNOSIS — N18.30 ANEMIA, CHRONIC RENAL FAILURE, STAGE 3 (MODERATE): ICD-10-CM

## 2023-09-14 DIAGNOSIS — Z87.891 FORMER SMOKER: ICD-10-CM

## 2023-09-14 DIAGNOSIS — D57.3 SICKLE CELL TRAIT SYNDROME: ICD-10-CM

## 2023-09-14 DIAGNOSIS — D46.9 MDS (MYELODYSPLASTIC SYNDROME): ICD-10-CM

## 2023-09-14 PROCEDURE — 63600175 PHARM REV CODE 636 W HCPCS: Mod: JZ,EC,JG | Performed by: INTERNAL MEDICINE

## 2023-09-14 PROCEDURE — 99214 OFFICE O/P EST MOD 30 MIN: CPT | Mod: S$GLB,,, | Performed by: INTERNAL MEDICINE

## 2023-09-14 PROCEDURE — 3008F BODY MASS INDEX DOCD: CPT | Mod: CPTII,S$GLB,, | Performed by: INTERNAL MEDICINE

## 2023-09-14 PROCEDURE — 3044F PR MOST RECENT HEMOGLOBIN A1C LEVEL <7.0%: ICD-10-PCS | Mod: CPTII,S$GLB,, | Performed by: INTERNAL MEDICINE

## 2023-09-14 PROCEDURE — 3044F HG A1C LEVEL LT 7.0%: CPT | Mod: CPTII,S$GLB,, | Performed by: INTERNAL MEDICINE

## 2023-09-14 PROCEDURE — 3078F DIAST BP <80 MM HG: CPT | Mod: CPTII,S$GLB,, | Performed by: INTERNAL MEDICINE

## 2023-09-14 PROCEDURE — 1160F RVW MEDS BY RX/DR IN RCRD: CPT | Mod: CPTII,S$GLB,, | Performed by: INTERNAL MEDICINE

## 2023-09-14 PROCEDURE — 96372 THER/PROPH/DIAG INJ SC/IM: CPT

## 2023-09-14 PROCEDURE — 99214 PR OFFICE/OUTPT VISIT, EST, LEVL IV, 30-39 MIN: ICD-10-PCS | Mod: S$GLB,,, | Performed by: INTERNAL MEDICINE

## 2023-09-14 PROCEDURE — 1160F PR REVIEW ALL MEDS BY PRESCRIBER/CLIN PHARMACIST DOCUMENTED: ICD-10-PCS | Mod: CPTII,S$GLB,, | Performed by: INTERNAL MEDICINE

## 2023-09-14 PROCEDURE — 3074F PR MOST RECENT SYSTOLIC BLOOD PRESSURE < 130 MM HG: ICD-10-PCS | Mod: CPTII,S$GLB,, | Performed by: INTERNAL MEDICINE

## 2023-09-14 PROCEDURE — 3008F PR BODY MASS INDEX (BMI) DOCUMENTED: ICD-10-PCS | Mod: CPTII,S$GLB,, | Performed by: INTERNAL MEDICINE

## 2023-09-14 PROCEDURE — 3074F SYST BP LT 130 MM HG: CPT | Mod: CPTII,S$GLB,, | Performed by: INTERNAL MEDICINE

## 2023-09-14 PROCEDURE — 1159F MED LIST DOCD IN RCRD: CPT | Mod: CPTII,S$GLB,, | Performed by: INTERNAL MEDICINE

## 2023-09-14 PROCEDURE — 3078F PR MOST RECENT DIASTOLIC BLOOD PRESSURE < 80 MM HG: ICD-10-PCS | Mod: CPTII,S$GLB,, | Performed by: INTERNAL MEDICINE

## 2023-09-14 PROCEDURE — 1159F PR MEDICATION LIST DOCUMENTED IN MEDICAL RECORD: ICD-10-PCS | Mod: CPTII,S$GLB,, | Performed by: INTERNAL MEDICINE

## 2023-09-14 RX ADMIN — EPOETIN ALFA-EPBX 40000 UNITS: 20000 INJECTION, SOLUTION INTRAVENOUS; SUBCUTANEOUS at 03:09

## 2023-09-14 NOTE — PLAN OF CARE
Problem: Anemia  Goal: Anemia Symptom Improvement  Outcome: Ongoing, Progressing  Intervention: Monitor and Manage Anemia  Flowsheets (Taken 9/14/2023 1600)  Oral Nutrition Promotion:   medicated   rest periods promoted  Fatigue Management: fatigue-related activity identified

## 2023-09-20 ENCOUNTER — LAB VISIT (OUTPATIENT)
Dept: LAB | Facility: HOSPITAL | Age: 63
End: 2023-09-20
Attending: INTERNAL MEDICINE
Payer: COMMERCIAL

## 2023-09-20 DIAGNOSIS — D57.3 SICKLE CELL TRAIT SYNDROME: ICD-10-CM

## 2023-09-20 DIAGNOSIS — D64.9 NORMOCHROMIC ANEMIA: ICD-10-CM

## 2023-09-20 LAB
ALBUMIN SERPL BCP-MCNC: 4.3 G/DL (ref 3.5–5.2)
ALP SERPL-CCNC: 65 U/L (ref 55–135)
ALT SERPL W/O P-5'-P-CCNC: 15 U/L (ref 10–44)
ANION GAP SERPL CALC-SCNC: 6 MMOL/L (ref 8–16)
AST SERPL-CCNC: 19 U/L (ref 10–40)
BASOPHILS # BLD AUTO: 0.11 K/UL (ref 0–0.2)
BASOPHILS NFR BLD: 1.7 % (ref 0–1.9)
BILIRUB SERPL-MCNC: 0.7 MG/DL (ref 0.1–1)
BUN SERPL-MCNC: 27 MG/DL (ref 8–23)
CALCIUM SERPL-MCNC: 9 MG/DL (ref 8.7–10.5)
CHLORIDE SERPL-SCNC: 107 MMOL/L (ref 95–110)
CO2 SERPL-SCNC: 27 MMOL/L (ref 23–29)
CREAT SERPL-MCNC: 1.9 MG/DL (ref 0.5–1.4)
DIFFERENTIAL METHOD: ABNORMAL
EOSINOPHIL # BLD AUTO: 0.1 K/UL (ref 0–0.5)
EOSINOPHIL NFR BLD: 2 % (ref 0–8)
ERYTHROCYTE [DISTWIDTH] IN BLOOD BY AUTOMATED COUNT: 27.6 % (ref 11.5–14.5)
EST. GFR  (NO RACE VARIABLE): 39.1 ML/MIN/1.73 M^2
FERRITIN SERPL-MCNC: 1300 NG/ML (ref 20–300)
GLUCOSE SERPL-MCNC: 101 MG/DL (ref 70–110)
HCT VFR BLD AUTO: 29.7 % (ref 40–54)
HGB BLD-MCNC: 9.9 G/DL (ref 14–18)
IMM GRANULOCYTES # BLD AUTO: 0.03 K/UL (ref 0–0.04)
IMM GRANULOCYTES NFR BLD AUTO: 0.5 % (ref 0–0.5)
IRON SERPL-MCNC: 87 UG/DL (ref 45–160)
LYMPHOCYTES # BLD AUTO: 1.6 K/UL (ref 1–4.8)
LYMPHOCYTES NFR BLD: 25 % (ref 18–48)
MCH RBC QN AUTO: 29.9 PG (ref 27–31)
MCHC RBC AUTO-ENTMCNC: 33.3 G/DL (ref 32–36)
MCV RBC AUTO: 90 FL (ref 82–98)
MONOCYTES # BLD AUTO: 0.8 K/UL (ref 0.3–1)
MONOCYTES NFR BLD: 11.7 % (ref 4–15)
NEUTROPHILS # BLD AUTO: 3.8 K/UL (ref 1.8–7.7)
NEUTROPHILS NFR BLD: 59.1 % (ref 38–73)
NRBC BLD-RTO: 0 /100 WBC
PLATELET # BLD AUTO: 470 K/UL (ref 150–450)
PMV BLD AUTO: 9.7 FL (ref 9.2–12.9)
POTASSIUM SERPL-SCNC: 4.3 MMOL/L (ref 3.5–5.1)
PROT SERPL-MCNC: 7.7 G/DL (ref 6–8.4)
RBC # BLD AUTO: 3.31 M/UL (ref 4.6–6.2)
SATURATED IRON: 45 % (ref 20–50)
SODIUM SERPL-SCNC: 140 MMOL/L (ref 136–145)
TOTAL IRON BINDING CAPACITY: 193 UG/DL (ref 250–450)
TRANSFERRIN SERPL-MCNC: 138 MG/DL (ref 200–375)
WBC # BLD AUTO: 6.48 K/UL (ref 3.9–12.7)

## 2023-09-20 PROCEDURE — 82728 ASSAY OF FERRITIN: CPT | Performed by: INTERNAL MEDICINE

## 2023-09-20 PROCEDURE — 85025 COMPLETE CBC W/AUTO DIFF WBC: CPT | Performed by: INTERNAL MEDICINE

## 2023-09-20 PROCEDURE — 84466 ASSAY OF TRANSFERRIN: CPT | Performed by: INTERNAL MEDICINE

## 2023-09-20 PROCEDURE — 83540 ASSAY OF IRON: CPT | Performed by: INTERNAL MEDICINE

## 2023-09-20 PROCEDURE — 80053 COMPREHEN METABOLIC PANEL: CPT | Performed by: INTERNAL MEDICINE

## 2023-09-20 PROCEDURE — 36415 COLL VENOUS BLD VENIPUNCTURE: CPT | Performed by: INTERNAL MEDICINE

## 2023-09-21 ENCOUNTER — INFUSION (OUTPATIENT)
Dept: INFUSION THERAPY | Facility: HOSPITAL | Age: 63
End: 2023-09-21
Attending: INTERNAL MEDICINE
Payer: COMMERCIAL

## 2023-09-21 VITALS
HEART RATE: 82 BPM | SYSTOLIC BLOOD PRESSURE: 133 MMHG | WEIGHT: 226.81 LBS | BODY MASS INDEX: 32.47 KG/M2 | RESPIRATION RATE: 17 BRPM | DIASTOLIC BLOOD PRESSURE: 73 MMHG | TEMPERATURE: 97 F | OXYGEN SATURATION: 97 % | HEIGHT: 70 IN

## 2023-09-21 DIAGNOSIS — D46.9 MDS (MYELODYSPLASTIC SYNDROME): Primary | ICD-10-CM

## 2023-09-21 DIAGNOSIS — D64.89 ANEMIA DUE TO MULTIPLE MECHANISMS: ICD-10-CM

## 2023-09-21 DIAGNOSIS — D64.9 CHRONIC ANEMIA: ICD-10-CM

## 2023-09-21 DIAGNOSIS — N18.30 STAGE 3 CHRONIC KIDNEY DISEASE, UNSPECIFIED WHETHER STAGE 3A OR 3B CKD: ICD-10-CM

## 2023-09-21 DIAGNOSIS — D46.1 RARS (REFRACTORY ANEMIA WITH RINGED SIDEROBLASTS): ICD-10-CM

## 2023-09-21 DIAGNOSIS — D64.9 NORMOCHROMIC ANEMIA: ICD-10-CM

## 2023-09-21 PROCEDURE — 63600175 PHARM REV CODE 636 W HCPCS: Mod: JZ,JG | Performed by: INTERNAL MEDICINE

## 2023-09-21 PROCEDURE — 96372 THER/PROPH/DIAG INJ SC/IM: CPT

## 2023-09-21 RX ADMIN — LUSPATERCEPT 100 MG: 75 INJECTION, POWDER, LYOPHILIZED, FOR SOLUTION SUBCUTANEOUS at 04:09

## 2023-09-21 NOTE — PLAN OF CARE
Problem: Fatigue  Goal: Improved Activity Tolerance  Intervention: Promote Improved Energy  Flowsheets (Taken 9/21/2023 1616)  Fatigue Management: fatigue-related activity identified  Activity Management: Ambulated -L4

## 2023-10-02 ENCOUNTER — OFFICE VISIT (OUTPATIENT)
Dept: ORTHOPEDICS | Facility: CLINIC | Age: 63
End: 2023-10-02
Payer: COMMERCIAL

## 2023-10-02 VITALS — HEIGHT: 70 IN | BODY MASS INDEX: 32.35 KG/M2 | WEIGHT: 226 LBS

## 2023-10-02 DIAGNOSIS — M17.11 PRIMARY OSTEOARTHRITIS OF RIGHT KNEE: Primary | ICD-10-CM

## 2023-10-02 DIAGNOSIS — M17.12 PRIMARY OSTEOARTHRITIS OF LEFT KNEE: ICD-10-CM

## 2023-10-02 PROCEDURE — 99213 PR OFFICE/OUTPT VISIT, EST, LEVL III, 20-29 MIN: ICD-10-PCS | Mod: 25,S$GLB,, | Performed by: PHYSICIAN ASSISTANT

## 2023-10-02 PROCEDURE — 1159F PR MEDICATION LIST DOCUMENTED IN MEDICAL RECORD: ICD-10-PCS | Mod: CPTII,S$GLB,, | Performed by: PHYSICIAN ASSISTANT

## 2023-10-02 PROCEDURE — 1160F PR REVIEW ALL MEDS BY PRESCRIBER/CLIN PHARMACIST DOCUMENTED: ICD-10-PCS | Mod: CPTII,S$GLB,, | Performed by: PHYSICIAN ASSISTANT

## 2023-10-02 PROCEDURE — 3008F PR BODY MASS INDEX (BMI) DOCUMENTED: ICD-10-PCS | Mod: CPTII,S$GLB,, | Performed by: PHYSICIAN ASSISTANT

## 2023-10-02 PROCEDURE — 1159F MED LIST DOCD IN RCRD: CPT | Mod: CPTII,S$GLB,, | Performed by: PHYSICIAN ASSISTANT

## 2023-10-02 PROCEDURE — 3008F BODY MASS INDEX DOCD: CPT | Mod: CPTII,S$GLB,, | Performed by: PHYSICIAN ASSISTANT

## 2023-10-02 PROCEDURE — 3044F HG A1C LEVEL LT 7.0%: CPT | Mod: CPTII,S$GLB,, | Performed by: PHYSICIAN ASSISTANT

## 2023-10-02 PROCEDURE — 20610 LARGE JOINT ASPIRATION/INJECTION: R KNEE: ICD-10-PCS | Mod: RT,S$GLB,, | Performed by: PHYSICIAN ASSISTANT

## 2023-10-02 PROCEDURE — 1160F RVW MEDS BY RX/DR IN RCRD: CPT | Mod: CPTII,S$GLB,, | Performed by: PHYSICIAN ASSISTANT

## 2023-10-02 PROCEDURE — 20610 DRAIN/INJ JOINT/BURSA W/O US: CPT | Mod: RT,S$GLB,, | Performed by: PHYSICIAN ASSISTANT

## 2023-10-02 PROCEDURE — 3044F PR MOST RECENT HEMOGLOBIN A1C LEVEL <7.0%: ICD-10-PCS | Mod: CPTII,S$GLB,, | Performed by: PHYSICIAN ASSISTANT

## 2023-10-02 PROCEDURE — 99213 OFFICE O/P EST LOW 20 MIN: CPT | Mod: 25,S$GLB,, | Performed by: PHYSICIAN ASSISTANT

## 2023-10-02 RX ORDER — TRIAMCINOLONE ACETONIDE 40 MG/ML
40 INJECTION, SUSPENSION INTRA-ARTICULAR; INTRAMUSCULAR
Status: DISCONTINUED | OUTPATIENT
Start: 2023-10-02 | End: 2023-10-02 | Stop reason: HOSPADM

## 2023-10-02 RX ADMIN — TRIAMCINOLONE ACETONIDE 40 MG: 40 INJECTION, SUSPENSION INTRA-ARTICULAR; INTRAMUSCULAR at 02:10

## 2023-10-02 NOTE — PROGRESS NOTES
[facility] ORTHOPEDICS  [address]  [place] [address]  Phone: [phone]   [phone]    Patient's PCP: Reynaldo Basilio NP  Referring Provider: No ref. provider found    Subjective:      Chief Complaint:   Chief Complaint   Patient presents with    Left Knee - Pain     BL Knee pain follow up.States that the left knee has started to buckel.    Right Knee - Pain     BL Knee pain follow up. Received inj in right knee 03/22/22 which he cannot recall if it was efficacious.       Past Medical History:   Diagnosis Date    Abnormal chest CT (new) 8/17/2022    Anemia due to multiple mechanisms 1/13/2019    Anemia, chronic renal failure, stage 2 (mild) 1/13/2019    Anemia, chronic renal failure, stage 3 (moderate) 8/2/2023    Depression     Former smoker 6/29/2017    H/O ETOH abuse 6/29/2017    Lung mass (new) 8/17/2022    Monocytosis 2019    Myelodysplasia (myelodysplastic syndrome)     Myelodysplasia (myelodysplastic syndrome)     RARS (refractory anemia with ringed sideroblasts) 6/1/2020    Sickle cell trait        Past Surgical History:   Procedure Laterality Date    BONE MARROW BIOPSY N/A 12/20/2019    Procedure: BIOPSY, BONE MARROW;  Surgeon: Satinder Diagnostic Provider;  Location: [facility];  Service: Interventional Radiology;  Laterality: N/A;    COLONOSCOPY N/A 11/5/2021    Procedure: COLONOSCOPY;  Surgeon: Rob Collins MD;  Location: [facility];  Service: Endoscopy;  Laterality: N/A;    ESOPHAGOGASTRODUODENOSCOPY N/A 11/5/2021    Procedure: EGD (ESOPHAGOGASTRODUODENOSCOPY);  Surgeon: Rob Collins MD;  Location: [facility] ENDO;  Service: Endoscopy;  Laterality: N/A;    INJECTION OF ANESTHETIC AGENT AROUND MEDIAL BRANCH NERVES INNERVATING LUMBAR FACET JOINT Bilateral 5/25/2018    Procedure: BLOCK-NERVE-MEDIAL BRANCH-LUMBAR;  Surgeon: Nura Heredia MD;  Location: [facility];  Service: Pain Management;  Laterality: Bilateral;  L3, 4, 5    KNEE ARTHROSCOPY W/ MENISCECTOMY Right 12/22/2021     Procedure: ARTHROSCOPY, KNEE, WITH MENISCECTOMY;  Surgeon: Sebastian Green MD;  Location: [facility] OR;  Service: Orthopedics;  Laterality: Right;  partial medial meniscectomy    RADIOFREQUENCY ABLATION OF LUMBAR MEDIAL BRANCH NERVE AT SINGLE LEVEL Bilateral 7/20/2018    Procedure: RADIOFREQUENCY ABLATION, NERVE, MEDIAL BRANCH, LUMBAR, 1 LEVEL;  Surgeon: Nura Heredia MD;  Location: [facility] OR;  Service: Pain Management;  Laterality: Bilateral;  L3, 4, 5 - Burned at 80 degrees C.  for 75 seconds x 2 each site    SMALL BOWEL ENTEROSCOPY N/A 10/16/2019    Procedure: ENTEROSCOPY;  Surgeon: Rob Collins MD;  Location: [facility] ENDO;  Service: Endoscopy;  Laterality: N/A;       Current Outpatient Medications   Medication Sig    amLODIPine (NORVASC) 5 MG tablet Take 1 tablet (5 mg total) by mouth once daily.    ascorbic acid, vitamin C, (VITAMIN C) 1000 MG tablet Take 1,000 mg by mouth once daily.    atorvastatin (LIPITOR) 10 MG tablet TAKE 1 TABLET BY MOUTH EVERY DAY IN THE EVENING    epoetin tray (PROCRIT INJ) Thursday    famotidine (PEPCID) 20 MG tablet famotidine 20 mg tablet   Take 1 tablet twice a day by oral route.    folic acid (FOLVITE) 1 MG tablet Take 2 tablets (2 mg total) by mouth once daily.    multivitamin capsule Take 1 capsule by mouth once daily.    pantoprazole (PROTONIX) 40 MG tablet     sertraline (ZOLOFT) 25 MG tablet Take 25 mg by mouth once daily.    tamsulosin (FLOMAX) 0.4 mg Cap Take 1 capsule (0.4 mg total) by mouth once daily.    traZODone (DESYREL) 100 MG tablet Take 1 tablet (100 mg total) by mouth nightly as needed for Insomnia.    sildenafil (VIAGRA) 100 MG tablet Take 1 tablet (100 mg total) by mouth daily as needed for Erectile Dysfunction.     No current facility-administered medications for this visit.       Review of patient's allergies indicates:  No Known Allergies    Family History   Problem Relation Age of Onset    Arthritis Mother     Depression Mother     Heart disease Mother     Hypertension  Mother     Heart attack Father 59       Social History     Socioeconomic History    Marital status:    Occupational History    Occupation: Prep Cook     Employer: [employer]   Tobacco Use    Smoking status: Former     Current packs/day: 0.00     Types: Cigarettes     Quit date: 1996     Years since quittin.7    Smokeless tobacco: Never   Substance and Sexual Activity    Alcohol use: Not Currently     Alcohol/week: 21.0 standard drinks of alcohol     Types: 21 Cans of beer per week     Comment: Sober 5 years    Drug use: Not Currently     Types: Marijuana    Sexual activity: Yes     Partners: Female     Social Determinants of Health     Stress: Stress Concern Present (2019)    [facility] Occupational [facility] - Occupational Stress Questionnaire     Feeling of Stress : Very much       History of present illness: Rick comes in today for his bilateral knees.  We have been treating his right knee for some time now.  We did an arthroscopy to the knee almost 2 years ago.  Had a medial meniscectomy.  He was found to have grade 3 patellofemoral changes, grade 2 changes in the lateral compartment, and grade 3 changes in the medial compartment.  Reports his right knee pain is worse than the left.  Actually, his left knee is asymptomatic today.  He said he felt an episode few weeks ago with the knee kind of buckled and it was painful.  That has resolved in his actually doing quite well.  Essentially he is wanting just to have it checked and for follow-up for his known arthritic right knee.    Review of Systems:    Constitutional: Negative for chills, fever and weight loss.   HENT: Negative for congestion.    Eyes: Negative for discharge and redness.   Respiratory: Negative for cough and shortness of breath.    Cardiovascular: Negative for chest pain.   Gastrointestinal: Negative for nausea and vomiting.   Musculoskeletal: See HPI.   Skin: Negative for rash.   Neurological: Negative for  headaches.   Endo/Heme/Allergies: Does not bruise/bleed easily.   Psychiatric/Behavioral: The patient is not nervous/anxious.    All other systems reviewed and are negative.       Objective:      Physical Examination:    Vital Signs:  There were no vitals filed for this visit.    Body mass index is 32.43 kg/m².    This a well-developed, well nourished patient in no acute distress.  They are alert and oriented and cooperative to examination.     Bilateral knee exam: Skin to bilateral knees is clean dry and intact.  No erythema or ecchymosis bilaterally.  No signs or symptoms of infection bilaterally.  He is neurovascularly intact throughout bilateral lower extremities.  Bilateral calves are soft and nontender.  Right knee range of motion 0-120 degrees.  Left knee range of motion 0-130 degrees.  Both knees stable to varus and valgus stresses.  He can weightbear as tolerated on bilateral lower extremities.  Negative straight leg raise bilaterally.  He is diffusely tender about the medial compartment of the right knee.  No real tenderness medially or laterally to the left knee.  He has a nonantalgic gait.    Pertinent New Results:        XRAY Report / Interpretation:   Three views taken of the bilateral knees today: AP, lateral, and sunrise views.  They reveal no acute fractures or dislocations.  Visualized soft tissues appear unremarkable.  Has severe arthrosis in the medial compartment of the right knee with near bone-on-bone deformity.  He is moderate arthrosis in the medial compartment of the left knee.      Assessment:       1. Primary osteoarthritis of right knee    2. Primary osteoarthritis of left knee      Plan:     Primary osteoarthritis of right knee  -     X-Ray Knee 3 View Bilateral  -     Large Joint Aspiration/Injection: R knee    Primary osteoarthritis of left knee  -     X-Ray Knee 3 View Bilateral        Follow up in about 3 months (around 1/2/2024) for Injec. f/up Right knee 10.2.23.    I injected his  right knee today via an anterior lateral approach with 40 mg of Kenalog and lidocaine.  He tolerated this well.  Since his left knee is asymptomatic, no particular intervention is warranted today.  I did discuss with him the severity of the arthritis in the right knee and the definitive treatment for that would be total knee arthroplasty at some point in the future.  However, it has been quite some time since he is had any particular treatment forward.  I believe trying intra-articular injections to begin with is most appropriate.  We shall see how he responds.  We will check him back in 3 months.        Fam Rubalcava, MPAS, PA-C    This note was created using [employer] voice recognition software that occasionally misinterprets words or phrases.

## 2023-10-02 NOTE — PROCEDURES
Large Joint Aspiration/Injection: R knee    Date/Time: 10/2/2023 2:45 PM    Performed by: Fam Rubalcava PA-C  Authorized by: Fam Rubalcava PA-C    Consent Done?:  Yes (Verbal)  Indications:  Arthritis and pain  Site marked: the procedure site was marked    Timeout: prior to procedure the correct patient, procedure, and site was verified    Prep: patient was prepped and draped in usual sterile fashion      Local anesthesia used?: Yes    Local anesthetic:  Lidocaine 1% without epinephrine    Details:  Needle Size:  25 G  Ultrasonic Guidance for needle placement?: No    Location:  Knee  Site:  R knee  Medications:  40 mg triamcinolone acetonide 40 mg/mL  Patient tolerance:  Patient tolerated the procedure well with no immediate complications

## 2023-10-04 ENCOUNTER — LAB VISIT (OUTPATIENT)
Dept: LAB | Facility: HOSPITAL | Age: 63
End: 2023-10-04
Attending: INTERNAL MEDICINE
Payer: COMMERCIAL

## 2023-10-04 ENCOUNTER — TELEPHONE (OUTPATIENT)
Dept: HEMATOLOGY/ONCOLOGY | Facility: CLINIC | Age: 63
End: 2023-10-04

## 2023-10-04 DIAGNOSIS — D57.3 SICKLE CELL TRAIT SYNDROME: ICD-10-CM

## 2023-10-04 DIAGNOSIS — D57.3 SICKLE CELL TRAIT SYNDROME: Primary | ICD-10-CM

## 2023-10-04 DIAGNOSIS — D64.9 NORMOCHROMIC ANEMIA: ICD-10-CM

## 2023-10-04 DIAGNOSIS — D64.89 ANEMIA DUE TO MULTIPLE MECHANISMS: ICD-10-CM

## 2023-10-04 LAB
ALBUMIN SERPL BCP-MCNC: 4.6 G/DL (ref 3.5–5.2)
ALP SERPL-CCNC: 70 U/L (ref 55–135)
ALT SERPL W/O P-5'-P-CCNC: 21 U/L (ref 10–44)
ANION GAP SERPL CALC-SCNC: 7 MMOL/L (ref 8–16)
ANISOCYTOSIS BLD QL SMEAR: SLIGHT
AST SERPL-CCNC: 23 U/L (ref 10–40)
BASOPHILS # BLD AUTO: 0.06 K/UL (ref 0–0.2)
BASOPHILS NFR BLD: 0.5 % (ref 0–1.9)
BILIRUB SERPL-MCNC: 0.5 MG/DL (ref 0.1–1)
BUN SERPL-MCNC: 36 MG/DL (ref 8–23)
CALCIUM SERPL-MCNC: 9.1 MG/DL (ref 8.7–10.5)
CHLORIDE SERPL-SCNC: 109 MMOL/L (ref 95–110)
CO2 SERPL-SCNC: 26 MMOL/L (ref 23–29)
CREAT SERPL-MCNC: 2 MG/DL (ref 0.5–1.4)
DACRYOCYTES BLD QL SMEAR: ABNORMAL
DIFFERENTIAL METHOD: ABNORMAL
EOSINOPHIL # BLD AUTO: 0 K/UL (ref 0–0.5)
EOSINOPHIL NFR BLD: 0.2 % (ref 0–8)
ERYTHROCYTE [DISTWIDTH] IN BLOOD BY AUTOMATED COUNT: 27 % (ref 11.5–14.5)
EST. GFR  (NO RACE VARIABLE): 36.8 ML/MIN/1.73 M^2
GLUCOSE SERPL-MCNC: 106 MG/DL (ref 70–110)
HCT VFR BLD AUTO: 32 % (ref 40–54)
HGB BLD-MCNC: 11 G/DL (ref 14–18)
IMM GRANULOCYTES # BLD AUTO: 0.17 K/UL (ref 0–0.04)
IMM GRANULOCYTES NFR BLD AUTO: 1.3 % (ref 0–0.5)
LYMPHOCYTES # BLD AUTO: 1.3 K/UL (ref 1–4.8)
LYMPHOCYTES NFR BLD: 9.9 % (ref 18–48)
MCH RBC QN AUTO: 30.1 PG (ref 27–31)
MCHC RBC AUTO-ENTMCNC: 34.4 G/DL (ref 32–36)
MCV RBC AUTO: 87 FL (ref 82–98)
MONOCYTES # BLD AUTO: 1.3 K/UL (ref 0.3–1)
MONOCYTES NFR BLD: 9.6 % (ref 4–15)
NEUTROPHILS # BLD AUTO: 10.3 K/UL (ref 1.8–7.7)
NEUTROPHILS NFR BLD: 78.5 % (ref 38–73)
NRBC BLD-RTO: 1 /100 WBC
OVALOCYTES BLD QL SMEAR: ABNORMAL
PLATELET # BLD AUTO: 528 K/UL (ref 150–450)
PLATELET BLD QL SMEAR: ABNORMAL
PMV BLD AUTO: 10.7 FL (ref 9.2–12.9)
POIKILOCYTOSIS BLD QL SMEAR: SLIGHT
POLYCHROMASIA BLD QL SMEAR: ABNORMAL
POTASSIUM SERPL-SCNC: 4.3 MMOL/L (ref 3.5–5.1)
PROT SERPL-MCNC: 7.8 G/DL (ref 6–8.4)
RBC # BLD AUTO: 3.66 M/UL (ref 4.6–6.2)
SCHISTOCYTES BLD QL SMEAR: PRESENT
SODIUM SERPL-SCNC: 142 MMOL/L (ref 136–145)
TARGETS BLD QL SMEAR: ABNORMAL
WBC # BLD AUTO: 13.08 K/UL (ref 3.9–12.7)

## 2023-10-04 PROCEDURE — 80053 COMPREHEN METABOLIC PANEL: CPT | Performed by: INTERNAL MEDICINE

## 2023-10-04 PROCEDURE — 36415 COLL VENOUS BLD VENIPUNCTURE: CPT | Performed by: INTERNAL MEDICINE

## 2023-10-04 PROCEDURE — 85025 COMPLETE CBC W/AUTO DIFF WBC: CPT | Performed by: INTERNAL MEDICINE

## 2023-10-05 ENCOUNTER — TELEPHONE (OUTPATIENT)
Dept: CARDIOLOGY | Facility: HOSPITAL | Age: 63
End: 2023-10-05

## 2023-10-05 NOTE — TELEPHONE ENCOUNTER
Nuclear Stress (talked to patient)  Patient advised, test will be at [facility] ([address]  Will need to register on the first floor at the main entrance.  Patient advised that arrival time is 6:40.  Patient advised that he may be here about 3.5-4 hours, and may want to bring something to occupy their time, as there will be periods of waiting.    Patient advised, may take his medications prior to testing if you need to.  Advised if he needs to eat to take his medications, please keep it light, like toast and juice.    Patient advised to avoid all caffeine 12 hours prior to testing.  This includes decaf tea and coffee.    Will provide peanut butter crackers for a snack after stress test.  If patient would prefer something else, please bring a snack from home.    Wear comfortable clothing.  No lotions, oils, or powders to the upper chest area. May wear deodorant.    No metal jewelry, buttons, or zippers to the upper body.  Patient verbalizes understanding of instructions.

## 2023-10-06 ENCOUNTER — HOSPITAL ENCOUNTER (OUTPATIENT)
Dept: RADIOLOGY | Facility: HOSPITAL | Age: 63
Discharge: HOME | End: 2023-10-06
Attending: INTERNAL MEDICINE
Payer: COMMERCIAL

## 2023-10-06 ENCOUNTER — HOSPITAL ENCOUNTER (OUTPATIENT)
Dept: CARDIOLOGY | Facility: HOSPITAL | Age: 63
Discharge: HOME | End: 2023-10-06
Attending: INTERNAL MEDICINE
Payer: COMMERCIAL

## 2023-10-06 VITALS — BODY MASS INDEX: 32.35 KG/M2 | HEIGHT: 70 IN | WEIGHT: 226 LBS

## 2023-10-06 DIAGNOSIS — R06.02 SHORTNESS OF BREATH: ICD-10-CM

## 2023-10-06 DIAGNOSIS — R07.9 CHEST PAIN, UNSPECIFIED TYPE: ICD-10-CM

## 2023-10-06 PROCEDURE — 93018 NUCLEAR STRESS - CARDIOLOGY INTERPRETED (CUPID ONLY): ICD-10-PCS | Mod: ,,, | Performed by: INTERNAL MEDICINE

## 2023-10-06 PROCEDURE — 93306 TTE W/DOPPLER COMPLETE: CPT | Mod: 26,,, | Performed by: INTERNAL MEDICINE

## 2023-10-06 PROCEDURE — 93016 CV STRESS TEST SUPVJ ONLY: CPT | Mod: ,,, | Performed by: NURSE PRACTITIONER

## 2023-10-06 PROCEDURE — 78452 HT MUSCLE IMAGE SPECT MULT: CPT

## 2023-10-06 PROCEDURE — 93018 CV STRESS TEST I&R ONLY: CPT | Mod: ,,, | Performed by: INTERNAL MEDICINE

## 2023-10-06 PROCEDURE — 78452 NUCLEAR STRESS - CARDIOLOGY INTERPRETED (CUPID ONLY): ICD-10-PCS | Mod: 26,,, | Performed by: INTERNAL MEDICINE

## 2023-10-06 PROCEDURE — A9502 TC99M TETROFOSMIN: HCPCS

## 2023-10-06 PROCEDURE — 78452 HT MUSCLE IMAGE SPECT MULT: CPT | Mod: 26,,, | Performed by: INTERNAL MEDICINE

## 2023-10-06 PROCEDURE — 93306 TTE W/DOPPLER COMPLETE: CPT

## 2023-10-06 PROCEDURE — 93306 ECHO (CUPID ONLY): ICD-10-PCS | Mod: 26,,, | Performed by: INTERNAL MEDICINE

## 2023-10-06 PROCEDURE — 93016 NUCLEAR STRESS - CARDIOLOGY INTERPRETED (CUPID ONLY): ICD-10-PCS | Mod: ,,, | Performed by: NURSE PRACTITIONER

## 2023-10-06 RX ORDER — REGADENOSON 0.08 MG/ML
0.4 INJECTION, SOLUTION INTRAVENOUS ONCE
Status: DISCONTINUED | OUTPATIENT
Start: 2023-10-06 | End: 2023-10-07 | Stop reason: HOSPADM

## 2023-10-09 LAB
CV PHARM DOSE: 0.4 MG
CV STRESS BASE HR: 60 BPM
DIASTOLIC BLOOD PRESSURE: 89 MMHG
EJECTION FRACTION- HIGH: 65 %
END DIASTOLIC INDEX-HIGH: 153 ML/M2
END DIASTOLIC INDEX-LOW: 93 ML/M2
END SYSTOLIC INDEX-HIGH: 71 ML/M2
END SYSTOLIC INDEX-LOW: 31 ML/M2
NUC REST DIASTOLIC VOLUME INDEX: 171
NUC REST EJECTION FRACTION: 47
NUC REST SYSTOLIC VOLUME INDEX: 90
NUC STRESS DIASTOLIC VOLUME INDEX: 173
NUC STRESS EJECTION FRACTION: 53 %
NUC STRESS SYSTOLIC VOLUME INDEX: 82
OHS CV CPX 1 MINUTE RECOVERY HEART RATE: 97 BPM
OHS CV CPX 85 PERCENT MAX PREDICTED HEART RATE MALE: 133
OHS CV CPX MAX PREDICTED HEART RATE: 157
OHS CV CPX PATIENT IS FEMALE: 0
OHS CV CPX PATIENT IS MALE: 1
OHS CV CPX PEAK DIASTOLIC BLOOD PRESSURE: 84 MMHG
OHS CV CPX PEAK HEAR RATE: 97 BPM
OHS CV CPX PEAK RATE PRESSURE PRODUCT: NORMAL
OHS CV CPX PEAK SYSTOLIC BLOOD PRESSURE: 160 MMHG
OHS CV CPX PERCENT MAX PREDICTED HEART RATE ACHIEVED: 62
OHS CV CPX RATE PRESSURE PRODUCT PRESENTING: 9360
RETIRED EF AND QEF - SEE NOTES: 53 %
SYSTOLIC BLOOD PRESSURE: 156 MMHG

## 2023-10-09 NOTE — PLAN OF CARE
Problem: Anemia  Goal: Anemia Symptom Improvement  Outcome: Ongoing, Progressing  Intervention: Monitor and Manage Anemia  Flowsheets (Taken 11/17/2022 1600)  Oral Nutrition Promotion: rest periods promoted  Safety Promotion/Fall Prevention: medications reviewed  Fatigue Management: frequent rest breaks encouraged      

## 2023-10-10 LAB
AV INDEX (PROSTH): 0.81
AV MEAN GRADIENT: 3 MMHG
AV PEAK GRADIENT: 6 MMHG
AV REGURGITATION PRESSURE HALF TIME: 912 MS
AV VALVE AREA BY VELOCITY RATIO: 2.47 CM²
AV VALVE AREA: 3.08 CM²
AV VELOCITY RATIO: 0.65
BSA FOR ECHO PROCEDURE: 2.25 M2
CV ECHO LV RWT: 0.55 CM
DOP CALC AO PEAK VEL: 1.26 M/S
DOP CALC AO VTI: 28.9 CM
DOP CALC LVOT AREA: 3.8 CM2
DOP CALC LVOT DIAMETER: 2.2 CM
DOP CALC LVOT PEAK VEL: 0.82 M/S
DOP CALC LVOT STROKE VOLUME: 88.91 CM3
DOP CALCLVOT PEAK VEL VTI: 23.4 CM
E WAVE DECELERATION TIME: 264 MSEC
E/A RATIO: 0.64
E/E' RATIO: 9.33 M/S
ECHO LV POSTERIOR WALL: 1.5 CM (ref 0.6–1.1)
FRACTIONAL SHORTENING: 29 % (ref 28–44)
INTERVENTRICULAR SEPTUM: 1.4 CM (ref 0.6–1.1)
IVRT: 84 MSEC
LEFT INTERNAL DIMENSION IN SYSTOLE: 3.9 CM (ref 2.1–4)
LEFT VENTRICLE MASS INDEX: 162 G/M2
LEFT VENTRICULAR INTERNAL DIMENSION IN DIASTOLE: 5.5 CM (ref 3.5–6)
LEFT VENTRICULAR MASS: 355.31 G
LV LATERAL E/E' RATIO: 7 M/S
LV SEPTAL E/E' RATIO: 14 M/S
LVOT MG: 1 MMHG
LVOT MV: 0.55 CM/S
Lab: 3.94 CM/M2
MR PISA EROA: 0.2 CM2
MV PEAK A VEL: 1.1 M/S
MV PEAK E VEL: 0.7 M/S
MV STENOSIS PRESSURE HALF TIME: 43 MS
MV VALVE AREA P 1/2 METHOD: 5.12 CM2
PISA AR MAX VEL: 2.98 M/S
PISA MRMAX VEL: 6.05 M/S
PISA RADIUS: 0.7 CM
PISA TR MAX VEL: 2.49 M/S
PISA VN NYQUIST MS: 0.39 M/S
PISA VN NYQUIST: 0.39 M/S
RA PRESSURE ESTIMATED: 3 MMHG
RV TB RVSP: 5 MMHG
TDI LATERAL: 0.1 M/S
TDI SEPTAL: 0.05 M/S
TDI: 0.08 M/S
TR MAX PG: 25 MMHG
TV REST PULMONARY ARTERY PRESSURE: 28 MMHG
Z-SCORE OF LEFT VENTRICULAR DIMENSION IN END DIASTOLE: -3.12
Z-SCORE OF LEFT VENTRICULAR DIMENSION IN END SYSTOLE: -1.24

## 2023-10-12 ENCOUNTER — INFUSION (OUTPATIENT)
Dept: INFUSION THERAPY | Facility: HOSPITAL | Age: 63
End: 2023-10-12
Attending: INTERNAL MEDICINE
Payer: COMMERCIAL

## 2023-10-12 VITALS
HEART RATE: 73 BPM | OXYGEN SATURATION: 98 % | HEIGHT: 70 IN | WEIGHT: 228.19 LBS | SYSTOLIC BLOOD PRESSURE: 135 MMHG | TEMPERATURE: 98 F | RESPIRATION RATE: 18 BRPM | DIASTOLIC BLOOD PRESSURE: 84 MMHG | BODY MASS INDEX: 32.67 KG/M2

## 2023-10-12 DIAGNOSIS — D46.9 MDS (MYELODYSPLASTIC SYNDROME): Primary | ICD-10-CM

## 2023-10-12 DIAGNOSIS — D46.1 RARS (REFRACTORY ANEMIA WITH RINGED SIDEROBLASTS): ICD-10-CM

## 2023-10-12 PROCEDURE — 63600175 PHARM REV CODE 636 W HCPCS: Mod: JZ,JG | Performed by: INTERNAL MEDICINE

## 2023-10-12 PROCEDURE — 96372 THER/PROPH/DIAG INJ SC/IM: CPT

## 2023-10-12 RX ADMIN — LUSPATERCEPT 100 MG: 75 INJECTION, POWDER, LYOPHILIZED, FOR SOLUTION SUBCUTANEOUS at 04:10

## 2023-10-16 ENCOUNTER — OFFICE VISIT (OUTPATIENT)
Dept: FAMILY MEDICINE | Facility: CLINIC | Age: 63
End: 2023-10-16
Payer: COMMERCIAL

## 2023-10-16 VITALS
HEIGHT: 70 IN | OXYGEN SATURATION: 97 % | BODY MASS INDEX: 32.5 KG/M2 | TEMPERATURE: 98 F | HEART RATE: 91 BPM | WEIGHT: 227 LBS | SYSTOLIC BLOOD PRESSURE: 128 MMHG | DIASTOLIC BLOOD PRESSURE: 88 MMHG

## 2023-10-16 DIAGNOSIS — N18.30 ANEMIA, CHRONIC RENAL FAILURE, STAGE 3 (MODERATE): ICD-10-CM

## 2023-10-16 DIAGNOSIS — D63.1 ANEMIA, CHRONIC RENAL FAILURE, STAGE 3 (MODERATE): ICD-10-CM

## 2023-10-16 DIAGNOSIS — D46.9 MDS (MYELODYSPLASTIC SYNDROME): ICD-10-CM

## 2023-10-16 DIAGNOSIS — I10 ESSENTIAL HYPERTENSION: Primary | ICD-10-CM

## 2023-10-16 DIAGNOSIS — F51.01 PRIMARY INSOMNIA: ICD-10-CM

## 2023-10-16 DIAGNOSIS — R73.02 IMPAIRED GLUCOSE TOLERANCE: ICD-10-CM

## 2023-10-16 DIAGNOSIS — Z23 NEED FOR INFLUENZA VACCINATION: ICD-10-CM

## 2023-10-16 DIAGNOSIS — N18.32 STAGE 3B CHRONIC KIDNEY DISEASE: ICD-10-CM

## 2023-10-16 DIAGNOSIS — E78.5 DYSLIPIDEMIA: ICD-10-CM

## 2023-10-16 PROCEDURE — 3062F PR POS MACROALBUMINURIA RESULT DOCUMENTED/REVIEW: ICD-10-PCS | Mod: CPTII,S$GLB,, | Performed by: NURSE PRACTITIONER

## 2023-10-16 PROCEDURE — 1159F MED LIST DOCD IN RCRD: CPT | Mod: CPTII,S$GLB,, | Performed by: NURSE PRACTITIONER

## 2023-10-16 PROCEDURE — 90471 IMMUNIZATION ADMIN: CPT | Mod: S$GLB,,, | Performed by: FAMILY MEDICINE

## 2023-10-16 PROCEDURE — 90471 FLU VACCINE (QUAD) GREATER THAN OR EQUAL TO 3YO PRESERVATIVE FREE IM: ICD-10-PCS | Mod: S$GLB,,, | Performed by: FAMILY MEDICINE

## 2023-10-16 PROCEDURE — 3044F PR MOST RECENT HEMOGLOBIN A1C LEVEL <7.0%: ICD-10-PCS | Mod: CPTII,S$GLB,, | Performed by: NURSE PRACTITIONER

## 2023-10-16 PROCEDURE — 90686 IIV4 VACC NO PRSV 0.5 ML IM: CPT | Mod: S$GLB,,, | Performed by: FAMILY MEDICINE

## 2023-10-16 PROCEDURE — 3074F PR MOST RECENT SYSTOLIC BLOOD PRESSURE < 130 MM HG: ICD-10-PCS | Mod: CPTII,S$GLB,, | Performed by: NURSE PRACTITIONER

## 2023-10-16 PROCEDURE — 1160F PR REVIEW ALL MEDS BY PRESCRIBER/CLIN PHARMACIST DOCUMENTED: ICD-10-PCS | Mod: CPTII,S$GLB,, | Performed by: NURSE PRACTITIONER

## 2023-10-16 PROCEDURE — 1159F PR MEDICATION LIST DOCUMENTED IN MEDICAL RECORD: ICD-10-PCS | Mod: CPTII,S$GLB,, | Performed by: NURSE PRACTITIONER

## 2023-10-16 PROCEDURE — 3079F DIAST BP 80-89 MM HG: CPT | Mod: CPTII,S$GLB,, | Performed by: NURSE PRACTITIONER

## 2023-10-16 PROCEDURE — 90686 FLU VACCINE (QUAD) GREATER THAN OR EQUAL TO 3YO PRESERVATIVE FREE IM: ICD-10-PCS | Mod: S$GLB,,, | Performed by: FAMILY MEDICINE

## 2023-10-16 PROCEDURE — 3066F PR DOCUMENTATION OF TREATMENT FOR NEPHROPATHY: ICD-10-PCS | Mod: CPTII,S$GLB,, | Performed by: NURSE PRACTITIONER

## 2023-10-16 PROCEDURE — 3044F HG A1C LEVEL LT 7.0%: CPT | Mod: CPTII,S$GLB,, | Performed by: NURSE PRACTITIONER

## 2023-10-16 PROCEDURE — 3008F BODY MASS INDEX DOCD: CPT | Mod: CPTII,S$GLB,, | Performed by: NURSE PRACTITIONER

## 2023-10-16 PROCEDURE — 99214 OFFICE O/P EST MOD 30 MIN: CPT | Mod: S$GLB,,, | Performed by: NURSE PRACTITIONER

## 2023-10-16 PROCEDURE — 3062F POS MACROALBUMINURIA REV: CPT | Mod: CPTII,S$GLB,, | Performed by: NURSE PRACTITIONER

## 2023-10-16 PROCEDURE — 3074F SYST BP LT 130 MM HG: CPT | Mod: CPTII,S$GLB,, | Performed by: NURSE PRACTITIONER

## 2023-10-16 PROCEDURE — 3079F PR MOST RECENT DIASTOLIC BLOOD PRESSURE 80-89 MM HG: ICD-10-PCS | Mod: CPTII,S$GLB,, | Performed by: NURSE PRACTITIONER

## 2023-10-16 PROCEDURE — 1160F RVW MEDS BY RX/DR IN RCRD: CPT | Mod: CPTII,S$GLB,, | Performed by: NURSE PRACTITIONER

## 2023-10-16 PROCEDURE — 3008F PR BODY MASS INDEX (BMI) DOCUMENTED: ICD-10-PCS | Mod: CPTII,S$GLB,, | Performed by: NURSE PRACTITIONER

## 2023-10-16 PROCEDURE — 3066F NEPHROPATHY DOC TX: CPT | Mod: CPTII,S$GLB,, | Performed by: NURSE PRACTITIONER

## 2023-10-16 PROCEDURE — 99214 PR OFFICE/OUTPT VISIT, EST, LEVL IV, 30-39 MIN: ICD-10-PCS | Mod: S$GLB,,, | Performed by: NURSE PRACTITIONER

## 2023-10-16 RX ORDER — TRAZODONE HYDROCHLORIDE 100 MG/1
100 TABLET ORAL NIGHTLY PRN
Qty: 90 TABLET | Refills: 2 | Status: SHIPPED | OUTPATIENT
Start: 2023-10-16 | End: 2024-10-15

## 2023-10-16 NOTE — PROGRESS NOTES
SUBJECTIVE:      Patient ID: Rick Butler is a 63 y.o. male.    Chief Complaint: Follow-up and Medication Refill    63-year-old male presents to the clinic for 6 month follow-up.      Patient has been doing well and offers no new complaints.  He is now established with Cardiology. He was referred to cardiology for chest pain.  Recent stress test and echo completed. Stress test showed no evidence of myocardial ischemia or infarction, negative for chest pain and arrhythmias during stress.  Echo, EF 55-60%, left ventricle wall thickness, severely dilated left atrium, and regurgitation noted from multiple valves.  He has follow-up later this month with Cardiology for results.     He is followed by Heme-Onc for MDS, sickle cell trait, and anemia.  He was recently switched from Procrit to Reblozyl.  Doing well on the new medication.  Hemoglobin 11 on most recent CBC.  Patient plans to see Dr. Feldman for the MDS in 2024.    Labs were completed recently and reviewed. Cholesterol has improved and is now controlled.  Reports compliance with Lipitor 10 and tolerating well without side effects.  Liver enzymes were stable. Kidney function remains about at baseline.  He is followed by the nephrologist Dr. Mas.  BP is stable with Amlodipine 5 mg daily.      He is sleeping well with Trazodone and is requesting a refill.     Overdue healthcare maintenance reviewed.  He would like the influenza vaccine today.          Family History   Problem Relation Age of Onset    Arthritis Mother     Depression Mother     Heart disease Mother     Hypertension Mother     Heart attack Father 59      Social History     Socioeconomic History    Marital status:    Occupational History    Occupation: Prep Cook     Employer: [employer]   Tobacco Use    Smoking status: Former     Current packs/day: 0.00     Types: Cigarettes     Quit date: 1996     Years since quittin.8    Smokeless tobacco: Never   Substance and Sexual  Activity    Alcohol use: Not Currently     Alcohol/week: 21.0 standard drinks of alcohol     Types: 21 Cans of beer per week     Comment: Sober 5 years    Drug use: Not Currently     Types: Marijuana    Sexual activity: Yes     Partners: Female     Social Determinants of Health     Stress: Stress Concern Present (4/24/2019)    [employer] [place] [employer] - Occupational Stress Questionnaire     Feeling of Stress : Very much     Current Outpatient Medications   Medication Sig Dispense Refill    amLODIPine (NORVASC) 5 MG tablet Take 1 tablet (5 mg total) by mouth once daily. 90 tablet 4    ascorbic acid, vitamin C, (VITAMIN C) 1000 MG tablet Take 1,000 mg by mouth once daily.      atorvastatin (LIPITOR) 10 MG tablet TAKE 1 TABLET BY MOUTH EVERY DAY IN THE EVENING 90 tablet 1    epoetin tray (PROCRIT INJ) Thursday      famotidine (PEPCID) 20 MG tablet famotidine 20 mg tablet   Take 1 tablet twice a day by oral route.      multivitamin capsule Take 1 capsule by mouth once daily.      pantoprazole (PROTONIX) 40 MG tablet       sertraline (ZOLOFT) 25 MG tablet Take 25 mg by mouth once daily.      tamsulosin (FLOMAX) 0.4 mg Cap Take 1 capsule (0.4 mg total) by mouth once daily. 90 capsule 4    folic acid (FOLVITE) 1 MG tablet Take 2 tablets (2 mg total) by mouth once daily. 180 tablet 4    sildenafil (VIAGRA) 100 MG tablet Take 1 tablet (100 mg total) by mouth daily as needed for Erectile Dysfunction. 10 tablet 6    traZODone (DESYREL) 100 MG tablet Take 1 tablet (100 mg total) by mouth nightly as needed for Insomnia. 90 tablet 2     No current facility-administered medications for this visit.     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    Sickle cell trait      Past Surgical History:   Procedure Laterality Date    BONE MARROW BIOPSY N/A 12/20/2019    Procedure: BIOPSY, BONE MARROW;  Surgeon: [facility];  Location: [facility] OR;  Service: Interventional Radiology;  Laterality: N/A;    COLONOSCOPY N/A 11/5/2021    Procedure: COLONOSCOPY;  Surgeon: Rob Collins MD;  Location: [facility] ENDO;  Service: Endoscopy;  Laterality: N/A;    ESOPHAGOGASTRODUODENOSCOPY N/A 11/5/2021    Procedure: EGD (ESOPHAGOGASTRODUODENOSCOPY);  Surgeon: Rob Collins MD;  Location: [facility] ENDO;  Service: Endoscopy;  Laterality: N/A;    INJECTION OF ANESTHETIC AGENT AROUND MEDIAL BRANCH NERVES INNERVATING LUMBAR FACET JOINT Bilateral 5/25/2018    Procedure: BLOCK-NERVE-MEDIAL BRANCH-LUMBAR;  Surgeon: Nura Heredia MD;  Location: [facility];  Service: Pain Management;  Laterality: Bilateral;  L3, 4, 5    KNEE ARTHROSCOPY W/ MENISCECTOMY Right 12/22/2021    Procedure: ARTHROSCOPY, KNEE, WITH MENISCECTOMY;  Surgeon: Sebastian Green MD;  Location: [facility] OR;  Service: Orthopedics;  Laterality: Right;  partial medial meniscectomy    RADIOFREQUENCY ABLATION OF LUMBAR MEDIAL BRANCH NERVE AT SINGLE LEVEL Bilateral 7/20/2018    Procedure: RADIOFREQUENCY ABLATION, NERVE, MEDIAL BRANCH, LUMBAR, 1 LEVEL;  Surgeon: Nura Heredia MD;  Location: [facility];  Service: Pain Management;  Laterality: Bilateral;  L3, 4, 5 - Burned at 80 degrees C.  for 75 seconds x 2 each site    SMALL BOWEL ENTEROSCOPY N/A 10/16/2019    Procedure: ENTEROSCOPY;  Surgeon: Rob Collins MD;  Location: [facility];  Service: Endoscopy;  Laterality: N/A;       Review of Systems   Constitutional:  Negative for activity change, appetite change, chills,  "diaphoresis, fatigue, fever and unexpected weight change.   HENT:  Negative for congestion, ear pain, sinus pressure, sore throat, trouble swallowing and voice change.    Eyes:  Negative for pain, discharge and visual disturbance.   Respiratory:  Negative for cough, chest tightness, shortness of breath and wheezing.    Cardiovascular:  Negative for chest pain and palpitations.   Gastrointestinal:  Negative for abdominal pain, constipation, diarrhea, nausea and vomiting.   Genitourinary:  Negative for difficulty urinating, flank pain, frequency and urgency.   Musculoskeletal:  Negative for back pain and joint swelling.   Skin:  Negative for color change and rash.   Neurological:  Negative for dizziness, seizures, syncope, weakness, numbness and headaches.   Hematological:  Negative for adenopathy.   Psychiatric/Behavioral:  Negative for dysphoric mood and sleep disturbance. The patient is not nervous/anxious.       OBJECTIVE:      Vitals:    10/16/23 1606   BP: 128/88   BP Location: Left arm   Patient Position: Sitting   BP Method: Medium (Manual)   Pulse: 91   Temp: 97.6 °F (36.4 °C)   TempSrc: Oral   SpO2: 97%   Weight: 103 kg (227 lb)   Height: 5' 10" (1.778 m)     Physical Exam  Vitals and nursing note reviewed.   Constitutional:       General: He is awake. He is not in acute distress.     Appearance: Normal appearance. He is obese. He is not ill-appearing, toxic-appearing or diaphoretic.   HENT:      Head: Normocephalic and atraumatic.      Right Ear: Tympanic membrane, ear canal and external ear normal. There is no impacted cerumen.      Left Ear: Tympanic membrane, ear canal and external ear normal. There is no impacted cerumen.      Nose: Nose normal.   Eyes:      General: Lids are normal. Gaze aligned appropriately.      Conjunctiva/sclera: Conjunctivae normal.      Right eye: Right conjunctiva is not injected.      Left eye: Left conjunctiva is not injected.      Pupils: Pupils are equal, round, and " reactive to light.   Cardiovascular:      Rate and Rhythm: Normal rate and regular rhythm.      Pulses: Normal pulses.      Heart sounds: Normal heart sounds, S1 normal and S2 normal. No murmur heard.     No friction rub. No gallop.   Pulmonary:      Effort: Pulmonary effort is normal. No respiratory distress.      Breath sounds: Normal breath sounds. No stridor. No decreased breath sounds, wheezing, rhonchi or rales.   Chest:      Chest wall: No tenderness.   Musculoskeletal:      Cervical back: Neck supple.      Right lower leg: No edema.      Left lower leg: No edema.   Lymphadenopathy:      Cervical: No cervical adenopathy.   Skin:     General: Skin is warm and dry.      Capillary Refill: Capillary refill takes less than 2 seconds.      Findings: No erythema or rash.   Neurological:      Mental Status: He is alert and oriented to person, place, and time. Mental status is at baseline.   Psychiatric:         Attention and Perception: Attention normal.         Mood and Affect: Mood normal.         Speech: Speech normal.         Behavior: Behavior normal. Behavior is cooperative.         Thought Content: Thought content normal.         Judgment: Judgment normal.        Hospital Outpatient Visit on 10/06/2023   Component Date Value Ref Range Status    BSA 10/06/2023 2.25  m2 Final    LVOT stroke volume 10/06/2023 88.91  cm3 Final    LVOT diameter 10/06/2023 2.20  cm Final    LVOT area 10/06/2023 3.8  cm2 Final    MV Peak E Los 10/06/2023 0.70  m/s Final    TDI LATERAL 10/06/2023 0.10  m/s Final    TDI SEPTAL 10/06/2023 0.05  m/s Final    E/E' ratio 10/06/2023 9.33  m/s Final    MV Peak A Los 10/06/2023 1.10  m/s Final    TR Max Los 10/06/2023 2.49  m/s Final    E/A ratio 10/06/2023 0.64   Final    IVRT 10/06/2023 84.00  msec Final    E wave deceleration time 10/06/2023 264.00  msec Final    LV SEPTAL E/E' RATIO 10/06/2023 14.00  m/s Final    LV LATERAL E/E' RATIO 10/06/2023 7.00  m/s Final    LVOT peak los 10/06/2023  0.82  m/s Final    Left Ventricular Outflow Tract Michela* 10/06/2023 0.55  cm/s Final    Left Ventricular Outflow Tract Michela* 10/06/2023 1.00  mmHg Final    AV regurgitation pressure 1/2 time 10/06/2023 912  ms Final    AR Max Caty 10/06/2023 2.98  m/s Final    Vn Nyquist MS 10/06/2023 0.39  m/s Final    AV mean gradient 10/06/2023 3  mmHg Final    AV peak gradient 10/06/2023 6  mmHg Final    Ao peak caty 10/06/2023 1.26  m/s Final    Ao VTI 10/06/2023 28.90  cm Final    LVOT peak VTI 10/06/2023 23.40  cm Final    AV valve area 10/06/2023 3.08  cm² Final    AV Velocity Ratio 10/06/2023 0.65   Final    AV index (prosthetic) 10/06/2023 0.81   Final    LIZZ by Velocity Ratio 10/06/2023 2.47  cm² Final    MR PISA EROA 10/06/2023 0.20  cm2 Final    Radius 10/06/2023 0.70  cm Final    Vn Nyquist 10/06/2023 0.39  m/s Final    Mr max caty 10/06/2023 6.05  m/s Final    MV stenosis pressure 1/2 time 10/06/2023 43.00  ms Final    MV valve area p 1/2 method 10/06/2023 5.12  cm2 Final    Triscuspid Valve Regurgitation Pea* 10/06/2023 25  mmHg Final    Mean e' 10/06/2023 0.08  m/s Final    LVIDd 10/06/2023 5.5  3.5 - 6.0 cm Final    LVIDs 10/06/2023 3.9  2.1 - 4.0 cm Final    LVIDs index 10/06/2023 3.94  cm/m2 Final    IVS 10/06/2023 1.4 (A)  0.6 - 1.1 cm Final    FS 10/06/2023 29  28 - 44 % Final    Left Ventricle Relative Wall Thick* 10/06/2023 0.55  cm Final    Posterior Wall 10/06/2023 1.5 (A)  0.6 - 1.1 cm Final    LV mass 10/06/2023 355.31  g Final    LV Mass Index 10/06/2023 162  g/m2 Final    ZLVIDS 10/06/2023 -1.24   Final    ZLVIDD 10/06/2023 -3.12   Final    TV resting pulmonary artery pressu* 10/06/2023 28  mmHg Final    RV TB RVSP 10/06/2023 5  mmHg Final    Est. RA pres 10/06/2023 3  mmHg Final   Hospital Outpatient Visit on 10/06/2023   Component Date Value Ref Range Status    85% Max Predicted HR 10/06/2023 133   Final    Max Predicted HR 10/06/2023 157   Final    [facility] CV CPX PATIENT IS MALE 10/06/2023 1.0   Final    [facility]  CV CPX PATIENT IS FEMALE 10/06/2023 0.0   Final    dose 10/06/2023 0.4  mg Final    Peak HR 10/06/2023 97.0  bpm Final    % Max HR Achieved 10/06/2023 62   Final    EF + QEF 10/06/2023 53  % Final    Ejection Fraction- High Stress 10/06/2023 65  % Final    End diastolic index (mL/m2) 10/06/2023 93  mL/m2 Final    End diastolic index (mL/m2) 10/06/2023 153  mL/m2 Final    End systolic index (mL/m2) 10/06/2023 31  mL/m2 Final    End systolic index (mL/m2) 10/06/2023 71  mL/m2 Final    Nuc Rest EF 10/06/2023 47   Final    Nuc Rest Diastolic Volume Index 10/06/2023 171   Final    Nuc Rest Systolic Volume Index 10/06/2023 90   Final    Nuc Stress EF 10/06/2023 53  % Final    Nuc Rest Diastolic Volume Index 10/06/2023 173   Final    Nuc Rest Systolic Volume Index 10/06/2023 82   Final    Systolic blood pressure 10/06/2023 156  mmHg Final    Diastolic blood pressure 10/06/2023 89  mmHg Final    HR at rest 10/06/2023 60  bpm Final    Peak Systolic BP 10/06/2023 160  mmHg Final    Peak Diatolic BP 10/06/2023 84  mmHg Final    1 Minute Recovery HR 10/06/2023 97  bpm Final    RPP 10/06/2023 9,360   Final    Peak RPP 10/06/2023 15,520   Final   Lab Visit on 10/04/2023   Component Date Value Ref Range Status    Cholesterol 10/04/2023 143  120 - 199 mg/dL Final    Comment: The National Cholesterol Education Program (NCEP) has set the  following guidelines (reference ranges) for Cholesterol:  Optimal.....................<200 mg/dL  Borderline High.............200-239 mg/dL  High........................> or = 240 mg/dL      Triglycerides 10/04/2023 49  30 - 150 mg/dL Final    Comment: The National Cholesterol Education Program (NCEP) has set the  following guidelines (reference values) for triglycerides:  Normal......................<150 mg/dL  Borderline High.............150-199 mg/dL  High........................200-499 mg/dL      HDL 10/04/2023 50  40 - 75 mg/dL Final    Comment: The National Cholesterol Education Program (NCEP)  has set the  following guidelines (reference values) for HDL Cholesterol:  Low...............<40 mg/dL  Optimal...........>60 mg/dL      LDL Cholesterol 10/04/2023 83.2  63.0 - 159.0 mg/dL Final    Comment: The National Cholesterol Education Program (NCEP) has set the  following guidelines (reference values) for LDL Cholesterol:  Optimal.......................<130 mg/dL  Borderline High...............130-159 mg/dL  High..........................160-189 mg/dL  Very High.....................>190 mg/dL      HDL/Cholesterol Ratio 10/04/2023 35.0  20.0 - 50.0 % Final    Total Cholesterol/HDL Ratio 10/04/2023 2.9  2.0 - 5.0 Final    Non-HDL Cholesterol 10/04/2023 93  mg/dL Final    Comment: Risk category and Non-HDL cholesterol goals:  Coronary heart disease (CHD)or equivalent (10-year risk of CHD >20%):  Non-HDL cholesterol goal     <130 mg/dL  Two or more CHD risk factors and 10-year risk of CHD <= 20%:  Non-HDL cholesterol goal     <160 mg/dL  0 to 1 CHD risk factor:  Non-HDL cholesterol goal     <190 mg/dL      PSA, Screen 10/04/2023 0.41  0.00 - 4.00 ng/mL Final    Comment: The testing method is a chemiluminescent immunoassay manufactured by   [employer] and performed on the [employer]oassay Systems. Values   obtained with different assay manufacturers for methods may be   different and   cannot be used interchangeably     Lab Visit on 10/04/2023   Component Date Value Ref Range Status    Microalbumin, Urine 10/04/2023 343.7 (H)  <19.9 ug/mL Final    Creatinine, Urine 10/04/2023 87.0  23.0 - 375.0 mg/dL Final    Comment: The random urine reference ranges provided were established   for 24 hour urine collections.  No reference ranges exist for  random urine specimens.  Correlate clinically.      Microalb/Creat Ratio 10/04/2023 395.1 (H)  0.0 - 30.0 ug/mg Final    Specimen UA 10/04/2023 Urine, Clean Catch   Final    Color, UA 10/04/2023 Yellow  Yellow, Straw, Dana Final    Appearance, UA 10/04/2023 Clear   Clear Final    pH, UA 10/04/2023 7.0  5.0 - 8.0 Final    Specific Gravity, UA 10/04/2023 1.015  1.005 - 1.030 Final    Protein, UA 10/04/2023 1+ (A)  Negative Final    Comment: Recommend a 24 hour urine protein or a urine   protein/creatinine ratio if globulin induced proteinuria is  clinically suspected.      Glucose, UA 10/04/2023 Negative  Negative Final    Ketones, UA 10/04/2023 Negative  Negative Final    Bilirubin (UA) 10/04/2023 Negative  Negative Final    Occult Blood UA 10/04/2023 Negative  Negative Final    Nitrite, UA 10/04/2023 Negative  Negative Final    Urobilinogen, UA 10/04/2023 Negative  Negative EU/dL Final    Leukocytes, UA 10/04/2023 Negative  Negative Final    RBC, UA 10/04/2023 1  0 - 4 /hpf Final    WBC, UA 10/04/2023 0  0 - 5 /hpf Final    Bacteria 10/04/2023 Negative  None-Occ /hpf Final    Squam Epithel, UA 10/04/2023 0  /hpf Final    Hyaline Casts, UA 10/04/2023 1  0-1/lpf /lpf Final    Microscopic Comment 10/04/2023 SEE COMMENT   Final    Comment: Other formed elements not mentioned in the report are not   present in the microscopic examination.      Lab Visit on 10/04/2023   Component Date Value Ref Range Status    WBC 10/04/2023 13.08 (H)  3.90 - 12.70 K/uL Final    RBC 10/04/2023 3.66 (L)  4.60 - 6.20 M/uL Final    Hemoglobin 10/04/2023 11.0 (L)  14.0 - 18.0 g/dL Final    Hematocrit 10/04/2023 32.0 (L)  40.0 - 54.0 % Final    MCV 10/04/2023 87  82 - 98 fL Final    MCH 10/04/2023 30.1  27.0 - 31.0 pg Final    MCHC 10/04/2023 34.4  32.0 - 36.0 g/dL Final    RDW 10/04/2023 27.0 (H)  11.5 - 14.5 % Final    Platelets 10/04/2023 528 (H)  150 - 450 K/uL Final    MPV 10/04/2023 10.7  9.2 - 12.9 fL Final    Immature Granulocytes 10/04/2023 1.3 (H)  0.0 - 0.5 % Final    Gran # (ANC) 10/04/2023 10.3 (H)  1.8 - 7.7 K/uL Final    Immature Grans (Abs) 10/04/2023 0.17 (H)  0.00 - 0.04 K/uL Final    Comment: Mild elevation in immature granulocytes is non specific and   can be seen in a variety of  conditions including stress response,   acute inflammation, trauma and pregnancy. Correlation with other   laboratory and clinical findings is essential.      Lymph # 10/04/2023 1.3  1.0 - 4.8 K/uL Final    Mono # 10/04/2023 1.3 (H)  0.3 - 1.0 K/uL Final    Eos # 10/04/2023 0.0  0.0 - 0.5 K/uL Final    Baso # 10/04/2023 0.06  0.00 - 0.20 K/uL Final    nRBC 10/04/2023 1 (A)  0 /100 WBC Final    Gran % 10/04/2023 78.5 (H)  38.0 - 73.0 % Final    Lymph % 10/04/2023 9.9 (L)  18.0 - 48.0 % Final    Mono % 10/04/2023 9.6  4.0 - 15.0 % Final    Eosinophil % 10/04/2023 0.2  0.0 - 8.0 % Final    Basophil % 10/04/2023 0.5  0.0 - 1.9 % Final    Platelet Estimate 10/04/2023 Increased (A)   Final    Aniso 10/04/2023 Slight   Final    Poik 10/04/2023 Slight   Final    Poly 10/04/2023 Occasional   Final    Ovalocytes 10/04/2023 Occasional   Final    Target Cells 10/04/2023 Marked   Final    Tear Drop Cells 10/04/2023 Occasional   Final    Schistocytes 10/04/2023 Present   Final    Differential Method 10/04/2023 Automated   Final    Sodium 10/04/2023 142  136 - 145 mmol/L Final    Potassium 10/04/2023 4.3  3.5 - 5.1 mmol/L Final    Chloride 10/04/2023 109  95 - 110 mmol/L Final    CO2 10/04/2023 26  23 - 29 mmol/L Final    Glucose 10/04/2023 106  70 - 110 mg/dL Final    BUN 10/04/2023 36 (H)  8 - 23 mg/dL Final    Creatinine 10/04/2023 2.0 (H)  0.5 - 1.4 mg/dL Final    Calcium 10/04/2023 9.1  8.7 - 10.5 mg/dL Final    Total Protein 10/04/2023 7.8  6.0 - 8.4 g/dL Final    Albumin 10/04/2023 4.6  3.5 - 5.2 g/dL Final    Total Bilirubin 10/04/2023 0.5  0.1 - 1.0 mg/dL Final    Comment: For infants and newborns, interpretation of results should be based  on gestational age, weight and in agreement with clinical  observations.    Premature Infant recommended reference ranges:  Up to 24 hours.............<8.0 mg/dL  Up to 48 hours............<12.0 mg/dL  3-5 days..................<15.0 mg/dL  6-29 days.................<15.0 mg/dL       Alkaline Phosphatase 10/04/2023 70  55 - 135 U/L Final    AST 10/04/2023 23  10 - 40 U/L Final    ALT 10/04/2023 21  10 - 44 U/L Final    eGFR 10/04/2023 36.8 (A)  >60 mL/min/1.73 m^2 Final    Anion Gap 10/04/2023 7 (L)  8 - 16 mmol/L Final     Assessment:       1. Essential hypertension    2. Dyslipidemia    3. Primary insomnia    4. Stage 3b chronic kidney disease    5. Anemia, chronic renal failure, stage 3 (moderate)    6. MDS (myelodysplastic syndrome)    7. Need for influenza vaccination    8. Impaired glucose tolerance        Plan:       Essential hypertension  Stable, continue amlodipine 5 mg. Reduce the amount of salt in your diet; Lose weight; Avoid drinking too much alcohol; Exercise at least 30 minutes per day most days of the week.    -     Comprehensive Metabolic Panel; Future; Expected date: 04/16/2024  -     Lipid Panel; Future; Expected date: 04/16/2024    Dyslipidemia  Stable with Lipitor 10 mg, continue current treatment. Limit red meat, butter, fried foods, cheese, and other foods that have a lot of saturated fat. Consume more: lean meats, fish, fruits, vegetables, whole grains, beans, lentils, and nuts.  Weight loss, and 30-45 min of cardiovascular exercise daily.  -     Comprehensive Metabolic Panel; Future; Expected date: 04/16/2024  -     Lipid Panel; Future; Expected date: 04/16/2024    Primary insomnia  Stable, continue Trazodone 100 mg qhs.   -     traZODone (DESYREL) 100 MG tablet; Take 1 tablet (100 mg total) by mouth nightly as needed for Insomnia.  Dispense: 90 tablet; Refill: 2    Impaired glucose tolerance  Recommend weight loss.  Cut down on the starches, carbohydrates, sugars and high-calorie items like syrups, sweets, cookies, and candies.  -     Comprehensive Metabolic Panel; Future; Expected date: 04/16/2024  -     Lipid Panel; Future; Expected date: 04/16/2024  -     TSH; Future; Expected date: 04/16/2024  -     Hemoglobin A1C; Future; Expected date: 04/16/2024    Stage 3b  chronic kidney disease  Managed by Nephrology. Strict BP control, avoid NSAIDs, increase fluids.     Anemia, chronic renal failure, stage 3 (moderate)  Managed by Heme-Onc, hgb stable.     MDS (myelodysplastic syndrome)  Managed by Heme-Onc    Need for influenza vaccination  -     Cancel: Influenza - Quadrivalent - High Dose (65+) (PF) (IM)  -     Influenza - Quadrivalent (PF)    This note was created using [employer] voice recognition software that occasionally misinterprets phrases or words.     I spent a total of 30 minutes on the day of the visit.This includes face to face time and non-face to face time preparing to see the patient (eg, review of tests), obtaining and/or reviewing separately obtained history, documenting clinical information in the electronic or other health record, independently interpreting results and communicating results to the patient/family/caregiver, or care coordinator.    Follow up in about 6 months (around 4/16/2024) for HTN and lipids.      10/16/2023 LATOYA Smith, FNP

## 2023-10-18 ENCOUNTER — LAB VISIT (OUTPATIENT)
Dept: LAB | Facility: HOSPITAL | Age: 63
End: 2023-10-18
Attending: INTERNAL MEDICINE
Payer: COMMERCIAL

## 2023-10-18 DIAGNOSIS — D57.3 SICKLE CELL TRAIT SYNDROME: ICD-10-CM

## 2023-10-18 DIAGNOSIS — D64.89 ANEMIA DUE TO MULTIPLE MECHANISMS: ICD-10-CM

## 2023-10-18 LAB
ALBUMIN SERPL BCP-MCNC: 4.2 G/DL (ref 3.5–5.2)
ALP SERPL-CCNC: 68 U/L (ref 55–135)
ALT SERPL W/O P-5'-P-CCNC: 16 U/L (ref 10–44)
ANION GAP SERPL CALC-SCNC: 6 MMOL/L (ref 8–16)
AST SERPL-CCNC: 17 U/L (ref 10–40)
BASOPHILS # BLD AUTO: 0.17 K/UL (ref 0–0.2)
BASOPHILS NFR BLD: 1.4 % (ref 0–1.9)
BILIRUB SERPL-MCNC: 0.5 MG/DL (ref 0.1–1)
BUN SERPL-MCNC: 28 MG/DL (ref 8–23)
CALCIUM SERPL-MCNC: 9 MG/DL (ref 8.7–10.5)
CHLORIDE SERPL-SCNC: 108 MMOL/L (ref 95–110)
CO2 SERPL-SCNC: 27 MMOL/L (ref 23–29)
CREAT SERPL-MCNC: 2.1 MG/DL (ref 0.5–1.4)
DIFFERENTIAL METHOD: ABNORMAL
EOSINOPHIL # BLD AUTO: 0.2 K/UL (ref 0–0.5)
EOSINOPHIL NFR BLD: 1.7 % (ref 0–8)
ERYTHROCYTE [DISTWIDTH] IN BLOOD BY AUTOMATED COUNT: 27.9 % (ref 11.5–14.5)
EST. GFR  (NO RACE VARIABLE): 34.7 ML/MIN/1.73 M^2
FERRITIN SERPL-MCNC: 1387.5 NG/ML (ref 20–300)
GLUCOSE SERPL-MCNC: 104 MG/DL (ref 70–110)
HCT VFR BLD AUTO: 31 % (ref 40–54)
HGB BLD-MCNC: 10.5 G/DL (ref 14–18)
IMM GRANULOCYTES # BLD AUTO: 0.47 K/UL (ref 0–0.04)
IMM GRANULOCYTES NFR BLD AUTO: 3.9 % (ref 0–0.5)
IRON SERPL-MCNC: 77 UG/DL (ref 45–160)
LYMPHOCYTES # BLD AUTO: 2.1 K/UL (ref 1–4.8)
LYMPHOCYTES NFR BLD: 16.9 % (ref 18–48)
MCH RBC QN AUTO: 29.9 PG (ref 27–31)
MCHC RBC AUTO-ENTMCNC: 33.9 G/DL (ref 32–36)
MCV RBC AUTO: 88 FL (ref 82–98)
MONOCYTES # BLD AUTO: 1.1 K/UL (ref 0.3–1)
MONOCYTES NFR BLD: 9.1 % (ref 4–15)
NEUTROPHILS # BLD AUTO: 8.2 K/UL (ref 1.8–7.7)
NEUTROPHILS NFR BLD: 67 % (ref 38–73)
NRBC BLD-RTO: 1 /100 WBC
PLATELET # BLD AUTO: 509 K/UL (ref 150–450)
PMV BLD AUTO: 9.8 FL (ref 9.2–12.9)
POTASSIUM SERPL-SCNC: 4.4 MMOL/L (ref 3.5–5.1)
PROT SERPL-MCNC: 7.6 G/DL (ref 6–8.4)
RBC # BLD AUTO: 3.51 M/UL (ref 4.6–6.2)
SATURATED IRON: 39 % (ref 20–50)
SODIUM SERPL-SCNC: 141 MMOL/L (ref 136–145)
TOTAL IRON BINDING CAPACITY: 200 UG/DL (ref 250–450)
TRANSFERRIN SERPL-MCNC: 143 MG/DL (ref 200–375)
WBC # BLD AUTO: 12.19 K/UL (ref 3.9–12.7)

## 2023-10-18 PROCEDURE — 36415 COLL VENOUS BLD VENIPUNCTURE: CPT | Performed by: INTERNAL MEDICINE

## 2023-10-18 PROCEDURE — 80053 COMPREHEN METABOLIC PANEL: CPT | Performed by: INTERNAL MEDICINE

## 2023-10-18 PROCEDURE — 82728 ASSAY OF FERRITIN: CPT | Performed by: INTERNAL MEDICINE

## 2023-10-18 PROCEDURE — 85025 COMPLETE CBC W/AUTO DIFF WBC: CPT | Performed by: INTERNAL MEDICINE

## 2023-10-18 PROCEDURE — 83540 ASSAY OF IRON: CPT | Performed by: INTERNAL MEDICINE

## 2023-10-18 PROCEDURE — 84466 ASSAY OF TRANSFERRIN: CPT | Performed by: INTERNAL MEDICINE

## 2023-10-24 ENCOUNTER — OFFICE VISIT (OUTPATIENT)
Dept: CARDIOLOGY | Facility: CLINIC | Age: 63
End: 2023-10-24
Payer: COMMERCIAL

## 2023-10-24 VITALS
HEIGHT: 70 IN | SYSTOLIC BLOOD PRESSURE: 120 MMHG | WEIGHT: 233.56 LBS | HEART RATE: 80 BPM | OXYGEN SATURATION: 97 % | BODY MASS INDEX: 33.44 KG/M2 | DIASTOLIC BLOOD PRESSURE: 80 MMHG

## 2023-10-24 DIAGNOSIS — I10 ESSENTIAL HYPERTENSION: ICD-10-CM

## 2023-10-24 DIAGNOSIS — R07.9 CHEST PAIN, UNSPECIFIED TYPE: Primary | ICD-10-CM

## 2023-10-24 DIAGNOSIS — I51.7 LEFT ATRIAL ENLARGEMENT: ICD-10-CM

## 2023-10-24 DIAGNOSIS — E78.5 DYSLIPIDEMIA: ICD-10-CM

## 2023-10-24 PROCEDURE — 3062F PR POS MACROALBUMINURIA RESULT DOCUMENTED/REVIEW: ICD-10-PCS | Mod: CPTII,S$GLB,, | Performed by: INTERNAL MEDICINE

## 2023-10-24 PROCEDURE — 3044F PR MOST RECENT HEMOGLOBIN A1C LEVEL <7.0%: ICD-10-PCS | Mod: CPTII,S$GLB,, | Performed by: INTERNAL MEDICINE

## 2023-10-24 PROCEDURE — 3066F PR DOCUMENTATION OF TREATMENT FOR NEPHROPATHY: ICD-10-PCS | Mod: CPTII,S$GLB,, | Performed by: INTERNAL MEDICINE

## 2023-10-24 PROCEDURE — 99214 PR OFFICE/OUTPT VISIT, EST, LEVL IV, 30-39 MIN: ICD-10-PCS | Mod: S$GLB,,, | Performed by: INTERNAL MEDICINE

## 2023-10-24 PROCEDURE — 1159F PR MEDICATION LIST DOCUMENTED IN MEDICAL RECORD: ICD-10-PCS | Mod: CPTII,S$GLB,, | Performed by: INTERNAL MEDICINE

## 2023-10-24 PROCEDURE — 3008F BODY MASS INDEX DOCD: CPT | Mod: CPTII,S$GLB,, | Performed by: INTERNAL MEDICINE

## 2023-10-24 PROCEDURE — 3079F DIAST BP 80-89 MM HG: CPT | Mod: CPTII,S$GLB,, | Performed by: INTERNAL MEDICINE

## 2023-10-24 PROCEDURE — 3066F NEPHROPATHY DOC TX: CPT | Mod: CPTII,S$GLB,, | Performed by: INTERNAL MEDICINE

## 2023-10-24 PROCEDURE — 1159F MED LIST DOCD IN RCRD: CPT | Mod: CPTII,S$GLB,, | Performed by: INTERNAL MEDICINE

## 2023-10-24 PROCEDURE — 3062F POS MACROALBUMINURIA REV: CPT | Mod: CPTII,S$GLB,, | Performed by: INTERNAL MEDICINE

## 2023-10-24 PROCEDURE — 3074F SYST BP LT 130 MM HG: CPT | Mod: CPTII,S$GLB,, | Performed by: INTERNAL MEDICINE

## 2023-10-24 PROCEDURE — 99999 PR PBB SHADOW E&M-EST. PATIENT-LVL IV: CPT | Mod: PBBFAC,,, | Performed by: INTERNAL MEDICINE

## 2023-10-24 PROCEDURE — 3008F PR BODY MASS INDEX (BMI) DOCUMENTED: ICD-10-PCS | Mod: CPTII,S$GLB,, | Performed by: INTERNAL MEDICINE

## 2023-10-24 PROCEDURE — 3074F PR MOST RECENT SYSTOLIC BLOOD PRESSURE < 130 MM HG: ICD-10-PCS | Mod: CPTII,S$GLB,, | Performed by: INTERNAL MEDICINE

## 2023-10-24 PROCEDURE — 99999 PR PBB SHADOW E&M-EST. PATIENT-LVL IV: ICD-10-PCS | Mod: PBBFAC,,, | Performed by: INTERNAL MEDICINE

## 2023-10-24 PROCEDURE — 3044F HG A1C LEVEL LT 7.0%: CPT | Mod: CPTII,S$GLB,, | Performed by: INTERNAL MEDICINE

## 2023-10-24 PROCEDURE — 3079F PR MOST RECENT DIASTOLIC BLOOD PRESSURE 80-89 MM HG: ICD-10-PCS | Mod: CPTII,S$GLB,, | Performed by: INTERNAL MEDICINE

## 2023-10-24 PROCEDURE — 99214 OFFICE O/P EST MOD 30 MIN: CPT | Mod: S$GLB,,, | Performed by: INTERNAL MEDICINE

## 2023-10-24 RX ORDER — AMLODIPINE BESYLATE 5 MG/1
5 TABLET ORAL DAILY
Qty: 90 TABLET | Refills: 3 | Status: SHIPPED | OUTPATIENT
Start: 2023-10-24

## 2023-10-24 RX ORDER — ATORVASTATIN CALCIUM 10 MG/1
10 TABLET, FILM COATED ORAL NIGHTLY
Qty: 90 TABLET | Refills: 3 | Status: SHIPPED | OUTPATIENT
Start: 2023-10-24 | End: 2024-10-23

## 2023-10-24 NOTE — PROGRESS NOTES
[facility] [place] [facility] [place]    Subjective:     Patient ID:  Rick Butler is a 63 y.o. male patient here for evaluation Hypertension and Results (Stress, echo & lab results)      HPI:  63-year-old male here for follow-up of his stress test and echocardiogram which were done for atypical chest pain.  Stress test is negative for ischemia.  Left atrium is enlarged on the echo but EF is normal and no significant valvular heart disease.  Patient reports doing well and he has not had more symptoms since last visit.    Review of Systems   All other systems reviewed and are negative.       Past Medical History:   Diagnosis Date    Abnormal chest CT (new) 8/17/2022    Anemia due to multiple mechanisms 1/13/2019    Anemia, chronic renal failure, stage 2 (mild) 1/13/2019    Anemia, chronic renal failure, stage 3 (moderate) 8/2/2023    Depression     Former smoker 6/29/2017    H/O ETOH abuse 6/29/2017    Lung mass (new) 8/17/2022    Monocytosis 2019    Myelodysplasia (myelodysplastic syndrome)     Myelodysplasia (myelodysplastic syndrome)     RARS (refractory anemia with ringed sideroblasts) 6/1/2020    Sickle cell trait        Past Surgical History:   Procedure Laterality Date    BONE MARROW BIOPSY N/A 12/20/2019    Procedure: BIOPSY, BONE MARROW;  Surgeon: Satinder Diagnostic Provider;  Location: [facility];  Service: Interventional Radiology;  Laterality: N/A;    COLONOSCOPY N/A 11/5/2021    Procedure: COLONOSCOPY;  Surgeon: Rob Collins MD;  Location: [facility];  Service: Endoscopy;  Laterality: N/A;    ESOPHAGOGASTRODUODENOSCOPY N/A 11/5/2021    Procedure: EGD (ESOPHAGOGASTRODUODENOSCOPY);  Surgeon: Rob Collins MD;  Location: [facility];  Service: Endoscopy;  Laterality: N/A;    INJECTION OF ANESTHETIC AGENT AROUND MEDIAL BRANCH NERVES INNERVATING LUMBAR FACET JOINT Bilateral 5/25/2018    Procedure: BLOCK-NERVE-MEDIAL BRANCH-LUMBAR;  Surgeon: Nura Heredia MD;  Location: [facility];   Service: Pain Management;  Laterality: Bilateral;  L3, 4, 5    KNEE ARTHROSCOPY W/ MENISCECTOMY Right 2021    Procedure: ARTHROSCOPY, KNEE, WITH MENISCECTOMY;  Surgeon: Sebastian Green MD;  Location: [facility] OR;  Service: Orthopedics;  Laterality: Right;  partial medial meniscectomy    RADIOFREQUENCY ABLATION OF LUMBAR MEDIAL BRANCH NERVE AT SINGLE LEVEL Bilateral 2018    Procedure: RADIOFREQUENCY ABLATION, NERVE, MEDIAL BRANCH, LUMBAR, 1 LEVEL;  Surgeon: Nura Heredia MD;  [facility];  Service: Pain Management;  Laterality: Bilateral;  L3, 4, 5 - Burned at 80 degrees C.  for 75 seconds x 2 each site    SMALL BOWEL ENTEROSCOPY N/A 10/16/2019    Procedure: ENTEROSCOPY;  Surgeon: Rob Collins MD;  Location: [facility] ENDO;  Service: Endoscopy;  Laterality: N/A;       Family History   Problem Relation Age of Onset    Arthritis Mother     Depression Mother     Heart disease Mother     Hypertension Mother     Heart attack Father 59       Social History     Socioeconomic History    Marital status:    Occupational History    Occupation: Prep Cook     Employer: [employer]   Tobacco Use    Smoking status: Former     Current packs/day: 0.00     Types: Cigarettes     Quit date: 1996     Years since quittin.8    Smokeless tobacco: Never   Substance and Sexual Activity    Alcohol use: Not Currently     Alcohol/week: 21.0 standard drinks of alcohol     Types: 21 Cans of beer per week     Comment: Sober 5 years    Drug use: Not Currently     Types: Marijuana    Sexual activity: Yes     Partners: Female     Social Determinants of Health     Stress: Stress Concern Present (2019)    [employer] [place] [employer] - Occupational Stress Questionnaire     Feeling of Stress : Very much       Current Outpatient Medications   Medication Sig Dispense Refill    ascorbic acid, vitamin C, (VITAMIN C) 1000 MG tablet Take 1,000 mg by mouth once daily.      epoetin tray (PROCRIT INJ) Thursday       famotidine (PEPCID) 20 MG tablet famotidine 20 mg tablet   Take 1 tablet twice a day by oral route.      multivitamin capsule Take 1 capsule by mouth once daily.      pantoprazole (PROTONIX) 40 MG tablet       sertraline (ZOLOFT) 25 MG tablet Take 25 mg by mouth once daily.      tamsulosin (FLOMAX) 0.4 mg Cap Take 1 capsule (0.4 mg total) by mouth once daily. 90 capsule 4    traZODone (DESYREL) 100 MG tablet Take 1 tablet (100 mg total) by mouth nightly as needed for Insomnia. 90 tablet 2    amLODIPine (NORVASC) 5 MG tablet Take 1 tablet (5 mg total) by mouth once daily. 90 tablet 3    atorvastatin (LIPITOR) 10 MG tablet Take 1 tablet (10 mg total) by mouth every evening. 90 tablet 3    folic acid (FOLVITE) 1 MG tablet Take 2 tablets (2 mg total) by mouth once daily. 180 tablet 4    sildenafil (VIAGRA) 100 MG tablet Take 1 tablet (100 mg total) by mouth daily as needed for Erectile Dysfunction. 10 tablet 6     No current facility-administered medications for this visit.       Review of patient's allergies indicates:  No Known Allergies      Objective:        Vitals:    10/24/23 1504   BP: 120/80   Pulse: 80       Physical Exam  Vitals reviewed.   Constitutional:       Appearance: Normal appearance.   HENT:      Mouth/Throat:      Mouth: Mucous membranes are moist.   Eyes:      Extraocular Movements: Extraocular movements intact.      Pupils: Pupils are equal, round, and reactive to light.   Cardiovascular:      Rate and Rhythm: Normal rate and regular rhythm.      Pulses: Normal pulses.      Heart sounds: Normal heart sounds. No murmur heard.     No gallop.   Pulmonary:      Effort: Pulmonary effort is normal.      Breath sounds: Normal breath sounds.   Abdominal:      General: Bowel sounds are normal.      Palpations: Abdomen is soft.   Musculoskeletal:         General: Normal range of motion.      Cervical back: Normal range of motion.   Skin:     General: Skin is warm and dry.   Neurological:      General: No  focal deficit present.      Mental Status: He is alert and oriented to person, place, and time.   Psychiatric:         Mood and Affect: Mood normal.         LIPIDS - LAST 2   Lab Results   Component Value Date    CHOL 143 10/04/2023    CHOL 155 03/22/2023    HDL 50 10/04/2023    HDL 49 03/22/2023    LDLCALC 83.2 10/04/2023    LDLCALC 102.0 03/22/2023    TRIG 49 10/04/2023    TRIG 20 (L) 03/22/2023    CHOLHDL 35.0 10/04/2023    CHOLHDL 31.6 03/22/2023       CBC - LAST 2  Lab Results   Component Value Date    WBC 12.19 10/18/2023    WBC 13.08 (H) 10/04/2023    RBC 3.51 (L) 10/18/2023    RBC 3.66 (L) 10/04/2023    HGB 10.5 (L) 10/18/2023    HGB 11.0 (L) 10/04/2023    HCT 31.0 (L) 10/18/2023    HCT 32.0 (L) 10/04/2023    MCV 88 10/18/2023    MCV 87 10/04/2023    MCH 29.9 10/18/2023    MCH 30.1 10/04/2023    MCHC 33.9 10/18/2023    MCHC 34.4 10/04/2023    RDW 27.9 (H) 10/18/2023    RDW 27.0 (H) 10/04/2023     (H) 10/18/2023     (H) 10/04/2023    MPV 9.8 10/18/2023    MPV 10.7 10/04/2023    GRAN 8.2 (H) 10/18/2023    GRAN 67.0 10/18/2023    LYMPH 2.1 10/18/2023    LYMPH 16.9 (L) 10/18/2023    MONO 1.1 (H) 10/18/2023    MONO 9.1 10/18/2023    BASO 0.17 10/18/2023    BASO 0.06 10/04/2023    NRBC 1 (A) 10/18/2023    NRBC 1 (A) 10/04/2023       CHEMISTRY & LIVER FUNCTION - LAST 2  Lab Results   Component Value Date     10/18/2023     10/04/2023    K 4.4 10/18/2023    K 4.3 10/04/2023     10/18/2023     10/04/2023    CO2 27 10/18/2023    CO2 26 10/04/2023    ANIONGAP 6 (L) 10/18/2023    ANIONGAP 7 (L) 10/04/2023    BUN 28 (H) 10/18/2023    BUN 36 (H) 10/04/2023    CREATININE 2.1 (H) 10/18/2023    CREATININE 2.0 (H) 10/04/2023     10/18/2023     10/04/2023    CALCIUM 9.0 10/18/2023    CALCIUM 9.1 10/04/2023    ALBUMIN 4.2 10/18/2023    ALBUMIN 4.6 10/04/2023    PROT 7.6 10/18/2023    PROT 7.8 10/04/2023    ALKPHOS 68 10/18/2023    ALKPHOS 70 10/04/2023    ALT 16 10/18/2023     ALT 21 10/04/2023    AST 17 10/18/2023    AST 23 10/04/2023    BILITOT 0.5 10/18/2023    BILITOT 0.5 10/04/2023        CARDIAC PROFILE - LAST 2  Lab Results   Component Value Date     04/24/2019        COAGULATION - LAST 2  Lab Results   Component Value Date    LABPT 15.9 (H) 12/20/2019    INR 1.3 12/20/2019    APTT 128.4 (H) 12/20/2019       ENDOCRINE & PSA - LAST 2  Lab Results   Component Value Date    HGBA1C 5.4 03/22/2023    HGBA1C 6.1 03/30/2022    TSH 3.290 03/22/2023    TSH 2.540 03/30/2022    PSA 0.41 10/04/2023    PSA 0.26 05/12/2021        ECHOCARDIOGRAM RESULTS  Results for orders placed during the hospital encounter of 10/06/23    Echo    Interpretation Summary    Left Ventricle: The left ventricle is normal in size. Moderately increased wall thickness. Normal wall motion. There is normal systolic function with a visually estimated ejection fraction of 55 - 60%.    Left Atrium: Left atrium is severely dilated.    Right Ventricle: Right ventricle was not well visualized due to poor acoustic window.    Aortic Valve: The aortic valve is a trileaflet valve. There is mild aortic regurgitation.    Mitral Valve: There is no stenosis. There is mild to moderate regurgitation.    Pulmonic Valve: There is mild regurgitation.    IVC/SVC: Normal venous pressure at 3 mmHg.      CURRENT/PREVIOUS VISIT EKG  Results for orders placed or performed in visit on 08/29/23   IN OFFICE EKG 12-LEAD (to [place])    Collection Time: 08/29/23  3:24 PM    Narrative    Test Reason : I10,    Vent. Rate : 070 BPM     Atrial Rate : 070 BPM     P-R Int : 172 ms          QRS Dur : 090 ms      QT Int : 412 ms       P-R-T Axes : 071 083 011 degrees     QTc Int : 444 ms    Normal sinus rhythm  Nonspecific T wave abnormality  Abnormal ECG  When compared with ECG of 03-NOV-2021 09:58,  No significant change was found  Confirmed by Fam Benoit MD ([id number]) on 9/24/2023 6:30:44 PM    Referred By: ENEIDA BARNES           Confirmed  By:Fam Bneoit MD     No valid procedures specified.   Results for orders placed during the hospital encounter of 10/06/23    Nuclear Stress - Cardiology Interpreted    Interpretation Summary    Normal myocardial perfusion scan. There is no evidence of myocardial ischemia or infarction.    The gated perfusion images showed an ejection fraction of 47% at rest. The gated perfusion images showed an ejection fraction of 53% post stress. Normal ejection fraction is greater than 53%.    The ECG portion of the study is negative for ischemia.    The patient reported no chest pain during the stress test.    There were no arrhythmias during stress.    No valid procedures specified.        Assessment:       1. Chest pain, unspecified type    2. Dyslipidemia    3. Essential hypertension    4. Left atrial enlargement           Plan:       Chest pain, unspecified type    Dyslipidemia  -     atorvastatin (LIPITOR) 10 MG tablet; Take 1 tablet (10 mg total) by mouth every evening.  Dispense: 90 tablet; Refill: 3    Essential hypertension  -     amLODIPine (NORVASC) 5 MG tablet; Take 1 tablet (5 mg total) by mouth once daily.  Dispense: 90 tablet; Refill: 3    Left atrial enlargement    Unsure of the patient's etiology of chest pain but does not appear cardiac in etiology it is resolved.  Given negative stress test, no further cardiac investigations needed for further evaluation of the same.  Discussed with patient regarding his left atrial enlargement and the risk of his AFib associated with it.  Informed him to continue with regular physical exercise and to be active and to let us know if he develops exertional shortness, chest pain or palpitations.    Continue current doses of statin and amlodipine.  Follow-up with nurse practitioner in a year to go over cardiac risk factors.    Follow up in about 1 year (around 10/24/2024) for NP for HTN, HLD.          Keith Saucedo MD  [facility]  [facility]

## 2023-11-01 ENCOUNTER — LAB VISIT (OUTPATIENT)
Dept: LAB | Facility: HOSPITAL | Age: 63
End: 2023-11-01
Attending: INTERNAL MEDICINE
Payer: COMMERCIAL

## 2023-11-01 DIAGNOSIS — D64.89 ANEMIA DUE TO MULTIPLE MECHANISMS: ICD-10-CM

## 2023-11-01 DIAGNOSIS — D57.3 SICKLE CELL TRAIT SYNDROME: ICD-10-CM

## 2023-11-01 LAB
ALBUMIN SERPL BCP-MCNC: 4.4 G/DL (ref 3.5–5.2)
ALP SERPL-CCNC: 71 U/L (ref 55–135)
ALT SERPL W/O P-5'-P-CCNC: 14 U/L (ref 10–44)
ANION GAP SERPL CALC-SCNC: 5 MMOL/L (ref 8–16)
ANISOCYTOSIS BLD QL SMEAR: SLIGHT
AST SERPL-CCNC: 19 U/L (ref 10–40)
BASOPHILS # BLD AUTO: 0.11 K/UL (ref 0–0.2)
BASOPHILS NFR BLD: 1.2 % (ref 0–1.9)
BILIRUB SERPL-MCNC: 0.8 MG/DL (ref 0.1–1)
BUN SERPL-MCNC: 29 MG/DL (ref 8–23)
CALCIUM SERPL-MCNC: 9.3 MG/DL (ref 8.7–10.5)
CHLORIDE SERPL-SCNC: 108 MMOL/L (ref 95–110)
CO2 SERPL-SCNC: 28 MMOL/L (ref 23–29)
CREAT SERPL-MCNC: 2.1 MG/DL (ref 0.5–1.4)
DACRYOCYTES BLD QL SMEAR: ABNORMAL
DIFFERENTIAL METHOD: ABNORMAL
EOSINOPHIL # BLD AUTO: 0.2 K/UL (ref 0–0.5)
EOSINOPHIL NFR BLD: 1.9 % (ref 0–8)
ERYTHROCYTE [DISTWIDTH] IN BLOOD BY AUTOMATED COUNT: 28 % (ref 11.5–14.5)
EST. GFR  (NO RACE VARIABLE): 34.7 ML/MIN/1.73 M^2
GIANT PLATELETS BLD QL SMEAR: PRESENT
GLUCOSE SERPL-MCNC: 102 MG/DL (ref 70–110)
HCT VFR BLD AUTO: 31.6 % (ref 40–54)
HGB BLD-MCNC: 10.8 G/DL (ref 14–18)
HYPOCHROMIA BLD QL SMEAR: ABNORMAL
IMM GRANULOCYTES # BLD AUTO: 0.06 K/UL (ref 0–0.04)
IMM GRANULOCYTES NFR BLD AUTO: 0.6 % (ref 0–0.5)
LYMPHOCYTES # BLD AUTO: 2.1 K/UL (ref 1–4.8)
LYMPHOCYTES NFR BLD: 22.1 % (ref 18–48)
MCH RBC QN AUTO: 30 PG (ref 27–31)
MCHC RBC AUTO-ENTMCNC: 34.2 G/DL (ref 32–36)
MCV RBC AUTO: 88 FL (ref 82–98)
MONOCYTES # BLD AUTO: 0.9 K/UL (ref 0.3–1)
MONOCYTES NFR BLD: 9.8 % (ref 4–15)
NEUTROPHILS # BLD AUTO: 6 K/UL (ref 1.8–7.7)
NEUTROPHILS NFR BLD: 64.4 % (ref 38–73)
NRBC BLD-RTO: 0 /100 WBC
OVALOCYTES BLD QL SMEAR: ABNORMAL
PLATELET # BLD AUTO: 476 K/UL (ref 150–450)
PLATELET BLD QL SMEAR: ABNORMAL
PMV BLD AUTO: 9.8 FL (ref 9.2–12.9)
POTASSIUM SERPL-SCNC: 4.4 MMOL/L (ref 3.5–5.1)
PROT SERPL-MCNC: 8 G/DL (ref 6–8.4)
RBC # BLD AUTO: 3.6 M/UL (ref 4.6–6.2)
SODIUM SERPL-SCNC: 141 MMOL/L (ref 136–145)
TARGETS BLD QL SMEAR: ABNORMAL
WBC # BLD AUTO: 9.37 K/UL (ref 3.9–12.7)

## 2023-11-01 PROCEDURE — 36415 COLL VENOUS BLD VENIPUNCTURE: CPT | Performed by: INTERNAL MEDICINE

## 2023-11-01 PROCEDURE — 80053 COMPREHEN METABOLIC PANEL: CPT | Performed by: INTERNAL MEDICINE

## 2023-11-01 PROCEDURE — 85025 COMPLETE CBC W/AUTO DIFF WBC: CPT | Performed by: INTERNAL MEDICINE

## 2023-11-02 ENCOUNTER — INFUSION (OUTPATIENT)
Dept: INFUSION THERAPY | Facility: HOSPITAL | Age: 63
End: 2023-11-02
Attending: INTERNAL MEDICINE
Payer: COMMERCIAL

## 2023-11-02 VITALS
WEIGHT: 230.63 LBS | DIASTOLIC BLOOD PRESSURE: 83 MMHG | SYSTOLIC BLOOD PRESSURE: 144 MMHG | RESPIRATION RATE: 20 BRPM | HEIGHT: 70 IN | BODY MASS INDEX: 33.02 KG/M2 | TEMPERATURE: 98 F | HEART RATE: 69 BPM

## 2023-11-02 DIAGNOSIS — D46.1 RARS (REFRACTORY ANEMIA WITH RINGED SIDEROBLASTS): ICD-10-CM

## 2023-11-02 DIAGNOSIS — D46.9 MDS (MYELODYSPLASTIC SYNDROME): Primary | ICD-10-CM

## 2023-11-02 PROCEDURE — 96372 THER/PROPH/DIAG INJ SC/IM: CPT

## 2023-11-02 PROCEDURE — 63600175 PHARM REV CODE 636 W HCPCS: Mod: JZ,JG | Performed by: INTERNAL MEDICINE

## 2023-11-02 RX ADMIN — LUSPATERCEPT 100 MG: 75 INJECTION, POWDER, LYOPHILIZED, FOR SOLUTION SUBCUTANEOUS at 04:11

## 2023-11-02 NOTE — PLAN OF CARE
Problem: Fatigue  Goal: Improved Activity Tolerance  Outcome: Ongoing, Progressing  Intervention: Promote Improved Energy  Flowsheets (Taken 11/2/2023 1612)  Fatigue Management: frequent rest breaks encouraged

## 2023-11-03 DIAGNOSIS — N40.1 BPH ASSOCIATED WITH NOCTURIA: ICD-10-CM

## 2023-11-03 DIAGNOSIS — R35.1 BPH ASSOCIATED WITH NOCTURIA: ICD-10-CM

## 2023-11-06 RX ORDER — TAMSULOSIN HYDROCHLORIDE 0.4 MG/1
1 CAPSULE ORAL DAILY
Qty: 90 CAPSULE | Refills: 1 | Status: SHIPPED | OUTPATIENT
Start: 2023-11-06

## 2023-11-15 ENCOUNTER — LAB VISIT (OUTPATIENT)
Dept: LAB | Facility: HOSPITAL | Age: 63
End: 2023-11-15
Attending: INTERNAL MEDICINE
Payer: COMMERCIAL

## 2023-11-15 DIAGNOSIS — D64.9 NORMOCHROMIC ANEMIA: ICD-10-CM

## 2023-11-15 DIAGNOSIS — D64.89 ANEMIA DUE TO MULTIPLE MECHANISMS: ICD-10-CM

## 2023-11-15 DIAGNOSIS — D57.3 SICKLE CELL TRAIT SYNDROME: ICD-10-CM

## 2023-11-15 LAB
ALBUMIN SERPL BCP-MCNC: 4.2 G/DL (ref 3.5–5.2)
ALP SERPL-CCNC: 71 U/L (ref 55–135)
ALT SERPL W/O P-5'-P-CCNC: 17 U/L (ref 10–44)
ANION GAP SERPL CALC-SCNC: 5 MMOL/L (ref 8–16)
AST SERPL-CCNC: 19 U/L (ref 10–40)
BASOPHILS # BLD AUTO: 0.12 K/UL (ref 0–0.2)
BASOPHILS NFR BLD: 1.4 % (ref 0–1.9)
BILIRUB SERPL-MCNC: 0.5 MG/DL (ref 0.1–1)
BUN SERPL-MCNC: 29 MG/DL (ref 8–23)
CALCIUM SERPL-MCNC: 8.8 MG/DL (ref 8.7–10.5)
CHLORIDE SERPL-SCNC: 108 MMOL/L (ref 95–110)
CO2 SERPL-SCNC: 28 MMOL/L (ref 23–29)
CREAT SERPL-MCNC: 2.1 MG/DL (ref 0.5–1.4)
DIFFERENTIAL METHOD: ABNORMAL
EOSINOPHIL # BLD AUTO: 0.2 K/UL (ref 0–0.5)
EOSINOPHIL NFR BLD: 2 % (ref 0–8)
ERYTHROCYTE [DISTWIDTH] IN BLOOD BY AUTOMATED COUNT: 28 % (ref 11.5–14.5)
EST. GFR  (NO RACE VARIABLE): 34.7 ML/MIN/1.73 M^2
FERRITIN SERPL-MCNC: 1300 NG/ML (ref 20–300)
GLUCOSE SERPL-MCNC: 103 MG/DL (ref 70–110)
HCT VFR BLD AUTO: 30.6 % (ref 40–54)
HGB BLD-MCNC: 10.3 G/DL (ref 14–18)
IMM GRANULOCYTES # BLD AUTO: 0.19 K/UL (ref 0–0.04)
IMM GRANULOCYTES NFR BLD AUTO: 2.2 % (ref 0–0.5)
IRON SERPL-MCNC: 76 UG/DL (ref 45–160)
LYMPHOCYTES # BLD AUTO: 1.7 K/UL (ref 1–4.8)
LYMPHOCYTES NFR BLD: 19.7 % (ref 18–48)
MCH RBC QN AUTO: 30 PG (ref 27–31)
MCHC RBC AUTO-ENTMCNC: 33.7 G/DL (ref 32–36)
MCV RBC AUTO: 89 FL (ref 82–98)
MONOCYTES # BLD AUTO: 0.9 K/UL (ref 0.3–1)
MONOCYTES NFR BLD: 9.9 % (ref 4–15)
NEUTROPHILS # BLD AUTO: 5.7 K/UL (ref 1.8–7.7)
NEUTROPHILS NFR BLD: 64.8 % (ref 38–73)
NRBC BLD-RTO: 1 /100 WBC
PLATELET # BLD AUTO: 425 K/UL (ref 150–450)
PMV BLD AUTO: 10.8 FL (ref 9.2–12.9)
POTASSIUM SERPL-SCNC: 4.4 MMOL/L (ref 3.5–5.1)
PROT SERPL-MCNC: 7.3 G/DL (ref 6–8.4)
RBC # BLD AUTO: 3.43 M/UL (ref 4.6–6.2)
SATURATED IRON: 39 % (ref 20–50)
SODIUM SERPL-SCNC: 141 MMOL/L (ref 136–145)
TOTAL IRON BINDING CAPACITY: 196 UG/DL (ref 250–450)
TRANSFERRIN SERPL-MCNC: 140 MG/DL (ref 200–375)
WBC # BLD AUTO: 8.83 K/UL (ref 3.9–12.7)

## 2023-11-15 PROCEDURE — 36415 COLL VENOUS BLD VENIPUNCTURE: CPT | Performed by: INTERNAL MEDICINE

## 2023-11-15 PROCEDURE — 83540 ASSAY OF IRON: CPT | Performed by: INTERNAL MEDICINE

## 2023-11-15 PROCEDURE — 84466 ASSAY OF TRANSFERRIN: CPT | Performed by: INTERNAL MEDICINE

## 2023-11-15 PROCEDURE — 80053 COMPREHEN METABOLIC PANEL: CPT | Performed by: INTERNAL MEDICINE

## 2023-11-15 PROCEDURE — 82728 ASSAY OF FERRITIN: CPT | Performed by: INTERNAL MEDICINE

## 2023-11-15 PROCEDURE — 85025 COMPLETE CBC W/AUTO DIFF WBC: CPT | Performed by: INTERNAL MEDICINE

## 2023-11-15 NOTE — PROGRESS NOTES
[facility], formerly [facility] Hematology/Oncology               PROGRESS NOTE - Follow-up Visit      Subjective:       Patient ID:   NAME: Rick Butler : 1960     63 y.o. male    Referring Doc: Chetna (new PCP)  Other Physicians: Getachew; Alan Mustafa    Chief Complaint:  Sickle cell trait/anemia/RARS  f/u    History of Present Illness:     Patient returns today for a regularly scheduled follow-up visit.  The patient is doing ok overall. He is here by himself today.     He has been Rebozyl and doing well with it    Breathing ok, no CP, SOB, HA's or N/V; some occasional fatigue    He sees Dr Hilario in 2024.             He has not had any recent pain crisis. He denies having any breathing difficulties. He denies any blood in the stool, dark stools or hematuria; breathing ok; no CP, SOB, HA's or N/V;       He saw Chetna on ; he is seeing cardiology on     Discussed covid19 precautions - he had his vaccinations      ROS:   GEN: normal without any fever, night sweats or weight loss  HEENT: normal with no HA's, sore throat, stiff neck, changes in vision  CV: normal with no CP, SOB, PND, MORA or orthopnea  PULM: normal with no SOB, cough, hemoptysis, sputum or pleuritic pain  GI: normal with no abdominal pain, nausea, vomiting, constipation, diarrhea, melanotic stools, BRBPR, or hematemesis  : normal with no hematuria, dysuria  BREAST: normal with no mass, discharge, pain  SKIN: normal with no rash, erythema, bruising, or swelling    Allergies:  Review of patient's allergies indicates:  No Known Allergies    Medications:    Current Outpatient Medications:     amLODIPine (NORVASC) 5 MG tablet, Take 1 tablet (5 mg total) by mouth once daily., Disp: 90 tablet, Rfl: 3    ascorbic acid, vitamin C, (VITAMIN C) 1000 MG tablet, Take 1,000 mg by mouth once daily., Disp: , Rfl:     atorvastatin (LIPITOR) 10 MG tablet, Take 1 tablet (10 mg total) by mouth every evening., Disp: 90 tablet, Rfl: 3    epoetin tray (PROCRIT INJ), Thursday,  "Disp: , Rfl:     famotidine (PEPCID) 20 MG tablet, famotidine 20 mg tablet  Take 1 tablet twice a day by oral route., Disp: , Rfl:     multivitamin capsule, Take 1 capsule by mouth once daily., Disp: , Rfl:     pantoprazole (PROTONIX) 40 MG tablet, , Disp: , Rfl:     sertraline (ZOLOFT) 25 MG tablet, Take 25 mg by mouth once daily., Disp: , Rfl:     tamsulosin (FLOMAX) 0.4 mg Cap, Take 1 capsule (0.4 mg total) by mouth once daily., Disp: 90 capsule, Rfl: 1    traZODone (DESYREL) 100 MG tablet, Take 1 tablet (100 mg total) by mouth nightly as needed for Insomnia., Disp: 90 tablet, Rfl: 2    folic acid (FOLVITE) 1 MG tablet, Take 2 tablets (2 mg total) by mouth once daily., Disp: 180 tablet, Rfl: 4    sildenafil (VIAGRA) 100 MG tablet, Take 1 tablet (100 mg total) by mouth daily as needed for Erectile Dysfunction., Disp: 10 tablet, Rfl: 6    PMHx/PSHx Updates:  See patient's last visit with me on 9/14/2023  See H&P on 7/9/2011      Pathology:    12/20/2019  BONE MARROW, RIGHT ILIAC CREST,    ASPIRATE, CLOT SECTION, AND CORE BIOPSY:    --HYPERCELLULAR MARROW (APPROXIMATELY 75% TO 80%) WITH TRILINEAGE   HEMATOPOIETIC ELEMENTS, ERYTHROID  HYPERPLASIA AND MILD MEGAKARYOCYTIC HYPERPLASIA, AND NON-SPECIFIC   DYSHEMATOPOIETIC CHANGES (SEE     COMMENT).  --[id number] INCREASED STAINABLE IRON WITH INCREASED RING   SIDEROBLASTS (GREATER THAN 15%)     (SEE COMMENT).  --PERIPHERAL BLOOD WITH THROMBOCYTOSIS (574,000/MICROLITER) AND ANEMIA   (HEMOGLOBIN 5.5 GRAM/DECILITER),    WITH SCATTERED DREPANOCYTOID FORMS AND FEW NUCLEATED ERYTHROCYTES      Cytogenetic Results at the bottom of the report.  NORMAL    Objective:     Vitals:  Blood pressure (!) 155/92, pulse 71, temperature 98.7 °F (37.1 °C), resp. rate 16, height 5' 10" (1.778 m), weight 104.7 kg (230 lb 12.8 oz).    Physical Examination:   GEN: no apparent distress, comfortable; AAOx3  HEAD: atraumatic and normocephalic  EYES: no pallor, no icterus, PERRLA  ENT: " OMM, no pharyngeal erythema, external ears WNL; no nasal discharge; no thrush  NECK: no masses, thyroid normal, trachea midline, no LAD/LN's, supple  CV: RRR with no murmur; normal pulse; normal S1 and S2; no pedal edema  CHEST: Normal respiratory effort; CTAB; normal breath sounds; no wheeze or crackles  ABDOM: nontender and nondistended; soft; normal bowel sounds; no rebound/guarding  MUSC/Skeletal: ROM normal; no crepitus; joints normal; no deformities or arthropathy  EXTREM: no clubbing, cyanosis, inflammation or swelling  SKIN: no rashes, lesions, ulcers, petechiae or subcutaneous nodules  : no jiang  NEURO: grossly intact; motor/sensory WNL; AAOx3; no tremors  PSYCH: normal mood, affect and behavior  LYMPH: normal cervical, supraclavicular, axillary and groin LN's            Labs:     Lab Results   Component Value Date    WBC 8.83 11/15/2023    HGB 10.3 (L) 11/15/2023    HCT 30.6 (L) 11/15/2023    MCV 89 11/15/2023     11/15/2023           CMP  Sodium   Date Value Ref Range Status   11/15/2023 141 136 - 145 mmol/L Final   06/26/2019 138 134 - 144 mmol/L      Potassium   Date Value Ref Range Status   11/15/2023 4.4 3.5 - 5.1 mmol/L Final     Chloride   Date Value Ref Range Status   11/15/2023 108 95 - 110 mmol/L Final   06/26/2019 105 98 - 110 mmol/L      CO2   Date Value Ref Range Status   11/15/2023 28 23 - 29 mmol/L Final     Glucose   Date Value Ref Range Status   11/15/2023 103 70 - 110 mg/dL Final   06/26/2019 114 (H) 70 - 99 mg/dL      BUN   Date Value Ref Range Status   11/15/2023 29 (H) 8 - 23 mg/dL Final     Creatinine   Date Value Ref Range Status   11/15/2023 2.1 (H) 0.5 - 1.4 mg/dL Final   06/26/2019 1.62 (H) 0.60 - 1.40 mg/dL      Calcium   Date Value Ref Range Status   11/15/2023 8.8 8.7 - 10.5 mg/dL Final     Total Protein   Date Value Ref Range Status   11/15/2023 7.3 6.0 - 8.4 g/dL Final     Albumin   Date Value Ref Range Status   11/15/2023 4.2 3.5 - 5.2 g/dL Final   06/26/2019 4.4  3.1 - 4.7 g/dL      Total Bilirubin   Date Value Ref Range Status   11/15/2023 0.5 0.1 - 1.0 mg/dL Final     Comment:     For infants and newborns, interpretation of results should be based  on gestational age, weight and in agreement with clinical  observations.    Premature Infant recommended reference ranges:  Up to 24 hours.............<8.0 mg/dL  Up to 48 hours............<12.0 mg/dL  3-5 days..................<15.0 mg/dL  6-29 days.................<15.0 mg/dL       Alkaline Phosphatase   Date Value Ref Range Status   11/15/2023 71 55 - 135 U/L Final     AST   Date Value Ref Range Status   11/15/2023 19 10 - 40 U/L Final     ALT   Date Value Ref Range Status   11/15/2023 17 10 - 44 U/L Final     Anion Gap   Date Value Ref Range Status   11/15/2023 5 (L) 8 - 16 mmol/L Final     eGFR if    Date Value Ref Range Status   07/13/2022 45.6 (A) >60 mL/min/1.73 m^2 Final     eGFR if non    Date Value Ref Range Status   07/13/2022 39.5 (A) >60 mL/min/1.73 m^2 Final     Comment:     Calculation used to obtain the estimated glomerular filtration  rate (eGFR) is the CKD-EPI equation.          Lab Results   Component Value Date    IRON 76 11/15/2023    TIBC 196 (L) 11/15/2023    FERRITIN 1,300.0 (H) 11/15/2023           I have reviewed all available lab results and radiology reports.    Radiology/Diagnostic Studies:    US 2/8/2023:    IMPRESSION:  1. Dilation of the common bile duct at up to 10 mm, unchanged when compared to 2016.  2. Nonvisualization of the pancreas because of overlying bowel gas.  3. No other significant findings.  spleen is normal in size and appearance      2/15/2018 Xray Survey:   IMPRESSION: No significant abnormality seen. No evidence of osteoblastic or  osteoclastic lesions identified    MRI L-spine 2/12/2018  IMPRESSION:    1. Prominent low signal intensity throughout the bone marrow on T1 and  T2-weighted imaging. Marrow infiltrative process including malignancy such  "as  metastatic disease or lymphoma/leukemia is a consideration along with multiple  myeloma or malignant process. Benign etiology such as osteopetrosis or  regenerative red marrow are also considerations.    2. Multilevel lumbar degenerative changes, most prominent at L4-L5 and L5-S1 as  described.    Assessment/Plan:   (1) 63 y.o. male with diagnosis of sickle cell anemia with borderline microcytosis  - latest hgb was 6.0 and he had blood transfusion  - today, he is not symptomatic at this time  - he probably has a multifactorial anemia process with underlying sickle cell, anemia of chronic disorders and anemia of chronic renal. I can not rule out an underlying GI bleeding process.   - iron panel is adequate (no deficiency)  - total bilirubin is only minimally elevated  - he required transfusion again in Oct 2019 and again in Dec 2019  - he had bone marrow biopsy on 12/20/2019    "dyshematopoietic changes are not entirely specific and are present  mostly within erythroid and megakaryocytic lineages. These findings   could be observed in chronic disease states, autoimmune conditions,  infectious settings, toxic exposures, nutritional deficits, as medication/drug effect, and in myelodysplastic syndrome (MDS), among other conditions"  "Additionally, ring sideroblasts are a non-specific   finding "    Patient was referred to see Dr Mustafa at [facility] and had repeat BM biopsy with diagnosis made of RARS  - he is now on procrit per directives of Dr Mustafa    7/30/2020:   he recently saw Dr Mustafa  And sees him again in Jan 2021  - he is doing well with procrit and is feeling much better  - he has not required any transfusions for some time time    9/24/2020:  - latest hgb at 8.5  - will increase the procrit dose  - f/u with Dr Zavala in Jan 2021 11/19/2020:  - patient doing well and feels "great"  - hgb up to 9.1; continued on the procrit  - f/u with Dr Mustafa in Jan 2021    3/4/2021:  - he saw Dr Mustafa in jan 2021 and " sees him again in June/July 2021  - latest hgb at 9.1 and stable and platelets are 474,000; wbc at 10.0    6/10/2021:  - latest hgb at 9.0  - plats 485,000  - on weekly procrit  - seeing Dr Mustafa again tomorrow    10/14/2021:  - hgb at 8.6 but he recently had been taking naproxen and had some BRBPR - which since resolved  - he saw Dr Beyer with GI and he is having colonsocopy on Nov 5th along with EGD  - plats 429,000  - he sees Dr Mustafa again in Nov 19th   - he sees Dr Mas again in Dec 2021    1/13/2022:  - He has been under the care of Dr Mustafa at [facility] for RARS- MDS. He is now on procrit weekly. He sees Dr Mustafa again in March 2022    - hgb 9.3  - he had scope with Dr Collins in nov 2021 which was good per patient and they plan to repeat in 5 yrs    5/12/2022:  - hgb at 9.0  - plats 485  - wbc 7.25  - he is on procrit weekly  - saw Dr Mustafa this past Fridday and since he is leaving, he will start seeing Dr Hilario instead in 3 months    8/18/2022:  - latest hgb a little lower at 8.8  - he has been on the procrit  - he saw Dr Hilario recently at [facility] and plans to see her again in 3 months    1/26/2023:  - He saw Dr Hilario at [facility] again recently this past Tuesday and they are considering another medication; he plans to see her again in April 2023  - Dr Hilario requested he have an US of spleen - will order here in [place]  - he is also on appetite stimulant  - hgb at 9.4 and plats at 500k    5/11/2023:  - hgb at 9.6 and plats 466,000  - He saw Dr Hilario at [facility] again recently this past Tuesday and they are considering another medication; he plans to see her again in Aug 2023    8/3/2023:  - His insurance will not approve of him getting the Reblozyl   - He sees Dr Hilario again next Tuesday and we will await her directives.     9/14/2023:  - the Rebozyl has been approved - will set up  - latest hgb at 10.1  - seeing Dr Hilario again in Jan 2024 11/16/2023:  - continued on Rebozyl  - WBC and plats WNL  - hgb at  "10.3  - cardiac w/u negative per patient    (2) Alcohol abuse issues in past - he has been sober for over 3 years now    (3) New findings on radiography with abnormal chest CT and lung mass  - former smoker    (4) chronic back issues - prior Xrays and MRI - suspect the findings on the MRI are possibly due to his sickle cell;   - SPEP was previously negative for M-protein  - PSA was 0.3 in Sept 2017  - recommended neurosurgery evaluation previously  - he saw Dr Nura VUONG and had a nerve "burning" procedure and his pain has improved    (5) CRI - elevated creatinine - he is followed Dr Chilel with nephrology      (6) H pylori gastritis - s/p recent endoscopy with Dr Collins and antibotics x 10 days    (7) CP   9/14/2023:  - seeing Dr lovett with cardiology and planning stress test in near future        1. Sickle cell trait syndrome        2. Chronic anemia        3. Normochromic anemia        4. Thrombocytosis        5. Monocytosis        6. 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 MDS (myelodysplastic syndrome)        9. Anemia, chronic renal failure, stage 3 (moderate)        10. Former smoker          PLAN;  1. Continue with the procrit  and set up the Rebozyl  2. Planned f/u with Dr Hilario again in Jan 2024  3. F/u with cardiology as directed  4.  F/u with nephrology as directed by them -   Dr Mas    5. Check labs every 2 weeks  6. Proceeding with colonoscopy on 12/15             RTC 8 weeks  Fax note to [facility] Bas[facility] Tveit;  Lewis Hilario    Total Time spent on patient:    I spent over 25 mins of time with the patient. Reviewed results of the recently ordered labs, tests, reports and studies; made directives with regards to the results. Over half of this time was spent couseling and coordinating care, making treatment and analytical decisions; ordering necessary labs, tests and studies; and discussing treatment options and setting up treatment plan(s) if " indicated.            Discussion:       Pathology Discussion:    I reviewed and discussed the pathology report(s) and radiograph reports (if available) in as simple to understand and/or laymen's terms to the best of my ability. I had an indepth conversation with the patient and went over the patient's individual diagnosis based on the information that was currently available. I discussed the TNM staging process with regard to the patient's particular cancer type, and the calculated stage based on the currently available TNM data and literature. I discussed the available prognostic data with regard to the current staging information and how it relates to the prognosis of their particular neoplastic process.          COVID-19 Discussion:    I had long discussion with patient and any applicable family about the COVID-19 coronavirus epidemic and the recommended precautions with regard to cancer and/or hematology patients. I have re-iterated the CDC recommendations for adequate hand washing, use of hand -like products, and coughing into elbow, etc. In addition, especially for our patients who are on chemotherapy and/or our otherwise immunocompromised patients, I have recommended avoidance of crowds, including movie theaters, restaurants, churches, etc. I have recommended avoidance of any sick or symptomatic family members and/or friends. I have also recommended avoidance of any raw and unwashed food products, and general avoidance of food items that have not been prepared by themselves. The patient has been asked to call us immediately with any symptom developments, issues, questions or other general concerns.     I have explained all of the above in detail and the patient understands all of the current recommendation(s). I have answered all of their questions to the best of my ability and to their complete satisfaction.   The patient is to continue with the current management plan.            Electronically  signed by Jose Tello MD

## 2023-11-16 ENCOUNTER — OFFICE VISIT (OUTPATIENT)
Dept: HEMATOLOGY/ONCOLOGY | Facility: CLINIC | Age: 63
End: 2023-11-16
Payer: COMMERCIAL

## 2023-11-16 VITALS
HEIGHT: 70 IN | RESPIRATION RATE: 16 BRPM | WEIGHT: 230.81 LBS | BODY MASS INDEX: 33.04 KG/M2 | SYSTOLIC BLOOD PRESSURE: 155 MMHG | TEMPERATURE: 99 F | DIASTOLIC BLOOD PRESSURE: 92 MMHG | HEART RATE: 71 BPM

## 2023-11-16 DIAGNOSIS — D46.1 RARS (REFRACTORY ANEMIA WITH RINGED SIDEROBLASTS): ICD-10-CM

## 2023-11-16 DIAGNOSIS — D72.821 MONOCYTOSIS: ICD-10-CM

## 2023-11-16 DIAGNOSIS — Z87.891 FORMER SMOKER: ICD-10-CM

## 2023-11-16 DIAGNOSIS — D63.1 ANEMIA, CHRONIC RENAL FAILURE, STAGE 3 (MODERATE): ICD-10-CM

## 2023-11-16 DIAGNOSIS — D57.3 SICKLE CELL TRAIT SYNDROME: Primary | ICD-10-CM

## 2023-11-16 DIAGNOSIS — D64.9 NORMOCHROMIC ANEMIA: ICD-10-CM

## 2023-11-16 DIAGNOSIS — D75.839 THROMBOCYTOSIS: ICD-10-CM

## 2023-11-16 DIAGNOSIS — N18.30 ANEMIA, CHRONIC RENAL FAILURE, STAGE 3 (MODERATE): ICD-10-CM

## 2023-11-16 DIAGNOSIS — D64.89 ANEMIA DUE TO MULTIPLE MECHANISMS: ICD-10-CM

## 2023-11-16 DIAGNOSIS — D64.9 CHRONIC ANEMIA: ICD-10-CM

## 2023-11-16 DIAGNOSIS — D46.9 MDS (MYELODYSPLASTIC SYNDROME): ICD-10-CM

## 2023-11-16 PROCEDURE — 1160F PR REVIEW ALL MEDS BY PRESCRIBER/CLIN PHARMACIST DOCUMENTED: ICD-10-PCS | Mod: CPTII,S$GLB,, | Performed by: INTERNAL MEDICINE

## 2023-11-16 PROCEDURE — 3062F PR POS MACROALBUMINURIA RESULT DOCUMENTED/REVIEW: ICD-10-PCS | Mod: CPTII,S$GLB,, | Performed by: INTERNAL MEDICINE

## 2023-11-16 PROCEDURE — 3066F NEPHROPATHY DOC TX: CPT | Mod: CPTII,S$GLB,, | Performed by: INTERNAL MEDICINE

## 2023-11-16 PROCEDURE — 3062F POS MACROALBUMINURIA REV: CPT | Mod: CPTII,S$GLB,, | Performed by: INTERNAL MEDICINE

## 2023-11-16 PROCEDURE — 3066F PR DOCUMENTATION OF TREATMENT FOR NEPHROPATHY: ICD-10-PCS | Mod: CPTII,S$GLB,, | Performed by: INTERNAL MEDICINE

## 2023-11-16 PROCEDURE — 3080F DIAST BP >= 90 MM HG: CPT | Mod: CPTII,S$GLB,, | Performed by: INTERNAL MEDICINE

## 2023-11-16 PROCEDURE — 1159F MED LIST DOCD IN RCRD: CPT | Mod: CPTII,S$GLB,, | Performed by: INTERNAL MEDICINE

## 2023-11-16 PROCEDURE — 3080F PR MOST RECENT DIASTOLIC BLOOD PRESSURE >= 90 MM HG: ICD-10-PCS | Mod: CPTII,S$GLB,, | Performed by: INTERNAL MEDICINE

## 2023-11-16 PROCEDURE — 1160F RVW MEDS BY RX/DR IN RCRD: CPT | Mod: CPTII,S$GLB,, | Performed by: INTERNAL MEDICINE

## 2023-11-16 PROCEDURE — 99214 PR OFFICE/OUTPT VISIT, EST, LEVL IV, 30-39 MIN: ICD-10-PCS | Mod: S$GLB,,, | Performed by: INTERNAL MEDICINE

## 2023-11-16 PROCEDURE — 99214 OFFICE O/P EST MOD 30 MIN: CPT | Mod: S$GLB,,, | Performed by: INTERNAL MEDICINE

## 2023-11-16 PROCEDURE — 3077F SYST BP >= 140 MM HG: CPT | Mod: CPTII,S$GLB,, | Performed by: INTERNAL MEDICINE

## 2023-11-16 PROCEDURE — 3077F PR MOST RECENT SYSTOLIC BLOOD PRESSURE >= 140 MM HG: ICD-10-PCS | Mod: CPTII,S$GLB,, | Performed by: INTERNAL MEDICINE

## 2023-11-16 PROCEDURE — 3044F HG A1C LEVEL LT 7.0%: CPT | Mod: CPTII,S$GLB,, | Performed by: INTERNAL MEDICINE

## 2023-11-16 PROCEDURE — 1159F PR MEDICATION LIST DOCUMENTED IN MEDICAL RECORD: ICD-10-PCS | Mod: CPTII,S$GLB,, | Performed by: INTERNAL MEDICINE

## 2023-11-16 PROCEDURE — 3008F BODY MASS INDEX DOCD: CPT | Mod: CPTII,S$GLB,, | Performed by: INTERNAL MEDICINE

## 2023-11-16 PROCEDURE — 3008F PR BODY MASS INDEX (BMI) DOCUMENTED: ICD-10-PCS | Mod: CPTII,S$GLB,, | Performed by: INTERNAL MEDICINE

## 2023-11-16 PROCEDURE — 3044F PR MOST RECENT HEMOGLOBIN A1C LEVEL <7.0%: ICD-10-PCS | Mod: CPTII,S$GLB,, | Performed by: INTERNAL MEDICINE

## 2023-11-22 ENCOUNTER — INFUSION (OUTPATIENT)
Dept: INFUSION THERAPY | Facility: HOSPITAL | Age: 63
End: 2023-11-22
Attending: INTERNAL MEDICINE
Payer: COMMERCIAL

## 2023-11-22 VITALS
HEART RATE: 74 BPM | WEIGHT: 235.31 LBS | SYSTOLIC BLOOD PRESSURE: 153 MMHG | HEIGHT: 70 IN | BODY MASS INDEX: 33.69 KG/M2 | DIASTOLIC BLOOD PRESSURE: 97 MMHG | TEMPERATURE: 98 F | RESPIRATION RATE: 16 BRPM

## 2023-11-22 DIAGNOSIS — D46.1 RARS (REFRACTORY ANEMIA WITH RINGED SIDEROBLASTS): Primary | ICD-10-CM

## 2023-11-22 DIAGNOSIS — D46.9 MDS (MYELODYSPLASTIC SYNDROME): ICD-10-CM

## 2023-11-22 PROCEDURE — 63600175 PHARM REV CODE 636 W HCPCS: Mod: JZ,JG | Performed by: INTERNAL MEDICINE

## 2023-11-22 PROCEDURE — 96372 THER/PROPH/DIAG INJ SC/IM: CPT

## 2023-11-22 RX ADMIN — LUSPATERCEPT 50 MG: 75 INJECTION, POWDER, LYOPHILIZED, FOR SOLUTION SUBCUTANEOUS at 07:11

## 2023-11-22 NOTE — PLAN OF CARE
Problem: Fatigue  Goal: Improved Activity Tolerance  11/22/2023 0728 by Gisell Castillo, RN  Outcome: Met  11/22/2023 0728 by Gisell Castillo, RN  Outcome: Ongoing, Progressing

## 2023-11-29 ENCOUNTER — LAB VISIT (OUTPATIENT)
Dept: LAB | Facility: HOSPITAL | Age: 63
End: 2023-11-29
Attending: INTERNAL MEDICINE
Payer: COMMERCIAL

## 2023-11-29 DIAGNOSIS — D57.3 SICKLE CELL TRAIT SYNDROME: ICD-10-CM

## 2023-11-29 DIAGNOSIS — D64.89 ANEMIA DUE TO MULTIPLE MECHANISMS: ICD-10-CM

## 2023-11-29 LAB
ALBUMIN SERPL BCP-MCNC: 4 G/DL (ref 3.5–5.2)
ALP SERPL-CCNC: 70 U/L (ref 55–135)
ALT SERPL W/O P-5'-P-CCNC: 15 U/L (ref 10–44)
ANION GAP SERPL CALC-SCNC: 6 MMOL/L (ref 8–16)
AST SERPL-CCNC: 17 U/L (ref 10–40)
BASOPHILS # BLD AUTO: 0.12 K/UL (ref 0–0.2)
BASOPHILS NFR BLD: 1.4 % (ref 0–1.9)
BILIRUB SERPL-MCNC: 0.6 MG/DL (ref 0.1–1)
BUN SERPL-MCNC: 25 MG/DL (ref 8–23)
CALCIUM SERPL-MCNC: 8.8 MG/DL (ref 8.7–10.5)
CHLORIDE SERPL-SCNC: 108 MMOL/L (ref 95–110)
CO2 SERPL-SCNC: 27 MMOL/L (ref 23–29)
CREAT SERPL-MCNC: 2 MG/DL (ref 0.5–1.4)
DIFFERENTIAL METHOD: ABNORMAL
EOSINOPHIL # BLD AUTO: 0.2 K/UL (ref 0–0.5)
EOSINOPHIL NFR BLD: 2.3 % (ref 0–8)
ERYTHROCYTE [DISTWIDTH] IN BLOOD BY AUTOMATED COUNT: 27.9 % (ref 11.5–14.5)
EST. GFR  (NO RACE VARIABLE): 36.8 ML/MIN/1.73 M^2
GLUCOSE SERPL-MCNC: 99 MG/DL (ref 70–110)
HCT VFR BLD AUTO: 30.8 % (ref 40–54)
HGB BLD-MCNC: 10.4 G/DL (ref 14–18)
IMM GRANULOCYTES # BLD AUTO: 0.13 K/UL (ref 0–0.04)
IMM GRANULOCYTES NFR BLD AUTO: 1.5 % (ref 0–0.5)
LYMPHOCYTES # BLD AUTO: 1.6 K/UL (ref 1–4.8)
LYMPHOCYTES NFR BLD: 18.8 % (ref 18–48)
MCH RBC QN AUTO: 29.9 PG (ref 27–31)
MCHC RBC AUTO-ENTMCNC: 33.8 G/DL (ref 32–36)
MCV RBC AUTO: 89 FL (ref 82–98)
MONOCYTES # BLD AUTO: 0.8 K/UL (ref 0.3–1)
MONOCYTES NFR BLD: 9.7 % (ref 4–15)
NEUTROPHILS # BLD AUTO: 5.7 K/UL (ref 1.8–7.7)
NEUTROPHILS NFR BLD: 66.3 % (ref 38–73)
NRBC BLD-RTO: 1 /100 WBC
PLATELET # BLD AUTO: 395 K/UL (ref 150–450)
PMV BLD AUTO: 9.8 FL (ref 9.2–12.9)
POTASSIUM SERPL-SCNC: 4.2 MMOL/L (ref 3.5–5.1)
PROT SERPL-MCNC: 7.4 G/DL (ref 6–8.4)
RBC # BLD AUTO: 3.48 M/UL (ref 4.6–6.2)
SODIUM SERPL-SCNC: 141 MMOL/L (ref 136–145)
WBC # BLD AUTO: 8.56 K/UL (ref 3.9–12.7)

## 2023-11-29 PROCEDURE — 36415 COLL VENOUS BLD VENIPUNCTURE: CPT | Performed by: INTERNAL MEDICINE

## 2023-11-29 PROCEDURE — 85025 COMPLETE CBC W/AUTO DIFF WBC: CPT | Performed by: INTERNAL MEDICINE

## 2023-11-29 PROCEDURE — 80053 COMPREHEN METABOLIC PANEL: CPT | Performed by: INTERNAL MEDICINE

## 2023-12-14 ENCOUNTER — INFUSION (OUTPATIENT)
Dept: INFUSION THERAPY | Facility: HOSPITAL | Age: 63
End: 2023-12-14
Attending: INTERNAL MEDICINE
Payer: COMMERCIAL

## 2023-12-14 ENCOUNTER — LAB VISIT (OUTPATIENT)
Dept: LAB | Facility: HOSPITAL | Age: 63
End: 2023-12-14
Attending: INTERNAL MEDICINE
Payer: COMMERCIAL

## 2023-12-14 ENCOUNTER — TELEPHONE (OUTPATIENT)
Dept: INFUSION THERAPY | Facility: HOSPITAL | Age: 63
End: 2023-12-14

## 2023-12-14 VITALS
TEMPERATURE: 99 F | SYSTOLIC BLOOD PRESSURE: 154 MMHG | WEIGHT: 236.88 LBS | HEIGHT: 70 IN | BODY MASS INDEX: 33.91 KG/M2 | OXYGEN SATURATION: 98 % | HEART RATE: 78 BPM | RESPIRATION RATE: 16 BRPM | DIASTOLIC BLOOD PRESSURE: 98 MMHG

## 2023-12-14 DIAGNOSIS — D46.1 RARS (REFRACTORY ANEMIA WITH RINGED SIDEROBLASTS): Primary | ICD-10-CM

## 2023-12-14 DIAGNOSIS — D46.9 MDS (MYELODYSPLASTIC SYNDROME): ICD-10-CM

## 2023-12-14 DIAGNOSIS — D64.89 ANEMIA DUE TO MULTIPLE MECHANISMS: ICD-10-CM

## 2023-12-14 DIAGNOSIS — D57.3 SICKLE CELL TRAIT SYNDROME: ICD-10-CM

## 2023-12-14 LAB
ALBUMIN SERPL BCP-MCNC: 4.4 G/DL (ref 3.5–5.2)
ALP SERPL-CCNC: 70 U/L (ref 55–135)
ALT SERPL W/O P-5'-P-CCNC: 15 U/L (ref 10–44)
ANION GAP SERPL CALC-SCNC: 4 MMOL/L (ref 8–16)
ANISOCYTOSIS BLD QL SMEAR: SLIGHT
AST SERPL-CCNC: 19 U/L (ref 10–40)
BASOPHILS # BLD AUTO: 0.11 K/UL (ref 0–0.2)
BASOPHILS NFR BLD: 1.3 % (ref 0–1.9)
BILIRUB SERPL-MCNC: 0.7 MG/DL (ref 0.1–1)
BUN SERPL-MCNC: 29 MG/DL (ref 8–23)
CALCIUM SERPL-MCNC: 9 MG/DL (ref 8.7–10.5)
CHLORIDE SERPL-SCNC: 106 MMOL/L (ref 95–110)
CO2 SERPL-SCNC: 28 MMOL/L (ref 23–29)
CREAT SERPL-MCNC: 2.2 MG/DL (ref 0.5–1.4)
DACRYOCYTES BLD QL SMEAR: ABNORMAL
DIFFERENTIAL METHOD: ABNORMAL
EOSINOPHIL # BLD AUTO: 0.2 K/UL (ref 0–0.5)
EOSINOPHIL NFR BLD: 1.7 % (ref 0–8)
ERYTHROCYTE [DISTWIDTH] IN BLOOD BY AUTOMATED COUNT: 28.6 % (ref 11.5–14.5)
EST. GFR  (NO RACE VARIABLE): 32.8 ML/MIN/1.73 M^2
GLUCOSE SERPL-MCNC: 85 MG/DL (ref 70–110)
HCT VFR BLD AUTO: 29 % (ref 40–54)
HGB BLD-MCNC: 9.7 G/DL (ref 14–18)
HYPOCHROMIA BLD QL SMEAR: ABNORMAL
IMM GRANULOCYTES # BLD AUTO: 0.08 K/UL (ref 0–0.04)
IMM GRANULOCYTES NFR BLD AUTO: 0.9 % (ref 0–0.5)
LYMPHOCYTES # BLD AUTO: 1.7 K/UL (ref 1–4.8)
LYMPHOCYTES NFR BLD: 19.6 % (ref 18–48)
MCH RBC QN AUTO: 29.6 PG (ref 27–31)
MCHC RBC AUTO-ENTMCNC: 33.4 G/DL (ref 32–36)
MCV RBC AUTO: 88 FL (ref 82–98)
MONOCYTES # BLD AUTO: 0.7 K/UL (ref 0.3–1)
MONOCYTES NFR BLD: 8.5 % (ref 4–15)
NEUTROPHILS # BLD AUTO: 5.9 K/UL (ref 1.8–7.7)
NEUTROPHILS NFR BLD: 68 % (ref 38–73)
NRBC BLD-RTO: 0 /100 WBC
OVALOCYTES BLD QL SMEAR: ABNORMAL
PLATELET # BLD AUTO: 396 K/UL (ref 150–450)
PLATELET BLD QL SMEAR: ABNORMAL
PMV BLD AUTO: 9.8 FL (ref 9.2–12.9)
POIKILOCYTOSIS BLD QL SMEAR: SLIGHT
POTASSIUM SERPL-SCNC: 4.5 MMOL/L (ref 3.5–5.1)
PROT SERPL-MCNC: 7.7 G/DL (ref 6–8.4)
RBC # BLD AUTO: 3.28 M/UL (ref 4.6–6.2)
SCHISTOCYTES BLD QL SMEAR: PRESENT
SODIUM SERPL-SCNC: 138 MMOL/L (ref 136–145)
TARGETS BLD QL SMEAR: ABNORMAL
WBC # BLD AUTO: 8.71 K/UL (ref 3.9–12.7)

## 2023-12-14 PROCEDURE — 36415 COLL VENOUS BLD VENIPUNCTURE: CPT | Performed by: INTERNAL MEDICINE

## 2023-12-14 PROCEDURE — 63600175 PHARM REV CODE 636 W HCPCS: Mod: JZ,JG | Performed by: INTERNAL MEDICINE

## 2023-12-14 PROCEDURE — 80053 COMPREHEN METABOLIC PANEL: CPT | Performed by: INTERNAL MEDICINE

## 2023-12-14 PROCEDURE — 96372 THER/PROPH/DIAG INJ SC/IM: CPT

## 2023-12-14 PROCEDURE — 85025 COMPLETE CBC W/AUTO DIFF WBC: CPT | Performed by: INTERNAL MEDICINE

## 2023-12-14 RX ADMIN — LUSPATERCEPT 100 MG: 75 INJECTION, POWDER, LYOPHILIZED, FOR SOLUTION SUBCUTANEOUS at 04:12

## 2023-12-14 NOTE — PLAN OF CARE
Problem: Anemia  Goal: Anemia Symptom Improvement  Outcome: Ongoing, Progressing  Intervention: Monitor and Manage Anemia  Flowsheets (Taken 12/14/2023 1618)  Safety Promotion/Fall Prevention:   Fall Risk reviewed with patient/family   supervised activity   instructed to call staff for mobility   in recliner, wheels locked  Fatigue Management:   paced activity encouraged   fatigue-related activity identified   frequent rest breaks encouraged

## 2023-12-14 NOTE — TELEPHONE ENCOUNTER
Left message for patient regarding need for labs prior his reblozyl injection today. Call back number given.

## 2023-12-15 ENCOUNTER — TELEPHONE (OUTPATIENT)
Dept: HEMATOLOGY/ONCOLOGY | Facility: CLINIC | Age: 63
End: 2023-12-15

## 2023-12-15 NOTE — TELEPHONE ENCOUNTER
Per Dr. Tello's verbal orders, infusion made aware that patient should be on both reblozyl and retacrit as he should get retacrit as needed dependent on his hgb. Infusion verbalized understanding. Noted today that hgb 9.7 which not puts him in range for retacrit injection. I spoke to patient made aware of this, verbalized understanding and asked if would be ok for him to take this injection at same time as his other injection because if not he will be here all the time. I informed him that retacrit is a once weekly injection as reblozyl is every 3 weeks and I would have to discuss with Dr. Tello. Verbalized understanding. I spoke to josh in infusion who states due to insurance patient can not get both injections on same day. Message sent to [employer] to verify if auth has been received, awaiting response. Ksenia made aware of need to schedule.

## 2023-12-22 ENCOUNTER — INFUSION (OUTPATIENT)
Dept: INFUSION THERAPY | Facility: HOSPITAL | Age: 63
End: 2023-12-22
Attending: INTERNAL MEDICINE
Payer: COMMERCIAL

## 2023-12-22 VITALS
HEIGHT: 70 IN | BODY MASS INDEX: 33.21 KG/M2 | RESPIRATION RATE: 16 BRPM | SYSTOLIC BLOOD PRESSURE: 159 MMHG | TEMPERATURE: 98 F | HEART RATE: 90 BPM | DIASTOLIC BLOOD PRESSURE: 88 MMHG | OXYGEN SATURATION: 98 % | WEIGHT: 232 LBS

## 2023-12-22 DIAGNOSIS — D46.9 MDS (MYELODYSPLASTIC SYNDROME): ICD-10-CM

## 2023-12-22 DIAGNOSIS — D64.89 ANEMIA DUE TO MULTIPLE MECHANISMS: ICD-10-CM

## 2023-12-22 DIAGNOSIS — D64.9 NORMOCHROMIC ANEMIA: ICD-10-CM

## 2023-12-22 DIAGNOSIS — D64.9 CHRONIC ANEMIA: Primary | ICD-10-CM

## 2023-12-22 DIAGNOSIS — N18.30 STAGE 3 CHRONIC KIDNEY DISEASE, UNSPECIFIED WHETHER STAGE 3A OR 3B CKD: ICD-10-CM

## 2023-12-22 PROCEDURE — 63600175 PHARM REV CODE 636 W HCPCS: Mod: JZ,EC,JG | Performed by: INTERNAL MEDICINE

## 2023-12-22 PROCEDURE — 96372 THER/PROPH/DIAG INJ SC/IM: CPT

## 2023-12-22 RX ADMIN — EPOETIN ALFA-EPBX 40000 UNITS: 40000 INJECTION, SOLUTION INTRAVENOUS; SUBCUTANEOUS at 04:12

## 2023-12-27 ENCOUNTER — LAB VISIT (OUTPATIENT)
Dept: LAB | Facility: HOSPITAL | Age: 63
End: 2023-12-27
Attending: INTERNAL MEDICINE
Payer: COMMERCIAL

## 2023-12-27 DIAGNOSIS — D57.3 SICKLE CELL TRAIT SYNDROME: ICD-10-CM

## 2023-12-27 DIAGNOSIS — D64.89 ANEMIA DUE TO MULTIPLE MECHANISMS: ICD-10-CM

## 2023-12-27 LAB
ALBUMIN SERPL BCP-MCNC: 4.3 G/DL (ref 3.5–5.2)
ALP SERPL-CCNC: 71 U/L (ref 55–135)
ALT SERPL W/O P-5'-P-CCNC: 16 U/L (ref 10–44)
ANION GAP SERPL CALC-SCNC: 8 MMOL/L (ref 8–16)
ANISOCYTOSIS BLD QL SMEAR: SLIGHT
AST SERPL-CCNC: 18 U/L (ref 10–40)
BASOPHILS # BLD AUTO: 0.16 K/UL (ref 0–0.2)
BASOPHILS NFR BLD: 1.6 % (ref 0–1.9)
BILIRUB SERPL-MCNC: 0.6 MG/DL (ref 0.1–1)
BUN SERPL-MCNC: 32 MG/DL (ref 8–23)
CALCIUM SERPL-MCNC: 9.1 MG/DL (ref 8.7–10.5)
CHLORIDE SERPL-SCNC: 107 MMOL/L (ref 95–110)
CO2 SERPL-SCNC: 26 MMOL/L (ref 23–29)
CREAT SERPL-MCNC: 2.3 MG/DL (ref 0.5–1.4)
DACRYOCYTES BLD QL SMEAR: ABNORMAL
DIFFERENTIAL METHOD: ABNORMAL
EOSINOPHIL # BLD AUTO: 0.2 K/UL (ref 0–0.5)
EOSINOPHIL NFR BLD: 1.8 % (ref 0–8)
ERYTHROCYTE [DISTWIDTH] IN BLOOD BY AUTOMATED COUNT: 27.9 % (ref 11.5–14.5)
EST. GFR  (NO RACE VARIABLE): 31.1 ML/MIN/1.73 M^2
FERRITIN SERPL-MCNC: 1461.8 NG/ML (ref 20–300)
GLUCOSE SERPL-MCNC: 104 MG/DL (ref 70–110)
HCT VFR BLD AUTO: 31.6 % (ref 40–54)
HGB BLD-MCNC: 10.6 G/DL (ref 14–18)
HYPOCHROMIA BLD QL SMEAR: ABNORMAL
IMM GRANULOCYTES # BLD AUTO: 0.13 K/UL (ref 0–0.04)
IMM GRANULOCYTES NFR BLD AUTO: 1.3 % (ref 0–0.5)
IRON SERPL-MCNC: 102 UG/DL (ref 45–160)
LYMPHOCYTES # BLD AUTO: 1.8 K/UL (ref 1–4.8)
LYMPHOCYTES NFR BLD: 18.4 % (ref 18–48)
MCH RBC QN AUTO: 29.4 PG (ref 27–31)
MCHC RBC AUTO-ENTMCNC: 33.5 G/DL (ref 32–36)
MCV RBC AUTO: 88 FL (ref 82–98)
MONOCYTES # BLD AUTO: 1.2 K/UL (ref 0.3–1)
MONOCYTES NFR BLD: 11.8 % (ref 4–15)
NEUTROPHILS # BLD AUTO: 6.5 K/UL (ref 1.8–7.7)
NEUTROPHILS NFR BLD: 65.1 % (ref 38–73)
NRBC BLD-RTO: 1 /100 WBC
OVALOCYTES BLD QL SMEAR: ABNORMAL
PLATELET # BLD AUTO: 528 K/UL (ref 150–450)
PLATELET BLD QL SMEAR: ABNORMAL
PMV BLD AUTO: 10.4 FL (ref 9.2–12.9)
POIKILOCYTOSIS BLD QL SMEAR: SLIGHT
POLYCHROMASIA BLD QL SMEAR: ABNORMAL
POTASSIUM SERPL-SCNC: 4.1 MMOL/L (ref 3.5–5.1)
PROT SERPL-MCNC: 7.6 G/DL (ref 6–8.4)
RBC # BLD AUTO: 3.61 M/UL (ref 4.6–6.2)
SATURATED IRON: 52 % (ref 20–50)
SCHISTOCYTES BLD QL SMEAR: PRESENT
SODIUM SERPL-SCNC: 141 MMOL/L (ref 136–145)
TARGETS BLD QL SMEAR: ABNORMAL
TOTAL IRON BINDING CAPACITY: 195 UG/DL (ref 250–450)
TRANSFERRIN SERPL-MCNC: 139 MG/DL (ref 200–375)
WBC # BLD AUTO: 9.99 K/UL (ref 3.9–12.7)

## 2023-12-27 PROCEDURE — 85025 COMPLETE CBC W/AUTO DIFF WBC: CPT | Performed by: INTERNAL MEDICINE

## 2023-12-27 PROCEDURE — 82728 ASSAY OF FERRITIN: CPT | Performed by: INTERNAL MEDICINE

## 2023-12-27 PROCEDURE — 80053 COMPREHEN METABOLIC PANEL: CPT | Performed by: INTERNAL MEDICINE

## 2023-12-27 PROCEDURE — 83540 ASSAY OF IRON: CPT | Performed by: INTERNAL MEDICINE

## 2023-12-27 PROCEDURE — 36415 COLL VENOUS BLD VENIPUNCTURE: CPT | Performed by: INTERNAL MEDICINE

## 2023-12-27 PROCEDURE — 84466 ASSAY OF TRANSFERRIN: CPT | Performed by: INTERNAL MEDICINE

## 2023-12-28 ENCOUNTER — INFUSION (OUTPATIENT)
Dept: INFUSION THERAPY | Facility: HOSPITAL | Age: 63
End: 2023-12-28
Attending: INTERNAL MEDICINE
Payer: COMMERCIAL

## 2023-12-28 VITALS
OXYGEN SATURATION: 99 % | WEIGHT: 229.69 LBS | RESPIRATION RATE: 16 BRPM | BODY MASS INDEX: 32.88 KG/M2 | HEART RATE: 70 BPM | TEMPERATURE: 97 F | DIASTOLIC BLOOD PRESSURE: 85 MMHG | HEIGHT: 70 IN | SYSTOLIC BLOOD PRESSURE: 136 MMHG

## 2023-12-28 DIAGNOSIS — D64.89 ANEMIA DUE TO MULTIPLE MECHANISMS: ICD-10-CM

## 2023-12-28 DIAGNOSIS — N18.30 STAGE 3 CHRONIC KIDNEY DISEASE, UNSPECIFIED WHETHER STAGE 3A OR 3B CKD: ICD-10-CM

## 2023-12-28 DIAGNOSIS — D64.9 CHRONIC ANEMIA: Primary | ICD-10-CM

## 2023-12-28 DIAGNOSIS — D64.9 NORMOCHROMIC ANEMIA: ICD-10-CM

## 2023-12-28 DIAGNOSIS — D46.9 MDS (MYELODYSPLASTIC SYNDROME): ICD-10-CM

## 2023-12-28 PROCEDURE — 63600175 PHARM REV CODE 636 W HCPCS: Mod: JZ,EC,JG | Performed by: INTERNAL MEDICINE

## 2023-12-28 PROCEDURE — 96372 THER/PROPH/DIAG INJ SC/IM: CPT

## 2023-12-28 RX ADMIN — EPOETIN ALFA-EPBX 40000 UNITS: 40000 INJECTION, SOLUTION INTRAVENOUS; SUBCUTANEOUS at 03:12

## 2023-12-28 NOTE — PLAN OF CARE
Problem: Fatigue  Goal: Improved Activity Tolerance  Outcome: Ongoing, Progressing  Intervention: Promote Improved Energy  Flowsheets (Taken 12/28/2023 1512)  Fatigue Management: activity schedule adjusted  Sleep/Rest Enhancement: relaxation techniques promoted  Activity Management: Ambulated -L4

## 2023-12-29 LAB — CRC RECOMMENDATION EXT: NORMAL

## 2024-01-04 ENCOUNTER — PATIENT OUTREACH (OUTPATIENT)
Dept: ADMINISTRATIVE | Facility: HOSPITAL | Age: 64
End: 2024-01-04
Payer: COMMERCIAL

## 2024-01-04 ENCOUNTER — INFUSION (OUTPATIENT)
Dept: INFUSION THERAPY | Facility: HOSPITAL | Age: 64
End: 2024-01-04
Attending: INTERNAL MEDICINE
Payer: COMMERCIAL

## 2024-01-04 VITALS
RESPIRATION RATE: 17 BRPM | OXYGEN SATURATION: 100 % | SYSTOLIC BLOOD PRESSURE: 136 MMHG | WEIGHT: 231.38 LBS | BODY MASS INDEX: 33.12 KG/M2 | DIASTOLIC BLOOD PRESSURE: 91 MMHG | HEART RATE: 79 BPM | TEMPERATURE: 97 F | HEIGHT: 70 IN

## 2024-01-04 DIAGNOSIS — D46.9 MDS (MYELODYSPLASTIC SYNDROME): ICD-10-CM

## 2024-01-04 DIAGNOSIS — D46.1 RARS (REFRACTORY ANEMIA WITH RINGED SIDEROBLASTS): Primary | ICD-10-CM

## 2024-01-04 PROCEDURE — 63600175 PHARM REV CODE 636 W HCPCS: Mod: JZ,JG | Performed by: INTERNAL MEDICINE

## 2024-01-04 PROCEDURE — 96372 THER/PROPH/DIAG INJ SC/IM: CPT

## 2024-01-04 RX ADMIN — LUSPATERCEPT 50 MG: 75 INJECTION, POWDER, LYOPHILIZED, FOR SOLUTION SUBCUTANEOUS at 04:01

## 2024-01-04 NOTE — PLAN OF CARE
Problem: Anemia  Goal: Anemia Symptom Improvement  Outcome: Ongoing, Progressing  Intervention: Monitor and Manage Anemia  Flowsheets (Taken 1/4/2024 1602)  Oral Nutrition Promotion: safe use of adaptive equipment encouraged  Safety Promotion/Fall Prevention: assistive device/personal item within reach  Fatigue Management: frequent rest breaks encouraged

## 2024-01-04 NOTE — PROGRESS NOTES
Population Health Chart Review & Patient Outreach Details    Outreach Performed: NO    Additional Pop Health Notes:           Updates Requested / Reviewed:      Updated Care Coordination Note and Care Team Updated         Health Maintenance Topics Overdue:    Health Maintenance Due   Topic Date Due    Shingles Vaccine (1 of 2) Never done    RSV Vaccine (Age 60+ and Pregnant patients) (1 - 1-dose 60+ series) Never done    Pneumococcal Vaccines (Age 0-64) (3 - PPSV23 or PCV20) 08/20/2023         Health Maintenance Topic(s) Outreach Outcomes & Actions Taken:    Colorectal Cancer Screening - Outreach Outcomes & Actions Taken  : External Records Uploaded, Care Team Updated, & History Updated if Applicable

## 2024-01-05 ENCOUNTER — INFUSION (OUTPATIENT)
Dept: INFUSION THERAPY | Facility: HOSPITAL | Age: 64
End: 2024-01-05
Attending: INTERNAL MEDICINE
Payer: COMMERCIAL

## 2024-01-05 VITALS
BODY MASS INDEX: 33.36 KG/M2 | DIASTOLIC BLOOD PRESSURE: 89 MMHG | TEMPERATURE: 98 F | HEIGHT: 70 IN | SYSTOLIC BLOOD PRESSURE: 158 MMHG | OXYGEN SATURATION: 98 % | WEIGHT: 233 LBS | RESPIRATION RATE: 18 BRPM | HEART RATE: 77 BPM

## 2024-01-05 DIAGNOSIS — N18.30 STAGE 3 CHRONIC KIDNEY DISEASE, UNSPECIFIED WHETHER STAGE 3A OR 3B CKD: ICD-10-CM

## 2024-01-05 DIAGNOSIS — D64.89 ANEMIA DUE TO MULTIPLE MECHANISMS: ICD-10-CM

## 2024-01-05 DIAGNOSIS — D64.9 CHRONIC ANEMIA: Primary | ICD-10-CM

## 2024-01-05 DIAGNOSIS — D64.9 NORMOCHROMIC ANEMIA: ICD-10-CM

## 2024-01-05 DIAGNOSIS — D46.9 MDS (MYELODYSPLASTIC SYNDROME): ICD-10-CM

## 2024-01-05 PROCEDURE — 96372 THER/PROPH/DIAG INJ SC/IM: CPT

## 2024-01-05 PROCEDURE — 63600175 PHARM REV CODE 636 W HCPCS: Mod: JZ,EC,JG | Performed by: INTERNAL MEDICINE

## 2024-01-05 RX ADMIN — EPOETIN ALFA-EPBX 40000 UNITS: 40000 INJECTION, SOLUTION INTRAVENOUS; SUBCUTANEOUS at 03:01

## 2024-01-05 NOTE — PLAN OF CARE
Problem: Anemia  Goal: Anemia Symptom Improvement  Outcome: Ongoing, Progressing  Intervention: Monitor and Manage Anemia  Flowsheets (Taken 1/5/2024 3349)  Oral Nutrition Promotion: rest periods promoted  Safety Promotion/Fall Prevention: medications reviewed  Fatigue Management: frequent rest breaks encouraged

## 2024-01-10 ENCOUNTER — LAB VISIT (OUTPATIENT)
Dept: LAB | Facility: HOSPITAL | Age: 64
End: 2024-01-10
Attending: INTERNAL MEDICINE
Payer: COMMERCIAL

## 2024-01-10 DIAGNOSIS — D57.3 SICKLE CELL TRAIT SYNDROME: ICD-10-CM

## 2024-01-10 DIAGNOSIS — D64.89 ANEMIA DUE TO MULTIPLE MECHANISMS: ICD-10-CM

## 2024-01-10 LAB
ALBUMIN SERPL BCP-MCNC: 4.3 G/DL (ref 3.5–5.2)
ALP SERPL-CCNC: 77 U/L (ref 55–135)
ALT SERPL W/O P-5'-P-CCNC: 14 U/L (ref 10–44)
ANION GAP SERPL CALC-SCNC: 7 MMOL/L (ref 8–16)
AST SERPL-CCNC: 18 U/L (ref 10–40)
BASOPHILS # BLD AUTO: 0.12 K/UL (ref 0–0.2)
BASOPHILS NFR BLD: 1.3 % (ref 0–1.9)
BILIRUB SERPL-MCNC: 0.6 MG/DL (ref 0.1–1)
BUN SERPL-MCNC: 28 MG/DL (ref 8–23)
CALCIUM SERPL-MCNC: 8.9 MG/DL (ref 8.7–10.5)
CHLORIDE SERPL-SCNC: 108 MMOL/L (ref 95–110)
CO2 SERPL-SCNC: 27 MMOL/L (ref 23–29)
CREAT SERPL-MCNC: 2.4 MG/DL (ref 0.5–1.4)
DIFFERENTIAL METHOD BLD: ABNORMAL
EOSINOPHIL # BLD AUTO: 0.3 K/UL (ref 0–0.5)
EOSINOPHIL NFR BLD: 3.1 % (ref 0–8)
ERYTHROCYTE [DISTWIDTH] IN BLOOD BY AUTOMATED COUNT: 27.9 % (ref 11.5–14.5)
EST. GFR  (NO RACE VARIABLE): 29.6 ML/MIN/1.73 M^2
GLUCOSE SERPL-MCNC: 107 MG/DL (ref 70–110)
HCT VFR BLD AUTO: 34.6 % (ref 40–54)
HGB BLD-MCNC: 11.7 G/DL (ref 14–18)
IMM GRANULOCYTES # BLD AUTO: 0.09 K/UL (ref 0–0.04)
IMM GRANULOCYTES NFR BLD AUTO: 1 % (ref 0–0.5)
LYMPHOCYTES # BLD AUTO: 1.9 K/UL (ref 1–4.8)
LYMPHOCYTES NFR BLD: 20.7 % (ref 18–48)
MCH RBC QN AUTO: 29.8 PG (ref 27–31)
MCHC RBC AUTO-ENTMCNC: 33.8 G/DL (ref 32–36)
MCV RBC AUTO: 88 FL (ref 82–98)
MONOCYTES # BLD AUTO: 1.1 K/UL (ref 0.3–1)
MONOCYTES NFR BLD: 11.3 % (ref 4–15)
NEUTROPHILS # BLD AUTO: 5.8 K/UL (ref 1.8–7.7)
NEUTROPHILS NFR BLD: 62.6 % (ref 38–73)
NRBC BLD-RTO: 1 /100 WBC
PLATELET # BLD AUTO: 479 K/UL (ref 150–450)
PMV BLD AUTO: 10.4 FL (ref 9.2–12.9)
POTASSIUM SERPL-SCNC: 4.6 MMOL/L (ref 3.5–5.1)
PROT SERPL-MCNC: 7.7 G/DL (ref 6–8.4)
RBC # BLD AUTO: 3.92 M/UL (ref 4.6–6.2)
SODIUM SERPL-SCNC: 142 MMOL/L (ref 136–145)
WBC # BLD AUTO: 9.29 K/UL (ref 3.9–12.7)

## 2024-01-10 PROCEDURE — 80053 COMPREHEN METABOLIC PANEL: CPT | Performed by: INTERNAL MEDICINE

## 2024-01-10 PROCEDURE — 36415 COLL VENOUS BLD VENIPUNCTURE: CPT | Performed by: INTERNAL MEDICINE

## 2024-01-10 PROCEDURE — 85025 COMPLETE CBC W/AUTO DIFF WBC: CPT | Performed by: INTERNAL MEDICINE

## 2024-01-12 ENCOUNTER — INFUSION (OUTPATIENT)
Dept: INFUSION THERAPY | Facility: HOSPITAL | Age: 64
End: 2024-01-12
Attending: INTERNAL MEDICINE
Payer: COMMERCIAL

## 2024-01-12 VITALS
DIASTOLIC BLOOD PRESSURE: 88 MMHG | HEART RATE: 63 BPM | BODY MASS INDEX: 33.05 KG/M2 | TEMPERATURE: 98 F | RESPIRATION RATE: 18 BRPM | HEIGHT: 70 IN | WEIGHT: 230.88 LBS | SYSTOLIC BLOOD PRESSURE: 153 MMHG | OXYGEN SATURATION: 97 %

## 2024-01-12 DIAGNOSIS — N18.30 STAGE 3 CHRONIC KIDNEY DISEASE, UNSPECIFIED WHETHER STAGE 3A OR 3B CKD: ICD-10-CM

## 2024-01-12 DIAGNOSIS — D64.9 CHRONIC ANEMIA: Primary | ICD-10-CM

## 2024-01-12 DIAGNOSIS — D46.9 MDS (MYELODYSPLASTIC SYNDROME): ICD-10-CM

## 2024-01-12 DIAGNOSIS — D64.9 NORMOCHROMIC ANEMIA: ICD-10-CM

## 2024-01-12 DIAGNOSIS — E53.8 FOLIC ACID DEFICIENCY: ICD-10-CM

## 2024-01-12 DIAGNOSIS — D64.89 ANEMIA DUE TO MULTIPLE MECHANISMS: ICD-10-CM

## 2024-01-12 PROCEDURE — 63600175 PHARM REV CODE 636 W HCPCS: Mod: JZ,EC,JG | Performed by: INTERNAL MEDICINE

## 2024-01-12 PROCEDURE — 96372 THER/PROPH/DIAG INJ SC/IM: CPT

## 2024-01-12 RX ADMIN — EPOETIN ALFA-EPBX 40000 UNITS: 40000 INJECTION, SOLUTION INTRAVENOUS; SUBCUTANEOUS at 04:01

## 2024-01-12 NOTE — PLAN OF CARE
Problem: Anemia  Goal: Anemia Symptom Improvement  Outcome: Ongoing, Progressing  Intervention: Monitor and Manage Anemia  Flowsheets (Taken 1/12/2024 1600)  Oral Nutrition Promotion: safe use of adaptive equipment encouraged  Safety Promotion/Fall Prevention: assistive device/personal item within reach  Fatigue Management: activity schedule adjusted

## 2024-01-16 RX ORDER — FOLIC ACID 1 MG/1
2 TABLET ORAL DAILY
Qty: 180 TABLET | Refills: 1 | Status: SHIPPED | OUTPATIENT
Start: 2024-01-16 | End: 2025-01-15

## 2024-01-17 ENCOUNTER — TELEPHONE (OUTPATIENT)
Dept: FAMILY MEDICINE | Facility: CLINIC | Age: 64
End: 2024-01-17
Payer: COMMERCIAL

## 2024-01-17 NOTE — TELEPHONE ENCOUNTER
APPT. 4-16-24, LOV. 10-16-23.  Patient wants a new prescription for Periactin.  He states he is having a problem with his appetite.  He is 5ft. 10 in. Was 230 lbs, lost weight and is now 219 lbs.

## 2024-01-19 ENCOUNTER — INFUSION (OUTPATIENT)
Dept: INFUSION THERAPY | Facility: HOSPITAL | Age: 64
End: 2024-01-19
Attending: INTERNAL MEDICINE
Payer: COMMERCIAL

## 2024-01-19 VITALS
WEIGHT: 232.81 LBS | HEART RATE: 71 BPM | OXYGEN SATURATION: 98 % | TEMPERATURE: 98 F | SYSTOLIC BLOOD PRESSURE: 151 MMHG | RESPIRATION RATE: 16 BRPM | DIASTOLIC BLOOD PRESSURE: 98 MMHG | HEIGHT: 70 IN | BODY MASS INDEX: 33.33 KG/M2

## 2024-01-19 DIAGNOSIS — D64.9 CHRONIC ANEMIA: Primary | ICD-10-CM

## 2024-01-19 DIAGNOSIS — D64.9 NORMOCHROMIC ANEMIA: ICD-10-CM

## 2024-01-19 DIAGNOSIS — N18.30 STAGE 3 CHRONIC KIDNEY DISEASE, UNSPECIFIED WHETHER STAGE 3A OR 3B CKD: ICD-10-CM

## 2024-01-19 DIAGNOSIS — D64.89 ANEMIA DUE TO MULTIPLE MECHANISMS: ICD-10-CM

## 2024-01-19 DIAGNOSIS — D46.9 MDS (MYELODYSPLASTIC SYNDROME): ICD-10-CM

## 2024-01-19 PROCEDURE — 96372 THER/PROPH/DIAG INJ SC/IM: CPT

## 2024-01-19 PROCEDURE — 63600175 PHARM REV CODE 636 W HCPCS: Mod: JZ,EC,JG | Performed by: INTERNAL MEDICINE

## 2024-01-19 RX ADMIN — EPOETIN ALFA-EPBX 40000 UNITS: 40000 INJECTION, SOLUTION INTRAVENOUS; SUBCUTANEOUS at 04:01

## 2024-01-19 NOTE — PLAN OF CARE
Problem: Anemia  Goal: Anemia Symptom Improvement  Outcome: Ongoing, Progressing  Intervention: Monitor and Manage Anemia  Flowsheets (Taken 1/19/2024 8914)  Oral Nutrition Promotion: rest periods promoted  Fatigue Management:   activity schedule adjusted   fatigue-related activity identified   frequent rest breaks encouraged   paced activity encouraged

## 2024-01-24 ENCOUNTER — TELEPHONE (OUTPATIENT)
Dept: INFUSION THERAPY | Facility: HOSPITAL | Age: 64
End: 2024-01-24

## 2024-01-24 ENCOUNTER — LAB VISIT (OUTPATIENT)
Dept: LAB | Facility: HOSPITAL | Age: 64
End: 2024-01-24
Attending: INTERNAL MEDICINE
Payer: COMMERCIAL

## 2024-01-24 DIAGNOSIS — D64.89 ANEMIA DUE TO MULTIPLE MECHANISMS: ICD-10-CM

## 2024-01-24 DIAGNOSIS — D57.3 SICKLE CELL TRAIT SYNDROME: ICD-10-CM

## 2024-01-24 LAB
ALBUMIN SERPL BCP-MCNC: 4.1 G/DL (ref 3.5–5.2)
ALP SERPL-CCNC: 75 U/L (ref 55–135)
ALT SERPL W/O P-5'-P-CCNC: 17 U/L (ref 10–44)
ANION GAP SERPL CALC-SCNC: 7 MMOL/L (ref 8–16)
ANISOCYTOSIS BLD QL SMEAR: SLIGHT
AST SERPL-CCNC: 20 U/L (ref 10–40)
BASOPHILS # BLD AUTO: 0.1 K/UL (ref 0–0.2)
BASOPHILS NFR BLD: 1.4 % (ref 0–1.9)
BILIRUB SERPL-MCNC: 0.9 MG/DL (ref 0.1–1)
BUN SERPL-MCNC: 29 MG/DL (ref 8–23)
CALCIUM SERPL-MCNC: 8.8 MG/DL (ref 8.7–10.5)
CHLORIDE SERPL-SCNC: 108 MMOL/L (ref 95–110)
CO2 SERPL-SCNC: 26 MMOL/L (ref 23–29)
CREAT SERPL-MCNC: 2 MG/DL (ref 0.5–1.4)
DACRYOCYTES BLD QL SMEAR: ABNORMAL
DIFFERENTIAL METHOD BLD: ABNORMAL
EOSINOPHIL # BLD AUTO: 0.2 K/UL (ref 0–0.5)
EOSINOPHIL NFR BLD: 2.2 % (ref 0–8)
ERYTHROCYTE [DISTWIDTH] IN BLOOD BY AUTOMATED COUNT: 27.7 % (ref 11.5–14.5)
EST. GFR  (NO RACE VARIABLE): 36.8 ML/MIN/1.73 M^2
FERRITIN SERPL-MCNC: 1490.2 NG/ML (ref 20–300)
GLUCOSE SERPL-MCNC: 103 MG/DL (ref 70–110)
HCT VFR BLD AUTO: 34.7 % (ref 40–54)
HGB BLD-MCNC: 11.7 G/DL (ref 14–18)
HYPOCHROMIA BLD QL SMEAR: ABNORMAL
IMM GRANULOCYTES # BLD AUTO: 0.07 K/UL (ref 0–0.04)
IMM GRANULOCYTES NFR BLD AUTO: 0.9 % (ref 0–0.5)
IRON SERPL-MCNC: 164 UG/DL (ref 45–160)
LYMPHOCYTES # BLD AUTO: 1.5 K/UL (ref 1–4.8)
LYMPHOCYTES NFR BLD: 20.5 % (ref 18–48)
MCH RBC QN AUTO: 29.8 PG (ref 27–31)
MCHC RBC AUTO-ENTMCNC: 33.7 G/DL (ref 32–36)
MCV RBC AUTO: 89 FL (ref 82–98)
MONOCYTES # BLD AUTO: 0.9 K/UL (ref 0.3–1)
MONOCYTES NFR BLD: 11.9 % (ref 4–15)
NEUTROPHILS # BLD AUTO: 4.7 K/UL (ref 1.8–7.7)
NEUTROPHILS NFR BLD: 63.1 % (ref 38–73)
NRBC BLD-RTO: 1 /100 WBC
OVALOCYTES BLD QL SMEAR: ABNORMAL
PLATELET # BLD AUTO: 415 K/UL (ref 150–450)
PLATELET BLD QL SMEAR: ABNORMAL
PMV BLD AUTO: 10.5 FL (ref 9.2–12.9)
POTASSIUM SERPL-SCNC: 4.3 MMOL/L (ref 3.5–5.1)
PROT SERPL-MCNC: 7.2 G/DL (ref 6–8.4)
RBC # BLD AUTO: 3.92 M/UL (ref 4.6–6.2)
SATURATED IRON: 84 % (ref 20–50)
SCHISTOCYTES BLD QL SMEAR: PRESENT
SODIUM SERPL-SCNC: 141 MMOL/L (ref 136–145)
TARGETS BLD QL SMEAR: ABNORMAL
TOTAL IRON BINDING CAPACITY: 196 UG/DL (ref 250–450)
TRANSFERRIN SERPL-MCNC: 140 MG/DL (ref 200–375)
WBC # BLD AUTO: 7.37 K/UL (ref 3.9–12.7)

## 2024-01-24 PROCEDURE — 80053 COMPREHEN METABOLIC PANEL: CPT | Performed by: INTERNAL MEDICINE

## 2024-01-24 PROCEDURE — 36415 COLL VENOUS BLD VENIPUNCTURE: CPT | Performed by: INTERNAL MEDICINE

## 2024-01-24 PROCEDURE — 85025 COMPLETE CBC W/AUTO DIFF WBC: CPT | Performed by: INTERNAL MEDICINE

## 2024-01-24 PROCEDURE — 83540 ASSAY OF IRON: CPT | Performed by: INTERNAL MEDICINE

## 2024-01-24 PROCEDURE — 82728 ASSAY OF FERRITIN: CPT | Performed by: INTERNAL MEDICINE

## 2024-01-24 NOTE — TELEPHONE ENCOUNTER
Patient notified of hgb 11.7.. no need for reblozyl or retacrit until labs are checked again on 2/7 and if hgb has dropped will resume medication. Patient voiced understanding

## 2024-02-07 ENCOUNTER — LAB VISIT (OUTPATIENT)
Dept: LAB | Facility: HOSPITAL | Age: 64
End: 2024-02-07
Attending: INTERNAL MEDICINE
Payer: COMMERCIAL

## 2024-02-07 DIAGNOSIS — D57.3 SICKLE CELL TRAIT SYNDROME: ICD-10-CM

## 2024-02-07 DIAGNOSIS — D64.89 ANEMIA DUE TO MULTIPLE MECHANISMS: ICD-10-CM

## 2024-02-07 LAB
ALBUMIN SERPL BCP-MCNC: 4 G/DL (ref 3.5–5.2)
ALP SERPL-CCNC: 80 U/L (ref 55–135)
ALT SERPL W/O P-5'-P-CCNC: 19 U/L (ref 10–44)
ANION GAP SERPL CALC-SCNC: 5 MMOL/L (ref 8–16)
ANISOCYTOSIS BLD QL SMEAR: SLIGHT
AST SERPL-CCNC: 21 U/L (ref 10–40)
BASOPHILS # BLD AUTO: 0.09 K/UL (ref 0–0.2)
BASOPHILS NFR BLD: 1.3 % (ref 0–1.9)
BILIRUB SERPL-MCNC: 0.5 MG/DL (ref 0.1–1)
BUN SERPL-MCNC: 30 MG/DL (ref 8–23)
CALCIUM SERPL-MCNC: 8.8 MG/DL (ref 8.7–10.5)
CHLORIDE SERPL-SCNC: 108 MMOL/L (ref 95–110)
CO2 SERPL-SCNC: 28 MMOL/L (ref 23–29)
CREAT SERPL-MCNC: 2.1 MG/DL (ref 0.5–1.4)
DACRYOCYTES BLD QL SMEAR: ABNORMAL
DIFFERENTIAL METHOD BLD: ABNORMAL
EOSINOPHIL # BLD AUTO: 0.2 K/UL (ref 0–0.5)
EOSINOPHIL NFR BLD: 3.1 % (ref 0–8)
ERYTHROCYTE [DISTWIDTH] IN BLOOD BY AUTOMATED COUNT: 27.1 % (ref 11.5–14.5)
EST. GFR  (NO RACE VARIABLE): 34.7 ML/MIN/1.73 M^2
GLUCOSE SERPL-MCNC: 98 MG/DL (ref 70–110)
HCT VFR BLD AUTO: 31.4 % (ref 40–54)
HGB BLD-MCNC: 10.6 G/DL (ref 14–18)
HYPOCHROMIA BLD QL SMEAR: ABNORMAL
IMM GRANULOCYTES # BLD AUTO: 0.03 K/UL (ref 0–0.04)
IMM GRANULOCYTES NFR BLD AUTO: 0.4 % (ref 0–0.5)
LYMPHOCYTES # BLD AUTO: 1.7 K/UL (ref 1–4.8)
LYMPHOCYTES NFR BLD: 23.2 % (ref 18–48)
MCH RBC QN AUTO: 30.3 PG (ref 27–31)
MCHC RBC AUTO-ENTMCNC: 33.8 G/DL (ref 32–36)
MCV RBC AUTO: 90 FL (ref 82–98)
MONOCYTES # BLD AUTO: 0.8 K/UL (ref 0.3–1)
MONOCYTES NFR BLD: 10.8 % (ref 4–15)
NEUTROPHILS # BLD AUTO: 4.4 K/UL (ref 1.8–7.7)
NEUTROPHILS NFR BLD: 61.2 % (ref 38–73)
NRBC BLD-RTO: 0 /100 WBC
OVALOCYTES BLD QL SMEAR: ABNORMAL
PLATELET # BLD AUTO: 342 K/UL (ref 150–450)
PLATELET BLD QL SMEAR: ABNORMAL
PMV BLD AUTO: 10.6 FL (ref 9.2–12.9)
POTASSIUM SERPL-SCNC: 4.3 MMOL/L (ref 3.5–5.1)
PROT SERPL-MCNC: 7.3 G/DL (ref 6–8.4)
RBC # BLD AUTO: 3.5 M/UL (ref 4.6–6.2)
SCHISTOCYTES BLD QL SMEAR: PRESENT
SODIUM SERPL-SCNC: 141 MMOL/L (ref 136–145)
TARGETS BLD QL SMEAR: ABNORMAL
WBC # BLD AUTO: 7.12 K/UL (ref 3.9–12.7)

## 2024-02-07 PROCEDURE — 36415 COLL VENOUS BLD VENIPUNCTURE: CPT | Performed by: INTERNAL MEDICINE

## 2024-02-07 PROCEDURE — 80053 COMPREHEN METABOLIC PANEL: CPT | Performed by: INTERNAL MEDICINE

## 2024-02-07 PROCEDURE — 85025 COMPLETE CBC W/AUTO DIFF WBC: CPT | Performed by: INTERNAL MEDICINE

## 2024-02-09 ENCOUNTER — INFUSION (OUTPATIENT)
Dept: INFUSION THERAPY | Facility: HOSPITAL | Age: 64
End: 2024-02-09
Attending: INTERNAL MEDICINE
Payer: COMMERCIAL

## 2024-02-09 VITALS
BODY MASS INDEX: 32.88 KG/M2 | SYSTOLIC BLOOD PRESSURE: 153 MMHG | WEIGHT: 229.69 LBS | TEMPERATURE: 98 F | OXYGEN SATURATION: 97 % | RESPIRATION RATE: 15 BRPM | HEIGHT: 70 IN | HEART RATE: 82 BPM | DIASTOLIC BLOOD PRESSURE: 96 MMHG

## 2024-02-09 DIAGNOSIS — N18.30 STAGE 3 CHRONIC KIDNEY DISEASE, UNSPECIFIED WHETHER STAGE 3A OR 3B CKD: ICD-10-CM

## 2024-02-09 DIAGNOSIS — D64.89 ANEMIA DUE TO MULTIPLE MECHANISMS: ICD-10-CM

## 2024-02-09 DIAGNOSIS — D64.9 NORMOCHROMIC ANEMIA: ICD-10-CM

## 2024-02-09 DIAGNOSIS — D64.9 CHRONIC ANEMIA: Primary | ICD-10-CM

## 2024-02-09 DIAGNOSIS — D46.9 MDS (MYELODYSPLASTIC SYNDROME): ICD-10-CM

## 2024-02-09 PROCEDURE — 63600175 PHARM REV CODE 636 W HCPCS: Mod: JZ,EC,JG | Performed by: INTERNAL MEDICINE

## 2024-02-09 PROCEDURE — 96372 THER/PROPH/DIAG INJ SC/IM: CPT

## 2024-02-09 RX ADMIN — EPOETIN ALFA-EPBX 40000 UNITS: 40000 INJECTION, SOLUTION INTRAVENOUS; SUBCUTANEOUS at 07:02

## 2024-02-09 NOTE — PLAN OF CARE
Problem: Anemia  Goal: Anemia Symptom Improvement  Outcome: Ongoing, Progressing  Intervention: Monitor and Manage Anemia  Flowsheets (Taken 2/9/2024 5109)  Oral Nutrition Promotion:   rest periods promoted   safe use of adaptive equipment encouraged  Safety Promotion/Fall Prevention: medications reviewed  Fatigue Management:   fatigue-related activity identified   frequent rest breaks encouraged   paced activity encouraged

## 2024-02-14 NOTE — PROGRESS NOTES
[facility] Hematology/Oncology               PROGRESS NOTE - Follow-up Visit      Subjective:       Patient ID:   NAME: Rick Butler : 1960     63 y.o. male    Referring Doc: Chetna (new PCP)  Other Physicians: Getachew; Alan Mustafa    Chief Complaint:  Sickle cell trait/anemia/RARS  f/u    History of Present Illness:     Patient returns today for a regularly scheduled follow-up visit.  The patient is doing ok overall. He is here by himself today.     He has been Rebozyl and doing well with it; he gets it today    Breathing ok, no CP, SOB, HA's or N/V; some occasional fatigue    He did not get to see Dr Hilario in 2024 and does not see her till 2024        He has not had any recent pain crisis.     He denies having any breathing difficulties. He denies any blood in the stool, dark stools or hematuria; breathing ok; no CP, SOB, HA's or N/V;            Discussed covid19 precautions - he had his vaccinations      ROS:   GEN: normal without any fever, night sweats or weight loss  HEENT: normal with no HA's, sore throat, stiff neck, changes in vision  CV: normal with no CP, SOB, PND, MORA or orthopnea  PULM: normal with no SOB, cough, hemoptysis, sputum or pleuritic pain  GI: normal with no abdominal pain, nausea, vomiting, constipation, diarrhea, melanotic stools, BRBPR, or hematemesis  : normal with no hematuria, dysuria  BREAST: normal with no mass, discharge, pain  SKIN: normal with no rash, erythema, bruising, or swelling    Allergies:  Review of patient's allergies indicates:  No Known Allergies    Medications:    Current Outpatient Medications:     amLODIPine (NORVASC) 5 MG tablet, Take 1 tablet (5 mg total) by mouth once daily., Disp: 90 tablet, Rfl: 3    ascorbic acid, vitamin C, (VITAMIN C) 1000 MG tablet, Take 1,000 mg by mouth once daily., Disp: , Rfl:     atorvastatin (LIPITOR) 10 MG tablet, Take 1 tablet (10 mg total) by mouth every evening., Disp: 90 tablet, Rfl: 3    epoetin tray (PROCRIT  INJ), Thursday, Disp: , Rfl:     famotidine (PEPCID) 20 MG tablet, famotidine 20 mg tablet  Take 1 tablet twice a day by oral route., Disp: , Rfl:     folic acid (FOLVITE) 1 MG tablet, Take 2 tablets (2 mg total) by mouth once daily., Disp: 180 tablet, Rfl: 1    multivitamin capsule, Take 1 capsule by mouth once daily., Disp: , Rfl:     pantoprazole (PROTONIX) 40 MG tablet, , Disp: , Rfl:     sertraline (ZOLOFT) 25 MG tablet, Take 25 mg by mouth once daily., Disp: , Rfl:     sildenafil (VIAGRA) 100 MG tablet, Take 1 tablet (100 mg total) by mouth daily as needed for Erectile Dysfunction., Disp: 10 tablet, Rfl: 6    tamsulosin (FLOMAX) 0.4 mg Cap, Take 1 capsule (0.4 mg total) by mouth once daily., Disp: 90 capsule, Rfl: 1    traZODone (DESYREL) 100 MG tablet, Take 1 tablet (100 mg total) by mouth nightly as needed for Insomnia., Disp: 90 tablet, Rfl: 2    PMHx/PSHx Updates:  See patient's last visit with me on 11/16/2023  See H&P on 7/9/2011      Pathology:    12/20/2019  BONE MARROW, RIGHT ILIAC CREST,    ASPIRATE, CLOT SECTION, AND CORE BIOPSY:    --HYPERCELLULAR MARROW (APPROXIMATELY 75% TO 80%) WITH TRILINEAGE   HEMATOPOIETIC ELEMENTS, ERYTHROID  HYPERPLASIA AND MILD MEGAKARYOCYTIC HYPERPLASIA, AND NON-SPECIFIC   DYSHEMATOPOIETIC CHANGES (SEE     COMMENT).  --[id number] INCREASED STAINABLE IRON WITH INCREASED RING   SIDEROBLASTS (GREATER THAN 15%)     (SEE COMMENT).  --PERIPHERAL BLOOD WITH THROMBOCYTOSIS (574,000/MICROLITER) AND ANEMIA   (HEMOGLOBIN 5.5 GRAM/DECILITER),    WITH SCATTERED DREPANOCYTOID FORMS AND FEW NUCLEATED ERYTHROCYTES      Cytogenetic Results at the bottom of the report.  NORMAL    Objective:     Vitals:  Blood pressure 138/84, pulse 80, temperature 97.6 °F (36.4 °C), weight 103.9 kg (229 lb).    Physical Examination:   GEN: no apparent distress, comfortable; AAOx3  HEAD: atraumatic and normocephalic  EYES: no pallor, no icterus, PERRLA  ENT: OMM, no pharyngeal erythema, external  ears WNL; no nasal discharge; no thrush  NECK: no masses, thyroid normal, trachea midline, no LAD/LN's, supple  CV: RRR with no murmur; normal pulse; normal S1 and S2; no pedal edema  CHEST: Normal respiratory effort; CTAB; normal breath sounds; no wheeze or crackles  ABDOM: nontender and nondistended; soft; normal bowel sounds; no rebound/guarding  MUSC/Skeletal: ROM normal; no crepitus; joints normal; no deformities or arthropathy  EXTREM: no clubbing, cyanosis, inflammation or swelling  SKIN: no rashes, lesions, ulcers, petechiae or subcutaneous nodules  : no jiang  NEURO: grossly intact; motor/sensory WNL; AAOx3; no tremors  PSYCH: normal mood, affect and behavior  LYMPH: normal cervical, supraclavicular, axillary and groin LN's            Labs:     Lab Results   Component Value Date    WBC 7.12 02/07/2024    HGB 10.6 (L) 02/07/2024    HCT 31.4 (L) 02/07/2024    MCV 90 02/07/2024     02/07/2024           CMP  Sodium   Date Value Ref Range Status   02/07/2024 141 136 - 145 mmol/L Final   06/26/2019 138 134 - 144 mmol/L      Potassium   Date Value Ref Range Status   02/07/2024 4.3 3.5 - 5.1 mmol/L Final     Chloride   Date Value Ref Range Status   02/07/2024 108 95 - 110 mmol/L Final   06/26/2019 105 98 - 110 mmol/L      CO2   Date Value Ref Range Status   02/07/2024 28 23 - 29 mmol/L Final     Glucose   Date Value Ref Range Status   02/07/2024 98 70 - 110 mg/dL Final   06/26/2019 114 (H) 70 - 99 mg/dL      BUN   Date Value Ref Range Status   02/07/2024 30 (H) 8 - 23 mg/dL Final     Creatinine   Date Value Ref Range Status   02/07/2024 2.1 (H) 0.5 - 1.4 mg/dL Final   06/26/2019 1.62 (H) 0.60 - 1.40 mg/dL      Calcium   Date Value Ref Range Status   02/07/2024 8.8 8.7 - 10.5 mg/dL Final     Total Protein   Date Value Ref Range Status   02/07/2024 7.3 6.0 - 8.4 g/dL Final     Albumin   Date Value Ref Range Status   02/07/2024 4.0 3.5 - 5.2 g/dL Final   06/26/2019 4.4 3.1 - 4.7 g/dL      Total Bilirubin    Date Value Ref Range Status   02/07/2024 0.5 0.1 - 1.0 mg/dL Final     Comment:     For infants and newborns, interpretation of results should be based  on gestational age, weight and in agreement with clinical  observations.    Premature Infant recommended reference ranges:  Up to 24 hours.............<8.0 mg/dL  Up to 48 hours............<12.0 mg/dL  3-5 days..................<15.0 mg/dL  6-29 days.................<15.0 mg/dL       Alkaline Phosphatase   Date Value Ref Range Status   02/07/2024 80 55 - 135 U/L Final     AST   Date Value Ref Range Status   02/07/2024 21 10 - 40 U/L Final     ALT   Date Value Ref Range Status   02/07/2024 19 10 - 44 U/L Final     Anion Gap   Date Value Ref Range Status   02/07/2024 5 (L) 8 - 16 mmol/L Final     eGFR if    Date Value Ref Range Status   07/13/2022 45.6 (A) >60 mL/min/1.73 m^2 Final     eGFR if non    Date Value Ref Range Status   07/13/2022 39.5 (A) >60 mL/min/1.73 m^2 Final     Comment:     Calculation used to obtain the estimated glomerular filtration  rate (eGFR) is the CKD-EPI equation.          Lab Results   Component Value Date    IRON 164 (H) 01/24/2024    TIBC 196 (L) 01/24/2024    FERRITIN 1,490.2 (H) 01/24/2024           I have reviewed all available lab results and radiology reports.    Radiology/Diagnostic Studies:    US 2/8/2023:    IMPRESSION:  1. Dilation of the common bile duct at up to 10 mm, unchanged when compared to 2016.  2. Nonvisualization of the pancreas because of overlying bowel gas.  3. No other significant findings.  spleen is normal in size and appearance      2/15/2018 Xray Survey:   IMPRESSION: No significant abnormality seen. No evidence of osteoblastic or  osteoclastic lesions identified    MRI L-spine 2/12/2018  IMPRESSION:    1. Prominent low signal intensity throughout the bone marrow on T1 and  T2-weighted imaging. Marrow infiltrative process including malignancy such as  metastatic disease or  "lymphoma/leukemia is a consideration along with multiple  myeloma or malignant process. Benign etiology such as osteopetrosis or  regenerative red marrow are also considerations.    2. Multilevel lumbar degenerative changes, most prominent at L4-L5 and L5-S1 as  described.    Assessment/Plan:   (1) 63 y.o. male with diagnosis of sickle cell anemia with borderline microcytosis  - latest hgb was 6.0 and he had blood transfusion  - today, he is not symptomatic at this time  - he probably has a multifactorial anemia process with underlying sickle cell, anemia of chronic disorders and anemia of chronic renal. I can not rule out an underlying GI bleeding process.   - iron panel is adequate (no deficiency)  - total bilirubin is only minimally elevated  - he required transfusion again in Oct 2019 and again in Dec 2019  - he had bone marrow biopsy on 12/20/2019    "dyshematopoietic changes are not entirely specific and are present  mostly within erythroid and megakaryocytic lineages. These findings   could be observed in chronic disease states, autoimmune conditions,  infectious settings, toxic exposures, nutritional deficits, as medication/drug effect, and in myelodysplastic syndrome (MDS), among other conditions"  "Additionally, ring sideroblasts are a non-specific   finding "    Patient was referred to see Dr Mustafa at [facility] and had repeat BM biopsy with diagnosis made of RARS  - he is now on procrit per directives of Dr Mustafa    7/30/2020:   he recently saw Dr Mustafa  And sees him again in Jan 2021  - he is doing well with procrit and is feeling much better  - he has not required any transfusions for some time time    9/24/2020:  - latest hgb at 8.5  - will increase the procrit dose  - f/u with Dr Zavala in Jan 2021 11/19/2020:  - patient doing well and feels "great"  - hgb up to 9.1; continued on the procrit  - f/u with Dr Mustafa in Jan 2021    3/4/2021:  - he saw Dr Mustafa in jan 2021 and sees him again in " June/July 2021  - latest hgb at 9.1 and stable and platelets are 474,000; wbc at 10.0    6/10/2021:  - latest hgb at 9.0  - plats 485,000  - on weekly procrit  - seeing Dr Mustafa again tomorrow    10/14/2021:  - hgb at 8.6 but he recently had been taking naproxen and had some BRBPR - which since resolved  - he saw Dr Beyer with GI and he is having colonsocopy on Nov 5th along with EGD  - plats 429,000  - he sees Dr Mustafa again in Nov 19th   - he sees Dr Mas again in Dec 2021    1/13/2022:  - He has been under the care of Dr Mustafa at [facility] for RARS- MDS. He is now on procrit weekly. He sees Dr Mustafa again in March 2022    - hgb 9.3  - he had scope with Dr Collins in nov 2021 which was good per patient and they plan to repeat in 5 yrs    5/12/2022:  - hgb at 9.0  - plats 485  - wbc 7.25  - he is on procrit weekly  - saw Dr Mustafa this past Fridday and since he is leaving, he will start seeing Dr Hilario instead in 3 months    8/18/2022:  - latest hgb a little lower at 8.8  - he has been on the procrit  - he saw Dr Hilario recently at [facility] and plans to see her again in 3 months    1/26/2023:  - He saw Dr Hilario at [facility] again recently this past Tuesday and they are considering another medication; he plans to see her again in April 2023  - Dr Hilario requested he have an US of spleen - will order here in [place]  - he is also on appetite stimulant  - hgb at 9.4 and plats at 500k    5/11/2023:  - hgb at 9.6 and plats 466,000  - He saw Dr Hilario at [facility] again recently this past Tuesday and they are considering another medication; he plans to see her again in Aug 2023    8/3/2023:  - His insurance will not approve of him getting the Reblozyl   - He sees Dr Hilario again next Tuesday and we will await her directives.     9/14/2023:  - the Rebozyl has been approved - will set up  - latest hgb at 10.1  - seeing Dr Hilario again in Jan 2024 11/16/2023:  - continued on Rebozyl  - WBC and plats WNL  - hgb at 10.3  - cardiac w/u  "negative per patient    2/15/2024:  - labs are adequate  - hgb at 10.6  - WBC and plat WNL  - continued on Rebozyl  - recent colonoscopy good per patient    (2) Alcohol abuse issues in past - he has been sober for over 3 years now    (3) New findings on radiography with abnormal chest CT and lung mass  - former smoker    (4) chronic back issues - prior Xrays and MRI - suspect the findings on the MRI are possibly due to his sickle cell;   - SPEP was previously negative for M-protein  - PSA was 0.3 in Sept 2017  - recommended neurosurgery evaluation previously  - he saw Dr Nura VUONG and had a nerve "burning" procedure and his pain has improved    (5) CRI - elevated creatinine - he is followed Dr Chilel with nephrology      (6) H pylori gastritis - s/p recent endoscopy with Dr Collins and antibotics x 10 days    (7) CP   9/14/2023:  - seeing Dr lovett with cardiology and planning stress test in near future        1. Chronic anemia        2. Sickle cell trait syndrome        3. Normochromic anemia        4. Thrombocytosis        5. Anemia due to multiple mechanisms        6. 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 MDS (myelodysplastic syndrome)        9. Folic acid deficiency        10. Anemia, chronic renal failure, stage 3 (moderate)        11. Former smoker          PLAN;  1. Continue with the procrit and the Rebozyl as directed  2. Planned f/u with Dr Hilario again in Apr 2024 tentatively  3. F/u with cardiology as directed  4.  F/u with nephrology as directed by them -   Dr Mas    5. Check labs every 2 weeks                RTC 8 weeks  Fax note to [facility] Bas[facility] Tveit;  Lewis Hilario    Total Time spent on patient:    I spent over 25 mins of time with the patient. Reviewed results of the recently ordered labs, tests, reports and studies; made directives with regards to the results. Over half of this time was spent couseling and coordinating care, making treatment and analytical " decisions; ordering necessary labs, tests and studies; and discussing treatment options and setting up treatment plan(s) if indicated.            Discussion:       Pathology Discussion:    I reviewed and discussed the pathology report(s) and radiograph reports (if available) in as simple to understand and/or laymen's terms to the best of my ability. I had an indepth conversation with the patient and went over the patient's individual diagnosis based on the information that was currently available. I discussed the TNM staging process with regard to the patient's particular cancer type, and the calculated stage based on the currently available TNM data and literature. I discussed the available prognostic data with regard to the current staging information and how it relates to the prognosis of their particular neoplastic process.          COVID-19 Discussion:    I had long discussion with patient and any applicable family about the COVID-19 coronavirus epidemic and the recommended precautions with regard to cancer and/or hematology patients. I have re-iterated the CDC recommendations for adequate hand washing, use of hand -like products, and coughing into elbow, etc. In addition, especially for our patients who are on chemotherapy and/or our otherwise immunocompromised patients, I have recommended avoidance of crowds, including movie theaters, restaurants, churches, etc. I have recommended avoidance of any sick or symptomatic family members and/or friends. I have also recommended avoidance of any raw and unwashed food products, and general avoidance of food items that have not been prepared by themselves. The patient has been asked to call us immediately with any symptom developments, issues, questions or other general concerns.     I have explained all of the above in detail and the patient understands all of the current recommendation(s). I have answered all of their questions to the best of my ability and  to their complete satisfaction.   The patient is to continue with the current management plan.            Electronically signed by Jose Tello MD

## 2024-02-15 ENCOUNTER — OFFICE VISIT (OUTPATIENT)
Dept: HEMATOLOGY/ONCOLOGY | Facility: CLINIC | Age: 64
End: 2024-02-15
Payer: COMMERCIAL

## 2024-02-15 ENCOUNTER — INFUSION (OUTPATIENT)
Dept: INFUSION THERAPY | Facility: HOSPITAL | Age: 64
End: 2024-02-15
Attending: INTERNAL MEDICINE
Payer: COMMERCIAL

## 2024-02-15 VITALS
DIASTOLIC BLOOD PRESSURE: 84 MMHG | SYSTOLIC BLOOD PRESSURE: 138 MMHG | BODY MASS INDEX: 32.78 KG/M2 | HEIGHT: 70 IN | TEMPERATURE: 98 F | WEIGHT: 229 LBS | HEART RATE: 80 BPM | BODY MASS INDEX: 32.86 KG/M2 | DIASTOLIC BLOOD PRESSURE: 84 MMHG | HEART RATE: 80 BPM | TEMPERATURE: 98 F | RESPIRATION RATE: 15 BRPM | SYSTOLIC BLOOD PRESSURE: 138 MMHG | WEIGHT: 229 LBS | OXYGEN SATURATION: 97 %

## 2024-02-15 DIAGNOSIS — D46.1 RARS (REFRACTORY ANEMIA WITH RINGED SIDEROBLASTS): ICD-10-CM

## 2024-02-15 DIAGNOSIS — Z87.891 FORMER SMOKER: ICD-10-CM

## 2024-02-15 DIAGNOSIS — D57.3 SICKLE CELL TRAIT SYNDROME: ICD-10-CM

## 2024-02-15 DIAGNOSIS — E53.8 FOLIC ACID DEFICIENCY: ICD-10-CM

## 2024-02-15 DIAGNOSIS — N18.30 ANEMIA, CHRONIC RENAL FAILURE, STAGE 3 (MODERATE): ICD-10-CM

## 2024-02-15 DIAGNOSIS — D46.9 MDS (MYELODYSPLASTIC SYNDROME): ICD-10-CM

## 2024-02-15 DIAGNOSIS — D64.9 NORMOCHROMIC ANEMIA: ICD-10-CM

## 2024-02-15 DIAGNOSIS — D75.839 THROMBOCYTOSIS: ICD-10-CM

## 2024-02-15 DIAGNOSIS — D64.9 CHRONIC ANEMIA: Primary | ICD-10-CM

## 2024-02-15 DIAGNOSIS — D63.1 ANEMIA, CHRONIC RENAL FAILURE, STAGE 3 (MODERATE): ICD-10-CM

## 2024-02-15 DIAGNOSIS — D64.89 ANEMIA DUE TO MULTIPLE MECHANISMS: ICD-10-CM

## 2024-02-15 DIAGNOSIS — D72.821 MONOCYTOSIS: ICD-10-CM

## 2024-02-15 DIAGNOSIS — D46.1 RARS (REFRACTORY ANEMIA WITH RINGED SIDEROBLASTS): Primary | ICD-10-CM

## 2024-02-15 PROCEDURE — 1159F MED LIST DOCD IN RCRD: CPT | Mod: CPTII,S$GLB,, | Performed by: INTERNAL MEDICINE

## 2024-02-15 PROCEDURE — 3075F SYST BP GE 130 - 139MM HG: CPT | Mod: CPTII,S$GLB,, | Performed by: INTERNAL MEDICINE

## 2024-02-15 PROCEDURE — 63600175 PHARM REV CODE 636 W HCPCS: Mod: JZ,JG | Performed by: NURSE PRACTITIONER

## 2024-02-15 PROCEDURE — 1160F RVW MEDS BY RX/DR IN RCRD: CPT | Mod: CPTII,S$GLB,, | Performed by: INTERNAL MEDICINE

## 2024-02-15 PROCEDURE — 3079F DIAST BP 80-89 MM HG: CPT | Mod: CPTII,S$GLB,, | Performed by: INTERNAL MEDICINE

## 2024-02-15 PROCEDURE — 96372 THER/PROPH/DIAG INJ SC/IM: CPT

## 2024-02-15 PROCEDURE — 3008F BODY MASS INDEX DOCD: CPT | Mod: CPTII,S$GLB,, | Performed by: INTERNAL MEDICINE

## 2024-02-15 PROCEDURE — 99214 OFFICE O/P EST MOD 30 MIN: CPT | Mod: S$GLB,,, | Performed by: INTERNAL MEDICINE

## 2024-02-15 RX ADMIN — LUSPATERCEPT 100 MG: 75 INJECTION, POWDER, LYOPHILIZED, FOR SOLUTION SUBCUTANEOUS at 03:02

## 2024-02-15 NOTE — PLAN OF CARE
Problem: Anemia  Goal: Anemia Symptom Improvement  Outcome: Ongoing, Progressing  Intervention: Monitor and Manage Anemia  Flowsheets (Taken 2/15/2024 1527)  Oral Nutrition Promotion:   rest periods promoted   safe use of adaptive equipment encouraged  Safety Promotion/Fall Prevention: medications reviewed  Fatigue Management:   fatigue-related activity identified   frequent rest breaks encouraged   paced activity encouraged

## 2024-02-16 ENCOUNTER — INFUSION (OUTPATIENT)
Dept: INFUSION THERAPY | Facility: HOSPITAL | Age: 64
End: 2024-02-16
Attending: INTERNAL MEDICINE
Payer: COMMERCIAL

## 2024-02-16 VITALS
OXYGEN SATURATION: 97 % | HEART RATE: 83 BPM | TEMPERATURE: 98 F | BODY MASS INDEX: 33.02 KG/M2 | RESPIRATION RATE: 15 BRPM | WEIGHT: 230.63 LBS | SYSTOLIC BLOOD PRESSURE: 155 MMHG | HEIGHT: 70 IN | DIASTOLIC BLOOD PRESSURE: 84 MMHG

## 2024-02-16 DIAGNOSIS — D64.9 NORMOCHROMIC ANEMIA: ICD-10-CM

## 2024-02-16 DIAGNOSIS — D64.89 ANEMIA DUE TO MULTIPLE MECHANISMS: ICD-10-CM

## 2024-02-16 DIAGNOSIS — N18.30 STAGE 3 CHRONIC KIDNEY DISEASE, UNSPECIFIED WHETHER STAGE 3A OR 3B CKD: ICD-10-CM

## 2024-02-16 DIAGNOSIS — D64.9 CHRONIC ANEMIA: Primary | ICD-10-CM

## 2024-02-16 DIAGNOSIS — D46.9 MDS (MYELODYSPLASTIC SYNDROME): ICD-10-CM

## 2024-02-16 PROCEDURE — 96372 THER/PROPH/DIAG INJ SC/IM: CPT

## 2024-02-16 PROCEDURE — 63600175 PHARM REV CODE 636 W HCPCS: Mod: JZ,EC,JG | Performed by: INTERNAL MEDICINE

## 2024-02-16 RX ADMIN — EPOETIN ALFA-EPBX 40000 UNITS: 40000 INJECTION, SOLUTION INTRAVENOUS; SUBCUTANEOUS at 04:02

## 2024-02-16 NOTE — PLAN OF CARE
Problem: Anemia  Goal: Anemia Symptom Improvement  Outcome: Ongoing, Progressing  Intervention: Monitor and Manage Anemia  Flowsheets (Taken 2/16/2024 0850)  Oral Nutrition Promotion:   rest periods promoted   safe use of adaptive equipment encouraged  Safety Promotion/Fall Prevention: medications reviewed  Fatigue Management:   fatigue-related activity identified   frequent rest breaks encouraged   paced activity encouraged

## 2024-02-21 ENCOUNTER — LAB VISIT (OUTPATIENT)
Dept: LAB | Facility: HOSPITAL | Age: 64
End: 2024-02-21
Attending: INTERNAL MEDICINE
Payer: COMMERCIAL

## 2024-02-21 DIAGNOSIS — D57.3 SICKLE CELL TRAIT SYNDROME: ICD-10-CM

## 2024-02-21 DIAGNOSIS — D64.89 ANEMIA DUE TO MULTIPLE MECHANISMS: ICD-10-CM

## 2024-02-21 LAB
ALBUMIN SERPL BCP-MCNC: 4.2 G/DL (ref 3.5–5.2)
ALP SERPL-CCNC: 79 U/L (ref 55–135)
ALT SERPL W/O P-5'-P-CCNC: 15 U/L (ref 10–44)
ANION GAP SERPL CALC-SCNC: 5 MMOL/L (ref 8–16)
AST SERPL-CCNC: 17 U/L (ref 10–40)
BASOPHILS # BLD AUTO: 0.15 K/UL (ref 0–0.2)
BASOPHILS NFR BLD: 1.5 % (ref 0–1.9)
BILIRUB SERPL-MCNC: 0.6 MG/DL (ref 0.1–1)
BUN SERPL-MCNC: 27 MG/DL (ref 8–23)
CALCIUM SERPL-MCNC: 8.6 MG/DL (ref 8.7–10.5)
CHLORIDE SERPL-SCNC: 108 MMOL/L (ref 95–110)
CO2 SERPL-SCNC: 27 MMOL/L (ref 23–29)
CREAT SERPL-MCNC: 2.2 MG/DL (ref 0.5–1.4)
DACRYOCYTES BLD QL SMEAR: ABNORMAL
DIFFERENTIAL METHOD BLD: ABNORMAL
EOSINOPHIL # BLD AUTO: 0.2 K/UL (ref 0–0.5)
EOSINOPHIL NFR BLD: 2.3 % (ref 0–8)
ERYTHROCYTE [DISTWIDTH] IN BLOOD BY AUTOMATED COUNT: 27.4 % (ref 11.5–14.5)
EST. GFR  (NO RACE VARIABLE): 32.8 ML/MIN/1.73 M^2
FERRITIN SERPL-MCNC: 1467.9 NG/ML (ref 20–300)
GLUCOSE SERPL-MCNC: 104 MG/DL (ref 70–110)
HCT VFR BLD AUTO: 33 % (ref 40–54)
HGB BLD-MCNC: 11.1 G/DL (ref 14–18)
HYPOCHROMIA BLD QL SMEAR: ABNORMAL
IMM GRANULOCYTES # BLD AUTO: 0.16 K/UL (ref 0–0.04)
IMM GRANULOCYTES NFR BLD AUTO: 1.6 % (ref 0–0.5)
IRON SERPL-MCNC: 97 UG/DL (ref 45–160)
LYMPHOCYTES # BLD AUTO: 1.7 K/UL (ref 1–4.8)
LYMPHOCYTES NFR BLD: 17.9 % (ref 18–48)
MCH RBC QN AUTO: 29.9 PG (ref 27–31)
MCHC RBC AUTO-ENTMCNC: 33.6 G/DL (ref 32–36)
MCV RBC AUTO: 89 FL (ref 82–98)
MONOCYTES # BLD AUTO: 1 K/UL (ref 0.3–1)
MONOCYTES NFR BLD: 10.5 % (ref 4–15)
NEUTROPHILS # BLD AUTO: 6.4 K/UL (ref 1.8–7.7)
NEUTROPHILS NFR BLD: 66.2 % (ref 38–73)
NRBC BLD-RTO: 1 /100 WBC
OVALOCYTES BLD QL SMEAR: ABNORMAL
PLATELET # BLD AUTO: 516 K/UL (ref 150–450)
PLATELET BLD QL SMEAR: ABNORMAL
PMV BLD AUTO: 10.3 FL (ref 9.2–12.9)
POIKILOCYTOSIS BLD QL SMEAR: SLIGHT
POTASSIUM SERPL-SCNC: 4.4 MMOL/L (ref 3.5–5.1)
PROT SERPL-MCNC: 7.4 G/DL (ref 6–8.4)
RBC # BLD AUTO: 3.71 M/UL (ref 4.6–6.2)
SATURATED IRON: 46 % (ref 20–50)
SODIUM SERPL-SCNC: 140 MMOL/L (ref 136–145)
TARGETS BLD QL SMEAR: ABNORMAL
TOTAL IRON BINDING CAPACITY: 211 UG/DL (ref 250–450)
TRANSFERRIN SERPL-MCNC: 151 MG/DL (ref 200–375)
WBC # BLD AUTO: 9.71 K/UL (ref 3.9–12.7)

## 2024-02-21 PROCEDURE — 80053 COMPREHEN METABOLIC PANEL: CPT | Performed by: INTERNAL MEDICINE

## 2024-02-21 PROCEDURE — 83540 ASSAY OF IRON: CPT | Performed by: INTERNAL MEDICINE

## 2024-02-21 PROCEDURE — 85025 COMPLETE CBC W/AUTO DIFF WBC: CPT | Performed by: INTERNAL MEDICINE

## 2024-02-21 PROCEDURE — 36415 COLL VENOUS BLD VENIPUNCTURE: CPT | Performed by: INTERNAL MEDICINE

## 2024-02-21 PROCEDURE — 82728 ASSAY OF FERRITIN: CPT | Performed by: INTERNAL MEDICINE

## 2024-02-23 ENCOUNTER — INFUSION (OUTPATIENT)
Dept: INFUSION THERAPY | Facility: HOSPITAL | Age: 64
End: 2024-02-23
Attending: INTERNAL MEDICINE
Payer: COMMERCIAL

## 2024-02-23 VITALS
SYSTOLIC BLOOD PRESSURE: 138 MMHG | DIASTOLIC BLOOD PRESSURE: 91 MMHG | WEIGHT: 234.13 LBS | HEART RATE: 78 BPM | OXYGEN SATURATION: 97 % | HEIGHT: 70 IN | BODY MASS INDEX: 33.52 KG/M2 | TEMPERATURE: 98 F | RESPIRATION RATE: 16 BRPM

## 2024-02-23 DIAGNOSIS — D46.9 MDS (MYELODYSPLASTIC SYNDROME): ICD-10-CM

## 2024-02-23 DIAGNOSIS — N18.30 STAGE 3 CHRONIC KIDNEY DISEASE, UNSPECIFIED WHETHER STAGE 3A OR 3B CKD: ICD-10-CM

## 2024-02-23 DIAGNOSIS — D64.9 NORMOCHROMIC ANEMIA: ICD-10-CM

## 2024-02-23 DIAGNOSIS — D64.9 CHRONIC ANEMIA: Primary | ICD-10-CM

## 2024-02-23 DIAGNOSIS — D64.89 ANEMIA DUE TO MULTIPLE MECHANISMS: ICD-10-CM

## 2024-02-23 PROCEDURE — 96372 THER/PROPH/DIAG INJ SC/IM: CPT

## 2024-02-23 PROCEDURE — 63600175 PHARM REV CODE 636 W HCPCS: Mod: JZ,EC,JG | Performed by: INTERNAL MEDICINE

## 2024-02-23 RX ADMIN — EPOETIN ALFA-EPBX 40000 UNITS: 40000 INJECTION, SOLUTION INTRAVENOUS; SUBCUTANEOUS at 04:02

## 2024-02-23 NOTE — PLAN OF CARE
Problem: Fatigue  Goal: Improved Activity Tolerance  2/23/2024 1615 by Ranulfo Deras, RN  Outcome: Ongoing, Progressing  2/23/2024 1603 by Ranulfo Deras, RN  Outcome: Ongoing, Progressing  Intervention: Promote Improved Energy  2/23/2024 1615 by Ranulfo Deras, RN  Flowsheets (Taken 2/23/2024 1615)  Fatigue Management:   activity assistance provided   fatigue-related activity identified   frequent rest breaks encouraged   paced activity encouraged  Activity Management: Ambulated -L4  2/23/2024 1603 by Ranulfo Deras, RN  Flowsheets (Taken 2/23/2024 1603)  Fatigue Management:   activity schedule adjusted   fatigue-related activity identified   frequent rest breaks encouraged   paced activity encouraged  Activity Management: Ambulated -L4

## 2024-02-23 NOTE — PLAN OF CARE
Problem: Fatigue  Goal: Improved Activity Tolerance  Outcome: Ongoing, Progressing  Intervention: Promote Improved Energy  Flowsheets (Taken 2/23/2024 1603)  Fatigue Management:   activity schedule adjusted   fatigue-related activity identified   frequent rest breaks encouraged   paced activity encouraged  Activity Management: Ambulated -L4

## 2024-03-01 ENCOUNTER — INFUSION (OUTPATIENT)
Dept: INFUSION THERAPY | Facility: HOSPITAL | Age: 64
End: 2024-03-01
Attending: INTERNAL MEDICINE
Payer: COMMERCIAL

## 2024-03-01 VITALS
HEIGHT: 70 IN | DIASTOLIC BLOOD PRESSURE: 91 MMHG | WEIGHT: 234.81 LBS | HEART RATE: 86 BPM | BODY MASS INDEX: 33.62 KG/M2 | SYSTOLIC BLOOD PRESSURE: 161 MMHG | TEMPERATURE: 97 F | OXYGEN SATURATION: 95 % | RESPIRATION RATE: 16 BRPM

## 2024-03-01 DIAGNOSIS — D64.89 ANEMIA DUE TO MULTIPLE MECHANISMS: ICD-10-CM

## 2024-03-01 DIAGNOSIS — N18.30 STAGE 3 CHRONIC KIDNEY DISEASE, UNSPECIFIED WHETHER STAGE 3A OR 3B CKD: ICD-10-CM

## 2024-03-01 DIAGNOSIS — D64.9 CHRONIC ANEMIA: Primary | ICD-10-CM

## 2024-03-01 DIAGNOSIS — D46.9 MDS (MYELODYSPLASTIC SYNDROME): ICD-10-CM

## 2024-03-01 DIAGNOSIS — D64.9 NORMOCHROMIC ANEMIA: ICD-10-CM

## 2024-03-01 PROCEDURE — 96372 THER/PROPH/DIAG INJ SC/IM: CPT

## 2024-03-01 PROCEDURE — 63600175 PHARM REV CODE 636 W HCPCS: Mod: JZ,EC,JG | Performed by: INTERNAL MEDICINE

## 2024-03-01 RX ADMIN — EPOETIN ALFA-EPBX 40000 UNITS: 40000 INJECTION, SOLUTION INTRAVENOUS; SUBCUTANEOUS at 04:03

## 2024-03-01 NOTE — PLAN OF CARE
Problem: Fall Injury Risk  Goal: Absence of Fall and Fall-Related Injury  Reactivated  Intervention: Identify and Manage Contributors  Flowsheets (Taken 3/1/2024 1602)  Self-Care Promotion: safe use of adaptive equipment encouraged  Intervention: Promote Injury-Free Environment  Flowsheets (Taken 3/1/2024 1602)  Safety Promotion/Fall Prevention: assistive device/personal item within reach

## 2024-03-06 ENCOUNTER — LAB VISIT (OUTPATIENT)
Dept: LAB | Facility: HOSPITAL | Age: 64
End: 2024-03-06
Attending: INTERNAL MEDICINE
Payer: COMMERCIAL

## 2024-03-06 DIAGNOSIS — D64.89 ANEMIA DUE TO MULTIPLE MECHANISMS: ICD-10-CM

## 2024-03-06 DIAGNOSIS — D57.3 SICKLE CELL TRAIT SYNDROME: ICD-10-CM

## 2024-03-06 LAB
ALBUMIN SERPL BCP-MCNC: 4.1 G/DL (ref 3.5–5.2)
ALP SERPL-CCNC: 73 U/L (ref 55–135)
ALT SERPL W/O P-5'-P-CCNC: 14 U/L (ref 10–44)
ANION GAP SERPL CALC-SCNC: 5 MMOL/L (ref 8–16)
AST SERPL-CCNC: 15 U/L (ref 10–40)
BASOPHILS # BLD AUTO: 0.11 K/UL (ref 0–0.2)
BASOPHILS NFR BLD: 1.5 % (ref 0–1.9)
BILIRUB SERPL-MCNC: 0.6 MG/DL (ref 0.1–1)
BUN SERPL-MCNC: 32 MG/DL (ref 8–23)
CALCIUM SERPL-MCNC: 8.8 MG/DL (ref 8.7–10.5)
CHLORIDE SERPL-SCNC: 108 MMOL/L (ref 95–110)
CO2 SERPL-SCNC: 28 MMOL/L (ref 23–29)
CREAT SERPL-MCNC: 2.1 MG/DL (ref 0.5–1.4)
DACRYOCYTES BLD QL SMEAR: ABNORMAL
DIFFERENTIAL METHOD BLD: ABNORMAL
EOSINOPHIL # BLD AUTO: 0.2 K/UL (ref 0–0.5)
EOSINOPHIL NFR BLD: 2.1 % (ref 0–8)
ERYTHROCYTE [DISTWIDTH] IN BLOOD BY AUTOMATED COUNT: 27.4 % (ref 11.5–14.5)
EST. GFR  (NO RACE VARIABLE): 34.7 ML/MIN/1.73 M^2
GIANT PLATELETS BLD QL SMEAR: PRESENT
GLUCOSE SERPL-MCNC: 105 MG/DL (ref 70–110)
HCT VFR BLD AUTO: 34.5 % (ref 40–54)
HGB BLD-MCNC: 11.4 G/DL (ref 14–18)
HYPOCHROMIA BLD QL SMEAR: ABNORMAL
IMM GRANULOCYTES # BLD AUTO: 0.06 K/UL (ref 0–0.04)
IMM GRANULOCYTES NFR BLD AUTO: 0.8 % (ref 0–0.5)
LYMPHOCYTES # BLD AUTO: 1.6 K/UL (ref 1–4.8)
LYMPHOCYTES NFR BLD: 21.6 % (ref 18–48)
MCH RBC QN AUTO: 30.2 PG (ref 27–31)
MCHC RBC AUTO-ENTMCNC: 33 G/DL (ref 32–36)
MCV RBC AUTO: 92 FL (ref 82–98)
MONOCYTES # BLD AUTO: 0.8 K/UL (ref 0.3–1)
MONOCYTES NFR BLD: 11.2 % (ref 4–15)
NEUTROPHILS # BLD AUTO: 4.6 K/UL (ref 1.8–7.7)
NEUTROPHILS NFR BLD: 62.8 % (ref 38–73)
NRBC BLD-RTO: 0 /100 WBC
OVALOCYTES BLD QL SMEAR: ABNORMAL
PLATELET # BLD AUTO: 486 K/UL (ref 150–450)
PLATELET BLD QL SMEAR: ABNORMAL
PMV BLD AUTO: 10.3 FL (ref 9.2–12.9)
POIKILOCYTOSIS BLD QL SMEAR: SLIGHT
POTASSIUM SERPL-SCNC: 4.4 MMOL/L (ref 3.5–5.1)
PROT SERPL-MCNC: 7.2 G/DL (ref 6–8.4)
RBC # BLD AUTO: 3.77 M/UL (ref 4.6–6.2)
SCHISTOCYTES BLD QL SMEAR: PRESENT
SODIUM SERPL-SCNC: 141 MMOL/L (ref 136–145)
TARGETS BLD QL SMEAR: ABNORMAL
WBC # BLD AUTO: 7.26 K/UL (ref 3.9–12.7)

## 2024-03-06 PROCEDURE — 36415 COLL VENOUS BLD VENIPUNCTURE: CPT | Performed by: INTERNAL MEDICINE

## 2024-03-06 PROCEDURE — 80053 COMPREHEN METABOLIC PANEL: CPT | Performed by: INTERNAL MEDICINE

## 2024-03-06 PROCEDURE — 85025 COMPLETE CBC W/AUTO DIFF WBC: CPT | Performed by: INTERNAL MEDICINE

## 2024-03-07 ENCOUNTER — INFUSION (OUTPATIENT)
Dept: INFUSION THERAPY | Facility: HOSPITAL | Age: 64
End: 2024-03-07
Attending: INTERNAL MEDICINE
Payer: COMMERCIAL

## 2024-03-07 VITALS
DIASTOLIC BLOOD PRESSURE: 93 MMHG | WEIGHT: 232.19 LBS | RESPIRATION RATE: 18 BRPM | BODY MASS INDEX: 33.24 KG/M2 | TEMPERATURE: 98 F | OXYGEN SATURATION: 97 % | SYSTOLIC BLOOD PRESSURE: 131 MMHG | HEIGHT: 70 IN | HEART RATE: 83 BPM

## 2024-03-07 DIAGNOSIS — D46.9 MDS (MYELODYSPLASTIC SYNDROME): ICD-10-CM

## 2024-03-07 DIAGNOSIS — D46.1 RARS (REFRACTORY ANEMIA WITH RINGED SIDEROBLASTS): Primary | ICD-10-CM

## 2024-03-07 PROCEDURE — 96372 THER/PROPH/DIAG INJ SC/IM: CPT

## 2024-03-07 PROCEDURE — 63600175 PHARM REV CODE 636 W HCPCS: Mod: JZ,JG | Performed by: INTERNAL MEDICINE

## 2024-03-07 RX ADMIN — LUSPATERCEPT 50 MG: 75 INJECTION, POWDER, LYOPHILIZED, FOR SOLUTION SUBCUTANEOUS at 04:03

## 2024-03-07 NOTE — PLAN OF CARE
Problem: Fatigue  Goal: Improved Activity Tolerance  Outcome: Ongoing, Progressing  Intervention: Promote Improved Energy  Flowsheets (Taken 3/7/2024 7767)  Fatigue Management: frequent rest breaks encouraged  Sleep/Rest Enhancement: regular sleep/rest pattern promoted  Activity Management:   Ambulated -L4   Up in chair - L3

## 2024-03-20 ENCOUNTER — LAB VISIT (OUTPATIENT)
Dept: LAB | Facility: HOSPITAL | Age: 64
End: 2024-03-20
Attending: INTERNAL MEDICINE
Payer: COMMERCIAL

## 2024-03-20 DIAGNOSIS — D57.3 SICKLE CELL TRAIT SYNDROME: ICD-10-CM

## 2024-03-20 DIAGNOSIS — D64.89 ANEMIA DUE TO MULTIPLE MECHANISMS: ICD-10-CM

## 2024-03-20 LAB
ALBUMIN SERPL BCP-MCNC: 4.1 G/DL (ref 3.5–5.2)
ALP SERPL-CCNC: 80 U/L (ref 55–135)
ALT SERPL W/O P-5'-P-CCNC: 16 U/L (ref 10–44)
ANION GAP SERPL CALC-SCNC: 6 MMOL/L (ref 8–16)
AST SERPL-CCNC: 20 U/L (ref 10–40)
BASOPHILS # BLD AUTO: 0.11 K/UL (ref 0–0.2)
BASOPHILS NFR BLD: 1.3 % (ref 0–1.9)
BILIRUB SERPL-MCNC: 0.6 MG/DL (ref 0.1–1)
BUN SERPL-MCNC: 31 MG/DL (ref 8–23)
CALCIUM SERPL-MCNC: 8.8 MG/DL (ref 8.7–10.5)
CHLORIDE SERPL-SCNC: 110 MMOL/L (ref 95–110)
CO2 SERPL-SCNC: 26 MMOL/L (ref 23–29)
CREAT SERPL-MCNC: 2.3 MG/DL (ref 0.5–1.4)
DACRYOCYTES BLD QL SMEAR: ABNORMAL
DIFFERENTIAL METHOD BLD: ABNORMAL
EOSINOPHIL # BLD AUTO: 0.2 K/UL (ref 0–0.5)
EOSINOPHIL NFR BLD: 2.2 % (ref 0–8)
ERYTHROCYTE [DISTWIDTH] IN BLOOD BY AUTOMATED COUNT: 26.2 % (ref 11.5–14.5)
EST. GFR  (NO RACE VARIABLE): 31.1 ML/MIN/1.73 M^2
FERRITIN SERPL-MCNC: 1463.4 NG/ML (ref 20–300)
GLUCOSE SERPL-MCNC: 101 MG/DL (ref 70–110)
HCT VFR BLD AUTO: 32.1 % (ref 40–54)
HGB BLD-MCNC: 10.8 G/DL (ref 14–18)
HYPOCHROMIA BLD QL SMEAR: ABNORMAL
IMM GRANULOCYTES # BLD AUTO: 0.05 K/UL (ref 0–0.04)
IMM GRANULOCYTES NFR BLD AUTO: 0.6 % (ref 0–0.5)
IRON SERPL-MCNC: 167 UG/DL (ref 45–160)
LYMPHOCYTES # BLD AUTO: 1.7 K/UL (ref 1–4.8)
LYMPHOCYTES NFR BLD: 19.7 % (ref 18–48)
MCH RBC QN AUTO: 30 PG (ref 27–31)
MCHC RBC AUTO-ENTMCNC: 33.6 G/DL (ref 32–36)
MCV RBC AUTO: 89 FL (ref 82–98)
MONOCYTES # BLD AUTO: 0.9 K/UL (ref 0.3–1)
MONOCYTES NFR BLD: 9.8 % (ref 4–15)
NEUTROPHILS # BLD AUTO: 5.8 K/UL (ref 1.8–7.7)
NEUTROPHILS NFR BLD: 66.4 % (ref 38–73)
NRBC BLD-RTO: 0 /100 WBC
OVALOCYTES BLD QL SMEAR: ABNORMAL
PLATELET # BLD AUTO: 433 K/UL (ref 150–450)
PLATELET BLD QL SMEAR: ABNORMAL
PMV BLD AUTO: 10.7 FL (ref 9.2–12.9)
POIKILOCYTOSIS BLD QL SMEAR: SLIGHT
POTASSIUM SERPL-SCNC: 4.5 MMOL/L (ref 3.5–5.1)
PROT SERPL-MCNC: 7.3 G/DL (ref 6–8.4)
RBC # BLD AUTO: 3.6 M/UL (ref 4.6–6.2)
SATURATED IRON: 89 % (ref 20–50)
SCHISTOCYTES BLD QL SMEAR: PRESENT
SODIUM SERPL-SCNC: 142 MMOL/L (ref 136–145)
TARGETS BLD QL SMEAR: ABNORMAL
TOTAL IRON BINDING CAPACITY: 188 UG/DL (ref 250–450)
TRANSFERRIN SERPL-MCNC: 134 MG/DL (ref 200–375)
WBC # BLD AUTO: 8.75 K/UL (ref 3.9–12.7)

## 2024-03-20 PROCEDURE — 36415 COLL VENOUS BLD VENIPUNCTURE: CPT | Performed by: INTERNAL MEDICINE

## 2024-03-20 PROCEDURE — 80053 COMPREHEN METABOLIC PANEL: CPT | Performed by: INTERNAL MEDICINE

## 2024-03-20 PROCEDURE — 85025 COMPLETE CBC W/AUTO DIFF WBC: CPT | Performed by: INTERNAL MEDICINE

## 2024-03-20 PROCEDURE — 82728 ASSAY OF FERRITIN: CPT | Performed by: INTERNAL MEDICINE

## 2024-03-20 PROCEDURE — 83540 ASSAY OF IRON: CPT | Performed by: INTERNAL MEDICINE

## 2024-03-22 ENCOUNTER — INFUSION (OUTPATIENT)
Dept: INFUSION THERAPY | Facility: HOSPITAL | Age: 64
End: 2024-03-22
Attending: INTERNAL MEDICINE
Payer: COMMERCIAL

## 2024-03-22 VITALS
WEIGHT: 235 LBS | DIASTOLIC BLOOD PRESSURE: 87 MMHG | OXYGEN SATURATION: 96 % | HEART RATE: 88 BPM | TEMPERATURE: 98 F | SYSTOLIC BLOOD PRESSURE: 144 MMHG | BODY MASS INDEX: 33.64 KG/M2 | RESPIRATION RATE: 18 BRPM | HEIGHT: 70 IN

## 2024-03-22 DIAGNOSIS — D64.9 CHRONIC ANEMIA: Primary | ICD-10-CM

## 2024-03-22 DIAGNOSIS — D64.89 ANEMIA DUE TO MULTIPLE MECHANISMS: ICD-10-CM

## 2024-03-22 DIAGNOSIS — D46.9 MDS (MYELODYSPLASTIC SYNDROME): ICD-10-CM

## 2024-03-22 DIAGNOSIS — D64.9 NORMOCHROMIC ANEMIA: ICD-10-CM

## 2024-03-22 DIAGNOSIS — N18.30 STAGE 3 CHRONIC KIDNEY DISEASE, UNSPECIFIED WHETHER STAGE 3A OR 3B CKD: ICD-10-CM

## 2024-03-22 PROCEDURE — 63600175 PHARM REV CODE 636 W HCPCS: Mod: JZ,EC,JG | Performed by: INTERNAL MEDICINE

## 2024-03-22 PROCEDURE — 96372 THER/PROPH/DIAG INJ SC/IM: CPT

## 2024-03-22 RX ADMIN — EPOETIN ALFA-EPBX 40000 UNITS: 40000 INJECTION, SOLUTION INTRAVENOUS; SUBCUTANEOUS at 04:03

## 2024-03-22 NOTE — PLAN OF CARE
Problem: Fall Injury Risk  Goal: Absence of Fall and Fall-Related Injury  Outcome: Ongoing, Progressing  Intervention: Identify and Manage Contributors  Flowsheets (Taken 3/22/2024 1608)  Self-Care Promotion: safe use of adaptive equipment encouraged  Medication Review/Management: medications reviewed  Intervention: Promote Injury-Free Environment  Flowsheets (Taken 3/22/2024 1608)  Safety Promotion/Fall Prevention: assistive device/personal item within reach

## 2024-03-27 ENCOUNTER — INFUSION (OUTPATIENT)
Dept: INFUSION THERAPY | Facility: HOSPITAL | Age: 64
End: 2024-03-27
Attending: INTERNAL MEDICINE
Payer: COMMERCIAL

## 2024-03-27 VITALS
BODY MASS INDEX: 33.28 KG/M2 | OXYGEN SATURATION: 96 % | SYSTOLIC BLOOD PRESSURE: 142 MMHG | RESPIRATION RATE: 18 BRPM | HEART RATE: 78 BPM | WEIGHT: 232.5 LBS | HEIGHT: 70 IN | DIASTOLIC BLOOD PRESSURE: 87 MMHG | TEMPERATURE: 98 F

## 2024-03-27 DIAGNOSIS — N18.30 STAGE 3 CHRONIC KIDNEY DISEASE, UNSPECIFIED WHETHER STAGE 3A OR 3B CKD: ICD-10-CM

## 2024-03-27 DIAGNOSIS — D64.9 CHRONIC ANEMIA: Primary | ICD-10-CM

## 2024-03-27 DIAGNOSIS — D64.9 NORMOCHROMIC ANEMIA: ICD-10-CM

## 2024-03-27 DIAGNOSIS — D46.9 MDS (MYELODYSPLASTIC SYNDROME): ICD-10-CM

## 2024-03-27 DIAGNOSIS — D64.89 ANEMIA DUE TO MULTIPLE MECHANISMS: ICD-10-CM

## 2024-03-27 PROCEDURE — 63600175 PHARM REV CODE 636 W HCPCS: Mod: JZ,EC,JG | Performed by: INTERNAL MEDICINE

## 2024-03-27 PROCEDURE — 96372 THER/PROPH/DIAG INJ SC/IM: CPT

## 2024-03-27 RX ADMIN — EPOETIN ALFA-EPBX 40000 UNITS: 40000 INJECTION, SOLUTION INTRAVENOUS; SUBCUTANEOUS at 03:03

## 2024-03-27 NOTE — PLAN OF CARE
Problem: Fatigue  Goal: Improved Activity Tolerance  Outcome: Ongoing, Progressing  Intervention: Promote Improved Energy  Flowsheets (Taken 3/27/2024 1602)  Fatigue Management: frequent rest breaks encouraged

## 2024-03-28 ENCOUNTER — INFUSION (OUTPATIENT)
Dept: INFUSION THERAPY | Facility: HOSPITAL | Age: 64
End: 2024-03-28
Attending: INTERNAL MEDICINE
Payer: COMMERCIAL

## 2024-03-28 VITALS
TEMPERATURE: 98 F | OXYGEN SATURATION: 97 % | HEART RATE: 82 BPM | HEIGHT: 70 IN | RESPIRATION RATE: 17 BRPM | DIASTOLIC BLOOD PRESSURE: 90 MMHG | SYSTOLIC BLOOD PRESSURE: 148 MMHG | WEIGHT: 232.38 LBS | BODY MASS INDEX: 33.27 KG/M2

## 2024-03-28 DIAGNOSIS — D46.9 MDS (MYELODYSPLASTIC SYNDROME): ICD-10-CM

## 2024-03-28 DIAGNOSIS — D46.1 RARS (REFRACTORY ANEMIA WITH RINGED SIDEROBLASTS): Primary | ICD-10-CM

## 2024-03-28 PROCEDURE — 63600175 PHARM REV CODE 636 W HCPCS: Mod: JZ,JG | Performed by: INTERNAL MEDICINE

## 2024-03-28 PROCEDURE — 96372 THER/PROPH/DIAG INJ SC/IM: CPT

## 2024-03-28 RX ADMIN — LUSPATERCEPT 50 MG: 75 INJECTION, POWDER, LYOPHILIZED, FOR SOLUTION SUBCUTANEOUS at 04:03

## 2024-03-28 NOTE — PLAN OF CARE
Problem: Anemia  Goal: Anemia Symptom Improvement  Outcome: Ongoing, Progressing  Intervention: Monitor and Manage Anemia  Flowsheets (Taken 3/28/2024 1601)  Oral Nutrition Promotion: rest periods promoted  Safety Promotion/Fall Prevention: medications reviewed  Fatigue Management:   fatigue-related activity identified   frequent rest breaks encouraged   paced activity encouraged

## 2024-04-03 ENCOUNTER — LAB VISIT (OUTPATIENT)
Dept: LAB | Facility: HOSPITAL | Age: 64
End: 2024-04-03
Attending: INTERNAL MEDICINE
Payer: COMMERCIAL

## 2024-04-03 DIAGNOSIS — D57.3 SICKLE CELL TRAIT SYNDROME: ICD-10-CM

## 2024-04-03 DIAGNOSIS — D64.89 ANEMIA DUE TO MULTIPLE MECHANISMS: ICD-10-CM

## 2024-04-03 LAB
ALBUMIN SERPL BCP-MCNC: 4.1 G/DL (ref 3.5–5.2)
ALP SERPL-CCNC: 81 U/L (ref 55–135)
ALT SERPL W/O P-5'-P-CCNC: 15 U/L (ref 10–44)
ANION GAP SERPL CALC-SCNC: 5 MMOL/L (ref 8–16)
AST SERPL-CCNC: 19 U/L (ref 10–40)
BASOPHILS # BLD AUTO: 0.11 K/UL (ref 0–0.2)
BASOPHILS NFR BLD: 1.3 % (ref 0–1.9)
BILIRUB SERPL-MCNC: 0.6 MG/DL (ref 0.1–1)
BUN SERPL-MCNC: 27 MG/DL (ref 8–23)
CALCIUM SERPL-MCNC: 8.6 MG/DL (ref 8.7–10.5)
CHLORIDE SERPL-SCNC: 110 MMOL/L (ref 95–110)
CO2 SERPL-SCNC: 28 MMOL/L (ref 23–29)
CREAT SERPL-MCNC: 2.4 MG/DL (ref 0.5–1.4)
DIFFERENTIAL METHOD BLD: ABNORMAL
EOSINOPHIL # BLD AUTO: 0.2 K/UL (ref 0–0.5)
EOSINOPHIL NFR BLD: 2.8 % (ref 0–8)
ERYTHROCYTE [DISTWIDTH] IN BLOOD BY AUTOMATED COUNT: 27.4 % (ref 11.5–14.5)
EST. GFR  (NO RACE VARIABLE): 29.6 ML/MIN/1.73 M^2
GLUCOSE SERPL-MCNC: 99 MG/DL (ref 70–110)
HCT VFR BLD AUTO: 33.7 % (ref 40–54)
HGB BLD-MCNC: 11.2 G/DL (ref 14–18)
IMM GRANULOCYTES # BLD AUTO: 0.1 K/UL (ref 0–0.04)
IMM GRANULOCYTES NFR BLD AUTO: 1.2 % (ref 0–0.5)
LYMPHOCYTES # BLD AUTO: 1.6 K/UL (ref 1–4.8)
LYMPHOCYTES NFR BLD: 18.2 % (ref 18–48)
MCH RBC QN AUTO: 30.3 PG (ref 27–31)
MCHC RBC AUTO-ENTMCNC: 33.2 G/DL (ref 32–36)
MCV RBC AUTO: 91 FL (ref 82–98)
MONOCYTES # BLD AUTO: 0.9 K/UL (ref 0.3–1)
MONOCYTES NFR BLD: 10.3 % (ref 4–15)
NEUTROPHILS # BLD AUTO: 5.7 K/UL (ref 1.8–7.7)
NEUTROPHILS NFR BLD: 66.2 % (ref 38–73)
NRBC BLD-RTO: 0 /100 WBC
PLATELET # BLD AUTO: 474 K/UL (ref 150–450)
PMV BLD AUTO: 10.1 FL (ref 9.2–12.9)
POTASSIUM SERPL-SCNC: 4.3 MMOL/L (ref 3.5–5.1)
PROT SERPL-MCNC: 7.1 G/DL (ref 6–8.4)
RBC # BLD AUTO: 3.7 M/UL (ref 4.6–6.2)
SODIUM SERPL-SCNC: 143 MMOL/L (ref 136–145)
WBC # BLD AUTO: 8.53 K/UL (ref 3.9–12.7)

## 2024-04-03 PROCEDURE — 85025 COMPLETE CBC W/AUTO DIFF WBC: CPT | Performed by: INTERNAL MEDICINE

## 2024-04-03 PROCEDURE — 80053 COMPREHEN METABOLIC PANEL: CPT | Performed by: INTERNAL MEDICINE

## 2024-04-03 NOTE — PROGRESS NOTES
[facility] Hematology/Oncology               PROGRESS NOTE - Follow-up Visit      Subjective:       Patient ID:   NAME: Rick Butler : 1960     63 y.o. male    Referring Doc: Chetna (new PCP)  Other Physicians: Getachew; Alan Mustafa    Chief Complaint:  Sickle cell trait/anemia/RARS  f/u    History of Present Illness:     Patient returns today for a regularly scheduled follow-up visit.  The patient is doing ok overall. He is here by himself today.     He has been Rebozyl and doing well with it; he gets it again tomorrow    Breathing ok, no CP, SOB, HA's or N/V; some occasional fatigue    He is supposed to f/u with Dr Hilario this month but has placed his f/u with her on hold for now        He has not had any recent pain crisis.     He denies having any breathing difficulties. He denies any blood in the stool, dark stools or hematuria; breathing ok; no CP, SOB, HA's or N/V;            Discussed covid19 precautions - he had his vaccinations      ROS:   GEN: normal without any fever, night sweats or weight loss  HEENT: normal with no HA's, sore throat, stiff neck, changes in vision  CV: normal with no CP, SOB, PND, MORA or orthopnea  PULM: normal with no SOB, cough, hemoptysis, sputum or pleuritic pain  GI: normal with no abdominal pain, nausea, vomiting, constipation, diarrhea, melanotic stools, BRBPR, or hematemesis  : normal with no hematuria, dysuria  BREAST: normal with no mass, discharge, pain  SKIN: normal with no rash, erythema, bruising, or swelling    Allergies:  Review of patient's allergies indicates:  No Known Allergies    Medications:    Current Outpatient Medications:     amLODIPine (NORVASC) 5 MG tablet, Take 1 tablet (5 mg total) by mouth once daily., Disp: 90 tablet, Rfl: 3    ascorbic acid, vitamin C, (VITAMIN C) 1000 MG tablet, Take 1,000 mg by mouth once daily., Disp: , Rfl:     atorvastatin (LIPITOR) 10 MG tablet, Take 1 tablet (10 mg total) by mouth every evening., Disp: 90 tablet, Rfl: 3     "epoetin tray (PROCRIT INJ), Thursday, Disp: , Rfl:     famotidine (PEPCID) 20 MG tablet, famotidine 20 mg tablet  Take 1 tablet twice a day by oral route., Disp: , Rfl:     folic acid (FOLVITE) 1 MG tablet, Take 2 tablets (2 mg total) by mouth once daily., Disp: 180 tablet, Rfl: 1    multivitamin capsule, Take 1 capsule by mouth once daily., Disp: , Rfl:     pantoprazole (PROTONIX) 40 MG tablet, , Disp: , Rfl:     sertraline (ZOLOFT) 25 MG tablet, Take 25 mg by mouth once daily., Disp: , Rfl:     tamsulosin (FLOMAX) 0.4 mg Cap, Take 1 capsule (0.4 mg total) by mouth once daily., Disp: 90 capsule, Rfl: 1    traZODone (DESYREL) 100 MG tablet, Take 1 tablet (100 mg total) by mouth nightly as needed for Insomnia., Disp: 90 tablet, Rfl: 2    sildenafil (VIAGRA) 100 MG tablet, Take 1 tablet (100 mg total) by mouth daily as needed for Erectile Dysfunction., Disp: 10 tablet, Rfl: 6    PMHx/PSHx Updates:  See patient's last visit with me on 2/15/2024  See H&P on 7/9/2011      Pathology:    12/20/2019  BONE MARROW, RIGHT ILIAC CREST,    ASPIRATE, CLOT SECTION, AND CORE BIOPSY:    --HYPERCELLULAR MARROW (APPROXIMATELY 75% TO 80%) WITH TRILINEAGE   HEMATOPOIETIC ELEMENTS, ERYTHROID  HYPERPLASIA AND MILD MEGAKARYOCYTIC HYPERPLASIA, AND NON-SPECIFIC   DYSHEMATOPOIETIC CHANGES (SEE     COMMENT).  --[id number]-[id number] INCREASED STAINABLE IRON WITH INCREASED RING   SIDEROBLASTS (GREATER THAN 15%)     (SEE COMMENT).  --PERIPHERAL BLOOD WITH THROMBOCYTOSIS (574,000/MICROLITER) AND ANEMIA   (HEMOGLOBIN 5.5 GRAM/DECILITER),    WITH SCATTERED DREPANOCYTOID FORMS AND FEW NUCLEATED ERYTHROCYTES      Cytogenetic Results at the bottom of the report.  NORMAL    Objective:     Vitals:  Blood pressure (!) 168/91, pulse 71, temperature 97.9 °F (36.6 °C), resp. rate 16, height 5' 10" (1.778 m), weight 106.3 kg (234 lb 6.4 oz).    Physical Examination:   GEN: no apparent distress, comfortable; AAOx3  HEAD: atraumatic and normocephalic  EYES: " no pallor, no icterus, PERRLA  ENT: OMM, no pharyngeal erythema, external ears WNL; no nasal discharge; no thrush  NECK: no masses, thyroid normal, trachea midline, no LAD/LN's, supple  CV: RRR with no murmur; normal pulse; normal S1 and S2; no pedal edema  CHEST: Normal respiratory effort; CTAB; normal breath sounds; no wheeze or crackles  ABDOM: nontender and nondistended; soft; normal bowel sounds; no rebound/guarding  MUSC/Skeletal: ROM normal; no crepitus; joints normal; no deformities or arthropathy  EXTREM: no clubbing, cyanosis, inflammation or swelling  SKIN: no rashes, lesions, ulcers, petechiae or subcutaneous nodules  : no jiang  NEURO: grossly intact; motor/sensory WNL; AAOx3; no tremors  PSYCH: normal mood, affect and behavior  LYMPH: normal cervical, supraclavicular, axillary and groin LN's            Labs:     Lab Results   Component Value Date    WBC 8.53 04/03/2024    HGB 11.2 (L) 04/03/2024    HCT 33.7 (L) 04/03/2024    MCV 91 04/03/2024     (H) 04/03/2024           CMP  Sodium   Date Value Ref Range Status   04/03/2024 143 136 - 145 mmol/L Final   06/26/2019 138 134 - 144 mmol/L      Potassium   Date Value Ref Range Status   04/03/2024 4.3 3.5 - 5.1 mmol/L Final     Chloride   Date Value Ref Range Status   04/03/2024 110 95 - 110 mmol/L Final   06/26/2019 105 98 - 110 mmol/L      CO2   Date Value Ref Range Status   04/03/2024 28 23 - 29 mmol/L Final     Glucose   Date Value Ref Range Status   04/03/2024 99 70 - 110 mg/dL Final   06/26/2019 114 (H) 70 - 99 mg/dL      BUN   Date Value Ref Range Status   04/03/2024 27 (H) 8 - 23 mg/dL Final     Creatinine   Date Value Ref Range Status   04/03/2024 2.4 (H) 0.5 - 1.4 mg/dL Final   06/26/2019 1.62 (H) 0.60 - 1.40 mg/dL      Calcium   Date Value Ref Range Status   04/03/2024 8.6 (L) 8.7 - 10.5 mg/dL Final     Total Protein   Date Value Ref Range Status   04/03/2024 7.1 6.0 - 8.4 g/dL Final     Albumin   Date Value Ref Range Status   04/03/2024  4.1 3.5 - 5.2 g/dL Final   06/26/2019 4.4 3.1 - 4.7 g/dL      Total Bilirubin   Date Value Ref Range Status   04/03/2024 0.6 0.1 - 1.0 mg/dL Final     Comment:     For infants and newborns, interpretation of results should be based  on gestational age, weight and in agreement with clinical  observations.    Premature Infant recommended reference ranges:  Up to 24 hours.............<8.0 mg/dL  Up to 48 hours............<12.0 mg/dL  3-5 days..................<15.0 mg/dL  6-29 days.................<15.0 mg/dL       Alkaline Phosphatase   Date Value Ref Range Status   04/03/2024 81 55 - 135 U/L Final     AST   Date Value Ref Range Status   04/03/2024 19 10 - 40 U/L Final     ALT   Date Value Ref Range Status   04/03/2024 15 10 - 44 U/L Final     Anion Gap   Date Value Ref Range Status   04/03/2024 5 (L) 8 - 16 mmol/L Final     eGFR if    Date Value Ref Range Status   07/13/2022 45.6 (A) >60 mL/min/1.73 m^2 Final     eGFR if non    Date Value Ref Range Status   07/13/2022 39.5 (A) >60 mL/min/1.73 m^2 Final     Comment:     Calculation used to obtain the estimated glomerular filtration  rate (eGFR) is the CKD-EPI equation.          Lab Results   Component Value Date    IRON 167 (H) 03/20/2024    TIBC 188 (L) 03/20/2024    FERRITIN 1,463.4 (H) 03/20/2024           I have reviewed all available lab results and radiology reports.    Radiology/Diagnostic Studies:    US 2/8/2023:    IMPRESSION:  1. Dilation of the common bile duct at up to 10 mm, unchanged when compared to 2016.  2. Nonvisualization of the pancreas because of overlying bowel gas.  3. No other significant findings.  spleen is normal in size and appearance      2/15/2018 Xray Survey:   IMPRESSION: No significant abnormality seen. No evidence of osteoblastic or  osteoclastic lesions identified    MRI L-spine 2/12/2018  IMPRESSION:    1. Prominent low signal intensity throughout the bone marrow on T1 and  T2-weighted imaging. Marrow  "infiltrative process including malignancy such as  metastatic disease or lymphoma/leukemia is a consideration along with multiple  myeloma or malignant process. Benign etiology such as osteopetrosis or  regenerative red marrow are also considerations.    2. Multilevel lumbar degenerative changes, most prominent at L4-L5 and L5-S1 as  described.    Assessment/Plan:   (1) 63 y.o. male with diagnosis of sickle cell anemia with borderline microcytosis  - latest hgb was 6.0 and he had blood transfusion  - today, he is not symptomatic at this time  - he probably has a multifactorial anemia process with underlying sickle cell, anemia of chronic disorders and anemia of chronic renal. I can not rule out an underlying GI bleeding process.   - iron panel is adequate (no deficiency)  - total bilirubin is only minimally elevated  - he required transfusion again in Oct 2019 and again in Dec 2019  - he had bone marrow biopsy on 12/20/2019    "dyshematopoietic changes are not entirely specific and are present  mostly within erythroid and megakaryocytic lineages. These findings   could be observed in chronic disease states, autoimmune conditions,  infectious settings, toxic exposures, nutritional deficits, as medication/drug effect, and in myelodysplastic syndrome (MDS), among other conditions"  "Additionally, ring sideroblasts are a non-specific   finding "    Patient was referred to see Dr Mustafa at [facility] and had repeat BM biopsy with diagnosis made of RARS  - he is now on procrit per directives of Dr Mustafa    7/30/2020:   he recently saw Dr Mustafa  And sees him again in Jan 2021  - he is doing well with procrit and is feeling much better  - he has not required any transfusions for some time time    9/24/2020:  - latest hgb at 8.5  - will increase the procrit dose  - f/u with Dr Zavala in Jan 2021 11/19/2020:  - patient doing well and feels "great"  - hgb up to 9.1; continued on the procrit  - f/u with Dr Mustafa in Jan " 2021    3/4/2021:  - he saw Dr Mustafa in jan 2021 and sees him again in June/July 2021  - latest hgb at 9.1 and stable and platelets are 474,000; wbc at 10.0    6/10/2021:  - latest hgb at 9.0  - plats 485,000  - on weekly procrit  - seeing Dr Mustafa again tomorrow    10/14/2021:  - hgb at 8.6 but he recently had been taking naproxen and had some BRBPR - which since resolved  - he saw Dr Beyer with GI and he is having colonsocopy on Nov 5th along with EGD  - plats 429,000  - he sees Dr Mustafa again in Nov 19th   - he sees Dr Mas again in Dec 2021    1/13/2022:  - He has been under the care of Dr Mustafa at [facility] for RARS- MDS. He is now on procrit weekly. He sees Dr Mustafa again in March 2022    - hgb 9.3  - he had scope with Dr Collins in nov 2021 which was good per patient and they plan to repeat in 5 yrs    5/12/2022:  - hgb at 9.0  - plats 485  - wbc 7.25  - he is on procrit weekly  - saw Dr Mustafa this past Fridday and since he is leaving, he will start seeing Dr Hilario instead in 3 months    8/18/2022:  - latest hgb a little lower at 8.8  - he has been on the procrit  - he saw Dr Hilario recently at [facility] and plans to see her again in 3 months    1/26/2023:  - He saw Dr Hilario at [facility] again recently this past Tuesday and they are considering another medication; he plans to see her again in April 2023  - Dr Hilario requested he have an US of spleen - will order here in [place]  - he is also on appetite stimulant  - hgb at 9.4 and plats at 500k    5/11/2023:  - hgb at 9.6 and plats 466,000  - He saw Dr Hilario at [facility] again recently this past Tuesday and they are considering another medication; he plans to see her again in Aug 2023    8/3/2023:  - His insurance will not approve of him getting the Rebloyl   - He sees Dr Hilario again next Tuesday and we will await her directives.     9/14/2023:  - the Rebozyl has been approved - will set up  - latest hgb at 10.1  - seeing Dr Hilario again in Jan 2024 11/16/2023:  -  "continued on Rebozyl  - WBC and plats WNL  - hgb at 10.3  - cardiac w/u negative per patient    2/15/2024:  - labs are adequate  - hgb at 10.6  - WBC and plat WNL  - continued on Rebozyl  - recent colonoscopy good per patient    4/4/2024:  - continued on Rebozyl  - counts are looking adequate    (2) Alcohol abuse issues in past - he has been sober for over 3 years now    (3) New findings on radiography with abnormal chest CT and lung mass  - former smoker    (4) chronic back issues - prior Xrays and MRI - suspect the findings on the MRI are possibly due to his sickle cell;   - SPEP was previously negative for M-protein  - PSA was 0.3 in Sept 2017  - recommended neurosurgery evaluation previously  - he saw Dr Nura VUONG and had a nerve "burning" procedure and his pain has improved    (5) CRI - elevated creatinine - he is followed Dr Chilel with nephrology      (6) H pylori gastritis - s/p recent endoscopy with Dr Collins and antibotics x 10 days    (7) CP   9/14/2023:  - seeing Dr lovett with cardiology and planning stress test in near future        1. Thrombocytosis        2. Normochromic anemia        3. Sickle cell trait syndrome        4. Chronic anemia        5. Monocytosis        6. 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 MDS (myelodysplastic syndrome)        9. Anemia, chronic renal failure, stage 3 (moderate)        10. Folic acid deficiency        11. Former smoker          PLAN;  1. Continue with the procrit and the Rebozyl as directed  2. Planned f/u with Dr Hilario as needed  3. F/u with cardiology as directed  4.  F/u with nephrology as directed by them -   Dr Mas    5. Check labs every 2 weeks                RTC 8 weeks  Fax note to [facility]veit;  Lewis Hilario    Total Time spent on patient:    I spent over 25 mins of time with the patient. Reviewed results of the recently ordered labs, tests, reports and studies; made directives with " regards to the results. Over half of this time was spent couseling and coordinating care, making treatment and analytical decisions; ordering necessary labs, tests and studies; and discussing treatment options and setting up treatment plan(s) if indicated.            Discussion:       Pathology Discussion:    I reviewed and discussed the pathology report(s) and radiograph reports (if available) in as simple to understand and/or laymen's terms to the best of my ability. I had an indepth conversation with the patient and went over the patient's individual diagnosis based on the information that was currently available. I discussed the TNM staging process with regard to the patient's particular cancer type, and the calculated stage based on the currently available TNM data and literature. I discussed the available prognostic data with regard to the current staging information and how it relates to the prognosis of their particular neoplastic process.          COVID-19 Discussion:    I had long discussion with patient and any applicable family about the COVID-19 coronavirus epidemic and the recommended precautions with regard to cancer and/or hematology patients. I have re-iterated the CDC recommendations for adequate hand washing, use of hand -like products, and coughing into elbow, etc. In addition, especially for our patients who are on chemotherapy and/or our otherwise immunocompromised patients, I have recommended avoidance of crowds, including movie theaters, restaurants, churches, etc. I have recommended avoidance of any sick or symptomatic family members and/or friends. I have also recommended avoidance of any raw and unwashed food products, and general avoidance of food items that have not been prepared by themselves. The patient has been asked to call us immediately with any symptom developments, issues, questions or other general concerns.     I have explained all of the above in detail and the  patient understands all of the current recommendation(s). I have answered all of their questions to the best of my ability and to their complete satisfaction.   The patient is to continue with the current management plan.            Electronically signed by Jose Tello MD

## 2024-04-04 ENCOUNTER — OFFICE VISIT (OUTPATIENT)
Dept: HEMATOLOGY/ONCOLOGY | Facility: CLINIC | Age: 64
End: 2024-04-04
Payer: COMMERCIAL

## 2024-04-04 VITALS
SYSTOLIC BLOOD PRESSURE: 168 MMHG | HEIGHT: 70 IN | TEMPERATURE: 98 F | DIASTOLIC BLOOD PRESSURE: 91 MMHG | BODY MASS INDEX: 33.55 KG/M2 | WEIGHT: 234.38 LBS | RESPIRATION RATE: 16 BRPM | HEART RATE: 71 BPM

## 2024-04-04 DIAGNOSIS — D63.1 ANEMIA, CHRONIC RENAL FAILURE, STAGE 3 (MODERATE): ICD-10-CM

## 2024-04-04 DIAGNOSIS — E53.8 FOLIC ACID DEFICIENCY: ICD-10-CM

## 2024-04-04 DIAGNOSIS — D64.89 ANEMIA DUE TO MULTIPLE MECHANISMS: ICD-10-CM

## 2024-04-04 DIAGNOSIS — D46.9 MDS (MYELODYSPLASTIC SYNDROME): ICD-10-CM

## 2024-04-04 DIAGNOSIS — D57.3 SICKLE CELL TRAIT SYNDROME: ICD-10-CM

## 2024-04-04 DIAGNOSIS — D75.839 THROMBOCYTOSIS: Primary | ICD-10-CM

## 2024-04-04 DIAGNOSIS — Z87.891 FORMER SMOKER: ICD-10-CM

## 2024-04-04 DIAGNOSIS — D64.9 CHRONIC ANEMIA: ICD-10-CM

## 2024-04-04 DIAGNOSIS — D72.821 MONOCYTOSIS: ICD-10-CM

## 2024-04-04 DIAGNOSIS — N18.30 ANEMIA, CHRONIC RENAL FAILURE, STAGE 3 (MODERATE): ICD-10-CM

## 2024-04-04 DIAGNOSIS — D64.9 NORMOCHROMIC ANEMIA: ICD-10-CM

## 2024-04-04 DIAGNOSIS — D46.1 RARS (REFRACTORY ANEMIA WITH RINGED SIDEROBLASTS): ICD-10-CM

## 2024-04-04 PROCEDURE — 1160F RVW MEDS BY RX/DR IN RCRD: CPT | Mod: CPTII,S$GLB,, | Performed by: INTERNAL MEDICINE

## 2024-04-04 PROCEDURE — 3080F DIAST BP >= 90 MM HG: CPT | Mod: CPTII,S$GLB,, | Performed by: INTERNAL MEDICINE

## 2024-04-04 PROCEDURE — 99214 OFFICE O/P EST MOD 30 MIN: CPT | Mod: S$GLB,,, | Performed by: INTERNAL MEDICINE

## 2024-04-04 PROCEDURE — 3044F HG A1C LEVEL LT 7.0%: CPT | Mod: CPTII,S$GLB,, | Performed by: INTERNAL MEDICINE

## 2024-04-04 PROCEDURE — 3077F SYST BP >= 140 MM HG: CPT | Mod: CPTII,S$GLB,, | Performed by: INTERNAL MEDICINE

## 2024-04-04 PROCEDURE — 3008F BODY MASS INDEX DOCD: CPT | Mod: CPTII,S$GLB,, | Performed by: INTERNAL MEDICINE

## 2024-04-04 PROCEDURE — 1159F MED LIST DOCD IN RCRD: CPT | Mod: CPTII,S$GLB,, | Performed by: INTERNAL MEDICINE

## 2024-04-05 ENCOUNTER — INFUSION (OUTPATIENT)
Dept: INFUSION THERAPY | Facility: HOSPITAL | Age: 64
End: 2024-04-05
Attending: INTERNAL MEDICINE
Payer: COMMERCIAL

## 2024-04-05 VITALS
DIASTOLIC BLOOD PRESSURE: 89 MMHG | TEMPERATURE: 98 F | OXYGEN SATURATION: 97 % | WEIGHT: 233.69 LBS | BODY MASS INDEX: 33.46 KG/M2 | HEIGHT: 70 IN | RESPIRATION RATE: 18 BRPM | HEART RATE: 76 BPM | SYSTOLIC BLOOD PRESSURE: 157 MMHG

## 2024-04-05 DIAGNOSIS — N18.30 STAGE 3 CHRONIC KIDNEY DISEASE, UNSPECIFIED WHETHER STAGE 3A OR 3B CKD: ICD-10-CM

## 2024-04-05 DIAGNOSIS — D64.89 ANEMIA DUE TO MULTIPLE MECHANISMS: ICD-10-CM

## 2024-04-05 DIAGNOSIS — D64.9 CHRONIC ANEMIA: Primary | ICD-10-CM

## 2024-04-05 DIAGNOSIS — D64.9 NORMOCHROMIC ANEMIA: ICD-10-CM

## 2024-04-05 DIAGNOSIS — D46.9 MDS (MYELODYSPLASTIC SYNDROME): ICD-10-CM

## 2024-04-05 PROCEDURE — 63600175 PHARM REV CODE 636 W HCPCS: Mod: JZ,EC,JG | Performed by: INTERNAL MEDICINE

## 2024-04-05 PROCEDURE — 96372 THER/PROPH/DIAG INJ SC/IM: CPT

## 2024-04-05 RX ADMIN — EPOETIN ALFA-EPBX 40000 UNITS: 40000 INJECTION, SOLUTION INTRAVENOUS; SUBCUTANEOUS at 04:04

## 2024-04-05 NOTE — PLAN OF CARE
Problem: Anemia  Goal: Anemia Symptom Improvement  Outcome: Ongoing, Progressing  Intervention: Monitor and Manage Anemia  Flowsheets (Taken 4/5/2024 1613)  Oral Nutrition Promotion:   rest periods promoted   medicated  Safety Promotion/Fall Prevention: in recliner, wheels locked  Fatigue Management: frequent rest breaks encouraged

## 2024-04-12 ENCOUNTER — INFUSION (OUTPATIENT)
Dept: INFUSION THERAPY | Facility: HOSPITAL | Age: 64
End: 2024-04-12
Attending: INTERNAL MEDICINE
Payer: COMMERCIAL

## 2024-04-12 VITALS
RESPIRATION RATE: 18 BRPM | HEIGHT: 70 IN | TEMPERATURE: 98 F | BODY MASS INDEX: 33.66 KG/M2 | HEART RATE: 76 BPM | OXYGEN SATURATION: 98 % | DIASTOLIC BLOOD PRESSURE: 93 MMHG | SYSTOLIC BLOOD PRESSURE: 140 MMHG | WEIGHT: 235.13 LBS

## 2024-04-12 DIAGNOSIS — D64.89 ANEMIA DUE TO MULTIPLE MECHANISMS: ICD-10-CM

## 2024-04-12 DIAGNOSIS — D46.9 MDS (MYELODYSPLASTIC SYNDROME): ICD-10-CM

## 2024-04-12 DIAGNOSIS — N18.30 STAGE 3 CHRONIC KIDNEY DISEASE, UNSPECIFIED WHETHER STAGE 3A OR 3B CKD: ICD-10-CM

## 2024-04-12 DIAGNOSIS — D64.9 CHRONIC ANEMIA: Primary | ICD-10-CM

## 2024-04-12 DIAGNOSIS — D64.9 NORMOCHROMIC ANEMIA: ICD-10-CM

## 2024-04-12 PROCEDURE — 63600175 PHARM REV CODE 636 W HCPCS: Mod: JZ,EC,JG | Performed by: INTERNAL MEDICINE

## 2024-04-12 PROCEDURE — 96372 THER/PROPH/DIAG INJ SC/IM: CPT

## 2024-04-12 RX ADMIN — EPOETIN ALFA-EPBX 40000 UNITS: 40000 INJECTION, SOLUTION INTRAVENOUS; SUBCUTANEOUS at 04:04

## 2024-04-12 NOTE — PLAN OF CARE
Problem: Fatigue  Goal: Improved Activity Tolerance  Outcome: Ongoing, Progressing  Intervention: Promote Improved Energy  Flowsheets (Taken 4/12/2024 1607)  Fatigue Management: frequent rest breaks encouraged  Sleep/Rest Enhancement: relaxation techniques promoted  Activity Management: Ambulated -L4

## 2024-04-17 ENCOUNTER — LAB VISIT (OUTPATIENT)
Dept: LAB | Facility: HOSPITAL | Age: 64
End: 2024-04-17
Attending: INTERNAL MEDICINE
Payer: COMMERCIAL

## 2024-04-17 DIAGNOSIS — D64.89 ANEMIA DUE TO MULTIPLE MECHANISMS: ICD-10-CM

## 2024-04-17 DIAGNOSIS — D57.3 SICKLE CELL TRAIT SYNDROME: ICD-10-CM

## 2024-04-17 LAB
ALBUMIN SERPL BCP-MCNC: 4.2 G/DL (ref 3.5–5.2)
ALP SERPL-CCNC: 76 U/L (ref 55–135)
ALT SERPL W/O P-5'-P-CCNC: 14 U/L (ref 10–44)
ANION GAP SERPL CALC-SCNC: 6 MMOL/L (ref 8–16)
AST SERPL-CCNC: 17 U/L (ref 10–40)
BASOPHILS # BLD AUTO: 0.11 K/UL (ref 0–0.2)
BASOPHILS NFR BLD: 1.5 % (ref 0–1.9)
BILIRUB SERPL-MCNC: 0.8 MG/DL (ref 0.1–1)
BUN SERPL-MCNC: 33 MG/DL (ref 8–23)
CALCIUM SERPL-MCNC: 8.9 MG/DL (ref 8.7–10.5)
CHLORIDE SERPL-SCNC: 108 MMOL/L (ref 95–110)
CO2 SERPL-SCNC: 26 MMOL/L (ref 23–29)
CREAT SERPL-MCNC: 2.9 MG/DL (ref 0.5–1.4)
DIFFERENTIAL METHOD BLD: ABNORMAL
EOSINOPHIL # BLD AUTO: 0.2 K/UL (ref 0–0.5)
EOSINOPHIL NFR BLD: 2.8 % (ref 0–8)
ERYTHROCYTE [DISTWIDTH] IN BLOOD BY AUTOMATED COUNT: 27.1 % (ref 11.5–14.5)
EST. GFR  (NO RACE VARIABLE): 23.4 ML/MIN/1.73 M^2
FERRITIN SERPL-MCNC: 1342.9 NG/ML (ref 20–300)
GLUCOSE SERPL-MCNC: 109 MG/DL (ref 70–110)
HCT VFR BLD AUTO: 32.8 % (ref 40–54)
HGB BLD-MCNC: 11.1 G/DL (ref 14–18)
IMM GRANULOCYTES # BLD AUTO: 0.05 K/UL (ref 0–0.04)
IMM GRANULOCYTES NFR BLD AUTO: 0.7 % (ref 0–0.5)
IRON SERPL-MCNC: 83 UG/DL (ref 45–160)
LYMPHOCYTES # BLD AUTO: 1.5 K/UL (ref 1–4.8)
LYMPHOCYTES NFR BLD: 20.1 % (ref 18–48)
MCH RBC QN AUTO: 30.2 PG (ref 27–31)
MCHC RBC AUTO-ENTMCNC: 33.8 G/DL (ref 32–36)
MCV RBC AUTO: 89 FL (ref 82–98)
MONOCYTES # BLD AUTO: 0.8 K/UL (ref 0.3–1)
MONOCYTES NFR BLD: 11 % (ref 4–15)
NEUTROPHILS # BLD AUTO: 4.8 K/UL (ref 1.8–7.7)
NEUTROPHILS NFR BLD: 63.9 % (ref 38–73)
NRBC BLD-RTO: 0 /100 WBC
PLATELET # BLD AUTO: 479 K/UL (ref 150–450)
PMV BLD AUTO: 9.6 FL (ref 9.2–12.9)
POTASSIUM SERPL-SCNC: 4.3 MMOL/L (ref 3.5–5.1)
PROT SERPL-MCNC: 7.1 G/DL (ref 6–8.4)
RBC # BLD AUTO: 3.67 M/UL (ref 4.6–6.2)
SATURATED IRON: 44 % (ref 20–50)
SODIUM SERPL-SCNC: 140 MMOL/L (ref 136–145)
TOTAL IRON BINDING CAPACITY: 188 UG/DL (ref 250–450)
TRANSFERRIN SERPL-MCNC: 134 MG/DL (ref 200–375)
WBC # BLD AUTO: 7.48 K/UL (ref 3.9–12.7)

## 2024-04-17 PROCEDURE — 36415 COLL VENOUS BLD VENIPUNCTURE: CPT | Performed by: INTERNAL MEDICINE

## 2024-04-17 PROCEDURE — 82728 ASSAY OF FERRITIN: CPT | Performed by: INTERNAL MEDICINE

## 2024-04-17 PROCEDURE — 80053 COMPREHEN METABOLIC PANEL: CPT | Performed by: INTERNAL MEDICINE

## 2024-04-17 PROCEDURE — 83540 ASSAY OF IRON: CPT | Performed by: INTERNAL MEDICINE

## 2024-04-17 PROCEDURE — 85025 COMPLETE CBC W/AUTO DIFF WBC: CPT | Performed by: INTERNAL MEDICINE

## 2024-04-18 ENCOUNTER — INFUSION (OUTPATIENT)
Dept: INFUSION THERAPY | Facility: HOSPITAL | Age: 64
End: 2024-04-18
Attending: INTERNAL MEDICINE
Payer: COMMERCIAL

## 2024-04-18 VITALS
BODY MASS INDEX: 32.95 KG/M2 | SYSTOLIC BLOOD PRESSURE: 142 MMHG | HEART RATE: 77 BPM | TEMPERATURE: 98 F | RESPIRATION RATE: 17 BRPM | HEIGHT: 70 IN | DIASTOLIC BLOOD PRESSURE: 90 MMHG | OXYGEN SATURATION: 98 % | WEIGHT: 230.19 LBS

## 2024-04-18 DIAGNOSIS — D46.9 MDS (MYELODYSPLASTIC SYNDROME): ICD-10-CM

## 2024-04-18 DIAGNOSIS — D46.1 RARS (REFRACTORY ANEMIA WITH RINGED SIDEROBLASTS): Primary | ICD-10-CM

## 2024-04-18 PROCEDURE — 63600175 PHARM REV CODE 636 W HCPCS: Mod: JZ,JG | Performed by: INTERNAL MEDICINE

## 2024-04-18 PROCEDURE — 96372 THER/PROPH/DIAG INJ SC/IM: CPT

## 2024-04-18 RX ADMIN — LUSPATERCEPT 50 MG: 75 INJECTION, POWDER, LYOPHILIZED, FOR SOLUTION SUBCUTANEOUS at 04:04

## 2024-04-18 NOTE — PLAN OF CARE
Problem: Anemia  Goal: Anemia Symptom Improvement  Outcome: Ongoing, Progressing  Intervention: Monitor and Manage Anemia  Flowsheets (Taken 4/18/2024 1602)  Oral Nutrition Promotion: rest periods promoted  Safety Promotion/Fall Prevention: medications reviewed  Fatigue Management:   activity schedule adjusted   activity assistance provided   fatigue-related activity identified

## 2024-04-19 ENCOUNTER — INFUSION (OUTPATIENT)
Dept: INFUSION THERAPY | Facility: HOSPITAL | Age: 64
End: 2024-04-19
Attending: INTERNAL MEDICINE
Payer: COMMERCIAL

## 2024-04-19 VITALS
RESPIRATION RATE: 16 BRPM | DIASTOLIC BLOOD PRESSURE: 89 MMHG | SYSTOLIC BLOOD PRESSURE: 135 MMHG | OXYGEN SATURATION: 95 % | HEIGHT: 70 IN | TEMPERATURE: 98 F | BODY MASS INDEX: 33.26 KG/M2 | WEIGHT: 232.31 LBS | HEART RATE: 82 BPM

## 2024-04-19 DIAGNOSIS — D64.9 CHRONIC ANEMIA: Primary | ICD-10-CM

## 2024-04-19 DIAGNOSIS — D46.9 MDS (MYELODYSPLASTIC SYNDROME): ICD-10-CM

## 2024-04-19 DIAGNOSIS — D64.89 ANEMIA DUE TO MULTIPLE MECHANISMS: ICD-10-CM

## 2024-04-19 DIAGNOSIS — N18.30 STAGE 3 CHRONIC KIDNEY DISEASE, UNSPECIFIED WHETHER STAGE 3A OR 3B CKD: ICD-10-CM

## 2024-04-19 DIAGNOSIS — D64.9 NORMOCHROMIC ANEMIA: ICD-10-CM

## 2024-04-19 PROCEDURE — 63600175 PHARM REV CODE 636 W HCPCS: Mod: JZ,EC,JG | Performed by: INTERNAL MEDICINE

## 2024-04-19 PROCEDURE — 96372 THER/PROPH/DIAG INJ SC/IM: CPT

## 2024-04-19 RX ADMIN — EPOETIN ALFA-EPBX 40000 UNITS: 40000 INJECTION, SOLUTION INTRAVENOUS; SUBCUTANEOUS at 04:04

## 2024-04-19 NOTE — PLAN OF CARE
Problem: Anemia  Goal: Anemia Symptom Improvement  Outcome: Ongoing, Progressing  Intervention: Monitor and Manage Anemia  Flowsheets (Taken 12/31/2020 7121)  Oral Nutrition Promotion: rest periods promoted  Fatigue Management: frequent rest breaks encouraged      Rehab Medicine Surgery Cardiology Gastroenterology Hospitalist Vascular Cardiology ENT

## 2024-04-22 ENCOUNTER — OFFICE VISIT (OUTPATIENT)
Dept: FAMILY MEDICINE | Facility: CLINIC | Age: 64
End: 2024-04-22
Payer: COMMERCIAL

## 2024-04-22 VITALS
WEIGHT: 228.69 LBS | HEART RATE: 95 BPM | TEMPERATURE: 98 F | SYSTOLIC BLOOD PRESSURE: 132 MMHG | HEIGHT: 70 IN | RESPIRATION RATE: 16 BRPM | OXYGEN SATURATION: 98 % | BODY MASS INDEX: 32.74 KG/M2 | DIASTOLIC BLOOD PRESSURE: 78 MMHG

## 2024-04-22 DIAGNOSIS — D46.9 MDS (MYELODYSPLASTIC SYNDROME): ICD-10-CM

## 2024-04-22 DIAGNOSIS — N18.30 ANEMIA, CHRONIC RENAL FAILURE, STAGE 3 (MODERATE): ICD-10-CM

## 2024-04-22 DIAGNOSIS — R51.9 NEW ONSET OF HEADACHES AFTER AGE 50: ICD-10-CM

## 2024-04-22 DIAGNOSIS — H53.8 BLURRY VISION, BILATERAL: ICD-10-CM

## 2024-04-22 DIAGNOSIS — N25.81 SECONDARY HYPERPARATHYROIDISM OF RENAL ORIGIN: ICD-10-CM

## 2024-04-22 DIAGNOSIS — I10 ESSENTIAL HYPERTENSION: Primary | ICD-10-CM

## 2024-04-22 DIAGNOSIS — D63.1 ANEMIA, CHRONIC RENAL FAILURE, STAGE 3 (MODERATE): ICD-10-CM

## 2024-04-22 DIAGNOSIS — E78.5 DYSLIPIDEMIA: ICD-10-CM

## 2024-04-22 DIAGNOSIS — N18.4 CHRONIC KIDNEY DISEASE (CKD), STAGE IV (SEVERE): ICD-10-CM

## 2024-04-22 PROCEDURE — 3075F SYST BP GE 130 - 139MM HG: CPT | Mod: CPTII,S$GLB,, | Performed by: NURSE PRACTITIONER

## 2024-04-22 PROCEDURE — 1160F RVW MEDS BY RX/DR IN RCRD: CPT | Mod: CPTII,S$GLB,, | Performed by: NURSE PRACTITIONER

## 2024-04-22 PROCEDURE — 3044F HG A1C LEVEL LT 7.0%: CPT | Mod: CPTII,S$GLB,, | Performed by: NURSE PRACTITIONER

## 2024-04-22 PROCEDURE — 99215 OFFICE O/P EST HI 40 MIN: CPT | Mod: S$GLB,,, | Performed by: NURSE PRACTITIONER

## 2024-04-22 PROCEDURE — 3078F DIAST BP <80 MM HG: CPT | Mod: CPTII,S$GLB,, | Performed by: NURSE PRACTITIONER

## 2024-04-22 PROCEDURE — 3008F BODY MASS INDEX DOCD: CPT | Mod: CPTII,S$GLB,, | Performed by: NURSE PRACTITIONER

## 2024-04-22 PROCEDURE — 1159F MED LIST DOCD IN RCRD: CPT | Mod: CPTII,S$GLB,, | Performed by: NURSE PRACTITIONER

## 2024-04-22 PROCEDURE — 99999 PR PBB SHADOW E&M-EST. PATIENT-LVL V: CPT | Mod: PBBFAC,,, | Performed by: NURSE PRACTITIONER

## 2024-04-22 NOTE — PROGRESS NOTES
SUBJECTIVE:      Patient ID: Rick Butler is a 64 y.o. male.    Chief Complaint: Hypertension    64-year-old male presents to the clinic for hypertension follow-up. Labs completed earlier this month were reviewed.    He continues to see Heme-Onc for MDS, sickle cell trait, and anemia. He is followed by Dr. Tello and Dr. Hilario. Continues to take Rebozyl and Epoetin Alpha. Labs are monitored monthly.    He has been getting headaches over the past 2 weeks.  These are new headaches.  Reports never having headaches before. Headaches are located to the frontal region. Occur every other day. Treating with extra strength tylenol, which helps. He has occasional blood in his nose after he sneezes, mostly notices in the morning. He is no on blood thinners. Denies hx of allergies. Denies feeling dizzy, lightheaded, aphasia, slurred speech, and unilateral weakness.     TSH is gradually increasing, now 4.372. Asymptomatic at this time.     Lipids are controlled, LDL 89.8, HDL 44, Trig 55, and Tot chol 144. Rx'd Lipitor 10 mg. Liver enzymes are wnl.     Blood pressure is stable with amlodipine 5 mg. He is followed by the cardiologist Dr. Saucedo. Denies chest pain, SOB, and SHARI.    He continues to see. Dr. Mas for CKD, eGFR 23.4 and creatinine 2.9.          Family History   Problem Relation Name Age of Onset    Arthritis Mother      Depression Mother      Heart disease Mother      Hypertension Mother      Heart attack Father  59      Social History     Socioeconomic History    Marital status:    Occupational History    Occupation: Prep Cook     Employer: [employer]   Tobacco Use    Smoking status: Former     Current packs/day: 0.00     Types: Cigarettes     Quit date: 1996     Years since quittin.3    Smokeless tobacco: Never   Substance and Sexual Activity    Alcohol use: Not Currently     Alcohol/week: 21.0 standard drinks of alcohol     Types: 21 Cans of beer per week     Comment: Sober 5 years    Drug  use: Not Currently     Types: Marijuana    Sexual activity: Yes     Partners: Female     Social Determinants of Health     Stress: Stress Concern Present (4/24/2019)    [employer] [place] [employer] - Occupational Stress Questionnaire     Feeling of Stress : Very much     Current Outpatient Medications   Medication Sig Dispense Refill    amLODIPine (NORVASC) 5 MG tablet Take 1 tablet (5 mg total) by mouth once daily. 90 tablet 3    ascorbic acid, vitamin C, (VITAMIN C) 1000 MG tablet Take 1,000 mg by mouth once daily.      atorvastatin (LIPITOR) 10 MG tablet Take 1 tablet (10 mg total) by mouth every evening. 90 tablet 3    epoetin tray (PROCRIT INJ) Thursday      famotidine (PEPCID) 20 MG tablet famotidine 20 mg tablet   Take 1 tablet twice a day by oral route.      folic acid (FOLVITE) 1 MG tablet Take 2 tablets (2 mg total) by mouth once daily. 180 tablet 1    multivitamin capsule Take 1 capsule by mouth once daily.      pantoprazole (PROTONIX) 40 MG tablet       sertraline (ZOLOFT) 25 MG tablet Take 25 mg by mouth once daily.      tamsulosin (FLOMAX) 0.4 mg Cap Take 1 capsule (0.4 mg total) by mouth once daily. 90 capsule 1    traZODone (DESYREL) 100 MG tablet Take 1 tablet (100 mg total) by mouth nightly as needed for Insomnia. 90 tablet 2    sildenafil (VIAGRA) 100 MG tablet Take 1 tablet (100 mg total) by mouth daily as needed for Erectile Dysfunction. 10 tablet 6     No current facility-administered medications for this visit.     Review of patient's allergies indicates:  No Known Allergies   Past Medical History:   Diagnosis Date    Abnormal chest CT (new) 08/17/2022    Anemia due to multiple mechanisms 01/13/2019    Anemia, chronic renal failure, stage 2 (mild) 01/13/2019    Anemia, chronic renal failure, stage 3 (moderate) 08/02/2023    Depression     Former smoker 06/29/2017    H/O ETOH abuse 06/29/2017    Lung mass (new) 08/17/2022    Monocytosis 2019    Myelodysplasia (myelodysplastic  syndrome)     Myelodysplasia (myelodysplastic syndrome)     Personal history of colonic polyps 12/29/2023    RARS (refractory anemia with ringed sideroblasts) 06/01/2020    Sickle cell trait      Past Surgical History:   Procedure Laterality Date    BONE MARROW BIOPSY N/A 12/20/2019    Procedure: BIOPSY, BONE MARROW;  Surgeon: Satinder Diagnostic Provider;  Location: [facility] OR;  Service: Interventional Radiology;  Laterality: N/A;    COLONOSCOPY N/A 11/05/2021    Procedure: COLONOSCOPY;  Surgeon: Rob Collins MD;  Location: [facility] ENDO;  Service: Endoscopy;  Laterality: N/A;    COLONOSCOPY  12/29/2023    5 YR RECALL    ESOPHAGOGASTRODUODENOSCOPY N/A 11/05/2021    Procedure: EGD (ESOPHAGOGASTRODUODENOSCOPY);  Surgeon: Rob Collins MD;  Location: [facility];  Service: Endoscopy;  Laterality: N/A;    INJECTION OF ANESTHETIC AGENT AROUND MEDIAL BRANCH NERVES INNERVATING LUMBAR FACET JOINT Bilateral 05/25/2018    Procedure: BLOCK-NERVE-MEDIAL BRANCH-LUMBAR;  Surgeon: Nura Heredia MD;  Location: [facility];  Service: Pain Management;  Laterality: Bilateral;  L3, 4, 5    KNEE ARTHROSCOPY W/ MENISCECTOMY Right 12/22/2021    Procedure: ARTHROSCOPY, KNEE, WITH MENISCECTOMY;  Surgeon: Sebastian Green MD;  Location: [facility];  Service: Orthopedics;  Laterality: Right;  partial medial meniscectomy    RADIOFREQUENCY ABLATION OF LUMBAR MEDIAL BRANCH NERVE AT SINGLE LEVEL Bilateral 07/20/2018    Procedure: RADIOFREQUENCY ABLATION, NERVE, MEDIAL BRANCH, LUMBAR, 1 LEVEL;  Surgeon: Nura Heredia MD;  Location: [facility];  Service: Pain Management;  Laterality: Bilateral;  L3, 4, 5 - Burned at 80 degrees C.  for 75 seconds x 2 each site    SMALL BOWEL ENTEROSCOPY N/A 10/16/2019    Procedure: ENTEROSCOPY;  Surgeon: Rob Collins MD;  Location: [facility];  Service: Endoscopy;  Laterality: N/A;       Review of Systems   Constitutional:  Negative for activity change, appetite change, chills, diaphoresis, fatigue, fever and unexpected weight change.  "  HENT:  Negative for congestion, ear pain, sinus pressure, sore throat, trouble swallowing and voice change.    Eyes:  Negative for pain, discharge and visual disturbance.   Respiratory:  Negative for cough, chest tightness, shortness of breath and wheezing.    Cardiovascular:  Negative for chest pain and palpitations.   Gastrointestinal:  Negative for abdominal pain, constipation, diarrhea, nausea and vomiting.   Genitourinary:  Negative for difficulty urinating, flank pain, frequency and urgency.   Musculoskeletal:  Negative for back pain and joint swelling.   Skin:  Negative for color change and rash.   Neurological:  Positive for headaches. Negative for dizziness, seizures, syncope, weakness and numbness.   Hematological:  Negative for adenopathy.   Psychiatric/Behavioral:  Negative for dysphoric mood and sleep disturbance. The patient is not nervous/anxious.       OBJECTIVE:      Vitals:    04/22/24 1614   BP: 132/78   Pulse: 95   Resp: 16   Temp: 98.3 °F (36.8 °C)   SpO2: 98%   Weight: 103.7 kg (228 lb 11.2 oz)   Height: 5' 10" (1.778 m)     Physical Exam  Vitals and nursing note reviewed.   Constitutional:       General: He is awake. He is not in acute distress.     Appearance: Normal appearance. He is obese. He is not ill-appearing, toxic-appearing or diaphoretic.   HENT:      Head: Normocephalic and atraumatic.      Nose: Nose normal.   Eyes:      General: Lids are normal. Gaze aligned appropriately.      Conjunctiva/sclera: Conjunctivae normal.      Right eye: Right conjunctiva is not injected.      Left eye: Left conjunctiva is not injected.      Pupils: Pupils are equal, round, and reactive to light.   Cardiovascular:      Rate and Rhythm: Normal rate and regular rhythm.      Pulses: Normal pulses.      Heart sounds: Normal heart sounds, S1 normal and S2 normal. No murmur heard.     No friction rub. No gallop.   Pulmonary:      Effort: Pulmonary effort is normal. No respiratory distress.      Breath " sounds: Normal breath sounds. No stridor. No decreased breath sounds, wheezing, rhonchi or rales.   Chest:      Chest wall: No tenderness.   Musculoskeletal:      Cervical back: Neck supple.      Right lower leg: No edema.      Left lower leg: No edema.   Lymphadenopathy:      Cervical: No cervical adenopathy.   Skin:     General: Skin is warm and dry.      Capillary Refill: Capillary refill takes less than 2 seconds.      Findings: No erythema or rash.   Neurological:      Mental Status: He is alert and oriented to person, place, and time. Mental status is at baseline.      GCS: GCS eye subscore is 4. GCS verbal subscore is 5. GCS motor subscore is 6.      Cranial Nerves: Cranial nerves 2-12 are intact. No dysarthria or facial asymmetry.      Sensory: Sensation is intact.      Motor: Motor function is intact. No weakness.      Gait: Gait is intact.   Psychiatric:         Attention and Perception: Attention normal.         Mood and Affect: Mood normal.         Speech: Speech normal.         Behavior: Behavior normal. Behavior is cooperative.         Thought Content: Thought content normal.         Judgment: Judgment normal.        Lab Visit on 04/17/2024   Component Date Value Ref Range Status    Iron 04/17/2024 83  45 - 160 ug/dL Final    Transferrin 04/17/2024 134 (L)  200 - 375 mg/dL Final    TIBC 04/17/2024 188 (L)  250 - 450 ug/dL Final    Saturated Iron 04/17/2024 44  20 - 50 % Final    Ferritin 04/17/2024 1,342.9 (H)  20.0 - 300.0 ng/mL Final    WBC 04/17/2024 7.48  3.90 - 12.70 K/uL Final    RBC 04/17/2024 3.67 (L)  4.60 - 6.20 M/uL Final    Hemoglobin 04/17/2024 11.1 (L)  14.0 - 18.0 g/dL Final    Hematocrit 04/17/2024 32.8 (L)  40.0 - 54.0 % Final    MCV 04/17/2024 89  82 - 98 fL Final    MCH 04/17/2024 30.2  27.0 - 31.0 pg Final    MCHC 04/17/2024 33.8  32.0 - 36.0 g/dL Final    RDW 04/17/2024 27.1 (H)  11.5 - 14.5 % Final    Platelets 04/17/2024 479 (H)  150 - 450 K/uL Final    MPV 04/17/2024 9.6  9.2 -  12.9 fL Final    Immature Granulocytes 04/17/2024 0.7 (H)  0.0 - 0.5 % Final    Gran # (ANC) 04/17/2024 4.8  1.8 - 7.7 K/uL Final    Immature Grans (Abs) 04/17/2024 0.05 (H)  0.00 - 0.04 K/uL Final    Comment: Mild elevation in immature granulocytes is non specific and   can be seen in a variety of conditions including stress response,   acute inflammation, trauma and pregnancy. Correlation with other   laboratory and clinical findings is essential.      Lymph # 04/17/2024 1.5  1.0 - 4.8 K/uL Final    Mono # 04/17/2024 0.8  0.3 - 1.0 K/uL Final    Eos # 04/17/2024 0.2  0.0 - 0.5 K/uL Final    Baso # 04/17/2024 0.11  0.00 - 0.20 K/uL Final    nRBC 04/17/2024 0  0 /100 WBC Final    Gran % 04/17/2024 63.9  38.0 - 73.0 % Final    Lymph % 04/17/2024 20.1  18.0 - 48.0 % Final    Mono % 04/17/2024 11.0  4.0 - 15.0 % Final    Eosinophil % 04/17/2024 2.8  0.0 - 8.0 % Final    Basophil % 04/17/2024 1.5  0.0 - 1.9 % Final    Differential Method 04/17/2024 Automated   Final    Sodium 04/17/2024 140  136 - 145 mmol/L Final    Potassium 04/17/2024 4.3  3.5 - 5.1 mmol/L Final    Chloride 04/17/2024 108  95 - 110 mmol/L Final    CO2 04/17/2024 26  23 - 29 mmol/L Final    Glucose 04/17/2024 109  70 - 110 mg/dL Final    BUN 04/17/2024 33 (H)  8 - 23 mg/dL Final    Creatinine 04/17/2024 2.9 (H)  0.5 - 1.4 mg/dL Final    Calcium 04/17/2024 8.9  8.7 - 10.5 mg/dL Final    Total Protein 04/17/2024 7.1  6.0 - 8.4 g/dL Final    Albumin 04/17/2024 4.2  3.5 - 5.2 g/dL Final    Total Bilirubin 04/17/2024 0.8  0.1 - 1.0 mg/dL Final    Comment: For infants and newborns, interpretation of results should be based  on gestational age, weight and in agreement with clinical  observations.    Premature Infant recommended reference ranges:  Up to 24 hours.............<8.0 mg/dL  Up to 48 hours............<12.0 mg/dL  3-5 days..................<15.0 mg/dL  6-29 days.................<15.0 mg/dL      Alkaline Phosphatase 04/17/2024 76  55 - 135 U/L Final     AST 04/17/2024 17  10 - 40 U/L Final    ALT 04/17/2024 14  10 - 44 U/L Final    eGFR 04/17/2024 23.4 (A)  >60 mL/min/1.73 m^2 Final    Anion Gap 04/17/2024 6 (L)  8 - 16 mmol/L Final   Lab Visit on 04/03/2024   Component Date Value Ref Range Status    WBC 04/03/2024 8.53  3.90 - 12.70 K/uL Final    RBC 04/03/2024 3.70 (L)  4.60 - 6.20 M/uL Final    Hemoglobin 04/03/2024 11.2 (L)  14.0 - 18.0 g/dL Final    Hematocrit 04/03/2024 33.7 (L)  40.0 - 54.0 % Final    MCV 04/03/2024 91  82 - 98 fL Final    MCH 04/03/2024 30.3  27.0 - 31.0 pg Final    MCHC 04/03/2024 33.2  32.0 - 36.0 g/dL Final    RDW 04/03/2024 27.4 (H)  11.5 - 14.5 % Final    Platelets 04/03/2024 474 (H)  150 - 450 K/uL Final    MPV 04/03/2024 10.1  9.2 - 12.9 fL Final    Immature Granulocytes 04/03/2024 1.2 (H)  0.0 - 0.5 % Final    Gran # (ANC) 04/03/2024 5.7  1.8 - 7.7 K/uL Final    Immature Grans (Abs) 04/03/2024 0.10 (H)  0.00 - 0.04 K/uL Final    Comment: Mild elevation in immature granulocytes is non specific and   can be seen in a variety of conditions including stress response,   acute inflammation, trauma and pregnancy. Correlation with other   laboratory and clinical findings is essential.      Lymph # 04/03/2024 1.6  1.0 - 4.8 K/uL Final    Mono # 04/03/2024 0.9  0.3 - 1.0 K/uL Final    Eos # 04/03/2024 0.2  0.0 - 0.5 K/uL Final    Baso # 04/03/2024 0.11  0.00 - 0.20 K/uL Final    nRBC 04/03/2024 0  0 /100 WBC Final    Gran % 04/03/2024 66.2  38.0 - 73.0 % Final    Lymph % 04/03/2024 18.2  18.0 - 48.0 % Final    Mono % 04/03/2024 10.3  4.0 - 15.0 % Final    Eosinophil % 04/03/2024 2.8  0.0 - 8.0 % Final    Basophil % 04/03/2024 1.3  0.0 - 1.9 % Final    Differential Method 04/03/2024 Automated   Final    Sodium 04/03/2024 143  136 - 145 mmol/L Final    Potassium 04/03/2024 4.3  3.5 - 5.1 mmol/L Final    Chloride 04/03/2024 110  95 - 110 mmol/L Final    CO2 04/03/2024 28  23 - 29 mmol/L Final    Glucose 04/03/2024 99  70 - 110 mg/dL Final     BUN 04/03/2024 27 (H)  8 - 23 mg/dL Final    Creatinine 04/03/2024 2.4 (H)  0.5 - 1.4 mg/dL Final    Calcium 04/03/2024 8.6 (L)  8.7 - 10.5 mg/dL Final    Total Protein 04/03/2024 7.1  6.0 - 8.4 g/dL Final    Albumin 04/03/2024 4.1  3.5 - 5.2 g/dL Final    Total Bilirubin 04/03/2024 0.6  0.1 - 1.0 mg/dL Final    Comment: For infants and newborns, interpretation of results should be based  on gestational age, weight and in agreement with clinical  observations.    Premature Infant recommended reference ranges:  Up to 24 hours.............<8.0 mg/dL  Up to 48 hours............<12.0 mg/dL  3-5 days..................<15.0 mg/dL  6-29 days.................<15.0 mg/dL      Alkaline Phosphatase 04/03/2024 81  55 - 135 U/L Final    AST 04/03/2024 19  10 - 40 U/L Final    ALT 04/03/2024 15  10 - 44 U/L Final    eGFR 04/03/2024 29.6 (A)  >60 mL/min/1.73 m^2 Final    Anion Gap 04/03/2024 5 (L)  8 - 16 mmol/L Final   Lab Visit on 04/03/2024   Component Date Value Ref Range Status    Cholesterol 04/03/2024 144  120 - 199 mg/dL Final    Comment: The National Cholesterol Education Program (NCEP) has set the  following guidelines (reference ranges) for Cholesterol:  Optimal.....................<200 mg/dL  Borderline High.............200-239 mg/dL  High........................> or = 240 mg/dL      Triglycerides 04/03/2024 55  30 - 150 mg/dL Final    Comment: The National Cholesterol Education Program (NCEP) has set the  following guidelines (reference values) for triglycerides:  Normal......................<150 mg/dL  Borderline High.............150-199 mg/dL  High........................200-499 mg/dL      HDL 04/03/2024 44  40 - 75 mg/dL Final    Comment: The National Cholesterol Education Program (NCEP) has set the  following guidelines (reference values) for HDL Cholesterol:  Low...............<40 mg/dL  Optimal...........>60 mg/dL      LDL Cholesterol 04/03/2024 89.0  63.0 - 159.0 mg/dL Final    Comment: The National Cholesterol  Education Program (NCEP) has set the  following guidelines (reference values) for LDL Cholesterol:  Optimal.......................<130 mg/dL  Borderline High...............130-159 mg/dL  High..........................160-189 mg/dL  Very High.....................>190 mg/dL      HDL/Cholesterol Ratio 04/03/2024 30.6  20.0 - 50.0 % Final    Total Cholesterol/HDL Ratio 04/03/2024 3.3  2.0 - 5.0 Final    Non-HDL Cholesterol 04/03/2024 100  mg/dL Final    Comment: Risk category and Non-HDL cholesterol goals:  Coronary heart disease (CHD)or equivalent (10-year risk of CHD >20%):  Non-HDL cholesterol goal     <130 mg/dL  Two or more CHD risk factors and 10-year risk of CHD <= 20%:  Non-HDL cholesterol goal     <160 mg/dL  0 to 1 CHD risk factor:  Non-HDL cholesterol goal     <190 mg/dL      TSH 04/03/2024 4.372  0.340 - 5.600 uIU/mL Final    Hemoglobin A1C 04/03/2024 5.6  4.5 - 6.2 % Final    Comment: According to ADA guidelines, hemoglobin A1C <7.0% represents  optimal control in non-pregnant diabetic patients.  Different  metrics may apply to specific populations.   Standards of Medical Care in Diabetes - 2016.    For the purpose of screening for the presence of diabetes:  <5.7%     Consistent with the absence of diabetes  5.7-6.4%  Consistent with increasing risk for diabetes   (prediabetes)  >or=6.5%  Consistent with diabetes    Currently no consensus exists for use of hemoglobin A1C  for diagnosis of diabetes for children.      Estimated Avg Glucose 04/03/2024 114  68 - 131 mg/dL Final     Assessment:       1. Essential hypertension    2. Dyslipidemia    3. New onset of headaches after age 50    4. Blurry vision, bilateral    5. MDS (myelodysplastic syndrome)    6. Anemia, chronic renal failure, stage 3 (moderate)    7. Chronic kidney disease (CKD), stage IV (severe)    8. Secondary hyperparathyroidism of renal origin        Plan:       Essential hypertension  Stable, continue amlodipine 5 mg. Reduce the amount of salt  in your diet; Lose weight; Avoid drinking too much alcohol; Exercise at least 30 minutes per day most days of the week.  Continue current medications and home BP monitoring.    Dyslipidemia  Stable, continue Lipitor 10 mg. Limit red meat, butter, fried foods, cheese, and other foods that have a lot of saturated fat. Consume more: lean meats, fish, fruits, vegetables, whole grains, beans, lentils, and nuts.      New onset of headaches after age 50  No neurological deficits. There have been no new meds. BP is controlled. Denies allergies and sinus pressure.  He does reports blurry vision, which has been ongoing for a while, perhaps straining to see is contributing to his headaches.  Will start with MRI to look for any actue abnormalities.  Continue Tylenol prn. Discussed if MRI negative he may need preventative treatment if headaches are persistent and will place referral to neurology.   -     MRI Brain Without Contrast; Future; Expected date: 04/22/2024    Blurry vision, bilateral  Referred to optometry for routine eye exam and prescription glasses.  -     Ambulatory referral/consult to Optometry; Future; Expected date: 04/29/2024    MDS (myelodysplastic syndrome)  Managed by Heme-Onc.     Anemia, chronic renal failure, stage 3 (moderate)  Managed by Heme-Onc.     Chronic kidney disease (CKD), stage IV (severe)  Managed by Nephrology.     Secondary hyperparathyroidism of renal origin  Managed by Nephrology.     This note was created using [employer] voice recognition software that occasionally misinterprets phrases or words.     I spent a total of 43 minutes on the day of the visit.This includes face to face time and non-face to face time preparing to see the patient (eg, review of tests), obtaining and/or reviewing separately obtained history, documenting clinical information in the electronic or other health record, independently interpreting results and communicating results to the patient/family/caregiver, or care  coordinator.    Follow up in about 6 months (around 10/22/2024) for HTN and HLD.          4/22/2024 Reynaldo Basilio, LATOYA, FNP

## 2024-04-26 ENCOUNTER — INFUSION (OUTPATIENT)
Dept: INFUSION THERAPY | Facility: HOSPITAL | Age: 64
End: 2024-04-26
Attending: INTERNAL MEDICINE
Payer: COMMERCIAL

## 2024-04-26 VITALS
OXYGEN SATURATION: 98 % | WEIGHT: 233.13 LBS | BODY MASS INDEX: 33.38 KG/M2 | SYSTOLIC BLOOD PRESSURE: 137 MMHG | TEMPERATURE: 98 F | DIASTOLIC BLOOD PRESSURE: 83 MMHG | RESPIRATION RATE: 18 BRPM | HEART RATE: 87 BPM | HEIGHT: 70 IN

## 2024-04-26 DIAGNOSIS — D64.9 CHRONIC ANEMIA: Primary | ICD-10-CM

## 2024-04-26 DIAGNOSIS — D64.89 ANEMIA DUE TO MULTIPLE MECHANISMS: ICD-10-CM

## 2024-04-26 DIAGNOSIS — D46.9 MDS (MYELODYSPLASTIC SYNDROME): ICD-10-CM

## 2024-04-26 DIAGNOSIS — D64.9 NORMOCHROMIC ANEMIA: ICD-10-CM

## 2024-04-26 DIAGNOSIS — N18.30 STAGE 3 CHRONIC KIDNEY DISEASE, UNSPECIFIED WHETHER STAGE 3A OR 3B CKD: ICD-10-CM

## 2024-04-26 PROCEDURE — 63600175 PHARM REV CODE 636 W HCPCS: Mod: JZ,EC,JG | Performed by: INTERNAL MEDICINE

## 2024-04-26 PROCEDURE — 96372 THER/PROPH/DIAG INJ SC/IM: CPT

## 2024-04-26 RX ADMIN — EPOETIN ALFA-EPBX 40000 UNITS: 40000 INJECTION, SOLUTION INTRAVENOUS; SUBCUTANEOUS at 04:04

## 2024-04-26 NOTE — PLAN OF CARE
Problem: Fatigue  Goal: Improved Activity Tolerance  Outcome: Progressing  Intervention: Promote Improved Energy  Flowsheets (Taken 4/26/2024 1607)  Fatigue Management: frequent rest breaks encouraged  Sleep/Rest Enhancement: regular sleep/rest pattern promoted  Activity Management: Ambulated -L4  Environmental Support: calm environment promoted

## 2024-04-30 ENCOUNTER — HOSPITAL ENCOUNTER (OUTPATIENT)
Dept: RADIOLOGY | Facility: HOSPITAL | Age: 64
Discharge: HOME | End: 2024-04-30
Attending: NURSE PRACTITIONER
Payer: COMMERCIAL

## 2024-04-30 DIAGNOSIS — R51.9 NEW ONSET OF HEADACHES AFTER AGE 50: ICD-10-CM

## 2024-04-30 PROCEDURE — 70551 MRI BRAIN STEM W/O DYE: CPT | Mod: 26,,, | Performed by: RADIOLOGY

## 2024-04-30 PROCEDURE — 70551 MRI BRAIN STEM W/O DYE: CPT | Mod: TC,PO

## 2024-05-01 ENCOUNTER — TELEPHONE (OUTPATIENT)
Dept: OTOLARYNGOLOGY | Facility: CLINIC | Age: 64
End: 2024-05-01
Payer: COMMERCIAL

## 2024-05-01 ENCOUNTER — LAB VISIT (OUTPATIENT)
Dept: LAB | Facility: HOSPITAL | Age: 64
End: 2024-05-01
Attending: INTERNAL MEDICINE
Payer: COMMERCIAL

## 2024-05-01 DIAGNOSIS — J33.8 MAXILLARY SINUS POLYP: Primary | ICD-10-CM

## 2024-05-01 DIAGNOSIS — J33.8: ICD-10-CM

## 2024-05-01 DIAGNOSIS — D57.3 SICKLE CELL TRAIT SYNDROME: ICD-10-CM

## 2024-05-01 DIAGNOSIS — D64.89 ANEMIA DUE TO MULTIPLE MECHANISMS: ICD-10-CM

## 2024-05-01 LAB
ALBUMIN SERPL BCP-MCNC: 4.2 G/DL (ref 3.5–5.2)
ALP SERPL-CCNC: 73 U/L (ref 55–135)
ALT SERPL W/O P-5'-P-CCNC: 17 U/L (ref 10–44)
ANION GAP SERPL CALC-SCNC: 5 MMOL/L (ref 8–16)
AST SERPL-CCNC: 20 U/L (ref 10–40)
BASOPHILS # BLD AUTO: 0.1 K/UL (ref 0–0.2)
BASOPHILS NFR BLD: 1.5 % (ref 0–1.9)
BILIRUB SERPL-MCNC: 0.6 MG/DL (ref 0.1–1)
BUN SERPL-MCNC: 28 MG/DL (ref 8–23)
CALCIUM SERPL-MCNC: 8.8 MG/DL (ref 8.7–10.5)
CHLORIDE SERPL-SCNC: 108 MMOL/L (ref 95–110)
CO2 SERPL-SCNC: 28 MMOL/L (ref 23–29)
CREAT SERPL-MCNC: 2.5 MG/DL (ref 0.5–1.4)
DIFFERENTIAL METHOD BLD: ABNORMAL
EOSINOPHIL # BLD AUTO: 0.2 K/UL (ref 0–0.5)
EOSINOPHIL NFR BLD: 3.5 % (ref 0–8)
ERYTHROCYTE [DISTWIDTH] IN BLOOD BY AUTOMATED COUNT: 27.7 % (ref 11.5–14.5)
EST. GFR  (NO RACE VARIABLE): 28 ML/MIN/1.73 M^2
GLUCOSE SERPL-MCNC: 104 MG/DL (ref 70–110)
HCT VFR BLD AUTO: 34.4 % (ref 40–54)
HGB BLD-MCNC: 11.4 G/DL (ref 14–18)
IMM GRANULOCYTES # BLD AUTO: 0.09 K/UL (ref 0–0.04)
IMM GRANULOCYTES NFR BLD AUTO: 1.3 % (ref 0–0.5)
LYMPHOCYTES # BLD AUTO: 1.6 K/UL (ref 1–4.8)
LYMPHOCYTES NFR BLD: 24.1 % (ref 18–48)
MCH RBC QN AUTO: 29.8 PG (ref 27–31)
MCHC RBC AUTO-ENTMCNC: 33.1 G/DL (ref 32–36)
MCV RBC AUTO: 90 FL (ref 82–98)
MONOCYTES # BLD AUTO: 0.8 K/UL (ref 0.3–1)
MONOCYTES NFR BLD: 11.7 % (ref 4–15)
NEUTROPHILS # BLD AUTO: 3.9 K/UL (ref 1.8–7.7)
NEUTROPHILS NFR BLD: 57.9 % (ref 38–73)
NRBC BLD-RTO: 0 /100 WBC
PLATELET # BLD AUTO: 503 K/UL (ref 150–450)
PMV BLD AUTO: 10 FL (ref 9.2–12.9)
POTASSIUM SERPL-SCNC: 4.4 MMOL/L (ref 3.5–5.1)
PROT SERPL-MCNC: 7.2 G/DL (ref 6–8.4)
RBC # BLD AUTO: 3.82 M/UL (ref 4.6–6.2)
SODIUM SERPL-SCNC: 141 MMOL/L (ref 136–145)
WBC # BLD AUTO: 6.77 K/UL (ref 3.9–12.7)

## 2024-05-01 PROCEDURE — 36415 COLL VENOUS BLD VENIPUNCTURE: CPT | Performed by: INTERNAL MEDICINE

## 2024-05-01 PROCEDURE — 85025 COMPLETE CBC W/AUTO DIFF WBC: CPT | Performed by: INTERNAL MEDICINE

## 2024-05-01 PROCEDURE — 80053 COMPREHEN METABOLIC PANEL: CPT | Performed by: INTERNAL MEDICINE

## 2024-05-01 NOTE — TELEPHONE ENCOUNTER
Patient has not seen ENT yet. Message sent to PCP's office as they contacted patient. Thanks, Destini

## 2024-05-01 NOTE — PROGRESS NOTES
Please call patient.  MRI showed mucous retention cysts versus polyps of the maxillary sinuses.  Mucosal thickening versus polyp of the left frontal sinus. Will place referral to ENT.

## 2024-05-01 NOTE — TELEPHONE ENCOUNTER
----- Message from Rahat Calix sent at 5/1/2024  3:34 PM CDT -----  Contact: self  Type: Sooner Appointment Request        Caller is requesting a sooner appointment. Caller declined first available appointment listed below. Caller will not accept being placed on the waitlist and is requesting a message be sent to doctor.        Name of Caller: Patient   Best Call Back Number: [phone]  Additional Information: Plz call the pt had a MRI today states cant recall everything that was said because he was at [facility]. Plz call to clarify that info. Thanks

## 2024-05-03 ENCOUNTER — INFUSION (OUTPATIENT)
Dept: INFUSION THERAPY | Facility: HOSPITAL | Age: 64
End: 2024-05-03
Attending: INTERNAL MEDICINE
Payer: COMMERCIAL

## 2024-05-03 VITALS
SYSTOLIC BLOOD PRESSURE: 128 MMHG | DIASTOLIC BLOOD PRESSURE: 82 MMHG | WEIGHT: 229.69 LBS | BODY MASS INDEX: 32.88 KG/M2 | OXYGEN SATURATION: 97 % | TEMPERATURE: 98 F | HEART RATE: 93 BPM | HEIGHT: 70 IN | RESPIRATION RATE: 18 BRPM

## 2024-05-03 DIAGNOSIS — N18.30 STAGE 3 CHRONIC KIDNEY DISEASE, UNSPECIFIED WHETHER STAGE 3A OR 3B CKD: ICD-10-CM

## 2024-05-03 DIAGNOSIS — D64.89 ANEMIA DUE TO MULTIPLE MECHANISMS: ICD-10-CM

## 2024-05-03 DIAGNOSIS — D46.9 MDS (MYELODYSPLASTIC SYNDROME): ICD-10-CM

## 2024-05-03 DIAGNOSIS — D64.9 NORMOCHROMIC ANEMIA: ICD-10-CM

## 2024-05-03 DIAGNOSIS — D64.9 CHRONIC ANEMIA: Primary | ICD-10-CM

## 2024-05-03 PROCEDURE — 63600175 PHARM REV CODE 636 W HCPCS: Mod: JZ,EC,JG | Performed by: INTERNAL MEDICINE

## 2024-05-03 PROCEDURE — 96372 THER/PROPH/DIAG INJ SC/IM: CPT

## 2024-05-03 RX ADMIN — EPOETIN ALFA-EPBX 40000 UNITS: 40000 INJECTION, SOLUTION INTRAVENOUS; SUBCUTANEOUS at 04:05

## 2024-05-03 NOTE — PLAN OF CARE
Problem: Anemia  Goal: Anemia Symptom Improvement  Outcome: Progressing  Intervention: Monitor and Manage Anemia  Flowsheets (Taken 5/3/2024 1603)  Oral Nutrition Promotion: rest periods promoted  Safety Promotion/Fall Prevention: assistive device/personal item within reach  Fatigue Management:   activity schedule adjusted   frequent rest breaks encouraged

## 2024-05-08 ENCOUNTER — OFFICE VISIT (OUTPATIENT)
Dept: OTOLARYNGOLOGY | Facility: CLINIC | Age: 64
End: 2024-05-08
Payer: COMMERCIAL

## 2024-05-08 VITALS
BODY MASS INDEX: 33.12 KG/M2 | SYSTOLIC BLOOD PRESSURE: 142 MMHG | WEIGHT: 230.81 LBS | HEART RATE: 77 BPM | DIASTOLIC BLOOD PRESSURE: 83 MMHG

## 2024-05-08 DIAGNOSIS — R93.0 ABNORMAL MRI OF HEAD: Primary | ICD-10-CM

## 2024-05-08 DIAGNOSIS — J33.8 MAXILLARY SINUS POLYP: ICD-10-CM

## 2024-05-08 DIAGNOSIS — J30.0 VASOMOTOR RHINITIS: ICD-10-CM

## 2024-05-08 DIAGNOSIS — J32.9 SINUSITIS, UNSPECIFIED CHRONICITY, UNSPECIFIED LOCATION: ICD-10-CM

## 2024-05-08 DIAGNOSIS — J33.8: ICD-10-CM

## 2024-05-08 PROCEDURE — 99999 PR PBB SHADOW E&M-EST. PATIENT-LVL IV: CPT | Mod: PBBFAC,,, | Performed by: OTOLARYNGOLOGY

## 2024-05-08 PROCEDURE — 1159F MED LIST DOCD IN RCRD: CPT | Mod: CPTII,S$GLB,, | Performed by: OTOLARYNGOLOGY

## 2024-05-08 PROCEDURE — 3044F HG A1C LEVEL LT 7.0%: CPT | Mod: CPTII,S$GLB,, | Performed by: OTOLARYNGOLOGY

## 2024-05-08 PROCEDURE — 3079F DIAST BP 80-89 MM HG: CPT | Mod: CPTII,S$GLB,, | Performed by: OTOLARYNGOLOGY

## 2024-05-08 PROCEDURE — 99204 OFFICE O/P NEW MOD 45 MIN: CPT | Mod: S$GLB,,, | Performed by: OTOLARYNGOLOGY

## 2024-05-08 PROCEDURE — 3008F BODY MASS INDEX DOCD: CPT | Mod: CPTII,S$GLB,, | Performed by: OTOLARYNGOLOGY

## 2024-05-08 PROCEDURE — 1160F RVW MEDS BY RX/DR IN RCRD: CPT | Mod: CPTII,S$GLB,, | Performed by: OTOLARYNGOLOGY

## 2024-05-08 PROCEDURE — 3077F SYST BP >= 140 MM HG: CPT | Mod: CPTII,S$GLB,, | Performed by: OTOLARYNGOLOGY

## 2024-05-08 RX ORDER — CALCITRIOL 0.25 UG/1
0.25 CAPSULE ORAL
COMMUNITY
Start: 2024-03-10

## 2024-05-08 RX ORDER — AZELASTINE 1 MG/ML
2 SPRAY, METERED NASAL 2 TIMES DAILY PRN
Qty: 30 ML | Refills: 5 | Status: SHIPPED | OUTPATIENT
Start: 2024-05-08 | End: 2024-06-07

## 2024-05-08 RX ORDER — SOD CHLOR,BICARB/SQUEEZ BOTTLE
1 PACKET, WITH RINSE DEVICE NASAL 2 TIMES DAILY
Qty: 1 EACH | Refills: 11 | Status: SHIPPED | OUTPATIENT
Start: 2024-05-08

## 2024-05-08 RX ORDER — FLUTICASONE PROPIONATE 50 MCG
1 SPRAY, SUSPENSION (ML) NASAL 2 TIMES DAILY
Qty: 15.8 ML | Refills: 5 | Status: SHIPPED | OUTPATIENT
Start: 2024-05-08

## 2024-05-08 NOTE — PROGRESS NOTES
[facility]    Subjective:      Patient: Rick Butler Patient PCP: Reynaldo Basilio FNP-C         :  1960     Sex:  male      MRN:  [id number]          Date of Visit: 2024      Chief Complaint: Sinus Problem (Had MRI Brain done 24. PCP referred to ENT for evaluation on abnormalties in the sinuses. States does have some sinus symptoms, has to blow nose every morning when he wakes up. States nose is runny in the morning. States that nose does run after eating dinner and relaxing. Did notice some blood when blowing his nose, but has not seen any for about a week. )      Patient ID: Rick Butler is a 64 y.o. male     Patient is a  former smoker with a past medical history of previous ETOH abuse, HTN, HLD, CKD IV, chronic anemia with myelodysplastic syndrome referred to me by Reynaldo Marie* in consultation for findings of a quit maxillary sinus polyp.    Patient underwent MRI brain without contrast 2024 reportedly for new onset or worsening headache with chronic involutional changes of the brain and mucous retention cysts versus polyps of the maxillary sinuses also reported as well as mucosal thickening versus polyp of the left frontal sinus.  My review of those images (pertinent images below) revealed no evidence of fluid levels, destructive process, atypical T2 signal or any indication of significant sinusitis.  We will prior MRI or CT imaging of the head for comparison.    Patient denies any symptoms of facial pressure, pain in the dental area, loss of smell, nasal obstruction or purulent discharge or foul smell.  Not treated for recurrent infections.  Not having any progressive nasal obstruction or bleeding.  No prior use of/need for intranasal steroids or rinses.  Does feel a significant degree of postnasal drip mucus production.  At times this affects his appetite but not resulting in any weight loss.  No sore throat or chronic throat clearing.  No concerning symptoms  of nasal obstruction pain or bleeding.          Labs:  WBC   Date Value Ref Range Status   05/01/2024 6.77 3.90 - 12.70 K/uL Final     Hemoglobin   Date Value Ref Range Status   05/01/2024 11.4 (L) 14.0 - 18.0 g/dL Final     Platelets   Date Value Ref Range Status   05/01/2024 503 (H) 150 - 450 K/uL Final     Comment:     Reviewed by Technologist.     Creatinine   Date Value Ref Range Status   05/01/2024 2.5 (H) 0.5 - 1.4 mg/dL Final     TSH   Date Value Ref Range Status   04/03/2024 4.372 0.340 - 5.600 uIU/mL Final     Hemoglobin A1C   Date Value Ref Range Status   04/03/2024 5.6 4.5 - 6.2 % Final     Comment:     According to ADA guidelines, hemoglobin A1C <7.0% represents  optimal control in non-pregnant diabetic patients.  Different  metrics may apply to specific populations.   Standards of Medical Care in Diabetes - 2016.    For the purpose of screening for the presence of diabetes:  <5.7%     Consistent with the absence of diabetes  5.7-6.4%  Consistent with increasing risk for diabetes   (prediabetes)  >or=6.5%  Consistent with diabetes    Currently no consensus exists for use of hemoglobin A1C  for diagnosis of diabetes for children.         Past Medical History  He has a past medical history of Abnormal chest CT (new), Anemia due to multiple mechanisms, Anemia, chronic renal failure, stage 2 (mild), Anemia, chronic renal failure, stage 3 (moderate), Depression, Former smoker, H/O ETOH abuse, Lung mass (new), Monocytosis, Myelodysplasia (myelodysplastic syndrome), Myelodysplasia (myelodysplastic syndrome), Personal history of colonic polyps, RARS (refractory anemia with ringed sideroblasts), and Sickle cell trait.    Family / Surgical / Social History  His family history includes Arthritis in his mother; Depression in his mother; Heart attack (age of onset: 59) in his father; Heart disease in his mother; Hypertension in his mother.    Past Surgical History:   Procedure Laterality Date    BONE MARROW BIOPSY  N/A 2019    Procedure: BIOPSY, BONE MARROW;  Surgeon: Satinder Diagnostic Provider;  Location: [facility] OR;  Service: Interventional Radiology;  Laterality: N/A;    COLONOSCOPY N/A 2021    Procedure: COLONOSCOPY;  Surgeon: Rob Collins MD;  Location: [facility] ENDO;  Service: Endoscopy;  Laterality: N/A;    COLONOSCOPY  2023    5 YR RECALL    ESOPHAGOGASTRODUODENOSCOPY N/A 2021    Procedure: EGD (ESOPHAGOGASTRODUODENOSCOPY);  Surgeon: Rob Collins MD;  Location: [facility] ENDO;  Service: Endoscopy;  Laterality: N/A;    INJECTION OF ANESTHETIC AGENT AROUND MEDIAL BRANCH NERVES INNERVATING LUMBAR FACET JOINT Bilateral 2018    Procedure: BLOCK-NERVE-MEDIAL BRANCH-LUMBAR;  Surgeon: Nura Heredia MD;  Location: [facility];  Service: Pain Management;  Laterality: Bilateral;  L3, 4, 5    KNEE ARTHROSCOPY W/ MENISCECTOMY Right 2021    Procedure: ARTHROSCOPY, KNEE, WITH MENISCECTOMY;  Surgeon: Sebastian Green MD;  Location: [facility];  Service: Orthopedics;  Laterality: Right;  partial medial meniscectomy    RADIOFREQUENCY ABLATION OF LUMBAR MEDIAL BRANCH NERVE AT SINGLE LEVEL Bilateral 2018    Procedure: RADIOFREQUENCY ABLATION, NERVE, MEDIAL BRANCH, LUMBAR, 1 LEVEL;  Surgeon: Nura Heredia MD;  Location: [facility];  Service: Pain Management;  Laterality: Bilateral;  L3, 4, 5 - Burned at 80 degrees C.  for 75 seconds x 2 each site    SMALL BOWEL ENTEROSCOPY N/A 10/16/2019    Procedure: ENTEROSCOPY;  Surgeon: Rob Collins MD;  Location: [facility];  Service: Endoscopy;  Laterality: N/A;       Social History     Tobacco Use    Smoking status: Former     Current packs/day: 0.00     Types: Cigarettes     Quit date: 1996     Years since quittin.3    Smokeless tobacco: Never   Substance and Sexual Activity    Alcohol use: Not Currently     Alcohol/week: 21.0 standard drinks of alcohol     Types: 21 Cans of beer per week     Comment: Sober 5 years    Drug use: Not Currently     Types: Marijuana     "Sexual activity: Yes     Partners: Female       Medications  He has a current medication list which includes the following prescription(s): amlodipine, atorvastatin, calcitriol, epoetin tray, famotidine, folic acid, multivitamin, pantoprazole, sildenafil, tamsulosin, and [DISCONTINUED] fluoxetine.      Allergies  Review of patient's allergies indicates:  No Known Allergies    All medications, allergies, and past history have been reviewed.    Objective:      Vitals:      4/26/2024     4:09 PM 5/3/2024     4:04 PM 5/8/2024     3:56 PM   Vitals - 1 value per visit   SYSTOLIC 137 128 142   DIASTOLIC 83 82 83   Pulse 87 93 77   Temp 98.2 °F (36.8 °C) 97.5 °F (36.4 °C)    Resp 18 18    SPO2 98 % 97 %    Weight (lb) 233.1 229.7 230.82   Weight (kg) 105.733 104.191 104.7   Height 5' 10" (1.778 m) 5' 10" (1.778 m)    BMI (Calculated) 33.4 33    Pain Score Zero Zero Zero       Body surface area is 2.27 meters squared.    Physical Exam:    GENERAL  APPEARANCE -  alert, appears stated age, and cooperative  BARRIER(S) TO COMMUNICATION -  none VOICE - appropriate for age and gender    INTEGUMENTARY  no suspicious head and neck lesions    HEENT  HEAD: Normocephalic, without obvious abnormality, atraumatic  FACE: INSPECTION - Symmetric, no signs of trauma, no suspicious lesion(s)      STRENGTH - facial symmetry intact     PALPATION -  No masses     SALIVARY GLANDS - non-tender with no appreciable mass    NECK/THYROID: normal atraumatic, no neck masses, normal thyroid, no jvd    EYES  Normal occular alignment and mobility with no visible nystagmus at rest    EARS/NOSE/MOUTH/THROAT  EARS  PINNAE AND EXTERNAL EARS - no suspicious lesion OTOSCOPIC EXAM (surgical microscopy was not used for visualization/instrumentation): EAR EXAM - Normal ear canals, tympanic membranes and mobility, and middle ear spaces bilaterally.  HEARING - grossly intact to voice/finger rub    NOSE AND SINUSES  EXTERNAL NOSE - Grossly normal for age/sex  SEPTUM - " mild S deformity TURBINATES - within normal limits MUCOSA -  generally pink mildly congested.  Some slight stickiness of the mucus but no pooling/active drainage.  No crusting or erythema.  Not markedly cyanotic.  No gross anterior edema.    MOUTH AND THROAT   ORAL CAVITY, LIPS, TEETH, GUMS & TONGUE - moist, no suspicious lesions  OROPHARYNX /TONSILS/PHARYNGEAL WALLS/HYPOPHARYNX - no erythema or exudates  NASOPHARYNX - limited mirror exam - unable to visualize due to anatomy/gag  LARYNX -  - limited mirror exam - unable to visualize due to anatomy/gag      CHEST AND LUNG   INSPECTION & AUSCULTATION - normal effort, no stridor    CARDIOVASCULAR  AUSCULTATION & PERIPHERAL VASCULAR - regular rate and rhythm.    NEUROLOGIC  MENTAL STATUS - alert, interactive CRANIAL NERVES - normal    LYMPHATIC  HEAD AND NECK - non-palpable; SUPRACLAVICULAR - deferred      Procedure(s):  None          Assessment:      Problem List Items Addressed This Visit    None  Visit Diagnoses       Abnormal MRI of head    -  Primary    Sinusitis, unspecified chronicity, unspecified location        Maxillary sinus polyp        Polyp of frontal sinus        Vasomotor rhinitis                     Plan:      No indication to proceed with any further evaluation and treatment.  Mild S deviation of the septum and non destructive non infectious findings of the sinuses on MRI without correlating symptoms.  Information on intranasal steroids as well as saline/rinses if needed for any development of sinonasal symptoms.    Follow up as needed          Voice recognition software was used in the creation of this note/communication and any sound-alike errors which may have occurred from its use should be taken in context when interpreting.  If such errors prevent a clear understanding of the note/communication, please contact the office for clarification.

## 2024-05-08 NOTE — PATIENT INSTRUCTIONS
Return with any worsening of symptoms, failure to improve, or any other concerns for further evaluation and treatment.      Voice recognition software was used in the creation of this note/communication and any sound-alike errors which may have occurred from its use should be taken in context when interpreting.  If such errors prevent a clear understanding of the note/communication, please contact the office for clarification.  NASAL SALINE    Still saline comes in many preparations including sprays/mists, gels, and rinses.  Different preparations served different purposes.  Saline spray helps to briefly moisturize the nose and help clear mucus.  Saline gels coat the nose for longer protective benefit of keeping the linings the nose moist.  Saline rinses clear the nose and sinuses and a more thorough way in her best used for significant postnasal drip and sinus complaints.  A combination of saline sprays/mists, gels and rinses should be used to address routine nasal clearing and dryness issues as well as flushing for better control of allergy and postnasal drip symptoms.  There is no real risk of over use of nasal saline products.  Saline sprays do not have any of the potential rebound or addiction of nasal decongestant sprays.  Nasal saline sprays and rinses should be used prior to the application of any medicated nasal sprays such as nasal steroids or nasal antihistamine sprays.        INTRANASAL STEROID SPRAYS      Intranasal steroid sprays are available both by prescription and over-the-counter both in generic and brand name preparations.  They are all very similar in efficacy and side effect profiles.  Over-the-counter and prescription intranasal steroids include fluticasone propionate (Flonase), fluticasone furoate (Sensimist), triamcinolone (Nasacort), and budesonide (Rhinocort).  While these medications are available as prescriptions as well there are few nasal steroids in her available by prescription  only and include mometasone (Nasonex), flunisolide (Nasarel), and beclomethasone (QNASL).    Nasal steroids or the foundation of treatment of both allergy and other inflammatory conditions of the nose and sinuses.  They are safe for regular use and while there are many side effects listed most of these are steroid class effects and not typically encountered.  Typical side effects include dryness and even ulceration and bleeding of the nose.  These side effects can be minimized by proper application and proper moisturization with saline and saline gel.    Sometimes changing between 1 brand of nasal steroid and another can result in improved control of symptoms especially after long term use of one particular nasal steroid.    Proper application of the nasal steroid spray is accomplished by spraying towards the I/ear on the same side with the tip of the superior just barely inside the nostril with the chin slightly downward.  Any dripping should be gently inhaled not sniff test backwards into the throat.  Labeled instructions should be followed.        ASTELIN (Azelastine) nasal spray    Astelin is a topical nasal antihistamine which can be of additional benefit in controlling nasal allergy and postnasal drip.  Typically is recommended on an as-needed basis 1-2 sprays each nostril twice daily.  People often find it beneficial at night.  This typically added to her regimen of saline and nasal steroids as a 3rd line agent for as needed use.  Excessive use can cause excessive dryness and even nose bleeds.  It has a very strong taste which many people find intolerable.  Astelin needs to be stopped 5-7 days prior to any skin allergy testing just like oral antihistamines as it will inhibit the skin response.      SINUS RINSE INSTRUCTIONS    Nasal Saline Irrigation Instructions  You can wash your nasal and sinus passages using nasal saline (salt water) irrigation. This   is simple and effective. Follow the instructions  below, as well as the ones provided by your   physician.  Supplies  First, you will need a nasal saline irrigation bottle and rinse solution.   You can purchase nasal rinse kits that include these items (such as   NeilMed®, Ayr®, Simply Saline®, Ocean Complete®) at most drug   stores. You can also make your own saline irrigation solution by   adding kosher (non-iodine) salt and baking soda to distilled water.   Your physician may tell you to add medications like a steroid or   antibiotic to the rinse as needed.  Steps for nasal irrigation  Step 1. Fill the bottle  ? Wash your hands.  ? Fill the irrigation bottle with lukewarm distilled water or boiled water that has cooled.  Step 2. Mix the solution  ? Put the saline and salt packet contents into the bottle.  ? Tighten the top of the bottle and shake it gently to dissolve the mixture.  ? If you are making your own solution:   - Add 1/4 to 1/2 teaspoon of baking soda and 1/8 teaspoon of kosher (non-iodine) salt   into the bottle.   - Tighten the top of the bottle.   - Shake the bottle gently to dissolve the mixture.  Step 3. Get into position  ?  front of the sink.  ? Unless you were instructed to use another position, bend forward.   Then tilt your face down about 45 degrees so that you are looking   down into the sink.  ? Gently place the spout of the saline bottle against 1 of your nostrils.  [facility]  CARE AND TREATMENT  Patient Education  ©2018 [employer]  ©2018 [employer]  Step 4. Rinse  ? Breathing through your mouth, gently squeeze the   bottle. This will squirt the solution into your nostril. The   solution will start to drain from the other nostril. Some   may drain from your mouth. This is normal.  ? Use 2 ounces (half of the bottle) on each nostril.  ? Afterwards, you may need to blow your nose gently to   help drain any solution that is left behind.  Step 5. Repeat  ? Repeat steps 3 and  4 with the other nostril.  You can watch a video to learn how to do nasal saline irrigation. Go to youtube.com and   search for NeilMed Sinus Rinse.  Step 6.  Clean the bottle and cap. Air dry the Sinus Rinse bottle, cap, and tube on a clean paper towel, a lint free towel, or use NeilMed® NasaDOCK® or NasaDOCK plus (sold separately) to store the bottle, cap and tube.  Please read Warnings before using.  Our recommendation is to replace the bottle every three months.      NEILMED CLEAING INSTRUCTIONS    It is very important to keep these devices clean and free from any contamination. Replace the bottle every 3 months.  NasaDock Plus  NasaDock NeilMed® SINUS RINSE Squeeze Bottle: Please perform routine inspections of the bottle and tube for any discolorations and cracks. If there are any visual signs of deterioration or permanent color changes, please clean thoroughly. If the discolorations remain after cleansing, discard the items and purchase new ones. Please follow these instructions after each use of the product. Be sure to replace your product after three months.  Step 1: Rinse the cap, tube and bottle using running water. Fill the bottle with distilled, micro-filtered (through 0.2 micron), reverse osmosis filtered, commercially bottled or previously boiled and cooled down water at lukewarm or body temperature..  Step 2: Add a few drops of dish washing liquid or baby shampoo.  Step 3: Attach the cap and tube to the bottle; hold your finger over the opening in the cap and shake the bottle vigorously.  Step 4: Squeeze the bottle hard to allow the soapy solution to clean the interior of the tube and the cap. Empty out the bottle completely.  Step 5: Rinse the soap from the bottle, cap and tube thoroughly and place the items on a clean paper towel to dry or use the preferred NasaDOCK® or NasaDOCK plus.    The NasaDOCK® is a simple, hygienic way to dry and store the SINUS RINSE bottle, cap and tube. NasaDOCK®  comes with various hanging options and is available in different colors. Our newest model also offers storage for our SINUS RINSE mixture packets. We strongly suggest using NasaDOCK® as an inexpensive, easy way to dry the cap, tube and SINUS RINSE bottle.        Cleaning:  Do not use a  to clean the inside of a bottle. While our bottle is  safe, a  will not adequately clean the SINUS RINSE bottle. The water jets in dishwashers cannot enter the narrow neck of the bottle, and portions of the bottles interior will not be cleaned thoroughly. Additional methods of cleaning the bottle include the use of concentrated white vinegar or isopropyl alcohol (70% concentration), followed by scrubbing and rinsing as described above.       Microwave Disinfection  Clean the device with soap and water as mentioned above and shake off the excess water. Now place the bottle, cap and tube in the microwave for 40 seconds. This will disinfect the bottle, cap and tube. If the microwave has been used recently, please make sure that the inside of the microwave has cooled back down to room temperature before using it to disinfect the bottle.    NeilMed NasaFlo® Neti Pot Users:  Use the same procedure as above.    Sinugator® Cleaning Directions:  Clean the Sinugator® by running plain water and dry with a clean lint free towel and then air dry the unit by keeping it open to the air. The nasal  tip, blue reservoir and white soft tube can be disinfected by cleaning with soap and water and shaking off the excess water before placing in the home microwave for 60 seconds. Clean the entire unit with a few drops of dishwashing liquid and water every three days to keep the unit clean. As a fi nal rinse to wash off any residual soap or tap water, use either distilled, micro-filtered (through 0.2 micron filter), commercially bottled or previously boiled & cooled down water. Please make sure to rinse  thoroughly during each wash so no soap is left behind. DO NOT place the white motor unit in microwave for disinfection. Because of the units stainless steel components, this can cause damage or fire hazards.    General Principles of Maintenance & Storage:  When permissible use a microwave periodically to disinfect devices. Always store NeilMed® products in a cool and dry place with adequate ventilation. NasaDOCK® or NasaDOCK plus offer a simple hygienic way to air dry & neatly store the bottle, cap, tube and NasaFlo. Do not store the bottle with the cap screwed on, unless both are dry. Do not store the wet parts in a sealed plastic bag. If you travel before they are dry, wrap parts separately in paper towels. Hand soap or shampoo can be used for cleaning parts while away from home.        USE ONLY AS DIRECTED, IF SYMPTOMS PERSIST SEE YOUR DOCTOR/HEALTHCARE PROFESSIONAL. ALWAYS READ THE LABEL.

## 2024-05-09 ENCOUNTER — INFUSION (OUTPATIENT)
Dept: INFUSION THERAPY | Facility: HOSPITAL | Age: 64
End: 2024-05-09
Attending: INTERNAL MEDICINE
Payer: COMMERCIAL

## 2024-05-09 VITALS
RESPIRATION RATE: 17 BRPM | OXYGEN SATURATION: 97 % | WEIGHT: 231.38 LBS | TEMPERATURE: 98 F | HEART RATE: 82 BPM | BODY MASS INDEX: 33.12 KG/M2 | DIASTOLIC BLOOD PRESSURE: 90 MMHG | HEIGHT: 70 IN | SYSTOLIC BLOOD PRESSURE: 155 MMHG

## 2024-05-09 DIAGNOSIS — D46.1 RARS (REFRACTORY ANEMIA WITH RINGED SIDEROBLASTS): Primary | ICD-10-CM

## 2024-05-09 DIAGNOSIS — D46.9 MDS (MYELODYSPLASTIC SYNDROME): ICD-10-CM

## 2024-05-09 PROCEDURE — 96372 THER/PROPH/DIAG INJ SC/IM: CPT

## 2024-05-09 PROCEDURE — 63600175 PHARM REV CODE 636 W HCPCS: Mod: JZ,JG | Performed by: INTERNAL MEDICINE

## 2024-05-09 RX ADMIN — LUSPATERCEPT 50 MG: 75 INJECTION, POWDER, LYOPHILIZED, FOR SOLUTION SUBCUTANEOUS at 04:05

## 2024-05-10 ENCOUNTER — INFUSION (OUTPATIENT)
Dept: INFUSION THERAPY | Facility: HOSPITAL | Age: 64
End: 2024-05-10
Attending: INTERNAL MEDICINE
Payer: COMMERCIAL

## 2024-05-10 VITALS
BODY MASS INDEX: 33.11 KG/M2 | OXYGEN SATURATION: 97 % | WEIGHT: 231.31 LBS | HEART RATE: 75 BPM | HEIGHT: 70 IN | RESPIRATION RATE: 17 BRPM | DIASTOLIC BLOOD PRESSURE: 80 MMHG | SYSTOLIC BLOOD PRESSURE: 152 MMHG | TEMPERATURE: 99 F

## 2024-05-10 DIAGNOSIS — D46.9 MDS (MYELODYSPLASTIC SYNDROME): ICD-10-CM

## 2024-05-10 DIAGNOSIS — D64.9 CHRONIC ANEMIA: Primary | ICD-10-CM

## 2024-05-10 DIAGNOSIS — D64.89 ANEMIA DUE TO MULTIPLE MECHANISMS: ICD-10-CM

## 2024-05-10 DIAGNOSIS — N18.30 STAGE 3 CHRONIC KIDNEY DISEASE, UNSPECIFIED WHETHER STAGE 3A OR 3B CKD: ICD-10-CM

## 2024-05-10 DIAGNOSIS — D64.9 NORMOCHROMIC ANEMIA: ICD-10-CM

## 2024-05-10 PROCEDURE — 63600175 PHARM REV CODE 636 W HCPCS: Mod: JZ,EC,JG | Performed by: INTERNAL MEDICINE

## 2024-05-10 PROCEDURE — 96372 THER/PROPH/DIAG INJ SC/IM: CPT

## 2024-05-10 RX ADMIN — EPOETIN ALFA-EPBX 40000 UNITS: 40000 INJECTION, SOLUTION INTRAVENOUS; SUBCUTANEOUS at 04:05

## 2024-05-10 NOTE — PLAN OF CARE
Problem: Anemia  Goal: Anemia Symptom Improvement  Outcome: Progressing  Intervention: Monitor and Manage Anemia  Flowsheets (Taken 5/10/2024 1616)  Oral Nutrition Promotion: rest periods promoted  Fatigue Management:   activity schedule adjusted   fatigue-related activity identified

## 2024-05-15 ENCOUNTER — LAB VISIT (OUTPATIENT)
Dept: LAB | Facility: HOSPITAL | Age: 64
End: 2024-05-15
Attending: INTERNAL MEDICINE
Payer: COMMERCIAL

## 2024-05-15 DIAGNOSIS — D64.89 ANEMIA DUE TO MULTIPLE MECHANISMS: ICD-10-CM

## 2024-05-15 DIAGNOSIS — D57.3 SICKLE CELL TRAIT SYNDROME: ICD-10-CM

## 2024-05-15 LAB
ALBUMIN SERPL BCP-MCNC: 4.2 G/DL (ref 3.5–5.2)
ALP SERPL-CCNC: 81 U/L (ref 55–135)
ALT SERPL W/O P-5'-P-CCNC: 16 U/L (ref 10–44)
ANION GAP SERPL CALC-SCNC: 6 MMOL/L (ref 8–16)
AST SERPL-CCNC: 17 U/L (ref 10–40)
BASOPHILS # BLD AUTO: 0.11 K/UL (ref 0–0.2)
BASOPHILS NFR BLD: 1.3 % (ref 0–1.9)
BILIRUB SERPL-MCNC: 0.7 MG/DL (ref 0.1–1)
BUN SERPL-MCNC: 31 MG/DL (ref 8–23)
CALCIUM SERPL-MCNC: 8.6 MG/DL (ref 8.7–10.5)
CHLORIDE SERPL-SCNC: 110 MMOL/L (ref 95–110)
CO2 SERPL-SCNC: 26 MMOL/L (ref 23–29)
CREAT SERPL-MCNC: 2.6 MG/DL (ref 0.5–1.4)
DIFFERENTIAL METHOD BLD: ABNORMAL
EOSINOPHIL # BLD AUTO: 0.2 K/UL (ref 0–0.5)
EOSINOPHIL NFR BLD: 2.7 % (ref 0–8)
ERYTHROCYTE [DISTWIDTH] IN BLOOD BY AUTOMATED COUNT: 28.2 % (ref 11.5–14.5)
EST. GFR  (NO RACE VARIABLE): 26.7 ML/MIN/1.73 M^2
FERRITIN SERPL-MCNC: 1325.3 NG/ML (ref 20–300)
GLUCOSE SERPL-MCNC: 110 MG/DL (ref 70–110)
HCT VFR BLD AUTO: 34.7 % (ref 40–54)
HGB BLD-MCNC: 11.3 G/DL (ref 14–18)
IMM GRANULOCYTES # BLD AUTO: 0.07 K/UL (ref 0–0.04)
IMM GRANULOCYTES NFR BLD AUTO: 0.8 % (ref 0–0.5)
IRON SERPL-MCNC: 84 UG/DL (ref 45–160)
LYMPHOCYTES # BLD AUTO: 1.8 K/UL (ref 1–4.8)
LYMPHOCYTES NFR BLD: 20.7 % (ref 18–48)
MCH RBC QN AUTO: 30 PG (ref 27–31)
MCHC RBC AUTO-ENTMCNC: 32.6 G/DL (ref 32–36)
MCV RBC AUTO: 92 FL (ref 82–98)
MONOCYTES # BLD AUTO: 1 K/UL (ref 0.3–1)
MONOCYTES NFR BLD: 11.8 % (ref 4–15)
NEUTROPHILS # BLD AUTO: 5.3 K/UL (ref 1.8–7.7)
NEUTROPHILS NFR BLD: 62.7 % (ref 38–73)
NRBC BLD-RTO: 1 /100 WBC
PLATELET # BLD AUTO: 556 K/UL (ref 150–450)
PMV BLD AUTO: 10.3 FL (ref 9.2–12.9)
POTASSIUM SERPL-SCNC: 4.5 MMOL/L (ref 3.5–5.1)
PROT SERPL-MCNC: 7.1 G/DL (ref 6–8.4)
RBC # BLD AUTO: 3.77 M/UL (ref 4.6–6.2)
SATURATED IRON: 46 % (ref 20–50)
SODIUM SERPL-SCNC: 142 MMOL/L (ref 136–145)
TOTAL IRON BINDING CAPACITY: 182 UG/DL (ref 250–450)
TRANSFERRIN SERPL-MCNC: 130 MG/DL (ref 200–375)
WBC # BLD AUTO: 8.49 K/UL (ref 3.9–12.7)

## 2024-05-15 PROCEDURE — 36415 COLL VENOUS BLD VENIPUNCTURE: CPT | Performed by: INTERNAL MEDICINE

## 2024-05-15 PROCEDURE — 85025 COMPLETE CBC W/AUTO DIFF WBC: CPT | Performed by: INTERNAL MEDICINE

## 2024-05-15 PROCEDURE — 82728 ASSAY OF FERRITIN: CPT | Performed by: INTERNAL MEDICINE

## 2024-05-15 PROCEDURE — 83540 ASSAY OF IRON: CPT | Performed by: INTERNAL MEDICINE

## 2024-05-15 PROCEDURE — 80053 COMPREHEN METABOLIC PANEL: CPT | Performed by: INTERNAL MEDICINE

## 2024-05-17 ENCOUNTER — INFUSION (OUTPATIENT)
Dept: INFUSION THERAPY | Facility: HOSPITAL | Age: 64
End: 2024-05-17
Attending: INTERNAL MEDICINE
Payer: COMMERCIAL

## 2024-05-17 VITALS
RESPIRATION RATE: 17 BRPM | TEMPERATURE: 98 F | WEIGHT: 230.69 LBS | DIASTOLIC BLOOD PRESSURE: 89 MMHG | HEIGHT: 70 IN | BODY MASS INDEX: 33.03 KG/M2 | HEART RATE: 87 BPM | SYSTOLIC BLOOD PRESSURE: 130 MMHG | OXYGEN SATURATION: 97 %

## 2024-05-17 DIAGNOSIS — N18.30 STAGE 3 CHRONIC KIDNEY DISEASE, UNSPECIFIED WHETHER STAGE 3A OR 3B CKD: ICD-10-CM

## 2024-05-17 DIAGNOSIS — D46.9 MDS (MYELODYSPLASTIC SYNDROME): ICD-10-CM

## 2024-05-17 DIAGNOSIS — D64.9 CHRONIC ANEMIA: Primary | ICD-10-CM

## 2024-05-17 DIAGNOSIS — D64.89 ANEMIA DUE TO MULTIPLE MECHANISMS: ICD-10-CM

## 2024-05-17 DIAGNOSIS — D64.9 NORMOCHROMIC ANEMIA: ICD-10-CM

## 2024-05-17 PROCEDURE — 96372 THER/PROPH/DIAG INJ SC/IM: CPT

## 2024-05-17 PROCEDURE — 63600175 PHARM REV CODE 636 W HCPCS: Mod: JZ,EC,JG | Performed by: INTERNAL MEDICINE

## 2024-05-17 RX ADMIN — EPOETIN ALFA-EPBX 40000 UNITS: 40000 INJECTION, SOLUTION INTRAVENOUS; SUBCUTANEOUS at 04:05

## 2024-05-17 NOTE — PLAN OF CARE
Problem: Anemia  Goal: Anemia Symptom Improvement  Outcome: Progressing  Intervention: Monitor and Manage Anemia  Flowsheets (Taken 5/17/2024 1601)  Oral Nutrition Promotion:   rest periods promoted   social interaction promoted  Safety Promotion/Fall Prevention: medications reviewed  Fatigue Management:   fatigue-related activity identified   frequent rest breaks encouraged   paced activity encouraged

## 2024-05-24 ENCOUNTER — INFUSION (OUTPATIENT)
Dept: INFUSION THERAPY | Facility: HOSPITAL | Age: 64
End: 2024-05-24
Attending: INTERNAL MEDICINE
Payer: COMMERCIAL

## 2024-05-24 VITALS
TEMPERATURE: 98 F | HEIGHT: 70 IN | SYSTOLIC BLOOD PRESSURE: 139 MMHG | DIASTOLIC BLOOD PRESSURE: 83 MMHG | OXYGEN SATURATION: 99 % | HEART RATE: 78 BPM | BODY MASS INDEX: 32.98 KG/M2 | RESPIRATION RATE: 15 BRPM | WEIGHT: 230.38 LBS

## 2024-05-24 DIAGNOSIS — D46.9 MDS (MYELODYSPLASTIC SYNDROME): ICD-10-CM

## 2024-05-24 DIAGNOSIS — D64.9 CHRONIC ANEMIA: Primary | ICD-10-CM

## 2024-05-24 DIAGNOSIS — N18.30 STAGE 3 CHRONIC KIDNEY DISEASE, UNSPECIFIED WHETHER STAGE 3A OR 3B CKD: ICD-10-CM

## 2024-05-24 DIAGNOSIS — D64.89 ANEMIA DUE TO MULTIPLE MECHANISMS: ICD-10-CM

## 2024-05-24 DIAGNOSIS — D64.9 NORMOCHROMIC ANEMIA: ICD-10-CM

## 2024-05-24 PROCEDURE — 96372 THER/PROPH/DIAG INJ SC/IM: CPT

## 2024-05-24 PROCEDURE — 63600175 PHARM REV CODE 636 W HCPCS: Mod: JZ,EC,JG | Performed by: INTERNAL MEDICINE

## 2024-05-24 RX ADMIN — EPOETIN ALFA-EPBX 40000 UNITS: 40000 INJECTION, SOLUTION INTRAVENOUS; SUBCUTANEOUS at 04:05

## 2024-05-24 NOTE — PLAN OF CARE
Problem: Anemia  Goal: Anemia Symptom Improvement  Outcome: Progressing  Intervention: Monitor and Manage Anemia  Flowsheets (Taken 5/24/2024 1610)  Oral Nutrition Promotion: rest periods promoted  Safety Promotion/Fall Prevention: medications reviewed  Fatigue Management:   fatigue-related activity identified   frequent rest breaks encouraged   paced activity encouraged

## 2024-05-27 ENCOUNTER — OFFICE VISIT (OUTPATIENT)
Facility: CLINIC | Age: 64
End: 2024-05-27
Payer: COMMERCIAL

## 2024-05-27 VITALS
HEIGHT: 70 IN | DIASTOLIC BLOOD PRESSURE: 86 MMHG | RESPIRATION RATE: 17 BRPM | SYSTOLIC BLOOD PRESSURE: 142 MMHG | BODY MASS INDEX: 32.71 KG/M2 | TEMPERATURE: 97 F | HEART RATE: 86 BPM | WEIGHT: 228.5 LBS

## 2024-05-27 DIAGNOSIS — D46.9 MDS (MYELODYSPLASTIC SYNDROME): Primary | ICD-10-CM

## 2024-05-27 DIAGNOSIS — R35.1 BPH ASSOCIATED WITH NOCTURIA: ICD-10-CM

## 2024-05-27 DIAGNOSIS — D46.1 RARS (REFRACTORY ANEMIA WITH RINGED SIDEROBLASTS): ICD-10-CM

## 2024-05-27 DIAGNOSIS — N40.1 BPH ASSOCIATED WITH NOCTURIA: ICD-10-CM

## 2024-05-27 DIAGNOSIS — N18.4 CHRONIC KIDNEY DISEASE (CKD), STAGE IV (SEVERE): ICD-10-CM

## 2024-05-27 PROCEDURE — 3008F BODY MASS INDEX DOCD: CPT | Mod: CPTII,S$GLB,, | Performed by: NURSE PRACTITIONER

## 2024-05-27 PROCEDURE — 3077F SYST BP >= 140 MM HG: CPT | Mod: CPTII,S$GLB,, | Performed by: NURSE PRACTITIONER

## 2024-05-27 PROCEDURE — 3079F DIAST BP 80-89 MM HG: CPT | Mod: CPTII,S$GLB,, | Performed by: NURSE PRACTITIONER

## 2024-05-27 PROCEDURE — 99215 OFFICE O/P EST HI 40 MIN: CPT | Mod: S$GLB,,, | Performed by: NURSE PRACTITIONER

## 2024-05-27 PROCEDURE — 99999 PR PBB SHADOW E&M-EST. PATIENT-LVL IV: CPT | Mod: PBBFAC,,, | Performed by: NURSE PRACTITIONER

## 2024-05-27 PROCEDURE — G2211 COMPLEX E/M VISIT ADD ON: HCPCS | Mod: S$GLB,,, | Performed by: NURSE PRACTITIONER

## 2024-05-27 PROCEDURE — 1159F MED LIST DOCD IN RCRD: CPT | Mod: CPTII,S$GLB,, | Performed by: NURSE PRACTITIONER

## 2024-05-27 PROCEDURE — 3044F HG A1C LEVEL LT 7.0%: CPT | Mod: CPTII,S$GLB,, | Performed by: NURSE PRACTITIONER

## 2024-05-27 RX ORDER — TAMSULOSIN HYDROCHLORIDE 0.4 MG/1
0.8 CAPSULE ORAL DAILY
Qty: 60 CAPSULE | Refills: 1 | Status: SHIPPED | OUTPATIENT
Start: 2024-05-27 | End: 2024-06-19

## 2024-05-27 NOTE — PROGRESS NOTES
[facility] Hematology/Oncology    PROGRESS NOTE - Follow-up Visit      Subjective:       Patient ID:   NAME: Rick Butler : 1960     64 y.o. male    Referring Doc: Chetna (new PCP)  Other Physicians: Getachew; Alan Mustafa    Chief Complaint:  Sickle cell trait/anemia/RARS  f/u    History of Present Illness:     Patient returns today for a regularly scheduled follow-up visit.  The patient is doing ok overall. He is here by himself today.     He has been Rebozyl (every 3 weeks) and doing well with it; he gets it again tomorrow  He also gets the Retacrit weekly     Breathing ok, no CP, SOB, HA's or N/V; reports some increased energy    He is supposed to f/u with Dr Hilario but has placed his f/u with her on hold for now    He has not had any recent pain crisis.     He denies having any breathing difficulties. He denies any blood in the stool, dark stools or hematuria; breathing ok; no CP, SOB, HA's or N/V;     He sees Dr. Mas in July.       Discussed covid19 precautions - he had his vaccinations      ROS:   GEN: normal without any fever, night sweats or weight loss  HEENT: normal with no HA's, sore throat, stiff neck, changes in vision  CV: normal with no CP, SOB, PND, MORA or orthopnea  PULM: normal with no SOB, cough, hemoptysis, sputum or pleuritic pain  GI: normal with no abdominal pain, nausea, vomiting, constipation, diarrhea, melanotic stools, BRBPR, or hematemesis  : normal with no hematuria, dysuria  BREAST: normal with no mass, discharge, pain  SKIN: normal with no rash, erythema, bruising, or swelling    Allergies:  Review of patient's allergies indicates:  No Known Allergies    Medications:    Current Outpatient Medications:     amLODIPine (NORVASC) 5 MG tablet, Take 1 tablet (5 mg total) by mouth once daily., Disp: 90 tablet, Rfl: 3    atorvastatin (LIPITOR) 10 MG tablet, Take 1 tablet (10 mg total) by mouth every evening., Disp: 90 tablet, Rfl: 3    azelastine (ASTELIN) 137 mcg (0.1 %) nasal spray,  2 sprays (274 mcg total) by Nasal route 2 (two) times daily as needed for Rhinitis., Disp: 30 mL, Rfl: 5    calcitRIOL (ROCALTROL) 0.25 MCG Cap, Take 0.25 mcg by mouth every 7 days., Disp: , Rfl:     epoetin tray (PROCRIT INJ), Thursday, Disp: , Rfl:     famotidine (PEPCID) 20 MG tablet, famotidine 20 mg tablet  Take 1 tablet twice a day by oral route., Disp: , Rfl:     fluticasone propionate (FLONASE) 50 mcg/actuation nasal spray, 1 spray (50 mcg total) by Each Nostril route 2 (two) times a day. 1 spray EVERY morning and afternoon  toward the ear each side after a sinus rinse, Disp: 15.8 mL, Rfl: 5    folic acid (FOLVITE) 1 MG tablet, Take 2 tablets (2 mg total) by mouth once daily., Disp: 180 tablet, Rfl: 1    multivitamin capsule, Take 1 capsule by mouth once daily., Disp: , Rfl:     pantoprazole (PROTONIX) 40 MG tablet, , Disp: , Rfl:     sod chlor-bicarb-squeez bottle (NEILMED SINUS RINSE COMPLETE) pkdv, 1 application  by sinus irrigation route 2 (two) times a day. Not right before bed, Disp: 1 each, Rfl: 11    sildenafil (VIAGRA) 100 MG tablet, Take 1 tablet (100 mg total) by mouth daily as needed for Erectile Dysfunction., Disp: 10 tablet, Rfl: 6    tamsulosin (FLOMAX) 0.4 mg Cap, Take 2 capsules (0.8 mg total) by mouth once daily., Disp: 60 capsule, Rfl: 1    PMHx/PSHx Updates:  See patient's last visit with Dr. Tello 4/2024  See H&P on 7/9/2011      Pathology:    12/20/2019  BONE MARROW, RIGHT ILIAC CREST,    ASPIRATE, CLOT SECTION, AND CORE BIOPSY:    --HYPERCELLULAR MARROW (APPROXIMATELY 75% TO 80%) WITH TRILINEAGE   HEMATOPOIETIC ELEMENTS, ERYTHROID  HYPERPLASIA AND MILD MEGAKARYOCYTIC HYPERPLASIA, AND NON-SPECIFIC   DYSHEMATOPOIETIC CHANGES (SEE     COMMENT).  --[id number] INCREASED STAINABLE IRON WITH INCREASED RING   SIDEROBLASTS (GREATER THAN 15%)     (SEE COMMENT).  --PERIPHERAL BLOOD WITH THROMBOCYTOSIS (574,000/MICROLITER) AND ANEMIA   (HEMOGLOBIN 5.5 GRAM/DECILITER),    WITH  "SCATTERED DREPANOCYTOID FORMS AND FEW NUCLEATED ERYTHROCYTES      Cytogenetic Results at the bottom of the report.  NORMAL    Objective:     Vitals:  Blood pressure (!) 142/86, pulse 86, temperature 97.4 °F (36.3 °C), resp. rate 17, height 5' 10" (1.778 m), weight 103.6 kg (228 lb 8 oz).    Physical Examination:   GEN: no apparent distress, comfortable; AAOx3  HEAD: atraumatic and normocephalic  EYES: no pallor, no icterus, PERRLA  ENT: OMM, no pharyngeal erythema, external ears WNL; no nasal discharge; no thrush  NECK: no masses, thyroid normal, trachea midline, no LAD/LN's, supple  CV: RRR with no murmur; normal pulse; normal S1 and S2; no pedal edema  CHEST: Normal respiratory effort; CTAB; normal breath sounds; no wheeze or crackles  ABDOM: nontender and nondistended; soft; normal bowel sounds; no rebound/guarding  MUSC/Skeletal: ROM normal; no crepitus; joints normal; no deformities or arthropathy  EXTREM: no clubbing, cyanosis, inflammation or swelling  SKIN: no rashes, lesions, ulcers, petechiae or subcutaneous nodules  : no jiang  NEURO: grossly intact; motor/sensory WNL; AAOx3; no tremors  PSYCH: normal mood, affect and behavior  LYMPH: normal cervical, supraclavicular, axillary and groin LN's            Labs:     Lab Results   Component Value Date    WBC 8.49 05/15/2024    HGB 11.3 (L) 05/15/2024    HCT 34.7 (L) 05/15/2024    MCV 92 05/15/2024     (H) 05/15/2024           CMP  Sodium   Date Value Ref Range Status   05/15/2024 142 136 - 145 mmol/L Final   06/26/2019 138 134 - 144 mmol/L      Potassium   Date Value Ref Range Status   05/15/2024 4.5 3.5 - 5.1 mmol/L Final     Chloride   Date Value Ref Range Status   05/15/2024 110 95 - 110 mmol/L Final   06/26/2019 105 98 - 110 mmol/L      CO2   Date Value Ref Range Status   05/15/2024 26 23 - 29 mmol/L Final     Glucose   Date Value Ref Range Status   05/15/2024 110 70 - 110 mg/dL Final   06/26/2019 114 (H) 70 - 99 mg/dL      BUN   Date Value Ref " Range Status   05/15/2024 31 (H) 8 - 23 mg/dL Final     Creatinine   Date Value Ref Range Status   05/15/2024 2.6 (H) 0.5 - 1.4 mg/dL Final   06/26/2019 1.62 (H) 0.60 - 1.40 mg/dL      Calcium   Date Value Ref Range Status   05/15/2024 8.6 (L) 8.7 - 10.5 mg/dL Final     Total Protein   Date Value Ref Range Status   05/15/2024 7.1 6.0 - 8.4 g/dL Final     Albumin   Date Value Ref Range Status   05/15/2024 4.2 3.5 - 5.2 g/dL Final   06/26/2019 4.4 3.1 - 4.7 g/dL      Total Bilirubin   Date Value Ref Range Status   05/15/2024 0.7 0.1 - 1.0 mg/dL Final     Comment:     For infants and newborns, interpretation of results should be based  on gestational age, weight and in agreement with clinical  observations.    Premature Infant recommended reference ranges:  Up to 24 hours.............<8.0 mg/dL  Up to 48 hours............<12.0 mg/dL  3-5 days..................<15.0 mg/dL  6-29 days.................<15.0 mg/dL       Alkaline Phosphatase   Date Value Ref Range Status   05/15/2024 81 55 - 135 U/L Final     AST   Date Value Ref Range Status   05/15/2024 17 10 - 40 U/L Final     ALT   Date Value Ref Range Status   05/15/2024 16 10 - 44 U/L Final     Anion Gap   Date Value Ref Range Status   05/15/2024 6 (L) 8 - 16 mmol/L Final     eGFR if    Date Value Ref Range Status   07/13/2022 45.6 (A) >60 mL/min/1.73 m^2 Final     eGFR if non    Date Value Ref Range Status   07/13/2022 39.5 (A) >60 mL/min/1.73 m^2 Final     Comment:     Calculation used to obtain the estimated glomerular filtration  rate (eGFR) is the CKD-EPI equation.          Lab Results   Component Value Date    IRON 84 05/15/2024    TIBC 182 (L) 05/15/2024    FERRITIN 1,325.3 (H) 05/15/2024           I have reviewed all available lab results and radiology reports.    Radiology/Diagnostic Studies:    US 2/8/2023:    IMPRESSION:  1. Dilation of the common bile duct at up to 10 mm, unchanged when compared to 2016.  2. Nonvisualization  "of the pancreas because of overlying bowel gas.  3. No other significant findings.  spleen is normal in size and appearance      2/15/2018 Xray Survey:   IMPRESSION: No significant abnormality seen. No evidence of osteoblastic or  osteoclastic lesions identified    MRI L-spine 2/12/2018  IMPRESSION:    1. Prominent low signal intensity throughout the bone marrow on T1 and  T2-weighted imaging. Marrow infiltrative process including malignancy such as  metastatic disease or lymphoma/leukemia is a consideration along with multiple  myeloma or malignant process. Benign etiology such as osteopetrosis or  regenerative red marrow are also considerations.    2. Multilevel lumbar degenerative changes, most prominent at L4-L5 and L5-S1 as  described.    Assessment/Plan:   (1) 64 y.o. male with diagnosis of sickle cell anemia with borderline microcytosis  - latest hgb was 6.0 and he had blood transfusion  - today, he is not symptomatic at this time  - he probably has a multifactorial anemia process with underlying sickle cell, anemia of chronic disorders and anemia of chronic renal. I can not rule out an underlying GI bleeding process.   - iron panel is adequate (no deficiency)  - total bilirubin is only minimally elevated  - he required transfusion again in Oct 2019 and again in Dec 2019  - he had bone marrow biopsy on 12/20/2019    "dyshematopoietic changes are not entirely specific and are present  mostly within erythroid and megakaryocytic lineages. These findings   could be observed in chronic disease states, autoimmune conditions,  infectious settings, toxic exposures, nutritional deficits, as medication/drug effect, and in myelodysplastic syndrome (MDS), among other conditions"  "Additionally, ring sideroblasts are a non-specific   finding "    Patient was referred to see Dr Mustafa at [facility] and had repeat BM biopsy with diagnosis made of RARS  - he is now on procrit per directives of Dr Mustafa    7/30/2020:   he " "recently saw Dr Mustafa  And sees him again in Jan 2021  - he is doing well with procrit and is feeling much better  - he has not required any transfusions for some time time    9/24/2020:  - latest hgb at 8.5  - will increase the procrit dose  - f/u with Dr Zavala in Jan 2021 11/19/2020:  - patient doing well and feels "great"  - hgb up to 9.1; continued on the procrit  - f/u with Dr Mustafa in Jan 2021    3/4/2021:  - he saw Dr Mustafa in jan 2021 and sees him again in June/July 2021  - latest hgb at 9.1 and stable and platelets are 474,000; wbc at 10.0    6/10/2021:  - latest hgb at 9.0  - plats 485,000  - on weekly procrit  - seeing Dr Mustafa again tomorrow    10/14/2021:  - hgb at 8.6 but he recently had been taking naproxen and had some BRBPR - which since resolved  - he saw Dr Beyer with GI and he is having colonsocopy on Nov 5th along with EGD  - plats 429,000  - he sees Dr Mustafa again in Nov 19th   - he sees Dr Mas again in Dec 2021    1/13/2022:  - He has been under the care of [place] Mustafa at [place] for RARS- MDS. He is now on procrit weekly. He sees Dr Mustafa again in March 2022    - hgb 9.3  - he had scope with Dr Collins in nov 2021 which was good per patient and they plan to repeat in 5 yrs    5/12/2022:  - hgb at 9.0  - plats 485  - wbc 7.25  - he is on procrit weekly  - saw Dr Mustafa this past Fridday and since he is leaving, he will start seeing Dr Hilario instead in 3 months    8/18/2022:  - latest hgb a little lower at 8.8  - he has been on the procrit  - he saw Dr Hilario recently at [place] and plans to see her again in 3 months    1/26/2023:  - He saw Dr Hilario at [place] again recently this past Tuesday and they are considering another medication; he plans to see her again in April 2023  - Dr Hilario requested he have an US of spleen - will order here in [place]  - he is also on appetite stimulant  - hgb at 9.4 and plats at 500k    5/11/2023:  - hgb at 9.6 and plats 466,000  - He saw Dr Hilario at [place] " "again recently this past Tuesday and they are considering another medication; he plans to see her again in Aug 2023    8/3/2023:  - His insurance will not approve of him getting the Reblozyl   - He sees Dr Hilario again next Tuesday and we will await her directives.     9/14/2023:  - the Rebozyl has been approved - will set up  - latest hgb at 10.1  - seeing Dr Hilario again in Jan 2024 11/16/2023:  - continued on Rebozyl  - WBC and plats WNL  - hgb at 10.3  - cardiac w/u negative per patient    2/15/2024:  - labs are adequate  - hgb at 10.6  - WBC and plat WNL  - continued on Rebozyl  - recent colonoscopy good per patient    4/4/2024:  - continued on Rebozyl  - counts are looking adequate    5/27/2024:   - labs stable   - continue on reblozyl   - doing well, and feeling great   - referral to urology for nocturia   - increased flomax until he can get an appt with Dr. Castillo     (2) Alcohol abuse issues in past - he has been sober for over 3 years now    (3) New findings on radiography with abnormal chest CT and lung mass  - former smoker    (4) chronic back issues - prior Xrays and MRI - suspect the findings on the MRI are possibly due to his sickle cell;   - SPEP was previously negative for M-protein  - PSA was 0.3 in Sept 2017  - recommended neurosurgery evaluation previously  - he saw Dr Nura VUONG and had a nerve "burning" procedure and his pain has improved    (5) CRI - elevated creatinine - he is followed Dr Chilel with nephrology      (6) H pylori gastritis - s/p recent endoscopy with Dr Collins and antibotics x 10 days    (7) CP   9/14/2023:  - seeing Dr lovett with cardiology and planning stress test in near future        1. MDS (myelodysplastic syndrome)        2. BPH associated with nocturia  tamsulosin (FLOMAX) 0.4 mg Cap    Ambulatory referral/consult to Urology      3. 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 Continue with the " procrit and the Rebozyl as directed  2. Planned f/u with Dr Hilario as needed  3. F/u with cardiology as directed  4.  F/u with nephrology as directed by them -   Dr Mas    5. Check labs every 2 weeks  6. Referral to Castillo and increase flowmax                RTC 8 weeks  Fax note to Dennis Basilio Tveit;  Lewis Hilario          Discussion:       Pathology Discussion:    I reviewed and discussed the pathology report(s) and radiograph reports (if available) in as simple to understand and/or laymen's terms to the best of my ability. I had an indepth conversation with the patient and went over the patient's individual diagnosis based on the information that was currently available. I discussed the TNM staging process with regard to the patient's particular cancer type, and the calculated stage based on the currently available TNM data and literature. I discussed the available prognostic data with regard to the current staging information and how it relates to the prognosis of their particular neoplastic process.          COVID-19 Discussion:    I had long discussion with patient and any applicable family about the COVID-19 coronavirus epidemic and the recommended precautions with regard to cancer and/or hematology patients. I have re-iterated the CDC recommendations for adequate hand washing, use of hand -like products, and coughing into elbow, etc. In addition, especially for our patients who are on chemotherapy and/or our otherwise immunocompromised patients, I have recommended avoidance of crowds, including movie theaters, restaurants, churches, etc. I have recommended avoidance of any sick or symptomatic family members and/or friends. I have also recommended avoidance of any raw and unwashed food products, and general avoidance of food items that have not been prepared by themselves. The patient has been asked to call us immediately with any symptom developments, issues, questions or other general concerns.      I have explained all of the above in detail and the patient understands all of the current recommendation(s). I have answered all of their questions to the best of my ability and to their complete satisfaction.   The patient is to continue with the current management plan.            Electronically signed by Pratima Melgar NP

## 2024-05-29 ENCOUNTER — LAB VISIT (OUTPATIENT)
Dept: LAB | Facility: HOSPITAL | Age: 64
End: 2024-05-29
Attending: INTERNAL MEDICINE
Payer: COMMERCIAL

## 2024-05-29 DIAGNOSIS — D57.3 SICKLE CELL TRAIT SYNDROME: ICD-10-CM

## 2024-05-29 DIAGNOSIS — D64.89 ANEMIA DUE TO MULTIPLE MECHANISMS: ICD-10-CM

## 2024-05-29 LAB
ALBUMIN SERPL BCP-MCNC: 4.2 G/DL (ref 3.5–5.2)
ALP SERPL-CCNC: 75 U/L (ref 55–135)
ALT SERPL W/O P-5'-P-CCNC: 14 U/L (ref 10–44)
ANION GAP SERPL CALC-SCNC: 8 MMOL/L (ref 8–16)
AST SERPL-CCNC: 16 U/L (ref 10–40)
BASOPHILS # BLD AUTO: 0.1 K/UL (ref 0–0.2)
BASOPHILS NFR BLD: 1.2 % (ref 0–1.9)
BILIRUB SERPL-MCNC: 0.6 MG/DL (ref 0.1–1)
BUN SERPL-MCNC: 36 MG/DL (ref 8–23)
CALCIUM SERPL-MCNC: 8.4 MG/DL (ref 8.7–10.5)
CHLORIDE SERPL-SCNC: 109 MMOL/L (ref 95–110)
CO2 SERPL-SCNC: 24 MMOL/L (ref 23–29)
CREAT SERPL-MCNC: 2.5 MG/DL (ref 0.5–1.4)
DIFFERENTIAL METHOD BLD: ABNORMAL
EOSINOPHIL # BLD AUTO: 0.2 K/UL (ref 0–0.5)
EOSINOPHIL NFR BLD: 2.2 % (ref 0–8)
ERYTHROCYTE [DISTWIDTH] IN BLOOD BY AUTOMATED COUNT: 27.1 % (ref 11.5–14.5)
EST. GFR  (NO RACE VARIABLE): 28 ML/MIN/1.73 M^2
GLUCOSE SERPL-MCNC: 110 MG/DL (ref 70–110)
HCT VFR BLD AUTO: 32.3 % (ref 40–54)
HGB BLD-MCNC: 11.1 G/DL (ref 14–18)
IMM GRANULOCYTES # BLD AUTO: 0.05 K/UL (ref 0–0.04)
IMM GRANULOCYTES NFR BLD AUTO: 0.6 % (ref 0–0.5)
LYMPHOCYTES # BLD AUTO: 1.6 K/UL (ref 1–4.8)
LYMPHOCYTES NFR BLD: 19.3 % (ref 18–48)
MCH RBC QN AUTO: 30.9 PG (ref 27–31)
MCHC RBC AUTO-ENTMCNC: 34.4 G/DL (ref 32–36)
MCV RBC AUTO: 90 FL (ref 82–98)
MONOCYTES # BLD AUTO: 0.9 K/UL (ref 0.3–1)
MONOCYTES NFR BLD: 10.6 % (ref 4–15)
NEUTROPHILS # BLD AUTO: 5.4 K/UL (ref 1.8–7.7)
NEUTROPHILS NFR BLD: 66.1 % (ref 38–73)
NRBC BLD-RTO: 0 /100 WBC
PLATELET # BLD AUTO: 489 K/UL (ref 150–450)
PMV BLD AUTO: 9.7 FL (ref 9.2–12.9)
POTASSIUM SERPL-SCNC: 4.4 MMOL/L (ref 3.5–5.1)
PROT SERPL-MCNC: 7.1 G/DL (ref 6–8.4)
RBC # BLD AUTO: 3.59 M/UL (ref 4.6–6.2)
SODIUM SERPL-SCNC: 141 MMOL/L (ref 136–145)
WBC # BLD AUTO: 8.17 K/UL (ref 3.9–12.7)

## 2024-05-29 PROCEDURE — 80053 COMPREHEN METABOLIC PANEL: CPT | Performed by: INTERNAL MEDICINE

## 2024-05-29 PROCEDURE — 36415 COLL VENOUS BLD VENIPUNCTURE: CPT | Performed by: INTERNAL MEDICINE

## 2024-05-29 PROCEDURE — 85025 COMPLETE CBC W/AUTO DIFF WBC: CPT | Performed by: INTERNAL MEDICINE

## 2024-05-30 ENCOUNTER — INFUSION (OUTPATIENT)
Dept: INFUSION THERAPY | Facility: HOSPITAL | Age: 64
End: 2024-05-30
Attending: INTERNAL MEDICINE
Payer: COMMERCIAL

## 2024-05-30 VITALS
TEMPERATURE: 98 F | WEIGHT: 229 LBS | SYSTOLIC BLOOD PRESSURE: 129 MMHG | HEART RATE: 87 BPM | RESPIRATION RATE: 16 BRPM | BODY MASS INDEX: 32.78 KG/M2 | OXYGEN SATURATION: 99 % | DIASTOLIC BLOOD PRESSURE: 79 MMHG | HEIGHT: 70 IN

## 2024-05-30 DIAGNOSIS — D46.1 RARS (REFRACTORY ANEMIA WITH RINGED SIDEROBLASTS): Primary | ICD-10-CM

## 2024-05-30 DIAGNOSIS — D46.9 MDS (MYELODYSPLASTIC SYNDROME): ICD-10-CM

## 2024-05-30 PROCEDURE — 96372 THER/PROPH/DIAG INJ SC/IM: CPT

## 2024-05-30 PROCEDURE — 63600175 PHARM REV CODE 636 W HCPCS: Mod: JZ,JG | Performed by: INTERNAL MEDICINE

## 2024-05-30 RX ADMIN — LUSPATERCEPT 50 MG: 75 INJECTION, POWDER, LYOPHILIZED, FOR SOLUTION SUBCUTANEOUS at 04:05

## 2024-05-30 NOTE — PLAN OF CARE
Problem: Anemia  Goal: Anemia Symptom Improvement  Outcome: Progressing  Intervention: Monitor and Manage Anemia  Flowsheets (Taken 5/30/2024 8275)  Safety Promotion/Fall Prevention:   instructed to call staff for mobility   supervised activity   in recliner, wheels locked

## 2024-05-31 ENCOUNTER — INFUSION (OUTPATIENT)
Dept: INFUSION THERAPY | Facility: HOSPITAL | Age: 64
End: 2024-05-31
Attending: INTERNAL MEDICINE
Payer: COMMERCIAL

## 2024-05-31 VITALS
DIASTOLIC BLOOD PRESSURE: 78 MMHG | SYSTOLIC BLOOD PRESSURE: 153 MMHG | TEMPERATURE: 97 F | HEART RATE: 81 BPM | RESPIRATION RATE: 17 BRPM | WEIGHT: 232.13 LBS | BODY MASS INDEX: 33.23 KG/M2 | OXYGEN SATURATION: 95 % | HEIGHT: 70 IN

## 2024-05-31 DIAGNOSIS — N18.30 STAGE 3 CHRONIC KIDNEY DISEASE, UNSPECIFIED WHETHER STAGE 3A OR 3B CKD: ICD-10-CM

## 2024-05-31 DIAGNOSIS — D46.9 MDS (MYELODYSPLASTIC SYNDROME): ICD-10-CM

## 2024-05-31 DIAGNOSIS — D64.89 ANEMIA DUE TO MULTIPLE MECHANISMS: ICD-10-CM

## 2024-05-31 DIAGNOSIS — D64.9 NORMOCHROMIC ANEMIA: ICD-10-CM

## 2024-05-31 DIAGNOSIS — D64.9 CHRONIC ANEMIA: Primary | ICD-10-CM

## 2024-05-31 PROCEDURE — 96372 THER/PROPH/DIAG INJ SC/IM: CPT

## 2024-05-31 PROCEDURE — 63600175 PHARM REV CODE 636 W HCPCS: Mod: JZ,EC,JG | Performed by: INTERNAL MEDICINE

## 2024-05-31 RX ADMIN — EPOETIN ALFA-EPBX 40000 UNITS: 40000 INJECTION, SOLUTION INTRAVENOUS; SUBCUTANEOUS at 04:05

## 2024-05-31 NOTE — PLAN OF CARE
Problem: Fatigue  Goal: Improved Activity Tolerance  Intervention: Promote Improved Energy  Flowsheets (Taken 5/31/2024 1606)  Fatigue Management: fatigue-related activity identified  Activity Management: Ambulated -L4

## 2024-06-07 ENCOUNTER — INFUSION (OUTPATIENT)
Dept: INFUSION THERAPY | Facility: HOSPITAL | Age: 64
End: 2024-06-07
Attending: INTERNAL MEDICINE
Payer: COMMERCIAL

## 2024-06-07 VITALS
RESPIRATION RATE: 17 BRPM | TEMPERATURE: 97 F | HEART RATE: 91 BPM | BODY MASS INDEX: 34.03 KG/M2 | DIASTOLIC BLOOD PRESSURE: 84 MMHG | SYSTOLIC BLOOD PRESSURE: 132 MMHG | WEIGHT: 237.69 LBS | OXYGEN SATURATION: 96 % | HEIGHT: 70 IN

## 2024-06-07 DIAGNOSIS — D64.9 CHRONIC ANEMIA: Primary | ICD-10-CM

## 2024-06-07 DIAGNOSIS — N18.30 STAGE 3 CHRONIC KIDNEY DISEASE, UNSPECIFIED WHETHER STAGE 3A OR 3B CKD: ICD-10-CM

## 2024-06-07 DIAGNOSIS — D46.9 MDS (MYELODYSPLASTIC SYNDROME): ICD-10-CM

## 2024-06-07 DIAGNOSIS — D64.89 ANEMIA DUE TO MULTIPLE MECHANISMS: ICD-10-CM

## 2024-06-07 DIAGNOSIS — D64.9 NORMOCHROMIC ANEMIA: ICD-10-CM

## 2024-06-07 PROCEDURE — 96372 THER/PROPH/DIAG INJ SC/IM: CPT

## 2024-06-07 PROCEDURE — 63600175 PHARM REV CODE 636 W HCPCS: Mod: JZ,EC,JG | Performed by: INTERNAL MEDICINE

## 2024-06-07 RX ADMIN — EPOETIN ALFA-EPBX 40000 UNITS: 40000 INJECTION, SOLUTION INTRAVENOUS; SUBCUTANEOUS at 04:06

## 2024-06-07 NOTE — PLAN OF CARE
Problem: Anemia  Goal: Anemia Symptom Improvement  Outcome: Progressing  Intervention: Monitor and Manage Anemia  Flowsheets (Taken 6/7/2024 7113)  Oral Nutrition Promotion: rest periods promoted  Fatigue Management:   activity schedule adjusted   paced activity encouraged   frequent rest breaks encouraged   fatigue-related activity identified

## 2024-06-12 ENCOUNTER — LAB VISIT (OUTPATIENT)
Dept: LAB | Facility: HOSPITAL | Age: 64
End: 2024-06-12
Attending: INTERNAL MEDICINE
Payer: COMMERCIAL

## 2024-06-12 DIAGNOSIS — D57.3 SICKLE CELL TRAIT SYNDROME: ICD-10-CM

## 2024-06-12 DIAGNOSIS — D64.89 ANEMIA DUE TO MULTIPLE MECHANISMS: ICD-10-CM

## 2024-06-12 LAB
ALBUMIN SERPL BCP-MCNC: 4.2 G/DL (ref 3.5–5.2)
ALP SERPL-CCNC: 79 U/L (ref 55–135)
ALT SERPL W/O P-5'-P-CCNC: 14 U/L (ref 10–44)
ANION GAP SERPL CALC-SCNC: 5 MMOL/L (ref 8–16)
AST SERPL-CCNC: 16 U/L (ref 10–40)
BASOPHILS # BLD AUTO: 0.13 K/UL (ref 0–0.2)
BASOPHILS NFR BLD: 1.6 % (ref 0–1.9)
BILIRUB SERPL-MCNC: 0.7 MG/DL (ref 0.1–1)
BUN SERPL-MCNC: 34 MG/DL (ref 8–23)
CALCIUM SERPL-MCNC: 8.5 MG/DL (ref 8.7–10.5)
CHLORIDE SERPL-SCNC: 110 MMOL/L (ref 95–110)
CO2 SERPL-SCNC: 27 MMOL/L (ref 23–29)
CREAT SERPL-MCNC: 2.8 MG/DL (ref 0.5–1.4)
DIFFERENTIAL METHOD BLD: ABNORMAL
EOSINOPHIL # BLD AUTO: 0.2 K/UL (ref 0–0.5)
EOSINOPHIL NFR BLD: 2.3 % (ref 0–8)
ERYTHROCYTE [DISTWIDTH] IN BLOOD BY AUTOMATED COUNT: 27.6 % (ref 11.5–14.5)
EST. GFR  (NO RACE VARIABLE): 24.4 ML/MIN/1.73 M^2
FERRITIN SERPL-MCNC: 1303.5 NG/ML (ref 20–300)
GLUCOSE SERPL-MCNC: 109 MG/DL (ref 70–110)
HCT VFR BLD AUTO: 33.7 % (ref 40–54)
HGB BLD-MCNC: 11.3 G/DL (ref 14–18)
IMM GRANULOCYTES # BLD AUTO: 0.07 K/UL (ref 0–0.04)
IMM GRANULOCYTES NFR BLD AUTO: 0.9 % (ref 0–0.5)
IRON SERPL-MCNC: 84 UG/DL (ref 45–160)
LYMPHOCYTES # BLD AUTO: 1.5 K/UL (ref 1–4.8)
LYMPHOCYTES NFR BLD: 18.3 % (ref 18–48)
MCH RBC QN AUTO: 30.5 PG (ref 27–31)
MCHC RBC AUTO-ENTMCNC: 33.5 G/DL (ref 32–36)
MCV RBC AUTO: 91 FL (ref 82–98)
MONOCYTES # BLD AUTO: 0.9 K/UL (ref 0.3–1)
MONOCYTES NFR BLD: 11.1 % (ref 4–15)
NEUTROPHILS # BLD AUTO: 5.3 K/UL (ref 1.8–7.7)
NEUTROPHILS NFR BLD: 65.8 % (ref 38–73)
NRBC BLD-RTO: 0 /100 WBC
PLATELET # BLD AUTO: 523 K/UL (ref 150–450)
PMV BLD AUTO: 10.4 FL (ref 9.2–12.9)
POTASSIUM SERPL-SCNC: 4.5 MMOL/L (ref 3.5–5.1)
PROT SERPL-MCNC: 7.2 G/DL (ref 6–8.4)
RBC # BLD AUTO: 3.7 M/UL (ref 4.6–6.2)
SATURATED IRON: 45 % (ref 20–50)
SODIUM SERPL-SCNC: 142 MMOL/L (ref 136–145)
TOTAL IRON BINDING CAPACITY: 186 UG/DL (ref 250–450)
TRANSFERRIN SERPL-MCNC: 133 MG/DL (ref 200–375)
WBC # BLD AUTO: 8.12 K/UL (ref 3.9–12.7)

## 2024-06-12 PROCEDURE — 36415 COLL VENOUS BLD VENIPUNCTURE: CPT | Performed by: INTERNAL MEDICINE

## 2024-06-12 PROCEDURE — 85025 COMPLETE CBC W/AUTO DIFF WBC: CPT | Performed by: INTERNAL MEDICINE

## 2024-06-12 PROCEDURE — 80053 COMPREHEN METABOLIC PANEL: CPT | Performed by: INTERNAL MEDICINE

## 2024-06-12 PROCEDURE — 83540 ASSAY OF IRON: CPT | Performed by: INTERNAL MEDICINE

## 2024-06-12 PROCEDURE — 82728 ASSAY OF FERRITIN: CPT | Performed by: INTERNAL MEDICINE

## 2024-06-14 ENCOUNTER — INFUSION (OUTPATIENT)
Dept: INFUSION THERAPY | Facility: HOSPITAL | Age: 64
End: 2024-06-14
Attending: INTERNAL MEDICINE
Payer: COMMERCIAL

## 2024-06-14 VITALS
HEART RATE: 74 BPM | HEIGHT: 70 IN | TEMPERATURE: 98 F | BODY MASS INDEX: 32.74 KG/M2 | OXYGEN SATURATION: 97 % | RESPIRATION RATE: 16 BRPM | WEIGHT: 228.69 LBS | SYSTOLIC BLOOD PRESSURE: 153 MMHG | DIASTOLIC BLOOD PRESSURE: 95 MMHG

## 2024-06-14 DIAGNOSIS — D64.9 NORMOCHROMIC ANEMIA: ICD-10-CM

## 2024-06-14 DIAGNOSIS — D64.89 ANEMIA DUE TO MULTIPLE MECHANISMS: ICD-10-CM

## 2024-06-14 DIAGNOSIS — D46.9 MDS (MYELODYSPLASTIC SYNDROME): ICD-10-CM

## 2024-06-14 DIAGNOSIS — N18.30 STAGE 3 CHRONIC KIDNEY DISEASE, UNSPECIFIED WHETHER STAGE 3A OR 3B CKD: ICD-10-CM

## 2024-06-14 DIAGNOSIS — D64.9 CHRONIC ANEMIA: Primary | ICD-10-CM

## 2024-06-14 PROCEDURE — 96372 THER/PROPH/DIAG INJ SC/IM: CPT

## 2024-06-14 PROCEDURE — 63600175 PHARM REV CODE 636 W HCPCS: Mod: JZ,EC,JG | Performed by: INTERNAL MEDICINE

## 2024-06-14 RX ADMIN — EPOETIN ALFA-EPBX 40000 UNITS: 40000 INJECTION, SOLUTION INTRAVENOUS; SUBCUTANEOUS at 07:06

## 2024-06-14 NOTE — PLAN OF CARE
Problem: Fatigue  Goal: Improved Activity Tolerance  Outcome: Progressing  Intervention: Promote Improved Energy  Flowsheets (Taken 6/14/2024 8007)  Sleep/Rest Enhancement: regular sleep/rest pattern promoted  Activity Management: Ambulated -L4  Environmental Support: rest periods encouraged

## 2024-06-19 DIAGNOSIS — R35.1 BPH ASSOCIATED WITH NOCTURIA: ICD-10-CM

## 2024-06-19 DIAGNOSIS — N40.1 BPH ASSOCIATED WITH NOCTURIA: ICD-10-CM

## 2024-06-19 RX ORDER — TAMSULOSIN HYDROCHLORIDE 0.4 MG/1
2 CAPSULE ORAL
Qty: 180 CAPSULE | Refills: 1 | Status: SHIPPED | OUTPATIENT
Start: 2024-06-19

## 2024-06-20 ENCOUNTER — INFUSION (OUTPATIENT)
Dept: INFUSION THERAPY | Facility: HOSPITAL | Age: 64
End: 2024-06-20
Attending: INTERNAL MEDICINE
Payer: COMMERCIAL

## 2024-06-20 VITALS
WEIGHT: 230.5 LBS | BODY MASS INDEX: 33 KG/M2 | DIASTOLIC BLOOD PRESSURE: 80 MMHG | SYSTOLIC BLOOD PRESSURE: 132 MMHG | HEIGHT: 70 IN | TEMPERATURE: 97 F | HEART RATE: 80 BPM | RESPIRATION RATE: 18 BRPM | OXYGEN SATURATION: 98 %

## 2024-06-20 DIAGNOSIS — D46.1 RARS (REFRACTORY ANEMIA WITH RINGED SIDEROBLASTS): Primary | ICD-10-CM

## 2024-06-20 DIAGNOSIS — D46.9 MDS (MYELODYSPLASTIC SYNDROME): ICD-10-CM

## 2024-06-20 PROCEDURE — 63600175 PHARM REV CODE 636 W HCPCS: Mod: JZ,JG | Performed by: INTERNAL MEDICINE

## 2024-06-20 PROCEDURE — 96372 THER/PROPH/DIAG INJ SC/IM: CPT

## 2024-06-20 RX ADMIN — LUSPATERCEPT 50 MG: 75 INJECTION, POWDER, LYOPHILIZED, FOR SOLUTION SUBCUTANEOUS at 04:06

## 2024-06-20 NOTE — PLAN OF CARE
Problem: Anemia  Goal: Anemia Symptom Improvement  Outcome: Progressing  Intervention: Monitor and Manage Anemia  Flowsheets (Taken 6/20/2024 1603)  Safety Promotion/Fall Prevention: assistive device/personal item within reach  Fatigue Management: activity schedule adjusted

## 2024-06-21 ENCOUNTER — INFUSION (OUTPATIENT)
Dept: INFUSION THERAPY | Facility: HOSPITAL | Age: 64
End: 2024-06-21
Attending: INTERNAL MEDICINE
Payer: COMMERCIAL

## 2024-06-21 VITALS
HEART RATE: 79 BPM | TEMPERATURE: 98 F | BODY MASS INDEX: 33.02 KG/M2 | RESPIRATION RATE: 17 BRPM | WEIGHT: 230.63 LBS | HEIGHT: 70 IN | OXYGEN SATURATION: 98 % | DIASTOLIC BLOOD PRESSURE: 84 MMHG | SYSTOLIC BLOOD PRESSURE: 145 MMHG

## 2024-06-21 DIAGNOSIS — N18.30 STAGE 3 CHRONIC KIDNEY DISEASE, UNSPECIFIED WHETHER STAGE 3A OR 3B CKD: ICD-10-CM

## 2024-06-21 DIAGNOSIS — D64.9 NORMOCHROMIC ANEMIA: ICD-10-CM

## 2024-06-21 DIAGNOSIS — D64.9 CHRONIC ANEMIA: Primary | ICD-10-CM

## 2024-06-21 DIAGNOSIS — D46.9 MDS (MYELODYSPLASTIC SYNDROME): ICD-10-CM

## 2024-06-21 DIAGNOSIS — D64.89 ANEMIA DUE TO MULTIPLE MECHANISMS: ICD-10-CM

## 2024-06-21 PROCEDURE — 96372 THER/PROPH/DIAG INJ SC/IM: CPT

## 2024-06-21 PROCEDURE — 63600175 PHARM REV CODE 636 W HCPCS: Mod: JZ,EC,JG | Performed by: INTERNAL MEDICINE

## 2024-06-21 RX ADMIN — EPOETIN ALFA-EPBX 40000 UNITS: 40000 INJECTION, SOLUTION INTRAVENOUS; SUBCUTANEOUS at 04:06

## 2024-06-21 NOTE — PLAN OF CARE
Problem: Fatigue  Goal: Improved Activity Tolerance  Outcome: Progressing  Intervention: Promote Improved Energy  Flowsheets (Taken 6/21/2024 1602)  Fatigue Management:   fatigue-related activity identified   frequent rest breaks encouraged   paced activity encouraged  Sleep/Rest Enhancement:   regular sleep/rest pattern promoted   relaxation techniques promoted  Activity Management: Ambulated -L4  Environmental Support: rest periods encouraged

## 2024-06-26 ENCOUNTER — LAB VISIT (OUTPATIENT)
Dept: LAB | Facility: HOSPITAL | Age: 64
End: 2024-06-26
Attending: INTERNAL MEDICINE
Payer: COMMERCIAL

## 2024-06-26 DIAGNOSIS — D64.89 ANEMIA DUE TO MULTIPLE MECHANISMS: ICD-10-CM

## 2024-06-26 DIAGNOSIS — D57.3 SICKLE CELL TRAIT SYNDROME: ICD-10-CM

## 2024-06-26 LAB
ALBUMIN SERPL BCP-MCNC: 4.1 G/DL (ref 3.5–5.2)
ALP SERPL-CCNC: 78 U/L (ref 55–135)
ALT SERPL W/O P-5'-P-CCNC: 14 U/L (ref 10–44)
ANION GAP SERPL CALC-SCNC: 8 MMOL/L (ref 8–16)
AST SERPL-CCNC: 17 U/L (ref 10–40)
BASOPHILS # BLD AUTO: 0.11 K/UL (ref 0–0.2)
BASOPHILS NFR BLD: 1.2 % (ref 0–1.9)
BILIRUB SERPL-MCNC: 0.7 MG/DL (ref 0.1–1)
BUN SERPL-MCNC: 35 MG/DL (ref 8–23)
BURR CELLS BLD QL SMEAR: ABNORMAL
CALCIUM SERPL-MCNC: 8.5 MG/DL (ref 8.7–10.5)
CHLORIDE SERPL-SCNC: 111 MMOL/L (ref 95–110)
CO2 SERPL-SCNC: 23 MMOL/L (ref 23–29)
CREAT SERPL-MCNC: 2.8 MG/DL (ref 0.5–1.4)
DACRYOCYTES BLD QL SMEAR: ABNORMAL
DIFFERENTIAL METHOD BLD: ABNORMAL
EOSINOPHIL # BLD AUTO: 0.2 K/UL (ref 0–0.5)
EOSINOPHIL NFR BLD: 2.2 % (ref 0–8)
ERYTHROCYTE [DISTWIDTH] IN BLOOD BY AUTOMATED COUNT: 26.9 % (ref 11.5–14.5)
EST. GFR  (NO RACE VARIABLE): 24.4 ML/MIN/1.73 M^2
GLUCOSE SERPL-MCNC: 105 MG/DL (ref 70–110)
HCT VFR BLD AUTO: 32.5 % (ref 40–54)
HGB BLD-MCNC: 11 G/DL (ref 14–18)
HYPOCHROMIA BLD QL SMEAR: ABNORMAL
IMM GRANULOCYTES # BLD AUTO: 0.11 K/UL (ref 0–0.04)
IMM GRANULOCYTES NFR BLD AUTO: 1.2 % (ref 0–0.5)
LYMPHOCYTES # BLD AUTO: 1.6 K/UL (ref 1–4.8)
LYMPHOCYTES NFR BLD: 18.2 % (ref 18–48)
MCH RBC QN AUTO: 30.2 PG (ref 27–31)
MCHC RBC AUTO-ENTMCNC: 33.8 G/DL (ref 32–36)
MCV RBC AUTO: 89 FL (ref 82–98)
MONOCYTES # BLD AUTO: 0.9 K/UL (ref 0.3–1)
MONOCYTES NFR BLD: 10.2 % (ref 4–15)
NEUTROPHILS # BLD AUTO: 5.9 K/UL (ref 1.8–7.7)
NEUTROPHILS NFR BLD: 67 % (ref 38–73)
NRBC BLD-RTO: 1 /100 WBC
OVALOCYTES BLD QL SMEAR: ABNORMAL
PLATELET # BLD AUTO: 515 K/UL (ref 150–450)
PLATELET BLD QL SMEAR: ABNORMAL
PMV BLD AUTO: 10.6 FL (ref 9.2–12.9)
POIKILOCYTOSIS BLD QL SMEAR: ABNORMAL
POTASSIUM SERPL-SCNC: 4.2 MMOL/L (ref 3.5–5.1)
PROT SERPL-MCNC: 6.8 G/DL (ref 6–8.4)
RBC # BLD AUTO: 3.64 M/UL (ref 4.6–6.2)
SODIUM SERPL-SCNC: 142 MMOL/L (ref 136–145)
TARGETS BLD QL SMEAR: ABNORMAL
WBC # BLD AUTO: 8.81 K/UL (ref 3.9–12.7)

## 2024-06-26 PROCEDURE — 36415 COLL VENOUS BLD VENIPUNCTURE: CPT | Performed by: INTERNAL MEDICINE

## 2024-06-26 PROCEDURE — 80053 COMPREHEN METABOLIC PANEL: CPT | Performed by: INTERNAL MEDICINE

## 2024-06-26 PROCEDURE — 85025 COMPLETE CBC W/AUTO DIFF WBC: CPT | Performed by: INTERNAL MEDICINE

## 2024-06-28 ENCOUNTER — INFUSION (OUTPATIENT)
Dept: INFUSION THERAPY | Facility: HOSPITAL | Age: 64
End: 2024-06-28
Attending: INTERNAL MEDICINE
Payer: COMMERCIAL

## 2024-06-28 VITALS
WEIGHT: 233.38 LBS | HEART RATE: 80 BPM | SYSTOLIC BLOOD PRESSURE: 145 MMHG | RESPIRATION RATE: 18 BRPM | HEIGHT: 70 IN | DIASTOLIC BLOOD PRESSURE: 84 MMHG | OXYGEN SATURATION: 97 % | TEMPERATURE: 99 F | BODY MASS INDEX: 33.41 KG/M2

## 2024-06-28 DIAGNOSIS — D64.9 NORMOCHROMIC ANEMIA: ICD-10-CM

## 2024-06-28 DIAGNOSIS — N18.30 STAGE 3 CHRONIC KIDNEY DISEASE, UNSPECIFIED WHETHER STAGE 3A OR 3B CKD: ICD-10-CM

## 2024-06-28 DIAGNOSIS — D46.9 MDS (MYELODYSPLASTIC SYNDROME): ICD-10-CM

## 2024-06-28 DIAGNOSIS — D64.9 CHRONIC ANEMIA: Primary | ICD-10-CM

## 2024-06-28 DIAGNOSIS — D64.89 ANEMIA DUE TO MULTIPLE MECHANISMS: ICD-10-CM

## 2024-06-28 PROCEDURE — 63600175 PHARM REV CODE 636 W HCPCS: Mod: JZ,EC,JG | Performed by: INTERNAL MEDICINE

## 2024-06-28 PROCEDURE — 96372 THER/PROPH/DIAG INJ SC/IM: CPT

## 2024-06-28 RX ADMIN — EPOETIN ALFA-EPBX 40000 UNITS: 40000 INJECTION, SOLUTION INTRAVENOUS; SUBCUTANEOUS at 04:06

## 2024-06-28 NOTE — PLAN OF CARE
Problem: Fatigue  Goal: Improved Activity Tolerance  Intervention: Promote Improved Energy  Flowsheets (Taken 6/28/2024 1602)  Fatigue Management: fatigue-related activity identified  Activity Management: Ambulated -L4

## 2024-07-05 ENCOUNTER — INFUSION (OUTPATIENT)
Dept: INFUSION THERAPY | Facility: HOSPITAL | Age: 64
End: 2024-07-05
Attending: INTERNAL MEDICINE
Payer: COMMERCIAL

## 2024-07-05 VITALS
HEIGHT: 70 IN | SYSTOLIC BLOOD PRESSURE: 141 MMHG | BODY MASS INDEX: 32.73 KG/M2 | HEART RATE: 82 BPM | WEIGHT: 228.63 LBS | OXYGEN SATURATION: 98 % | TEMPERATURE: 99 F | DIASTOLIC BLOOD PRESSURE: 85 MMHG

## 2024-07-05 DIAGNOSIS — N18.30 STAGE 3 CHRONIC KIDNEY DISEASE, UNSPECIFIED WHETHER STAGE 3A OR 3B CKD: ICD-10-CM

## 2024-07-05 DIAGNOSIS — D64.9 NORMOCHROMIC ANEMIA: ICD-10-CM

## 2024-07-05 DIAGNOSIS — D46.9 MDS (MYELODYSPLASTIC SYNDROME): ICD-10-CM

## 2024-07-05 DIAGNOSIS — D64.9 CHRONIC ANEMIA: Primary | ICD-10-CM

## 2024-07-05 DIAGNOSIS — D64.89 ANEMIA DUE TO MULTIPLE MECHANISMS: ICD-10-CM

## 2024-07-05 PROCEDURE — 63600175 PHARM REV CODE 636 W HCPCS: Mod: JZ,EC,JG | Performed by: INTERNAL MEDICINE

## 2024-07-05 PROCEDURE — 96372 THER/PROPH/DIAG INJ SC/IM: CPT

## 2024-07-05 RX ADMIN — EPOETIN ALFA-EPBX 40000 UNITS: 40000 INJECTION, SOLUTION INTRAVENOUS; SUBCUTANEOUS at 03:07

## 2024-07-05 NOTE — PLAN OF CARE
Problem: Fatigue  Goal: Improved Activity Tolerance  Outcome: Progressing  Intervention: Promote Improved Energy  Flowsheets (Taken 7/5/2024 1516)  Fatigue Management: frequent rest breaks encouraged  Environmental Support: rest periods encouraged

## 2024-07-08 DIAGNOSIS — N40.1 BPH ASSOCIATED WITH NOCTURIA: ICD-10-CM

## 2024-07-08 DIAGNOSIS — R35.1 BPH ASSOCIATED WITH NOCTURIA: ICD-10-CM

## 2024-07-08 RX ORDER — TAMSULOSIN HYDROCHLORIDE 0.4 MG/1
1 CAPSULE ORAL
Qty: 90 CAPSULE | Refills: 1 | OUTPATIENT
Start: 2024-07-08

## 2024-07-10 ENCOUNTER — LAB VISIT (OUTPATIENT)
Dept: LAB | Facility: HOSPITAL | Age: 64
End: 2024-07-10
Attending: INTERNAL MEDICINE
Payer: COMMERCIAL

## 2024-07-10 DIAGNOSIS — D64.89 ANEMIA DUE TO MULTIPLE MECHANISMS: ICD-10-CM

## 2024-07-10 DIAGNOSIS — D57.3 SICKLE CELL TRAIT SYNDROME: ICD-10-CM

## 2024-07-10 LAB
ALBUMIN SERPL BCP-MCNC: 4.3 G/DL (ref 3.5–5.2)
ALP SERPL-CCNC: 77 U/L (ref 55–135)
ALT SERPL W/O P-5'-P-CCNC: 15 U/L (ref 10–44)
ANION GAP SERPL CALC-SCNC: 7 MMOL/L (ref 8–16)
AST SERPL-CCNC: 17 U/L (ref 10–40)
BASOPHILS # BLD AUTO: 0.1 K/UL (ref 0–0.2)
BASOPHILS NFR BLD: 1.1 % (ref 0–1.9)
BILIRUB SERPL-MCNC: 0.7 MG/DL (ref 0.1–1)
BUN SERPL-MCNC: 37 MG/DL (ref 8–23)
CALCIUM SERPL-MCNC: 8.4 MG/DL (ref 8.7–10.5)
CHLORIDE SERPL-SCNC: 108 MMOL/L (ref 95–110)
CO2 SERPL-SCNC: 26 MMOL/L (ref 23–29)
CREAT SERPL-MCNC: 2.9 MG/DL (ref 0.5–1.4)
DIFFERENTIAL METHOD BLD: ABNORMAL
EOSINOPHIL # BLD AUTO: 0.2 K/UL (ref 0–0.5)
EOSINOPHIL NFR BLD: 1.7 % (ref 0–8)
ERYTHROCYTE [DISTWIDTH] IN BLOOD BY AUTOMATED COUNT: 27.4 % (ref 11.5–14.5)
EST. GFR  (NO RACE VARIABLE): 23.4 ML/MIN/1.73 M^2
FERRITIN SERPL-MCNC: 1370.8 NG/ML (ref 20–300)
GLUCOSE SERPL-MCNC: 103 MG/DL (ref 70–110)
HCT VFR BLD AUTO: 33.1 % (ref 40–54)
HGB BLD-MCNC: 11.1 G/DL (ref 14–18)
IMM GRANULOCYTES # BLD AUTO: 0.07 K/UL (ref 0–0.04)
IMM GRANULOCYTES NFR BLD AUTO: 0.7 % (ref 0–0.5)
IRON SERPL-MCNC: 49 UG/DL (ref 45–160)
LYMPHOCYTES # BLD AUTO: 1.6 K/UL (ref 1–4.8)
LYMPHOCYTES NFR BLD: 16.7 % (ref 18–48)
MCH RBC QN AUTO: 30.4 PG (ref 27–31)
MCHC RBC AUTO-ENTMCNC: 33.5 G/DL (ref 32–36)
MCV RBC AUTO: 91 FL (ref 82–98)
MONOCYTES # BLD AUTO: 1 K/UL (ref 0.3–1)
MONOCYTES NFR BLD: 11 % (ref 4–15)
NEUTROPHILS # BLD AUTO: 6.4 K/UL (ref 1.8–7.7)
NEUTROPHILS NFR BLD: 68.8 % (ref 38–73)
NRBC BLD-RTO: 0 /100 WBC
PLATELET # BLD AUTO: 541 K/UL (ref 150–450)
PMV BLD AUTO: 10 FL (ref 9.2–12.9)
POTASSIUM SERPL-SCNC: 4.3 MMOL/L (ref 3.5–5.1)
PROT SERPL-MCNC: 7.3 G/DL (ref 6–8.4)
RBC # BLD AUTO: 3.65 M/UL (ref 4.6–6.2)
SATURATED IRON: 25 % (ref 20–50)
SODIUM SERPL-SCNC: 141 MMOL/L (ref 136–145)
TOTAL IRON BINDING CAPACITY: 195 UG/DL (ref 250–450)
TRANSFERRIN SERPL-MCNC: 139 MG/DL (ref 200–375)
WBC # BLD AUTO: 9.34 K/UL (ref 3.9–12.7)

## 2024-07-10 PROCEDURE — 83540 ASSAY OF IRON: CPT | Performed by: INTERNAL MEDICINE

## 2024-07-10 PROCEDURE — 82728 ASSAY OF FERRITIN: CPT | Mod: 91 | Performed by: INTERNAL MEDICINE

## 2024-07-10 PROCEDURE — 80053 COMPREHEN METABOLIC PANEL: CPT | Performed by: INTERNAL MEDICINE

## 2024-07-10 PROCEDURE — 85025 COMPLETE CBC W/AUTO DIFF WBC: CPT | Performed by: INTERNAL MEDICINE

## 2024-07-10 PROCEDURE — 36415 COLL VENOUS BLD VENIPUNCTURE: CPT | Performed by: INTERNAL MEDICINE

## 2024-07-11 ENCOUNTER — INFUSION (OUTPATIENT)
Dept: INFUSION THERAPY | Facility: HOSPITAL | Age: 64
End: 2024-07-11
Attending: INTERNAL MEDICINE
Payer: COMMERCIAL

## 2024-07-11 VITALS
BODY MASS INDEX: 32.67 KG/M2 | OXYGEN SATURATION: 96 % | HEIGHT: 70 IN | TEMPERATURE: 98 F | WEIGHT: 228.19 LBS | RESPIRATION RATE: 18 BRPM | DIASTOLIC BLOOD PRESSURE: 87 MMHG | HEART RATE: 74 BPM | SYSTOLIC BLOOD PRESSURE: 157 MMHG

## 2024-07-11 DIAGNOSIS — D64.9 CHRONIC ANEMIA: Primary | ICD-10-CM

## 2024-07-11 DIAGNOSIS — N18.30 STAGE 3 CHRONIC KIDNEY DISEASE, UNSPECIFIED WHETHER STAGE 3A OR 3B CKD: ICD-10-CM

## 2024-07-11 DIAGNOSIS — D64.89 ANEMIA DUE TO MULTIPLE MECHANISMS: ICD-10-CM

## 2024-07-11 DIAGNOSIS — D46.9 MDS (MYELODYSPLASTIC SYNDROME): ICD-10-CM

## 2024-07-11 DIAGNOSIS — D64.9 NORMOCHROMIC ANEMIA: ICD-10-CM

## 2024-07-11 PROCEDURE — 63600175 PHARM REV CODE 636 W HCPCS: Mod: JZ,EC,JG | Performed by: INTERNAL MEDICINE

## 2024-07-11 PROCEDURE — 96372 THER/PROPH/DIAG INJ SC/IM: CPT

## 2024-07-11 RX ADMIN — EPOETIN ALFA-EPBX 40000 UNITS: 40000 INJECTION, SOLUTION INTRAVENOUS; SUBCUTANEOUS at 04:07

## 2024-07-11 NOTE — PLAN OF CARE
Problem: Fatigue  Goal: Improved Activity Tolerance  Intervention: Promote Improved Energy  Flowsheets (Taken 7/11/2024 1601)  Fatigue Management: fatigue-related activity identified  Activity Management: Ambulated -L4

## 2024-07-12 ENCOUNTER — TELEPHONE (OUTPATIENT)
Facility: CLINIC | Age: 64
End: 2024-07-12
Payer: COMMERCIAL

## 2024-07-12 NOTE — TELEPHONE ENCOUNTER
Patient made aware that auth dept is still working on auth for Reblozyl as there have been changes for requirements per his insurance and that we will need to cancel his scheduled appt today until auth is obtained. Verbalized understanding.

## 2024-07-17 DIAGNOSIS — N18.30 CHRONIC KIDNEY DISEASE, STAGE III (MODERATE): ICD-10-CM

## 2024-07-17 DIAGNOSIS — N17.9 ACUTE KIDNEY FAILURE, UNSPECIFIED: Primary | ICD-10-CM

## 2024-07-18 ENCOUNTER — HOSPITAL ENCOUNTER (OUTPATIENT)
Dept: RADIOLOGY | Facility: HOSPITAL | Age: 64
Discharge: HOME | End: 2024-07-18
Attending: INTERNAL MEDICINE
Payer: COMMERCIAL

## 2024-07-18 DIAGNOSIS — N18.30 CHRONIC KIDNEY DISEASE, STAGE III (MODERATE): ICD-10-CM

## 2024-07-18 DIAGNOSIS — N17.9 ACUTE KIDNEY FAILURE, UNSPECIFIED: ICD-10-CM

## 2024-07-18 PROCEDURE — 76770 US EXAM ABDO BACK WALL COMP: CPT | Mod: TC,PO

## 2024-07-18 PROCEDURE — 76857 US EXAM PELVIC LIMITED: CPT | Mod: TC,PO

## 2024-07-18 PROCEDURE — 76857 US EXAM PELVIC LIMITED: CPT | Mod: 26,,, | Performed by: RADIOLOGY

## 2024-07-18 PROCEDURE — 76770 US EXAM ABDO BACK WALL COMP: CPT | Mod: 26,,, | Performed by: RADIOLOGY

## 2024-07-19 ENCOUNTER — INFUSION (OUTPATIENT)
Dept: INFUSION THERAPY | Facility: HOSPITAL | Age: 64
End: 2024-07-19
Attending: INTERNAL MEDICINE
Payer: COMMERCIAL

## 2024-07-19 VITALS
RESPIRATION RATE: 18 BRPM | BODY MASS INDEX: 32.67 KG/M2 | OXYGEN SATURATION: 95 % | DIASTOLIC BLOOD PRESSURE: 77 MMHG | SYSTOLIC BLOOD PRESSURE: 144 MMHG | TEMPERATURE: 98 F | WEIGHT: 228.19 LBS | HEIGHT: 70 IN | HEART RATE: 58 BPM

## 2024-07-19 DIAGNOSIS — D46.9 MDS (MYELODYSPLASTIC SYNDROME): ICD-10-CM

## 2024-07-19 DIAGNOSIS — N18.30 STAGE 3 CHRONIC KIDNEY DISEASE, UNSPECIFIED WHETHER STAGE 3A OR 3B CKD: ICD-10-CM

## 2024-07-19 DIAGNOSIS — D64.89 ANEMIA DUE TO MULTIPLE MECHANISMS: ICD-10-CM

## 2024-07-19 DIAGNOSIS — D64.9 CHRONIC ANEMIA: Primary | ICD-10-CM

## 2024-07-19 DIAGNOSIS — D64.9 NORMOCHROMIC ANEMIA: ICD-10-CM

## 2024-07-19 PROCEDURE — 96372 THER/PROPH/DIAG INJ SC/IM: CPT

## 2024-07-19 PROCEDURE — 63600175 PHARM REV CODE 636 W HCPCS: Mod: JZ,EC,JG | Performed by: INTERNAL MEDICINE

## 2024-07-19 RX ADMIN — EPOETIN ALFA-EPBX 40000 UNITS: 40000 INJECTION, SOLUTION INTRAVENOUS; SUBCUTANEOUS at 04:07

## 2024-07-19 NOTE — PLAN OF CARE
Problem: Fall Injury Risk  Goal: Absence of Fall and Fall-Related Injury  Outcome: Progressing  Intervention: Identify and Manage Contributors  Flowsheets (Taken 7/19/2024 1607)  Medication Review/Management: medications reviewed  Intervention: Promote Injury-Free Environment  Flowsheets (Taken 7/19/2024 1607)  Safety Promotion/Fall Prevention: assistive device/personal item within reach

## 2024-07-24 ENCOUNTER — LAB VISIT (OUTPATIENT)
Dept: LAB | Facility: HOSPITAL | Age: 64
End: 2024-07-24
Attending: INTERNAL MEDICINE
Payer: COMMERCIAL

## 2024-07-24 DIAGNOSIS — D57.3 SICKLE CELL TRAIT SYNDROME: ICD-10-CM

## 2024-07-24 DIAGNOSIS — D64.89 ANEMIA DUE TO MULTIPLE MECHANISMS: ICD-10-CM

## 2024-07-24 LAB
ALBUMIN SERPL BCP-MCNC: 4.2 G/DL (ref 3.5–5.2)
ALP SERPL-CCNC: 78 U/L (ref 55–135)
ALT SERPL W/O P-5'-P-CCNC: 14 U/L (ref 10–44)
ANION GAP SERPL CALC-SCNC: 6 MMOL/L (ref 8–16)
AST SERPL-CCNC: 17 U/L (ref 10–40)
BASOPHILS # BLD AUTO: 0.1 K/UL (ref 0–0.2)
BASOPHILS NFR BLD: 1.2 % (ref 0–1.9)
BILIRUB SERPL-MCNC: 0.5 MG/DL (ref 0.1–1)
BUN SERPL-MCNC: 39 MG/DL (ref 8–23)
CALCIUM SERPL-MCNC: 8.4 MG/DL (ref 8.7–10.5)
CHLORIDE SERPL-SCNC: 110 MMOL/L (ref 95–110)
CO2 SERPL-SCNC: 26 MMOL/L (ref 23–29)
CREAT SERPL-MCNC: 2.7 MG/DL (ref 0.5–1.4)
DIFFERENTIAL METHOD BLD: ABNORMAL
EOSINOPHIL # BLD AUTO: 0.2 K/UL (ref 0–0.5)
EOSINOPHIL NFR BLD: 2.1 % (ref 0–8)
ERYTHROCYTE [DISTWIDTH] IN BLOOD BY AUTOMATED COUNT: 27.2 % (ref 11.5–14.5)
EST. GFR  (NO RACE VARIABLE): 25.5 ML/MIN/1.73 M^2
GLUCOSE SERPL-MCNC: 105 MG/DL (ref 70–110)
HCT VFR BLD AUTO: 30.3 % (ref 40–54)
HGB BLD-MCNC: 10.4 G/DL (ref 14–18)
IMM GRANULOCYTES # BLD AUTO: 0.06 K/UL (ref 0–0.04)
IMM GRANULOCYTES NFR BLD AUTO: 0.7 % (ref 0–0.5)
LYMPHOCYTES # BLD AUTO: 1.6 K/UL (ref 1–4.8)
LYMPHOCYTES NFR BLD: 19.2 % (ref 18–48)
MCH RBC QN AUTO: 30.8 PG (ref 27–31)
MCHC RBC AUTO-ENTMCNC: 34.3 G/DL (ref 32–36)
MCV RBC AUTO: 90 FL (ref 82–98)
MONOCYTES # BLD AUTO: 0.9 K/UL (ref 0.3–1)
MONOCYTES NFR BLD: 11 % (ref 4–15)
NEUTROPHILS # BLD AUTO: 5.5 K/UL (ref 1.8–7.7)
NEUTROPHILS NFR BLD: 65.8 % (ref 38–73)
NRBC BLD-RTO: 0 /100 WBC
PLATELET # BLD AUTO: 505 K/UL (ref 150–450)
PMV BLD AUTO: 10.3 FL (ref 9.2–12.9)
POTASSIUM SERPL-SCNC: 4.3 MMOL/L (ref 3.5–5.1)
PROT SERPL-MCNC: 7 G/DL (ref 6–8.4)
RBC # BLD AUTO: 3.38 M/UL (ref 4.6–6.2)
SODIUM SERPL-SCNC: 142 MMOL/L (ref 136–145)
WBC # BLD AUTO: 8.38 K/UL (ref 3.9–12.7)

## 2024-07-24 PROCEDURE — 80053 COMPREHEN METABOLIC PANEL: CPT | Performed by: INTERNAL MEDICINE

## 2024-07-24 PROCEDURE — 85025 COMPLETE CBC W/AUTO DIFF WBC: CPT | Performed by: INTERNAL MEDICINE

## 2024-07-24 PROCEDURE — 36415 COLL VENOUS BLD VENIPUNCTURE: CPT | Performed by: INTERNAL MEDICINE

## 2024-07-26 ENCOUNTER — INFUSION (OUTPATIENT)
Dept: INFUSION THERAPY | Facility: HOSPITAL | Age: 64
End: 2024-07-26
Attending: INTERNAL MEDICINE
Payer: COMMERCIAL

## 2024-07-26 VITALS
BODY MASS INDEX: 32.81 KG/M2 | RESPIRATION RATE: 18 BRPM | SYSTOLIC BLOOD PRESSURE: 150 MMHG | WEIGHT: 229.19 LBS | DIASTOLIC BLOOD PRESSURE: 79 MMHG | TEMPERATURE: 98 F | OXYGEN SATURATION: 97 % | HEIGHT: 70 IN | HEART RATE: 79 BPM

## 2024-07-26 DIAGNOSIS — N18.30 STAGE 3 CHRONIC KIDNEY DISEASE, UNSPECIFIED WHETHER STAGE 3A OR 3B CKD: ICD-10-CM

## 2024-07-26 DIAGNOSIS — D64.89 ANEMIA DUE TO MULTIPLE MECHANISMS: ICD-10-CM

## 2024-07-26 DIAGNOSIS — D46.9 MDS (MYELODYSPLASTIC SYNDROME): ICD-10-CM

## 2024-07-26 DIAGNOSIS — D64.9 CHRONIC ANEMIA: Primary | ICD-10-CM

## 2024-07-26 DIAGNOSIS — D64.9 NORMOCHROMIC ANEMIA: ICD-10-CM

## 2024-07-26 PROCEDURE — 96372 THER/PROPH/DIAG INJ SC/IM: CPT

## 2024-07-26 PROCEDURE — 63600175 PHARM REV CODE 636 W HCPCS: Mod: JZ,EC,JG | Performed by: INTERNAL MEDICINE

## 2024-07-26 RX ADMIN — EPOETIN ALFA-EPBX 40000 UNITS: 40000 INJECTION, SOLUTION INTRAVENOUS; SUBCUTANEOUS at 04:07

## 2024-07-26 NOTE — PLAN OF CARE
Problem: Anemia  Goal: Anemia Symptom Improvement  Outcome: Progressing  Intervention: Monitor and Manage Anemia  Flowsheets (Taken 7/26/2024 8277)  Fatigue Management: frequent rest breaks encouraged

## 2024-07-31 NOTE — PROGRESS NOTES
[facility] Hematology/Oncology               PROGRESS NOTE - Follow-up Visit      Subjective:       Patient ID:   NAME: Rick Butler : 1960     64 y.o. male    Referring Doc: Chetna (new PCP)  Other Physicians: Getachew; Alan Mustafa    Chief Complaint:  Sickle cell trait/anemia/RARS  f/u    History of Present Illness:     Patient returns today for a regularly scheduled follow-up visit.  The patient is doing ok overall. He is here by himself today.     He has been Rebozyl and doing well with it; but have been having some problems with his insurance approving; he is seeing Dr Hilario on     He saw Dr Mas recently with nephrology for rising creatinine    Breathing ok, no CP, SOB, HA's or N/V; some occasional fatigue     He has not had any recent pain crisis.     He denies having any breathing difficulties. He denies any blood in the stool, dark stools or hematuria; breathing ok; no CP, SOB, HA's or N/V;            Discussed covid19 precautions - he had his vaccinations      ROS:   GEN: normal without any fever, night sweats or weight loss  HEENT: normal with no HA's, sore throat, stiff neck, changes in vision  CV: normal with no CP, SOB, PND, MORA or orthopnea  PULM: normal with no SOB, cough, hemoptysis, sputum or pleuritic pain  GI: normal with no abdominal pain, nausea, vomiting, constipation, diarrhea, melanotic stools, BRBPR, or hematemesis  : normal with no hematuria, dysuria  BREAST: normal with no mass, discharge, pain  SKIN: normal with no rash, erythema, bruising, or swelling    Allergies:  Review of patient's allergies indicates:  No Known Allergies    Medications:    Current Outpatient Medications:     amLODIPine (NORVASC) 5 MG tablet, Take 1 tablet (5 mg total) by mouth once daily., Disp: 90 tablet, Rfl: 3    atorvastatin (LIPITOR) 10 MG tablet, Take 1 tablet (10 mg total) by mouth every evening., Disp: 90 tablet, Rfl: 3    calcitRIOL (ROCALTROL) 0.25 MCG Cap, Take 0.25 mcg by mouth every 7 days.,  Disp: , Rfl:     epoetin tray (PROCRIT INJ), Thursday, Disp: , Rfl:     famotidine (PEPCID) 20 MG tablet, famotidine 20 mg tablet  Take 1 tablet twice a day by oral route., Disp: , Rfl:     fluticasone propionate (FLONASE) 50 mcg/actuation nasal spray, 1 spray (50 mcg total) by Each Nostril route 2 (two) times a day. 1 spray EVERY morning and afternoon  toward the ear each side after a sinus rinse, Disp: 15.8 mL, Rfl: 5    folic acid (FOLVITE) 1 MG tablet, Take 2 tablets (2 mg total) by mouth once daily., Disp: 180 tablet, Rfl: 1    multivitamin capsule, Take 1 capsule by mouth once daily., Disp: , Rfl:     pantoprazole (PROTONIX) 40 MG tablet, , Disp: , Rfl:     sod chlor-bicarb-squeez bottle (NEILMED SINUS RINSE COMPLETE) pkdv, 1 application  by sinus irrigation route 2 (two) times a day. Not right before bed, Disp: 1 each, Rfl: 11    tamsulosin (FLOMAX) 0.4 mg Cap, TAKE 2 CAPSULES BY MOUTH ONCE DAILY., Disp: 180 capsule, Rfl: 1    azelastine (ASTELIN) 137 mcg (0.1 %) nasal spray, 2 sprays (274 mcg total) by Nasal route 2 (two) times daily as needed for Rhinitis., Disp: 30 mL, Rfl: 5    sildenafil (VIAGRA) 100 MG tablet, Take 1 tablet (100 mg total) by mouth daily as needed for Erectile Dysfunction., Disp: 10 tablet, Rfl: 6    PMHx/PSHx Updates:  See patient's last visit with me on 4/4/2024  See H&P on 7/9/2011      Pathology:    12/20/2019  BONE MARROW, RIGHT ILIAC CREST,    ASPIRATE, CLOT SECTION, AND CORE BIOPSY:    --HYPERCELLULAR MARROW (APPROXIMATELY 75% TO 80%) WITH TRILINEAGE   HEMATOPOIETIC ELEMENTS, ERYTHROID  HYPERPLASIA AND MILD MEGAKARYOCYTIC HYPERPLASIA, AND NON-SPECIFIC   DYSHEMATOPOIETIC CHANGES (SEE     COMMENT).  --[id number]-DI-JUCJANKS INCREASED STAINABLE IRON WITH INCREASED RING   SIDEROBLASTS (GREATER THAN 15%)     (SEE COMMENT).  --PERIPHERAL BLOOD WITH THROMBOCYTOSIS (574,000/MICROLITER) AND ANEMIA   (HEMOGLOBIN 5.5 GRAM/DECILITER),    WITH SCATTERED DREPANOCYTOID FORMS AND FEW NUCLEATED  "ERYTHROCYTES      Cytogenetic Results at the bottom of the report.  NORMAL    Objective:     Vitals:  Blood pressure (!) 153/87, pulse 73, temperature 97.7 °F (36.5 °C), resp. rate 16, height 5' 10" (1.778 m), weight 103.2 kg (227 lb 9.6 oz).    Physical Examination:   GEN: no apparent distress, comfortable; AAOx3  HEAD: atraumatic and normocephalic  EYES: no pallor, no icterus, PERRLA  ENT: OMM, no pharyngeal erythema, external ears WNL; no nasal discharge; no thrush  NECK: no masses, thyroid normal, trachea midline, no LAD/LN's, supple  CV: RRR with no murmur; normal pulse; normal S1 and S2; no pedal edema  CHEST: Normal respiratory effort; CTAB; normal breath sounds; no wheeze or crackles  ABDOM: nontender and nondistended; soft; normal bowel sounds; no rebound/guarding  MUSC/Skeletal: ROM normal; no crepitus; joints normal; no deformities or arthropathy  EXTREM: no clubbing, cyanosis, inflammation or swelling  SKIN: no rashes, lesions, ulcers, petechiae or subcutaneous nodules  : no jiang  NEURO: grossly intact; motor/sensory WNL; AAOx3; no tremors  PSYCH: normal mood, affect and behavior  LYMPH: normal cervical, supraclavicular, axillary and groin LN's            Labs:     Lab Results   Component Value Date    WBC 8.38 07/24/2024    HGB 10.4 (L) 07/24/2024    HCT 30.3 (L) 07/24/2024    MCV 90 07/24/2024     (H) 07/24/2024           CMP  Sodium   Date Value Ref Range Status   07/24/2024 142 136 - 145 mmol/L Final   06/26/2019 138 134 - 144 mmol/L      Potassium   Date Value Ref Range Status   07/24/2024 4.3 3.5 - 5.1 mmol/L Final     Chloride   Date Value Ref Range Status   07/24/2024 110 95 - 110 mmol/L Final   06/26/2019 105 98 - 110 mmol/L      CO2   Date Value Ref Range Status   07/24/2024 26 23 - 29 mmol/L Final     Glucose   Date Value Ref Range Status   07/24/2024 105 70 - 110 mg/dL Final   06/26/2019 114 (H) 70 - 99 mg/dL      BUN   Date Value Ref Range Status   07/24/2024 39 (H) 8 - 23 mg/dL " Final     Creatinine   Date Value Ref Range Status   07/24/2024 2.7 (H) 0.5 - 1.4 mg/dL Final   06/26/2019 1.62 (H) 0.60 - 1.40 mg/dL      Calcium   Date Value Ref Range Status   07/24/2024 8.4 (L) 8.7 - 10.5 mg/dL Final     Total Protein   Date Value Ref Range Status   07/24/2024 7.0 6.0 - 8.4 g/dL Final     Albumin   Date Value Ref Range Status   07/24/2024 4.2 3.5 - 5.2 g/dL Final   06/26/2019 4.4 3.1 - 4.7 g/dL      Total Bilirubin   Date Value Ref Range Status   07/24/2024 0.5 0.1 - 1.0 mg/dL Final     Comment:     For infants and newborns, interpretation of results should be based  on gestational age, weight and in agreement with clinical  observations.    Premature Infant recommended reference ranges:  Up to 24 hours.............<8.0 mg/dL  Up to 48 hours............<12.0 mg/dL  3-5 days..................<15.0 mg/dL  6-29 days.................<15.0 mg/dL       Alkaline Phosphatase   Date Value Ref Range Status   07/24/2024 78 55 - 135 U/L Final     AST   Date Value Ref Range Status   07/24/2024 17 10 - 40 U/L Final     ALT   Date Value Ref Range Status   07/24/2024 14 10 - 44 U/L Final     Anion Gap   Date Value Ref Range Status   07/24/2024 6 (L) 8 - 16 mmol/L Final     eGFR if    Date Value Ref Range Status   07/13/2022 45.6 (A) >60 mL/min/1.73 m^2 Final     eGFR if non    Date Value Ref Range Status   07/13/2022 39.5 (A) >60 mL/min/1.73 m^2 Final     Comment:     Calculation used to obtain the estimated glomerular filtration  rate (eGFR) is the CKD-EPI equation.          Lab Results   Component Value Date    IRON 49 07/10/2024    TIBC 195 (L) 07/10/2024    FERRITIN 1,317.6 (H) 07/10/2024           I have reviewed all available lab results and radiology reports.    Radiology/Diagnostic Studies:    US 2/8/2023:    IMPRESSION:  1. Dilation of the common bile duct at up to 10 mm, unchanged when compared to 2016.  2. Nonvisualization of the pancreas because of overlying bowel  "gas.  3. No other significant findings.  spleen is normal in size and appearance      2/15/2018 Xray Survey:   IMPRESSION: No significant abnormality seen. No evidence of osteoblastic or  osteoclastic lesions identified    MRI L-spine 2/12/2018  IMPRESSION:    1. Prominent low signal intensity throughout the bone marrow on T1 and  T2-weighted imaging. Marrow infiltrative process including malignancy such as  metastatic disease or lymphoma/leukemia is a consideration along with multiple  myeloma or malignant process. Benign etiology such as osteopetrosis or  regenerative red marrow are also considerations.    2. Multilevel lumbar degenerative changes, most prominent at L4-L5 and L5-S1 as  described.    Assessment/Plan:   (1) 64 y.o. male with diagnosis of sickle cell anemia with borderline microcytosis  - latest hgb was 6.0 and he had blood transfusion  - today, he is not symptomatic at this time  - he probably has a multifactorial anemia process with underlying sickle cell, anemia of chronic disorders and anemia of chronic renal. I can not rule out an underlying GI bleeding process.   - iron panel is adequate (no deficiency)  - total bilirubin is only minimally elevated  - he required transfusion again in Oct 2019 and again in Dec 2019  - he had bone marrow biopsy on 12/20/2019    "dyshematopoietic changes are not entirely specific and are present  mostly within erythroid and megakaryocytic lineages. These findings   could be observed in chronic disease states, autoimmune conditions,  infectious settings, toxic exposures, nutritional deficits, as medication/drug effect, and in myelodysplastic syndrome (MDS), among other conditions"  "Additionally, ring sideroblasts are a non-specific   finding "    Patient was referred to see Dr Mustafa at [facility] and had repeat BM biopsy with diagnosis made of RARS  - he is now on procrit per directives of Dr Mustafa    7/30/2020:   he recently saw Dr Mustafa  And sees him again in Andre " "2021  - he is doing well with procrit and is feeling much better  - he has not required any transfusions for some time time    9/24/2020:  - latest hgb at 8.5  - will increase the procrit dose  - f/u with Dr Zavala in Jan 2021 11/19/2020:  - patient doing well and feels "great"  - hgb up to 9.1; continued on the procrit  - f/u with Dr Mustafa in Jan 2021    3/4/2021:  - he saw Dr Mustafa in jan 2021 and sees him again in June/July 2021  - latest hgb at 9.1 and stable and platelets are 474,000; wbc at 10.0    6/10/2021:  - latest hgb at 9.0  - plats 485,000  - on weekly procrit  - seeing Dr Mustafa again tomorrow    10/14/2021:  - hgb at 8.6 but he recently had been taking naproxen and had some BRBPR - which since resolved  - he saw Dr Beyer with GI and he is having colonsocopy on Nov 5th along with EGD  - plats 429,000  - he sees Dr Mustafa again in Nov 19th   - he sees Dr Mas again in Dec 2021    1/13/2022:  - He has been under the care of Dr Mustafa at [facility] for RARS- MDS. He is now on procrit weekly. He sees Dr Mustafa again in March 2022    - hgb 9.3  - he had scope with Dr Collins in nov 2021 which was good per patient and they plan to repeat in 5 yrs    5/12/2022:  - hgb at 9.0  - plats 485  - wbc 7.25  - he is on procrit weekly  - saw Dr Mustafa this past Fridday and since he is leaving, he will start seeing Dr Hilario instead in 3 months    8/18/2022:  - latest hgb a little lower at 8.8  - he has been on the procrit  - he saw Dr Hilario recently at [facility] and plans to see her again in 3 months    1/26/2023:  - He saw Dr Hilario at [facility] again recently this past Tuesday and they are considering another medication; he plans to see her again in April 2023  - Dr Hilario requested he have an US of spleen - will order here in [place]  - he is also on appetite stimulant  - hgb at 9.4 and plats at 500k    5/11/2023:  - hgb at 9.6 and plats 466,000  - He saw Dr Hilario at [facility] again recently this past Tuesday and they are " "considering another medication; he plans to see her again in Aug 2023    8/3/2023:  - His insurance will not approve of him getting the Reblozyl   - He sees Dr Hilario again next Tuesday and we will await her directives.     9/14/2023:  - the Rebozyl has been approved - will set up  - latest hgb at 10.1  - seeing Dr Hilario again in Jan 2024 11/16/2023:  - continued on Rebozyl  - WBC and plats WNL  - hgb at 10.3  - cardiac w/u negative per patient    2/15/2024:  - labs are adequate  - hgb at 10.6  - WBC and plat WNL  - continued on Rebozyl  - recent colonoscopy good per patient    4/4/2024:  - continued on Rebozyl  - counts are looking adequate    8/1/2024:  - continued on procrit  - insurance would not approve of the rebozyl and he has not had the last 2-3 injections  - hold rebozyl for now anyway due to the increased creatine and decreased kidney function  - f/u with Dr Chilel  - seeing Dr Hilario on 8/13  - continue procrit as directed    (2) Alcohol abuse issues in past - he has been sober for over 3 years now    (3) New findings on radiography with abnormal chest CT and lung mass  - former smoker    (4) chronic back issues - prior Xrays and MRI - suspect the findings on the MRI are possibly due to his sickle cell;   - SPEP was previously negative for M-protein  - PSA was 0.3 in Sept 2017  - recommended neurosurgery evaluation previously  - he saw Dr Nura VUONG and had a nerve "burning" procedure and his pain has improved    (5) CRI - elevated creatinine - he is followed Dr Chilel with nephrology      (6) H pylori gastritis - s/p recent endoscopy with Dr Collins and antibotics x 10 days    (7) CP   9/14/2023:  - seeing Dr lovett with cardiology and planning stress test in near future        1. Normochromic anemia        2. Thrombocytosis        3. Monocytosis        4. Anemia due to multiple mechanisms        5. RARS (refractory anemia with ringed sideroblasts)        6. MDS (myelodysplastic syndrome)        7. Anemia, " chronic renal failure, stage 3 (moderate)        8. Former smoker          PLAN;  1. Continue with the procrit   2. Unable to get insurance approval for the Rebozyl - but will hold it anyway due to the decline in kidney function  2. Planned f/u with Dr Hilario on 8/13  3. F/u with cardiology as directed  4.  F/u with nephrology as directed by them -   Dr Mas in Oct 2024   5. Seeing Dr Castillo with  tomorrow  6. Check labs every 2 weeks  7. Add marinol for appetite                RTC  4 weeks with Whit and  8 weeks with myself  Fax note to Dennis Basilio Tveit;  Lewis Hilario; Jonathan    Total Time spent on patient:    I spent over 25 mins of time with the patient. Reviewed results of the recently ordered labs, tests, reports and studies; made directives with regards to the results. Over half of this time was spent couseling and coordinating care, making treatment and analytical decisions; ordering necessary labs, tests and studies; and discussing treatment options and setting up treatment plan(s) if indicated.            Discussion:       Pathology Discussion:    I reviewed and discussed the pathology report(s) and radiograph reports (if available) in as simple to understand and/or laymen's terms to the best of my ability. I had an indepth conversation with the patient and went over the patient's individual diagnosis based on the information that was currently available. I discussed the TNM staging process with regard to the patient's particular cancer type, and the calculated stage based on the currently available TNM data and literature. I discussed the available prognostic data with regard to the current staging information and how it relates to the prognosis of their particular neoplastic process.          COVID-19 Discussion:    I had long discussion with patient and any applicable family about the COVID-19 coronavirus epidemic and the recommended precautions with regard to cancer and/or hematology patients. I  have re-iterated the CDC recommendations for adequate hand washing, use of hand -like products, and coughing into elbow, etc. In addition, especially for our patients who are on chemotherapy and/or our otherwise immunocompromised patients, I have recommended avoidance of crowds, including movie theaters, restaurants, churches, etc. I have recommended avoidance of any sick or symptomatic family members and/or friends. I have also recommended avoidance of any raw and unwashed food products, and general avoidance of food items that have not been prepared by themselves. The patient has been asked to call us immediately with any symptom developments, issues, questions or other general concerns.     I have explained all of the above in detail and the patient understands all of the current recommendation(s). I have answered all of their questions to the best of my ability and to their complete satisfaction.   The patient is to continue with the current management plan.            Electronically signed by Jose Tello MD

## 2024-08-01 ENCOUNTER — OFFICE VISIT (OUTPATIENT)
Facility: CLINIC | Age: 64
End: 2024-08-01
Payer: COMMERCIAL

## 2024-08-01 VITALS
DIASTOLIC BLOOD PRESSURE: 87 MMHG | BODY MASS INDEX: 32.59 KG/M2 | WEIGHT: 227.63 LBS | HEIGHT: 70 IN | SYSTOLIC BLOOD PRESSURE: 153 MMHG | HEART RATE: 73 BPM | RESPIRATION RATE: 16 BRPM | TEMPERATURE: 98 F

## 2024-08-01 DIAGNOSIS — R63.0 APPETITE LOSS: ICD-10-CM

## 2024-08-01 DIAGNOSIS — N18.30 ANEMIA, CHRONIC RENAL FAILURE, STAGE 3 (MODERATE): ICD-10-CM

## 2024-08-01 DIAGNOSIS — D46.1 RARS (REFRACTORY ANEMIA WITH RINGED SIDEROBLASTS): ICD-10-CM

## 2024-08-01 DIAGNOSIS — D64.9 NORMOCHROMIC ANEMIA: Primary | ICD-10-CM

## 2024-08-01 DIAGNOSIS — Z87.891 FORMER SMOKER: ICD-10-CM

## 2024-08-01 DIAGNOSIS — D75.839 THROMBOCYTOSIS: ICD-10-CM

## 2024-08-01 DIAGNOSIS — D64.89 ANEMIA DUE TO MULTIPLE MECHANISMS: ICD-10-CM

## 2024-08-01 DIAGNOSIS — D46.9 MDS (MYELODYSPLASTIC SYNDROME): ICD-10-CM

## 2024-08-01 DIAGNOSIS — D72.821 MONOCYTOSIS: ICD-10-CM

## 2024-08-01 DIAGNOSIS — D63.1 ANEMIA, CHRONIC RENAL FAILURE, STAGE 3 (MODERATE): ICD-10-CM

## 2024-08-01 PROCEDURE — 99999 PR PBB SHADOW E&M-EST. PATIENT-LVL IV: CPT | Mod: PBBFAC,,, | Performed by: INTERNAL MEDICINE

## 2024-08-01 RX ORDER — DRONABINOL 2.5 MG/1
2.5 CAPSULE ORAL
Qty: 60 CAPSULE | Refills: 3 | Status: SHIPPED | OUTPATIENT
Start: 2024-08-01

## 2024-08-02 ENCOUNTER — OFFICE VISIT (OUTPATIENT)
Dept: UROLOGY | Facility: CLINIC | Age: 64
End: 2024-08-02
Payer: COMMERCIAL

## 2024-08-02 ENCOUNTER — INFUSION (OUTPATIENT)
Dept: INFUSION THERAPY | Facility: HOSPITAL | Age: 64
End: 2024-08-02
Attending: INTERNAL MEDICINE
Payer: COMMERCIAL

## 2024-08-02 VITALS
TEMPERATURE: 98 F | DIASTOLIC BLOOD PRESSURE: 76 MMHG | WEIGHT: 224 LBS | HEIGHT: 70 IN | SYSTOLIC BLOOD PRESSURE: 148 MMHG | HEART RATE: 73 BPM | BODY MASS INDEX: 32.07 KG/M2 | RESPIRATION RATE: 16 BRPM | OXYGEN SATURATION: 97 %

## 2024-08-02 VITALS — BODY MASS INDEX: 32.07 KG/M2 | HEIGHT: 70 IN | WEIGHT: 224 LBS

## 2024-08-02 DIAGNOSIS — D64.9 NORMOCHROMIC ANEMIA: ICD-10-CM

## 2024-08-02 DIAGNOSIS — D64.89 ANEMIA DUE TO MULTIPLE MECHANISMS: ICD-10-CM

## 2024-08-02 DIAGNOSIS — D46.9 MDS (MYELODYSPLASTIC SYNDROME): ICD-10-CM

## 2024-08-02 DIAGNOSIS — N18.30 STAGE 3 CHRONIC KIDNEY DISEASE, UNSPECIFIED WHETHER STAGE 3A OR 3B CKD: ICD-10-CM

## 2024-08-02 DIAGNOSIS — D64.9 CHRONIC ANEMIA: Primary | ICD-10-CM

## 2024-08-02 DIAGNOSIS — R35.1 BPH ASSOCIATED WITH NOCTURIA: Primary | ICD-10-CM

## 2024-08-02 DIAGNOSIS — N40.1 BPH ASSOCIATED WITH NOCTURIA: Primary | ICD-10-CM

## 2024-08-02 PROCEDURE — 99999 PR PBB SHADOW E&M-EST. PATIENT-LVL III: CPT | Mod: PBBFAC,,, | Performed by: UROLOGY

## 2024-08-02 PROCEDURE — 96372 THER/PROPH/DIAG INJ SC/IM: CPT

## 2024-08-02 PROCEDURE — 63600175 PHARM REV CODE 636 W HCPCS: Mod: JZ,EC,JG | Performed by: INTERNAL MEDICINE

## 2024-08-02 RX ADMIN — EPOETIN ALFA-EPBX 40000 UNITS: 40000 INJECTION, SOLUTION INTRAVENOUS; SUBCUTANEOUS at 03:08

## 2024-08-02 NOTE — PLAN OF CARE
Problem: Fatigue  Goal: Improved Activity Tolerance  Outcome: Progressing  Intervention: Promote Improved Energy  Flowsheets (Taken 8/2/2024 1513)  Fatigue Management: frequent rest breaks encouraged  Activity Management: Ambulated -L4  Environmental Support: rest periods encouraged

## 2024-08-02 NOTE — PROGRESS NOTES
[facility] Urology New Patient/H&P:    Rick Butler is a 64 y.o. male who presents for nocturia.    Patient with a history of myelodysplasia, CKD, lung mass and EtOH abuse who was referred for lower urinary tract symptoms.    He reports nocturia x 4, weak stream, mild frequency and mild urgency for several years. He had been on Flomax 0.4 mg PO daily for several year and recently increased to 0.8 mg. Drinks water throughout the day and night. He does not restrict his nighttime fluid intake.     Renal ultrasound negative on 7/18/24. UA micro on 7/10/24 negative. Currently undergoing evaluation with nephrology for CKD.     Works as a cook at [employer].     Denies any fever, chills, gross hematuria, flank pain, bone pain, unintentional weight loss,  trauma or history of  malignancy.         IPSS QoL  10 2 8/2/24    PVR  0 mL  8/2/24    PSA  0.41  10/4/23      Past Medical History:   Diagnosis Date    Abnormal chest CT (new) 08/17/2022    Anemia due to multiple mechanisms 01/13/2019    Anemia, chronic renal failure, stage 2 (mild) 01/13/2019    Anemia, chronic renal failure, stage 3 (moderate) 08/02/2023    Depression     Former smoker 06/29/2017    H/O ETOH abuse 06/29/2017    Lung mass (new) 08/17/2022    Monocytosis 2019    Myelodysplasia (myelodysplastic syndrome)     Myelodysplasia (myelodysplastic syndrome)     Personal history of colonic polyps 12/29/2023    RARS (refractory anemia with ringed sideroblasts) 06/01/2020    Sickle cell trait        Past Surgical History:   Procedure Laterality Date    BONE MARROW BIOPSY N/A 12/20/2019    Procedure: BIOPSY, BONE MARROW;  Surgeon: Satinder Diagnostic Provider;  Location: [facility] OR;  Service: Interventional Radiology;  Laterality: N/A;    COLONOSCOPY N/A 11/05/2021    Procedure: COLONOSCOPY;  Surgeon: Rob Collins MD;  Location: [facility] ENDO;  Service: Endoscopy;  Laterality: N/A;    COLONOSCOPY  12/29/2023    5 YR RECALL    ESOPHAGOGASTRODUODENOSCOPY  "N/A 11/05/2021    Procedure: EGD (ESOPHAGOGASTRODUODENOSCOPY);  Surgeon: Rob Collins MD;  Location: [facility] ENDO;  Service: Endoscopy;  Laterality: N/A;    INJECTION OF ANESTHETIC AGENT AROUND MEDIAL BRANCH NERVES INNERVATING LUMBAR FACET JOINT Bilateral 05/25/2018    Procedure: BLOCK-NERVE-MEDIAL BRANCH-LUMBAR;  Surgeon: Nura Heredia MD;  Location: [facility] OR;  Service: Pain Management;  Laterality: Bilateral;  L3, 4, 5    KNEE ARTHROSCOPY W/ MENISCECTOMY Right 12/22/2021    Procedure: ARTHROSCOPY, KNEE, WITH MENISCECTOMY;  Surgeon: Sebastian Green MD;  Location: [facility] OR;  Service: Orthopedics;  Laterality: Right;  partial medial meniscectomy    RADIOFREQUENCY ABLATION OF LUMBAR MEDIAL BRANCH NERVE AT SINGLE LEVEL Bilateral 07/20/2018    Procedure: RADIOFREQUENCY ABLATION, NERVE, MEDIAL BRANCH, LUMBAR, 1 LEVEL;  Surgeon: Nura Heredia MD;  Location: [facility] OR;  Service: Pain Management;  Laterality: Bilateral;  L3, 4, 5 - Burned at 80 degrees C.  for 75 seconds x 2 each site    SMALL BOWEL ENTEROSCOPY N/A 10/16/2019    Procedure: ENTEROSCOPY;  Surgeon: Rob Collins MD;  Location: [facility] ENDO;  Service: Endoscopy;  Laterality: N/A;       Family History   Problem Relation Name Age of Onset    Arthritis Mother      Depression Mother      Heart disease Mother      Hypertension Mother      Heart attack Father  59       Review of patient's allergies indicates:  No Known Allergies    Medications Reviewed: see MAR      FOCUSED PHYSICAL EXAM:    There were no vitals filed for this visit.  Body mass index is 32.14 kg/m². Weight: 101.6 kg (224 lb) Height: 5' 10" (177.8 cm)       General: Alert, cooperative, no distress, appears stated age  Abdomen: Soft, non-tender, no CVA tenderness, non-distended      LABS:    Recent Results (from the past 336 hour(s))   CBC Auto Differential    Collection Time: 07/24/24  7:37 AM   Result Value Ref Range    WBC 8.38 3.90 - 12.70 K/uL    RBC 3.38 (L) 4.60 - 6.20 M/uL    Hemoglobin 10.4 (L) 14.0 - " 18.0 g/dL    Hematocrit 30.3 (L) 40.0 - 54.0 %    MCV 90 82 - 98 fL    MCH 30.8 27.0 - 31.0 pg    MCHC 34.3 32.0 - 36.0 g/dL    RDW 27.2 (H) 11.5 - 14.5 %    Platelets 505 (H) 150 - 450 K/uL    MPV 10.3 9.2 - 12.9 fL    Immature Granulocytes 0.7 (H) 0.0 - 0.5 %    Gran # (ANC) 5.5 1.8 - 7.7 K/uL    Immature Grans (Abs) 0.06 (H) 0.00 - 0.04 K/uL    Lymph # 1.6 1.0 - 4.8 K/uL    Mono # 0.9 0.3 - 1.0 K/uL    Eos # 0.2 0.0 - 0.5 K/uL    Baso # 0.10 0.00 - 0.20 K/uL    nRBC 0 0 /100 WBC    Gran % 65.8 38.0 - 73.0 %    Lymph % 19.2 18.0 - 48.0 %    Mono % 11.0 4.0 - 15.0 %    Eosinophil % 2.1 0.0 - 8.0 %    Basophil % 1.2 0.0 - 1.9 %    Differential Method Automated    CMP    Collection Time: 07/24/24  7:37 AM   Result Value Ref Range    Sodium 142 136 - 145 mmol/L    Potassium 4.3 3.5 - 5.1 mmol/L    Chloride 110 95 - 110 mmol/L    CO2 26 23 - 29 mmol/L    Glucose 105 70 - 110 mg/dL    BUN 39 (H) 8 - 23 mg/dL    Creatinine 2.7 (H) 0.5 - 1.4 mg/dL    Calcium 8.4 (L) 8.7 - 10.5 mg/dL    Total Protein 7.0 6.0 - 8.4 g/dL    Albumin 4.2 3.5 - 5.2 g/dL    Total Bilirubin 0.5 0.1 - 1.0 mg/dL    Alkaline Phosphatase 78 55 - 135 U/L    AST 17 10 - 40 U/L    ALT 14 10 - 44 U/L    eGFR 25.5 (A) >60 mL/min/1.73 m^2    Anion Gap 6 (L) 8 - 16 mmol/L           Assessment/Diagnosis:    1. BPH associated with nocturia  Ambulatory referral/consult to Urology          Plans:    - I spent 45 minutes of the day of this encounter preparing for, treating and managing this patient. Extensive discussion with patient regarding the etiology and management of his lower urinary tract symptoms. Explained that LUTS are multifactorial and can be secondary to an enlarged prostate, PO intake of bladder irritants, overactive bladder, constipation, malignancy, trauma, infection, stones or medications.   - Explained that his nocturia is likely due to his nighttime fluid intake. Instructed to restrict.   - Continue Flomax 0.8 mg PO daily.   - RTC as needed.

## 2024-08-06 DIAGNOSIS — D46.1 RARS (REFRACTORY ANEMIA WITH RINGED SIDEROBLASTS): ICD-10-CM

## 2024-08-06 DIAGNOSIS — E53.8 FOLIC ACID DEFICIENCY: ICD-10-CM

## 2024-08-06 DIAGNOSIS — R63.0 APPETITE LOSS: ICD-10-CM

## 2024-08-06 DIAGNOSIS — D46.9 MDS (MYELODYSPLASTIC SYNDROME): ICD-10-CM

## 2024-08-06 RX ORDER — DRONABINOL 2.5 MG/1
2.5 CAPSULE ORAL
Qty: 60 CAPSULE | Refills: 3 | Status: CANCELLED | OUTPATIENT
Start: 2024-08-06

## 2024-08-06 RX ORDER — DRONABINOL 2.5 MG/1
2.5 CAPSULE ORAL
Qty: 60 CAPSULE | Refills: 3 | Status: SHIPPED | OUTPATIENT
Start: 2024-08-06 | End: 2024-08-07 | Stop reason: SDUPTHER

## 2024-08-07 ENCOUNTER — LAB VISIT (OUTPATIENT)
Dept: LAB | Facility: HOSPITAL | Age: 64
End: 2024-08-07
Attending: INTERNAL MEDICINE
Payer: COMMERCIAL

## 2024-08-07 DIAGNOSIS — D46.1 RARS (REFRACTORY ANEMIA WITH RINGED SIDEROBLASTS): ICD-10-CM

## 2024-08-07 DIAGNOSIS — D64.89 ANEMIA DUE TO MULTIPLE MECHANISMS: ICD-10-CM

## 2024-08-07 DIAGNOSIS — R63.0 APPETITE LOSS: ICD-10-CM

## 2024-08-07 DIAGNOSIS — D46.9 MDS (MYELODYSPLASTIC SYNDROME): ICD-10-CM

## 2024-08-07 DIAGNOSIS — D57.3 SICKLE CELL TRAIT SYNDROME: ICD-10-CM

## 2024-08-07 LAB
ALBUMIN SERPL BCP-MCNC: 4.1 G/DL (ref 3.5–5.2)
ALP SERPL-CCNC: 79 U/L (ref 55–135)
ALT SERPL W/O P-5'-P-CCNC: 15 U/L (ref 10–44)
ANION GAP SERPL CALC-SCNC: 6 MMOL/L (ref 8–16)
AST SERPL-CCNC: 17 U/L (ref 10–40)
BASOPHILS # BLD AUTO: 0.09 K/UL (ref 0–0.2)
BASOPHILS NFR BLD: 1.1 % (ref 0–1.9)
BILIRUB SERPL-MCNC: 0.5 MG/DL (ref 0.1–1)
BUN SERPL-MCNC: 39 MG/DL (ref 8–23)
CALCIUM SERPL-MCNC: 8.7 MG/DL (ref 8.7–10.5)
CHLORIDE SERPL-SCNC: 108 MMOL/L (ref 95–110)
CO2 SERPL-SCNC: 26 MMOL/L (ref 23–29)
CREAT SERPL-MCNC: 2.8 MG/DL (ref 0.5–1.4)
DIFFERENTIAL METHOD BLD: ABNORMAL
EOSINOPHIL # BLD AUTO: 0.2 K/UL (ref 0–0.5)
EOSINOPHIL NFR BLD: 2.4 % (ref 0–8)
ERYTHROCYTE [DISTWIDTH] IN BLOOD BY AUTOMATED COUNT: 27.3 % (ref 11.5–14.5)
EST. GFR  (NO RACE VARIABLE): 24.4 ML/MIN/1.73 M^2
FERRITIN SERPL-MCNC: 1261.7 NG/ML (ref 20–300)
GLUCOSE SERPL-MCNC: 106 MG/DL (ref 70–110)
HCT VFR BLD AUTO: 29.7 % (ref 40–54)
HGB BLD-MCNC: 9.9 G/DL (ref 14–18)
IMM GRANULOCYTES # BLD AUTO: 0.03 K/UL (ref 0–0.04)
IMM GRANULOCYTES NFR BLD AUTO: 0.4 % (ref 0–0.5)
IRON SERPL-MCNC: 58 UG/DL (ref 45–160)
LYMPHOCYTES # BLD AUTO: 1.4 K/UL (ref 1–4.8)
LYMPHOCYTES NFR BLD: 17.6 % (ref 18–48)
MCH RBC QN AUTO: 29.9 PG (ref 27–31)
MCHC RBC AUTO-ENTMCNC: 33.3 G/DL (ref 32–36)
MCV RBC AUTO: 90 FL (ref 82–98)
MONOCYTES # BLD AUTO: 0.9 K/UL (ref 0.3–1)
MONOCYTES NFR BLD: 11.3 % (ref 4–15)
NEUTROPHILS # BLD AUTO: 5.4 K/UL (ref 1.8–7.7)
NEUTROPHILS NFR BLD: 67.2 % (ref 38–73)
NRBC BLD-RTO: 0 /100 WBC
PLATELET # BLD AUTO: 519 K/UL (ref 150–450)
PMV BLD AUTO: 9.8 FL (ref 9.2–12.9)
POTASSIUM SERPL-SCNC: 4.4 MMOL/L (ref 3.5–5.1)
PROT SERPL-MCNC: 7.1 G/DL (ref 6–8.4)
RBC # BLD AUTO: 3.31 M/UL (ref 4.6–6.2)
SATURATED IRON: 32 % (ref 20–50)
SODIUM SERPL-SCNC: 140 MMOL/L (ref 136–145)
TOTAL IRON BINDING CAPACITY: 182 UG/DL (ref 250–450)
TRANSFERRIN SERPL-MCNC: 130 MG/DL (ref 200–375)
WBC # BLD AUTO: 8 K/UL (ref 3.9–12.7)

## 2024-08-07 PROCEDURE — 82728 ASSAY OF FERRITIN: CPT | Performed by: INTERNAL MEDICINE

## 2024-08-07 PROCEDURE — 85025 COMPLETE CBC W/AUTO DIFF WBC: CPT | Performed by: INTERNAL MEDICINE

## 2024-08-07 PROCEDURE — 36415 COLL VENOUS BLD VENIPUNCTURE: CPT | Performed by: INTERNAL MEDICINE

## 2024-08-07 PROCEDURE — 83540 ASSAY OF IRON: CPT | Performed by: INTERNAL MEDICINE

## 2024-08-07 PROCEDURE — 80053 COMPREHEN METABOLIC PANEL: CPT | Performed by: INTERNAL MEDICINE

## 2024-08-07 RX ORDER — DRONABINOL 2.5 MG/1
2.5 CAPSULE ORAL
Qty: 60 CAPSULE | Refills: 3 | Status: SHIPPED | OUTPATIENT
Start: 2024-08-07

## 2024-08-07 RX ORDER — FOLIC ACID 1 MG/1
2000 TABLET ORAL
Qty: 180 TABLET | Refills: 0 | Status: SHIPPED | OUTPATIENT
Start: 2024-08-07

## 2024-08-09 ENCOUNTER — INFUSION (OUTPATIENT)
Dept: INFUSION THERAPY | Facility: HOSPITAL | Age: 64
End: 2024-08-09
Attending: INTERNAL MEDICINE
Payer: COMMERCIAL

## 2024-08-09 VITALS
HEART RATE: 77 BPM | TEMPERATURE: 98 F | RESPIRATION RATE: 16 BRPM | HEIGHT: 70 IN | DIASTOLIC BLOOD PRESSURE: 81 MMHG | BODY MASS INDEX: 33.17 KG/M2 | OXYGEN SATURATION: 97 % | SYSTOLIC BLOOD PRESSURE: 136 MMHG | WEIGHT: 231.69 LBS

## 2024-08-09 DIAGNOSIS — D46.9 MDS (MYELODYSPLASTIC SYNDROME): ICD-10-CM

## 2024-08-09 DIAGNOSIS — D64.89 ANEMIA DUE TO MULTIPLE MECHANISMS: ICD-10-CM

## 2024-08-09 DIAGNOSIS — D64.9 NORMOCHROMIC ANEMIA: ICD-10-CM

## 2024-08-09 DIAGNOSIS — N18.30 STAGE 3 CHRONIC KIDNEY DISEASE, UNSPECIFIED WHETHER STAGE 3A OR 3B CKD: ICD-10-CM

## 2024-08-09 DIAGNOSIS — D64.9 CHRONIC ANEMIA: Primary | ICD-10-CM

## 2024-08-09 PROCEDURE — 63600175 PHARM REV CODE 636 W HCPCS: Mod: JZ,EC,JG | Performed by: INTERNAL MEDICINE

## 2024-08-09 RX ADMIN — EPOETIN ALFA-EPBX 40000 UNITS: 40000 INJECTION, SOLUTION INTRAVENOUS; SUBCUTANEOUS at 04:08

## 2024-08-15 ENCOUNTER — INFUSION (OUTPATIENT)
Dept: INFUSION THERAPY | Facility: HOSPITAL | Age: 64
End: 2024-08-15
Attending: INTERNAL MEDICINE
Payer: COMMERCIAL

## 2024-08-15 VITALS
DIASTOLIC BLOOD PRESSURE: 73 MMHG | HEIGHT: 70 IN | RESPIRATION RATE: 16 BRPM | HEART RATE: 78 BPM | WEIGHT: 230.69 LBS | OXYGEN SATURATION: 98 % | TEMPERATURE: 98 F | BODY MASS INDEX: 33.03 KG/M2 | SYSTOLIC BLOOD PRESSURE: 119 MMHG

## 2024-08-15 DIAGNOSIS — D46.1 RARS (REFRACTORY ANEMIA WITH RINGED SIDEROBLASTS): Primary | ICD-10-CM

## 2024-08-15 DIAGNOSIS — D46.9 MDS (MYELODYSPLASTIC SYNDROME): ICD-10-CM

## 2024-08-15 PROCEDURE — 63600175 PHARM REV CODE 636 W HCPCS: Mod: JZ,JG | Performed by: INTERNAL MEDICINE

## 2024-08-15 PROCEDURE — 96372 THER/PROPH/DIAG INJ SC/IM: CPT

## 2024-08-15 RX ADMIN — LUSPATERCEPT 50 MG: 75 INJECTION, POWDER, LYOPHILIZED, FOR SOLUTION SUBCUTANEOUS at 04:08

## 2024-08-15 NOTE — PLAN OF CARE
Problem: Fatigue  Goal: Improved Activity Tolerance  Outcome: Progressing  Intervention: Promote Improved Energy  Flowsheets (Taken 8/15/2024 1850)  Fatigue Management:   fatigue-related activity identified   frequent rest breaks encouraged   paced activity encouraged  Sleep/Rest Enhancement:   regular sleep/rest pattern promoted   relaxation techniques promoted  Activity Management: Ambulated -L4  Environmental Support: rest periods encouraged

## 2024-08-16 ENCOUNTER — INFUSION (OUTPATIENT)
Dept: INFUSION THERAPY | Facility: HOSPITAL | Age: 64
End: 2024-08-16
Attending: INTERNAL MEDICINE
Payer: COMMERCIAL

## 2024-08-16 VITALS
SYSTOLIC BLOOD PRESSURE: 145 MMHG | RESPIRATION RATE: 18 BRPM | WEIGHT: 231.69 LBS | TEMPERATURE: 98 F | DIASTOLIC BLOOD PRESSURE: 79 MMHG | HEART RATE: 73 BPM | OXYGEN SATURATION: 97 % | HEIGHT: 70 IN | BODY MASS INDEX: 33.17 KG/M2

## 2024-08-16 DIAGNOSIS — D64.9 NORMOCHROMIC ANEMIA: ICD-10-CM

## 2024-08-16 DIAGNOSIS — N18.30 STAGE 3 CHRONIC KIDNEY DISEASE, UNSPECIFIED WHETHER STAGE 3A OR 3B CKD: ICD-10-CM

## 2024-08-16 DIAGNOSIS — D64.9 CHRONIC ANEMIA: Primary | ICD-10-CM

## 2024-08-16 DIAGNOSIS — D64.89 ANEMIA DUE TO MULTIPLE MECHANISMS: ICD-10-CM

## 2024-08-16 DIAGNOSIS — D46.9 MDS (MYELODYSPLASTIC SYNDROME): ICD-10-CM

## 2024-08-16 PROCEDURE — 96372 THER/PROPH/DIAG INJ SC/IM: CPT

## 2024-08-16 PROCEDURE — 63600175 PHARM REV CODE 636 W HCPCS: Mod: JZ,EC,JG | Performed by: INTERNAL MEDICINE

## 2024-08-16 RX ADMIN — EPOETIN ALFA-EPBX 40000 UNITS: 40000 INJECTION, SOLUTION INTRAVENOUS; SUBCUTANEOUS at 04:08

## 2024-08-16 NOTE — PLAN OF CARE
Problem: Fatigue  Goal: Improved Activity Tolerance  Outcome: Progressing  Intervention: Promote Improved Energy  Flowsheets (Taken 8/16/2024 3716)  Fatigue Management: frequent rest breaks encouraged  Sleep/Rest Enhancement: regular sleep/rest pattern promoted  Activity Management:   Ambulated -L4   Up in chair - L3  Environmental Support:   calm environment promoted   rest periods encouraged

## 2024-08-21 ENCOUNTER — LAB VISIT (OUTPATIENT)
Dept: LAB | Facility: HOSPITAL | Age: 64
End: 2024-08-21
Attending: INTERNAL MEDICINE
Payer: COMMERCIAL

## 2024-08-21 DIAGNOSIS — D57.3 SICKLE CELL TRAIT SYNDROME: ICD-10-CM

## 2024-08-21 DIAGNOSIS — D64.89 ANEMIA DUE TO MULTIPLE MECHANISMS: ICD-10-CM

## 2024-08-21 LAB
ALBUMIN SERPL BCP-MCNC: 4.2 G/DL (ref 3.5–5.2)
ALP SERPL-CCNC: 78 U/L (ref 55–135)
ALT SERPL W/O P-5'-P-CCNC: 16 U/L (ref 10–44)
ANION GAP SERPL CALC-SCNC: 7 MMOL/L (ref 8–16)
AST SERPL-CCNC: 18 U/L (ref 10–40)
BASOPHILS # BLD AUTO: 0.14 K/UL (ref 0–0.2)
BASOPHILS NFR BLD: 1.5 % (ref 0–1.9)
BILIRUB SERPL-MCNC: 0.7 MG/DL (ref 0.1–1)
BUN SERPL-MCNC: 43 MG/DL (ref 8–23)
CALCIUM SERPL-MCNC: 8.6 MG/DL (ref 8.7–10.5)
CHLORIDE SERPL-SCNC: 109 MMOL/L (ref 95–110)
CO2 SERPL-SCNC: 25 MMOL/L (ref 23–29)
CREAT SERPL-MCNC: 3 MG/DL (ref 0.5–1.4)
DIFFERENTIAL METHOD BLD: ABNORMAL
EOSINOPHIL # BLD AUTO: 0.2 K/UL (ref 0–0.5)
EOSINOPHIL NFR BLD: 2.6 % (ref 0–8)
ERYTHROCYTE [DISTWIDTH] IN BLOOD BY AUTOMATED COUNT: 26.4 % (ref 11.5–14.5)
EST. GFR  (NO RACE VARIABLE): 22.5 ML/MIN/1.73 M^2
GLUCOSE SERPL-MCNC: 103 MG/DL (ref 70–110)
HCT VFR BLD AUTO: 30.4 % (ref 40–54)
HGB BLD-MCNC: 10.3 G/DL (ref 14–18)
IMM GRANULOCYTES # BLD AUTO: 0.1 K/UL (ref 0–0.04)
IMM GRANULOCYTES NFR BLD AUTO: 1.1 % (ref 0–0.5)
LYMPHOCYTES # BLD AUTO: 1.6 K/UL (ref 1–4.8)
LYMPHOCYTES NFR BLD: 16.9 % (ref 18–48)
MCH RBC QN AUTO: 30.4 PG (ref 27–31)
MCHC RBC AUTO-ENTMCNC: 33.9 G/DL (ref 32–36)
MCV RBC AUTO: 90 FL (ref 82–98)
MONOCYTES # BLD AUTO: 0.9 K/UL (ref 0.3–1)
MONOCYTES NFR BLD: 9.8 % (ref 4–15)
NEUTROPHILS # BLD AUTO: 6.3 K/UL (ref 1.8–7.7)
NEUTROPHILS NFR BLD: 68.1 % (ref 38–73)
NRBC BLD-RTO: 0 /100 WBC
PLATELET # BLD AUTO: 601 K/UL (ref 150–450)
PMV BLD AUTO: 10.3 FL (ref 9.2–12.9)
POTASSIUM SERPL-SCNC: 4.5 MMOL/L (ref 3.5–5.1)
PROT SERPL-MCNC: 7.2 G/DL (ref 6–8.4)
RBC # BLD AUTO: 3.39 M/UL (ref 4.6–6.2)
SODIUM SERPL-SCNC: 141 MMOL/L (ref 136–145)
WBC # BLD AUTO: 9.27 K/UL (ref 3.9–12.7)

## 2024-08-21 PROCEDURE — 36415 COLL VENOUS BLD VENIPUNCTURE: CPT | Performed by: INTERNAL MEDICINE

## 2024-08-21 PROCEDURE — 80053 COMPREHEN METABOLIC PANEL: CPT | Performed by: INTERNAL MEDICINE

## 2024-08-21 PROCEDURE — 85025 COMPLETE CBC W/AUTO DIFF WBC: CPT | Performed by: INTERNAL MEDICINE

## 2024-08-23 ENCOUNTER — INFUSION (OUTPATIENT)
Dept: INFUSION THERAPY | Facility: HOSPITAL | Age: 64
End: 2024-08-23
Attending: INTERNAL MEDICINE
Payer: COMMERCIAL

## 2024-08-23 VITALS
OXYGEN SATURATION: 99 % | SYSTOLIC BLOOD PRESSURE: 159 MMHG | DIASTOLIC BLOOD PRESSURE: 85 MMHG | HEART RATE: 72 BPM | BODY MASS INDEX: 33.53 KG/M2 | WEIGHT: 234.19 LBS | TEMPERATURE: 97 F | RESPIRATION RATE: 17 BRPM | HEIGHT: 70 IN

## 2024-08-23 DIAGNOSIS — N18.30 STAGE 3 CHRONIC KIDNEY DISEASE, UNSPECIFIED WHETHER STAGE 3A OR 3B CKD: ICD-10-CM

## 2024-08-23 DIAGNOSIS — D64.89 ANEMIA DUE TO MULTIPLE MECHANISMS: ICD-10-CM

## 2024-08-23 DIAGNOSIS — D46.9 MDS (MYELODYSPLASTIC SYNDROME): ICD-10-CM

## 2024-08-23 DIAGNOSIS — D64.9 CHRONIC ANEMIA: Primary | ICD-10-CM

## 2024-08-23 DIAGNOSIS — D64.9 NORMOCHROMIC ANEMIA: ICD-10-CM

## 2024-08-23 PROCEDURE — 63600175 PHARM REV CODE 636 W HCPCS: Mod: JZ,EC,JG | Performed by: INTERNAL MEDICINE

## 2024-08-23 PROCEDURE — 96372 THER/PROPH/DIAG INJ SC/IM: CPT

## 2024-08-23 RX ADMIN — EPOETIN ALFA-EPBX 40000 UNITS: 40000 INJECTION, SOLUTION INTRAVENOUS; SUBCUTANEOUS at 04:08

## 2024-08-23 NOTE — PLAN OF CARE
Problem: Fatigue  Goal: Improved Activity Tolerance  Outcome: Progressing  Intervention: Promote Improved Energy  Flowsheets (Taken 8/23/2024 9670)  Fatigue Management:   fatigue-related activity identified   frequent rest breaks encouraged   paced activity encouraged  Sleep/Rest Enhancement:   regular sleep/rest pattern promoted   relaxation techniques promoted  Activity Management: Ambulated -L4  Environmental Support: rest periods encouraged

## 2024-08-29 ENCOUNTER — OFFICE VISIT (OUTPATIENT)
Facility: CLINIC | Age: 64
End: 2024-08-29
Payer: COMMERCIAL

## 2024-08-29 VITALS
BODY MASS INDEX: 33.21 KG/M2 | HEART RATE: 71 BPM | SYSTOLIC BLOOD PRESSURE: 161 MMHG | DIASTOLIC BLOOD PRESSURE: 82 MMHG | TEMPERATURE: 97 F | HEIGHT: 70 IN | WEIGHT: 232 LBS | RESPIRATION RATE: 16 BRPM

## 2024-08-29 DIAGNOSIS — J90 PLEURAL EFFUSION: ICD-10-CM

## 2024-08-29 DIAGNOSIS — D46.9 MDS (MYELODYSPLASTIC SYNDROME): Primary | ICD-10-CM

## 2024-08-29 DIAGNOSIS — N18.4 CHRONIC KIDNEY DISEASE (CKD), STAGE IV (SEVERE): ICD-10-CM

## 2024-08-29 DIAGNOSIS — D46.1 RARS (REFRACTORY ANEMIA WITH RINGED SIDEROBLASTS): ICD-10-CM

## 2024-08-29 PROCEDURE — 99999 PR PBB SHADOW E&M-EST. PATIENT-LVL IV: CPT | Mod: PBBFAC,,, | Performed by: NURSE PRACTITIONER

## 2024-08-29 NOTE — PROGRESS NOTES
[facility] Hematology/Oncology           PROGRESS NOTE - Follow-up Visit    Subjective:       Patient ID:   NAME: Rick Butler : 1960     64 y.o. male    Referring Doc: Chetna (new PCP)  Other Physicians: Getachew; Alan uMstafa    Chief Complaint:  Sickle cell trait/anemia/RARS  f/u    History of Present Illness:     Patient returns today for a regularly scheduled follow-up visit.  The patient is doing ok overall. He is here by himself today.     He has been Rebozyl and doing well with it; he did have some insurance issues but it was re approved.  He saw Dr. Serrano and she states to continue.     He did have an US with Dr. SERRANO that did show a small pleural effusion but no enlarged spleen.     He saw Dr Mas recently with nephrology for rising creatinine and he has repeat labs for him next week.     Breathing ok, no CP, SOB, HA's or N/V; some occasional fatigue     He has not had any recent pain crisis.     He denies having any breathing difficulties. He denies any blood in the stool, dark stools or hematuria; breathing ok; no CP, SOB, HA's or N/V;            Discussed covid19 precautions - he had his vaccinations      ROS:   GEN: normal without any fever, night sweats or weight loss  HEENT: normal with no HA's, sore throat, stiff neck, changes in vision  CV: normal with no CP, SOB, PND, MORA or orthopnea  PULM: normal with no SOB, cough, hemoptysis, sputum or pleuritic pain  GI: normal with no abdominal pain, nausea, vomiting, constipation, diarrhea, melanotic stools, BRBPR, or hematemesis  : normal with no hematuria, dysuria  BREAST: normal with no mass, discharge, pain  SKIN: normal with no rash, erythema, bruising, or swelling    Allergies:  Review of patient's allergies indicates:  No Known Allergies    Medications:    Current Outpatient Medications:     amLODIPine (NORVASC) 5 MG tablet, Take 1 tablet (5 mg total) by mouth once daily., Disp: 90 tablet, Rfl: 3    atorvastatin (LIPITOR) 10 MG tablet, Take 1  tablet (10 mg total) by mouth every evening., Disp: 90 tablet, Rfl: 3    calcitRIOL (ROCALTROL) 0.25 MCG Cap, Take 0.25 mcg by mouth every 7 days., Disp: , Rfl:     droNABinol (MARINOL) 2.5 MG capsule, Take 1 capsule (2.5 mg total) by mouth 2 (two) times daily before meals., Disp: 60 capsule, Rfl: 3    epoetin tray (PROCRIT INJ), Thursday, Disp: , Rfl:     famotidine (PEPCID) 20 MG tablet, famotidine 20 mg tablet  Take 1 tablet twice a day by oral route., Disp: , Rfl:     fluticasone propionate (FLONASE) 50 mcg/actuation nasal spray, 1 spray (50 mcg total) by Each Nostril route 2 (two) times a day. 1 spray EVERY morning and afternoon  toward the ear each side after a sinus rinse, Disp: 15.8 mL, Rfl: 5    folic acid (FOLVITE) 1 MG tablet, TAKE 2 TABLETS BY MOUTH ONCE DAILY., Disp: 180 tablet, Rfl: 0    multivitamin capsule, Take 1 capsule by mouth once daily., Disp: , Rfl:     pantoprazole (PROTONIX) 40 MG tablet, , Disp: , Rfl:     sod chlor-bicarb-squeez bottle (NEILMED SINUS RINSE COMPLETE) pkdv, 1 application  by sinus irrigation route 2 (two) times a day. Not right before bed, Disp: 1 each, Rfl: 11    tamsulosin (FLOMAX) 0.4 mg Cap, TAKE 2 CAPSULES BY MOUTH ONCE DAILY., Disp: 180 capsule, Rfl: 1    azelastine (ASTELIN) 137 mcg (0.1 %) nasal spray, 2 sprays (274 mcg total) by Nasal route 2 (two) times daily as needed for Rhinitis., Disp: 30 mL, Rfl: 5    sildenafil (VIAGRA) 100 MG tablet, Take 1 tablet (100 mg total) by mouth daily as needed for Erectile Dysfunction., Disp: 10 tablet, Rfl: 6    PMHx/PSHx Updates:  See patient's last visit with  Dr. Tello on 8/1/2024  See H&P on 7/9/2011      Pathology:    12/20/2019  BONE MARROW, RIGHT ILIAC CREST,    ASPIRATE, CLOT SECTION, AND CORE BIOPSY:    --HYPERCELLULAR MARROW (APPROXIMATELY 75% TO 80%) WITH TRILINEAGE   HEMATOPOIETIC ELEMENTS, ERYTHROID  HYPERPLASIA AND MILD MEGAKARYOCYTIC HYPERPLASIA, AND NON-SPECIFIC   DYSHEMATOPOIETIC CHANGES (SEE      "COMMENT).  --[id number] INCREASED STAINABLE IRON WITH INCREASED RING   SIDEROBLASTS (GREATER THAN 15%)     (SEE COMMENT).  --PERIPHERAL BLOOD WITH THROMBOCYTOSIS (574,000/MICROLITER) AND ANEMIA   (HEMOGLOBIN 5.5 GRAM/DECILITER),    WITH SCATTERED DREPANOCYTOID FORMS AND FEW NUCLEATED ERYTHROCYTES      Cytogenetic Results at the bottom of the report.  NORMAL    Objective:     Vitals:  Blood pressure (!) 161/82, pulse 71, temperature 97.3 °F (36.3 °C), resp. rate 16, height 5' 10" (1.778 m), weight 105.2 kg (232 lb).    Physical Examination:   GEN: no apparent distress, comfortable; AAOx3  HEAD: atraumatic and normocephalic  EYES: no pallor, no icterus, PERRLA  ENT: OMM, no pharyngeal erythema, external ears WNL; no nasal discharge; no thrush  NECK: no masses, thyroid normal, trachea midline, no LAD/LN's, supple  CV: RRR with no murmur; normal pulse; normal S1 and S2; no pedal edema  CHEST: Normal respiratory effort; CTAB; normal breath sounds; no wheeze or crackles  ABDOM: nontender and nondistended; soft; normal bowel sounds; no rebound/guarding  MUSC/Skeletal: ROM normal; no crepitus; joints normal; no deformities or arthropathy  EXTREM: no clubbing, cyanosis, inflammation or swelling  SKIN: no rashes, lesions, ulcers, petechiae or subcutaneous nodules  : no jiang  NEURO: grossly intact; motor/sensory WNL; AAOx3; no tremors  PSYCH: normal mood, affect and behavior  LYMPH: normal cervical, supraclavicular, axillary and groin LN's            Labs:     Lab Results   Component Value Date    WBC 9.27 08/21/2024    HGB 10.3 (L) 08/21/2024    HCT 30.4 (L) 08/21/2024    MCV 90 08/21/2024     (H) 08/21/2024           CMP  Sodium   Date Value Ref Range Status   08/21/2024 141 136 - 145 mmol/L Final   06/26/2019 138 134 - 144 mmol/L      Potassium   Date Value Ref Range Status   08/21/2024 4.5 3.5 - 5.1 mmol/L Final     Chloride   Date Value Ref Range Status   08/21/2024 109 95 - 110 mmol/L Final "   06/26/2019 105 98 - 110 mmol/L      CO2   Date Value Ref Range Status   08/21/2024 25 23 - 29 mmol/L Final     Glucose   Date Value Ref Range Status   08/21/2024 103 70 - 110 mg/dL Final   06/26/2019 114 (H) 70 - 99 mg/dL      BUN   Date Value Ref Range Status   08/21/2024 43 (H) 8 - 23 mg/dL Final     Creatinine   Date Value Ref Range Status   08/21/2024 3.0 (H) 0.5 - 1.4 mg/dL Final   06/26/2019 1.62 (H) 0.60 - 1.40 mg/dL      Calcium   Date Value Ref Range Status   08/21/2024 8.6 (L) 8.7 - 10.5 mg/dL Final     Total Protein   Date Value Ref Range Status   08/21/2024 7.2 6.0 - 8.4 g/dL Final     Albumin   Date Value Ref Range Status   08/21/2024 4.2 3.5 - 5.2 g/dL Final   06/26/2019 4.4 3.1 - 4.7 g/dL      Total Bilirubin   Date Value Ref Range Status   08/21/2024 0.7 0.1 - 1.0 mg/dL Final     Comment:     For infants and newborns, interpretation of results should be based  on gestational age, weight and in agreement with clinical  observations.    Premature Infant recommended reference ranges:  Up to 24 hours.............<8.0 mg/dL  Up to 48 hours............<12.0 mg/dL  3-5 days..................<15.0 mg/dL  6-29 days.................<15.0 mg/dL       Alkaline Phosphatase   Date Value Ref Range Status   08/21/2024 78 55 - 135 U/L Final     AST   Date Value Ref Range Status   08/21/2024 18 10 - 40 U/L Final     ALT   Date Value Ref Range Status   08/21/2024 16 10 - 44 U/L Final     Anion Gap   Date Value Ref Range Status   08/21/2024 7 (L) 8 - 16 mmol/L Final     eGFR if    Date Value Ref Range Status   07/13/2022 45.6 (A) >60 mL/min/1.73 m^2 Final     eGFR if non    Date Value Ref Range Status   07/13/2022 39.5 (A) >60 mL/min/1.73 m^2 Final     Comment:     Calculation used to obtain the estimated glomerular filtration  rate (eGFR) is the CKD-EPI equation.          Lab Results   Component Value Date    IRON 58 08/07/2024    TIBC 182 (L) 08/07/2024    FERRITIN 1,261.7 (H)  "08/07/2024           I have reviewed all available lab results and radiology reports.    Radiology/Diagnostic Studies:    US 2/8/2023:    IMPRESSION:  1. Dilation of the common bile duct at up to 10 mm, unchanged when compared to 2016.  2. Nonvisualization of the pancreas because of overlying bowel gas.  3. No other significant findings.  spleen is normal in size and appearance      2/15/2018 Xray Survey:   IMPRESSION: No significant abnormality seen. No evidence of osteoblastic or  osteoclastic lesions identified    MRI L-spine 2/12/2018  IMPRESSION:    1. Prominent low signal intensity throughout the bone marrow on T1 and  T2-weighted imaging. Marrow infiltrative process including malignancy such as  metastatic disease or lymphoma/leukemia is a consideration along with multiple  myeloma or malignant process. Benign etiology such as osteopetrosis or  regenerative red marrow are also considerations.    2. Multilevel lumbar degenerative changes, most prominent at L4-L5 and L5-S1 as  described.    Assessment/Plan:   (1) 64 y.o. male with diagnosis of sickle cell anemia with borderline microcytosis  - latest hgb was 6.0 and he had blood transfusion  - today, he is not symptomatic at this time  - he probably has a multifactorial anemia process with underlying sickle cell, anemia of chronic disorders and anemia of chronic renal. I can not rule out an underlying GI bleeding process.   - iron panel is adequate (no deficiency)  - total bilirubin is only minimally elevated  - he required transfusion again in Oct 2019 and again in Dec 2019  - he had bone marrow biopsy on 12/20/2019    "dyshematopoietic changes are not entirely specific and are present  mostly within erythroid and megakaryocytic lineages. These findings   could be observed in chronic disease states, autoimmune conditions,  infectious settings, toxic exposures, nutritional deficits, as medication/drug effect, and in myelodysplastic syndrome (MDS), among other " "conditions"  "Additionally, ring sideroblasts are a non-specific   finding "    Patient was referred to see Dr Mustafa at [facility] and had repeat BM biopsy with diagnosis made of RARS  - he is now on procrit per directives of Dr Mustafa    7/30/2020:   he recently saw Dr Mustafa  And sees him again in Jan 2021  - he is doing well with procrit and is feeling much better  - he has not required any transfusions for some time time    9/24/2020:  - latest hgb at 8.5  - will increase the procrit dose  - f/u with Dr Zavala in Jan 2021 11/19/2020:  - patient doing well and feels "great"  - hgb up to 9.1; continued on the procrit  - f/u with Dr Mustafa in Jan 2021    3/4/2021:  - he saw Dr Mustafa in jan 2021 and sees him again in June/July 2021  - latest hgb at 9.1 and stable and platelets are 474,000; wbc at 10.0    6/10/2021:  - latest hgb at 9.0  - plats 485,000  - on weekly procrit  - seeing Dr Mustafa again tomorrow    10/14/2021:  - hgb at 8.6 but he recently had been taking naproxen and had some BRBPR - which since resolved  - he saw Dr Beyer with GI and he is having colonsocopy on Nov 5th along with EGD  - plats 429,000  - he sees Dr Mustafa again in Nov 19th   - he sees Dr Mas again in Dec 2021    1/13/2022:  - He has been under the care of Dr Mustafa at [facility] for RARS- MDS. He is now on procrit weekly. He sees Dr Mustafa again in March 2022    - hgb 9.3  - he had scope with Dr Collins in nov 2021 which was good per patient and they plan to repeat in 5 yrs    5/12/2022:  - hgb at 9.0  - plats 485  - wbc 7.25  - he is on procrit weekly  - saw Dr Mustafa this past Fridday and since he is leaving, he will start seeing Dr Hilario instead in 3 months    8/18/2022:  - latest hgb a little lower at 8.8  - he has been on the procrit  - he saw Dr Hilario recently at [facility] and plans to see her again in 3 months    1/26/2023:  - He saw Dr Hilario at [facility] again recently this past Tuesday and they are considering another medication; he " plans to see her again in April 2023  - Dr Serrano requested he have an US of spleen - will order here in [place]  - he is also on appetite stimulant  - hgb at 9.4 and plats at 500k    5/11/2023:  - hgb at 9.6 and plats 466,000  - He saw Dr Serrano at [facility] again recently this past Tuesday and they are considering another medication; he plans to see her again in Aug 2023    8/3/2023:  - His insurance will not approve of him getting the Reblozyl   - He sees Dr Serrano again next Tuesday and we will await her directives.     9/14/2023:  - the Rebozyl has been approved - will set up  - latest hgb at 10.1  - seeing Dr Serrano again in Jan 2024 11/16/2023:  - continued on Rebozyl  - WBC and plats WNL  - hgb at 10.3  - cardiac w/u negative per patient    2/15/2024:  - labs are adequate  - hgb at 10.6  - WBC and plat WNL  - continued on Rebozyl  - recent colonoscopy good per patient    4/4/2024:  - continued on Rebozyl  - counts are looking adequate    8/1/2024:  - continued on procrit  - insurance would not approve of the rebozyl and he has not had the last 2-3 injections  - hold rebozyl for now anyway due to the increased creatine and decreased kidney function  - f/u with Dr Ferreira  - seeing Dr Serrano on 8/13  - continue procrit as directed    8/29/2024:   - continue procrit and Reblozyl per Dr. SERRANO recs   - needs to see Dr. FERREIRA about kidney function decreasing   - could be from BP, so needs to see Dr. Saucedo for better cardiac management   - US from [facility] showed pleural effusion, will get Ct chest without contrast     (2) Alcohol abuse issues in past - he has been sober for over 3 years now    (3) New findings on radiography with abnormal chest CT and lung mass  - former smoker    (4) chronic back issues - prior Xrays and MRI - suspect the findings on the MRI are possibly due to his sickle cell;   - SPEP was previously negative for M-protein  - PSA was 0.3 in Sept 2017  - recommended neurosurgery evaluation previously  - he saw   "N[facility]U and had a nerve "burning" procedure and his pain has improved    (5) CRI - elevated creatinine - he is followed Dr Chilel with nephrology      (6) H pylori gastritis - s/p recent endoscopy with Dr Collins and antibotics x 10 days    (7) CP   9/14/2023:  - seeing Dr lovett with cardiology and planning stress test in near future        1. MDS (myelodysplastic syndrome)        2. RARS (refractory anemia with ringed sideroblasts)        3. Pleural effusion  CT Chest Without Contrast      4. Chronic kidney disease (CKD), stage IV (severe)            PLAN;  1. Continue with the procrit   2. Continue with reblozyl per Dr. SERRANO  2. Planned f/u with Dr Serrano as needed  3. F/u with cardiology as directed - need BP control, could be effecting kidney function   4.  F/u with nephrology as directed by them -   Dr Mas in Oct 2024   5. Labs every 2 weeks.      RTC  4 weeks with Dr. Tello   Fax note to Dennis Basilio Tveit;  Lewis Serrano; Jonathan            Discussion:       Pathology Discussion:    I reviewed and discussed the pathology report(s) and radiograph reports (if available) in as simple to understand and/or laymen's terms to the best of my ability. I had an indepth conversation with the patient and went over the patient's individual diagnosis based on the information that was currently available. I discussed the TNM staging process with regard to the patient's particular cancer type, and the calculated stage based on the currently available TNM data and literature. I discussed the available prognostic data with regard to the current staging information and how it relates to the prognosis of their particular neoplastic process.          COVID-19 Discussion:    I had long discussion with patient and any applicable family about the COVID-19 coronavirus epidemic and the recommended precautions with regard to cancer and/or hematology patients. I have re-iterated the CDC recommendations for adequate hand washing, use of " hand -like products, and coughing into elbow, etc. In addition, especially for our patients who are on chemotherapy and/or our otherwise immunocompromised patients, I have recommended avoidance of crowds, including movie theaters, restaurants, churches, etc. I have recommended avoidance of any sick or symptomatic family members and/or friends. I have also recommended avoidance of any raw and unwashed food products, and general avoidance of food items that have not been prepared by themselves. The patient has been asked to call us immediately with any symptom developments, issues, questions or other general concerns.     I have explained all of the above in detail and the patient understands all of the current recommendation(s). I have answered all of their questions to the best of my ability and to their complete satisfaction.   The patient is to continue with the current management plan.            Electronically signed by Pratima Melgar NP

## 2024-08-30 ENCOUNTER — INFUSION (OUTPATIENT)
Dept: INFUSION THERAPY | Facility: HOSPITAL | Age: 64
End: 2024-08-30
Attending: INTERNAL MEDICINE
Payer: COMMERCIAL

## 2024-08-30 VITALS
DIASTOLIC BLOOD PRESSURE: 82 MMHG | HEIGHT: 70 IN | BODY MASS INDEX: 33.36 KG/M2 | WEIGHT: 233 LBS | SYSTOLIC BLOOD PRESSURE: 149 MMHG | TEMPERATURE: 98 F | RESPIRATION RATE: 17 BRPM | OXYGEN SATURATION: 95 % | HEART RATE: 73 BPM

## 2024-08-30 DIAGNOSIS — D46.9 MDS (MYELODYSPLASTIC SYNDROME): ICD-10-CM

## 2024-08-30 DIAGNOSIS — N18.30 STAGE 3 CHRONIC KIDNEY DISEASE, UNSPECIFIED WHETHER STAGE 3A OR 3B CKD: ICD-10-CM

## 2024-08-30 DIAGNOSIS — D64.9 CHRONIC ANEMIA: Primary | ICD-10-CM

## 2024-08-30 DIAGNOSIS — D64.89 ANEMIA DUE TO MULTIPLE MECHANISMS: ICD-10-CM

## 2024-08-30 DIAGNOSIS — D64.9 NORMOCHROMIC ANEMIA: ICD-10-CM

## 2024-08-30 PROCEDURE — 96372 THER/PROPH/DIAG INJ SC/IM: CPT

## 2024-08-30 PROCEDURE — 63600175 PHARM REV CODE 636 W HCPCS: Mod: JZ,EC,JG | Performed by: INTERNAL MEDICINE

## 2024-08-30 RX ADMIN — EPOETIN ALFA-EPBX 40000 UNITS: 40000 INJECTION, SOLUTION INTRAVENOUS; SUBCUTANEOUS at 04:08

## 2024-08-30 NOTE — PLAN OF CARE
Problem: Fatigue  Goal: Improved Activity Tolerance  Intervention: Promote Improved Energy  Flowsheets (Taken 8/30/2024 1606)  Fatigue Management: fatigue-related activity identified  Activity Management: Ambulated -L4

## 2024-09-04 ENCOUNTER — HOSPITAL ENCOUNTER (OUTPATIENT)
Dept: RADIOLOGY | Facility: HOSPITAL | Age: 64
Discharge: HOME | End: 2024-09-04
Attending: NURSE PRACTITIONER
Payer: COMMERCIAL

## 2024-09-04 ENCOUNTER — TELEPHONE (OUTPATIENT)
Facility: CLINIC | Age: 64
End: 2024-09-04
Payer: COMMERCIAL

## 2024-09-04 DIAGNOSIS — J90 PLEURAL EFFUSION: ICD-10-CM

## 2024-09-04 DIAGNOSIS — D46.9 MDS (MYELODYSPLASTIC SYNDROME): Primary | ICD-10-CM

## 2024-09-04 PROCEDURE — 71250 CT THORAX DX C-: CPT | Mod: 26,,, | Performed by: RADIOLOGY

## 2024-09-04 PROCEDURE — 71250 CT THORAX DX C-: CPT | Mod: TC,PO

## 2024-09-05 ENCOUNTER — INFUSION (OUTPATIENT)
Dept: INFUSION THERAPY | Facility: HOSPITAL | Age: 64
End: 2024-09-05
Attending: INTERNAL MEDICINE
Payer: COMMERCIAL

## 2024-09-05 ENCOUNTER — TELEPHONE (OUTPATIENT)
Facility: CLINIC | Age: 64
End: 2024-09-05
Payer: COMMERCIAL

## 2024-09-05 VITALS
DIASTOLIC BLOOD PRESSURE: 84 MMHG | SYSTOLIC BLOOD PRESSURE: 142 MMHG | TEMPERATURE: 98 F | BODY MASS INDEX: 33.05 KG/M2 | WEIGHT: 230.88 LBS | HEIGHT: 70 IN | OXYGEN SATURATION: 98 % | RESPIRATION RATE: 16 BRPM | HEART RATE: 76 BPM

## 2024-09-05 DIAGNOSIS — D46.9 MDS (MYELODYSPLASTIC SYNDROME): ICD-10-CM

## 2024-09-05 DIAGNOSIS — D46.1 RARS (REFRACTORY ANEMIA WITH RINGED SIDEROBLASTS): Primary | ICD-10-CM

## 2024-09-05 PROCEDURE — 96372 THER/PROPH/DIAG INJ SC/IM: CPT

## 2024-09-05 PROCEDURE — 63600175 PHARM REV CODE 636 W HCPCS: Mod: JZ,JG | Performed by: INTERNAL MEDICINE

## 2024-09-05 RX ADMIN — LUSPATERCEPT 50 MG: 25 INJECTION, POWDER, LYOPHILIZED, FOR SOLUTION SUBCUTANEOUS at 04:09

## 2024-09-05 NOTE — TELEPHONE ENCOUNTER
----- Message from Pratima Melgar NP sent at 9/5/2024  3:03 PM CDT -----  Tell him CT is stable, very very small nodule, nothing to worry about we will repeat in one year  ----- Message -----  From: Interface, Rad Results In  Sent: 9/4/2024   4:44 PM CDT  To: Pratima Melgar NP

## 2024-09-06 ENCOUNTER — INFUSION (OUTPATIENT)
Dept: INFUSION THERAPY | Facility: HOSPITAL | Age: 64
End: 2024-09-06
Attending: INTERNAL MEDICINE
Payer: COMMERCIAL

## 2024-09-06 VITALS
BODY MASS INDEX: 33.17 KG/M2 | OXYGEN SATURATION: 98 % | SYSTOLIC BLOOD PRESSURE: 146 MMHG | HEART RATE: 74 BPM | HEIGHT: 70 IN | WEIGHT: 231.69 LBS | RESPIRATION RATE: 16 BRPM | TEMPERATURE: 98 F | DIASTOLIC BLOOD PRESSURE: 89 MMHG

## 2024-09-06 DIAGNOSIS — D64.89 ANEMIA DUE TO MULTIPLE MECHANISMS: ICD-10-CM

## 2024-09-06 DIAGNOSIS — N18.30 STAGE 3 CHRONIC KIDNEY DISEASE, UNSPECIFIED WHETHER STAGE 3A OR 3B CKD: ICD-10-CM

## 2024-09-06 DIAGNOSIS — D64.9 CHRONIC ANEMIA: Primary | ICD-10-CM

## 2024-09-06 DIAGNOSIS — D64.9 NORMOCHROMIC ANEMIA: ICD-10-CM

## 2024-09-06 DIAGNOSIS — D46.9 MDS (MYELODYSPLASTIC SYNDROME): ICD-10-CM

## 2024-09-06 PROCEDURE — 96372 THER/PROPH/DIAG INJ SC/IM: CPT

## 2024-09-06 PROCEDURE — 63600175 PHARM REV CODE 636 W HCPCS: Mod: JZ,EC,JG | Performed by: INTERNAL MEDICINE

## 2024-09-06 RX ADMIN — EPOETIN ALFA-EPBX 40000 UNITS: 40000 INJECTION, SOLUTION INTRAVENOUS; SUBCUTANEOUS at 04:09

## 2024-09-06 NOTE — PLAN OF CARE
Problem: Fatigue  Goal: Improved Activity Tolerance  Outcome: Progressing  Intervention: Promote Improved Energy  Flowsheets (Taken 9/6/2024 1612)  Fatigue Management: frequent rest breaks encouraged  Sleep/Rest Enhancement: relaxation techniques promoted  Activity Management: Ambulated -L4  Environmental Support: rest periods encouraged

## 2024-09-13 ENCOUNTER — INFUSION (OUTPATIENT)
Dept: INFUSION THERAPY | Facility: HOSPITAL | Age: 64
End: 2024-09-13
Attending: INTERNAL MEDICINE
Payer: COMMERCIAL

## 2024-09-13 VITALS
WEIGHT: 232.13 LBS | BODY MASS INDEX: 33.23 KG/M2 | HEIGHT: 70 IN | TEMPERATURE: 98 F | RESPIRATION RATE: 18 BRPM | HEART RATE: 78 BPM | SYSTOLIC BLOOD PRESSURE: 169 MMHG | OXYGEN SATURATION: 96 % | DIASTOLIC BLOOD PRESSURE: 79 MMHG

## 2024-09-13 DIAGNOSIS — D46.9 MDS (MYELODYSPLASTIC SYNDROME): ICD-10-CM

## 2024-09-13 DIAGNOSIS — D64.9 NORMOCHROMIC ANEMIA: ICD-10-CM

## 2024-09-13 DIAGNOSIS — N18.30 STAGE 3 CHRONIC KIDNEY DISEASE, UNSPECIFIED WHETHER STAGE 3A OR 3B CKD: ICD-10-CM

## 2024-09-13 DIAGNOSIS — D64.9 CHRONIC ANEMIA: Primary | ICD-10-CM

## 2024-09-13 DIAGNOSIS — D64.89 ANEMIA DUE TO MULTIPLE MECHANISMS: ICD-10-CM

## 2024-09-13 PROCEDURE — 96372 THER/PROPH/DIAG INJ SC/IM: CPT

## 2024-09-13 PROCEDURE — 63600175 PHARM REV CODE 636 W HCPCS: Mod: JZ,EC,JG | Performed by: INTERNAL MEDICINE

## 2024-09-13 RX ADMIN — EPOETIN ALFA-EPBX 40000 UNITS: 40000 INJECTION, SOLUTION INTRAVENOUS; SUBCUTANEOUS at 04:09

## 2024-09-18 ENCOUNTER — LAB VISIT (OUTPATIENT)
Dept: LAB | Facility: HOSPITAL | Age: 64
End: 2024-09-18
Attending: INTERNAL MEDICINE
Payer: COMMERCIAL

## 2024-09-18 DIAGNOSIS — D64.89 ANEMIA DUE TO MULTIPLE MECHANISMS: ICD-10-CM

## 2024-09-18 DIAGNOSIS — D57.3 SICKLE CELL TRAIT SYNDROME: ICD-10-CM

## 2024-09-18 LAB
ALBUMIN SERPL BCP-MCNC: 4.4 G/DL (ref 3.5–5.2)
ALP SERPL-CCNC: 81 U/L (ref 55–135)
ALT SERPL W/O P-5'-P-CCNC: 16 U/L (ref 10–44)
ANION GAP SERPL CALC-SCNC: 4 MMOL/L (ref 8–16)
AST SERPL-CCNC: 18 U/L (ref 10–40)
BASOPHILS # BLD AUTO: 0.11 K/UL (ref 0–0.2)
BASOPHILS NFR BLD: 1.3 % (ref 0–1.9)
BILIRUB SERPL-MCNC: 0.7 MG/DL (ref 0.1–1)
BUN SERPL-MCNC: 38 MG/DL (ref 8–23)
CALCIUM SERPL-MCNC: 8.9 MG/DL (ref 8.7–10.5)
CHLORIDE SERPL-SCNC: 110 MMOL/L (ref 95–110)
CO2 SERPL-SCNC: 28 MMOL/L (ref 23–29)
CREAT SERPL-MCNC: 3 MG/DL (ref 0.5–1.4)
DIFFERENTIAL METHOD BLD: ABNORMAL
EOSINOPHIL # BLD AUTO: 0.2 K/UL (ref 0–0.5)
EOSINOPHIL NFR BLD: 2.2 % (ref 0–8)
ERYTHROCYTE [DISTWIDTH] IN BLOOD BY AUTOMATED COUNT: 28 % (ref 11.5–14.5)
EST. GFR  (NO RACE VARIABLE): 22.5 ML/MIN/1.73 M^2
GLUCOSE SERPL-MCNC: 96 MG/DL (ref 70–110)
HCT VFR BLD AUTO: 31.8 % (ref 40–54)
HGB BLD-MCNC: 10.6 G/DL (ref 14–18)
IMM GRANULOCYTES # BLD AUTO: 0.12 K/UL (ref 0–0.04)
IMM GRANULOCYTES NFR BLD AUTO: 1.4 % (ref 0–0.5)
LYMPHOCYTES # BLD AUTO: 1.6 K/UL (ref 1–4.8)
LYMPHOCYTES NFR BLD: 18.3 % (ref 18–48)
MCH RBC QN AUTO: 30.7 PG (ref 27–31)
MCHC RBC AUTO-ENTMCNC: 33.3 G/DL (ref 32–36)
MCV RBC AUTO: 92 FL (ref 82–98)
MONOCYTES # BLD AUTO: 1 K/UL (ref 0.3–1)
MONOCYTES NFR BLD: 11.3 % (ref 4–15)
NEUTROPHILS # BLD AUTO: 5.7 K/UL (ref 1.8–7.7)
NEUTROPHILS NFR BLD: 65.5 % (ref 38–73)
NRBC BLD-RTO: 1 /100 WBC
PLATELET # BLD AUTO: 599 K/UL (ref 150–450)
PMV BLD AUTO: 10.3 FL (ref 9.2–12.9)
POTASSIUM SERPL-SCNC: 4.5 MMOL/L (ref 3.5–5.1)
PROT SERPL-MCNC: 7 G/DL (ref 6–8.4)
RBC # BLD AUTO: 3.45 M/UL (ref 4.6–6.2)
SODIUM SERPL-SCNC: 142 MMOL/L (ref 136–145)
WBC # BLD AUTO: 8.68 K/UL (ref 3.9–12.7)

## 2024-09-18 PROCEDURE — 85025 COMPLETE CBC W/AUTO DIFF WBC: CPT | Performed by: INTERNAL MEDICINE

## 2024-09-18 PROCEDURE — 36415 COLL VENOUS BLD VENIPUNCTURE: CPT | Performed by: INTERNAL MEDICINE

## 2024-09-18 PROCEDURE — 80053 COMPREHEN METABOLIC PANEL: CPT | Performed by: INTERNAL MEDICINE

## 2024-09-19 ENCOUNTER — TELEPHONE (OUTPATIENT)
Facility: CLINIC | Age: 64
End: 2024-09-19
Payer: COMMERCIAL

## 2024-09-19 NOTE — TELEPHONE ENCOUNTER
----- Message from Pratima Melgar NP sent at 9/19/2024  4:09 PM CDT -----  Needs to see TViet  ----- Message -----  From: Jose Tello MD  Sent: 9/18/2024  12:21 PM CDT  To: Pratima Melgar NP    Kidney function still an issue ... Is he seeing nephrology?

## 2024-09-20 ENCOUNTER — INFUSION (OUTPATIENT)
Dept: INFUSION THERAPY | Facility: HOSPITAL | Age: 64
End: 2024-09-20
Attending: INTERNAL MEDICINE
Payer: COMMERCIAL

## 2024-09-20 VITALS
WEIGHT: 230 LBS | DIASTOLIC BLOOD PRESSURE: 74 MMHG | BODY MASS INDEX: 32.93 KG/M2 | SYSTOLIC BLOOD PRESSURE: 132 MMHG | OXYGEN SATURATION: 96 % | RESPIRATION RATE: 17 BRPM | HEART RATE: 85 BPM | HEIGHT: 70 IN

## 2024-09-20 DIAGNOSIS — D46.9 MDS (MYELODYSPLASTIC SYNDROME): ICD-10-CM

## 2024-09-20 DIAGNOSIS — D64.89 ANEMIA DUE TO MULTIPLE MECHANISMS: ICD-10-CM

## 2024-09-20 DIAGNOSIS — D64.9 CHRONIC ANEMIA: Primary | ICD-10-CM

## 2024-09-20 DIAGNOSIS — N18.30 STAGE 3 CHRONIC KIDNEY DISEASE, UNSPECIFIED WHETHER STAGE 3A OR 3B CKD: ICD-10-CM

## 2024-09-20 DIAGNOSIS — D64.9 NORMOCHROMIC ANEMIA: ICD-10-CM

## 2024-09-20 PROCEDURE — 63600175 PHARM REV CODE 636 W HCPCS: Mod: JZ,EC,JG | Performed by: INTERNAL MEDICINE

## 2024-09-20 PROCEDURE — 96372 THER/PROPH/DIAG INJ SC/IM: CPT

## 2024-09-20 RX ADMIN — EPOETIN ALFA-EPBX 40000 UNITS: 40000 INJECTION, SOLUTION INTRAVENOUS; SUBCUTANEOUS at 04:09

## 2024-09-20 NOTE — PLAN OF CARE
Problem: Fatigue  Goal: Improved Activity Tolerance  Intervention: Promote Improved Energy  Flowsheets (Taken 9/20/2024 1606)  Fatigue Management: fatigue-related activity identified  Activity Management: Ambulated -L4

## 2024-09-25 ENCOUNTER — LAB VISIT (OUTPATIENT)
Dept: LAB | Facility: HOSPITAL | Age: 64
End: 2024-09-25
Attending: INTERNAL MEDICINE
Payer: COMMERCIAL

## 2024-09-25 DIAGNOSIS — D46.9 MDS (MYELODYSPLASTIC SYNDROME): ICD-10-CM

## 2024-09-25 LAB
ALBUMIN SERPL BCP-MCNC: 4.3 G/DL (ref 3.5–5.2)
ALP SERPL-CCNC: 73 U/L (ref 55–135)
ALT SERPL W/O P-5'-P-CCNC: 14 U/L (ref 10–44)
ANION GAP SERPL CALC-SCNC: 8 MMOL/L (ref 8–16)
AST SERPL-CCNC: 17 U/L (ref 10–40)
BASOPHILS # BLD AUTO: 0.09 K/UL (ref 0–0.2)
BASOPHILS NFR BLD: 1.3 % (ref 0–1.9)
BILIRUB SERPL-MCNC: 0.8 MG/DL (ref 0.1–1)
BUN SERPL-MCNC: 37 MG/DL (ref 8–23)
CALCIUM SERPL-MCNC: 8.5 MG/DL (ref 8.7–10.5)
CHLORIDE SERPL-SCNC: 110 MMOL/L (ref 95–110)
CO2 SERPL-SCNC: 24 MMOL/L (ref 23–29)
CREAT SERPL-MCNC: 3 MG/DL (ref 0.5–1.4)
DIFFERENTIAL METHOD BLD: ABNORMAL
EOSINOPHIL # BLD AUTO: 0.2 K/UL (ref 0–0.5)
EOSINOPHIL NFR BLD: 2.4 % (ref 0–8)
ERYTHROCYTE [DISTWIDTH] IN BLOOD BY AUTOMATED COUNT: 27.4 % (ref 11.5–14.5)
EST. GFR  (NO RACE VARIABLE): 22.5 ML/MIN/1.73 M^2
GLUCOSE SERPL-MCNC: 119 MG/DL (ref 70–110)
HCT VFR BLD AUTO: 31.3 % (ref 40–54)
HGB BLD-MCNC: 10.6 G/DL (ref 14–18)
IMM GRANULOCYTES # BLD AUTO: 0.05 K/UL (ref 0–0.04)
IMM GRANULOCYTES NFR BLD AUTO: 0.7 % (ref 0–0.5)
LYMPHOCYTES # BLD AUTO: 1.3 K/UL (ref 1–4.8)
LYMPHOCYTES NFR BLD: 18.3 % (ref 18–48)
MCH RBC QN AUTO: 30.7 PG (ref 27–31)
MCHC RBC AUTO-ENTMCNC: 33.9 G/DL (ref 32–36)
MCV RBC AUTO: 91 FL (ref 82–98)
MONOCYTES # BLD AUTO: 0.8 K/UL (ref 0.3–1)
MONOCYTES NFR BLD: 11.4 % (ref 4–15)
NEUTROPHILS # BLD AUTO: 4.7 K/UL (ref 1.8–7.7)
NEUTROPHILS NFR BLD: 65.9 % (ref 38–73)
NRBC BLD-RTO: 0 /100 WBC
PLATELET # BLD AUTO: 451 K/UL (ref 150–450)
PMV BLD AUTO: 10 FL (ref 9.2–12.9)
POTASSIUM SERPL-SCNC: 4.5 MMOL/L (ref 3.5–5.1)
PROT SERPL-MCNC: 6.8 G/DL (ref 6–8.4)
RBC # BLD AUTO: 3.45 M/UL (ref 4.6–6.2)
SODIUM SERPL-SCNC: 142 MMOL/L (ref 136–145)
WBC # BLD AUTO: 7.11 K/UL (ref 3.9–12.7)

## 2024-09-25 PROCEDURE — 36415 COLL VENOUS BLD VENIPUNCTURE: CPT | Performed by: INTERNAL MEDICINE

## 2024-09-25 PROCEDURE — 80053 COMPREHEN METABOLIC PANEL: CPT | Performed by: INTERNAL MEDICINE

## 2024-09-25 PROCEDURE — 85025 COMPLETE CBC W/AUTO DIFF WBC: CPT | Performed by: INTERNAL MEDICINE

## 2024-09-26 ENCOUNTER — INFUSION (OUTPATIENT)
Dept: INFUSION THERAPY | Facility: HOSPITAL | Age: 64
End: 2024-09-26
Attending: INTERNAL MEDICINE
Payer: COMMERCIAL

## 2024-09-26 VITALS
OXYGEN SATURATION: 97 % | TEMPERATURE: 98 F | HEART RATE: 78 BPM | SYSTOLIC BLOOD PRESSURE: 126 MMHG | RESPIRATION RATE: 16 BRPM | HEIGHT: 70 IN | BODY MASS INDEX: 32.54 KG/M2 | DIASTOLIC BLOOD PRESSURE: 78 MMHG | WEIGHT: 227.31 LBS

## 2024-09-26 DIAGNOSIS — D46.1 RARS (REFRACTORY ANEMIA WITH RINGED SIDEROBLASTS): Primary | ICD-10-CM

## 2024-09-26 DIAGNOSIS — D46.9 MDS (MYELODYSPLASTIC SYNDROME): ICD-10-CM

## 2024-09-26 PROCEDURE — 63600175 PHARM REV CODE 636 W HCPCS: Mod: JZ,JG | Performed by: INTERNAL MEDICINE

## 2024-09-26 PROCEDURE — 96372 THER/PROPH/DIAG INJ SC/IM: CPT

## 2024-09-26 RX ADMIN — LUSPATERCEPT 50 MG: 75 INJECTION, POWDER, LYOPHILIZED, FOR SOLUTION SUBCUTANEOUS at 04:09

## 2024-09-26 NOTE — PLAN OF CARE
Problem: Fatigue  Goal: Improved Activity Tolerance  Outcome: Progressing  Intervention: Promote Improved Energy  Flowsheets (Taken 9/26/2024 0238)  Environmental Support: rest periods encouraged

## 2024-09-27 ENCOUNTER — INFUSION (OUTPATIENT)
Dept: INFUSION THERAPY | Facility: HOSPITAL | Age: 64
End: 2024-09-27
Attending: INTERNAL MEDICINE
Payer: COMMERCIAL

## 2024-09-27 VITALS
SYSTOLIC BLOOD PRESSURE: 146 MMHG | TEMPERATURE: 98 F | WEIGHT: 227.63 LBS | BODY MASS INDEX: 32.59 KG/M2 | RESPIRATION RATE: 17 BRPM | DIASTOLIC BLOOD PRESSURE: 85 MMHG | OXYGEN SATURATION: 96 % | HEIGHT: 70 IN | HEART RATE: 83 BPM

## 2024-09-27 DIAGNOSIS — D64.9 CHRONIC ANEMIA: Primary | ICD-10-CM

## 2024-09-27 DIAGNOSIS — D64.9 NORMOCHROMIC ANEMIA: ICD-10-CM

## 2024-09-27 DIAGNOSIS — N18.30 STAGE 3 CHRONIC KIDNEY DISEASE, UNSPECIFIED WHETHER STAGE 3A OR 3B CKD: ICD-10-CM

## 2024-09-27 DIAGNOSIS — D64.89 ANEMIA DUE TO MULTIPLE MECHANISMS: ICD-10-CM

## 2024-09-27 DIAGNOSIS — D46.9 MDS (MYELODYSPLASTIC SYNDROME): ICD-10-CM

## 2024-09-27 PROCEDURE — 63600175 PHARM REV CODE 636 W HCPCS: Mod: JZ,EC,JG | Performed by: INTERNAL MEDICINE

## 2024-09-27 PROCEDURE — 96372 THER/PROPH/DIAG INJ SC/IM: CPT

## 2024-09-27 RX ADMIN — EPOETIN ALFA-EPBX 40000 UNITS: 40000 INJECTION, SOLUTION INTRAVENOUS; SUBCUTANEOUS at 03:09

## 2024-09-27 NOTE — PLAN OF CARE
Problem: Fatigue  Goal: Improved Activity Tolerance  9/27/2024 1458 by Gisell Castillo, RN  Outcome: Met  9/27/2024 1458 by Gisell Castillo, RN  Outcome: Progressing

## 2024-10-04 ENCOUNTER — INFUSION (OUTPATIENT)
Dept: INFUSION THERAPY | Facility: HOSPITAL | Age: 64
End: 2024-10-04
Attending: INTERNAL MEDICINE
Payer: COMMERCIAL

## 2024-10-04 VITALS
RESPIRATION RATE: 15 BRPM | DIASTOLIC BLOOD PRESSURE: 84 MMHG | HEART RATE: 76 BPM | TEMPERATURE: 98 F | OXYGEN SATURATION: 96 % | SYSTOLIC BLOOD PRESSURE: 140 MMHG | WEIGHT: 227 LBS | HEIGHT: 70 IN | BODY MASS INDEX: 32.5 KG/M2

## 2024-10-04 DIAGNOSIS — D64.89 ANEMIA DUE TO MULTIPLE MECHANISMS: ICD-10-CM

## 2024-10-04 DIAGNOSIS — N18.30 STAGE 3 CHRONIC KIDNEY DISEASE, UNSPECIFIED WHETHER STAGE 3A OR 3B CKD: ICD-10-CM

## 2024-10-04 DIAGNOSIS — D64.9 CHRONIC ANEMIA: Primary | ICD-10-CM

## 2024-10-04 DIAGNOSIS — D46.9 MDS (MYELODYSPLASTIC SYNDROME): ICD-10-CM

## 2024-10-04 DIAGNOSIS — D64.9 NORMOCHROMIC ANEMIA: ICD-10-CM

## 2024-10-04 PROCEDURE — 63600175 PHARM REV CODE 636 W HCPCS: Mod: JZ,EC,JG | Performed by: INTERNAL MEDICINE

## 2024-10-04 RX ADMIN — EPOETIN ALFA-EPBX 40000 UNITS: 40000 INJECTION, SOLUTION INTRAVENOUS; SUBCUTANEOUS at 04:10

## 2024-10-04 NOTE — PLAN OF CARE
Problem: Fatigue  Goal: Improved Activity Tolerance  Outcome: Progressing  Intervention: Promote Improved Energy  Flowsheets (Taken 10/4/2024 1607)  Fatigue Management:   fatigue-related activity identified   frequent rest breaks encouraged   paced activity encouraged  Sleep/Rest Enhancement:   regular sleep/rest pattern promoted   relaxation techniques promoted  Activity Management: Ambulated -L4  Environmental Support: rest periods encouraged

## 2024-10-09 ENCOUNTER — LAB VISIT (OUTPATIENT)
Dept: LAB | Facility: HOSPITAL | Age: 64
End: 2024-10-09
Attending: INTERNAL MEDICINE
Payer: COMMERCIAL

## 2024-10-09 DIAGNOSIS — D46.9 MDS (MYELODYSPLASTIC SYNDROME): ICD-10-CM

## 2024-10-09 LAB
ALBUMIN SERPL BCP-MCNC: 4.3 G/DL (ref 3.5–5.2)
ALP SERPL-CCNC: 68 U/L (ref 55–135)
ALT SERPL W/O P-5'-P-CCNC: 17 U/L (ref 10–44)
ANION GAP SERPL CALC-SCNC: 8 MMOL/L (ref 8–16)
AST SERPL-CCNC: 18 U/L (ref 10–40)
BASOPHILS # BLD AUTO: 0.11 K/UL (ref 0–0.2)
BASOPHILS NFR BLD: 1.6 % (ref 0–1.9)
BILIRUB SERPL-MCNC: 0.8 MG/DL (ref 0.1–1)
BUN SERPL-MCNC: 36 MG/DL (ref 8–23)
CALCIUM SERPL-MCNC: 8.6 MG/DL (ref 8.7–10.5)
CHLORIDE SERPL-SCNC: 108 MMOL/L (ref 95–110)
CO2 SERPL-SCNC: 25 MMOL/L (ref 23–29)
CREAT SERPL-MCNC: 3.2 MG/DL (ref 0.5–1.4)
DIFFERENTIAL METHOD BLD: ABNORMAL
EOSINOPHIL # BLD AUTO: 0.2 K/UL (ref 0–0.5)
EOSINOPHIL NFR BLD: 2.4 % (ref 0–8)
ERYTHROCYTE [DISTWIDTH] IN BLOOD BY AUTOMATED COUNT: 26.9 % (ref 11.5–14.5)
EST. GFR  (NO RACE VARIABLE): 20.8 ML/MIN/1.73 M^2
FERRITIN SERPL-MCNC: 1427.4 NG/ML (ref 20–300)
GLUCOSE SERPL-MCNC: 101 MG/DL (ref 70–110)
HCT VFR BLD AUTO: 31.5 % (ref 40–54)
HGB BLD-MCNC: 10.6 G/DL (ref 14–18)
IMM GRANULOCYTES # BLD AUTO: 0.07 K/UL (ref 0–0.04)
IMM GRANULOCYTES NFR BLD AUTO: 1 % (ref 0–0.5)
IRON SERPL-MCNC: 170 UG/DL (ref 45–160)
LYMPHOCYTES # BLD AUTO: 1.3 K/UL (ref 1–4.8)
LYMPHOCYTES NFR BLD: 19.9 % (ref 18–48)
MCH RBC QN AUTO: 30.6 PG (ref 27–31)
MCHC RBC AUTO-ENTMCNC: 33.7 G/DL (ref 32–36)
MCV RBC AUTO: 91 FL (ref 82–98)
MONOCYTES # BLD AUTO: 0.7 K/UL (ref 0.3–1)
MONOCYTES NFR BLD: 10.8 % (ref 4–15)
NEUTROPHILS # BLD AUTO: 4.3 K/UL (ref 1.8–7.7)
NEUTROPHILS NFR BLD: 64.3 % (ref 38–73)
NRBC BLD-RTO: 1 /100 WBC
PLATELET # BLD AUTO: 442 K/UL (ref 150–450)
PMV BLD AUTO: 10.6 FL (ref 9.2–12.9)
POTASSIUM SERPL-SCNC: 4.5 MMOL/L (ref 3.5–5.1)
PROT SERPL-MCNC: 7 G/DL (ref 6–8.4)
RBC # BLD AUTO: 3.46 M/UL (ref 4.6–6.2)
SATURATED IRON: 90 % (ref 20–50)
SODIUM SERPL-SCNC: 141 MMOL/L (ref 136–145)
TOTAL IRON BINDING CAPACITY: 189 UG/DL (ref 250–450)
TRANSFERRIN SERPL-MCNC: 135 MG/DL (ref 200–375)
WBC # BLD AUTO: 6.73 K/UL (ref 3.9–12.7)

## 2024-10-09 PROCEDURE — 36415 COLL VENOUS BLD VENIPUNCTURE: CPT | Performed by: INTERNAL MEDICINE

## 2024-10-09 PROCEDURE — 82728 ASSAY OF FERRITIN: CPT | Performed by: INTERNAL MEDICINE

## 2024-10-09 PROCEDURE — 83540 ASSAY OF IRON: CPT | Performed by: INTERNAL MEDICINE

## 2024-10-09 PROCEDURE — 85025 COMPLETE CBC W/AUTO DIFF WBC: CPT | Performed by: INTERNAL MEDICINE

## 2024-10-09 PROCEDURE — 80053 COMPREHEN METABOLIC PANEL: CPT | Performed by: INTERNAL MEDICINE

## 2024-10-10 ENCOUNTER — OFFICE VISIT (OUTPATIENT)
Facility: CLINIC | Age: 64
End: 2024-10-10
Payer: COMMERCIAL

## 2024-10-10 ENCOUNTER — TELEPHONE (OUTPATIENT)
Facility: CLINIC | Age: 64
End: 2024-10-10

## 2024-10-10 VITALS
RESPIRATION RATE: 18 BRPM | BODY MASS INDEX: 34.01 KG/M2 | HEART RATE: 72 BPM | TEMPERATURE: 99 F | WEIGHT: 229.63 LBS | SYSTOLIC BLOOD PRESSURE: 145 MMHG | HEIGHT: 69 IN | DIASTOLIC BLOOD PRESSURE: 83 MMHG

## 2024-10-10 DIAGNOSIS — Z87.891 FORMER SMOKER: ICD-10-CM

## 2024-10-10 DIAGNOSIS — D57.3 SICKLE CELL TRAIT SYNDROME: ICD-10-CM

## 2024-10-10 DIAGNOSIS — D75.839 THROMBOCYTOSIS: ICD-10-CM

## 2024-10-10 DIAGNOSIS — D64.9 CHRONIC ANEMIA: ICD-10-CM

## 2024-10-10 DIAGNOSIS — D64.89 ANEMIA DUE TO MULTIPLE MECHANISMS: ICD-10-CM

## 2024-10-10 DIAGNOSIS — D63.1 ANEMIA, CHRONIC RENAL FAILURE, STAGE 3 (MODERATE): Primary | ICD-10-CM

## 2024-10-10 DIAGNOSIS — D46.1 RARS (REFRACTORY ANEMIA WITH RINGED SIDEROBLASTS): ICD-10-CM

## 2024-10-10 DIAGNOSIS — D72.821 MONOCYTOSIS: ICD-10-CM

## 2024-10-10 DIAGNOSIS — D64.9 NORMOCHROMIC ANEMIA: ICD-10-CM

## 2024-10-10 DIAGNOSIS — D46.9 MDS (MYELODYSPLASTIC SYNDROME): ICD-10-CM

## 2024-10-10 DIAGNOSIS — N18.30 ANEMIA, CHRONIC RENAL FAILURE, STAGE 3 (MODERATE): Primary | ICD-10-CM

## 2024-10-10 PROCEDURE — 3008F BODY MASS INDEX DOCD: CPT | Mod: CPTII,S$GLB,, | Performed by: INTERNAL MEDICINE

## 2024-10-10 PROCEDURE — 3044F HG A1C LEVEL LT 7.0%: CPT | Mod: CPTII,S$GLB,, | Performed by: INTERNAL MEDICINE

## 2024-10-10 PROCEDURE — 1160F RVW MEDS BY RX/DR IN RCRD: CPT | Mod: CPTII,S$GLB,, | Performed by: INTERNAL MEDICINE

## 2024-10-10 PROCEDURE — 99215 OFFICE O/P EST HI 40 MIN: CPT | Mod: S$GLB,,, | Performed by: INTERNAL MEDICINE

## 2024-10-10 PROCEDURE — 3079F DIAST BP 80-89 MM HG: CPT | Mod: CPTII,S$GLB,, | Performed by: INTERNAL MEDICINE

## 2024-10-10 PROCEDURE — 99999 PR PBB SHADOW E&M-EST. PATIENT-LVL III: CPT | Mod: PBBFAC,,, | Performed by: INTERNAL MEDICINE

## 2024-10-10 PROCEDURE — 1159F MED LIST DOCD IN RCRD: CPT | Mod: CPTII,S$GLB,, | Performed by: INTERNAL MEDICINE

## 2024-10-10 PROCEDURE — G2211 COMPLEX E/M VISIT ADD ON: HCPCS | Mod: S$GLB,,, | Performed by: INTERNAL MEDICINE

## 2024-10-10 PROCEDURE — 3077F SYST BP >= 140 MM HG: CPT | Mod: CPTII,S$GLB,, | Performed by: INTERNAL MEDICINE

## 2024-10-10 NOTE — PROGRESS NOTES
[facility] Hematology/Oncology               PROGRESS NOTE - Follow-up Visit      Subjective:       Patient ID:   NAME: Rick Butler : 1960     64 y.o. male    Referring Doc: Chetna (new PCP)  Other Physicians: Getachew; Alan Mustafa    Chief Complaint:  Sickle cell trait/anemia/RARS  f/u    History of Present Illness:     Patient returns today for a regularly scheduled follow-up visit.  The patient is doing ok overall. He is here by himself today.     He has been Rebozyl and doing well with it; DR Hilario is now at [facility] in [place] and patient plans to keep in touch with her through telemed visits    He sees Dr oh again on 10/31; creatinine is up to 3.2 - will hold next weeks dose of rebozyl     He has not had any recent pain crisis. He denies having any breathing difficulties. He denies any blood in the stool, dark stools or hematuria;; no CP, SOB, HA's or N/V;            Discussed covid19 precautions - he had his vaccinations      ROS:   GEN: normal without any fever, night sweats or weight loss  HEENT: normal with no HA's, sore throat, stiff neck, changes in vision  CV: normal with no CP, SOB, PND, MORA or orthopnea  PULM: normal with no SOB, cough, hemoptysis, sputum or pleuritic pain  GI: normal with no abdominal pain, nausea, vomiting, constipation, diarrhea, melanotic stools, BRBPR, or hematemesis  : normal with no hematuria, dysuria  BREAST: normal with no mass, discharge, pain  SKIN: normal with no rash, erythema, bruising, or swelling    Allergies:  Review of patient's allergies indicates:  No Known Allergies    Medications:    Current Outpatient Medications:     amLODIPine (NORVASC) 5 MG tablet, Take 1 tablet (5 mg total) by mouth once daily., Disp: 90 tablet, Rfl: 3    atorvastatin (LIPITOR) 10 MG tablet, Take 1 tablet (10 mg total) by mouth every evening., Disp: 90 tablet, Rfl: 3    calcitRIOL (ROCALTROL) 0.25 MCG Cap, Take 0.25 mcg by mouth every 7 days., Disp: , Rfl:     droNABinol (MARINOL) 2.5 MG  capsule, Take 1 capsule (2.5 mg total) by mouth 2 (two) times daily before meals., Disp: 60 capsule, Rfl: 3    epoetin tray (PROCRIT INJ), Thursday, Disp: , Rfl:     famotidine (PEPCID) 20 MG tablet, famotidine 20 mg tablet  Take 1 tablet twice a day by oral route., Disp: , Rfl:     fluticasone propionate (FLONASE) 50 mcg/actuation nasal spray, 1 spray (50 mcg total) by Each Nostril route 2 (two) times a day. 1 spray EVERY morning and afternoon  toward the ear each side after a sinus rinse, Disp: 15.8 mL, Rfl: 5    folic acid (FOLVITE) 1 MG tablet, TAKE 2 TABLETS BY MOUTH ONCE DAILY., Disp: 180 tablet, Rfl: 0    multivitamin capsule, Take 1 capsule by mouth once daily., Disp: , Rfl:     pantoprazole (PROTONIX) 40 MG tablet, , Disp: , Rfl:     sod chlor-bicarb-squeez bottle (NEILMED SINUS RINSE COMPLETE) pkdv, 1 application  by sinus irrigation route 2 (two) times a day. Not right before bed, Disp: 1 each, Rfl: 11    tamsulosin (FLOMAX) 0.4 mg Cap, TAKE 2 CAPSULES BY MOUTH ONCE DAILY., Disp: 180 capsule, Rfl: 1    azelastine (ASTELIN) 137 mcg (0.1 %) nasal spray, 2 sprays (274 mcg total) by Nasal route 2 (two) times daily as needed for Rhinitis., Disp: 30 mL, Rfl: 5    sildenafil (VIAGRA) 100 MG tablet, Take 1 tablet (100 mg total) by mouth daily as needed for Erectile Dysfunction., Disp: 10 tablet, Rfl: 6    PMHx/PSHx Updates:  See patient's last visit with me on 8/1/2024  See H&P on 7/9/2011      Pathology:    12/20/2019  BONE MARROW, RIGHT ILIAC CREST,    ASPIRATE, CLOT SECTION, AND CORE BIOPSY:    --HYPERCELLULAR MARROW (APPROXIMATELY 75% TO 80%) WITH TRILINEAGE   HEMATOPOIETIC ELEMENTS, ERYTHROID  HYPERPLASIA AND MILD MEGAKARYOCYTIC HYPERPLASIA, AND NON-SPECIFIC   DYSHEMATOPOIETIC CHANGES (SEE     COMMENT).  --LYLEASAQNS-[id number] INCREASED STAINABLE IRON WITH INCREASED RING   SIDEROBLASTS (GREATER THAN 15%)     (SEE COMMENT).  --PERIPHERAL BLOOD WITH THROMBOCYTOSIS (574,000/MICROLITER) AND ANEMIA    "(HEMOGLOBIN 5.5 GRAM/DECILITER),    WITH SCATTERED DREPANOCYTOID FORMS AND FEW NUCLEATED ERYTHROCYTES      Cytogenetic Results at the bottom of the report.  NORMAL    Objective:     Vitals:  Blood pressure (!) 145/83, pulse 72, temperature 98.7 °F (37.1 °C), temperature source Temporal, resp. rate 18, height 5' 9" (1.753 m), weight 104.1 kg (229 lb 9.6 oz).    Physical Examination:   GEN: no apparent distress, comfortable; AAOx3  HEAD: atraumatic and normocephalic  EYES: no pallor, no icterus, PERRLA  ENT: OMM, no pharyngeal erythema, external ears WNL; no nasal discharge; no thrush  NECK: no masses, thyroid normal, trachea midline, no LAD/LN's, supple  CV: RRR with no murmur; normal pulse; normal S1 and S2; no pedal edema  CHEST: Normal respiratory effort; CTAB; normal breath sounds; no wheeze or crackles  ABDOM: nontender and nondistended; soft; normal bowel sounds; no rebound/guarding  MUSC/Skeletal: ROM normal; no crepitus; joints normal; no deformities or arthropathy  EXTREM: no clubbing, cyanosis, inflammation or swelling  SKIN: no rashes, lesions, ulcers, petechiae or subcutaneous nodules  : no jiang  NEURO: grossly intact; motor/sensory WNL; AAOx3; no tremors  PSYCH: normal mood, affect and behavior  LYMPH: normal cervical, supraclavicular, axillary and groin LN's            Labs:     Lab Results   Component Value Date    WBC 6.73 10/09/2024    HGB 10.6 (L) 10/09/2024    HCT 31.5 (L) 10/09/2024    MCV 91 10/09/2024     10/09/2024           CMP  Sodium   Date Value Ref Range Status   10/09/2024 141 136 - 145 mmol/L Final   06/26/2019 138 134 - 144 mmol/L      Potassium   Date Value Ref Range Status   10/09/2024 4.5 3.5 - 5.1 mmol/L Final     Chloride   Date Value Ref Range Status   10/09/2024 108 95 - 110 mmol/L Final   06/26/2019 105 98 - 110 mmol/L      CO2   Date Value Ref Range Status   10/09/2024 25 23 - 29 mmol/L Final     Glucose   Date Value Ref Range Status   10/09/2024 101 70 - 110 mg/dL " Final   06/26/2019 114 (H) 70 - 99 mg/dL      BUN   Date Value Ref Range Status   10/09/2024 36 (H) 8 - 23 mg/dL Final     Creatinine   Date Value Ref Range Status   10/09/2024 3.2 (H) 0.5 - 1.4 mg/dL Final   06/26/2019 1.62 (H) 0.60 - 1.40 mg/dL      Calcium   Date Value Ref Range Status   10/09/2024 8.6 (L) 8.7 - 10.5 mg/dL Final     Total Protein   Date Value Ref Range Status   10/09/2024 7.0 6.0 - 8.4 g/dL Final     Albumin   Date Value Ref Range Status   10/09/2024 4.3 3.5 - 5.2 g/dL Final   06/26/2019 4.4 3.1 - 4.7 g/dL      Total Bilirubin   Date Value Ref Range Status   10/09/2024 0.8 0.1 - 1.0 mg/dL Final     Comment:     For infants and newborns, interpretation of results should be based  on gestational age, weight and in agreement with clinical  observations.    Premature Infant recommended reference ranges:  Up to 24 hours.............<8.0 mg/dL  Up to 48 hours............<12.0 mg/dL  3-5 days..................<15.0 mg/dL  6-29 days.................<15.0 mg/dL       Alkaline Phosphatase   Date Value Ref Range Status   10/09/2024 68 55 - 135 U/L Final     AST   Date Value Ref Range Status   10/09/2024 18 10 - 40 U/L Final     ALT   Date Value Ref Range Status   10/09/2024 17 10 - 44 U/L Final     Anion Gap   Date Value Ref Range Status   10/09/2024 8 8 - 16 mmol/L Final     eGFR if    Date Value Ref Range Status   07/13/2022 45.6 (A) >60 mL/min/1.73 m^2 Final     eGFR if non    Date Value Ref Range Status   07/13/2022 39.5 (A) >60 mL/min/1.73 m^2 Final     Comment:     Calculation used to obtain the estimated glomerular filtration  rate (eGFR) is the CKD-EPI equation.          Lab Results   Component Value Date    IRON 170 (H) 10/09/2024    TIBC 189 (L) 10/09/2024    FERRITIN 1,427.4 (H) 10/09/2024           I have reviewed all available lab results and radiology reports.    Radiology/Diagnostic Studies:    US 2/8/2023:    IMPRESSION:  1. Dilation of the common bile duct at  "up to 10 mm, unchanged when compared to 2016.  2. Nonvisualization of the pancreas because of overlying bowel gas.  3. No other significant findings.  spleen is normal in size and appearance      2/15/2018 Xray Survey:   IMPRESSION: No significant abnormality seen. No evidence of osteoblastic or  osteoclastic lesions identified    MRI L-spine 2/12/2018  IMPRESSION:    1. Prominent low signal intensity throughout the bone marrow on T1 and  T2-weighted imaging. Marrow infiltrative process including malignancy such as  metastatic disease or lymphoma/leukemia is a consideration along with multiple  myeloma or malignant process. Benign etiology such as osteopetrosis or  regenerative red marrow are also considerations.    2. Multilevel lumbar degenerative changes, most prominent at L4-L5 and L5-S1 as  described.    Assessment/Plan:   (1) 64 y.o. male with diagnosis of sickle cell anemia with borderline microcytosis  - latest hgb was 6.0 and he had blood transfusion  - today, he is not symptomatic at this time  - he probably has a multifactorial anemia process with underlying sickle cell, anemia of chronic disorders and anemia of chronic renal. I can not rule out an underlying GI bleeding process.   - iron panel is adequate (no deficiency)  - total bilirubin is only minimally elevated  - he required transfusion again in Oct 2019 and again in Dec 2019  - he had bone marrow biopsy on 12/20/2019    "dyshematopoietic changes are not entirely specific and are present  mostly within erythroid and megakaryocytic lineages. These findings   could be observed in chronic disease states, autoimmune conditions,  infectious settings, toxic exposures, nutritional deficits, as medication/drug effect, and in myelodysplastic syndrome (MDS), among other conditions"  "Additionally, ring sideroblasts are a non-specific   finding "    Patient was referred to see Dr Mustafa at 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  - he is " "now on procrit per directives of Dr Mustafa    7/30/2020:   he recently saw Dr Mustafa  And sees him again in Jan 2021  - he is doing well with procrit and is feeling much better  - he has not required any transfusions for some time time    9/24/2020:  - latest hgb at 8.5  - will increase the procrit dose  - f/u with Dr Zavala in Jan 2021 11/19/2020:  - patient doing well and feels "great"  - hgb up to 9.1; continued on the procrit  - f/u with Dr Mustafa in Jan 2021    3/4/2021:  - he saw Dr Mustafa in jan 2021 and sees him again in June/July 2021  - latest hgb at 9.1 and stable and platelets are 474,000; wbc at 10.0    6/10/2021:  - latest hgb at 9.0  - plats 485,000  - on weekly procrit  - seeing Dr Mustafa again tomorrow    10/14/2021:  - hgb at 8.6 but he recently had been taking naproxen and had some BRBPR - which since resolved  - he saw Dr Beyer with GI and he is having colonsocopy on Nov 5th along with EGD  - plats 429,000  - he sees Dr Mustafa again in Nov 19th   - he sees Dr Mas again in Dec 2021    1/13/2022:  - He has been under the care of Dr Mustafa at [facility] for RARS- MDS. He is now on procrit weekly. He sees Dr Mustafa again in March 2022    - hgb 9.3  - he had scope with Dr Collins in nov 2021 which was good per patient and they plan to repeat in 5 yrs    5/12/2022:  - hgb at 9.0  - plats 485  - wbc 7.25  - he is on procrit weekly  - saw Dr Mustafa this past Fridday and since he is leaving, he will start seeing Dr Hilario instead in 3 months    8/18/2022:  - latest hgb a little lower at 8.8  - he has been on the procrit  - he saw Dr Hilario recently at [facility] and plans to see her again in 3 months    1/26/2023:  - He saw Dr Hilario at [facility] again recently this past Tuesday and they are considering another medication; he plans to see her again in April 2023  - Dr Hilario requested he have an US of spleen - will order here in [place]  - he is also on appetite stimulant  - hgb at 9.4 and plats at " "500k    5/11/2023:  - hgb at 9.6 and plats 466,000  - He saw Dr Hilario at [facility] again recently this past Tuesday and they are considering another medication; he plans to see her again in Aug 2023    8/3/2023:  - His insurance will not approve of him getting the Reblozyl   - He sees Dr Hilario again next Tuesday and we will await her directives.     9/14/2023:  - the Rebozyl has been approved - will set up  - latest hgb at 10.1  - seeing Dr Hilario again in Jan 2024 11/16/2023:  - continued on Rebozyl  - WBC and plats WNL  - hgb at 10.3  - cardiac w/u negative per patient    2/15/2024:  - labs are adequate  - hgb at 10.6  - WBC and plat WNL  - continued on Rebozyl  - recent colonoscopy good per patient    4/4/2024:  - continued on Rebozyl  - counts are looking adequate    8/1/2024:  - continued on procrit  - insurance would not approve of the rebozyl and he has not had the last 2-3 injections  - hold rebozyl for now anyway due to the increased creatine and decreased kidney function  - f/u with Dr Chilel  - seeing Dr Hilario on 8/13  - continue procrit as directed    10/10/2024  - he sees Dr Mas on 10/31  - he has been continued on rebozyl and is due again next week  - he has been on procrit  - Dr Hilario is now at [facility] in [place] and he last saw her in Aug 2024 - he plans to continue under her care via telemed    (2) Alcohol abuse issues in past - he has been sober for over 3 years now    (3) New findings on radiography with abnormal chest CT and lung mass  - former smoker    (4) chronic back issues - prior Xrays and MRI - suspect the findings on the MRI are possibly due to his sickle cell;   - SPEP was previously negative for M-protein  - PSA was 0.3 in Sept 2017  - recommended neurosurgery evaluation previously  - he saw Dr Nura VUONG and had a nerve "burning" procedure and his pain has improved    (5) CRI - elevated creatinine - he is followed Dr Chilel with nephrology      (6) H pylori gastritis - s/p recent endoscopy with Dr" Dennis and antibotics x 10 days    (7) CP   9/14/2023:  - seeing Dr lovett with cardiology and planning stress test in near future        1. Anemia, chronic renal failure, stage 3 (moderate)        2. MDS (myelodysplastic syndrome)        3. RARS (refractory anemia with ringed sideroblasts)        4. Anemia due to multiple mechanisms        5. Monocytosis        6. Thrombocytosis        7. Normochromic anemia        8. Sickle cell trait syndrome        9. Chronic anemia        10. Former smoker          PLAN;  1. Continue with the procrit ; hold next weeks rebozyl dose due to the rising creatinine  2. Proceed with Dr Mas visit on 10/31  2. Planned f/u with Dr Hilario via telemed in future since she is now at [facility]  3. F/u with cardiology as directed  4.  F/u with Jonathan as needed  5. Check labs every 2 weeks  6. Add marinol for appetite                RTC  4 weeks with Whit and  8 weeks with myself  Fax note to Dennis Basilio Tveit;  Lewis Hilario; Jonathan    Total Time spent on patient:    I spent over 25 mins of time with the patient. Reviewed results of the recently ordered labs, tests, reports and studies; made directives with regards to the results. Over half of this time was spent couseling and coordinating care, making treatment and analytical decisions; ordering necessary labs, tests and studies; and discussing treatment options and setting up treatment plan(s) if indicated.            Discussion:       Pathology Discussion:    I reviewed and discussed the pathology report(s) and radiograph reports (if available) in as simple to understand and/or laymen's terms to the best of my ability. I had an indepth conversation with the patient and went over the patient's individual diagnosis based on the information that was currently available. I discussed the TNM staging process with regard to the patient's particular cancer type, and the calculated stage based on the currently available TNM data and literature. I  discussed the available prognostic data with regard to the current staging information and how it relates to the prognosis of their particular neoplastic process.          COVID-19 Discussion:    I had long discussion with patient and any applicable family about the COVID-19 coronavirus epidemic and the recommended precautions with regard to cancer and/or hematology patients. I have re-iterated the CDC recommendations for adequate hand washing, use of hand -like products, and coughing into elbow, etc. In addition, especially for our patients who are on chemotherapy and/or our otherwise immunocompromised patients, I have recommended avoidance of crowds, including movie theaters, restaurants, churches, etc. I have recommended avoidance of any sick or symptomatic family members and/or friends. I have also recommended avoidance of any raw and unwashed food products, and general avoidance of food items that have not been prepared by themselves. The patient has been asked to call us immediately with any symptom developments, issues, questions or other general concerns.     I have explained all of the above in detail and the patient understands all of the current recommendation(s). I have answered all of their questions to the best of my ability and to their complete satisfaction.   The patient is to continue with the current management plan.            Electronically signed by Jose Tello MD

## 2024-10-10 NOTE — TELEPHONE ENCOUNTER
Per Dr Tello's verbal orders, I made infusion aware that patient will need to hold reblozyl next week due to kidneys.

## 2024-10-11 ENCOUNTER — INFUSION (OUTPATIENT)
Dept: INFUSION THERAPY | Facility: HOSPITAL | Age: 64
End: 2024-10-11
Attending: INTERNAL MEDICINE
Payer: COMMERCIAL

## 2024-10-11 VITALS
BODY MASS INDEX: 34.09 KG/M2 | WEIGHT: 230.19 LBS | HEIGHT: 69 IN | DIASTOLIC BLOOD PRESSURE: 85 MMHG | HEART RATE: 73 BPM | SYSTOLIC BLOOD PRESSURE: 145 MMHG | OXYGEN SATURATION: 97 % | TEMPERATURE: 98 F | RESPIRATION RATE: 16 BRPM

## 2024-10-11 DIAGNOSIS — D64.9 CHRONIC ANEMIA: Primary | ICD-10-CM

## 2024-10-11 DIAGNOSIS — N18.30 STAGE 3 CHRONIC KIDNEY DISEASE, UNSPECIFIED WHETHER STAGE 3A OR 3B CKD: ICD-10-CM

## 2024-10-11 DIAGNOSIS — D64.89 ANEMIA DUE TO MULTIPLE MECHANISMS: ICD-10-CM

## 2024-10-11 DIAGNOSIS — D46.9 MDS (MYELODYSPLASTIC SYNDROME): ICD-10-CM

## 2024-10-11 DIAGNOSIS — D64.9 NORMOCHROMIC ANEMIA: ICD-10-CM

## 2024-10-11 PROCEDURE — 63600175 PHARM REV CODE 636 W HCPCS: Mod: JZ,EC,JG | Performed by: INTERNAL MEDICINE

## 2024-10-11 PROCEDURE — 96372 THER/PROPH/DIAG INJ SC/IM: CPT

## 2024-10-11 RX ADMIN — EPOETIN ALFA-EPBX 40000 UNITS: 40000 INJECTION, SOLUTION INTRAVENOUS; SUBCUTANEOUS at 04:10

## 2024-10-11 NOTE — PLAN OF CARE
Problem: Fatigue  Goal: Improved Activity Tolerance  Outcome: Progressing  Intervention: Promote Improved Energy  Flowsheets (Taken 10/11/2024 4680)  Fatigue Management: frequent rest breaks encouraged  Activity Management: Ambulated -L4  Environmental Support: rest periods encouraged

## 2024-10-16 ENCOUNTER — TELEPHONE (OUTPATIENT)
Facility: CLINIC | Age: 64
End: 2024-10-16
Payer: COMMERCIAL

## 2024-10-16 NOTE — TELEPHONE ENCOUNTER
----- Message from Jolynn sent at 10/16/2024  9:19 AM CDT -----  Sarah lehman/ Dr. Saez's office in [place], calling in regards to mutual Pt. Dr. Saez would like to review Elisha's last clinic notes and recent lab results that warranted taking the Pt off of Reblozyl. Please fax clinic notes and labs.     [facility] OFFICE: [phone]  FAX: [phone]

## 2024-10-17 ENCOUNTER — TELEPHONE (OUTPATIENT)
Facility: CLINIC | Age: 64
End: 2024-10-17
Payer: COMMERCIAL

## 2024-10-17 NOTE — TELEPHONE ENCOUNTER
Attempted to fax last office notes and labs 4 times to fax number provided by Dr Saez's office, [phone], error message received each time, fax will not go through. Spoke to their office who confirmed that above fax number is correct, states she will have nurse call me back to let me know if there is another number that I can fax info to.

## 2024-10-18 ENCOUNTER — INFUSION (OUTPATIENT)
Dept: INFUSION THERAPY | Facility: HOSPITAL | Age: 64
End: 2024-10-18
Attending: INTERNAL MEDICINE
Payer: COMMERCIAL

## 2024-10-18 VITALS
HEART RATE: 82 BPM | WEIGHT: 227.31 LBS | TEMPERATURE: 98 F | OXYGEN SATURATION: 96 % | RESPIRATION RATE: 18 BRPM | SYSTOLIC BLOOD PRESSURE: 140 MMHG | DIASTOLIC BLOOD PRESSURE: 84 MMHG | BODY MASS INDEX: 33.67 KG/M2 | HEIGHT: 69 IN

## 2024-10-18 DIAGNOSIS — D64.89 ANEMIA DUE TO MULTIPLE MECHANISMS: ICD-10-CM

## 2024-10-18 DIAGNOSIS — D46.9 MDS (MYELODYSPLASTIC SYNDROME): ICD-10-CM

## 2024-10-18 DIAGNOSIS — D64.9 NORMOCHROMIC ANEMIA: ICD-10-CM

## 2024-10-18 DIAGNOSIS — N18.30 STAGE 3 CHRONIC KIDNEY DISEASE, UNSPECIFIED WHETHER STAGE 3A OR 3B CKD: ICD-10-CM

## 2024-10-18 DIAGNOSIS — D64.9 CHRONIC ANEMIA: Primary | ICD-10-CM

## 2024-10-18 PROCEDURE — 96372 THER/PROPH/DIAG INJ SC/IM: CPT

## 2024-10-18 PROCEDURE — 63600175 PHARM REV CODE 636 W HCPCS: Mod: JZ,EC,JG | Performed by: INTERNAL MEDICINE

## 2024-10-18 RX ADMIN — EPOETIN ALFA-EPBX 40000 UNITS: 40000 INJECTION, SOLUTION INTRAVENOUS; SUBCUTANEOUS at 04:10

## 2024-10-18 NOTE — PLAN OF CARE
Problem: Fatigue  Goal: Improved Activity Tolerance  Outcome: Progressing  Intervention: Promote Improved Energy  Flowsheets (Taken 10/18/2024 1606)  Fatigue Management: frequent rest breaks encouraged  Sleep/Rest Enhancement: regular sleep/rest pattern promoted  Activity Management:   Ambulated -L4   Up in chair - L3  Environmental Support:   calm environment promoted   rest periods encouraged

## 2024-10-23 ENCOUNTER — LAB VISIT (OUTPATIENT)
Dept: LAB | Facility: HOSPITAL | Age: 64
End: 2024-10-23
Attending: INTERNAL MEDICINE
Payer: COMMERCIAL

## 2024-10-23 DIAGNOSIS — D46.9 MDS (MYELODYSPLASTIC SYNDROME): ICD-10-CM

## 2024-10-23 LAB
ALBUMIN SERPL BCP-MCNC: 4.5 G/DL (ref 3.5–5.2)
ALP SERPL-CCNC: 73 U/L (ref 55–135)
ALT SERPL W/O P-5'-P-CCNC: 15 U/L (ref 10–44)
ANION GAP SERPL CALC-SCNC: 9 MMOL/L (ref 8–16)
AST SERPL-CCNC: 17 U/L (ref 10–40)
BASOPHILS # BLD AUTO: 0.09 K/UL (ref 0–0.2)
BASOPHILS NFR BLD: 1.4 % (ref 0–1.9)
BILIRUB SERPL-MCNC: 0.9 MG/DL (ref 0.1–1)
BUN SERPL-MCNC: 37 MG/DL (ref 8–23)
CALCIUM SERPL-MCNC: 8.8 MG/DL (ref 8.7–10.5)
CHLORIDE SERPL-SCNC: 107 MMOL/L (ref 95–110)
CO2 SERPL-SCNC: 24 MMOL/L (ref 23–29)
CREAT SERPL-MCNC: 3.2 MG/DL (ref 0.5–1.4)
DACRYOCYTES BLD QL SMEAR: ABNORMAL
DIFFERENTIAL METHOD BLD: ABNORMAL
EOSINOPHIL # BLD AUTO: 0.1 K/UL (ref 0–0.5)
EOSINOPHIL NFR BLD: 1.5 % (ref 0–8)
ERYTHROCYTE [DISTWIDTH] IN BLOOD BY AUTOMATED COUNT: 26.5 % (ref 11.5–14.5)
EST. GFR  (NO RACE VARIABLE): 20.8 ML/MIN/1.73 M^2
GLUCOSE SERPL-MCNC: 100 MG/DL (ref 70–110)
HCT VFR BLD AUTO: 32 % (ref 40–54)
HGB BLD-MCNC: 10.6 G/DL (ref 14–18)
HYPOCHROMIA BLD QL SMEAR: ABNORMAL
IMM GRANULOCYTES # BLD AUTO: 0.05 K/UL (ref 0–0.04)
IMM GRANULOCYTES NFR BLD AUTO: 0.8 % (ref 0–0.5)
LYMPHOCYTES # BLD AUTO: 1.3 K/UL (ref 1–4.8)
LYMPHOCYTES NFR BLD: 19.6 % (ref 18–48)
MCH RBC QN AUTO: 30.4 PG (ref 27–31)
MCHC RBC AUTO-ENTMCNC: 33.1 G/DL (ref 32–36)
MCV RBC AUTO: 92 FL (ref 82–98)
MONOCYTES # BLD AUTO: 0.7 K/UL (ref 0.3–1)
MONOCYTES NFR BLD: 11 % (ref 4–15)
NEUTROPHILS # BLD AUTO: 4.4 K/UL (ref 1.8–7.7)
NEUTROPHILS NFR BLD: 65.7 % (ref 38–73)
NRBC BLD-RTO: 0 /100 WBC
OVALOCYTES BLD QL SMEAR: ABNORMAL
PLATELET # BLD AUTO: 425 K/UL (ref 150–450)
PLATELET BLD QL SMEAR: ABNORMAL
PMV BLD AUTO: 10.1 FL (ref 9.2–12.9)
POIKILOCYTOSIS BLD QL SMEAR: ABNORMAL
POTASSIUM SERPL-SCNC: 4.9 MMOL/L (ref 3.5–5.1)
PROT SERPL-MCNC: 7.2 G/DL (ref 6–8.4)
RBC # BLD AUTO: 3.49 M/UL (ref 4.6–6.2)
SODIUM SERPL-SCNC: 140 MMOL/L (ref 136–145)
TARGETS BLD QL SMEAR: ABNORMAL
WBC # BLD AUTO: 6.63 K/UL (ref 3.9–12.7)

## 2024-10-23 PROCEDURE — 85025 COMPLETE CBC W/AUTO DIFF WBC: CPT | Performed by: INTERNAL MEDICINE

## 2024-10-23 PROCEDURE — 80053 COMPREHEN METABOLIC PANEL: CPT | Performed by: INTERNAL MEDICINE

## 2024-10-25 ENCOUNTER — INFUSION (OUTPATIENT)
Dept: INFUSION THERAPY | Facility: HOSPITAL | Age: 64
End: 2024-10-25
Attending: INTERNAL MEDICINE
Payer: COMMERCIAL

## 2024-10-25 VITALS
BODY MASS INDEX: 33.67 KG/M2 | SYSTOLIC BLOOD PRESSURE: 128 MMHG | DIASTOLIC BLOOD PRESSURE: 83 MMHG | TEMPERATURE: 98 F | WEIGHT: 227.31 LBS | RESPIRATION RATE: 18 BRPM | HEART RATE: 85 BPM | HEIGHT: 69 IN

## 2024-10-25 DIAGNOSIS — N18.30 STAGE 3 CHRONIC KIDNEY DISEASE, UNSPECIFIED WHETHER STAGE 3A OR 3B CKD: ICD-10-CM

## 2024-10-25 DIAGNOSIS — D46.9 MDS (MYELODYSPLASTIC SYNDROME): ICD-10-CM

## 2024-10-25 DIAGNOSIS — D64.9 CHRONIC ANEMIA: Primary | ICD-10-CM

## 2024-10-25 DIAGNOSIS — D64.9 NORMOCHROMIC ANEMIA: ICD-10-CM

## 2024-10-25 DIAGNOSIS — D64.89 ANEMIA DUE TO MULTIPLE MECHANISMS: ICD-10-CM

## 2024-10-25 PROCEDURE — 96372 THER/PROPH/DIAG INJ SC/IM: CPT

## 2024-10-25 PROCEDURE — 63600175 PHARM REV CODE 636 W HCPCS: Mod: JZ,EC,JG | Performed by: INTERNAL MEDICINE

## 2024-10-25 RX ADMIN — EPOETIN ALFA-EPBX 40000 UNITS: 40000 INJECTION, SOLUTION INTRAVENOUS; SUBCUTANEOUS at 04:10

## 2024-10-25 NOTE — PLAN OF CARE
Problem: Fatigue  Goal: Improved Activity Tolerance  10/25/2024 1600 by Gisell Castillo, RN  Outcome: Met  10/25/2024 1600 by Gisell Castillo, RN  Outcome: Progressing

## 2024-10-28 ENCOUNTER — OFFICE VISIT (OUTPATIENT)
Dept: FAMILY MEDICINE | Facility: CLINIC | Age: 64
End: 2024-10-28
Payer: COMMERCIAL

## 2024-10-28 VITALS
HEIGHT: 69 IN | DIASTOLIC BLOOD PRESSURE: 84 MMHG | HEART RATE: 78 BPM | SYSTOLIC BLOOD PRESSURE: 130 MMHG | WEIGHT: 223.5 LBS | TEMPERATURE: 99 F | OXYGEN SATURATION: 98 % | BODY MASS INDEX: 33.1 KG/M2

## 2024-10-28 DIAGNOSIS — M54.50 ACUTE BILATERAL LOW BACK PAIN WITHOUT SCIATICA: ICD-10-CM

## 2024-10-28 DIAGNOSIS — I10 ESSENTIAL HYPERTENSION: Primary | ICD-10-CM

## 2024-10-28 DIAGNOSIS — Z23 NEED FOR INFLUENZA VACCINATION: ICD-10-CM

## 2024-10-28 DIAGNOSIS — D46.9 MDS (MYELODYSPLASTIC SYNDROME): ICD-10-CM

## 2024-10-28 DIAGNOSIS — E78.5 DYSLIPIDEMIA: ICD-10-CM

## 2024-10-28 DIAGNOSIS — R91.1 PULMONARY NODULE: ICD-10-CM

## 2024-10-28 DIAGNOSIS — N18.4 CHRONIC KIDNEY DISEASE (CKD), STAGE IV (SEVERE): ICD-10-CM

## 2024-10-28 DIAGNOSIS — N25.81 SECONDARY HYPERPARATHYROIDISM OF RENAL ORIGIN: ICD-10-CM

## 2024-10-28 DIAGNOSIS — Z12.5 SCREENING FOR PROSTATE CANCER: ICD-10-CM

## 2024-10-28 PROCEDURE — 1159F MED LIST DOCD IN RCRD: CPT | Mod: CPTII,S$GLB,, | Performed by: NURSE PRACTITIONER

## 2024-10-28 PROCEDURE — 3075F SYST BP GE 130 - 139MM HG: CPT | Mod: CPTII,S$GLB,, | Performed by: NURSE PRACTITIONER

## 2024-10-28 PROCEDURE — 99999 PR PBB SHADOW E&M-EST. PATIENT-LVL IV: CPT | Mod: PBBFAC,,, | Performed by: NURSE PRACTITIONER

## 2024-10-28 PROCEDURE — 3079F DIAST BP 80-89 MM HG: CPT | Mod: CPTII,S$GLB,, | Performed by: NURSE PRACTITIONER

## 2024-10-28 PROCEDURE — 1160F RVW MEDS BY RX/DR IN RCRD: CPT | Mod: CPTII,S$GLB,, | Performed by: NURSE PRACTITIONER

## 2024-10-28 PROCEDURE — 3044F HG A1C LEVEL LT 7.0%: CPT | Mod: CPTII,S$GLB,, | Performed by: NURSE PRACTITIONER

## 2024-10-28 PROCEDURE — 90656 IIV3 VACC NO PRSV 0.5 ML IM: CPT | Mod: S$GLB,,, | Performed by: NURSE PRACTITIONER

## 2024-10-28 PROCEDURE — 99214 OFFICE O/P EST MOD 30 MIN: CPT | Mod: 25,S$GLB,, | Performed by: NURSE PRACTITIONER

## 2024-10-28 PROCEDURE — 90471 IMMUNIZATION ADMIN: CPT | Mod: S$GLB,,, | Performed by: NURSE PRACTITIONER

## 2024-10-28 PROCEDURE — 3008F BODY MASS INDEX DOCD: CPT | Mod: CPTII,S$GLB,, | Performed by: NURSE PRACTITIONER

## 2024-10-28 RX ORDER — LIDOCAINE 50 MG/G
1 PATCH TOPICAL DAILY
Qty: 14 PATCH | Refills: 0 | Status: SHIPPED | OUTPATIENT
Start: 2024-10-28

## 2024-10-28 RX ORDER — TRAZODONE HYDROCHLORIDE 100 MG/1
100 TABLET ORAL NIGHTLY PRN
COMMUNITY
Start: 2024-08-06 | End: 2024-11-01

## 2024-10-28 RX ORDER — ASCORBIC ACID 500 MG
500 TABLET ORAL DAILY
COMMUNITY

## 2024-10-31 ENCOUNTER — HOSPITAL ENCOUNTER (OUTPATIENT)
Dept: RADIOLOGY | Facility: HOSPITAL | Age: 64
Discharge: HOME | End: 2024-10-31
Attending: INTERNAL MEDICINE
Payer: COMMERCIAL

## 2024-10-31 DIAGNOSIS — M54.9 BACK PAIN: Primary | ICD-10-CM

## 2024-10-31 DIAGNOSIS — M54.9 BACK PAIN: ICD-10-CM

## 2024-10-31 PROCEDURE — 72100 X-RAY EXAM L-S SPINE 2/3 VWS: CPT | Mod: 26,,, | Performed by: RADIOLOGY

## 2024-10-31 PROCEDURE — 72100 X-RAY EXAM L-S SPINE 2/3 VWS: CPT | Mod: TC,PO

## 2024-11-01 ENCOUNTER — INFUSION (OUTPATIENT)
Dept: INFUSION THERAPY | Facility: HOSPITAL | Age: 64
End: 2024-11-01
Attending: INTERNAL MEDICINE
Payer: COMMERCIAL

## 2024-11-01 VITALS
TEMPERATURE: 98 F | HEART RATE: 78 BPM | RESPIRATION RATE: 17 BRPM | OXYGEN SATURATION: 98 % | DIASTOLIC BLOOD PRESSURE: 81 MMHG | WEIGHT: 226 LBS | BODY MASS INDEX: 33.47 KG/M2 | HEIGHT: 69 IN | SYSTOLIC BLOOD PRESSURE: 135 MMHG

## 2024-11-01 DIAGNOSIS — N18.30 STAGE 3 CHRONIC KIDNEY DISEASE, UNSPECIFIED WHETHER STAGE 3A OR 3B CKD: ICD-10-CM

## 2024-11-01 DIAGNOSIS — D64.89 ANEMIA DUE TO MULTIPLE MECHANISMS: ICD-10-CM

## 2024-11-01 DIAGNOSIS — D46.9 MDS (MYELODYSPLASTIC SYNDROME): ICD-10-CM

## 2024-11-01 DIAGNOSIS — E53.8 FOLIC ACID DEFICIENCY: ICD-10-CM

## 2024-11-01 DIAGNOSIS — D64.9 CHRONIC ANEMIA: Primary | ICD-10-CM

## 2024-11-01 DIAGNOSIS — D64.9 NORMOCHROMIC ANEMIA: ICD-10-CM

## 2024-11-01 DIAGNOSIS — F51.01 PRIMARY INSOMNIA: ICD-10-CM

## 2024-11-01 PROCEDURE — 63600175 PHARM REV CODE 636 W HCPCS: Mod: JZ,EC,JG | Performed by: INTERNAL MEDICINE

## 2024-11-01 PROCEDURE — 96372 THER/PROPH/DIAG INJ SC/IM: CPT

## 2024-11-01 RX ORDER — FOLIC ACID 1 MG/1
2000 TABLET ORAL
Qty: 180 TABLET | Refills: 0 | Status: SHIPPED | OUTPATIENT
Start: 2024-11-01

## 2024-11-01 RX ORDER — TRAZODONE HYDROCHLORIDE 100 MG/1
100 TABLET ORAL NIGHTLY
Qty: 90 TABLET | Refills: 2 | Status: SHIPPED | OUTPATIENT
Start: 2024-11-01

## 2024-11-01 RX ADMIN — EPOETIN ALFA-EPBX 40000 UNITS: 40000 INJECTION, SOLUTION INTRAVENOUS; SUBCUTANEOUS at 03:11

## 2024-11-06 ENCOUNTER — LAB VISIT (OUTPATIENT)
Dept: LAB | Facility: HOSPITAL | Age: 64
End: 2024-11-06
Attending: INTERNAL MEDICINE
Payer: COMMERCIAL

## 2024-11-06 DIAGNOSIS — D46.9 MDS (MYELODYSPLASTIC SYNDROME): ICD-10-CM

## 2024-11-06 LAB
ALBUMIN SERPL BCP-MCNC: 4.2 G/DL (ref 3.5–5.2)
ALP SERPL-CCNC: 71 U/L (ref 55–135)
ALT SERPL W/O P-5'-P-CCNC: 18 U/L (ref 10–44)
ANION GAP SERPL CALC-SCNC: 7 MMOL/L (ref 8–16)
AST SERPL-CCNC: 20 U/L (ref 10–40)
BASOPHILS # BLD AUTO: 0.08 K/UL (ref 0–0.2)
BASOPHILS NFR BLD: 1.4 % (ref 0–1.9)
BILIRUB SERPL-MCNC: 0.6 MG/DL (ref 0.1–1)
BUN SERPL-MCNC: 33 MG/DL (ref 8–23)
CALCIUM SERPL-MCNC: 8.6 MG/DL (ref 8.7–10.5)
CHLORIDE SERPL-SCNC: 109 MMOL/L (ref 95–110)
CO2 SERPL-SCNC: 26 MMOL/L (ref 23–29)
CREAT SERPL-MCNC: 2.9 MG/DL (ref 0.5–1.4)
DIFFERENTIAL METHOD BLD: ABNORMAL
EOSINOPHIL # BLD AUTO: 0.1 K/UL (ref 0–0.5)
EOSINOPHIL NFR BLD: 2.3 % (ref 0–8)
ERYTHROCYTE [DISTWIDTH] IN BLOOD BY AUTOMATED COUNT: 26.6 % (ref 11.5–14.5)
EST. GFR  (NO RACE VARIABLE): 23.4 ML/MIN/1.73 M^2
FERRITIN SERPL-MCNC: 1270.4 NG/ML (ref 20–300)
GLUCOSE SERPL-MCNC: 101 MG/DL (ref 70–110)
HCT VFR BLD AUTO: 28.6 % (ref 40–54)
HGB BLD-MCNC: 9.7 G/DL (ref 14–18)
IMM GRANULOCYTES # BLD AUTO: 0.02 K/UL (ref 0–0.04)
IMM GRANULOCYTES NFR BLD AUTO: 0.3 % (ref 0–0.5)
IRON SERPL-MCNC: 163 UG/DL (ref 45–160)
LYMPHOCYTES # BLD AUTO: 1 K/UL (ref 1–4.8)
LYMPHOCYTES NFR BLD: 17.8 % (ref 18–48)
MCH RBC QN AUTO: 30.8 PG (ref 27–31)
MCHC RBC AUTO-ENTMCNC: 33.9 G/DL (ref 32–36)
MCV RBC AUTO: 91 FL (ref 82–98)
MONOCYTES # BLD AUTO: 0.7 K/UL (ref 0.3–1)
MONOCYTES NFR BLD: 12.4 % (ref 4–15)
NEUTROPHILS # BLD AUTO: 3.8 K/UL (ref 1.8–7.7)
NEUTROPHILS NFR BLD: 65.8 % (ref 38–73)
NRBC BLD-RTO: 0 /100 WBC
PLATELET # BLD AUTO: 400 K/UL (ref 150–450)
PMV BLD AUTO: 10 FL (ref 9.2–12.9)
POTASSIUM SERPL-SCNC: 4.5 MMOL/L (ref 3.5–5.1)
PROT SERPL-MCNC: 6.9 G/DL (ref 6–8.4)
RBC # BLD AUTO: 3.15 M/UL (ref 4.6–6.2)
SATURATED IRON: 86 % (ref 20–50)
SODIUM SERPL-SCNC: 142 MMOL/L (ref 136–145)
TOTAL IRON BINDING CAPACITY: 189 UG/DL (ref 250–450)
TRANSFERRIN SERPL-MCNC: 135 MG/DL (ref 200–375)
WBC # BLD AUTO: 5.72 K/UL (ref 3.9–12.7)

## 2024-11-06 PROCEDURE — 83540 ASSAY OF IRON: CPT | Performed by: INTERNAL MEDICINE

## 2024-11-06 PROCEDURE — 82728 ASSAY OF FERRITIN: CPT | Performed by: INTERNAL MEDICINE

## 2024-11-06 PROCEDURE — 80053 COMPREHEN METABOLIC PANEL: CPT | Performed by: INTERNAL MEDICINE

## 2024-11-06 PROCEDURE — 85025 COMPLETE CBC W/AUTO DIFF WBC: CPT | Performed by: INTERNAL MEDICINE

## 2024-11-06 PROCEDURE — 36415 COLL VENOUS BLD VENIPUNCTURE: CPT | Performed by: INTERNAL MEDICINE

## 2024-11-07 ENCOUNTER — OFFICE VISIT (OUTPATIENT)
Facility: CLINIC | Age: 64
End: 2024-11-07
Payer: COMMERCIAL

## 2024-11-07 VITALS
TEMPERATURE: 98 F | BODY MASS INDEX: 33.12 KG/M2 | WEIGHT: 223.63 LBS | DIASTOLIC BLOOD PRESSURE: 74 MMHG | HEIGHT: 69 IN | RESPIRATION RATE: 18 BRPM | HEART RATE: 75 BPM | SYSTOLIC BLOOD PRESSURE: 143 MMHG

## 2024-11-07 DIAGNOSIS — N18.4 CHRONIC KIDNEY DISEASE (CKD), STAGE IV (SEVERE): ICD-10-CM

## 2024-11-07 DIAGNOSIS — M51.362 DEGENERATION OF INTERVERTEBRAL DISC OF LUMBAR REGION WITH DISCOGENIC BACK PAIN AND LOWER EXTREMITY PAIN: ICD-10-CM

## 2024-11-07 DIAGNOSIS — R63.0 APPETITE LOSS: ICD-10-CM

## 2024-11-07 DIAGNOSIS — D46.1 RARS (REFRACTORY ANEMIA WITH RINGED SIDEROBLASTS): ICD-10-CM

## 2024-11-07 DIAGNOSIS — D46.9 MDS (MYELODYSPLASTIC SYNDROME): Primary | ICD-10-CM

## 2024-11-07 DIAGNOSIS — M47.816 LUMBAR SPONDYLOSIS: ICD-10-CM

## 2024-11-07 PROCEDURE — 3044F HG A1C LEVEL LT 7.0%: CPT | Mod: CPTII,S$GLB,, | Performed by: NURSE PRACTITIONER

## 2024-11-07 PROCEDURE — 3077F SYST BP >= 140 MM HG: CPT | Mod: CPTII,S$GLB,, | Performed by: NURSE PRACTITIONER

## 2024-11-07 PROCEDURE — 99215 OFFICE O/P EST HI 40 MIN: CPT | Mod: S$GLB,,, | Performed by: NURSE PRACTITIONER

## 2024-11-07 PROCEDURE — 3078F DIAST BP <80 MM HG: CPT | Mod: CPTII,S$GLB,, | Performed by: NURSE PRACTITIONER

## 2024-11-07 PROCEDURE — 99999 PR PBB SHADOW E&M-EST. PATIENT-LVL V: CPT | Mod: PBBFAC,,, | Performed by: NURSE PRACTITIONER

## 2024-11-07 PROCEDURE — G2211 COMPLEX E/M VISIT ADD ON: HCPCS | Mod: S$GLB,,, | Performed by: NURSE PRACTITIONER

## 2024-11-07 PROCEDURE — 1159F MED LIST DOCD IN RCRD: CPT | Mod: CPTII,S$GLB,, | Performed by: NURSE PRACTITIONER

## 2024-11-07 PROCEDURE — 3008F BODY MASS INDEX DOCD: CPT | Mod: CPTII,S$GLB,, | Performed by: NURSE PRACTITIONER

## 2024-11-07 RX ORDER — DRONABINOL 5 MG/1
5 CAPSULE ORAL
Qty: 60 CAPSULE | Refills: 1 | Status: SHIPPED | OUTPATIENT
Start: 2024-11-07

## 2024-11-07 RX ORDER — FLUTICASONE PROPIONATE 50 MCG
1 SPRAY, SUSPENSION (ML) NASAL 2 TIMES DAILY
Qty: 48 ML | Refills: 1 | Status: SHIPPED | OUTPATIENT
Start: 2024-11-07

## 2024-11-07 NOTE — PROGRESS NOTES
[facility] Hematology/Oncology    PROGRESS NOTE - Follow-up Visit      Subjective:       Patient ID:   NAME: Rick Butler : 1960     64 y.o. male    Referring Doc: Chetna (new PCP)  Other Physicians: Getachew; Alan Mustafa    Chief Complaint:  MDS     History of Present Illness:     Patient returns today for a regularly scheduled follow-up visit.  The patient is doing ok overall. He is here by himself today.     He has been Rebozyl and doing well with it - however, he did have some decreased kidney function that both Dr. Tello and Dr. Mas believe it could be the reblozyl; DR Hilario is now at [facility] in [place] and patient plans to keep in touch with her through telemed visits - he has a televist with her in December     Creatinine is now at 2.9 down from 3.3. He has been off of reblozyl 2024 Hemoglobin is at 9.6 now as he continues on retacrit     He has not had any recent pain crisis. He denies having any breathing difficulties. He denies any blood in the stool, dark stools or hematuria;; no CP, SOB, HA's or N/V;        He was having back pain and did have an XRAY on his lower back that did show severe degenerative changes.     Discussed covid19 precautions - he had his vaccinations      ROS:   GEN: normal without any fever, night sweats or weight loss  HEENT: normal with no HA's, sore throat, stiff neck, changes in vision  CV: normal with no CP, SOB, PND, MORA or orthopnea  PULM: normal with no SOB, cough, hemoptysis, sputum or pleuritic pain  GI: normal with no abdominal pain, nausea, vomiting, constipation, diarrhea, melanotic stools, BRBPR, or hematemesis  : normal with no hematuria, dysuria  BREAST: normal with no mass, discharge, pain  SKIN: normal with no rash, erythema, bruising, or swelling    Allergies:  Review of patient's allergies indicates:  No Known Allergies    Medications:    Current Outpatient Medications:     amLODIPine (NORVASC) 5 MG tablet, Take 1 tablet (5 mg total) by mouth once  daily., Disp: 90 tablet, Rfl: 3    ascorbic acid, vitamin C, (VITAMIN C) 500 MG tablet, Take 500 mg by mouth once daily., Disp: , Rfl:     calcitRIOL (ROCALTROL) 0.25 MCG Cap, Take 0.25 mcg by mouth every 7 days., Disp: , Rfl:     droNABinol (MARINOL) 2.5 MG capsule, Take 1 capsule (2.5 mg total) by mouth 2 (two) times daily before meals., Disp: 60 capsule, Rfl: 3    epoetin tray (PROCRIT INJ), Thursday, Disp: , Rfl:     famotidine (PEPCID) 20 MG tablet, famotidine 20 mg tablet  Take 1 tablet twice a day by oral route., Disp: , Rfl:     fluticasone propionate (FLONASE) 50 mcg/actuation nasal spray, 1 SPRAY (50 MCG TOTAL) BY EACH NOSTRIL ROUTE 2 (TWO) TIMES A DAY. 1 SPRAY EVERY MORNING AND AFTERNOON TOWARD THE EAR EACH SIDE AFTER A SINUS RINSE, Disp: 48 mL, Rfl: 1    folic acid (FOLVITE) 1 MG tablet, TAKE 2 TABLETS BY MOUTH EVERY DAY, Disp: 180 tablet, Rfl: 0    LIDOcaine (LIDODERM) 5 %, Place 1 patch onto the skin once daily. Remove & Discard patch within 12 hours or as directed by MD, Disp: 14 patch, Rfl: 0    multivitamin capsule, Take 1 capsule by mouth once daily., Disp: , Rfl:     pantoprazole (PROTONIX) 40 MG tablet, , Disp: , Rfl:     sod chlor-bicarb-squeez bottle (NEILMED SINUS RINSE COMPLETE) pkdv, 1 application  by sinus irrigation route 2 (two) times a day. Not right before bed, Disp: 1 each, Rfl: 11    tamsulosin (FLOMAX) 0.4 mg Cap, TAKE 2 CAPSULES BY MOUTH ONCE DAILY., Disp: 180 capsule, Rfl: 1    traZODone (DESYREL) 100 MG tablet, TAKE 1 TABLET BY MOUTH NIGHTLY AS NEEDED FOR INSOMNIA., Disp: 90 tablet, Rfl: 2    atorvastatin (LIPITOR) 10 MG tablet, Take 1 tablet (10 mg total) by mouth every evening., Disp: 90 tablet, Rfl: 3    azelastine (ASTELIN) 137 mcg (0.1 %) nasal spray, 2 sprays (274 mcg total) by Nasal route 2 (two) times daily as needed for Rhinitis., Disp: 30 mL, Rfl: 5    sildenafil (VIAGRA) 100 MG tablet, Take 1 tablet (100 mg total) by mouth daily as needed for Erectile Dysfunction.,  "Disp: 10 tablet, Rfl: 6    PMHx/PSHx Updates:  See patient's last visit with Dr. Tello on 10/10/2024  See H&P on 7/9/2011      Pathology:    12/20/2019  BONE MARROW, RIGHT ILIAC CREST,    ASPIRATE, CLOT SECTION, AND CORE BIOPSY:    --HYPERCELLULAR MARROW (APPROXIMATELY 75% TO 80%) WITH TRILINEAGE   HEMATOPOIETIC ELEMENTS, ERYTHROID  HYPERPLASIA AND MILD MEGAKARYOCYTIC HYPERPLASIA, AND NON-SPECIFIC   DYSHEMATOPOIETIC CHANGES (SEE     COMMENT).  --[id number] INCREASED STAINABLE IRON WITH INCREASED RING   SIDEROBLASTS (GREATER THAN 15%)     (SEE COMMENT).  --PERIPHERAL BLOOD WITH THROMBOCYTOSIS (574,000/MICROLITER) AND ANEMIA   (HEMOGLOBIN 5.5 GRAM/DECILITER),    WITH SCATTERED DREPANOCYTOID FORMS AND FEW NUCLEATED ERYTHROCYTES      Cytogenetic Results at the bottom of the report.  NORMAL    Objective:     Vitals:  Blood pressure (!) 143/74, pulse 75, temperature 98.2 °F (36.8 °C), temperature source Temporal, resp. rate 18, height 5' 9" (1.753 m), weight 101.4 kg (223 lb 9.6 oz).    Physical Examination:   GEN: no apparent distress, comfortable; AAOx3  HEAD: atraumatic and normocephalic  EYES: no pallor, no icterus, PERRLA  ENT: OMM, no pharyngeal erythema, external ears WNL; no nasal discharge; no thrush  NECK: no masses, thyroid normal, trachea midline, no LAD/LN's, supple  CV: RRR with no murmur; normal pulse; normal S1 and S2; no pedal edema  CHEST: Normal respiratory effort; CTAB; normal breath sounds; no wheeze or crackles  ABDOM: nontender and nondistended; soft; normal bowel sounds; no rebound/guarding  MUSC/Skeletal: ROM normal; no crepitus; joints normal; no deformities or arthropathy  EXTREM: no clubbing, cyanosis, inflammation or swelling  SKIN: no rashes, lesions, ulcers, petechiae or subcutaneous nodules  : no jiang  NEURO: grossly intact; motor/sensory WNL; AAOx3; no tremors  PSYCH: normal mood, affect and behavior  LYMPH: normal cervical, supraclavicular, axillary and groin " LN's    Labs:     Lab Results   Component Value Date    WBC 5.72 11/06/2024    HGB 9.7 (L) 11/06/2024    HCT 28.6 (L) 11/06/2024    MCV 91 11/06/2024     11/06/2024           CMP  Sodium   Date Value Ref Range Status   11/06/2024 142 136 - 145 mmol/L Final   06/26/2019 138 134 - 144 mmol/L      Potassium   Date Value Ref Range Status   11/06/2024 4.5 3.5 - 5.1 mmol/L Final     Chloride   Date Value Ref Range Status   11/06/2024 109 95 - 110 mmol/L Final   06/26/2019 105 98 - 110 mmol/L      CO2   Date Value Ref Range Status   11/06/2024 26 23 - 29 mmol/L Final     Glucose   Date Value Ref Range Status   11/06/2024 101 70 - 110 mg/dL Final   06/26/2019 114 (H) 70 - 99 mg/dL      BUN   Date Value Ref Range Status   11/06/2024 33 (H) 8 - 23 mg/dL Final     Creatinine   Date Value Ref Range Status   11/06/2024 2.9 (H) 0.5 - 1.4 mg/dL Final   06/26/2019 1.62 (H) 0.60 - 1.40 mg/dL      Calcium   Date Value Ref Range Status   11/06/2024 8.6 (L) 8.7 - 10.5 mg/dL Final     Total Protein   Date Value Ref Range Status   11/06/2024 6.9 6.0 - 8.4 g/dL Final     Albumin   Date Value Ref Range Status   11/06/2024 4.2 3.5 - 5.2 g/dL Final   06/26/2019 4.4 3.1 - 4.7 g/dL      Total Bilirubin   Date Value Ref Range Status   11/06/2024 0.6 0.1 - 1.0 mg/dL Final     Comment:     For infants and newborns, interpretation of results should be based  on gestational age, weight and in agreement with clinical  observations.    Premature Infant recommended reference ranges:  Up to 24 hours.............<8.0 mg/dL  Up to 48 hours............<12.0 mg/dL  3-5 days..................<15.0 mg/dL  6-29 days.................<15.0 mg/dL       Alkaline Phosphatase   Date Value Ref Range Status   11/06/2024 71 55 - 135 U/L Final     AST   Date Value Ref Range Status   11/06/2024 20 10 - 40 U/L Final     ALT   Date Value Ref Range Status   11/06/2024 18 10 - 44 U/L Final     Anion Gap   Date Value Ref Range Status   11/06/2024 7 (L) 8 - 16 mmol/L  Final     eGFR if    Date Value Ref Range Status   07/13/2022 45.6 (A) >60 mL/min/1.73 m^2 Final     eGFR if non    Date Value Ref Range Status   07/13/2022 39.5 (A) >60 mL/min/1.73 m^2 Final     Comment:     Calculation used to obtain the estimated glomerular filtration  rate (eGFR) is the CKD-EPI equation.          Lab Results   Component Value Date    IRON 163 (H) 11/06/2024    TIBC 189 (L) 11/06/2024    FERRITIN 1,270.4 (H) 11/06/2024           I have reviewed all available lab results and radiology reports.    Radiology/Diagnostic Studies:    CXR 10/31/2024:   Impression:     Multilevel severe discogenic and facet degenerative changes of the lumbar spine.        Electronically signed by:Herber Aaron  Date:                                            10/31/2024  Time:                                           16:16    US 2/8/2023:    IMPRESSION:  1. Dilation of the common bile duct at up to 10 mm, unchanged when compared to 2016.  2. Nonvisualization of the pancreas because of overlying bowel gas.  3. No other significant findings.  spleen is normal in size and appearance      2/15/2018 Xray Survey:   IMPRESSION: No significant abnormality seen. No evidence of osteoblastic or  osteoclastic lesions identified    MRI L-spine 2/12/2018  IMPRESSION:    1. Prominent low signal intensity throughout the bone marrow on T1 and  T2-weighted imaging. Marrow infiltrative process including malignancy such as  metastatic disease or lymphoma/leukemia is a consideration along with multiple  myeloma or malignant process. Benign etiology such as osteopetrosis or  regenerative red marrow are also considerations.    2. Multilevel lumbar degenerative changes, most prominent at L4-L5 and L5-S1 as  described.    Assessment/Plan:   (1) 64 y.o. male with diagnosis of sickle cell anemia with borderline microcytosis  - latest hgb was 6.0 and he had blood transfusion  - today, he is not symptomatic at this  "time  - he probably has a multifactorial anemia process with underlying sickle cell, anemia of chronic disorders and anemia of chronic renal. I can not rule out an underlying GI bleeding process.   - iron panel is adequate (no deficiency)  - total bilirubin is only minimally elevated  - he required transfusion again in Oct 2019 and again in Dec 2019  - he had bone marrow biopsy on 12/20/2019    "dyshematopoietic changes are not entirely specific and are present  mostly within erythroid and megakaryocytic lineages. These findings   could be observed in chronic disease states, autoimmune conditions,  infectious settings, toxic exposures, nutritional deficits, as medication/drug effect, and in myelodysplastic syndrome (MDS), among other conditions"  "Additionally, ring sideroblasts are a non-specific   finding "    Patient was referred to see Dr Mustafa at [facility] and had repeat BM biopsy with diagnosis made of RARS  - he is now on procrit per directives of Dr Mustafa    7/30/2020:   he recently saw Dr Mustafa  And sees him again in Jan 2021  - he is doing well with procrit and is feeling much better  - he has not required any transfusions for some time time    9/24/2020:  - latest hgb at 8.5  - will increase the procrit dose  - f/u with Dr Zavala in Jan 2021 11/19/2020:  - patient doing well and feels "great"  - hgb up to 9.1; continued on the procrit  - f/u with Dr Mustafa in Jan 2021    3/4/2021:  - he saw Dr Mustafa in jan 2021 and sees him again in June/July 2021  - latest hgb at 9.1 and stable and platelets are 474,000; wbc at 10.0    6/10/2021:  - latest hgb at 9.0  - plats 485,000  - on weekly procrit  - seeing Dr Mustafa again tomorrow    10/14/2021:  - hgb at 8.6 but he recently had been taking naproxen and had some BRBPR - which since resolved  - he saw Dr Beyer with GI and he is having colonsocopy on Nov 5th along with EGD  - plats 429,000  - he sees Dr Mustafa again in Nov 19th   - he sees Dr Mas again in " Dec 2021    1/13/2022:  - He has been under the care of Dr Mustafa at [facility] for RARS- MDS. He is now on procrit weekly. He sees Dr Mustafa again in March 2022    - hgb 9.3  - he had scope with Dr Collins in nov 2021 which was good per patient and they plan to repeat in 5 yrs    5/12/2022:  - hgb at 9.0  - plats 485  - wbc 7.25  - he is on procrit weekly  - saw Dr Mustafa this past Fridday and since he is leaving, he will start seeing Dr Hilario instead in 3 months    8/18/2022:  - latest hgb a little lower at 8.8  - he has been on the procrit  - he saw Dr Hilario recently at [facility] and plans to see her again in 3 months    1/26/2023:  - He saw Dr Hilario at [facility] again recently this past Tuesday and they are considering another medication; he plans to see her again in April 2023  - Dr Hilario requested he have an US of spleen - will order here in [place]  - he is also on appetite stimulant  - hgb at 9.4 and plats at 500k    5/11/2023:  - hgb at 9.6 and plats 466,000  - He saw Dr Hilario at [facility] again recently this past Tuesday and they are considering another medication; he plans to see her again in Aug 2023    8/3/2023:  - His insurance will not approve of him getting the Reblozyl   - He sees Dr Hilario again next Tuesday and we will await her directives.     9/14/2023:  - the Rebozyl has been approved - will set up  - latest hgb at 10.1  - seeing Dr Hilario again in Jan 2024 11/16/2023:  - continued on Rebozyl  - WBC and plats WNL  - hgb at 10.3  - cardiac w/u negative per patient    2/15/2024:  - labs are adequate  - hgb at 10.6  - WBC and plat WNL  - continued on Rebozyl  - recent colonoscopy good per patient    4/4/2024:  - continued on Rebozyl  - counts are looking adequate    8/1/2024:  - continued on procrit  - insurance would not approve of the rebozyl and he has not had the last 2-3 injections  - hold rebozyl for now anyway due to the increased creatine and decreased kidney function  - f/u with Dr Chilel  - seeing Dr Hilario on  "8/13  - continue procrit as directed    10/10/2024  - he sees Dr Mas on 10/31  - he has been continued on rebozyl and is due again next week  - he has been on procrit  - Dr Serrano is now at [facility] in .. and he last saw her in Aug 2024 - he plans to continue under her care via telemed    11/7/2024:   - continue procrit   - hold reblozyl due to kidney function until patient sees Dr. SERRANO in December   - referral to neuro and back clinic due to severe disc disease in lower back     (2) Alcohol abuse issues in past - he has been sober for over 3 years now    (3) New findings on radiography with abnormal chest CT and lung mass  - former smoker    (4) chronic back issues - prior Xrays and MRI - suspect the findings on the MRI are possibly due to his sickle cell;   - SPEP was previously negative for M-protein  - PSA was 0.3 in Sept 2017  - recommended neurosurgery evaluation previously  - he saw Dr Nura VUONG and had a nerve "burning" procedure and his pain has improved    (5) CRI - elevated creatinine - he is followed Dr Chilel with nephrology      (6) H pylori gastritis - s/p recent endoscopy with Dr Collins and antibotics x 10 days    (7) CP   9/14/2023:  - seeing Dr lovett with cardiology and planning stress test in near future        1. MDS (myelodysplastic syndrome)        2. Degeneration of intervertebral disc of lumbar region with discogenic back pain and lower extremity pain  Ambulatory referral/consult to Back & Spine Clinic    Ambulatory referral/consult to Neurosurgery      3. Lumbar spondylosis        4. Chronic kidney disease (CKD), stage IV (severe)            PLAN;  1. Continue with the procrit ; hold next weeks rebozyl until patient meets with Dr. SERRANO   2. Proceed with Dr Mas   2. Planned f/u with Dr Serrano via telemed in December   3. F/u with cardiology as directed  4.  F/u with Castillo as needed  5. Check labs every 2 weeks  6. Add marinol for appetite - increase to 5mg BID        RTC  4 weeks with Cheryl and  8 " weeks with myself  Fax note to Dennis Bas[facility] Tveit;  Ricco Hilario        Discussion:       Pathology Discussion:    I reviewed and discussed the pathology report(s) and radiograph reports (if available) in as simple to understand and/or laymen's terms to the best of my ability. I had an indepth conversation with the patient and went over the patient's individual diagnosis based on the information that was currently available. I discussed the TNM staging process with regard to the patient's particular cancer type, and the calculated stage based on the currently available TNM data and literature. I discussed the available prognostic data with regard to the current staging information and how it relates to the prognosis of their particular neoplastic process.          COVID-19 Discussion:    I had long discussion with patient and any applicable family about the COVID-19 coronavirus epidemic and the recommended precautions with regard to cancer and/or hematology patients. I have re-iterated the CDC recommendations for adequate hand washing, use of hand -like products, and coughing into elbow, etc. In addition, especially for our patients who are on chemotherapy and/or our otherwise immunocompromised patients, I have recommended avoidance of crowds, including movie theaters, restaurants, churches, etc. I have recommended avoidance of any sick or symptomatic family members and/or friends. I have also recommended avoidance of any raw and unwashed food products, and general avoidance of food items that have not been prepared by themselves. The patient has been asked to call us immediately with any symptom developments, issues, questions or other general concerns.     I have explained all of the above in detail and the patient understands all of the current recommendation(s). I have answered all of their questions to the best of my ability and to their complete satisfaction.   The patient is to continue  with the current management plan.            Electronically signed by Pratima Melgar NP

## 2024-11-08 ENCOUNTER — INFUSION (OUTPATIENT)
Dept: INFUSION THERAPY | Facility: HOSPITAL | Age: 64
End: 2024-11-08
Attending: INTERNAL MEDICINE
Payer: COMMERCIAL

## 2024-11-08 VITALS
TEMPERATURE: 98 F | DIASTOLIC BLOOD PRESSURE: 77 MMHG | WEIGHT: 224.5 LBS | HEIGHT: 69 IN | SYSTOLIC BLOOD PRESSURE: 147 MMHG | RESPIRATION RATE: 18 BRPM | OXYGEN SATURATION: 97 % | HEART RATE: 76 BPM | BODY MASS INDEX: 33.25 KG/M2

## 2024-11-08 DIAGNOSIS — D64.9 CHRONIC ANEMIA: Primary | ICD-10-CM

## 2024-11-08 DIAGNOSIS — D46.9 MDS (MYELODYSPLASTIC SYNDROME): ICD-10-CM

## 2024-11-08 DIAGNOSIS — D64.89 ANEMIA DUE TO MULTIPLE MECHANISMS: ICD-10-CM

## 2024-11-08 DIAGNOSIS — D64.9 NORMOCHROMIC ANEMIA: ICD-10-CM

## 2024-11-08 DIAGNOSIS — N18.30 STAGE 3 CHRONIC KIDNEY DISEASE, UNSPECIFIED WHETHER STAGE 3A OR 3B CKD: ICD-10-CM

## 2024-11-08 PROCEDURE — 96372 THER/PROPH/DIAG INJ SC/IM: CPT

## 2024-11-08 PROCEDURE — 63600175 PHARM REV CODE 636 W HCPCS: Mod: JZ,EC,JG | Performed by: INTERNAL MEDICINE

## 2024-11-08 RX ADMIN — EPOETIN ALFA-EPBX 40000 UNITS: 40000 INJECTION, SOLUTION INTRAVENOUS; SUBCUTANEOUS at 04:11

## 2024-11-08 NOTE — PLAN OF CARE
Problem: Fatigue  Goal: Improved Activity Tolerance  Outcome: Progressing  Intervention: Promote Improved Energy  Flowsheets (Taken 11/8/2024 2801)  Fatigue Management: frequent rest breaks encouraged  Sleep/Rest Enhancement: regular sleep/rest pattern promoted  Activity Management:   Ambulated -L4   Up in chair - L3  Environmental Support:   calm environment promoted   rest periods encouraged

## 2024-11-11 DIAGNOSIS — M51.362 DEGENERATION OF INTERVERTEBRAL DISC OF LUMBAR REGION WITH DISCOGENIC BACK PAIN AND LOWER EXTREMITY PAIN: Primary | ICD-10-CM

## 2024-11-11 DIAGNOSIS — D46.9 MDS (MYELODYSPLASTIC SYNDROME): ICD-10-CM

## 2024-11-11 DIAGNOSIS — R63.0 APPETITE LOSS: ICD-10-CM

## 2024-11-11 DIAGNOSIS — D46.1 RARS (REFRACTORY ANEMIA WITH RINGED SIDEROBLASTS): ICD-10-CM

## 2024-11-11 RX ORDER — DRONABINOL 5 MG/1
5 CAPSULE ORAL
Qty: 60 CAPSULE | Refills: 1 | Status: SHIPPED | OUTPATIENT
Start: 2024-11-11

## 2024-11-11 NOTE — TELEPHONE ENCOUNTER
Confirmed with patient why he wanted to see an orthopedic doc. Patient also stated he needed to get his dronabinol sent to [facility]. Referral placed with REMBERTO Rausch's verbal order.

## 2024-11-11 NOTE — TELEPHONE ENCOUNTER
----- Message from Pratima Almonte NP sent at 11/8/2024  3:14 PM CST -----  Yes  ----- Message -----  From: Kim Samuels, RN  Sent: 11/8/2024   1:36 PM CST  To: Pratima Melgar NP    Okay to send this?  ----- Message -----  From: Angie Sorto  Sent: 11/8/2024   8:57 AM CST  To: [facility] Staff    Pt needs to change his Orthapedist to Dr. Hal Varghese on [facility], Rosa and sandro a referral.       # [phone]

## 2024-11-15 ENCOUNTER — INFUSION (OUTPATIENT)
Dept: INFUSION THERAPY | Facility: HOSPITAL | Age: 64
End: 2024-11-15
Attending: INTERNAL MEDICINE
Payer: COMMERCIAL

## 2024-11-15 VITALS
HEART RATE: 78 BPM | SYSTOLIC BLOOD PRESSURE: 143 MMHG | DIASTOLIC BLOOD PRESSURE: 88 MMHG | TEMPERATURE: 98 F | WEIGHT: 223.81 LBS | HEIGHT: 69 IN | OXYGEN SATURATION: 97 % | BODY MASS INDEX: 33.15 KG/M2 | RESPIRATION RATE: 18 BRPM

## 2024-11-15 DIAGNOSIS — N18.30 STAGE 3 CHRONIC KIDNEY DISEASE, UNSPECIFIED WHETHER STAGE 3A OR 3B CKD: ICD-10-CM

## 2024-11-15 DIAGNOSIS — D46.9 MDS (MYELODYSPLASTIC SYNDROME): ICD-10-CM

## 2024-11-15 DIAGNOSIS — D64.9 NORMOCHROMIC ANEMIA: ICD-10-CM

## 2024-11-15 DIAGNOSIS — D64.9 CHRONIC ANEMIA: Primary | ICD-10-CM

## 2024-11-15 DIAGNOSIS — D64.89 ANEMIA DUE TO MULTIPLE MECHANISMS: ICD-10-CM

## 2024-11-15 PROCEDURE — 63600175 PHARM REV CODE 636 W HCPCS: Mod: JZ,EC,JG | Performed by: INTERNAL MEDICINE

## 2024-11-15 PROCEDURE — 96372 THER/PROPH/DIAG INJ SC/IM: CPT

## 2024-11-15 RX ADMIN — EPOETIN ALFA-EPBX 40000 UNITS: 40000 INJECTION, SOLUTION INTRAVENOUS; SUBCUTANEOUS at 04:11

## 2024-11-15 NOTE — PLAN OF CARE
Problem: Fatigue  Goal: Improved Activity Tolerance  Outcome: Progressing  Intervention: Promote Improved Energy  Flowsheets (Taken 11/15/2024 1602)  Fatigue Management: frequent rest breaks encouraged  Sleep/Rest Enhancement: regular sleep/rest pattern promoted  Activity Management:   Ambulated -L4   Up in chair - L3  Environmental Support:   calm environment promoted   rest periods encouraged

## 2024-11-20 ENCOUNTER — LAB VISIT (OUTPATIENT)
Dept: LAB | Facility: HOSPITAL | Age: 64
End: 2024-11-20
Attending: INTERNAL MEDICINE
Payer: COMMERCIAL

## 2024-11-20 DIAGNOSIS — D46.9 MDS (MYELODYSPLASTIC SYNDROME): ICD-10-CM

## 2024-11-20 LAB
ALBUMIN SERPL BCP-MCNC: 4.2 G/DL (ref 3.5–5.2)
ALP SERPL-CCNC: 67 U/L (ref 55–135)
ALT SERPL W/O P-5'-P-CCNC: 14 U/L (ref 10–44)
ANION GAP SERPL CALC-SCNC: 5 MMOL/L (ref 8–16)
AST SERPL-CCNC: 15 U/L (ref 10–40)
BASOPHILS # BLD AUTO: 0.07 K/UL (ref 0–0.2)
BASOPHILS NFR BLD: 1.4 % (ref 0–1.9)
BILIRUB SERPL-MCNC: 0.7 MG/DL (ref 0.1–1)
BUN SERPL-MCNC: 39 MG/DL (ref 8–23)
CALCIUM SERPL-MCNC: 8.8 MG/DL (ref 8.7–10.5)
CHLORIDE SERPL-SCNC: 109 MMOL/L (ref 95–110)
CO2 SERPL-SCNC: 27 MMOL/L (ref 23–29)
CREAT SERPL-MCNC: 3.1 MG/DL (ref 0.5–1.4)
DIFFERENTIAL METHOD BLD: ABNORMAL
EOSINOPHIL # BLD AUTO: 0.1 K/UL (ref 0–0.5)
EOSINOPHIL NFR BLD: 2.2 % (ref 0–8)
ERYTHROCYTE [DISTWIDTH] IN BLOOD BY AUTOMATED COUNT: 26.9 % (ref 11.5–14.5)
EST. GFR  (NO RACE VARIABLE): 21.6 ML/MIN/1.73 M^2
GLUCOSE SERPL-MCNC: 105 MG/DL (ref 70–110)
HCT VFR BLD AUTO: 27.4 % (ref 40–54)
HGB BLD-MCNC: 9.2 G/DL (ref 14–18)
IMM GRANULOCYTES # BLD AUTO: 0.02 K/UL (ref 0–0.04)
IMM GRANULOCYTES NFR BLD AUTO: 0.4 % (ref 0–0.5)
LYMPHOCYTES # BLD AUTO: 1 K/UL (ref 1–4.8)
LYMPHOCYTES NFR BLD: 20.6 % (ref 18–48)
MCH RBC QN AUTO: 31 PG (ref 27–31)
MCHC RBC AUTO-ENTMCNC: 33.6 G/DL (ref 32–36)
MCV RBC AUTO: 92 FL (ref 82–98)
MONOCYTES # BLD AUTO: 0.7 K/UL (ref 0.3–1)
MONOCYTES NFR BLD: 14 % (ref 4–15)
NEUTROPHILS # BLD AUTO: 3 K/UL (ref 1.8–7.7)
NEUTROPHILS NFR BLD: 61.4 % (ref 38–73)
NRBC BLD-RTO: 0 /100 WBC
PLATELET # BLD AUTO: 402 K/UL (ref 150–450)
PMV BLD AUTO: 10.3 FL (ref 9.2–12.9)
POTASSIUM SERPL-SCNC: 4.7 MMOL/L (ref 3.5–5.1)
PROT SERPL-MCNC: 6.9 G/DL (ref 6–8.4)
RBC # BLD AUTO: 2.97 M/UL (ref 4.6–6.2)
SODIUM SERPL-SCNC: 141 MMOL/L (ref 136–145)
WBC # BLD AUTO: 4.94 K/UL (ref 3.9–12.7)

## 2024-11-20 PROCEDURE — 85025 COMPLETE CBC W/AUTO DIFF WBC: CPT | Performed by: INTERNAL MEDICINE

## 2024-11-20 PROCEDURE — 36415 COLL VENOUS BLD VENIPUNCTURE: CPT | Performed by: INTERNAL MEDICINE

## 2024-11-20 PROCEDURE — 80053 COMPREHEN METABOLIC PANEL: CPT | Performed by: INTERNAL MEDICINE

## 2024-11-21 ENCOUNTER — TELEPHONE (OUTPATIENT)
Facility: CLINIC | Age: 64
End: 2024-11-21
Payer: COMMERCIAL

## 2024-11-21 NOTE — TELEPHONE ENCOUNTER
----- Message from Jose Tello MD sent at 11/20/2024  2:38 PM CST -----  Make sure he has f/u with Whit or myself and also has f/u with nephrology; fax latest labs to nephrologist too please    CC: Cheryl

## 2024-11-21 NOTE — TELEPHONE ENCOUNTER
Patient made aware of results, confirms he sees Dr Mas in January but that he will call them to make aware of new labs. I informed that I am also faxing results to them for their review. Verbalized understanding. Results faxed.

## 2024-11-22 ENCOUNTER — INFUSION (OUTPATIENT)
Dept: INFUSION THERAPY | Facility: HOSPITAL | Age: 64
End: 2024-11-22
Attending: INTERNAL MEDICINE
Payer: COMMERCIAL

## 2024-11-22 VITALS
SYSTOLIC BLOOD PRESSURE: 127 MMHG | WEIGHT: 225.13 LBS | TEMPERATURE: 98 F | HEART RATE: 73 BPM | OXYGEN SATURATION: 98 % | BODY MASS INDEX: 33.35 KG/M2 | DIASTOLIC BLOOD PRESSURE: 74 MMHG | HEIGHT: 69 IN | RESPIRATION RATE: 18 BRPM

## 2024-11-22 DIAGNOSIS — N18.30 STAGE 3 CHRONIC KIDNEY DISEASE, UNSPECIFIED WHETHER STAGE 3A OR 3B CKD: ICD-10-CM

## 2024-11-22 DIAGNOSIS — D64.9 CHRONIC ANEMIA: Primary | ICD-10-CM

## 2024-11-22 DIAGNOSIS — D64.9 NORMOCHROMIC ANEMIA: ICD-10-CM

## 2024-11-22 DIAGNOSIS — D64.89 ANEMIA DUE TO MULTIPLE MECHANISMS: ICD-10-CM

## 2024-11-22 DIAGNOSIS — D46.9 MDS (MYELODYSPLASTIC SYNDROME): ICD-10-CM

## 2024-11-22 PROCEDURE — 96372 THER/PROPH/DIAG INJ SC/IM: CPT

## 2024-11-22 PROCEDURE — 63600175 PHARM REV CODE 636 W HCPCS: Mod: JZ,EC,JG | Performed by: INTERNAL MEDICINE

## 2024-11-22 RX ADMIN — EPOETIN ALFA-EPBX 40000 UNITS: 40000 INJECTION, SOLUTION INTRAVENOUS; SUBCUTANEOUS at 04:11

## 2024-11-29 ENCOUNTER — INFUSION (OUTPATIENT)
Dept: INFUSION THERAPY | Facility: HOSPITAL | Age: 64
End: 2024-11-29
Attending: INTERNAL MEDICINE
Payer: COMMERCIAL

## 2024-11-29 VITALS
OXYGEN SATURATION: 96 % | DIASTOLIC BLOOD PRESSURE: 78 MMHG | HEART RATE: 78 BPM | WEIGHT: 222.88 LBS | TEMPERATURE: 98 F | HEIGHT: 69 IN | BODY MASS INDEX: 33.01 KG/M2 | RESPIRATION RATE: 18 BRPM | SYSTOLIC BLOOD PRESSURE: 149 MMHG

## 2024-11-29 DIAGNOSIS — D64.9 NORMOCHROMIC ANEMIA: ICD-10-CM

## 2024-11-29 DIAGNOSIS — D46.9 MDS (MYELODYSPLASTIC SYNDROME): ICD-10-CM

## 2024-11-29 DIAGNOSIS — D64.9 CHRONIC ANEMIA: Primary | ICD-10-CM

## 2024-11-29 DIAGNOSIS — N18.30 STAGE 3 CHRONIC KIDNEY DISEASE, UNSPECIFIED WHETHER STAGE 3A OR 3B CKD: ICD-10-CM

## 2024-11-29 DIAGNOSIS — D64.89 ANEMIA DUE TO MULTIPLE MECHANISMS: ICD-10-CM

## 2024-11-29 PROCEDURE — 63600175 PHARM REV CODE 636 W HCPCS: Mod: JZ,EC,JG | Performed by: INTERNAL MEDICINE

## 2024-11-29 PROCEDURE — 96372 THER/PROPH/DIAG INJ SC/IM: CPT

## 2024-11-29 RX ADMIN — EPOETIN ALFA-EPBX 40000 UNITS: 40000 INJECTION, SOLUTION INTRAVENOUS; SUBCUTANEOUS at 04:11

## 2024-12-01 DIAGNOSIS — E53.8 FOLIC ACID DEFICIENCY: ICD-10-CM

## 2024-12-01 DIAGNOSIS — N40.1 BPH ASSOCIATED WITH NOCTURIA: ICD-10-CM

## 2024-12-01 DIAGNOSIS — R35.1 BPH ASSOCIATED WITH NOCTURIA: ICD-10-CM

## 2024-12-02 ENCOUNTER — TELEPHONE (OUTPATIENT)
Facility: CLINIC | Age: 64
End: 2024-12-02
Payer: COMMERCIAL

## 2024-12-02 RX ORDER — TAMSULOSIN HYDROCHLORIDE 0.4 MG/1
2 CAPSULE ORAL
Qty: 180 CAPSULE | Refills: 1 | Status: SHIPPED | OUTPATIENT
Start: 2024-12-02

## 2024-12-02 RX ORDER — FOLIC ACID 1 MG/1
2000 TABLET ORAL
Qty: 180 TABLET | Refills: 0 | OUTPATIENT
Start: 2024-12-02

## 2024-12-02 NOTE — TELEPHONE ENCOUNTER
I spoke with pt and reminded him to have labs done prior to appt on 12/9/24 pt verbalized understanding states he is scheduled on Wednesday to have labs done.

## 2024-12-04 ENCOUNTER — LAB VISIT (OUTPATIENT)
Dept: LAB | Facility: HOSPITAL | Age: 64
End: 2024-12-04
Attending: INTERNAL MEDICINE
Payer: COMMERCIAL

## 2024-12-04 DIAGNOSIS — D46.9 MDS (MYELODYSPLASTIC SYNDROME): ICD-10-CM

## 2024-12-04 LAB
ALBUMIN SERPL BCP-MCNC: 4.3 G/DL (ref 3.5–5.2)
ALP SERPL-CCNC: 65 U/L (ref 55–135)
ALT SERPL W/O P-5'-P-CCNC: 17 U/L (ref 10–44)
ANION GAP SERPL CALC-SCNC: 3 MMOL/L (ref 8–16)
AST SERPL-CCNC: 16 U/L (ref 10–40)
BASOPHILS # BLD AUTO: 0.09 K/UL (ref 0–0.2)
BASOPHILS NFR BLD: 1.6 % (ref 0–1.9)
BILIRUB SERPL-MCNC: 0.6 MG/DL (ref 0.1–1)
BUN SERPL-MCNC: 44 MG/DL (ref 8–23)
CALCIUM SERPL-MCNC: 8.7 MG/DL (ref 8.7–10.5)
CHLORIDE SERPL-SCNC: 111 MMOL/L (ref 95–110)
CO2 SERPL-SCNC: 27 MMOL/L (ref 23–29)
CREAT SERPL-MCNC: 3.6 MG/DL (ref 0.5–1.4)
DIFFERENTIAL METHOD BLD: ABNORMAL
EOSINOPHIL # BLD AUTO: 0.2 K/UL (ref 0–0.5)
EOSINOPHIL NFR BLD: 3 % (ref 0–8)
ERYTHROCYTE [DISTWIDTH] IN BLOOD BY AUTOMATED COUNT: 26.6 % (ref 11.5–14.5)
EST. GFR  (NO RACE VARIABLE): 18.1 ML/MIN/1.73 M^2
FERRITIN SERPL-MCNC: 1187.2 NG/ML (ref 20–300)
GLUCOSE SERPL-MCNC: 97 MG/DL (ref 70–110)
HCT VFR BLD AUTO: 27 % (ref 40–54)
HGB BLD-MCNC: 9.2 G/DL (ref 14–18)
IMM GRANULOCYTES # BLD AUTO: 0.07 K/UL (ref 0–0.04)
IMM GRANULOCYTES NFR BLD AUTO: 1.2 % (ref 0–0.5)
IRON SERPL-MCNC: 95 UG/DL (ref 45–160)
LYMPHOCYTES # BLD AUTO: 1.1 K/UL (ref 1–4.8)
LYMPHOCYTES NFR BLD: 20.3 % (ref 18–48)
MCH RBC QN AUTO: 31 PG (ref 27–31)
MCHC RBC AUTO-ENTMCNC: 34.1 G/DL (ref 32–36)
MCV RBC AUTO: 91 FL (ref 82–98)
MONOCYTES # BLD AUTO: 0.7 K/UL (ref 0.3–1)
MONOCYTES NFR BLD: 12.3 % (ref 4–15)
NEUTROPHILS # BLD AUTO: 3.5 K/UL (ref 1.8–7.7)
NEUTROPHILS NFR BLD: 61.6 % (ref 38–73)
NRBC BLD-RTO: 0 /100 WBC
PLATELET # BLD AUTO: 468 K/UL (ref 150–450)
PMV BLD AUTO: 10 FL (ref 9.2–12.9)
POTASSIUM SERPL-SCNC: 5.1 MMOL/L (ref 3.5–5.1)
PROT SERPL-MCNC: 7.1 G/DL (ref 6–8.4)
RBC # BLD AUTO: 2.97 M/UL (ref 4.6–6.2)
SATURATED IRON: 52 % (ref 20–50)
SODIUM SERPL-SCNC: 141 MMOL/L (ref 136–145)
TOTAL IRON BINDING CAPACITY: 181 UG/DL (ref 250–450)
TRANSFERRIN SERPL-MCNC: 129 MG/DL (ref 200–375)
WBC # BLD AUTO: 5.61 K/UL (ref 3.9–12.7)

## 2024-12-04 PROCEDURE — 80053 COMPREHEN METABOLIC PANEL: CPT | Performed by: INTERNAL MEDICINE

## 2024-12-04 PROCEDURE — 36415 COLL VENOUS BLD VENIPUNCTURE: CPT | Performed by: INTERNAL MEDICINE

## 2024-12-04 PROCEDURE — 82728 ASSAY OF FERRITIN: CPT | Performed by: INTERNAL MEDICINE

## 2024-12-04 PROCEDURE — 85025 COMPLETE CBC W/AUTO DIFF WBC: CPT | Performed by: INTERNAL MEDICINE

## 2024-12-04 PROCEDURE — 83540 ASSAY OF IRON: CPT | Performed by: INTERNAL MEDICINE

## 2024-12-06 ENCOUNTER — INFUSION (OUTPATIENT)
Dept: INFUSION THERAPY | Facility: HOSPITAL | Age: 64
End: 2024-12-06
Attending: INTERNAL MEDICINE
Payer: COMMERCIAL

## 2024-12-06 VITALS
DIASTOLIC BLOOD PRESSURE: 82 MMHG | WEIGHT: 220.63 LBS | OXYGEN SATURATION: 98 % | SYSTOLIC BLOOD PRESSURE: 143 MMHG | HEART RATE: 77 BPM | RESPIRATION RATE: 16 BRPM | TEMPERATURE: 98 F | BODY MASS INDEX: 32.68 KG/M2 | HEIGHT: 69 IN

## 2024-12-06 DIAGNOSIS — D64.89 ANEMIA DUE TO MULTIPLE MECHANISMS: ICD-10-CM

## 2024-12-06 DIAGNOSIS — D46.9 MDS (MYELODYSPLASTIC SYNDROME): ICD-10-CM

## 2024-12-06 DIAGNOSIS — D64.9 NORMOCHROMIC ANEMIA: ICD-10-CM

## 2024-12-06 DIAGNOSIS — D64.9 CHRONIC ANEMIA: Primary | ICD-10-CM

## 2024-12-06 DIAGNOSIS — N18.30 STAGE 3 CHRONIC KIDNEY DISEASE, UNSPECIFIED WHETHER STAGE 3A OR 3B CKD: ICD-10-CM

## 2024-12-06 PROCEDURE — 96372 THER/PROPH/DIAG INJ SC/IM: CPT

## 2024-12-06 PROCEDURE — 63600175 PHARM REV CODE 636 W HCPCS: Mod: JZ,EC,JG | Performed by: INTERNAL MEDICINE

## 2024-12-06 RX ADMIN — EPOETIN ALFA-EPBX 40000 UNITS: 40000 INJECTION, SOLUTION INTRAVENOUS; SUBCUTANEOUS at 03:12

## 2024-12-06 NOTE — PLAN OF CARE
Problem: Fatigue  Goal: Improved Activity Tolerance  Outcome: Progressing  Intervention: Promote Improved Energy  Flowsheets (Taken 12/6/2024 1606)  Fatigue Management: frequent rest breaks encouraged

## 2024-12-08 DIAGNOSIS — N40.1 BPH ASSOCIATED WITH NOCTURIA: ICD-10-CM

## 2024-12-08 DIAGNOSIS — R35.1 BPH ASSOCIATED WITH NOCTURIA: ICD-10-CM

## 2024-12-09 ENCOUNTER — OFFICE VISIT (OUTPATIENT)
Facility: CLINIC | Age: 64
End: 2024-12-09
Payer: COMMERCIAL

## 2024-12-09 VITALS
RESPIRATION RATE: 18 BRPM | DIASTOLIC BLOOD PRESSURE: 82 MMHG | BODY MASS INDEX: 32.38 KG/M2 | TEMPERATURE: 98 F | HEIGHT: 69 IN | WEIGHT: 218.63 LBS | SYSTOLIC BLOOD PRESSURE: 126 MMHG | HEART RATE: 76 BPM

## 2024-12-09 DIAGNOSIS — D46.9 MDS (MYELODYSPLASTIC SYNDROME): Primary | ICD-10-CM

## 2024-12-09 DIAGNOSIS — N18.4 CHRONIC KIDNEY DISEASE (CKD), STAGE IV (SEVERE): ICD-10-CM

## 2024-12-09 PROCEDURE — 99215 OFFICE O/P EST HI 40 MIN: CPT | Mod: S$GLB,,, | Performed by: NURSE PRACTITIONER

## 2024-12-09 PROCEDURE — G2211 COMPLEX E/M VISIT ADD ON: HCPCS | Mod: S$GLB,,, | Performed by: NURSE PRACTITIONER

## 2024-12-09 PROCEDURE — 3079F DIAST BP 80-89 MM HG: CPT | Mod: CPTII,S$GLB,, | Performed by: NURSE PRACTITIONER

## 2024-12-09 PROCEDURE — 3044F HG A1C LEVEL LT 7.0%: CPT | Mod: CPTII,S$GLB,, | Performed by: NURSE PRACTITIONER

## 2024-12-09 PROCEDURE — 3008F BODY MASS INDEX DOCD: CPT | Mod: CPTII,S$GLB,, | Performed by: NURSE PRACTITIONER

## 2024-12-09 PROCEDURE — 1159F MED LIST DOCD IN RCRD: CPT | Mod: CPTII,S$GLB,, | Performed by: NURSE PRACTITIONER

## 2024-12-09 PROCEDURE — 3074F SYST BP LT 130 MM HG: CPT | Mod: CPTII,S$GLB,, | Performed by: NURSE PRACTITIONER

## 2024-12-09 PROCEDURE — 99999 PR PBB SHADOW E&M-EST. PATIENT-LVL IV: CPT | Mod: PBBFAC,,, | Performed by: NURSE PRACTITIONER

## 2024-12-09 RX ORDER — TAMSULOSIN HYDROCHLORIDE 0.4 MG/1
1 CAPSULE ORAL
Qty: 90 CAPSULE | Refills: 1 | OUTPATIENT
Start: 2024-12-09

## 2024-12-09 NOTE — PROGRESS NOTES
[facility] Hematology/Oncology    PROGRESS NOTE - Follow-up Visit      Subjective:       Patient ID:   NAME: Rick Butler : 1960     64 y.o. male    Referring Doc: Chetna (new PCP)  Other Physicians: Getachew; Alan Mustafa    Chief Complaint:  MDS     History of Present Illness:     Patient returns today for a regularly scheduled follow-up visit.  The patient is doing ok overall. He is here by himself today.     He has been Rebozyl and doing well with it - however, he did have some decreased kidney function that both Dr. Tello and Dr. Mas believe it could be the reblozyl; DR Hilario is now at [facility] in [place] and patient plans to keep in touch with her through telemed visits - he has a televist with her in December and she states to hold the reblozyl     Creatinine is now at 2.9 down from 3.3. He has been off of reblozyl 2024 Hemoglobin is at 9.6 now as he continues on retacrit     He has not had any recent pain crisis. He denies having any breathing difficulties. He denies any blood in the stool, dark stools or hematuria;; no CP, SOB, HA's or N/V;        He was having back pain and did have an XRAY on his lower back that did show severe degenerative changes.     Discussed covid19 precautions - he had his vaccinations      ROS:   GEN: normal without any fever, night sweats or weight loss  HEENT: normal with no HA's, sore throat, stiff neck, changes in vision  CV: normal with no CP, SOB, PND, MORA or orthopnea  PULM: normal with no SOB, cough, hemoptysis, sputum or pleuritic pain  GI: normal with no abdominal pain, nausea, vomiting, constipation, diarrhea, melanotic stools, BRBPR, or hematemesis  : normal with no hematuria, dysuria  BREAST: normal with no mass, discharge, pain  SKIN: normal with no rash, erythema, bruising, or swelling    Allergies:  Review of patient's allergies indicates:  No Known Allergies    Medications:    Current Outpatient Medications:     amLODIPine (NORVASC) 5 MG tablet, Take 1  tablet (5 mg total) by mouth once daily., Disp: 90 tablet, Rfl: 3    ascorbic acid, vitamin C, (VITAMIN C) 500 MG tablet, Take 500 mg by mouth once daily., Disp: , Rfl:     calcitRIOL (ROCALTROL) 0.25 MCG Cap, Take 0.25 mcg by mouth every 7 days., Disp: , Rfl:     droNABinol (MARINOL) 5 MG capsule, Take 1 capsule (5 mg total) by mouth 2 (two) times daily before meals., Disp: 60 capsule, Rfl: 1    epoetin tray (PROCRIT INJ), Thursday, Disp: , Rfl:     famotidine (PEPCID) 20 MG tablet, famotidine 20 mg tablet  Take 1 tablet twice a day by oral route., Disp: , Rfl:     fluticasone propionate (FLONASE) 50 mcg/actuation nasal spray, 1 SPRAY (50 MCG TOTAL) BY EACH NOSTRIL ROUTE 2 (TWO) TIMES A DAY. 1 SPRAY EVERY MORNING AND AFTERNOON TOWARD THE EAR EACH SIDE AFTER A SINUS RINSE, Disp: 48 mL, Rfl: 1    folic acid (FOLVITE) 1 MG tablet, TAKE 2 TABLETS BY MOUTH EVERY DAY, Disp: 180 tablet, Rfl: 0    LIDOcaine (LIDODERM) 5 %, Place 1 patch onto the skin once daily. Remove & Discard patch within 12 hours or as directed by MD, Disp: 14 patch, Rfl: 0    multivitamin capsule, Take 1 capsule by mouth once daily., Disp: , Rfl:     pantoprazole (PROTONIX) 40 MG tablet, , Disp: , Rfl:     sod chlor-bicarb-squeez bottle (NEILMED SINUS RINSE COMPLETE) pkdv, 1 application  by sinus irrigation route 2 (two) times a day. Not right before bed, Disp: 1 each, Rfl: 11    tamsulosin (FLOMAX) 0.4 mg Cap, TAKE 2 CAPSULES BY MOUTH EVERY DAY, Disp: 180 capsule, Rfl: 1    traZODone (DESYREL) 100 MG tablet, TAKE 1 TABLET BY MOUTH NIGHTLY AS NEEDED FOR INSOMNIA., Disp: 90 tablet, Rfl: 2    atorvastatin (LIPITOR) 10 MG tablet, Take 1 tablet (10 mg total) by mouth every evening., Disp: 90 tablet, Rfl: 3    azelastine (ASTELIN) 137 mcg (0.1 %) nasal spray, 2 sprays (274 mcg total) by Nasal route 2 (two) times daily as needed for Rhinitis., Disp: 30 mL, Rfl: 5    sildenafil (VIAGRA) 100 MG tablet, Take 1 tablet (100 mg total) by mouth daily as needed for  "Erectile Dysfunction., Disp: 10 tablet, Rfl: 6    PMHx/PSHx Updates:  See patient's last visit with Dr. Tello on 10/10/2024  See H&P on 7/9/2011      Pathology:    12/20/2019  BONE MARROW, RIGHT ILIAC CREST,    ASPIRATE, CLOT SECTION, AND CORE BIOPSY:    --HYPERCELLULAR MARROW (APPROXIMATELY 75% TO 80%) WITH TRILINEAGE   HEMATOPOIETIC ELEMENTS, ERYTHROID  HYPERPLASIA AND MILD MEGAKARYOCYTIC HYPERPLASIA, AND NON-SPECIFIC   DYSHEMATOPOIETIC CHANGES (SEE     COMMENT).  --[id number] INCREASED STAINABLE IRON WITH INCREASED RING   SIDEROBLASTS (GREATER THAN 15%)     (SEE COMMENT).  --PERIPHERAL BLOOD WITH THROMBOCYTOSIS (574,000/MICROLITER) AND ANEMIA   (HEMOGLOBIN 5.5 GRAM/DECILITER),    WITH SCATTERED DREPANOCYTOID FORMS AND FEW NUCLEATED ERYTHROCYTES      Cytogenetic Results at the bottom of the report.  NORMAL    Objective:     Vitals:  Blood pressure 126/82, pulse 76, temperature 97.5 °F (36.4 °C), temperature source Temporal, resp. rate 18, height 5' 9" (1.753 m), weight 99.2 kg (218 lb 9.6 oz).    Physical Examination:   GEN: no apparent distress, comfortable; AAOx3  HEAD: atraumatic and normocephalic  EYES: no pallor, no icterus, PERRLA  ENT: OMM, no pharyngeal erythema, external ears WNL; no nasal discharge; no thrush  NECK: no masses, thyroid normal, trachea midline, no LAD/LN's, supple  CV: RRR with no murmur; normal pulse; normal S1 and S2; no pedal edema  CHEST: Normal respiratory effort; CTAB; normal breath sounds; no wheeze or crackles  ABDOM: nontender and nondistended; soft; normal bowel sounds; no rebound/guarding  MUSC/Skeletal: ROM normal; no crepitus; joints normal; no deformities or arthropathy  EXTREM: no clubbing, cyanosis, inflammation or swelling  SKIN: no rashes, lesions, ulcers, petechiae or subcutaneous nodules  : no jiang  NEURO: grossly intact; motor/sensory WNL; AAOx3; no tremors  PSYCH: normal mood, affect and behavior  LYMPH: normal cervical, supraclavicular, axillary " and jean carlos COLON's    Labs:     Lab Results   Component Value Date    WBC 5.61 12/04/2024    HGB 9.2 (L) 12/04/2024    HCT 27.0 (L) 12/04/2024    MCV 91 12/04/2024     (H) 12/04/2024           CMP  Sodium   Date Value Ref Range Status   12/04/2024 141 136 - 145 mmol/L Final   06/26/2019 138 134 - 144 mmol/L      Potassium   Date Value Ref Range Status   12/04/2024 5.1 3.5 - 5.1 mmol/L Final     Chloride   Date Value Ref Range Status   12/04/2024 111 (H) 95 - 110 mmol/L Final   06/26/2019 105 98 - 110 mmol/L      CO2   Date Value Ref Range Status   12/04/2024 27 23 - 29 mmol/L Final     Glucose   Date Value Ref Range Status   12/04/2024 97 70 - 110 mg/dL Final   06/26/2019 114 (H) 70 - 99 mg/dL      BUN   Date Value Ref Range Status   12/04/2024 44 (H) 8 - 23 mg/dL Final     Creatinine   Date Value Ref Range Status   12/04/2024 3.6 (H) 0.5 - 1.4 mg/dL Final   06/26/2019 1.62 (H) 0.60 - 1.40 mg/dL      Calcium   Date Value Ref Range Status   12/04/2024 8.7 8.7 - 10.5 mg/dL Final     Total Protein   Date Value Ref Range Status   12/04/2024 7.1 6.0 - 8.4 g/dL Final     Albumin   Date Value Ref Range Status   12/04/2024 4.3 3.5 - 5.2 g/dL Final   06/26/2019 4.4 3.1 - 4.7 g/dL      Total Bilirubin   Date Value Ref Range Status   12/04/2024 0.6 0.1 - 1.0 mg/dL Final     Comment:     For infants and newborns, interpretation of results should be based  on gestational age, weight and in agreement with clinical  observations.    Premature Infant recommended reference ranges:  Up to 24 hours.............<8.0 mg/dL  Up to 48 hours............<12.0 mg/dL  3-5 days..................<15.0 mg/dL  6-29 days.................<15.0 mg/dL       Alkaline Phosphatase   Date Value Ref Range Status   12/04/2024 65 55 - 135 U/L Final     AST   Date Value Ref Range Status   12/04/2024 16 10 - 40 U/L Final     ALT   Date Value Ref Range Status   12/04/2024 17 10 - 44 U/L Final     Anion Gap   Date Value Ref Range Status   12/04/2024 3 (L) 8  - 16 mmol/L Final     eGFR if    Date Value Ref Range Status   07/13/2022 45.6 (A) >60 mL/min/1.73 m^2 Final     eGFR if non    Date Value Ref Range Status   07/13/2022 39.5 (A) >60 mL/min/1.73 m^2 Final     Comment:     Calculation used to obtain the estimated glomerular filtration  rate (eGFR) is the CKD-EPI equation.          Lab Results   Component Value Date    IRON 95 12/04/2024    TIBC 181 (L) 12/04/2024    FERRITIN 1,187.2 (H) 12/04/2024           I have reviewed all available lab results and radiology reports.    Radiology/Diagnostic Studies:    CXR 10/31/2024:   Impression:     Multilevel severe discogenic and facet degenerative changes of the lumbar spine.        Electronically signed by:Herber Aaron  Date:                                            10/31/2024  Time:                                           16:16    US 2/8/2023:    IMPRESSION:  1. Dilation of the common bile duct at up to 10 mm, unchanged when compared to 2016.  2. Nonvisualization of the pancreas because of overlying bowel gas.  3. No other significant findings.  spleen is normal in size and appearance      2/15/2018 Xray Survey:   IMPRESSION: No significant abnormality seen. No evidence of osteoblastic or  osteoclastic lesions identified    MRI L-spine 2/12/2018  IMPRESSION:    1. Prominent low signal intensity throughout the bone marrow on T1 and  T2-weighted imaging. Marrow infiltrative process including malignancy such as  metastatic disease or lymphoma/leukemia is a consideration along with multiple  myeloma or malignant process. Benign etiology such as osteopetrosis or  regenerative red marrow are also considerations.    2. Multilevel lumbar degenerative changes, most prominent at L4-L5 and L5-S1 as  described.    Assessment/Plan:   (1) 64 y.o. male with diagnosis of sickle cell anemia with borderline microcytosis  - latest hgb was 6.0 and he had blood transfusion  - today, he is not symptomatic  "at this time  - he probably has a multifactorial anemia process with underlying sickle cell, anemia of chronic disorders and anemia of chronic renal. I can not rule out an underlying GI bleeding process.   - iron panel is adequate (no deficiency)  - total bilirubin is only minimally elevated  - he required transfusion again in Oct 2019 and again in Dec 2019  - he had bone marrow biopsy on 12/20/2019    "dyshematopoietic changes are not entirely specific and are present  mostly within erythroid and megakaryocytic lineages. These findings   could be observed in chronic disease states, autoimmune conditions,  infectious settings, toxic exposures, nutritional deficits, as medication/drug effect, and in myelodysplastic syndrome (MDS), among other conditions"  "Additionally, ring sideroblasts are a non-specific   finding "    Patient was referred to see Dr Mustafa at [facility] and had repeat BM biopsy with diagnosis made of RARS  - he is now on procrit per directives of Dr Mustafa    7/30/2020:   he recently saw Dr Mustafa  And sees him again in Jan 2021  - he is doing well with procrit and is feeling much better  - he has not required any transfusions for some time time    9/24/2020:  - latest hgb at 8.5  - will increase the procrit dose  - f/u with Dr Zavala in Jan 2021 11/19/2020:  - patient doing well and feels "great"  - hgb up to 9.1; continued on the procrit  - f/u with Dr Mustafa in Jan 2021    3/4/2021:  - he saw Dr Mustafa in jan 2021 and sees him again in June/July 2021  - latest hgb at 9.1 and stable and platelets are 474,000; wbc at 10.0    6/10/2021:  - latest hgb at 9.0  - plats 485,000  - on weekly procrit  - seeing Dr Mustafa again tomorrow    10/14/2021:  - hgb at 8.6 but he recently had been taking naproxen and had some BRBPR - which since resolved  - he saw Dr Beyer with GI and he is having colonsocopy on Nov 5th along with EGD  - plats 429,000  - he sees Dr Mustafa again in Nov 19th   - he sees Dr Mas " again in Dec 2021    1/13/2022:  - He has been under the care of Dr Mustafa at [facility] for RARS- MDS. He is now on procrit weekly. He sees Dr Mustafa again in March 2022    - hgb 9.3  - he had scope with Dr Collins in nov 2021 which was good per patient and they plan to repeat in 5 yrs    5/12/2022:  - hgb at 9.0  - plats 485  - wbc 7.25  - he is on procrit weekly  - saw Dr Mustafa this past Fridday and since he is leaving, he will start seeing Dr Hilario instead in 3 months    8/18/2022:  - latest hgb a little lower at 8.8  - he has been on the procrit  - he saw Dr Hilario recently at [facility] and plans to see her again in 3 months    1/26/2023:  - He saw Dr Hilario at [facility] again recently this past Tuesday and they are considering another medication; he plans to see her again in April 2023  - Dr Hilario requested he have an US of spleen - will order here in [place]  - he is also on appetite stimulant  - hgb at 9.4 and plats at 500k    5/11/2023:  - hgb at 9.6 and plats 466,000  - He saw Dr Hilario at [facility] again recently this past Tuesday and they are considering another medication; he plans to see her again in Aug 2023    8/3/2023:  - His insurance will not approve of him getting the Reblozyl   - He sees Dr Hilario again next Tuesday and we will await her directives.     9/14/2023:  - the Rebozyl has been approved - will set up  - latest hgb at 10.1  - seeing Dr Hilario again in Jan 2024 11/16/2023:  - continued on Rebozyl  - WBC and plats WNL  - hgb at 10.3  - cardiac w/u negative per patient    2/15/2024:  - labs are adequate  - hgb at 10.6  - WBC and plat WNL  - continued on Rebozyl  - recent colonoscopy good per patient    4/4/2024:  - continued on Rebozyl  - counts are looking adequate    8/1/2024:  - continued on procrit  - insurance would not approve of the rebozyl and he has not had the last 2-3 injections  - hold rebozyl for now anyway due to the increased creatine and decreased kidney function  - f/u with Dr Chilel  - seeing  "Dr Serrano on 8/13  - continue procrit as directed    10/10/2024  - he sees Dr Mas on 10/31  - he has been continued on rebozyl and is due again next week  - he has been on procrit  - Dr Serrano is now at [facility] in [facility] and he last saw her in Aug 2024 - he plans to continue under her care via telemed    11/7/2024:   - continue procrit   - hold reblozyl due to kidney function until patient sees Dr. SERRANO in December   - referral to neuro and back clinic due to severe disc disease in lower back     12/11/2024:   - patient kidney function has declined more   - [place] [address] per Dr. SERRANO   - hold reblozyl for now, hgb stable       (2) Alcohol abuse issues in past - he has been sober for over 3 years now    (3) New findings on radiography with abnormal chest CT and lung mass  - former smoker    (4) chronic back issues - prior Xrays and MRI - suspect the findings on the MRI are possibly due to his sickle cell;   - SPEP was previously negative for M-protein  - PSA was 0.3 in Sept 2017  - recommended neurosurgery evaluation previously  - he saw Dr Nura VUONG and had a nerve "burning" procedure and his pain has improved    (5) CRI - elevated creatinine - he is followed Dr Chilel with nephrology      (6) H pylori gastritis - s/p recent endoscopy with Dr Collins and antibotics x 10 days    (7) CP   9/14/2023:  - seeing Dr lovett with cardiology and planning stress test in near future        1. MDS (myelodysplastic syndrome)        2. Chronic kidney disease (CKD), stage IV (severe)              PLAN;  1. Continue with the procrit ; hold reblozyl until further recs from fan   2. Proceed with Dr Mas - water and food only, no diet sodas   2. Planned f/u with Dr Serrano again next year   3. F/u with cardiology as directed  4.  F/u with Castillo as needed  5. Check labs every 2 weeks  6. Add marinol for appetite - increase to 10mg BID        RTC  4 weeks with me           Discussion:       Pathology Discussion:    I reviewed and " discussed the pathology report(s) and radiograph reports (if available) in as simple to understand and/or laymen's terms to the best of my ability. I had an indepth conversation with the patient and went over the patient's individual diagnosis based on the information that was currently available. I discussed the TNM staging process with regard to the patient's particular cancer type, and the calculated stage based on the currently available TNM data and literature. I discussed the available prognostic data with regard to the current staging information and how it relates to the prognosis of their particular neoplastic process.          COVID-19 Discussion:    I had long discussion with patient and any applicable family about the COVID-19 coronavirus epidemic and the recommended precautions with regard to cancer and/or hematology patients. I have re-iterated the CDC recommendations for adequate hand washing, use of hand -like products, and coughing into elbow, etc. In addition, especially for our patients who are on chemotherapy and/or our otherwise immunocompromised patients, I have recommended avoidance of crowds, including movie theaters, restaurants, churches, etc. I have recommended avoidance of any sick or symptomatic family members and/or friends. I have also recommended avoidance of any raw and unwashed food products, and general avoidance of food items that have not been prepared by themselves. The patient has been asked to call us immediately with any symptom developments, issues, questions or other general concerns.     I have explained all of the above in detail and the patient understands all of the current recommendation(s). I have answered all of their questions to the best of my ability and to their complete satisfaction.   The patient is to continue with the current management plan.            Electronically signed by Pratima Melgar NP

## 2024-12-11 ENCOUNTER — TELEPHONE (OUTPATIENT)
Facility: CLINIC | Age: 64
End: 2024-12-11
Payer: COMMERCIAL

## 2024-12-11 NOTE — TELEPHONE ENCOUNTER
----- Message from Юлия sent at 12/11/2024  1:47 PM CST -----  Sarah with [employer] calling with partial approval for Retacrit due to 1 yr exceeding the limit. He is approved for 26 visits for 180 days. Auth # [id number] -valid 12/14/24-06/11/25    Further questions [phone]

## 2024-12-13 ENCOUNTER — INFUSION (OUTPATIENT)
Dept: INFUSION THERAPY | Facility: HOSPITAL | Age: 64
End: 2024-12-13
Attending: INTERNAL MEDICINE
Payer: COMMERCIAL

## 2024-12-13 VITALS
BODY MASS INDEX: 32.81 KG/M2 | HEART RATE: 73 BPM | RESPIRATION RATE: 18 BRPM | SYSTOLIC BLOOD PRESSURE: 144 MMHG | WEIGHT: 221.5 LBS | DIASTOLIC BLOOD PRESSURE: 83 MMHG | HEIGHT: 69 IN | TEMPERATURE: 98 F | OXYGEN SATURATION: 100 %

## 2024-12-13 DIAGNOSIS — N18.30 STAGE 3 CHRONIC KIDNEY DISEASE, UNSPECIFIED WHETHER STAGE 3A OR 3B CKD: ICD-10-CM

## 2024-12-13 DIAGNOSIS — D64.89 ANEMIA DUE TO MULTIPLE MECHANISMS: ICD-10-CM

## 2024-12-13 DIAGNOSIS — D46.9 MDS (MYELODYSPLASTIC SYNDROME): ICD-10-CM

## 2024-12-13 DIAGNOSIS — D64.9 NORMOCHROMIC ANEMIA: ICD-10-CM

## 2024-12-13 DIAGNOSIS — D64.9 CHRONIC ANEMIA: Primary | ICD-10-CM

## 2024-12-13 PROCEDURE — 63600175 PHARM REV CODE 636 W HCPCS: Mod: JZ,EC,JG | Performed by: NURSE PRACTITIONER

## 2024-12-13 PROCEDURE — 96372 THER/PROPH/DIAG INJ SC/IM: CPT

## 2024-12-13 RX ADMIN — EPOETIN ALFA-EPBX 60000 UNITS: 20000 INJECTION, SOLUTION INTRAVENOUS; SUBCUTANEOUS at 04:12

## 2024-12-18 ENCOUNTER — LAB VISIT (OUTPATIENT)
Dept: LAB | Facility: HOSPITAL | Age: 64
End: 2024-12-18
Attending: INTERNAL MEDICINE
Payer: COMMERCIAL

## 2024-12-18 DIAGNOSIS — D46.9 MDS (MYELODYSPLASTIC SYNDROME): ICD-10-CM

## 2024-12-18 LAB
ALBUMIN SERPL BCP-MCNC: 4.3 G/DL (ref 3.5–5.2)
ALP SERPL-CCNC: 62 U/L (ref 55–135)
ALT SERPL W/O P-5'-P-CCNC: 14 U/L (ref 10–44)
ANION GAP SERPL CALC-SCNC: 4 MMOL/L (ref 8–16)
AST SERPL-CCNC: 15 U/L (ref 10–40)
BASOPHILS # BLD AUTO: 0.06 K/UL (ref 0–0.2)
BASOPHILS NFR BLD: 1.1 % (ref 0–1.9)
BILIRUB SERPL-MCNC: 0.7 MG/DL (ref 0.1–1)
BUN SERPL-MCNC: 40 MG/DL (ref 8–23)
CALCIUM SERPL-MCNC: 8.6 MG/DL (ref 8.7–10.5)
CHLORIDE SERPL-SCNC: 109 MMOL/L (ref 95–110)
CO2 SERPL-SCNC: 25 MMOL/L (ref 23–29)
CREAT SERPL-MCNC: 3.3 MG/DL (ref 0.5–1.4)
DIFFERENTIAL METHOD BLD: ABNORMAL
EOSINOPHIL # BLD AUTO: 0.1 K/UL (ref 0–0.5)
EOSINOPHIL NFR BLD: 2.5 % (ref 0–8)
ERYTHROCYTE [DISTWIDTH] IN BLOOD BY AUTOMATED COUNT: 26.4 % (ref 11.5–14.5)
EST. GFR  (NO RACE VARIABLE): 20.1 ML/MIN/1.73 M^2
GLUCOSE SERPL-MCNC: 101 MG/DL (ref 70–110)
HCT VFR BLD AUTO: 25.8 % (ref 40–54)
HGB BLD-MCNC: 8.6 G/DL (ref 14–18)
IMM GRANULOCYTES # BLD AUTO: 0.02 K/UL (ref 0–0.04)
IMM GRANULOCYTES NFR BLD AUTO: 0.4 % (ref 0–0.5)
LYMPHOCYTES # BLD AUTO: 1.3 K/UL (ref 1–4.8)
LYMPHOCYTES NFR BLD: 23.8 % (ref 18–48)
MCH RBC QN AUTO: 30.6 PG (ref 27–31)
MCHC RBC AUTO-ENTMCNC: 33.3 G/DL (ref 32–36)
MCV RBC AUTO: 92 FL (ref 82–98)
MONOCYTES # BLD AUTO: 0.6 K/UL (ref 0.3–1)
MONOCYTES NFR BLD: 11.5 % (ref 4–15)
NEUTROPHILS # BLD AUTO: 3.2 K/UL (ref 1.8–7.7)
NEUTROPHILS NFR BLD: 60.7 % (ref 38–73)
NRBC BLD-RTO: 0 /100 WBC
PLATELET # BLD AUTO: 438 K/UL (ref 150–450)
PMV BLD AUTO: 10 FL (ref 9.2–12.9)
POTASSIUM SERPL-SCNC: 4.5 MMOL/L (ref 3.5–5.1)
PROT SERPL-MCNC: 7 G/DL (ref 6–8.4)
RBC # BLD AUTO: 2.81 M/UL (ref 4.6–6.2)
SODIUM SERPL-SCNC: 138 MMOL/L (ref 136–145)
WBC # BLD AUTO: 5.3 K/UL (ref 3.9–12.7)

## 2024-12-18 PROCEDURE — 80053 COMPREHEN METABOLIC PANEL: CPT | Performed by: INTERNAL MEDICINE

## 2024-12-18 PROCEDURE — 85025 COMPLETE CBC W/AUTO DIFF WBC: CPT | Performed by: INTERNAL MEDICINE

## 2024-12-18 PROCEDURE — 36415 COLL VENOUS BLD VENIPUNCTURE: CPT | Performed by: INTERNAL MEDICINE

## 2024-12-20 ENCOUNTER — INFUSION (OUTPATIENT)
Dept: INFUSION THERAPY | Facility: HOSPITAL | Age: 64
End: 2024-12-20
Attending: INTERNAL MEDICINE
Payer: COMMERCIAL

## 2024-12-20 ENCOUNTER — TELEPHONE (OUTPATIENT)
Facility: CLINIC | Age: 64
End: 2024-12-20
Payer: COMMERCIAL

## 2024-12-20 VITALS
RESPIRATION RATE: 17 BRPM | SYSTOLIC BLOOD PRESSURE: 144 MMHG | TEMPERATURE: 98 F | HEIGHT: 69 IN | HEART RATE: 75 BPM | BODY MASS INDEX: 32.46 KG/M2 | OXYGEN SATURATION: 97 % | DIASTOLIC BLOOD PRESSURE: 78 MMHG | WEIGHT: 219.13 LBS

## 2024-12-20 DIAGNOSIS — D64.9 NORMOCHROMIC ANEMIA: ICD-10-CM

## 2024-12-20 DIAGNOSIS — D64.89 ANEMIA DUE TO MULTIPLE MECHANISMS: ICD-10-CM

## 2024-12-20 DIAGNOSIS — D46.9 MDS (MYELODYSPLASTIC SYNDROME): ICD-10-CM

## 2024-12-20 DIAGNOSIS — N18.30 STAGE 3 CHRONIC KIDNEY DISEASE, UNSPECIFIED WHETHER STAGE 3A OR 3B CKD: ICD-10-CM

## 2024-12-20 DIAGNOSIS — R63.0 APPETITE LOSS: ICD-10-CM

## 2024-12-20 DIAGNOSIS — D64.9 CHRONIC ANEMIA: Primary | ICD-10-CM

## 2024-12-20 DIAGNOSIS — D46.1 RARS (REFRACTORY ANEMIA WITH RINGED SIDEROBLASTS): ICD-10-CM

## 2024-12-20 PROCEDURE — 96372 THER/PROPH/DIAG INJ SC/IM: CPT

## 2024-12-20 PROCEDURE — 63600175 PHARM REV CODE 636 W HCPCS: Mod: JZ,EC,JG | Performed by: NURSE PRACTITIONER

## 2024-12-20 RX ORDER — DRONABINOL 10 MG/1
10 CAPSULE ORAL
Qty: 60 CAPSULE | Refills: 1 | Status: SHIPPED | OUTPATIENT
Start: 2024-12-20

## 2024-12-20 RX ADMIN — EPOETIN ALFA-EPBX 60000 UNITS: 20000 INJECTION, SOLUTION INTRAVENOUS; SUBCUTANEOUS at 04:12

## 2024-12-20 NOTE — TELEPHONE ENCOUNTER
Talked to patient and informed him, we are needing to start him on reblozyl. I reached out to Dr. SERRANO.

## 2024-12-24 DIAGNOSIS — D64.9 CHRONIC ANEMIA: Primary | ICD-10-CM

## 2024-12-31 ENCOUNTER — TELEPHONE (OUTPATIENT)
Facility: CLINIC | Age: 64
End: 2024-12-31
Payer: COMMERCIAL

## 2024-12-31 ENCOUNTER — LAB VISIT (OUTPATIENT)
Dept: LAB | Facility: HOSPITAL | Age: 64
End: 2024-12-31
Attending: INTERNAL MEDICINE
Payer: COMMERCIAL

## 2024-12-31 DIAGNOSIS — D46.9 MDS (MYELODYSPLASTIC SYNDROME): ICD-10-CM

## 2024-12-31 LAB
ALBUMIN SERPL BCP-MCNC: 4.2 G/DL (ref 3.5–5.2)
ALP SERPL-CCNC: 63 U/L (ref 55–135)
ALT SERPL W/O P-5'-P-CCNC: 17 U/L (ref 10–44)
ANION GAP SERPL CALC-SCNC: 4 MMOL/L (ref 8–16)
AST SERPL-CCNC: 17 U/L (ref 10–40)
BASOPHILS # BLD AUTO: 0.08 K/UL (ref 0–0.2)
BASOPHILS NFR BLD: 1.3 % (ref 0–1.9)
BILIRUB SERPL-MCNC: 0.7 MG/DL (ref 0.1–1)
BUN SERPL-MCNC: 36 MG/DL (ref 8–23)
CALCIUM SERPL-MCNC: 8.4 MG/DL (ref 8.7–10.5)
CHLORIDE SERPL-SCNC: 111 MMOL/L (ref 95–110)
CO2 SERPL-SCNC: 24 MMOL/L (ref 23–29)
CREAT SERPL-MCNC: 3.2 MG/DL (ref 0.5–1.4)
DIFFERENTIAL METHOD BLD: ABNORMAL
EOSINOPHIL # BLD AUTO: 0.2 K/UL (ref 0–0.5)
EOSINOPHIL NFR BLD: 2.7 % (ref 0–8)
ERYTHROCYTE [DISTWIDTH] IN BLOOD BY AUTOMATED COUNT: 26.6 % (ref 11.5–14.5)
EST. GFR  (NO RACE VARIABLE): 20.8 ML/MIN/1.73 M^2
FERRITIN SERPL-MCNC: 1216 NG/ML (ref 20–300)
GLUCOSE SERPL-MCNC: 100 MG/DL (ref 70–110)
HCT VFR BLD AUTO: 22.7 % (ref 40–54)
HGB BLD-MCNC: 7.7 G/DL (ref 14–18)
IMM GRANULOCYTES # BLD AUTO: 0.02 K/UL (ref 0–0.04)
IMM GRANULOCYTES NFR BLD AUTO: 0.3 % (ref 0–0.5)
IRON SERPL-MCNC: 78 UG/DL (ref 45–160)
LYMPHOCYTES # BLD AUTO: 1.4 K/UL (ref 1–4.8)
LYMPHOCYTES NFR BLD: 22.5 % (ref 18–48)
MCH RBC QN AUTO: 31 PG (ref 27–31)
MCHC RBC AUTO-ENTMCNC: 33.9 G/DL (ref 32–36)
MCV RBC AUTO: 92 FL (ref 82–98)
MONOCYTES # BLD AUTO: 0.7 K/UL (ref 0.3–1)
MONOCYTES NFR BLD: 10.6 % (ref 4–15)
NEUTROPHILS # BLD AUTO: 3.9 K/UL (ref 1.8–7.7)
NEUTROPHILS NFR BLD: 62.6 % (ref 38–73)
NRBC BLD-RTO: 0 /100 WBC
PLATELET # BLD AUTO: 368 K/UL (ref 150–450)
PMV BLD AUTO: 9.4 FL (ref 9.2–12.9)
POTASSIUM SERPL-SCNC: 4.4 MMOL/L (ref 3.5–5.1)
PROT SERPL-MCNC: 6.7 G/DL (ref 6–8.4)
RBC # BLD AUTO: 2.48 M/UL (ref 4.6–6.2)
SATURATED IRON: 45 % (ref 20–50)
SODIUM SERPL-SCNC: 139 MMOL/L (ref 136–145)
TOTAL IRON BINDING CAPACITY: 175 UG/DL (ref 250–450)
TRANSFERRIN SERPL-MCNC: 125 MG/DL (ref 200–375)
WBC # BLD AUTO: 6.22 K/UL (ref 3.9–12.7)

## 2024-12-31 PROCEDURE — 82728 ASSAY OF FERRITIN: CPT | Performed by: INTERNAL MEDICINE

## 2024-12-31 PROCEDURE — 36415 COLL VENOUS BLD VENIPUNCTURE: CPT | Performed by: INTERNAL MEDICINE

## 2024-12-31 PROCEDURE — 80053 COMPREHEN METABOLIC PANEL: CPT | Performed by: INTERNAL MEDICINE

## 2024-12-31 PROCEDURE — 85025 COMPLETE CBC W/AUTO DIFF WBC: CPT | Performed by: INTERNAL MEDICINE

## 2024-12-31 PROCEDURE — 84466 ASSAY OF TRANSFERRIN: CPT | Performed by: INTERNAL MEDICINE

## 2024-12-31 NOTE — TELEPHONE ENCOUNTER
Dr Tello reviewed patient's labs and per his verbal order, patient to get 1 unit of blood. Reviewed above with patient, patient refused 1 unit of blood at this time. Reviewed with patient that if he experiences SOB, CP, Dizziness, fatigue, he is to report to the ER immediately for evaluation. Patient verbalized understanding of above.

## 2025-01-02 ENCOUNTER — TELEPHONE (OUTPATIENT)
Facility: CLINIC | Age: 65
End: 2025-01-02
Payer: COMMERCIAL

## 2025-01-02 NOTE — TELEPHONE ENCOUNTER
Spoke with patient and let him know that we labs drawn and he will need to be seen by Dr Saez regarding the reblozyl and our office will reach out to Dr Saez to get them scheduled earlier.  Patient made aware of above and verbalized understanding.

## 2025-01-02 NOTE — TELEPHONE ENCOUNTER
"----- Message from Winter sent at 1/2/2025  9:46 AM CST -----  Pt -[phone]    Pt would like his "reblaso" injection for today...    He would like a CB as soon as possible.  He left a message on Tuesday.    ? Re-clast ???  Thank you  "

## 2025-01-03 ENCOUNTER — INFUSION (OUTPATIENT)
Dept: INFUSION THERAPY | Facility: HOSPITAL | Age: 65
End: 2025-01-03
Attending: INTERNAL MEDICINE
Payer: COMMERCIAL

## 2025-01-03 ENCOUNTER — LAB VISIT (OUTPATIENT)
Dept: LAB | Facility: HOSPITAL | Age: 65
End: 2025-01-03
Attending: NURSE PRACTITIONER
Payer: COMMERCIAL

## 2025-01-03 VITALS
SYSTOLIC BLOOD PRESSURE: 136 MMHG | OXYGEN SATURATION: 99 % | RESPIRATION RATE: 18 BRPM | DIASTOLIC BLOOD PRESSURE: 76 MMHG | HEART RATE: 72 BPM | TEMPERATURE: 98 F | HEIGHT: 69 IN | WEIGHT: 219.88 LBS | BODY MASS INDEX: 32.57 KG/M2

## 2025-01-03 DIAGNOSIS — D64.89 ANEMIA DUE TO MULTIPLE MECHANISMS: ICD-10-CM

## 2025-01-03 DIAGNOSIS — D64.9 NORMOCHROMIC ANEMIA: ICD-10-CM

## 2025-01-03 DIAGNOSIS — D46.9 MDS (MYELODYSPLASTIC SYNDROME): ICD-10-CM

## 2025-01-03 DIAGNOSIS — N18.30 STAGE 3 CHRONIC KIDNEY DISEASE, UNSPECIFIED WHETHER STAGE 3A OR 3B CKD: ICD-10-CM

## 2025-01-03 DIAGNOSIS — D64.9 CHRONIC ANEMIA: ICD-10-CM

## 2025-01-03 DIAGNOSIS — D64.9 CHRONIC ANEMIA: Primary | ICD-10-CM

## 2025-01-03 LAB — LDH SERPL L TO P-CCNC: 138 U/L (ref 110–260)

## 2025-01-03 PROCEDURE — 63600175 PHARM REV CODE 636 W HCPCS: Mod: JZ,EC,TB | Performed by: NURSE PRACTITIONER

## 2025-01-03 PROCEDURE — 36415 COLL VENOUS BLD VENIPUNCTURE: CPT | Performed by: INTERNAL MEDICINE

## 2025-01-03 PROCEDURE — 83010 ASSAY OF HAPTOGLOBIN QUANT: CPT | Performed by: INTERNAL MEDICINE

## 2025-01-03 PROCEDURE — 96372 THER/PROPH/DIAG INJ SC/IM: CPT

## 2025-01-03 PROCEDURE — 83615 LACTATE (LD) (LDH) ENZYME: CPT | Performed by: INTERNAL MEDICINE

## 2025-01-03 RX ADMIN — EPOETIN ALFA-EPBX 60000 UNITS: 20000 INJECTION, SOLUTION INTRAVENOUS; SUBCUTANEOUS at 04:01

## 2025-01-04 LAB — HAPTOGLOB SERPL-MCNC: 65 MG/DL (ref 32–363)

## 2025-01-07 ENCOUNTER — TELEPHONE (OUTPATIENT)
Facility: CLINIC | Age: 65
End: 2025-01-07
Payer: COMMERCIAL

## 2025-01-07 DIAGNOSIS — D46.9 MDS (MYELODYSPLASTIC SYNDROME): Primary | ICD-10-CM

## 2025-01-07 NOTE — TELEPHONE ENCOUNTER
----- Message from Angie sent at 1/7/2025 10:31 AM CST -----  Pt calling about his repazole injection.     CB# [phone]

## 2025-01-07 NOTE — TELEPHONE ENCOUNTER
Per Pratima's verbal orders I placed orders for STAT BM BX as she states this is wanted by Dr Saez. Spoke to patient made aware that scheduling would be reaching out to him to schedule. Verbalized understanding. I spoke to Ania at [facility] made aware of need to call to schedule STAT BM BX, she verbalized understanding and states she will look at their schedule to see how soon can get patient in but she may need to forward to [facility].

## 2025-01-08 ENCOUNTER — TELEPHONE (OUTPATIENT)
Dept: RADIOLOGY | Facility: HOSPITAL | Age: 65
End: 2025-01-08

## 2025-01-08 NOTE — NURSING
Pt scheduled for bonemarrow bx on Tuesday 1/21/25 at 1000am with an arrival time of 0800, medications reviewed, will have a  to and from procedure, will remain NPO after midnight 1/20/25, all questions answered and understanding verbalized of all.

## 2025-01-10 ENCOUNTER — INFUSION (OUTPATIENT)
Dept: INFUSION THERAPY | Facility: HOSPITAL | Age: 65
End: 2025-01-10
Attending: INTERNAL MEDICINE
Payer: COMMERCIAL

## 2025-01-10 VITALS
DIASTOLIC BLOOD PRESSURE: 87 MMHG | SYSTOLIC BLOOD PRESSURE: 153 MMHG | RESPIRATION RATE: 17 BRPM | HEART RATE: 71 BPM | WEIGHT: 221.38 LBS | HEIGHT: 69 IN | BODY MASS INDEX: 32.79 KG/M2 | OXYGEN SATURATION: 100 % | TEMPERATURE: 97 F

## 2025-01-10 DIAGNOSIS — D46.9 MDS (MYELODYSPLASTIC SYNDROME): ICD-10-CM

## 2025-01-10 DIAGNOSIS — N18.30 STAGE 3 CHRONIC KIDNEY DISEASE, UNSPECIFIED WHETHER STAGE 3A OR 3B CKD: ICD-10-CM

## 2025-01-10 DIAGNOSIS — D64.9 CHRONIC ANEMIA: Primary | ICD-10-CM

## 2025-01-10 DIAGNOSIS — D64.89 ANEMIA DUE TO MULTIPLE MECHANISMS: ICD-10-CM

## 2025-01-10 DIAGNOSIS — D64.9 NORMOCHROMIC ANEMIA: ICD-10-CM

## 2025-01-10 PROCEDURE — 96372 THER/PROPH/DIAG INJ SC/IM: CPT

## 2025-01-10 PROCEDURE — 63600175 PHARM REV CODE 636 W HCPCS: Mod: JZ,EC,TB | Performed by: NURSE PRACTITIONER

## 2025-01-10 RX ADMIN — EPOETIN ALFA-EPBX 60000 UNITS: 20000 INJECTION, SOLUTION INTRAVENOUS; SUBCUTANEOUS at 04:01

## 2025-01-10 NOTE — PLAN OF CARE
Problem: Fatigue  Goal: Improved Activity Tolerance  1/10/2025 1607 by Gisell Castillo, RN  Outcome: Met  1/10/2025 1607 by Gisell Castillo, RN  Outcome: Progressing

## 2025-01-15 ENCOUNTER — LAB VISIT (OUTPATIENT)
Dept: LAB | Facility: HOSPITAL | Age: 65
End: 2025-01-15
Attending: INTERNAL MEDICINE
Payer: COMMERCIAL

## 2025-01-15 DIAGNOSIS — D46.9 MDS (MYELODYSPLASTIC SYNDROME): ICD-10-CM

## 2025-01-15 LAB
ALBUMIN SERPL BCP-MCNC: 4.3 G/DL (ref 3.5–5.2)
ALP SERPL-CCNC: 65 U/L (ref 55–135)
ALT SERPL W/O P-5'-P-CCNC: 17 U/L (ref 10–44)
ANION GAP SERPL CALC-SCNC: 6 MMOL/L (ref 8–16)
AST SERPL-CCNC: 19 U/L (ref 10–40)
BASOPHILS # BLD AUTO: 0.09 K/UL (ref 0–0.2)
BASOPHILS NFR BLD: 0.9 % (ref 0–1.9)
BILIRUB SERPL-MCNC: 0.6 MG/DL (ref 0.1–1)
BUN SERPL-MCNC: 30 MG/DL (ref 8–23)
CALCIUM SERPL-MCNC: 8.6 MG/DL (ref 8.7–10.5)
CHLORIDE SERPL-SCNC: 111 MMOL/L (ref 95–110)
CO2 SERPL-SCNC: 26 MMOL/L (ref 23–29)
CREAT SERPL-MCNC: 3.1 MG/DL (ref 0.5–1.4)
DIFFERENTIAL METHOD BLD: ABNORMAL
EOSINOPHIL # BLD AUTO: 0.1 K/UL (ref 0–0.5)
EOSINOPHIL NFR BLD: 1.3 % (ref 0–8)
ERYTHROCYTE [DISTWIDTH] IN BLOOD BY AUTOMATED COUNT: 27.5 % (ref 11.5–14.5)
EST. GFR  (NO RACE VARIABLE): 21.6 ML/MIN/1.73 M^2
GLUCOSE SERPL-MCNC: 105 MG/DL (ref 70–110)
HCT VFR BLD AUTO: 24.4 % (ref 40–54)
HGB BLD-MCNC: 8.2 G/DL (ref 14–18)
IMM GRANULOCYTES # BLD AUTO: 0.08 K/UL (ref 0–0.04)
IMM GRANULOCYTES NFR BLD AUTO: 0.8 % (ref 0–0.5)
LYMPHOCYTES # BLD AUTO: 1.2 K/UL (ref 1–4.8)
LYMPHOCYTES NFR BLD: 12.3 % (ref 18–48)
MCH RBC QN AUTO: 31.3 PG (ref 27–31)
MCHC RBC AUTO-ENTMCNC: 33.6 G/DL (ref 32–36)
MCV RBC AUTO: 93 FL (ref 82–98)
MONOCYTES # BLD AUTO: 0.9 K/UL (ref 0.3–1)
MONOCYTES NFR BLD: 8.7 % (ref 4–15)
NEUTROPHILS # BLD AUTO: 7.5 K/UL (ref 1.8–7.7)
NEUTROPHILS NFR BLD: 76 % (ref 38–73)
NRBC BLD-RTO: 0 /100 WBC
PLATELET # BLD AUTO: 498 K/UL (ref 150–450)
PMV BLD AUTO: 10.3 FL (ref 9.2–12.9)
POTASSIUM SERPL-SCNC: 4.6 MMOL/L (ref 3.5–5.1)
PROT SERPL-MCNC: 7.2 G/DL (ref 6–8.4)
RBC # BLD AUTO: 2.62 M/UL (ref 4.6–6.2)
SODIUM SERPL-SCNC: 143 MMOL/L (ref 136–145)
WBC # BLD AUTO: 9.89 K/UL (ref 3.9–12.7)

## 2025-01-15 PROCEDURE — 80053 COMPREHEN METABOLIC PANEL: CPT | Performed by: INTERNAL MEDICINE

## 2025-01-15 PROCEDURE — 85025 COMPLETE CBC W/AUTO DIFF WBC: CPT | Performed by: INTERNAL MEDICINE

## 2025-01-15 PROCEDURE — 36415 COLL VENOUS BLD VENIPUNCTURE: CPT | Performed by: INTERNAL MEDICINE

## 2025-01-17 ENCOUNTER — INFUSION (OUTPATIENT)
Dept: INFUSION THERAPY | Facility: HOSPITAL | Age: 65
End: 2025-01-17
Attending: INTERNAL MEDICINE
Payer: COMMERCIAL

## 2025-01-17 VITALS
HEIGHT: 69 IN | TEMPERATURE: 98 F | DIASTOLIC BLOOD PRESSURE: 80 MMHG | HEART RATE: 73 BPM | WEIGHT: 220.63 LBS | OXYGEN SATURATION: 99 % | SYSTOLIC BLOOD PRESSURE: 138 MMHG | BODY MASS INDEX: 32.68 KG/M2 | RESPIRATION RATE: 18 BRPM

## 2025-01-17 DIAGNOSIS — D64.89 ANEMIA DUE TO MULTIPLE MECHANISMS: ICD-10-CM

## 2025-01-17 DIAGNOSIS — N18.30 STAGE 3 CHRONIC KIDNEY DISEASE, UNSPECIFIED WHETHER STAGE 3A OR 3B CKD: ICD-10-CM

## 2025-01-17 DIAGNOSIS — D64.9 NORMOCHROMIC ANEMIA: ICD-10-CM

## 2025-01-17 DIAGNOSIS — D64.9 CHRONIC ANEMIA: Primary | ICD-10-CM

## 2025-01-17 DIAGNOSIS — D46.9 MDS (MYELODYSPLASTIC SYNDROME): ICD-10-CM

## 2025-01-17 PROCEDURE — 63600175 PHARM REV CODE 636 W HCPCS: Mod: JZ,EC,TB | Performed by: NURSE PRACTITIONER

## 2025-01-17 PROCEDURE — 96372 THER/PROPH/DIAG INJ SC/IM: CPT

## 2025-01-17 RX ADMIN — EPOETIN ALFA-EPBX 60000 UNITS: 20000 INJECTION, SOLUTION INTRAVENOUS; SUBCUTANEOUS at 04:01

## 2025-01-17 NOTE — PLAN OF CARE
Problem: Fatigue  Goal: Improved Activity Tolerance  Outcome: Progressing  Intervention: Promote Improved Energy  Flowsheets (Taken 1/17/2025 1616)  Fatigue Management: frequent rest breaks encouraged

## 2025-01-23 ENCOUNTER — TELEPHONE (OUTPATIENT)
Dept: RADIOLOGY | Facility: HOSPITAL | Age: 65
End: 2025-01-23

## 2025-01-23 NOTE — TELEPHONE ENCOUNTER
----- Message from Jesica Fonseca MD sent at 11/5/2017  9:58 AM CST -----  Lab work looks good - he could use a daily otc folic acid tab - 800 mcg daily   PROVIDER:[TOKEN:[[id number]],FOLLOWUP:[1 week]]

## 2025-01-23 NOTE — NURSING
Pre procedure instructions for bone marrow biopsy given to pt ,verbalized understanding to arrive at 8 am on 1/27/25, npo after mn , will have his wife to drive him home.

## 2025-01-24 ENCOUNTER — INFUSION (OUTPATIENT)
Dept: INFUSION THERAPY | Facility: HOSPITAL | Age: 65
End: 2025-01-24
Attending: INTERNAL MEDICINE
Payer: COMMERCIAL

## 2025-01-24 VITALS
TEMPERATURE: 98 F | DIASTOLIC BLOOD PRESSURE: 81 MMHG | HEART RATE: 76 BPM | SYSTOLIC BLOOD PRESSURE: 149 MMHG | RESPIRATION RATE: 18 BRPM | WEIGHT: 216.63 LBS | BODY MASS INDEX: 32.08 KG/M2 | OXYGEN SATURATION: 99 % | HEIGHT: 69 IN

## 2025-01-24 DIAGNOSIS — D64.89 ANEMIA DUE TO MULTIPLE MECHANISMS: ICD-10-CM

## 2025-01-24 DIAGNOSIS — D64.9 CHRONIC ANEMIA: Primary | ICD-10-CM

## 2025-01-24 DIAGNOSIS — N18.30 STAGE 3 CHRONIC KIDNEY DISEASE, UNSPECIFIED WHETHER STAGE 3A OR 3B CKD: ICD-10-CM

## 2025-01-24 DIAGNOSIS — D64.9 NORMOCHROMIC ANEMIA: ICD-10-CM

## 2025-01-24 DIAGNOSIS — D46.9 MDS (MYELODYSPLASTIC SYNDROME): ICD-10-CM

## 2025-01-24 PROCEDURE — 96372 THER/PROPH/DIAG INJ SC/IM: CPT

## 2025-01-24 PROCEDURE — 63600175 PHARM REV CODE 636 W HCPCS: Mod: JZ,EC,TB | Performed by: NURSE PRACTITIONER

## 2025-01-24 RX ADMIN — EPOETIN ALFA-EPBX 60000 UNITS: 20000 INJECTION, SOLUTION INTRAVENOUS; SUBCUTANEOUS at 04:01

## 2025-01-27 ENCOUNTER — HOSPITAL ENCOUNTER (OUTPATIENT)
Dept: RADIOLOGY | Facility: HOSPITAL | Age: 65
Discharge: HOME | End: 2025-01-27
Attending: NURSE PRACTITIONER
Payer: COMMERCIAL

## 2025-01-27 VITALS
TEMPERATURE: 98 F | DIASTOLIC BLOOD PRESSURE: 93 MMHG | SYSTOLIC BLOOD PRESSURE: 150 MMHG | OXYGEN SATURATION: 98 % | RESPIRATION RATE: 16 BRPM | HEART RATE: 67 BPM

## 2025-01-27 DIAGNOSIS — D46.9 MDS (MYELODYSPLASTIC SYNDROME): Primary | ICD-10-CM

## 2025-01-27 DIAGNOSIS — D46.9 MDS (MYELODYSPLASTIC SYNDROME): ICD-10-CM

## 2025-01-27 LAB
APTT PPP: 32.1 SEC (ref 21–32)
BASOPHILS # BLD AUTO: 0.07 K/UL (ref 0–0.2)
BASOPHILS NFR BLD: 1.2 % (ref 0–1.9)
DIFFERENTIAL METHOD BLD: ABNORMAL
EOSINOPHIL # BLD AUTO: 0.2 K/UL (ref 0–0.5)
EOSINOPHIL NFR BLD: 3.5 % (ref 0–8)
ERYTHROCYTE [DISTWIDTH] IN BLOOD BY AUTOMATED COUNT: 28 % (ref 11.5–14.5)
HCT VFR BLD AUTO: 28.1 % (ref 40–54)
HGB BLD-MCNC: 9.1 G/DL (ref 14–18)
IMM GRANULOCYTES # BLD AUTO: 0.02 K/UL (ref 0–0.04)
IMM GRANULOCYTES NFR BLD AUTO: 0.3 % (ref 0–0.5)
INR PPP: 1.1 (ref 0.8–1.2)
LYMPHOCYTES # BLD AUTO: 1.4 K/UL (ref 1–4.8)
LYMPHOCYTES NFR BLD: 24.9 % (ref 18–48)
MCH RBC QN AUTO: 30.4 PG (ref 27–31)
MCHC RBC AUTO-ENTMCNC: 32.4 G/DL (ref 32–36)
MCV RBC AUTO: 94 FL (ref 82–98)
MONOCYTES # BLD AUTO: 0.5 K/UL (ref 0.3–1)
MONOCYTES NFR BLD: 8.7 % (ref 4–15)
NEUTROPHILS # BLD AUTO: 3.5 K/UL (ref 1.8–7.7)
NEUTROPHILS NFR BLD: 61.4 % (ref 38–73)
NRBC BLD-RTO: 0 /100 WBC
PLATELET # BLD AUTO: 465 K/UL (ref 150–450)
PMV BLD AUTO: 9.9 FL (ref 9.2–12.9)
PROTHROMBIN TIME: 11.8 SEC (ref 9–12.5)
RBC # BLD AUTO: 2.99 M/UL (ref 4.6–6.2)
WBC # BLD AUTO: 5.74 K/UL (ref 3.9–12.7)

## 2025-01-27 PROCEDURE — 85610 PROTHROMBIN TIME: CPT | Performed by: NURSE PRACTITIONER

## 2025-01-27 PROCEDURE — 99152 MOD SED SAME PHYS/QHP 5/>YRS: CPT | Performed by: RADIOLOGY

## 2025-01-27 PROCEDURE — 85730 THROMBOPLASTIN TIME PARTIAL: CPT | Performed by: NURSE PRACTITIONER

## 2025-01-27 PROCEDURE — A4215 STERILE NEEDLE: HCPCS

## 2025-01-27 PROCEDURE — 25000003 PHARM REV CODE 250: Performed by: RADIOLOGY

## 2025-01-27 PROCEDURE — 77012 CT SCAN FOR NEEDLE BIOPSY: CPT | Mod: TC | Performed by: RADIOLOGY

## 2025-01-27 PROCEDURE — 85025 COMPLETE CBC W/AUTO DIFF WBC: CPT | Performed by: NURSE PRACTITIONER

## 2025-01-27 PROCEDURE — 63600175 PHARM REV CODE 636 W HCPCS: Performed by: RADIOLOGY

## 2025-01-27 PROCEDURE — 99152 MOD SED SAME PHYS/QHP 5/>YRS: CPT

## 2025-01-27 RX ORDER — MIDAZOLAM HYDROCHLORIDE 1 MG/ML
INJECTION, SOLUTION INTRAMUSCULAR; INTRAVENOUS
Status: COMPLETED | OUTPATIENT
Start: 2025-01-27 | End: 2025-01-27

## 2025-01-27 RX ORDER — HYDROCODONE BITARTRATE AND ACETAMINOPHEN 5; 325 MG/1; MG/1
1 TABLET ORAL EVERY 4 HOURS PRN
Status: DISCONTINUED | OUTPATIENT
Start: 2025-01-27 | End: 2025-01-28 | Stop reason: HOSPADM

## 2025-01-27 RX ORDER — FENTANYL CITRATE 50 UG/ML
INJECTION, SOLUTION INTRAMUSCULAR; INTRAVENOUS
Status: COMPLETED | OUTPATIENT
Start: 2025-01-27 | End: 2025-01-27

## 2025-01-27 RX ORDER — SODIUM CHLORIDE 9 MG/ML
INJECTION, SOLUTION INTRAVENOUS
Status: COMPLETED | OUTPATIENT
Start: 2025-01-27 | End: 2025-01-27

## 2025-01-27 RX ORDER — LIDOCAINE HYDROCHLORIDE 10 MG/ML
INJECTION, SOLUTION EPIDURAL; INFILTRATION; INTRACAUDAL; PERINEURAL
Status: COMPLETED | OUTPATIENT
Start: 2025-01-27 | End: 2025-01-27

## 2025-01-27 RX ADMIN — FENTANYL CITRATE 50 MCG: 50 INJECTION INTRAMUSCULAR; INTRAVENOUS at 10:01

## 2025-01-27 RX ADMIN — SODIUM CHLORIDE 250 ML/HR: 9 INJECTION, SOLUTION INTRAVENOUS at 10:01

## 2025-01-27 RX ADMIN — LIDOCAINE HYDROCHLORIDE 8 ML: 10 INJECTION, SOLUTION EPIDURAL; INFILTRATION; INTRACAUDAL; PERINEURAL at 10:01

## 2025-01-27 RX ADMIN — MIDAZOLAM 1 MG: 1 INJECTION INTRAMUSCULAR; INTRAVENOUS at 10:01

## 2025-01-27 RX ADMIN — MIDAZOLAM 2 MG: 1 INJECTION INTRAMUSCULAR; INTRAVENOUS at 10:01

## 2025-01-27 NOTE — PLAN OF CARE
Patient arrived to CT via stretcher. AAOx3, MAEx4.  Denies pain.  Verbalizes understanding of procedure.

## 2025-01-27 NOTE — DISCHARGE INSTRUCTIONS
Medical Imaging Discharge Instructions    Discharge Instructions After:  Bone Marrow Biopsy    Diet:  Resume diet as prescribed by your physician and Avoid the use of muscle relaxants, sedative, hypnotics or mood altering medications for 24 hours unless approved by your physician    Medications:  Resume medications as prescribed by your physician and Avoid the use of muscle relaxants, sedatives, hypnotics or mood altering medications for 24 hours unless approved by your physician.    Activity:  Rest today, Avoid strenuous activity for 1 days, Do not drive an automobile or operate hazardous machinery for 24 hours, and Make no major decisions or sign any legal documents for 24 hours    Care of Dressing and Incision:  Do not remove dressing until 24 hours and May change dressing or bandage as needed    Report to M.D. any of the Following:  Any severe pain, new onset pain or worsening symptoms, Excessive bleeding, bruising or swelling, Temperature of 101 degrees or above, and IV site may become tender. If so, place a warm, wet compress on IV site four times a day. This should relieve symptoms in a few days.    Follow up with your physician regarding the results of this exam. Please feel free to call the radiology department at [phone] for any problems or questions. Ask to speak with the radiology nurse.    Maria Guadalupe Castro RN  Date of Service: 01/27/2025  10:46 AM

## 2025-01-27 NOTE — PLAN OF CARE
Patient tolerated procedure well. Denies pain and nausea. Dressing to right back of maikol and large bandaid CDI.  Patient returned to ASU room via stretcher. Report given to ROSALINDA Lindquist

## 2025-01-27 NOTE — PROGRESS NOTES
[facility] Hematology/Oncology    PROGRESS NOTE - Follow-up Visit    NO Changes to H and P since last visit     Subjective:       Patient ID:   NAME: Rick Butler : 1960     64 y.o. male    Referring Doc: Chetna (new PCP)  Other Physicians: Getachew; Alan Mustafa    Chief Complaint:  MDS     History of Present Illness:     Patient returns today for a regularly scheduled follow-up visit.  The patient is doing ok overall. He is here by himself today.     He has been Rebozyl and doing well with it - however, he did have some decreased kidney function that both Dr. Tello and Dr. Mas believe it could be the reblozyl; DR Hilario is now at [facility] in [place] and patient plans to keep in touch with her through telemed visits - he has a televist with her in December and she states to hold the reblozyl     Creatinine is now at 2.9 down from 3.3. He has been off of reblozyl 2024 Hemoglobin is at 9.6 now as he continues on retacrit     He has not had any recent pain crisis. He denies having any breathing difficulties. He denies any blood in the stool, dark stools or hematuria;; no CP, SOB, HA's or N/V;        He was having back pain and did have an XRAY on his lower back that did show severe degenerative changes.     Discussed covid19 precautions - he had his vaccinations      ROS:   GEN: normal without any fever, night sweats or weight loss  HEENT: normal with no HA's, sore throat, stiff neck, changes in vision  CV: normal with no CP, SOB, PND, MORA or orthopnea  PULM: normal with no SOB, cough, hemoptysis, sputum or pleuritic pain  GI: normal with no abdominal pain, nausea, vomiting, constipation, diarrhea, melanotic stools, BRBPR, or hematemesis  : normal with no hematuria, dysuria  BREAST: normal with no mass, discharge, pain  SKIN: normal with no rash, erythema, bruising, or swelling    Allergies:  Review of patient's allergies indicates:  No Known Allergies    Medications:    Current Outpatient Medications:      amLODIPine (NORVASC) 5 MG tablet, Take 1 tablet (5 mg total) by mouth once daily., Disp: 90 tablet, Rfl: 3    ascorbic acid, vitamin C, (VITAMIN C) 500 MG tablet, Take 500 mg by mouth once daily., Disp: , Rfl:     atorvastatin (LIPITOR) 10 MG tablet, Take 1 tablet (10 mg total) by mouth every evening., Disp: 90 tablet, Rfl: 3    azelastine (ASTELIN) 137 mcg (0.1 %) nasal spray, 2 sprays (274 mcg total) by Nasal route 2 (two) times daily as needed for Rhinitis., Disp: 30 mL, Rfl: 5    calcitRIOL (ROCALTROL) 0.25 MCG Cap, Take 0.25 mcg by mouth every 7 days., Disp: , Rfl:     droNABinol (MARINOL) 10 MG capsule, Take 1 capsule (10 mg total) by mouth 2 (two) times daily before meals., Disp: 60 capsule, Rfl: 1    epoetin tray (PROCRIT INJ), Thursday, Disp: , Rfl:     famotidine (PEPCID) 20 MG tablet, famotidine 20 mg tablet  Take 1 tablet twice a day by oral route., Disp: , Rfl:     fluticasone propionate (FLONASE) 50 mcg/actuation nasal spray, 1 SPRAY (50 MCG TOTAL) BY EACH NOSTRIL ROUTE 2 (TWO) TIMES A DAY. 1 SPRAY EVERY MORNING AND AFTERNOON TOWARD THE EAR EACH SIDE AFTER A SINUS RINSE (Patient not taking: Reported on 1/8/2025), Disp: 48 mL, Rfl: 1    folic acid (FOLVITE) 1 MG tablet, TAKE 2 TABLETS BY MOUTH EVERY DAY, Disp: 180 tablet, Rfl: 0    LIDOcaine (LIDODERM) 5 %, Place 1 patch onto the skin once daily. Remove & Discard patch within 12 hours or as directed by MD (Patient not taking: Reported on 1/8/2025), Disp: 14 patch, Rfl: 0    multivitamin capsule, Take 1 capsule by mouth once daily., Disp: , Rfl:     pantoprazole (PROTONIX) 40 MG tablet, , Disp: , Rfl:     sildenafil (VIAGRA) 100 MG tablet, Take 1 tablet (100 mg total) by mouth daily as needed for Erectile Dysfunction., Disp: 10 tablet, Rfl: 6    sod chlor-bicarb-squeez bottle (NEILMED SINUS RINSE COMPLETE) pkdv, 1 application  by sinus irrigation route 2 (two) times a day. Not right before bed, Disp: 1 each, Rfl: 11    tamsulosin (FLOMAX) 0.4 mg Cap,  TAKE 2 CAPSULES BY MOUTH EVERY DAY, Disp: 180 capsule, Rfl: 1    traZODone (DESYREL) 100 MG tablet, TAKE 1 TABLET BY MOUTH NIGHTLY AS NEEDED FOR INSOMNIA., Disp: 90 tablet, Rfl: 2    PMHx/PSHx Updates:  See patient's last visit with Dr. Tello on 10/10/2024  See H&P on 7/9/2011      Pathology:    12/20/2019  BONE MARROW, RIGHT ILIAC CREST,    ASPIRATE, CLOT SECTION, AND CORE BIOPSY:    --HYPERCELLULAR MARROW (APPROXIMATELY 75% TO 80%) WITH TRILINEAGE   HEMATOPOIETIC ELEMENTS, ERYTHROID  HYPERPLASIA AND MILD MEGAKARYOCYTIC HYPERPLASIA, AND NON-SPECIFIC   DYSHEMATOPOIETIC CHANGES (SEE     COMMENT).  --[id number] INCREASED STAINABLE IRON WITH INCREASED RING   SIDEROBLASTS (GREATER THAN 15%)     (SEE COMMENT).  --PERIPHERAL BLOOD WITH THROMBOCYTOSIS (574,000/MICROLITER) AND ANEMIA   (HEMOGLOBIN 5.5 GRAM/DECILITER),    WITH SCATTERED DREPANOCYTOID FORMS AND FEW NUCLEATED ERYTHROCYTES      Cytogenetic Results at the bottom of the report.  NORMAL    Objective:     Vitals:  There were no vitals taken for this visit.    Physical Examination:   GEN: no apparent distress, comfortable; AAOx3  HEAD: atraumatic and normocephalic  EYES: no pallor, no icterus, PERRLA  ENT: OMM, no pharyngeal erythema, external ears WNL; no nasal discharge; no thrush  NECK: no masses, thyroid normal, trachea midline, no LAD/LN's, supple  CV: RRR with no murmur; normal pulse; normal S1 and S2; no pedal edema  CHEST: Normal respiratory effort; CTAB; normal breath sounds; no wheeze or crackles  ABDOM: nontender and nondistended; soft; normal bowel sounds; no rebound/guarding  MUSC/Skeletal: ROM normal; no crepitus; joints normal; no deformities or arthropathy  EXTREM: no clubbing, cyanosis, inflammation or swelling  SKIN: no rashes, lesions, ulcers, petechiae or subcutaneous nodules  : no jiang  NEURO: grossly intact; motor/sensory WNL; AAOx3; no tremors  PSYCH: normal mood, affect and behavior  LYMPH: normal cervical, supraclavicular,  axillary and groin LN's    Labs:     Lab Results   Component Value Date    WBC 9.89 01/15/2025    HGB 8.2 (L) 01/15/2025    HCT 24.4 (L) 01/15/2025    MCV 93 01/15/2025     (H) 01/15/2025           CMP  Sodium   Date Value Ref Range Status   01/15/2025 143 136 - 145 mmol/L Final   06/26/2019 138 134 - 144 mmol/L      Potassium   Date Value Ref Range Status   01/15/2025 4.6 3.5 - 5.1 mmol/L Final     Chloride   Date Value Ref Range Status   01/15/2025 111 (H) 95 - 110 mmol/L Final   06/26/2019 105 98 - 110 mmol/L      CO2   Date Value Ref Range Status   01/15/2025 26 23 - 29 mmol/L Final     Glucose   Date Value Ref Range Status   01/15/2025 105 70 - 110 mg/dL Final   06/26/2019 114 (H) 70 - 99 mg/dL      BUN   Date Value Ref Range Status   01/15/2025 30 (H) 8 - 23 mg/dL Final     Creatinine   Date Value Ref Range Status   01/15/2025 3.1 (H) 0.5 - 1.4 mg/dL Final   06/26/2019 1.62 (H) 0.60 - 1.40 mg/dL      Calcium   Date Value Ref Range Status   01/15/2025 8.6 (L) 8.7 - 10.5 mg/dL Final     Total Protein   Date Value Ref Range Status   01/15/2025 7.2 6.0 - 8.4 g/dL Final     Albumin   Date Value Ref Range Status   01/15/2025 4.3 3.5 - 5.2 g/dL Final   06/26/2019 4.4 3.1 - 4.7 g/dL      Total Bilirubin   Date Value Ref Range Status   01/15/2025 0.6 0.1 - 1.0 mg/dL Final     Comment:     For infants and newborns, interpretation of results should be based  on gestational age, weight and in agreement with clinical  observations.    Premature Infant recommended reference ranges:  Up to 24 hours.............<8.0 mg/dL  Up to 48 hours............<12.0 mg/dL  3-5 days..................<15.0 mg/dL  6-29 days.................<15.0 mg/dL       Alkaline Phosphatase   Date Value Ref Range Status   01/15/2025 65 55 - 135 U/L Final     AST   Date Value Ref Range Status   01/15/2025 19 10 - 40 U/L Final     ALT   Date Value Ref Range Status   01/15/2025 17 10 - 44 U/L Final     Anion Gap   Date Value Ref Range Status    01/15/2025 6 (L) 8 - 16 mmol/L Final     eGFR if    Date Value Ref Range Status   07/13/2022 45.6 (A) >60 mL/min/1.73 m^2 Final     eGFR if non    Date Value Ref Range Status   07/13/2022 39.5 (A) >60 mL/min/1.73 m^2 Final     Comment:     Calculation used to obtain the estimated glomerular filtration  rate (eGFR) is the CKD-EPI equation.          Lab Results   Component Value Date    IRON 78 12/31/2024    TIBC 175 (L) 12/31/2024    FERRITIN 1,216.0 (H) 12/31/2024           I have reviewed all available lab results and radiology reports.    Radiology/Diagnostic Studies:    CXR 10/31/2024:   Impression:     Multilevel severe discogenic and facet degenerative changes of the lumbar spine.        Electronically signed by:Herber Aaron  Date:                                            10/31/2024  Time:                                           16:16    US 2/8/2023:    IMPRESSION:  1. Dilation of the common bile duct at up to 10 mm, unchanged when compared to 2016.  2. Nonvisualization of the pancreas because of overlying bowel gas.  3. No other significant findings.  spleen is normal in size and appearance      2/15/2018 Xray Survey:   IMPRESSION: No significant abnormality seen. No evidence of osteoblastic or  osteoclastic lesions identified    MRI L-spine 2/12/2018  IMPRESSION:    1. Prominent low signal intensity throughout the bone marrow on T1 and  T2-weighted imaging. Marrow infiltrative process including malignancy such as  metastatic disease or lymphoma/leukemia is a consideration along with multiple  myeloma or malignant process. Benign etiology such as osteopetrosis or  regenerative red marrow are also considerations.    2. Multilevel lumbar degenerative changes, most prominent at L4-L5 and L5-S1 as  described.    Assessment/Plan:   (1) 64 y.o. male with diagnosis of sickle cell anemia with borderline microcytosis  - latest hgb was 6.0 and he had blood transfusion  - today, he  "is not symptomatic at this time  - he probably has a multifactorial anemia process with underlying sickle cell, anemia of chronic disorders and anemia of chronic renal. I can not rule out an underlying GI bleeding process.   - iron panel is adequate (no deficiency)  - total bilirubin is only minimally elevated  - he required transfusion again in Oct 2019 and again in Dec 2019  - he had bone marrow biopsy on 12/20/2019    "dyshematopoietic changes are not entirely specific and are present  mostly within erythroid and megakaryocytic lineages. These findings   could be observed in chronic disease states, autoimmune conditions,  infectious settings, toxic exposures, nutritional deficits, as medication/drug effect, and in myelodysplastic syndrome (MDS), among other conditions"  "Additionally, ring sideroblasts are a non-specific   finding "    Patient was referred to see Dr Mustafa at 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  - he is now on procrit per directives of Dr Mustafa    7/30/2020:   he recently saw Dr Mustafa  And sees him again in Jan 2021  - he is doing well with procrit and is feeling much better  - he has not required any transfusions for some time time    9/24/2020:  - latest hgb at 8.5  - will increase the procrit dose  - f/u with Dr Zavala in Jan 2021 11/19/2020:  - patient doing well and feels "great"  - hgb up to 9.1; continued on the procrit  - f/u with Dr Mustafa in Jan 2021    3/4/2021:  - he saw Dr Mustafa in jan 2021 and sees him again in June/July 2021  - latest hgb at 9.1 and stable and platelets are 474,000; wbc at 10.0    6/10/2021:  - latest hgb at 9.0  - plats 485,000  - on weekly procrit  - seeing Dr Mustafa again tomorrow    10/14/2021:  - hgb at 8.6 but he recently had been taking naproxen and had some BRBPR - which since resolved  - he saw Dr Beyer with GI and he is having colonsocopy on Nov 5th along with EGD  - plats 429,000  - he sees Dr Mustafa again in Nov 19th   - " he sees Dr Mas again in Dec 2021    1/13/2022:  - He has been under the care of Dr Mustafa at [facility] for RARS- MDS. He is now on procrit weekly. He sees Dr Mustafa again in March 2022    - hgb 9.3  - he had scope with Dr Collins in nov 2021 which was good per patient and they plan to repeat in 5 yrs    5/12/2022:  - hgb at 9.0  - plats 485  - wbc 7.25  - he is on procrit weekly  - saw Dr Mustafa this past Fridday and since he is leaving, he will start seeing Dr Hilario instead in 3 months    8/18/2022:  - latest hgb a little lower at 8.8  - he has been on the procrit  - he saw Dr Hilario recently at [facility] and plans to see her again in 3 months    1/26/2023:  - He saw Dr Hilario at [facility] again recently this past Tuesday and they are considering another medication; he plans to see her again in April 2023  - Dr Hilario requested he have an US of spleen - will order here in [place]  - he is also on appetite stimulant  - hgb at 9.4 and plats at 500k    5/11/2023:  - hgb at 9.6 and plats 466,000  - He saw Dr Hilario at [facility] again recently this past Tuesday and they are considering another medication; he plans to see her again in Aug 2023    8/3/2023:  - His insurance will not approve of him getting the Reblozyl   - He sees Dr Hilario again next Tuesday and we will await her directives.     9/14/2023:  - the Rebozyl has been approved - will set up  - latest hgb at 10.1  - seeing Dr Hilario again in Jan 2024 11/16/2023:  - continued on Rebozyl  - WBC and plats WNL  - hgb at 10.3  - cardiac w/u negative per patient    2/15/2024:  - labs are adequate  - hgb at 10.6  - WBC and plat WNL  - continued on Rebozyl  - recent colonoscopy good per patient    4/4/2024:  - continued on Rebozyl  - counts are looking adequate    8/1/2024:  - continued on procrit  - insurance would not approve of the rebozyl and he has not had the last 2-3 injections  - hold rebozyl for now anyway due to the increased creatine and decreased kidney function  - f/u with   "Getachew  - seeing Dr Serrano on 8/13  - continue procrit as directed    10/10/2024  - he sees Dr Mas on 10/31  - he has been continued on rebozyl and is due again next week  - he has been on procrit  - Dr Serrano is now at [facility] in [place] and he last saw her in Aug 2024 - he plans to continue under her care via telemed    11/7/2024:   - continue procrit   - hold reblozyl due to kidney function until patient sees Dr. SERRANO in December   - referral to neuro and back clinic due to severe disc disease in lower back     12/11/2024:   - patient kidney function has declined more   - [place] procrit to [id number] per Dr. SERRANO   - hold reblozyl for now, hgb stable       (2) Alcohol abuse issues in past - he has been sober for over 3 years now    (3) New findings on radiography with abnormal chest CT and lung mass  - former smoker    (4) chronic back issues - prior Xrays and MRI - suspect the findings on the MRI are possibly due to his sickle cell;   - SPEP was previously negative for M-protein  - PSA was 0.3 in Sept 2017  - recommended neurosurgery evaluation previously  - he saw Dr Nura VUONG and had a nerve "burning" procedure and his pain has improved    (5) CRI - elevated creatinine - he is followed Dr Chilel with nephrology      (6) H pylori gastritis - s/p recent endoscopy with Dr Collins and antibotics x 10 days    (7) CP   9/14/2023:  - seeing Dr lovett with cardiology and planning stress test in near future        1. MDS (myelodysplastic syndrome)            - note updated to proceed with bone marrow biopsy       PLAN;  1. Continue with the procrit ; hold reblozyl until further recs from fan   2. Proceed with Dr Mas - water and food only, no diet sodas   2. Planned f/u with Dr Serrano again next year   3. F/u with cardiology as directed  4.  F/u with Castillo as needed  5. Check labs every 2 weeks  6. Add marinol for appetite - increase to 10mg BID        RTC  4 weeks with me           Discussion:       Pathology Discussion:    I reviewed " and discussed the pathology report(s) and radiograph reports (if available) in as simple to understand and/or laymen's terms to the best of my ability. I had an indepth conversation with the patient and went over the patient's individual diagnosis based on the information that was currently available. I discussed the TNM staging process with regard to the patient's particular cancer type, and the calculated stage based on the currently available TNM data and literature. I discussed the available prognostic data with regard to the current staging information and how it relates to the prognosis of their particular neoplastic process.          COVID-19 Discussion:    I had long discussion with patient and any applicable family about the COVID-19 coronavirus epidemic and the recommended precautions with regard to cancer and/or hematology patients. I have re-iterated the CDC recommendations for adequate hand washing, use of hand -like products, and coughing into elbow, etc. In addition, especially for our patients who are on chemotherapy and/or our otherwise immunocompromised patients, I have recommended avoidance of crowds, including movie theaters, restaurants, churches, etc. I have recommended avoidance of any sick or symptomatic family members and/or friends. I have also recommended avoidance of any raw and unwashed food products, and general avoidance of food items that have not been prepared by themselves. The patient has been asked to call us immediately with any symptom developments, issues, questions or other general concerns.     I have explained all of the above in detail and the patient understands all of the current recommendation(s). I have answered all of their questions to the best of my ability and to their complete satisfaction.   The patient is to continue with the current management plan.            Electronically signed by Pratima Melgar NP

## 2025-01-28 DIAGNOSIS — E53.8 FOLIC ACID DEFICIENCY: ICD-10-CM

## 2025-01-28 DIAGNOSIS — E78.5 DYSLIPIDEMIA: ICD-10-CM

## 2025-01-28 DIAGNOSIS — K57.30 DIVERTICULOSIS OF SIGMOID COLON: Primary | ICD-10-CM

## 2025-01-28 DIAGNOSIS — I10 ESSENTIAL HYPERTENSION: ICD-10-CM

## 2025-01-28 RX ORDER — FOLIC ACID 1 MG/1
2000 TABLET ORAL
Qty: 180 TABLET | Refills: 0 | Status: SHIPPED | OUTPATIENT
Start: 2025-01-28

## 2025-01-28 RX ORDER — AMLODIPINE BESYLATE 5 MG/1
5 TABLET ORAL DAILY
Qty: 90 TABLET | Refills: 3 | Status: SHIPPED | OUTPATIENT
Start: 2025-01-28

## 2025-01-28 RX ORDER — ATORVASTATIN CALCIUM 10 MG/1
10 TABLET, FILM COATED ORAL NIGHTLY
Qty: 90 TABLET | Refills: 3 | Status: SHIPPED | OUTPATIENT
Start: 2025-01-28 | End: 2026-01-28

## 2025-01-29 ENCOUNTER — LAB VISIT (OUTPATIENT)
Dept: LAB | Facility: HOSPITAL | Age: 65
End: 2025-01-29
Attending: INTERNAL MEDICINE
Payer: COMMERCIAL

## 2025-01-29 DIAGNOSIS — D46.9 MDS (MYELODYSPLASTIC SYNDROME): ICD-10-CM

## 2025-01-29 LAB
ALBUMIN SERPL BCP-MCNC: 4.4 G/DL (ref 3.5–5.2)
ALP SERPL-CCNC: 64 U/L (ref 55–135)
ALT SERPL W/O P-5'-P-CCNC: 15 U/L (ref 10–44)
ANION GAP SERPL CALC-SCNC: 7 MMOL/L (ref 8–16)
AST SERPL-CCNC: 16 U/L (ref 10–40)
BASOPHILS # BLD AUTO: 0.07 K/UL (ref 0–0.2)
BASOPHILS NFR BLD: 1.2 % (ref 0–1.9)
BILIRUB SERPL-MCNC: 0.9 MG/DL (ref 0.1–1)
BUN SERPL-MCNC: 36 MG/DL (ref 8–23)
CALCIUM SERPL-MCNC: 9 MG/DL (ref 8.7–10.5)
CHLORIDE SERPL-SCNC: 110 MMOL/L (ref 95–110)
CO2 SERPL-SCNC: 25 MMOL/L (ref 23–29)
CREAT SERPL-MCNC: 2.8 MG/DL (ref 0.5–1.4)
DIFFERENTIAL METHOD BLD: ABNORMAL
EOSINOPHIL # BLD AUTO: 0.1 K/UL (ref 0–0.5)
EOSINOPHIL NFR BLD: 2.2 % (ref 0–8)
ERYTHROCYTE [DISTWIDTH] IN BLOOD BY AUTOMATED COUNT: 27 % (ref 11.5–14.5)
EST. GFR  (NO RACE VARIABLE): 24.4 ML/MIN/1.73 M^2
FERRITIN SERPL-MCNC: 1454.6 NG/ML (ref 20–300)
GLUCOSE SERPL-MCNC: 93 MG/DL (ref 70–110)
HCT VFR BLD AUTO: 27.8 % (ref 40–54)
HGB BLD-MCNC: 9.3 G/DL (ref 14–18)
IMM GRANULOCYTES # BLD AUTO: 0.02 K/UL (ref 0–0.04)
IMM GRANULOCYTES NFR BLD AUTO: 0.3 % (ref 0–0.5)
IRON SERPL-MCNC: 169 UG/DL (ref 45–160)
LYMPHOCYTES # BLD AUTO: 1.5 K/UL (ref 1–4.8)
LYMPHOCYTES NFR BLD: 26.1 % (ref 18–48)
MCH RBC QN AUTO: 30.7 PG (ref 27–31)
MCHC RBC AUTO-ENTMCNC: 33.5 G/DL (ref 32–36)
MCV RBC AUTO: 92 FL (ref 82–98)
MONOCYTES # BLD AUTO: 0.4 K/UL (ref 0.3–1)
MONOCYTES NFR BLD: 6 % (ref 4–15)
NEUTROPHILS # BLD AUTO: 3.8 K/UL (ref 1.8–7.7)
NEUTROPHILS NFR BLD: 64.2 % (ref 38–73)
NRBC BLD-RTO: 0 /100 WBC
PLATELET # BLD AUTO: 467 K/UL (ref 150–450)
PMV BLD AUTO: 10.2 FL (ref 9.2–12.9)
POTASSIUM SERPL-SCNC: 4.2 MMOL/L (ref 3.5–5.1)
PROT SERPL-MCNC: 7.3 G/DL (ref 6–8.4)
RBC # BLD AUTO: 3.03 M/UL (ref 4.6–6.2)
SATURATED IRON: 92 % (ref 20–50)
SODIUM SERPL-SCNC: 142 MMOL/L (ref 136–145)
TOTAL IRON BINDING CAPACITY: 183 UG/DL (ref 250–450)
TRANSFERRIN SERPL-MCNC: 131 MG/DL (ref 200–375)
WBC # BLD AUTO: 5.87 K/UL (ref 3.9–12.7)

## 2025-01-29 PROCEDURE — 84466 ASSAY OF TRANSFERRIN: CPT | Performed by: INTERNAL MEDICINE

## 2025-01-29 PROCEDURE — 36415 COLL VENOUS BLD VENIPUNCTURE: CPT | Performed by: INTERNAL MEDICINE

## 2025-01-29 PROCEDURE — 82728 ASSAY OF FERRITIN: CPT | Performed by: INTERNAL MEDICINE

## 2025-01-29 PROCEDURE — 85025 COMPLETE CBC W/AUTO DIFF WBC: CPT | Performed by: INTERNAL MEDICINE

## 2025-01-29 PROCEDURE — 80053 COMPREHEN METABOLIC PANEL: CPT | Performed by: INTERNAL MEDICINE

## 2025-01-31 ENCOUNTER — INFUSION (OUTPATIENT)
Dept: INFUSION THERAPY | Facility: HOSPITAL | Age: 65
End: 2025-01-31
Attending: INTERNAL MEDICINE
Payer: COMMERCIAL

## 2025-01-31 ENCOUNTER — TELEPHONE (OUTPATIENT)
Dept: HEMATOLOGY/ONCOLOGY | Facility: CLINIC | Age: 65
End: 2025-01-31
Payer: COMMERCIAL

## 2025-01-31 VITALS
RESPIRATION RATE: 17 BRPM | HEART RATE: 78 BPM | SYSTOLIC BLOOD PRESSURE: 137 MMHG | HEIGHT: 69 IN | TEMPERATURE: 98 F | DIASTOLIC BLOOD PRESSURE: 75 MMHG | OXYGEN SATURATION: 100 % | BODY MASS INDEX: 31.99 KG/M2 | WEIGHT: 216 LBS

## 2025-01-31 DIAGNOSIS — N18.30 STAGE 3 CHRONIC KIDNEY DISEASE, UNSPECIFIED WHETHER STAGE 3A OR 3B CKD: ICD-10-CM

## 2025-01-31 DIAGNOSIS — D64.9 CHRONIC ANEMIA: Primary | ICD-10-CM

## 2025-01-31 DIAGNOSIS — D46.9 MDS (MYELODYSPLASTIC SYNDROME): ICD-10-CM

## 2025-01-31 DIAGNOSIS — D64.9 NORMOCHROMIC ANEMIA: ICD-10-CM

## 2025-01-31 DIAGNOSIS — D64.89 ANEMIA DUE TO MULTIPLE MECHANISMS: ICD-10-CM

## 2025-01-31 PROCEDURE — 96372 THER/PROPH/DIAG INJ SC/IM: CPT

## 2025-01-31 PROCEDURE — 63600175 PHARM REV CODE 636 W HCPCS: Mod: JZ,EC,TB | Performed by: NURSE PRACTITIONER

## 2025-01-31 RX ADMIN — EPOETIN ALFA-EPBX 60000 UNITS: 20000 INJECTION, SOLUTION INTRAVENOUS; SUBCUTANEOUS at 04:01

## 2025-01-31 NOTE — TELEPHONE ENCOUNTER
Patient made aware that we are still waiting on results and will get him in for a follow up when the final report is received.

## 2025-01-31 NOTE — TELEPHONE ENCOUNTER
----- Message from Pratima Almonte NP sent at 1/30/2025  4:30 PM CST -----  Still waiting on results.  ----- Message -----  From: Kim Samuels RN  Sent: 1/30/2025   1:19 PM CST  To: Pratima Melgar NP    How soon do you want a follow up with him?  ----- Message -----  From: Angie Sorto  Sent: 1/30/2025   1:05 PM CST  To: [facility] Staff    Pt calling for the results of his bone biopsy the Pt had done on Monday. Pt does not have a follow up visit scheduled.     # [phone]

## 2025-02-03 ENCOUNTER — LAB VISIT (OUTPATIENT)
Dept: LAB | Facility: HOSPITAL | Age: 65
End: 2025-02-03
Attending: INTERNAL MEDICINE
Payer: COMMERCIAL

## 2025-02-03 DIAGNOSIS — N18.30 CHRONIC KIDNEY DISEASE, STAGE III (MODERATE): ICD-10-CM

## 2025-02-03 DIAGNOSIS — N25.81 SECONDARY HYPERPARATHYROIDISM OF RENAL ORIGIN: Primary | ICD-10-CM

## 2025-02-03 DIAGNOSIS — D64.9 ANEMIA, UNSPECIFIED: ICD-10-CM

## 2025-02-03 LAB
ALBUMIN SERPL BCP-MCNC: 4.3 G/DL (ref 3.5–5.2)
ANION GAP SERPL CALC-SCNC: 6 MMOL/L (ref 8–16)
BASOPHILS # BLD AUTO: 0.1 K/UL (ref 0–0.2)
BASOPHILS NFR BLD: 1.6 % (ref 0–1.9)
BUN SERPL-MCNC: 30 MG/DL (ref 8–23)
CALCIUM SERPL-MCNC: 8.9 MG/DL (ref 8.7–10.5)
CHLORIDE SERPL-SCNC: 109 MMOL/L (ref 95–110)
CO2 SERPL-SCNC: 26 MMOL/L (ref 23–29)
CREAT SERPL-MCNC: 2.8 MG/DL (ref 0.5–1.4)
CREAT UR-MCNC: 110.1 MG/DL (ref 23–375)
DIFFERENTIAL METHOD BLD: ABNORMAL
EOSINOPHIL # BLD AUTO: 0.2 K/UL (ref 0–0.5)
EOSINOPHIL NFR BLD: 3.1 % (ref 0–8)
ERYTHROCYTE [DISTWIDTH] IN BLOOD BY AUTOMATED COUNT: 27.3 % (ref 11.5–14.5)
EST. GFR  (NO RACE VARIABLE): 24.4 ML/MIN/1.73 M^2
FERRITIN SERPL-MCNC: 1426.1 NG/ML (ref 20–300)
GLUCOSE SERPL-MCNC: 111 MG/DL (ref 70–110)
HCT VFR BLD AUTO: 27.8 % (ref 40–54)
HGB BLD-MCNC: 9.2 G/DL (ref 14–18)
IMM GRANULOCYTES # BLD AUTO: 0.05 K/UL (ref 0–0.04)
IMM GRANULOCYTES NFR BLD AUTO: 0.8 % (ref 0–0.5)
IRON SERPL-MCNC: 167 UG/DL (ref 45–160)
IRON SERPL-MCNC: 167 UG/DL (ref 45–160)
LYMPHOCYTES # BLD AUTO: 1.4 K/UL (ref 1–4.8)
LYMPHOCYTES NFR BLD: 21.6 % (ref 18–48)
MCH RBC QN AUTO: 30.9 PG (ref 27–31)
MCHC RBC AUTO-ENTMCNC: 33.1 G/DL (ref 32–36)
MCV RBC AUTO: 93 FL (ref 82–98)
MICROSCOPIC COMMENT: NORMAL
MONOCYTES # BLD AUTO: 0.6 K/UL (ref 0.3–1)
MONOCYTES NFR BLD: 9.9 % (ref 4–15)
NEUTROPHILS # BLD AUTO: 4 K/UL (ref 1.8–7.7)
NEUTROPHILS NFR BLD: 63 % (ref 38–73)
NRBC BLD-RTO: 1 /100 WBC
PHOSPHATE SERPL-MCNC: 3.2 MG/DL (ref 2.7–4.5)
PLATELET # BLD AUTO: 482 K/UL (ref 150–450)
PMV BLD AUTO: 9.7 FL (ref 9.2–12.9)
POTASSIUM SERPL-SCNC: 4.4 MMOL/L (ref 3.5–5.1)
PROT UR-MCNC: 103 MG/DL (ref 6–15)
PTH-INTACT SERPL-MCNC: 120.7 PG/ML (ref 9–77)
RBC # BLD AUTO: 2.98 M/UL (ref 4.6–6.2)
SATURATED IRON: 96 % (ref 20–50)
SODIUM SERPL-SCNC: 141 MMOL/L (ref 136–145)
TOTAL IRON BINDING CAPACITY: 174 UG/DL (ref 250–450)
TRANSFERRIN SERPL-MCNC: 124 MG/DL (ref 200–375)
WBC # BLD AUTO: 6.35 K/UL (ref 3.9–12.7)
WBC #/AREA URNS HPF: 0 /HPF (ref 0–5)

## 2025-02-03 PROCEDURE — 84156 ASSAY OF PROTEIN URINE: CPT | Performed by: INTERNAL MEDICINE

## 2025-02-03 PROCEDURE — 83970 ASSAY OF PARATHORMONE: CPT | Performed by: INTERNAL MEDICINE

## 2025-02-03 PROCEDURE — 83540 ASSAY OF IRON: CPT | Performed by: INTERNAL MEDICINE

## 2025-02-03 PROCEDURE — 85025 COMPLETE CBC W/AUTO DIFF WBC: CPT | Performed by: INTERNAL MEDICINE

## 2025-02-03 PROCEDURE — 82570 ASSAY OF URINE CREATININE: CPT | Performed by: INTERNAL MEDICINE

## 2025-02-03 PROCEDURE — 81001 URINALYSIS AUTO W/SCOPE: CPT | Performed by: INTERNAL MEDICINE

## 2025-02-03 PROCEDURE — 36415 COLL VENOUS BLD VENIPUNCTURE: CPT | Performed by: INTERNAL MEDICINE

## 2025-02-03 PROCEDURE — 82728 ASSAY OF FERRITIN: CPT | Performed by: INTERNAL MEDICINE

## 2025-02-03 PROCEDURE — 80069 RENAL FUNCTION PANEL: CPT | Performed by: INTERNAL MEDICINE

## 2025-02-04 ENCOUNTER — TELEPHONE (OUTPATIENT)
Facility: CLINIC | Age: 65
End: 2025-02-04
Payer: COMMERCIAL

## 2025-02-04 NOTE — TELEPHONE ENCOUNTER
"----- Message from Winter sent at 2/4/2025  9:02 AM CST -----  Pt had a Bone Marrow Biopsy last Monday and would like a CB today on the results      The patient is "deeply concerned"...and his hematologist in [place] has not heard anything, according the the patient/    PT -[phone]  "

## 2025-02-07 ENCOUNTER — INFUSION (OUTPATIENT)
Dept: INFUSION THERAPY | Facility: HOSPITAL | Age: 65
End: 2025-02-07
Attending: INTERNAL MEDICINE
Payer: COMMERCIAL

## 2025-02-07 VITALS
HEART RATE: 77 BPM | SYSTOLIC BLOOD PRESSURE: 127 MMHG | HEIGHT: 69 IN | TEMPERATURE: 98 F | DIASTOLIC BLOOD PRESSURE: 71 MMHG | RESPIRATION RATE: 17 BRPM | BODY MASS INDEX: 32.39 KG/M2 | WEIGHT: 218.69 LBS | OXYGEN SATURATION: 99 %

## 2025-02-07 DIAGNOSIS — D64.89 ANEMIA DUE TO MULTIPLE MECHANISMS: ICD-10-CM

## 2025-02-07 DIAGNOSIS — D64.9 CHRONIC ANEMIA: Primary | ICD-10-CM

## 2025-02-07 DIAGNOSIS — D46.9 MDS (MYELODYSPLASTIC SYNDROME): ICD-10-CM

## 2025-02-07 DIAGNOSIS — N18.30 STAGE 3 CHRONIC KIDNEY DISEASE, UNSPECIFIED WHETHER STAGE 3A OR 3B CKD: ICD-10-CM

## 2025-02-07 DIAGNOSIS — D64.9 NORMOCHROMIC ANEMIA: ICD-10-CM

## 2025-02-07 PROCEDURE — 96372 THER/PROPH/DIAG INJ SC/IM: CPT

## 2025-02-07 PROCEDURE — 63600175 PHARM REV CODE 636 W HCPCS: Mod: JZ,EC,TB | Performed by: NURSE PRACTITIONER

## 2025-02-07 RX ADMIN — EPOETIN ALFA-EPBX 60000 UNITS: 20000 INJECTION, SOLUTION INTRAVENOUS; SUBCUTANEOUS at 04:02

## 2025-02-12 ENCOUNTER — LAB VISIT (OUTPATIENT)
Dept: LAB | Facility: HOSPITAL | Age: 65
End: 2025-02-12
Attending: INTERNAL MEDICINE
Payer: COMMERCIAL

## 2025-02-12 DIAGNOSIS — D46.9 MDS (MYELODYSPLASTIC SYNDROME): ICD-10-CM

## 2025-02-12 LAB
ALBUMIN SERPL BCP-MCNC: 4.2 G/DL (ref 3.5–5.2)
ALP SERPL-CCNC: 59 U/L (ref 55–135)
ALT SERPL W/O P-5'-P-CCNC: 14 U/L (ref 10–44)
ANION GAP SERPL CALC-SCNC: 5 MMOL/L (ref 8–16)
AST SERPL-CCNC: 15 U/L (ref 10–40)
BASOPHILS # BLD AUTO: 0.08 K/UL (ref 0–0.2)
BASOPHILS NFR BLD: 1.4 % (ref 0–1.9)
BILIRUB SERPL-MCNC: 0.7 MG/DL (ref 0.1–1)
BUN SERPL-MCNC: 44 MG/DL (ref 8–23)
CALCIUM SERPL-MCNC: 8.5 MG/DL (ref 8.7–10.5)
CHLORIDE SERPL-SCNC: 110 MMOL/L (ref 95–110)
CO2 SERPL-SCNC: 23 MMOL/L (ref 23–29)
CREAT SERPL-MCNC: 3.2 MG/DL (ref 0.5–1.4)
DIFFERENTIAL METHOD BLD: ABNORMAL
EOSINOPHIL # BLD AUTO: 0.2 K/UL (ref 0–0.5)
EOSINOPHIL NFR BLD: 3.1 % (ref 0–8)
ERYTHROCYTE [DISTWIDTH] IN BLOOD BY AUTOMATED COUNT: 27.8 % (ref 11.5–14.5)
EST. GFR  (NO RACE VARIABLE): 20.8 ML/MIN/1.73 M^2
GLUCOSE SERPL-MCNC: 104 MG/DL (ref 70–110)
HCT VFR BLD AUTO: 24.3 % (ref 40–54)
HGB BLD-MCNC: 8.2 G/DL (ref 14–18)
IMM GRANULOCYTES # BLD AUTO: 0.06 K/UL (ref 0–0.04)
IMM GRANULOCYTES NFR BLD AUTO: 1.1 % (ref 0–0.5)
LYMPHOCYTES # BLD AUTO: 1.2 K/UL (ref 1–4.8)
LYMPHOCYTES NFR BLD: 21 % (ref 18–48)
MCH RBC QN AUTO: 30.6 PG (ref 27–31)
MCHC RBC AUTO-ENTMCNC: 33.7 G/DL (ref 32–36)
MCV RBC AUTO: 91 FL (ref 82–98)
MONOCYTES # BLD AUTO: 0.7 K/UL (ref 0.3–1)
MONOCYTES NFR BLD: 12.9 % (ref 4–15)
NEUTROPHILS # BLD AUTO: 3.4 K/UL (ref 1.8–7.7)
NEUTROPHILS NFR BLD: 60.5 % (ref 38–73)
NRBC BLD-RTO: 0 /100 WBC
PLATELET # BLD AUTO: 492 K/UL (ref 150–450)
PMV BLD AUTO: 10.1 FL (ref 9.2–12.9)
POTASSIUM SERPL-SCNC: 4.5 MMOL/L (ref 3.5–5.1)
PROT SERPL-MCNC: 7 G/DL (ref 6–8.4)
RBC # BLD AUTO: 2.68 M/UL (ref 4.6–6.2)
SODIUM SERPL-SCNC: 138 MMOL/L (ref 136–145)
WBC # BLD AUTO: 5.57 K/UL (ref 3.9–12.7)

## 2025-02-12 PROCEDURE — 80053 COMPREHEN METABOLIC PANEL: CPT | Performed by: INTERNAL MEDICINE

## 2025-02-12 PROCEDURE — 36415 COLL VENOUS BLD VENIPUNCTURE: CPT | Performed by: INTERNAL MEDICINE

## 2025-02-12 PROCEDURE — 85025 COMPLETE CBC W/AUTO DIFF WBC: CPT | Performed by: INTERNAL MEDICINE

## 2025-02-14 ENCOUNTER — INFUSION (OUTPATIENT)
Dept: INFUSION THERAPY | Facility: HOSPITAL | Age: 65
End: 2025-02-14
Attending: INTERNAL MEDICINE
Payer: COMMERCIAL

## 2025-02-14 ENCOUNTER — TELEPHONE (OUTPATIENT)
Facility: CLINIC | Age: 65
End: 2025-02-14
Payer: COMMERCIAL

## 2025-02-14 VITALS
BODY MASS INDEX: 32.58 KG/M2 | WEIGHT: 220 LBS | SYSTOLIC BLOOD PRESSURE: 164 MMHG | DIASTOLIC BLOOD PRESSURE: 76 MMHG | RESPIRATION RATE: 18 BRPM | HEIGHT: 69 IN | TEMPERATURE: 98 F | HEART RATE: 82 BPM

## 2025-02-14 DIAGNOSIS — D64.89 ANEMIA DUE TO MULTIPLE MECHANISMS: ICD-10-CM

## 2025-02-14 DIAGNOSIS — D46.9 MDS (MYELODYSPLASTIC SYNDROME): ICD-10-CM

## 2025-02-14 DIAGNOSIS — D64.9 CHRONIC ANEMIA: Primary | ICD-10-CM

## 2025-02-14 DIAGNOSIS — N18.30 STAGE 3 CHRONIC KIDNEY DISEASE, UNSPECIFIED WHETHER STAGE 3A OR 3B CKD: ICD-10-CM

## 2025-02-14 DIAGNOSIS — N28.9 FUNCTION KIDNEY DECREASED: Primary | ICD-10-CM

## 2025-02-14 DIAGNOSIS — D64.9 NORMOCHROMIC ANEMIA: ICD-10-CM

## 2025-02-14 PROCEDURE — 63600175 PHARM REV CODE 636 W HCPCS: Mod: JZ,EC,TB | Performed by: NURSE PRACTITIONER

## 2025-02-14 PROCEDURE — 96372 THER/PROPH/DIAG INJ SC/IM: CPT

## 2025-02-14 RX ADMIN — EPOETIN ALFA-EPBX 60000 UNITS: 20000 INJECTION, SOLUTION INTRAVENOUS; SUBCUTANEOUS at 04:02

## 2025-02-14 NOTE — PLAN OF CARE
Problem: Fatigue  Goal: Improved Activity Tolerance  2/14/2025 1604 by Gisell Castillo, RN  Outcome: Met  2/14/2025 1604 by Gisell Castillo, RN  Outcome: Progressing

## 2025-02-14 NOTE — TELEPHONE ENCOUNTER
Per Pratima Bear NP-C's patient to get US of Kidney due to decreased kidney function. Patient made aware of above and verbalized understanding.

## 2025-02-17 ENCOUNTER — TELEPHONE (OUTPATIENT)
Facility: CLINIC | Age: 65
End: 2025-02-17
Payer: COMMERCIAL

## 2025-02-17 NOTE — TELEPHONE ENCOUNTER
----- Message from Jolynn sent at 2/11/2025  8:45 AM CST -----  Kar lehman/ [facility] calling to speak to Malissa in regards to PA required for molecular testing ordered by Elisha. [place] [address].    If you have any questions, please call Kar directly.     CB: [phone]

## 2025-02-20 ENCOUNTER — HOSPITAL ENCOUNTER (OUTPATIENT)
Dept: RADIOLOGY | Facility: HOSPITAL | Age: 65
Discharge: HOME | End: 2025-02-20
Attending: NURSE PRACTITIONER
Payer: COMMERCIAL

## 2025-02-20 DIAGNOSIS — N28.9 FUNCTION KIDNEY DECREASED: ICD-10-CM

## 2025-02-20 PROCEDURE — 76770 US EXAM ABDO BACK WALL COMP: CPT | Mod: TC,PO

## 2025-02-21 ENCOUNTER — RESULTS FOLLOW-UP (OUTPATIENT)
Dept: HEMATOLOGY/ONCOLOGY | Facility: CLINIC | Age: 65
End: 2025-02-21
Payer: COMMERCIAL

## 2025-02-21 ENCOUNTER — TELEPHONE (OUTPATIENT)
Facility: CLINIC | Age: 65
End: 2025-02-21
Payer: COMMERCIAL

## 2025-02-21 ENCOUNTER — INFUSION (OUTPATIENT)
Dept: INFUSION THERAPY | Facility: HOSPITAL | Age: 65
End: 2025-02-21
Attending: INTERNAL MEDICINE
Payer: COMMERCIAL

## 2025-02-21 VITALS
RESPIRATION RATE: 18 BRPM | BODY MASS INDEX: 32.42 KG/M2 | TEMPERATURE: 97 F | WEIGHT: 218.88 LBS | OXYGEN SATURATION: 100 % | HEART RATE: 74 BPM | SYSTOLIC BLOOD PRESSURE: 152 MMHG | DIASTOLIC BLOOD PRESSURE: 73 MMHG | HEIGHT: 69 IN

## 2025-02-21 DIAGNOSIS — D64.9 NORMOCHROMIC ANEMIA: ICD-10-CM

## 2025-02-21 DIAGNOSIS — D64.89 ANEMIA DUE TO MULTIPLE MECHANISMS: ICD-10-CM

## 2025-02-21 DIAGNOSIS — D64.9 CHRONIC ANEMIA: Primary | ICD-10-CM

## 2025-02-21 DIAGNOSIS — N18.30 STAGE 3 CHRONIC KIDNEY DISEASE, UNSPECIFIED WHETHER STAGE 3A OR 3B CKD: ICD-10-CM

## 2025-02-21 DIAGNOSIS — D46.9 MDS (MYELODYSPLASTIC SYNDROME): ICD-10-CM

## 2025-02-21 PROCEDURE — 63600175 PHARM REV CODE 636 W HCPCS: Mod: JZ,EC,TB | Performed by: NURSE PRACTITIONER

## 2025-02-21 PROCEDURE — 96372 THER/PROPH/DIAG INJ SC/IM: CPT

## 2025-02-21 RX ADMIN — EPOETIN ALFA-EPBX 60000 UNITS: 20000 INJECTION, SOLUTION INTRAVENOUS; SUBCUTANEOUS at 04:02

## 2025-02-21 NOTE — TELEPHONE ENCOUNTER
----- Message from Pratima Almonte NP sent at 2/21/2025  3:33 PM CST -----  Next week with me   ----- Message -----  From: Kim Samuels RN  Sent: 2/21/2025   9:08 AM CST  To: Pratima Melgar NP    He has no follow up, when do you want him back in again?  ----- Message -----  From: Pratima Almonte NP  Sent: 2/21/2025   8:21 AM CST  To: Kim Samuels RN    US of kidneys looks good.   ----- Message -----  From: Yi, Rad Results In  Sent: 2/20/2025   4:34 PM CST  To: Pratima Melgar NP

## 2025-02-21 NOTE — PLAN OF CARE
Problem: Fall Injury Risk  Goal: Absence of Fall and Fall-Related Injury  Outcome: Progressing  Intervention: Identify and Manage Contributors  Flowsheets (Taken 2/21/2025 5319)  Medication Review/Management: medications reviewed  Intervention: Promote Injury-Free Environment  Flowsheets (Taken 2/21/2025 8114)  Safety Promotion/Fall Prevention: assistive device/personal item within reach

## 2025-02-21 NOTE — TELEPHONE ENCOUNTER
Spoke to Francisca with [employer] who states she is still unsure if this office needs to do PA for molecular testing and states she will call me back on Monday to let me know what she finds out. She inquired as to if this is something that we usually do of which I told her no it was not as when I reviewed request with Pratima she said this is not something we do and directed me to reach out to Shireen begum. Verbalized understanding and states she will call me back with update on Monday.

## 2025-02-21 NOTE — TELEPHONE ENCOUNTER
Patient made aware of good kidney ultrasound and verbalized understanding. Appointment scheduled for follow up.

## 2025-02-26 ENCOUNTER — LAB VISIT (OUTPATIENT)
Dept: LAB | Facility: HOSPITAL | Age: 65
End: 2025-02-26
Attending: INTERNAL MEDICINE
Payer: COMMERCIAL

## 2025-02-26 DIAGNOSIS — D46.9 MDS (MYELODYSPLASTIC SYNDROME): ICD-10-CM

## 2025-02-26 LAB
ALBUMIN SERPL BCP-MCNC: 4.4 G/DL (ref 3.5–5.2)
ALP SERPL-CCNC: 58 U/L (ref 55–135)
ALT SERPL W/O P-5'-P-CCNC: 17 U/L (ref 10–44)
ANION GAP SERPL CALC-SCNC: 6 MMOL/L (ref 8–16)
AST SERPL-CCNC: 17 U/L (ref 10–40)
BASOPHILS # BLD AUTO: 0.08 K/UL (ref 0–0.2)
BASOPHILS NFR BLD: 1.4 % (ref 0–1.9)
BILIRUB SERPL-MCNC: 0.7 MG/DL (ref 0.1–1)
BUN SERPL-MCNC: 37 MG/DL (ref 8–23)
CALCIUM SERPL-MCNC: 8.8 MG/DL (ref 8.7–10.5)
CHLORIDE SERPL-SCNC: 108 MMOL/L (ref 95–110)
CO2 SERPL-SCNC: 27 MMOL/L (ref 23–29)
CREAT SERPL-MCNC: 3.1 MG/DL (ref 0.5–1.4)
DIFFERENTIAL METHOD BLD: ABNORMAL
EOSINOPHIL # BLD AUTO: 0.2 K/UL (ref 0–0.5)
EOSINOPHIL NFR BLD: 3.1 % (ref 0–8)
ERYTHROCYTE [DISTWIDTH] IN BLOOD BY AUTOMATED COUNT: 27.8 % (ref 11.5–14.5)
EST. GFR  (NO RACE VARIABLE): 21.6 ML/MIN/1.73 M^2
FERRITIN SERPL-MCNC: 1030.1 NG/ML (ref 20–300)
GLUCOSE SERPL-MCNC: 101 MG/DL (ref 70–110)
HCT VFR BLD AUTO: 25.6 % (ref 40–54)
HGB BLD-MCNC: 8.7 G/DL (ref 14–18)
IMM GRANULOCYTES # BLD AUTO: 0.04 K/UL (ref 0–0.04)
IMM GRANULOCYTES NFR BLD AUTO: 0.7 % (ref 0–0.5)
IRON SERPL-MCNC: 162 UG/DL (ref 45–160)
LYMPHOCYTES # BLD AUTO: 1.2 K/UL (ref 1–4.8)
LYMPHOCYTES NFR BLD: 19.8 % (ref 18–48)
MCH RBC QN AUTO: 31.6 PG (ref 27–31)
MCHC RBC AUTO-ENTMCNC: 34 G/DL (ref 32–36)
MCV RBC AUTO: 93 FL (ref 82–98)
MONOCYTES # BLD AUTO: 0.6 K/UL (ref 0.3–1)
MONOCYTES NFR BLD: 10.2 % (ref 4–15)
NEUTROPHILS # BLD AUTO: 3.8 K/UL (ref 1.8–7.7)
NEUTROPHILS NFR BLD: 64.8 % (ref 38–73)
NRBC BLD-RTO: 0 /100 WBC
PLATELET # BLD AUTO: 437 K/UL (ref 150–450)
PMV BLD AUTO: 9.6 FL (ref 9.2–12.9)
POTASSIUM SERPL-SCNC: 4.7 MMOL/L (ref 3.5–5.1)
PROT SERPL-MCNC: 7.2 G/DL (ref 6–8.4)
RBC # BLD AUTO: 2.75 M/UL (ref 4.6–6.2)
SATURATED IRON: 96 % (ref 20–50)
SODIUM SERPL-SCNC: 141 MMOL/L (ref 136–145)
TOTAL IRON BINDING CAPACITY: 168 UG/DL (ref 250–450)
TRANSFERRIN SERPL-MCNC: 120 MG/DL (ref 200–375)
WBC # BLD AUTO: 5.81 K/UL (ref 3.9–12.7)

## 2025-02-26 PROCEDURE — 36415 COLL VENOUS BLD VENIPUNCTURE: CPT | Performed by: INTERNAL MEDICINE

## 2025-02-26 PROCEDURE — 85025 COMPLETE CBC W/AUTO DIFF WBC: CPT | Performed by: INTERNAL MEDICINE

## 2025-02-26 PROCEDURE — 84466 ASSAY OF TRANSFERRIN: CPT | Performed by: INTERNAL MEDICINE

## 2025-02-26 PROCEDURE — 82728 ASSAY OF FERRITIN: CPT | Performed by: INTERNAL MEDICINE

## 2025-02-26 PROCEDURE — 80053 COMPREHEN METABOLIC PANEL: CPT | Performed by: INTERNAL MEDICINE

## 2025-02-28 ENCOUNTER — INFUSION (OUTPATIENT)
Dept: INFUSION THERAPY | Facility: HOSPITAL | Age: 65
End: 2025-02-28
Attending: INTERNAL MEDICINE
Payer: COMMERCIAL

## 2025-02-28 VITALS
BODY MASS INDEX: 32.42 KG/M2 | DIASTOLIC BLOOD PRESSURE: 79 MMHG | OXYGEN SATURATION: 97 % | TEMPERATURE: 98 F | SYSTOLIC BLOOD PRESSURE: 133 MMHG | HEIGHT: 69 IN | WEIGHT: 218.88 LBS | HEART RATE: 87 BPM | RESPIRATION RATE: 18 BRPM

## 2025-02-28 DIAGNOSIS — D64.89 ANEMIA DUE TO MULTIPLE MECHANISMS: ICD-10-CM

## 2025-02-28 DIAGNOSIS — D64.9 CHRONIC ANEMIA: Primary | ICD-10-CM

## 2025-02-28 DIAGNOSIS — N18.30 STAGE 3 CHRONIC KIDNEY DISEASE, UNSPECIFIED WHETHER STAGE 3A OR 3B CKD: ICD-10-CM

## 2025-02-28 DIAGNOSIS — D64.9 NORMOCHROMIC ANEMIA: ICD-10-CM

## 2025-02-28 DIAGNOSIS — D46.9 MDS (MYELODYSPLASTIC SYNDROME): ICD-10-CM

## 2025-02-28 PROCEDURE — 96372 THER/PROPH/DIAG INJ SC/IM: CPT

## 2025-02-28 PROCEDURE — 63600175 PHARM REV CODE 636 W HCPCS: Mod: JZ,EC,TB | Performed by: NURSE PRACTITIONER

## 2025-02-28 RX ADMIN — EPOETIN ALFA-EPBX 60000 UNITS: 40000 INJECTION, SOLUTION INTRAVENOUS; SUBCUTANEOUS at 04:02

## 2025-02-28 NOTE — PLAN OF CARE
Problem: Fatigue  Goal: Improved Activity Tolerance  Outcome: Progressing  Intervention: Promote Improved Energy  Flowsheets (Taken 2/28/2025 1959)  Fatigue Management: frequent rest breaks encouraged  Sleep/Rest Enhancement: regular sleep/rest pattern promoted  Activity Management: Ambulated -L4  Environmental Support: calm environment promoted

## 2025-03-07 ENCOUNTER — INFUSION (OUTPATIENT)
Dept: INFUSION THERAPY | Facility: HOSPITAL | Age: 65
End: 2025-03-07
Attending: INTERNAL MEDICINE
Payer: COMMERCIAL

## 2025-03-07 VITALS
HEIGHT: 69 IN | SYSTOLIC BLOOD PRESSURE: 131 MMHG | TEMPERATURE: 98 F | OXYGEN SATURATION: 96 % | DIASTOLIC BLOOD PRESSURE: 79 MMHG | RESPIRATION RATE: 17 BRPM | HEART RATE: 84 BPM | WEIGHT: 217.38 LBS | BODY MASS INDEX: 32.2 KG/M2

## 2025-03-07 DIAGNOSIS — N18.30 STAGE 3 CHRONIC KIDNEY DISEASE, UNSPECIFIED WHETHER STAGE 3A OR 3B CKD: ICD-10-CM

## 2025-03-07 DIAGNOSIS — D46.9 MDS (MYELODYSPLASTIC SYNDROME): ICD-10-CM

## 2025-03-07 DIAGNOSIS — D64.9 CHRONIC ANEMIA: Primary | ICD-10-CM

## 2025-03-07 DIAGNOSIS — D64.89 ANEMIA DUE TO MULTIPLE MECHANISMS: ICD-10-CM

## 2025-03-07 DIAGNOSIS — D64.9 NORMOCHROMIC ANEMIA: ICD-10-CM

## 2025-03-07 PROCEDURE — 96372 THER/PROPH/DIAG INJ SC/IM: CPT

## 2025-03-07 PROCEDURE — 63600175 PHARM REV CODE 636 W HCPCS: Mod: JZ,EC,TB | Performed by: NURSE PRACTITIONER

## 2025-03-07 RX ADMIN — EPOETIN ALFA-EPBX 60000 UNITS: 20000 INJECTION, SOLUTION INTRAVENOUS; SUBCUTANEOUS at 04:03

## 2025-03-07 NOTE — PLAN OF CARE
Problem: Fall Injury Risk  Goal: Absence of Fall and Fall-Related Injury  Outcome: Progressing  Intervention: Identify and Manage Contributors  Flowsheets (Taken 3/7/2025 1603)  Self-Care Promotion:   independence encouraged   BADL personal objects within reach   adaptive equipment use encouraged  Medication Review/Management: medications reviewed  Intervention: Promote Injury-Free Environment  Flowsheets (Taken 3/7/2025 1603)  Safety Promotion/Fall Prevention: medications reviewed

## 2025-03-12 ENCOUNTER — LAB VISIT (OUTPATIENT)
Dept: LAB | Facility: HOSPITAL | Age: 65
End: 2025-03-12
Attending: INTERNAL MEDICINE
Payer: COMMERCIAL

## 2025-03-12 ENCOUNTER — RESULTS FOLLOW-UP (OUTPATIENT)
Facility: CLINIC | Age: 65
End: 2025-03-12

## 2025-03-12 ENCOUNTER — OFFICE VISIT (OUTPATIENT)
Facility: CLINIC | Age: 65
End: 2025-03-12
Payer: COMMERCIAL

## 2025-03-12 VITALS
OXYGEN SATURATION: 98 % | HEART RATE: 74 BPM | WEIGHT: 216.13 LBS | BODY MASS INDEX: 32.01 KG/M2 | RESPIRATION RATE: 16 BRPM | HEIGHT: 69 IN | SYSTOLIC BLOOD PRESSURE: 129 MMHG | TEMPERATURE: 98 F | DIASTOLIC BLOOD PRESSURE: 74 MMHG

## 2025-03-12 DIAGNOSIS — D46.9 MDS (MYELODYSPLASTIC SYNDROME): ICD-10-CM

## 2025-03-12 DIAGNOSIS — R63.0 APPETITE LOSS: ICD-10-CM

## 2025-03-12 DIAGNOSIS — D46.1 RARS (REFRACTORY ANEMIA WITH RINGED SIDEROBLASTS): ICD-10-CM

## 2025-03-12 LAB
ALBUMIN SERPL BCP-MCNC: 4.4 G/DL (ref 3.5–5.2)
ALP SERPL-CCNC: 59 U/L (ref 55–135)
ALT SERPL W/O P-5'-P-CCNC: 14 U/L (ref 10–44)
ANION GAP SERPL CALC-SCNC: 6 MMOL/L (ref 8–16)
AST SERPL-CCNC: 16 U/L (ref 10–40)
BASOPHILS # BLD AUTO: 0.07 K/UL (ref 0–0.2)
BASOPHILS NFR BLD: 1.2 % (ref 0–1.9)
BILIRUB SERPL-MCNC: 0.8 MG/DL (ref 0.1–1)
BUN SERPL-MCNC: 38 MG/DL (ref 8–23)
CALCIUM SERPL-MCNC: 9 MG/DL (ref 8.7–10.5)
CHLORIDE SERPL-SCNC: 109 MMOL/L (ref 95–110)
CO2 SERPL-SCNC: 25 MMOL/L (ref 23–29)
CREAT SERPL-MCNC: 3.1 MG/DL (ref 0.5–1.4)
DIFFERENTIAL METHOD BLD: ABNORMAL
EOSINOPHIL # BLD AUTO: 0.2 K/UL (ref 0–0.5)
EOSINOPHIL NFR BLD: 2.9 % (ref 0–8)
ERYTHROCYTE [DISTWIDTH] IN BLOOD BY AUTOMATED COUNT: 28.3 % (ref 11.5–14.5)
EST. GFR  (NO RACE VARIABLE): 21.6 ML/MIN/1.73 M^2
GLUCOSE SERPL-MCNC: 97 MG/DL (ref 70–110)
HCT VFR BLD AUTO: 26.2 % (ref 40–54)
HGB BLD-MCNC: 8.8 G/DL (ref 14–18)
IMM GRANULOCYTES # BLD AUTO: 0.03 K/UL (ref 0–0.04)
IMM GRANULOCYTES NFR BLD AUTO: 0.5 % (ref 0–0.5)
LYMPHOCYTES # BLD AUTO: 1.3 K/UL (ref 1–4.8)
LYMPHOCYTES NFR BLD: 22.1 % (ref 18–48)
MCH RBC QN AUTO: 31.2 PG (ref 27–31)
MCHC RBC AUTO-ENTMCNC: 33.6 G/DL (ref 32–36)
MCV RBC AUTO: 93 FL (ref 82–98)
MONOCYTES # BLD AUTO: 0.6 K/UL (ref 0.3–1)
MONOCYTES NFR BLD: 10.3 % (ref 4–15)
NEUTROPHILS # BLD AUTO: 3.7 K/UL (ref 1.8–7.7)
NEUTROPHILS NFR BLD: 63 % (ref 38–73)
NRBC BLD-RTO: 0 /100 WBC
PLATELET # BLD AUTO: 458 K/UL (ref 150–450)
PMV BLD AUTO: 9.5 FL (ref 9.2–12.9)
POTASSIUM SERPL-SCNC: 4.5 MMOL/L (ref 3.5–5.1)
PROT SERPL-MCNC: 7.1 G/DL (ref 6–8.4)
RBC # BLD AUTO: 2.82 M/UL (ref 4.6–6.2)
SODIUM SERPL-SCNC: 140 MMOL/L (ref 136–145)
WBC # BLD AUTO: 5.83 K/UL (ref 3.9–12.7)

## 2025-03-12 PROCEDURE — 99999 PR PBB SHADOW E&M-EST. PATIENT-LVL V: CPT | Mod: PBBFAC,,, | Performed by: NURSE PRACTITIONER

## 2025-03-12 PROCEDURE — G2211 COMPLEX E/M VISIT ADD ON: HCPCS | Mod: S$GLB,,, | Performed by: NURSE PRACTITIONER

## 2025-03-12 PROCEDURE — 3078F DIAST BP <80 MM HG: CPT | Mod: CPTII,S$GLB,, | Performed by: NURSE PRACTITIONER

## 2025-03-12 PROCEDURE — 36415 COLL VENOUS BLD VENIPUNCTURE: CPT | Performed by: INTERNAL MEDICINE

## 2025-03-12 PROCEDURE — 1159F MED LIST DOCD IN RCRD: CPT | Mod: CPTII,S$GLB,, | Performed by: NURSE PRACTITIONER

## 2025-03-12 PROCEDURE — 3008F BODY MASS INDEX DOCD: CPT | Mod: CPTII,S$GLB,, | Performed by: NURSE PRACTITIONER

## 2025-03-12 PROCEDURE — 85025 COMPLETE CBC W/AUTO DIFF WBC: CPT | Performed by: INTERNAL MEDICINE

## 2025-03-12 PROCEDURE — 99215 OFFICE O/P EST HI 40 MIN: CPT | Mod: S$GLB,,, | Performed by: NURSE PRACTITIONER

## 2025-03-12 PROCEDURE — 3074F SYST BP LT 130 MM HG: CPT | Mod: CPTII,S$GLB,, | Performed by: NURSE PRACTITIONER

## 2025-03-12 PROCEDURE — 80053 COMPREHEN METABOLIC PANEL: CPT | Performed by: INTERNAL MEDICINE

## 2025-03-12 RX ORDER — DRONABINOL 10 MG/1
10 CAPSULE ORAL
Qty: 60 CAPSULE | Refills: 1 | Status: SHIPPED | OUTPATIENT
Start: 2025-03-12

## 2025-03-12 NOTE — PROGRESS NOTES
[facility] HEMATOLGY ONCOLOGY     Subjective:       Patient ID: Rick Butler is a 64 y.o. male presents today for chemotherapy education.      Chief Complaint: MDS (myelodysplastic syndrome)    The patient is here today to go over a new drug infusion Rytelo for MDS.    HPI    Previously on Retacrit and Reblozyl.   The patient saw Dr. Hilario and she recommends we start him on Rytelo for better control of MDS given his drop in hemoglobin.       Oncology History   RARS (refractory anemia with ringed sideroblasts)   6/1/2020 Initial Diagnosis    RARS (refractory anemia with ringed sideroblasts)     9/21/2023 - 9/26/2024 Chemotherapy    Treatment Summary   Plan Name: OP LUSPATERCEPT-AAMT SUBQ Q3W FOR MDS  Treatment Goal: Control  Status: Inactive  Start Date: 9/21/2023  End Date: 9/26/2024  Provider: Jose Tello MD  Chemotherapy: [No matching medication found in this treatment plan]     3/10/2025 -  Chemotherapy    Treatment Summary   Plan Name: OP IMETELSTAT (RYTELO) Q4W  Treatment Goal: Control  Status: Active  Start Date: 3/10/2025 (Planned)  End Date: 7/28/2025 (Planned)  Provider: Jose Tello MD  Chemotherapy: [No matching medication found in this treatment plan]     MDS (myelodysplastic syndrome)   8/11/2020 Initial Diagnosis    MDS (myelodysplastic syndrome)     9/21/2023 - 9/26/2024 Chemotherapy    Treatment Summary   Plan Name: OP LUSPATERCEPT-AAMT SUBQ Q3W FOR MDS  Treatment Goal: Control  Status: Inactive  Start Date: 9/21/2023  End Date: 9/26/2024  Provider: Jose Tello MD  Chemotherapy: [No matching medication found in this treatment plan]     3/10/2025 -  Chemotherapy    Treatment Summary   Plan Name: OP IMETELSTAT (RYTELO) Q4W  Treatment Goal: Control  Status: Active  Start Date: 3/10/2025 (Planned)  End Date: 7/28/2025 (Planned)  Provider: Jose Tello MD  Chemotherapy: [No matching medication found in this treatment plan]         Past Medical History:   Diagnosis Date    Abnormal  chest CT (new) 08/17/2022    Anemia due to multiple mechanisms 01/13/2019    Anemia, chronic renal failure, stage 2 (mild) 01/13/2019    Anemia, chronic renal failure, stage 3 (moderate) 08/02/2023    Depression     Former smoker 06/29/2017    H/O ETOH abuse 06/29/2017    Lung mass (new) 08/17/2022    Monocytosis 2019    Myelodysplasia (myelodysplastic syndrome)     Myelodysplasia (myelodysplastic syndrome)     Personal history of colonic polyps 12/29/2023    RARS (refractory anemia with ringed sideroblasts) 06/01/2020    Sickle cell trait        Past Surgical History:   Procedure Laterality Date    BONE MARROW BIOPSY N/A 12/20/2019    Procedure: BIOPSY, BONE MARROW;  Surgeon: [facility] Diagnostic Provider;  Location: [facility];  Service: Interventional Radiology;  Laterality: N/A;    COLONOSCOPY N/A 11/05/2021    Procedure: COLONOSCOPY;  Surgeon: Rob Collins MD;  Location: [facility] ENDO;  Service: Endoscopy;  Laterality: N/A;    COLONOSCOPY  12/29/2023    5 YR RECALL    ESOPHAGOGASTRODUODENOSCOPY N/A 11/05/2021    Procedure: EGD (ESOPHAGOGASTRODUODENOSCOPY);  Surgeon: Rob Collins MD;  Location: [facility] ENDO;  Service: Endoscopy;  Laterality: N/A;    INJECTION OF ANESTHETIC AGENT AROUND MEDIAL BRANCH NERVES INNERVATING LUMBAR FACET JOINT Bilateral 05/25/2018    Procedure: BLOCK-NERVE-MEDIAL BRANCH-LUMBAR;  Surgeon: Nura Heredia MD;  Location: [facility];  Service: Pain Management;  Laterality: Bilateral;  L3, 4, 5    KNEE ARTHROSCOPY W/ MENISCECTOMY Right 12/22/2021    Procedure: ARTHROSCOPY, KNEE, WITH MENISCECTOMY;  Surgeon: Sebastian Grene MD;  Location: [facility] OR;  Service: Orthopedics;  Laterality: Right;  partial medial meniscectomy    RADIOFREQUENCY ABLATION OF LUMBAR MEDIAL BRANCH NERVE AT SINGLE LEVEL Bilateral 07/20/2018    Procedure: RADIOFREQUENCY ABLATION, NERVE, MEDIAL BRANCH, LUMBAR, 1 LEVEL;  Surgeon: Nura Herdeia MD;  Location: [facility];  Service: Pain Management;  Laterality: Bilateral;  L3, 4, 5 - Burned at 80  degrees C.  for 75 seconds x 2 each site    SMALL BOWEL ENTEROSCOPY N/A 10/16/2019    Procedure: ENTEROSCOPY;  Surgeon: Rob Collins MD;  Location: [facility];  Service: Endoscopy;  Laterality: N/A;       Social History     Socioeconomic History    Marital status:    Occupational History    Occupation: Prep Cook     Employer: [employer]   Tobacco Use    Smoking status: Former     Current packs/day: 0.00     Types: Cigarettes     Quit date: 1996     Years since quittin.2    Smokeless tobacco: Never   Substance and Sexual Activity    Alcohol use: Not Currently     Alcohol/week: 21.0 standard drinks of alcohol     Types: 21 Cans of beer per week     Comment: Sober 5 years    Drug use: Not Currently     Types: Marijuana    Sexual activity: Yes     Partners: Female     Social Drivers of Health     Stress: Stress Concern Present (2019)    [facility] - Occupational Stress Questionnaire     Feeling of Stress : Very much       Family History   Problem Relation Name Age of Onset    Arthritis Mother      Depression Mother      Heart disease Mother      Hypertension Mother      Heart attack Father  59       Review of patient's allergies indicates:  No Known Allergies    Current Medications[1]    All medications and past history have been reviewed.    Review of Systems   Constitutional:  Negative for activity change, appetite change, fatigue and fever.   HENT:  Negative for congestion, mouth sores, postnasal drip, rhinorrhea, sinus pressure, sore throat and trouble swallowing.    Eyes:  Negative for photophobia and visual disturbance.   Respiratory:  Negative for cough, chest tightness, shortness of breath and wheezing.    Cardiovascular:  Negative for chest pain and leg swelling.   Gastrointestinal:  Negative for abdominal distention, abdominal pain, constipation, diarrhea, nausea and vomiting.   Endocrine: Negative for cold intolerance.   Genitourinary:  Negative for  "decreased urine volume, dysuria and frequency.   Musculoskeletal:  Negative for arthralgias and myalgias.   Skin:  Negative for pallor and rash.   Allergic/Immunologic: Negative for immunocompromised state.   Neurological:  Negative for dizziness, syncope, weakness, light-headedness, numbness and headaches.   Hematological:  Negative for adenopathy. Does not bruise/bleed easily.   Psychiatric/Behavioral:  Negative for sleep disturbance. The patient is not nervous/anxious.        Objective:        Physcial Examination  VITAL SIGNS:    Body surface area is 2.18 meters squared.   Pain Assessment  Vitals:    03/12/25 1518   BP: 129/74   Pulse: 74   Resp: 16   Temp: 98 °F (36.7 °C)   TempSrc: Temporal   SpO2: 98%   Weight: 98 kg (216 lb 1.6 oz)   Height: 5' 9" (1.753 m)   PainSc: 0-No pain          Wt Readings from Last 5 Encounters:   03/12/25 98 kg (216 lb 1.6 oz)   03/07/25 98.6 kg (217 lb 6.4 oz)   02/28/25 99.3 kg (218 lb 14.4 oz)   02/21/25 99.3 kg (218 lb 14.4 oz)   02/14/25 99.8 kg (220 lb 0.3 oz)       Physical Exam  Constitutional:       Appearance: Normal appearance.   HENT:      Head: Normocephalic and atraumatic.      Mouth/Throat:      Mouth: Mucous membranes are moist.   Cardiovascular:      Rate and Rhythm: Normal rate and regular rhythm.      Pulses: Normal pulses.      Heart sounds: Normal heart sounds.   Pulmonary:      Effort: Pulmonary effort is normal. No respiratory distress.      Breath sounds: Normal breath sounds. No wheezing.   Abdominal:      General: There is no distension.      Palpations: Abdomen is soft. There is no mass.      Tenderness: There is no abdominal tenderness.   Musculoskeletal:         General: No swelling. Normal range of motion.      Right lower leg: No edema.      Left lower leg: No edema.   Skin:     General: Skin is warm and dry.      Capillary Refill: Capillary refill takes 2 to 3 seconds.      Findings: No bruising or rash.   Neurological:      Mental Status: He is " alert and oriented to person, place, and time. Mental status is at baseline.      Motor: No weakness.   Psychiatric:         Mood and Affect: Mood normal.         Behavior: Behavior normal.              Laboratory and Radiology   Lab Results   Component Value Date    WBC 5.83 03/12/2025    RBC 2.82 (L) 03/12/2025    HGB 8.8 (L) 03/12/2025    HCT 26.2 (L) 03/12/2025    MCV 93 03/12/2025    MCH 31.2 (H) 03/12/2025    MCHC 33.6 03/12/2025    RDW 28.3 (H) 03/12/2025     (H) 03/12/2025    MPV 9.5 03/12/2025    GRAN 3.7 03/12/2025    GRAN 63.0 03/12/2025    LYMPH 1.3 03/12/2025    LYMPH 22.1 03/12/2025    MONO 0.6 03/12/2025    MONO 10.3 03/12/2025    EOS 0.2 03/12/2025    BASO 0.07 03/12/2025    EOSINOPHIL 2.9 03/12/2025    BASOPHIL 1.2 03/12/2025     BMP  Lab Results   Component Value Date     03/12/2025    K 4.5 03/12/2025     03/12/2025    CO2 25 03/12/2025    BUN 38 (H) 03/12/2025    CREATININE 3.1 (H) 03/12/2025    CALCIUM 9.0 03/12/2025    ANIONGAP 6 (L) 03/12/2025    ESTGFRAFRICA 45.6 (A) 07/13/2022    EGFRNONAA 39.5 (A) 07/13/2022     Lab Results   Component Value Date    ALT 14 03/12/2025    AST 16 03/12/2025    ALKPHOS 59 03/12/2025    BILITOT 0.8 03/12/2025     Results for orders placed or performed during the hospital encounter of 01/27/25 (from the past 2160 hours)   CT Dx Bone Marrow Biopsy(ies) w/ Aspiration: Left(XPD)    Narrative    CMS MANDATED QUALITY DATA - CT RADIATION - 436    All CT scans at this facility utilize dose modulation, iterative reconstruction, and/or weight based dosing when appropriate to reduce radiation dose to as low as reasonably achievable.    EXAMINATION:  CT DIAGNOSTIC BONE MARROW BIOPSY(IES) WITH ASPIRATION LEFT (XPD)    CLINICAL HISTORY:  Myelodysplastic syndrome, unspecified    PROCEDURE:  After obtaining informed consent, the medial Right iliac bone was localized under CT, and the overlying skin was marked. Skin was prepped and draped in usual sterile  fashion. 1% Lidocaine was used to achieve adequate local anesthesia.    A small skin incision was made.    An 11-gauge Jamshidi needle was introduced and guided into the medial Right iliac bone posteriorly. 8 mL of bone marrow aspirate were obtained without difficulty. The Jamshidi needle was then further advanced into the iliac bone and a core biopsy bone marrow specimen was obtained.    Site was cleaned and bandage was placed. Patient tolerated the procedure well with no immediate complications and less than 5 mL of blood loss.    Conscious sedation was utilized throughout the exam and titrated to patient comfort with patient receiving a total of 3 mg of Versed and 100 mcg of Fentanyl. Continuous patient monitoring was performed by the interventional radiology registered nurse and supervision performed by the interventional radiologist. Intraprocedural sedation and monitoring time was 15 minutes.      Impression    Successful CT guided bone marrow biopsy and aspiration.      Electronically signed by: Herber Aaron  Date:    01/27/2025  Time:    10:59     *Note: Due to a large number of results and/or encounters for the requested time period, some results have not been displayed. A complete set of results can be found in Results Review.     No results found. However, due to the size of the patient record, not all encounters were searched. Please check Results Review for a complete set of results.  No results found. However, due to the size of the patient record, not all encounters were searched. Please check Results Review for a complete set of results.          All lab results and imaging results have been reviewed and discussed with the patient.        TITLE: PLAN OF CARE FOR THE CHEMOTHERAPY PATIENT / TEACHING PROTOCOL    PURPOSE: To involve the patient / significant other in the plan of care and to provide teaching to the significant other & patient receiving  chemotherapy.    LEVEL: Independent.    CONTENT: The Plan of Care for the chemotherapy patient is individualized and appropriate to the patients needs, strengths, limitations, & goals.  Education includes information regarding chemotherapy side effects, the treatment itself, and self-care  Activities.    GOAL / OUTCOME STANDARDS    PHYSIOLOGIC: The client will remain free or experience minimal side effects or toxicities throughout the chemotherapy treatment period.     PSYCHOLOGIC: The client/significant others will demonstrate positive coping mechanisms in relation to chemotherapy and its side effects.      COGINITIVE: The client/significant others will verbalize understanding of self-care measure to avoid/minimize side effects of the chemotherapy regimen.    EVALUATION / COMMENT KEY:    V = Audiovisual/Video  S = Successfully meets outcome  N = Needs further instruction  NA = Not applicable to the patient  P = Previous knowledge  U = Unable to comprehend  * = See progress notes      PLAN OF CARE  INFORMATION TO BE DELIVERED / NURSING INTERVENTIONS DATE EVALUATION   Assessment of client/caregiver,         knowledge of cancer diagnosis,         and chemotherapy as a treatment. 1a. Evaluate patient/caregiver learning ability    b. Plan teaching sessions with patient/caregiver according to needs and present anxiety level/ability to learn.    c. Provide Chemotherapy Education Packet,        Mouth Care Protocol,         Specific Patient Education Sheets. 03/12/2025 S   Individual chemotherapy treatment         plan. 2a. Review of Chemotherapy Education handout from [email or web address]            03/12/2025   S   Knowledge Deficit & Self-Management of general side effects common to all chemotherapy:  Nausea/Vomiting  b.   Diarrhea  Mouth Care  Dental care  Constipation  Hair Loss  Potential for infection  Fatigue   3a. Reinforce that the majority of side effects from chemotherapy are reversible and are  controlled both in the  hospital and at home        (blood counts recover, hair grows back).   b.  Refer to the following for reinforcement of         information post-treatment:  Mouth Care Protocol.  Bowel Protocol for constipation or diarrhea.  3.  Drug Specific Chemotherapy Information Sheets for each medication patient receiving.    03/12/2025     S     PLAN OF CARE  INFORMATION TO BE DELIVERED / NURSING INTERVENTIONS DATE EVALUATION   h. Potential for bleeding         i. Potential anemia/fatigue         j. Potential sunburn         k. Birth control measures  l. Safety measures post treatment 4.  Chemotherapy Home Care Instruction  and Safety Information Sheet.  A. patient/caregivers to thoroughly cook shellfish (shrimp, crab, etc) to decrease the chance of infection.    B.  Use sunscreen and protective clothing while in the sun.   03/12/2025      Knowledge deficit & Self Management of Drug Specific  Side Effects.    BLADDER EFFECTS        (Hemorrhagic Cystitis)                  Preventable with adequate hydration; occurs 2-3 days or more post treatment.   1.  Instruct patient to:  a.   Void at least every 2 hours; increase intake.  b.   DO NOT hold urine; go when urge is felt.  c.    Empty bladder at bedtime and on         awakening.  d.   Observe for color changes (red to tea           colored), amount and frequency changes.  e.   Notify oncologist of any abnormalities           in urine or voiding or if you cannot               drink adequate fluids.   03/12/2025   S   b.   CHANGES IN URINE   COLOR:      1.   Instruct patient:  a.   Most evident in first 2-3 voidings after           administration.  Lasts less than 24 hours.  If urine is discolored 2 or more days post- treatment, notify oncologist.      03/12/2025 S   c.    KIDNEY EFFECTS           (Nephrotoxicity)   1.  Instruct patient to:  a.   Drink 8-16 glasses of fluid/day the day   pre-treatment and 3-4 days post-treatment to maintain hydration; the best way to minimize  kidney problems.  b.   Notify oncologist immediately if unable to drink fluids or if changes are noted in urinary elimination.     03/12/2025   S   PULMONARY TOXICITY    Instruct patient to report symptoms such as shortness of breath, chest pain, shallow breathing, or chest wall discomfort to physician.  Reinforce preventative measures used by the health care team.  Baseline and periodic PFT and chest x-ray.   03/12/2025   S     PLAN OF CARE INFORMATION TO BE DELIVERED / NURSING INTERVENTIONS DATE EVALUATION   NERVE & MUSCLE EFFECTS (neurotoxocity; neuropathy, possible visual/hearing changes)        Instruct patient to:    Report numbness or tingling of the hands/feet, loss of fine motor movement (buttoning shirt, tying shoelaces), or gait changes to your oncologist.  If numbness/tingling are present:  protect feet with shoes at all times.  Use gloves for washing dishes/gardening & potholders in kitchen.       03/12/2025   S   CARDIOTOXICITY  Decreased effectiveness of             cardiac function. Effective are                  cumulative and irreversible.                                    CARDIAC ARRYTHMIAS              4   Instruct:  Heart function may be tested before treatment and perdiocally during treatment.  Notify oncologist of irregular pulse, palpitations, shortness of breath, or swelling in lower extremities/feet.          Taxol and Taxotere can cause arrhythmias on infusion that resolve once infusion discontinued. Instruct nurse if any irregularity felt.    03/12/2025   S   EXTRAVASTION  Occurs when vesicants leak outside of vein and cause damage to the skin and underlying tissues.   Reinforce preventive measures used to avoid complications.  Fresh IV site or central line monitored continuously with vesicant IVP.  Continuous infusion via central line site and blood return monitored periodically around the clock.  Instruct to:  Notify nurse of any discomfort, burning, stinging, etc. at IV site during  chemotherapy administration.  Notify oncologist of any redness, pain, or swelling at IV site after discharge from hospital.   03/12/2025   S   HYPERSENSITIVITY can happen with any medication.   Instruct patient:  Nurse is with them during the initial part of treatment and will be close by to monitor.  Pre-medication ordered by the oncologist must be taken on time. If doses are missed, treatment will need to be re-scheduled.  Skin redness, itching, or hives appearing after discharge should be reported to oncologist. 03/12/2025 S       PLAN OF CARE INFORMATION TO BE DELIVERED / NURSING INTERVENTIONS DATE EVALUATION   FLU-LIKE SYNDROME      Instruct patient symptoms are hard to prevent and may include fever, shaking chills, muscle and body aches.  Taking prescribed medications from physician if needed.  Adequate fluids are important.    Reinforce the need to call if temperature is         elevated to 100.4 or more  03/12/2025   S   HAND-FOOT SYNDROME  causes painful, symmetric swelling and redness of palms and soles                  Instruct patient to report any numbness or tingling in the hands or feet.  Explain prevention techniques, such as     Use heavy moisturizers to lessen skin dryness and itching, but to avoid if skin is cracked or broken  Bathe in tepid water, use non-perfumed soap, and wash gently. Baths with oatmeal or diluted baking soda may be soothing.  Avoid tight fitting shoes and repetitive actions, such as rubbing hands or applying pressure to hands/feet.  Review measures to take should syndrome occur:  Cold compresses and elevation for          edema  Pain medications and other measures as ordered by oncologist.   4.   Syndrome resolves few weeks after therapy. 03/12/2025   S   5. DISCHARGE PLANNING /        EDUCATION 1.    Explain importance of compliance with follow- up  tests (CBC, CMP).  2.    Verify patient/caregiver know:  a.    Oncologists office phone number.  b.    Dates of follow-up  appointments.  c.    Prescriptions given for nausea  3.   Review side effects to monitor and notify          oncologist about.  4.   Reinforce the need for patient and caregivers to:  a.    Review information given.  b.    Call oncologists office with questions  or symptoms  5.   Provide [facility] [place] [employer]chure make referrals if needed for financial or .   03/12/2025   S     PROGRESS NOTES: I met with the patient Mr. Butler today for chemotherapy education. he will be starting treatment with Rytelo . We discussed the mechanism of action, potential side effects of this treatment as well as ways he can manage them at home. Some of these side effects include but or not limited to fever, nausea, vomiting, decreased appetite, fatigue, weakness, cytopenias, myalgia/arthralgia, constipation, diarrhea, bleeding, headache, shortness of breath, nail changes, taste change, hair thinning/loss, mood disturbances, or edema. We also discussed dietary modifications he should make although this will be discussed in more detail with the dietician. he was provided with anti-emetic medication, a copy of all of the information we discussed today as well as our contact information. he will be provided a schedule on his first day of treatment. We will obtain labs on a weekly basis and the patient will follow-up with the physician for toxicity monitoring throughout treatment. All questions were answered and an informed consent was obtained. he was reminded to certainly contact us sooner if needed.  Attached to the patients folder and discussed with the patient the 24 hour/ 7 days a week after hours telephone number for the physician.  Patient notified to call anytime 24/7 because their is a physician on call for any problems that may arise.  Patient also notified to report to [facility] if they can not get in touch with a physician after hours.  Discussed the five wishes booklet with the patient and their  family.           Assessment/Plan:       1. MDS (myelodysplastic syndrome)      MDS (myelodysplastic syndrome)          I have explained and the patient understands all of  the current recommendation(s). I have answered all of their questions to the best of my ability and to their complete satisfaction.   The patient is to continue with the current management plan.            Standing Labs Ordered:  CBC weekly  CMP weekly       Electronically signed by: Electronically signed by: Pratima Stringer, MSN, APRN, AGNP-C                [1]   Current Outpatient Medications:     amLODIPine (NORVASC) 5 MG tablet, Take 1 tablet (5 mg total) by mouth once daily., Disp: 90 tablet, Rfl: 3    ascorbic acid, vitamin C, (VITAMIN C) 500 MG tablet, Take 500 mg by mouth once daily., Disp: , Rfl:     atorvastatin (LIPITOR) 10 MG tablet, Take 1 tablet (10 mg total) by mouth every evening., Disp: 90 tablet, Rfl: 3    calcitRIOL (ROCALTROL) 0.25 MCG Cap, Take 0.25 mcg by mouth every 7 days., Disp: , Rfl:     droNABinol (MARINOL) 10 MG capsule, Take 1 capsule (10 mg total) by mouth 2 (two) times daily before meals., Disp: 60 capsule, Rfl: 1    epoetin tray (PROCRIT INJ), Thursday, Disp: , Rfl:     famotidine (PEPCID) 20 MG tablet, famotidine 20 mg tablet  Take 1 tablet twice a day by oral route., Disp: , Rfl:     fluticasone propionate (FLONASE) 50 mcg/actuation nasal spray, 1 SPRAY (50 MCG TOTAL) BY EACH NOSTRIL ROUTE 2 (TWO) TIMES A DAY. 1 SPRAY EVERY MORNING AND AFTERNOON TOWARD THE EAR EACH SIDE AFTER A SINUS RINSE, Disp: 48 mL, Rfl: 1    folic acid (FOLVITE) 1 MG tablet, TAKE 2 TABLETS BY MOUTH EVERY DAY, Disp: 180 tablet, Rfl: 0    LIDOcaine (LIDODERM) 5 %, Place 1 patch onto the skin once daily. Remove & Discard patch within 12 hours or as directed by MD, Disp: 14 patch, Rfl: 0    multivitamin capsule, Take 1 capsule by mouth once daily., Disp: , Rfl:     pantoprazole (PROTONIX) 40 MG tablet, , Disp: , Rfl:     sod  chlor-bicarb-squeez bottle (NEILMED SINUS RINSE COMPLETE) pkdv, 1 application  by sinus irrigation route 2 (two) times a day. Not right before bed, Disp: 1 each, Rfl: 11    tamsulosin (FLOMAX) 0.4 mg Cap, TAKE 2 CAPSULES BY MOUTH EVERY DAY, Disp: 180 capsule, Rfl: 1    traZODone (DESYREL) 100 MG tablet, TAKE 1 TABLET BY MOUTH NIGHTLY AS NEEDED FOR INSOMNIA., Disp: 90 tablet, Rfl: 2    azelastine (ASTELIN) 137 mcg (0.1 %) nasal spray, 2 sprays (274 mcg total) by Nasal route 2 (two) times daily as needed for Rhinitis., Disp: 30 mL, Rfl: 5    sildenafil (VIAGRA) 100 MG tablet, Take 1 tablet (100 mg total) by mouth daily as needed for Erectile Dysfunction., Disp: 10 tablet, Rfl: 6

## 2025-03-14 ENCOUNTER — INFUSION (OUTPATIENT)
Dept: INFUSION THERAPY | Facility: HOSPITAL | Age: 65
End: 2025-03-14
Attending: INTERNAL MEDICINE
Payer: COMMERCIAL

## 2025-03-14 VITALS
WEIGHT: 216.38 LBS | BODY MASS INDEX: 32.05 KG/M2 | OXYGEN SATURATION: 99 % | HEIGHT: 69 IN | TEMPERATURE: 98 F | RESPIRATION RATE: 18 BRPM | SYSTOLIC BLOOD PRESSURE: 139 MMHG | HEART RATE: 70 BPM | DIASTOLIC BLOOD PRESSURE: 69 MMHG

## 2025-03-14 DIAGNOSIS — D46.9 MDS (MYELODYSPLASTIC SYNDROME): ICD-10-CM

## 2025-03-14 DIAGNOSIS — D64.9 CHRONIC ANEMIA: Primary | ICD-10-CM

## 2025-03-14 DIAGNOSIS — N18.30 STAGE 3 CHRONIC KIDNEY DISEASE, UNSPECIFIED WHETHER STAGE 3A OR 3B CKD: ICD-10-CM

## 2025-03-14 DIAGNOSIS — D64.9 NORMOCHROMIC ANEMIA: ICD-10-CM

## 2025-03-14 DIAGNOSIS — D64.89 ANEMIA DUE TO MULTIPLE MECHANISMS: ICD-10-CM

## 2025-03-14 PROCEDURE — 63600175 PHARM REV CODE 636 W HCPCS: Mod: JZ,EC,TB | Performed by: NURSE PRACTITIONER

## 2025-03-14 PROCEDURE — 96372 THER/PROPH/DIAG INJ SC/IM: CPT

## 2025-03-14 RX ADMIN — EPOETIN ALFA-EPBX 60000 UNITS: 20000 INJECTION, SOLUTION INTRAVENOUS; SUBCUTANEOUS at 04:03

## 2025-03-14 NOTE — PLAN OF CARE
Problem: Fatigue  Goal: Improved Activity Tolerance  Outcome: Progressing  Intervention: Promote Improved Energy  Flowsheets (Taken 3/14/2025 9400)  Fatigue Management: frequent rest breaks encouraged  Sleep/Rest Enhancement: regular sleep/rest pattern promoted  Activity Management:   Ambulated -L4   Up in chair - L3  Environmental Support:   calm environment promoted   rest periods encouraged

## 2025-03-18 ENCOUNTER — TELEPHONE (OUTPATIENT)
Dept: HEMATOLOGY/ONCOLOGY | Facility: CLINIC | Age: 65
End: 2025-03-18
Payer: COMMERCIAL

## 2025-03-18 NOTE — TELEPHONE ENCOUNTER
----- Message from Callie sent at 3/18/2025  9:16 AM CDT -----  The patient said his new infusion medication is being denied by his insurance. He said they are asking for additional information. # [phone]

## 2025-03-21 ENCOUNTER — INFUSION (OUTPATIENT)
Dept: INFUSION THERAPY | Facility: HOSPITAL | Age: 65
End: 2025-03-21
Attending: INTERNAL MEDICINE
Payer: COMMERCIAL

## 2025-03-21 VITALS
WEIGHT: 216.69 LBS | BODY MASS INDEX: 32.09 KG/M2 | RESPIRATION RATE: 18 BRPM | HEIGHT: 69 IN | SYSTOLIC BLOOD PRESSURE: 116 MMHG | OXYGEN SATURATION: 99 % | HEART RATE: 79 BPM | DIASTOLIC BLOOD PRESSURE: 69 MMHG | TEMPERATURE: 98 F

## 2025-03-21 DIAGNOSIS — D64.9 NORMOCHROMIC ANEMIA: ICD-10-CM

## 2025-03-21 DIAGNOSIS — N18.30 STAGE 3 CHRONIC KIDNEY DISEASE, UNSPECIFIED WHETHER STAGE 3A OR 3B CKD: ICD-10-CM

## 2025-03-21 DIAGNOSIS — D64.9 CHRONIC ANEMIA: Primary | ICD-10-CM

## 2025-03-21 DIAGNOSIS — D46.9 MDS (MYELODYSPLASTIC SYNDROME): ICD-10-CM

## 2025-03-21 DIAGNOSIS — D64.89 ANEMIA DUE TO MULTIPLE MECHANISMS: ICD-10-CM

## 2025-03-21 PROCEDURE — 96372 THER/PROPH/DIAG INJ SC/IM: CPT

## 2025-03-21 PROCEDURE — 63600175 PHARM REV CODE 636 W HCPCS: Mod: JZ,EC,TB | Performed by: NURSE PRACTITIONER

## 2025-03-21 RX ADMIN — EPOETIN ALFA-EPBX 60000 UNITS: 40000 INJECTION, SOLUTION INTRAVENOUS; SUBCUTANEOUS at 04:03

## 2025-03-21 NOTE — PLAN OF CARE
Problem: Fatigue  Goal: Improved Activity Tolerance  Outcome: Progressing  Intervention: Promote Improved Energy  Flowsheets (Taken 3/21/2025 1613)  Environmental Support: rest periods encouraged

## 2025-03-25 ENCOUNTER — TELEPHONE (OUTPATIENT)
Dept: HEMATOLOGY/ONCOLOGY | Facility: CLINIC | Age: 65
End: 2025-03-25
Payer: COMMERCIAL

## 2025-03-25 NOTE — TELEPHONE ENCOUNTER
Peer to Peer set up with [employer] [facility]. Patient made aware of above and verbalized understanding. Patient to bring new insurance cards to office tomorrow so they can be updated in the system as well.

## 2025-03-26 ENCOUNTER — LAB VISIT (OUTPATIENT)
Dept: LAB | Facility: HOSPITAL | Age: 65
End: 2025-03-26
Attending: INTERNAL MEDICINE
Payer: COMMERCIAL

## 2025-03-26 DIAGNOSIS — D46.9 MDS (MYELODYSPLASTIC SYNDROME): ICD-10-CM

## 2025-03-26 LAB
ABSOLUTE EOSINOPHIL (SMH): 0.12 K/UL
ABSOLUTE MONOCYTE (SMH): 0.67 K/UL (ref 0.3–1)
ABSOLUTE NEUTROPHIL COUNT (SMH): 3.6 K/UL (ref 1.8–7.7)
ALBUMIN SERPL-MCNC: 4.2 G/DL (ref 3.5–5.2)
ALP SERPL-CCNC: 56 UNIT/L (ref 55–135)
ALT SERPL-CCNC: 13 UNIT/L (ref 10–44)
ANION GAP (SMH): 5 MMOL/L (ref 8–16)
ANISOCYTOSIS BLD QL SMEAR: SLIGHT
AST SERPL-CCNC: 16 UNIT/L (ref 10–40)
BASOPHILS # BLD AUTO: 0.07 K/UL
BASOPHILS NFR BLD AUTO: 1.3 %
BILIRUB SERPL-MCNC: 0.7 MG/DL (ref 0.1–1)
BUN SERPL-MCNC: 34 MG/DL (ref 8–23)
CALCIUM SERPL-MCNC: 9 MG/DL (ref 8.7–10.5)
CHLORIDE SERPL-SCNC: 112 MMOL/L (ref 95–110)
CO2 SERPL-SCNC: 24 MMOL/L (ref 23–29)
CREAT SERPL-MCNC: 3 MG/DL (ref 0.5–1.4)
DACRYOCYTES BLD QL SMEAR: NORMAL
ERYTHROCYTE [DISTWIDTH] IN BLOOD BY AUTOMATED COUNT: 27.6 % (ref 11.5–14.5)
FERRITIN SERPL-MCNC: 1229.2 NG/ML (ref 20–300)
GFR SERPLBLD CREATININE-BSD FMLA CKD-EPI: 22 ML/MIN/1.73/M2
GLUCOSE SERPL-MCNC: 95 MG/DL (ref 70–110)
HCT VFR BLD AUTO: 25.9 % (ref 40–54)
HGB BLD-MCNC: 8.8 GM/DL (ref 14–18)
HYPOCHROMIA BLD QL SMEAR: NORMAL
IMM GRANULOCYTES # BLD AUTO: 0.02 K/UL (ref 0–0.04)
IMM GRANULOCYTES NFR BLD AUTO: 0.4 % (ref 0–0.5)
IRON SATN MFR SERPL: >75 % (ref 20–50)
IRON SERPL-MCNC: 154 UG/DL (ref 45–160)
LYMPHOCYTES # BLD AUTO: 1.05 K/UL (ref 1–4.8)
MCH RBC QN AUTO: 31.5 PG (ref 27–31)
MCHC RBC AUTO-ENTMCNC: 34 G/DL (ref 32–36)
MCV RBC AUTO: 93 FL (ref 82–98)
NUCLEATED RBC (/100WBC) (SMH): 0 /100 WBC
OVALOCYTES BLD QL SMEAR: NORMAL
PLATELET # BLD AUTO: 482 K/UL (ref 150–450)
PLATELET BLD QL SMEAR: NORMAL
PMV BLD AUTO: 9.9 FL (ref 9.2–12.9)
POLYCHROMASIA BLD QL SMEAR: NORMAL
POTASSIUM SERPL-SCNC: 4.7 MMOL/L (ref 3.5–5.1)
PROT SERPL-MCNC: 7.1 GM/DL (ref 6–8.4)
RBC # BLD AUTO: 2.79 M/UL (ref 4.6–6.2)
RELATIVE EOSINOPHIL (SMH): 2.2 % (ref 0–8)
RELATIVE LYMPHOCYTE (SMH): 19.1 % (ref 18–48)
RELATIVE MONOCYTE (SMH): 12.2 % (ref 4–15)
RELATIVE NEUTROPHIL (SMH): 64.8 % (ref 38–73)
SCHISTOCYTES BLD QL SMEAR: NORMAL
SODIUM SERPL-SCNC: 141 MMOL/L (ref 136–145)
TARGETS BLD QL SMEAR: NORMAL
TIBC SERPL-MCNC: 174 UG/DL (ref 250–450)
TRANSFERRIN SERPL-MCNC: 124 MG/DL (ref 200–375)
WBC # BLD AUTO: 5.51 K/UL (ref 3.9–12.7)

## 2025-03-26 PROCEDURE — 85025 COMPLETE CBC W/AUTO DIFF WBC: CPT

## 2025-03-26 PROCEDURE — 82728 ASSAY OF FERRITIN: CPT

## 2025-03-26 PROCEDURE — 36415 COLL VENOUS BLD VENIPUNCTURE: CPT

## 2025-03-26 PROCEDURE — 80053 COMPREHEN METABOLIC PANEL: CPT

## 2025-03-26 PROCEDURE — 84466 ASSAY OF TRANSFERRIN: CPT

## 2025-03-28 ENCOUNTER — INFUSION (OUTPATIENT)
Dept: INFUSION THERAPY | Facility: HOSPITAL | Age: 65
End: 2025-03-28
Attending: INTERNAL MEDICINE
Payer: COMMERCIAL

## 2025-03-28 VITALS
HEIGHT: 69 IN | BODY MASS INDEX: 32.33 KG/M2 | SYSTOLIC BLOOD PRESSURE: 134 MMHG | HEART RATE: 78 BPM | WEIGHT: 218.25 LBS | RESPIRATION RATE: 18 BRPM | TEMPERATURE: 98 F | DIASTOLIC BLOOD PRESSURE: 86 MMHG

## 2025-03-28 DIAGNOSIS — D64.9 CHRONIC ANEMIA: Primary | ICD-10-CM

## 2025-03-28 DIAGNOSIS — D64.9 NORMOCHROMIC ANEMIA: ICD-10-CM

## 2025-03-28 DIAGNOSIS — D64.89 ANEMIA DUE TO MULTIPLE MECHANISMS: ICD-10-CM

## 2025-03-28 DIAGNOSIS — D46.9 MDS (MYELODYSPLASTIC SYNDROME): ICD-10-CM

## 2025-03-28 DIAGNOSIS — N18.30 STAGE 3 CHRONIC KIDNEY DISEASE, UNSPECIFIED WHETHER STAGE 3A OR 3B CKD: ICD-10-CM

## 2025-03-28 PROCEDURE — 63600175 PHARM REV CODE 636 W HCPCS: Mod: JZ,EC,TB | Performed by: NURSE PRACTITIONER

## 2025-03-28 PROCEDURE — 96372 THER/PROPH/DIAG INJ SC/IM: CPT

## 2025-03-28 RX ADMIN — EPOETIN ALFA-EPBX 60000 UNITS: 40000 INJECTION, SOLUTION INTRAVENOUS; SUBCUTANEOUS at 04:03

## 2025-03-28 NOTE — PLAN OF CARE
Problem: Fatigue  Goal: Improved Activity Tolerance  3/28/2025 1556 by Gisell Castillo, RN  Outcome: Met  3/28/2025 1556 by Gisell Castillo, RN  Outcome: Progressing

## 2025-04-02 ENCOUNTER — TELEPHONE (OUTPATIENT)
Facility: CLINIC | Age: 65
End: 2025-04-02
Payer: COMMERCIAL

## 2025-04-02 NOTE — TELEPHONE ENCOUNTER
----- Message from Юлия sent at 4/2/2025  9:13 AM CDT -----  Pt calling to let us know that he is now on Medicare Part A and B and the B part will cover Rytelo. He is no longer with [employer]. Copy of ins card in media. He would like a call back with status, he is wanting to be kept up to datecb [phone]

## 2025-04-02 NOTE — TELEPHONE ENCOUNTER
Message forwarded to Ksenia to please make auth dept aware of new insurance so that they can resubmit request. Pratima also made aware.

## 2025-04-02 NOTE — TELEPHONE ENCOUNTER
Patient made aware that auth dept made aware of updated insurance with request to resubmit request for auth for rytelo, and we will let him know once we have update concerning this. Verbalized understanding.

## 2025-04-03 RX ORDER — SODIUM CHLORIDE 0.9 % (FLUSH) 0.9 %
10 SYRINGE (ML) INJECTION
OUTPATIENT
Start: 2025-04-07

## 2025-04-03 RX ORDER — HEPARIN 100 UNIT/ML
500 SYRINGE INTRAVENOUS
OUTPATIENT
Start: 2025-04-07

## 2025-04-03 RX ORDER — DIPHENHYDRAMINE HYDROCHLORIDE 50 MG/ML
50 INJECTION, SOLUTION INTRAMUSCULAR; INTRAVENOUS ONCE AS NEEDED
OUTPATIENT
Start: 2025-04-07

## 2025-04-03 RX ORDER — EPINEPHRINE 0.3 MG/.3ML
0.3 INJECTION SUBCUTANEOUS ONCE AS NEEDED
OUTPATIENT
Start: 2025-04-07

## 2025-04-04 ENCOUNTER — INFUSION (OUTPATIENT)
Dept: INFUSION THERAPY | Facility: HOSPITAL | Age: 65
End: 2025-04-04
Attending: INTERNAL MEDICINE
Payer: MEDICARE

## 2025-04-04 VITALS
OXYGEN SATURATION: 99 % | HEIGHT: 69 IN | HEART RATE: 76 BPM | TEMPERATURE: 98 F | SYSTOLIC BLOOD PRESSURE: 139 MMHG | RESPIRATION RATE: 18 BRPM | DIASTOLIC BLOOD PRESSURE: 81 MMHG | WEIGHT: 215.69 LBS | BODY MASS INDEX: 31.95 KG/M2

## 2025-04-04 DIAGNOSIS — D46.9 MDS (MYELODYSPLASTIC SYNDROME): ICD-10-CM

## 2025-04-04 DIAGNOSIS — D64.89 ANEMIA DUE TO MULTIPLE MECHANISMS: ICD-10-CM

## 2025-04-04 DIAGNOSIS — D64.9 NORMOCHROMIC ANEMIA: ICD-10-CM

## 2025-04-04 DIAGNOSIS — N18.30 STAGE 3 CHRONIC KIDNEY DISEASE, UNSPECIFIED WHETHER STAGE 3A OR 3B CKD: ICD-10-CM

## 2025-04-04 DIAGNOSIS — D64.9 CHRONIC ANEMIA: Primary | ICD-10-CM

## 2025-04-04 PROCEDURE — 63600175 PHARM REV CODE 636 W HCPCS: Mod: JZ,EC,TB | Performed by: NURSE PRACTITIONER

## 2025-04-04 PROCEDURE — 96372 THER/PROPH/DIAG INJ SC/IM: CPT

## 2025-04-04 RX ADMIN — EPOETIN ALFA-EPBX 60000 UNITS: 40000 INJECTION, SOLUTION INTRAVENOUS; SUBCUTANEOUS at 04:04

## 2025-04-04 NOTE — PLAN OF CARE
Problem: Fatigue  Goal: Improved Activity Tolerance  Outcome: Progressing  Intervention: Promote Improved Energy  Flowsheets (Taken 4/4/2025 1611)  Environmental Support: rest periods encouraged

## 2025-04-07 ENCOUNTER — SOCIAL WORK (OUTPATIENT)
Dept: HEMATOLOGY/ONCOLOGY | Facility: CLINIC | Age: 65
End: 2025-04-07
Payer: MEDICARE

## 2025-04-07 ENCOUNTER — INFUSION (OUTPATIENT)
Dept: INFUSION THERAPY | Facility: HOSPITAL | Age: 65
End: 2025-04-07
Attending: INTERNAL MEDICINE
Payer: MEDICARE

## 2025-04-07 VITALS
WEIGHT: 213.38 LBS | SYSTOLIC BLOOD PRESSURE: 136 MMHG | HEART RATE: 61 BPM | BODY MASS INDEX: 31.6 KG/M2 | TEMPERATURE: 98 F | DIASTOLIC BLOOD PRESSURE: 81 MMHG | RESPIRATION RATE: 18 BRPM | HEIGHT: 69 IN

## 2025-04-07 DIAGNOSIS — D46.1 RARS (REFRACTORY ANEMIA WITH RINGED SIDEROBLASTS): ICD-10-CM

## 2025-04-07 DIAGNOSIS — D46.9 MDS (MYELODYSPLASTIC SYNDROME): Primary | ICD-10-CM

## 2025-04-07 PROCEDURE — 25000003 PHARM REV CODE 250: Performed by: INTERNAL MEDICINE

## 2025-04-07 PROCEDURE — 96367 TX/PROPH/DG ADDL SEQ IV INF: CPT

## 2025-04-07 PROCEDURE — 96375 TX/PRO/DX INJ NEW DRUG ADDON: CPT

## 2025-04-07 PROCEDURE — 96365 THER/PROPH/DIAG IV INF INIT: CPT

## 2025-04-07 PROCEDURE — 96366 THER/PROPH/DIAG IV INF ADDON: CPT

## 2025-04-07 PROCEDURE — 63600175 PHARM REV CODE 636 W HCPCS: Performed by: INTERNAL MEDICINE

## 2025-04-07 RX ORDER — DIPHENHYDRAMINE HYDROCHLORIDE 50 MG/ML
50 INJECTION, SOLUTION INTRAMUSCULAR; INTRAVENOUS ONCE AS NEEDED
Status: DISCONTINUED | OUTPATIENT
Start: 2025-04-07 | End: 2025-04-07 | Stop reason: HOSPADM

## 2025-04-07 RX ORDER — SODIUM CHLORIDE 0.9 % (FLUSH) 0.9 %
10 SYRINGE (ML) INJECTION
Status: DISCONTINUED | OUTPATIENT
Start: 2025-04-07 | End: 2025-04-07 | Stop reason: HOSPADM

## 2025-04-07 RX ORDER — EPINEPHRINE 0.3 MG/.3ML
0.3 INJECTION SUBCUTANEOUS ONCE AS NEEDED
Status: DISCONTINUED | OUTPATIENT
Start: 2025-04-07 | End: 2025-04-07 | Stop reason: HOSPADM

## 2025-04-07 RX ADMIN — SODIUM CHLORIDE 25 MG: 9 INJECTION, SOLUTION INTRAVENOUS at 11:04

## 2025-04-07 RX ADMIN — SODIUM CHLORIDE: 9 INJECTION, SOLUTION INTRAVENOUS at 11:04

## 2025-04-07 RX ADMIN — IMETELSTAT SODIUM: 47 INJECTION, POWDER, LYOPHILIZED, FOR SOLUTION INTRAVENOUS at 12:04

## 2025-04-07 RX ADMIN — HYDROCORTISONE SODIUM SUCCINATE 100 MG: 100 INJECTION, POWDER, FOR SOLUTION INTRAMUSCULAR; INTRAVENOUS at 11:04

## 2025-04-07 NOTE — PLAN OF CARE
Problem: Fatigue  Goal: Improved Activity Tolerance  4/7/2025 1140 by Gisell Castillo, RN  Outcome: Met  4/7/2025 1140 by Gisell Castillo, RN  Outcome: Progressing

## 2025-04-07 NOTE — PROGRESS NOTES
Rick Butler is a 64 year old diagnosed with MDS myelodyspastic syndrome.  I met with patient during his chemo infusion to complete new patient orientation and to complete the NCCN Distress Screening; patient indicated a rating of 0.  Patient denied needing psychosocial support at this time.  I provided patient with the [facility] community events flyer and my contact information in the event supportive services arise in the future.

## 2025-04-09 ENCOUNTER — OFFICE VISIT (OUTPATIENT)
Facility: CLINIC | Age: 65
End: 2025-04-09
Payer: MEDICARE

## 2025-04-09 ENCOUNTER — LAB VISIT (OUTPATIENT)
Dept: LAB | Facility: HOSPITAL | Age: 65
End: 2025-04-09
Attending: INTERNAL MEDICINE
Payer: MEDICARE

## 2025-04-09 VITALS
RESPIRATION RATE: 16 BRPM | SYSTOLIC BLOOD PRESSURE: 143 MMHG | TEMPERATURE: 98 F | OXYGEN SATURATION: 99 % | BODY MASS INDEX: 32.14 KG/M2 | DIASTOLIC BLOOD PRESSURE: 79 MMHG | HEART RATE: 76 BPM | WEIGHT: 217 LBS | HEIGHT: 69 IN

## 2025-04-09 DIAGNOSIS — D63.1 ANEMIA, CHRONIC RENAL FAILURE, STAGE 3 (MODERATE): ICD-10-CM

## 2025-04-09 DIAGNOSIS — D46.9 MDS (MYELODYSPLASTIC SYNDROME): Primary | ICD-10-CM

## 2025-04-09 DIAGNOSIS — N18.30 ANEMIA, CHRONIC RENAL FAILURE, STAGE 3 (MODERATE): ICD-10-CM

## 2025-04-09 DIAGNOSIS — D46.9 MDS (MYELODYSPLASTIC SYNDROME): ICD-10-CM

## 2025-04-09 LAB
ABSOLUTE EOSINOPHIL (SMH): 0.13 K/UL
ABSOLUTE MONOCYTE (SMH): 0.66 K/UL (ref 0.3–1)
ABSOLUTE NEUTROPHIL COUNT (SMH): 4.2 K/UL (ref 1.8–7.7)
ALBUMIN SERPL-MCNC: 4.2 G/DL (ref 3.5–5.2)
ALP SERPL-CCNC: 58 UNIT/L (ref 55–135)
ALT SERPL-CCNC: 12 UNIT/L (ref 10–44)
ANION GAP (SMH): 6 MMOL/L (ref 8–16)
AST SERPL-CCNC: 16 UNIT/L (ref 10–40)
BASOPHILS # BLD AUTO: 0.09 K/UL
BASOPHILS NFR BLD AUTO: 1.5 %
BILIRUB SERPL-MCNC: 0.6 MG/DL (ref 0.1–1)
BUN SERPL-MCNC: 34 MG/DL (ref 8–23)
CALCIUM SERPL-MCNC: 8.7 MG/DL (ref 8.7–10.5)
CHLORIDE SERPL-SCNC: 110 MMOL/L (ref 95–110)
CO2 SERPL-SCNC: 25 MMOL/L (ref 23–29)
CREAT SERPL-MCNC: 3.1 MG/DL (ref 0.5–1.4)
ERYTHROCYTE [DISTWIDTH] IN BLOOD BY AUTOMATED COUNT: 28.6 % (ref 11.5–14.5)
GFR SERPLBLD CREATININE-BSD FMLA CKD-EPI: 22 ML/MIN/1.73/M2
GLUCOSE SERPL-MCNC: 99 MG/DL (ref 70–110)
HCT VFR BLD AUTO: 27 % (ref 40–54)
HGB BLD-MCNC: 9 GM/DL (ref 14–18)
IMM GRANULOCYTES # BLD AUTO: 0.04 K/UL (ref 0–0.04)
IMM GRANULOCYTES NFR BLD AUTO: 0.6 % (ref 0–0.5)
LYMPHOCYTES # BLD AUTO: 1.06 K/UL (ref 1–4.8)
MCH RBC QN AUTO: 30.7 PG (ref 27–31)
MCHC RBC AUTO-ENTMCNC: 33.3 G/DL (ref 32–36)
MCV RBC AUTO: 92 FL (ref 82–98)
NUCLEATED RBC (/100WBC) (SMH): 0 /100 WBC
PLATELET # BLD AUTO: 495 K/UL (ref 150–450)
PMV BLD AUTO: 10.7 FL (ref 9.2–12.9)
POTASSIUM SERPL-SCNC: 4.8 MMOL/L (ref 3.5–5.1)
PROT SERPL-MCNC: 6.9 GM/DL (ref 6–8.4)
RBC # BLD AUTO: 2.93 M/UL (ref 4.6–6.2)
RELATIVE EOSINOPHIL (SMH): 2.1 % (ref 0–8)
RELATIVE LYMPHOCYTE (SMH): 17.2 % (ref 18–48)
RELATIVE MONOCYTE (SMH): 10.7 % (ref 4–15)
RELATIVE NEUTROPHIL (SMH): 67.9 % (ref 38–73)
SODIUM SERPL-SCNC: 141 MMOL/L (ref 136–145)
WBC # BLD AUTO: 6.17 K/UL (ref 3.9–12.7)

## 2025-04-09 PROCEDURE — 80053 COMPREHEN METABOLIC PANEL: CPT

## 2025-04-09 PROCEDURE — 99214 OFFICE O/P EST MOD 30 MIN: CPT | Mod: PBBFAC,PN | Performed by: NURSE PRACTITIONER

## 2025-04-09 PROCEDURE — 99215 OFFICE O/P EST HI 40 MIN: CPT | Mod: S$PBB,,, | Performed by: NURSE PRACTITIONER

## 2025-04-09 PROCEDURE — 99999 PR PBB SHADOW E&M-EST. PATIENT-LVL IV: CPT | Mod: PBBFAC,,, | Performed by: NURSE PRACTITIONER

## 2025-04-09 PROCEDURE — G2211 COMPLEX E/M VISIT ADD ON: HCPCS | Mod: S$PBB,,, | Performed by: NURSE PRACTITIONER

## 2025-04-09 PROCEDURE — 36415 COLL VENOUS BLD VENIPUNCTURE: CPT

## 2025-04-09 PROCEDURE — 85025 COMPLETE CBC W/AUTO DIFF WBC: CPT

## 2025-04-09 NOTE — LETTER
[facility]  [address]  [address]  [place] [facility] [address]  Phone: [phone]  Fax: [phone] April 9, 2025    Rick Butler  [address] [facility] [place] [address]      To Whom It May Concern:    Rick Butler is unable to participate in jury duty due to his cancer diagnosis.     He has a bone marrow cancer and is required [facility].    He will be unable to participate in jury duty due to the multiple lab appointments, treatment dates, and clinic visits.         If you have any questions or concerns, please feel free to call my office.    Sincerely,              Pratima Almonte NP

## 2025-04-09 NOTE — PROGRESS NOTES
[facility] Hematology/Oncology    PROGRESS NOTE - Follow-up Visit      Subjective:       Patient ID:   NAME: Rick Butler : 1960     64 y.o. male    Referring Doc: Chetna (new PCP)  Other Physicians: Getachew; Alan Mustafa    Chief Complaint:  MDS     History of Present Illness:     Patient returns today for a regularly scheduled follow-up visit.  The patient is doing ok overall. He is here by himself today.     He has been Rebozyl and doing well with it - however, he did have some decreased kidney function that both Dr. Tello and Dr. Mas believe it could be the reblozyl;      He has now started on Rytelo per Dr. SERRANO for MDS.     He has not had any recent pain crisis. He denies having any breathing difficulties. He denies any blood in the stool, dark stools or hematuria;; no CP, SOB, HA's or N/V;        He was having back pain and did have an XRAY on his lower back that did show severe degenerative changes.     Discussed covid19 precautions - he had his vaccinations      ROS:   GEN: normal without any fever, night sweats or weight loss  HEENT: normal with no HA's, sore throat, stiff neck, changes in vision  CV: normal with no CP, SOB, PND, MORA or orthopnea  PULM: normal with no SOB, cough, hemoptysis, sputum or pleuritic pain  GI: normal with no abdominal pain, nausea, vomiting, constipation, diarrhea, melanotic stools, BRBPR, or hematemesis  : normal with no hematuria, dysuria  BREAST: normal with no mass, discharge, pain  SKIN: normal with no rash, erythema, bruising, or swelling    Allergies:  Review of patient's allergies indicates:  No Known Allergies    Medications:    Current Outpatient Medications:     amLODIPine (NORVASC) 5 MG tablet, Take 1 tablet (5 mg total) by mouth once daily., Disp: 90 tablet, Rfl: 3    ascorbic acid, vitamin C, (VITAMIN C) 500 MG tablet, Take 500 mg by mouth once daily., Disp: , Rfl:     atorvastatin (LIPITOR) 10 MG tablet, Take 1 tablet (10 mg total) by mouth every  evening., Disp: 90 tablet, Rfl: 3    calcitRIOL (ROCALTROL) 0.25 MCG Cap, Take 0.25 mcg by mouth every 7 days., Disp: , Rfl:     droNABinol (MARINOL) 10 MG capsule, Take 1 capsule (10 mg total) by mouth 2 (two) times daily before meals., Disp: 60 capsule, Rfl: 1    epoetin tray (PROCRIT INJ), Thursday, Disp: , Rfl:     famotidine (PEPCID) 20 MG tablet, famotidine 20 mg tablet  Take 1 tablet twice a day by oral route., Disp: , Rfl:     fluticasone propionate (FLONASE) 50 mcg/actuation nasal spray, 1 SPRAY (50 MCG TOTAL) BY EACH NOSTRIL ROUTE 2 (TWO) TIMES A DAY. 1 SPRAY EVERY MORNING AND AFTERNOON TOWARD THE EAR EACH SIDE AFTER A SINUS RINSE, Disp: 48 mL, Rfl: 1    folic acid (FOLVITE) 1 MG tablet, TAKE 2 TABLETS BY MOUTH EVERY DAY, Disp: 180 tablet, Rfl: 0    LIDOcaine (LIDODERM) 5 %, Place 1 patch onto the skin once daily. Remove & Discard patch within 12 hours or as directed by MD, Disp: 14 patch, Rfl: 0    multivitamin capsule, Take 1 capsule by mouth once daily., Disp: , Rfl:     pantoprazole (PROTONIX) 40 MG tablet, , Disp: , Rfl:     sod chlor-bicarb-squeez bottle (NEILMED SINUS RINSE COMPLETE) pkdv, 1 application  by sinus irrigation route 2 (two) times a day. Not right before bed, Disp: 1 each, Rfl: 11    tamsulosin (FLOMAX) 0.4 mg Cap, TAKE 2 CAPSULES BY MOUTH EVERY DAY, Disp: 180 capsule, Rfl: 1    traZODone (DESYREL) 100 MG tablet, TAKE 1 TABLET BY MOUTH NIGHTLY AS NEEDED FOR INSOMNIA., Disp: 90 tablet, Rfl: 2    azelastine (ASTELIN) 137 mcg (0.1 %) nasal spray, 2 sprays (274 mcg total) by Nasal route 2 (two) times daily as needed for Rhinitis., Disp: 30 mL, Rfl: 5    sildenafil (VIAGRA) 100 MG tablet, Take 1 tablet (100 mg total) by mouth daily as needed for Erectile Dysfunction., Disp: 10 tablet, Rfl: 6    PMHx/PSHx Updates:  See patient's last visit with Dr. Tello on 10/10/2024  See H&P on 7/9/2011      Pathology:    12/20/2019  BONE MARROW, RIGHT ILIAC CREST,    ASPIRATE, CLOT SECTION, AND CORE  "BIOPSY:    --HYPERCELLULAR MARROW (APPROXIMATELY 75% TO 80%) WITH TRILINEAGE   HEMATOPOIETIC ELEMENTS, ERYTHROID  HYPERPLASIA AND MILD MEGAKARYOCYTIC HYPERPLASIA, AND NON-SPECIFIC   DYSHEMATOPOIETIC CHANGES (SEE     COMMENT).  --[id number] INCREASED STAINABLE IRON WITH INCREASED RING   SIDEROBLASTS (GREATER THAN 15%)     (SEE COMMENT).  --PERIPHERAL BLOOD WITH THROMBOCYTOSIS (574,000/MICROLITER) AND ANEMIA   (HEMOGLOBIN 5.5 GRAM/DECILITER),    WITH SCATTERED DREPANOCYTOID FORMS AND FEW NUCLEATED ERYTHROCYTES      Cytogenetic Results at the bottom of the report.  NORMAL    Objective:     Vitals:  Blood pressure (!) 143/79, pulse 76, temperature 97.8 °F (36.6 °C), temperature source Temporal, resp. rate 16, height 5' 9" (1.753 m), weight 98.4 kg (217 lb), SpO2 99%.    Physical Examination:   GEN: no apparent distress, comfortable; AAOx3  HEAD: atraumatic and normocephalic  EYES: no pallor, no icterus, PERRLA  ENT: OMM, no pharyngeal erythema, external ears WNL; no nasal discharge; no thrush  NECK: no masses, thyroid normal, trachea midline, no LAD/LN's, supple  CV: RRR with no murmur; normal pulse; normal S1 and S2; no pedal edema  CHEST: Normal respiratory effort; CTAB; normal breath sounds; no wheeze or crackles  ABDOM: nontender and nondistended; soft; normal bowel sounds; no rebound/guarding  MUSC/Skeletal: ROM normal; no crepitus; joints normal; no deformities or arthropathy  EXTREM: no clubbing, cyanosis, inflammation or swelling  SKIN: no rashes, lesions, ulcers, petechiae or subcutaneous nodules  : no jiang  NEURO: grossly intact; motor/sensory WNL; AAOx3; no tremors  PSYCH: normal mood, affect and behavior  LYMPH: normal cervical, supraclavicular, axillary and groin LN's    Labs:     Lab Results   Component Value Date    WBC 6.17 04/09/2025    HGB 9.0 (L) 04/09/2025    HCT 27.0 (L) 04/09/2025    MCV 92 04/09/2025     (H) 04/09/2025           CMP  Sodium   Date Value Ref Range Status "   04/09/2025 141 136 - 145 mmol/L Final   06/26/2019 138 134 - 144 mmol/L      Potassium   Date Value Ref Range Status   04/09/2025 4.8 3.5 - 5.1 mmol/L Final     Chloride   Date Value Ref Range Status   03/12/2025 109 95 - 110 mmol/L Final   06/26/2019 105 98 - 110 mmol/L      CO2   Date Value Ref Range Status   04/09/2025 25 23 - 29 mmol/L Final     Glucose   Date Value Ref Range Status   03/12/2025 97 70 - 110 mg/dL Final   06/26/2019 114 (H) 70 - 99 mg/dL      BUN   Date Value Ref Range Status   04/09/2025 34 (H) 8 - 23 mg/dL Final     Creatinine   Date Value Ref Range Status   04/09/2025 3.1 (H) 0.5 - 1.4 mg/dL Final   06/26/2019 1.62 (H) 0.60 - 1.40 mg/dL      Calcium   Date Value Ref Range Status   04/09/2025 8.7 8.7 - 10.5 mg/dL Final     Total Protein   Date Value Ref Range Status   03/12/2025 7.1 6.0 - 8.4 g/dL Final     Albumin   Date Value Ref Range Status   04/09/2025 4.2 3.5 - 5.2 g/dL Final   06/26/2019 4.4 3.1 - 4.7 g/dL      Bilirubin Total   Date Value Ref Range Status   04/09/2025 0.6 0.1 - 1.0 mg/dL Final     Comment:     For infants and newborns, interpretation of results should be based   on gestational age, weight and in agreement with clinical   observations.    Premature Infant recommended reference ranges:   0-24 hours:  <8.0 mg/dL   24-48 hours: <12.0 mg/dL   3-5 days:    <15.0 mg/dL   6-29 days:   <15.0 mg/dL     ALP   Date Value Ref Range Status   04/09/2025 58 55 - 135 unit/L Final     AST   Date Value Ref Range Status   04/09/2025 16 10 - 40 unit/L Final     ALT   Date Value Ref Range Status   04/09/2025 12 10 - 44 unit/L Final     Anion Gap   Date Value Ref Range Status   03/12/2025 6 (L) 8 - 16 mmol/L Final     eGFR if    Date Value Ref Range Status   07/13/2022 45.6 (A) >60 mL/min/1.73 m^2 Final     eGFR if non    Date Value Ref Range Status   07/13/2022 39.5 (A) >60 mL/min/1.73 m^2 Final     Comment:     Calculation used to obtain the estimated  glomerular filtration  rate (eGFR) is the CKD-EPI equation.          Lab Results   Component Value Date    IRON 154 03/26/2025    TIBC 174 (L) 03/26/2025    FERRITIN 1,229.2 (H) 03/26/2025           I have reviewed all available lab results and radiology reports.    Radiology/Diagnostic Studies:    CXR 10/31/2024:   Impression:     Multilevel severe discogenic and facet degenerative changes of the lumbar spine.        Electronically signed by:Herber Aaron  Date:                                            10/31/2024  Time:                                           16:16    US 2/8/2023:    IMPRESSION:  1. Dilation of the common bile duct at up to 10 mm, unchanged when compared to 2016.  2. Nonvisualization of the pancreas because of overlying bowel gas.  3. No other significant findings.  spleen is normal in size and appearance      2/15/2018 Xray Survey:   IMPRESSION: No significant abnormality seen. No evidence of osteoblastic or  osteoclastic lesions identified    MRI L-spine 2/12/2018  IMPRESSION:    1. Prominent low signal intensity throughout the bone marrow on T1 and  T2-weighted imaging. Marrow infiltrative process including malignancy such as  metastatic disease or lymphoma/leukemia is a consideration along with multiple  myeloma or malignant process. Benign etiology such as osteopetrosis or  regenerative red marrow are also considerations.    2. Multilevel lumbar degenerative changes, most prominent at L4-L5 and L5-S1 as  described.    Assessment/Plan:   (1) 64 y.o. male with diagnosis of sickle cell anemia with borderline microcytosis  - latest hgb was 6.0 and he had blood transfusion  - today, he is not symptomatic at this time  - he probably has a multifactorial anemia process with underlying sickle cell, anemia of chronic disorders and anemia of chronic renal. I can not rule out an underlying GI bleeding process.   - iron panel is adequate (no deficiency)  - total bilirubin is only minimally  "elevated  - he required transfusion again in Oct 2019 and again in Dec 2019  - he had bone marrow biopsy on 12/20/2019    "dyshematopoietic changes are not entirely specific and are present  mostly within erythroid and megakaryocytic lineages. 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  - he is now on procrit per directives of Dr Mustafa    7/30/2020:   he recently saw Dr Mustafa  And sees him again in Jan 2021  - he is doing well with procrit and is feeling much better  - he has not required any transfusions for some time time    9/24/2020:  - latest hgb at 8.5  - will increase the procrit dose  - f/u with Dr Zavala in Jan 2021 11/19/2020:  - patient doing well and feels "great"  - hgb up to 9.1; continued on the procrit  - f/u with Dr Mustafa in Jan 2021    3/4/2021:  - he saw Dr Mustafa in jan 2021 and sees him again in June/July 2021  - latest hgb at 9.1 and stable and platelets are 474,000; wbc at 10.0    6/10/2021:  - latest hgb at 9.0  - plats 485,000  - on weekly procrit  - seeing Dr Mustafa again tomorrow    10/14/2021:  - hgb at 8.6 but he recently had been taking naproxen and had some BRBPR - which since resolved  - he saw Dr Beyer with GI and he is having colonsocopy on Nov 5th along with EGD  - plats 429,000  - he sees Dr Mustafa again in Nov 19th   - he sees Dr Mas again in Dec 2021    1/13/2022:  - He has been under the care of Dr Mustafa at [facility] for RARS- MDS. He is now on procrit weekly. He sees Dr Mustafa again in March 2022    - hgb 9.3  - he had scope with Dr Collins in nov 2021 which was good per patient and they plan to repeat in 5 " yrs    5/12/2022:  - hgb at 9.0  - plats 485  - wbc 7.25  - he is on procrit weekly  - saw Dr Mustafa this past Fridday and since he is leaving, he will start seeing Dr Hilario instead in 3 months    8/18/2022:  - latest hgb a little lower at 8.8  - he has been on the procrit  - he saw Dr Hilario recently at [facility] and plans to see her again in 3 months    1/26/2023:  - He saw Dr Hilario at [facility] again recently this past Tuesday and they are considering another medication; he plans to see her again in April 2023  - Dr Hilario requested he have an US of spleen - will order here in [place]  - he is also on appetite stimulant  - hgb at 9.4 and plats at 500k    5/11/2023:  - hgb at 9.6 and plats 466,000  - He saw Dr Hilario at [facility] again recently this past Tuesday and they are considering another medication; he plans to see her again in Aug 2023    8/3/2023:  - His insurance will not approve of him getting the Reblozyl   - He sees Dr Hilario again next Tuesday and we will await her directives.     9/14/2023:  - the Rebozyl has been approved - will set up  - latest hgb at 10.1  - seeing Dr Hilario again in Jan 2024 11/16/2023:  - continued on Rebozyl  - WBC and plats WNL  - hgb at 10.3  - cardiac w/u negative per patient    2/15/2024:  - labs are adequate  - hgb at 10.6  - WBC and plat WNL  - continued on Rebozyl  - recent colonoscopy good per patient    4/4/2024:  - continued on Rebozyl  - counts are looking adequate    8/1/2024:  - continued on procrit  - insurance would not approve of the rebozyl and he has not had the last 2-3 injections  - hold rebozyl for now anyway due to the increased creatine and decreased kidney function  - f/u with Dr Chilel  - seeing Dr Hilario on 8/13  - continue procrit as directed    10/10/2024  - he sees Dr Mas on 10/31  - he has been continued on rebozyl and is due again next week  - he has been on procrit  - Dr Hilario is now at [facility] in [place] and he last saw her in Aug 2024 - he plans to continue under her  "care via telemed    11/7/2024:   - continue procrit   - hold reblozyl due to kidney function until patient sees Dr. SERRANO in December   - referral to neuro and back clinic due to severe disc disease in lower back     12/11/2024:   - patient kidney function has declined more   - [place] to [address] per Dr. SERRANO   - hold reblozyl for now, hgb stable     4/10/2025:   - per Dr. SERRANO hold reblozyl and stop procrit   - now on Rytelo q 28 days   - cbc, cmp monthly   - last saw ZACH on 3/4/2025      (2) Alcohol abuse issues in past - he has been sober for over 3 years now    (3) New findings on radiography with abnormal chest CT and lung mass  - former smoker    (4) chronic back issues - prior Xrays and MRI - suspect the findings on the MRI are possibly due to his sickle cell;   - SPEP was previously negative for M-protein  - PSA was 0.3 in Sept 2017  - recommended neurosurgery evaluation previously  - he saw Dr Nura VUONG and had a nerve "burning" procedure and his pain has improved    (5) CRI - elevated creatinine - he is followed Dr Chilel with nephrology      (6) H pylori gastritis - s/p recent endoscopy with Dr Collins and antibotics x 10 days    (7) CP   9/14/2023:  - seeing Dr lovett with cardiology and planning stress test in near future        1. MDS (myelodysplastic syndrome)        2. Anemia, chronic renal failure, stage 3 (moderate)                PLAN;  1. Hold procrit and stop reblozyl, started on Rytelo this week   2. Proceed with Dr Mas  GFR stable at 22 water and food only, no diet sodas   3. Planned f/u with Dr Serrano again next year   4. Cbc and cmp weekly for now then monthly   5. Patient will not be able to do jury duty due to his MDS and treatment plan     RTC  4 weeks with me           Discussion:       Pathology Discussion:    I reviewed and discussed the pathology report(s) and radiograph reports (if available) in as simple to understand and/or laymen's terms to the best of my ability. I had an " indepth conversation with the patient and went over the patient's individual diagnosis based on the information that was currently available. I discussed the TNM staging process with regard to the patient's particular cancer type, and the calculated stage based on the currently available TNM data and literature. I discussed the available prognostic data with regard to the current staging information and how it relates to the prognosis of their particular neoplastic process.          COVID-19 Discussion:    I had long discussion with patient and any applicable family about the COVID-19 coronavirus epidemic and the recommended precautions with regard to cancer and/or hematology patients. I have re-iterated the CDC recommendations for adequate hand washing, use of hand -like products, and coughing into elbow, etc. In addition, especially for our patients who are on chemotherapy and/or our otherwise immunocompromised patients, I have recommended avoidance of crowds, including movie theaters, restaurants, churches, etc. I have recommended avoidance of any sick or symptomatic family members and/or friends. I have also recommended avoidance of any raw and unwashed food products, and general avoidance of food items that have not been prepared by themselves. The patient has been asked to call us immediately with any symptom developments, issues, questions or other general concerns.     I have explained all of the above in detail and the patient understands all of the current recommendation(s). I have answered all of their questions to the best of my ability and to their complete satisfaction.   The patient is to continue with the current management plan.            Electronically signed by Pratima Melgar NP

## 2025-04-10 ENCOUNTER — RESULTS FOLLOW-UP (OUTPATIENT)
Dept: FAMILY MEDICINE | Facility: CLINIC | Age: 65
End: 2025-04-10

## 2025-04-23 ENCOUNTER — LAB VISIT (OUTPATIENT)
Dept: LAB | Facility: HOSPITAL | Age: 65
End: 2025-04-23
Attending: INTERNAL MEDICINE
Payer: MEDICARE

## 2025-04-23 DIAGNOSIS — D46.9 MDS (MYELODYSPLASTIC SYNDROME): ICD-10-CM

## 2025-04-23 LAB
ABSOLUTE EOSINOPHIL (SMH): 0.12 K/UL
ABSOLUTE MONOCYTE (SMH): 0.4 K/UL (ref 0.3–1)
ABSOLUTE NEUTROPHIL COUNT (SMH): 2.9 K/UL (ref 1.8–7.7)
ALBUMIN SERPL-MCNC: 4.3 G/DL (ref 3.5–5.2)
ALP SERPL-CCNC: 62 UNIT/L (ref 55–135)
ALT SERPL-CCNC: 49 UNIT/L (ref 10–44)
ANION GAP (SMH): 6 MMOL/L (ref 8–16)
AST SERPL-CCNC: 47 UNIT/L (ref 10–40)
BASOPHILS # BLD AUTO: 0.05 K/UL
BASOPHILS NFR BLD AUTO: 1.1 %
BILIRUB SERPL-MCNC: 0.5 MG/DL (ref 0.1–1)
BUN SERPL-MCNC: 49 MG/DL (ref 8–23)
CALCIUM SERPL-MCNC: 9 MG/DL (ref 8.7–10.5)
CHLORIDE SERPL-SCNC: 109 MMOL/L (ref 95–110)
CO2 SERPL-SCNC: 25 MMOL/L (ref 23–29)
CREAT SERPL-MCNC: 3.4 MG/DL (ref 0.5–1.4)
ERYTHROCYTE [DISTWIDTH] IN BLOOD BY AUTOMATED COUNT: 26.9 % (ref 11.5–14.5)
GFR SERPLBLD CREATININE-BSD FMLA CKD-EPI: 19 ML/MIN/1.73/M2
GLUCOSE SERPL-MCNC: 99 MG/DL (ref 70–110)
HCT VFR BLD AUTO: 21.9 % (ref 40–54)
HGB BLD-MCNC: 7.5 GM/DL (ref 14–18)
IMM GRANULOCYTES # BLD AUTO: 0.02 K/UL (ref 0–0.04)
IMM GRANULOCYTES NFR BLD AUTO: 0.4 % (ref 0–0.5)
LYMPHOCYTES # BLD AUTO: 1.07 K/UL (ref 1–4.8)
MCH RBC QN AUTO: 31.1 PG (ref 27–31)
MCHC RBC AUTO-ENTMCNC: 34.2 G/DL (ref 32–36)
MCV RBC AUTO: 91 FL (ref 82–98)
NUCLEATED RBC (/100WBC) (SMH): 0 /100 WBC
PLATELET # BLD AUTO: 133 K/UL (ref 150–450)
PMV BLD AUTO: 10.7 FL (ref 9.2–12.9)
POTASSIUM SERPL-SCNC: 4.6 MMOL/L (ref 3.5–5.1)
PROT SERPL-MCNC: 7.1 GM/DL (ref 6–8.4)
RBC # BLD AUTO: 2.41 M/UL (ref 4.6–6.2)
RELATIVE EOSINOPHIL (SMH): 2.6 % (ref 0–8)
RELATIVE LYMPHOCYTE (SMH): 23.4 % (ref 18–48)
RELATIVE MONOCYTE (SMH): 8.8 % (ref 4–15)
RELATIVE NEUTROPHIL (SMH): 63.7 % (ref 38–73)
SODIUM SERPL-SCNC: 140 MMOL/L (ref 136–145)
WBC # BLD AUTO: 4.57 K/UL (ref 3.9–12.7)

## 2025-04-23 PROCEDURE — 80053 COMPREHEN METABOLIC PANEL: CPT

## 2025-04-23 PROCEDURE — 85025 COMPLETE CBC W/AUTO DIFF WBC: CPT

## 2025-04-23 PROCEDURE — 36415 COLL VENOUS BLD VENIPUNCTURE: CPT

## 2025-04-28 ENCOUNTER — TELEPHONE (OUTPATIENT)
Facility: CLINIC | Age: 65
End: 2025-04-28
Payer: MEDICARE

## 2025-04-28 ENCOUNTER — OFFICE VISIT (OUTPATIENT)
Dept: FAMILY MEDICINE | Facility: CLINIC | Age: 65
End: 2025-04-28
Payer: MEDICARE

## 2025-04-28 VITALS
DIASTOLIC BLOOD PRESSURE: 74 MMHG | SYSTOLIC BLOOD PRESSURE: 110 MMHG | BODY MASS INDEX: 31.34 KG/M2 | WEIGHT: 211.63 LBS | TEMPERATURE: 98 F | HEIGHT: 69 IN | HEART RATE: 78 BPM | OXYGEN SATURATION: 98 %

## 2025-04-28 DIAGNOSIS — N18.4 CHRONIC KIDNEY DISEASE (CKD), STAGE IV (SEVERE): ICD-10-CM

## 2025-04-28 DIAGNOSIS — D46.9 MDS (MYELODYSPLASTIC SYNDROME): ICD-10-CM

## 2025-04-28 DIAGNOSIS — E78.5 DYSLIPIDEMIA: ICD-10-CM

## 2025-04-28 DIAGNOSIS — N40.1 BPH ASSOCIATED WITH NOCTURIA: ICD-10-CM

## 2025-04-28 DIAGNOSIS — R35.0 URINARY FREQUENCY: ICD-10-CM

## 2025-04-28 DIAGNOSIS — I10 ESSENTIAL HYPERTENSION: Primary | ICD-10-CM

## 2025-04-28 DIAGNOSIS — F51.01 PRIMARY INSOMNIA: ICD-10-CM

## 2025-04-28 DIAGNOSIS — R97.20 ELEVATED PSA: ICD-10-CM

## 2025-04-28 DIAGNOSIS — R74.8 ELEVATED LIVER ENZYMES: ICD-10-CM

## 2025-04-28 DIAGNOSIS — R35.1 BPH ASSOCIATED WITH NOCTURIA: ICD-10-CM

## 2025-04-28 PROCEDURE — 99999 PR PBB SHADOW E&M-EST. PATIENT-LVL III: CPT | Mod: PBBFAC,,, | Performed by: NURSE PRACTITIONER

## 2025-04-28 PROCEDURE — 99213 OFFICE O/P EST LOW 20 MIN: CPT | Mod: PBBFAC,PN | Performed by: NURSE PRACTITIONER

## 2025-04-28 PROCEDURE — G2211 COMPLEX E/M VISIT ADD ON: HCPCS | Mod: 95,S$PBB,, | Performed by: NURSE PRACTITIONER

## 2025-04-28 PROCEDURE — 99214 OFFICE O/P EST MOD 30 MIN: CPT | Mod: S$PBB,,, | Performed by: NURSE PRACTITIONER

## 2025-04-28 NOTE — PROGRESS NOTES
SUBJECTIVE:      Patient ID: Rick Butler is a 65 y.o. male.    Chief Complaint: Follow-up (6 mon f/u)    History of Present Illness    CHIEF COMPLAINT:  Mr. Butler presents for a follow-up visit to discuss recent lab results and ongoing medical management.    HPI:  Mr. Butler reports increased urinary frequency without trouble urinating or burning sensation. He feels he is emptying his bladder completely but does not specify the duration of these symptoms. He has a history of BPH and is on Flomax. He had a recent PSA level, which has more than tripled since the PSA last year. Mr. Butler is currently receiving Rytelo infusions for MDA an anemia, which he started at the beginning of the month, scheduled every 28 days with the next infusion on the upcoming Monday. A recent CBC showed anemia, low enough to required transfusion. He denies any symptoms of anemia, such as dizziness, weakness, tachycardia, paraesthesias, chest pain, or fatigue. Mr. Butler is evaluated by Dr. Tello and Dr. Hilario for his MDS care, with his next appointment scheduled for the following Thursday. He is also evaluated by Dr. Obrien, a kidney specialist, every six months, with his last visit indicating satisfactory progress.      TEST RESULTS:  Recent CBC results show a Hemoglobin of 7.5 and Hematocrit of 21.9. Mr. Butler's recent Platelet count is 133, which was previously in the 400s. His recent Creatinine level is 3.4, and eGFR is 19. Liver enzymes (AST and ALT) are recently in the 40s, previously in the teens. Recent Cholesterol results show LDL at 38.2 and HDL at 48. Mr. Butler's recent Thyroid level is 3.167, and PSA is 1.72, which was previously 0.41.        Review of Systems   Constitutional:  Negative for activity change, appetite change, chills, diaphoresis, fatigue, fever and unexpected weight change.   HENT:  Negative for congestion, ear pain, sinus pressure, sore throat, trouble swallowing and voice change.    Eyes:  Negative  for pain, discharge and visual disturbance.   Respiratory:  Negative for cough, chest tightness, shortness of breath and wheezing.    Cardiovascular:  Negative for chest pain and palpitations.   Gastrointestinal:  Negative for abdominal pain, constipation, diarrhea, nausea and vomiting.   Genitourinary:  Positive for frequency. Negative for difficulty urinating, flank pain and urgency.   Musculoskeletal:  Negative for back pain and joint swelling.   Skin:  Negative for color change and rash.   Neurological:  Negative for dizziness, seizures, syncope, weakness, numbness and headaches.   Hematological:  Negative for adenopathy.   Psychiatric/Behavioral:  Negative for dysphoric mood and sleep disturbance. The patient is not nervous/anxious.        Family History   Problem Relation Name Age of Onset    Arthritis Mother      Depression Mother      Heart disease Mother      Hypertension Mother      Heart attack Father  59      Social History     Socioeconomic History    Marital status:    Occupational History    Occupation: Prep Cook     Employer: [employer]   Tobacco Use    Smoking status: Former     Current packs/day: 0.00     Types: Cigarettes     Quit date: 1996     Years since quittin.3    Smokeless tobacco: Never   Substance and Sexual Activity    Alcohol use: Not Currently     Alcohol/week: 21.0 standard drinks of alcohol     Types: 21 Cans of beer per week     Comment: Sober 5 years    Drug use: Not Currently     Types: Marijuana    Sexual activity: Yes     Partners: Female     Social Drivers of Health     Stress: Stress Concern Present (2019)    [employer] [place] [employer] - Occupational Stress Questionnaire     Feeling of Stress : Very much     Current Outpatient Medications   Medication Sig    ascorbic acid, vitamin C, (VITAMIN C) 500 MG tablet Take 500 mg by mouth once daily.    atorvastatin (LIPITOR) 10 MG tablet Take 1 tablet (10 mg total) by mouth every evening.     calcitRIOL (ROCALTROL) 0.25 MCG Cap Take 0.25 mcg by mouth every 7 days.    famotidine (PEPCID) 20 MG tablet famotidine 20 mg tablet   Take 1 tablet twice a day by oral route.    folic acid (FOLVITE) 1 MG tablet TAKE 2 TABLETS BY MOUTH EVERY DAY    multivitamin capsule Take 1 capsule by mouth once daily.    pantoprazole (PROTONIX) 40 MG tablet     sildenafil (VIAGRA) 100 MG tablet Take 1 tablet (100 mg total) by mouth daily as needed for Erectile Dysfunction.    tamsulosin (FLOMAX) 0.4 mg Cap TAKE 2 CAPSULES BY MOUTH EVERY DAY    traZODone (DESYREL) 100 MG tablet TAKE 1 TABLET BY MOUTH NIGHTLY AS NEEDED FOR INSOMNIA.    amLODIPine (NORVASC) 5 MG tablet Take 1 tablet (5 mg total) by mouth once daily.    azelastine (ASTELIN) 137 mcg (0.1 %) nasal spray 2 sprays (274 mcg total) by Nasal route 2 (two) times daily as needed for Rhinitis. (Patient not taking: Reported on 4/28/2025)    fluticasone propionate (FLONASE) 50 mcg/actuation nasal spray 1 SPRAY (50 MCG TOTAL) BY EACH NOSTRIL ROUTE 2 (TWO) TIMES A DAY. 1 SPRAY EVERY MORNING AND AFTERNOON TOWARD THE EAR EACH SIDE AFTER A SINUS RINSE (Patient not taking: Reported on 4/28/2025)    LIDOcaine (LIDODERM) 5 % Place 1 patch onto the skin once daily. Remove & Discard patch within 12 hours or as directed by MD (Patient not taking: Reported on 4/28/2025)    multivitamin with minerals Cap Take 1 capsule by mouth every morning.    sod chlor-bicarb-squeez bottle (NEILMED SINUS RINSE COMPLETE) pkdv 1 application  by sinus irrigation route 2 (two) times a day. Not right before bed (Patient not taking: Reported on 4/28/2025)   Last reviewed on 4/28/2025  4:22 PM by Reynaldo Basilio FNP-C    Review of patient's allergies indicates:  No Known Allergies   Past Medical History:   Diagnosis Date    Abnormal chest CT (new) 08/17/2022    Anemia due to multiple mechanisms 01/13/2019    Anemia, chronic renal failure, stage 2 (mild) 01/13/2019    Anemia, chronic renal failure,  stage 3 (moderate) 08/02/2023    Depression     Former smoker 06/29/2017    H/O ETOH abuse 06/29/2017    Lung mass (new) 08/17/2022    Monocytosis 2019    Myelodysplasia (myelodysplastic syndrome)     Myelodysplasia (myelodysplastic syndrome)     Personal history of colonic polyps 12/29/2023    RARS (refractory anemia with ringed sideroblasts) 06/01/2020    Sickle cell trait      Past Surgical History:   Procedure Laterality Date    BONE MARROW BIOPSY N/A 12/20/2019    Procedure: BIOPSY, BONE MARROW;  Surgeon: [facility];  Location: [facility] OR;  Service: Interventional Radiology;  Laterality: N/A;    COLONOSCOPY N/A 11/05/2021    Procedure: COLONOSCOPY;  Surgeon: Rob Collins MD;  Location: [facility] ENDO;  Service: Endoscopy;  Laterality: N/A;    COLONOSCOPY  12/29/2023    5 YR RECALL    ESOPHAGOGASTRODUODENOSCOPY N/A 11/05/2021    Procedure: EGD (ESOPHAGOGASTRODUODENOSCOPY);  Surgeon: Rob Collins MD;  Location: [facility] ENDO;  Service: Endoscopy;  Laterality: N/A;    INJECTION OF ANESTHETIC AGENT AROUND MEDIAL BRANCH NERVES INNERVATING LUMBAR FACET JOINT Bilateral 05/25/2018    Procedure: BLOCK-NERVE-MEDIAL BRANCH-LUMBAR;  Surgeon: Nura Heredia MD;  Location: [facility];  Service: Pain Management;  Laterality: Bilateral;  L3, 4, 5    KNEE ARTHROSCOPY W/ MENISCECTOMY Right 12/22/2021    Procedure: ARTHROSCOPY, KNEE, WITH MENISCECTOMY;  Surgeon: Sebastian Green MD;  Location: [facility] OR;  Service: Orthopedics;  Laterality: Right;  partial medial meniscectomy    RADIOFREQUENCY ABLATION OF LUMBAR MEDIAL BRANCH NERVE AT SINGLE LEVEL Bilateral 07/20/2018    Procedure: RADIOFREQUENCY ABLATION, NERVE, MEDIAL BRANCH, LUMBAR, 1 LEVEL;  Surgeon: Nura Heredia MD;  Location: [facility];  Service: Pain Management;  Laterality: Bilateral;  L3, 4, 5 - Burned at 80 degrees C.  for 75 seconds x 2 each site    SMALL BOWEL ENTEROSCOPY N/A 10/16/2019    Procedure: ENTEROSCOPY;  Surgeon: Rob Collins MD;  Location: [facility] ENDO;  Service:  "Endoscopy;  Laterality: N/A;          OBJECTIVE:      Vitals:    04/28/25 1603   BP: 110/74   BP Location: Left arm   Patient Position: Sitting   Pulse: 78   Temp: 98.1 °F (36.7 °C)   TempSrc: Oral   SpO2: 98%   Weight: 96 kg (211 lb 10.3 oz)   Height: 5' 9" (1.753 m)     Physical Exam  Vitals and nursing note reviewed.   Constitutional:       General: He is awake. He is not in acute distress.     Appearance: He is well-developed and well-groomed. He is obese. He is not ill-appearing, toxic-appearing or diaphoretic.   HENT:      Head: Normocephalic and atraumatic.      Nose: Nose normal.   Eyes:      General: Lids are normal. Gaze aligned appropriately.      Conjunctiva/sclera: Conjunctivae normal.      Right eye: Right conjunctiva is not injected.      Left eye: Left conjunctiva is not injected.      Pupils: Pupils are equal, round, and reactive to light.   Cardiovascular:      Rate and Rhythm: Normal rate and regular rhythm.      Pulses: Normal pulses.      Heart sounds: Normal heart sounds, S1 normal and S2 normal. No murmur heard.     No friction rub. No gallop.   Pulmonary:      Effort: Pulmonary effort is normal. No respiratory distress.      Breath sounds: Normal breath sounds. No stridor. No decreased breath sounds, wheezing, rhonchi or rales.   Chest:      Chest wall: No tenderness.   Musculoskeletal:      Cervical back: Neck supple.      Right lower leg: No edema.      Left lower leg: No edema.   Lymphadenopathy:      Cervical: No cervical adenopathy.   Skin:     General: Skin is warm and dry.      Capillary Refill: Capillary refill takes less than 2 seconds.      Findings: No erythema or rash.   Neurological:      Mental Status: He is alert and oriented to person, place, and time. Mental status is at baseline.   Psychiatric:         Attention and Perception: Attention normal.         Mood and Affect: Mood normal.         Speech: Speech normal.         Behavior: Behavior normal. Behavior is cooperative.    "      Thought Content: Thought content normal.         Judgment: Judgment normal.       Lab Visit on 04/23/2025   Component Date Value Ref Range Status    Sodium 04/23/2025 140  136 - 145 mmol/L Final    Potassium 04/23/2025 4.6  3.5 - 5.1 mmol/L Final    Chloride 04/23/2025 109  95 - 110 mmol/L Final    CO2 04/23/2025 25  23 - 29 mmol/L Final    Glucose 04/23/2025 99  70 - 110 mg/dL Final    BUN 04/23/2025 49 (H)  8 - 23 mg/dL Final    Creatinine 04/23/2025 3.4 (H)  0.5 - 1.4 mg/dL Final    Calcium 04/23/2025 9.0  8.7 - 10.5 mg/dL Final    Protein Total 04/23/2025 7.1  6.0 - 8.4 gm/dL Final    Albumin 04/23/2025 4.3  3.5 - 5.2 g/dL Final    Bilirubin Total 04/23/2025 0.5  0.1 - 1.0 mg/dL Final    For infants and newborns, interpretation of results should be based   on gestational age, weight and in agreement with clinical   observations.    Premature Infant recommended reference ranges:   0-24 hours:  <8.0 mg/dL   24-48 hours: <12.0 mg/dL   3-5 days:    <15.0 mg/dL   6-29 days:   <15.0 mg/dL    ALP 04/23/2025 62  55 - 135 unit/L Final    AST 04/23/2025 47 (H)  10 - 40 unit/L Final    ALT 04/23/2025 49 (H)  10 - 44 unit/L Final    Anion Gap 04/23/2025 6 (L)  8 - 16 mmol/L Final    eGFR 04/23/2025 19 (L)  >60 mL/min/1.73/m2 Final    WBC 04/23/2025 4.57  3.90 - 12.70 K/uL Final    RBC 04/23/2025 2.41 (L)  4.60 - 6.20 M/uL Final    Hgb 04/23/2025 7.5 (L)  14.0 - 18.0 gm/dL Final    Hct 04/23/2025 21.9 (L)  40.0 - 54.0 % Final    MCV 04/23/2025 91  82 - 98 fL Final    MCH 04/23/2025 31.1 (H)  27.0 - 31.0 pg Final    MCHC 04/23/2025 34.2  32.0 - 36.0 g/dL Final    RDW 04/23/2025 26.9 (H)  11.5 - 14.5 % Final    Platelet Count 04/23/2025 133 (L)  150 - 450 K/uL Final    MPV 04/23/2025 10.7  9.2 - 12.9 fL Final    Nucleated RBC 04/23/2025 0  <=0 /100 WBC Final    Neut % 04/23/2025 63.7  38 - 73 % Final    Lymph % 04/23/2025 23.4  18 - 48 % Final    Mono % 04/23/2025 8.8  4 - 15 % Final    Eos % 04/23/2025 2.6  0 - 8 % Final     Basophil % 04/23/2025 1.1  <=1.9 % Final    Imm Grans % 04/23/2025 0.4  0.0 - 0.5 % Final    Neut # 04/23/2025 2.9  1.8 - 7.7 K/uL Final    Lymph # 04/23/2025 1.07  1 - 4.8 K/uL Final    Mono # 04/23/2025 0.40  0.3 - 1 K/uL Final    Eos # 04/23/2025 0.12  <=0.5 K/uL Final    Baso # 04/23/2025 0.05  <=0.2 K/uL Final    Imm Grans # 04/23/2025 0.02  0.00 - 0.04 K/uL Final    Mild elevation in immature granulocytes is non specific and can be seen in a variety of conditions including stress response, acute inflammation, trauma and pregnancy. Correlation with other laboratory and clinical findings is essential.   Lab Visit on 04/09/2025   Component Date Value Ref Range Status    Cholesterol Total 04/09/2025 92 (L)  120 - 199 mg/dL Final    The National Cholesterol Education Program (NCEP) has set the  following guidelines (reference ranges) for Cholesterol:  Optimal.....................<200 mg/dL  Borderline High.............200-239 mg/dL  High........................> or = 240 mg/dL    Triglyceride 04/09/2025 29 (L)  30 - 150 mg/dL Final    The National Cholesterol Education Program (NCEP) has set the  following guidelines (reference values) for triglycerides:  Normal......................<150 mg/dL  Borderline High.............150-199 mg/dL  High........................200-499 mg/dL      HDL Cholesterol 04/09/2025 48  40 - 75 mg/dL Final    The National Cholesterol Education Program (NCEP) has set the  following guidelines (reference values) for HDL Cholesterol:  Low...............<40 mg/dL  Optimal...........>60 mg/dL    LDL Cholesterol 04/09/2025 38.2 (L)  63.0 - 159.0 mg/dL Final    The National Cholesterol Education Program (NCEP) has set the  following guidelines (reference values) for LDL Cholesterol:  Optimal.......................<130 mg/dL  Borderline High...............130-159 mg/dL  High..........................160-189 mg/dL  Very High.....................>190 mg/dL      HDL/Cholesterol Ratio 04/09/2025  52.2 (H)  20.0 - 50.0 % Final    Cholesterol/HDL Ratio 04/09/2025 1.9 (L)  2.0 - 5.0 Final    Non HDL Cholesterol 04/09/2025 44  mg/dL Final    Risk category and Non-HDL cholesterol goals:  Coronary heart disease (CHD)or equivalent (10-year risk of CHD >20%):  Non-HDL cholesterol goal     <130 mg/dL  Two or more CHD risk factors and 10-year risk of CHD <= 20%:  Non-HDL cholesterol goal     <160 mg/dL  0 to 1 CHD risk factor:  Non-HDL cholesterol goal     <190 mg/dL    TSH 04/09/2025 3.167  0.340 - 5.600 uIU/mL Final    Prostate Specific Antigen 04/09/2025 1.72  Not established ng/mL Final    PSA Expected levels:  Hormonal Therapy: <0.05 ng/ml  Prostatectomy: <0.01 ng/ml  Radiation Therapy: <1.00 ng/ml   Lab Visit on 04/09/2025   Component Date Value Ref Range Status    Sodium 04/09/2025 141  136 - 145 mmol/L Final    Potassium 04/09/2025 4.8  3.5 - 5.1 mmol/L Final    Chloride 04/09/2025 110  95 - 110 mmol/L Final    CO2 04/09/2025 25  23 - 29 mmol/L Final    Glucose 04/09/2025 99  70 - 110 mg/dL Final    BUN 04/09/2025 34 (H)  8 - 23 mg/dL Final    Creatinine 04/09/2025 3.1 (H)  0.5 - 1.4 mg/dL Final    Calcium 04/09/2025 8.7  8.7 - 10.5 mg/dL Final    Protein Total 04/09/2025 6.9  6.0 - 8.4 gm/dL Final    Albumin 04/09/2025 4.2  3.5 - 5.2 g/dL Final    Bilirubin Total 04/09/2025 0.6  0.1 - 1.0 mg/dL Final    For infants and newborns, interpretation of results should be based   on gestational age, weight and in agreement with clinical   observations.    Premature Infant recommended reference ranges:   0-24 hours:  <8.0 mg/dL   24-48 hours: <12.0 mg/dL   3-5 days:    <15.0 mg/dL   6-29 days:   <15.0 mg/dL    ALP 04/09/2025 58  55 - 135 unit/L Final    AST 04/09/2025 16  10 - 40 unit/L Final    ALT 04/09/2025 12  10 - 44 unit/L Final    Anion Gap 04/09/2025 6 (L)  8 - 16 mmol/L Final    eGFR 04/09/2025 22 (L)  >60 mL/min/1.73/m2 Final    WBC 04/09/2025 6.17  3.90 - 12.70 K/uL Final    RBC 04/09/2025 2.93 (L)  4.60 -  6.20 M/uL Final    Hgb 04/09/2025 9.0 (L)  14.0 - 18.0 gm/dL Final    Hct 04/09/2025 27.0 (L)  40.0 - 54.0 % Final    MCV 04/09/2025 92  82 - 98 fL Final    MCH 04/09/2025 30.7  27.0 - 31.0 pg Final    MCHC 04/09/2025 33.3  32.0 - 36.0 g/dL Final    RDW 04/09/2025 28.6 (H)  11.5 - 14.5 % Final    Platelet Count 04/09/2025 495 (H)  150 - 450 K/uL Final    MPV 04/09/2025 10.7  9.2 - 12.9 fL Final    Nucleated RBC 04/09/2025 0  <=0 /100 WBC Final    Neut % 04/09/2025 67.9  38 - 73 % Final    Lymph % 04/09/2025 17.2 (L)  18 - 48 % Final    Mono % 04/09/2025 10.7  4 - 15 % Final    Eos % 04/09/2025 2.1  0 - 8 % Final    Basophil % 04/09/2025 1.5  <=1.9 % Final    Imm Grans % 04/09/2025 0.6 (H)  0.0 - 0.5 % Final    Neut # 04/09/2025 4.2  1.8 - 7.7 K/uL Final    Lymph # 04/09/2025 1.06  1 - 4.8 K/uL Final    Mono # 04/09/2025 0.66  0.3 - 1 K/uL Final    Eos # 04/09/2025 0.13  <=0.5 K/uL Final    Baso # 04/09/2025 0.09  <=0.2 K/uL Final    Imm Grans # 04/09/2025 0.04  0.00 - 0.04 K/uL Final    Mild elevation in immature granulocytes is non specific and can be seen in a variety of conditions including stress response, acute inflammation, trauma and pregnancy. Correlation with other laboratory and clinical findings is essential.          Assessment:       1. Essential hypertension    2. Dyslipidemia    3. BPH associated with nocturia    4. Primary insomnia    5. Chronic kidney disease (CKD), stage IV (severe)    6. MDS (myelodysplastic syndrome)    7. Elevated liver enzymes    8. Urinary frequency    9. Elevated PSA        Plan:       Assessment & Plan    PLAN SUMMARY:  - Ordered repeat PSA (total and free) and urinalysis  - May refer to urology based on findings  - Will review results and determine if MRI Prostate is necessary  - Continue Lipitor 10 mg  - Continue trazodone 100 mg  - Continue amlodipine 5 mg  - Continue tamsulosin  - Continue monthly Rytelo infusions   - Mr. Butler to complete ordered lab work (CMP, CBC,  PSA, urinalysis) next Wednesday before seeing Dr. Tello  - Follow up with Dr. Obrien (kidney specialist) in 2 months  - Follow up with current provider in 6 months    CHRONIC KIDNEY DISEASE (CKD), STAGE IV (SEVERE):  - Monitored patient's kidney function with creatinine increased to 3.4 and eGFR decreased to 19.  - CBC shows anemia with hemoglobin 7.5, hematocrit 21.9, and platelets dropped from 400 to 133.  - Mr. Btuler is receiving monthly Rytelo infusions for anemia with next infusion scheduled for Monday, and denies experiencing dizziness, weakness, or fatigue related to anemia.  - Dr. Obrien (kidney specialist) reports that everything is progressing adequately with follow-up scheduled in 2 months per patient.  - Ordered repeat CMP to recheck liver enzymes and will consider liver ultrasound if they continue to rise.  - Ordered repeat CBC for next week to continue monitoring kidney function and anemia status.    MDS:  - Will continue monthly Rytelo infusions for anemia management, which have replaced previous weekly Procrit injections.    THROMBOCYTOPENIA:  - Anticipate improvement after next infusion.    BENIGN PROSTATIC HYPERPLASIA (BPH) AND LOWER URINARY TRACT SYMPTOMS:  - PSA has increased from 0.41 to 1.72.  - Mr. Butler reports increased urinary frequency without other symptoms.  - Currently managed with tamsulosin.  - Ordered repeat PSA (total and free) and urinalysis to rule out UTI.  - Will review results and determine if MRI Prostate is necessary.  - May refer to urology based on findings.    LIVER ENZYME ELEVATION:  - Liver enzymes (AST, ALT) have elevated from teens to 40s, possibly related to new Rytelo infusion.  - If liver enzymes remain elevated will discuss RUQ ultrasound to evaluate the liver.    HYPERTENSION:  - Blood pressure well-controlled at 110/74.  - Continue amlodipine 5 mg.  - Reduce the amount of salt in your diet; Lose weight; Avoid drinking too much alcohol; Exercise at least 30  minutes per day most days of the week.    - Continue current medications and home BP monitoring.    HYPERLIPIDEMIA:  - Cholesterol levels adequately controlled with low LDL (38.2) and good HDL (48).  - Continue Lipitor 10 mg.  - Limit red meat, butter, fried foods, cheese, and other foods that have a lot of saturated fat.   - Consume more: lean meats, fish, fruits, vegetables, whole grains, beans, lentils, and nuts.     INSOMNIA:  - Mr. Butler reports sleeping well with current medication.  - Continue trazodone 100 mg.    FOLLOW-UP:  - Mr. Butler to complete ordered lab work (CMP, CBC, PSA, urinalysis) next Wednesday before seeing Dr. Mondragon.  - Follow up in 6 months.       Essential hypertension    Dyslipidemia    BPH associated with nocturia    Primary insomnia    Chronic kidney disease (CKD), stage IV (severe)    MDS (myelodysplastic syndrome)    Elevated liver enzymes    Urinary frequency  -     Cancel: Urinalysis, Reflex to Urine Culture  -     Urinalysis, Reflex to Urine Culture; Future; Expected date: 04/28/2025    Elevated PSA  -     PSA, Total and Free; Future; Expected date: 04/28/2025    I spent a total of 26 minutes on the day of the visit.This includes face to face time and non-face to face time preparing to see the patient (eg, review of tests), obtaining and/or reviewing separately obtained history, documenting clinical information in the electronic or other health record, independently interpreting results and communicating results to the patient/family/caregiver, or care coordinator.    Follow up in about 6 months (around 10/28/2025).          4/28/2025 LATOYA Smith, DEVIKA    This note was generated with the assistance of ambient listening technology. Verbal consent was obtained by the patient and accompanying visitor(s) for the recording of patient appointment to facilitate this note. I attest to having reviewed and edited the generated note for accuracy, though some syntax or  spelling errors may persist. Please contact the author of this note for any clarification.

## 2025-04-28 NOTE — TELEPHONE ENCOUNTER
Reviewed package insert (below) and patient is to wait 1 hour after each infusion for monitoring. Patient made aware of above and verbalized understanding.

## 2025-04-28 NOTE — TELEPHONE ENCOUNTER
----- Message from Callie sent at 4/28/2025 11:53 AM CDT -----  The patient wants to know if he will have to wait an hour after his infusion on 5/5. He said he had to wait an hour last time because it was his first time receiving the medication. He said he needs to go to work so needs to know. # [phone]

## 2025-04-30 ENCOUNTER — RESULTS FOLLOW-UP (OUTPATIENT)
Dept: HEMATOLOGY/ONCOLOGY | Facility: HOSPITAL | Age: 65
End: 2025-04-30

## 2025-04-30 ENCOUNTER — DOCUMENTATION ONLY (OUTPATIENT)
Facility: CLINIC | Age: 65
End: 2025-04-30
Payer: MEDICARE

## 2025-04-30 ENCOUNTER — LAB VISIT (OUTPATIENT)
Dept: LAB | Facility: HOSPITAL | Age: 65
End: 2025-04-30
Attending: INTERNAL MEDICINE
Payer: MEDICARE

## 2025-04-30 DIAGNOSIS — D46.9 MDS (MYELODYSPLASTIC SYNDROME): Primary | ICD-10-CM

## 2025-04-30 DIAGNOSIS — R97.20 ELEVATED PSA: ICD-10-CM

## 2025-04-30 DIAGNOSIS — R35.0 URINARY FREQUENCY: ICD-10-CM

## 2025-04-30 DIAGNOSIS — D46.9 MDS (MYELODYSPLASTIC SYNDROME): ICD-10-CM

## 2025-04-30 LAB
ABSOLUTE EOSINOPHIL (SMH): 0.04 K/UL
ABSOLUTE MONOCYTE (SMH): 0.1 K/UL (ref 0.3–1)
ABSOLUTE NEUTROPHIL COUNT (SMH): 2.3 K/UL (ref 1.8–7.7)
ALBUMIN SERPL-MCNC: 4.3 G/DL (ref 3.5–5.2)
ALP SERPL-CCNC: 66 UNIT/L (ref 55–135)
ALT SERPL-CCNC: 67 UNIT/L (ref 10–44)
ANION GAP (SMH): 7 MMOL/L (ref 8–16)
AST SERPL-CCNC: 49 UNIT/L (ref 10–40)
BASOPHILS # BLD AUTO: 0.03 K/UL
BASOPHILS NFR BLD AUTO: 0.9 %
BILIRUB SERPL-MCNC: 0.4 MG/DL (ref 0.1–1)
BILIRUB UR QL STRIP.AUTO: NEGATIVE
BUN SERPL-MCNC: 49 MG/DL (ref 8–23)
CALCIUM SERPL-MCNC: 8.6 MG/DL (ref 8.7–10.5)
CHLORIDE SERPL-SCNC: 112 MMOL/L (ref 95–110)
CLARITY UR: CLEAR
CO2 SERPL-SCNC: 23 MMOL/L (ref 23–29)
COLOR UR AUTO: YELLOW
CREAT SERPL-MCNC: 3.3 MG/DL (ref 0.5–1.4)
ERYTHROCYTE [DISTWIDTH] IN BLOOD BY AUTOMATED COUNT: 26.8 % (ref 11.5–14.5)
GFR SERPLBLD CREATININE-BSD FMLA CKD-EPI: 20 ML/MIN/1.73/M2
GLUCOSE SERPL-MCNC: 126 MG/DL (ref 70–110)
GLUCOSE UR QL STRIP: NEGATIVE
HCT VFR BLD AUTO: 19.6 % (ref 40–54)
HGB BLD-MCNC: 6.6 GM/DL (ref 14–18)
HGB UR QL STRIP: NEGATIVE
IMM GRANULOCYTES # BLD AUTO: 0.01 K/UL (ref 0–0.04)
IMM GRANULOCYTES NFR BLD AUTO: 0.3 % (ref 0–0.5)
KETONES UR QL STRIP: NEGATIVE
LEUKOCYTE ESTERASE UR QL STRIP: NEGATIVE
LYMPHOCYTES # BLD AUTO: 0.81 K/UL (ref 1–4.8)
MCH RBC QN AUTO: 30.8 PG (ref 27–31)
MCHC RBC AUTO-ENTMCNC: 33.7 G/DL (ref 32–36)
MCV RBC AUTO: 92 FL (ref 82–98)
NITRITE UR QL STRIP: NEGATIVE
NUCLEATED RBC (/100WBC) (SMH): 0 /100 WBC
PH UR STRIP: 6 [PH]
PLATELET # BLD AUTO: 76 K/UL (ref 150–450)
PLATELET BLD QL SMEAR: ABNORMAL
PMV BLD AUTO: 9.7 FL (ref 9.2–12.9)
POTASSIUM SERPL-SCNC: 4.5 MMOL/L (ref 3.5–5.1)
PROT SERPL-MCNC: 6.8 GM/DL (ref 6–8.4)
PROT UR QL STRIP: ABNORMAL
RBC # BLD AUTO: 2.14 M/UL (ref 4.6–6.2)
RELATIVE EOSINOPHIL (SMH): 1.2 % (ref 0–8)
RELATIVE LYMPHOCYTE (SMH): 24.5 % (ref 18–48)
RELATIVE MONOCYTE (SMH): 3 % (ref 4–15)
RELATIVE NEUTROPHIL (SMH): 70.1 % (ref 38–73)
SODIUM SERPL-SCNC: 142 MMOL/L (ref 136–145)
SP GR UR STRIP: 1.01
UROBILINOGEN UR STRIP-ACNC: NEGATIVE EU/DL
WBC # BLD AUTO: 3.31 K/UL (ref 3.9–12.7)

## 2025-04-30 PROCEDURE — 82040 ASSAY OF SERUM ALBUMIN: CPT

## 2025-04-30 PROCEDURE — 84153 ASSAY OF PSA TOTAL: CPT

## 2025-04-30 PROCEDURE — 86902 BLOOD TYPE ANTIGEN DONOR EA: CPT | Performed by: NURSE PRACTITIONER

## 2025-04-30 PROCEDURE — 81003 URINALYSIS AUTO W/O SCOPE: CPT

## 2025-04-30 PROCEDURE — 85025 COMPLETE CBC W/AUTO DIFF WBC: CPT

## 2025-04-30 PROCEDURE — 36415 COLL VENOUS BLD VENIPUNCTURE: CPT

## 2025-04-30 PROCEDURE — 86999 UNLISTED TRANSFUSION MED PX: CPT | Performed by: NURSE PRACTITIONER

## 2025-04-30 PROCEDURE — 85660 RBC SICKLE CELL TEST: CPT | Performed by: NURSE PRACTITIONER

## 2025-04-30 RX ORDER — ACETAMINOPHEN 325 MG/1
650 TABLET ORAL ONCE
Status: CANCELLED | OUTPATIENT
Start: 2025-04-30

## 2025-04-30 RX ORDER — HYDROCODONE BITARTRATE AND ACETAMINOPHEN 500; 5 MG/1; MG/1
TABLET ORAL ONCE
Status: CANCELLED | OUTPATIENT
Start: 2025-04-30 | End: 2025-04-30

## 2025-04-30 RX ORDER — FUROSEMIDE 10 MG/ML
20 INJECTION INTRAMUSCULAR; INTRAVENOUS
Status: CANCELLED | OUTPATIENT
Start: 2025-04-30

## 2025-04-30 RX ORDER — DIPHENHYDRAMINE HYDROCHLORIDE 50 MG/ML
25 INJECTION, SOLUTION INTRAMUSCULAR; INTRAVENOUS ONCE
Status: CANCELLED | OUTPATIENT
Start: 2025-04-30

## 2025-05-01 ENCOUNTER — INFUSION (OUTPATIENT)
Dept: INFUSION THERAPY | Facility: HOSPITAL | Age: 65
End: 2025-05-01
Attending: INTERNAL MEDICINE
Payer: MEDICARE

## 2025-05-01 VITALS
OXYGEN SATURATION: 98 % | BODY MASS INDEX: 31.31 KG/M2 | SYSTOLIC BLOOD PRESSURE: 139 MMHG | DIASTOLIC BLOOD PRESSURE: 72 MMHG | TEMPERATURE: 98 F | HEART RATE: 65 BPM | WEIGHT: 212 LBS

## 2025-05-01 DIAGNOSIS — D46.9 MDS (MYELODYSPLASTIC SYNDROME): ICD-10-CM

## 2025-05-01 LAB
ABO + RH BLD: NORMAL
BLD PROD TYP BPU: NORMAL
BLOOD UNIT EXPIRATION DATE: NORMAL
BLOOD UNIT TYPE CODE: 5100
CROSSMATCH INTERPRETATION: NORMAL
DISPENSE STATUS: NORMAL
UNIT NUMBER: NORMAL

## 2025-05-01 PROCEDURE — 63600175 PHARM REV CODE 636 W HCPCS: Performed by: NURSE PRACTITIONER

## 2025-05-01 PROCEDURE — 25000003 PHARM REV CODE 250: Performed by: NURSE PRACTITIONER

## 2025-05-01 PROCEDURE — 36430 TRANSFUSION BLD/BLD COMPNT: CPT

## 2025-05-01 PROCEDURE — 96375 TX/PRO/DX INJ NEW DRUG ADDON: CPT

## 2025-05-01 PROCEDURE — P9016 RBC LEUKOCYTES REDUCED: HCPCS | Performed by: NURSE PRACTITIONER

## 2025-05-01 PROCEDURE — 86920 COMPATIBILITY TEST SPIN: CPT | Performed by: NURSE PRACTITIONER

## 2025-05-01 RX ORDER — FUROSEMIDE 10 MG/ML
20 INJECTION INTRAMUSCULAR; INTRAVENOUS
Status: ACTIVE | OUTPATIENT
Start: 2025-05-01

## 2025-05-01 RX ORDER — ACETAMINOPHEN 325 MG/1
650 TABLET ORAL ONCE
Status: COMPLETED | OUTPATIENT
Start: 2025-05-01 | End: 2025-05-01

## 2025-05-01 RX ORDER — DIPHENHYDRAMINE HYDROCHLORIDE 50 MG/ML
25 INJECTION, SOLUTION INTRAMUSCULAR; INTRAVENOUS ONCE
Status: COMPLETED | OUTPATIENT
Start: 2025-05-01 | End: 2025-05-01

## 2025-05-01 RX ORDER — HYDROCODONE BITARTRATE AND ACETAMINOPHEN 500; 5 MG/1; MG/1
TABLET ORAL ONCE
Status: ACTIVE | OUTPATIENT
Start: 2025-05-01

## 2025-05-01 RX ADMIN — ACETAMINOPHEN 650 MG: 325 TABLET ORAL at 08:05

## 2025-05-01 RX ADMIN — DIPHENHYDRAMINE HYDROCHLORIDE 25 MG: 50 INJECTION INTRAMUSCULAR; INTRAVENOUS at 08:05

## 2025-05-01 NOTE — PLAN OF CARE
Problem: Adult Inpatient Plan of Care  Goal: Plan of Care Review  5/1/2025 0827 by Janette Simms RN  Outcome: Met  5/1/2025 0827 by Janette Simms RN  Outcome: Progressing  Goal: Patient-Specific Goal (Individualized)  5/1/2025 0827 by Janette Simms RN  Outcome: Met  5/1/2025 0827 by Janette Simms RN  Outcome: Progressing  Goal: Absence of Hospital-Acquired Illness or Injury  5/1/2025 0827 by Janette Simms RN  Outcome: Met  5/1/2025 0827 by Janette Simms RN  Outcome: Progressing  Goal: Optimal Comfort and Wellbeing  5/1/2025 0827 by aJnette Simms RN  Outcome: Met  5/1/2025 0827 by Janette Simms RN  Outcome: Progressing  Goal: Readiness for Transition of Care  5/1/2025 0827 by Janette Simms RN  Outcome: Met  5/1/2025 0827 by Janette Simms RN  Outcome: Progressing

## 2025-05-02 LAB
PSA FREE MFR SERPL: 54.4 %
PSA FREE SERPL-MCNC: 0.49 NG/ML
PSA SERPL-MCNC: 0.9 NG/ML (ref 0–4)

## 2025-05-05 ENCOUNTER — INFUSION (OUTPATIENT)
Dept: INFUSION THERAPY | Facility: HOSPITAL | Age: 65
End: 2025-05-05
Attending: INTERNAL MEDICINE
Payer: MEDICARE

## 2025-05-05 VITALS
HEIGHT: 69 IN | SYSTOLIC BLOOD PRESSURE: 136 MMHG | BODY MASS INDEX: 31.45 KG/M2 | HEART RATE: 70 BPM | TEMPERATURE: 98 F | DIASTOLIC BLOOD PRESSURE: 80 MMHG | WEIGHT: 212.38 LBS | OXYGEN SATURATION: 98 % | RESPIRATION RATE: 16 BRPM

## 2025-05-05 DIAGNOSIS — D46.9 MDS (MYELODYSPLASTIC SYNDROME): Primary | ICD-10-CM

## 2025-05-05 DIAGNOSIS — D46.1 RARS (REFRACTORY ANEMIA WITH RINGED SIDEROBLASTS): ICD-10-CM

## 2025-05-05 PROCEDURE — 25000003 PHARM REV CODE 250: Performed by: INTERNAL MEDICINE

## 2025-05-05 PROCEDURE — 96375 TX/PRO/DX INJ NEW DRUG ADDON: CPT

## 2025-05-05 PROCEDURE — 96367 TX/PROPH/DG ADDL SEQ IV INF: CPT

## 2025-05-05 PROCEDURE — 96413 CHEMO IV INFUSION 1 HR: CPT

## 2025-05-05 PROCEDURE — 96415 CHEMO IV INFUSION ADDL HR: CPT

## 2025-05-05 PROCEDURE — 63600175 PHARM REV CODE 636 W HCPCS: Performed by: INTERNAL MEDICINE

## 2025-05-05 RX ORDER — DIPHENHYDRAMINE HYDROCHLORIDE 50 MG/ML
50 INJECTION, SOLUTION INTRAMUSCULAR; INTRAVENOUS ONCE AS NEEDED
Status: CANCELLED | OUTPATIENT
Start: 2025-05-05

## 2025-05-05 RX ORDER — SODIUM CHLORIDE 0.9 % (FLUSH) 0.9 %
10 SYRINGE (ML) INJECTION
Status: DISCONTINUED | OUTPATIENT
Start: 2025-05-05 | End: 2025-05-05 | Stop reason: HOSPADM

## 2025-05-05 RX ORDER — EPINEPHRINE 0.3 MG/.3ML
0.3 INJECTION SUBCUTANEOUS ONCE AS NEEDED
Status: CANCELLED | OUTPATIENT
Start: 2025-05-05

## 2025-05-05 RX ORDER — EPINEPHRINE 0.3 MG/.3ML
0.3 INJECTION SUBCUTANEOUS ONCE AS NEEDED
Status: DISCONTINUED | OUTPATIENT
Start: 2025-05-05 | End: 2025-05-05 | Stop reason: HOSPADM

## 2025-05-05 RX ORDER — HEPARIN 100 UNIT/ML
500 SYRINGE INTRAVENOUS
Status: CANCELLED | OUTPATIENT
Start: 2025-05-05

## 2025-05-05 RX ORDER — DIPHENHYDRAMINE HYDROCHLORIDE 50 MG/ML
50 INJECTION, SOLUTION INTRAMUSCULAR; INTRAVENOUS ONCE AS NEEDED
Status: DISCONTINUED | OUTPATIENT
Start: 2025-05-05 | End: 2025-05-05 | Stop reason: HOSPADM

## 2025-05-05 RX ORDER — SODIUM CHLORIDE 0.9 % (FLUSH) 0.9 %
10 SYRINGE (ML) INJECTION
Status: CANCELLED | OUTPATIENT
Start: 2025-05-05

## 2025-05-05 RX ADMIN — HYDROCORTISONE SODIUM SUCCINATE 100 MG: 100 INJECTION, POWDER, FOR SOLUTION INTRAMUSCULAR; INTRAVENOUS at 07:05

## 2025-05-05 RX ADMIN — SODIUM CHLORIDE 25 MG: 9 INJECTION, SOLUTION INTRAVENOUS at 07:05

## 2025-05-05 RX ADMIN — SODIUM CHLORIDE: 9 INJECTION, SOLUTION INTRAVENOUS at 07:05

## 2025-05-05 RX ADMIN — IMETELSTAT SODIUM: 47 INJECTION, POWDER, LYOPHILIZED, FOR SOLUTION INTRAVENOUS at 08:05

## 2025-05-05 NOTE — PLAN OF CARE
Problem: Fatigue  Goal: Improved Activity Tolerance  Outcome: Progressing  Intervention: Promote Improved Energy  Flowsheets (Taken 5/5/2025 3197)  Fatigue Management: frequent rest breaks encouraged  Activity Management: Ambulated -L4  Environmental Support: rest periods encouraged

## 2025-05-07 ENCOUNTER — DOCUMENTATION ONLY (OUTPATIENT)
Facility: CLINIC | Age: 65
End: 2025-05-07
Payer: MEDICARE

## 2025-05-07 ENCOUNTER — TELEPHONE (OUTPATIENT)
Facility: CLINIC | Age: 65
End: 2025-05-07
Payer: MEDICARE

## 2025-05-07 ENCOUNTER — LAB VISIT (OUTPATIENT)
Dept: LAB | Facility: HOSPITAL | Age: 65
End: 2025-05-07
Attending: INTERNAL MEDICINE
Payer: MEDICARE

## 2025-05-07 DIAGNOSIS — D46.9 MDS (MYELODYSPLASTIC SYNDROME): ICD-10-CM

## 2025-05-07 DIAGNOSIS — D46.9 MDS (MYELODYSPLASTIC SYNDROME): Primary | ICD-10-CM

## 2025-05-07 LAB
ALBUMIN SERPL-MCNC: 4.1 G/DL (ref 3.5–5.2)
ALP SERPL-CCNC: 60 UNIT/L (ref 55–135)
ALT SERPL-CCNC: 31 UNIT/L (ref 10–44)
ANION GAP (SMH): 7 MMOL/L (ref 8–16)
ANISOCYTOSIS BLD QL SMEAR: SLIGHT
AST SERPL-CCNC: 25 UNIT/L (ref 10–40)
BILIRUB SERPL-MCNC: 0.7 MG/DL (ref 0.1–1)
BUN SERPL-MCNC: 45 MG/DL (ref 8–23)
CALCIUM SERPL-MCNC: 8.7 MG/DL (ref 8.7–10.5)
CHLORIDE SERPL-SCNC: 110 MMOL/L (ref 95–110)
CO2 SERPL-SCNC: 23 MMOL/L (ref 23–29)
CREAT SERPL-MCNC: 3.3 MG/DL (ref 0.5–1.4)
DACRYOCYTES BLD QL SMEAR: ABNORMAL
EOSINOPHIL NFR BLD MANUAL: 5 % (ref 0–8)
ERYTHROCYTE [DISTWIDTH] IN BLOOD BY AUTOMATED COUNT: 24.5 % (ref 11.5–14.5)
FERRITIN SERPL-MCNC: 2366.1 NG/ML (ref 20–300)
GFR SERPLBLD CREATININE-BSD FMLA CKD-EPI: 20 ML/MIN/1.73/M2
GLUCOSE SERPL-MCNC: 113 MG/DL (ref 70–110)
HCT VFR BLD AUTO: 17.6 % (ref 40–54)
HGB BLD-MCNC: 5.8 GM/DL (ref 14–18)
HYPOCHROMIA BLD QL SMEAR: ABNORMAL
INDIRECT COOMBS: NORMAL
IRON SATN MFR SERPL: >75 % (ref 20–50)
IRON SERPL-MCNC: 163 UG/DL (ref 45–160)
LYMPHOCYTES NFR BLD MANUAL: 44 % (ref 18–48)
MCH RBC QN AUTO: 29.6 PG (ref 27–31)
MCHC RBC AUTO-ENTMCNC: 33 G/DL (ref 32–36)
MCV RBC AUTO: 90 FL (ref 82–98)
MONOCYTES NFR BLD MANUAL: 2 % (ref 4–15)
NEUTROPHILS NFR BLD MANUAL: 47 % (ref 38–73)
NEUTS BAND NFR BLD MANUAL: 2 %
NUCLEATED RBC (/100WBC) (SMH): 0 /100 WBC
PLATELET # BLD AUTO: 27 K/UL (ref 150–450)
PLATELET BLD QL SMEAR: ABNORMAL
PMV BLD AUTO: ABNORMAL FL
POTASSIUM SERPL-SCNC: 4.6 MMOL/L (ref 3.5–5.1)
PROT SERPL-MCNC: 6.8 GM/DL (ref 6–8.4)
RBC # BLD AUTO: 1.96 M/UL (ref 4.6–6.2)
RH BLD: NORMAL
SCHISTOCYTES BLD QL SMEAR: ABNORMAL
SODIUM SERPL-SCNC: 140 MMOL/L (ref 136–145)
SPECIMEN OUTDATE: NORMAL
TARGETS BLD QL SMEAR: ABNORMAL
TIBC SERPL-MCNC: 169 UG/DL (ref 250–450)
TRANSFERRIN SERPL-MCNC: 121 MG/DL (ref 200–375)
WBC # BLD AUTO: 1.65 K/UL (ref 3.9–12.7)

## 2025-05-07 PROCEDURE — 82728 ASSAY OF FERRITIN: CPT

## 2025-05-07 PROCEDURE — 82040 ASSAY OF SERUM ALBUMIN: CPT

## 2025-05-07 PROCEDURE — 85007 BL SMEAR W/DIFF WBC COUNT: CPT

## 2025-05-07 PROCEDURE — 86901 BLOOD TYPING SEROLOGIC RH(D): CPT | Performed by: NURSE PRACTITIONER

## 2025-05-07 PROCEDURE — 84466 ASSAY OF TRANSFERRIN: CPT

## 2025-05-07 PROCEDURE — 36415 COLL VENOUS BLD VENIPUNCTURE: CPT

## 2025-05-07 RX ORDER — DIPHENHYDRAMINE HYDROCHLORIDE 50 MG/ML
25 INJECTION, SOLUTION INTRAMUSCULAR; INTRAVENOUS ONCE
Status: CANCELLED | OUTPATIENT
Start: 2025-05-07

## 2025-05-07 RX ORDER — FUROSEMIDE 10 MG/ML
20 INJECTION INTRAMUSCULAR; INTRAVENOUS ONCE
Status: CANCELLED | OUTPATIENT
Start: 2025-05-07

## 2025-05-07 RX ORDER — ACETAMINOPHEN 325 MG/1
650 TABLET ORAL ONCE
Status: CANCELLED | OUTPATIENT
Start: 2025-05-07

## 2025-05-07 RX ORDER — HYDROCODONE BITARTRATE AND ACETAMINOPHEN 500; 5 MG/1; MG/1
TABLET ORAL ONCE
Status: CANCELLED | OUTPATIENT
Start: 2025-05-07 | End: 2025-05-07

## 2025-05-07 RX ORDER — FUROSEMIDE 10 MG/ML
20 INJECTION INTRAMUSCULAR; INTRAVENOUS
Status: CANCELLED | OUTPATIENT
Start: 2025-05-07

## 2025-05-07 NOTE — PROGRESS NOTES
[facility] Hematology/Oncology               PROGRESS NOTE - Follow-up Visit      Subjective:       Patient ID:   NAME: Rick Butler : 1960     65 y.o. male    Referring Doc: Chetna (new PCP)  Other Physicians: Getachew; Alan Mustafa    Chief Complaint:  Sickle cell trait/anemia/RARS  f/u    History of Present Illness:     Patient returns today for a regularly scheduled follow-up visit.  The patient is doing ok overall. He is here by himself today.       He saw Cheryl Muñoz NP on 4/10/2025; he saw Diana NP on 2025    He has been on the new drug Rytelo for the past two months per direction of Dr Hilario at [facility]; he saw Dr Hilario on 3/4/2025    His counts dropped this week and he received two units blood and single donor platelet today; Pratima has spoken to Dr Hilario who is aware and she suspects it is still in the window of the therapy where the counts initially drop before leveling off    He sees Dr Mas with nephrology again in July/Aug 2025     He has not had any recent pain crisis. He denies having any breathing difficulties. He denies any blood in the stool, dark stools or hematuria;; no CP, SOB, HA's or N/V;               ROS:   GEN: normal without any fever, night sweats or weight loss  HEENT: normal with no HA's, sore throat, stiff neck, changes in vision  CV: normal with no CP, SOB, PND, MORA or orthopnea  PULM: normal with no SOB, cough, hemoptysis, sputum or pleuritic pain  GI: normal with no abdominal pain, nausea, vomiting, constipation, diarrhea, melanotic stools, BRBPR, or hematemesis  : normal with no hematuria, dysuria  BREAST: normal with no mass, discharge, pain  SKIN: normal with no rash, erythema, bruising, or swelling    Allergies:  Review of patient's allergies indicates:  No Known Allergies    Medications:    Current Outpatient Medications:     amLODIPine (NORVASC) 5 MG tablet, Take 1 tablet (5 mg total) by mouth once daily., Disp: 90 tablet, Rfl: 3    ascorbic acid, vitamin C, (VITAMIN  C) 500 MG tablet, Take 500 mg by mouth once daily., Disp: , Rfl:     atorvastatin (LIPITOR) 10 MG tablet, Take 1 tablet (10 mg total) by mouth every evening., Disp: 90 tablet, Rfl: 3    calcitRIOL (ROCALTROL) 0.25 MCG Cap, Take 0.25 mcg by mouth every 7 days., Disp: , Rfl:     famotidine (PEPCID) 20 MG tablet, famotidine 20 mg tablet  Take 1 tablet twice a day by oral route., Disp: , Rfl:     fluticasone propionate (FLONASE) 50 mcg/actuation nasal spray, 1 SPRAY (50 MCG TOTAL) BY EACH NOSTRIL ROUTE 2 (TWO) TIMES A DAY. 1 SPRAY EVERY MORNING AND AFTERNOON TOWARD THE EAR EACH SIDE AFTER A SINUS RINSE, Disp: 48 mL, Rfl: 1    folic acid (FOLVITE) 1 MG tablet, TAKE 2 TABLETS BY MOUTH EVERY DAY, Disp: 180 tablet, Rfl: 0    LIDOcaine (LIDODERM) 5 %, Place 1 patch onto the skin once daily. Remove & Discard patch within 12 hours or as directed by MD, Disp: 14 patch, Rfl: 0    multivitamin capsule, Take 1 capsule by mouth once daily., Disp: , Rfl:     multivitamin with minerals Cap, Take 1 capsule by mouth every morning., Disp: , Rfl:     pantoprazole (PROTONIX) 40 MG tablet, , Disp: , Rfl:     sod chlor-bicarb-squeez bottle (NEILMED SINUS RINSE COMPLETE) pkdv, 1 application  by sinus irrigation route 2 (two) times a day. Not right before bed, Disp: 1 each, Rfl: 11    tamsulosin (FLOMAX) 0.4 mg Cap, TAKE 2 CAPSULES BY MOUTH EVERY DAY, Disp: 180 capsule, Rfl: 1    traZODone (DESYREL) 100 MG tablet, TAKE 1 TABLET BY MOUTH NIGHTLY AS NEEDED FOR INSOMNIA., Disp: 90 tablet, Rfl: 2    azelastine (ASTELIN) 137 mcg (0.1 %) nasal spray, 2 sprays (274 mcg total) by Nasal route 2 (two) times daily as needed for Rhinitis. (Patient not taking: Reported on 4/28/2025), Disp: 30 mL, Rfl: 5    sildenafil (VIAGRA) 100 MG tablet, Take 1 tablet (100 mg total) by mouth daily as needed for Erectile Dysfunction., Disp: 10 tablet, Rfl: 6    Current Facility-Administered Medications:     0.9%  NaCl infusion (for blood administration), , Intravenous,  "Once, Pratima Almonte NP, Stopped at 05/08/25 1159    furosemide injection 20 mg, 20 mg, Intravenous, PRN, Pratima Almonte NP    furosemide injection 20 mg, 20 mg, Intravenous, Once, Pratima Almonte NP    furosemide injection 20 mg, 20 mg, Intravenous, PRN, Pratima Almonte NP    PMHx/PSHx Updates:  See patient's last visit with me on 4/9/2025  See H&P on 7/9/2011      Pathology:    12/20/2019  BONE MARROW, RIGHT ILIAC CREST,    ASPIRATE, CLOT SECTION, AND CORE BIOPSY:    --HYPERCELLULAR MARROW (APPROXIMATELY 75% TO 80%) WITH TRILINEAGE   HEMATOPOIETIC ELEMENTS, ERYTHROID  HYPERPLASIA AND MILD MEGAKARYOCYTIC HYPERPLASIA, AND NON-SPECIFIC   DYSHEMATOPOIETIC CHANGES (SEE     COMMENT).  --[id number] INCREASED STAINABLE IRON WITH INCREASED RING   SIDEROBLASTS (GREATER THAN 15%)     (SEE COMMENT).  --PERIPHERAL BLOOD WITH THROMBOCYTOSIS (574,000/MICROLITER) AND ANEMIA   (HEMOGLOBIN 5.5 GRAM/DECILITER),    WITH SCATTERED DREPANOCYTOID FORMS AND FEW NUCLEATED ERYTHROCYTES      Cytogenetic Results at the bottom of the report.  NORMAL    Objective:     Vitals:  Blood pressure (!) 151/79, pulse 73, temperature 99 °F (37.2 °C), temperature source Temporal, resp. rate 16, height 5' 9" (1.753 m), weight 96.2 kg (212 lb), SpO2 97%.    Physical Examination:   GEN: no apparent distress, comfortable; AAOx3  HEAD: atraumatic and normocephalic  EYES: no pallor, no icterus, PERRLA  ENT: OMM, no pharyngeal erythema, external ears WNL; no nasal discharge; no thrush  NECK: no masses, thyroid normal, trachea midline, no LAD/LN's, supple  CV: RRR with no murmur; normal pulse; normal S1 and S2; no pedal edema  CHEST: Normal respiratory effort; CTAB; normal breath sounds; no wheeze or crackles  ABDOM: nontender and nondistended; soft; normal bowel sounds; no rebound/guarding  MUSC/Skeletal: ROM normal; no crepitus; joints normal; no deformities or arthropathy  EXTREM: no clubbing, " cyanosis, inflammation or swelling  SKIN: no rashes, lesions, ulcers, petechiae or subcutaneous nodules  : no jiang  NEURO: grossly intact; motor/sensory WNL; AAOx3; no tremors  PSYCH: normal mood, affect and behavior  LYMPH: normal cervical, supraclavicular, axillary and groin LN's            Labs:     Lab Results   Component Value Date    WBC 1.65 (LL) 05/07/2025    HGB 5.8 (LL) 05/07/2025    HCT 17.6 (LL) 05/07/2025    MCV 90 05/07/2025    PLT 27 (LL) 05/07/2025           CMP  Sodium   Date Value Ref Range Status   05/07/2025 140 136 - 145 mmol/L Final   06/26/2019 138 134 - 144 mmol/L      Potassium   Date Value Ref Range Status   05/07/2025 4.6 3.5 - 5.1 mmol/L Final     Chloride   Date Value Ref Range Status   05/07/2025 110 95 - 110 mmol/L Final   06/26/2019 105 98 - 110 mmol/L      CO2   Date Value Ref Range Status   05/07/2025 23 23 - 29 mmol/L Final     Glucose   Date Value Ref Range Status   05/07/2025 113 (H) 70 - 110 mg/dL Final   06/26/2019 114 (H) 70 - 99 mg/dL      BUN   Date Value Ref Range Status   05/07/2025 45 (H) 8 - 23 mg/dL Final     Creatinine   Date Value Ref Range Status   05/07/2025 3.3 (H) 0.5 - 1.4 mg/dL Final   06/26/2019 1.62 (H) 0.60 - 1.40 mg/dL      Calcium   Date Value Ref Range Status   05/07/2025 8.7 8.7 - 10.5 mg/dL Final     Protein Total   Date Value Ref Range Status   05/07/2025 6.8 6.0 - 8.4 gm/dL Final     Albumin   Date Value Ref Range Status   05/07/2025 4.1 3.5 - 5.2 g/dL Final   06/26/2019 4.4 3.1 - 4.7 g/dL      Bilirubin Total   Date Value Ref Range Status   05/07/2025 0.7 0.1 - 1.0 mg/dL Final     Comment:     For infants and newborns, interpretation of results should be based   on gestational age, weight and in agreement with clinical   observations.    Premature Infant recommended reference ranges:   0-24 hours:  <8.0 mg/dL   24-48 hours: <12.0 mg/dL   3-5 days:    <15.0 mg/dL   6-29 days:   <15.0 mg/dL     ALP   Date Value Ref Range Status   05/07/2025 60 55 -  135 unit/L Final     AST   Date Value Ref Range Status   05/07/2025 25 10 - 40 unit/L Final     ALT   Date Value Ref Range Status   05/07/2025 31 10 - 44 unit/L Final     Anion Gap   Date Value Ref Range Status   05/07/2025 7 (L) 8 - 16 mmol/L Final     eGFR if    Date Value Ref Range Status   07/13/2022 45.6 (A) >60 mL/min/1.73 m^2 Final     eGFR if non    Date Value Ref Range Status   07/13/2022 39.5 (A) >60 mL/min/1.73 m^2 Final     Comment:     Calculation used to obtain the estimated glomerular filtration  rate (eGFR) is the CKD-EPI equation.          Lab Results   Component Value Date    IRON 163 (H) 05/07/2025    TIBC 169 (L) 05/07/2025    FERRITIN 2,366.1 (H) 05/07/2025           I have reviewed all available lab results and radiology reports.    Radiology/Diagnostic Studies:    US 2/8/2023:    IMPRESSION:  1. Dilation of the common bile duct at up to 10 mm, unchanged when compared to 2016.  2. Nonvisualization of the pancreas because of overlying bowel gas.  3. No other significant findings.  spleen is normal in size and appearance      2/15/2018 Xray Survey:   IMPRESSION: No significant abnormality seen. No evidence of osteoblastic or  osteoclastic lesions identified    MRI L-spine 2/12/2018  IMPRESSION:    1. Prominent low signal intensity throughout the bone marrow on T1 and  T2-weighted imaging. Marrow infiltrative process including malignancy such as  metastatic disease or lymphoma/leukemia is a consideration along with multiple  myeloma or malignant process. Benign etiology such as osteopetrosis or  regenerative red marrow are also considerations.    2. Multilevel lumbar degenerative changes, most prominent at L4-L5 and L5-S1 as  described.    Assessment/Plan:   (1) 65 y.o. male with diagnosis of sickle cell anemia with borderline microcytosis  - latest hgb was 6.0 and he had blood transfusion  - today, he is not symptomatic at this time  - he probably has a  "multifactorial anemia process with underlying sickle cell, anemia of chronic disorders and anemia of chronic renal. I can not rule out an underlying GI bleeding process.   - iron panel is adequate (no deficiency)  - total bilirubin is only minimally elevated  - he required transfusion again in Oct 2019 and again in Dec 2019  - he had bone marrow biopsy on 12/20/2019    "dyshematopoietic changes are not entirely specific and are present  mostly within erythroid and megakaryocytic lineages. These findings   could be observed in chronic disease states, autoimmune conditions,  infectious settings, toxic exposures, nutritional deficits, as medication/drug effect, and in myelodysplastic syndrome (MDS), among other conditions"  "Additionally, ring sideroblasts are a non-specific   finding "    Patient was referred to see Dr Mustafa at [facility] and had repeat BM biopsy with diagnosis made of RARS  - he is now on procrit per directives of Dr Mustafa    7/30/2020:   he recently saw Dr Mustafa  And sees him again in Jan 2021  - he is doing well with procrit and is feeling much better  - he has not required any transfusions for some time time    9/24/2020:  - latest hgb at 8.5  - will increase the procrit dose  - f/u with Dr Zavala in Jan 2021 11/19/2020:  - patient doing well and feels "great"  - hgb up to 9.1; continued on the procrit  - f/u with Dr Mustafa in Jan 2021    3/4/2021:  - he saw Dr Mustafa in jan 2021 and sees him again in June/July 2021  - latest hgb at 9.1 and stable and platelets are 474,000; wbc at 10.0    6/10/2021:  - latest hgb at 9.0  - plats 485,000  - on weekly procrit  - seeing Dr Mustafa again tomorrow    10/14/2021:  - hgb at 8.6 but he recently had been taking naproxen and had some BRBPR - which since resolved  - he saw Dr Beyer with GI and he is having colonsocopy on Nov 5th along with EGD  - plats 429,000  - he sees Dr Mustafa again in Nov 19th   - he sees Dr Mas again in Dec 2021    1/13/2022:  - " He has been under the care of Dr Mustafa at [facility] for RARS- MDS. He is now on procrit weekly. He sees Dr Mustafa again in March 2022    - hgb 9.3  - he had scope with Dr Collins in nov 2021 which was good per patient and they plan to repeat in 5 yrs    5/12/2022:  - hgb at 9.0  - plats 485  - wbc 7.25  - he is on procrit weekly  - saw Dr Mustafa this past Fridday and since he is leaving, he will start seeing Dr Hilario instead in 3 months    8/18/2022:  - latest hgb a little lower at 8.8  - he has been on the procrit  - he saw Dr Hilario recently at [facility] and plans to see her again in 3 months    1/26/2023:  - He saw Dr Hilario at [facility] again recently this past Tuesday and they are considering another medication; he plans to see her again in April 2023  - Dr Hilario requested he have an US of spleen - will order here in [place]  - he is also on appetite stimulant  - hgb at 9.4 and plats at 500k    5/11/2023:  - hgb at 9.6 and plats 466,000  - He saw Dr Hilario at [facility] again recently this past Tuesday and they are considering another medication; he plans to see her again in Aug 2023    8/3/2023:  - His insurance will not approve of him getting the Reblozyl   - He sees Dr Hilario again next Tuesday and we will await her directives.     9/14/2023:  - the Rebozyl has been approved - will set up  - latest hgb at 10.1  - seeing Dr Hilario again in Jan 2024 11/16/2023:  - continued on Rebozyl  - WBC and plats WNL  - hgb at 10.3  - cardiac w/u negative per patient    2/15/2024:  - labs are adequate  - hgb at 10.6  - WBC and plat WNL  - continued on Rebozyl  - recent colonoscopy good per patient    4/4/2024:  - continued on Rebozyl  - counts are looking adequate    8/1/2024:  - continued on procrit  - insurance would not approve of the rebozyl and he has not had the last 2-3 injections  - hold rebozyl for now anyway due to the increased creatine and decreased kidney function  - f/u with Dr Chilel  - seeing Dr Hilario on 8/13  - continue procrit  "as directed    10/10/2024  - he sees Dr Mas on 10/31  - he has been continued on rebozyl and is due again next week  - he has been on procrit  - Dr Hilario is now at [facility] in [place] and he last saw her in Aug 2024 - he plans to continue under her care via telemed    5/8/2025:  - He saw Cheryl Muñoz NP on 4/10/2025; he saw Diana NP on 4/28/2025  - He has been on the new drug Rytelo for the past two months per direction of Dr Hilario at [facility]; he saw Dr Hilario on 3/4/2025  - His counts dropped this week and he received two units blood and single donor platelet today; Pratima has spoken to Dr Hilario who is aware and she suspects it is still in the window of the therapy where the counts initially drop before leveling off  - He sees Dr Mas with nephrology again in July/Aug 2025    (2) Alcohol abuse issues in past - he has been sober for over 3 years now    (3) New findings on radiography with abnormal chest CT and lung mass  - former smoker    (4) chronic back issues - prior Xrays and MRI - suspect the findings on the MRI are possibly due to his sickle cell;   - SPEP was previously negative for M-protein  - PSA was 0.3 in Sept 2017  - recommended neurosurgery evaluation previously  - he saw Dr Nura VUONG and had a nerve "burning" procedure and his pain has improved    (5) CRI - elevated creatinine - he is followed Dr Chilel with nephrology      (6) H pylori gastritis - s/p recent endoscopy with Dr Collins and antibotics x 10 days    (7) CP   9/14/2023:  - seeing Dr lovett with cardiology and planning stress test in near future        1. Anemia, chronic renal failure, stage 3 (moderate)        2. Anemia due to multiple mechanisms        3. Chronic anemia        4. MDS (myelodysplastic syndrome)        5. Monocytosis        6. Normochromic anemia        7. RARS (refractory anemia with ringed sideroblasts)        8. Sickle cell trait syndrome        9. Thrombocytosis        10. Folic acid deficiency        11. Former smoker      "     PLAN;  1. Continue with the Rytelo regimen ;check labs as directed and transfuse as needed  2. F/u with Dr Mas  in July/Aug 2025  2. F/u with Dr Hilario in June/July 2025  3. F/u with cardiology as directed  4.  F/u with Jonathan as needed                   RTC  4 weeks with Cheryl and  8 weeks with myself  Fax note to [facility];  Ricco Hilario    Total Time spent on patient:    I spent over 25 mins of time with the patient. Reviewed results of the recently ordered labs, tests, reports and studies; made directives with regards to the results. Over half of this time was spent couseling and coordinating care, making treatment and analytical decisions; ordering necessary labs, tests and studies; and discussing treatment options and setting up treatment plan(s) if indicated.            Discussion:       Pathology Discussion:    I reviewed and discussed the pathology report(s) and radiograph reports (if available) in as simple to understand and/or laymen's terms to the best of my ability. I had an indepth conversation with the patient and went over the patient's individual diagnosis based on the information that was currently available. I discussed the TNM staging process with regard to the patient's particular cancer type, and the calculated stage based on the currently available TNM data and literature. I discussed the available prognostic data with regard to the current staging information and how it relates to the prognosis of their particular neoplastic process.          COVID-19 Discussion:    I had long discussion with patient and any applicable family about the COVID-19 coronavirus epidemic and the recommended precautions with regard to cancer and/or hematology patients. I have re-iterated the CDC recommendations for adequate hand washing, use of hand -like products, and coughing into elbow, etc. In addition, especially for our patients who are on chemotherapy and/or our otherwise  immunocompromised patients, I have recommended avoidance of crowds, including movie theaters, restaurants, churches, etc. I have recommended avoidance of any sick or symptomatic family members and/or friends. I have also recommended avoidance of any raw and unwashed food products, and general avoidance of food items that have not been prepared by themselves. The patient has been asked to call us immediately with any symptom developments, issues, questions or other general concerns.     I have explained all of the above in detail and the patient understands all of the current recommendation(s). I have answered all of their questions to the best of my ability and to their complete satisfaction.   The patient is to continue with the current management plan.            Electronically signed by Jose Tello MD

## 2025-05-08 ENCOUNTER — OFFICE VISIT (OUTPATIENT)
Facility: CLINIC | Age: 65
End: 2025-05-08
Payer: MEDICARE

## 2025-05-08 ENCOUNTER — INFUSION (OUTPATIENT)
Dept: INFUSION THERAPY | Facility: HOSPITAL | Age: 65
End: 2025-05-08
Attending: NURSE PRACTITIONER
Payer: MEDICARE

## 2025-05-08 VITALS
BODY MASS INDEX: 31.4 KG/M2 | TEMPERATURE: 99 F | WEIGHT: 212 LBS | HEART RATE: 73 BPM | DIASTOLIC BLOOD PRESSURE: 79 MMHG | RESPIRATION RATE: 16 BRPM | HEIGHT: 69 IN | SYSTOLIC BLOOD PRESSURE: 151 MMHG | OXYGEN SATURATION: 97 %

## 2025-05-08 VITALS
SYSTOLIC BLOOD PRESSURE: 151 MMHG | DIASTOLIC BLOOD PRESSURE: 74 MMHG | TEMPERATURE: 98 F | HEIGHT: 69 IN | WEIGHT: 212.31 LBS | OXYGEN SATURATION: 100 % | RESPIRATION RATE: 18 BRPM | HEART RATE: 55 BPM | BODY MASS INDEX: 31.44 KG/M2

## 2025-05-08 DIAGNOSIS — E53.8 FOLIC ACID DEFICIENCY: ICD-10-CM

## 2025-05-08 DIAGNOSIS — D63.1 ANEMIA, CHRONIC RENAL FAILURE, STAGE 3 (MODERATE): Primary | ICD-10-CM

## 2025-05-08 DIAGNOSIS — D64.89 ANEMIA DUE TO MULTIPLE MECHANISMS: ICD-10-CM

## 2025-05-08 DIAGNOSIS — D64.9 CHRONIC ANEMIA: ICD-10-CM

## 2025-05-08 DIAGNOSIS — D64.9 NORMOCHROMIC ANEMIA: ICD-10-CM

## 2025-05-08 DIAGNOSIS — D46.9 MDS (MYELODYSPLASTIC SYNDROME): ICD-10-CM

## 2025-05-08 DIAGNOSIS — R63.0 APPETITE LOSS: ICD-10-CM

## 2025-05-08 DIAGNOSIS — N18.30 ANEMIA, CHRONIC RENAL FAILURE, STAGE 3 (MODERATE): Primary | ICD-10-CM

## 2025-05-08 DIAGNOSIS — D46.1 RARS (REFRACTORY ANEMIA WITH RINGED SIDEROBLASTS): ICD-10-CM

## 2025-05-08 DIAGNOSIS — D46.9 MDS (MYELODYSPLASTIC SYNDROME): Primary | ICD-10-CM

## 2025-05-08 DIAGNOSIS — Z87.891 FORMER SMOKER: ICD-10-CM

## 2025-05-08 DIAGNOSIS — D72.821 MONOCYTOSIS: ICD-10-CM

## 2025-05-08 DIAGNOSIS — D57.3 SICKLE CELL TRAIT SYNDROME: ICD-10-CM

## 2025-05-08 DIAGNOSIS — D75.839 THROMBOCYTOSIS: ICD-10-CM

## 2025-05-08 PROCEDURE — 99999 PR PBB SHADOW E&M-EST. PATIENT-LVL V: CPT | Mod: PBBFAC,,, | Performed by: INTERNAL MEDICINE

## 2025-05-08 PROCEDURE — 86920 COMPATIBILITY TEST SPIN: CPT | Performed by: INTERNAL MEDICINE

## 2025-05-08 PROCEDURE — 63600175 PHARM REV CODE 636 W HCPCS: Performed by: NURSE PRACTITIONER

## 2025-05-08 PROCEDURE — P9016 RBC LEUKOCYTES REDUCED: HCPCS | Performed by: INTERNAL MEDICINE

## 2025-05-08 PROCEDURE — P9037 PLATE PHERES LEUKOREDU IRRAD: HCPCS | Performed by: NURSE PRACTITIONER

## 2025-05-08 PROCEDURE — 99215 OFFICE O/P EST HI 40 MIN: CPT | Mod: PBBFAC,PN | Performed by: INTERNAL MEDICINE

## 2025-05-08 PROCEDURE — 25000003 PHARM REV CODE 250: Performed by: NURSE PRACTITIONER

## 2025-05-08 RX ORDER — DIPHENHYDRAMINE HYDROCHLORIDE 50 MG/ML
25 INJECTION, SOLUTION INTRAMUSCULAR; INTRAVENOUS ONCE
Status: COMPLETED | OUTPATIENT
Start: 2025-05-08 | End: 2025-05-08

## 2025-05-08 RX ORDER — DRONABINOL 10 MG/1
10 CAPSULE ORAL
Qty: 60 CAPSULE | Refills: 1 | Status: SHIPPED | OUTPATIENT
Start: 2025-05-08 | End: 2025-05-09 | Stop reason: SDUPTHER

## 2025-05-08 RX ORDER — HYDROCODONE BITARTRATE AND ACETAMINOPHEN 500; 5 MG/1; MG/1
TABLET ORAL ONCE
Status: COMPLETED | OUTPATIENT
Start: 2025-05-08 | End: 2025-05-08

## 2025-05-08 RX ORDER — FUROSEMIDE 10 MG/ML
20 INJECTION INTRAMUSCULAR; INTRAVENOUS ONCE
Status: DISPENSED | OUTPATIENT
Start: 2025-05-08

## 2025-05-08 RX ORDER — FUROSEMIDE 10 MG/ML
20 INJECTION INTRAMUSCULAR; INTRAVENOUS
Status: DISPENSED | OUTPATIENT
Start: 2025-05-08

## 2025-05-08 RX ORDER — ACETAMINOPHEN 325 MG/1
650 TABLET ORAL ONCE
Status: COMPLETED | OUTPATIENT
Start: 2025-05-08 | End: 2025-05-08

## 2025-05-08 RX ADMIN — ACETAMINOPHEN 650 MG: 325 TABLET ORAL at 08:05

## 2025-05-08 RX ADMIN — SODIUM CHLORIDE: 9 INJECTION, SOLUTION INTRAVENOUS at 08:05

## 2025-05-08 RX ADMIN — DIPHENHYDRAMINE HYDROCHLORIDE 25 MG: 50 INJECTION INTRAMUSCULAR; INTRAVENOUS at 08:05

## 2025-05-09 DIAGNOSIS — D46.9 MDS (MYELODYSPLASTIC SYNDROME): ICD-10-CM

## 2025-05-09 DIAGNOSIS — R63.0 APPETITE LOSS: ICD-10-CM

## 2025-05-09 DIAGNOSIS — D46.1 RARS (REFRACTORY ANEMIA WITH RINGED SIDEROBLASTS): ICD-10-CM

## 2025-05-09 RX ORDER — DRONABINOL 10 MG/1
10 CAPSULE ORAL
Qty: 60 CAPSULE | Refills: 1 | Status: SHIPPED | OUTPATIENT
Start: 2025-05-09

## 2025-05-12 DIAGNOSIS — D46.9 MDS (MYELODYSPLASTIC SYNDROME): ICD-10-CM

## 2025-05-12 DIAGNOSIS — R11.2 CHEMOTHERAPY INDUCED NAUSEA AND VOMITING: Primary | ICD-10-CM

## 2025-05-12 DIAGNOSIS — D46.1 RARS (REFRACTORY ANEMIA WITH RINGED SIDEROBLASTS): ICD-10-CM

## 2025-05-12 DIAGNOSIS — R63.0 APPETITE LOSS: ICD-10-CM

## 2025-05-12 DIAGNOSIS — T45.1X5A CHEMOTHERAPY INDUCED NAUSEA AND VOMITING: Primary | ICD-10-CM

## 2025-05-12 RX ORDER — DRONABINOL 10 MG/1
10 CAPSULE ORAL
Qty: 60 CAPSULE | Refills: 1 | Status: SHIPPED | OUTPATIENT
Start: 2025-05-12 | End: 2025-05-15

## 2025-05-14 ENCOUNTER — DOCUMENTATION ONLY (OUTPATIENT)
Facility: CLINIC | Age: 65
End: 2025-05-14
Payer: MEDICARE

## 2025-05-14 ENCOUNTER — LAB VISIT (OUTPATIENT)
Dept: LAB | Facility: HOSPITAL | Age: 65
End: 2025-05-14
Attending: INTERNAL MEDICINE
Payer: MEDICARE

## 2025-05-14 DIAGNOSIS — D46.9 MDS (MYELODYSPLASTIC SYNDROME): ICD-10-CM

## 2025-05-14 DIAGNOSIS — D64.9 SYMPTOMATIC ANEMIA: Primary | ICD-10-CM

## 2025-05-14 LAB
ALBUMIN SERPL-MCNC: 4.1 G/DL (ref 3.5–5.2)
ALP SERPL-CCNC: 62 UNIT/L (ref 55–135)
ALT SERPL-CCNC: 28 UNIT/L (ref 10–44)
ANION GAP (SMH): 9 MMOL/L (ref 8–16)
AST SERPL-CCNC: 24 UNIT/L (ref 10–40)
BILIRUB SERPL-MCNC: 0.3 MG/DL (ref 0.1–1)
BUN SERPL-MCNC: 41 MG/DL (ref 8–23)
CALCIUM SERPL-MCNC: 8.8 MG/DL (ref 8.7–10.5)
CHLORIDE SERPL-SCNC: 108 MMOL/L (ref 95–110)
CO2 SERPL-SCNC: 22 MMOL/L (ref 23–29)
CREAT SERPL-MCNC: 3.3 MG/DL (ref 0.5–1.4)
ERYTHROCYTE [DISTWIDTH] IN BLOOD BY AUTOMATED COUNT: 21.5 % (ref 11.5–14.5)
FERRITIN SERPL-MCNC: 2600.7 NG/ML (ref 20–300)
GFR SERPLBLD CREATININE-BSD FMLA CKD-EPI: 20 ML/MIN/1.73/M2
GLUCOSE SERPL-MCNC: 124 MG/DL (ref 70–110)
HCT VFR BLD AUTO: 21.2 % (ref 40–54)
HGB BLD-MCNC: 6.9 GM/DL (ref 14–18)
INDIRECT COOMBS: NORMAL
IRON SATN MFR SERPL: >75 % (ref 20–50)
IRON SERPL-MCNC: 162 UG/DL (ref 45–160)
MCH RBC QN AUTO: 29.2 PG (ref 27–31)
MCHC RBC AUTO-ENTMCNC: 32.5 G/DL (ref 32–36)
MCV RBC AUTO: 90 FL (ref 82–98)
NUCLEATED RBC (/100WBC) (SMH): 0 /100 WBC
PLATELET # BLD AUTO: 95 K/UL (ref 150–450)
PLATELET BLD QL SMEAR: ABNORMAL
PMV BLD AUTO: 10.5 FL (ref 9.2–12.9)
POTASSIUM SERPL-SCNC: 4.3 MMOL/L (ref 3.5–5.1)
PROT SERPL-MCNC: 7.1 GM/DL (ref 6–8.4)
RBC # BLD AUTO: 2.36 M/UL (ref 4.6–6.2)
RH BLD: NORMAL
SODIUM SERPL-SCNC: 139 MMOL/L (ref 136–145)
SPECIMEN OUTDATE: NORMAL
TIBC SERPL-MCNC: 171 UG/DL (ref 250–450)
TRANSFERRIN SERPL-MCNC: 122 MG/DL (ref 200–375)
WBC # BLD AUTO: 1 K/UL (ref 3.9–12.7)

## 2025-05-14 PROCEDURE — 85025 COMPLETE CBC W/AUTO DIFF WBC: CPT

## 2025-05-14 PROCEDURE — 83540 ASSAY OF IRON: CPT

## 2025-05-14 PROCEDURE — 36415 COLL VENOUS BLD VENIPUNCTURE: CPT

## 2025-05-14 PROCEDURE — 82728 ASSAY OF FERRITIN: CPT

## 2025-05-14 PROCEDURE — 84075 ASSAY ALKALINE PHOSPHATASE: CPT

## 2025-05-14 PROCEDURE — 86850 RBC ANTIBODY SCREEN: CPT | Performed by: NURSE PRACTITIONER

## 2025-05-14 PROCEDURE — 86902 BLOOD TYPE ANTIGEN DONOR EA: CPT | Performed by: NURSE PRACTITIONER

## 2025-05-14 PROCEDURE — 85660 RBC SICKLE CELL TEST: CPT | Performed by: NURSE PRACTITIONER

## 2025-05-14 RX ORDER — FUROSEMIDE 10 MG/ML
20 INJECTION INTRAMUSCULAR; INTRAVENOUS ONCE
OUTPATIENT
Start: 2025-05-14

## 2025-05-14 RX ORDER — DIPHENHYDRAMINE HYDROCHLORIDE 50 MG/ML
25 INJECTION, SOLUTION INTRAMUSCULAR; INTRAVENOUS ONCE
OUTPATIENT
Start: 2025-05-14

## 2025-05-14 RX ORDER — ACETAMINOPHEN 325 MG/1
650 TABLET ORAL ONCE
Status: CANCELLED | OUTPATIENT
Start: 2025-05-14

## 2025-05-14 RX ORDER — HYDROCODONE BITARTRATE AND ACETAMINOPHEN 500; 5 MG/1; MG/1
TABLET ORAL ONCE
Status: CANCELLED | OUTPATIENT
Start: 2025-05-14 | End: 2025-05-14

## 2025-05-14 NOTE — PROGRESS NOTES
Critical Hgb of 6.9 - per JYOTI RauschC patient to get 2 units of blood with lasix between first and second unit and after second unit of blood. Patient made aware of above and verbalized understanding. Message sent to [facility] to get patient scheduled for blood.

## 2025-05-15 ENCOUNTER — INFUSION (OUTPATIENT)
Dept: INFUSION THERAPY | Facility: HOSPITAL | Age: 65
End: 2025-05-15
Attending: NURSE PRACTITIONER
Payer: MEDICARE

## 2025-05-15 VITALS
DIASTOLIC BLOOD PRESSURE: 80 MMHG | HEART RATE: 63 BPM | TEMPERATURE: 98 F | SYSTOLIC BLOOD PRESSURE: 161 MMHG | RESPIRATION RATE: 16 BRPM | OXYGEN SATURATION: 100 %

## 2025-05-15 DIAGNOSIS — D64.9 SYMPTOMATIC ANEMIA: ICD-10-CM

## 2025-05-15 DIAGNOSIS — R63.0 DECREASED APPETITE: Primary | ICD-10-CM

## 2025-05-15 LAB
ABO + RH BLD: NORMAL
ABO + RH BLD: NORMAL
BLD PROD TYP BPU: NORMAL
BLD PROD TYP BPU: NORMAL
BLOOD UNIT EXPIRATION DATE: NORMAL
BLOOD UNIT EXPIRATION DATE: NORMAL
BLOOD UNIT TYPE CODE: 5100
BLOOD UNIT TYPE CODE: 5100
CROSSMATCH INTERPRETATION: NORMAL
CROSSMATCH INTERPRETATION: NORMAL
DISPENSE STATUS: NORMAL
DISPENSE STATUS: NORMAL
UNIT NUMBER: NORMAL
UNIT NUMBER: NORMAL

## 2025-05-15 PROCEDURE — 25000003 PHARM REV CODE 250: Performed by: NURSE PRACTITIONER

## 2025-05-15 PROCEDURE — 36430 TRANSFUSION BLD/BLD COMPNT: CPT

## 2025-05-15 PROCEDURE — 86920 COMPATIBILITY TEST SPIN: CPT | Performed by: NURSE PRACTITIONER

## 2025-05-15 PROCEDURE — P9016 RBC LEUKOCYTES REDUCED: HCPCS | Performed by: NURSE PRACTITIONER

## 2025-05-15 RX ORDER — ACETAMINOPHEN 325 MG/1
650 TABLET ORAL ONCE
Status: COMPLETED | OUTPATIENT
Start: 2025-05-15 | End: 2025-05-15

## 2025-05-15 RX ORDER — CYPROHEPTADINE HYDROCHLORIDE 4 MG/1
4 TABLET ORAL 3 TIMES DAILY
Qty: 90 TABLET | Refills: 3 | Status: SHIPPED | OUTPATIENT
Start: 2025-05-15

## 2025-05-15 RX ORDER — HYDROCODONE BITARTRATE AND ACETAMINOPHEN 500; 5 MG/1; MG/1
TABLET ORAL ONCE
Status: COMPLETED | OUTPATIENT
Start: 2025-05-15 | End: 2025-05-15

## 2025-05-15 RX ADMIN — SODIUM CHLORIDE: 9 INJECTION, SOLUTION INTRAVENOUS at 07:05

## 2025-05-15 RX ADMIN — ACETAMINOPHEN 650 MG: 325 TABLET ORAL at 07:05

## 2025-05-15 NOTE — TELEPHONE ENCOUNTER
----- Message from Pratima Almonte NP sent at 5/15/2025  3:08 PM CDT -----  We can try periactin  ----- Message -----  From: Kim Samuels, ROSALINDA  Sent: 5/15/2025   1:17 PM CDT  To: Pratima Melgar NP    This was marinol - which has been denied in the PA process. Is there another medication we can try?  ----- Message -----  From: Angie Sorto  Sent: 5/15/2025   1:07 PM CDT  To: [facility] Staff    Pt has Medicare Part B, however [facility] does not take medicare Part B, according to the patient.  So the prescription was sent to [facility] and [facility] is waiting for an ok to refill the prescription. It is showing 1 refill is remaining. Pt said he is going to call it in to be refilled. Pt said he would call back if not able to fill. # [phone]

## 2025-05-15 NOTE — TELEPHONE ENCOUNTER
Orders placed and pended to Pratima Bear NP-ESTELLE to review and sign. Patient made aware of above and verbalized understanding.

## 2025-05-15 NOTE — PLAN OF CARE
Problem: Adult Inpatient Plan of Care  Goal: Optimal Comfort and Wellbeing  Outcome: Progressing  Intervention: Provide Person-Centered Care  Flowsheets (Taken 5/15/2025 2692)  Trust Relationship/Rapport:   care explained   choices provided   emotional support provided   empathic listening provided   questions answered   questions encouraged   reassurance provided   thoughts/feelings acknowledged

## 2025-05-21 ENCOUNTER — LAB VISIT (OUTPATIENT)
Dept: LAB | Facility: HOSPITAL | Age: 65
End: 2025-05-21
Attending: INTERNAL MEDICINE
Payer: MEDICARE

## 2025-05-21 ENCOUNTER — TELEPHONE (OUTPATIENT)
Facility: CLINIC | Age: 65
End: 2025-05-21
Payer: MEDICARE

## 2025-05-21 ENCOUNTER — RESULTS FOLLOW-UP (OUTPATIENT)
Facility: CLINIC | Age: 65
End: 2025-05-21

## 2025-05-21 DIAGNOSIS — D46.9 MDS (MYELODYSPLASTIC SYNDROME): ICD-10-CM

## 2025-05-21 LAB
ABSOLUTE EOSINOPHIL (SMH): 0.01 K/UL
ABSOLUTE MONOCYTE (SMH): 0.56 K/UL (ref 0.3–1)
ABSOLUTE NEUTROPHIL COUNT (SMH): 1 K/UL (ref 1.8–7.7)
ALBUMIN SERPL-MCNC: 4.2 G/DL (ref 3.5–5.2)
ALP SERPL-CCNC: 66 UNIT/L (ref 55–135)
ALT SERPL-CCNC: 23 UNIT/L (ref 10–44)
ANION GAP (SMH): 8 MMOL/L (ref 8–16)
AST SERPL-CCNC: 24 UNIT/L (ref 10–40)
BASOPHILS # BLD AUTO: 0.02 K/UL
BASOPHILS NFR BLD AUTO: 0.8 %
BILIRUB SERPL-MCNC: 0.4 MG/DL (ref 0.1–1)
BUN SERPL-MCNC: 39 MG/DL (ref 8–23)
CALCIUM SERPL-MCNC: 9 MG/DL (ref 8.7–10.5)
CHLORIDE SERPL-SCNC: 108 MMOL/L (ref 95–110)
CO2 SERPL-SCNC: 24 MMOL/L (ref 23–29)
CREAT SERPL-MCNC: 3.5 MG/DL (ref 0.5–1.4)
ERYTHROCYTE [DISTWIDTH] IN BLOOD BY AUTOMATED COUNT: 20 % (ref 11.5–14.5)
FERRITIN SERPL-MCNC: 2048.7 NG/ML (ref 20–300)
GFR SERPLBLD CREATININE-BSD FMLA CKD-EPI: 19 ML/MIN/1.73/M2
GLUCOSE SERPL-MCNC: 138 MG/DL (ref 70–110)
HCT VFR BLD AUTO: 27.1 % (ref 40–54)
HGB BLD-MCNC: 8.8 GM/DL (ref 14–18)
IMM GRANULOCYTES # BLD AUTO: 0.02 K/UL (ref 0–0.04)
IMM GRANULOCYTES NFR BLD AUTO: 0.8 % (ref 0–0.5)
IRON SATN MFR SERPL: >75 % (ref 20–50)
IRON SERPL-MCNC: 165 UG/DL (ref 45–160)
LYMPHOCYTES # BLD AUTO: 0.97 K/UL (ref 1–4.8)
MCH RBC QN AUTO: 28.8 PG (ref 27–31)
MCHC RBC AUTO-ENTMCNC: 32.5 G/DL (ref 32–36)
MCV RBC AUTO: 89 FL (ref 82–98)
NUCLEATED RBC (/100WBC) (SMH): 0 /100 WBC
PLATELET # BLD AUTO: 236 K/UL (ref 150–450)
PMV BLD AUTO: 9.3 FL (ref 9.2–12.9)
POTASSIUM SERPL-SCNC: 4.5 MMOL/L (ref 3.5–5.1)
PROT SERPL-MCNC: 7.4 GM/DL (ref 6–8.4)
RBC # BLD AUTO: 3.06 M/UL (ref 4.6–6.2)
RELATIVE EOSINOPHIL (SMH): 0.4 % (ref 0–8)
RELATIVE LYMPHOCYTE (SMH): 38.3 % (ref 18–48)
RELATIVE MONOCYTE (SMH): 22.1 % (ref 4–15)
RELATIVE NEUTROPHIL (SMH): 37.6 % (ref 38–73)
SODIUM SERPL-SCNC: 140 MMOL/L (ref 136–145)
TIBC SERPL-MCNC: 174 UG/DL (ref 250–450)
TRANSFERRIN SERPL-MCNC: 124 MG/DL (ref 200–375)
WBC # BLD AUTO: 2.53 K/UL (ref 3.9–12.7)

## 2025-05-21 PROCEDURE — 85025 COMPLETE CBC W/AUTO DIFF WBC: CPT

## 2025-05-21 PROCEDURE — 82728 ASSAY OF FERRITIN: CPT

## 2025-05-21 PROCEDURE — 84295 ASSAY OF SERUM SODIUM: CPT

## 2025-05-21 PROCEDURE — 83540 ASSAY OF IRON: CPT

## 2025-05-21 PROCEDURE — 36415 COLL VENOUS BLD VENIPUNCTURE: CPT

## 2025-05-21 NOTE — TELEPHONE ENCOUNTER
----- Message from Pratima Almonet NP sent at 5/21/2025  2:33 PM CDT -----  No blood! His labs are improving.   ----- Message -----  From: Lab, Background User  Sent: 5/21/2025   7:37 AM CDT  To: Jose Tello MD

## 2025-05-28 ENCOUNTER — LAB VISIT (OUTPATIENT)
Dept: LAB | Facility: HOSPITAL | Age: 65
End: 2025-05-28
Attending: INTERNAL MEDICINE
Payer: MEDICARE

## 2025-05-28 DIAGNOSIS — D46.9 MDS (MYELODYSPLASTIC SYNDROME): ICD-10-CM

## 2025-05-28 LAB
ABSOLUTE EOSINOPHIL (SMH): 0.01 K/UL
ABSOLUTE MONOCYTE (SMH): 0.5 K/UL (ref 0.3–1)
ABSOLUTE NEUTROPHIL COUNT (SMH): 1.8 K/UL (ref 1.8–7.7)
ALBUMIN SERPL-MCNC: 4.1 G/DL (ref 3.5–5.2)
ALP SERPL-CCNC: 62 UNIT/L (ref 55–135)
ALT SERPL-CCNC: 27 UNIT/L (ref 10–44)
ANION GAP (SMH): 8 MMOL/L (ref 8–16)
AST SERPL-CCNC: 28 UNIT/L (ref 10–40)
BASOPHILS # BLD AUTO: 0.04 K/UL
BASOPHILS NFR BLD AUTO: 1.1 %
BILIRUB SERPL-MCNC: 0.4 MG/DL (ref 0.1–1)
BUN SERPL-MCNC: 39 MG/DL (ref 8–23)
CALCIUM SERPL-MCNC: 8.6 MG/DL (ref 8.7–10.5)
CHLORIDE SERPL-SCNC: 110 MMOL/L (ref 95–110)
CO2 SERPL-SCNC: 24 MMOL/L (ref 23–29)
CREAT SERPL-MCNC: 3.4 MG/DL (ref 0.5–1.4)
ERYTHROCYTE [DISTWIDTH] IN BLOOD BY AUTOMATED COUNT: 20.1 % (ref 11.5–14.5)
FERRITIN SERPL-MCNC: 2095.6 NG/ML (ref 20–300)
GFR SERPLBLD CREATININE-BSD FMLA CKD-EPI: 19 ML/MIN/1.73/M2
GLUCOSE SERPL-MCNC: 115 MG/DL (ref 70–110)
HCT VFR BLD AUTO: 25.9 % (ref 40–54)
HGB BLD-MCNC: 8.4 GM/DL (ref 14–18)
IMM GRANULOCYTES # BLD AUTO: 0.02 K/UL (ref 0–0.04)
IMM GRANULOCYTES NFR BLD AUTO: 0.6 % (ref 0–0.5)
IRON SATN MFR SERPL: >75 % (ref 20–50)
IRON SERPL-MCNC: 161 UG/DL (ref 45–160)
LYMPHOCYTES # BLD AUTO: 1.2 K/UL (ref 1–4.8)
MCH RBC QN AUTO: 28.8 PG (ref 27–31)
MCHC RBC AUTO-ENTMCNC: 32.4 G/DL (ref 32–36)
MCV RBC AUTO: 89 FL (ref 82–98)
NUCLEATED RBC (/100WBC) (SMH): 0 /100 WBC
PLATELET # BLD AUTO: 62 K/UL (ref 150–450)
PMV BLD AUTO: 8.6 FL (ref 9.2–12.9)
POTASSIUM SERPL-SCNC: 4.4 MMOL/L (ref 3.5–5.1)
PROT SERPL-MCNC: 7.2 GM/DL (ref 6–8.4)
RBC # BLD AUTO: 2.92 M/UL (ref 4.6–6.2)
RELATIVE EOSINOPHIL (SMH): 0.3 % (ref 0–8)
RELATIVE LYMPHOCYTE (SMH): 34.1 % (ref 18–48)
RELATIVE MONOCYTE (SMH): 14.2 % (ref 4–15)
RELATIVE NEUTROPHIL (SMH): 49.7 % (ref 38–73)
SODIUM SERPL-SCNC: 142 MMOL/L (ref 136–145)
TIBC SERPL-MCNC: 164 UG/DL (ref 250–450)
TRANSFERRIN SERPL-MCNC: 117 MG/DL (ref 200–375)
WBC # BLD AUTO: 3.52 K/UL (ref 3.9–12.7)

## 2025-05-28 PROCEDURE — 82040 ASSAY OF SERUM ALBUMIN: CPT

## 2025-05-28 PROCEDURE — 85025 COMPLETE CBC W/AUTO DIFF WBC: CPT

## 2025-05-28 PROCEDURE — 84466 ASSAY OF TRANSFERRIN: CPT

## 2025-05-28 PROCEDURE — 36415 COLL VENOUS BLD VENIPUNCTURE: CPT

## 2025-05-28 PROCEDURE — 82728 ASSAY OF FERRITIN: CPT

## 2025-05-30 RX ORDER — HEPARIN 100 UNIT/ML
500 SYRINGE INTRAVENOUS
OUTPATIENT
Start: 2025-06-02

## 2025-05-30 RX ORDER — SODIUM CHLORIDE 0.9 % (FLUSH) 0.9 %
10 SYRINGE (ML) INJECTION
OUTPATIENT
Start: 2025-06-02

## 2025-05-30 RX ORDER — EPINEPHRINE 0.3 MG/.3ML
0.3 INJECTION SUBCUTANEOUS ONCE AS NEEDED
OUTPATIENT
Start: 2025-06-02

## 2025-05-30 RX ORDER — DIPHENHYDRAMINE HYDROCHLORIDE 50 MG/ML
50 INJECTION, SOLUTION INTRAMUSCULAR; INTRAVENOUS ONCE AS NEEDED
OUTPATIENT
Start: 2025-06-02

## 2025-06-01 DIAGNOSIS — R35.1 BPH ASSOCIATED WITH NOCTURIA: ICD-10-CM

## 2025-06-01 DIAGNOSIS — N40.1 BPH ASSOCIATED WITH NOCTURIA: ICD-10-CM

## 2025-06-01 DIAGNOSIS — E53.8 FOLIC ACID DEFICIENCY: ICD-10-CM

## 2025-06-02 ENCOUNTER — INFUSION (OUTPATIENT)
Dept: INFUSION THERAPY | Facility: HOSPITAL | Age: 65
End: 2025-06-02
Attending: INTERNAL MEDICINE
Payer: MEDICARE

## 2025-06-02 VITALS
DIASTOLIC BLOOD PRESSURE: 82 MMHG | WEIGHT: 210.19 LBS | RESPIRATION RATE: 16 BRPM | TEMPERATURE: 98 F | BODY MASS INDEX: 31.13 KG/M2 | OXYGEN SATURATION: 100 % | HEIGHT: 69 IN | HEART RATE: 71 BPM | SYSTOLIC BLOOD PRESSURE: 155 MMHG

## 2025-06-02 DIAGNOSIS — D46.9 MDS (MYELODYSPLASTIC SYNDROME): Primary | ICD-10-CM

## 2025-06-02 DIAGNOSIS — D46.1 RARS (REFRACTORY ANEMIA WITH RINGED SIDEROBLASTS): ICD-10-CM

## 2025-06-02 PROCEDURE — 63600175 PHARM REV CODE 636 W HCPCS: Performed by: NURSE PRACTITIONER

## 2025-06-02 PROCEDURE — 25000003 PHARM REV CODE 250: Performed by: INTERNAL MEDICINE

## 2025-06-02 PROCEDURE — 25000003 PHARM REV CODE 250: Performed by: NURSE PRACTITIONER

## 2025-06-02 PROCEDURE — 96375 TX/PRO/DX INJ NEW DRUG ADDON: CPT

## 2025-06-02 PROCEDURE — 96413 CHEMO IV INFUSION 1 HR: CPT

## 2025-06-02 PROCEDURE — 96415 CHEMO IV INFUSION ADDL HR: CPT

## 2025-06-02 PROCEDURE — 63600175 PHARM REV CODE 636 W HCPCS: Performed by: INTERNAL MEDICINE

## 2025-06-02 RX ORDER — DIPHENHYDRAMINE HYDROCHLORIDE 50 MG/ML
50 INJECTION, SOLUTION INTRAMUSCULAR; INTRAVENOUS ONCE AS NEEDED
Status: DISCONTINUED | OUTPATIENT
Start: 2025-06-02 | End: 2025-06-02 | Stop reason: HOSPADM

## 2025-06-02 RX ORDER — FAMOTIDINE 10 MG/ML
20 INJECTION, SOLUTION INTRAVENOUS
Status: DISCONTINUED | OUTPATIENT
Start: 2025-06-02 | End: 2025-06-02

## 2025-06-02 RX ORDER — TAMSULOSIN HYDROCHLORIDE 0.4 MG/1
2 CAPSULE ORAL
Qty: 180 CAPSULE | Refills: 1 | Status: SHIPPED | OUTPATIENT
Start: 2025-06-02

## 2025-06-02 RX ORDER — FAMOTIDINE 10 MG/ML
20 INJECTION, SOLUTION INTRAVENOUS
Status: COMPLETED | OUTPATIENT
Start: 2025-06-02 | End: 2025-06-02

## 2025-06-02 RX ORDER — SODIUM CHLORIDE 0.9 % (FLUSH) 0.9 %
10 SYRINGE (ML) INJECTION
Status: DISCONTINUED | OUTPATIENT
Start: 2025-06-02 | End: 2025-06-02 | Stop reason: HOSPADM

## 2025-06-02 RX ORDER — CETIRIZINE HYDROCHLORIDE 10 MG/1
10 TABLET ORAL DAILY
Status: DISCONTINUED | OUTPATIENT
Start: 2025-06-02 | End: 2025-06-02

## 2025-06-02 RX ORDER — CETIRIZINE HYDROCHLORIDE 10 MG/1
10 TABLET ORAL
Status: CANCELLED
Start: 2025-06-02

## 2025-06-02 RX ORDER — FAMOTIDINE 10 MG/ML
20 INJECTION, SOLUTION INTRAVENOUS
Status: CANCELLED
Start: 2025-06-02

## 2025-06-02 RX ORDER — FOLIC ACID 1 MG/1
2000 TABLET ORAL
Qty: 180 TABLET | Refills: 0 | Status: SHIPPED | OUTPATIENT
Start: 2025-06-02

## 2025-06-02 RX ORDER — EPINEPHRINE 0.3 MG/.3ML
0.3 INJECTION SUBCUTANEOUS ONCE AS NEEDED
Status: DISCONTINUED | OUTPATIENT
Start: 2025-06-02 | End: 2025-06-02 | Stop reason: HOSPADM

## 2025-06-02 RX ORDER — CETIRIZINE HYDROCHLORIDE 10 MG/1
10 TABLET ORAL
Status: COMPLETED | OUTPATIENT
Start: 2025-06-02 | End: 2025-06-02

## 2025-06-02 RX ADMIN — HYDROCORTISONE SODIUM SUCCINATE 100 MG: 100 INJECTION, POWDER, FOR SOLUTION INTRAMUSCULAR; INTRAVENOUS at 07:06

## 2025-06-02 RX ADMIN — SODIUM CHLORIDE 25 MG: 9 INJECTION, SOLUTION INTRAVENOUS at 07:06

## 2025-06-02 RX ADMIN — CETIRIZINE HYDROCHLORIDE 10 MG: 10 TABLET, FILM COATED ORAL at 07:06

## 2025-06-02 RX ADMIN — SODIUM CHLORIDE: 9 INJECTION, SOLUTION INTRAVENOUS at 07:06

## 2025-06-02 RX ADMIN — FAMOTIDINE 20 MG: 10 INJECTION INTRAVENOUS at 07:06

## 2025-06-02 RX ADMIN — IMETELSTAT SODIUM: 47 INJECTION, POWDER, LYOPHILIZED, FOR SOLUTION INTRAVENOUS at 08:06

## 2025-06-04 ENCOUNTER — RESULTS FOLLOW-UP (OUTPATIENT)
Facility: CLINIC | Age: 65
End: 2025-06-04

## 2025-06-04 ENCOUNTER — TELEPHONE (OUTPATIENT)
Facility: CLINIC | Age: 65
End: 2025-06-04
Payer: MEDICARE

## 2025-06-04 ENCOUNTER — LAB VISIT (OUTPATIENT)
Dept: LAB | Facility: HOSPITAL | Age: 65
End: 2025-06-04
Attending: INTERNAL MEDICINE
Payer: MEDICARE

## 2025-06-04 ENCOUNTER — DOCUMENTATION ONLY (OUTPATIENT)
Facility: CLINIC | Age: 65
End: 2025-06-04
Payer: MEDICARE

## 2025-06-04 DIAGNOSIS — D64.9 SYMPTOMATIC ANEMIA: ICD-10-CM

## 2025-06-04 DIAGNOSIS — D46.9 MDS (MYELODYSPLASTIC SYNDROME): ICD-10-CM

## 2025-06-04 DIAGNOSIS — D64.9 SYMPTOMATIC ANEMIA: Primary | ICD-10-CM

## 2025-06-04 LAB
ABSOLUTE EOSINOPHIL (SMH): 0.06 K/UL
ABSOLUTE MONOCYTE (SMH): 0.13 K/UL (ref 0.3–1)
ABSOLUTE NEUTROPHIL COUNT (SMH): 2.8 K/UL (ref 1.8–7.7)
ALBUMIN SERPL-MCNC: 4 G/DL (ref 3.5–5.2)
ALP SERPL-CCNC: 55 UNIT/L (ref 55–135)
ALT SERPL-CCNC: 18 UNIT/L (ref 10–44)
ANION GAP (SMH): 6 MMOL/L (ref 8–16)
AST SERPL-CCNC: 19 UNIT/L (ref 10–40)
BASOPHILS # BLD AUTO: 0.01 K/UL
BASOPHILS NFR BLD AUTO: 0.3 %
BILIRUB SERPL-MCNC: 0.5 MG/DL (ref 0.1–1)
BUN SERPL-MCNC: 48 MG/DL (ref 8–23)
CALCIUM SERPL-MCNC: 9 MG/DL (ref 8.7–10.5)
CHLORIDE SERPL-SCNC: 111 MMOL/L (ref 95–110)
CO2 SERPL-SCNC: 25 MMOL/L (ref 23–29)
CREAT SERPL-MCNC: 3.9 MG/DL (ref 0.5–1.4)
ERYTHROCYTE [DISTWIDTH] IN BLOOD BY AUTOMATED COUNT: 19.8 % (ref 11.5–14.5)
FERRITIN SERPL-MCNC: 1953.3 NG/ML (ref 20–300)
GFR SERPLBLD CREATININE-BSD FMLA CKD-EPI: 16 ML/MIN/1.73/M2
GLUCOSE SERPL-MCNC: 121 MG/DL (ref 70–110)
HCT VFR BLD AUTO: 21.7 % (ref 40–54)
HGB BLD-MCNC: 7.3 GM/DL (ref 14–18)
IMM GRANULOCYTES # BLD AUTO: 0.02 K/UL (ref 0–0.04)
IMM GRANULOCYTES NFR BLD AUTO: 0.5 % (ref 0–0.5)
INDIRECT COOMBS: NORMAL
IRON SATN MFR SERPL: >75 % (ref 20–50)
IRON SERPL-MCNC: 159 UG/DL (ref 45–160)
LYMPHOCYTES # BLD AUTO: 0.91 K/UL (ref 1–4.8)
MCH RBC QN AUTO: 29.4 PG (ref 27–31)
MCHC RBC AUTO-ENTMCNC: 33.6 G/DL (ref 32–36)
MCV RBC AUTO: 88 FL (ref 82–98)
NUCLEATED RBC (/100WBC) (SMH): 0 /100 WBC
PLATELET # BLD AUTO: 7 K/UL (ref 150–450)
PLATELET BLD QL SMEAR: ABNORMAL
PMV BLD AUTO: ABNORMAL FL
POTASSIUM SERPL-SCNC: 4.3 MMOL/L (ref 3.5–5.1)
PROT SERPL-MCNC: 7 GM/DL (ref 6–8.4)
RBC # BLD AUTO: 2.48 M/UL (ref 4.6–6.2)
RELATIVE EOSINOPHIL (SMH): 1.5 % (ref 0–8)
RELATIVE LYMPHOCYTE (SMH): 23 % (ref 18–48)
RELATIVE MONOCYTE (SMH): 3.3 % (ref 4–15)
RELATIVE NEUTROPHIL (SMH): 71.4 % (ref 38–73)
RH BLD: NORMAL
SODIUM SERPL-SCNC: 142 MMOL/L (ref 136–145)
SPECIMEN OUTDATE: NORMAL
TIBC SERPL-MCNC: 164 UG/DL (ref 250–450)
TRANSFERRIN SERPL-MCNC: 117 MG/DL (ref 200–375)
WBC # BLD AUTO: 3.95 K/UL (ref 3.9–12.7)

## 2025-06-04 PROCEDURE — 36415 COLL VENOUS BLD VENIPUNCTURE: CPT

## 2025-06-04 PROCEDURE — 80053 COMPREHEN METABOLIC PANEL: CPT

## 2025-06-04 PROCEDURE — 85025 COMPLETE CBC W/AUTO DIFF WBC: CPT

## 2025-06-04 PROCEDURE — 82728 ASSAY OF FERRITIN: CPT

## 2025-06-04 PROCEDURE — 86901 BLOOD TYPING SEROLOGIC RH(D): CPT | Performed by: INTERNAL MEDICINE

## 2025-06-04 PROCEDURE — 84466 ASSAY OF TRANSFERRIN: CPT

## 2025-06-04 RX ORDER — HYDROCODONE BITARTRATE AND ACETAMINOPHEN 500; 5 MG/1; MG/1
TABLET ORAL ONCE
Status: CANCELLED | OUTPATIENT
Start: 2025-06-04 | End: 2025-06-04

## 2025-06-04 RX ORDER — FUROSEMIDE 10 MG/ML
20 INJECTION INTRAMUSCULAR; INTRAVENOUS ONCE
Status: CANCELLED | OUTPATIENT
Start: 2025-06-04

## 2025-06-04 RX ORDER — DIPHENHYDRAMINE HYDROCHLORIDE 50 MG/ML
25 INJECTION, SOLUTION INTRAMUSCULAR; INTRAVENOUS ONCE
Status: CANCELLED | OUTPATIENT
Start: 2025-06-04

## 2025-06-04 RX ORDER — HEPARIN 100 UNIT/ML
500 SYRINGE INTRAVENOUS
Status: CANCELLED | OUTPATIENT
Start: 2025-06-05

## 2025-06-04 RX ORDER — FUROSEMIDE 10 MG/ML
20 INJECTION INTRAMUSCULAR; INTRAVENOUS
Status: CANCELLED | OUTPATIENT
Start: 2025-06-04

## 2025-06-04 RX ORDER — ACETAMINOPHEN 325 MG/1
650 TABLET ORAL ONCE
Status: CANCELLED | OUTPATIENT
Start: 2025-06-04

## 2025-06-04 RX ORDER — SODIUM CHLORIDE 0.9 % (FLUSH) 0.9 %
10 SYRINGE (ML) INJECTION
Status: CANCELLED | OUTPATIENT
Start: 2025-06-05

## 2025-06-05 ENCOUNTER — OFFICE VISIT (OUTPATIENT)
Facility: CLINIC | Age: 65
End: 2025-06-05
Payer: MEDICARE

## 2025-06-05 ENCOUNTER — INFUSION (OUTPATIENT)
Dept: INFUSION THERAPY | Facility: HOSPITAL | Age: 65
End: 2025-06-05
Attending: INTERNAL MEDICINE
Payer: MEDICARE

## 2025-06-05 VITALS
SYSTOLIC BLOOD PRESSURE: 170 MMHG | RESPIRATION RATE: 16 BRPM | BODY MASS INDEX: 31.45 KG/M2 | DIASTOLIC BLOOD PRESSURE: 90 MMHG | TEMPERATURE: 99 F | HEIGHT: 69 IN | HEART RATE: 72 BPM | HEART RATE: 70 BPM | WEIGHT: 213 LBS | WEIGHT: 212 LBS | TEMPERATURE: 100 F | OXYGEN SATURATION: 100 % | OXYGEN SATURATION: 99 % | BODY MASS INDEX: 31.4 KG/M2 | DIASTOLIC BLOOD PRESSURE: 89 MMHG | SYSTOLIC BLOOD PRESSURE: 160 MMHG

## 2025-06-05 DIAGNOSIS — D64.89 ANEMIA DUE TO MULTIPLE MECHANISMS: ICD-10-CM

## 2025-06-05 DIAGNOSIS — D64.9 SYMPTOMATIC ANEMIA: ICD-10-CM

## 2025-06-05 DIAGNOSIS — D46.1 RARS (REFRACTORY ANEMIA WITH RINGED SIDEROBLASTS): ICD-10-CM

## 2025-06-05 DIAGNOSIS — D46.9 MDS (MYELODYSPLASTIC SYNDROME): Primary | ICD-10-CM

## 2025-06-05 DIAGNOSIS — T45.1X5A CHEMOTHERAPY INDUCED NAUSEA AND VOMITING: ICD-10-CM

## 2025-06-05 DIAGNOSIS — R63.0 APPETITE LOSS: ICD-10-CM

## 2025-06-05 DIAGNOSIS — R11.2 CHEMOTHERAPY INDUCED NAUSEA AND VOMITING: ICD-10-CM

## 2025-06-05 DIAGNOSIS — D46.9 MDS (MYELODYSPLASTIC SYNDROME): ICD-10-CM

## 2025-06-05 LAB
ABO + RH BLD: NORMAL
BLD PROD TYP BPU: NORMAL
BLOOD UNIT EXPIRATION DATE: NORMAL
BLOOD UNIT TYPE CODE: 5100
BLOOD UNIT TYPE CODE: 5100
BLOOD UNIT TYPE CODE: 600
CROSSMATCH INTERPRETATION: NORMAL
DISPENSE STATUS: NORMAL
UNIT NUMBER: NORMAL

## 2025-06-05 PROCEDURE — P9016 RBC LEUKOCYTES REDUCED: HCPCS | Performed by: INTERNAL MEDICINE

## 2025-06-05 PROCEDURE — 63600175 PHARM REV CODE 636 W HCPCS: Performed by: NURSE PRACTITIONER

## 2025-06-05 PROCEDURE — 36430 TRANSFUSION BLD/BLD COMPNT: CPT

## 2025-06-05 PROCEDURE — 63600175 PHARM REV CODE 636 W HCPCS: Performed by: INTERNAL MEDICINE

## 2025-06-05 PROCEDURE — 99999 PR PBB SHADOW E&M-EST. PATIENT-LVL V: CPT | Mod: PBBFAC,,, | Performed by: NURSE PRACTITIONER

## 2025-06-05 PROCEDURE — 25000003 PHARM REV CODE 250: Performed by: NURSE PRACTITIONER

## 2025-06-05 PROCEDURE — 86920 COMPATIBILITY TEST SPIN: CPT | Performed by: INTERNAL MEDICINE

## 2025-06-05 PROCEDURE — 96365 THER/PROPH/DIAG IV INF INIT: CPT

## 2025-06-05 PROCEDURE — 96366 THER/PROPH/DIAG IV INF ADDON: CPT

## 2025-06-05 PROCEDURE — 99215 OFFICE O/P EST HI 40 MIN: CPT | Mod: S$PBB,,, | Performed by: NURSE PRACTITIONER

## 2025-06-05 PROCEDURE — 25000003 PHARM REV CODE 250: Performed by: INTERNAL MEDICINE

## 2025-06-05 PROCEDURE — G2211 COMPLEX E/M VISIT ADD ON: HCPCS | Mod: ,,, | Performed by: NURSE PRACTITIONER

## 2025-06-05 PROCEDURE — P9037 PLATE PHERES LEUKOREDU IRRAD: HCPCS | Performed by: INTERNAL MEDICINE

## 2025-06-05 PROCEDURE — 99215 OFFICE O/P EST HI 40 MIN: CPT | Mod: PBBFAC,PN | Performed by: NURSE PRACTITIONER

## 2025-06-05 PROCEDURE — 96375 TX/PRO/DX INJ NEW DRUG ADDON: CPT

## 2025-06-05 RX ORDER — DRONABINOL 10 MG/1
10 CAPSULE ORAL
Qty: 60 CAPSULE | Refills: 1 | Status: SHIPPED | OUTPATIENT
Start: 2025-06-05 | End: 2025-06-06 | Stop reason: SDUPTHER

## 2025-06-05 RX ORDER — HEPARIN 100 UNIT/ML
500 SYRINGE INTRAVENOUS
OUTPATIENT
Start: 2025-06-05

## 2025-06-05 RX ORDER — SODIUM CHLORIDE 0.9 % (FLUSH) 0.9 %
10 SYRINGE (ML) INJECTION
Status: DISCONTINUED | OUTPATIENT
Start: 2025-06-05 | End: 2025-06-05 | Stop reason: HOSPADM

## 2025-06-05 RX ORDER — ACETAMINOPHEN 325 MG/1
650 TABLET ORAL ONCE
Status: COMPLETED | OUTPATIENT
Start: 2025-06-05 | End: 2025-06-05

## 2025-06-05 RX ORDER — FUROSEMIDE 10 MG/ML
20 INJECTION INTRAMUSCULAR; INTRAVENOUS ONCE
Status: COMPLETED | OUTPATIENT
Start: 2025-06-05 | End: 2025-06-05

## 2025-06-05 RX ORDER — SODIUM CHLORIDE 0.9 % (FLUSH) 0.9 %
10 SYRINGE (ML) INJECTION
OUTPATIENT
Start: 2025-06-05

## 2025-06-05 RX ORDER — HYDROCODONE BITARTRATE AND ACETAMINOPHEN 500; 5 MG/1; MG/1
TABLET ORAL ONCE
Status: ACTIVE | OUTPATIENT
Start: 2025-06-05

## 2025-06-05 RX ORDER — FUROSEMIDE 10 MG/ML
20 INJECTION INTRAMUSCULAR; INTRAVENOUS
Status: ACTIVE | OUTPATIENT
Start: 2025-06-05

## 2025-06-05 RX ORDER — HEPARIN 100 UNIT/ML
500 SYRINGE INTRAVENOUS
Status: DISCONTINUED | OUTPATIENT
Start: 2025-06-05 | End: 2025-06-05 | Stop reason: HOSPADM

## 2025-06-05 RX ORDER — DIPHENHYDRAMINE HYDROCHLORIDE 50 MG/ML
25 INJECTION, SOLUTION INTRAMUSCULAR; INTRAVENOUS ONCE
Status: COMPLETED | OUTPATIENT
Start: 2025-06-05 | End: 2025-06-05

## 2025-06-05 RX ADMIN — DIPHENHYDRAMINE HYDROCHLORIDE 25 MG: 50 INJECTION INTRAMUSCULAR; INTRAVENOUS at 07:06

## 2025-06-05 RX ADMIN — ACETAMINOPHEN 650 MG: 325 TABLET ORAL at 07:06

## 2025-06-05 RX ADMIN — FUROSEMIDE 20 MG: 10 INJECTION, SOLUTION INTRAMUSCULAR; INTRAVENOUS at 11:06

## 2025-06-05 RX ADMIN — DEFEROXAMINE MESYLATE 1000 MG: 95 INJECTION, POWDER, LYOPHILIZED, FOR SOLUTION INTRAMUSCULAR; INTRAVENOUS; SUBCUTANEOUS at 11:06

## 2025-06-06 ENCOUNTER — TELEPHONE (OUTPATIENT)
Facility: CLINIC | Age: 65
End: 2025-06-06
Payer: MEDICARE

## 2025-06-06 DIAGNOSIS — T45.1X5A CHEMOTHERAPY INDUCED NAUSEA AND VOMITING: ICD-10-CM

## 2025-06-06 DIAGNOSIS — R63.0 APPETITE LOSS: ICD-10-CM

## 2025-06-06 DIAGNOSIS — R11.2 CHEMOTHERAPY INDUCED NAUSEA AND VOMITING: ICD-10-CM

## 2025-06-06 DIAGNOSIS — D46.1 RARS (REFRACTORY ANEMIA WITH RINGED SIDEROBLASTS): ICD-10-CM

## 2025-06-06 DIAGNOSIS — D46.9 MDS (MYELODYSPLASTIC SYNDROME): ICD-10-CM

## 2025-06-06 RX ORDER — DRONABINOL 10 MG/1
10 CAPSULE ORAL
Qty: 60 CAPSULE | Refills: 1 | Status: SHIPPED | OUTPATIENT
Start: 2025-06-06

## 2025-06-10 DIAGNOSIS — R63.0 DECREASED APPETITE: ICD-10-CM

## 2025-06-10 RX ORDER — LEVOCETIRIZINE DIHYDROCHLORIDE 5 MG/1
TABLET, FILM COATED ORAL
Refills: 0 | OUTPATIENT
Start: 2025-06-10

## 2025-06-12 ENCOUNTER — TELEPHONE (OUTPATIENT)
Facility: CLINIC | Age: 65
End: 2025-06-12
Payer: MEDICARE

## 2025-06-12 NOTE — TELEPHONE ENCOUNTER
Spoke to Patient.  He is requesting his RX be moved to [facility]'s and written with [facility] Part B in the comments. Message forwarded to Kim PINTO RN.

## 2025-06-12 NOTE — TELEPHONE ENCOUNTER
----- Message from Jolynn sent at 6/11/2025  4:53 PM CDT -----  Pt calling to request that Rx for Marinol 10 mg be resent to [facility] on [address]. Insurance denied Rx unless [facility] Part B pays-[facility] that accepts part B. CB: [phone]

## 2025-06-13 DIAGNOSIS — T45.1X5A CHEMOTHERAPY INDUCED NAUSEA AND VOMITING: ICD-10-CM

## 2025-06-13 DIAGNOSIS — R11.2 CHEMOTHERAPY INDUCED NAUSEA AND VOMITING: ICD-10-CM

## 2025-06-13 DIAGNOSIS — D46.9 MDS (MYELODYSPLASTIC SYNDROME): ICD-10-CM

## 2025-06-13 DIAGNOSIS — R63.0 APPETITE LOSS: ICD-10-CM

## 2025-06-13 DIAGNOSIS — D46.1 RARS (REFRACTORY ANEMIA WITH RINGED SIDEROBLASTS): ICD-10-CM

## 2025-06-13 RX ORDER — DRONABINOL 10 MG/1
10 CAPSULE ORAL
Qty: 60 CAPSULE | Refills: 1 | Status: SHIPPED | OUTPATIENT
Start: 2025-06-13

## 2025-06-13 NOTE — TELEPHONE ENCOUNTER
Prescription pended to Pratima Bear NP-C to review and sign. Patient made aware of above and verbalized understanding.

## 2025-06-13 NOTE — TELEPHONE ENCOUNTER
----- Message from Юлия sent at 6/13/2025 10:01 AM CDT -----  Pt is calling to let you know that he called [facility] and they have Dronabinol in stock and they take his ins under plan B. [facility] does not. Can we please send it to [facility] [address] and send it with Plan B.     [phone]

## 2025-06-16 DIAGNOSIS — N18.30 CHRONIC KIDNEY DISEASE, STAGE III (MODERATE): Primary | ICD-10-CM

## 2025-06-18 ENCOUNTER — TELEPHONE (OUTPATIENT)
Facility: CLINIC | Age: 65
End: 2025-06-18
Payer: MEDICARE

## 2025-06-18 ENCOUNTER — HOSPITAL ENCOUNTER (INPATIENT)
Facility: HOSPITAL | Age: 65
LOS: 10 days | Discharge: HOME HEALTH | DRG: 871 | End: 2025-06-28
Attending: EMERGENCY MEDICINE | Admitting: INTERNAL MEDICINE
Payer: MEDICARE

## 2025-06-18 DIAGNOSIS — J18.9 PNEUMONIA DUE TO INFECTIOUS ORGANISM, UNSPECIFIED LATERALITY, UNSPECIFIED PART OF LUNG: Primary | ICD-10-CM

## 2025-06-18 DIAGNOSIS — D46.9 MDS (MYELODYSPLASTIC SYNDROME): ICD-10-CM

## 2025-06-18 DIAGNOSIS — R79.89 ELEVATED TROPONIN: ICD-10-CM

## 2025-06-18 DIAGNOSIS — J18.9 MULTIFOCAL PNEUMONIA: ICD-10-CM

## 2025-06-18 DIAGNOSIS — D57.3 SICKLE CELL TRAIT SYNDROME: ICD-10-CM

## 2025-06-18 DIAGNOSIS — I49.9 CARDIAC RHYTHM DISORDER OR DISTURBANCE OR CHANGE: ICD-10-CM

## 2025-06-18 DIAGNOSIS — A41.9 SEVERE SEPSIS: ICD-10-CM

## 2025-06-18 DIAGNOSIS — R09.02 HYPOXIA: ICD-10-CM

## 2025-06-18 DIAGNOSIS — D46.9 MYELODYSPLASTIC SYNDROME: ICD-10-CM

## 2025-06-18 DIAGNOSIS — I48.91 A-FIB: ICD-10-CM

## 2025-06-18 DIAGNOSIS — D69.6 THROMBOCYTOPENIA: ICD-10-CM

## 2025-06-18 DIAGNOSIS — R65.20 SEVERE SEPSIS: ICD-10-CM

## 2025-06-18 DIAGNOSIS — N18.30 STAGE 3 CHRONIC KIDNEY DISEASE, UNSPECIFIED WHETHER STAGE 3A OR 3B CKD: ICD-10-CM

## 2025-06-18 DIAGNOSIS — R07.9 CHEST PAIN: ICD-10-CM

## 2025-06-18 PROBLEM — D64.9 ACUTE ON CHRONIC ANEMIA: Status: ACTIVE | Noted: 2025-06-18

## 2025-06-18 PROBLEM — N18.9 ACUTE KIDNEY INJURY SUPERIMPOSED ON CHRONIC KIDNEY DISEASE: Status: ACTIVE | Noted: 2025-06-18

## 2025-06-18 PROBLEM — N17.9 ACUTE KIDNEY INJURY SUPERIMPOSED ON CHRONIC KIDNEY DISEASE: Status: ACTIVE | Noted: 2025-06-18

## 2025-06-18 PROBLEM — R50.81 NEUTROPENIC FEVER: Status: ACTIVE | Noted: 2025-06-18

## 2025-06-18 PROBLEM — D70.9 NEUTROPENIC FEVER: Status: ACTIVE | Noted: 2025-06-18

## 2025-06-18 LAB
ADENOVIRUS (SMH): NOT DETECTED
ALBUMIN SERPL-MCNC: 4 G/DL (ref 3.5–5.2)
ALP SERPL-CCNC: 45 UNIT/L (ref 55–135)
ALT SERPL-CCNC: 19 UNIT/L (ref 10–44)
ANION GAP (SMH): 12 MMOL/L (ref 8–16)
ANISOCYTOSIS BLD QL SMEAR: SLIGHT
AST SERPL-CCNC: 23 UNIT/L (ref 10–40)
BILIRUB SERPL-MCNC: 0.6 MG/DL (ref 0.1–1)
BILIRUB UR QL STRIP.AUTO: NEGATIVE
BNP SERPL-MCNC: 891 PG/ML
BORDETELLA PARAPERTUSSIS (IS1001) (SMH): NOT DETECTED
BORDETELLA PERTUSSIS (PTXP) (SMH): NOT DETECTED
BUN SERPL-MCNC: 55 MG/DL (ref 8–23)
CALCIUM SERPL-MCNC: 8.9 MG/DL (ref 8.7–10.5)
CHLAMYDIA PNEUMONIAE (SMH): NOT DETECTED
CHLORIDE SERPL-SCNC: 103 MMOL/L (ref 95–110)
CK SERPL-CCNC: 149 U/L (ref 20–200)
CLARITY UR: CLEAR
CO2 SERPL-SCNC: 21 MMOL/L (ref 23–29)
COLOR UR AUTO: YELLOW
CORONAVIRUS 229E, COMMON COLD VIRUS (SMH): NOT DETECTED
CORONAVIRUS HKU1, COMMON COLD VIRUS (SMH): NOT DETECTED
CORONAVIRUS NL63, COMMON COLD VIRUS (SMH): NOT DETECTED
CORONAVIRUS OC43, COMMON COLD VIRUS (SMH): NOT DETECTED
CREAT SERPL-MCNC: 4.6 MG/DL (ref 0.5–1.4)
CREAT UR-MCNC: 108.3 MG/DL (ref 23–375)
ERYTHROCYTE [DISTWIDTH] IN BLOOD BY AUTOMATED COUNT: 17.7 % (ref 11.5–14.5)
ETHANOL SERPL-MCNC: <10 MG/DL
FLUBV RNA NPH QL NAA+NON-PROBE: NOT DETECTED
GFR SERPLBLD CREATININE-BSD FMLA CKD-EPI: 13 ML/MIN/1.73/M2
GLUCOSE SERPL-MCNC: 95 MG/DL (ref 70–110)
GLUCOSE UR QL STRIP: NEGATIVE
HCT VFR BLD AUTO: 22.9 % (ref 40–54)
HCV AB SERPL QL IA: NORMAL
HGB BLD-MCNC: 7.3 GM/DL (ref 14–18)
HGB UR QL STRIP: ABNORMAL
HIV 1+2 AB+HIV1 P24 AG SERPL QL IA: NORMAL
HPIV1 RNA NPH QL NAA+NON-PROBE: NOT DETECTED
HPIV2 RNA NPH QL NAA+NON-PROBE: NOT DETECTED
HPIV3 RNA NPH QL NAA+NON-PROBE: NOT DETECTED
HPIV4 RNA NPH QL NAA+NON-PROBE: NOT DETECTED
HUMAN METAPNEUMOVIRUS (SMH): NOT DETECTED
INDIRECT COOMBS: NORMAL
INFLUENZA A (SUBTYPES H1,H1-2009,H3) (SMH): NOT DETECTED
KETONES UR QL STRIP: NEGATIVE
LACTATE SERPL-SCNC: 1.8 MMOL/L (ref 0.5–1.9)
LDH SERPL L TO P-CCNC: 1.61 MMOL/L (ref 0.5–2.2)
LEUKOCYTE ESTERASE UR QL STRIP: NEGATIVE
LYMPHOCYTES NFR BLD MANUAL: 31 % (ref 18–48)
MAGNESIUM SERPL-MCNC: 1.4 MG/DL (ref 1.6–2.6)
MCH RBC QN AUTO: 27.5 PG (ref 27–31)
MCHC RBC AUTO-ENTMCNC: 31.9 G/DL (ref 32–36)
MCV RBC AUTO: 86 FL (ref 82–98)
MICROSCOPIC COMMENT: NORMAL
MONOCYTES NFR BLD MANUAL: 40 % (ref 4–15)
MRSA PCR SCRN (SMH): NOT DETECTED
MYCOPLASMA PNEUMONIAE (SMH): NOT DETECTED
NEUTROPHILS NFR BLD MANUAL: 16 % (ref 38–73)
NEUTS BAND NFR BLD MANUAL: 13 %
NITRITE UR QL STRIP: NEGATIVE
NUCLEATED RBC (/100WBC) (SMH): 0 /100 WBC
OHS QRS DURATION: 88 MS
OHS QTC CALCULATION: 441 MS
PH UR STRIP: 6 [PH]
PLATELET # BLD AUTO: 62 K/UL (ref 150–450)
PLATELET BLD QL SMEAR: ABNORMAL
PMV BLD AUTO: 9.6 FL (ref 9.2–12.9)
POTASSIUM SERPL-SCNC: 4 MMOL/L (ref 3.5–5.1)
PROCALCITONIN SERPL-MCNC: 61.03 NG/ML
PROT SERPL-MCNC: 7.5 GM/DL (ref 6–8.4)
PROT UR QL STRIP: ABNORMAL
RBC # BLD AUTO: 2.65 M/UL (ref 4.6–6.2)
RBC #/AREA URNS AUTO: <1 /HPF
RESPIRATORY INFECTION PANEL SOURCE (SMH): NORMAL
RH BLD: NORMAL
RSV RNA NPH QL NAA+NON-PROBE: NOT DETECTED
RV+EV RNA NPH QL NAA+NON-PROBE: NOT DETECTED
SAMPLE: NORMAL
SARS-COV-2 RNA RESP QL NAA+PROBE: NOT DETECTED
SODIUM SERPL-SCNC: 136 MMOL/L (ref 136–145)
SP GR UR STRIP: 1.01
SPECIMEN OUTDATE: NORMAL
TROPONIN HIGH SENSITIVE (SMH): 123.2 PG/ML
TROPONIN HIGH SENSITIVE (SMH): 49.9 PG/ML
TROPONIN HIGH SENSITIVE (SMH): 58.7 PG/ML
UROBILINOGEN UR STRIP-ACNC: NEGATIVE EU/DL
WBC # BLD AUTO: 1.02 K/UL (ref 3.9–12.7)
WBC #/AREA URNS AUTO: <1 /HPF

## 2025-06-18 PROCEDURE — 84145 PROCALCITONIN (PCT): CPT | Performed by: EMERGENCY MEDICINE

## 2025-06-18 PROCEDURE — 36415 COLL VENOUS BLD VENIPUNCTURE: CPT | Performed by: EMERGENCY MEDICINE

## 2025-06-18 PROCEDURE — 27000221 HC OXYGEN, UP TO 24 HOURS

## 2025-06-18 PROCEDURE — 25000003 PHARM REV CODE 250: Performed by: INTERNAL MEDICINE

## 2025-06-18 PROCEDURE — 87449 NOS EACH ORGANISM AG IA: CPT | Performed by: INTERNAL MEDICINE

## 2025-06-18 PROCEDURE — 99900035 HC TECH TIME PER 15 MIN (STAT)

## 2025-06-18 PROCEDURE — 99232 SBSQ HOSP IP/OBS MODERATE 35: CPT | Mod: ,,, | Performed by: STUDENT IN AN ORGANIZED HEALTH CARE EDUCATION/TRAINING PROGRAM

## 2025-06-18 PROCEDURE — 93005 ELECTROCARDIOGRAM TRACING: CPT | Performed by: GENERAL PRACTICE

## 2025-06-18 PROCEDURE — 25000242 PHARM REV CODE 250 ALT 637 W/ HCPCS: Performed by: INTERNAL MEDICINE

## 2025-06-18 PROCEDURE — 99291 CRITICAL CARE FIRST HOUR: CPT

## 2025-06-18 PROCEDURE — 96368 THER/DIAG CONCURRENT INF: CPT

## 2025-06-18 PROCEDURE — 36430 TRANSFUSION BLD/BLD COMPNT: CPT

## 2025-06-18 PROCEDURE — 93010 ELECTROCARDIOGRAM REPORT: CPT | Mod: ,,, | Performed by: GENERAL PRACTICE

## 2025-06-18 PROCEDURE — 87389 HIV-1 AG W/HIV-1&-2 AB AG IA: CPT | Performed by: EMERGENCY MEDICINE

## 2025-06-18 PROCEDURE — 85025 COMPLETE CBC W/AUTO DIFF WBC: CPT | Performed by: EMERGENCY MEDICINE

## 2025-06-18 PROCEDURE — 63600175 PHARM REV CODE 636 W HCPCS: Performed by: INTERNAL MEDICINE

## 2025-06-18 PROCEDURE — 81003 URINALYSIS AUTO W/O SCOPE: CPT | Performed by: EMERGENCY MEDICINE

## 2025-06-18 PROCEDURE — 82570 ASSAY OF URINE CREATININE: CPT | Performed by: EMERGENCY MEDICINE

## 2025-06-18 PROCEDURE — 96365 THER/PROPH/DIAG IV INF INIT: CPT

## 2025-06-18 PROCEDURE — 83605 ASSAY OF LACTIC ACID: CPT | Performed by: EMERGENCY MEDICINE

## 2025-06-18 PROCEDURE — 94799 UNLISTED PULMONARY SVC/PX: CPT

## 2025-06-18 PROCEDURE — P9040 RBC LEUKOREDUCED IRRADIATED: HCPCS | Performed by: EMERGENCY MEDICINE

## 2025-06-18 PROCEDURE — 30233N1 TRANSFUSION OF NONAUTOLOGOUS RED BLOOD CELLS INTO PERIPHERAL VEIN, PERCUTANEOUS APPROACH: ICD-10-PCS | Performed by: EMERGENCY MEDICINE

## 2025-06-18 PROCEDURE — 20000000 HC ICU ROOM

## 2025-06-18 PROCEDURE — 86803 HEPATITIS C AB TEST: CPT | Performed by: EMERGENCY MEDICINE

## 2025-06-18 PROCEDURE — 87040 BLOOD CULTURE FOR BACTERIA: CPT | Performed by: EMERGENCY MEDICINE

## 2025-06-18 PROCEDURE — 94640 AIRWAY INHALATION TREATMENT: CPT

## 2025-06-18 PROCEDURE — 80053 COMPREHEN METABOLIC PANEL: CPT | Performed by: EMERGENCY MEDICINE

## 2025-06-18 PROCEDURE — 86850 RBC ANTIBODY SCREEN: CPT | Performed by: EMERGENCY MEDICINE

## 2025-06-18 PROCEDURE — 84484 ASSAY OF TROPONIN QUANT: CPT | Performed by: EMERGENCY MEDICINE

## 2025-06-18 PROCEDURE — 83735 ASSAY OF MAGNESIUM: CPT | Performed by: EMERGENCY MEDICINE

## 2025-06-18 PROCEDURE — 99233 SBSQ HOSP IP/OBS HIGH 50: CPT | Mod: ,,, | Performed by: INTERNAL MEDICINE

## 2025-06-18 PROCEDURE — 63600175 PHARM REV CODE 636 W HCPCS: Performed by: EMERGENCY MEDICINE

## 2025-06-18 PROCEDURE — 99223 1ST HOSP IP/OBS HIGH 75: CPT | Mod: ,,, | Performed by: INTERNAL MEDICINE

## 2025-06-18 PROCEDURE — 84484 ASSAY OF TROPONIN QUANT: CPT | Performed by: INTERNAL MEDICINE

## 2025-06-18 PROCEDURE — 27000207 HC ISOLATION

## 2025-06-18 PROCEDURE — 25000003 PHARM REV CODE 250: Performed by: EMERGENCY MEDICINE

## 2025-06-18 PROCEDURE — 99900031 HC PATIENT EDUCATION (STAT)

## 2025-06-18 PROCEDURE — 82550 ASSAY OF CK (CPK): CPT | Performed by: EMERGENCY MEDICINE

## 2025-06-18 PROCEDURE — 83880 ASSAY OF NATRIURETIC PEPTIDE: CPT | Performed by: EMERGENCY MEDICINE

## 2025-06-18 PROCEDURE — 96375 TX/PRO/DX INJ NEW DRUG ADDON: CPT

## 2025-06-18 PROCEDURE — 94761 N-INVAS EAR/PLS OXIMETRY MLT: CPT

## 2025-06-18 PROCEDURE — 87641 MR-STAPH DNA AMP PROBE: CPT | Performed by: INTERNAL MEDICINE

## 2025-06-18 PROCEDURE — 82077 ASSAY SPEC XCP UR&BREATH IA: CPT | Performed by: EMERGENCY MEDICINE

## 2025-06-18 PROCEDURE — 86902 BLOOD TYPE ANTIGEN DONOR EA: CPT | Performed by: EMERGENCY MEDICINE

## 2025-06-18 PROCEDURE — 36415 COLL VENOUS BLD VENIPUNCTURE: CPT | Performed by: INTERNAL MEDICINE

## 2025-06-18 PROCEDURE — 86920 COMPATIBILITY TEST SPIN: CPT | Performed by: EMERGENCY MEDICINE

## 2025-06-18 PROCEDURE — 63600175 PHARM REV CODE 636 W HCPCS: Performed by: STUDENT IN AN ORGANIZED HEALTH CARE EDUCATION/TRAINING PROGRAM

## 2025-06-18 PROCEDURE — 0202U NFCT DS 22 TRGT SARS-COV-2: CPT | Performed by: EMERGENCY MEDICINE

## 2025-06-18 RX ORDER — SODIUM CHLORIDE, SODIUM LACTATE, POTASSIUM CHLORIDE, CALCIUM CHLORIDE 600; 310; 30; 20 MG/100ML; MG/100ML; MG/100ML; MG/100ML
1000 INJECTION, SOLUTION INTRAVENOUS
Status: COMPLETED | OUTPATIENT
Start: 2025-06-18 | End: 2025-06-18

## 2025-06-18 RX ORDER — ACETAMINOPHEN 325 MG/1
650 TABLET ORAL EVERY 4 HOURS PRN
Status: DISCONTINUED | OUTPATIENT
Start: 2025-06-18 | End: 2025-06-28 | Stop reason: HOSPADM

## 2025-06-18 RX ORDER — ONDANSETRON HYDROCHLORIDE 2 MG/ML
4 INJECTION, SOLUTION INTRAVENOUS EVERY 6 HOURS PRN
Status: DISCONTINUED | OUTPATIENT
Start: 2025-06-18 | End: 2025-06-25

## 2025-06-18 RX ORDER — SODIUM CHLORIDE 9 MG/ML
INJECTION, SOLUTION INTRAVENOUS CONTINUOUS
Status: DISCONTINUED | OUTPATIENT
Start: 2025-06-18 | End: 2025-06-21

## 2025-06-18 RX ORDER — CEFEPIME HYDROCHLORIDE 1 G/1
1 INJECTION, POWDER, FOR SOLUTION INTRAMUSCULAR; INTRAVENOUS
Status: DISCONTINUED | OUTPATIENT
Start: 2025-06-18 | End: 2025-06-18

## 2025-06-18 RX ORDER — CEFEPIME HYDROCHLORIDE 1 G/1
1 INJECTION, POWDER, FOR SOLUTION INTRAMUSCULAR; INTRAVENOUS
Status: DISCONTINUED | OUTPATIENT
Start: 2025-06-18 | End: 2025-06-21

## 2025-06-18 RX ORDER — MAGNESIUM SULFATE HEPTAHYDRATE 40 MG/ML
2 INJECTION, SOLUTION INTRAVENOUS ONCE
Status: COMPLETED | OUTPATIENT
Start: 2025-06-18 | End: 2025-06-18

## 2025-06-18 RX ORDER — NOREPINEPHRINE BITARTRATE/D5W 4MG/250ML
0-3 PLASTIC BAG, INJECTION (ML) INTRAVENOUS CONTINUOUS
Status: DISCONTINUED | OUTPATIENT
Start: 2025-06-18 | End: 2025-06-21

## 2025-06-18 RX ORDER — ACETAMINOPHEN 325 MG/1
650 TABLET ORAL EVERY 8 HOURS PRN
Status: DISCONTINUED | OUTPATIENT
Start: 2025-06-18 | End: 2025-06-28 | Stop reason: HOSPADM

## 2025-06-18 RX ORDER — ALUMINUM HYDROXIDE, MAGNESIUM HYDROXIDE, AND SIMETHICONE 1200; 120; 1200 MG/30ML; MG/30ML; MG/30ML
30 SUSPENSION ORAL 4 TIMES DAILY PRN
Status: DISCONTINUED | OUTPATIENT
Start: 2025-06-18 | End: 2025-06-28 | Stop reason: HOSPADM

## 2025-06-18 RX ORDER — NALOXONE HCL 0.4 MG/ML
0.02 VIAL (ML) INJECTION
Status: DISCONTINUED | OUTPATIENT
Start: 2025-06-18 | End: 2025-06-28 | Stop reason: HOSPADM

## 2025-06-18 RX ORDER — LANOLIN ALCOHOL/MO/W.PET/CERES
800 CREAM (GRAM) TOPICAL
Status: DISCONTINUED | OUTPATIENT
Start: 2025-06-18 | End: 2025-06-28 | Stop reason: HOSPADM

## 2025-06-18 RX ORDER — CALCITRIOL 0.25 UG/1
0.25 CAPSULE ORAL
Status: DISCONTINUED | OUTPATIENT
Start: 2025-06-24 | End: 2025-06-28 | Stop reason: HOSPADM

## 2025-06-18 RX ORDER — DOXYCYCLINE 100 MG/1
100 CAPSULE ORAL EVERY 12 HOURS
Status: DISCONTINUED | OUTPATIENT
Start: 2025-06-18 | End: 2025-06-28 | Stop reason: HOSPADM

## 2025-06-18 RX ORDER — TRAZODONE HYDROCHLORIDE 50 MG/1
100 TABLET ORAL NIGHTLY
Status: DISCONTINUED | OUTPATIENT
Start: 2025-06-18 | End: 2025-06-28 | Stop reason: HOSPADM

## 2025-06-18 RX ORDER — BENZONATATE 100 MG/1
200 CAPSULE ORAL 3 TIMES DAILY PRN
Status: DISCONTINUED | OUTPATIENT
Start: 2025-06-18 | End: 2025-06-28 | Stop reason: HOSPADM

## 2025-06-18 RX ORDER — SODIUM,POTASSIUM PHOSPHATES 280-250MG
2 POWDER IN PACKET (EA) ORAL
Status: DISCONTINUED | OUTPATIENT
Start: 2025-06-18 | End: 2025-06-28 | Stop reason: HOSPADM

## 2025-06-18 RX ORDER — TALC
6 POWDER (GRAM) TOPICAL NIGHTLY PRN
Status: DISCONTINUED | OUTPATIENT
Start: 2025-06-18 | End: 2025-06-28 | Stop reason: HOSPADM

## 2025-06-18 RX ORDER — CEFEPIME HYDROCHLORIDE 2 G/1
2 INJECTION, POWDER, FOR SOLUTION INTRAVENOUS ONCE
Status: COMPLETED | OUTPATIENT
Start: 2025-06-18 | End: 2025-06-18

## 2025-06-18 RX ORDER — FOLIC ACID 1 MG/1
2000 TABLET ORAL DAILY
Status: DISCONTINUED | OUTPATIENT
Start: 2025-06-18 | End: 2025-06-28 | Stop reason: HOSPADM

## 2025-06-18 RX ORDER — MEROPENEM 1 G/1
1 INJECTION, POWDER, FOR SOLUTION INTRAVENOUS
Status: DISCONTINUED | OUTPATIENT
Start: 2025-06-18 | End: 2025-06-18

## 2025-06-18 RX ORDER — HYDROCODONE BITARTRATE AND ACETAMINOPHEN 500; 5 MG/1; MG/1
TABLET ORAL
Status: DISCONTINUED | OUTPATIENT
Start: 2025-06-18 | End: 2025-06-28 | Stop reason: HOSPADM

## 2025-06-18 RX ORDER — PANTOPRAZOLE SODIUM 40 MG/1
40 TABLET, DELAYED RELEASE ORAL DAILY
Status: DISCONTINUED | OUTPATIENT
Start: 2025-06-18 | End: 2025-06-28 | Stop reason: HOSPADM

## 2025-06-18 RX ORDER — LEVALBUTEROL INHALATION SOLUTION 1.25 MG/3ML
1.25 SOLUTION RESPIRATORY (INHALATION) EVERY 8 HOURS
Status: DISCONTINUED | OUTPATIENT
Start: 2025-06-18 | End: 2025-06-19

## 2025-06-18 RX ORDER — MUPIROCIN 20 MG/G
OINTMENT TOPICAL 2 TIMES DAILY
Status: COMPLETED | OUTPATIENT
Start: 2025-06-18 | End: 2025-06-23

## 2025-06-18 RX ORDER — ACETAMINOPHEN 500 MG
1000 TABLET ORAL
Status: COMPLETED | OUTPATIENT
Start: 2025-06-18 | End: 2025-06-18

## 2025-06-18 RX ORDER — HYDROCODONE BITARTRATE AND ACETAMINOPHEN 5; 325 MG/1; MG/1
1 TABLET ORAL EVERY 6 HOURS PRN
Refills: 0 | Status: DISCONTINUED | OUTPATIENT
Start: 2025-06-18 | End: 2025-06-28 | Stop reason: HOSPADM

## 2025-06-18 RX ADMIN — MUPIROCIN 1 G: 20 OINTMENT TOPICAL at 08:06

## 2025-06-18 RX ADMIN — LEVALBUTEROL HYDROCHLORIDE 1.25 MG: 1.25 SOLUTION RESPIRATORY (INHALATION) at 03:06

## 2025-06-18 RX ADMIN — SODIUM CHLORIDE, POTASSIUM CHLORIDE, SODIUM LACTATE AND CALCIUM CHLORIDE 1000 ML: 600; 310; 30; 20 INJECTION, SOLUTION INTRAVENOUS at 09:06

## 2025-06-18 RX ADMIN — BENZONATATE 200 MG: 100 CAPSULE ORAL at 06:06

## 2025-06-18 RX ADMIN — MAGNESIUM SULFATE HEPTAHYDRATE 2 G: 40 INJECTION, SOLUTION INTRAVENOUS at 11:06

## 2025-06-18 RX ADMIN — SODIUM CHLORIDE, POTASSIUM CHLORIDE, SODIUM LACTATE AND CALCIUM CHLORIDE 1000 ML: 600; 310; 30; 20 INJECTION, SOLUTION INTRAVENOUS at 02:06

## 2025-06-18 RX ADMIN — VANCOMYCIN HYDROCHLORIDE 2000 MG: 2 INJECTION, POWDER, LYOPHILIZED, FOR SOLUTION INTRAVENOUS at 11:06

## 2025-06-18 RX ADMIN — CEFEPIME 1 G: 1 INJECTION, POWDER, FOR SOLUTION INTRAMUSCULAR; INTRAVENOUS at 07:06

## 2025-06-18 RX ADMIN — CEFEPIME 2 G: 2 INJECTION, POWDER, FOR SOLUTION INTRAVENOUS at 09:06

## 2025-06-18 RX ADMIN — DOXYCYCLINE 100 MG: 100 CAPSULE ORAL at 08:06

## 2025-06-18 RX ADMIN — HYDROCODONE BITARTRATE AND ACETAMINOPHEN 1 TABLET: 5; 325 TABLET ORAL at 05:06

## 2025-06-18 RX ADMIN — AZITHROMYCIN MONOHYDRATE 500 MG: 500 INJECTION, POWDER, LYOPHILIZED, FOR SOLUTION INTRAVENOUS at 02:06

## 2025-06-18 RX ADMIN — FOLIC ACID 2000 MCG: 1 TABLET ORAL at 02:06

## 2025-06-18 RX ADMIN — PANTOPRAZOLE SODIUM 40 MG: 40 TABLET, DELAYED RELEASE ORAL at 02:06

## 2025-06-18 RX ADMIN — ACETAMINOPHEN 1000 MG: 500 TABLET ORAL at 09:06

## 2025-06-18 RX ADMIN — MEROPENEM 1 G: 1 INJECTION INTRAVENOUS at 02:06

## 2025-06-18 RX ADMIN — GUAIFENESIN AND DEXTROMETHORPHAN HYDROBROMIDE 1 TABLET: 600; 30 TABLET, EXTENDED RELEASE ORAL at 08:06

## 2025-06-18 RX ADMIN — SODIUM CHLORIDE: 9 INJECTION, SOLUTION INTRAVENOUS at 05:06

## 2025-06-18 RX ADMIN — TRAZODONE HYDROCHLORIDE 100 MG: 50 TABLET ORAL at 08:06

## 2025-06-18 RX ADMIN — LEVALBUTEROL HYDROCHLORIDE 1.25 MG: 1.25 SOLUTION RESPIRATORY (INHALATION) at 11:06

## 2025-06-18 NOTE — PLAN OF CARE
[facility] hospital - Emergency Dept  Initial Discharge Assessment       Primary Care Provider: Reynaldo Basilio FNP-ESTELLE    Admission Diagnosis: Severe sepsis [A41.9, R65.20]    Admission Date: 6/18/2025  Expected Discharge Date:     CM discussed plan of care with wife Nelly Butler on the phone. No family was present in the room assisting with assessment. CM verified demographics, insurance, supports, and PCP. CM assessed patient's needs. Per wife, patient is able to complete ADLs independently. Wife verified no home DME.  Wife verified No HH, No Dialysis, No Blood Thinners, and No Oxygen. Wife states patient goes to [facility] for Blood transfusions but unsure how often. Wife states patient was taking Procrit Q week and was switched to another medication Q Month that is not working. Dr Tello is the Hem doctor. Wife states pt does not have a Living Will/Advance Directive    [facility]  Wife confirmed she will be source of transportation at the time of discharge.     Transition of Care Barriers: None    Payor: MEDICARE / Plan: MEDICARE PART A & B / Product Type: Government /     Extended Emergency Contact Information  Primary Emergency Contact: Nelly Butler  Address: [address] [place], [place] [address] [place] [address]  Home Phone: [phone]  Mobile Phone: [phone]  Relation: Spouse  Preferred language: English   needed? No    Discharge Plan A: Home with family  Discharge Plan B: Home with family      [id number] [place], [place] - [address]  [address] [address]  Phone: [phone] Fax: [phone]    [facility] - [place], [place] - [email or web address] [facility] [address]  [email or web address]  [facility] [address]  Phone: [phone] Fax: [phone]    [address] [address]  Phone: [phone] Fax:  [phone]    [facility]  [address]  [facility] [address]  Phone: [phone] Fax: [phone]      Initial Assessment (most recent)       Adult Discharge Assessment - 06/18/25 1501          Discharge Assessment    Assessment Type Discharge Planning Assessment     Confirmed/corrected address, phone number and insurance Yes     Confirmed Demographics Correct on Facesheet     Source of Information family   Wife Nelly Butler    Communicated ELMO with patient/caregiver Date not available/Unable to determine     People in Home spouse     Name(s) of People in Home Nelly Luke     Do you expect to return to your current living situation? Yes     Do you have help at home or someone to help you manage your care at home? Yes     Who are your caregiver(s) and their phone number(s)? Nelly Butler [phone]     Prior to hospitilization cognitive status: Alert/Oriented     Current cognitive status: Alert/Oriented     Walking or Climbing Stairs Difficulty no     Dressing/Bathing Difficulty no     Readmission within 30 days? No     Patient currently being followed by outpatient case management? No     Do you currently have service(s) that help you manage your care at home? No     Do you take prescription medications? Yes     Do you have prescription coverage? Yes     Do you have any problems affording any of your prescribed medications? No     Is the patient taking medications as prescribed? yes     Who is going to help you get home at discharge? Nelly Butler [phone] Wife     How do you get to doctors appointments? car, drives self;family or friend will provide     Are you on dialysis? No     Do you take coumadin? No     Discharge Plan A Home with family     Discharge Plan B Home with family     DME Needed Upon Discharge  none     Discharge Plan discussed with: Spouse/sig other     Name(s) and Number(s) Nelly Butler [phone] Wife     Transition of Care Barriers None

## 2025-06-18 NOTE — SUBJECTIVE & OBJECTIVE
Past Medical History:   Diagnosis Date    Abnormal chest CT (new) 08/17/2022    Anemia due to multiple mechanisms 01/13/2019    Anemia, chronic renal failure, stage 2 (mild) 01/13/2019    Anemia, chronic renal failure, stage 3 (moderate) 08/02/2023    Depression     Former smoker 06/29/2017    H/O ETOH abuse 06/29/2017    Lung mass (new) 08/17/2022    Monocytosis 2019    Myelodysplasia (myelodysplastic syndrome)     Myelodysplasia (myelodysplastic syndrome)     Personal history of colonic polyps 12/29/2023    RARS (refractory anemia with ringed sideroblasts) 06/01/2020    Sickle cell trait        Past Surgical History:   Procedure Laterality Date    BONE MARROW BIOPSY N/A 12/20/2019    Procedure: BIOPSY, BONE MARROW;  Surgeon: [facility];  Location: [facility] OR;  Service: Interventional Radiology;  Laterality: N/A;    COLONOSCOPY N/A 11/05/2021    Procedure: COLONOSCOPY;  Surgeon: Rob Collins MD;  Location: [facility] ENDO;  Service: Endoscopy;  Laterality: N/A;    COLONOSCOPY  12/29/2023    5 YR RECALL    ESOPHAGOGASTRODUODENOSCOPY N/A 11/05/2021    Procedure: EGD (ESOPHAGOGASTRODUODENOSCOPY);  Surgeon: Rob Collins MD;  Location: [facility] ENDO;  Service: Endoscopy;  Laterality: N/A;    INJECTION OF ANESTHETIC AGENT AROUND MEDIAL BRANCH NERVES INNERVATING LUMBAR FACET JOINT Bilateral 05/25/2018    Procedure: BLOCK-NERVE-MEDIAL BRANCH-LUMBAR;  Surgeon: Nura Heredia MD;  Location: [facility] OR;  Service: Pain Management;  Laterality: Bilateral;  L3, 4, 5    KNEE ARTHROSCOPY W/ MENISCECTOMY Right 12/22/2021    Procedure: ARTHROSCOPY, KNEE, WITH MENISCECTOMY;  Surgeon: Sebastian Green MD;  Location: [facility] OR;  Service: Orthopedics;  Laterality: Right;  partial medial meniscectomy    RADIOFREQUENCY ABLATION OF LUMBAR MEDIAL BRANCH NERVE AT SINGLE LEVEL Bilateral 07/20/2018    Procedure: RADIOFREQUENCY ABLATION, NERVE, MEDIAL BRANCH, LUMBAR, 1 LEVEL;  Surgeon: Nura Heredia MD;  Location: [facility];  Service: Pain Management;   Laterality: Bilateral;  L3, 4, 5 - Burned at 80 degrees C.  for 75 seconds x 2 each site    SMALL BOWEL ENTEROSCOPY N/A 10/16/2019    Procedure: ENTEROSCOPY;  Surgeon: Rob Collins MD;  Location: [facility];  Service: Endoscopy;  Laterality: N/A;       Review of patient's allergies indicates:  No Known Allergies    Current Facility-Administered Medications on File Prior to Encounter   Medication    0.9%  NaCl infusion (for blood administration)    0.9%  NaCl infusion (for blood administration)    furosemide injection 20 mg    furosemide injection 20 mg    furosemide injection 20 mg    furosemide injection 20 mg     Current Outpatient Medications on File Prior to Encounter   Medication Sig    amLODIPine (NORVASC) 5 MG tablet Take 1 tablet (5 mg total) by mouth once daily.    ascorbic acid, vitamin C, (VITAMIN C) 500 MG tablet Take 500 mg by mouth once daily.    atorvastatin (LIPITOR) 10 MG tablet Take 1 tablet (10 mg total) by mouth every evening.    calcitRIOL (ROCALTROL) 0.25 MCG Cap Take 0.25 mcg by mouth every 7 days.    cyproheptadine (PERIACTIN) 4 mg tablet Take 1 tablet (4 mg total) by mouth 3 (three) times daily.    droNABinol (MARINOL) 10 MG capsule Take 1 capsule (10 mg total) by mouth 2 (two) times daily before meals.    famotidine (PEPCID) 20 MG tablet Take 20 mg by mouth 2 (two) times daily.    fluticasone propionate (FLONASE) 50 mcg/actuation nasal spray 1 SPRAY (50 MCG TOTAL) BY EACH NOSTRIL ROUTE 2 (TWO) TIMES A DAY. 1 SPRAY EVERY MORNING AND AFTERNOON TOWARD THE EAR EACH SIDE AFTER A SINUS RINSE    folic acid (FOLVITE) 1 MG tablet TAKE 2 TABLETS BY MOUTH EVERY DAY (Patient taking differently: Take 2,000 mcg by mouth once daily.)    multivitamin with minerals Cap Take 1 capsule by mouth every morning.    pantoprazole (PROTONIX) 40 MG tablet Take 40 mg by mouth once daily.    tamsulosin (FLOMAX) 0.4 mg Cap TAKE 2 CAPSULES BY MOUTH EVERY DAY (Patient taking differently: Take 2 capsules by mouth once  daily.)    traZODone (DESYREL) 100 MG tablet TAKE 1 TABLET BY MOUTH NIGHTLY AS NEEDED FOR INSOMNIA.    LIDOcaine (LIDODERM) 5 % Place 1 patch onto the skin once daily. Remove & Discard patch within 12 hours or as directed by MD    sildenafil (VIAGRA) 100 MG tablet Take 1 tablet (100 mg total) by mouth daily as needed for Erectile Dysfunction.    sod chlor-bicarb-squeez bottle (NEILMED SINUS RINSE COMPLETE) pkdv 1 application  by sinus irrigation route 2 (two) times a day. Not right before bed    [DISCONTINUED] azelastine (ASTELIN) 137 mcg (0.1 %) nasal spray 2 sprays (274 mcg total) by Nasal route 2 (two) times daily as needed for Rhinitis. (Patient not taking: Reported on 2025)    [DISCONTINUED] fluoxetine 20 MG tablet TAKE 1 TABLET BY MOUTH EVERY DAY    [DISCONTINUED] multivitamin capsule Take 1 capsule by mouth once daily.     Family History       Problem Relation (Age of Onset)    Arthritis Mother    Depression Mother    Heart attack Father (59)    Heart disease Mother    Hypertension Mother          Tobacco Use    Smoking status: Former     Current packs/day: 0.00     Types: Cigarettes     Quit date: 1996     Years since quittin.4    Smokeless tobacco: Never   Substance and Sexual Activity    Alcohol use: Not Currently     Alcohol/week: 21.0 standard drinks of alcohol     Types: 21 Cans of beer per week     Comment: Sober 5 years    Drug use: Not Currently     Types: Marijuana    Sexual activity: Yes     Partners: Female     Review of Systems   Constitutional:  Positive for fever. Negative for activity change and appetite change.   HENT:  Negative for congestion and dental problem.    Eyes:  Negative for discharge and itching.   Respiratory:  Positive for cough and shortness of breath.    Cardiovascular:  Negative for chest pain.   Gastrointestinal:  Negative for abdominal distention and abdominal pain.   Endocrine: Negative for cold intolerance.   Genitourinary:  Negative for difficulty urinating  and dysuria.   Musculoskeletal:  Negative for arthralgias and back pain.   Skin:  Negative for color change.   Neurological:  Negative for dizziness and facial asymmetry.   Hematological:  Negative for adenopathy.   Psychiatric/Behavioral:  Negative for agitation and behavioral problems.      Objective:     Vital Signs (Most Recent):  Temp: (!) 101.5 °F (38.6 °C) (06/18/25 1100)  Pulse: (!) 113 (06/18/25 1500)  Resp: (!) 41 (06/18/25 1500)  BP: 112/63 (06/18/25 1500)  SpO2: 98 % (06/18/25 1500) Vital Signs (24h Range):  Temp:  [101.5 °F (38.6 °C)-103 °F (39.4 °C)] 101.5 °F (38.6 °C)  Pulse:  [110-132] 113  Resp:  [25-41] 41  SpO2:  [89 %-100 %] 98 %  BP: ()/(51-63) 112/63     Weight: 96.2 kg (212 lb)  Body mass index is 31.31 kg/m².     Physical Exam  Vitals and nursing note reviewed.   Constitutional:       Appearance: He is well-developed.   HENT:      Head: Atraumatic.      Right Ear: External ear normal.      Left Ear: External ear normal.      Nose: Nose normal.      Mouth/Throat:      Mouth: Mucous membranes are dry.   Eyes:      Extraocular Movements: Extraocular movements intact.   Cardiovascular:      Rate and Rhythm: Tachycardia present.   Pulmonary:      Effort: Pulmonary effort is normal.   Abdominal:      Palpations: Abdomen is soft.   Musculoskeletal:         General: Normal range of motion.      Cervical back: Full passive range of motion without pain and normal range of motion.      Right lower leg: No edema.      Left lower leg: No edema.   Skin:     General: Skin is warm.   Neurological:      Mental Status: He is alert and oriented to person, place, and time.   Psychiatric:         Behavior: Behavior normal.                Significant Labs: All pertinent labs within the past 24 hours have been reviewed.  CBC:   Recent Labs   Lab 06/18/25  0843   WBC 1.02*   HGB 7.3*   HCT 22.9*   PLT 62*     CMP:   Recent Labs   Lab 06/18/25  0843      K 4.0      CO2 21*   GLU 95   BUN 55*    CREATININE 4.6*   CALCIUM 8.9   PROT 7.5   ALBUMIN 4.0   BILITOT 0.6   ALKPHOS 45*   AST 23   ALT 19   ANIONGAP 12       Significant Imaging: I have reviewed all pertinent imaging results/findings within the past 24 hours.

## 2025-06-18 NOTE — ASSESSMENT & PLAN NOTE
"Maintain iv abx as below  De escalate as needed     Antibiotics (From admission, onward)      Start     Stop Route Frequency Ordered    06/18/25 1300  meropenem injection 1 g         -- IV Every 12 hours (non-standard times) 06/18/25 1146    06/18/25 1236  vancomycin - pharmacy to dose  (vancomycin IVPB (PEDS and ADULTS))        Placed in "And" Linked Group    -- IV pharmacy to manage frequency 06/18/25 1136    06/18/25 1230  azithromycin (ZITHROMAX) 500 mg in 0.9% NaCl 250 mL IVPB (admixture device)         -- IV Every 24 hours (non-standard times) 06/18/25 1137            Microbiology Results (last 7 days)       Procedure Component Value Units Date/Time    Respiratory Infection Panel (PCR), Nasopharyngeal [[id number]] Collected: 06/18/25 1027    Order Status: Completed Specimen: Nasopharyngeal Swab Updated: 06/18/25 1209     Respiratory Infection Panel Source Nasopharyngeal Swab     Adenovirus Not Detected     Coronavirus 229E, Common Cold Virus Not Detected     Coronavirus HKU1, Common Cold Virus Not Detected     Coronavirus NL63, Common Cold Virus Not Detected     Coronavirus OC43, Common Cold Virus Not Detected     SARS-CoV2 (COVID-19) Qualitative PCR Not Detected     Human Metapneumovirus Not Detected     Human Rhinovirus/Enterovirus Not Detected     Influenza A (subtypes H1, H1-2009,H3) Not Detected     Influenza B Not Detected     Parainfluenza Virus 1 Not Detected     Parainfluenza Virus 2 Not Detected     Parainfluenza Virus 3 Not Detected     Parainfluenza Virus 4 Not Detected     Respiratory Syncytial Virus Not Detected     Bordetella Parapertussis (FP5198) Not Detected     Bordetella pertussis (ptxP) Not Detected     Chlamydia pneumoniae Not Detected     Mycoplasma pneumoniae Not Detected    Blood culture x two cultures. Draw prior to antibiotics. [[id number]] Collected: 06/18/25 0932    Order Status: Sent Specimen: Blood Updated: 06/18/25 0942    Blood culture x two cultures. Draw prior to antibiotics. " [[id number]] Collected: 06/18/25 0930    Order Status: Sent Specimen: Blood Updated: 06/18/25 0942

## 2025-06-18 NOTE — HPI
65 year old pt getting admitted with Sepsis/Severe pneumonia/Neutropenic fever  Pt was suffering from URTI like infection for past several days  Later he suffered from severe SOB/cough  Today symptoms went worse, came to ER found to be hypoxic and got admitted

## 2025-06-18 NOTE — PHARMACY MED REC
"  Admission Medication History     The home medication history was taken by Kelli Calles.    You may go to "Admission" then "Reconcile Home Medications" tabs to review and/or act upon these items.     The home medication list has been updated by the Pharmacy department.   Please read ALL comments highlighted in yellow.   Please address this information as you see fit.    Feel free to contact us if you have any questions or require assistance.        Medications listed below were obtained from: Patient/family and Analytic software- [employer]  Medications Ordered Prior to Encounter[1]    Kelli Calles  [id number]                .               [1]   Current Facility-Administered Medications on File Prior to Encounter   Medication Dose Route Frequency Provider Last Rate Last Admin    0.9%  NaCl infusion (for blood administration)   Intravenous Once [facility], NP   Stopped at 05/08/25 1159    0.9%  NaCl infusion (for blood administration)   Intravenous Once Jose Tello MD        furosemide injection 20 mg  20 mg Intravenous PRN [facility], NP        furosemide injection 20 mg  20 [facility]        furosemide injection 20 mg  20 mg Intravenous PRN [facility], NP        furosemide injection 20 mg  20 mg Intravenous PRN Jose Tello MD         Current Outpatient Medications on File Prior to Encounter   Medication Sig Dispense Refill    amLODIPine (NORVASC) 5 MG tablet Take 1 tablet (5 mg total) by mouth once daily. 90 tablet 3    ascorbic acid, vitamin C, (VITAMIN C) 500 MG tablet Take 500 mg by mouth once daily.      atorvastatin (LIPITOR) 10 MG tablet Take 1 tablet (10 mg total) by mouth every evening. 90 tablet 3    calcitRIOL (ROCALTROL) 0.25 MCG Cap Take 0.25 mcg by mouth every 7 days.      cyproheptadine (PERIACTIN) 4 mg tablet Take 1 tablet (4 mg total) by mouth 3 (three) times daily. 90 tablet 3    droNABinol (MARINOL) " 10 MG capsule Take 1 capsule (10 mg total) by mouth 2 (two) times daily before meals. 60 capsule 1    famotidine (PEPCID) 20 MG tablet Take 20 mg by mouth 2 (two) times daily.      fluticasone propionate (FLONASE) 50 mcg/actuation nasal spray 1 SPRAY (50 MCG TOTAL) BY EACH NOSTRIL ROUTE 2 (TWO) TIMES A DAY. 1 SPRAY EVERY MORNING AND AFTERNOON TOWARD THE EAR EACH SIDE AFTER A SINUS RINSE 48 mL 1    folic acid (FOLVITE) 1 MG tablet TAKE 2 TABLETS BY MOUTH EVERY DAY (Patient taking differently: Take 2,000 mcg by mouth once daily.) 180 tablet 0    multivitamin with minerals Cap Take 1 capsule by mouth every morning.      pantoprazole (PROTONIX) 40 MG tablet Take 40 mg by mouth once daily.      tamsulosin (FLOMAX) 0.4 mg Cap TAKE 2 CAPSULES BY MOUTH EVERY DAY (Patient taking differently: Take 2 capsules by mouth once daily.) 180 capsule 1    traZODone (DESYREL) 100 MG tablet TAKE 1 TABLET BY MOUTH NIGHTLY AS NEEDED FOR INSOMNIA. 90 tablet 2    LIDOcaine (LIDODERM) 5 % Place 1 patch onto the skin once daily. Remove & Discard patch within 12 hours or as directed by MD 14 patch 0    sildenafil (VIAGRA) 100 MG tablet Take 1 tablet (100 mg total) by mouth daily as needed for Erectile Dysfunction. 10 tablet 6    sod chlor-bicarb-squeez bottle (NEILMED SINUS RINSE COMPLETE) pkdv 1 application  by sinus irrigation route 2 (two) times a day. Not right before bed 1 each 11    [DISCONTINUED] azelastine (ASTELIN) 137 mcg (0.1 %) nasal spray 2 sprays (274 mcg total) by Nasal route 2 (two) times daily as needed for Rhinitis. (Patient not taking: Reported on 4/28/2025) 30 mL 5    [DISCONTINUED] fluoxetine 20 MG tablet TAKE 1 TABLET BY MOUTH EVERY DAY 30 tablet 5    [DISCONTINUED] multivitamin capsule Take 1 capsule by mouth once daily.

## 2025-06-18 NOTE — TELEPHONE ENCOUNTER
----- Message from Юлия sent at 6/18/2025 10:26 AM CDT -----  Dr. Brown wants to speak to Dr. ESTELLE Veloz [phone]  ----- Message -----  From: Юлия Donovan  Sent: 6/18/2025  10:26 AM CDT  To: [facility] Staff    Dr. Brown wants to speak to Dr. ESTELLE Veloz [phone]

## 2025-06-18 NOTE — ASSESSMENT & PLAN NOTE
"This patient does have evidence of infective focus  My overall impression is sepsis with Acute kidney injury, Acute respiratory failure, and Thrombocytopenia .  Source: Respiratory Infection  Antibiotics given-   Antibiotics (72h ago, onward)      Start     Stop Route Frequency Ordered    06/18/25 1300  meropenem injection 1 g         -- IV Every 12 hours (non-standard times) 06/18/25 1146    06/18/25 1236  vancomycin - pharmacy to dose  (vancomycin IVPB (PEDS and ADULTS))        Placed in "And" Linked Group    -- IV pharmacy to manage frequency 06/18/25 1136    06/18/25 1230  azithromycin (ZITHROMAX) 500 mg in 0.9% NaCl 250 mL IVPB (admixture device)         -- IV Every 24 hours (non-standard times) 06/18/25 1137          Latest lactate reviewed-  Recent Labs   Lab 06/18/25  1318 06/18/25  1440   LACTATE 1.8  --    POCLAC  --  1.61       "

## 2025-06-18 NOTE — ASSESSMENT & PLAN NOTE
Latest Reference Range & Units 05/28/25 07:42 06/04/25 07:33 06/18/25 08:43   Platelet Count 150 - 450 K/uL 62 (L) 7 (LL) 62 (L)   (LL): Data is critically low  (L): Data is abnormally low

## 2025-06-18 NOTE — TELEPHONE ENCOUNTER
Attempted to return call for Dr Tello, no answer, VM full. Per Dr Tello we will attempt to return call again in a little while.

## 2025-06-18 NOTE — ED PROVIDER NOTES
Encounter Date: 6/18/2025       History     Chief Complaint   Patient presents with    Shortness of Breath     X 1 day.     Fatigue    Cough     Onset lastnite     65-year-old male with history of myelodysplastic syndrome, sickle cell trait, chronic kidney disease stage 3, depression, former smoker, ETOH abuse, lumbar degenerative disc disease.  Patient presents emergency department with complaint of shortness of breath and increasing fatigue over last 24 hours.  Patient states had slightly productive cough as well during this time.  Felt fatigued.  Patient decided come to the emergency department for further evaluation.  On arrival to ER found with temperature of 103°, heart rate of 132 with room air sat of 91%.      Review of patient's allergies indicates:  No Known Allergies  Past Medical History:   Diagnosis Date    Abnormal chest CT (new) 08/17/2022    Anemia due to multiple mechanisms 01/13/2019    Anemia, chronic renal failure, stage 2 (mild) 01/13/2019    Anemia, chronic renal failure, stage 3 (moderate) 08/02/2023    Depression     Former smoker 06/29/2017    H/O ETOH abuse 06/29/2017    Lung mass (new) 08/17/2022    Monocytosis 2019    Myelodysplasia (myelodysplastic syndrome)     Myelodysplasia (myelodysplastic syndrome)     Personal history of colonic polyps 12/29/2023    RARS (refractory anemia with ringed sideroblasts) 06/01/2020    Sickle cell trait      Past Surgical History:   Procedure Laterality Date    BONE MARROW BIOPSY N/A 12/20/2019    Procedure: BIOPSY, BONE MARROW;  Surgeon: Satinder Diagnostic Provider;  Location: [facility] OR;  Service: Interventional Radiology;  Laterality: N/A;    COLONOSCOPY N/A 11/05/2021    Procedure: COLONOSCOPY;  Surgeon: Rob Collins MD;  Location: [facility];  Service: Endoscopy;  Laterality: N/A;    COLONOSCOPY  12/29/2023    5 YR RECALL    ESOPHAGOGASTRODUODENOSCOPY N/A 11/05/2021    Procedure: EGD (ESOPHAGOGASTRODUODENOSCOPY);  Surgeon: Rob Collins MD;  Location:  [facility] ENDO;  Service: Endoscopy;  Laterality: N/A;    INJECTION OF ANESTHETIC AGENT AROUND MEDIAL BRANCH NERVES INNERVATING LUMBAR FACET JOINT Bilateral 05/25/2018    Procedure: BLOCK-NERVE-MEDIAL BRANCH-LUMBAR;  Surgeon: Nura Heredia MD;  Location: [facility];  Service: Pain Management;  Laterality: Bilateral;  L3, 4, 5    KNEE ARTHROSCOPY W/ MENISCECTOMY Right 12/22/2021    Procedure: ARTHROSCOPY, KNEE, WITH MENISCECTOMY;  Surgeon: Sebastian Green MD;  Location: [facility] OR;  Service: Orthopedics;  Laterality: Right;  partial medial meniscectomy    RADIOFREQUENCY ABLATION OF LUMBAR MEDIAL BRANCH NERVE AT SINGLE LEVEL Bilateral 07/20/2018    Procedure: RADIOFREQUENCY ABLATION, NERVE, MEDIAL BRANCH, LUMBAR, 1 LEVEL;  Surgeon: Nura Heredia MD;  Location: [facility] OR;  Service: Pain Management;  Laterality: Bilateral;  L3, 4, 5 - Burned at 80 degrees C.  for 75 seconds x 2 each site    SMALL BOWEL ENTEROSCOPY N/A 10/16/2019    Procedure: ENTEROSCOPY;  Surgeon: Rob Collins MD;  Location: [facility];  Service: Endoscopy;  Laterality: N/A;     Family History   Problem Relation Name Age of Onset    Arthritis Mother      Depression Mother      Heart disease Mother      Hypertension Mother      Heart attack Father  59     Social History[1]  Review of Systems   Constitutional:  Positive for fatigue and fever.   HENT:  Negative for sore throat.    Respiratory:  Positive for cough and shortness of breath.    Cardiovascular:  Negative for chest pain.   Gastrointestinal:  Negative for nausea and vomiting.   Genitourinary:  Negative for dysuria.   Musculoskeletal:  Negative for back pain.   Skin:  Negative for rash.   Neurological:  Positive for weakness.   Hematological:  Does not bruise/bleed easily.       Physical Exam     Initial Vitals [06/18/25 0835]   BP Pulse Resp Temp SpO2   (!) 106/59 (!) 132 (!) 25 (!) 103 °F (39.4 °C) (!) 92 %      MAP       --         Physical Exam    Nursing note and vitals reviewed.  Constitutional: He appears  well-developed and well-nourished.   Frail appearing male no acute distress   HENT:   Head: Normocephalic and atraumatic.   Nose: Nose normal. Mouth/Throat: Oropharynx is clear and moist.   Eyes: Conjunctivae and EOM are normal. Pupils are equal, round, and reactive to light. No scleral icterus.   Neck: Neck supple.   Normal range of motion.  Cardiovascular:  Normal rate, regular rhythm, normal heart sounds and intact distal pulses.     Exam reveals no gallop and no friction rub.       No murmur heard.  Pulmonary/Chest: No stridor. No respiratory distress.   Course bilateral breath sounds with slight crackles in her heard at the right middle, lower lobe   Abdominal: Abdomen is soft. Bowel sounds are normal. He exhibits no mass. There is no abdominal tenderness. There is no rebound and no guarding.   Musculoskeletal:         General: No edema. Normal range of motion.      Cervical back: Normal range of motion and neck supple.     Lymphadenopathy:     He has no cervical adenopathy.   Neurological: He is alert and oriented to person, place, and time. He has normal strength and normal reflexes. No cranial nerve deficit or sensory deficit. GCS score is 15. GCS eye subscore is 4. GCS verbal subscore is 5. GCS motor subscore is 6.   Skin: Skin is warm and dry. Capillary refill takes less than 2 seconds. No rash noted.   Psychiatric: He has a normal mood and affect. His behavior is normal. Judgment and thought content normal.         ED Course   Procedures  Labs Reviewed   MAGNESIUM - Abnormal       Result Value    Magnesium 1.4 (*)    COMPREHENSIVE METABOLIC PANEL - Abnormal    Sodium 136      Potassium 4.0      Chloride 103      CO2 21 (*)     Glucose 95      BUN 55 (*)     Creatinine 4.6 (*)     Calcium 8.9      Protein Total 7.5      Albumin 4.0      Bilirubin Total 0.6      ALP 45 (*)     AST 23      ALT 19      Anion Gap 12      eGFR 13 (*)    TROPONIN I HIGH SENSITIVITY - Abnormal    Troponin High Sensitive 49.9 (*)     B-TYPE NATRIURETIC PEPTIDE - Abnormal     (*)    CBC WITH DIFFERENTIAL - Abnormal    WBC 1.02 (*)     RBC 2.65 (*)     Hgb 7.3 (*)     Hct 22.9 (*)     MCV 86      MCH 27.5      MCHC 31.9 (*)     RDW 17.7 (*)     Platelet Count 62 (*)     MPV 9.6      Nucleated RBC 0     PROCALCITONIN - Abnormal    Procalcitonin 61.028 (*)    MANUAL DIFFERENTIAL - Abnormal    Segmented Neutrophil % 16.0 (*)     Bands % 13.0      Lymphocyte % 31.0      Monocyte % 40.0 (*)     Platelet Estimate Decreased (*)     Anisocyte Slight     ALCOHOL,MEDICAL (ETHANOL) - Normal    Alcohol, Serum <10     CK - Normal         CULTURE, BLOOD   CULTURE, BLOOD   RESPIRATORY INFECTION PANEL (PCR), NASOPHARYNGEAL   CBC W/ AUTO DIFFERENTIAL    Narrative:     The following orders were created for panel order CBC auto differential.  Procedure                               Abnormality         Status                     ---------                               -----------         ------                     CBC with Differential[[id number]]       Abnormal            Final result               Manual Differential[[id number]]         Abnormal            Final result                 Please view results for these tests on the individual orders.   HEPATITIS C ANTIBODY   HEP C VIRUS HOLD SPECIMEN   HIV 1 / 2 ANTIBODY   HIV VIRUS CONFIRMATION HOLD SPECIMEN   URINALYSIS, REFLEX TO URINE CULTURE   CREATININE, URINE, RANDOM   PROTEIN, URINE, TIMED   TROPONIN I HIGH SENSITIVITY   LACTIC ACID, PLASMA   TYPE & SCREEN    Specimen Outdate 06/21/2025 23:59      Group & Rh O POS      Indirect Padmini NEG     POCT LACTATE        ECG Results              EKG 12-lead (In process)        Collection Time Result Time QRS Duration OHS QTC Calculation    06/18/25 08:25:18 06/18/25 08:48:37 88 [id number]                     In process by Interface, Lab In [facility] (06/18/25 08:48:46)                   Narrative:    Test Reason : R07.9,    Vent. Rate : 127 BPM     Atrial Rate :  127 BPM     P-R Int : 154 ms          QRS Dur :  88 ms      QT Int : 304 ms       P-R-T Axes :  58  54 -11 degrees    QTcB Int : 441 ms    Sinus tachycardia  LVH with repolarization abnormality ( Sokolow-Gomez )  Abnormal ECG  No previous ECGs available    Referred By: AAAREFERRAL SELF           Confirmed By:                                   Imaging Results              X-Ray Chest AP Portable (Final result)  Result time 06/18/25 09:01:39      Final result by Alexandra Cerda MD (06/18/25 09:01:39)                   Impression:      Alveolar consolidations in the right mid and lower lung compatible with pneumonia      Electronically signed by: Alexandra Cerda  Date:    06/18/2025  Time:    09:01               Narrative:    EXAMINATION:  XR CHEST AP PORTABLE    CLINICAL HISTORY:  Generalized weakness;    FINDINGS:  Portable chest at 08:49 is compared to 12/20/2021 shows normal cardiomediastinal silhouette.    There is an alveolar consolidation in the mid and lower lung compatible with pneumonia.  The left lung is clear.  There are no pleural effusions.  Right pulmonary vasculature is normal. No acute osseous abnormality.                                       Medications   magnesium sulfate 2g in water 50mL IVPB (premix) (2 g Intravenous New Bag 6/18/25 1103)   vancomycin - pharmacy to dose (has no administration in time range)   vancomycin 2,000 mg in 0.9% NaCl 500 mL IVPB (admixture device) (2,000 mg Intravenous New Bag 6/18/25 1103)   acetaminophen tablet 1,000 mg (1,000 mg Oral Given 6/18/25 0944)   ceFEPIme injection 2 g (2 g Intravenous Given 6/18/25 0944)   lactated ringers infusion (1,000 mLs Intravenous New Bag 6/18/25 0945)     Medical Decision Making  Amount and/or Complexity of Data Reviewed  Labs: ordered.  Radiology: ordered.    Risk  OTC drugs.  Prescription drug management.              Attending Attestation:         Attending Critical Care:   Critical Care Times:   Direct Patient Care (initial  evaluation, reassessments, and time considering the case)................................................................30 minutes.   Additional History from reviewing old medical records or taking additional history from the family, EMS, PCP, etc.......................5 minutes.   Ordering, Reviewing, and Interpreting Diagnostic Studies...............................................................................................................5 minutes.   Documentation..................................................................................................................................................................................5 minutes.   Consultation with other Physicians. .................................................................................................................................................5 minutes.   Consultation with the patient's family directly relating to the patient's condition, care, and DNR status (when patient unable)......5 minutes.   Other..................................................................................................................................................................................................5 minutes.   ==============================================================  Total Critical Care Time - exclusive of procedural time: 60 minutes.  ==============================================================  Critical care was necessary to treat or prevent imminent or life-threatening deterioration of the following conditions: sepsis.   Critical care was time spent personally by me on the following activities: obtaining history from patient or relative, examination of patient, review of old charts, review of x-rays / CT sent with the patient, ordering lab, x-rays, and/or EKG, development of treatment plan with patient or relative, ordering and performing treatments and interventions, evaluation of patient's response to treatment,  "discussion with consultants and re-evaluation of patient's conition.   Critical Care Condition: potentially life-threatening                      Medical Decision Making:   Initial Assessment:   65-year-old male with history of myelodysplastic syndrome, sickle cell trait, chronic kidney disease stage 3, depression, former smoker, ETOH abuse, lumbar degenerative disc disease.  Patient presents emergency department with complaint of shortness of breath and increasing fatigue over last 24 hours.  Patient states had slightly productive cough as well during this time.  Felt fatigued.  Patient decided come to the emergency department for further evaluation.  On arrival to ER found with temperature of 103°, heart rate of 132 with room air sat of 91%.    Differential Diagnosis:   Sepsis, pneumonia, viral syndrome, occult bacteremia  Clinical Tests:   Lab Tests: Ordered and Reviewed  Radiological Study: Ordered and Reviewed  Medical Tests: Ordered and Reviewed  Sepsis Perfusion Assessment: "I attest a sepsis perfusion exam was performed within 6 hours of sepsis, severe sepsis, or septic shock presentation, following fluid resuscitation."             Clinical Impression:  Final diagnoses:  [J18.9] Pneumonia due to infectious organism, unspecified laterality, unspecified part of lung (Primary)  [R09.02] Hypoxia  [D46.9] Myelodysplastic syndrome                       [1]   Social History  Tobacco Use    Smoking status: Former     Current packs/day: 0.00     Types: Cigarettes     Quit date: 1996     Years since quittin.4    Smokeless tobacco: Never   Substance Use Topics    Alcohol use: Not Currently     Alcohol/week: 21.0 standard drinks of alcohol     Types: 21 Cans of beer per week     Comment: Sober 5 years    Drug use: Not Currently     Types: Marijuana        Dov Brown MD  25 1108    "

## 2025-06-18 NOTE — PROGRESS NOTES
Pharmacist Renal Dose Adjustment Note    Rick Butler is a 65 y.o. male being treated with the medication merrem    Patient Data:    Vital Signs (Most Recent):  Temp: (!) 101.5 °F (38.6 °C) (06/18/25 1100)  Pulse: (!) 111 (06/18/25 1108)  Resp: (!) 33 (06/18/25 1108)  BP: (!) 95/56 (06/18/25 1100)  SpO2: (!) 93 % (06/18/25 1108) Vital Signs (72h Range):  Temp:  [101.5 °F (38.6 °C)-103 °F (39.4 °C)]   Pulse:  [111-132]   Resp:  [25-39]   BP: ()/(51-59)   SpO2:  [89 %-94 %]      Recent Labs   Lab 06/18/25  0843   CREATININE 4.6*     Serum creatinine: 4.6 mg/dL (H) 06/18/25 0843  Estimated creatinine clearance: 18.3 mL/min (A)    Medication:merrem dose: 1 g frequency q8h will be changed to medication:merrem dose:1 g frequency:q12h  Due to CrCl < 25 mL/min    Pharmacist's Name: Temo Escobar  Pharmacist's Extension: [phone]

## 2025-06-18 NOTE — ASSESSMENT & PLAN NOTE
Maintain iv fluids   Latest Reference Range & Units 05/07/25 07:38 05/14/25 07:30 05/21/25 07:36 05/28/25 07:42 06/04/25 07:33 06/18/25 08:43   Creatinine 0.5 - 1.4 mg/dL 3.3 (H) 3.3 (H) 3.5 (H) 3.4 (H) 3.9 (H) 4.6 (H)   (H): Data is abnormally high

## 2025-06-18 NOTE — CONSULTS
Pulmonary/Critical Care Consult      PATIENT NAME: Rick Butler  MRN: [id number]  TODAY'S DATE: 2025  1:55 PM  ADMIT DATE: 2025  AGE: 65 y.o. : 1960    CONSULT REQUESTED BY: Feliciano Moran MD    REASON FOR CONSULT:   Pneumonia    HPI:  Mr. Butler is a 65 year old man with prior dx of MDS/RARS and sickle cell trait, who presents with pneumonia.     He reports his symptoms started suddenly yesterday he was feeling fatigued, febrile, chills, and short of breath.     He reports he is on chemotherapy for his MDS. He reports no productive cough.  Febrile to 103 and elevated HR to 132        Review of Systems   Constitutional:  Positive for chills and fever. Negative for appetite change and unexpected weight change.   HENT:  Negative for nosebleeds, postnasal drip, trouble swallowing and voice change.    Eyes:  Negative for visual disturbance.   Respiratory:  Positive for shortness of breath. Negative for cough and wheezing.    Cardiovascular:  Negative for chest pain and leg swelling.   Gastrointestinal:  Negative for diarrhea, nausea and vomiting.   Endocrine: Negative for cold intolerance and heat intolerance.   Genitourinary:  Negative for dysuria, flank pain and frequency.   Musculoskeletal:  Negative for neck pain and neck stiffness.   Allergic/Immunologic: Negative for environmental allergies and immunocompromised state.   Neurological:  Negative for syncope, speech difficulty and weakness.   Hematological:  Negative for adenopathy.   Psychiatric/Behavioral:  Negative for confusion.            ALLERGIES  Review of patient's allergies indicates:  No Known Allergies    INPATIENT SCHEDULED MEDICATIONS   0.9%  NaCl infusion (for blood administration)   Intravenous Once    0.9%  NaCl infusion (for blood administration)   Intravenous Once    azithromycin  500 mg Intravenous Q24H    [START ON 2025] calcitRIOL  0.25 mcg Oral Q7 Days    folic acid  2,000 mcg Oral Daily    furosemide (LASIX) injection  20 mg  Intravenous Once    levalbuterol  1.25 mg Nebulization Q8H    meropenem IV (PEDS and ADULTS)  1 g Intravenous Q12H    pantoprazole  40 mg Oral Daily    traZODone  100 mg Oral QHS      0.9% NaCl   Intravenous Continuous        NORepinephrine bitartrate-D5W  0-3 mcg/kg/min Intravenous Continuous           MEDICAL AND SURGICAL HISTORY  Past Medical History:   Diagnosis Date    Abnormal chest CT (new) 08/17/2022    Anemia due to multiple mechanisms 01/13/2019    Anemia, chronic renal failure, stage 2 (mild) 01/13/2019    Anemia, chronic renal failure, stage 3 (moderate) 08/02/2023    Depression     Former smoker 06/29/2017    H/O ETOH abuse 06/29/2017    Lung mass (new) 08/17/2022    Monocytosis 2019    Myelodysplasia (myelodysplastic syndrome)     Myelodysplasia (myelodysplastic syndrome)     Personal history of colonic polyps 12/29/2023    RARS (refractory anemia with ringed sideroblasts) 06/01/2020    Sickle cell trait      Past Surgical History:   Procedure Laterality Date    BONE MARROW BIOPSY N/A 12/20/2019    Procedure: BIOPSY, BONE MARROW;  Surgeon: Satinder Diagnostic Provider;  Location: [facility] OR;  Service: Interventional Radiology;  Laterality: N/A;    COLONOSCOPY N/A 11/05/2021    Procedure: COLONOSCOPY;  Surgeon: Rob Collins MD;  Location: [facility];  Service: Endoscopy;  Laterality: N/A;    COLONOSCOPY  12/29/2023    5 YR RECALL    ESOPHAGOGASTRODUODENOSCOPY N/A 11/05/2021    Procedure: EGD (ESOPHAGOGASTRODUODENOSCOPY);  Surgeon: Rob Collins MD;  Location: [facility] ENDO;  Service: Endoscopy;  Laterality: N/A;    INJECTION OF ANESTHETIC AGENT AROUND MEDIAL BRANCH NERVES INNERVATING LUMBAR FACET JOINT Bilateral 05/25/2018    Procedure: BLOCK-NERVE-MEDIAL BRANCH-LUMBAR;  Surgeon: Nura Heredia MD;  Location: [facility] OR;  Service: Pain Management;  Laterality: Bilateral;  L3, 4, 5    KNEE ARTHROSCOPY W/ MENISCECTOMY Right 12/22/2021    Procedure: ARTHROSCOPY, KNEE, WITH MENISCECTOMY;  Surgeon: Sebastian Green MD;   Location: [facility] OR;  Service: Orthopedics;  Laterality: Right;  partial medial meniscectomy    RADIOFREQUENCY ABLATION OF LUMBAR MEDIAL BRANCH NERVE AT SINGLE LEVEL Bilateral 07/20/2018    Procedure: RADIOFREQUENCY ABLATION, NERVE, MEDIAL BRANCH, LUMBAR, 1 LEVEL;  Surgeon: Nura Heredia MD;  Location: [facility] OR;  Service: Pain Management;  Laterality: Bilateral;  L3, 4, 5 - Burned at 80 degrees C.  for 75 seconds x 2 each site    SMALL BOWEL ENTEROSCOPY N/A 10/16/2019    Procedure: ENTEROSCOPY;  Surgeon: Rob Collins MD;  Location: [facility] ENDO;  Service: Endoscopy;  Laterality: N/A;       ALCOHOL, TOBACCO AND DRUG USE  Tobacco Use History[1]  Social History     Substance and Sexual Activity   Alcohol Use Not Currently    Alcohol/week: 21.0 standard drinks of alcohol    Types: 21 Cans of beer per week    Comment: Sober 5 years     Social History     Substance and Sexual Activity   Drug Use Not Currently    Types: Marijuana       FAMILY HISTORY  Family History   Problem Relation Name Age of Onset    Arthritis Mother      Depression Mother      Heart disease Mother      Hypertension Mother      Heart attack Father  59       VITAL SIGNS (MOST RECENT)  Temp: (!) 101.5 °F (38.6 °C) (06/18/25 1100)  Pulse: (!) 111 (06/18/25 1305)  Resp: (!) 37 (06/18/25 1305)  BP: 103/60 (06/18/25 1305)  SpO2: 96 % (06/18/25 1305)    INTAKE AND OUTPUT (LAST 24 HOURS):No intake or output data in the 24 hours ending 06/18/25 1355    WEIGHT  Wt Readings from Last 1 Encounters:   06/18/25 96.2 kg (212 lb)       Physical Exam  Vitals reviewed.   Constitutional:       General: He is not in acute distress.     Appearance: He is well-developed. He is ill-appearing. He is not diaphoretic.   HENT:      Head: Normocephalic and atraumatic.      Mouth/Throat:      Pharynx: No oropharyngeal exudate or posterior oropharyngeal erythema.   Eyes:      General: No scleral icterus.     Pupils: Pupils are equal, round, and reactive to light.   Neck:      Vascular:  "No JVD.   Cardiovascular:      Rate and Rhythm: Regular rhythm. Tachycardia present.      Heart sounds: Normal heart sounds. No murmur heard.  Pulmonary:      Effort: Pulmonary effort is normal. No respiratory distress.      Breath sounds: Wheezing and rales present.   Abdominal:      General: Bowel sounds are normal. There is no distension.      Palpations: Abdomen is soft.      Tenderness: There is no abdominal tenderness.   Musculoskeletal:         General: No swelling.      Cervical back: Normal range of motion and neck supple. No rigidity.   Skin:     General: Skin is warm and dry.      Capillary Refill: Capillary refill takes less than 2 seconds.      Coloration: Skin is pale.      Findings: No rash.   Neurological:      General: No focal deficit present.      Mental Status: He is alert and oriented to person, place, and time.      Cranial Nerves: No cranial nerve deficit.      Motor: No weakness or abnormal muscle tone.           ACUTE PHASE REACTANT (LAST 24 HOURS)  No results for input(s): "FERRITIN", "CRP", "LDH", "DDIMER" in the last 24 hours.    CBC LAST (LAST 24 HOURS)  Recent Labs   Lab 06/18/25  0843   WBC 1.02*   RBC 2.65*   HGB 7.3*   HCT 22.9*   MCV 86   MCH 27.5   MCHC 31.9*   RDW 17.7*   PLT 62*   MPV 9.6   NRBC 0       CHEMISTRY LAST (LAST 24 HOURS)  Recent Labs   Lab 06/18/25  0843      K 4.0      CO2 21*   ANIONGAP 12   BUN 55*   CREATININE 4.6*   GLU 95   CALCIUM 8.9   MG 1.4*   ALBUMIN 4.0   PROT 7.5   ALKPHOS 45*   ALT 19   AST 23   BILITOT 0.6       COAGULATION LAST (LAST 24 HOURS)  No results for input(s): "LABPT", "INR", "APTT" in the last 24 hours.    CARDIAC PROFILE (LAST 24 HOURS)  Recent Labs   Lab 06/18/25  0843   *          LAST 7 DAYS MICROBIOLOGY   Microbiology Results (last 7 days)       Procedure Component Value Units Date/Time    Respiratory Infection Panel (PCR), Nasopharyngeal [[id number]] Collected: 06/18/25 1027    Order Status: Completed " Specimen: Nasopharyngeal Swab Updated: 06/18/25 1209     Respiratory Infection Panel Source Nasopharyngeal Swab     Adenovirus Not Detected     Coronavirus 229E, Common Cold Virus Not Detected     Coronavirus HKU1, Common Cold Virus Not Detected     Coronavirus NL63, Common Cold Virus Not Detected     Coronavirus OC43, Common Cold Virus Not Detected     SARS-CoV2 (COVID-19) Qualitative PCR Not Detected     Human Metapneumovirus Not Detected     Human Rhinovirus/Enterovirus Not Detected     Influenza A (subtypes H1, H1-2009,H3) Not Detected     Influenza B Not Detected     Parainfluenza Virus 1 Not Detected     Parainfluenza Virus 2 Not Detected     Parainfluenza Virus 3 Not Detected     Parainfluenza Virus 4 Not Detected     Respiratory Syncytial Virus Not Detected     Bordetella Parapertussis (ZL0283) Not Detected     Bordetella pertussis (ptxP) Not Detected     Chlamydia pneumoniae Not Detected     Mycoplasma pneumoniae Not Detected    Blood culture x two cultures. Draw prior to antibiotics. [[id number]] Collected: 06/18/25 0932    Order Status: Sent Specimen: Blood Updated: 06/18/25 0942    Blood culture x two cultures. Draw prior to antibiotics. [[id number]] Collected: 06/18/25 0930    Order Status: Sent Specimen: Blood Updated: 06/18/25 0942            MOST RECENT IMAGING  X-Ray Chest AP Portable  Narrative: EXAMINATION:  XR CHEST AP PORTABLE    CLINICAL HISTORY:  Generalized weakness;    FINDINGS:  Portable chest at 08:49 is compared to 12/20/2021 shows normal cardiomediastinal silhouette.    There is an alveolar consolidation in the mid and lower lung compatible with pneumonia.  The left lung is clear.  There are no pleural effusions.  Right pulmonary vasculature is normal. No acute osseous abnormality.  Impression: Alveolar consolidations in the right mid and lower lung compatible with pneumonia    Electronically signed by: Alexandra Cerda  Date:    06/18/2025  Time:    09:01      CURRENT VISIT EKG  Results  "for orders placed or performed during the hospital encounter of 06/18/25   EKG 12-lead   Result Value Ref Range    QRS Duration 88 ms    [facility]HS QTC Calculation 441 ms    Narrative    Test Reason : R07.9,    Vent. Rate : 127 BPM     Atrial Rate : 127 BPM     P-R Int : 154 ms          QRS Dur :  88 ms      QT Int : 304 ms       P-R-T Axes :  58  54 -11 degrees    QTcB Int : 441 ms    Sinus tachycardia  LVH with repolarization abnormality ( Sokolow-Gomez )  Abnormal ECG  No previous ECGs available    Referred By: AAAREFERRAL SELF           Confirmed By:        ECHOCARDIOGRAM RESULTS  Results for orders placed during the hospital encounter of 10/06/23    Echo    Interpretation Summary    Left Ventricle: The left ventricle is normal in size. Moderately increased wall thickness. Normal wall motion. There is normal systolic function with a visually estimated ejection fraction of 55 - 60%.    Left Atrium: Left atrium is severely dilated.    Right Ventricle: Right ventricle was not well visualized due to poor acoustic window.    Aortic Valve: The aortic valve is a trileaflet valve. There is mild aortic regurgitation.    Mitral Valve: There is no stenosis. There is mild to moderate regurgitation.    Pulmonic Valve: There is mild regurgitation.    IVC/SVC: Normal venous pressure at 3 mmHg.        VENTILATOR INFORMATION              LAST ARTERIAL BLOOD GAS  ABG  No results for input(s): "PH", "PO2", "PCO2", "HCO3", "BE" in the last 168 hours.      ASSESSMENT:   Pneumonia  Sepsis  3.   Acute kidney injury    Suspected pneumonia for source of sepsis  May need bronchoscopic evaluation if clinical response to antibiotics is inadequate  PLAN:   - continue meropenem  - blood cultures pending  - we will give additional 1 L fluid LR, in addition to what ID is given  - would consult Nephrology as he has significant TORRES on CKD  - monitor urine output, he may require Stover  - check CT chest  - replace electrolytes as needed      Cedric MARTÍNEZ " MD [place]  Date of Service: 2025  1:55 PM         [1]   Social History  Tobacco Use   Smoking Status Former    Current packs/day: 0.00    Types: Cigarettes    Quit date: 1996    Years since quittin.4   Smokeless Tobacco Never

## 2025-06-18 NOTE — ASSESSMENT & PLAN NOTE
Aware   One unit pRBC ordered today     Recent Labs     06/18/25  0843   HGB 7.3*   HCT 22.9*

## 2025-06-18 NOTE — CARE UPDATE
06/18/25 1230   Patient Assessment/Suction   Level of Consciousness (AVPU) alert   Respiratory Effort Normal;Unlabored   Expansion/Accessory Muscles/Retractions no use of accessory muscles   All Lung Fields Breath Sounds clear;diminished   Rhythm/Pattern, Respiratory unlabored   Cough Frequency infrequent   Cough Type congested   Skin Integrity   $ Wound Care Tech Time 15 min   Area Observed Left;Right;Behind ear   Skin Appearance without discoloration   PRE-TX-O2   Device (Oxygen Therapy) nasal cannula   $ Is the patient on Low Flow Oxygen? Yes   Flow (L/min) (Oxygen Therapy) 2   SpO2 (!) 93 %   Pulse Oximetry Type Continuous   $ Pulse Oximetry - Multiple Charge Pulse Oximetry - Multiple   Pulse 110   Resp (!) 34   BP 98/60   Education   $ Education Oxygen;15 min   Tobacco Cessation Intervention   Do you use any type of tobacco product? No   Respiratory Evaluation   $ Care Plan Tech Time 15 min   $ Respiratory Evaluation Complete   Evaluation For New Orders   Admitting Diagnosis severe sepsis   Cardiac Diagnosis htn   Pulmonary Diagnosis pna   Home Oxygen   Has Home Oxygen? No   Home Aerosol, MDI, DPI, and Other Treatments/Therapies   Home Respiratory Therapy Per Patient/Review of Chart No   Oxygen Care Plan   Oxygen Care Plan Per Protocol   SPO2 Goal (%) MD order   Rationale SpO2 is <MD Goal   Bronchodilator Care Plan   Bronchodilator Care Plan Aerosol   Aerosol Meds w/ frequency Xopenex(Levalbuterol HCL) 1.25mg Q 8Hr   Rationale Other  (pna)   Atelectasis Care Plan   Rationale No Rational Found   Airway Clearance Care Plan   Rationale No rationale found

## 2025-06-18 NOTE — ASSESSMENT & PLAN NOTE
From sepsis/TORRES  Trend c enzymes   Latest Reference Range & Units 06/18/25 08:43 06/18/25 13:18   Troponin I High Sensitivity <=14.9 pg/mL 49.9 (H) 58.7 (HH)   (HH): Data is critically high  (H): Data is abnormally high      TORRES on CKD  Maintain iv fluids   Latest Reference Range & Units 05/07/25 07:38 05/14/25 07:30 05/21/25 07:36 05/28/25 07:42 06/04/25 07:33 06/18/25 08:43   Creatinine 0.5 - 1.4 mg/dL 3.3 (H) 3.3 (H) 3.5 (H) 3.4 (H) 3.9 (H) 4.6 (H)   (H): Data is abnormally high

## 2025-06-18 NOTE — H&P
[facility] - Emergency Dept  [facility] Medicine  History & Physical    Patient Name: Rick Butler  MRN: [id number]  Patient Class: IP- Inpatient  Admission Date: 6/18/2025  Attending Physician: Feliciano Moran MD   Primary Care Provider: Reynaldo Basilio FNP-C         Patient information was obtained from patient, past medical records, ER records, and ER MD .     Subjective:     Principal Problem:Severe sepsis    Chief Complaint:   Chief Complaint   Patient presents with    Shortness of Breath     X 1 day.     Fatigue    Cough     Onset lastnite        HPI: 65 year old pt getting admitted with Sepsis/Severe pneumonia/Neutropenic fever  Pt was suffering from URTI like infection for past several days  Later he suffered from severe SOB/cough  Today symptoms went worse, came to ER found to be hypoxic and got admitted     Past Medical History:   Diagnosis Date    Abnormal chest CT (new) 08/17/2022    Anemia due to multiple mechanisms 01/13/2019    Anemia, chronic renal failure, stage 2 (mild) 01/13/2019    Anemia, chronic renal failure, stage 3 (moderate) 08/02/2023    Depression     Former smoker 06/29/2017    H/O ETOH abuse 06/29/2017    Lung mass (new) 08/17/2022    Monocytosis 2019    Myelodysplasia (myelodysplastic syndrome)     Myelodysplasia (myelodysplastic syndrome)     Personal history of colonic polyps 12/29/2023    RARS (refractory anemia with ringed sideroblasts) 06/01/2020    Sickle cell trait        Past Surgical History:   Procedure Laterality Date    BONE MARROW BIOPSY N/A 12/20/2019    Procedure: BIOPSY, BONE MARROW;  Surgeon: Satinder Diagnostic Provider;  Location: [facility] OR;  Service: Interventional Radiology;  Laterality: N/A;    COLONOSCOPY N/A 11/05/2021    Procedure: COLONOSCOPY;  Surgeon: Rob Collins MD;  Location: [facility] ENDO;  Service: Endoscopy;  Laterality: N/A;    COLONOSCOPY  12/29/2023    5 YR RECALL    ESOPHAGOGASTRODUODENOSCOPY N/A 11/05/2021    Procedure: EGD  (ESOPHAGOGASTRODUODENOSCOPY);  Surgeon: Rob Collins MD;  Location: [facility] ENDO;  Service: Endoscopy;  Laterality: N/A;    INJECTION OF ANESTHETIC AGENT AROUND MEDIAL BRANCH NERVES INNERVATING LUMBAR FACET JOINT Bilateral 05/25/2018    Procedure: BLOCK-NERVE-MEDIAL BRANCH-LUMBAR;  Surgeon: Nura Heredia MD;  Location: [facility] OR;  Service: Pain Management;  Laterality: Bilateral;  L3, 4, 5    KNEE ARTHROSCOPY W/ MENISCECTOMY Right 12/22/2021    Procedure: ARTHROSCOPY, KNEE, WITH MENISCECTOMY;  Surgeon: Sebastian Green MD;  Location: [facility] OR;  Service: Orthopedics;  Laterality: Right;  partial medial meniscectomy    RADIOFREQUENCY ABLATION OF LUMBAR MEDIAL BRANCH NERVE AT SINGLE LEVEL Bilateral 07/20/2018    Procedure: RADIOFREQUENCY ABLATION, NERVE, MEDIAL BRANCH, LUMBAR, 1 LEVEL;  Surgeon: Nura Heredia MD;  Location: [facility] OR;  Service: Pain Management;  Laterality: Bilateral;  L3, 4, 5 - Burned at 80 degrees C.  for 75 seconds x 2 each site    SMALL BOWEL ENTEROSCOPY N/A 10/16/2019    Procedure: ENTEROSCOPY;  Surgeon: Rob Collins MD;  Location: [facility];  Service: Endoscopy;  Laterality: N/A;       Review of patient's allergies indicates:  No Known Allergies    Current Facility-Administered Medications on File Prior to Encounter   Medication    0.9%  NaCl infusion (for blood administration)    0.9%  NaCl infusion (for blood administration)    furosemide injection 20 mg    furosemide injection 20 mg    furosemide injection 20 mg    furosemide injection 20 mg     Current Outpatient Medications on File Prior to Encounter   Medication Sig    amLODIPine (NORVASC) 5 MG tablet Take 1 tablet (5 mg total) by mouth once daily.    ascorbic acid, vitamin C, (VITAMIN C) 500 MG tablet Take 500 mg by mouth once daily.    atorvastatin (LIPITOR) 10 MG tablet Take 1 tablet (10 mg total) by mouth every evening.    calcitRIOL (ROCALTROL) 0.25 MCG Cap Take 0.25 mcg by mouth every 7 days.    cyproheptadine (PERIACTIN) 4 mg tablet Take 1  tablet (4 mg total) by mouth 3 (three) times daily.    droNABinol (MARINOL) 10 MG capsule Take 1 capsule (10 mg total) by mouth 2 (two) times daily before meals.    famotidine (PEPCID) 20 MG tablet Take 20 mg by mouth 2 (two) times daily.    fluticasone propionate (FLONASE) 50 mcg/actuation nasal spray 1 SPRAY (50 MCG TOTAL) BY EACH NOSTRIL ROUTE 2 (TWO) TIMES A DAY. 1 SPRAY EVERY MORNING AND AFTERNOON TOWARD THE EAR EACH SIDE AFTER A SINUS RINSE    folic acid (FOLVITE) 1 MG tablet TAKE 2 TABLETS BY MOUTH EVERY DAY (Patient taking differently: Take 2,000 mcg by mouth once daily.)    multivitamin with minerals Cap Take 1 capsule by mouth every morning.    pantoprazole (PROTONIX) 40 MG tablet Take 40 mg by mouth once daily.    tamsulosin (FLOMAX) 0.4 mg Cap TAKE 2 CAPSULES BY MOUTH EVERY DAY (Patient taking differently: Take 2 capsules by mouth once daily.)    traZODone (DESYREL) 100 MG tablet TAKE 1 TABLET BY MOUTH NIGHTLY AS NEEDED FOR INSOMNIA.    LIDOcaine (LIDODERM) 5 % Place 1 patch onto the skin once daily. Remove & Discard patch within 12 hours or as directed by MD    sildenafil (VIAGRA) 100 MG tablet Take 1 tablet (100 mg total) by mouth daily as needed for Erectile Dysfunction.    sod chlor-bicarb-squeez bottle (NEILMED SINUS RINSE COMPLETE) pkdv 1 application  by sinus irrigation route 2 (two) times a day. Not right before bed    [DISCONTINUED] azelastine (ASTELIN) 137 mcg (0.1 %) nasal spray 2 sprays (274 mcg total) by Nasal route 2 (two) times daily as needed for Rhinitis. (Patient not taking: Reported on 4/28/2025)    [DISCONTINUED] fluoxetine 20 MG tablet TAKE 1 TABLET BY MOUTH EVERY DAY    [DISCONTINUED] multivitamin capsule Take 1 capsule by mouth once daily.     Family History       Problem Relation (Age of Onset)    Arthritis Mother    Depression Mother    Heart attack Father (59)    Heart disease Mother    Hypertension Mother          Tobacco Use    Smoking status: Former     Current packs/day:  0.00     Types: Cigarettes     Quit date: 1996     Years since quittin.4    Smokeless tobacco: Never   Substance and Sexual Activity    Alcohol use: Not Currently     Alcohol/week: 21.0 standard drinks of alcohol     Types: 21 Cans of beer per week     Comment: Sober 5 years    Drug use: Not Currently     Types: Marijuana    Sexual activity: Yes     Partners: Female     Review of Systems   Constitutional:  Positive for fever. Negative for activity change and appetite change.   HENT:  Negative for congestion and dental problem.    Eyes:  Negative for discharge and itching.   Respiratory:  Positive for cough and shortness of breath.    Cardiovascular:  Negative for chest pain.   Gastrointestinal:  Negative for abdominal distention and abdominal pain.   Endocrine: Negative for cold intolerance.   Genitourinary:  Negative for difficulty urinating and dysuria.   Musculoskeletal:  Negative for arthralgias and back pain.   Skin:  Negative for color change.   Neurological:  Negative for dizziness and facial asymmetry.   Hematological:  Negative for adenopathy.   Psychiatric/Behavioral:  Negative for agitation and behavioral problems.      Objective:     Vital Signs (Most Recent):  Temp: (!) 101.5 °F (38.6 °C) (25 1100)  Pulse: (!) 113 (25 1500)  Resp: (!) 41 (25 1500)  BP: 112/63 (25 1500)  SpO2: 98 % (25 1500) Vital Signs (24h Range):  Temp:  [101.5 °F (38.6 °C)-103 °F (39.4 °C)] 101.5 °F (38.6 °C)  Pulse:  [110-132] 113  Resp:  [25-41] 41  SpO2:  [89 %-100 %] 98 %  BP: ()/(51-63) 112/63     Weight: 96.2 kg (212 lb)  Body mass index is 31.31 kg/m².     Physical Exam  Vitals and nursing note reviewed.   Constitutional:       Appearance: He is well-developed.   HENT:      Head: Atraumatic.      Right Ear: External ear normal.      Left Ear: External ear normal.      Nose: Nose normal.      Mouth/Throat:      Mouth: Mucous membranes are dry.   Eyes:      Extraocular Movements:  "Extraocular movements intact.   Cardiovascular:      Rate and Rhythm: Tachycardia present.   Pulmonary:      Effort: Pulmonary effort is normal.   Abdominal:      Palpations: Abdomen is soft.   Musculoskeletal:         General: Normal range of motion.      Cervical back: Full passive range of motion without pain and normal range of motion.      Right lower leg: No edema.      Left lower leg: No edema.   Skin:     General: Skin is warm.   Neurological:      Mental Status: He is alert and oriented to person, place, and time.   Psychiatric:         Behavior: Behavior normal.                Significant Labs: All pertinent labs within the past 24 hours have been reviewed.  CBC:   Recent Labs   Lab 06/18/25  0843   WBC 1.02*   HGB 7.3*   HCT 22.9*   PLT 62*     CMP:   Recent Labs   Lab 06/18/25  0843      K 4.0      CO2 21*   GLU 95   BUN 55*   CREATININE 4.6*   CALCIUM 8.9   PROT 7.5   ALBUMIN 4.0   BILITOT 0.6   ALKPHOS 45*   AST 23   ALT 19   ANIONGAP 12       Significant Imaging: I have reviewed all pertinent imaging results/findings within the past 24 hours.    Assessment/Plan:     Assessment & Plan  Severe sepsis  This patient does have evidence of infective focus  My overall impression is sepsis with Acute kidney injury, Acute respiratory failure, and Thrombocytopenia .  Source: Respiratory Infection  Antibiotics given-   Antibiotics (72h ago, onward)      Start     Stop Route Frequency Ordered    06/18/25 1300  meropenem injection 1 g         -- IV Every 12 hours (non-standard times) 06/18/25 1146    06/18/25 1236  vancomycin - pharmacy to dose  (vancomycin IVPB (PEDS and ADULTS))        Placed in "And" Linked Group    -- IV pharmacy to manage frequency 06/18/25 1136    06/18/25 1230  azithromycin (ZITHROMAX) 500 mg in 0.9% NaCl 250 mL IVPB (admixture device)         -- IV Every 24 hours (non-standard times) 06/18/25 1137          Latest lactate reviewed-  Recent Labs   Lab 06/18/25  1318 " "06/18/25  1440   LACTATE 1.8  --    POCLAC  --  1.61       Acute pneumonia  Maintain iv abx as below  De escalate as needed     Antibiotics (From admission, onward)      Start     Stop Route Frequency Ordered    06/18/25 1300  meropenem injection 1 g         -- IV Every 12 hours (non-standard times) 06/18/25 1146    06/18/25 1236  vancomycin - pharmacy to dose  (vancomycin IVPB (PEDS and ADULTS))        Placed in "And" Linked Group    -- IV pharmacy to manage frequency 06/18/25 1136    06/18/25 1230  azithromycin (ZITHROMAX) 500 mg in 0.9% NaCl 250 mL IVPB (admixture device)         -- IV Every 24 hours (non-standard times) 06/18/25 1137            Microbiology Results (last 7 days)       Procedure Component Value Units Date/Time    Respiratory Infection Panel (PCR), Nasopharyngeal [[id number]] Collected: 06/18/25 1027    Order Status: Completed Specimen: Nasopharyngeal Swab Updated: 06/18/25 1209     Respiratory Infection Panel Source Nasopharyngeal Swab     Adenovirus Not Detected     Coronavirus 229E, Common Cold Virus Not Detected     Coronavirus HKU1, Common Cold Virus Not Detected     Coronavirus NL63, Common Cold Virus Not Detected     Coronavirus OC43, Common Cold Virus Not Detected     SARS-CoV2 (COVID-19) Qualitative PCR Not Detected     Human Metapneumovirus Not Detected     Human Rhinovirus/Enterovirus Not Detected     Influenza A (subtypes H1, H1-2009,H3) Not Detected     Influenza B Not Detected     Parainfluenza Virus 1 Not Detected     Parainfluenza Virus 2 Not Detected     Parainfluenza Virus 3 Not Detected     Parainfluenza Virus 4 Not Detected     Respiratory Syncytial Virus Not Detected     Bordetella Parapertussis (RB7101) Not Detected     Bordetella pertussis (ptxP) Not Detected     Chlamydia pneumoniae Not Detected     Mycoplasma pneumoniae Not Detected    Blood culture x two cultures. Draw prior to antibiotics. [[id number]] Collected: 06/18/25 0932    Order Status: Sent Specimen: Blood " Updated: 06/18/25 0942    Blood culture x two cultures. Draw prior to antibiotics. [[id number]] Collected: 06/18/25 0930    Order Status: Sent Specimen: Blood Updated: 06/18/25 0942          Sickle cell trait syndrome  Aware     RARS (refractory anemia with ringed sideroblasts)  Aware   One unit pRBC ordered today     Recent Labs     06/18/25  0843   HGB 7.3*   HCT 22.9*       MDS (myelodysplastic syndrome)  Aware     Anemia, chronic renal failure, stage 3 (moderate)  Aware   Also has MDS  Neutropenic fever  Maintain Rx as ordered  Isolation precautions    Acute on chronic anemia  pRBC ordered   Thrombocytopenia   Latest Reference Range & Units 05/28/25 07:42 06/04/25 07:33 06/18/25 08:43   Platelet Count 150 - 450 K/uL 62 (L) 7 (LL) 62 (L)   (LL): Data is critically low  (L): Data is abnormally low  Elevated troponin  From sepsis/TORRES  Trend c enzymes   Latest Reference Range & Units 06/18/25 08:43 06/18/25 13:18   Troponin I High Sensitivity <=14.9 pg/mL 49.9 (H) 58.7 (HH)   (HH): Data is critically high  (H): Data is abnormally high      TORRES on CKD  Maintain iv fluids   Latest Reference Range & Units 05/07/25 07:38 05/14/25 07:30 05/21/25 07:36 05/28/25 07:42 06/04/25 07:33 06/18/25 08:43   Creatinine 0.5 - 1.4 mg/dL 3.3 (H) 3.3 (H) 3.5 (H) 3.4 (H) 3.9 (H) 4.6 (H)   (H): Data is abnormally high      VTE Risk Mitigation (From admission, onward)           Ordered     IP VTE HIGH RISK PATIENT  Once         06/18/25 1145     Place sequential compression device  Until discontinued         06/18/25 1145                               Pharmacist Renal Dose Adjustment Note    Rick Butler is a 65 y.o. male being treated with the medication merrem    Patient Data:    Vital Signs (Most Recent):  Temp: (!) 101.5 °F (38.6 °C) (06/18/25 1100)  Pulse: (!) 111 (06/18/25 1108)  Resp: (!) 33 (06/18/25 1108)  BP: (!) 95/56 (06/18/25 1100)  SpO2: (!) 93 % (06/18/25 1108) Vital Signs (72h Range):  Temp:  [101.5 °F (38.6 °C)-103 °F  (39.4 °C)]   Pulse:  [111-132]   Resp:  [25-39]   BP: ()/(51-59)   SpO2:  [89 %-94 %]      Recent Labs   Lab 06/18/25  0843   CREATININE 4.6*     Serum creatinine: 4.6 mg/dL (H) 06/18/25 0843  Estimated creatinine clearance: 18.3 mL/min (A)    Medication:merrem dose: 1 g frequency q8h will be changed to medication:merrem dose:1 g frequency:q12h  Due to CrCl < 25 mL/min    Pharmacist's Name: Temo Escobar  Pharmacist's Extension: [phone]      Feliciano Moran MD  Department of Hospital Medicine  [facility] - Emergency Dept

## 2025-06-18 NOTE — CONSULTS
[facility]   Hematology/Oncology  Inpatient Consult Note          Patient Name: Rick Butler  MRN: [id number]  Admission Date: 6/18/2025  Hospital Length of Stay: 0 days  Code Status: Full Code   Attending Provider: Feliciano Moran MD  Referring Provider: Self, Aaareferral  Consulting Provider: Jose Tello MD  Primary Care Physician: Reynaldo Basilio, LUCINAP-ESTELLE  Principal Problem:Severe sepsis    Consults  Subjective:     Chief Complaint: Shortness of Breath (X 1 day. ), Fatigue, and Cough (Onset lastnite)          History Present Illness:  65 y.o. male known to my oncology service with diagnosis of MDS/RARS and sickle cell trait. He is treated in conjunction with Dr Marcelino Hilario out of [facility] in [place], whom he recently saw for follow-up visit on 6/4/2025. He has been on chemotherapy with the drug Rytelo for the past couple of months. He requires periodic blood and platelet transfusions. He presented to ER with cough, fever and progressive weakness/fatigue. Troponin was elevated and CXR showing RML/RLL consolidations consistent with a pneumonia. Patient is awake and alert laying on gurney in ED. He is currently comfortable. I discussed with Dr Brown and Dr Moran both in person. I discussed with his ER nurse in person. Plans are to transfuse a unit of blood, cardiology/pulm consults, ID consult and nephrology consult.             Past Medical/Surgical History:  Past Medical History:   Diagnosis Date    Abnormal chest CT (new) 08/17/2022    Anemia due to multiple mechanisms 01/13/2019    Anemia, chronic renal failure, stage 2 (mild) 01/13/2019    Anemia, chronic renal failure, stage 3 (moderate) 08/02/2023    Depression     Former smoker 06/29/2017    H/O ETOH abuse 06/29/2017    Lung mass (new) 08/17/2022    Monocytosis 2019    Myelodysplasia (myelodysplastic syndrome)     Myelodysplasia (myelodysplastic syndrome)     Personal history of colonic polyps 12/29/2023    RARS (refractory  anemia with ringed sideroblasts) 06/01/2020    Sickle cell trait      Past Surgical History:   Procedure Laterality Date    BONE MARROW BIOPSY N/A 12/20/2019    Procedure: BIOPSY, BONE MARROW;  Surgeon: Satinder Diagnostic Provider;  Location: [facility] OR;  Service: Interventional Radiology;  Laterality: N/A;    COLONOSCOPY N/A 11/05/2021    Procedure: COLONOSCOPY;  Surgeon: Rob Collins MD;  Location: [facility] ENDO;  Service: Endoscopy;  Laterality: N/A;    COLONOSCOPY  12/29/2023    5 YR RECALL    ESOPHAGOGASTRODUODENOSCOPY N/A 11/05/2021    Procedure: EGD (ESOPHAGOGASTRODUODENOSCOPY);  Surgeon: Rob Collins MD;  Location: [facility] ENDO;  Service: Endoscopy;  Laterality: N/A;    INJECTION OF ANESTHETIC AGENT AROUND MEDIAL BRANCH NERVES INNERVATING LUMBAR FACET JOINT Bilateral 05/25/2018    Procedure: BLOCK-NERVE-MEDIAL BRANCH-LUMBAR;  Surgeon: Nura Heredia MD;  Location: [facility];  Service: Pain Management;  Laterality: Bilateral;  L3, 4, 5    KNEE ARTHROSCOPY W/ MENISCECTOMY Right 12/22/2021    Procedure: ARTHROSCOPY, KNEE, WITH MENISCECTOMY;  Surgeon: Sebastian Green MD;  Location: [facility] OR;  Service: Orthopedics;  Laterality: Right;  partial medial meniscectomy    RADIOFREQUENCY ABLATION OF LUMBAR MEDIAL BRANCH NERVE AT SINGLE LEVEL Bilateral 07/20/2018    Procedure: RADIOFREQUENCY ABLATION, NERVE, MEDIAL BRANCH, LUMBAR, 1 LEVEL;  Surgeon: Nura Heredia MD;  Location: [facility];  Service: Pain Management;  Laterality: Bilateral;  L3, 4, 5 - Burned at 80 degrees C.  for 75 seconds x 2 each site    SMALL BOWEL ENTEROSCOPY N/A 10/16/2019    Procedure: ENTEROSCOPY;  Surgeon: Rob Collins MD;  Location: [facility] ENDO;  Service: Endoscopy;  Laterality: N/A;         Allergies:  Review of patient's allergies indicates:  No Known Allergies    Social/Family History:  Social History[1]  Family History   Problem Relation Name Age of Onset    Arthritis Mother      Depression Mother      Heart disease Mother      Hypertension Mother      Heart  attack Father  59         ROS:    GEN: general aches and pains, malaise, fatigue, weakness; fever  HEENT: normal with no HA's, sore throat, stiff neck, changes in vision  CV: normal with no CP, SOB, PND, MORA or orthopnea  PULM: normal with no SOB,  hemoptysis, sputum or pleuritic pain; cough   GI: normal with no abdominal pain, nausea, vomiting, constipation, diarrhea, melanotic stools, BRBPR, or hematemesis  : normal with no hematuria, dysuria  BREAST: normal with no mass, discharge, pain  SKIN: normal with no rash, erythema, bruising, or swelling        Medications:  Continuous Infusions:   0.9% NaCl   Intravenous Continuous        NORepinephrine bitartrate-D5W  0-3 mcg/kg/min Intravenous Continuous         Scheduled Meds:   0.9%  NaCl infusion (for blood administration)   Intravenous Once    0.9%  NaCl infusion (for blood administration)   Intravenous Once    azithromycin  500 mg Intravenous Q24H    [START ON 6/24/2025] calcitRIOL  0.25 mcg Oral Q7 Days    folic acid  2,000 mcg Oral Daily    furosemide (LASIX) injection  20 mg Intravenous Once    lactated ringers  1,000 mL Intravenous Once    levalbuterol  1.25 mg Nebulization Q8H    meropenem IV (PEDS and ADULTS)  1 g Intravenous Q12H    pantoprazole  40 mg Oral Daily    traZODone  100 mg Oral QHS     PRN Meds:  Current Facility-Administered Medications:     0.9%  NaCl infusion (for blood administration), , Intravenous, Q24H PRN    acetaminophen, 650 mg, Oral, Q8H PRN    acetaminophen, 650 mg, Oral, Q4H PRN    aluminum-magnesium hydroxide-simethicone, 30 mL, Oral, QID PRN    furosemide (LASIX) injection, 20 mg, Intravenous, PRN    furosemide (LASIX) injection, 20 mg, Intravenous, PRN    furosemide (LASIX) injection, 20 mg, Intravenous, PRN    HYDROcodone-acetaminophen, 1 tablet, Oral, Q6H PRN    magnesium oxide, 800 mg, Oral, PRN    magnesium oxide, 800 mg, Oral, PRN    melatonin, 6 mg, Oral, Nightly PRN    naloxone, 0.02 mg, Intravenous, PRN    ondansetron, 4  "mg, Intravenous, Q6H PRN    potassium bicarbonate, 35 mEq, Oral, PRN    potassium bicarbonate, 50 mEq, Oral, PRN    potassium bicarbonate, 60 mEq, Oral, PRN    potassium, sodium phosphates, 2 packet, Oral, PRN    potassium, sodium phosphates, 2 packet, Oral, PRN    potassium, sodium phosphates, 2 packet, Oral, PRN    Pharmacy to dose Vancomycin consult, , , Once **AND** vancomycin - pharmacy to dose, , Intravenous, pharmacy to manage frequency         Objective:       Physical Exam:    Vitals:  Blood pressure 103/60, pulse (!) 111, temperature (!) 101.5 °F (38.6 °C), temperature source Oral, resp. rate (!) 37, height 5' 9" (1.753 m), weight 96.2 kg (212 lb), SpO2 96%.    GEN: no apparent distress, comfortable; AAOx3  HEAD: atraumatic and normocephalic  EYES: no pallor, no icterus, PERRLA  ENT: OMM, no pharyngeal erythema, external ears WNL; no nasal discharge; no thrush  NECK: no masses, thyroid normal, trachea midline, no LAD/LN's, supple  CV: mild tachycardia RR with no murmur; normal pulse; normal S1 and S2; no pedal edema  CHEST: Normal respiratory effort; CTAB; some mild diffuse coarser breath sounds Rgt>left;; tachypnea  no wheeze or crackles  ABDOM: nontender and nondistended; soft; normal bowel sounds; no rebound/guarding  MUSC/Skeletal: ROM normal; no crepitus; joints normal; no deformities or arthropathy  EXTREM: no clubbing, cyanosis, inflammation or swelling; IV  SKIN: no rashes, lesions, ulcers, petechiae or subcutaneous nodules; tattoos   : no jiang  NEURO: grossly intact; motor/sensory WNL; AAOx3; no tremors  PSYCH: normal mood, affect and behavior  LYMPH: normal cervical, supraclavicular, axillary and groin LN's      Lab Review:   Lab Results   Component Value Date    WBC 1.02 (LL) 06/18/2025    HGB 7.3 (L) 06/18/2025    HCT 22.9 (L) 06/18/2025    MCV 86 06/18/2025    PLT 62 (L) 06/18/2025     CMP  Sodium   Date Value Ref Range Status   06/18/2025 136 136 - 145 mmol/L Final   06/26/2019 138 134 - " 144 mmol/L      Potassium   Date Value Ref Range Status   06/18/2025 4.0 3.5 - 5.1 mmol/L Final     Chloride   Date Value Ref Range Status   06/18/2025 103 95 - 110 mmol/L Final   06/26/2019 105 98 - 110 mmol/L      CO2   Date Value Ref Range Status   06/18/2025 21 (L) 23 - 29 mmol/L Final     Glucose   Date Value Ref Range Status   06/18/2025 95 70 - 110 mg/dL Final   06/26/2019 114 (H) 70 - 99 mg/dL      BUN   Date Value Ref Range Status   06/18/2025 55 (H) 8 - 23 mg/dL Final     Creatinine   Date Value Ref Range Status   06/18/2025 4.6 (H) 0.5 - 1.4 mg/dL Final   06/26/2019 1.62 (H) 0.60 - 1.40 mg/dL      Calcium   Date Value Ref Range Status   06/18/2025 8.9 8.7 - 10.5 mg/dL Final     Protein Total   Date Value Ref Range Status   06/18/2025 7.5 6.0 - 8.4 gm/dL Final     Albumin   Date Value Ref Range Status   06/18/2025 4.0 3.5 - 5.2 g/dL Final   06/26/2019 4.4 3.1 - 4.7 g/dL      Bilirubin Total   Date Value Ref Range Status   06/18/2025 0.6 0.1 - 1.0 mg/dL Final     Comment:     For infants and newborns, interpretation of results should be based   on gestational age, weight and in agreement with clinical   observations.    Premature Infant recommended reference ranges:   0-24 hours:  <8.0 mg/dL   24-48 hours: <12.0 mg/dL   3-5 days:    <15.0 mg/dL   6-29 days:   <15.0 mg/dL     ALP   Date Value Ref Range Status   06/18/2025 45 (L) 55 - 135 unit/L Final     AST   Date Value Ref Range Status   06/18/2025 23 10 - 40 unit/L Final     ALT   Date Value Ref Range Status   06/18/2025 19 10 - 44 unit/L Final     Anion Gap   Date Value Ref Range Status   06/18/2025 12 8 - 16 mmol/L Final     eGFR   Date Value Ref Range Status   06/18/2025 13 (L) >60 mL/min/1.73/m2 Final   03/12/2025 21.6 (A) >60 mL/min/1.73 m^2 Final           Diagnostic Results:  X-Ray Chest AP Portable [[id number]] Resulted: 06/18/25 0901   Order Status: Completed Updated: 06/18/25 0903   Narrative:     EXAMINATION:  XR CHEST AP PORTABLE    CLINICAL  HISTORY:  Generalized weakness;    FINDINGS:  Portable chest at 08:49 is compared to 12/20/2021 shows normal cardiomediastinal silhouette.    There is an alveolar consolidation in the mid and lower lung compatible with pneumonia.  The left lung is clear.  There are no pleural effusions.  Right pulmonary vasculature is normal. No acute osseous abnormality.   Impression:       Alveolar consolidations in the right mid and lower lung compatible with pneumonia       Assessment/Plan:     IMPRESSION:  (1) 65 y.o. male known to my oncology service with diagnosis of MDS/RARS and sickle cell trait. He is treated in conjunction with Dr Marcelino Hilario out of [facility] in [place], whom he recently saw for follow-up visit on 6/4/2025. He has been on chemotherapy with the drug Rytelo for the past couple of months. He requires periodic blood and platelet transfusions. He presented to ER with cough, fever and progressive weakness/fatigue. Troponin was elevated and CXR showing RML/RLL consolidations consistent with a pneumonia.    6/18/2025;  - WBc 1.02, Hgb 7.3 and plats 62,000  -  I discussed with Dr Brown and Dr Moran both in person. I discussed with his ER nurse in person.   - Plans are to transfuse a unit of blood, IV antibiotics; cultures drawn  - cardiology/pulm consults, ID consult and nephrology consult.       (2) Acute exacerbation of CKD - followed by Dr Mas as outpatient    (3) Chronic back pains - seen by Dr Heredia in past     (4) Hx/of H pylori gastritis s/p prior colonoscopy with Dr Collins in past    (5) Hx/of alcohol uses in past (sober for several years now); former smoker             Active Diagnoses:    Diagnosis Date Noted POA    PRINCIPAL PROBLEM:  Severe sepsis [A41.9, R65.20] 06/18/2025 Unknown    Acute pneumonia [J18.9] 06/18/2025 Unknown    Neutropenic fever [D70.9, R50.81] 06/18/2025 Unknown    Acute on chronic anemia [D64.9] 06/18/2025 Unknown    Thrombocytopenia [D69.6] 06/18/2025 Unknown    Elevated troponin  [R79.89] 2025 Unknown    Anemia, chronic renal failure, stage 3 (moderate) [N18.30, D63.1] 2023 Yes    MDS (myelodysplastic syndrome) [D46.9] 2020 Yes    RARS (refractory anemia with ringed sideroblasts) [D46.1] 2020 Yes    Sickle cell trait syndrome [D57.3] 10/09/2017 Yes      Problems Resolved During this Admission:           PLAN:   1. Monitor labs and transfuse as needed - recommend consideration for a unit of blood  2. Infection workup, IV antibiotics etc as per primary team and/or ID  3. Recommend pulmonary and cardiology evaluation  4. Recommend ID consult given severity of immunocompromised state  5. Recommend nephrology consult for th acute on top of Chronic renal insufficiency   6. IVF's, electrolyte and pain management as per primary team  7. Will follow with you        Thank you for your consult.      Jose Tello MD  Hematology/Oncology  [facility] - Emergency Dept         [1]   Social History  Socioeconomic History    Marital status:    Occupational History    Occupation: Prep Cook     Employer: [employer]   Tobacco Use    Smoking status: Former     Current packs/day: 0.00     Types: Cigarettes     Quit date: 1996     Years since quittin.4    Smokeless tobacco: Never   Substance and Sexual Activity    Alcohol use: Not Currently     Alcohol/week: 21.0 standard drinks of alcohol     Types: 21 Cans of beer per week     Comment: Sober 5 years    Drug use: Not Currently     Types: Marijuana    Sexual activity: Yes     Partners: Female     Social Drivers of Health     Stress: Stress Concern Present (2019)    [employer] - Occupational Stress Questionnaire     Feeling of Stress : Very much

## 2025-06-18 NOTE — CONSULTS
"[facility] - Emergency Dept   Department of Infectious Disease  Consult Note        PATIENT NAME: Rick Butler  MRN: [id number]  TODAY'S DATE: 06/18/2025  ADMIT DATE: 6/18/2025  LOS: 0 days    CHIEF COMPLAINT: Shortness of Breath (X 1 day. ), Fatigue, and Cough (Onset lastnite)      PRINCIPLE PROBLEM: Severe sepsis    REASON FOR CONSULT:  Pneumonia    ASSESSMENT and PLAN   Right lower lobe pneumonia in a patient with MDS on Rytelo.  Respiratory panel negative.  Blood culture in progress.  Check sputum culture and MRSA nasal screen.  Change Meropenem to cefepime and switch azithromycin to doxycycline.    Pancytopenia secondary to MDS.    MDS.  On Rytelo       RECOMMENDATIONS:   Check sputum culture  Check MRSA screen   DC Meropenem and azithromycin   Restart cefepime and add doxycycline IV/p.o.    Thank you for this consult. Please send [employer] secure chat with any questions.    Antibiotics (From admission, onward)      Start     Stop Route Frequency Ordered    06/18/25 1300  meropenem injection 1 g         -- IV Every 12 hours (non-standard times) 06/18/25 1146    06/18/25 1236  vancomycin - pharmacy to dose  (vancomycin IVPB (PEDS and ADULTS))        Placed in "And" Linked Group    -- IV pharmacy to manage frequency 06/18/25 1136    06/18/25 1230  azithromycin (ZITHROMAX) 500 mg in 0.9% NaCl 250 mL IVPB (admixture device)         -- IV Every 24 hours (non-standard times) 06/18/25 1137          Antifungals (From admission, onward)      None           Antivirals (From admission, onward)      None              HPI      Rick Butler is a 65 y.o. male with history of MDS/RA RS and sickle cell trait.  He is on Rytelo for about 2 months he requires.  A blood and platelet transfusions.  Presents to the emergency room 06/18/2025 with fever cough and generalized weakness and fatigue of 1 day duration.  In the ED /59, pulse 132, respiratory rate 25, temperature 103°, oxygen saturation 92%.    Sodium 136, " creatinine 4.6, AST 20, ALT 19, troponin 50, .  WBC 1, hematocrit 23, MCV 86, platelet count 62 with 13% bands.  Acute respiratory panel negative x-ray showed right middle and lower lobe infiltrate he received IV fluid and was placed on antibiotics he has been evaluated by oncologist and pulmonologist ID asked to assist with his care.    No recent travel other than a cruise to [place] in April 2025.  No sick contacts.  He is employed and works as a prep shift.  Was actually working up until 06/17/2025.      Antibiotic history:    Vancomycin 6/18/25-  Cefepime: 06/08/2025 x1 dose   Azithromycin: 06/18/2025   Meropenem: 06/18/2025    Microbiology:    Respiratory panel 06/18/2025: Negative   Blood culture 06/18/2025:  NGTD    Outdoor activities:  He lives with his family.  He quit smoking cigarettes 1999.  He is a prep shaved in the restaurant.  No pets at home.  Travel:  Cruise to [place] in April 2025.   Implants:   None  Antibiotic history:   As in HPI    Social History  Marital Status:   Alcohol History:  reports that he does not currently use alcohol after a past usage of about 21.0 standard drinks of alcohol per week.  Tobacco History:  reports that he quit smoking about 29 years ago. His smoking use included cigarettes. He has never used smokeless tobacco.  Drug History:  reports that he does not currently use drugs after having used the following drugs: Marijuana.      Review of patient's allergies indicates:  No Known Allergies  Past Medical History:   Diagnosis Date    Abnormal chest CT (new) 08/17/2022    Anemia due to multiple mechanisms 01/13/2019    Anemia, chronic renal failure, stage 2 (mild) 01/13/2019    Anemia, chronic renal failure, stage 3 (moderate) 08/02/2023    Depression     Former smoker 06/29/2017    H/O ETOH abuse 06/29/2017    Lung mass (new) 08/17/2022    Monocytosis 2019    Myelodysplasia (myelodysplastic syndrome)     Myelodysplasia (myelodysplastic syndrome)     Personal  history of colonic polyps 12/29/2023    RARS (refractory anemia with ringed sideroblasts) 06/01/2020    Sickle cell trait      Past Surgical History:   Procedure Laterality Date    BONE MARROW BIOPSY N/A 12/20/2019    Procedure: BIOPSY, BONE MARROW;  Surgeon: Satinder Diagnostic Provider;  Location: [facility] OR;  Service: Interventional Radiology;  Laterality: N/A;    COLONOSCOPY N/A 11/05/2021    Procedure: COLONOSCOPY;  Surgeon: Rob Collins MD;  Location: [facility] ENDO;  Service: Endoscopy;  Laterality: N/A;    COLONOSCOPY  12/29/2023    5 YR RECALL    ESOPHAGOGASTRODUODENOSCOPY N/A 11/05/2021    Procedure: EGD (ESOPHAGOGASTRODUODENOSCOPY);  Surgeon: Rob Collins MD;  Location: [facility] ENDO;  Service: Endoscopy;  Laterality: N/A;    INJECTION OF ANESTHETIC AGENT AROUND MEDIAL BRANCH NERVES INNERVATING LUMBAR FACET JOINT Bilateral 05/25/2018    Procedure: BLOCK-NERVE-MEDIAL BRANCH-LUMBAR;  Surgeon: Nura Heredia MD;  Location: [facility];  Service: Pain Management;  Laterality: Bilateral;  L3, 4, 5    KNEE ARTHROSCOPY W/ MENISCECTOMY Right 12/22/2021    Procedure: ARTHROSCOPY, KNEE, WITH MENISCECTOMY;  Surgeon: Sebastian Green MD;  Location: [facility] OR;  Service: Orthopedics;  Laterality: Right;  partial medial meniscectomy    RADIOFREQUENCY ABLATION OF LUMBAR MEDIAL BRANCH NERVE AT SINGLE LEVEL Bilateral 07/20/2018    Procedure: RADIOFREQUENCY ABLATION, NERVE, MEDIAL BRANCH, LUMBAR, 1 LEVEL;  Surgeon: Nura Heredia MD;  Location: [facility];  Service: Pain Management;  Laterality: Bilateral;  L3, 4, 5 - Burned at 80 degrees C.  for 75 seconds x 2 each site    SMALL BOWEL ENTEROSCOPY N/A 10/16/2019    Procedure: ENTEROSCOPY;  Surgeon: Rob Collins MD;  Location: [facility] ENDO;  Service: Endoscopy;  Laterality: N/A;     Family History   Problem Relation Name Age of Onset    Arthritis Mother      Depression Mother      Heart disease Mother      Hypertension Mother      Heart attack Father  59       SUBJECTIVE     Review of systems:  10  "system review unremarkable.  As in HPI.     OBJECTIVE   Temp:  [101.5 °F (38.6 °C)-103 °F (39.4 °C)] 101.5 °F (38.6 °C)  Pulse:  [110-132] 117  Resp:  [25-41] 36  SpO2:  [89 %-100 %] 92 %  BP: ()/(51-67) 113/67  Temp:  [101.5 °F (38.6 °C)-103 °F (39.4 °C)]   Temp: (!) 101.5 °F (38.6 °C) (06/18/25 1100)  Pulse: (!) 117 (06/18/25 1600)  Resp: (!) 36 (06/18/25 1600)  BP: 113/67 (06/18/25 1600)  SpO2: (!) 92 % (06/18/25 1600)    Intake/Output Summary (Last 24 hours) at 6/18/2025 1616  Last data filed at 6/18/2025 1600  Gross per 24 hour   Intake --   Output 225 ml   Net -225 ml       Physical Exam  General:  Elderly man who is acutely ill looking.  Tachycardic  HEENT:  Pale.  No oral thrush, no cervical adenopathy.  Upper and lower dentures.  CVS: S1 and 2 heard, no murmurs appreciated   Respiratory:  Clear to auscultation anteriorly.  Reduced breath sounds right mid and lower zone posteriorly   Abdomen: Full, soft, nontender, no palpable organomegaly   Skin: No rash appreciated   CNS: No focal deficits   Musculoskeletal: No joint or bony abnormalities appreciated  Psych: Good mood, normal affect.    VAD:  ISOLATION: Airborne and Contact and Enhanced Respiratory     Wounds:  None    Significant Labs: All pertinent labs within the past 24 hours have been reviewed.    CBC LAST 7  Recent Labs   Lab 06/18/25  0843   WBC 1.02*   RBC 2.65*   HGB 7.3*   HCT 22.9*   MCV 86   MCH 27.5   MCHC 31.9*   RDW 17.7*   PLT 62*   MPV 9.6   NRBC 0       CHEMISTRY LAST 7  Recent Labs   Lab 06/18/25  0843      K 4.0      CO2 21*   ANIONGAP 12   BUN 55*   CREATININE 4.6*   GLU 95   CALCIUM 8.9   MG 1.4*   ALBUMIN 4.0   PROT 7.5   ALKPHOS 45*   ALT 19   AST 23   BILITOT 0.6       Estimated Creatinine Clearance: 18.3 mL/min (A) (based on SCr of 4.6 mg/dL (H)).    INFLAMMATORY/PROCAL  LAST 7  No results for input(s): "PROCAL", "ESR", "CRP" in the last 168 hours.  No results found for: "ESR"  CRP   Date Value Ref Range Status "   12/04/2024 0.40 <1.00 mg/dL Final     Comment:     CRP-Normal Application expected values:   <1.0        mg/dL   Normal Range  1.0 - 5.0  mg/dL   Indicates mild inflammation  5.0 - 10.0 mg/dL   Indicates severe inflammation  >10.0        mg/dL   Represents serious processes and   frequently         indicates the presence of a bacterial   infection.          PRIOR  MICROBIOLOGY:  Reviewed    No results found for the last 90 days.    LAST 7 DAYS MICROBIOLOGY   Microbiology Results (last 7 days)       Procedure Component Value Units Date/Time    Blood culture x two cultures. Draw prior to antibiotics. [[id number]]  (Normal) Collected: 06/18/25 0930    Order Status: Completed Specimen: Blood Updated: 06/18/25 1602     CULTURE, BLOOD ([facility]) No Growth After 6 Hours    Blood culture x two cultures. Draw prior to antibiotics. [[id number]]  (Normal) Collected: 06/18/25 0932    Order Status: Completed Specimen: Blood Updated: 06/18/25 1602     CULTURE, BLOOD ([facility]) No Growth After 6 Hours    Respiratory Infection Panel (PCR), Nasopharyngeal [[id number]] Collected: 06/18/25 1027    Order Status: Completed Specimen: Nasopharyngeal Swab Updated: 06/18/25 1209     Respiratory Infection Panel Source Nasopharyngeal Swab     Adenovirus Not Detected     Coronavirus 229E, Common Cold Virus Not Detected     Coronavirus HKU1, Common Cold Virus Not Detected     Coronavirus NL63, Common Cold Virus Not Detected     Coronavirus OC43, Common Cold Virus Not Detected     SARS-CoV2 (COVID-19) Qualitative PCR Not Detected     Human Metapneumovirus Not Detected     Human Rhinovirus/Enterovirus Not Detected     Influenza A (subtypes H1, H1-2009,H3) Not Detected     Influenza B Not Detected     Parainfluenza Virus 1 Not Detected     Parainfluenza Virus 2 Not Detected     Parainfluenza Virus 3 Not Detected     Parainfluenza Virus 4 Not Detected     Respiratory Syncytial Virus Not Detected     Bordetella Parapertussis (SW6040) Not Detected      Bordetella pertussis (ptxP) Not Detected     Chlamydia pneumoniae Not Detected     Mycoplasma pneumoniae Not Detected            CURRENT/PREVIOUS VISIT EKG  Results for orders placed or performed during the hospital encounter of 06/18/25   EKG 12-lead    Collection Time: 06/18/25  8:25 AM   Result Value Ref Range    QRS Duration 88 ms    [facility] QTC Calculation 441 ms    Narrative    Test Reason : R07.9,    Vent. Rate : 127 BPM     Atrial Rate : 127 BPM     P-R Int : 154 ms          QRS Dur :  88 ms      QT Int : 304 ms       P-R-T Axes :  58  54 -11 degrees    QTcB Int : 441 ms    Sinus tachycardia  LVH with repolarization abnormality ( Sokolow-Gomez )  Abnormal ECG  No previous ECGs available    Referred By: AAAREFERRAL SELF           Confirmed By:      Significant Imaging: I have reviewed all relevant and available imaging results/findings within the past 24 hours.    I spent a total of 70 minutes on the day of the visit.This includes face to face time and non-face to face time preparing to see the patient (eg, review of tests), obtaining and/or reviewing separately obtained history, documenting clinical information in the electronic or other health record, independently interpreting results and communicating results to the patient/family/caregiver, or care coordinator.    Aris Cervantes MD  Date of Service: 06/18/2025      This note was created using [employer] voice recognition software that occasionally misinterpreted phrases or words.

## 2025-06-19 ENCOUNTER — CLINICAL SUPPORT (OUTPATIENT)
Dept: CARDIOLOGY | Facility: HOSPITAL | Age: 65
End: 2025-06-19
Attending: EMERGENCY MEDICINE
Payer: MEDICARE

## 2025-06-19 LAB
ABO + RH BLD: NORMAL
ABO + RH BLD: NORMAL
ABORH RETYPE: NORMAL
ABSOLUTE EOSINOPHIL (SMH): 0 K/UL
ABSOLUTE MONOCYTE (SMH): 0.53 K/UL (ref 0.3–1)
ABSOLUTE NEUTROPHIL COUNT (SMH): 3.4 K/UL (ref 1.8–7.7)
ALBUMIN SERPL-MCNC: 3.3 G/DL (ref 3.5–5.2)
ALP SERPL-CCNC: 35 UNIT/L (ref 55–135)
ALT SERPL-CCNC: 17 UNIT/L (ref 10–44)
ANION GAP (SMH): 8 MMOL/L (ref 8–16)
ANION GAP (SMH): 9 MMOL/L (ref 8–16)
ANISOCYTOSIS BLD QL SMEAR: SLIGHT
AORTIC ROOT ANNULUS: 3.3 CM
AORTIC VALVE CUSP SEPERATION: 2 CM
APICAL FOUR CHAMBER EJECTION FRACTION: 33 %
APICAL TWO CHAMBER EJECTION FRACTION: 30 %
AST SERPL-CCNC: 25 UNIT/L (ref 10–40)
AV INDEX (PROSTH): 0.71
AV MEAN GRADIENT: 5 MMHG
AV PEAK GRADIENT: 9 MMHG
AV VALVE AREA BY VELOCITY RATIO: 3.3 CM²
AV VALVE AREA: 3.2 CM²
AV VELOCITY RATIO: 0.73
BACTERIA SPEC CULT: NORMAL
BASOPHILS # BLD AUTO: 0.01 K/UL
BASOPHILS NFR BLD AUTO: 0.2 %
BILIRUB SERPL-MCNC: 1.1 MG/DL (ref 0.1–1)
BLD PROD TYP BPU: NORMAL
BLD PROD TYP BPU: NORMAL
BLOOD UNIT EXPIRATION DATE: NORMAL
BLOOD UNIT EXPIRATION DATE: NORMAL
BLOOD UNIT TYPE CODE: 9500
BLOOD UNIT TYPE CODE: 9500
BSA FOR ECHO PROCEDURE: 2.14 M2
BUN SERPL-MCNC: 54 MG/DL (ref 8–23)
BUN SERPL-MCNC: 57 MG/DL (ref 8–23)
CALCIUM SERPL-MCNC: 8.1 MG/DL (ref 8.7–10.5)
CALCIUM SERPL-MCNC: 8.3 MG/DL (ref 8.7–10.5)
CHLORIDE SERPL-SCNC: 108 MMOL/L (ref 95–110)
CHLORIDE SERPL-SCNC: 112 MMOL/L (ref 95–110)
CO2 SERPL-SCNC: 19 MMOL/L (ref 23–29)
CO2 SERPL-SCNC: 21 MMOL/L (ref 23–29)
CREAT SERPL-MCNC: 4.1 MG/DL (ref 0.5–1.4)
CREAT SERPL-MCNC: 4.2 MG/DL (ref 0.5–1.4)
CROSSMATCH INTERPRETATION: NORMAL
CROSSMATCH INTERPRETATION: NORMAL
CV ECHO LV RWT: 0.42 CM
DISPENSE STATUS: NORMAL
DISPENSE STATUS: NORMAL
DOP CALC AO PEAK VEL: 1.5 M/S
DOP CALC AO VTI: 24.8 CM
DOP CALC LVOT AREA: 4.5 CM2
DOP CALC LVOT DIAMETER: 2.4 CM
DOP CALC LVOT PEAK VEL: 1.1 M/S
DOP CALC LVOT STROKE VOLUME: 80 CM3
DOP CALC MV VTI: 65.6 CM
DOP CALCLVOT PEAK VEL VTI: 17.7 CM
E WAVE DECELERATION TIME: 144 MSEC
E/A RATIO: 0.9
E/E' RATIO: 6 M/S
ECHO LV POSTERIOR WALL: 1.1 CM (ref 0.6–1.1)
ERYTHROCYTE [DISTWIDTH] IN BLOOD BY AUTOMATED COUNT: 17.2 % (ref 11.5–14.5)
ERYTHROCYTE [DISTWIDTH] IN BLOOD BY AUTOMATED COUNT: 17.6 % (ref 11.5–14.5)
ERYTHROCYTE [DISTWIDTH] IN BLOOD BY AUTOMATED COUNT: 17.8 % (ref 11.5–14.5)
FRACTIONAL SHORTENING: 18.9 % (ref 28–44)
GFR SERPLBLD CREATININE-BSD FMLA CKD-EPI: 15 ML/MIN/1.73/M2
GFR SERPLBLD CREATININE-BSD FMLA CKD-EPI: 15 ML/MIN/1.73/M2
GLUCOSE SERPL-MCNC: 111 MG/DL (ref 70–110)
GLUCOSE SERPL-MCNC: 92 MG/DL (ref 70–110)
GRAM STAIN (RESPIRATORY) (SMH): NORMAL
GRAM STAIN (RESPIRATORY) (SMH): NORMAL
HCT VFR BLD AUTO: 20.4 % (ref 40–54)
HCT VFR BLD AUTO: 20.7 % (ref 40–54)
HCT VFR BLD AUTO: 23.6 % (ref 40–54)
HGB BLD-MCNC: 6.7 GM/DL (ref 14–18)
HGB BLD-MCNC: 6.8 GM/DL (ref 14–18)
HGB BLD-MCNC: 7.6 GM/DL (ref 14–18)
IMM GRANULOCYTES # BLD AUTO: 0.03 K/UL (ref 0–0.04)
IMM GRANULOCYTES NFR BLD AUTO: 0.7 % (ref 0–0.5)
INTERVENTRICULAR SEPTUM: 1.1 CM (ref 0.6–1.1)
IVC DIAMETER: 2.2 CM
LEFT ATRIUM AREA SYSTOLIC (APICAL 2 CHAMBER): 24.9 CM2
LEFT ATRIUM AREA SYSTOLIC (APICAL 4 CHAMBER): 22 CM2
LEFT ATRIUM VOLUME INDEX MOD: 32 ML/M2
LEFT ATRIUM VOLUME MOD: 67 ML
LEFT INTERNAL DIMENSION IN SYSTOLE: 4.3 CM (ref 2.1–4)
LEFT VENTRICLE DIASTOLIC VOLUME INDEX: 64.59 ML/M2
LEFT VENTRICLE DIASTOLIC VOLUME: 135 ML
LEFT VENTRICLE END DIASTOLIC VOLUME APICAL 2 CHAMBER: 102 ML
LEFT VENTRICLE END DIASTOLIC VOLUME APICAL 4 CHAMBER: 126 ML
LEFT VENTRICLE END SYSTOLIC VOLUME APICAL 2 CHAMBER: 72.9 ML
LEFT VENTRICLE END SYSTOLIC VOLUME APICAL 4 CHAMBER: 58.8 ML
LEFT VENTRICLE MASS INDEX: 109 G/M2
LEFT VENTRICLE SYSTOLIC VOLUME INDEX: 39.7 ML/M2
LEFT VENTRICLE SYSTOLIC VOLUME: 83 ML
LEFT VENTRICULAR INTERNAL DIMENSION IN DIASTOLE: 5.3 CM (ref 3.5–6)
LEFT VENTRICULAR MASS: 227.7 G
LV LATERAL E/E' RATIO: 5.2 M/S
LV SEPTAL E/E' RATIO: 6.1 M/S
LVED V (TEICH): 135 ML
LVES V (TEICH): 83.1 ML
LVOT MG: 3 MMHG
LVOT MV: 0.73 CM/S
LYMPHOCYTES # BLD AUTO: 0.58 K/UL (ref 1–4.8)
LYMPHOCYTES NFR BLD MANUAL: 20 % (ref 18–48)
MAGNESIUM SERPL-MCNC: 2 MG/DL (ref 1.6–2.6)
MAGNESIUM SERPL-MCNC: 2.2 MG/DL (ref 1.6–2.6)
MCH RBC QN AUTO: 27.3 PG (ref 27–31)
MCH RBC QN AUTO: 27.9 PG (ref 27–31)
MCH RBC QN AUTO: 27.9 PG (ref 27–31)
MCHC RBC AUTO-ENTMCNC: 32.2 G/DL (ref 32–36)
MCHC RBC AUTO-ENTMCNC: 32.8 G/DL (ref 32–36)
MCHC RBC AUTO-ENTMCNC: 32.9 G/DL (ref 32–36)
MCV RBC AUTO: 85 FL (ref 82–98)
METAMYELOCYTES NFR BLD MANUAL: 6 %
MONOCYTES NFR BLD MANUAL: 14 % (ref 4–15)
MR PISA EROA: 0.09 CM2
MV MEAN GRADIENT: 25 MMHG
MV PEAK A VEL: 0.81 M/S
MV PEAK E VEL: 0.73 M/S
MV PEAK GRADIENT: 29 MMHG
MV VALVE AREA BY CONTINUITY EQUATION: 1.22 CM2
MYELOCYTES NFR BLD MANUAL: 1 %
NEUTROPHILS NFR BLD MANUAL: 28 % (ref 38–73)
NEUTS BAND NFR BLD MANUAL: 31 %
NUCLEATED RBC (/100WBC) (SMH): 0 /100 WBC
NUCLEATED RBC (/100WBC) (SMH): 0 /100 WBC
OHS LV EJECTION FRACTION SIMPSONS BIPLANE MOD: 31 %
PHOSPHATE SERPL-MCNC: 4.1 MG/DL (ref 2.7–4.5)
PISA MRMAX VEL: 4.85 M/S
PISA RADIUS: 0.5 CM
PISA TR MAX VEL: 3 M/S
PISA VN NYQUIST MS: 0.27 M/S
PISA VN NYQUIST: 0.27 M/S
PLATELET # BLD AUTO: 46 K/UL (ref 150–450)
PLATELET # BLD AUTO: 47 K/UL (ref 150–450)
PLATELET # BLD AUTO: 47 K/UL (ref 150–450)
PLATELET BLD QL SMEAR: ABNORMAL
PMV BLD AUTO: 10.3 FL (ref 9.2–12.9)
PMV BLD AUTO: ABNORMAL FL
PMV BLD AUTO: ABNORMAL FL
POLYCHROMASIA BLD QL SMEAR: ABNORMAL
POTASSIUM SERPL-SCNC: 4.1 MMOL/L (ref 3.5–5.1)
POTASSIUM SERPL-SCNC: 4.9 MMOL/L (ref 3.5–5.1)
PROT 24H UR-MRATE: 2450 MG/SPEC (ref 6–100)
PROT SERPL-MCNC: 6.3 GM/DL (ref 6–8.4)
PROT UR-MCNC: 98 MG/DL
PV MV: 0.81 M/S
PV PEAK GRADIENT: 5 MMHG
PV PEAK VELOCITY: 1.17 M/S
RBC # BLD AUTO: 2.4 M/UL (ref 4.6–6.2)
RBC # BLD AUTO: 2.44 M/UL (ref 4.6–6.2)
RBC # BLD AUTO: 2.78 M/UL (ref 4.6–6.2)
RELATIVE EOSINOPHIL (SMH): 0 % (ref 0–8)
RELATIVE LYMPHOCYTE (SMH): 12.7 % (ref 18–48)
RELATIVE MONOCYTE (SMH): 11.6 % (ref 4–15)
RELATIVE NEUTROPHIL (SMH): 74.8 % (ref 38–73)
RIGHT ATRIUM END SYSTOLIC VOLUME APICAL 4 CHAMBER INDEX BSA: 10.33 ML/M2
RIGHT ATRIUM VOLUME AREA LENGTH APICAL 4 CHAMBER: 21.6 ML
RV TISSUE DOPPLER FREE WALL SYSTOLIC VELOCITY 1 (APICAL 4 CHAMBER VIEW): 13.2 CM/S
SODIUM SERPL-SCNC: 138 MMOL/L (ref 136–145)
SODIUM SERPL-SCNC: 139 MMOL/L (ref 136–145)
TDI LATERAL: 0.14 M/S
TDI SEPTAL: 0.12 M/S
TDI: 0.13 M/S
TOTAL HOURS OF COLLECTION (SMH): 1800 HR
TOTAL VOLUME  (SMH): 2500 ML
TR MAX PG: 36 MMHG
TRICUSPID ANNULAR PLANE SYSTOLIC EXCURSION: 3.4 CM
TROPONIN HIGH SENSITIVE (SMH): 144.1 PG/ML
UNIT NUMBER: NORMAL
UNIT NUMBER: NORMAL
VANCOMYCIN SERPL-MCNC: 15.3 UG/ML (ref ?–80)
WBC # BLD AUTO: 2.38 K/UL (ref 3.9–12.7)
WBC # BLD AUTO: 2.85 K/UL (ref 3.9–12.7)
WBC # BLD AUTO: 4.57 K/UL (ref 3.9–12.7)
Z-SCORE OF LEFT VENTRICULAR DIMENSION IN END DIASTOLE: -1.96
Z-SCORE OF LEFT VENTRICULAR DIMENSION IN END SYSTOLE: 0.69

## 2025-06-19 PROCEDURE — 20000000 HC ICU ROOM

## 2025-06-19 PROCEDURE — 63600175 PHARM REV CODE 636 W HCPCS: Performed by: INTERNAL MEDICINE

## 2025-06-19 PROCEDURE — 86920 COMPATIBILITY TEST SPIN: CPT | Performed by: EMERGENCY MEDICINE

## 2025-06-19 PROCEDURE — 99233 SBSQ HOSP IP/OBS HIGH 50: CPT | Mod: ,,, | Performed by: INTERNAL MEDICINE

## 2025-06-19 PROCEDURE — 94640 AIRWAY INHALATION TREATMENT: CPT

## 2025-06-19 PROCEDURE — 84484 ASSAY OF TROPONIN QUANT: CPT

## 2025-06-19 PROCEDURE — 25000003 PHARM REV CODE 250: Performed by: INTERNAL MEDICINE

## 2025-06-19 PROCEDURE — 87070 CULTURE OTHR SPECIMN AEROBIC: CPT | Performed by: INTERNAL MEDICINE

## 2025-06-19 PROCEDURE — 93306 TTE W/DOPPLER COMPLETE: CPT

## 2025-06-19 PROCEDURE — 83735 ASSAY OF MAGNESIUM: CPT | Performed by: INTERNAL MEDICINE

## 2025-06-19 PROCEDURE — P9016 RBC LEUKOCYTES REDUCED: HCPCS | Performed by: EMERGENCY MEDICINE

## 2025-06-19 PROCEDURE — 84100 ASSAY OF PHOSPHORUS: CPT | Performed by: INTERNAL MEDICINE

## 2025-06-19 PROCEDURE — 27000207 HC ISOLATION

## 2025-06-19 PROCEDURE — 27000221 HC OXYGEN, UP TO 24 HOURS

## 2025-06-19 PROCEDURE — 85007 BL SMEAR W/DIFF WBC COUNT: CPT | Performed by: INTERNAL MEDICINE

## 2025-06-19 PROCEDURE — 93005 ELECTROCARDIOGRAM TRACING: CPT | Performed by: GENERAL PRACTICE

## 2025-06-19 PROCEDURE — 94761 N-INVAS EAR/PLS OXIMETRY MLT: CPT

## 2025-06-19 PROCEDURE — 80202 ASSAY OF VANCOMYCIN: CPT | Performed by: INTERNAL MEDICINE

## 2025-06-19 PROCEDURE — 85025 COMPLETE CBC W/AUTO DIFF WBC: CPT | Performed by: INTERNAL MEDICINE

## 2025-06-19 PROCEDURE — 36415 COLL VENOUS BLD VENIPUNCTURE: CPT

## 2025-06-19 PROCEDURE — 82310 ASSAY OF CALCIUM: CPT | Performed by: INTERNAL MEDICINE

## 2025-06-19 PROCEDURE — 99900031 HC PATIENT EDUCATION (STAT)

## 2025-06-19 PROCEDURE — 99499 UNLISTED E&M SERVICE: CPT | Mod: ,,, | Performed by: INTERNAL MEDICINE

## 2025-06-19 PROCEDURE — 80053 COMPREHEN METABOLIC PANEL: CPT | Performed by: INTERNAL MEDICINE

## 2025-06-19 PROCEDURE — 51798 US URINE CAPACITY MEASURE: CPT

## 2025-06-19 PROCEDURE — 84156 ASSAY OF PROTEIN URINE: CPT | Performed by: EMERGENCY MEDICINE

## 2025-06-19 PROCEDURE — 93306 TTE W/DOPPLER COMPLETE: CPT | Mod: 26,,, | Performed by: GENERAL PRACTICE

## 2025-06-19 PROCEDURE — 99900035 HC TECH TIME PER 15 MIN (STAT)

## 2025-06-19 PROCEDURE — 36415 COLL VENOUS BLD VENIPUNCTURE: CPT | Performed by: INTERNAL MEDICINE

## 2025-06-19 PROCEDURE — 36430 TRANSFUSION BLD/BLD COMPNT: CPT

## 2025-06-19 PROCEDURE — 85027 COMPLETE CBC AUTOMATED: CPT | Performed by: INTERNAL MEDICINE

## 2025-06-19 PROCEDURE — 25000242 PHARM REV CODE 250 ALT 637 W/ HCPCS: Performed by: INTERNAL MEDICINE

## 2025-06-19 PROCEDURE — 93010 ELECTROCARDIOGRAM REPORT: CPT | Mod: ,,, | Performed by: GENERAL PRACTICE

## 2025-06-19 RX ORDER — MORPHINE SULFATE 2 MG/ML
2 INJECTION, SOLUTION INTRAMUSCULAR; INTRAVENOUS ONCE
Status: DISCONTINUED | OUTPATIENT
Start: 2025-06-19 | End: 2025-06-28 | Stop reason: HOSPADM

## 2025-06-19 RX ORDER — HYDROCODONE BITARTRATE AND ACETAMINOPHEN 500; 5 MG/1; MG/1
TABLET ORAL
Status: DISCONTINUED | OUTPATIENT
Start: 2025-06-19 | End: 2025-06-28 | Stop reason: HOSPADM

## 2025-06-19 RX ORDER — DIGOXIN 0.25 MG/ML
125 INJECTION INTRAMUSCULAR; INTRAVENOUS ONCE
Status: COMPLETED | OUTPATIENT
Start: 2025-06-19 | End: 2025-06-19

## 2025-06-19 RX ORDER — TAMSULOSIN HYDROCHLORIDE 0.4 MG/1
0.4 CAPSULE ORAL DAILY
Status: DISCONTINUED | OUTPATIENT
Start: 2025-06-19 | End: 2025-06-28 | Stop reason: HOSPADM

## 2025-06-19 RX ORDER — METOPROLOL TARTRATE 1 MG/ML
5 INJECTION, SOLUTION INTRAVENOUS ONCE
Status: COMPLETED | OUTPATIENT
Start: 2025-06-19 | End: 2025-06-19

## 2025-06-19 RX ORDER — CALCIUM GLUCONATE 20 MG/ML
1 INJECTION, SOLUTION INTRAVENOUS ONCE
Status: COMPLETED | OUTPATIENT
Start: 2025-06-19 | End: 2025-06-19

## 2025-06-19 RX ORDER — LEVALBUTEROL INHALATION SOLUTION 1.25 MG/3ML
1.25 SOLUTION RESPIRATORY (INHALATION)
Status: DISCONTINUED | OUTPATIENT
Start: 2025-06-20 | End: 2025-06-22

## 2025-06-19 RX ADMIN — VANCOMYCIN HYDROCHLORIDE 500 MG: 500 INJECTION, POWDER, LYOPHILIZED, FOR SOLUTION INTRAVENOUS at 11:06

## 2025-06-19 RX ADMIN — CEFEPIME 1 G: 1 INJECTION, POWDER, FOR SOLUTION INTRAMUSCULAR; INTRAVENOUS at 07:06

## 2025-06-19 RX ADMIN — DOXYCYCLINE 100 MG: 100 CAPSULE ORAL at 09:06

## 2025-06-19 RX ADMIN — CALCIUM GLUCONATE 1 G: 20 INJECTION, SOLUTION INTRAVENOUS at 09:06

## 2025-06-19 RX ADMIN — DIGOXIN 125 MCG: 0.25 INJECTION INTRAMUSCULAR; INTRAVENOUS at 10:06

## 2025-06-19 RX ADMIN — MUPIROCIN 1 G: 20 OINTMENT TOPICAL at 07:06

## 2025-06-19 RX ADMIN — HYDROCODONE BITARTRATE AND ACETAMINOPHEN 1 TABLET: 5; 325 TABLET ORAL at 09:06

## 2025-06-19 RX ADMIN — SODIUM CHLORIDE: 9 INJECTION, SOLUTION INTRAVENOUS at 05:06

## 2025-06-19 RX ADMIN — GUAIFENESIN AND DEXTROMETHORPHAN HYDROBROMIDE 1 TABLET: 600; 30 TABLET, EXTENDED RELEASE ORAL at 09:06

## 2025-06-19 RX ADMIN — LEVALBUTEROL HYDROCHLORIDE 1.25 MG: 1.25 SOLUTION RESPIRATORY (INHALATION) at 03:06

## 2025-06-19 RX ADMIN — FOLIC ACID 2000 MCG: 1 TABLET ORAL at 07:06

## 2025-06-19 RX ADMIN — LEVALBUTEROL HYDROCHLORIDE 1.25 MG: 1.25 SOLUTION RESPIRATORY (INHALATION) at 07:06

## 2025-06-19 RX ADMIN — TAMSULOSIN HYDROCHLORIDE 0.4 MG: 0.4 CAPSULE ORAL at 11:06

## 2025-06-19 RX ADMIN — PANTOPRAZOLE SODIUM 40 MG: 40 TABLET, DELAYED RELEASE ORAL at 05:06

## 2025-06-19 RX ADMIN — DOXYCYCLINE 100 MG: 100 CAPSULE ORAL at 07:06

## 2025-06-19 RX ADMIN — GUAIFENESIN AND DEXTROMETHORPHAN HYDROBROMIDE 1 TABLET: 600; 30 TABLET, EXTENDED RELEASE ORAL at 07:06

## 2025-06-19 RX ADMIN — METOROPROLOL TARTRATE 5 MG: 5 INJECTION, SOLUTION INTRAVENOUS at 07:06

## 2025-06-19 RX ADMIN — TRAZODONE HYDROCHLORIDE 100 MG: 50 TABLET ORAL at 09:06

## 2025-06-19 RX ADMIN — MUPIROCIN 1 G: 20 OINTMENT TOPICAL at 09:06

## 2025-06-19 NOTE — CONSULTS
[facility]  Department of Cardiology  Consult Note      PATIENT NAME: Rick Butler  MRN: [id number]  TODAY'S DATE: 06/19/2025  ADMIT DATE: 6/18/2025                          CONSULT REQUESTED BY: Feliciano Moran MD    SUBJECTIVE     PRINCIPAL PROBLEM: Severe sepsis      REASON FOR CONSULT:  Elevated troponin      HPI:  65yoF with history of Sickle cell trait, former alcohol abuse, myelodysplasia, former smoker, and depression presented with shortness of breath, fatigue, and cough for one day found to be hypoxic and septic with severe pneumonia and a neutropenic fever.    Troponin HS 49.9-> 58.7-> 123.2  Hgb 6.7, sCr 4.2  Last echo and stress test10/06/2023 showed LVEF 55-60% and no evidence of myocardia ischemia.  Resp panel and other infectious testing negative at this time.      From hospitalist H&P:  HPI: 65 year old pt getting admitted with Sepsis/Severe pneumonia/Neutropenic fever  Pt was suffering from URTI like infection for past several days  Later he suffered from severe SOB/cough  Today symptoms went worse, came to ER found to be hypoxic and got admitted     Review of patient's allergies indicates:  No Known Allergies    Past Medical History:   Diagnosis Date    Abnormal chest CT (new) 08/17/2022    Anemia due to multiple mechanisms 01/13/2019    Anemia, chronic renal failure, stage 2 (mild) 01/13/2019    Anemia, chronic renal failure, stage 3 (moderate) 08/02/2023    Depression     Former smoker 06/29/2017    H/O ETOH abuse 06/29/2017    Lung mass (new) 08/17/2022    Monocytosis 2019    Myelodysplasia (myelodysplastic syndrome)     Myelodysplasia (myelodysplastic syndrome)     Personal history of colonic polyps 12/29/2023    RARS (refractory anemia with ringed sideroblasts) 06/01/2020    Sickle cell trait      Past Surgical History:   Procedure Laterality Date    BONE MARROW BIOPSY N/A 12/20/2019    Procedure: BIOPSY, BONE MARROW;  Surgeon: Dosc Diagnostic Provider;  Location: [facility] OR;  Service:  Interventional Radiology;  Laterality: N/A;    COLONOSCOPY N/A 11/05/2021    Procedure: COLONOSCOPY;  Surgeon: Rob Collins MD;  Location: [facility];  Service: Endoscopy;  Laterality: N/A;    COLONOSCOPY  12/29/2023    5 YR RECALL    ESOPHAGOGASTRODUODENOSCOPY N/A 11/05/2021    Procedure: EGD (ESOPHAGOGASTRODUODENOSCOPY);  Surgeon: Rob Collins MD;  Location: [facility];  Service: Endoscopy;  Laterality: N/A;    INJECTION OF ANESTHETIC AGENT AROUND MEDIAL BRANCH NERVES INNERVATING LUMBAR FACET JOINT Bilateral 05/25/2018    Procedure: BLOCK-NERVE-MEDIAL BRANCH-LUMBAR;  Surgeon: Nura Heredia MD;  Location: [facility] OR;  Service: Pain Management;  Laterality: Bilateral;  L3, 4, 5    KNEE ARTHROSCOPY W/ MENISCECTOMY Right 12/22/2021    Procedure: ARTHROSCOPY, KNEE, WITH MENISCECTOMY;  Surgeon: Sebastian Green MD;  Location: [facility] OR;  Service: Orthopedics;  Laterality: Right;  partial medial meniscectomy    RADIOFREQUENCY ABLATION OF LUMBAR MEDIAL BRANCH NERVE AT SINGLE LEVEL Bilateral 07/20/2018    Procedure: RADIOFREQUENCY ABLATION, NERVE, MEDIAL BRANCH, LUMBAR, 1 LEVEL;  Surgeon: Nura Heredia MD;  Location: [facility] OR;  Service: Pain Management;  Laterality: Bilateral;  L3, 4, 5 - Burned at 80 degrees C.  for 75 seconds x 2 each site    SMALL BOWEL ENTEROSCOPY N/A 10/16/2019    Procedure: ENTEROSCOPY;  Surgeon: Rob Collins MD;  Location: [facility];  Service: Endoscopy;  Laterality: N/A;     Social History[1]     REVIEW OF SYSTEMS    As mentioned in HPI    OBJECTIVE     VITAL SIGNS (Most Recent)  Temp: 98.3 °F (36.8 °C) (06/19/25 0701)  Pulse: 97 (06/19/25 0726)  Resp: (!) 24 (06/19/25 0726)  BP: 112/64 (06/19/25 0701)  SpO2: 100 % (06/19/25 0726)    VENTILATION STATUS  Resp: (!) 24 (06/19/25 0726)  SpO2: 100 % (06/19/25 0726)           I & O (Last 24H):  Intake/Output Summary (Last 24 hours) at 6/19/2025 0835  Last data filed at 6/19/2025 0730  Gross per 24 hour   Intake 1631.41 ml   Output 1540 ml   Net 91.41 ml        WEIGHTS  Wt Readings from Last 3 Encounters:   06/18/25 1700 93.7 kg (206 lb 9.1 oz)   06/18/25 0835 96.2 kg (212 lb)   06/05/25 1547 96.2 kg (212 lb)   06/05/25 0719 96.6 kg (213 lb)       PHYSICAL EXAM    CONSTITUTIONAL: NAD  HEENT: Normocephalic. No pallor  NECK: no JVD, no carotid bruit  LUNGS: CTA b/l  HEART: regular rate and rhythm, S1, S2 normal, no murmur   ABDOMEN: soft, non-tender, bowel sounds normal. No bruit  EXTREMITIES: No edema. Pulses intact.  SKIN: No rash  NEURO: AAO X 3  PSYCH: normal affect      HOME MEDICATIONS:Medications Ordered Prior to Encounter[2]    SCHEDULED MEDS:   [START ON 6/24/2025] calcitRIOL  0.25 mcg Oral Q7 Days    ceFEPime IV (PEDS and ADULTS)  1 g Intravenous Q12H    dextromethorphan-guaiFENesin  mg  1 tablet Oral BID    doxycycline  100 mg Oral Q12H    folic acid  2,000 mcg Oral Daily    levalbuterol  1.25 mg Nebulization Q8H    mupirocin   Nasal BID    pantoprazole  40 mg Oral Daily    traZODone  100 mg Oral QHS       CONTINUOUS INFUSIONS:   0.9% NaCl   Intravenous Continuous 125 mL/hr at 06/19/25 0701 Rate Verify at 06/19/25 0701    NORepinephrine bitartrate-D5W  0-3 mcg/kg/min Intravenous Continuous           PRN MEDS:  Current Facility-Administered Medications:     0.9%  NaCl infusion (for blood administration), , Intravenous, Q24H PRN    acetaminophen, 650 mg, Oral, Q8H PRN    acetaminophen, 650 mg, Oral, Q4H PRN    aluminum-magnesium hydroxide-simethicone, 30 mL, Oral, QID PRN    benzonatate, 200 mg, Oral, TID PRN    HYDROcodone-acetaminophen, 1 tablet, Oral, Q6H PRN    magnesium oxide, 800 mg, Oral, PRN    magnesium oxide, 800 mg, Oral, PRN    melatonin, 6 mg, Oral, Nightly PRN    naloxone, 0.02 mg, Intravenous, PRN    ondansetron, 4 mg, Intravenous, Q6H PRN    potassium bicarbonate, 35 mEq, Oral, PRN    potassium bicarbonate, 50 mEq, Oral, PRN    potassium bicarbonate, 60 mEq, Oral, PRN    potassium, sodium phosphates, 2 packet, Oral, PRN    potassium,  "sodium phosphates, 2 packet, Oral, PRN    potassium, sodium phosphates, 2 packet, Oral, PRN    Pharmacy to dose Vancomycin consult, , , Once **AND** vancomycin - pharmacy to dose, , Intravenous, pharmacy to manage frequency    LABS AND DIAGNOSTICS     CBC LAST 3 DAYS  Recent Labs   Lab 06/18/25  0843 06/19/25  0301   WBC 1.02* 2.38*   RBC 2.65* 2.40*   HGB 7.3* 6.7*   HCT 22.9* 20.4*   MCV 86 85   MCH 27.5 27.9   MCHC 31.9* 32.8   RDW 17.7* 17.2*   PLT 62* 47*   MPV 9.6 10.3   NRBC 0 0       COAGULATION LAST 3 DAYS  No results for input(s): "LABPT", "INR", "APTT" in the last 168 hours.    CHEMISTRY LAST 3 DAYS  Recent Labs   Lab 06/18/25  0843 06/19/25  0301    138   K 4.0 4.9    108   CO2 21* 21*   ANIONGAP 12 9   BUN 55* 54*   CREATININE 4.6* 4.2*   GLU 95 92   CALCIUM 8.9 8.1*   MG 1.4* 2.0   ALBUMIN 4.0 3.3*   PROT 7.5 6.3   ALKPHOS 45* 35*   ALT 19 17   AST 23 25   BILITOT 0.6 1.1*       CARDIAC PROFILE LAST 3 DAYS  Recent Labs   Lab 06/18/25  0843   *          ENDOCRINE LAST 3 DAYS  No results for input(s): "TSH", "PROCAL" in the last 168 hours.    LAST ARTERIAL BLOOD GAS  ABG  No results for input(s): "PH", "PO2", "PCO2", "HCO3", "BE" in the last 168 hours.    LAST 7 DAYS MICROBIOLOGY   Microbiology Results (last 7 days)       Procedure Component Value Units Date/Time    Culture, Respiratory with Gram Stain [[id number]] Collected: 06/19/25 0734    Order Status: Sent Specimen: Respiratory from Sputum, Expectorated Updated: 06/19/25 0734    MRSA Screen by PCR [[id number]]  (Normal) Collected: 06/18/25 1948    Order Status: Completed Specimen: Nasal Swab Updated: 06/18/25 2115     MRSA PCR SCRN ([facility]) Not Detected    Blood culture x two cultures. Draw prior to antibiotics. [[id number]]  (Normal) Collected: 06/18/25 0930    Order Status: Completed Specimen: Blood Updated: 06/18/25 1602     CULTURE, BLOOD ([facility]) No Growth After 6 Hours    Blood culture x two cultures. Draw prior to " antibiotics. [[id number]]  (Normal) Collected: 06/18/25 0932    Order Status: Completed Specimen: Blood Updated: 06/18/25 1602     CULTURE, BLOOD (H) No Growth After 6 Hours    Respiratory Infection Panel (PCR), Nasopharyngeal [[id number]] Collected: 06/18/25 1027    Order Status: Completed Specimen: Nasopharyngeal Swab Updated: 06/18/25 1209     Respiratory Infection Panel Source Nasopharyngeal Swab     Adenovirus Not Detected     Coronavirus 229E, Common Cold Virus Not Detected     Coronavirus HKU1, Common Cold Virus Not Detected     Coronavirus NL63, Common Cold Virus Not Detected     Coronavirus OC43, Common Cold Virus Not Detected     SARS-CoV2 (COVID-19) Qualitative PCR Not Detected     Human Metapneumovirus Not Detected     Human Rhinovirus/Enterovirus Not Detected     Influenza A (subtypes H1, H1-2009,H3) Not Detected     Influenza B Not Detected     Parainfluenza Virus 1 Not Detected     Parainfluenza Virus 2 Not Detected     Parainfluenza Virus 3 Not Detected     Parainfluenza Virus 4 Not Detected     Respiratory Syncytial Virus Not Detected     Bordetella Parapertussis (ZZ0952) Not Detected     Bordetella pertussis (ptxP) Not Detected     Chlamydia pneumoniae Not Detected     Mycoplasma pneumoniae Not Detected            MOST RECENT IMAGING  X-Ray Chest AP Portable  Narrative: EXAMINATION:  XR CHEST AP PORTABLE    CLINICAL HISTORY:  Generalized weakness;    FINDINGS:  Portable chest at 08:49 is compared to 12/20/2021 shows normal cardiomediastinal silhouette.    There is an alveolar consolidation in the mid and lower lung compatible with pneumonia.  The left lung is clear.  There are no pleural effusions.  Right pulmonary vasculature is normal. No acute osseous abnormality.  Impression: Alveolar consolidations in the right mid and lower lung compatible with pneumonia    Electronically signed by: Alexandra Cerda  Date:    06/18/2025  Time:    09:01      ECHOCARDIOGRAM RESULTS (last 5)  Results for orders  placed during the hospital encounter of 10/06/23    Echo    Interpretation Summary    Left Ventricle: The left ventricle is normal in size. Moderately increased wall thickness. Normal wall motion. There is normal systolic function with a visually estimated ejection fraction of 55 - 60%.    Left Atrium: Left atrium is severely dilated.    Right Ventricle: Right ventricle was not well visualized due to poor acoustic window.    Aortic Valve: The aortic valve is a trileaflet valve. There is mild aortic regurgitation.    Mitral Valve: There is no stenosis. There is mild to moderate regurgitation.    Pulmonic Valve: There is mild regurgitation.    IVC/SVC: Normal venous pressure at 3 mmHg.      CURRENT/PREVIOUS VISIT EKG      Results for orders placed or performed during the hospital encounter of 06/18/25   EKG 12-lead    Collection Time: 06/18/25  8:25 AM   Result Value Ref Range    QRS Duration 88 ms    [facility] QTC Calculation 441 ms    Narrative    Test Reason : R07.9,    Vent. Rate : 127 BPM     Atrial Rate : 127 BPM     P-R Int : 154 ms          QRS Dur :  88 ms      QT Int : 304 ms       P-R-T Axes :  58  54 -11 degrees    QTcB Int : 441 ms    Sinus tachycardia  LVH with repolarization abnormality ( Sokolow-Gomez )  Abnormal ECG  No previous ECGs available  Confirmed by Fam Benoit ([id number]) on 6/18/2025 9:08:26 PM    Referred By: AAAREFERRAL SELF           Confirmed By: Fam Benoit           ASSESSMENT/PLAN:     Active Hospital Problems    Diagnosis    *Severe sepsis    Acute pneumonia    Neutropenic fever    Acute on chronic anemia    Thrombocytopenia    Elevated troponin    Acute kidney injury superimposed on chronic kidney disease    Anemia, chronic renal failure, stage 3 (moderate)    MDS (myelodysplastic syndrome)    RARS (refractory anemia with ringed sideroblasts)    Sickle cell trait syndrome     per hemo         ASSESSMENT & PLAN:     Severe sepsis  Pneumonia  Neutropenic fever  Acute on chronic anemia   Sickle cell trait  Former alcohol abuse  Myelodysplasia syndrome  Former smoker  Depression       RECOMMENDATIONS:    - EKG shows sinus tachycardia with LVH and early repolarization, - no evidence of acute myocardial ischemia.  - Continue antibiotics per primary team for pneumonia/ sepsis  - Hgb 6.7, transfuse/ pain meds per heme and primary team.  - Trend troponin to peak, though it is likely elevated in the setting of acute on chronic anemia and pneumonia.  - Last cardiac stress test negative in  with a normal echo.  - Echo shows LVEF 50-55% with normal diastolic function and RV function.      Thank you for consulting cardiology. Please reconsult for any further cardiac specific concerns.        JA[facility] Cardiology  Date of Service: 2025        I have personally interviewed and examined the patient, I have reviewed the Nurse Practitioner's history and physical, assessment, and plan. I have personally evaluated the patient at bedside and agree with the findings and made appropriate changes as necessary in recommendations.    NON ST-ELEVATION MI SECONDARY TO ACUTE ON CHRONIC ANEMIA AND SEPSIS    NO FURTHER TESTING REQUIRED AT THIS TIME WILL BE AVAILABLE HANNA Benoit MD  Department of Cardiology  [facility]  2025 8:35 AM         [1]   Social History  Tobacco Use    Smoking status: Former     Current packs/day: 0.00     Types: Cigarettes     Quit date: 1996     Years since quittin.4    Smokeless tobacco: Never   Substance Use Topics    Alcohol use: Not Currently     Alcohol/week: 21.0 standard drinks of alcohol     Types: 21 Cans of beer per week     Comment: Sober 5 years    Drug use: Not Currently     Types: Marijuana   [2]   No current facility-administered medications on file prior to encounter.     Current Outpatient Medications on File Prior to Encounter   Medication Sig Dispense Refill    amLODIPine (NORVASC) 5 MG tablet Take 1 tablet  (5 mg total) by mouth once daily. 90 tablet 3    ascorbic acid, vitamin C, (VITAMIN C) 500 MG tablet Take 500 mg by mouth once daily.      atorvastatin (LIPITOR) 10 MG tablet Take 1 tablet (10 mg total) by mouth every evening. 90 tablet 3    calcitRIOL (ROCALTROL) 0.25 MCG Cap Take 0.25 mcg by mouth every 7 days.      cyproheptadine (PERIACTIN) 4 mg tablet Take 1 tablet (4 mg total) by mouth 3 (three) times daily. 90 tablet 3    droNABinol (MARINOL) 10 MG capsule Take 1 capsule (10 mg total) by mouth 2 (two) times daily before meals. 60 capsule 1    famotidine (PEPCID) 20 MG tablet Take 20 mg by mouth 2 (two) times daily.      fluticasone propionate (FLONASE) 50 mcg/actuation nasal spray 1 SPRAY (50 MCG TOTAL) BY EACH NOSTRIL ROUTE 2 (TWO) TIMES A DAY. 1 SPRAY EVERY MORNING AND AFTERNOON TOWARD THE EAR EACH SIDE AFTER A SINUS RINSE 48 mL 1    folic acid (FOLVITE) 1 MG tablet TAKE 2 TABLETS BY MOUTH EVERY DAY (Patient taking differently: Take 2,000 mcg by mouth once daily.) 180 tablet 0    multivitamin with minerals Cap Take 1 capsule by mouth every morning.      pantoprazole (PROTONIX) 40 MG tablet Take 40 mg by mouth once daily.      tamsulosin (FLOMAX) 0.4 mg Cap TAKE 2 CAPSULES BY MOUTH EVERY DAY (Patient taking differently: Take 2 capsules by mouth once daily.) 180 capsule 1    traZODone (DESYREL) 100 MG tablet TAKE 1 TABLET BY MOUTH NIGHTLY AS NEEDED FOR INSOMNIA. 90 tablet 2    LIDOcaine (LIDODERM) 5 % Place 1 patch onto the skin once daily. Remove & Discard patch within 12 hours or as directed by MD 14 patch 0    sildenafil (VIAGRA) 100 MG tablet Take 1 tablet (100 mg total) by mouth daily as needed for Erectile Dysfunction. 10 tablet 6    sod chlor-bicarb-squeez bottle (NEILMED SINUS RINSE COMPLETE) pkdv 1 application  by sinus irrigation route 2 (two) times a day. Not right before bed 1 each 11    [DISCONTINUED] fluoxetine 20 MG tablet TAKE 1 TABLET BY MOUTH EVERY DAY 30 tablet 5

## 2025-06-19 NOTE — PROGRESS NOTES
"[facility]   Department of Infectious Disease  Progress Note        PATIENT NAME: Rick Butler  MRN: [id number]  TODAY'S DATE: 06/19/2025  ADMIT DATE: 6/18/2025  LOS: 1 days    CHIEF COMPLAINT: Shortness of Breath (X 1 day. ), Fatigue, and Cough (Onset lastnite)      PRINCIPLE PROBLEM: Severe sepsis    INTERVAL HISTORY      06/19/2025:  he was seen and evaluated at bedside.  States he is improving.  Still with pleuritic right chest pain.  Heart rate and fever curve improving.  Sputum was not a good sample.  Blood cultures so far negative.      Antibiotics (From admission, onward)      Start     Stop Route Frequency Ordered    06/18/25 2100  mupirocin 2 % ointment         06/23/25 2059 Nasl 2 times daily 06/18/25 1747 06/18/25 2100  doxycycline capsule 100 mg         -- Oral Every 12 hours 06/18/25 1752 06/18/25 1900  ceFEPIme injection 1 g         -- IV Every 12 hours (non-standard times) 06/18/25 1752    06/18/25 1236  vancomycin - pharmacy to dose  (vancomycin IVPB (PEDS and ADULTS))        Placed in "And" Linked Group    -- IV pharmacy to manage frequency 06/18/25 1136          Antifungals (From admission, onward)      None           Antivirals (From admission, onward)      None            ASSESSMENT and PLAN      Right lower lobe pneumonia in a patient with MDS on Rytelo.  Respiratory panel negative.  Continue cefepime and doxycycline.  Discontinue vancomycin.     Pancytopenia secondary to MDS.     MDS.  On Rytelo      RECOMMENDATIONS:    Continue cefepime and doxycycline   Discontinue vancomycin  Continue other symptomatic care    Please send Epic secure chat with any questions.  Discussed with his wife at bedside      SUBJECTIVE    Rick Butler is a 65 y.o. male with history of MDS/RA RS and sickle cell trait.  He is on Rytelo for about 2 months he requires.  A blood and platelet transfusions.  Presents to the emergency room 06/18/2025 with fever cough and generalized weakness and fatigue of 1 " day duration.  In the ED /59, pulse 132, respiratory rate 25, temperature 103°, oxygen saturation 92%.     Sodium 136, creatinine 4.6, AST 20, ALT 19, troponin 50, .  WBC 1, hematocrit 23, MCV 86, platelet count 62 with 13% bands.  Acute respiratory panel negative x-ray showed right middle and lower lobe infiltrate he received IV fluid and was placed on antibiotics he has been evaluated by oncologist and pulmonologist ID asked to assist with his care.     No recent travel other than a cruise to [place] in April 2025.  No sick contacts.  He is employed and works as a prep shift.  Was actually working up until 06/17/2025.        Antibiotic history:    Vancomycin 6/18/25-  Cefepime: 06/08/2025 x1 dose   Azithromycin: 06/18/2025   Meropenem: 06/18/2025     Microbiology:    Respiratory panel 06/18/2025: Negative   Blood culture 06/18/2025:  NGTD    Review of Systems  Negative except as stated above in Interval History     OBJECTIVE   Temp:  [98.1 °F (36.7 °C)-101.5 °F (38.6 °C)] 98.3 °F (36.8 °C)  Pulse:  [] 112  Resp:  [20-45] 45  SpO2:  [87 %-100 %] 87 %  BP: ()/(51-84) 119/64  Temp:  [98.1 °F (36.7 °C)-101.5 °F (38.6 °C)]   Temp: 98.3 °F (36.8 °C) (06/19/25 0701)  Pulse: (!) 112 (06/19/25 0801)  Resp: (!) 45 (06/19/25 0801)  BP: 119/64 (06/19/25 0801)  SpO2: (!) 87 % (06/19/25 0801)    Intake/Output Summary (Last 24 hours) at 6/19/2025 0859  Last data filed at 6/19/2025 0730  Gross per 24 hour   Intake 1631.41 ml   Output 1540 ml   Net 91.41 ml       Physical Exam  General:  Elderly man who is acutely ill looking.   HEENT:  Pale.  No oral thrush, no cervical adenopathy.  Upper and lower dentures.  CVS: S1 and 2 heard, no murmurs appreciated   Respiratory:  Coarse breath sounds right lung  Abdomen: Full, soft, nontender, no palpable organomegaly   Skin: No rash appreciated   CNS: No focal deficits   Musculoskeletal: No joint or bony abnormalities appreciated  Psych: Good mood, normal affect.    "  VAD:  ISOLATION: Airborne and Contact and Enhanced Respiratory      Wounds:  None    Significant Labs: All pertinent labs within the past 24 hours have been reviewed.    CBC LAST 7 DAYS  Recent Labs   Lab 06/18/25  0843 06/19/25  0301   WBC 1.02* 2.38*   RBC 2.65* 2.40*   HGB 7.3* 6.7*   HCT 22.9* 20.4*   MCV 86 85   MCH 27.5 27.9   MCHC 31.9* 32.8   RDW 17.7* 17.2*   PLT 62* 47*   MPV 9.6 10.3   NRBC 0 0       CHEMISTRY LAST 7 DAYS  Recent Labs   Lab 06/18/25  0843 06/19/25  0301    138   K 4.0 4.9    108   CO2 21* 21*   ANIONGAP 12 9   BUN 55* 54*   CREATININE 4.6* 4.2*   GLU 95 92   CALCIUM 8.9 8.1*   MG 1.4* 2.0   ALBUMIN 4.0 3.3*   PROT 7.5 6.3   ALKPHOS 45* 35*   ALT 19 17   AST 23 25   BILITOT 0.6 1.1*       Estimated Creatinine Clearance: 19.8 mL/min (A) (based on SCr of 4.2 mg/dL (H)).    INFLAMMATORY/PROCAL  LAST 7 DAYS  No results for input(s): "PROCAL", "ESR", "CRP" in the last 168 hours.  No results found for: "ESR"  CRP   Date Value Ref Range Status   12/04/2024 0.40 <1.00 mg/dL Final     Comment:     CRP-Normal Application expected values:   <1.0        mg/dL   Normal Range  1.0 - 5.0  mg/dL   Indicates mild inflammation  5.0 - 10.0 mg/dL   Indicates severe inflammation  >10.0        mg/dL   Represents serious processes and   frequently         indicates the presence of a bacterial   infection.          PRIOR  MICROBIOLOGY:    No results found for the last 90 days.      LAST 7 DAYS MICROBIOLOGY   Microbiology Results (last 7 days)       Procedure Component Value Units Date/Time    Culture, Respiratory with Gram Stain [[id number]] Collected: 06/19/25 0734    Order Status: Completed Specimen: Respiratory from Sputum, Expectorated Updated: 06/19/25 0895     Respiratory Culture Specimen inadequate - culture not performed     GRAM STAIN (RESPIRATORY) >10epis/lfp and <than many WBC's      Predominance of oropharyngeal federico. Please recollect    MRSA Screen by PCR [[id number]]  (Normal) " Collected: 06/18/25 1948    Order Status: Completed Specimen: Nasal Swab Updated: 06/18/25 2115     MRSA PCR SCRN ([facility]) Not Detected    Blood culture x two cultures. Draw prior to antibiotics. [[id number]]  (Normal) Collected: 06/18/25 0930    Order Status: Completed Specimen: Blood Updated: 06/18/25 1602     CULTURE, BLOOD ([facility]) No Growth After 6 Hours    Blood culture x two cultures. Draw prior to antibiotics. [[id number]]  (Normal) Collected: 06/18/25 0932    Order Status: Completed Specimen: Blood Updated: 06/18/25 1602     CULTURE, BLOOD ([facility]) No Growth After 6 Hours    Respiratory Infection Panel (PCR), Nasopharyngeal [[id number]] Collected: 06/18/25 1027    Order Status: Completed Specimen: Nasopharyngeal Swab Updated: 06/18/25 1209     Respiratory Infection Panel Source Nasopharyngeal Swab     Adenovirus Not Detected     Coronavirus 229E, Common Cold Virus Not Detected     Coronavirus HKU1, Common Cold Virus Not Detected     Coronavirus NL63, Common Cold Virus Not Detected     Coronavirus OC43, Common Cold Virus Not Detected     SARS-CoV2 (COVID-19) Qualitative PCR Not Detected     Human Metapneumovirus Not Detected     Human Rhinovirus/Enterovirus Not Detected     Influenza A (subtypes H1, H1-2009,H3) Not Detected     Influenza B Not Detected     Parainfluenza Virus 1 Not Detected     Parainfluenza Virus 2 Not Detected     Parainfluenza Virus 3 Not Detected     Parainfluenza Virus 4 Not Detected     Respiratory Syncytial Virus Not Detected     Bordetella Parapertussis ([id number]) Not Detected     Bordetella pertussis (ptxP) Not Detected     Chlamydia pneumoniae Not Detected     Mycoplasma pneumoniae Not Detected              CURRENT/PREVIOUS VISIT EKG  Results for orders placed or performed during the hospital encounter of 06/18/25   EKG 12-lead    Collection Time: 06/18/25  8:25 AM   Result Value Ref Range    QRS Duration 88 ms    [facility] QTC Calculation 441 ms    Narrative    Test Reason : R07.9,    Vent. Rate :  127 BPM     Atrial Rate : 127 BPM     P-R Int : 154 ms          QRS Dur :  88 ms      QT Int : 304 ms       P-R-T Axes :  58  54 -11 degrees    QTcB Int : 441 ms    Sinus tachycardia  LVH with repolarization abnormality ( Sokolow-Gomez )  Abnormal ECG  No previous ECGs available  Confirmed by Fam Benoit ([id number]) on 6/18/2025 9:08:26 PM    Referred By: AAAREFERRAL SELF           Confirmed By: Fam Benoit     Significant Imaging: I have reviewed all relevant and available imaging results/findings within the past 24 hours.    I spent a total of 52 minutes on the day of the visit.This includes face to face time and non-face to face time preparing to see the patient (eg, review of tests), obtaining and/or reviewing separately obtained history, documenting clinical information in the electronic or other health record, independently interpreting results and communicating results to the patient/family/caregiver, or care coordinator.    Aris Cervantes MD  Date of Service: 06/19/2025      This note was created using [employer] voice recognition software that occasionally misinterpreted phrases or words.

## 2025-06-19 NOTE — PLAN OF CARE
Problem: Adult Inpatient Plan of Care  Goal: Plan of Care Review  Outcome: Progressing  Goal: Patient-Specific Goal (Individualized)  Outcome: Progressing  Goal: Absence of Hospital-Acquired Illness or Injury  Outcome: Progressing  Goal: Optimal Comfort and Wellbeing  Outcome: Progressing  Goal: Readiness for Transition of Care  Outcome: Progressing     Problem: Sepsis/Septic Shock  Goal: Optimal Coping  Outcome: Progressing  Goal: Absence of Bleeding  Outcome: Progressing  Goal: Blood Glucose Level Within Targeted Range  Outcome: Progressing  Goal: Absence of Infection Signs and Symptoms  Outcome: Progressing  Goal: Optimal Nutrition Intake  Outcome: Progressing     Problem: Acute Kidney Injury/Impairment  Goal: Fluid and Electrolyte Balance  Outcome: Progressing  Goal: Improved Oral Intake  Outcome: Progressing  Goal: Effective Renal Function  Outcome: Progressing     Problem: Pneumonia  Goal: Fluid Balance  Outcome: Progressing  Goal: Resolution of Infection Signs and Symptoms  Outcome: Progressing  Goal: Effective Oxygenation and Ventilation  Outcome: Progressing     Problem: Skin Injury Risk Increased  Goal: Skin Health and Integrity  Outcome: Progressing

## 2025-06-19 NOTE — SUBJECTIVE & OBJECTIVE
Interval History:  Patient is seen and examined during multidisciplinary rounds.  Patient received 1 pack of red blood cell transfusion yesterday.  Patient complaining of right lower lateral chest pain with deep inspiration.  Patient also reports frequent cough.  T-max 103° F.  Currently on doxycycline and cefepime by ID specialist.  Serum troponin appears to be elevated for which cardiology evaluation pending.  Pulmonary Medicine, Oncology and ID specialist and Nephrology teams following patient.    Review of Systems   Constitutional:  Positive for fever. Negative for activity change and appetite change.   HENT:  Negative for congestion and dental problem.    Eyes:  Negative for discharge and itching.   Respiratory:  Positive for cough and shortness of breath.    Cardiovascular:  Negative for chest pain.   Gastrointestinal:  Negative for abdominal distention and abdominal pain.   Endocrine: Negative for cold intolerance.   Genitourinary:  Negative for difficulty urinating and dysuria.   Musculoskeletal:  Negative for arthralgias and back pain.   Skin:  Negative for color change.   Neurological:  Negative for dizziness and facial asymmetry.   Hematological:  Negative for adenopathy.   Psychiatric/Behavioral:  Negative for agitation and behavioral problems.      Objective:     Vital Signs (Most Recent):  Temp: 98.3 °F (36.8 °C) (06/19/25 0701)  Pulse: 97 (06/19/25 0726)  Resp: (!) 24 (06/19/25 0726)  BP: 112/64 (06/19/25 0701)  SpO2: 100 % (06/19/25 0726) Vital Signs (24h Range):  Temp:  [98.1 °F (36.7 °C)-103 °F (39.4 °C)] 98.3 °F (36.8 °C)  Pulse:  [] 97  Resp:  [20-44] 24  SpO2:  [89 %-100 %] 100 %  BP: ()/(51-84) 112/64     Weight: 93.7 kg (206 lb 9.1 oz)  Body mass index is 30.51 kg/m².    Intake/Output Summary (Last 24 hours) at 6/19/2025 0824  Last data filed at 6/19/2025 0730  Gross per 24 hour   Intake 1631.41 ml   Output 1540 ml   Net 91.41 ml         Physical Exam  Vitals and nursing note  "reviewed.   Constitutional:       Appearance: He is well-developed.   HENT:      Head: Atraumatic.      Right Ear: External ear normal.      Left Ear: External ear normal.      Nose: Nose normal.      Mouth/Throat:      Mouth: Mucous membranes are dry.   Eyes:      Extraocular Movements: Extraocular movements intact.   Cardiovascular:      Rate and Rhythm: Tachycardia present.   Pulmonary:      Effort: Pulmonary effort is normal.   Abdominal:      Palpations: Abdomen is soft.   Musculoskeletal:         General: Normal range of motion.      Cervical back: Full passive range of motion without pain and normal range of motion.      Right lower leg: No edema.      Left lower leg: No edema.   Skin:     General: Skin is warm.   Neurological:      Mental Status: He is alert and oriented to person, place, and time.   Psychiatric:         Behavior: Behavior normal.               Significant Labs: All pertinent labs within the past 24 hours have been reviewed.  CBC:   Recent Labs   Lab 06/18/25  0843 06/19/25  0301   WBC 1.02* 2.38*   HGB 7.3* 6.7*   HCT 22.9* 20.4*   PLT 62* 47*     CMP:   Recent Labs   Lab 06/18/25  0843 06/19/25  0301    138   K 4.0 4.9    108   CO2 21* 21*   GLU 95 92   BUN 55* 54*   CREATININE 4.6* 4.2*   CALCIUM 8.9 8.1*   PROT 7.5 6.3   ALBUMIN 4.0 3.3*   BILITOT 0.6 1.1*   ALKPHOS 45* 35*   AST 23 25   ALT 19 17   ANIONGAP 12 9     Lactic Acid:   Recent Labs   Lab 06/18/25  1318   LACTATE 1.8     Magnesium:   Recent Labs   Lab 06/18/25  0843 06/19/25  0301   MG 1.4* 2.0     Troponin: No results for input(s): "TROPONINI", "TROPONINIHS" in the last 48 hours.  TSH:   Recent Labs   Lab 04/09/25  0730   TSH 3.167     Urine Studies:   Recent Labs   Lab 06/18/25  1113   APPEARANCEUA Clear   SPECGRAV 1.010   PROTEINUA 1+*   BILIRUBINUA Negative   UROBILINOGEN Negative   LEUKOCYTESUR Negative   RBCUA <1   WBCUA <1     Microbiology Results (last 7 days)       Procedure Component Value Units Date/Time "    Culture, Respiratory with Gram Stain [[id number]] Collected: 06/19/25 0734    Order Status: Sent Specimen: Respiratory from Sputum, Expectorated Updated: 06/19/25 0734    MRSA Screen by PCR [[id number]]  (Normal) Collected: 06/18/25 1948    Order Status: Completed Specimen: Nasal Swab Updated: 06/18/25 2115     MRSA PCR SCRN ([facility]) Not Detected    Blood culture x two cultures. Draw prior to antibiotics. [[id number]]  (Normal) Collected: 06/18/25 0930    Order Status: Completed Specimen: Blood Updated: 06/18/25 1602     CULTURE, BLOOD ([facility]) No Growth After 6 Hours    Blood culture x two cultures. Draw prior to antibiotics. [[id number]]  (Normal) Collected: 06/18/25 0932    Order Status: Completed Specimen: Blood Updated: 06/18/25 1602     CULTURE, BLOOD ([facility]) No Growth After 6 Hours    Respiratory Infection Panel (PCR), Nasopharyngeal [[id number]] Collected: 06/18/25 1027    Order Status: Completed Specimen: Nasopharyngeal Swab Updated: 06/18/25 1209     Respiratory Infection Panel Source Nasopharyngeal Swab     Adenovirus Not Detected     Coronavirus 229E, Common Cold Virus Not Detected     Coronavirus HKU1, Common Cold Virus Not Detected     Coronavirus NL63, Common Cold Virus Not Detected     Coronavirus OC43, Common Cold Virus Not Detected     SARS-CoV2 (COVID-19) Qualitative PCR Not Detected     Human Metapneumovirus Not Detected     Human Rhinovirus/Enterovirus Not Detected     Influenza A (subtypes H1, H1-2009,H3) Not Detected     Influenza B Not Detected     Parainfluenza Virus 1 Not Detected     Parainfluenza Virus 2 Not Detected     Parainfluenza Virus 3 Not Detected     Parainfluenza Virus 4 Not Detected     Respiratory Syncytial Virus Not Detected     Bordetella Parapertussis ([id number]) Not Detected     Bordetella pertussis (ptxP) Not Detected     Chlamydia pneumoniae Not Detected     Mycoplasma pneumoniae Not Detected          Significant Imaging:   CXR: Alveolar consolidations in the right mid and lower  lung compatible with pneumonia     CT Chest without contrast:   Dense alveolar consolidation involving the majority of the right middle lobe with patchy ground-glass opacities in the inferior right upper lobe and right lower lobe compatible with multifocal pneumonia  7 mm ground-glass opacity left upper lobe with atelectasis in left lung base

## 2025-06-19 NOTE — PROGRESS NOTES
"[facility]  Hematology/Oncology  Inpatient Progress Note          Patient Name: Rick Butler  MRN: [id number]  Admission Date: 6/18/2025  Hospital Length of Stay: 1 days  Code Status: Full Code   Attending Provider: Feliciano Moran MD  Consulting Provider: Jose Tello MD  Primary Care Physician: Reynaldo Basilio, MALGORZATAC  Principal Problem:Severe sepsis      Subjective:       Patient ID: Rick Butler is a 65 y.o. male.    Chief Complaint: Shortness of Breath (X 1 day. ), Fatigue, and Cough (Onset lastnite)        History Present Illness:    Patient currently in ICU; he has been seen by pulm/crictical care and ID; he is awake and alert; brother and wife are at bedside; breathing is ok and stable; he is feeling a little better; appears more comfortable; he received a unit of blood earlier today; discussed with his ICU nurse in person      Review of Systems:  GEN: general aches and pains, malaise, fatigue, weakness; fever  HEENT: normal with no HA's, sore throat, stiff neck, changes in vision  CV: normal with no CP, SOB, PND, MORA or orthopnea  PULM: normal with no SOB,  hemoptysis, sputum or pleuritic pain; cough   GI: normal with no abdominal pain, nausea, vomiting, constipation, diarrhea, melanotic stools, BRBPR, or hematemesis  : normal with no hematuria, dysuria  BREAST: normal with no mass, discharge, pain  SKIN: normal with no rash, erythema, bruising, or swelling      Objective:     Vitals:  Blood pressure 112/64, pulse 97, temperature 98.3 °F (36.8 °C), temperature source Oral, resp. rate (!) 24, height 5' 9" (1.753 m), weight 93.7 kg (206 lb 9.1 oz), SpO2 100%.    Physical Exam:  GEN: no apparent distress, comfortable; AAOx3  HEAD: atraumatic and normocephalic  EYES: no pallor, no icterus, PERRLA  ENT: OMM, no pharyngeal erythema, external ears WNL; no nasal discharge; no thrush  NECK: no masses, thyroid normal, trachea midline, no LAD/LN's, supple  CV: RRR with no murmur; " normal pulse; normal S1 and S2; no pedal edema  CHEST: Normal respiratory effort; CTAB; some mild diffuse coarser breath sounds Rgt>left; O2 per NC;  no wheeze or crackles  ABDOM: nontender and nondistended; soft; normal bowel sounds; no rebound/guarding  MUSC/Skeletal: ROM normal; no crepitus; joints normal; no deformities or arthropathy  EXTREM: no clubbing, cyanosis, inflammation or swelling; IV's bilateral arms  SKIN: no rashes, lesions, ulcers, petechiae or subcutaneous nodules; tattoos   : no jiang  NEURO: grossly intact; motor/sensory WNL; AAOx3; no tremors  PSYCH: normal mood, affect and behavior  LYMPH: normal cervical, supraclavicular, axillary and groin LN's          Lab Review:        Lab Results   Component Value Date    WBC 2.38 (L) 06/19/2025    HGB 6.7 (LL) 06/19/2025    HCT 20.4 (LL) 06/19/2025    MCV 85 06/19/2025    PLT 47 (LL) 06/19/2025     CMP  Sodium   Date Value Ref Range Status   06/19/2025 138 136 - 145 mmol/L Final   06/26/2019 138 134 - 144 mmol/L      Potassium   Date Value Ref Range Status   06/19/2025 4.9 3.5 - 5.1 mmol/L Final     Chloride   Date Value Ref Range Status   06/19/2025 108 95 - 110 mmol/L Final   06/26/2019 105 98 - 110 mmol/L      CO2   Date Value Ref Range Status   06/19/2025 21 (L) 23 - 29 mmol/L Final     Glucose   Date Value Ref Range Status   06/19/2025 92 70 - 110 mg/dL Final   06/26/2019 114 (H) 70 - 99 mg/dL      BUN   Date Value Ref Range Status   06/19/2025 54 (H) 8 - 23 mg/dL Final     Creatinine   Date Value Ref Range Status   06/19/2025 4.2 (H) 0.5 - 1.4 mg/dL Final   06/26/2019 1.62 (H) 0.60 - 1.40 mg/dL      Calcium   Date Value Ref Range Status   06/19/2025 8.1 (L) 8.7 - 10.5 mg/dL Final     Protein Total   Date Value Ref Range Status   06/19/2025 6.3 6.0 - 8.4 gm/dL Final     Albumin   Date Value Ref Range Status   06/19/2025 3.3 (L) 3.5 - 5.2 g/dL Final   06/26/2019 4.4 3.1 - 4.7 g/dL      Bilirubin Total   Date Value Ref Range Status   06/19/2025 1.1  (H) 0.1 - 1.0 mg/dL Final     Comment:     For infants and newborns, interpretation of results should be based   on gestational age, weight and in agreement with clinical   observations.    Premature Infant recommended reference ranges:   0-24 hours:  <8.0 mg/dL   24-48 hours: <12.0 mg/dL   3-5 days:    <15.0 mg/dL   6-29 days:   <15.0 mg/dL     ALP   Date Value Ref Range Status   06/19/2025 35 (L) 55 - 135 unit/L Final     AST   Date Value Ref Range Status   06/19/2025 25 10 - 40 unit/L Final     ALT   Date Value Ref Range Status   06/19/2025 17 10 - 44 unit/L Final     Anion Gap   Date Value Ref Range Status   06/19/2025 9 8 - 16 mmol/L Final     eGFR   Date Value Ref Range Status   06/19/2025 15 (L) >60 mL/min/1.73/m2 Final   03/12/2025 21.6 (A) >60 mL/min/1.73 m^2 Final           Radiology Diagnostic Studies:     X-Ray Chest AP Portable [[id number]] Resulted: 06/18/25 0901   Order Status: Completed Updated: 06/18/25 0903   Narrative:     EXAMINATION:  XR CHEST AP PORTABLE    CLINICAL HISTORY:  Generalized weakness;    FINDINGS:  Portable chest at 08:49 is compared to 12/20/2021 shows normal cardiomediastinal silhouette.    There is an alveolar consolidation in the mid and lower lung compatible with pneumonia.  The left lung is clear.  There are no pleural effusions.  Right pulmonary vasculature is normal. No acute osseous abnormality.   Impression:       Alveolar consolidations in the right mid and lower lung compatible with pneumonia         Assessment:     IMPRESSION:    (1) 65 y.o. male known to my oncology service with diagnosis of MDS/RARS and sickle cell trait. He is treated in conjunction with Dr Marcelino Hilario out of [facility] in [place], whom he recently saw for follow-up visit on 6/4/2025. He has been on chemotherapy with the drug Rytelo for the past couple of months. He requires periodic blood and platelet transfusions. He presented to ER with cough, fever and progressive weakness/fatigue. Troponin was  elevated and CXR showing RML/RLL consolidations consistent with a pneumonia.     6/18/2025;  - WBc 1.02, Hgb 7.3 and plats 62,000  -  I discussed with Dr Brown and Dr Moran both in person. I discussed with his ER nurse in person.   - Plans are to transfuse a unit of blood, IV antibiotics; cultures drawn  - cardiology/pulm consults, ID consult and nephrology consult.      6/19/2025:  - patient currently in ICU  - he has been seen by pulmonary/critical care and ID, cardiology and nephrology  - wbc at 2.38, Hgb 6.7 and plats at 47,000  - cardiology and nephrology consulted     (2) Acute exacerbation of CKD - followed by Dr Mas as outpatient     (3) Chronic back pains - seen by Dr Heredia in past      (4) Hx/of H pylori gastritis s/p prior colonoscopy with Dr Collins in past     (5) Hx/of alcohol uses in past (sober for several years now); former smoker            1. Pneumonia due to infectious organism, unspecified laterality, unspecified part of lung    2. Hypoxia    3. Myelodysplastic syndrome    4. Severe sepsis    5. Chest pain           Plan:     PLAN:          1. Monitor labs and transfuse as needed    2. Infection workup, IV antibiotics etc as per ID  3. Respiratory and critical care as per pulmonary directives   4. appreciate cardiology consult   5. Appreciate nephrology consult for the acute on top of Chronic renal insufficiency   6. IVF's, electrolyte and pain management as per primary team  7. Will follow with you               Jose Tello MD  Hematology/Oncology  [facility]

## 2025-06-19 NOTE — CONSULTS
INPATIENT NEPHROLOGY CONSULT   [facility]    Patient Name: Rick Butler  Date: 06/19/2025    Reason for consultation: TORRES    Chief Complaint:   Chief Complaint   Patient presents with    Shortness of Breath     X 1 day.     Fatigue    Cough     Onset lastnite       History of Present Illness:  65-year-old male with history of myelodysplastic syndrome, sickle cell trait, chronic kidney disease stage 3 f/b Dr. Mas, depression, former smoker, ETOH abuse, lumbar degenerative disc disease. Patient presents to the emergency department with complaint of shortness of breath and increasing fatigue over last 24 hours. Patient states had slightly productive cough as well during this time. Patient decided come to the emergency department for further evaluation. On arrival to ER found with temperature of 103°, heart rate of 132, SBP 90s, with room air sat of 91%. Diagnosed with RLL PNA. Consulted for TORRES.    Notable home meds- norvasc, calcitriol, flomax    Interval History:  6/19- afebrile, SBP 100s, on 4L NC, UOP 1.2L, transfused 1u of blood yest, on levophed and NS IVFs    Plan of Care:    Assessment:  Septic shock due to HCAP in an IC patient  TORRES/CKD IV  Elevated BNP and troponin  Metabolic acidosis  SHPT  MDS on chemotherapy    Plan:    - on treatment for HCAP- blood cx and RVP negative- continue pressors and IVF support- dose meds for CrCl < 20  - TORRES is due to septic shock- SCr is improving with above management- he has no acute RRT needs- continue strict measurement of UOP- no nsaids or IV contrast  - suspect elevated BNP/trop due to demand ischemia- lactic acid is normal- if don't see a turn around would check an echo  - acidosis is due to hypoperfusion and renal dysfunction- it is mild- no acute bicarbonate needs  - check phos- continue calcitriol  - transfusion goals per heme/onc    Thank you for allowing us to participate in this patient's care. We will continue to follow.    Vital Signs:  Temp  Readings from Last 3 Encounters:   06/19/25 98.3 °F (36.8 °C) (Oral)   06/05/25 100 °F (37.8 °C) (Temporal)   06/05/25 99 °F (37.2 °C)       Pulse Readings from Last 3 Encounters:   06/19/25 99   06/05/25 72   06/05/25 70       BP Readings from Last 3 Encounters:   06/19/25 (!) 105/57   06/05/25 (!) 160/89   06/05/25 (!) 170/90       Weight:  Wt Readings from Last 3 Encounters:   06/18/25 93.7 kg (206 lb 9.1 oz)   06/05/25 96.2 kg (212 lb)   06/05/25 96.6 kg (213 lb)       INS/OUTS:  I/O last 3 completed shifts:  In: 1378.2 [I.V.:1054.2; Blood:324]  Out: 1220 [Urine:1220]  I/O this shift:  In: 253.3 [I.V.:253.3]  Out: 320 [Urine:320]    Past Medical & Surgical History:  Past Medical History:   Diagnosis Date    Abnormal chest CT (new) 08/17/2022    Anemia due to multiple mechanisms 01/13/2019    Anemia, chronic renal failure, stage 2 (mild) 01/13/2019    Anemia, chronic renal failure, stage 3 (moderate) 08/02/2023    Depression     Former smoker 06/29/2017    H/O ETOH abuse 06/29/2017    Lung mass (new) 08/17/2022    Monocytosis 2019    Myelodysplasia (myelodysplastic syndrome)     Myelodysplasia (myelodysplastic syndrome)     Personal history of colonic polyps 12/29/2023    RARS (refractory anemia with ringed sideroblasts) 06/01/2020    Sickle cell trait        Past Surgical History:   Procedure Laterality Date    BONE MARROW BIOPSY N/A 12/20/2019    Procedure: BIOPSY, BONE MARROW;  Surgeon: Satinder Diagnostic Provider;  Location: [facility] OR;  Service: Interventional Radiology;  Laterality: N/A;    COLONOSCOPY N/A 11/05/2021    Procedure: COLONOSCOPY;  Surgeon: Rob Collins MD;  Location: [facility] ENDO;  Service: Endoscopy;  Laterality: N/A;    COLONOSCOPY  12/29/2023    5 YR RECALL    ESOPHAGOGASTRODUODENOSCOPY N/A 11/05/2021    Procedure: EGD (ESOPHAGOGASTRODUODENOSCOPY);  Surgeon: Rob Collins MD;  Location: [facility];  Service: Endoscopy;  Laterality: N/A;    INJECTION OF ANESTHETIC AGENT AROUND MEDIAL BRANCH  NERVES INNERVATING LUMBAR FACET JOINT Bilateral 2018    Procedure: BLOCK-NERVE-MEDIAL BRANCH-LUMBAR;  Surgeon: Nura Heredia MD;  [facility];  Service: Pain Management;  Laterality: Bilateral;  L3, 4, 5    KNEE ARTHROSCOPY W/ MENISCECTOMY Right 2021    Procedure: ARTHROSCOPY, KNEE, WITH MENISCECTOMY;  Surgeon: Sebastian Green MD;  Location: [facility] OR;  Service: Orthopedics;  Laterality: Right;  partial medial meniscectomy    RADIOFREQUENCY ABLATION OF LUMBAR MEDIAL BRANCH NERVE AT SINGLE LEVEL Bilateral 2018    Procedure: RADIOFREQUENCY ABLATION, NERVE, MEDIAL BRANCH, LUMBAR, 1 LEVEL;  Surgeon: Nura Heredia MD;  [facility];  Service: Pain Management;  Laterality: Bilateral;  L3, 4, 5 - Burned at 80 degrees C.  for 75 seconds x 2 each site    SMALL BOWEL ENTEROSCOPY N/A 10/16/2019    Procedure: ENTEROSCOPY;  Surgeon: Rob Collins MD;  Location: [facility] ENDO;  Service: Endoscopy;  Laterality: N/A;       Past Social History:  Social History     Socioeconomic History    Marital status:    Occupational History    Occupation: Prep Cook     Employer: [employer]   Tobacco Use    Smoking status: Former     Current packs/day: 0.00     Types: Cigarettes     Quit date: 1996     Years since quittin.4    Smokeless tobacco: Never   Substance and Sexual Activity    Alcohol use: Not Currently     Alcohol/week: 21.0 standard drinks of alcohol     Types: 21 Cans of beer per week     Comment: Sober 5 years    Drug use: Not Currently     Types: Marijuana    Sexual activity: Yes     Partners: Female     Social Drivers of Health     Financial Resource Strain: Patient Declined (2025)    Overall Financial Resource Strain (CARDIA)     Difficulty of Paying Living Expenses: Patient declined   Food Insecurity: Patient Declined (2025)    Hunger Vital Sign     Worried About Running Out of Food in the Last Year: Patient declined     Ran Out of Food in the Last Year: Patient declined    Transportation Needs: Patient Declined (6/19/2025)    PRAPARE - Transportation     Lack of Transportation (Medical): Patient declined     Lack of Transportation (Non-Medical): Patient declined   Stress: Patient Declined (6/19/2025)    [employer] - Occupational Stress Questionnaire     Feeling of Stress : Patient declined   Housing Stability: Patient Declined (6/19/2025)    Housing Stability Vital Sign     Unable to Pay for Housing in the Last Year: Patient declined     Homeless in the Last Year: Patient declined       Medications:  Scheduled Meds:   [START ON 6/24/2025] calcitRIOL  0.25 mcg Oral Q7 Days    ceFEPime IV (PEDS and ADULTS)  1 g Intravenous Q12H    dextromethorphan-guaiFENesin  mg  1 tablet Oral BID    doxycycline  100 mg Oral Q12H    folic acid  2,000 mcg Oral Daily    levalbuterol  1.25 mg Nebulization Q8H    mupirocin   Nasal BID    pantoprazole  40 mg Oral Daily    traZODone  100 mg Oral QHS     Continuous Infusions:   0.9% NaCl   Intravenous Continuous 125 mL/hr at 06/19/25 0701 Rate Verify at 06/19/25 0701    NORepinephrine bitartrate-D5W  0-3 mcg/kg/min Intravenous Continuous         PRN Meds:.  Current Facility-Administered Medications:     0.9%  NaCl infusion (for blood administration), , Intravenous, Q24H PRN    0.9%  NaCl infusion (for blood administration), , Intravenous, Q24H PRN    acetaminophen, 650 mg, Oral, Q8H PRN    acetaminophen, 650 mg, Oral, Q4H PRN    aluminum-magnesium hydroxide-simethicone, 30 mL, Oral, QID PRN    benzonatate, 200 mg, Oral, TID PRN    HYDROcodone-acetaminophen, 1 tablet, Oral, Q6H PRN    magnesium oxide, 800 mg, Oral, PRN    magnesium oxide, 800 mg, Oral, PRN    melatonin, 6 mg, Oral, Nightly PRN    naloxone, 0.02 mg, Intravenous, PRN    ondansetron, 4 mg, Intravenous, Q6H PRN    potassium bicarbonate, 35 mEq, Oral, PRN    potassium bicarbonate, 50 mEq, Oral, PRN    potassium bicarbonate, 60 mEq, Oral, PRN    potassium,  "sodium phosphates, 2 packet, Oral, PRN    potassium, sodium phosphates, 2 packet, Oral, PRN    potassium, sodium phosphates, 2 packet, Oral, PRN    Pharmacy to dose Vancomycin consult, , , Once **AND** vancomycin - pharmacy to dose, , Intravenous, pharmacy to manage frequency  Medications Ordered Prior to Encounter[1]    Allergies:  Patient has no known allergies.    Past Family History:  Reviewed; refer to Hospitalist Admission Note    Review of Systems:  Review of Systems - All 14 systems reviewed and negative, except as noted in HPI    Physical Exam:  BP (!) 105/57   Pulse 99   Temp 98.3 °F (36.8 °C) (Oral)   Resp (!) 24   Ht 5' 9" (1.753 m)   Wt 93.7 kg (206 lb 9.1 oz)   SpO2 96%   BMI 30.51 kg/m²     General Appearance:    NAD, AAO x 3, cooperative, appears stated age   Head:    Normocephalic, atraumatic   Eyes:    PER, EOMI, and conjunctiva/sclera clear bilaterally        Mouth:   Moist mucus membranes, no thrush or oral lesions, normal      dentition   Back:     No CVA tenderness   Lungs:     Clear to auscultation bilaterally, no wheeze, crackles, rales      or rhonchi, symmetric air movement, respirations unlabored   Heart:    Regular rate and rhythm, S1 and S2 normal, no murmur, rub   or gallop   Abdomen:     Soft, non-tender, non-distended, bowel sounds active all four   quadrants, no RT or guarding, no masses, no organomegaly   Extremities:   Warm and well perfused, distal pulses intact, no cyanosis or    peripheral edema   MSK:   No joint or muscle swelling, tenderness or deformity   Skin:   Skin color, texture, turgor normal, no rashes or lesions   Neurologic/Psychiatric:   CNII-XII intact, normal strength and sensation       throughout, no asterixis; normal affect, memory, judgement     and insight     Results:  Lab Results   Component Value Date     06/19/2025    K 4.9 06/19/2025     06/19/2025    CO2 21 (L) 06/19/2025    BUN 54 (H) 06/19/2025    CREATININE 4.2 (H) 06/19/2025    " CALCIUM 8.1 (L) 06/19/2025    ANIONGAP 9 06/19/2025    ESTGFRAFRICA 45.6 (A) 07/13/2022    EGFRNONAA 39.5 (A) 07/13/2022       Lab Results   Component Value Date    CALCIUM 8.1 (L) 06/19/2025    PHOS 3.2 02/03/2025       Recent Labs   Lab 06/19/25  0301   WBC 2.38*   RBC 2.40*   HGB 6.7*   HCT 20.4*   PLT 47*   MCV 85   MCH 27.9   MCHC 32.8       I have personally reviewed pertinent radiological imaging and reports from this admission.    I have spent > 70 minutes providing care for this patient for the above diagnoses. These services have included chart/data/imaging review, evaluation, exam, formulation of plan, , note preparation, and discussions with staff involved in this patient's care.    Cleopatra Herrera MD MPH   [facility] [address] [address]  [phone] (p)  [phone] (f)         [1]   No current facility-administered medications on file prior to encounter.     Current Outpatient Medications on File Prior to Encounter   Medication Sig Dispense Refill    amLODIPine (NORVASC) 5 MG tablet Take 1 tablet (5 mg total) by mouth once daily. 90 tablet 3    ascorbic acid, vitamin C, (VITAMIN C) 500 MG tablet Take 500 mg by mouth once daily.      atorvastatin (LIPITOR) 10 MG tablet Take 1 tablet (10 mg total) by mouth every evening. 90 tablet 3    calcitRIOL (ROCALTROL) 0.25 MCG Cap Take 0.25 mcg by mouth every 7 days.      cyproheptadine (PERIACTIN) 4 mg tablet Take 1 tablet (4 mg total) by mouth 3 (three) times daily. 90 tablet 3    droNABinol (MARINOL) 10 MG capsule Take 1 capsule (10 mg total) by mouth 2 (two) times daily before meals. 60 capsule 1    famotidine (PEPCID) 20 MG tablet Take 20 mg by mouth 2 (two) times daily.      fluticasone propionate (FLONASE) 50 mcg/actuation nasal spray 1 SPRAY (50 MCG TOTAL) BY EACH NOSTRIL ROUTE 2 (TWO) TIMES A DAY. 1 SPRAY EVERY MORNING AND AFTERNOON TOWARD THE EAR EACH SIDE AFTER A SINUS RINSE 48 mL 1    folic acid  (FOLVITE) 1 MG tablet TAKE 2 TABLETS BY MOUTH EVERY DAY (Patient taking differently: Take 2,000 mcg by mouth once daily.) 180 tablet 0    multivitamin with minerals Cap Take 1 capsule by mouth every morning.      pantoprazole (PROTONIX) 40 MG tablet Take 40 mg by mouth once daily.      tamsulosin (FLOMAX) 0.4 mg Cap TAKE 2 CAPSULES BY MOUTH EVERY DAY (Patient taking differently: Take 2 capsules by mouth once daily.) 180 capsule 1    traZODone (DESYREL) 100 MG tablet TAKE 1 TABLET BY MOUTH NIGHTLY AS NEEDED FOR INSOMNIA. 90 tablet 2    LIDOcaine (LIDODERM) 5 % Place 1 patch onto the skin once daily. Remove & Discard patch within 12 hours or as directed by MD 14 patch 0    sildenafil (VIAGRA) 100 MG tablet Take 1 tablet (100 mg total) by mouth daily as needed for Erectile Dysfunction. 10 tablet 6    sod chlor-bicarb-squeez bottle (NEILMED SINUS RINSE COMPLETE) pkdv 1 application  by sinus irrigation route 2 (two) times a day. Not right before bed 1 each 11    [DISCONTINUED] fluoxetine 20 MG tablet TAKE 1 TABLET BY MOUTH EVERY DAY 30 tablet 5

## 2025-06-19 NOTE — ASSESSMENT & PLAN NOTE
This patient does have evidence of infective focus  My overall impression is sepsis with Acute kidney injury, Acute respiratory failure, and Thrombocytopenia .  Source: Respiratory Infection  Antibiotics given-   Antibiotics (72h ago, onward)      Start     Stop Route Frequency Ordered    06/18/25 2100  mupirocin 2 % ointment         06/23/25 2059 Nasl 2 times daily 06/18/25 1747    06/18/25 2100  doxycycline capsule 100 mg         -- Oral Every 12 hours 06/18/25 1752    06/18/25 1900  ceFEPIme injection 1 g         -- IV Every 12 hours (non-standard times) 06/18/25 1752          Latest lactate reviewed-  Recent Labs   Lab 06/18/25  1318 06/18/25  1440   LACTATE 1.8  --    POCLAC  --  1.61

## 2025-06-19 NOTE — PROGRESS NOTES
[facility] Medicine  Progress Note    Patient Name: Rick Butler  MRN: [id number]  Patient Class: IP- Inpatient   Admission Date: 6/18/2025  Length of Stay: 1 days  Attending Physician: Judy Gonzalez MD  Primary Care Provider: Reynaldo Basilio FNP-C        Subjective     Principal Problem:Severe sepsis    HPI:  65 year old pt getting admitted with Sepsis/Severe pneumonia/Neutropenic fever  Pt was suffering from URTI like infection for past several days  Later he suffered from severe SOB/cough  Today symptoms went worse, came to ER found to be hypoxic and got admitted     Overview/Hospital Course:  No notes on file    Interval History:  Patient is seen and examined during multidisciplinary rounds.  Patient received 1 pack of red blood cell transfusion yesterday.  Patient complaining of right lower lateral chest pain with deep inspiration.  Patient also reports frequent cough.  T-max 103° F.  Currently on doxycycline and cefepime by ID specialist.  Serum troponin appears to be elevated for which cardiology evaluation pending.  Pulmonary Medicine, Oncology and ID specialist and Nephrology teams following patient.    Review of Systems   Constitutional:  Positive for fever. Negative for activity change and appetite change.   HENT:  Negative for congestion and dental problem.    Eyes:  Negative for discharge and itching.   Respiratory:  Positive for cough and shortness of breath.    Cardiovascular:  Negative for chest pain.   Gastrointestinal:  Negative for abdominal distention and abdominal pain.   Endocrine: Negative for cold intolerance.   Genitourinary:  Negative for difficulty urinating and dysuria.   Musculoskeletal:  Negative for arthralgias and back pain.   Skin:  Negative for color change.   Neurological:  Negative for dizziness and facial asymmetry.   Hematological:  Negative for adenopathy.   Psychiatric/Behavioral:  Negative for agitation and behavioral problems.      Objective:      Vital Signs (Most Recent):  Temp: 98.3 °F (36.8 °C) (06/19/25 0701)  Pulse: 97 (06/19/25 0726)  Resp: (!) 24 (06/19/25 0726)  BP: 112/64 (06/19/25 0701)  SpO2: 100 % (06/19/25 0726) Vital Signs (24h Range):  Temp:  [98.1 °F (36.7 °C)-103 °F (39.4 °C)] 98.3 °F (36.8 °C)  Pulse:  [] 97  Resp:  [20-44] 24  SpO2:  [89 %-100 %] 100 %  BP: ()/(51-84) 112/64     Weight: 93.7 kg (206 lb 9.1 oz)  Body mass index is 30.51 kg/m².    Intake/Output Summary (Last 24 hours) at 6/19/2025 0824  Last data filed at 6/19/2025 0730  Gross per 24 hour   Intake 1631.41 ml   Output 1540 ml   Net 91.41 ml         Physical Exam  Vitals and nursing note reviewed.   Constitutional:       Appearance: He is well-developed.   HENT:      Head: Atraumatic.      Right Ear: External ear normal.      Left Ear: External ear normal.      Nose: Nose normal.      Mouth/Throat:      Mouth: Mucous membranes are dry.   Eyes:      Extraocular Movements: Extraocular movements intact.   Cardiovascular:      Rate and Rhythm: Tachycardia present.   Pulmonary:      Effort: Pulmonary effort is normal.   Abdominal:      Palpations: Abdomen is soft.   Musculoskeletal:         General: Normal range of motion.      Cervical back: Full passive range of motion without pain and normal range of motion.      Right lower leg: No edema.      Left lower leg: No edema.   Skin:     General: Skin is warm.   Neurological:      Mental Status: He is alert and oriented to person, place, and time.   Psychiatric:         Behavior: Behavior normal.               Significant Labs: All pertinent labs within the past 24 hours have been reviewed.  CBC:   Recent Labs   Lab 06/18/25  0843 06/19/25  0301   WBC 1.02* 2.38*   HGB 7.3* 6.7*   HCT 22.9* 20.4*   PLT 62* 47*     CMP:   Recent Labs   Lab 06/18/25  0843 06/19/25  0301    138   K 4.0 4.9    108   CO2 21* 21*   GLU 95 92   BUN 55* 54*   CREATININE 4.6* 4.2*   CALCIUM 8.9 8.1*   PROT 7.5 6.3   ALBUMIN 4.0 3.3*  "  BILITOT 0.6 1.1*   ALKPHOS 45* 35*   AST 23 25   ALT 19 17   ANIONGAP 12 9     Lactic Acid:   Recent Labs   Lab 06/18/25  1318   LACTATE 1.8     Magnesium:   Recent Labs   Lab 06/18/25  0843 06/19/25  0301   MG 1.4* 2.0     Troponin: No results for input(s): "TROPONINI", "TROPONINIHS" in the last 48 hours.  TSH:   Recent Labs   Lab 04/09/25  0730   TSH 3.167     Urine Studies:   Recent Labs   Lab 06/18/25  1113   APPEARANCEUA Clear   SPECGRAV 1.010   PROTEINUA 1+*   BILIRUBINUA Negative   UROBILINOGEN Negative   LEUKOCYTESUR Negative   RBCUA <1   WBCUA <1     Microbiology Results (last 7 days)       Procedure Component Value Units Date/Time    Culture, Respiratory with Gram Stain [[id number]] Collected: 06/19/25 0734    Order Status: Sent Specimen: Respiratory from Sputum, Expectorated Updated: 06/19/25 0734    MRSA Screen by PCR [[id number]]  (Normal) Collected: 06/18/25 1948    Order Status: Completed Specimen: Nasal Swab Updated: 06/18/25 2115     MRSA PCR SCRN ([facility]) Not Detected    Blood culture x two cultures. Draw prior to antibiotics. [[id number]]  (Normal) Collected: 06/18/25 0930    Order Status: Completed Specimen: Blood Updated: 06/18/25 1602     CULTURE, BLOOD ([facility]) No Growth After 6 Hours    Blood culture x two cultures. Draw prior to antibiotics. [[id number]]  (Normal) Collected: 06/18/25 0932    Order Status: Completed Specimen: Blood Updated: 06/18/25 1602     CULTURE, BLOOD ([facility]) No Growth After 6 Hours    Respiratory Infection Panel (PCR), Nasopharyngeal [[id number]] Collected: 06/18/25 1027    Order Status: Completed Specimen: Nasopharyngeal Swab Updated: 06/18/25 1209     Respiratory Infection Panel Source Nasopharyngeal Swab     Adenovirus Not Detected     Coronavirus 229E, Common Cold Virus Not Detected     Coronavirus HKU1, Common Cold Virus Not Detected     Coronavirus NL63, Common Cold Virus Not Detected     Coronavirus OC43, Common Cold Virus Not Detected     SARS-CoV2 (COVID-19) " Qualitative PCR Not Detected     Human Metapneumovirus Not Detected     Human Rhinovirus/Enterovirus Not Detected     Influenza A (subtypes H1, H1-2009,H3) Not Detected     Influenza B Not Detected     Parainfluenza Virus 1 Not Detected     Parainfluenza Virus 2 Not Detected     Parainfluenza Virus 3 Not Detected     Parainfluenza Virus 4 Not Detected     Respiratory Syncytial Virus Not Detected     Bordetella Parapertussis ([id number]) Not Detected     Bordetella pertussis (ptxP) Not Detected     Chlamydia pneumoniae Not Detected     Mycoplasma pneumoniae Not Detected          Significant Imaging:   CXR: Alveolar consolidations in the right mid and lower lung compatible with pneumonia     CT Chest without contrast:   Dense alveolar consolidation involving the majority of the right middle lobe with patchy ground-glass opacities in the inferior right upper lobe and right lower lobe compatible with multifocal pneumonia  7 mm ground-glass opacity left upper lobe with atelectasis in left lung base    Assessment & Plan  Severe sepsis  This patient does have evidence of infective focus  My overall impression is sepsis with Acute kidney injury, Acute respiratory failure, and Thrombocytopenia .  Source: Respiratory Infection  Antibiotics given-   Antibiotics (72h ago, onward)      Start     Stop Route Frequency Ordered    06/18/25 2100  mupirocin 2 % ointment         06/23/25 2059 Nasl 2 times daily 06/18/25 1747    06/18/25 2100  doxycycline capsule 100 mg         -- Oral Every 12 hours 06/18/25 1752    06/18/25 1900  ceFEPIme injection 1 g         -- IV Every 12 hours (non-standard times) 06/18/25 1752          Latest lactate reviewed-  Recent Labs   Lab 06/18/25  1318 06/18/25  1440   LACTATE 1.8  --    POCLAC  --  1.61     Acute pneumonia  Maintain iv abx as below  De escalate as needed     Antibiotics (From admission, onward)      Start     Stop Route Frequency Ordered    06/18/25 2100  mupirocin 2 % ointment          06/23/25 2059 Nasl 2 times daily 06/18/25 1747    06/18/25 2100  doxycycline capsule 100 mg         -- Oral Every 12 hours 06/18/25 1752 06/18/25 1900  ceFEPIme injection 1 g         -- IV Every 12 hours (non-standard times) 06/18/25 1752            Microbiology Results (last 7 days)       Procedure Component Value Units Date/Time    Culture, Respiratory with Gram Stain [[id number]] Collected: 06/19/25 0950    Order Status: Completed Specimen: Respiratory Updated: 06/19/25 1225     GRAM STAIN (RESPIRATORY) >10 Epithelial Cells/LPF      Few WBC seen      Rare Branching Gram positive rods      Rare Gram positive cocci    Blood culture x two cultures. Draw prior to antibiotics. [[id number]]  (Normal) Collected: 06/18/25 0930    Order Status: Completed Specimen: Blood Updated: 06/19/25 1002     CULTURE, BLOOD ([facility]) No Growth After 24 Hours    Blood culture x two cultures. Draw prior to antibiotics. [[id number]]  (Normal) Collected: 06/18/25 0932    Order Status: Completed Specimen: Blood Updated: 06/19/25 1002     CULTURE, BLOOD ([facility]) No Growth After 24 Hours    Culture, Respiratory with Gram Stain [[id number]] Collected: 06/19/25 0734    Order Status: Completed Specimen: Respiratory from Sputum, Expectorated Updated: 06/19/25 0844     Respiratory Culture Specimen inadequate - culture not performed     GRAM STAIN (RESPIRATORY) >10epis/lfp and <than many WBC's      Predominance of oropharyngeal federico. Please recollect    MRSA Screen by PCR [[id number]]  (Normal) Collected: 06/18/25 1948    Order Status: Completed Specimen: Nasal Swab Updated: 06/18/25 2115     MRSA PCR SCRN ([facility]) Not Detected    Respiratory Infection Panel (PCR), Nasopharyngeal [[id number]] Collected: 06/18/25 1027    Order Status: Completed Specimen: Nasopharyngeal Swab Updated: 06/18/25 1209     Respiratory Infection Panel Source Nasopharyngeal Swab     Adenovirus Not Detected     Coronavirus 229E, Common Cold Virus Not Detected     Coronavirus  HKU1, Common Cold Virus Not Detected     Coronavirus NL63, Common Cold Virus Not Detected     Coronavirus OC43, Common Cold Virus Not Detected     SARS-CoV2 (COVID-19) Qualitative PCR Not Detected     Human Metapneumovirus Not Detected     Human Rhinovirus/Enterovirus Not Detected     Influenza A (subtypes H1, H1-2009,H3) Not Detected     Influenza B Not Detected     Parainfluenza Virus 1 Not Detected     Parainfluenza Virus 2 Not Detected     Parainfluenza Virus 3 Not Detected     Parainfluenza Virus 4 Not Detected     Respiratory Syncytial Virus Not Detected     Bordetella Parapertussis ([id number]) Not Detected     Bordetella pertussis (ptxP) Not Detected     Chlamydia pneumoniae Not Detected     Mycoplasma pneumoniae Not Detected          Sickle cell trait syndrome  Aware     RARS (refractory anemia with ringed sideroblasts)  Aware   Patient received 1 unit of PRBC on 6/18/25.    Recent Labs     06/18/25  0843 06/19/25  0301 06/19/25  0951   HGB 7.3* 6.7* 6.8*   HCT 22.9* 20.4* 20.7*       MDS (myelodysplastic syndrome)  Aware     Anemia, chronic renal failure, stage 3 (moderate)  Aware   Also has MDS  Neutropenic fever  Maintain Rx as ordered  Isolation precautions    Acute on chronic anemia  pRBC ordered   Thrombocytopenia   Latest Reference Range & Units 05/28/25 07:42 06/04/25 07:33 06/18/25 08:43   Platelet Count 150 - 450 K/uL 62 (L) 7 (LL) 62 (L)   (LL): Data is critically low  (L): Data is abnormally low  Elevated troponin  From sepsis/TORRES  Trend c enzymes   Latest Reference Range & Units 06/18/25 08:43 06/18/25 13:18   Troponin I High Sensitivity <=14.9 pg/mL 49.9 (H) 58.7 (HH)   (HH): Data is critically high  (H): Data is abnormally high      TORRES on CKD  Maintain iv fluids   Latest Reference Range & Units 05/07/25 07:38 05/14/25 07:30 05/21/25 07:36 05/28/25 07:42 06/04/25 07:33 06/18/25 08:43   Creatinine 0.5 - 1.4 mg/dL 3.3 (H) 3.3 (H) 3.5 (H) 3.4 (H) 3.9 (H) 4.6 (H)   (H): Data is abnormally  high    Acute kidney injury superimposed on chronic kidney disease  Maintain iv fluids   Latest Reference Range & Units 05/07/25 07:38 05/14/25 07:30 05/21/25 07:36 05/28/25 07:42 06/04/25 07:33 06/18/25 08:43   Creatinine 0.5 - 1.4 mg/dL 3.3 (H) 3.3 (H) 3.5 (H) 3.4 (H) 3.9 (H) 4.6 (H)   (H): Data is abnormally high    Discussed with bedside nurse and .  VTE Risk Mitigation (From admission, onward)           Ordered     IP VTE HIGH RISK PATIENT  Once         06/18/25 1145     Place sequential compression device  Until discontinued         06/18/25 1145                    Discharge Planning   ELMO: 6/23/2025     Code Status: Full Code   Medical Readiness for Discharge Date:   Discharge Plan A: Home with family            Critical care time spent on the evaluation and treatment of severe organ dysfunction, review of pertinent labs and imaging studies, discussions with consulting providers and discussions with patient/family: 35 minutes.            Judy Gonzalez MD  Department of Hospital Medicine   [facility]

## 2025-06-19 NOTE — PROGRESS NOTES
"Pharmacokinetic Initial Assessment: IV Vancomycin    Assessment/Plan:    Initiate intravenous vancomycin with loading dose of 2000 mg once with subsequent doses when random concentrations are less than 20 mcg/mL  Desired empiric serum trough concentration is 15 to 20 mcg/mL  Draw vancomycin random level on 6/19 at 1000.  Pharmacy will continue to follow and monitor vancomycin.      Please contact pharmacy at extension [phone] with any questions regarding this assessment.     Thank you for the consult,   Shanti Tracy       Patient brief summary:  Rick Butler is a 65 y.o. male initiated on antimicrobial therapy with IV Vancomycin for treatment of suspected lower respiratory infection    Drug Allergies:   Review of patient's allergies indicates:  No Known Allergies    Actual Body Weight:   93.7 kg    Renal Function:   Estimated Creatinine Clearance: 18.1 mL/min (A) (based on SCr of 4.6 mg/dL (H)).,     Dialysis Method (if applicable):  N/A    CBC (last 72 hours):  Recent Labs   Lab Result Units 06/18/25  0843   WBC K/uL 1.02*   Hgb gm/dL 7.3*   Hct % 22.9*   Platelet Count K/uL 62*   Monocyte % % 40.0*       Metabolic Panel (last 72 hours):  Recent Labs   Lab Result Units 06/18/25  0843 06/18/25  1113   Sodium mmol/L 136  --    Potassium mmol/L 4.0  --    Chloride mmol/L 103  --    CO2 mmol/L 21*  --    Glucose mg/dL 95  --    Glucose, UA   --  Negative   BUN mg/dL 55*  --    Creatinine mg/dL 4.6*  --    Urine Creatinine mg/dL  --  108.3   Albumin g/dL 4.0  --    Bilirubin Total mg/dL 0.6  --    ALP unit/L 45*  --    AST unit/L 23  --    ALT unit/L 19  --    Magnesium mg/dL 1.4*  --        Drug levels (last 3 results):  No results for input(s): "VANCOMYCINRA", "VANCORANDOM", "VANCOMYCINPE", "VANCOPEAK", "VANCOMYCINTR", "VANCOTROUGH" in the last 72 hours.    Microbiologic Results:  Microbiology Results (last 7 days)       Procedure Component Value Units Date/Time    MRSA Screen by PCR [[id number]]     Order " Status: Sent Specimen: Nasal Swab     Culture, Respiratory with Gram Stain [[id number]]     Order Status: Sent Specimen: Respiratory from Sputum, Expectorated     Blood culture x two cultures. Draw prior to antibiotics. [[id number]]  (Normal) Collected: 06/18/25 0930    Order Status: Completed Specimen: Blood Updated: 06/18/25 1602     CULTURE, BLOOD ([facility]) No Growth After 6 Hours    Blood culture x two cultures. Draw prior to antibiotics. [[id number]]  (Normal) Collected: 06/18/25 0932    Order Status: Completed Specimen: Blood Updated: 06/18/25 1602     CULTURE, BLOOD ([facility]) No Growth After 6 Hours    Respiratory Infection Panel (PCR), Nasopharyngeal [[id number]] Collected: 06/18/25 1027    Order Status: Completed Specimen: Nasopharyngeal Swab Updated: 06/18/25 1209     Respiratory Infection Panel Source Nasopharyngeal Swab     Adenovirus Not Detected     Coronavirus 229E, Common Cold Virus Not Detected     Coronavirus HKU1, Common Cold Virus Not Detected     Coronavirus NL63, Common Cold Virus Not Detected     Coronavirus OC43, Common Cold Virus Not Detected     SARS-CoV2 (COVID-19) Qualitative PCR Not Detected     Human Metapneumovirus Not Detected     Human Rhinovirus/Enterovirus Not Detected     Influenza A (subtypes H1, H1-2009,H3) Not Detected     Influenza B Not Detected     Parainfluenza Virus 1 Not Detected     Parainfluenza Virus 2 Not Detected     Parainfluenza Virus 3 Not Detected     Parainfluenza Virus 4 Not Detected     Respiratory Syncytial Virus Not Detected     Bordetella Parapertussis ([id number]) Not Detected     Bordetella pertussis (ptxP) Not Detected     Chlamydia pneumoniae Not Detected     Mycoplasma pneumoniae Not Detected

## 2025-06-19 NOTE — PROGRESS NOTES
Pharmacy Consult Discontinuation: Vancomycin    Rick Butler [id number] is a 65 y.o. male was consulted for vancomycin pharmacotherapy management by pharmacy.    Pharmacy consult for vancomycin dosing is no longer required.  Vancomycin was discontinued 06/19/2025 @ 1430 by Dr. Cervantes.    Thank you for allowing us to participate in this patient's care. Should you have any questions or concerns please feel free to contact the pharmacy department at [phone].    Hayden Delgado RPH

## 2025-06-19 NOTE — PROGRESS NOTES
Pharmacokinetic Assessment Follow Up: IV Vancomycin    Vancomycin serum concentration assessment(s):    The random level was drawn correctly and can be used to guide therapy at this time. The measurement is within the desired definitive target range of 15 to 20 mcg/mL.    Vancomycin Regimen Plan:    Re-dose vancomycin 500 mg x 1. Random vancomycin level ordered for 6/20 at 1030.    Drug levels (last 3 results):  Recent Labs   Lab Result Units 06/19/25  0915   Vancomycin Random ug/ml 15.3       Pharmacy will continue to follow and monitor vancomycin.    Please contact pharmacy at extension [phone] for questions regarding this assessment.    Thank you for the consult,   Nevaeh Maradiaga       Patient brief summary:  Rick Butler is a 65 y.o. male initiated on antimicrobial therapy with IV Vancomycin for treatment of lower respiratory infection    The patient's current regimen is pulse    Drug Allergies:   Review of patient's allergies indicates:  No Known Allergies    Actual Body Weight:   93.7    Renal Function:   Estimated Creatinine Clearance: 19.8 mL/min (A) (based on SCr of 4.2 mg/dL (H)).,     Dialysis Method (if applicable):  N/A    CBC (last 72 hours):  Recent Labs   Lab Result Units 06/18/25  0843 06/19/25  0301   WBC K/uL 1.02* 2.38*   Hgb gm/dL 7.3* 6.7*   Hct % 22.9* 20.4*   Platelet Count K/uL 62* 47*   Monocyte % % 40.0* 14.0       Metabolic Panel (last 72 hours):  Recent Labs   Lab Result Units 06/18/25  0843 06/18/25  1113 06/19/25  0301   Sodium mmol/L 136  --  138   Potassium mmol/L 4.0  --  4.9   Chloride mmol/L 103  --  108   CO2 mmol/L 21*  --  21*   Glucose mg/dL 95  --  92   Glucose, UA   --  Negative  --    BUN mg/dL 55*  --  54*   Creatinine mg/dL 4.6*  --  4.2*   Urine Creatinine mg/dL  --  108.3  --    Albumin g/dL 4.0  --  3.3*   Bilirubin Total mg/dL 0.6  --  1.1*   ALP unit/L 45*  --  35*   AST unit/L 23  --  25   ALT unit/L 19  --  17   Magnesium mg/dL 1.4*  --  2.0       Vancomycin  Administrations:  vancomycin given in the last 96 hours                     vancomycin 2,000 mg in 0.9% NaCl 500 mL IVPB (admixture device) (mg) 2,000 mg New Bag 06/18/25 1103                    Microbiologic Results:  Microbiology Results (last 7 days)       Procedure Component Value Units Date/Time    Blood culture x two cultures. Draw prior to antibiotics. [[id number]]  (Normal) Collected: 06/18/25 0930    Order Status: Completed Specimen: Blood Updated: 06/19/25 1002     CULTURE, BLOOD ([facility]) No Growth After 24 Hours    Blood culture x two cultures. Draw prior to antibiotics. [[id number]]  (Normal) Collected: 06/18/25 0932    Order Status: Completed Specimen: Blood Updated: 06/19/25 1002     CULTURE, BLOOD ([facility]) No Growth After 24 Hours    Culture, Respiratory with Gram Stain [[id number]] Collected: 06/19/25 0734    Order Status: Completed Specimen: Respiratory from Sputum, Expectorated Updated: 06/19/25 0844     Respiratory Culture Specimen inadequate - culture not performed     GRAM STAIN (RESPIRATORY) >10epis/lfp and <than many WBC's      Predominance of oropharyngeal federico. Please recollect    MRSA Screen by PCR [[id number]]  (Normal) Collected: 06/18/25 1948    Order Status: Completed Specimen: Nasal Swab Updated: 06/18/25 2115     MRSA PCR SCRN ([facility]) Not Detected    Respiratory Infection Panel (PCR), Nasopharyngeal [[id number]] Collected: 06/18/25 1027    Order Status: Completed Specimen: Nasopharyngeal Swab Updated: 06/18/25 1209     Respiratory Infection Panel Source Nasopharyngeal Swab     Adenovirus Not Detected     Coronavirus 229E, Common Cold Virus Not Detected     Coronavirus HKU1, Common Cold Virus Not Detected     Coronavirus NL63, Common Cold Virus Not Detected     Coronavirus OC43, Common Cold Virus Not Detected     SARS-CoV2 (COVID-19) Qualitative PCR Not Detected     Human Metapneumovirus Not Detected     Human Rhinovirus/Enterovirus Not Detected     Influenza A (subtypes H1, H1-2009,H3) Not  Detected     Influenza B Not Detected     Parainfluenza Virus 1 Not Detected     Parainfluenza Virus 2 Not Detected     Parainfluenza Virus 3 Not Detected     Parainfluenza Virus 4 Not Detected     Respiratory Syncytial Virus Not Detected     Bordetella Parapertussis ([id number]) Not Detected     Bordetella pertussis (ptxP) Not Detected     Chlamydia pneumoniae Not Detected     Mycoplasma pneumoniae Not Detected

## 2025-06-19 NOTE — ASSESSMENT & PLAN NOTE
Aware   Patient received 1 unit of PRBC on 6/18/25.    Recent Labs     06/18/25  0843 06/19/25  0301 06/19/25  0951   HGB 7.3* 6.7* 6.8*   HCT 22.9* 20.4* 20.7*

## 2025-06-19 NOTE — RESPIRATORY THERAPY
06/18/25 1926   Patient Assessment/Suction   Level of Consciousness (AVPU) alert   Respiratory Effort Normal;Unlabored   Expansion/Accessory Muscles/Retractions no use of accessory muscles   All Lung Fields Breath Sounds coarse   Rhythm/Pattern, Respiratory unlabored;pattern regular;depth regular;no shortness of breath reported   Skin Integrity   $ Wound Care Tech Time 15 min   Area Observed Left;Right;Cheek;Behind ear;Upper lip;Nares   Skin Appearance without discoloration   PRE-TX-O2   Device (Oxygen Therapy) nasal cannula   SpO2 95 %   Pulse Oximetry Type Continuous   $ Pulse Oximetry - Multiple Charge Pulse Oximetry - Multiple   Pulse (!) 116   Resp (!) 35   Education   $ Education 15 min;Bronchodilator;Oxygen   Respiratory Evaluation   $ Care Plan Tech Time 15 min

## 2025-06-19 NOTE — ASSESSMENT & PLAN NOTE
Maintain iv abx as below  De escalate as needed     Antibiotics (From admission, onward)      Start     Stop Route Frequency Ordered    06/18/25 2100  mupirocin 2 % ointment         06/23/25 2059 Nasl 2 times daily 06/18/25 1747 06/18/25 2100  doxycycline capsule 100 mg         -- Oral Every 12 hours 06/18/25 1752 06/18/25 1900  ceFEPIme injection 1 g         -- IV Every 12 hours (non-standard times) 06/18/25 1752            Microbiology Results (last 7 days)       Procedure Component Value Units Date/Time    Culture, Respiratory with Gram Stain [[id number]] Collected: 06/19/25 0950    Order Status: Completed Specimen: Respiratory Updated: 06/19/25 1225     GRAM STAIN (RESPIRATORY) >10 Epithelial Cells/LPF      Few WBC seen      Rare Branching Gram positive rods      Rare Gram positive cocci    Blood culture x two cultures. Draw prior to antibiotics. [[id number]]  (Normal) Collected: 06/18/25 0930    Order Status: Completed Specimen: Blood Updated: 06/19/25 1002     CULTURE, BLOOD (H) No Growth After 24 Hours    Blood culture x two cultures. Draw prior to antibiotics. [[id number]]  (Normal) Collected: 06/18/25 0932    Order Status: Completed Specimen: Blood Updated: 06/19/25 1002     CULTURE, BLOOD (H) No Growth After 24 Hours    Culture, Respiratory with Gram Stain [[id number]] Collected: 06/19/25 0734    Order Status: Completed Specimen: Respiratory from Sputum, Expectorated Updated: 06/19/25 0844     Respiratory Culture Specimen inadequate - culture not performed     GRAM STAIN (RESPIRATORY) >10epis/lfp and <than many WBC's      Predominance of oropharyngeal federico. Please recollect    MRSA Screen by PCR [[id number]]  (Normal) Collected: 06/18/25 1948    Order Status: Completed Specimen: Nasal Swab Updated: 06/18/25 2115     MRSA PCR SCRN ([facility]) Not Detected    Respiratory Infection Panel (PCR), Nasopharyngeal [[id number]] Collected: 06/18/25 1027    Order Status: Completed Specimen: Nasopharyngeal Swab  Updated: 06/18/25 1209     Respiratory Infection Panel Source Nasopharyngeal Swab     Adenovirus Not Detected     Coronavirus 229E, Common Cold Virus Not Detected     Coronavirus HKU1, Common Cold Virus Not Detected     Coronavirus NL63, Common Cold Virus Not Detected     Coronavirus OC43, Common Cold Virus Not Detected     SARS-CoV2 (COVID-19) Qualitative PCR Not Detected     Human Metapneumovirus Not Detected     Human Rhinovirus/Enterovirus Not Detected     Influenza A (subtypes H1, H1-2009,H3) Not Detected     Influenza B Not Detected     Parainfluenza Virus 1 Not Detected     Parainfluenza Virus 2 Not Detected     Parainfluenza Virus 3 Not Detected     Parainfluenza Virus 4 Not Detected     Respiratory Syncytial Virus Not Detected     Bordetella Parapertussis (BV3938) Not Detected     Bordetella pertussis (ptxP) Not Detected     Chlamydia pneumoniae Not Detected     Mycoplasma pneumoniae Not Detected

## 2025-06-19 NOTE — HOSPITAL COURSE
65-year-old male with myelodysplastic syndrome admitted with severe sepsis/pneumonia/neutropenic fever. Continue antibiotics and ID followed.  Later patient developed elevated troponin for demand ischemia and developed AFib with RVR and placed on IV amiodarone and transitioned to oral amiodarone.  Anticoagulation is held for low platelets and anemia.  For symptomatic anemia, s/p 3 units of packed red blood cells .For MDS, patient has received 1 platelet transfusion during hospital stay.  Oncologist is closely following.  Respiratory status has improved and patient is on room air.  He is on cefepime and doxy per ID while admitted and upon discharge he will complete Cipro and doxy through July 5th per Dr. Crawford recommendations.  Follow up at Dr. Tello's office on Monday 6/30.

## 2025-06-20 PROBLEM — I48.91 ATRIAL FIBRILLATION WITH RAPID VENTRICULAR RESPONSE: Status: ACTIVE | Noted: 2025-06-20

## 2025-06-20 LAB
ABO + RH BLD: NORMAL
ABSOLUTE EOSINOPHIL (SMH): 0 K/UL
ABSOLUTE MONOCYTE (SMH): 0.46 K/UL (ref 0.3–1)
ABSOLUTE NEUTROPHIL COUNT (SMH): 4.6 K/UL (ref 1.8–7.7)
ALBUMIN SERPL-MCNC: 3 G/DL (ref 3.5–5.2)
ALP SERPL-CCNC: 46 UNIT/L (ref 55–135)
ALT SERPL-CCNC: 19 UNIT/L (ref 10–44)
ANION GAP (SMH): 8 MMOL/L (ref 8–16)
AST SERPL-CCNC: 23 UNIT/L (ref 10–40)
BASOPHILS # BLD AUTO: 0 K/UL
BASOPHILS NFR BLD AUTO: 0 %
BILIRUB SERPL-MCNC: 0.5 MG/DL (ref 0.1–1)
BLD PROD TYP BPU: NORMAL
BLOOD UNIT EXPIRATION DATE: NORMAL
BLOOD UNIT TYPE CODE: 9500
BUN SERPL-MCNC: 54 MG/DL (ref 8–23)
CALCIUM SERPL-MCNC: 8.5 MG/DL (ref 8.7–10.5)
CHLORIDE SERPL-SCNC: 112 MMOL/L (ref 95–110)
CO2 SERPL-SCNC: 20 MMOL/L (ref 23–29)
CREAT SERPL-MCNC: 4 MG/DL (ref 0.5–1.4)
CROSSMATCH INTERPRETATION: NORMAL
DISPENSE STATUS: NORMAL
ERYTHROCYTE [DISTWIDTH] IN BLOOD BY AUTOMATED COUNT: 17.9 % (ref 11.5–14.5)
GFR SERPLBLD CREATININE-BSD FMLA CKD-EPI: 16 ML/MIN/1.73/M2
GLUCOSE SERPL-MCNC: 110 MG/DL (ref 70–110)
HCT VFR BLD AUTO: 21.6 % (ref 40–54)
HCT VFR BLD AUTO: 26.9 % (ref 40–54)
HGB BLD-MCNC: 7.1 GM/DL (ref 14–18)
HGB BLD-MCNC: 8.8 GM/DL (ref 14–18)
IMM GRANULOCYTES # BLD AUTO: 0.09 K/UL (ref 0–0.04)
IMM GRANULOCYTES NFR BLD AUTO: 1.6 % (ref 0–0.5)
LYMPHOCYTES # BLD AUTO: 0.54 K/UL (ref 1–4.8)
MAGNESIUM SERPL-MCNC: 2.2 MG/DL (ref 1.6–2.6)
MCH RBC QN AUTO: 27.5 PG (ref 27–31)
MCHC RBC AUTO-ENTMCNC: 32.9 G/DL (ref 32–36)
MCV RBC AUTO: 84 FL (ref 82–98)
NUCLEATED RBC (/100WBC) (SMH): 0 /100 WBC
PLATELET # BLD AUTO: 40 K/UL (ref 150–450)
PLATELET BLD QL SMEAR: ABNORMAL
PMV BLD AUTO: ABNORMAL FL
POTASSIUM SERPL-SCNC: 4.2 MMOL/L (ref 3.5–5.1)
PROT SERPL-MCNC: 6.7 GM/DL (ref 6–8.4)
RBC # BLD AUTO: 2.58 M/UL (ref 4.6–6.2)
RELATIVE EOSINOPHIL (SMH): 0 % (ref 0–8)
RELATIVE LYMPHOCYTE (SMH): 9.4 % (ref 18–48)
RELATIVE MONOCYTE (SMH): 8 % (ref 4–15)
RELATIVE NEUTROPHIL (SMH): 81 % (ref 38–73)
SODIUM SERPL-SCNC: 140 MMOL/L (ref 136–145)
TROPONIN HIGH SENSITIVE (SMH): 63.1 PG/ML
UNIT NUMBER: NORMAL
WBC # BLD AUTO: 5.73 K/UL (ref 3.9–12.7)

## 2025-06-20 PROCEDURE — 36415 COLL VENOUS BLD VENIPUNCTURE: CPT | Performed by: INTERNAL MEDICINE

## 2025-06-20 PROCEDURE — 25000003 PHARM REV CODE 250: Performed by: INTERNAL MEDICINE

## 2025-06-20 PROCEDURE — 86920 COMPATIBILITY TEST SPIN: CPT | Performed by: INTERNAL MEDICINE

## 2025-06-20 PROCEDURE — 80053 COMPREHEN METABOLIC PANEL: CPT | Performed by: INTERNAL MEDICINE

## 2025-06-20 PROCEDURE — 63600175 PHARM REV CODE 636 W HCPCS: Performed by: INTERNAL MEDICINE

## 2025-06-20 PROCEDURE — 99233 SBSQ HOSP IP/OBS HIGH 50: CPT | Mod: ,,, | Performed by: INTERNAL MEDICINE

## 2025-06-20 PROCEDURE — 94761 N-INVAS EAR/PLS OXIMETRY MLT: CPT

## 2025-06-20 PROCEDURE — 85018 HEMOGLOBIN: CPT | Performed by: INTERNAL MEDICINE

## 2025-06-20 PROCEDURE — 99900031 HC PATIENT EDUCATION (STAT)

## 2025-06-20 PROCEDURE — 99900035 HC TECH TIME PER 15 MIN (STAT)

## 2025-06-20 PROCEDURE — 83735 ASSAY OF MAGNESIUM: CPT | Performed by: INTERNAL MEDICINE

## 2025-06-20 PROCEDURE — 84484 ASSAY OF TROPONIN QUANT: CPT

## 2025-06-20 PROCEDURE — 93005 ELECTROCARDIOGRAM TRACING: CPT | Performed by: GENERAL PRACTICE

## 2025-06-20 PROCEDURE — 93010 ELECTROCARDIOGRAM REPORT: CPT | Mod: ,,, | Performed by: GENERAL PRACTICE

## 2025-06-20 PROCEDURE — 85025 COMPLETE CBC W/AUTO DIFF WBC: CPT | Performed by: INTERNAL MEDICINE

## 2025-06-20 PROCEDURE — 36415 COLL VENOUS BLD VENIPUNCTURE: CPT

## 2025-06-20 PROCEDURE — P9016 RBC LEUKOCYTES REDUCED: HCPCS | Performed by: INTERNAL MEDICINE

## 2025-06-20 PROCEDURE — 94640 AIRWAY INHALATION TREATMENT: CPT

## 2025-06-20 PROCEDURE — 63600175 PHARM REV CODE 636 W HCPCS

## 2025-06-20 PROCEDURE — 27000221 HC OXYGEN, UP TO 24 HOURS

## 2025-06-20 PROCEDURE — 97162 PT EVAL MOD COMPLEX 30 MIN: CPT

## 2025-06-20 PROCEDURE — 94664 DEMO&/EVAL PT USE INHALER: CPT

## 2025-06-20 PROCEDURE — 20000000 HC ICU ROOM

## 2025-06-20 PROCEDURE — 25000242 PHARM REV CODE 250 ALT 637 W/ HCPCS: Performed by: INTERNAL MEDICINE

## 2025-06-20 PROCEDURE — 25000003 PHARM REV CODE 250

## 2025-06-20 PROCEDURE — 85014 HEMATOCRIT: CPT | Performed by: INTERNAL MEDICINE

## 2025-06-20 RX ORDER — METOPROLOL TARTRATE 1 MG/ML
5 INJECTION, SOLUTION INTRAVENOUS ONCE
Status: COMPLETED | OUTPATIENT
Start: 2025-06-20 | End: 2025-06-20

## 2025-06-20 RX ORDER — HYDROCODONE BITARTRATE AND ACETAMINOPHEN 500; 5 MG/1; MG/1
TABLET ORAL
Status: DISCONTINUED | OUTPATIENT
Start: 2025-06-20 | End: 2025-06-28 | Stop reason: HOSPADM

## 2025-06-20 RX ORDER — FUROSEMIDE 10 MG/ML
20 INJECTION INTRAMUSCULAR; INTRAVENOUS
Status: DISCONTINUED | OUTPATIENT
Start: 2025-06-20 | End: 2025-06-28 | Stop reason: HOSPADM

## 2025-06-20 RX ORDER — ENOXAPARIN SODIUM 100 MG/ML
1 INJECTION SUBCUTANEOUS EVERY 24 HOURS
Status: DISCONTINUED | OUTPATIENT
Start: 2025-06-20 | End: 2025-06-23

## 2025-06-20 RX ORDER — HEPARIN SODIUM 5000 [USP'U]/ML
70 INJECTION, SOLUTION INTRAVENOUS; SUBCUTANEOUS EVERY 8 HOURS
Status: DISCONTINUED | OUTPATIENT
Start: 2025-06-20 | End: 2025-06-20

## 2025-06-20 RX ADMIN — METOROPROLOL TARTRATE 5 MG: 5 INJECTION, SOLUTION INTRAVENOUS at 09:06

## 2025-06-20 RX ADMIN — SODIUM CHLORIDE: 9 INJECTION, SOLUTION INTRAVENOUS at 06:06

## 2025-06-20 RX ADMIN — DOXYCYCLINE 100 MG: 100 CAPSULE ORAL at 09:06

## 2025-06-20 RX ADMIN — AMIODARONE HYDROCHLORIDE 0.5 MG/MIN: 1.8 INJECTION, SOLUTION INTRAVENOUS at 06:06

## 2025-06-20 RX ADMIN — ENOXAPARIN SODIUM 90 MG: 100 INJECTION SUBCUTANEOUS at 04:06

## 2025-06-20 RX ADMIN — AMIODARONE HYDROCHLORIDE 1 MG/MIN: 1.8 INJECTION, SOLUTION INTRAVENOUS at 12:06

## 2025-06-20 RX ADMIN — LEVALBUTEROL HYDROCHLORIDE 1.25 MG: 1.25 SOLUTION RESPIRATORY (INHALATION) at 07:06

## 2025-06-20 RX ADMIN — LEVALBUTEROL HYDROCHLORIDE 1.25 MG: 1.25 SOLUTION RESPIRATORY (INHALATION) at 06:06

## 2025-06-20 RX ADMIN — CEFEPIME 1 G: 1 INJECTION, POWDER, FOR SOLUTION INTRAMUSCULAR; INTRAVENOUS at 07:06

## 2025-06-20 RX ADMIN — GUAIFENESIN AND DEXTROMETHORPHAN HYDROBROMIDE 1 TABLET: 600; 30 TABLET, EXTENDED RELEASE ORAL at 09:06

## 2025-06-20 RX ADMIN — FUROSEMIDE 20 MG: 10 INJECTION, SOLUTION INTRAMUSCULAR; INTRAVENOUS at 04:06

## 2025-06-20 RX ADMIN — MUPIROCIN 1 G: 20 OINTMENT TOPICAL at 09:06

## 2025-06-20 RX ADMIN — AMIODARONE HYDROCHLORIDE 150 MG: 1.5 INJECTION, SOLUTION INTRAVENOUS at 12:06

## 2025-06-20 RX ADMIN — TAMSULOSIN HYDROCHLORIDE 0.4 MG: 0.4 CAPSULE ORAL at 09:06

## 2025-06-20 RX ADMIN — PANTOPRAZOLE SODIUM 40 MG: 40 TABLET, DELAYED RELEASE ORAL at 05:06

## 2025-06-20 RX ADMIN — SODIUM CHLORIDE: 9 INJECTION, SOLUTION INTRAVENOUS at 07:06

## 2025-06-20 RX ADMIN — TRAZODONE HYDROCHLORIDE 100 MG: 50 TABLET ORAL at 09:06

## 2025-06-20 RX ADMIN — LEVALBUTEROL HYDROCHLORIDE 1.25 MG: 1.25 SOLUTION RESPIRATORY (INHALATION) at 12:06

## 2025-06-20 RX ADMIN — FOLIC ACID 2000 MCG: 1 TABLET ORAL at 09:06

## 2025-06-20 RX ADMIN — BENZONATATE 200 MG: 100 CAPSULE ORAL at 06:06

## 2025-06-20 RX ADMIN — CEFEPIME 1 G: 1 INJECTION, POWDER, FOR SOLUTION INTRAMUSCULAR; INTRAVENOUS at 09:06

## 2025-06-20 NOTE — ASSESSMENT & PLAN NOTE
Not a candidate for anticoagulation therapy in the setting of profound thrombocytopenia.  Currently on intravenous amiodarone infusion as per cardiology team.  Trend serial cardiac enzymes.

## 2025-06-20 NOTE — ASSESSMENT & PLAN NOTE
Aware   Patient received 1 unit of PRBC on 6/18/25.  Patient agreeable to receive another unit of packed red blood cell today.    Recent Labs     06/19/25  0951 06/19/25  1720 06/20/25  0230   HGB 6.8* 7.6* 7.1*   HCT 20.7* 23.6* 21.6*

## 2025-06-20 NOTE — PT/OT/SLP PROGRESS
Occupational Therapy      Patient Name:  Rick Butler   MRN:  [id number]    Patient not seen today secondary to currently receiving Blood transfusion. Will follow-up 6/21.    6/20/2025

## 2025-06-20 NOTE — PROGRESS NOTES
[facility] Medicine  Progress Note    Patient Name: Rick Butler  MRN: [id number]  Patient Class: IP- Inpatient   Admission Date: 6/18/2025  Length of Stay: 2 days  Attending Physician: Judy Gonzalez MD  Primary Care Provider: Reynaldo Basilio FNP-C        Subjective     Principal Problem:Severe sepsis        HPI:  65 year old pt getting admitted with Sepsis/Severe pneumonia/Neutropenic fever  Pt was suffering from URTI like infection for past several days  Later he suffered from severe SOB/cough  Today symptoms went worse, came to ER found to be hypoxic and got admitted     Overview/Hospital Course:  Patient admitted to intensive care unit where patient received 1 unit of packed red blood cells for symptomatic anemia.  Pancytopenia was closely monitored.  Patient placed on neutropenic precautions.  For pneumonia patient maintained on doxycycline and cefepime at the directions of Infectious Disease and Pulmonary Medicine.  Patient noted to have elevated serum troponin due to demand ischemia for which cardiologist was consulted.    Interval History:  Patient is seen and examined during multidisciplinary rounds.  Patient is in good spirits.  On amiodarone infusion for AFib with RVR.  Remains tachycardic.  Breathing better.  Currently on 4 L/min supplemental oxygen via nasal cannula.  Agreeable to receive another unit of packed red blood cell.  Currently afebrile.  Neutropenic precautions taken off. Pulmonary Medicine, Oncology, Cardiology and ID specialist and Nephrology teams following patient.    Review of Systems   Constitutional:  Positive for fever. Negative for activity change and appetite change.   HENT:  Negative for congestion and dental problem.    Eyes:  Negative for discharge and itching.   Respiratory:  Positive for cough and shortness of breath.    Cardiovascular:  Negative for chest pain.   Gastrointestinal:  Negative for abdominal distention and abdominal pain.    Endocrine: Negative for cold intolerance.   Genitourinary:  Negative for difficulty urinating and dysuria.   Musculoskeletal:  Negative for arthralgias and back pain.   Skin:  Negative for color change.   Neurological:  Negative for dizziness and facial asymmetry.   Hematological:  Negative for adenopathy.   Psychiatric/Behavioral:  Negative for agitation and behavioral problems.      Objective:     Vital Signs (Most Recent):  Temp: 98.5 °F (36.9 °C) (06/20/25 0415)  Pulse: (!) 123 (06/20/25 0652)  Resp: (!) 34 (06/20/25 0652)  BP: 133/74 (06/20/25 0415)  SpO2: 100 % (06/20/25 0652) Vital Signs (24h Range):  Temp:  [97.5 °F (36.4 °C)-98.5 °F (36.9 °C)] 98.5 °F (36.9 °C)  Pulse:  [] 123  Resp:  [18-38] 34  SpO2:  [89 %-100 %] 100 %  BP: ()/(52-83) 133/74     Weight: 93.7 kg (206 lb 9.1 oz)  Body mass index is 30.51 kg/m².    Intake/Output Summary (Last 24 hours) at 6/20/2025 0818  Last data filed at 6/20/2025 0605  Gross per 24 hour   Intake 3762.36 ml   Output 1350 ml   Net 2412.36 ml         Physical Exam  Vitals and nursing note reviewed.   Constitutional:       Appearance: He is well-developed.   HENT:      Head: Atraumatic.      Right Ear: External ear normal.      Left Ear: External ear normal.      Nose: Nose normal.      Mouth/Throat:      Mouth: Mucous membranes are dry.   Eyes:      Extraocular Movements: Extraocular movements intact.   Cardiovascular:      Rate and Rhythm: Tachycardia present.   Pulmonary:      Effort: Pulmonary effort is normal.   Abdominal:      Palpations: Abdomen is soft.   Musculoskeletal:         General: Normal range of motion.      Cervical back: Full passive range of motion without pain and normal range of motion.      Right lower leg: No edema.      Left lower leg: No edema.   Skin:     General: Skin is warm.   Neurological:      Mental Status: He is alert and oriented to person, place, and time.   Psychiatric:         Behavior: Behavior normal.          "      Significant Labs: All pertinent labs within the past 24 hours have been reviewed.  CBC:   Recent Labs   Lab 06/19/25  0951 06/19/25  1720 06/20/25  0230   WBC 2.85* 4.57 5.73   HGB 6.8* 7.6* 7.1*   HCT 20.7* 23.6* 21.6*   PLT 46* 47* 40*     CMP:   Recent Labs   Lab 06/18/25  0843 06/19/25  0301 06/19/25 2026 06/20/25  0230    138 139 140   K 4.0 4.9 4.1 4.2    108 112* 112*   CO2 21* 21* 19* 20*   GLU 95 92 111* 110   BUN 55* 54* 57* 54*   CREATININE 4.6* 4.2* 4.1* 4.0*   CALCIUM 8.9 8.1* 8.3* 8.5*   PROT 7.5 6.3  --  6.7   ALBUMIN 4.0 3.3*  --  3.0*   BILITOT 0.6 1.1*  --  0.5   ALKPHOS 45* 35*  --  46*   AST 23 25  --  23   ALT 19 17  --  19   ANIONGAP 12 9 8 8     Lactic Acid:   Recent Labs   Lab 06/18/25  1318   LACTATE 1.8     Magnesium:   Recent Labs   Lab 06/19/25  0301 06/19/25 2026 06/20/25  0230   MG 2.0 2.2 2.2     Troponin: No results for input(s): "TROPONINI", "TROPONINIHS" in the last 48 hours.  TSH:   Recent Labs   Lab 04/09/25  0730   TSH 3.167     Urine Studies:   Recent Labs   Lab 06/18/25  1113   APPEARANCEUA Clear   SPECGRAV 1.010   PROTEINUA 1+*   BILIRUBINUA Negative   UROBILINOGEN Negative   LEUKOCYTESUR Negative   RBCUA <1   WBCUA <1     Microbiology Results (last 7 days)       Procedure Component Value Units Date/Time    Culture, Respiratory with Gram Stain [[id number]] Collected: 06/19/25 0950    Order Status: Completed Specimen: Respiratory Updated: 06/20/25 0747     Respiratory Culture Normal respiratory federico     GRAM STAIN (RESPIRATORY) >10 Epithelial Cells/LPF      Few WBC seen      Rare Gram Positive Rods     Comment: Corrected result: Previously reported as Rare Branching Gram positive rods on 6/19/2025 at 1225 CDT.         Rare Gram positive cocci    Blood culture x two cultures. Draw prior to antibiotics. [[id number]]  (Normal) Collected: 06/18/25 0930    Order Status: Completed Specimen: Blood Updated: 06/19/25 2202     CULTURE, BLOOD ([facility]) No Growth After 36 " Hours    Blood culture x two cultures. Draw prior to antibiotics. [[id number]]  (Normal) Collected: 06/18/25 0932    Order Status: Completed Specimen: Blood Updated: 06/19/25 2202     CULTURE, BLOOD ([facility]) No Growth After 36 Hours    Culture, Respiratory with Gram Stain [[id number]] Collected: 06/19/25 0734    Order Status: Completed Specimen: Respiratory from Sputum, Expectorated Updated: 06/19/25 0844     Respiratory Culture Specimen inadequate - culture not performed     GRAM STAIN (RESPIRATORY) >10epis/lfp and <than many WBC's      Predominance of oropharyngeal federico. Please recollect    MRSA Screen by PCR [[id number]]  (Normal) Collected: 06/18/25 1948    Order Status: Completed Specimen: Nasal Swab Updated: 06/18/25 2115     MRSA PCR SCRN ([facility]) Not Detected    Respiratory Infection Panel (PCR), Nasopharyngeal [[id number]] Collected: 06/18/25 1027    Order Status: Completed Specimen: Nasopharyngeal Swab Updated: 06/18/25 1209     Respiratory Infection Panel Source Nasopharyngeal Swab     Adenovirus Not Detected     Coronavirus 229E, Common Cold Virus Not Detected     Coronavirus HKU1, Common Cold Virus Not Detected     Coronavirus NL63, Common Cold Virus Not Detected     Coronavirus OC43, Common Cold Virus Not Detected     SARS-CoV2 (COVID-19) Qualitative PCR Not Detected     Human Metapneumovirus Not Detected     Human Rhinovirus/Enterovirus Not Detected     Influenza A (subtypes H1, H1-2009,H3) Not Detected     Influenza B Not Detected     Parainfluenza Virus 1 Not Detected     Parainfluenza Virus 2 Not Detected     Parainfluenza Virus 3 Not Detected     Parainfluenza Virus 4 Not Detected     Respiratory Syncytial Virus Not Detected     Bordetella Parapertussis ([id number]) Not Detected     Bordetella pertussis (ptxP) Not Detected     Chlamydia pneumoniae Not Detected     Mycoplasma pneumoniae Not Detected          Significant Imaging:   CXR: Alveolar consolidations in the right mid and lower lung compatible with  pneumonia     CT Chest without contrast:   Dense alveolar consolidation involving the majority of the right middle lobe with patchy ground-glass opacities in the inferior right upper lobe and right lower lobe compatible with multifocal pneumonia  7 mm ground-glass opacity left upper lobe with atelectasis in left lung base.    ECHO:    Left Ventricle: The left ventricle is normal in size. Normal wall thickness. Mild global hypokinesis present. There is low normal systolic function with a visually estimated ejection fraction of 50 - 55%. There is normal diastolic function.    Right Ventricle: The right ventricle is normal in size Systolic function is normal.    Left Atrium: The left atrium is moderately dilated    Mitral Valve: There is moderate regurgitation.    Tricuspid Valve: There is mild to moderate regurgitation. There is pulmonary hypertension. The estimated PA systolic pressure is at least 36 mmHg.        Assessment & Plan  Severe sepsis  This patient does have evidence of infective focus  My overall impression is sepsis with Acute kidney injury, Acute respiratory failure, and Thrombocytopenia .  Source: Respiratory Infection  Antibiotics given-   Antibiotics (72h ago, onward)      Start     Stop Route Frequency Ordered    06/18/25 2100  mupirocin 2 % ointment         06/23/25 2059 Nasl 2 times daily 06/18/25 1747 06/18/25 2100  doxycycline capsule 100 mg         -- Oral Every 12 hours 06/18/25 1752    06/18/25 1900  ceFEPIme injection 1 g         -- IV Every 12 hours (non-standard times) 06/18/25 1752          Latest lactate reviewed-  Recent Labs   Lab 06/18/25  1318 06/18/25  1440   LACTATE 1.8  --    POCLAC  --  1.61     Acute pneumonia  Maintain iv abx as below  De escalate as needed     Antibiotics (From admission, onward)      Start     Stop Route Frequency Ordered    06/18/25 2100  mupirocin 2 % ointment         06/23/25 2059 Nasl 2 times daily 06/18/25 1747 06/18/25 2100  doxycycline capsule  100 mg         -- Oral Every 12 hours 06/18/25 1752 06/18/25 1900  ceFEPIme injection 1 g         -- IV Every 12 hours (non-standard times) 06/18/25 1752            Microbiology Results (last 7 days)       Procedure Component Value Units Date/Time    Blood culture x two cultures. Draw prior to antibiotics. [[id number]]  (Normal) Collected: 06/18/25 0930    Order Status: Completed Specimen: Blood Updated: 06/20/25 1002     CULTURE, BLOOD ([facility]) No Growth After 48 Hours    Blood culture x two cultures. Draw prior to antibiotics. [[id number]]  (Normal) Collected: 06/18/25 0932    Order Status: Completed Specimen: Blood Updated: 06/20/25 1002     CULTURE, BLOOD ([facility]) No Growth After 48 Hours    Culture, Respiratory with Gram Stain [[id number]] Collected: 06/19/25 0950    Order Status: Completed Specimen: Respiratory Updated: 06/20/25 0747     Respiratory Culture Normal respiratory federico     GRAM STAIN (RESPIRATORY) >10 Epithelial Cells/LPF      Few WBC seen      Rare Gram Positive Rods     Comment: Corrected result: Previously reported as Rare Branching Gram positive rods on 6/19/2025 at 1225 CDT.         Rare Gram positive cocci    Culture, Respiratory with Gram Stain [[id number]] Collected: 06/19/25 0734    Order Status: Completed Specimen: Respiratory from Sputum, Expectorated Updated: 06/19/25 0844     Respiratory Culture Specimen inadequate - culture not performed     GRAM STAIN (RESPIRATORY) >10epis/lfp and <than many WBC's      Predominance of oropharyngeal federico. Please recollect    MRSA Screen by PCR [[id number]]  (Normal) Collected: 06/18/25 1948    Order Status: Completed Specimen: Nasal Swab Updated: 06/18/25 2115     MRSA PCR SCRN ([facility]) Not Detected    Respiratory Infection Panel (PCR), Nasopharyngeal [[id number]] Collected: 06/18/25 1027    Order Status: Completed Specimen: Nasopharyngeal Swab Updated: 06/18/25 1209     Respiratory Infection Panel Source Nasopharyngeal Swab     Adenovirus Not Detected      Coronavirus 229E, Common Cold Virus Not Detected     Coronavirus HKU1, Common Cold Virus Not Detected     Coronavirus NL63, Common Cold Virus Not Detected     Coronavirus OC43, Common Cold Virus Not Detected     SARS-CoV2 (COVID-19) Qualitative PCR Not Detected     Human Metapneumovirus Not Detected     Human Rhinovirus/Enterovirus Not Detected     Influenza A (subtypes H1, H1-2009,H3) Not Detected     Influenza B Not Detected     Parainfluenza Virus 1 Not Detected     Parainfluenza Virus 2 Not Detected     Parainfluenza Virus 3 Not Detected     Parainfluenza Virus 4 Not Detected     Respiratory Syncytial Virus Not Detected     Bordetella Parapertussis ([id number]) Not Detected     Bordetella pertussis (ptxP) Not Detected     Chlamydia pneumoniae Not Detected     Mycoplasma pneumoniae Not Detected          Sickle cell trait syndrome  Aware     RARS (refractory anemia with ringed sideroblasts)  Aware   Patient received 1 unit of PRBC on 6/18/25.  Patient agreeable to receive another unit of packed red blood cell today.    Recent Labs     06/19/25  0951 06/19/25  1720 06/20/25  0230   HGB 6.8* 7.6* 7.1*   HCT 20.7* 23.6* 21.6*       MDS (myelodysplastic syndrome)  Aware     Anemia, chronic renal failure, stage 3 (moderate)  Aware   Also has MDS  Neutropenic fever  Maintain Rx as ordered  Isolation precautions    Acute on chronic anemia  pRBC ordered   Thrombocytopenia   Latest Reference Range & Units 05/28/25 07:42 06/04/25 07:33 06/18/25 08:43   Platelet Count 150 - 450 K/uL 62 (L) 7 (LL) 62 (L)   (LL): Data is critically low  (L): Data is abnormally low  Elevated troponin  From sepsis/TORRES  Trend c enzymes   Latest Reference Range & Units 06/18/25 08:43 06/18/25 13:18   Troponin I High Sensitivity <=14.9 pg/mL 49.9 (H) 58.7 (HH)   (HH): Data is critically high  (H): Data is abnormally high      TORRES on CKD  Maintain iv fluids   Latest Reference Range & Units 05/07/25 07:38 05/14/25 07:30 05/21/25 07:36 05/28/25 07:42  06/04/25 07:33 06/18/25 08:43   Creatinine 0.5 - 1.4 mg/dL 3.3 (H) 3.3 (H) 3.5 (H) 3.4 (H) 3.9 (H) 4.6 (H)   (H): Data is abnormally high    Acute kidney injury superimposed on chronic kidney disease  Improving.  Follow Nephrology recommendations.  Maintain iv fluids   Latest Reference Range & Units 05/07/25 07:38 05/14/25 07:30 05/21/25 07:36 05/28/25 07:42 06/04/25 07:33 06/18/25 08:43   Creatinine 0.5 - 1.4 mg/dL 3.3 (H) 3.3 (H) 3.5 (H) 3.4 (H) 3.9 (H) 4.6 (H)   (H): Data is abnormally high  Atrial fibrillation with rapid ventricular response  Not a candidate for anticoagulation therapy in the setting of profound thrombocytopenia.  Currently on intravenous amiodarone infusion as per cardiology team.  Trend serial cardiac enzymes.    VTE Risk Mitigation (From admission, onward)           Ordered     IP VTE HIGH RISK PATIENT  Once         06/18/25 1145     Place sequential compression device  Until discontinued         06/18/25 1145                    Discharge Planning   ELMO: 6/23/2025     Code Status: Full Code   Patient is Medically Not Yet Ready for discharge.  Medical Readiness for Discharge Date:   Discharge Plan A: Home with family          SDOH Screening:  The patient declined to be screened for utility difficulties, food insecurity, transport difficulties, housing insecurity, and interpersonal safety, so no concerns could be identified this admission.            Critical care time spent on the evaluation and treatment of severe organ dysfunction, review of pertinent labs and imaging studies, discussions with consulting providers and discussions with patient/family: 35 minutes.          Judy Gonzalez MD  Department of Hospital Medicine   [facility]

## 2025-06-20 NOTE — CARE UPDATE
06/19/25 2008   Patient Assessment/Suction   Level of Consciousness (AVPU) alert   Respiratory Effort Unlabored   Expansion/Accessory Muscles/Retractions expansion symmetric   All Lung Fields Breath Sounds clear   Rhythm/Pattern, Respiratory depth regular;pattern regular   Cough Frequency infrequent   Cough Type good;nonproductive   Skin Integrity   $ Wound Care Tech Time 15 min   Area Observed Left;Right;Behind ear;Cheek;Upper lip;Nares   Skin Appearance without discoloration   PRE-TX-O2   Device (Oxygen Therapy) nasal cannula   $ Is the patient on Low Flow Oxygen? Yes   Flow (L/min) (Oxygen Therapy) 3   SpO2 (!) 94 %   Pulse Oximetry Type Continuous   $ Pulse Oximetry - Multiple Charge Pulse Oximetry - Multiple   Pulse (!) 113   Resp (!) 36   Education   $ Education Oxygen;15 min

## 2025-06-20 NOTE — PROGRESS NOTES
"[facility]  Hematology/Oncology  Inpatient Progress Note          Patient Name: Rick Butler  MRN: [id number]  Admission Date: 6/18/2025  Hospital Length of Stay: 2 days  Code Status: Full Code   Attending Provider: Judy Gonzalez MD  Consulting Provider: Jose Tello MD  Primary Care Physician: Reynaldo Basilio, LUCINAPLeonardoC  Principal Problem:Severe sepsis      Subjective:       Patient ID: Rick Butler is a 65 y.o. male.    Chief Complaint: Shortness of Breath (X 1 day. ), Fatigue, and Cough (Onset lastnite)        History Present Illness:    Patient remains in the ICU; he is awake and alert; sitting up in bed; he has been getting up with PT; breathing better; feeling a little better; wife and another family member at bedside; discussed with his ICU nurse in person; I communicated with Bambi NP with cardiology and ok to start heparin/lovenox for the atrial fib as long as platelet count does not come back down       Review of Systems:  GEN: general aches and pains, malaise, fatigue, weakness; fever  HEENT: normal with no HA's, sore throat, stiff neck, changes in vision  CV: normal with no CP, SOB, PND, MORA or orthopnea  PULM: normal with no SOB,  hemoptysis, sputum or pleuritic pain; cough   GI: normal with no abdominal pain, nausea, vomiting, constipation, diarrhea, melanotic stools, BRBPR, or hematemesis  : normal with no hematuria, dysuria  BREAST: normal with no mass, discharge, pain  SKIN: normal with no rash, erythema, bruising, or swelling      Objective:     Vitals:  Blood pressure 133/74, pulse (!) 123, temperature 98.5 °F (36.9 °C), temperature source Oral, resp. rate (!) 34, height 5' 9" (1.753 m), weight 93.7 kg (206 lb 9.1 oz), SpO2 100%.    Physical Exam:  GEN: no apparent distress, comfortable; AAOx3  HEAD: atraumatic and normocephalic  EYES: no pallor, no icterus, PERRLA  ENT: OMM, no pharyngeal erythema, external ears WNL; no nasal discharge; no thrush  NECK: no " masses, thyroid normal, trachea midline, no LAD/LN's, supple  CV: tachycardic RR with no murmur; normal pulse; normal S1 and S2; no pedal edema  CHEST: Normal respiratory effort; CTAB; some mild diffuse coarser breath sounds Rgt>left; O2 per NC;  no wheeze or crackles  ABDOM: nontender and nondistended; soft; normal bowel sounds; no rebound/guarding  MUSC/Skeletal: ROM normal; no crepitus; joints normal; no deformities or arthropathy  EXTREM: no clubbing, cyanosis, inflammation or swelling; IV's bilateral arms  SKIN: no rashes, lesions, ulcers, petechiae or subcutaneous nodules; tattoos   : no jiang  NEURO: grossly intact; motor/sensory WNL; AAOx3; no tremors  PSYCH: normal mood, affect and behavior  LYMPH: normal cervical, supraclavicular, axillary and groin LN's          Lab Review:        Lab Results   Component Value Date    WBC 5.73 06/20/2025    HGB 7.1 (L) 06/20/2025    HCT 21.6 (L) 06/20/2025    MCV 84 06/20/2025    PLT 40 (LL) 06/20/2025     CMP  Sodium   Date Value Ref Range Status   06/20/2025 140 136 - 145 mmol/L Final   06/26/2019 138 134 - 144 mmol/L      Potassium   Date Value Ref Range Status   06/20/2025 4.2 3.5 - 5.1 mmol/L Final     Chloride   Date Value Ref Range Status   06/20/2025 112 (H) 95 - 110 mmol/L Final   06/26/2019 105 98 - 110 mmol/L      CO2   Date Value Ref Range Status   06/20/2025 20 (L) 23 - 29 mmol/L Final     Glucose   Date Value Ref Range Status   06/20/2025 110 70 - 110 mg/dL Final   06/26/2019 114 (H) 70 - 99 mg/dL      BUN   Date Value Ref Range Status   06/20/2025 54 (H) 8 - 23 mg/dL Final     Creatinine   Date Value Ref Range Status   06/20/2025 4.0 (H) 0.5 - 1.4 mg/dL Final   06/26/2019 1.62 (H) 0.60 - 1.40 mg/dL      Calcium   Date Value Ref Range Status   06/20/2025 8.5 (L) 8.7 - 10.5 mg/dL Final     Protein Total   Date Value Ref Range Status   06/20/2025 6.7 6.0 - 8.4 gm/dL Final     Albumin   Date Value Ref Range Status   06/20/2025 3.0 (L) 3.5 - 5.2 g/dL Final    06/26/2019 4.4 3.1 - 4.7 g/dL      Bilirubin Total   Date Value Ref Range Status   06/20/2025 0.5 0.1 - 1.0 mg/dL Final     Comment:     For infants and newborns, interpretation of results should be based   on gestational age, weight and in agreement with clinical   observations.    Premature Infant recommended reference ranges:   0-24 hours:  <8.0 mg/dL   24-48 hours: <12.0 mg/dL   3-5 days:    <15.0 mg/dL   6-29 days:   <15.0 mg/dL     ALP   Date Value Ref Range Status   06/20/2025 46 (L) 55 - 135 unit/L Final     AST   Date Value Ref Range Status   06/20/2025 23 10 - 40 unit/L Final     ALT   Date Value Ref Range Status   06/20/2025 19 10 - 44 unit/L Final     Anion Gap   Date Value Ref Range Status   06/20/2025 8 8 - 16 mmol/L Final     eGFR   Date Value Ref Range Status   06/20/2025 16 (L) >60 mL/min/1.73/m2 Final   03/12/2025 21.6 (A) >60 mL/min/1.73 m^2 Final           Radiology Diagnostic Studies:     X-Ray Chest AP Portable [[id number]] Resulted: 06/18/25 0901   Order Status: Completed Updated: 06/18/25 0903   Narrative:     EXAMINATION:  XR CHEST AP PORTABLE    CLINICAL HISTORY:  Generalized weakness;    FINDINGS:  Portable chest at 08:49 is compared to 12/20/2021 shows normal cardiomediastinal silhouette.    There is an alveolar consolidation in the mid and lower lung compatible with pneumonia.  The left lung is clear.  There are no pleural effusions.  Right pulmonary vasculature is normal. No acute osseous abnormality.   Impression:       Alveolar consolidations in the right mid and lower lung compatible with pneumonia         Assessment:     IMPRESSION:    (1) 65 y.o. male known to my oncology service with diagnosis of MDS/RARS and sickle cell trait. He is treated in conjunction with Dr Marcelino faulkner of [facility] in [place], whom he recently saw for follow-up visit on 6/4/2025. He has been on chemotherapy with the drug Rytelo for the past couple of months. He requires periodic blood and platelet  transfusions. He presented to ER with cough, fever and progressive weakness/fatigue. Troponin was elevated and CXR showing RML/RLL consolidations consistent with a pneumonia.     6/18/2025;  - WBc 1.02, Hgb 7.3 and plats 62,000  -  I discussed with Dr Brown and Dr Moran both in person. I discussed with his ER nurse in person.   - Plans are to transfuse a unit of blood, IV antibiotics; cultures drawn  - cardiology/pulm consults, ID consult and nephrology consult.      6/19/2025:  - patient currently in ICU  - he has been seen by pulmonary/critical care and ID, cardiology and nephrology  - wbc at 2.38, Hgb 6.7 and plats at 47,000  - cardiology and nephrology consulted    6/20/2025:  - patient remains in the ICU  - WBC 5.73, Hgb 7.1 and plats 40,000  - s/p unit blood yesterday and another one today  -  he has been getting up with PT; breathing better; feeling a little better;   -  discussed with his ICU nurse in person;  -  I communicated with Bambi NP with cardiology and ok to start heparin/lovenox for the atrial fib as long as platelet count does not come back down      (2) Acute exacerbation of CKD - followed by Dr Mas as outpatient     (3) Chronic back pains - seen by Dr Heredia in past      (4) Hx/of H pylori gastritis s/p prior colonoscopy with Dr Collins in past     (5) Hx/of alcohol uses in past (sober for several years now); former smoker            1. Pneumonia due to infectious organism, unspecified laterality, unspecified part of lung    2. Hypoxia    3. Myelodysplastic syndrome    4. Severe sepsis    5. Chest pain    6. Elevated troponin    7. A-fib           Plan:     PLAN:          1. Monitor labs and transfuse as needed    2. Infection workup, IV antibiotics etc as per ID  3. Respiratory and critical care as per pulmonary directives   4. appreciate cardiology consult and assistance   5. Appreciate nephrology consult for the acute on top of Chronic renal insufficiency   6. IVF's, electrolyte and pain  management as per primary team  7. Will follow with you - Dr Landry Pereira is on call for me through weekend                Jose Tello MD  Hematology/Oncology  [facility] hospital

## 2025-06-20 NOTE — PROGRESS NOTES
1900 Spoke with Dr Rodriguez about abnormal vitals, pain in ribs, and stat labs, CXR, and meds ordered. See mar.    2200 Spoke with Dr Scott see mar.      0000 Spoke with Dr Scott again about a fib rvr rate 120-130s amio gtt infusion ordered.      Wife remains at bedside. Safety intact: call light within reach, bed alarm set, personal items within reach, side rails upx2. Room free of clutter.

## 2025-06-20 NOTE — PLAN OF CARE
CM present during SIBR with pt who had spouse present.  Pt on 4L O2, IV abx and to receive RBC today. Pt and spouse note no current discharge needs. To have PT/OT eval. CM will continue to follow and monitor for possible needs (HH/O2).       06/20/25 1001   Discharge Reassessment   Assessment Type Discharge Planning Reassessment   Did the patient's condition or plan change since previous assessment? No   Discharge Plan discussed with: Spouse/sig other;Patient   Name(s) and Number(s) Nelly Butler (Spouse)  [phone]   Discharge Plan A Home with family   Discharge Plan B Home Health   DME Needed Upon Discharge  other (see comments)  (Monitor O2 needs)   Transition of Care Barriers None   Why the patient remains in the hospital Requires continued medical care

## 2025-06-20 NOTE — PROGRESS NOTES
INPATIENT NEPHROLOGY Progress Note   [facility]    Patient Name: Rick Butler  Date: 06/20/2025    Reason for consultation: TORRES    Chief Complaint:   Chief Complaint   Patient presents with    Shortness of Breath     X 1 day.     Fatigue    Cough     Onset lastnite       History of Present Illness:  65-year-old male with history of myelodysplastic syndrome, sickle cell trait, chronic kidney disease stage 3 f/b Dr. Mas, depression, former smoker, ETOH abuse, lumbar degenerative disc disease. Patient presents to the emergency department with complaint of shortness of breath and increasing fatigue over last 24 hours. Patient states had slightly productive cough as well during this time. Patient decided come to the emergency department for further evaluation. On arrival to ER found with temperature of 103°, heart rate of 132, SBP 90s, with room air sat of 91%. Diagnosed with RLL PNA. Consulted for TORRES.    Notable home meds- norvasc, calcitriol, flomax    Interval History:  6/19- afebrile, SBP 100s, on 4L NC, UOP 1.2L, transfused 1u of blood yest, on levophed and NS IVFs  6/20- afebrile, elevated HR, SBP 120s, on 3-4L NC, coughing, UOP 1.6L, getting blood, off levophed, on amio gtt (went in to AF with RVR overnight), on NS IVFs, echo with TR/pulm HTN    Plan of Care:    Assessment:  Septic shock due to HCAP in an IC patient  TORRES/CKD IV  Elevated BNP and troponin  AF with RVR  Metabolic acidosis  SHPT  MDS on chemotherapy  BPH    Plan:    - on treatment for HCAP- blood cx and RVP negative- off pressors- continue IVF support- dose meds for CrCl < 20  - TORRES is due to septic shock- SCr is improving with above management- nonoliguric- he has no acute RRT needs- continue strict measurement of UOP- no nsaids or IV contrast  - suspect elevated BNP/trop due to demand ischemia- improving- lactic acid is normal- echo noted- monitor for s/s volume overload  - on amio gtt  - acidosis is due to hypoperfusion and  renal dysfunction- it is mild and improving- no acute bicarbonate needs  - continue calcitriol  - transfusion goals per heme/onc  - continue flomax    Thank you for allowing us to participate in this patient's care. We will continue to follow.    Vital Signs:  Temp Readings from Last 3 Encounters:   06/20/25 98.9 °F (37.2 °C)   06/05/25 100 °F (37.8 °C) (Temporal)   06/05/25 99 °F (37.2 °C)       Pulse Readings from Last 3 Encounters:   06/20/25 (!) 122   06/05/25 72   06/05/25 70       BP Readings from Last 3 Encounters:   06/20/25 112/70   06/05/25 (!) 160/89   06/05/25 (!) 170/90       Weight:  Wt Readings from Last 3 Encounters:   06/18/25 93.7 kg (206 lb 9.1 oz)   06/05/25 96.2 kg (212 lb)   06/05/25 96.6 kg (213 lb)       INS/OUTS:  I/O last 3 completed shifts:  In: 5313.9 [P.O.:500; I.V.:3930.2; Blood:661.5; IV Piggyback:222.2]  Out: 2415 [Urine:2415]  No intake/output data recorded.    Medications:  Scheduled Meds:   [START ON 6/24/2025] calcitRIOL  0.25 mcg Oral Q7 Days    ceFEPime IV (PEDS and ADULTS)  1 g Intravenous Q12H    dextromethorphan-guaiFENesin  mg  1 tablet Oral BID    doxycycline  100 mg Oral Q12H    folic acid  2,000 mcg Oral Daily    levalbuterol  1.25 mg Nebulization Q6H WAKE    morphine  2 mg Intravenous Once    mupirocin   Nasal BID    pantoprazole  40 mg Oral Daily    tamsulosin  0.4 mg Oral Daily    traZODone  100 mg Oral QHS     Continuous Infusions:   0.9% NaCl   Intravenous Continuous 125 mL/hr at 06/20/25 0722 New Bag at 06/20/25 0722    amiodarone in dextrose 5%  0.5 mg/min Intravenous Continuous 16.7 mL/hr at 06/20/25 0646 0.5 mg/min at 06/20/25 0646    NORepinephrine bitartrate-D5W  0-3 mcg/kg/min Intravenous Continuous         PRN Meds:.  Current Facility-Administered Medications:     0.9%  NaCl infusion (for blood administration), , Intravenous, Q24H PRN    0.9%  NaCl infusion (for blood administration), , Intravenous, Q24H PRN    0.9%  NaCl infusion (for blood  "administration), , Intravenous, Q24H PRN    acetaminophen, 650 mg, Oral, Q8H PRN    acetaminophen, 650 mg, Oral, Q4H PRN    aluminum-magnesium hydroxide-simethicone, 30 mL, Oral, QID PRN    benzonatate, 200 mg, Oral, TID PRN    furosemide (LASIX) injection, 20 mg, Intravenous, PRN    HYDROcodone-acetaminophen, 1 tablet, Oral, Q6H PRN    magnesium oxide, 800 mg, Oral, PRN    magnesium oxide, 800 mg, Oral, PRN    melatonin, 6 mg, Oral, Nightly PRN    naloxone, 0.02 mg, Intravenous, PRN    ondansetron, 4 mg, Intravenous, Q6H PRN    potassium bicarbonate, 35 mEq, Oral, PRN    potassium bicarbonate, 50 mEq, Oral, PRN    potassium bicarbonate, 60 mEq, Oral, PRN    potassium, sodium phosphates, 2 packet, Oral, PRN    potassium, sodium phosphates, 2 packet, Oral, PRN    potassium, sodium phosphates, 2 packet, Oral, PRN  Medications Ordered Prior to Encounter[1]    Review of Systems:  Neg    Physical Exam:  /70   Pulse (!) 122   Temp 98.9 °F (37.2 °C)   Resp (!) 41   Ht 5' 9" (1.753 m)   Wt 93.7 kg (206 lb 9.1 oz)   SpO2 (!) 93%   BMI 30.51 kg/m²     General Appearance:    NAD, AAO x 3, cooperative, appears stated age   Head:    Normocephalic, atraumatic   Eyes:    PER, EOMI, and conjunctiva/sclera clear bilaterally        Mouth:   Moist mucus membranes, no thrush or oral lesions, normal      dentition   Back:     No CVA tenderness   Lungs:     Clear to auscultation bilaterally, no wheeze, crackles, rales      or rhonchi, symmetric air movement, respirations unlabored   Heart:    Regular rate and rhythm, S1 and S2 normal, no murmur, rub   or gallop   Abdomen:     Soft, non-tender, non-distended, bowel sounds active all four   quadrants, no RT or guarding, no masses, no organomegaly   Extremities:   Warm and well perfused, distal pulses intact, no cyanosis or    peripheral edema   MSK:   No joint or muscle swelling, tenderness or deformity   Skin:   Skin color, texture, turgor normal, no rashes or lesions "   Neurologic/Psychiatric:   CNII-XII intact, normal strength and sensation       throughout, no asterixis; normal affect, memory, judgement     and insight     Results:  Lab Results   Component Value Date     06/20/2025    K 4.2 06/20/2025     (H) 06/20/2025    CO2 20 (L) 06/20/2025    BUN 54 (H) 06/20/2025    CREATININE 4.0 (H) 06/20/2025    CALCIUM 8.5 (L) 06/20/2025    ANIONGAP 8 06/20/2025    ESTGFRAFRICA 45.6 (A) 07/13/2022    EGFRNONAA 39.5 (A) 07/13/2022       Lab Results   Component Value Date    CALCIUM 8.5 (L) 06/20/2025    PHOS 4.1 06/19/2025       Recent Labs   Lab 06/20/25  0230   WBC 5.73   RBC 2.58*   HGB 7.1*   HCT 21.6*   PLT 40*   MCV 84   MCH 27.5   MCHC 32.9       I have personally reviewed pertinent radiological imaging and reports from this admission.    I have spent > 35 minutes providing care for this patient for the above diagnoses. These services have included chart/data/imaging review, evaluation, exam, formulation of plan, , note preparation, and discussions with staff involved in this patient's care.    Cleopatra Herrera MD MPH   [facility]  [address] [place] [id number]  [phone] (p)  [phone] (f)           [1]   No current facility-administered medications on file prior to encounter.     Current Outpatient Medications on File Prior to Encounter   Medication Sig Dispense Refill    amLODIPine (NORVASC) 5 MG tablet Take 1 tablet (5 mg total) by mouth once daily. 90 tablet 3    ascorbic acid, vitamin C, (VITAMIN C) 500 MG tablet Take 500 mg by mouth once daily.      atorvastatin (LIPITOR) 10 MG tablet Take 1 tablet (10 mg total) by mouth every evening. 90 tablet 3    calcitRIOL (ROCALTROL) 0.25 MCG Cap Take 0.25 mcg by mouth every 7 days.      cyproheptadine (PERIACTIN) 4 mg tablet Take 1 tablet (4 mg total) by mouth 3 (three) times daily. 90 tablet 3    droNABinol (MARINOL) 10 MG capsule Take 1 capsule (10 mg total) by mouth 2 (two)  times daily before meals. 60 capsule 1    famotidine (PEPCID) 20 MG tablet Take 20 mg by mouth 2 (two) times daily.      fluticasone propionate (FLONASE) 50 mcg/actuation nasal spray 1 SPRAY (50 MCG TOTAL) BY EACH NOSTRIL ROUTE 2 (TWO) TIMES A DAY. 1 SPRAY EVERY MORNING AND AFTERNOON TOWARD THE EAR EACH SIDE AFTER A SINUS RINSE 48 mL 1    folic acid (FOLVITE) 1 MG tablet TAKE 2 TABLETS BY MOUTH EVERY DAY (Patient taking differently: Take 2,000 mcg by mouth once daily.) 180 tablet 0    multivitamin with minerals Cap Take 1 capsule by mouth every morning.      pantoprazole (PROTONIX) 40 MG tablet Take 40 mg by mouth once daily.      tamsulosin (FLOMAX) 0.4 mg Cap TAKE 2 CAPSULES BY MOUTH EVERY DAY (Patient taking differently: Take 2 capsules by mouth once daily.) 180 capsule 1    traZODone (DESYREL) 100 MG tablet TAKE 1 TABLET BY MOUTH NIGHTLY AS NEEDED FOR INSOMNIA. 90 tablet 2    LIDOcaine (LIDODERM) 5 % Place 1 patch onto the skin once daily. Remove & Discard patch within 12 hours or as directed by MD 14 patch 0    sildenafil (VIAGRA) 100 MG tablet Take 1 tablet (100 mg total) by mouth daily as needed for Erectile Dysfunction. 10 tablet 6    sod chlor-bicarb-squeez bottle (NEILMED SINUS RINSE COMPLETE) pkdv 1 application  by sinus irrigation route 2 (two) times a day. Not right before bed 1 each 11    [DISCONTINUED] fluoxetine 20 MG tablet TAKE 1 TABLET BY MOUTH EVERY DAY 30 tablet 5

## 2025-06-20 NOTE — SUBJECTIVE & OBJECTIVE
Interval History:  Patient is seen and examined during multidisciplinary rounds.  Patient is in good spirits.  On amiodarone infusion for AFib with RVR.  Remains tachycardic.  Breathing better.  Currently on 4 L/min supplemental oxygen via nasal cannula.  Agreeable to receive another unit of packed red blood cell.  Currently afebrile.  Neutropenic precautions taken off. Pulmonary Medicine, Oncology, Cardiology and ID specialist and Nephrology teams following patient.    Review of Systems   Constitutional:  Positive for fever. Negative for activity change and appetite change.   HENT:  Negative for congestion and dental problem.    Eyes:  Negative for discharge and itching.   Respiratory:  Positive for cough and shortness of breath.    Cardiovascular:  Negative for chest pain.   Gastrointestinal:  Negative for abdominal distention and abdominal pain.   Endocrine: Negative for cold intolerance.   Genitourinary:  Negative for difficulty urinating and dysuria.   Musculoskeletal:  Negative for arthralgias and back pain.   Skin:  Negative for color change.   Neurological:  Negative for dizziness and facial asymmetry.   Hematological:  Negative for adenopathy.   Psychiatric/Behavioral:  Negative for agitation and behavioral problems.      Objective:     Vital Signs (Most Recent):  Temp: 98.5 °F (36.9 °C) (06/20/25 0415)  Pulse: (!) 123 (06/20/25 0652)  Resp: (!) 34 (06/20/25 0652)  BP: 133/74 (06/20/25 0415)  SpO2: 100 % (06/20/25 0652) Vital Signs (24h Range):  Temp:  [97.5 °F (36.4 °C)-98.5 °F (36.9 °C)] 98.5 °F (36.9 °C)  Pulse:  [] 123  Resp:  [18-38] 34  SpO2:  [89 %-100 %] 100 %  BP: ()/(52-83) 133/74     Weight: 93.7 kg (206 lb 9.1 oz)  Body mass index is 30.51 kg/m².    Intake/Output Summary (Last 24 hours) at 6/20/2025 0818  Last data filed at 6/20/2025 0605  Gross per 24 hour   Intake 3762.36 ml   Output 1350 ml   Net 2412.36 ml         Physical Exam  Vitals and nursing note reviewed.   Constitutional:  "      Appearance: He is well-developed.   HENT:      Head: Atraumatic.      Right Ear: External ear normal.      Left Ear: External ear normal.      Nose: Nose normal.      Mouth/Throat:      Mouth: Mucous membranes are dry.   Eyes:      Extraocular Movements: Extraocular movements intact.   Cardiovascular:      Rate and Rhythm: Tachycardia present.   Pulmonary:      Effort: Pulmonary effort is normal.   Abdominal:      Palpations: Abdomen is soft.   Musculoskeletal:         General: Normal range of motion.      Cervical back: Full passive range of motion without pain and normal range of motion.      Right lower leg: No edema.      Left lower leg: No edema.   Skin:     General: Skin is warm.   Neurological:      Mental Status: He is alert and oriented to person, place, and time.   Psychiatric:         Behavior: Behavior normal.               Significant Labs: All pertinent labs within the past 24 hours have been reviewed.  CBC:   Recent Labs   Lab 06/19/25  0951 06/19/25  1720 06/20/25  0230   WBC 2.85* 4.57 5.73   HGB 6.8* 7.6* 7.1*   HCT 20.7* 23.6* 21.6*   PLT 46* 47* 40*     CMP:   Recent Labs   Lab 06/18/25  0843 06/19/25  0301 06/19/25 2026 06/20/25  0230    138 139 140   K 4.0 4.9 4.1 4.2    108 112* 112*   CO2 21* 21* 19* 20*   GLU 95 92 111* 110   BUN 55* 54* 57* 54*   CREATININE 4.6* 4.2* 4.1* 4.0*   CALCIUM 8.9 8.1* 8.3* 8.5*   PROT 7.5 6.3  --  6.7   ALBUMIN 4.0 3.3*  --  3.0*   BILITOT 0.6 1.1*  --  0.5   ALKPHOS 45* 35*  --  46*   AST 23 25  --  23   ALT 19 17  --  19   ANIONGAP 12 9 8 8     Lactic Acid:   Recent Labs   Lab 06/18/25  1318   LACTATE 1.8     Magnesium:   Recent Labs   Lab 06/19/25  0301 06/19/25 2026 06/20/25  0230   MG 2.0 2.2 2.2     Troponin: No results for input(s): "TROPONINI", "TROPONINIHS" in the last 48 hours.  TSH:   Recent Labs   Lab 04/09/25  0730   TSH 3.167     Urine Studies:   Recent Labs   Lab 06/18/25  1113   APPEARANCEUA Clear   SPECGRAV 1.010   PROTEINUA " 1+*   BILIRUBINUA Negative   UROBILINOGEN Negative   LEUKOCYTESUR Negative   RBCUA <1   WBCUA <1     Microbiology Results (last 7 days)       Procedure Component Value Units Date/Time    Culture, Respiratory with Gram Stain [[id number]] Collected: 06/19/25 0950    Order Status: Completed Specimen: Respiratory Updated: 06/20/25 0747     Respiratory Culture Normal respiratory federico     GRAM STAIN (RESPIRATORY) >10 Epithelial Cells/LPF      Few WBC seen      Rare Gram Positive Rods     Comment: Corrected result: Previously reported as Rare Branching Gram positive rods on 6/19/2025 at 1225 CDT.         Rare Gram positive cocci    Blood culture x two cultures. Draw prior to antibiotics. [[id number]]  (Normal) Collected: 06/18/25 0930    Order Status: Completed Specimen: Blood Updated: 06/19/25 2202     CULTURE, BLOOD ([facility]) No Growth After 36 Hours    Blood culture x two cultures. Draw prior to antibiotics. [[id number]]  (Normal) Collected: 06/18/25 0932    Order Status: Completed Specimen: Blood Updated: 06/19/25 2202     CULTURE, BLOOD ([facility]) No Growth After 36 Hours    Culture, Respiratory with Gram Stain [[id number]] Collected: 06/19/25 0734    Order Status: Completed Specimen: Respiratory from Sputum, Expectorated Updated: 06/19/25 0844     Respiratory Culture Specimen inadequate - culture not performed     GRAM STAIN (RESPIRATORY) >10epis/lfp and <than many WBC's      Predominance of oropharyngeal federico. Please recollect    MRSA Screen by PCR [[id number]]  (Normal) Collected: 06/18/25 1948    Order Status: Completed Specimen: Nasal Swab Updated: 06/18/25 2115     MRSA PCR SCRN ([facility]) Not Detected    Respiratory Infection Panel (PCR), Nasopharyngeal [[id number]] Collected: 06/18/25 1027    Order Status: Completed Specimen: Nasopharyngeal Swab Updated: 06/18/25 1209     Respiratory Infection Panel Source Nasopharyngeal Swab     Adenovirus Not Detected     Coronavirus 229E, Common Cold Virus Not Detected     Coronavirus  HKU1, Common Cold Virus Not Detected     Coronavirus NL63, Common Cold Virus Not Detected     Coronavirus OC43, Common Cold Virus Not Detected     SARS-CoV2 (COVID-19) Qualitative PCR Not Detected     Human Metapneumovirus Not Detected     Human Rhinovirus/Enterovirus Not Detected     Influenza A (subtypes H1, H1-2009,H3) Not Detected     Influenza B Not Detected     Parainfluenza Virus 1 Not Detected     Parainfluenza Virus 2 Not Detected     Parainfluenza Virus 3 Not Detected     Parainfluenza Virus 4 Not Detected     Respiratory Syncytial Virus Not Detected     Bordetella Parapertussis ([id number]) Not Detected     Bordetella pertussis (ptxP) Not Detected     Chlamydia pneumoniae Not Detected     Mycoplasma pneumoniae Not Detected          Significant Imaging:   CXR: Alveolar consolidations in the right mid and lower lung compatible with pneumonia     CT Chest without contrast:   Dense alveolar consolidation involving the majority of the right middle lobe with patchy ground-glass opacities in the inferior right upper lobe and right lower lobe compatible with multifocal pneumonia  7 mm ground-glass opacity left upper lobe with atelectasis in left lung base.    ECHO:    Left Ventricle: The left ventricle is normal in size. Normal wall thickness. Mild global hypokinesis present. There is low normal systolic function with a visually estimated ejection fraction of 50 - 55%. There is normal diastolic function.    Right Ventricle: The right ventricle is normal in size Systolic function is normal.    Left Atrium: The left atrium is moderately dilated    Mitral Valve: There is moderate regurgitation.    Tricuspid Valve: There is mild to moderate regurgitation. There is pulmonary hypertension. The estimated PA systolic pressure is at least 36 mmHg.

## 2025-06-20 NOTE — ASSESSMENT & PLAN NOTE
COVID 19 surge in effect.     Pt compliant with assessment, did not want to allow for full skin check but when asked if any part of skin are itchy, painful or bothersome pt responds with no.     Refuses medication.    Maintain iv abx as below  De escalate as needed     Antibiotics (From admission, onward)      Start     Stop Route Frequency Ordered    06/18/25 2100  mupirocin 2 % ointment         06/23/25 2059 Nasl 2 times daily 06/18/25 1747 06/18/25 2100  doxycycline capsule 100 mg         -- Oral Every 12 hours 06/18/25 1752 06/18/25 1900  ceFEPIme injection 1 g         -- IV Every 12 hours (non-standard times) 06/18/25 1752            Microbiology Results (last 7 days)       Procedure Component Value Units Date/Time    Blood culture x two cultures. Draw prior to antibiotics. [[id number]]  (Normal) Collected: 06/18/25 0930    Order Status: Completed Specimen: Blood Updated: 06/20/25 1002     CULTURE, BLOOD ([facility]) No Growth After 48 Hours    Blood culture x two cultures. Draw prior to antibiotics. [[id number]]  (Normal) Collected: 06/18/25 0932    Order Status: Completed Specimen: Blood Updated: 06/20/25 1002     CULTURE, BLOOD ([facility]) No Growth After 48 Hours    Culture, Respiratory with Gram Stain [[id number]] Collected: 06/19/25 0950    Order Status: Completed Specimen: Respiratory Updated: 06/20/25 0747     Respiratory Culture Normal respiratory federico     GRAM STAIN (RESPIRATORY) >10 Epithelial Cells/LPF      Few WBC seen      Rare Gram Positive Rods     Comment: Corrected result: Previously reported as Rare Branching Gram positive rods on 6/19/2025 at 1225 CDT.         Rare Gram positive cocci    Culture, Respiratory with Gram Stain [[id number]] Collected: 06/19/25 0734    Order Status: Completed Specimen: Respiratory from Sputum, Expectorated Updated: 06/19/25 0844     Respiratory Culture Specimen inadequate - culture not performed     GRAM STAIN (RESPIRATORY) >10epis/lfp and <than many WBC's      Predominance of oropharyngeal federico. Please recollect    MRSA Screen by PCR [[id number]]  (Normal) Collected: 06/18/25 1948    Order Status: Completed Specimen: Nasal Swab Updated: 06/18/25 2115     MRSA PCR SCRN ([facility]) Not  Detected    Respiratory Infection Panel (PCR), Nasopharyngeal [[id number]] Collected: 06/18/25 1027    Order Status: Completed Specimen: Nasopharyngeal Swab Updated: 06/18/25 1209     Respiratory Infection Panel Source Nasopharyngeal Swab     Adenovirus Not Detected     Coronavirus 229E, Common Cold Virus Not Detected     Coronavirus HKU1, Common Cold Virus Not Detected     Coronavirus NL63, Common Cold Virus Not Detected     Coronavirus OC43, Common Cold Virus Not Detected     SARS-CoV2 (COVID-19) Qualitative PCR Not Detected     Human Metapneumovirus Not Detected     Human Rhinovirus/Enterovirus Not Detected     Influenza A (subtypes H1, H1-2009,H3) Not Detected     Influenza B Not Detected     Parainfluenza Virus 1 Not Detected     Parainfluenza Virus 2 Not Detected     Parainfluenza Virus 3 Not Detected     Parainfluenza Virus 4 Not Detected     Respiratory Syncytial Virus Not Detected     Bordetella Parapertussis ([id number]) Not Detected     Bordetella pertussis (ptxP) Not Detected     Chlamydia pneumoniae Not Detected     Mycoplasma pneumoniae Not Detected

## 2025-06-20 NOTE — ASSESSMENT & PLAN NOTE
Improving.  Follow Nephrology recommendations.  Maintain iv fluids   Latest Reference Range & Units 05/07/25 07:38 05/14/25 07:30 05/21/25 07:36 05/28/25 07:42 06/04/25 07:33 06/18/25 08:43   Creatinine 0.5 - 1.4 mg/dL 3.3 (H) 3.3 (H) 3.5 (H) 3.4 (H) 3.9 (H) 4.6 (H)   (H): Data is abnormally high

## 2025-06-20 NOTE — PROGRESS NOTES
Pulmonary/Critical Care  Progress Note      PATIENT NAME: Rick Butler  MRN: [id number]  TODAY'S DATE: 2025  1:55 PM  ADMIT DATE: 2025  AGE: 65 y.o. : 1960    CONSULT REQUESTED BY: Judy Gonzalez MD    REASON FOR CONSULT:   Pneumonia    HPI:  Mr. Butler is a 65 year old man with prior dx of MDS/RARS and sickle cell trait, who presents with pneumonia.     He reports his symptoms started suddenly yesterday he was feeling fatigued, febrile, chills, and short of breath.     He reports he is on chemotherapy for his MDS. He reports no productive cough.  Febrile to 103 and elevated HR to 132    2025 - Stable overnight, no new issues reported and feels better.  Still with AF/RVR (cardiology following).  WBC has recovered.  Hgb and platelets still low.          Review of Systems   Constitutional:  Positive for chills and fever. Negative for appetite change and unexpected weight change.   HENT:  Negative for nosebleeds, postnasal drip, trouble swallowing and voice change.    Eyes:  Negative for visual disturbance.   Respiratory:  Positive for shortness of breath. Negative for cough and wheezing.    Cardiovascular:  Negative for chest pain and leg swelling.   Gastrointestinal:  Negative for diarrhea, nausea and vomiting.   Endocrine: Negative for cold intolerance and heat intolerance.   Genitourinary:  Negative for dysuria, flank pain and frequency.   Musculoskeletal:  Negative for neck pain and neck stiffness.   Allergic/Immunologic: Negative for environmental allergies and immunocompromised state.   Neurological:  Negative for syncope, speech difficulty and weakness.   Hematological:  Negative for adenopathy.   Psychiatric/Behavioral:  Negative for confusion.            ALLERGIES  Review of patient's allergies indicates:  No Known Allergies    INPATIENT SCHEDULED MEDICATIONS   [START ON 2025] calcitRIOL  0.25 mcg Oral Q7 Days    ceFEPime IV (PEDS and ADULTS)  1 g Intravenous Q12H     dextromethorphan-guaiFENesin  mg  1 tablet Oral BID    doxycycline  100 mg Oral Q12H    folic acid  2,000 mcg Oral Daily    levalbuterol  1.25 mg Nebulization Q6H WAKE    morphine  2 mg Intravenous Once    mupirocin   Nasal BID    pantoprazole  40 mg Oral Daily    tamsulosin  0.4 mg Oral Daily    traZODone  100 mg Oral QHS      0.9% NaCl   Intravenous Continuous 125 mL/hr at 06/20/25 0722 New Bag at 06/20/25 0722    amiodarone in dextrose 5%  0.5 mg/min Intravenous Continuous 16.7 mL/hr at 06/20/25 0646 0.5 mg/min at 06/20/25 0646    NORepinephrine bitartrate-D5W  0-3 mcg/kg/min Intravenous Continuous           MEDICAL AND SURGICAL HISTORY  Past Medical History:   Diagnosis Date    Abnormal chest CT (new) 08/17/2022    Anemia due to multiple mechanisms 01/13/2019    Anemia, chronic renal failure, stage 2 (mild) 01/13/2019    Anemia, chronic renal failure, stage 3 (moderate) 08/02/2023    Depression     Former smoker 06/29/2017    H/O ETOH abuse 06/29/2017    Lung mass (new) 08/17/2022    Monocytosis 2019    Myelodysplasia (myelodysplastic syndrome)     Myelodysplasia (myelodysplastic syndrome)     Personal history of colonic polyps 12/29/2023    RARS (refractory anemia with ringed sideroblasts) 06/01/2020    Sickle cell trait      Past Surgical History:   Procedure Laterality Date    BONE MARROW BIOPSY N/A 12/20/2019    Procedure: BIOPSY, BONE MARROW;  Surgeon: [facility] Diagnostic Provider;  Location: [facility] OR;  Service: Interventional Radiology;  Laterality: N/A;    COLONOSCOPY N/A 11/05/2021    Procedure: COLONOSCOPY;  Surgeon: Rob Collins MD;  Location: [facility] ENDO;  Service: Endoscopy;  Laterality: N/A;    COLONOSCOPY  12/29/2023    5 YR RECALL    ESOPHAGOGASTRODUODENOSCOPY N/A 11/05/2021    Procedure: EGD (ESOPHAGOGASTRODUODENOSCOPY);  Surgeon: Rob Collins MD;  Location: [facility] ENDO;  Service: Endoscopy;  Laterality: N/A;    INJECTION OF ANESTHETIC AGENT AROUND MEDIAL BRANCH NERVES INNERVATING LUMBAR FACET  JOINT Bilateral 05/25/2018    Procedure: BLOCK-NERVE-MEDIAL BRANCH-LUMBAR;  Surgeon: Nura Heredia MD;  Location: [facility] OR;  Service: Pain Management;  Laterality: Bilateral;  L3, 4, 5    KNEE ARTHROSCOPY W/ MENISCECTOMY Right 12/22/2021    Procedure: ARTHROSCOPY, KNEE, WITH MENISCECTOMY;  Surgeon: Sebastian Green MD;  Location: [facility] OR;  Service: Orthopedics;  Laterality: Right;  partial medial meniscectomy    RADIOFREQUENCY ABLATION OF LUMBAR MEDIAL BRANCH NERVE AT SINGLE LEVEL Bilateral 07/20/2018    Procedure: RADIOFREQUENCY ABLATION, NERVE, MEDIAL BRANCH, LUMBAR, 1 LEVEL;  Surgeon: Nura Heredia MD;  Location: [facility] OR;  Service: Pain Management;  Laterality: Bilateral;  L3, 4, 5 - Burned at 80 degrees C.  for 75 seconds x 2 each site    SMALL BOWEL ENTEROSCOPY N/A 10/16/2019    Procedure: ENTEROSCOPY;  Surgeon: Rob Collins MD;  Location: [facility] ENDO;  Service: Endoscopy;  Laterality: N/A;       ALCOHOL, TOBACCO AND DRUG USE  Tobacco Use History[1]  Social History     Substance and Sexual Activity   Alcohol Use Not Currently    Alcohol/week: 21.0 standard drinks of alcohol    Types: 21 Cans of beer per week    Comment: Sober 5 years     Social History     Substance and Sexual Activity   Drug Use Not Currently    Types: Marijuana       FAMILY HISTORY  Family History   Problem Relation Name Age of Onset    Arthritis Mother      Depression Mother      Heart disease Mother      Hypertension Mother      Heart attack Father  59       VITAL SIGNS (MOST RECENT)  Temp: 98.9 °F (37.2 °C) (06/20/25 0701)  Pulse: (!) 122 (06/20/25 1000)  Resp: (!) 41 (06/20/25 1000)  BP: 112/70 (06/20/25 1000)  SpO2: (!) 93 % (06/20/25 1000)    INTAKE AND OUTPUT (LAST 24 HOURS):  Intake/Output Summary (Last 24 hours) at 6/20/2025 1120  Last data filed at 6/20/2025 0605  Gross per 24 hour   Intake 3762.36 ml   Output 950 ml   Net 2812.36 ml       WEIGHT  Wt Readings from Last 1 Encounters:   06/18/25 93.7 kg (206 lb 9.1 oz)       Physical  "Exam  Vitals reviewed.   Constitutional:       General: He is not in acute distress.     Appearance: He is well-developed. He is ill-appearing. He is not diaphoretic.   HENT:      Head: Normocephalic and atraumatic.      Mouth/Throat:      Pharynx: No oropharyngeal exudate or posterior oropharyngeal erythema.   Eyes:      General: No scleral icterus.     Pupils: Pupils are equal, round, and reactive to light.   Neck:      Vascular: No JVD.   Cardiovascular:      Rate and Rhythm: Regular rhythm. Tachycardia present.      Heart sounds: Normal heart sounds. No murmur heard.  Pulmonary:      Effort: Pulmonary effort is normal. No respiratory distress.      Breath sounds: Wheezing and rales present.   Abdominal:      General: Bowel sounds are normal. There is no distension.      Palpations: Abdomen is soft.      Tenderness: There is no abdominal tenderness.   Musculoskeletal:         General: No swelling.      Cervical back: Normal range of motion and neck supple. No rigidity.   Skin:     General: Skin is warm and dry.      Capillary Refill: Capillary refill takes less than 2 seconds.      Coloration: Skin is pale.      Findings: No rash.   Neurological:      General: No focal deficit present.      Mental Status: He is alert and oriented to person, place, and time.      Cranial Nerves: No cranial nerve deficit.      Motor: No weakness or abnormal muscle tone.           ACUTE PHASE REACTANT (LAST 24 HOURS)  No results for input(s): "FERRITIN", "CRP", "LDH", "DDIMER" in the last 24 hours.    CBC LAST (LAST 24 HOURS)  Recent Labs   Lab 06/20/25  0230   WBC 5.73   RBC 2.58*   HGB 7.1*   HCT 21.6*   MCV 84   MCH 27.5   MCHC 32.9   RDW 17.9*   PLT 40*   NRBC 0       CHEMISTRY LAST (LAST 24 HOURS)  Recent Labs   Lab 06/20/25  0230      K 4.2   *   CO2 20*   ANIONGAP 8   BUN 54*   CREATININE 4.0*      CALCIUM 8.5*   MG 2.2   ALBUMIN 3.0*   PROT 6.7   ALKPHOS 46*   ALT 19   AST 23   BILITOT 0.5 " "      COAGULATION LAST (LAST 24 HOURS)  No results for input(s): "LABPT", "INR", "APTT" in the last 24 hours.    CARDIAC PROFILE (LAST 24 HOURS)  Recent Labs   Lab 06/18/25  0843   *          LAST 7 DAYS MICROBIOLOGY   Microbiology Results (last 7 days)       Procedure Component Value Units Date/Time    Blood culture x two cultures. Draw prior to antibiotics. [[id number]]  (Normal) Collected: 06/18/25 0930    Order Status: Completed Specimen: Blood Updated: 06/20/25 1002     CULTURE, BLOOD ([facility]) No Growth After 48 Hours    Blood culture x two cultures. Draw prior to antibiotics. [[id number]]  (Normal) Collected: 06/18/25 0932    Order Status: Completed Specimen: Blood Updated: 06/20/25 1002     CULTURE, BLOOD ([facility]) No Growth After 48 Hours    Culture, Respiratory with Gram Stain [[id number]] Collected: 06/19/25 0950    Order Status: Completed Specimen: Respiratory Updated: 06/20/25 0747     Respiratory Culture Normal respiratory federico     GRAM STAIN (RESPIRATORY) >10 Epithelial Cells/LPF      Few WBC seen      Rare Gram Positive Rods     Comment: Corrected result: Previously reported as Rare Branching Gram positive rods on 6/19/2025 at 1225 CDT.         Rare Gram positive cocci    Culture, Respiratory with Gram Stain [[id number]] Collected: 06/19/25 0734    Order Status: Completed Specimen: Respiratory from Sputum, Expectorated Updated: 06/19/25 0844     Respiratory Culture Specimen inadequate - culture not performed     GRAM STAIN (RESPIRATORY) >10epis/lfp and <than many WBC's      Predominance of oropharyngeal federico. Please recollect    MRSA Screen by PCR [[id number]]  (Normal) Collected: 06/18/25 1948    Order Status: Completed Specimen: Nasal Swab Updated: 06/18/25 2115     MRSA PCR SCRN ([facility]) Not Detected    Respiratory Infection Panel (PCR), Nasopharyngeal [[id number]] Collected: 06/18/25 1027    Order Status: Completed Specimen: Nasopharyngeal Swab Updated: 06/18/25 1209     Respiratory " Infection Panel Source Nasopharyngeal Swab     Adenovirus Not Detected     Coronavirus 229E, Common Cold Virus Not Detected     Coronavirus HKU1, Common Cold Virus Not Detected     Coronavirus NL63, Common Cold Virus Not Detected     Coronavirus OC43, Common Cold Virus Not Detected     SARS-CoV2 (COVID-19) Qualitative PCR Not Detected     Human Metapneumovirus Not Detected     Human Rhinovirus/Enterovirus Not Detected     Influenza A (subtypes H1, H1-2009,H3) Not Detected     Influenza B Not Detected     Parainfluenza Virus 1 Not Detected     Parainfluenza Virus 2 Not Detected     Parainfluenza Virus 3 Not Detected     Parainfluenza Virus 4 Not Detected     Respiratory Syncytial Virus Not Detected     Bordetella Parapertussis (SF3747) Not Detected     Bordetella pertussis (ptxP) Not Detected     Chlamydia pneumoniae Not Detected     Mycoplasma pneumoniae Not Detected            MOST RECENT IMAGING  X-Ray Chest 1 View  Narrative: CLINICAL HISTORY:  ([id number] y/o  (1960) M    Rib pain on right. Tachypnea;    TECHNIQUE:  (A#[id number], exam time 6/19/2025 20:06)    XR CHEST 1 VIEW [id number]    COMPARISON:  6.18.25    FINDINGS:  Consolidative airspace opacity in the right middle lobe and scattered interstitial opacities of both lung bases, similar to the previous exam.  Costophrenic angles are seen without effusion. No pneumothorax is identified. The heart is top normal in size. Osseous structures show degenerative changes in the spine. The visualized upper abdomen is unremarkable.  Impression: Unchanged radiograph of the chest when accounting for differences in imaging technique.    Electronically signed by: Omkar Whitney  Date:    06/20/2025  Time:    07:15      CURRENT VISIT EKG  Results for orders placed or performed during the hospital encounter of 06/18/25   EKG 12-lead   Result Value Ref Range    QRS Duration 88 ms    [facility] QTC Calculation 441 ms    Narrative    Test Reason : R07.9,    Vent. Rate : 127 BPM     " Atrial Rate : 127 BPM     P-R Int : 154 ms          QRS Dur :  88 ms      QT Int : 304 ms       P-R-T Axes :  58  54 -11 degrees    QTcB Int : 441 ms    Sinus tachycardia  LVH with repolarization abnormality ( Sokolow-Gomez )  Abnormal ECG  No previous ECGs available    Referred By: AAAREFERRAL SELF           Confirmed By:        ECHOCARDIOGRAM RESULTS  Results for orders placed during the hospital encounter of 10/06/23    Echo    Interpretation Summary    Left Ventricle: The left ventricle is normal in size. Moderately increased wall thickness. Normal wall motion. There is normal systolic function with a visually estimated ejection fraction of 55 - 60%.    Left Atrium: Left atrium is severely dilated.    Right Ventricle: Right ventricle was not well visualized due to poor acoustic window.    Aortic Valve: The aortic valve is a trileaflet valve. There is mild aortic regurgitation.    Mitral Valve: There is no stenosis. There is mild to moderate regurgitation.    Pulmonic Valve: There is mild regurgitation.    IVC/SVC: Normal venous pressure at 3 mmHg.        VENTILATOR INFORMATION              LAST ARTERIAL BLOOD GAS  ABG  No results for input(s): "PH", "PO2", "PCO2", "HCO3", "BE" in the last 168 hours.      ASSESSMENT:   Pneumonia  Sepsis  3.   Acute kidney injury    Suspected pneumonia for source of sepsis  May need bronchoscopic evaluation if clinical response to antibiotics is inadequate  PLAN:     - continue abx  - follow up culture results  - increase activity as able  - TORRES better, renal following  - CT chest reviewed  - replace electrolytes as needed  - keep in ICU until HR better      Moses Gomez MD  [facility] Pulmonary/Critical Care  2025           [1]   Social History  Tobacco Use   Smoking Status Former    Current packs/day: 0.00    Types: Cigarettes    Quit date: 1996    Years since quittin.4   Smokeless Tobacco Never     "
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
## 2025-06-20 NOTE — PLAN OF CARE
Problem: Adult Inpatient Plan of Care  Goal: Plan of Care Review  Outcome: Ongoing  Goal: Patient-Specific Goal (Individualized)  Outcome: Ongoing  Goal: Absence of Hospital-Acquired Illness or Injury  Outcome: Ongoing  Goal: Optimal Comfort and Wellbeing  Outcome: Ongoing  Goal: Readiness for Transition of Care  Outcome: Ongoing     Problem: Sepsis/Septic Shock  Goal: Optimal Coping  Outcome: Ongoing  Goal: Absence of Bleeding  Outcome: Ongoing  Goal: Blood Glucose Level Within Targeted Range  Outcome: Ongoing  Goal: Absence of Infection Signs and Symptoms  Outcome: Ongoing  Goal: Optimal Nutrition Intake  Outcome: Ongoing     Problem: Acute Kidney Injury/Impairment  Goal: Fluid and Electrolyte Balance  Outcome: Ongoing  Goal: Improved Oral Intake  Outcome: Ongoing  Goal: Effective Renal Function  Outcome: Ongoing     Problem: Pneumonia  Goal: Fluid Balance  Outcome: Ongoing  Goal: Resolution of Infection Signs and Symptoms  Outcome: Ongoing  Goal: Effective Oxygenation and Ventilation  Outcome: Ongoing     Problem: Skin Injury Risk Increased  Goal: Skin Health and Integrity  Outcome: Ongoing     Problem: Fall Injury Risk  Goal: Absence of Fall and Fall-Related Injury  Outcome: Ongoing

## 2025-06-20 NOTE — ASSESSMENT & PLAN NOTE
From sepsis/TORERS  Trend c enzymes   Latest Reference Range & Units 06/18/25 08:43 06/18/25 13:18   Troponin I High Sensitivity <=14.9 pg/mL 49.9 (H) 58.7 (HH)   (HH): Data is critically high  (H): Data is abnormally high      TORRES on CKD  Maintain iv fluids   Latest Reference Range & Units 05/07/25 07:38 05/14/25 07:30 05/21/25 07:36 05/28/25 07:42 06/04/25 07:33 06/18/25 08:43   Creatinine 0.5 - 1.4 mg/dL 3.3 (H) 3.3 (H) 3.5 (H) 3.4 (H) 3.9 (H) 4.6 (H)   (H): Data is abnormally high

## 2025-06-20 NOTE — CARE UPDATE
Continue tx wean oxygen as tolerated   06/20/25 0645   Patient Assessment/Suction   Level of Consciousness (AVPU) alert   Respiratory Effort Normal;Unlabored   Expansion/Accessory Muscles/Retractions no use of accessory muscles;expansion symmetric   All Lung Fields Breath Sounds coarse   MAYELIN Breath Sounds coarse   LLL Breath Sounds coarse   RUL Breath Sounds coarse   RML Breath Sounds coarse   RLL Breath Sounds coarse   Rhythm/Pattern, Respiratory unlabored;depth regular;shallow   Cough Frequency infrequent   Cough Type good;nonproductive   PRE-TX-O2   Device (Oxygen Therapy) nasal cannula   $ Is the patient on Low Flow Oxygen? Yes   Flow (L/min) (Oxygen Therapy) 4   SpO2 97 %   Pulse Oximetry Type Continuous   $ Pulse Oximetry - Multiple Charge Pulse Oximetry - Multiple   Pulse (!) 120   Resp (!) 28   Aerosol Therapy   $ Aerosol Therapy Charges Aerosol Treatment   Daily Review of Necessity (SVN) completed   Respiratory Treatment Status (SVN) given   Treatment Route (SVN) mask;oxygen   Patient Position HOB elevated   Post Treatment Assessment (SVN) breath sounds improved;increased aeration   Signs of Intolerance (SVN) none   Breath Sounds Post-Respiratory Treatment   Throughout All Fields Post-Treatment All Fields   Throughout All Fields Post-Treatment aeration increased   Post-treatment Heart Rate (beats/min) 117   Post-treatment Resp Rate (breaths/min) 27   Education   $ Education Bronchodilator;15 min   Respiratory Evaluation   $ Care Plan Tech Time 15 min

## 2025-06-20 NOTE — PROGRESS NOTES
[facility]   Department of Infectious Disease  Progress Note        PATIENT NAME: Rick Butler  MRN: [id number]  TODAY'S DATE: 06/20/2025  ADMIT DATE: 6/18/2025  LOS: 2 days    CHIEF COMPLAINT: Shortness of Breath (X 1 day. ), Fatigue, and Cough (Onset lastnite)      PRINCIPLE PROBLEM: Severe sepsis    INTERVAL HISTORY      06/19/2025:  he was seen and evaluated at bedside.  States he is improving.  Still with pleuritic right chest pain.  Heart rate and fever curve improving.  Sputum was not a good sample.  Blood cultures so far negative.      06/20/2025:  Continues to improve.  Blood cultures negative.  Repeat sputum culture was once again not a good sample.  Appetite improving.    Antibiotics (From admission, onward)      Start     Stop Route Frequency Ordered    06/18/25 2100  mupirocin 2 % ointment         06/23/25 2059 Nasl 2 times daily 06/18/25 1747    06/18/25 2100  doxycycline capsule 100 mg         -- Oral Every 12 hours 06/18/25 1752    06/18/25 1900  ceFEPIme injection 1 g         -- IV Every 12 hours (non-standard times) 06/18/25 1752          Antifungals (From admission, onward)      None           Antivirals (From admission, onward)      None            ASSESSMENT and PLAN      Right lower lobe pneumonia in a patient with MDS on Rytelo.  Respiratory panel negative.  Continue cefepime and doxycycline.      Pancytopenia secondary to MDS.     MDS.  On Rytelo      RECOMMENDATIONS:    Continue cefepime and doxycycline   Continue other symptomatic care    Please send Epic secure chat with any questions.  Discussed with his wife at bedside      SUBJECTIVE    Rick Butler is a 65 y.o. male with history of MDS/RA RS and sickle cell trait.  He is on Rytelo for about 2 months he requires.  A blood and platelet transfusions.  Presents to the emergency room 06/18/2025 with fever cough and generalized weakness and fatigue of 1 day duration.  In the ED /59, pulse 132, respiratory rate 25, temperature  103°, oxygen saturation 92%.     Sodium 136, creatinine 4.6, AST 20, ALT 19, troponin 50, .  WBC 1, hematocrit 23, MCV 86, platelet count 62 with 13% bands.  Acute respiratory panel negative x-ray showed right middle and lower lobe infiltrate he received IV fluid and was placed on antibiotics he has been evaluated by oncologist and pulmonologist ID asked to assist with his care.     No recent travel other than a cruise to [place] in April 2025.  No sick contacts.  He is employed and works as a prep shift.  Was actually working up until 06/17/2025.        Antibiotic history:    Vancomycin 6/18/25-  Cefepime: 06/08/2025 x1 dose   Azithromycin: 06/18/2025   Meropenem: 06/18/2025     Microbiology:    Respiratory panel 06/18/2025: Negative   Blood culture 06/18/2025:  NGTD    Review of Systems  Negative except as stated above in Interval History     OBJECTIVE   Temp:  [97.5 °F (36.4 °C)-98.9 °F (37.2 °C)] 98.3 °F (36.8 °C)  Pulse:  [] 90  Resp:  [18-41] 18  SpO2:  [89 %-100 %] 99 %  BP: ()/(54-84) 117/76  Temp:  [97.5 °F (36.4 °C)-98.9 °F (37.2 °C)]   Temp: 98.3 °F (36.8 °C) (06/20/25 1157)  Pulse: 90 (06/20/25 1303)  Resp: 18 (06/20/25 1255)  BP: 117/76 (06/20/25 1303)  SpO2: 99 % (06/20/25 1303)    Intake/Output Summary (Last 24 hours) at 6/20/2025 1449  Last data filed at 6/20/2025 1155  Gross per 24 hour   Intake 3324.86 ml   Output 1150 ml   Net 2174.86 ml       Physical Exam  General:  Elderly man who is acutely ill looking.   HEENT:  Pale.  No oral thrush, no cervical adenopathy.  Upper and lower dentures.  CVS: S1 and 2 heard, no murmurs appreciated   Respiratory:  Coarse breath sounds right lung  Abdomen: Full, soft, nontender, no palpable organomegaly   Skin: No rash appreciated   CNS: No focal deficits   Musculoskeletal: No joint or bony abnormalities appreciated  Psych: Good mood, normal affect.     VAD:  ISOLATION: Airborne and Contact and Enhanced Respiratory      Wounds:   "None    Significant Labs: All pertinent labs within the past 24 hours have been reviewed.    CBC LAST 7 DAYS  Recent Labs   Lab 06/18/25  0843 06/19/25  0301 06/19/25  0951 06/19/25  1720 06/20/25  0230   WBC 1.02* 2.38* 2.85* 4.57 5.73   RBC 2.65* 2.40* 2.44* 2.78* 2.58*   HGB 7.3* 6.7* 6.8* 7.6* 7.1*   HCT 22.9* 20.4* 20.7* 23.6* 21.6*   MCV 86 85 85 85 84   MCH 27.5 27.9 27.9 27.3 27.5   MCHC 31.9* 32.8 32.9 32.2 32.9   RDW 17.7* 17.2* 17.8* 17.6* 17.9*   PLT 62* 47* 46* 47* 40*   MPV 9.6 10.3  --   --   --    NRBC 0 0  --  0 0       CHEMISTRY LAST 7 DAYS  Recent Labs   Lab 06/18/25  0843 06/19/25  0301 06/19/25  2026 06/20/25  0230    138 139 140   K 4.0 4.9 4.1 4.2    108 112* 112*   CO2 21* 21* 19* 20*   ANIONGAP 12 9 8 8   BUN 55* 54* 57* 54*   CREATININE 4.6* 4.2* 4.1* 4.0*   GLU 95 92 111* 110   CALCIUM 8.9 8.1* 8.3* 8.5*   MG 1.4* 2.0 2.2 2.2   ALBUMIN 4.0 3.3*  --  3.0*   PROT 7.5 6.3  --  6.7   ALKPHOS 45* 35*  --  46*   ALT 19 17  --  19   AST 23 25  --  23   BILITOT 0.6 1.1*  --  0.5       Estimated Creatinine Clearance: 20.8 mL/min (A) (based on SCr of 4 mg/dL (H)).    INFLAMMATORY/PROCAL  LAST 7 DAYS  No results for input(s): "PROCAL", "ESR", "CRP" in the last 168 hours.  No results found for: "ESR"  CRP   Date Value Ref Range Status   12/04/2024 0.40 <1.00 mg/dL Final     Comment:     CRP-Normal Application expected values:   <1.0        mg/dL   Normal Range  1.0 - 5.0  mg/dL   Indicates mild inflammation  5.0 - 10.0 mg/dL   Indicates severe inflammation  >10.0        mg/dL   Represents serious processes and   frequently         indicates the presence of a bacterial   infection.          PRIOR  MICROBIOLOGY:    No results found for the last 90 days.      LAST 7 DAYS MICROBIOLOGY   Microbiology Results (last 7 days)       Procedure Component Value Units Date/Time    Blood culture x two cultures. Draw prior to antibiotics. [[id number]]  (Normal) Collected: 06/18/25 6889    Order Status: " Completed Specimen: Blood Updated: 06/20/25 1002     CULTURE, BLOOD ([facility]) No Growth After 48 Hours    Blood culture x two cultures. Draw prior to antibiotics. [[id number]]  (Normal) Collected: 06/18/25 0932    Order Status: Completed Specimen: Blood Updated: 06/20/25 1002     CULTURE, BLOOD ([facility]) No Growth After 48 Hours    Culture, Respiratory with Gram Stain [[id number]] Collected: 06/19/25 0950    Order Status: Completed Specimen: Respiratory Updated: 06/20/25 0747     Respiratory Culture Normal respiratory federico     GRAM STAIN (RESPIRATORY) >10 Epithelial Cells/LPF      Few WBC seen      Rare Gram Positive Rods     Comment: Corrected result: Previously reported as Rare Branching Gram positive rods on 6/19/2025 at 1225 CDT.         Rare Gram positive cocci    Culture, Respiratory with Gram Stain [[id number]] Collected: 06/19/25 0734    Order Status: Completed Specimen: Respiratory from Sputum, Expectorated Updated: 06/19/25 0844     Respiratory Culture Specimen inadequate - culture not performed     GRAM STAIN (RESPIRATORY) >10epis/lfp and <than many WBC's      Predominance of oropharyngeal federico. Please recollect    MRSA Screen by PCR [[id number]]  (Normal) Collected: 06/18/25 1948    Order Status: Completed Specimen: Nasal Swab Updated: 06/18/25 2115     MRSA PCR SCRN ([facility]) Not Detected    Respiratory Infection Panel (PCR), Nasopharyngeal [[id number]] Collected: 06/18/25 1027    Order Status: Completed Specimen: Nasopharyngeal Swab Updated: 06/18/25 1209     Respiratory Infection Panel Source Nasopharyngeal Swab     Adenovirus Not Detected     Coronavirus 229E, Common Cold Virus Not Detected     Coronavirus HKU1, Common Cold Virus Not Detected     Coronavirus NL63, Common Cold Virus Not Detected     Coronavirus OC43, Common Cold Virus Not Detected     SARS-CoV2 (COVID-19) Qualitative PCR Not Detected     Human Metapneumovirus Not Detected     Human Rhinovirus/Enterovirus Not Detected     Influenza A (subtypes  H1, H1-2009,H3) Not Detected     Influenza B Not Detected     Parainfluenza Virus 1 Not Detected     Parainfluenza Virus 2 Not Detected     Parainfluenza Virus 3 Not Detected     Parainfluenza Virus 4 Not Detected     Respiratory Syncytial Virus Not Detected     Bordetella Parapertussis (RE7839) Not Detected     Bordetella pertussis (ptxP) Not Detected     Chlamydia pneumoniae Not Detected     Mycoplasma pneumoniae Not Detected              CURRENT/PREVIOUS VISIT EKG  Results for orders placed or performed during the hospital encounter of 06/18/25   EKG 12-lead    Collection Time: 06/20/25  1:14 PM   Result Value Ref Range    QRS Duration 94 ms    OHS QTC Calculation 484 ms    Narrative    Test Reason : I49.9,    Vent. Rate :  93 BPM     Atrial Rate :  93 BPM     P-R Int : 170 ms          QRS Dur :  94 ms      QT Int : 390 ms       P-R-T Axes :  56   6  48 degrees    QTcB Int : 484 ms    Normal sinus rhythm  Nonspecific T wave abnormality  Prolonged QT  Abnormal ECG  When compared with ECG of 19-Jun-2025 18:19,  Sinus rhythm has replaced Atrial fibrillation  Vent. rate has decreased by  56 bpm    Referred By: AAAREFERRAL SELF           Confirmed By:      Significant Imaging: I have reviewed all relevant and available imaging results/findings within the past 24 hours.    I spent a total of 5o minutes on the day of the visit.This includes face to face time and non-face to face time preparing to see the patient (eg, review of tests), obtaining and/or reviewing separately obtained history, documenting clinical information in the electronic or other health record, independently interpreting results and communicating results to the patient/family/caregiver, or care coordinator.    Aris Cervantes MD  Date of Service: 06/20/2025      This note was created using [employer] voice recognition software that occasionally misinterpreted phrases or words.

## 2025-06-20 NOTE — PT/OT/SLP EVAL
Physical Therapy Evaluation    Patient Name:  Rick Butler   MRN:  [id number]    Recommendations:     Discharge Recommendations: Low Intensity Therapy  or No therapy depending on progress  Discharge Equipment Recommendations:  (TBD)   Barriers to discharge: medical status    Assessment:     Rick Butler is a 65 y.o. male admitted with a medical diagnosis of Severe sepsis.  He presents with the following impairments/functional limitations: weakness, impaired functional mobility, gait instability, impaired cardiopulmonary response to activity .    Rehab Prognosis: Good; patient would benefit from acute skilled PT services to address these deficits and reach maximum level of function.    Recent Surgery: * No surgery found *      Plan:     During this hospitalization, patient to be seen daily to address the identified rehab impairments via gait training, therapeutic activities, therapeutic exercises and progress toward the following goals:    Plan of Care Expires:  07/20/25    Subjective     Chief Complaint: shortness of breath   Patient/Family Comments/goals: Return to work as   Pain/Comfort:  Pain Rating Post-Intervention 1: 0/10    Patients cultural, spiritual, Church conflicts given the current situation:      Living Environment:  Pt lives in a raised house with 9 step entry B railing  Prior to admission, patients level of function was independent . (+)   Equipment used at home: none.  DME owned (not currently used): none.  Upon discharge, patient will have assistance from his wife.    Objective:     Communicated with nurse prior to session.  Patient found HOB elevated with oxygen, telemetry  upon PT entry to room.    General Precautions: Standard, fall  Orthopedic Precautions:    Braces:    Respiratory Status: Nasal cannula, flow 3 L/min    Exams:  Cognitive Exam:  Patient is oriented to Person, Place, Time, and Situation  RLE ROM: WFL  RLE Strength: WFL  LLE ROM: WFL  LLE Strength: WFL    Functional  Mobility:  Bed Mobility:     Supine to Sit: stand by assistance  Sit to Supine: stand by assistance  Transfers:     Sit to Stand:  contact guard assistance with rolling walker      AM-PAC 6 CLICK MOBILITY  Total Score:        Treatment & Education:  Pt was educated on safety, use of call light PT POC    Patient left HOB elevated with all lines intact, call button in reach, and family members  present.    GOALS:   Multidisciplinary Problems       Physical Therapy Goals          Problem: Physical Therapy    Goal Priority Disciplines Outcome Interventions   Physical Therapy Goal     PT, PT/OT     Description: Goals to be met by: 2025     Patient will increase functional independence with mobility by performin. Supine to sit with Modified [place]  2. Sit to supine with Modified [place]  3. Sit to stand transfer with Modified [place]  4. Gait  x 300  feet with Modified [place] using Rolling Walker.                          DME Justifications:  No DME recommended requiring DME justifications    History:     Past Medical History:   Diagnosis Date    Abnormal chest CT (new) 2022    Anemia due to multiple mechanisms 2019    Anemia, chronic renal failure, stage 2 (mild) 2019    Anemia, chronic renal failure, stage 3 (moderate) 2023    Depression     Former smoker 2017    H/O ETOH abuse 2017    Lung mass (new) 2022    Monocytosis     Myelodysplasia (myelodysplastic syndrome)     Myelodysplasia (myelodysplastic syndrome)     Personal history of colonic polyps 2023    RARS (refractory anemia with ringed sideroblasts) 2020    Sickle cell trait        Past Surgical History:   Procedure Laterality Date    BONE MARROW BIOPSY N/A 2019    Procedure: BIOPSY, BONE MARROW;  Surgeon: Satinder Diagnostic Provider;  Location: [facility];  Service: Interventional Radiology;  Laterality: N/A;    COLONOSCOPY N/A 2021    Procedure: COLONOSCOPY;   Surgeon: Rob Collins MD;  Location: [facility];  Service: Endoscopy;  Laterality: N/A;    COLONOSCOPY  12/29/2023    5 YR RECALL    ESOPHAGOGASTRODUODENOSCOPY N/A 11/05/2021    Procedure: EGD (ESOPHAGOGASTRODUODENOSCOPY);  Surgeon: Rob Collins MD;  Location: [facility];  Service: Endoscopy;  Laterality: N/A;    INJECTION OF ANESTHETIC AGENT AROUND MEDIAL BRANCH NERVES INNERVATING LUMBAR FACET JOINT Bilateral 05/25/2018    Procedure: BLOCK-NERVE-MEDIAL BRANCH-LUMBAR;  Surgeon: Nura Heredia MD;  Location: [facility];  Service: Pain Management;  Laterality: Bilateral;  L3, 4, 5    KNEE ARTHROSCOPY W/ MENISCECTOMY Right 12/22/2021    Procedure: ARTHROSCOPY, KNEE, WITH MENISCECTOMY;  Surgeon: Sebastian Green MD;  Location: [facility];  Service: Orthopedics;  Laterality: Right;  partial medial meniscectomy    RADIOFREQUENCY ABLATION OF LUMBAR MEDIAL BRANCH NERVE AT SINGLE LEVEL Bilateral 07/20/2018    Procedure: RADIOFREQUENCY ABLATION, NERVE, MEDIAL BRANCH, LUMBAR, 1 LEVEL;  Surgeon: Nura Heredia MD;  Location: [facility] OR;  Service: Pain Management;  Laterality: Bilateral;  L3, 4, 5 - Burned at 80 degrees C.  for 75 seconds x 2 each site    SMALL BOWEL ENTEROSCOPY N/A 10/16/2019    Procedure: ENTEROSCOPY;  Surgeon: Rob Collins MD;  Location: [facility];  Service: Endoscopy;  Laterality: N/A;       Time Tracking:     PT Received On: 06/20/25  PT Start Time: 1355     PT Stop Time: 1409  PT Total Time (min): 14 min     Billable Minutes: Evaluation 14 minutes      06/20/2025

## 2025-06-21 PROBLEM — N17.9 AKI (ACUTE KIDNEY INJURY): Status: ACTIVE | Noted: 2025-06-21

## 2025-06-21 PROBLEM — N17.9 AKI (ACUTE KIDNEY INJURY): Status: RESOLVED | Noted: 2025-06-18 | Resolved: 2025-06-21

## 2025-06-21 LAB
ABSOLUTE EOSINOPHIL (SMH): 0 K/UL
ABSOLUTE MONOCYTE (SMH): 0.75 K/UL (ref 0.3–1)
ABSOLUTE NEUTROPHIL COUNT (SMH): 6.8 K/UL (ref 1.8–7.7)
ALBUMIN SERPL-MCNC: 3.1 G/DL (ref 3.5–5.2)
ALP SERPL-CCNC: 61 UNIT/L (ref 55–135)
ALT SERPL-CCNC: 22 UNIT/L (ref 10–44)
ANION GAP (SMH): 9 MMOL/L (ref 8–16)
AST SERPL-CCNC: 27 UNIT/L (ref 10–40)
BACTERIA SPEC CULT: NORMAL
BASOPHILS # BLD AUTO: 0.02 K/UL
BASOPHILS NFR BLD AUTO: 0.2 %
BILIRUB SERPL-MCNC: 0.5 MG/DL (ref 0.1–1)
BUN SERPL-MCNC: 50 MG/DL (ref 8–23)
CALCIUM SERPL-MCNC: 8.7 MG/DL (ref 8.7–10.5)
CHLORIDE SERPL-SCNC: 116 MMOL/L (ref 95–110)
CO2 SERPL-SCNC: 19 MMOL/L (ref 23–29)
CREAT SERPL-MCNC: 3.6 MG/DL (ref 0.5–1.4)
EAG (SMH): 120 MG/DL (ref 68–131)
ERYTHROCYTE [DISTWIDTH] IN BLOOD BY AUTOMATED COUNT: 17.3 % (ref 11.5–14.5)
GFR SERPLBLD CREATININE-BSD FMLA CKD-EPI: 18 ML/MIN/1.73/M2
GLUCOSE SERPL-MCNC: 96 MG/DL (ref 70–110)
GRAM STAIN (RESPIRATORY) (SMH): NORMAL
HBA1C MFR BLD: 5.8 % (ref 4.5–6.2)
HCT VFR BLD AUTO: 25.4 % (ref 40–54)
HGB BLD-MCNC: 8.2 GM/DL (ref 14–18)
IMM GRANULOCYTES # BLD AUTO: 0.39 K/UL (ref 0–0.04)
IMM GRANULOCYTES NFR BLD AUTO: 4.5 % (ref 0–0.5)
LYMPHOCYTES # BLD AUTO: 0.74 K/UL (ref 1–4.8)
MAGNESIUM SERPL-MCNC: 2 MG/DL (ref 1.6–2.6)
MCH RBC QN AUTO: 27.3 PG (ref 27–31)
MCHC RBC AUTO-ENTMCNC: 32.3 G/DL (ref 32–36)
MCV RBC AUTO: 85 FL (ref 82–98)
NUCLEATED RBC (/100WBC) (SMH): 0 /100 WBC
OHS QRS DURATION: 90 MS
OHS QRS DURATION: 94 MS
OHS QTC CALCULATION: 478 MS
OHS QTC CALCULATION: 484 MS
PLATELET # BLD AUTO: 42 K/UL (ref 150–450)
PLATELET BLD QL SMEAR: ABNORMAL
PMV BLD AUTO: 12.3 FL (ref 9.2–12.9)
POTASSIUM SERPL-SCNC: 4 MMOL/L (ref 3.5–5.1)
PROT SERPL-MCNC: 6.8 GM/DL (ref 6–8.4)
RBC # BLD AUTO: 3 M/UL (ref 4.6–6.2)
RELATIVE EOSINOPHIL (SMH): 0 % (ref 0–8)
RELATIVE LYMPHOCYTE (SMH): 8.5 % (ref 18–48)
RELATIVE MONOCYTE (SMH): 8.7 % (ref 4–15)
RELATIVE NEUTROPHIL (SMH): 78.1 % (ref 38–73)
SODIUM SERPL-SCNC: 144 MMOL/L (ref 136–145)
TSH SERPL-ACNC: 3.98 UIU/ML (ref 0.34–5.6)
WBC # BLD AUTO: 8.67 K/UL (ref 3.9–12.7)

## 2025-06-21 PROCEDURE — 85025 COMPLETE CBC W/AUTO DIFF WBC: CPT | Performed by: INTERNAL MEDICINE

## 2025-06-21 PROCEDURE — 83735 ASSAY OF MAGNESIUM: CPT | Performed by: INTERNAL MEDICINE

## 2025-06-21 PROCEDURE — 99233 SBSQ HOSP IP/OBS HIGH 50: CPT | Mod: ,,, | Performed by: INTERNAL MEDICINE

## 2025-06-21 PROCEDURE — 84443 ASSAY THYROID STIM HORMONE: CPT | Performed by: FAMILY MEDICINE

## 2025-06-21 PROCEDURE — 94640 AIRWAY INHALATION TREATMENT: CPT

## 2025-06-21 PROCEDURE — 99900031 HC PATIENT EDUCATION (STAT)

## 2025-06-21 PROCEDURE — 25000003 PHARM REV CODE 250: Performed by: INTERNAL MEDICINE

## 2025-06-21 PROCEDURE — 94761 N-INVAS EAR/PLS OXIMETRY MLT: CPT

## 2025-06-21 PROCEDURE — 83036 HEMOGLOBIN GLYCOSYLATED A1C: CPT | Performed by: FAMILY MEDICINE

## 2025-06-21 PROCEDURE — 97165 OT EVAL LOW COMPLEX 30 MIN: CPT

## 2025-06-21 PROCEDURE — 63600175 PHARM REV CODE 636 W HCPCS: Performed by: INTERNAL MEDICINE

## 2025-06-21 PROCEDURE — 11000001 HC ACUTE MED/SURG PRIVATE ROOM

## 2025-06-21 PROCEDURE — 25000003 PHARM REV CODE 250: Performed by: FAMILY MEDICINE

## 2025-06-21 PROCEDURE — 99900035 HC TECH TIME PER 15 MIN (STAT)

## 2025-06-21 PROCEDURE — 87899 AGENT NOS ASSAY W/OPTIC: CPT | Performed by: FAMILY MEDICINE

## 2025-06-21 PROCEDURE — 82247 BILIRUBIN TOTAL: CPT | Performed by: INTERNAL MEDICINE

## 2025-06-21 PROCEDURE — 94664 DEMO&/EVAL PT USE INHALER: CPT

## 2025-06-21 PROCEDURE — 27000221 HC OXYGEN, UP TO 24 HOURS

## 2025-06-21 PROCEDURE — 36415 COLL VENOUS BLD VENIPUNCTURE: CPT | Performed by: INTERNAL MEDICINE

## 2025-06-21 PROCEDURE — 25000242 PHARM REV CODE 250 ALT 637 W/ HCPCS: Performed by: INTERNAL MEDICINE

## 2025-06-21 PROCEDURE — 94799 UNLISTED PULMONARY SVC/PX: CPT | Mod: XB

## 2025-06-21 PROCEDURE — 97535 SELF CARE MNGMENT TRAINING: CPT

## 2025-06-21 PROCEDURE — 97116 GAIT TRAINING THERAPY: CPT

## 2025-06-21 PROCEDURE — 63600175 PHARM REV CODE 636 W HCPCS

## 2025-06-21 RX ORDER — CEFTRIAXONE 2 G/1
2 INJECTION, POWDER, FOR SOLUTION INTRAMUSCULAR; INTRAVENOUS
Status: DISCONTINUED | OUTPATIENT
Start: 2025-06-21 | End: 2025-06-22

## 2025-06-21 RX ORDER — AMIODARONE HYDROCHLORIDE 200 MG/1
400 TABLET ORAL DAILY
Status: DISCONTINUED | OUTPATIENT
Start: 2025-07-01 | End: 2025-06-28 | Stop reason: HOSPADM

## 2025-06-21 RX ORDER — AMIODARONE HYDROCHLORIDE 200 MG/1
400 TABLET ORAL DAILY
Status: DISCONTINUED | OUTPATIENT
Start: 2025-07-02 | End: 2025-06-21

## 2025-06-21 RX ORDER — AMIODARONE HYDROCHLORIDE 200 MG/1
400 TABLET ORAL 2 TIMES DAILY
Status: DISCONTINUED | OUTPATIENT
Start: 2025-06-26 | End: 2025-06-21

## 2025-06-21 RX ORDER — AMIODARONE HYDROCHLORIDE 200 MG/1
400 TABLET ORAL 2 TIMES DAILY
Status: DISCONTINUED | OUTPATIENT
Start: 2025-06-26 | End: 2025-06-28 | Stop reason: HOSPADM

## 2025-06-21 RX ORDER — AMIODARONE HYDROCHLORIDE 200 MG/1
400 TABLET ORAL 3 TIMES DAILY
Status: DISCONTINUED | OUTPATIENT
Start: 2025-06-21 | End: 2025-06-21

## 2025-06-21 RX ORDER — AMIODARONE HYDROCHLORIDE 200 MG/1
400 TABLET ORAL 3 TIMES DAILY
Status: COMPLETED | OUTPATIENT
Start: 2025-06-21 | End: 2025-06-25

## 2025-06-21 RX ADMIN — PANTOPRAZOLE SODIUM 40 MG: 40 TABLET, DELAYED RELEASE ORAL at 05:06

## 2025-06-21 RX ADMIN — LEVALBUTEROL HYDROCHLORIDE 1.25 MG: 1.25 SOLUTION RESPIRATORY (INHALATION) at 12:06

## 2025-06-21 RX ADMIN — CEFTRIAXONE SODIUM 2 G: 2 INJECTION, POWDER, FOR SOLUTION INTRAMUSCULAR; INTRAVENOUS at 06:06

## 2025-06-21 RX ADMIN — CEFEPIME 1 G: 1 INJECTION, POWDER, FOR SOLUTION INTRAMUSCULAR; INTRAVENOUS at 09:06

## 2025-06-21 RX ADMIN — AMIODARONE HYDROCHLORIDE 400 MG: 200 TABLET ORAL at 03:06

## 2025-06-21 RX ADMIN — TRAZODONE HYDROCHLORIDE 100 MG: 50 TABLET ORAL at 08:06

## 2025-06-21 RX ADMIN — GUAIFENESIN AND DEXTROMETHORPHAN HYDROBROMIDE 1 TABLET: 600; 30 TABLET, EXTENDED RELEASE ORAL at 08:06

## 2025-06-21 RX ADMIN — FOLIC ACID 2000 MCG: 1 TABLET ORAL at 09:06

## 2025-06-21 RX ADMIN — MUPIROCIN 1 G: 20 OINTMENT TOPICAL at 09:06

## 2025-06-21 RX ADMIN — DOXYCYCLINE 100 MG: 100 CAPSULE ORAL at 08:06

## 2025-06-21 RX ADMIN — AMIODARONE HYDROCHLORIDE 0.5 MG/MIN: 1.8 INJECTION, SOLUTION INTRAVENOUS at 06:06

## 2025-06-21 RX ADMIN — AMIODARONE HYDROCHLORIDE 400 MG: 200 TABLET ORAL at 08:06

## 2025-06-21 RX ADMIN — Medication 6 MG: at 11:06

## 2025-06-21 RX ADMIN — AMIODARONE HYDROCHLORIDE 400 MG: 200 TABLET ORAL at 12:06

## 2025-06-21 RX ADMIN — ENOXAPARIN SODIUM 90 MG: 100 INJECTION SUBCUTANEOUS at 03:06

## 2025-06-21 RX ADMIN — MUPIROCIN 1 G: 20 OINTMENT TOPICAL at 08:06

## 2025-06-21 RX ADMIN — LEVALBUTEROL HYDROCHLORIDE 1.25 MG: 1.25 SOLUTION RESPIRATORY (INHALATION) at 06:06

## 2025-06-21 RX ADMIN — GUAIFENESIN AND DEXTROMETHORPHAN HYDROBROMIDE 1 TABLET: 600; 30 TABLET, EXTENDED RELEASE ORAL at 09:06

## 2025-06-21 RX ADMIN — DOXYCYCLINE 100 MG: 100 CAPSULE ORAL at 09:06

## 2025-06-21 RX ADMIN — LEVALBUTEROL HYDROCHLORIDE 1.25 MG: 1.25 SOLUTION RESPIRATORY (INHALATION) at 07:06

## 2025-06-21 RX ADMIN — TAMSULOSIN HYDROCHLORIDE 0.4 MG: 0.4 CAPSULE ORAL at 09:06

## 2025-06-21 NOTE — ASSESSMENT & PLAN NOTE
Aware   Patient received 1 unit of PRBC on 6/18/25.  Patient agreeable to receive another unit of packed red blood cell today.    Recent Labs     06/20/25  0230 06/20/25  1835 06/21/25  0302   HGB 7.1* 8.8* 8.2*   HCT 21.6* 26.9* 25.4*

## 2025-06-21 NOTE — PT/OT/SLP EVAL
Occupational Therapy   Evaluation    Name: Rick Butler  MRN: [id number]  Admitting Diagnosis: Severe sepsis  Recent Surgery: * No surgery found *      Recommendations:     Discharge Recommendations: No Therapy Indicated  Discharge Equipment Recommendations:  none  Barriers to discharge:  None    Assessment:     Rick Butler is a 65 y.o. male with a medical diagnosis of Severe sepsis.  Pt agreeable to OT evaluation this AM. Performance deficits affecting function: weakness, impaired endurance, impaired functional mobility, impaired self care skills, impaired cardiopulmonary response to activity.      Upon entry, pt's sats mid 90s on 4L NC. Once pt stood and transferred to chair, pt's sats dropped to 84% on 4L, but pt quickly recovered (within seconds) back to 95% once seated.    Rehab Prognosis: Good; patient would benefit from acute skilled OT services to address these deficits and reach maximum level of function.       Plan:     Patient to be seen 3 x/week to address the above listed problems via self-care/home management, therapeutic activities, therapeutic exercises  Plan of Care Expires: 07/21/25  Plan of Care Reviewed with: patient, family    Subjective     Chief Complaint: none stated  Patient/Family Comments/goals: none stated    Occupational Profile:  Living Environment: Pt lives with wife in a 1 story, raised home with ~9 ROSY with HR. 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  Patient found HOB elevated with oxygen, telemetry, bed alarm, peripheral IV, pulse ox (continuous), blood  pressure cuff upon OT entry to room.    General Precautions: Standard, fall, respiratory  Orthopedic Precautions: N/A  Braces: N/A  Respiratory Status: Nasal cannula, flow 4 L/min    Occupational Performance:    Bed Mobility:    Patient completed Supine to Sit with stand by assistance    Functional Mobility/Transfers:  Patient completed Sit <> Stand Transfer with stand by assistance  with  no assistive device   Patient completed Bed <> Chair Transfer using Step Transfer technique with stand by assistance with no assistive device  Functional Mobility: pt took a few steps to chair with SBA with no AD, no LOB    Activities of Daily Living:  Feeding:  independence per pt  Grooming: setup assistance seated in chair    Lower Body Dressing: stand by assistance seated EOB to don/doff socks    Cognitive/Visual Perceptual:  Cognitive/Psychosocial Skills:     -       Oriented to: Person, Place, Time, and Situation   -       Follows Commands/attention:Follows two-step commands  -       Communication: clear/fluent  -       Memory: No Deficits noted  -       Safety awareness/insight to disability: intact   -       Mood/Affect/Coping skills/emotional control: Appropriate to situation, Cooperative, and Pleasant    Physical Exam:  Balance:    -       SBA seated/standing balance  Upper Extremity Range of Motion:     -       Right Upper Extremity: WFL  -       Left Upper Extremity: WFL  Upper Extremity Strength:    -       Right Upper Extremity: WFL  -       Left Upper Extremity: WFL   Strength:    -       Right Upper Extremity: WFL  -       Left Upper Extremity: WFL  Fine Motor Coordination:    -       Intact  Gross motor coordination: WFL    AMPAC 6 Click ADL:  AMPAC Total Score: 19    Treatment & Education:  Pt educated on role of OT/POC, importance of OOB/EOB activity, use of call bell, and safety during ADLs, transfers, and functional mobility.    Patient left up in chair with all lines intact, call button in reach, chair  alarm on, nursing notified, and family present    GOALS:   Multidisciplinary Problems       Occupational Therapy Goals          Problem: Occupational Therapy    Goal Priority Disciplines Outcome Interventions   Occupational Therapy Goal     OT, PT/OT     Description: Goals to be met by: 7/21/25     Patient will increase functional independence with ADLs by performing:    UE Dressing with Supervision.  LE Dressing with Supervision.  Grooming while standing at sink with Supervision.  Toileting from toilet with Supervision for hygiene and clothing management.   Toilet transfer to toilet with Supervision.                           History:     Past Medical History:   Diagnosis Date    Abnormal chest CT (new) 08/17/2022    Anemia due to multiple mechanisms 01/13/2019    Anemia, chronic renal failure, stage 2 (mild) 01/13/2019    Anemia, chronic renal failure, stage 3 (moderate) 08/02/2023    Depression     Former smoker 06/29/2017    H/O ETOH abuse 06/29/2017    Lung mass (new) 08/17/2022    Monocytosis 2019    Myelodysplasia (myelodysplastic syndrome)     Myelodysplasia (myelodysplastic syndrome)     Personal history of colonic polyps 12/29/2023    RARS (refractory anemia with ringed sideroblasts) 06/01/2020    Sickle cell trait          Past Surgical History:   Procedure Laterality Date    BONE MARROW BIOPSY N/A 12/20/2019    Procedure: BIOPSY, BONE MARROW;  Surgeon: Satinder Diagnostic Provider;  Location: [facility] OR;  Service: Interventional Radiology;  Laterality: N/A;    COLONOSCOPY N/A 11/05/2021    Procedure: COLONOSCOPY;  Surgeon: Rob Collins MD;  Location: [facility];  Service: Endoscopy;  Laterality: N/A;    COLONOSCOPY  12/29/2023    5 YR RECALL    ESOPHAGOGASTRODUODENOSCOPY N/A 11/05/2021    Procedure: EGD (ESOPHAGOGASTRODUODENOSCOPY);  Surgeon: Rob Collins MD;  Location: [facility];  Service: Endoscopy;  Laterality: N/A;    INJECTION OF ANESTHETIC AGENT AROUND MEDIAL BRANCH NERVES INNERVATING LUMBAR FACET  JOINT Bilateral 05/25/2018    Procedure: BLOCK-NERVE-MEDIAL BRANCH-LUMBAR;  Surgeon: Nura Heredia MD;  Location: [facility] OR;  Service: Pain Management;  Laterality: Bilateral;  L3, 4, 5    KNEE ARTHROSCOPY W/ MENISCECTOMY Right 12/22/2021    Procedure: ARTHROSCOPY, KNEE, WITH MENISCECTOMY;  Surgeon: Sebastian Green MD;  Location: [facility] OR;  Service: Orthopedics;  Laterality: Right;  partial medial meniscectomy    RADIOFREQUENCY ABLATION OF LUMBAR MEDIAL BRANCH NERVE AT SINGLE LEVEL Bilateral 07/20/2018    Procedure: RADIOFREQUENCY ABLATION, NERVE, MEDIAL BRANCH, LUMBAR, 1 LEVEL;  Surgeon: Nura Heredia MD;  Location: [facility] OR;  Service: Pain Management;  Laterality: Bilateral;  L3, 4, 5 - Burned at 80 degrees C.  for 75 seconds x 2 each site    SMALL BOWEL ENTEROSCOPY N/A 10/16/2019    Procedure: ENTEROSCOPY;  Surgeon: Rob Collins MD;  Location: [facility];  Service: Endoscopy;  Laterality: N/A;       Time Tracking:     OT Date of Treatment: 06/21/25  OT Start Time: 1044  OT Stop Time: 1106  OT Total Time (min): 22 min    Billable Minutes:Evaluation 12  Self Care/Home Management 10    6/21/2025

## 2025-06-21 NOTE — PROGRESS NOTES
Pulmonary/Critical Care  Progress Note      PATIENT NAME: Rick Butler  MRN: [id number]  TODAY'S DATE: 2025  1:55 PM  ADMIT DATE: 2025  AGE: 65 y.o. : 1960    CONSULT REQUESTED BY: Breonna Krishnan DO    REASON FOR CONSULT:   Pneumonia    HPI:  Mr. Butler is a 65 year old man with prior dx of MDS/RARS and sickle cell trait, who presents with pneumonia.     He reports his symptoms started suddenly yesterday he was feeling fatigued, febrile, chills, and short of breath.     He reports he is on chemotherapy for his MDS. He reports no productive cough.  Febrile to 103 and elevated HR to 132    2025 - Stable overnight, no new issues reported and feels better.  Still with AF/RVR (cardiology following).  WBC has recovered.  Hgb and platelets still low.      2025 - Stable overnight and is feeling better overall.  He says he has been having issues with decreased appetite as outpt and has been on marinol (insurance not covering) and cyproheptadine (not much change ).  Labs reviewed, renal function better.  R has been good since about 1 PM yesterday        Review of Systems   Constitutional:  Positive for chills and fever. Negative for appetite change and unexpected weight change.   HENT:  Negative for nosebleeds, postnasal drip, trouble swallowing and voice change.    Eyes:  Negative for visual disturbance.   Respiratory:  Positive for shortness of breath. Negative for cough and wheezing.    Cardiovascular:  Negative for chest pain and leg swelling.   Gastrointestinal:  Negative for diarrhea, nausea and vomiting.   Endocrine: Negative for cold intolerance and heat intolerance.   Genitourinary:  Negative for dysuria, flank pain and frequency.   Musculoskeletal:  Negative for neck pain and neck stiffness.   Allergic/Immunologic: Negative for environmental allergies and immunocompromised state.   Neurological:  Negative for syncope, speech difficulty and weakness.   Hematological:  Negative for adenopathy.    Psychiatric/Behavioral:  Negative for confusion.            ALLERGIES  Review of patient's allergies indicates:  No Known Allergies    INPATIENT SCHEDULED MEDICATIONS   [START ON 6/24/2025] calcitRIOL  0.25 mcg Oral Q7 Days    ceFEPime IV (PEDS and ADULTS)  1 g Intravenous Q12H    dextromethorphan-guaiFENesin  mg  1 tablet Oral BID    doxycycline  100 mg Oral Q12H    enoxparin  1 mg/kg Subcutaneous Q24H (treatment, non-standard time)    folic acid  2,000 mcg Oral Daily    levalbuterol  1.25 mg Nebulization Q6H WAKE    morphine  2 mg Intravenous Once    mupirocin   Nasal BID    pantoprazole  40 mg Oral Daily    tamsulosin  0.4 mg Oral Daily    traZODone  100 mg Oral QHS      amiodarone in dextrose 5%  0.5 mg/min Intravenous Continuous 16.7 mL/hr at 06/21/25 0614 0.5 mg/min at 06/21/25 0614       MEDICAL AND SURGICAL HISTORY  Past Medical History:   Diagnosis Date    Abnormal chest CT (new) 08/17/2022    Anemia due to multiple mechanisms 01/13/2019    Anemia, chronic renal failure, stage 2 (mild) 01/13/2019    Anemia, chronic renal failure, stage 3 (moderate) 08/02/2023    Depression     Former smoker 06/29/2017    H/O ETOH abuse 06/29/2017    Lung mass (new) 08/17/2022    Monocytosis 2019    Myelodysplasia (myelodysplastic syndrome)     Myelodysplasia (myelodysplastic syndrome)     Personal history of colonic polyps 12/29/2023    RARS (refractory anemia with ringed sideroblasts) 06/01/2020    Sickle cell trait      Past Surgical History:   Procedure Laterality Date    BONE MARROW BIOPSY N/A 12/20/2019    Procedure: BIOPSY, BONE MARROW;  Surgeon: [facility] Diagnostic Provider;  Location: [facility] OR;  Service: Interventional Radiology;  Laterality: N/A;    COLONOSCOPY N/A 11/05/2021    Procedure: COLONOSCOPY;  Surgeon: Rob Collins MD;  Location: [facility] ENDO;  Service: Endoscopy;  Laterality: N/A;    COLONOSCOPY  12/29/2023    5 YR RECALL    ESOPHAGOGASTRODUODENOSCOPY N/A 11/05/2021    Procedure: EGD  (ESOPHAGOGASTRODUODENOSCOPY);  Surgeon: Rob Collins MD;  Location: [facility] ENDO;  Service: Endoscopy;  Laterality: N/A;    INJECTION OF ANESTHETIC AGENT AROUND MEDIAL BRANCH NERVES INNERVATING LUMBAR FACET JOINT Bilateral 05/25/2018    Procedure: BLOCK-NERVE-MEDIAL BRANCH-LUMBAR;  Surgeon: Nura Heredia MD;  Location: [facility] OR;  Service: Pain Management;  Laterality: Bilateral;  L3, 4, 5    KNEE ARTHROSCOPY W/ MENISCECTOMY Right 12/22/2021    Procedure: ARTHROSCOPY, KNEE, WITH MENISCECTOMY;  Surgeon: Sebastian Green MD;  Location: [facility] OR;  Service: Orthopedics;  Laterality: Right;  partial medial meniscectomy    RADIOFREQUENCY ABLATION OF LUMBAR MEDIAL BRANCH NERVE AT SINGLE LEVEL Bilateral 07/20/2018    Procedure: RADIOFREQUENCY ABLATION, NERVE, MEDIAL BRANCH, LUMBAR, 1 LEVEL;  Surgeon: Nura Heredia MD;  Location: [facility] OR;  Service: Pain Management;  Laterality: Bilateral;  L3, 4, 5 - Burned at 80 degrees C.  for 75 seconds x 2 each site    SMALL BOWEL ENTEROSCOPY N/A 10/16/2019    Procedure: ENTEROSCOPY;  Surgeon: Rob Collins MD;  Location: [facility] [place] ENDO;  Service: Endoscopy;  Laterality: N/A;       ALCOHOL, TOBACCO AND DRUG USE  Tobacco Use History[1]  Social History     Substance and Sexual Activity   Alcohol Use Not Currently    Alcohol/week: 21.0 standard drinks of alcohol    Types: 21 Cans of beer per week    Comment: Sober 5 years     Social History     Substance and Sexual Activity   Drug Use Not Currently    Types: Marijuana       FAMILY HISTORY  Family History   Problem Relation Name Age of Onset    Arthritis Mother      Depression Mother      Heart disease Mother      Hypertension Mother      Heart attack Father  59       VITAL SIGNS (MOST RECENT)  Temp: 98.2 °F (36.8 °C) (06/21/25 0800)  Pulse: 89 (06/21/25 0900)  Resp: (!) 25 (06/21/25 0900)  BP: (!) 146/82 (06/21/25 0900)  SpO2: 95 % (06/21/25 0900)    INTAKE AND OUTPUT (LAST 24 HOURS):  Intake/Output Summary (Last 24 hours) at 6/21/2025 0951  Last data  "filed at 6/21/2025 0600  Gross per 24 hour   Intake 2733.73 ml   Output 1775 ml   Net 958.73 ml       WEIGHT  Wt Readings from Last 1 Encounters:   06/18/25 93.7 kg (206 lb 9.1 oz)       Physical Exam  Vitals reviewed.   Constitutional:       General: He is not in acute distress.     Appearance: He is well-developed. He is ill-appearing. He is not diaphoretic.   HENT:      Head: Normocephalic and atraumatic.      Mouth/Throat:      Pharynx: No oropharyngeal exudate or posterior oropharyngeal erythema.   Eyes:      General: No scleral icterus.     Pupils: Pupils are equal, round, and reactive to light.   Neck:      Vascular: No JVD.   Cardiovascular:      Rate and Rhythm: Regular rhythm. Tachycardia present.      Heart sounds: Normal heart sounds. No murmur heard.  Pulmonary:      Effort: Pulmonary effort is normal. No respiratory distress.      Breath sounds: Wheezing and rales present.   Abdominal:      General: Bowel sounds are normal. There is no distension.      Palpations: Abdomen is soft.      Tenderness: There is no abdominal tenderness.   Musculoskeletal:         General: No swelling.      Cervical back: Normal range of motion and neck supple. No rigidity.   Skin:     General: Skin is warm and dry.      Capillary Refill: Capillary refill takes less than 2 seconds.      Coloration: Skin is pale.      Findings: No rash.   Neurological:      General: No focal deficit present.      Mental Status: He is alert and oriented to person, place, and time.      Cranial Nerves: No cranial nerve deficit.      Motor: No weakness or abnormal muscle tone.           ACUTE PHASE REACTANT (LAST 24 HOURS)  No results for input(s): "FERRITIN", "CRP", "LDH", "DDIMER" in the last 24 hours.    CBC LAST (LAST 24 HOURS)  Recent Labs   Lab 06/21/25  0302   WBC 8.67   RBC 3.00*   HGB 8.2*   HCT 25.4*   MCV 85   MCH 27.3   MCHC 32.3   RDW 17.3*   PLT 42*   MPV 12.3   NRBC 0       CHEMISTRY LAST (LAST 24 HOURS)  Recent Labs   Lab " "06/21/25  0302      K 4.0   *   CO2 19*   ANIONGAP 9   BUN 50*   CREATININE 3.6*   GLU 96   CALCIUM 8.7   MG 2.0   ALBUMIN 3.1*   PROT 6.8   ALKPHOS 61   ALT 22   AST 27   BILITOT 0.5       COAGULATION LAST (LAST 24 HOURS)  No results for input(s): "LABPT", "INR", "APTT" in the last 24 hours.    CARDIAC PROFILE (LAST 24 HOURS)  Recent Labs   Lab 06/18/25  0843   *          LAST 7 DAYS MICROBIOLOGY   Microbiology Results (last 7 days)       Procedure Component Value Units Date/Time    Culture, Respiratory with Gram Stain [[id number]] Collected: 06/19/25 0950    Order Status: Completed Specimen: Respiratory Updated: 06/21/25 0701     Respiratory Culture Normal respiratory federico     GRAM STAIN (RESPIRATORY) >10 Epithelial Cells/LPF      Few WBC seen      Rare Gram Positive Rods     Comment: Corrected result: Previously reported as Rare Branching Gram positive rods on 6/19/2025 at 1225 CDT.         Rare Gram positive cocci    Blood culture x two cultures. Draw prior to antibiotics. [[id number]]  (Normal) Collected: 06/18/25 0930    Order Status: Completed Specimen: Blood Updated: 06/20/25 1002     CULTURE, BLOOD ([facility]) No Growth After 48 Hours    Blood culture x two cultures. Draw prior to antibiotics. [[id number]]  (Normal) Collected: 06/18/25 0932    Order Status: Completed Specimen: Blood Updated: 06/20/25 1002     CULTURE, BLOOD ([facility]) No Growth After 48 Hours    Culture, Respiratory with Gram Stain [[id number]] Collected: 06/19/25 0734    Order Status: Completed Specimen: Respiratory from Sputum, Expectorated Updated: 06/19/25 0844     Respiratory Culture Specimen inadequate - culture not performed     GRAM STAIN (RESPIRATORY) >10epis/lfp and <than many WBC's      Predominance of oropharyngeal federico. Please recollect    MRSA Screen by PCR [[id number]]  (Normal) Collected: 06/18/25 1948    Order Status: Completed Specimen: Nasal Swab Updated: 06/18/25 2115     MRSA PCR SCRN ([facility]) Not Detected "    Respiratory Infection Panel (PCR), Nasopharyngeal [[id number]] Collected: 06/18/25 1027    Order Status: Completed Specimen: Nasopharyngeal Swab Updated: 06/18/25 1209     Respiratory Infection Panel Source Nasopharyngeal Swab     Adenovirus Not Detected     Coronavirus 229E, Common Cold Virus Not Detected     Coronavirus HKU1, Common Cold Virus Not Detected     Coronavirus NL63, Common Cold Virus Not Detected     Coronavirus OC43, Common Cold Virus Not Detected     SARS-CoV2 (COVID-19) Qualitative PCR Not Detected     Human Metapneumovirus Not Detected     Human Rhinovirus/Enterovirus Not Detected     Influenza A (subtypes H1, H1-2009,H3) Not Detected     Influenza B Not Detected     Parainfluenza Virus 1 Not Detected     Parainfluenza Virus 2 Not Detected     Parainfluenza Virus 3 Not Detected     Parainfluenza Virus 4 Not Detected     Respiratory Syncytial Virus Not Detected     Bordetella Parapertussis (DU8160) Not Detected     Bordetella pertussis (ptxP) Not Detected     Chlamydia pneumoniae Not Detected     Mycoplasma pneumoniae Not Detected            MOST RECENT IMAGING  X-Ray Chest 1 View  Narrative: CLINICAL HISTORY:  ([id number] y/o  (1960) M    Rib pain on right. Tachypnea;    TECHNIQUE:  (A#[id number], exam time 6/19/2025 20:06)    XR CHEST 1 VIEW [id number]    COMPARISON:  6.18.25    FINDINGS:  Consolidative airspace opacity in the right middle lobe and scattered interstitial opacities of both lung bases, similar to the previous exam.  Costophrenic angles are seen without effusion. No pneumothorax is identified. The heart is top normal in size. Osseous structures show degenerative changes in the spine. The visualized upper abdomen is unremarkable.  Impression: Unchanged radiograph of the chest when accounting for differences in imaging technique.    Electronically signed by: Omkar Whitney  Date:    06/20/2025  Time:    07:15      CURRENT VISIT EKG  Results for orders placed or performed during  "the hospital encounter of 06/18/25   EKG 12-lead   Result Value Ref Range    QRS Duration 88 ms    [facility] QTC Calculation 441 ms    Narrative    Test Reason : R07.9,    Vent. Rate : 127 BPM     Atrial Rate : 127 BPM     P-R Int : 154 ms          QRS Dur :  88 ms      QT Int : 304 ms       P-R-T Axes :  58  54 -11 degrees    QTcB Int : 441 ms    Sinus tachycardia  LVH with repolarization abnormality ( Sokolow-Gomez )  Abnormal ECG  No previous ECGs available    Referred By: AAAREFERRAL SELF           Confirmed By:        ECHOCARDIOGRAM RESULTS  Results for orders placed during the hospital encounter of 10/06/23    Echo    Interpretation Summary    Left Ventricle: The left ventricle is normal in size. Moderately increased wall thickness. Normal wall motion. There is normal systolic function with a visually estimated ejection fraction of 55 - 60%.    Left Atrium: Left atrium is severely dilated.    Right Ventricle: Right ventricle was not well visualized due to poor acoustic window.    Aortic Valve: The aortic valve is a trileaflet valve. There is mild aortic regurgitation.    Mitral Valve: There is no stenosis. There is mild to moderate regurgitation.    Pulmonic Valve: There is mild regurgitation.    IVC/SVC: Normal venous pressure at 3 mmHg.        VENTILATOR INFORMATION              LAST ARTERIAL BLOOD GAS  ABG  No results for input(s): "PH", "PO2", "PCO2", "HCO3", "BE" in the last 168 hours.      ASSESSMENT:   Pneumonia  Sepsis  3.   Acute kidney injury    Suspected pneumonia for source of sepsis  May need bronchoscopic evaluation if clinical response to antibiotics is inadequate  PLAN:     - continue abx  - follow up culture results  - increase activity as able  - TORRES better, renal following  - CT chest reviewed  - replace electrolytes as needed  - transfer out of ICU      Moses Gomez MD  [facility] Pulmonary/Critical Care  06/21/2025             [1]   Social History  Tobacco Use   Smoking Status Former    Current " packs/day: 0.00    Types: Cigarettes    Quit date: 1996    Years since quittin.4   Smokeless Tobacco Never

## 2025-06-21 NOTE — CARE UPDATE
06/20/25 1935   Patient Assessment/Suction   Level of Consciousness (AVPU) alert   Respiratory Effort Unlabored   Expansion/Accessory Muscles/Retractions expansion symmetric   All Lung Fields Breath Sounds coarse   Rhythm/Pattern, Respiratory depth regular;pattern regular   Cough Frequency infrequent   Cough Type good;productive   Secretions Amount moderate   Secretions Color white   Secretions Characteristics thick   Skin Integrity   $ Wound Care Tech Time 15 min   Area Observed Left;Right;Behind ear;Cheek;Upper lip;Nares   Skin Appearance without discoloration   PRE-TX-O2   Device (Oxygen Therapy) nasal cannula   $ Is the patient on Low Flow Oxygen? Yes   Flow (L/min) (Oxygen Therapy) 3   SpO2 98 %   Pulse Oximetry Type Continuous   $ Pulse Oximetry - Multiple Charge Pulse Oximetry - Multiple   Pulse 95   Resp (!) 23   Aerosol Therapy   $ Aerosol Therapy Charges Aerosol Treatment   Daily Review of Necessity (SVN) completed   Respiratory Treatment Status (SVN) given   Treatment Route (SVN) mask   Patient Position HOB elevated   Post Treatment Assessment (SVN) breath sounds unchanged   Signs of Intolerance (SVN) none   Breath Sounds Post-Respiratory Treatment   Throughout All Fields Post-Treatment All Fields   Throughout All Fields Post-Treatment no change   Post-treatment Heart Rate (beats/min) 81   Post-treatment Resp Rate (breaths/min) 18   Vibratory PEP Therapy   $ Vibratory PEP Charges Aerobika Therapy   $ Vibratory PEP Equipment Aerobika Equipment   $ Vibratory PEP Tech Time Charges 15 min   Daily Review of Necessity (PEP Therapy) completed   Type (PEP Therapy) vibratory/oscillatory   Device (PEP Therapy) flutter   Route (PEP Therapy) mouthpiece   Breaths per Cycle (PEP Therapy) 10   Cycles (PEP Therapy) 1   Settings (PEP Therapy) PEP 3   Patient Position (PEP Therapy) HOB elevated   Post Treatment Assessment (PEP) breath sounds unchanged   Signs of Intolerance (PEP Therapy) none   Peak Flow   PEFR  PREtreatment (L/Min) 0   Education   $ Education Oxygen;15 min

## 2025-06-21 NOTE — PROGRESS NOTES
INPATIENT NEPHROLOGY Progress Note   [facility]    Patient Name: Rick Butler  Date: 06/21/2025    Reason for consultation: TORRES    Chief Complaint:   Chief Complaint   Patient presents with    Shortness of Breath     X 1 day.     Fatigue    Cough     Onset lastnite       History of Present Illness:  65-year-old male with history of myelodysplastic syndrome, sickle cell trait, chronic kidney disease stage 3 f/b Dr. Mas, depression, former smoker, ETOH abuse, lumbar degenerative disc disease. Patient presents to the emergency department with complaint of shortness of breath and increasing fatigue over last 24 hours. Patient states had slightly productive cough as well during this time. Patient decided come to the emergency department for further evaluation. On arrival to ER found with temperature of 103°, heart rate of 132, SBP 90s, with room air sat of 91%. Diagnosed with RLL PNA. Consulted for TORRES.    Notable home meds- norvasc, calcitriol, flomax    Interval History:  6/19- afebrile, SBP 100s, on 4L NC, UOP 1.2L, transfused 1u of blood yest, on levophed and NS IVFs  6/20- afebrile, elevated HR, SBP 120s, on 3-4L NC, coughing, UOP 1.6L, getting blood, off levophed, on amio gtt (went in to AF with RVR overnight), on NS IVFs, echo with TR/pulm HTN  6/21- afebrile, HR improved, BP stable, on 4L NC, UOP 1.8L- transfused yest, on amio gtt    Plan of Care:    Assessment:  Septic shock due to HCAP in an IC patient  TORRES/CKD IV  Elevated BNP and troponin  AF with RVR  Metabolic acidosis  SHPT  MDS on chemotherapy  BPH    Plan:    - on treatment for HCAP- blood cx and RVP negative- off pressors- dose meds for CrCl < 20  - TORRES is due to septic shock- SCr is improving- can do a trial off IVFs- nonoliguric- he has no acute RRT needs- continue strict measurement of UOP- no nsaids or IV contrast- patient reports he was supposed to get a renal/bladder u/s with Dr. Mas so will order now  - suspect elevated  BNP/trop due to demand ischemia- improving- lactic acid is normal- echo noted- monitor for s/s volume overload  - on amio gtt  - acidosis is due to hypoperfusion and renal dysfunction- it is mild and improving- stopping IVFs- no acute bicarbonate needs  - continue calcitriol  - transfusion goals per heme/onc  - continue flomax    Thank you for allowing us to participate in this patient's care. We will continue to follow.    Vital Signs:  Temp Readings from Last 3 Encounters:   06/21/25 98.2 °F (36.8 °C) (Oral)   06/05/25 100 °F (37.8 °C) (Temporal)   06/05/25 99 °F (37.2 °C)       Pulse Readings from Last 3 Encounters:   06/21/25 89   06/05/25 72   06/05/25 70       BP Readings from Last 3 Encounters:   06/21/25 (!) 146/82   06/05/25 (!) 160/89   06/05/25 (!) 170/90       Weight:  Wt Readings from Last 3 Encounters:   06/18/25 93.7 kg (206 lb 9.1 oz)   06/05/25 96.2 kg (212 lb)   06/05/25 96.6 kg (213 lb)       INS/OUTS:  I/O last 3 completed shifts:  In: 6058.6 [P.O.:500; I.V.:5054.8; Blood:381.7; IV Piggyback:122.2]  Out: 2225 [Urine:2225]  I/O this shift:  In: -   Out: 525 [Urine:525]    Medications:  Scheduled Meds:   [START ON 6/24/2025] calcitRIOL  0.25 mcg Oral Q7 Days    ceFEPime IV (PEDS and ADULTS)  1 g Intravenous Q12H    dextromethorphan-guaiFENesin  mg  1 tablet Oral BID    doxycycline  100 mg Oral Q12H    enoxparin  1 mg/kg Subcutaneous Q24H (treatment, non-standard time)    folic acid  2,000 mcg Oral Daily    levalbuterol  1.25 mg Nebulization Q6H WAKE    morphine  2 mg Intravenous Once    mupirocin   Nasal BID    pantoprazole  40 mg Oral Daily    tamsulosin  0.4 mg Oral Daily    traZODone  100 mg Oral QHS     Continuous Infusions:   amiodarone in dextrose 5%  0.5 mg/min Intravenous Continuous 16.7 mL/hr at 06/21/25 0614 0.5 mg/min at 06/21/25 0614     PRN Meds:.  Current Facility-Administered Medications:     0.9%  NaCl infusion (for blood administration), , Intravenous, Q24H PRN    0.9%  NaCl  "infusion (for blood administration), , Intravenous, Q24H PRN    0.9%  NaCl infusion (for blood administration), , Intravenous, Q24H PRN    acetaminophen, 650 mg, Oral, Q8H PRN    acetaminophen, 650 mg, Oral, Q4H PRN    aluminum-magnesium hydroxide-simethicone, 30 mL, Oral, QID PRN    benzonatate, 200 mg, Oral, TID PRN    furosemide (LASIX) injection, 20 mg, Intravenous, PRN    HYDROcodone-acetaminophen, 1 tablet, Oral, Q6H PRN    magnesium oxide, 800 mg, Oral, PRN    magnesium oxide, 800 mg, Oral, PRN    melatonin, 6 mg, Oral, Nightly PRN    naloxone, 0.02 mg, Intravenous, PRN    ondansetron, 4 mg, Intravenous, Q6H PRN    potassium bicarbonate, 35 mEq, Oral, PRN    potassium bicarbonate, 50 mEq, Oral, PRN    potassium bicarbonate, 60 mEq, Oral, PRN    potassium, sodium phosphates, 2 packet, Oral, PRN    potassium, sodium phosphates, 2 packet, Oral, PRN    potassium, sodium phosphates, 2 packet, Oral, PRN  Medications Ordered Prior to Encounter[1]    Review of Systems:  Neg    Physical Exam:  BP (!) 146/82   Pulse 89   Temp 98.2 °F (36.8 °C) (Oral)   Resp (!) 25   Ht 5' 9" (1.753 m)   Wt 93.7 kg (206 lb 9.1 oz)   SpO2 95%   BMI 30.51 kg/m²     General Appearance:    NAD, AAO x 3, cooperative, appears stated age   Head:    Normocephalic, atraumatic   Eyes:    PER, EOMI, and conjunctiva/sclera clear bilaterally        Mouth:   Moist mucus membranes, no thrush or oral lesions, normal      dentition   Back:     No CVA tenderness   Lungs:     Clear to auscultation bilaterally, no wheeze, crackles, rales      or rhonchi, symmetric air movement, respirations unlabored   Heart:    Regular rate and rhythm, S1 and S2 normal, no murmur, rub   or gallop   Abdomen:     Soft, non-tender, non-distended, bowel sounds active all four   quadrants, no RT or guarding, no masses, no organomegaly   Extremities:   Warm and well perfused, distal pulses intact, no cyanosis or    peripheral edema   MSK:   No joint or muscle swelling, " tenderness or deformity   Skin:   Skin color, texture, turgor normal, no rashes or lesions   Neurologic/Psychiatric:   CNII-XII intact, normal strength and sensation       throughout, no asterixis; normal affect, memory, judgement     and insight     Results:  Lab Results   Component Value Date     06/21/2025    K 4.0 06/21/2025     (H) 06/21/2025    CO2 19 (L) 06/21/2025    BUN 50 (H) 06/21/2025    CREATININE 3.6 (H) 06/21/2025    CALCIUM 8.7 06/21/2025    ANIONGAP 9 06/21/2025    ESTGFRAFRICA 45.6 (A) 07/13/2022    EGFRNONAA 39.5 (A) 07/13/2022       Lab Results   Component Value Date    CALCIUM 8.7 06/21/2025    PHOS 4.1 06/19/2025       Recent Labs   Lab 06/21/25  0302   WBC 8.67   RBC 3.00*   HGB 8.2*   HCT 25.4*   PLT 42*   MCV 85   MCH 27.3   MCHC 32.3       I have personally reviewed pertinent radiological imaging and reports from this admission.    I have spent > 35 minutes providing care for this patient for the above diagnoses. These services have included chart/data/imaging review, evaluation, exam, formulation of plan, , note preparation, and discussions with staff involved in this patient's care.    Cleopatra Herrera MD MPH   [facility]  [address] [place] [id number]  [phone] (p)  [phone] (f)             [1]   No current facility-administered medications on file prior to encounter.     Current Outpatient Medications on File Prior to Encounter   Medication Sig Dispense Refill    amLODIPine (NORVASC) 5 MG tablet Take 1 tablet (5 mg total) by mouth once daily. 90 tablet 3    ascorbic acid, vitamin C, (VITAMIN C) 500 MG tablet Take 500 mg by mouth once daily.      atorvastatin (LIPITOR) 10 MG tablet Take 1 tablet (10 mg total) by mouth every evening. 90 tablet 3    calcitRIOL (ROCALTROL) 0.25 MCG Cap Take 0.25 mcg by mouth every 7 days.      cyproheptadine (PERIACTIN) 4 mg tablet Take 1 tablet (4 mg total) by mouth 3 (three) times daily. 90 tablet 3     droNABinol (MARINOL) 10 MG capsule Take 1 capsule (10 mg total) by mouth 2 (two) times daily before meals. 60 capsule 1    famotidine (PEPCID) 20 MG tablet Take 20 mg by mouth 2 (two) times daily.      fluticasone propionate (FLONASE) 50 mcg/actuation nasal spray 1 SPRAY (50 MCG TOTAL) BY EACH NOSTRIL ROUTE 2 (TWO) TIMES A DAY. 1 SPRAY EVERY MORNING AND AFTERNOON TOWARD THE EAR EACH SIDE AFTER A SINUS RINSE 48 mL 1    folic acid (FOLVITE) 1 MG tablet TAKE 2 TABLETS BY MOUTH EVERY DAY (Patient taking differently: Take 2,000 mcg by mouth once daily.) 180 tablet 0    multivitamin with minerals Cap Take 1 capsule by mouth every morning.      pantoprazole (PROTONIX) 40 MG tablet Take 40 mg by mouth once daily.      tamsulosin (FLOMAX) 0.4 mg Cap TAKE 2 CAPSULES BY MOUTH EVERY DAY (Patient taking differently: Take 2 capsules by mouth once daily.) 180 capsule 1    traZODone (DESYREL) 100 MG tablet TAKE 1 TABLET BY MOUTH NIGHTLY AS NEEDED FOR INSOMNIA. 90 tablet 2    LIDOcaine (LIDODERM) 5 % Place 1 patch onto the skin once daily. Remove & Discard patch within 12 hours or as directed by MD 14 patch 0    sildenafil (VIAGRA) 100 MG tablet Take 1 tablet (100 mg total) by mouth daily as needed for Erectile Dysfunction. 10 tablet 6    sod chlor-bicarb-squeez bottle (NEILMED SINUS RINSE COMPLETE) pkdv 1 application  by sinus irrigation route 2 (two) times a day. Not right before bed 1 each 11    [DISCONTINUED] fluoxetine 20 MG tablet TAKE 1 TABLET BY MOUTH EVERY DAY 30 tablet 5

## 2025-06-21 NOTE — PT/OT/SLP PROGRESS
Physical Therapy Treatment    Patient Name:  Rick Butler   MRN:  [id number]    Recommendations:     Discharge Recommendations: Low Intensity Therapy  Discharge Equipment Recommendations:  (TBD)  Barriers to discharge: medical status    Assessment:     Rick Butler is a 65 y.o. male admitted with a medical diagnosis of Severe sepsis.  He presents with the following impairments/functional limitations: weakness, impaired endurance, impaired functional mobility, gait instability, impaired cardiopulmonary response to activity.    Rehab Prognosis: Good; patient would benefit from acute skilled PT services to address these deficits and reach maximum level of function.    Recent Surgery: * No surgery found *      Plan:     During this hospitalization, patient to be seen daily to address the identified rehab impairments via gait training, therapeutic activities, therapeutic exercises and progress toward the following goals:    Plan of Care Expires:  07/20/25    Subjective     Chief Complaint: MORA  (Post activity)  Patient/Family Comments/goals: PLOF  Pain/Comfort:  Pain Rating Post-Intervention 1: 0/10      Objective:     Communicated with nurse prior to session.  Patient found up in chair with oxygen, pulse ox (continuous), telemetry, blood pressure cuff upon PT entry to room.     General Precautions: Standard, fall, respiratory  Orthopedic Precautions:    Braces:    Respiratory Status: Nasal cannula, flow 4 L/min  02 sat 100 % at rest  95% Post gait but moderate shortness of breath     Functional Mobility:  Transfers:     Sit to Stand:  contact guard assistance with rolling walker  Gait: 30 ft RW and CGA       AM-PAC 6 CLICK MOBILITY          Treatment & Education:  Gait training  with monitoring of sa02    Patient left up in chair with all lines intact, call button in reach, and wife present..    GOALS:   Multidisciplinary Problems       Physical Therapy Goals          Problem: Physical Therapy    Goal Priority Disciplines  Outcome Interventions   Physical Therapy Goal     PT, PT/OT     Description: Goals to be met by: 2025     Patient will increase functional independence with mobility by performin. Supine to sit with Modified [place]  2. Sit to supine with Modified [place]  3. Sit to stand transfer with Modified [place]  4. Gait  x 300  feet with Modified [place] using Rolling Walker.                          DME Justifications:  No DME recommended requiring DME justifications    Time Tracking:     PT Received On: 25  PT Start Time: 1220     PT Stop Time: 1238  PT Total Time (min): 18 min     Billable Minutes: Gait Training 18 minutes    Treatment Type: Treatment  PT/PTA: PT     Number of PTA visits since last PT visit: 0     2025

## 2025-06-21 NOTE — CARE UPDATE
Continue tx ,wean oxygen as  tolerated   06/21/25 0639   Patient Assessment/Suction   Level of Consciousness (AVPU) alert   Respiratory Effort Normal;Unlabored   Expansion/Accessory Muscles/Retractions no use of accessory muscles;expansion symmetric   All Lung Fields Breath Sounds coarse;diminished   MAYELIN Breath Sounds coarse;diminished   LLL Breath Sounds diminished   RUL Breath Sounds coarse   RML Breath Sounds coarse   RLL Breath Sounds coarse   Rhythm/Pattern, Respiratory unlabored;depth regular   Cough Frequency frequent   Cough Type good;productive   Secretions Amount small   Secretions Color white   Secretions Characteristics thick   PRE-TX-O2   Device (Oxygen Therapy) nasal cannula   $ Is the patient on Low Flow Oxygen? Yes   Flow (L/min) (Oxygen Therapy) 4   SpO2 97 %   Pulse Oximetry Type Continuous   $ Pulse Oximetry - Multiple Charge Pulse Oximetry - Multiple   Pulse 86   Resp (!) 23   Aerosol Therapy   $ Aerosol Therapy Charges Aerosol Treatment   Daily Review of Necessity (SVN) completed   Respiratory Treatment Status (SVN) given   Treatment Route (SVN) mask   Patient Position HOB elevated   Post Treatment Assessment (SVN) breath sounds improved;increased aeration   Signs of Intolerance (SVN) none   Breath Sounds Post-Respiratory Treatment   Throughout All Fields Post-Treatment All Fields   Throughout All Fields Post-Treatment aeration increased   Post-treatment Heart Rate (beats/min) 83   Post-treatment Resp Rate (breaths/min) 21   Vibratory PEP Therapy   $ Vibratory PEP Charges Aerobika Therapy   $ Vibratory PEP Tech Time Charges 15 min   Daily Review of Necessity (PEP Therapy) completed   Type (PEP Therapy) vibratory/oscillatory   Device (PEP Therapy) flutter   Route (PEP Therapy) mouthpiece   Breaths per Cycle (PEP Therapy) 10   Cycles (PEP Therapy) 1   Settings (PEP Therapy) PEP 3   Post Treatment Assessment (PEP) breath sounds improved;increased aeration   Signs of Intolerance (PEP Therapy) none    Education   $ Education Bronchodilator;15 min   Respiratory Evaluation   $ Care Plan Tech Time 15 min

## 2025-06-21 NOTE — ASSESSMENT & PLAN NOTE
Maintain iv abx as below  De escalate as needed     Antibiotics (From admission, onward)      Start     Stop Route Frequency Ordered    06/18/25 2100  mupirocin 2 % ointment         06/23/25 2059 Nasl 2 times daily 06/18/25 1747 06/18/25 2100  doxycycline capsule 100 mg         -- Oral Every 12 hours 06/18/25 1752 06/18/25 1900  ceFEPIme injection 1 g         -- IV Every 12 hours (non-standard times) 06/18/25 1752            Microbiology Results (last 7 days)       Procedure Component Value Units Date/Time    Blood culture x two cultures. Draw prior to antibiotics. [[id number]]  (Normal) Collected: 06/18/25 0930    Order Status: Completed Specimen: Blood Updated: 06/21/25 1002     CULTURE, BLOOD ([facility]) No Growth After 72 Hours    Blood culture x two cultures. Draw prior to antibiotics. [[id number]]  (Normal) Collected: 06/18/25 0932    Order Status: Completed Specimen: Blood Updated: 06/21/25 1002     CULTURE, BLOOD ([facility]) No Growth After 72 Hours    Culture, Respiratory with Gram Stain [[id number]] Collected: 06/19/25 0950    Order Status: Completed Specimen: Respiratory Updated: 06/21/25 0701     Respiratory Culture Normal respiratory federico     GRAM STAIN (RESPIRATORY) >10 Epithelial Cells/LPF      Few WBC seen      Rare Gram Positive Rods     Comment: Corrected result: Previously reported as Rare Branching Gram positive rods on 6/19/2025 at 1225 CDT.         Rare Gram positive cocci    Culture, Respiratory with Gram Stain [[id number]] Collected: 06/19/25 0734    Order Status: Completed Specimen: Respiratory from Sputum, Expectorated Updated: 06/19/25 0844     Respiratory Culture Specimen inadequate - culture not performed     GRAM STAIN (RESPIRATORY) >10epis/lfp and <than many WBC's      Predominance of oropharyngeal federico. Please recollect    MRSA Screen by PCR [[id number]]  (Normal) Collected: 06/18/25 1948    Order Status: Completed Specimen: Nasal Swab Updated: 06/18/25 2115     MRSA PCR SCRN ([facility]) Not  Detected    Respiratory Infection Panel (PCR), Nasopharyngeal [[id number]] Collected: 06/18/25 1027    Order Status: Completed Specimen: Nasopharyngeal Swab Updated: 06/18/25 1209     Respiratory Infection Panel Source Nasopharyngeal Swab     Adenovirus Not Detected     Coronavirus 229E, Common Cold Virus Not Detected     Coronavirus HKU1, Common Cold Virus Not Detected     Coronavirus NL63, Common Cold Virus Not Detected     Coronavirus OC43, Common Cold Virus Not Detected     SARS-CoV2 (COVID-19) Qualitative PCR Not Detected     Human Metapneumovirus Not Detected     Human Rhinovirus/Enterovirus Not Detected     Influenza A (subtypes H1, H1-2009,H3) Not Detected     Influenza B Not Detected     Parainfluenza Virus 1 Not Detected     Parainfluenza Virus 2 Not Detected     Parainfluenza Virus 3 Not Detected     Parainfluenza Virus 4 Not Detected     Respiratory Syncytial Virus Not Detected     Bordetella Parapertussis (GY4566) Not Detected     Bordetella pertussis (ptxP) Not Detected     Chlamydia pneumoniae Not Detected     Mycoplasma pneumoniae Not Detected

## 2025-06-21 NOTE — PROGRESS NOTES
[facility]   Department of Infectious Disease  Progress Note        PATIENT NAME: Rick Butler  MRN: [id number]  TODAY'S DATE: 06/21/2025  ADMIT DATE: 6/18/2025  LOS: 3 days    CHIEF COMPLAINT: Shortness of Breath (X 1 day. ), Fatigue, and Cough (Onset lastnite)      PRINCIPLE PROBLEM: Severe sepsis    INTERVAL HISTORY      06/19/2025:  he was seen and evaluated at bedside.  States he is improving.  Still with pleuritic right chest pain.  Heart rate and fever curve improving.  Sputum was not a good sample.  Blood cultures so far negative.      06/20/2025:  Continues to improve.  Blood cultures negative.  Repeat sputum culture was once again not a good sample.  Appetite improving.    06/21/2025:  Seen and evaluated at bedside.  Continues to improve.  No new acute issues.  Blood cultures remain negative.  Was unable to produce a good sputum sample.    Antibiotics (From admission, onward)      Start     Stop Route Frequency Ordered    06/18/25 2100  mupirocin 2 % ointment         06/23/25 2059 Nasl 2 times daily 06/18/25 1747    06/18/25 2100  doxycycline capsule 100 mg         -- Oral Every 12 hours 06/18/25 1752    06/18/25 1900  ceFEPIme injection 1 g         -- IV Every 12 hours (non-standard times) 06/18/25 1752          Antifungals (From admission, onward)      None           Antivirals (From admission, onward)      None            ASSESSMENT and PLAN      Right lower lobe pneumonia in a patient with MDS on Rytelo.  Respiratory panel negative.  Change cefepime to ceftriaxone 2 g Q 24 hours.  Continue doxycycline.  Check strep pneumoniae antigen.  Maybe able to complete with oral antibiotic at discharge.  Treat for 8 days.     Pancytopenia secondary to MDS.     MDS.  On Rytelo      RECOMMENDATIONS:    Change cefepime to ceftriaxone 2 g Q 24 hours   Continue doxycycline  Check strep pneumoniae antigen  Continue other symptomatic care    Please send Epic secure chat with any questions.  Discussed with his  wife at bedside      ARMANDO Butler is a 65 y.o. male with history of MDS/RA RS and sickle cell trait.  He is on Rytelo for about 2 months he requires.  A blood and platelet transfusions.  Presents to the emergency room 06/18/2025 with fever cough and generalized weakness and fatigue of 1 day duration.  In the ED /59, pulse 132, respiratory rate 25, temperature 103°, oxygen saturation 92%.     Sodium 136, creatinine 4.6, AST 20, ALT 19, troponin 50, .  WBC 1, hematocrit 23, MCV 86, platelet count 62 with 13% bands.  Acute respiratory panel negative x-ray showed right middle and lower lobe infiltrate he received IV fluid and was placed on antibiotics he has been evaluated by oncologist and pulmonologist ID asked to assist with his care.     No recent travel other than a cruise to [place] in April 2025.  No sick contacts.  He is employed and works as a prep shift.  Was actually working up until 06/17/2025.        Antibiotic history:    Vancomycin 6/18/25-  Cefepime: 06/08/2025 x1 dose   Azithromycin: 06/18/2025   Meropenem: 06/18/2025     Microbiology:    Respiratory panel 06/18/2025: Negative   Blood culture 06/18/2025:  NGTD    Review of Systems  Negative except as stated above in Interval History     OBJECTIVE   Temp:  [97.2 °F (36.2 °C)-98.3 °F (36.8 °C)] 98.3 °F (36.8 °C)  Pulse:  [] 93  Resp:  [15-48] 25  SpO2:  [79 %-99 %] 97 %  BP: (114-149)/(67-91) 144/85  Temp:  [97.2 °F (36.2 °C)-98.3 °F (36.8 °C)]   Temp: 98.3 °F (36.8 °C) (06/21/25 1200)  Pulse: 93 (06/21/25 1217)  Resp: (!) 25 (06/21/25 1217)  BP: (!) 144/85 (06/21/25 1200)  SpO2: 97 % (06/21/25 1217)    Intake/Output Summary (Last 24 hours) at 6/21/2025 1521  Last data filed at 6/21/2025 1504  Gross per 24 hour   Intake 2733.73 ml   Output 2250 ml   Net 483.73 ml       Physical Exam  General:  Elderly man who is acutely ill looking.   HEENT:  Pale.  No oral thrush, no cervical adenopathy.  Upper and lower dentures.  CVS: S1  "and 2 heard, no murmurs appreciated   Respiratory:  Coarse breath sounds right lung  Abdomen: Full, soft, nontender, no palpable organomegaly   Skin: No rash appreciated   CNS: No focal deficits   Musculoskeletal: No joint or bony abnormalities appreciated  Psych: Good mood, normal affect.     VAD:  ISOLATION: Airborne and Contact and Enhanced Respiratory      Wounds:  None    Significant Labs: All pertinent labs within the past 24 hours have been reviewed.    CBC LAST 7 DAYS  Recent Labs   Lab 06/18/25  0843 06/19/25  0301 06/19/25  0951 06/19/25  1720 06/20/25  0230 06/20/25  1835 06/21/25  0302   WBC 1.02* 2.38* 2.85* 4.57 5.73  --  8.67   RBC 2.65* 2.40* 2.44* 2.78* 2.58*  --  3.00*   HGB 7.3* 6.7* 6.8* 7.6* 7.1* 8.8* 8.2*   HCT 22.9* 20.4* 20.7* 23.6* 21.6* 26.9* 25.4*   MCV 86 85 85 85 84  --  85   MCH 27.5 27.9 27.9 27.3 27.5  --  27.3   MCHC 31.9* 32.8 32.9 32.2 32.9  --  32.3   RDW 17.7* 17.2* 17.8* 17.6* 17.9*  --  17.3*   PLT 62* 47* 46* 47* 40*  --  42*   MPV 9.6 10.3  --   --   --   --  12.3   NRBC 0 0  --  0 0  --  0       CHEMISTRY LAST 7 DAYS  Recent Labs   Lab 06/18/25  0843 06/19/25  0301 06/19/25  2026 06/20/25  0230 06/21/25  0302    138 139 140 144   K 4.0 4.9 4.1 4.2 4.0    108 112* 112* 116*   CO2 21* 21* 19* 20* 19*   ANIONGAP 12 9 8 8 9   BUN 55* 54* 57* 54* 50*   CREATININE 4.6* 4.2* 4.1* 4.0* 3.6*   GLU 95 92 111* 110 96   CALCIUM 8.9 8.1* 8.3* 8.5* 8.7   MG 1.4* 2.0 2.2 2.2 2.0   ALBUMIN 4.0 3.3*  --  3.0* 3.1*   PROT 7.5 6.3  --  6.7 6.8   ALKPHOS 45* 35*  --  46* 61   ALT 19 17  --  19 22   AST 23 25  --  23 27   BILITOT 0.6 1.1*  --  0.5 0.5       Estimated Creatinine Clearance: 23.1 mL/min (A) (based on SCr of 3.6 mg/dL (H)).    INFLAMMATORY/PROCAL  LAST 7 DAYS  No results for input(s): "PROCAL", "ESR", "CRP" in the last 168 hours.  No results found for: "ESR"  CRP   Date Value Ref Range Status   12/04/2024 0.40 <1.00 mg/dL Final     Comment:     CRP-Normal Application " expected values:   <1.0        mg/dL   Normal Range  1.0 - 5.0  mg/dL   Indicates mild inflammation  5.0 - 10.0 mg/dL   Indicates severe inflammation  >10.0        mg/dL   Represents serious processes and   frequently         indicates the presence of a bacterial   infection.          PRIOR  MICROBIOLOGY:    No results found for the last 90 days.      LAST 7 DAYS MICROBIOLOGY   Microbiology Results (last 7 days)       Procedure Component Value Units Date/Time    Blood culture x two cultures. Draw prior to antibiotics. [[id number]]  (Normal) Collected: 06/18/25 0930    Order Status: Completed Specimen: Blood Updated: 06/21/25 1002     CULTURE, BLOOD ([facility]) No Growth After 72 Hours    Blood culture x two cultures. Draw prior to antibiotics. [[id number]]  (Normal) Collected: 06/18/25 0932    Order Status: Completed Specimen: Blood Updated: 06/21/25 1002     CULTURE, BLOOD ([facility]) No Growth After 72 Hours    Culture, Respiratory with Gram Stain [[id number]] Collected: 06/19/25 0950    Order Status: Completed Specimen: Respiratory Updated: 06/21/25 0701     Respiratory Culture Normal respiratory federico     GRAM STAIN (RESPIRATORY) >10 Epithelial Cells/LPF      Few WBC seen      Rare Gram Positive Rods     Comment: Corrected result: Previously reported as Rare Branching Gram positive rods on 6/19/2025 at 1225 CDT.         Rare Gram positive cocci    Culture, Respiratory with Gram Stain [[id number]] Collected: 06/19/25 0734    Order Status: Completed Specimen: Respiratory from Sputum, Expectorated Updated: 06/19/25 0844     Respiratory Culture Specimen inadequate - culture not performed     GRAM STAIN (RESPIRATORY) >10epis/lfp and <than many WBC's      Predominance of oropharyngeal federico. Please recollect    MRSA Screen by PCR [[id number]]  (Normal) Collected: 06/18/25 1948    Order Status: Completed Specimen: Nasal Swab Updated: 06/18/25 2115     MRSA PCR SCRN ([facility]) Not Detected    Respiratory Infection Panel (PCR),  Nasopharyngeal [[id number]] Collected: 06/18/25 1027    Order Status: Completed Specimen: Nasopharyngeal Swab Updated: 06/18/25 1209     Respiratory Infection Panel Source Nasopharyngeal Swab     Adenovirus Not Detected     Coronavirus 229E, Common Cold Virus Not Detected     Coronavirus HKU1, Common Cold Virus Not Detected     Coronavirus NL63, Common Cold Virus Not Detected     Coronavirus OC43, Common Cold Virus Not Detected     SARS-CoV2 (COVID-19) Qualitative PCR Not Detected     Human Metapneumovirus Not Detected     Human Rhinovirus/Enterovirus Not Detected     Influenza A (subtypes H1, H1-2009,H3) Not Detected     Influenza B Not Detected     Parainfluenza Virus 1 Not Detected     Parainfluenza Virus 2 Not Detected     Parainfluenza Virus 3 Not Detected     Parainfluenza Virus 4 Not Detected     Respiratory Syncytial Virus Not Detected     Bordetella Parapertussis ([id number]) Not Detected     Bordetella pertussis (ptxP) Not Detected     Chlamydia pneumoniae Not Detected     Mycoplasma pneumoniae Not Detected              CURRENT/PREVIOUS VISIT EKG  Results for orders placed or performed during the hospital encounter of 06/18/25   EKG 12-lead    Collection Time: 06/20/25  1:14 PM   Result Value Ref Range    QRS Duration 94 ms    [facility] QTC Calculation 484 ms    Narrative    Test Reason : I49.9,    Vent. Rate :  93 BPM     Atrial Rate :  93 BPM     P-R Int : 170 ms          QRS Dur :  94 ms      QT Int : 390 ms       P-R-T Axes :  56   6  48 degrees    QTcB Int : 484 ms    Normal sinus rhythm  Nonspecific T wave abnormality  Prolonged QT  Abnormal ECG  When compared with ECG of 19-Jun-2025 18:19,  Sinus rhythm has replaced Atrial fibrillation  Vent. rate has decreased by  56 bpm  Confirmed by Fam Benoit ([id number]) on 6/21/2025 2:14:31 PM    Referred By: AAAREFERRAL SELF           Confirmed By: Fam Benoit     Significant Imaging: I have reviewed all relevant and available imaging results/findings within the past  24 hours.    I spent a total of 50 minutes on the day of the visit.This includes face to face time and non-face to face time preparing to see the patient (eg, review of tests), obtaining and/or reviewing separately obtained history, documenting clinical information in the electronic or other health record, independently interpreting results and communicating results to the patient/family/caregiver, or care coordinator.    Aris Cervantes MD  Date of Service: 06/21/2025      This note was created using "[employer] voice recognition software that occasionally misinterpreted phrases or words.

## 2025-06-21 NOTE — ASSESSMENT & PLAN NOTE
This patient does have evidence of infective focus  My overall impression is sepsis with Acute kidney injury, Acute respiratory failure, and Thrombocytopenia .  Source: Respiratory Infection  Antibiotics given-   Antibiotics (72h ago, onward)      Start     Stop Route Frequency Ordered    06/18/25 2100  mupirocin 2 % ointment         06/23/25 2059 Nasl 2 times daily 06/18/25 1747    06/18/25 2100  doxycycline capsule 100 mg         -- Oral Every 12 hours 06/18/25 1752    06/18/25 1900  ceFEPIme injection 1 g         -- IV Every 12 hours (non-standard times) 06/18/25 1752          Latest lactate reviewed-  Recent Labs   Lab 06/18/25  1440   POCLAC 1.61

## 2025-06-21 NOTE — PROGRESS NOTES
[facility]  Progress Note    Patient Name: Rick Butler  MRN: [id number]  Patient Class: IP- Inpatient   Admission Date: 6/18/2025  Length of Stay: 3 days  Attending Physician: Breonna Krishnan DO  Primary Care Provider: Reynaldo Basilio FNP-C        Subjective     Principal Problem:Severe sepsis        HPI:  65 year old pt getting admitted with Sepsis/Severe pneumonia/Neutropenic fever  Pt was suffering from URTI like infection for past several days  Later he suffered from severe SOB/cough  Today symptoms went worse, came to ER found to be hypoxic and got admitted     Overview/Hospital Course:  65-year-old male with myelodysplastic syndrome admitted with severe sepsis/pneumonia/neutropenic fever.  Oncology and ID specialist following.  Continue antibiotics.  Later patient developed elevated troponin for demand ischemia and developed AFib with RVR and placed on IV amiodarone and transitioned to oral amiodarone.  For symptomatic anemia, s/p 2 units of packed red blood cells .    Interval History:  Patient said he feels okay.  For daily updates refer to hospital course    Review of Systems   All other systems reviewed and are negative.    Objective:     Vital Signs (Most Recent):  Temp: 98.3 °F (36.8 °C) (06/21/25 1200)  Pulse: 93 (06/21/25 1217)  Resp: (!) 25 (06/21/25 1217)  BP: (!) 144/85 (06/21/25 1200)  SpO2: 97 % (06/21/25 1217) Vital Signs (24h Range):  Temp:  [97.2 °F (36.2 °C)-98.3 °F (36.8 °C)] 98.3 °F (36.8 °C)  Pulse:  [] 93  Resp:  [15-48] 25  SpO2:  [79 %-99 %] 97 %  BP: (114-149)/(67-91) 144/85     Weight: 93.7 kg (206 lb 9.1 oz)  Body mass index is 30.51 kg/m².    Intake/Output Summary (Last 24 hours) at 6/21/2025 1344  Last data filed at 6/21/2025 1227  Gross per 24 hour   Intake 2733.73 ml   Output 2300 ml   Net 433.73 ml         Physical Exam  Cardiovascular:      Rate and Rhythm: Normal rate.      Heart sounds: Murmur heard.   Abdominal:      General:  There is no distension.   Neurological:      Mental Status: He is alert and oriented to person, place, and time.               Significant Labs: All pertinent labs within the past 24 hours have been reviewed.    Significant Imaging: I have reviewed all pertinent imaging results/findings within the past 24 hours.      Assessment & Plan  Severe sepsis  This patient does have evidence of infective focus  My overall impression is sepsis with Acute kidney injury, Acute respiratory failure, and Thrombocytopenia .  Source: Respiratory Infection  Antibiotics given-   Antibiotics (72h ago, onward)      Start     Stop Route Frequency Ordered    06/18/25 2100  mupirocin 2 % ointment         06/23/25 2059 Nasl 2 times daily 06/18/25 1747    06/18/25 2100  doxycycline capsule 100 mg         -- Oral Every 12 hours 06/18/25 1752    06/18/25 1900  ceFEPIme injection 1 g         -- IV Every 12 hours (non-standard times) 06/18/25 1752          Latest lactate reviewed-  Recent Labs   Lab 06/18/25  1440   POCLAC 1.61     Acute pneumonia  Maintain iv abx as below  De escalate as needed     Antibiotics (From admission, onward)      Start     Stop Route Frequency Ordered    06/18/25 2100  mupirocin 2 % ointment         06/23/25 2059 Nasl 2 times daily 06/18/25 1747 06/18/25 2100  doxycycline capsule 100 mg         -- Oral Every 12 hours 06/18/25 1752    06/18/25 1900  ceFEPIme injection 1 g         -- IV Every 12 hours (non-standard times) 06/18/25 1752            Microbiology Results (last 7 days)       Procedure Component Value Units Date/Time    Blood culture x two cultures. Draw prior to antibiotics. [[id number]]  (Normal) Collected: 06/18/25 0930    Order Status: Completed Specimen: Blood Updated: 06/21/25 1002     CULTURE, BLOOD (H) No Growth After 72 Hours    Blood culture x two cultures. Draw prior to antibiotics. [[id number]]  (Normal) Collected: 06/18/25 0932    Order Status: Completed Specimen: Blood Updated: 06/21/25 1002      CULTURE, BLOOD ([facility]) No Growth After 72 Hours    Culture, Respiratory with Gram Stain [[id number]] Collected: 06/19/25 0950    Order Status: Completed Specimen: Respiratory Updated: 06/21/25 0701     Respiratory Culture Normal respiratory federico     GRAM STAIN (RESPIRATORY) >10 Epithelial Cells/LPF      Few WBC seen      Rare Gram Positive Rods     Comment: Corrected result: Previously reported as Rare Branching Gram positive rods on 6/19/2025 at 1225 CDT.         Rare Gram positive cocci    Culture, Respiratory with Gram Stain [[id number]] Collected: 06/19/25 0734    Order Status: Completed Specimen: Respiratory from Sputum, Expectorated Updated: 06/19/25 0844     Respiratory Culture Specimen inadequate - culture not performed     GRAM STAIN (RESPIRATORY) >10epis/lfp and <than many WBC's      Predominance of oropharyngeal federico. Please recollect    MRSA Screen by PCR [[id number]]  (Normal) Collected: 06/18/25 1948    Order Status: Completed Specimen: Nasal Swab Updated: 06/18/25 2115     MRSA PCR SCRN ([facility]) Not Detected    Respiratory Infection Panel (PCR), Nasopharyngeal [[id number]] Collected: 06/18/25 1027    Order Status: Completed Specimen: Nasopharyngeal Swab Updated: 06/18/25 1209     Respiratory Infection Panel Source Nasopharyngeal Swab     Adenovirus Not Detected     Coronavirus 229E, Common Cold Virus Not Detected     Coronavirus HKU1, Common Cold Virus Not Detected     Coronavirus NL63, Common Cold Virus Not Detected     Coronavirus OC43, Common Cold Virus Not Detected     SARS-CoV2 (COVID-19) Qualitative PCR Not Detected     Human Metapneumovirus Not Detected     Human Rhinovirus/Enterovirus Not Detected     Influenza A (subtypes H1, H1-2009,H3) Not Detected     Influenza B Not Detected     Parainfluenza Virus 1 Not Detected     Parainfluenza Virus 2 Not Detected     Parainfluenza Virus 3 Not Detected     Parainfluenza Virus 4 Not Detected     Respiratory Syncytial Virus Not Detected     Bordetella  Parapertussis ([id number]) Not Detected     Bordetella pertussis (ptxP) Not Detected     Chlamydia pneumoniae Not Detected     Mycoplasma pneumoniae Not Detected          Sickle cell trait syndrome  Aware     RARS (refractory anemia with ringed sideroblasts)  Aware   Patient received 1 unit of PRBC on 6/18/25.  Patient agreeable to receive another unit of packed red blood cell today.    Recent Labs     06/20/25  0230 06/20/25  1835 06/21/25  0302   HGB 7.1* 8.8* 8.2*   HCT 21.6* 26.9* 25.4*       MDS (myelodysplastic syndrome)  Aware     Anemia, chronic renal failure, stage 3 (moderate)  Aware   Also has MDS  Neutropenic fever  Maintain Rx as ordered  Isolation precautions    Acute on chronic anemia  pRBC ordered   Thrombocytopenia  Continue to monitor  Heme oncology is following  Elevated troponin  Trended down likely demand  Cardiology is following  TORRES (acute kidney injury) (Resolved: 6/21/2025)  Improving.  Follow Nephrology recommendations.  Maintain iv fluids   Latest Reference Range & Units 05/07/25 07:38 05/14/25 07:30 05/21/25 07:36 05/28/25 07:42 06/04/25 07:33 06/18/25 08:43   Creatinine 0.5 - 1.4 mg/dL 3.3 (H) 3.3 (H) 3.5 (H) 3.4 (H) 3.9 (H) 4.6 (H)   (H): Data is abnormally high  Atrial fibrillation with rapid ventricular response   IV amiodarone transitioned to oral on 06/21  Anticoagulation per Cardiology team recommendation  TORRES (acute kidney injury)  TORRES is likely due to multifactorial.Most recent creatinine and eGFR are listed below.  Recent Labs     06/19/25  2026 06/20/25  0230 06/21/25  0302   CREATININE 4.1* 4.0* 3.6*   EGFRNORACEVR 15* 16* 18*      Plan  - TORRES is improving  - Avoid nephrotoxins and renally dose meds for GFR listed above  - Monitor urine output, serial BMP, and adjust therapy as needed  - nephrology is following    VTE Risk Mitigation (From admission, onward)           Ordered     enoxaparin injection 90 mg  Every 24 hours         06/20/25 1321     IP VTE HIGH RISK PATIENT  Once          06/18/25 1145     Place sequential compression device  Until discontinued         06/18/25 1145                    Discharge Planning   ELMO: 6/23/2025     Code Status: Full Code   Medical Readiness for Discharge Date: 6/20/2025  Discharge Plan A: Home with family              Critical care time spent on the evaluation and treatment of severe organ dysfunction, review of pertinent labs and imaging studies, discussions with consulting providers and discussions with patient/family: 32 minutes.    Please place Justification for DME      Breonna Krishnan DO  Department of Hospital Medicine   [facility]

## 2025-06-21 NOTE — ASSESSMENT & PLAN NOTE
TORRES is likely due to multifactorial.Most recent creatinine and eGFR are listed below.  Recent Labs     06/19/25  2026 06/20/25  0230 06/21/25  0302   CREATININE 4.1* 4.0* 3.6*   EGFRNORACEVR 15* 16* 18*      Plan  - TORRES is improving  - Avoid nephrotoxins and renally dose meds for GFR listed above  - Monitor urine output, serial BMP, and adjust therapy as needed  - nephrology is following

## 2025-06-21 NOTE — SUBJECTIVE & OBJECTIVE
Interval History:  Patient said he feels okay.  For daily updates refer to hospital course    Review of Systems   All other systems reviewed and are negative.    Objective:     Vital Signs (Most Recent):  Temp: 98.3 °F (36.8 °C) (06/21/25 1200)  Pulse: 93 (06/21/25 1217)  Resp: (!) 25 (06/21/25 1217)  BP: (!) 144/85 (06/21/25 1200)  SpO2: 97 % (06/21/25 1217) Vital Signs (24h Range):  Temp:  [97.2 °F (36.2 °C)-98.3 °F (36.8 °C)] 98.3 °F (36.8 °C)  Pulse:  [] 93  Resp:  [15-48] 25  SpO2:  [79 %-99 %] 97 %  BP: (114-149)/(67-91) 144/85     Weight: 93.7 kg (206 lb 9.1 oz)  Body mass index is 30.51 kg/m².    Intake/Output Summary (Last 24 hours) at 6/21/2025 1344  Last data filed at 6/21/2025 1227  Gross per 24 hour   Intake 2733.73 ml   Output 2300 ml   Net 433.73 ml         Physical Exam  Cardiovascular:      Rate and Rhythm: Normal rate.      Heart sounds: Murmur heard.   Abdominal:      General: There is no distension.   Neurological:      Mental Status: He is alert and oriented to person, place, and time.               Significant Labs: All pertinent labs within the past 24 hours have been reviewed.    Significant Imaging: I have reviewed all pertinent imaging results/findings within the past 24 hours.

## 2025-06-22 LAB
ALBUMIN SERPL-MCNC: 3 G/DL (ref 3.5–5.2)
ALP SERPL-CCNC: 61 UNIT/L (ref 55–135)
ALT SERPL-CCNC: 20 UNIT/L (ref 10–44)
ANION GAP (SMH): 9 MMOL/L (ref 8–16)
ANISOCYTOSIS BLD QL SMEAR: SLIGHT
AST SERPL-CCNC: 23 UNIT/L (ref 10–40)
BILIRUB SERPL-MCNC: 0.5 MG/DL (ref 0.1–1)
BUN SERPL-MCNC: 45 MG/DL (ref 8–23)
BURR CELLS BLD QL SMEAR: ABNORMAL
CALCIUM SERPL-MCNC: 8.9 MG/DL (ref 8.7–10.5)
CHLORIDE SERPL-SCNC: 115 MMOL/L (ref 95–110)
CO2 SERPL-SCNC: 18 MMOL/L (ref 23–29)
CREAT SERPL-MCNC: 3.3 MG/DL (ref 0.5–1.4)
ERYTHROCYTE [DISTWIDTH] IN BLOOD BY AUTOMATED COUNT: 17.3 % (ref 11.5–14.5)
GFR SERPLBLD CREATININE-BSD FMLA CKD-EPI: 20 ML/MIN/1.73/M2
GLUCOSE SERPL-MCNC: 98 MG/DL (ref 70–110)
HCT VFR BLD AUTO: 24 % (ref 40–54)
HGB BLD-MCNC: 7.8 GM/DL (ref 14–18)
LYMPHOCYTES NFR BLD MANUAL: 18 % (ref 18–48)
MAGNESIUM SERPL-MCNC: 1.8 MG/DL (ref 1.6–2.6)
MCH RBC QN AUTO: 27.7 PG (ref 27–31)
MCHC RBC AUTO-ENTMCNC: 32.5 G/DL (ref 32–36)
MCV RBC AUTO: 85 FL (ref 82–98)
MONOCYTES NFR BLD MANUAL: 8 % (ref 4–15)
NEUTROPHILS NFR BLD MANUAL: 71 % (ref 38–73)
NEUTS BAND NFR BLD MANUAL: 3 %
NUCLEATED RBC (/100WBC) (SMH): 0 /100 WBC
PLATELET # BLD AUTO: 33 K/UL (ref 150–450)
PLATELET BLD QL SMEAR: ABNORMAL
PMV BLD AUTO: ABNORMAL FL
POIKILOCYTOSIS BLD QL SMEAR: SLIGHT
POTASSIUM SERPL-SCNC: 3.9 MMOL/L (ref 3.5–5.1)
PROT SERPL-MCNC: 6.3 GM/DL (ref 6–8.4)
RBC # BLD AUTO: 2.82 M/UL (ref 4.6–6.2)
SCHISTOCYTES BLD QL SMEAR: ABNORMAL
SODIUM SERPL-SCNC: 142 MMOL/L (ref 136–145)
WBC # BLD AUTO: 6.42 K/UL (ref 3.9–12.7)

## 2025-06-22 PROCEDURE — 11000001 HC ACUTE MED/SURG PRIVATE ROOM

## 2025-06-22 PROCEDURE — 99233 SBSQ HOSP IP/OBS HIGH 50: CPT | Mod: ,,, | Performed by: INTERNAL MEDICINE

## 2025-06-22 PROCEDURE — 83735 ASSAY OF MAGNESIUM: CPT | Performed by: INTERNAL MEDICINE

## 2025-06-22 PROCEDURE — 99900035 HC TECH TIME PER 15 MIN (STAT)

## 2025-06-22 PROCEDURE — 25000003 PHARM REV CODE 250: Performed by: INTERNAL MEDICINE

## 2025-06-22 PROCEDURE — 25000003 PHARM REV CODE 250: Performed by: FAMILY MEDICINE

## 2025-06-22 PROCEDURE — 27000221 HC OXYGEN, UP TO 24 HOURS

## 2025-06-22 PROCEDURE — 99900031 HC PATIENT EDUCATION (STAT)

## 2025-06-22 PROCEDURE — 25000242 PHARM REV CODE 250 ALT 637 W/ HCPCS: Performed by: INTERNAL MEDICINE

## 2025-06-22 PROCEDURE — 36415 COLL VENOUS BLD VENIPUNCTURE: CPT | Performed by: INTERNAL MEDICINE

## 2025-06-22 PROCEDURE — 94761 N-INVAS EAR/PLS OXIMETRY MLT: CPT

## 2025-06-22 PROCEDURE — 85025 COMPLETE CBC W/AUTO DIFF WBC: CPT | Performed by: INTERNAL MEDICINE

## 2025-06-22 PROCEDURE — 94799 UNLISTED PULMONARY SVC/PX: CPT

## 2025-06-22 PROCEDURE — 94664 DEMO&/EVAL PT USE INHALER: CPT

## 2025-06-22 PROCEDURE — 97116 GAIT TRAINING THERAPY: CPT

## 2025-06-22 PROCEDURE — 97530 THERAPEUTIC ACTIVITIES: CPT

## 2025-06-22 PROCEDURE — 80053 COMPREHEN METABOLIC PANEL: CPT | Performed by: INTERNAL MEDICINE

## 2025-06-22 PROCEDURE — 94640 AIRWAY INHALATION TREATMENT: CPT

## 2025-06-22 RX ORDER — CEFUROXIME AXETIL 250 MG/1
500 TABLET ORAL DAILY
Status: DISCONTINUED | OUTPATIENT
Start: 2025-06-22 | End: 2025-06-23

## 2025-06-22 RX ORDER — AMLODIPINE BESYLATE 5 MG/1
10 TABLET ORAL DAILY
Status: DISCONTINUED | OUTPATIENT
Start: 2025-06-22 | End: 2025-06-28 | Stop reason: HOSPADM

## 2025-06-22 RX ORDER — CEFUROXIME AXETIL 250 MG/1
500 TABLET ORAL EVERY 12 HOURS
Status: DISCONTINUED | OUTPATIENT
Start: 2025-06-22 | End: 2025-06-22

## 2025-06-22 RX ORDER — LEVALBUTEROL INHALATION SOLUTION 1.25 MG/3ML
1.25 SOLUTION RESPIRATORY (INHALATION) EVERY 6 HOURS PRN
Status: DISCONTINUED | OUTPATIENT
Start: 2025-06-22 | End: 2025-06-24

## 2025-06-22 RX ADMIN — AMIODARONE HYDROCHLORIDE 400 MG: 200 TABLET ORAL at 03:06

## 2025-06-22 RX ADMIN — GUAIFENESIN AND DEXTROMETHORPHAN HYDROBROMIDE 1 TABLET: 600; 30 TABLET, EXTENDED RELEASE ORAL at 09:06

## 2025-06-22 RX ADMIN — AMIODARONE HYDROCHLORIDE 400 MG: 200 TABLET ORAL at 09:06

## 2025-06-22 RX ADMIN — TAMSULOSIN HYDROCHLORIDE 0.4 MG: 0.4 CAPSULE ORAL at 09:06

## 2025-06-22 RX ADMIN — Medication 6 MG: at 08:06

## 2025-06-22 RX ADMIN — AMLODIPINE BESYLATE 10 MG: 5 TABLET ORAL at 06:06

## 2025-06-22 RX ADMIN — TRAZODONE HYDROCHLORIDE 100 MG: 50 TABLET ORAL at 08:06

## 2025-06-22 RX ADMIN — LEVALBUTEROL HYDROCHLORIDE 1.25 MG: 1.25 SOLUTION RESPIRATORY (INHALATION) at 06:06

## 2025-06-22 RX ADMIN — DOXYCYCLINE 100 MG: 100 CAPSULE ORAL at 08:06

## 2025-06-22 RX ADMIN — PANTOPRAZOLE SODIUM 40 MG: 40 TABLET, DELAYED RELEASE ORAL at 05:06

## 2025-06-22 RX ADMIN — MUPIROCIN 1 G: 20 OINTMENT TOPICAL at 09:06

## 2025-06-22 RX ADMIN — DOXYCYCLINE 100 MG: 100 CAPSULE ORAL at 09:06

## 2025-06-22 RX ADMIN — CEFUROXIME AXETIL 500 MG: 250 TABLET, FILM COATED ORAL at 12:06

## 2025-06-22 RX ADMIN — FOLIC ACID 2000 MCG: 1 TABLET ORAL at 09:06

## 2025-06-22 RX ADMIN — MUPIROCIN 1 G: 20 OINTMENT TOPICAL at 08:06

## 2025-06-22 RX ADMIN — AMIODARONE HYDROCHLORIDE 400 MG: 200 TABLET ORAL at 08:06

## 2025-06-22 NOTE — ASSESSMENT & PLAN NOTE
"Maintain iv abx as below  De escalate as needed     Antibiotics (From admission, onward)      Start     Stop Route Frequency Ordered    06/22/25 1145  cefUROXime tablet 500 mg        Note to Pharmacy: Ht: 5' 9" (1.753 m)  Wt: 93.7 kg (206 lb 9.1 oz)  Estimated Creatinine Clearance: 25.2 mL/min (A) (based on SCr of 3.3 mg/dL (H)).   Body mass index is 30.51 kg/m².    06/27/25 0859 Oral Daily 06/22/25 1032    06/18/25 2100  mupirocin 2 % ointment         06/23/25 2059 Nasl 2 times daily 06/18/25 1747 06/18/25 2100  doxycycline capsule 100 mg         -- Oral Every 12 hours 06/18/25 1752            Microbiology Results (last 7 days)       Procedure Component Value Units Date/Time    Blood culture x two cultures. Draw prior to antibiotics. [[id number]]  (Normal) Collected: 06/18/25 0930    Order Status: Completed Specimen: Blood Updated: 06/21/25 1002     CULTURE, BLOOD ([facility]) No Growth After 72 Hours    Blood culture x two cultures. Draw prior to antibiotics. [[id number]]  (Normal) Collected: 06/18/25 0932    Order Status: Completed Specimen: Blood Updated: 06/21/25 1002     CULTURE, BLOOD ([facility]) No Growth After 72 Hours    Culture, Respiratory with Gram Stain [[id number]] Collected: 06/19/25 0950    Order Status: Completed Specimen: Respiratory Updated: 06/21/25 0701     Respiratory Culture Normal respiratory federico     GRAM STAIN (RESPIRATORY) >10 Epithelial Cells/LPF      Few WBC seen      Rare Gram Positive Rods     Comment: Corrected result: Previously reported as Rare Branching Gram positive rods on 6/19/2025 at 1225 CDT.         Rare Gram positive cocci    Culture, Respiratory with Gram Stain [[id number]] Collected: 06/19/25 0734    Order Status: Completed Specimen: Respiratory from Sputum, Expectorated Updated: 06/19/25 0844     Respiratory Culture Specimen inadequate - culture not performed     GRAM STAIN (RESPIRATORY) >10epis/lfp and <than many WBC's      Predominance of oropharyngeal federico. Please recollect "    MRSA Screen by PCR [[id number]]  (Normal) Collected: 06/18/25 1948    Order Status: Completed Specimen: Nasal Swab Updated: 06/18/25 2115     MRSA PCR SCRN ([facility]) Not Detected    Respiratory Infection Panel (PCR), Nasopharyngeal [[id number]] Collected: 06/18/25 1027    Order Status: Completed Specimen: Nasopharyngeal Swab Updated: 06/18/25 1209     Respiratory Infection Panel Source Nasopharyngeal Swab     Adenovirus Not Detected     Coronavirus 229E, Common Cold Virus Not Detected     Coronavirus HKU1, Common Cold Virus Not Detected     Coronavirus NL63, Common Cold Virus Not Detected     Coronavirus OC43, Common Cold Virus Not Detected     SARS-CoV2 (COVID-19) Qualitative PCR Not Detected     Human Metapneumovirus Not Detected     Human Rhinovirus/Enterovirus Not Detected     Influenza A (subtypes H1, H1-2009,H3) Not Detected     Influenza B Not Detected     Parainfluenza Virus 1 Not Detected     Parainfluenza Virus 2 Not Detected     Parainfluenza Virus 3 Not Detected     Parainfluenza Virus 4 Not Detected     Respiratory Syncytial Virus Not Detected     Bordetella Parapertussis ([id number]) Not Detected     Bordetella pertussis (ptxP) Not Detected     Chlamydia pneumoniae Not Detected     Mycoplasma pneumoniae Not Detected

## 2025-06-22 NOTE — PROGRESS NOTES
[facility] Medicine  Progress Note    Patient Name: Rick Butler  MRN: [id number]  Patient Class: IP- Inpatient   Admission Date: 6/18/2025  Length of Stay: 4 days  Attending Physician: Breonna Krishnan DO  Primary Care Provider: Reynaldo Basilio FNP-C        Subjective     Principal Problem:Severe sepsis        HPI:  65 year old pt getting admitted with Sepsis/Severe pneumonia/Neutropenic fever  Pt was suffering from URTI like infection for past several days  Later he suffered from severe SOB/cough  Today symptoms went worse, came to ER found to be hypoxic and got admitted     Overview/Hospital Course:  65-year-old male with myelodysplastic syndrome admitted with severe sepsis/pneumonia/neutropenic fever. Continue antibiotics and ID followed.  Later patient developed elevated troponin for demand ischemia and developed AFib with RVR and placed on IV amiodarone and transitioned to oral amiodarone.  For symptomatic anemia, s/p 2 units of packed red blood cells .For MDS, Give plts if less than 20 or bleeding.Transfuse prbc if hgb less that 7 and Heme/oncology followed.     Interval History:  Patient said he feels okay.  For daily updates refer to hospital course    Review of Systems   All other systems reviewed and are negative.    Objective:     Vital Signs (Most Recent):  Temp: 98 °F (36.7 °C) (06/22/25 0705)  Pulse: 91 (06/22/25 1300)  Resp: (!) 30 (06/22/25 1300)  BP: 133/73 (06/22/25 0830)  SpO2: 96 % (06/22/25 1300) Vital Signs (24h Range):  Temp:  [98 °F (36.7 °C)-98.4 °F (36.9 °C)] 98 °F (36.7 °C)  Pulse:  [] 91  Resp:  [15-50] 30  SpO2:  [91 %-100 %] 96 %  BP: (133-162)/(64-94) 133/73     Weight: 93.7 kg (206 lb 9.1 oz)  Body mass index is 30.51 kg/m².    Intake/Output Summary (Last 24 hours) at 6/22/2025 1312  Last data filed at 6/22/2025 1036  Gross per 24 hour   Intake --   Output 1675 ml   Net -1675 ml         Physical Exam  Cardiovascular:      Rate and Rhythm: Normal  "rate.      Heart sounds: Murmur heard.   Abdominal:      General: There is no distension.   Neurological:      Mental Status: He is alert and oriented to person, place, and time.               Significant Labs: All pertinent labs within the past 24 hours have been reviewed.    Significant Imaging: I have reviewed all pertinent imaging results/findings within the past 24 hours.      Assessment & Plan  Severe sepsis  This patient does have evidence of infective focus  My overall impression is sepsis with Acute kidney injury, Acute respiratory failure, and Thrombocytopenia .  Source: Respiratory Infection  Antibiotics given-   Antibiotics (72h ago, onward)      Start     Stop Route Frequency Ordered    06/22/25 1145  cefUROXime tablet 500 mg        Note to Pharmacy: Ht: 5' 9" (1.753 m)  Wt: 93.7 kg (206 lb 9.1 oz)  Estimated Creatinine Clearance: 25.2 mL/min (A) (based on SCr of 3.3 mg/dL (H)).   Body mass index is 30.51 kg/m².    06/27/25 0859 Oral Daily 06/22/25 1032    06/18/25 2100  mupirocin 2 % ointment         06/23/25 2059 Nasl 2 times daily 06/18/25 1747    06/18/25 2100  doxycycline capsule 100 mg         -- Oral Every 12 hours 06/18/25 1752          Latest lactate reviewed-  No results for input(s): "LACTATE", "POCLAC" in the last 72 hours.    Acute pneumonia  Maintain iv abx as below  De escalate as needed     Antibiotics (From admission, onward)      Start     Stop Route Frequency Ordered    06/22/25 1145  cefUROXime tablet 500 mg        Note to Pharmacy: Ht: 5' 9" (1.753 m)  Wt: 93.7 kg (206 lb 9.1 oz)  Estimated Creatinine Clearance: 25.2 mL/min (A) (based on SCr of 3.3 mg/dL (H)).   Body mass index is 30.51 kg/m².    06/27/25 0859 Oral Daily 06/22/25 1032    06/18/25 2100  mupirocin 2 % ointment         06/23/25 2059 Nasl 2 times daily 06/18/25 1747 06/18/25 2100  doxycycline capsule 100 mg         -- Oral Every 12 hours 06/18/25 1752            Microbiology Results (last 7 days)       Procedure " Component Value Units Date/Time    Blood culture x two cultures. Draw prior to antibiotics. [[id number]]  (Normal) Collected: 06/18/25 0930    Order Status: Completed Specimen: Blood Updated: 06/21/25 1002     CULTURE, BLOOD ([facility]) No Growth After 72 Hours    Blood culture x two cultures. Draw prior to antibiotics. [[id number]]  (Normal) Collected: 06/18/25 0932    Order Status: Completed Specimen: Blood Updated: 06/21/25 1002     CULTURE, BLOOD ([facility]) No Growth After 72 Hours    Culture, Respiratory with Gram Stain [[id number]] Collected: 06/19/25 0950    Order Status: Completed Specimen: Respiratory Updated: 06/21/25 0701     Respiratory Culture Normal respiratory federico     GRAM STAIN (RESPIRATORY) >10 Epithelial Cells/LPF      Few WBC seen      Rare Gram Positive Rods     Comment: Corrected result: Previously reported as Rare Branching Gram positive rods on 6/19/2025 at 1225 CDT.         Rare Gram positive cocci    Culture, Respiratory with Gram Stain [[id number]] Collected: 06/19/25 0734    Order Status: Completed Specimen: Respiratory from Sputum, Expectorated Updated: 06/19/25 0844     Respiratory Culture Specimen inadequate - culture not performed     GRAM STAIN (RESPIRATORY) >10epis/lfp and <than many WBC's      Predominance of oropharyngeal federico. Please recollect    MRSA Screen by PCR [[id number]]  (Normal) Collected: 06/18/25 1948    Order Status: Completed Specimen: Nasal Swab Updated: 06/18/25 2115     MRSA PCR SCRN ([facility]) Not Detected    Respiratory Infection Panel (PCR), Nasopharyngeal [[id number]] Collected: 06/18/25 1027    Order Status: Completed Specimen: Nasopharyngeal Swab Updated: 06/18/25 1209     Respiratory Infection Panel Source Nasopharyngeal Swab     Adenovirus Not Detected     Coronavirus 229E, Common Cold Virus Not Detected     Coronavirus HKU1, Common Cold Virus Not Detected     Coronavirus NL63, Common Cold Virus Not Detected     Coronavirus OC43, Common Cold Virus Not Detected      SARS-CoV2 (COVID-19) Qualitative PCR Not Detected     Human Metapneumovirus Not Detected     Human Rhinovirus/Enterovirus Not Detected     Influenza A (subtypes H1, H1-2009,H3) Not Detected     Influenza B Not Detected     Parainfluenza Virus 1 Not Detected     Parainfluenza Virus 2 Not Detected     Parainfluenza Virus 3 Not Detected     Parainfluenza Virus 4 Not Detected     Respiratory Syncytial Virus Not Detected     Bordetella Parapertussis ([id number]) Not Detected     Bordetella pertussis (ptxP) Not Detected     Chlamydia pneumoniae Not Detected     Mycoplasma pneumoniae Not Detected          Sickle cell trait syndrome  Aware     RARS (refractory anemia with ringed sideroblasts)  Aware   Patient received 1 unit of PRBC on 6/18/25.  Patient agreeable to receive another unit of packed red blood cell today.    Recent Labs     06/20/25  1835 06/21/25  0302 06/22/25  0315   HGB 8.8* 8.2* 7.8*   HCT 26.9* 25.4* 24.0*       MDS (myelodysplastic syndrome)  Aware   Give plts if less than 20 or bleeding.Transfuse prbc if hgb less that 7 and Heme/oncology followed.   Anemia, chronic renal failure, stage 3 (moderate)  Aware   Also has MDS  Neutropenic fever  Maintain Rx as ordered  Isolation precautions    Acute on chronic anemia  pRBC ordered   Give plts if less than 20 or bleeding.Transfuse prbc if hgb less that 7 and Heme/oncology followed.   Thrombocytopenia  Continue to monitor  Heme oncology is following  Elevated troponin  Trended down likely demand  Cardiology is following  Atrial fibrillation with rapid ventricular response   IV amiodarone transitioned to oral on 06/21  Anticoagulation per Cardiology team recommendation  TORRES (acute kidney injury)  TORRES is likely due to multifactorial.Most recent creatinine and eGFR are listed below.  Recent Labs     06/20/25  0230 06/21/25  0302 06/22/25  0315   CREATININE 4.0* 3.6* 3.3*   EGFRNORACEVR 16* 18* 20*      Plan  - TORRES is improving  - Avoid nephrotoxins and renally dose  meds for GFR listed above  - Monitor urine output, serial BMP, and adjust therapy as needed  - nephrology is following  VTE Risk Mitigation (From admission, onward)           Ordered     enoxaparin injection 90 mg  Every 24 hours         06/20/25 1321     IP VTE HIGH RISK PATIENT  Once         06/18/25 1145     Place sequential compression device  Until discontinued         06/18/25 1145                    Discharge Planning   ELMO: 6/23/2025     Code Status: Full Code   Medical Readiness for Discharge Date: 6/20/2025  Discharge Plan A: Home with family              Critical care time spent on the evaluation and treatment of severe organ dysfunction, review of pertinent labs and imaging studies, discussions with consulting providers and discussions with patient/family: 32 minutes.    Please place Justification for DME      Breonna Krishnan DO  Department of Hospital Medicine   [facility]

## 2025-06-22 NOTE — PROGRESS NOTES
Pharmacist Renal Adjustment Note    Rick Butler is a 65 y.o. male being treated with Cefuroxime.     Patient Data:    Vital Signs (Most Recent):  Temp: 98 °F (36.7 °C) (06/22/25 0705)  Pulse: 96 (06/22/25 0905)  Resp: (!) 28 (06/22/25 0905)  BP: 133/73 (06/22/25 0830)  SpO2: (!) 91 % (06/22/25 0905) Vital Signs (72h Range):  Temp:  [97.2 °F (36.2 °C)-98.9 °F (37.2 °C)]   Pulse:  []   Resp:  [15-50]   BP: ()/(52-94)   SpO2:  [79 %-100 %]      Recent Labs   Lab 06/20/25  0230 06/21/25  0302 06/22/25  0315   CREATININE 4.0* 3.6* 3.3*     Serum creatinine: 3.3 mg/dL (H) 06/22/25 0315  Estimated creatinine clearance: 25.2 mL/min (A)    Cefuroxime 500 mg every 12 hours will be changed to Cefuroxime 500 mg every 24 hours due to CrCl 10-30 mL/min per Renal Dose Adjustment protocol.     Pharmacist's Name: Heidi Escobar  Pharmacist's Extension: [phone]

## 2025-06-22 NOTE — CARE UPDATE
06/21/25 1908   Patient Assessment/Suction   Level of Consciousness (AVPU) alert   Respiratory Effort Unlabored;Normal   Expansion/Accessory Muscles/Retractions no use of accessory muscles;expansion symmetric   All Lung Fields Breath Sounds clear;diminished;coarse   MAYELIN Breath Sounds clear   LLL Breath Sounds clear   RUL Breath Sounds diminished;coarse   RML Breath Sounds coarse;diminished   RLL Breath Sounds coarse;diminished   Rhythm/Pattern, Respiratory unlabored;pattern regular;depth regular   Cough Frequency frequent   Cough Type productive;good   Skin Integrity   $ Wound Care Tech Time 15 min   Area Observed Left;Right;Behind ear;Cheek;Nares   Skin Appearance without discoloration   PRE-TX-O2   Device (Oxygen Therapy) nasal cannula   $ Is the patient on Low Flow Oxygen? Yes   Flow (L/min) (Oxygen Therapy) 4   SpO2 98 %   Pulse Oximetry Type Continuous   $ Pulse Oximetry - Multiple Charge Pulse Oximetry - Multiple   Pulse 90   Resp 20   Aerosol Therapy   $ Aerosol Therapy Charges Aerosol Treatment   Daily Review of Necessity (SVN) completed   Respiratory Treatment Status (SVN) given   Treatment Route (SVN) mask;oxygen   Patient Position HOB elevated;sitting in chair   Post Treatment Assessment (SVN) increased aeration   Signs of Intolerance (SVN) none   Incentive Spirometer   $ Incentive Spirometer Charges done with encouragement   Administration (IS) proper technique demonstrated;instruction provided, follow-up   Number of Repetitions (IS) 10   Level Incentive Spirometer (mL) 1500   Patient Tolerance (IS) good;no adverse signs/symptoms present   Vibratory PEP Therapy   $ Vibratory PEP Charges Aerobika Therapy   $ Vibratory PEP Equipment Aerobika Equipment   $ Vibratory PEP Tech Time Charges 15 min   Daily Review of Necessity (PEP Therapy) completed   Type (PEP Therapy) vibratory/oscillatory   Device (PEP Therapy) flutter   Route (PEP Therapy) mouthpiece   Breaths per Cycle (PEP Therapy) 10   Cycles (PEP  Therapy) 1   Settings (PEP Therapy) PEP 3   Patient Position (PEP Therapy) sitting in chair   Post Treatment Assessment (PEP) increased aeration;breath sounds improved   Signs of Intolerance (PEP Therapy) none   Education   $ Education 15 min;Bronchodilator;Oxygen;Incentive Spirometry;PEP Therapy   Respiratory Evaluation   $ Care Plan Tech Time 15 min

## 2025-06-22 NOTE — PROGRESS NOTES
"[facility]   Department of Infectious Disease  Progress Note        PATIENT NAME: Rick Butler  MRN: [id number]  TODAY'S DATE: 06/22/2025  ADMIT DATE: 6/18/2025  LOS: 4 days    CHIEF COMPLAINT: Shortness of Breath (X 1 day. ), Fatigue, and Cough (Onset lastnite)      PRINCIPLE PROBLEM: Severe sepsis    INTERVAL HISTORY      06/19/2025:  he was seen and evaluated at bedside.  States he is improving.  Still with pleuritic right chest pain.  Heart rate and fever curve improving.  Sputum was not a good sample.  Blood cultures so far negative.      06/20/2025:  Continues to improve.  Blood cultures negative.  Repeat sputum culture was once again not a good sample.  Appetite improving.    06/21/2025:  Seen and evaluated at bedside.  Continues to improve.  No new acute issues.  Blood cultures remain negative.  Was unable to produce a good sputum sample.    06/22/2024:  Chest x-ray today about the same.  No new acute issues.  Continues to improve.  Pending transfer out of ICU to medical floor.  Continues to clinically improve.  Appetite better.    Antibiotics (From admission, onward)      Start     Stop Route Frequency Ordered    06/22/25 1145  cefUROXime tablet 500 mg        Note to Pharmacy: Ht: 5' 9" (1.753 m)  Wt: 93.7 kg (206 lb 9.1 oz)  Estimated Creatinine Clearance: 25.2 mL/min (A) (based on SCr of 3.3 mg/dL (H)).   Body mass index is 30.51 kg/m².    06/27/25 0859 Oral Daily 06/22/25 1032    06/18/25 2100  mupirocin 2 % ointment         06/23/25 2059 Nasl 2 times daily 06/18/25 1747    06/18/25 2100  doxycycline capsule 100 mg         -- Oral Every 12 hours 06/18/25 1752          Antifungals (From admission, onward)      None           Antivirals (From admission, onward)      None            ASSESSMENT and PLAN      Right lower lobe pneumonia in a patient with MDS on Rytelo.  Respiratory panel negative.  Continue ceftriaxone and doxycycline.  Maybe able to complete with oral antibiotic at discharge.  " Treat for 8 days.     Pancytopenia secondary to MDS.     MDS.  On Rytelo      RECOMMENDATIONS:    Continue doxycycline and ceftriaxone  Anticipate discharge home in the next 1-2 days on either oral doxycycline +Augmentin or levofloxacin monotherapy.  Follow strep pneumoniae antigen  Continue other symptomatic care    Please send Epic secure chat with any questions.  Discussed with his wife at bedside      SUBJECTIVE    Rick Butler is a 65 y.o. male with history of MDS/RA RS and sickle cell trait.  He is on Rytelo for about 2 months he requires.  A blood and platelet transfusions.  Presents to the emergency room 06/18/2025 with fever cough and generalized weakness and fatigue of 1 day duration.  In the ED /59, pulse 132, respiratory rate 25, temperature 103°, oxygen saturation 92%.     Sodium 136, creatinine 4.6, AST 20, ALT 19, troponin 50, .  WBC 1, hematocrit 23, MCV 86, platelet count 62 with 13% bands.  Acute respiratory panel negative x-ray showed right middle and lower lobe infiltrate he received IV fluid and was placed on antibiotics he has been evaluated by oncologist and pulmonologist ID asked to assist with his care.     No recent travel other than a cruise to [place] in April 2025.  No sick contacts.  He is employed and works as a prep shift.  Was actually working up until 06/17/2025.        Antibiotic history:    Vancomycin 6/18/25-  Cefepime: 06/08/2025 x1 dose   Azithromycin: 06/18/2025   Meropenem: 06/18/2025     Microbiology:    Respiratory panel 06/18/2025: Negative   Blood culture 06/18/2025:  NGTD    Review of Systems  Negative except as stated above in Interval History     OBJECTIVE   Temp:  [98 °F (36.7 °C)-98.4 °F (36.9 °C)] 98.3 °F (36.8 °C)  Pulse:  [] 90  Resp:  [8-50] 33  SpO2:  [91 %-100 %] 96 %  BP: (133-162)/(64-94) 155/85  Temp:  [98 °F (36.7 °C)-98.4 °F (36.9 °C)]   Temp: 98.3 °F (36.8 °C) (06/22/25 1105)  Pulse: 90 (06/22/25 1315)  Resp: (!) 33 (06/22/25 6155)  BP:  (!) 155/85 (06/22/25 1200)  SpO2: 96 % (06/22/25 1315)    Intake/Output Summary (Last 24 hours) at 6/22/2025 1354  Last data filed at 6/22/2025 1036  Gross per 24 hour   Intake --   Output 1675 ml   Net -1675 ml       Physical Exam  General:  Elderly man who is acutely ill looking.   HEENT:  Pale.  No oral thrush, no cervical adenopathy.  Upper and lower dentures.  CVS: S1 and 2 heard, no murmurs appreciated   Respiratory:  Coarse breath sounds right lung  Abdomen: Full, soft, nontender, no palpable organomegaly   Skin: No rash appreciated   CNS: No focal deficits   Musculoskeletal: No joint or bony abnormalities appreciated  Psych: Good mood, normal affect.     VAD:  ISOLATION: Airborne and Contact and Enhanced Respiratory      Wounds:  None    Significant Labs: All pertinent labs within the past 24 hours have been reviewed.    CBC LAST 7 DAYS  Recent Labs   Lab 06/18/25  0843 06/19/25  0301 06/19/25  0951 06/19/25  1720 06/20/25  0230 06/20/25  1835 06/21/25  0302 06/22/25  0315   WBC 1.02* 2.38* 2.85* 4.57 5.73  --  8.67 6.42   RBC 2.65* 2.40* 2.44* 2.78* 2.58*  --  3.00* 2.82*   HGB 7.3* 6.7* 6.8* 7.6* 7.1* 8.8* 8.2* 7.8*   HCT 22.9* 20.4* 20.7* 23.6* 21.6* 26.9* 25.4* 24.0*   MCV 86 85 85 85 84  --  85 85   MCH 27.5 27.9 27.9 27.3 27.5  --  27.3 27.7   MCHC 31.9* 32.8 32.9 32.2 32.9  --  32.3 32.5   RDW 17.7* 17.2* 17.8* 17.6* 17.9*  --  17.3* 17.3*   PLT 62* 47* 46* 47* 40*  --  42* 33*   MPV 9.6 10.3  --   --   --   --  12.3  --    NRBC 0 0  --  0 0  --  0 0       CHEMISTRY LAST 7 DAYS  Recent Labs   Lab 06/18/25  0843 06/19/25 0301 06/19/25 2026 06/20/25 0230 06/21/25 0302 06/22/25 0315    138 139 140 144 142   K 4.0 4.9 4.1 4.2 4.0 3.9    108 112* 112* 116* 115*   CO2 21* 21* 19* 20* 19* 18*   ANIONGAP 12 9 8 8 9 9   BUN 55* 54* 57* 54* 50* 45*   CREATININE 4.6* 4.2* 4.1* 4.0* 3.6* 3.3*   GLU 95 92 111* 110 96 98   CALCIUM 8.9 8.1* 8.3* 8.5* 8.7 8.9   MG 1.4* 2.0 2.2 2.2 2.0 1.8   ALBUMIN  "4.0 3.3*  --  3.0* 3.1* 3.0*   PROT 7.5 6.3  --  6.7 6.8 6.3   ALKPHOS 45* 35*  --  46* 61 61   ALT 19 17  --  19 22 20   AST 23 25  --  23 27 23   BILITOT 0.6 1.1*  --  0.5 0.5 0.5       Estimated Creatinine Clearance: 25.2 mL/min (A) (based on SCr of 3.3 mg/dL (H)).    INFLAMMATORY/PROCAL  LAST 7 DAYS  No results for input(s): "PROCAL", "ESR", "CRP" in the last 168 hours.  No results found for: "ESR"  CRP   Date Value Ref Range Status   12/04/2024 0.40 <1.00 mg/dL Final     Comment:     CRP-Normal Application expected values:   <1.0        mg/dL   Normal Range  1.0 - 5.0  mg/dL   Indicates mild inflammation  5.0 - 10.0 mg/dL   Indicates severe inflammation  >10.0        mg/dL   Represents serious processes and   frequently         indicates the presence of a bacterial   infection.          PRIOR  MICROBIOLOGY:    No results found for the last 90 days.      LAST 7 DAYS MICROBIOLOGY   Microbiology Results (last 7 days)       Procedure Component Value Units Date/Time    Blood culture x two cultures. Draw prior to antibiotics. [[id number]]  (Normal) Collected: 06/18/25 0930    Order Status: Completed Specimen: Blood Updated: 06/21/25 1002     CULTURE, BLOOD ([facility]) No Growth After 72 Hours    Blood culture x two cultures. Draw prior to antibiotics. [[id number]]  (Normal) Collected: 06/18/25 0932    Order Status: Completed Specimen: Blood Updated: 06/21/25 1002     CULTURE, BLOOD ([facility]) No Growth After 72 Hours    Culture, Respiratory with Gram Stain [[id number]] Collected: 06/19/25 0950    Order Status: Completed Specimen: Respiratory Updated: 06/21/25 0701     Respiratory Culture Normal respiratory federico     GRAM STAIN (RESPIRATORY) >10 Epithelial Cells/LPF      Few WBC seen      Rare Gram Positive Rods     Comment: Corrected result: Previously reported as Rare Branching Gram positive rods on 6/19/2025 at 1225 CDT.         Rare Gram positive cocci    Culture, Respiratory with Gram Stain [[id number]] Collected: 06/19/25 " 0734    Order Status: Completed Specimen: Respiratory from Sputum, Expectorated Updated: 06/19/25 0844     Respiratory Culture Specimen inadequate - culture not performed     GRAM STAIN (RESPIRATORY) >10epis/lfp and <than many WBC's      Predominance of oropharyngeal federico. Please recollect    MRSA Screen by PCR [[id number]]  (Normal) Collected: 06/18/25 1948    Order Status: Completed Specimen: Nasal Swab Updated: 06/18/25 2115     MRSA PCR SCRN ([facility]) Not Detected    Respiratory Infection Panel (PCR), Nasopharyngeal [[id number]] Collected: 06/18/25 1027    Order Status: Completed Specimen: Nasopharyngeal Swab Updated: 06/18/25 1209     Respiratory Infection Panel Source Nasopharyngeal Swab     Adenovirus Not Detected     Coronavirus 229E, Common Cold Virus Not Detected     Coronavirus HKU1, Common Cold Virus Not Detected     Coronavirus NL63, Common Cold Virus Not Detected     Coronavirus OC43, Common Cold Virus Not Detected     SARS-CoV2 (COVID-19) Qualitative PCR Not Detected     Human Metapneumovirus Not Detected     Human Rhinovirus/Enterovirus Not Detected     Influenza A (subtypes H1, H1-2009,H3) Not Detected     Influenza B Not Detected     Parainfluenza Virus 1 Not Detected     Parainfluenza Virus 2 Not Detected     Parainfluenza Virus 3 Not Detected     Parainfluenza Virus 4 Not Detected     Respiratory Syncytial Virus Not Detected     Bordetella Parapertussis (QS6989) Not Detected     Bordetella pertussis (ptxP) Not Detected     Chlamydia pneumoniae Not Detected     Mycoplasma pneumoniae Not Detected              CURRENT/PREVIOUS VISIT EKG  Results for orders placed or performed during the hospital encounter of 06/18/25   EKG 12-lead    Collection Time: 06/20/25  1:14 PM   Result Value Ref Range    QRS Duration 94 ms    [facility] QTC Calculation 484 ms    Narrative    Test Reason : I49.9,    Vent. Rate :  93 BPM     Atrial Rate :  93 BPM     P-R Int : 170 ms          QRS Dur :  94 ms      QT Int : 390 ms        P-R-T Axes :  56   6  48 degrees    QTcB Int : 484 ms    Normal sinus rhythm  Nonspecific T wave abnormality  Prolonged QT  Abnormal ECG  When compared with ECG of 19-Jun-2025 18:19,  Sinus rhythm has replaced Atrial fibrillation  Vent. rate has decreased by  56 bpm  Confirmed by Fam Benoit ([id number]) on 6/21/2025 2:14:31 PM    Referred By: AAAREFERRAL SELF           Confirmed By: Fam Benoit     Significant Imaging: I have reviewed all relevant and available imaging results/findings within the past 24 hours.    I spent a total of 50 minutes on the day of the visit.This includes face to face time and non-face to face time preparing to see the patient (eg, review of tests), obtaining and/or reviewing separately obtained history, documenting clinical information in the electronic or other health record, independently interpreting results and communicating results to the patient/family/caregiver, or care coordinator.    Aris Cervantes MD  Date of Service: 06/22/2025      This note was created using M Modal voice recognition software that occasionally misinterpreted phrases or words.

## 2025-06-22 NOTE — PROGRESS NOTES
Pulmonary/Critical Care  Progress Note      PATIENT NAME: Rick Butler  MRN: [id number]  TODAY'S DATE: 2025  1:55 PM  ADMIT DATE: 2025  AGE: 65 y.o. : 1960    CONSULT REQUESTED BY: Breonna Krishnan DO    REASON FOR CONSULT:   Pneumonia    HPI:  Mr. Butler is a 65 year old man with prior dx of MDS/RARS and sickle cell trait, who presents with pneumonia.     He reports his symptoms started suddenly yesterday he was feeling fatigued, febrile, chills, and short of breath.     He reports he is on chemotherapy for his MDS. He reports no productive cough.  Febrile to 103 and elevated HR to 132    2025 - Stable overnight, no new issues reported and feels better.  Still with AF/RVR (cardiology following).  WBC has recovered.  Hgb and platelets still low.      2025 - Stable overnight and is feeling better overall.  He says he has been having issues with decreased appetite as outpt and has been on marinol (insurance not covering) and cyproheptadine (not much change ).  Labs reviewed, renal function better.  R has been good since about 1 PM yesterday    2025 - Stable overnight, feels good and no new complaints. Not moved out of ICU because of bed issues.   On 3 LPM.  Renal function is better.  CXR is about the same.          Review of Systems   Constitutional:  Positive for chills and fever. Negative for appetite change and unexpected weight change.   HENT:  Negative for nosebleeds, postnasal drip, trouble swallowing and voice change.    Eyes:  Negative for visual disturbance.   Respiratory:  Positive for shortness of breath. Negative for cough and wheezing.    Cardiovascular:  Negative for chest pain and leg swelling.   Gastrointestinal:  Negative for diarrhea, nausea and vomiting.   Endocrine: Negative for cold intolerance and heat intolerance.   Genitourinary:  Negative for dysuria, flank pain and frequency.   Musculoskeletal:  Negative for neck pain and neck stiffness.   Allergic/Immunologic:  Negative for environmental allergies and immunocompromised state.   Neurological:  Negative for syncope, speech difficulty and weakness.   Hematological:  Negative for adenopathy.   Psychiatric/Behavioral:  Negative for confusion.            ALLERGIES  Review of patient's allergies indicates:  No Known Allergies    INPATIENT SCHEDULED MEDICATIONS   amiodarone  400 mg Oral TID    Followed by    [START ON 6/26/2025] amiodarone  400 mg Oral BID    Followed by    [START ON 7/1/2025] amiodarone  400 mg Oral Daily    [START ON 6/24/2025] calcitRIOL  0.25 mcg Oral Q7 Days    cefTRIAXone (Rocephin) IV (PEDS and ADULTS)  2 g Intravenous Q24H    dextromethorphan-guaiFENesin  mg  1 tablet Oral BID    doxycycline  100 mg Oral Q12H    enoxparin  1 mg/kg Subcutaneous Q24H (treatment, non-standard time)    folic acid  2,000 mcg Oral Daily    levalbuterol  1.25 mg Nebulization Q6H WAKE    morphine  2 mg Intravenous Once    mupirocin   Nasal BID    pantoprazole  40 mg Oral Daily    tamsulosin  0.4 mg Oral Daily    traZODone  100 mg Oral QHS           MEDICAL AND SURGICAL HISTORY  Past Medical History:   Diagnosis Date    Abnormal chest CT (new) 08/17/2022    Anemia due to multiple mechanisms 01/13/2019    Anemia, chronic renal failure, stage 2 (mild) 01/13/2019    Anemia, chronic renal failure, stage 3 (moderate) 08/02/2023    Depression     Former smoker 06/29/2017    H/O ETOH abuse 06/29/2017    Lung mass (new) 08/17/2022    Monocytosis 2019    Myelodysplasia (myelodysplastic syndrome)     Myelodysplasia (myelodysplastic syndrome)     Personal history of colonic polyps 12/29/2023    RARS (refractory anemia with ringed sideroblasts) 06/01/2020    Sickle cell trait      Past Surgical History:   Procedure Laterality Date    BONE MARROW BIOPSY N/A 12/20/2019    Procedure: BIOPSY, BONE MARROW;  [facility];  Location: [facility] OR;  Service: Interventional Radiology;  Laterality: N/A;    COLONOSCOPY N/A  11/05/2021    Procedure: COLONOSCOPY;  Surgeon: Rob Collins MD;  Location: [facility];  Service: Endoscopy;  Laterality: N/A;    COLONOSCOPY  12/29/2023    5 YR RECALL    ESOPHAGOGASTRODUODENOSCOPY N/A 11/05/2021    Procedure: EGD (ESOPHAGOGASTRODUODENOSCOPY);  Surgeon: Rob Collins MD;  Location: [facility] ENDO;  Service: Endoscopy;  Laterality: N/A;    INJECTION OF ANESTHETIC AGENT AROUND MEDIAL BRANCH NERVES INNERVATING LUMBAR FACET JOINT Bilateral 05/25/2018    Procedure: BLOCK-NERVE-MEDIAL BRANCH-LUMBAR;  Surgeon: Nura Heredia MD;  Location: [facility];  Service: Pain Management;  Laterality: Bilateral;  L3, 4, 5    KNEE ARTHROSCOPY W/ MENISCECTOMY Right 12/22/2021    Procedure: ARTHROSCOPY, KNEE, WITH MENISCECTOMY;  Surgeon: Sebastian Green MD;  Location: [facility] OR;  Service: Orthopedics;  Laterality: Right;  partial medial meniscectomy    RADIOFREQUENCY ABLATION OF LUMBAR MEDIAL BRANCH NERVE AT SINGLE LEVEL Bilateral 07/20/2018    Procedure: RADIOFREQUENCY ABLATION, NERVE, MEDIAL BRANCH, LUMBAR, 1 LEVEL;  Surgeon: Nura Heredia MD;  Location: [facility];  Service: Pain Management;  Laterality: Bilateral;  L3, 4, 5 - Burned at 80 degrees C.  for 75 seconds x 2 each site    SMALL BOWEL ENTEROSCOPY N/A 10/16/2019    Procedure: ENTEROSCOPY;  Surgeon: Rob Collins MD;  Location: [facility];  Service: Endoscopy;  Laterality: N/A;       ALCOHOL, TOBACCO AND DRUG USE  Tobacco Use History[1]  Social History     Substance and Sexual Activity   Alcohol Use Not Currently    Alcohol/week: 21.0 standard drinks of alcohol    Types: 21 Cans of beer per week    Comment: Sober 5 years     Social History     Substance and Sexual Activity   Drug Use Not Currently    Types: Marijuana       FAMILY HISTORY  Family History   Problem Relation Name Age of Onset    Arthritis Mother      Depression Mother      Heart disease Mother      Hypertension Mother      Heart attack Father  59       VITAL SIGNS (MOST RECENT)  Temp: 98 °F (36.7 °C)  "(06/22/25 0705)  Pulse: 96 (06/22/25 0905)  Resp: (!) 28 (06/22/25 0905)  BP: 133/73 (06/22/25 0830)  SpO2: (!) 91 % (06/22/25 0905)    INTAKE AND OUTPUT (LAST 24 HOURS):  Intake/Output Summary (Last 24 hours) at 6/22/2025 1022  Last data filed at 6/22/2025 0545  Gross per 24 hour   Intake --   Output 1475 ml   Net -1475 ml       WEIGHT  Wt Readings from Last 1 Encounters:   06/18/25 93.7 kg (206 lb 9.1 oz)       Physical Exam  Vitals reviewed.   Constitutional:       General: He is not in acute distress.     Appearance: He is well-developed. He is ill-appearing. He is not diaphoretic.   HENT:      Head: Normocephalic and atraumatic.      Mouth/Throat:      Pharynx: No oropharyngeal exudate or posterior oropharyngeal erythema.   Eyes:      General: No scleral icterus.     Pupils: Pupils are equal, round, and reactive to light.   Neck:      Vascular: No JVD.   Cardiovascular:      Rate and Rhythm: Regular rhythm. Tachycardia present.      Heart sounds: Normal heart sounds. No murmur heard.  Pulmonary:      Effort: Pulmonary effort is normal. No respiratory distress.      Breath sounds: Wheezing and rales present.   Abdominal:      General: Bowel sounds are normal. There is no distension.      Palpations: Abdomen is soft.      Tenderness: There is no abdominal tenderness.   Musculoskeletal:         General: No swelling.      Cervical back: Normal range of motion and neck supple. No rigidity.   Skin:     General: Skin is warm and dry.      Capillary Refill: Capillary refill takes less than 2 seconds.      Coloration: Skin is pale.      Findings: No rash.   Neurological:      General: No focal deficit present.      Mental Status: He is alert and oriented to person, place, and time.      Cranial Nerves: No cranial nerve deficit.      Motor: No weakness or abnormal muscle tone.           ACUTE PHASE REACTANT (LAST 24 HOURS)  No results for input(s): "FERRITIN", "CRP", "LDH", "DDIMER" in the last 24 hours.    CBC LAST " "(LAST 24 HOURS)  Recent Labs   Lab 06/22/25  0315   WBC 6.42   RBC 2.82*   HGB 7.8*   HCT 24.0*   MCV 85   MCH 27.7   MCHC 32.5   RDW 17.3*   PLT 33*   NRBC 0       CHEMISTRY LAST (LAST 24 HOURS)  Recent Labs   Lab 06/22/25  0315      K 3.9   *   CO2 18*   ANIONGAP 9   BUN 45*   CREATININE 3.3*   GLU 98   CALCIUM 8.9   MG 1.8   ALBUMIN 3.0*   PROT 6.3   ALKPHOS 61   ALT 20   AST 23   BILITOT 0.5       COAGULATION LAST (LAST 24 HOURS)  No results for input(s): "LABPT", "INR", "APTT" in the last 24 hours.    CARDIAC PROFILE (LAST 24 HOURS)  Recent Labs   Lab 06/18/25  0843   *          LAST 7 DAYS MICROBIOLOGY   Microbiology Results (last 7 days)       Procedure Component Value Units Date/Time    Blood culture x two cultures. Draw prior to antibiotics. [[id number]]  (Normal) Collected: 06/18/25 0930    Order Status: Completed Specimen: Blood Updated: 06/21/25 1002     CULTURE, BLOOD ([facility]) No Growth After 72 Hours    Blood culture x two cultures. Draw prior to antibiotics. [[id number]]  (Normal) Collected: 06/18/25 0932    Order Status: Completed Specimen: Blood Updated: 06/21/25 1002     CULTURE, BLOOD ([facility]) No Growth After 72 Hours    Culture, Respiratory with Gram Stain [[id number]] Collected: 06/19/25 0950    Order Status: Completed Specimen: Respiratory Updated: 06/21/25 0701     Respiratory Culture Normal respiratory federico     GRAM STAIN (RESPIRATORY) >10 Epithelial Cells/LPF      Few WBC seen      Rare Gram Positive Rods     Comment: Corrected result: Previously reported as Rare Branching Gram positive rods on 6/19/2025 at 1225 CDT.         Rare Gram positive cocci    Culture, Respiratory with Gram Stain [[id number]] Collected: 06/19/25 0734    Order Status: Completed Specimen: Respiratory from Sputum, Expectorated Updated: 06/19/25 0844     Respiratory Culture Specimen inadequate - culture not performed     GRAM STAIN (RESPIRATORY) >10epis/lfp and <than many WBC's      Predominance of " oropharyngeal federico. Please recollect    MRSA Screen by PCR [[id number]]  (Normal) Collected: 06/18/25 1948    Order Status: Completed Specimen: Nasal Swab Updated: 06/18/25 2115     MRSA PCR SCRN ([facility]) Not Detected    Respiratory Infection Panel (PCR), Nasopharyngeal [[id number]] Collected: 06/18/25 1027    Order Status: Completed Specimen: Nasopharyngeal Swab Updated: 06/18/25 1209     Respiratory Infection Panel Source Nasopharyngeal Swab     Adenovirus Not Detected     Coronavirus 229E, Common Cold Virus Not Detected     Coronavirus HKU1, Common Cold Virus Not Detected     Coronavirus NL63, Common Cold Virus Not Detected     Coronavirus OC43, Common Cold Virus Not Detected     SARS-CoV2 (COVID-19) Qualitative PCR Not Detected     Human Metapneumovirus Not Detected     Human Rhinovirus/Enterovirus Not Detected     Influenza A (subtypes H1, H1-2009,H3) Not Detected     Influenza B Not Detected     Parainfluenza Virus 1 Not Detected     Parainfluenza Virus 2 Not Detected     Parainfluenza Virus 3 Not Detected     Parainfluenza Virus 4 Not Detected     Respiratory Syncytial Virus Not Detected     Bordetella Parapertussis (LB9695) Not Detected     Bordetella pertussis (ptxP) Not Detected     Chlamydia pneumoniae Not Detected     Mycoplasma pneumoniae Not Detected            MOST RECENT IMAGING  X-Ray Chest AP Portable  Narrative: EXAMINATION:  XR CHEST AP PORTABLE    CLINICAL HISTORY:  Follow up on right lower lobe pneumoniae;    FINDINGS:  Portable chest with comparison chest x-ray 06/19/2025.  Normal cardiomediastinal silhouette.Hazy ill-defined opacity of the right middle lobe is unchanged.  No new airspace opacities.  Pulmonary vasculature is normal. No acute osseous abnormality.  Impression: Right middle lobe airspace opacities unchanged.    Electronically signed by: Herber Aaron  Date:    06/22/2025  Time:    06:50      CURRENT VISIT EKG  Results for orders placed or performed during the hospital encounter of  "06/18/25   EKG 12-lead   Result Value Ref Range    QRS Duration 88 ms    [facility] QTC Calculation 441 ms    Narrative    Test Reason : R07.9,    Vent. Rate : 127 BPM     Atrial Rate : 127 BPM     P-R Int : 154 ms          QRS Dur :  88 ms      QT Int : 304 ms       P-R-T Axes :  58  54 -11 degrees    QTcB Int : 441 ms    Sinus tachycardia  LVH with repolarization abnormality ( Sokolow-Gomez )  Abnormal ECG  No previous ECGs available    Referred By: AAAREFERRAL SELF           Confirmed By:        ECHOCARDIOGRAM RESULTS  Results for orders placed during the hospital encounter of 10/06/23    Echo    Interpretation Summary    Left Ventricle: The left ventricle is normal in size. Moderately increased wall thickness. Normal wall motion. There is normal systolic function with a visually estimated ejection fraction of 55 - 60%.    Left Atrium: Left atrium is severely dilated.    Right Ventricle: Right ventricle was not well visualized due to poor acoustic window.    Aortic Valve: The aortic valve is a trileaflet valve. There is mild aortic regurgitation.    Mitral Valve: There is no stenosis. There is mild to moderate regurgitation.    Pulmonic Valve: There is mild regurgitation.    IVC/SVC: Normal venous pressure at 3 mmHg.        VENTILATOR INFORMATION              LAST ARTERIAL BLOOD GAS  ABG  No results for input(s): "PH", "PO2", "PCO2", "HCO3", "BE" in the last 168 hours.      ASSESSMENT:   Pneumonia  Sepsis  3.   Acute kidney injury    Suspected pneumonia for source of sepsis  May need bronchoscopic evaluation if clinical response to antibiotics is inadequate  PLAN:     - continue abx - changed to po  - follow up culture results  - increase activity as able  - TORRES better, renal following - looks like baseline Cr is about 3  - replace electrolytes as needed  - transfer out of ICU  - increase activity      Moses Gomez MD  [facility] Pulmonary/Critical Care  06/22/2025               [1]   Social History  Tobacco Use "   Smoking Status Former    Current packs/day: 0.00    Types: Cigarettes    Quit date: 1996    Years since quittin.4   Smokeless Tobacco Never

## 2025-06-22 NOTE — PT/OT/SLP PROGRESS
Physical Therapy Treatment    Patient Name:  Rick Butler   MRN:  [id number]    Recommendations:     Discharge Recommendations: Low Intensity Therapy  Discharge Equipment Recommendations:  (TBD)  Barriers to discharge: medical status    Assessment:     Rick Butler is a 65 y.o. male admitted with a medical diagnosis of Severe sepsis.  He presents with the following impairments/functional limitations: weakness, impaired endurance, impaired functional mobility, gait instability, impaired cardiopulmonary response to activity .    Rehab Prognosis: Good; patient would benefit from acute skilled PT services to address these deficits and reach maximum level of function.    Recent Surgery: * No surgery found *      Plan:     During this hospitalization, patient to be seen daily to address the identified rehab impairments via gait training, therapeutic activities, therapeutic exercises and progress toward the following goals:    Plan of Care Expires:  07/20/25    Subjective     Chief Complaint: MORA  Patient/Family Comments/goals: Return to work   Pain/Comfort:  Pain Rating 1: 0/10      Objective:     Communicated with nurse prior to session.  Patient found up in chair with oxygen, pulse ox (continuous), telemetry, blood pressure cuff upon PT entry to room.     General Precautions: Standard, fall, respiratory  Orthopedic Precautions:    Braces:    Respiratory Status: Nasal cannula, flow 3 L/min     Functional Mobility:  Transfers:     Sit to Stand:  contact guard assistance with rolling walker  Gait: 20 ft x2 ,40 ft RW  with 2 standing rests.  SA02 dropped briefly from 98% to 90%.       AM-PAC 6 CLICK MOBILITY          Treatment & Education:  Gait training with close monitoring of 02 sat    Patient left supine with all lines intact, call button in reach, and bed alarm on..    GOALS:   Multidisciplinary Problems       Physical Therapy Goals          Problem: Physical Therapy    Goal Priority Disciplines Outcome Interventions    Physical Therapy Goal     PT, PT/OT     Description: Goals to be met by: 2025     Patient will increase functional independence with mobility by performin. Supine to sit with Modified [place]  2. Sit to supine with Modified [place]  3. Sit to stand transfer with Modified [place]  4. Gait  x 300  feet with Modified [place] using Rolling Walker.                          DME Justifications:  No DME recommended requiring DME justifications    Time Tracking:     PT Received On: 25  PT Start Time: 1103     PT Stop Time: 1130  PT Total Time (min): 27 min     Billable Minutes: Gait Training 14 minutes  and Therapeutic Activity 13 minutes    Treatment Type: Treatment  PT/PTA: PT     Number of PTA visits since last PT visit: 0     2025

## 2025-06-22 NOTE — ASSESSMENT & PLAN NOTE
pRBC ordered   Give plts if less than 20 or bleeding.Transfuse prbc if hgb less that 7 and Heme/oncology followed.

## 2025-06-22 NOTE — ASSESSMENT & PLAN NOTE
Aware   Give plts if less than 20 or bleeding.Transfuse prbc if hgb less that 7 and Heme/oncology followed.

## 2025-06-22 NOTE — PROGRESS NOTES
INPATIENT NEPHROLOGY Progress Note   [facility]    Patient Name: Rick Butler  Date: 06/22/2025    Reason for consultation: TORRES    Chief Complaint:   Chief Complaint   Patient presents with    Shortness of Breath     X 1 day.     Fatigue    Cough     Onset lastnite       History of Present Illness:  65-year-old male with history of myelodysplastic syndrome, sickle cell trait, chronic kidney disease stage 3 f/b Dr. Mas, depression, former smoker, ETOH abuse, lumbar degenerative disc disease. Patient presents to the emergency department with complaint of shortness of breath and increasing fatigue over last 24 hours. Patient states had slightly productive cough as well during this time. Patient decided come to the emergency department for further evaluation. On arrival to ER found with temperature of 103°, heart rate of 132, SBP 90s, with room air sat of 91%. Diagnosed with RLL PNA. Consulted for TORRES.    Notable home meds- norvasc, calcitriol, flomax    Echo:    Left Ventricle: The left ventricle is normal in size. Normal wall thickness. Mild global hypokinesis present. There is low normal systolic function with a visually estimated ejection fraction of 50 - 55%. There is normal diastolic function.    Right Ventricle: The right ventricle is normal in size Systolic function is normal.    Left Atrium: The left atrium is moderately dilated    Mitral Valve: There is moderate regurgitation.    Tricuspid Valve: There is mild to moderate regurgitation. There is pulmonary hypertension. The estimated PA systolic pressure is at least 36 mmHg.    Interval History:  6/19- afebrile, SBP 100s, on 4L NC, UOP 1.2L, transfused 1u of blood yest, on levophed and NS IVFs  6/20- afebrile, elevated HR, SBP 120s, on 3-4L NC, coughing, UOP 1.6L, getting blood, off levophed, on amio gtt (went in to AF with RVR overnight), on NS IVFs, echo with TR/pulm HTN  6/21- afebrile, HR improved, BP stable, on 4L NC, UOP 1.8L-  transfused yest, on amio gtt  6/22- VSS, on 3L NC, UOP 2L + voids, renal imaging unrevealing    Plan of Care:    Assessment:  Septic shock due to HCAP in an IC patient  TORRES/CKD IV  Elevated BNP and troponin- EF 50-55%, moderate MR, moderate TR, pulm HTN  AF with RVR  Metabolic acidosis  SHPT  MDS on chemotherapy  BPH    Plan:    - on treatment for HCAP- blood cx and RVP negative- remains off pressors- dose meds for CrCl 20  - TORRES is due to septic shock- SCr is improving- nonoliguric- he has no acute RRT needs- nonoliguric- no nsaids or IV contrast  - trop improving- echo noted- monitor for s/s volume overload  - rate controlled- switched to PO amio, on lovenox   - acidosis is mild- component of dilution- hold off sod bicarb for now to minimize salt load- reassess tomorrow  - continue calcitriol  - H/H and platelets fluctuating- transfusion goals per heme/onc  - continue flomax    Thank you for allowing us to participate in this patient's care. We will continue to follow.    Vital Signs:  Temp Readings from Last 3 Encounters:   06/22/25 98 °F (36.7 °C) (Axillary)   06/05/25 100 °F (37.8 °C) (Temporal)   06/05/25 99 °F (37.2 °C)       Pulse Readings from Last 3 Encounters:   06/22/25 96   06/05/25 72   06/05/25 70       BP Readings from Last 3 Encounters:   06/22/25 133/73   06/05/25 (!) 160/89   06/05/25 (!) 170/90       Weight:  Wt Readings from Last 3 Encounters:   06/18/25 93.7 kg (206 lb 9.1 oz)   06/05/25 96.2 kg (212 lb)   06/05/25 96.6 kg (213 lb)       INS/OUTS:  I/O last 3 completed shifts:  In: 958.8 [I.V.:958.8]  Out: 3175 [Urine:3175]  No intake/output data recorded.    Medications:  Scheduled Meds:   amiodarone  400 mg Oral TID    Followed by    [START ON 6/26/2025] amiodarone  400 mg Oral BID    Followed by    [START ON 7/1/2025] amiodarone  400 mg Oral Daily    [START ON 6/24/2025] calcitRIOL  0.25 mcg Oral Q7 Days    cefUROXime  500 mg Oral Q12H    doxycycline  100 mg Oral Q12H    enoxparin  1 mg/kg  "Subcutaneous Q24H (treatment, non-standard time)    folic acid  2,000 mcg Oral Daily    morphine  2 mg Intravenous Once    mupirocin   Nasal BID    pantoprazole  40 mg Oral Daily    tamsulosin  0.4 mg Oral Daily    traZODone  100 mg Oral QHS     Continuous Infusions:      PRN Meds:.  Current Facility-Administered Medications:     0.9%  NaCl infusion (for blood administration), , Intravenous, Q24H PRN    0.9%  NaCl infusion (for blood administration), , Intravenous, Q24H PRN    0.9%  NaCl infusion (for blood administration), , Intravenous, Q24H PRN    acetaminophen, 650 mg, Oral, Q8H PRN    acetaminophen, 650 mg, Oral, Q4H PRN    aluminum-magnesium hydroxide-simethicone, 30 mL, Oral, QID PRN    benzonatate, 200 mg, Oral, TID PRN    dextromethorphan-guaiFENesin  mg, 1 tablet, Oral, BID PRN    furosemide (LASIX) injection, 20 mg, Intravenous, PRN    HYDROcodone-acetaminophen, 1 tablet, Oral, Q6H PRN    levalbuterol, 1.25 mg, Nebulization, Q6H PRN    magnesium oxide, 800 mg, Oral, PRN    magnesium oxide, 800 mg, Oral, PRN    melatonin, 6 mg, Oral, Nightly PRN    naloxone, 0.02 mg, Intravenous, PRN    ondansetron, 4 mg, Intravenous, Q6H PRN    potassium bicarbonate, 35 mEq, Oral, PRN    potassium bicarbonate, 50 mEq, Oral, PRN    potassium bicarbonate, 60 mEq, Oral, PRN    potassium, sodium phosphates, 2 packet, Oral, PRN    potassium, sodium phosphates, 2 packet, Oral, PRN    potassium, sodium phosphates, 2 packet, Oral, PRN  Medications Ordered Prior to Encounter[1]    Review of Systems:  Neg    Physical Exam:  /73   Pulse 96   Temp 98 °F (36.7 °C) (Axillary)   Resp (!) 28   Ht 5' 9" (1.753 m)   Wt 93.7 kg (206 lb 9.1 oz)   SpO2 (!) 91%   BMI 30.51 kg/m²     General Appearance:    NAD, AAO x 3, cooperative, appears stated age   Head:    Normocephalic, atraumatic   Eyes:    PER, EOMI, and conjunctiva/sclera clear bilaterally        Mouth:   Moist mucus membranes, no thrush or oral lesions, normal      " dentition   Back:     No CVA tenderness   Lungs:     Clear to auscultation bilaterally, no wheeze, crackles, rales      or rhonchi, symmetric air movement, respirations unlabored   Heart:    Regular rate and rhythm, S1 and S2 normal, no murmur, rub   or gallop   Abdomen:     Soft, non-tender, non-distended, bowel sounds active all four   quadrants, no RT or guarding, no masses, no organomegaly   Extremities:   Warm and well perfused, distal pulses intact, no cyanosis or    peripheral edema   MSK:   No joint or muscle swelling, tenderness or deformity   Skin:   Skin color, texture, turgor normal, no rashes or lesions   Neurologic/Psychiatric:   CNII-XII intact, normal strength and sensation       throughout, no asterixis; normal affect, memory, judgement     and insight     Results:  Lab Results   Component Value Date     06/22/2025    K 3.9 06/22/2025     (H) 06/22/2025    CO2 18 (L) 06/22/2025    BUN 45 (H) 06/22/2025    CREATININE 3.3 (H) 06/22/2025    CALCIUM 8.9 06/22/2025    ANIONGAP 9 06/22/2025    ESTGFRAFRICA 45.6 (A) 07/13/2022    EGFRNONAA 39.5 (A) 07/13/2022       Lab Results   Component Value Date    CALCIUM 8.9 06/22/2025    PHOS 4.1 06/19/2025       Recent Labs   Lab 06/22/25  0315   WBC 6.42   RBC 2.82*   HGB 7.8*   HCT 24.0*   PLT 33*   MCV 85   MCH 27.7   MCHC 32.5       I have personally reviewed pertinent radiological imaging and reports from this admission.    I have spent > 35 minutes providing care for this patient for the above diagnoses. These services have included chart/data/imaging review, evaluation, exam, formulation of plan, , note preparation, and discussions with staff involved in this patient's care.    Cleopatra Herrera MD MPH   [facility]  [address] [place] [id number]  [phone] (p)  [phone] (f)               [1]   No current facility-administered medications on file prior to encounter.     Current Outpatient Medications on  File Prior to Encounter   Medication Sig Dispense Refill    amLODIPine (NORVASC) 5 MG tablet Take 1 tablet (5 mg total) by mouth once daily. 90 tablet 3    ascorbic acid, vitamin C, (VITAMIN C) 500 MG tablet Take 500 mg by mouth once daily.      atorvastatin (LIPITOR) 10 MG tablet Take 1 tablet (10 mg total) by mouth every evening. 90 tablet 3    calcitRIOL (ROCALTROL) 0.25 MCG Cap Take 0.25 mcg by mouth every 7 days.      cyproheptadine (PERIACTIN) 4 mg tablet Take 1 tablet (4 mg total) by mouth 3 (three) times daily. 90 tablet 3    droNABinol (MARINOL) 10 MG capsule Take 1 capsule (10 mg total) by mouth 2 (two) times daily before meals. 60 capsule 1    famotidine (PEPCID) 20 MG tablet Take 20 mg by mouth 2 (two) times daily.      fluticasone propionate (FLONASE) 50 mcg/actuation nasal spray 1 SPRAY (50 MCG TOTAL) BY EACH NOSTRIL ROUTE 2 (TWO) TIMES A DAY. 1 SPRAY EVERY MORNING AND AFTERNOON TOWARD THE EAR EACH SIDE AFTER A SINUS RINSE 48 mL 1    folic acid (FOLVITE) 1 MG tablet TAKE 2 TABLETS BY MOUTH EVERY DAY (Patient taking differently: Take 2,000 mcg by mouth once daily.) 180 tablet 0    multivitamin with minerals Cap Take 1 capsule by mouth every morning.      pantoprazole (PROTONIX) 40 MG tablet Take 40 mg by mouth once daily.      tamsulosin (FLOMAX) 0.4 mg Cap TAKE 2 CAPSULES BY MOUTH EVERY DAY (Patient taking differently: Take 2 capsules by mouth once daily.) 180 capsule 1    traZODone (DESYREL) 100 MG tablet TAKE 1 TABLET BY MOUTH NIGHTLY AS NEEDED FOR INSOMNIA. 90 tablet 2    LIDOcaine (LIDODERM) 5 % Place 1 patch onto the skin once daily. Remove & Discard patch within 12 hours or as directed by MD 14 patch 0    sildenafil (VIAGRA) 100 MG tablet Take 1 tablet (100 mg total) by mouth daily as needed for Erectile Dysfunction. 10 tablet 6    sod chlor-bicarb-squeez bottle (NEILMED SINUS RINSE COMPLETE) pkdv 1 application  by sinus irrigation route 2 (two) times a day. Not right before bed 1 each 11     [DISCONTINUED] fluoxetine 20 MG tablet TAKE 1 TABLET BY MOUTH EVERY DAY 30 tablet 5

## 2025-06-22 NOTE — PROGRESS NOTES
[facility]  Hematology/Oncology  Progress Note    Patient Name: Rick Butler  Admission Date: 6/18/2025  Hospital Length of Stay: 3 days  Code Status: Full Code     Subjective:     Interval History:  Comfortable no specific complaints ambulating to bathroom  Patient under the care of DR. Saez for MDS  Admitted with sepsis/shortness of breath fatigue cytopenias  Oncology Treatment Plan:   OP IMETELSTAT (RYTELO) Q4W    Medications:  Continuous Infusions:  Scheduled Meds:   amiodarone  400 mg Oral TID    Followed by    [START ON 6/26/2025] amiodarone  400 mg Oral BID    Followed by    [START ON 7/1/2025] amiodarone  400 mg Oral Daily    [START ON 6/24/2025] calcitRIOL  0.25 mcg Oral Q7 Days    cefTRIAXone (Rocephin) IV (PEDS and ADULTS)  2 g Intravenous Q24H    dextromethorphan-guaiFENesin  mg  1 tablet Oral BID    doxycycline  100 mg Oral Q12H    enoxparin  1 mg/kg Subcutaneous Q24H (treatment, non-standard time)    folic acid  2,000 mcg Oral Daily    levalbuterol  1.25 mg Nebulization Q6H WAKE    morphine  2 mg Intravenous Once    mupirocin   Nasal BID    pantoprazole  40 mg Oral Daily    tamsulosin  0.4 mg Oral Daily    traZODone  100 mg Oral QHS     PRN Meds:  Current Facility-Administered Medications:     0.9%  NaCl infusion (for blood administration), , Intravenous, Q24H PRN    0.9%  NaCl infusion (for blood administration), , Intravenous, Q24H PRN    0.9%  NaCl infusion (for blood administration), , Intravenous, Q24H PRN    acetaminophen, 650 mg, Oral, Q8H PRN    acetaminophen, 650 mg, Oral, Q4H PRN    aluminum-magnesium hydroxide-simethicone, 30 mL, Oral, QID PRN    benzonatate, 200 mg, Oral, TID PRN    furosemide (LASIX) injection, 20 mg, Intravenous, PRN    HYDROcodone-acetaminophen, 1 tablet, Oral, Q6H PRN    magnesium oxide, 800 mg, Oral, PRN    magnesium oxide, 800 mg, Oral, PRN    melatonin, 6 mg, Oral, Nightly PRN    naloxone, 0.02 mg, Intravenous, PRN    ondansetron, 4 mg,  Intravenous, Q6H PRN    potassium bicarbonate, 35 mEq, Oral, PRN    potassium bicarbonate, 50 mEq, Oral, PRN    potassium bicarbonate, 60 mEq, Oral, PRN    potassium, sodium phosphates, 2 packet, Oral, PRN    potassium, sodium phosphates, 2 packet, Oral, PRN    potassium, sodium phosphates, 2 packet, Oral, PRN     Review of Systems  As above  Objective:     Vital Signs (Most Recent):  Temp: 98 °F (36.7 °C) (06/21/25 2000)  Pulse: 98 (06/21/25 2245)  Resp: (!) 50 (06/21/25 2245)  BP: (!) 153/73 (06/21/25 2100)  SpO2: (!) 94 % (06/21/25 2245) Vital Signs (24h Range):  Temp:  [98 °F (36.7 °C)-98.3 °F (36.8 °C)] 98 °F (36.7 °C)  Pulse:  [] 98  Resp:  [15-50] 50  SpO2:  [90 %-100 %] 94 %  BP: (114-157)/(67-94) 153/73     Weight: 93.7 kg (206 lb 9.1 oz)  Body mass index is 30.51 kg/m².  Body surface area is 2.14 meters squared.      Intake/Output Summary (Last 24 hours) at 6/21/2025 2328  Last data filed at 6/21/2025 2245  Gross per 24 hour   Intake 958.78 ml   Output 1750 ml   Net -791.22 ml       Physical Exam  VITAL SIGNS:  as above   GENERAL: appears well-built, well-nourished.  No anxiety, no agitation, and in no distress.  Patient is awake, alert, oriented and cooperative.  HEENT:  Showed no congestion. Trachea is central no obvious icterus or pallor noted no hoarseness. no obvious JVD   NECK:  Supple.  No JVD. No obvious cervical submental or supraclavicular adenopathy.  RS:the visualized portion of  Chest expands well. chest appears symmetric, no audible wheezes.  No dyspnea recognized  ABDOMEN:  abdomen appears undistended.  EXTREMITIES:  Without edema.  NEUROLOGICAL:  The patient is appropriate, higher functions are normal.  No  obvious neurological deficits.  normal judgement normal thought content  No confusion, no speech impediment. Cranial nerves are intact and show no deficit. No gross motor deficits noted   SKIN MUSCULOSKELETAL: no joint or skeletal deformity, no clubbing of nails.  No visible rash  ecchymosis or petechiae   Significant Labs:   Lab Results   Component Value Date    WBC 8.67 06/21/2025    HGB 8.2 (L) 06/21/2025    HCT 25.4 (L) 06/21/2025    MCV 85 06/21/2025    PLT 42 (LL) 06/21/2025      CMP  Sodium   Date Value Ref Range Status   06/21/2025 144 136 - 145 mmol/L Final   06/26/2019 138 134 - 144 mmol/L      Potassium   Date Value Ref Range Status   06/21/2025 4.0 3.5 - 5.1 mmol/L Final     Chloride   Date Value Ref Range Status   06/21/2025 116 (H) 95 - 110 mmol/L Final   06/26/2019 105 98 - 110 mmol/L      CO2   Date Value Ref Range Status   06/21/2025 19 (L) 23 - 29 mmol/L Final     Glucose   Date Value Ref Range Status   06/21/2025 96 70 - 110 mg/dL Final   06/26/2019 114 (H) 70 - 99 mg/dL      BUN   Date Value Ref Range Status   06/21/2025 50 (H) 8 - 23 mg/dL Final     Creatinine   Date Value Ref Range Status   06/21/2025 3.6 (H) 0.5 - 1.4 mg/dL Final   06/26/2019 1.62 (H) 0.60 - 1.40 mg/dL      Calcium   Date Value Ref Range Status   06/21/2025 8.7 8.7 - 10.5 mg/dL Final     Protein Total   Date Value Ref Range Status   06/21/2025 6.8 6.0 - 8.4 gm/dL Final     Albumin   Date Value Ref Range Status   06/21/2025 3.1 (L) 3.5 - 5.2 g/dL Final   06/26/2019 4.4 3.1 - 4.7 g/dL      Bilirubin Total   Date Value Ref Range Status   06/21/2025 0.5 0.1 - 1.0 mg/dL Final     Comment:     For infants and newborns, interpretation of results should be based   on gestational age, weight and in agreement with clinical   observations.    Premature Infant recommended reference ranges:   0-24 hours:  <8.0 mg/dL   24-48 hours: <12.0 mg/dL   3-5 days:    <15.0 mg/dL   6-29 days:   <15.0 mg/dL     ALP   Date Value Ref Range Status   06/21/2025 61 55 - 135 unit/L Final     AST   Date Value Ref Range Status   06/21/2025 27 10 - 40 unit/L Final     ALT   Date Value Ref Range Status   06/21/2025 22 10 - 44 unit/L Final     Anion Gap   Date Value Ref Range Status   06/21/2025 9 8 - 16 mmol/L Final     eGFR   Date Value  Ref Range Status   06/21/2025 18 (L) >60 mL/min/1.73/m2 Final   03/12/2025 21.6 (A) >60 mL/min/1.73 m^2 Final        Diagnostic Results:  All imaging studies evaluated and reviewed by me    Assessment/Plan:     Active Diagnoses:    Diagnosis Date Noted POA    PRINCIPAL PROBLEM:  Severe sepsis [A41.9, R65.20] 06/18/2025 Yes    TORRES (acute kidney injury) [N17.9] 06/21/2025 Yes    Atrial fibrillation with rapid ventricular response [I48.91] 06/20/2025 No    Acute pneumonia [J18.9] 06/18/2025 Yes    Neutropenic fever [D70.9, R50.81] 06/18/2025 Yes    Acute on chronic anemia [D64.9] 06/18/2025 Yes    Thrombocytopenia [D69.6] 06/18/2025 Yes    Elevated troponin [R79.89] 06/18/2025 Yes    Anemia, chronic renal failure, stage 3 (moderate) [N18.30, D63.1] 08/02/2023 Yes    MDS (myelodysplastic syndrome) [D46.9] 08/11/2020 Yes    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 male with diagnosis of MDS/RARS and sickle cell trait. He is treated in conjunction with Dr Marcelino Hilario out of [facility] in [place], whom he recently saw for follow-up visit on 6/4/2025. He has been on chemotherapy with the drug Rytelo for the past couple of months. He requires periodic blood and platelet transfusions.  Patient has been admitted with pneumonia  currently managed by Pulmonary and ID and on antibiotics  Due to significant cytopenias patient has been receiving transfusion  He is also followed by Cardiology/Pulmonary and Nephrology  Patient continues heparin/Lovenox for AFib per Cardiology  (2) Acute exacerbation of CKD - followed by Dr Mas as outpatient     Neyda Pereira MD  Hematology/Oncology  [facility]

## 2025-06-22 NOTE — ASSESSMENT & PLAN NOTE
TORRES is likely due to multifactorial.Most recent creatinine and eGFR are listed below.  Recent Labs     06/20/25  0230 06/21/25  0302 06/22/25  0315   CREATININE 4.0* 3.6* 3.3*   EGFRNORACEVR 16* 18* 20*      Plan  - TORRES is improving  - Avoid nephrotoxins and renally dose meds for GFR listed above  - Monitor urine output, serial BMP, and adjust therapy as needed  - nephrology is following

## 2025-06-22 NOTE — PLAN OF CARE
Problem: Adult Inpatient Plan of Care  Goal: Plan of Care Review  Outcome: Progressing  Goal: Patient-Specific Goal (Individualized)  Outcome: Progressing  Goal: Optimal Comfort and Wellbeing  Outcome: Progressing  Goal: Readiness for Transition of Care  Outcome: Progressing     Problem: Acute Kidney Injury/Impairment  Goal: Fluid and Electrolyte Balance  Outcome: Progressing  Goal: Improved Oral Intake  Outcome: Progressing     Problem: Pneumonia  Goal: Fluid Balance  Outcome: Progressing  Goal: Resolution of Infection Signs and Symptoms  Outcome: Progressing  Goal: Effective Oxygenation and Ventilation  Outcome: Progressing     Problem: Skin Injury Risk Increased  Goal: Skin Health and Integrity  Outcome: Progressing     Problem: Fall Injury Risk  Goal: Absence of Fall and Fall-Related Injury  Outcome: Progressing

## 2025-06-22 NOTE — ASSESSMENT & PLAN NOTE
Aware   Patient received 1 unit of PRBC on 6/18/25.  Patient agreeable to receive another unit of packed red blood cell today.    Recent Labs     06/20/25  1835 06/21/25  0302 06/22/25  0315   HGB 8.8* 8.2* 7.8*   HCT 26.9* 25.4* 24.0*

## 2025-06-22 NOTE — SUBJECTIVE & OBJECTIVE
Interval History:  Patient said he feels okay.  For daily updates refer to hospital course    Review of Systems   All other systems reviewed and are negative.    Objective:     Vital Signs (Most Recent):  Temp: 98 °F (36.7 °C) (06/22/25 0705)  Pulse: 91 (06/22/25 1300)  Resp: (!) 30 (06/22/25 1300)  BP: 133/73 (06/22/25 0830)  SpO2: 96 % (06/22/25 1300) Vital Signs (24h Range):  Temp:  [98 °F (36.7 °C)-98.4 °F (36.9 °C)] 98 °F (36.7 °C)  Pulse:  [] 91  Resp:  [15-50] 30  SpO2:  [91 %-100 %] 96 %  BP: (133-162)/(64-94) 133/73     Weight: 93.7 kg (206 lb 9.1 oz)  Body mass index is 30.51 kg/m².    Intake/Output Summary (Last 24 hours) at 6/22/2025 1312  Last data filed at 6/22/2025 1036  Gross per 24 hour   Intake --   Output 1675 ml   Net -1675 ml         Physical Exam  Cardiovascular:      Rate and Rhythm: Normal rate.      Heart sounds: Murmur heard.   Abdominal:      General: There is no distension.   Neurological:      Mental Status: He is alert and oriented to person, place, and time.               Significant Labs: All pertinent labs within the past 24 hours have been reviewed.    Significant Imaging: I have reviewed all pertinent imaging results/findings within the past 24 hours.

## 2025-06-22 NOTE — ASSESSMENT & PLAN NOTE
"This patient does have evidence of infective focus  My overall impression is sepsis with Acute kidney injury, Acute respiratory failure, and Thrombocytopenia .  Source: Respiratory Infection  Antibiotics given-   Antibiotics (72h ago, onward)      Start     Stop Route Frequency Ordered    06/22/25 1145  cefUROXime tablet 500 mg        Note to Pharmacy: Ht: 5' 9" (1.753 m)  Wt: 93.7 kg (206 lb 9.1 oz)  Estimated Creatinine Clearance: 25.2 mL/min (A) (based on SCr of 3.3 mg/dL (H)).   Body mass index is 30.51 kg/m².    06/27/25 0859 Oral Daily 06/22/25 1032    06/18/25 2100  mupirocin 2 % ointment         06/23/25 2059 Nasl 2 times daily 06/18/25 1747    06/18/25 2100  doxycycline capsule 100 mg         -- Oral Every 12 hours 06/18/25 1752          Latest lactate reviewed-  No results for input(s): "LACTATE", "POCLAC" in the last 72 hours.    "

## 2025-06-23 PROBLEM — R09.02 HYPOXIA: Status: ACTIVE | Noted: 2025-06-23

## 2025-06-23 LAB
ABSOLUTE EOSINOPHIL (SMH): 0 K/UL
ABSOLUTE MONOCYTE (SMH): 0.9 K/UL (ref 0.3–1)
ABSOLUTE NEUTROPHIL COUNT (SMH): 3.4 K/UL (ref 1.8–7.7)
ALBUMIN SERPL-MCNC: 3.4 G/DL (ref 3.5–5.2)
ALP SERPL-CCNC: 65 UNIT/L (ref 55–135)
ALT SERPL-CCNC: 20 UNIT/L (ref 10–44)
ANION GAP (SMH): 8 MMOL/L (ref 8–16)
AST SERPL-CCNC: 23 UNIT/L (ref 10–40)
BACTERIA BLD CULT: NORMAL
BACTERIA BLD CULT: NORMAL
BASOPHILS # BLD AUTO: 0.02 K/UL
BASOPHILS NFR BLD AUTO: 0.4 %
BILIRUB SERPL-MCNC: 0.7 MG/DL (ref 0.1–1)
BUN SERPL-MCNC: 39 MG/DL (ref 8–23)
CALCIUM SERPL-MCNC: 9.2 MG/DL (ref 8.7–10.5)
CHLORIDE SERPL-SCNC: 112 MMOL/L (ref 95–110)
CO2 SERPL-SCNC: 21 MMOL/L (ref 23–29)
CREAT SERPL-MCNC: 3.1 MG/DL (ref 0.5–1.4)
ERYTHROCYTE [DISTWIDTH] IN BLOOD BY AUTOMATED COUNT: 17.3 % (ref 11.5–14.5)
GFR SERPLBLD CREATININE-BSD FMLA CKD-EPI: 21 ML/MIN/1.73/M2
GLUCOSE SERPL-MCNC: 109 MG/DL (ref 70–110)
HCT VFR BLD AUTO: 26.6 % (ref 40–54)
HGB BLD-MCNC: 8.6 GM/DL (ref 14–18)
IMM GRANULOCYTES # BLD AUTO: 0.12 K/UL (ref 0–0.04)
IMM GRANULOCYTES NFR BLD AUTO: 2.3 % (ref 0–0.5)
LYMPHOCYTES # BLD AUTO: 0.84 K/UL (ref 1–4.8)
MAGNESIUM SERPL-MCNC: 1.5 MG/DL (ref 1.6–2.6)
MCH RBC QN AUTO: 27.7 PG (ref 27–31)
MCHC RBC AUTO-ENTMCNC: 32.3 G/DL (ref 32–36)
MCV RBC AUTO: 86 FL (ref 82–98)
NUCLEATED RBC (/100WBC) (SMH): 0 /100 WBC
PLATELET # BLD AUTO: 27 K/UL (ref 150–450)
PLATELET BLD QL SMEAR: ABNORMAL
PMV BLD AUTO: ABNORMAL FL
POTASSIUM SERPL-SCNC: 4.4 MMOL/L (ref 3.5–5.1)
PROCALCITONIN SERPL-MCNC: 6.54 NG/ML
PROT SERPL-MCNC: 6.9 GM/DL (ref 6–8.4)
RBC # BLD AUTO: 3.11 M/UL (ref 4.6–6.2)
RELATIVE EOSINOPHIL (SMH): 0 % (ref 0–8)
RELATIVE LYMPHOCYTE (SMH): 15.8 % (ref 18–48)
RELATIVE MONOCYTE (SMH): 16.9 % (ref 4–15)
RELATIVE NEUTROPHIL (SMH): 64.6 % (ref 38–73)
SODIUM SERPL-SCNC: 141 MMOL/L (ref 136–145)
WBC # BLD AUTO: 5.31 K/UL (ref 3.9–12.7)

## 2025-06-23 PROCEDURE — 25000003 PHARM REV CODE 250: Performed by: FAMILY MEDICINE

## 2025-06-23 PROCEDURE — 94664 DEMO&/EVAL PT USE INHALER: CPT

## 2025-06-23 PROCEDURE — 85025 COMPLETE CBC W/AUTO DIFF WBC: CPT | Performed by: INTERNAL MEDICINE

## 2025-06-23 PROCEDURE — 99900031 HC PATIENT EDUCATION (STAT)

## 2025-06-23 PROCEDURE — 94799 UNLISTED PULMONARY SVC/PX: CPT | Mod: XB

## 2025-06-23 PROCEDURE — 36415 COLL VENOUS BLD VENIPUNCTURE: CPT | Performed by: STUDENT IN AN ORGANIZED HEALTH CARE EDUCATION/TRAINING PROGRAM

## 2025-06-23 PROCEDURE — 99291 CRITICAL CARE FIRST HOUR: CPT | Mod: ,,, | Performed by: STUDENT IN AN ORGANIZED HEALTH CARE EDUCATION/TRAINING PROGRAM

## 2025-06-23 PROCEDURE — 25000003 PHARM REV CODE 250: Performed by: INTERNAL MEDICINE

## 2025-06-23 PROCEDURE — 11000001 HC ACUTE MED/SURG PRIVATE ROOM

## 2025-06-23 PROCEDURE — 63600175 PHARM REV CODE 636 W HCPCS: Performed by: STUDENT IN AN ORGANIZED HEALTH CARE EDUCATION/TRAINING PROGRAM

## 2025-06-23 PROCEDURE — 97116 GAIT TRAINING THERAPY: CPT | Mod: CQ

## 2025-06-23 PROCEDURE — 25000003 PHARM REV CODE 250: Performed by: STUDENT IN AN ORGANIZED HEALTH CARE EDUCATION/TRAINING PROGRAM

## 2025-06-23 PROCEDURE — 25000242 PHARM REV CODE 250 ALT 637 W/ HCPCS: Performed by: STUDENT IN AN ORGANIZED HEALTH CARE EDUCATION/TRAINING PROGRAM

## 2025-06-23 PROCEDURE — 84145 PROCALCITONIN (PCT): CPT | Performed by: STUDENT IN AN ORGANIZED HEALTH CARE EDUCATION/TRAINING PROGRAM

## 2025-06-23 PROCEDURE — 99900035 HC TECH TIME PER 15 MIN (STAT)

## 2025-06-23 PROCEDURE — G0545 PR VISIT INHERENT TO INPT OR OBS CARE, INFECTIOUS DISEASE: HCPCS | Mod: ,,, | Performed by: STUDENT IN AN ORGANIZED HEALTH CARE EDUCATION/TRAINING PROGRAM

## 2025-06-23 PROCEDURE — 83735 ASSAY OF MAGNESIUM: CPT | Performed by: INTERNAL MEDICINE

## 2025-06-23 PROCEDURE — 99233 SBSQ HOSP IP/OBS HIGH 50: CPT | Mod: ,,, | Performed by: STUDENT IN AN ORGANIZED HEALTH CARE EDUCATION/TRAINING PROGRAM

## 2025-06-23 PROCEDURE — 99232 SBSQ HOSP IP/OBS MODERATE 35: CPT | Mod: ,,, | Performed by: INTERNAL MEDICINE

## 2025-06-23 PROCEDURE — 27000221 HC OXYGEN, UP TO 24 HOURS

## 2025-06-23 PROCEDURE — 36415 COLL VENOUS BLD VENIPUNCTURE: CPT | Performed by: INTERNAL MEDICINE

## 2025-06-23 PROCEDURE — 94761 N-INVAS EAR/PLS OXIMETRY MLT: CPT

## 2025-06-23 PROCEDURE — 80053 COMPREHEN METABOLIC PANEL: CPT | Performed by: INTERNAL MEDICINE

## 2025-06-23 RX ORDER — CETIRIZINE HYDROCHLORIDE 5 MG/1
5 TABLET ORAL DAILY
Status: DISCONTINUED | OUTPATIENT
Start: 2025-06-24 | End: 2025-06-28 | Stop reason: HOSPADM

## 2025-06-23 RX ORDER — PREDNISONE 20 MG/1
40 TABLET ORAL DAILY
Status: COMPLETED | OUTPATIENT
Start: 2025-06-23 | End: 2025-06-27

## 2025-06-23 RX ORDER — CETIRIZINE HYDROCHLORIDE 10 MG/1
10 TABLET ORAL DAILY
Status: DISCONTINUED | OUTPATIENT
Start: 2025-06-23 | End: 2025-06-23

## 2025-06-23 RX ORDER — FLUTICASONE PROPIONATE 50 MCG
2 SPRAY, SUSPENSION (ML) NASAL DAILY
Status: DISCONTINUED | OUTPATIENT
Start: 2025-06-23 | End: 2025-06-28 | Stop reason: HOSPADM

## 2025-06-23 RX ADMIN — DOXYCYCLINE 100 MG: 100 CAPSULE ORAL at 10:06

## 2025-06-23 RX ADMIN — PIPERACILLIN SODIUM AND TAZOBACTAM SODIUM 4.5 G: 4; .5 INJECTION, POWDER, LYOPHILIZED, FOR SOLUTION INTRAVENOUS at 12:06

## 2025-06-23 RX ADMIN — GUAIFENESIN AND DEXTROMETHORPHAN HYDROBROMIDE 1 TABLET: 600; 30 TABLET, EXTENDED RELEASE ORAL at 10:06

## 2025-06-23 RX ADMIN — TRAZODONE HYDROCHLORIDE 100 MG: 50 TABLET ORAL at 09:06

## 2025-06-23 RX ADMIN — Medication 6 MG: at 10:06

## 2025-06-23 RX ADMIN — FOLIC ACID 2000 MCG: 1 TABLET ORAL at 10:06

## 2025-06-23 RX ADMIN — PANTOPRAZOLE SODIUM 40 MG: 40 TABLET, DELAYED RELEASE ORAL at 05:06

## 2025-06-23 RX ADMIN — TAMSULOSIN HYDROCHLORIDE 0.4 MG: 0.4 CAPSULE ORAL at 10:06

## 2025-06-23 RX ADMIN — AMIODARONE HYDROCHLORIDE 400 MG: 200 TABLET ORAL at 03:06

## 2025-06-23 RX ADMIN — MUPIROCIN 1 G: 20 OINTMENT TOPICAL at 10:06

## 2025-06-23 RX ADMIN — AMIODARONE HYDROCHLORIDE 400 MG: 200 TABLET ORAL at 10:06

## 2025-06-23 RX ADMIN — CEFUROXIME AXETIL 500 MG: 250 TABLET, FILM COATED ORAL at 10:06

## 2025-06-23 RX ADMIN — AMIODARONE HYDROCHLORIDE 400 MG: 200 TABLET ORAL at 08:06

## 2025-06-23 RX ADMIN — PIPERACILLIN SODIUM AND TAZOBACTAM SODIUM 4.5 G: 4; .5 INJECTION, POWDER, LYOPHILIZED, FOR SOLUTION INTRAVENOUS at 08:06

## 2025-06-23 RX ADMIN — CETIRIZINE HYDROCHLORIDE 10 MG: 10 TABLET, FILM COATED ORAL at 03:06

## 2025-06-23 RX ADMIN — Medication 800 MG: at 10:06

## 2025-06-23 RX ADMIN — FLUTICASONE PROPIONATE 100 MCG: 50 SPRAY, METERED NASAL at 03:06

## 2025-06-23 RX ADMIN — AMLODIPINE BESYLATE 10 MG: 5 TABLET ORAL at 10:06

## 2025-06-23 RX ADMIN — DOXYCYCLINE 100 MG: 100 CAPSULE ORAL at 09:06

## 2025-06-23 RX ADMIN — PREDNISONE 40 MG: 20 TABLET ORAL at 03:06

## 2025-06-23 NOTE — PROGRESS NOTES
[facility]   Department of Infectious Disease  Progress Note        PATIENT NAME: Rick Butler  MRN: [id number]  TODAY'S DATE: 06/23/2025  ADMIT DATE: 6/18/2025  LOS: 5 days    CHIEF COMPLAINT: Shortness of Breath (X 1 day. ), Fatigue, and Cough (Onset lastnite)      PRINCIPLE PROBLEM: Severe sepsis    ASSESSMENT and PLAN     Severe sepsis secondary to multifocal pneumonia in the setting of immunosuppression, history of MDS on Rytelo, was increased oxygen requirements, currently 6 L of oxygen  Procalcitonin 1 on admission, repeat 1 today 6.5, trending down  RVP 6/18 negative  Respiratory culture 6/19, inadequate specimen, 2nd sample same day still greater than 10 epithelial cells, normal respiratory federico  MRSA nasal swab 6/18 not detected  Blood cultures 6/18 x2 sets no growth     Bi-cytopenia; H&H 8.6/26.6, platelet count 27 likely secondary to MDS   Heme-Onc following    Stage III CKD, not on HD - Estimated Creatinine Clearance: 26.8 mL/min (A) (based on SCr of 3.1 mg/dL (H)).  Nephrology following    RECOMMENDATIONS:    Pulmonary follow-up for bronchoscopy, we do not have inadequate sample for sputum culture  Speech pathology evaluation to rule out aspiration  Stop cefuroxime   Start Zosyn 4.5 g IV q.8 over 4 hour infusion this for multifocal pneumonia   Continue doxycycline 100 mg p.o. twice a day to cover atypicals  Monitor cell counts, patient with underlying MDS, discussed with Heme-Onc/Dr. Tello Zosyn can worsen thrombocytopenia, will monitor closely  If creatinine clearance drops below 20, please adjust Zosyn to q.12  Monitor O2 sats    D/w patient, wife at bedside, Dr Siegel/Medicine, Dr Tello/Heme-Onc    Please send [employer] secure chat with any questions.      Antibiotics (From admission, onward)      Start     Stop Route Frequency Ordered    06/23/25 1215  piperacillin-tazobactam (ZOSYN) 4.5 g in D5W 100 mL IVPB (MB+)         -- IV Every 8 hours (non-standard times) 06/23/25 1111     06/18/25 2100  doxycycline capsule 100 mg         -- Oral Every 12 hours 06/18/25 1752          Antifungals (From admission, onward)      None           Antivirals (From admission, onward)      None              INTERVAL HISTORY      06/19/2025:  he was seen and evaluated at bedside.  States he is improving.  Still with pleuritic right chest pain.  Heart rate and fever curve improving.  Sputum was not a good sample.  Blood cultures so far negative.      06/20/2025:  Continues to improve.  Blood cultures negative.  Repeat sputum culture was once again not a good sample.  Appetite improving.    06/21/2025:  Seen and evaluated at bedside.  Continues to improve.  No new acute issues.  Blood cultures remain negative.  Was unable to produce a good sputum sample.    06/22/2024:  Chest x-ray today about the same.  No new acute issues.  Continues to improve.  Pending transfer out of ICU to medical floor.  Continues to clinically improve.  Appetite better.    6/23 (Ty):  Interim reviewed, patient seen and examined at bedside, he is sitting in the chair, wife at bedside.  He is complaining of shortness of breath, he is tachypneic on 6 L of O2, saturating 92%.  Hypertensive, T-max 98.3°, afebrile.  Discussed with Medicine, need for Pulmonary follow up for bronchoscopy to obtain better sputum sample and guide antibiotic therapy.  Chest x-ray yesterday with right middle lobe airspace opacities unchanged.  Discussed with patient, he mentions he sometimes chokes while eating or drinking, will have official speech evaluation to rule out ongoing aspiration.  Labs reviewed, white count 5.3, H&H 8.6/26.6, thrombocytopenia 27.  Stable electrolytes, CKD not on HD, creatinine 3.1, creatinine clearance 26.8 mL/min, stable LFTs.  Procalcitonin down to 6.5, trending down.  Micro reviewed, no further data.  Patient also mentions he has not received pneumonia shot, will likely give this outpatient.      ARMANDO    Rick Butler is a 65  y.o. male with history of MDS/RA RS and sickle cell trait.  He is on Rytelo for about 2 months he requires.  A blood and platelet transfusions.  Presents to the emergency room 06/18/2025 with fever cough and generalized weakness and fatigue of 1 day duration.  In the ED /59, pulse 132, respiratory rate 25, temperature 103°, oxygen saturation 92%.     Sodium 136, creatinine 4.6, AST 20, ALT 19, troponin 50, .  WBC 1, hematocrit 23, MCV 86, platelet count 62 with 13% bands.  Acute respiratory panel negative x-ray showed right middle and lower lobe infiltrate he received IV fluid and was placed on antibiotics he has been evaluated by oncologist and pulmonologist ID asked to assist with his care.     No recent travel other than a cruise to [place] in April 2025.  No sick contacts.  He is employed and works as a prep shift.  Was actually working up until 06/17/2025.        Antibiotic history:    Vancomycin 6/18/25-  Cefepime: 06/08/2025 x1 dose   Azithromycin: 06/18/2025   Meropenem: 06/18/2025     Microbiology:    Respiratory panel 06/18/2025: Negative   Blood culture 06/18/2025:  NGTD    Review of Systems  Negative except as stated above in Interval History     OBJECTIVE   Temp:  [97.7 °F (36.5 °C)-98.1 °F (36.7 °C)] 97.7 °F (36.5 °C)  Pulse:  [] 89  Resp:  [14-39] 16  SpO2:  [91 %-99 %] 97 %  BP: (148-167)/(81-94) 148/83  Temp:  [97.7 °F (36.5 °C)-98.1 °F (36.7 °C)]   Temp: 97.7 °F (36.5 °C) (06/23/25 1117)  Pulse: 89 (06/23/25 1117)  Resp: 16 (06/23/25 1117)  BP: (!) 148/83 (06/23/25 1117)  SpO2: 97 % (06/23/25 1305)    Intake/Output Summary (Last 24 hours) at 6/23/2025 1338  Last data filed at 6/23/2025 0700  Gross per 24 hour   Intake 360 ml   Output 800 ml   Net -440 ml       Physical Exam  General: AA male, sitting in the sonia, tachypneic, on O2 by NC 6 L  Eyes: Eyes with no icterus or injection. Vision grossly normal  Ears: Hearing grossly normal.  Nose: Nares patent  Mouth: Moist mucous  membranes, dentures. No ulcerations, erythema or exudates.  Neck: Supple, no tenderness to palpation.  Cardiovascular: Regular rate and rhythm, no murmurs, no edema.    Respiratory:  Coarse breath sounds right more than left, bilateral rales  Gastrointestinal: Soft with active bowel sounds, no tenderness to palpation, no distention.  Genitourinary: No suprapubic tenderness.  Musculoskeletal: Moves all extremities with good strength.    Skin: Warm and dry, no obvious rashes.    Neuro: Oriented, conversant, follows commands.  Psych: Calm   VAD: PIVs  ISOLATION: None     Wounds:  None    Significant Labs: All pertinent labs within the past 24 hours have been reviewed.    CBC LAST 7 DAYS  Recent Labs   Lab 06/18/25  0843 06/19/25  0301 06/19/25  0951 06/19/25  1720 06/20/25  0230 06/20/25  1835 06/21/25  0302 06/22/25  0315 06/23/25  0612   WBC 1.02* 2.38* 2.85* 4.57 5.73  --  8.67 6.42 5.31   RBC 2.65* 2.40* 2.44* 2.78* 2.58*  --  3.00* 2.82* 3.11*   HGB 7.3* 6.7* 6.8* 7.6* 7.1* 8.8* 8.2* 7.8* 8.6*   HCT 22.9* 20.4* 20.7* 23.6* 21.6* 26.9* 25.4* 24.0* 26.6*   MCV 86 85 85 85 84  --  85 85 86   MCH 27.5 27.9 27.9 27.3 27.5  --  27.3 27.7 27.7   MCHC 31.9* 32.8 32.9 32.2 32.9  --  32.3 32.5 32.3   RDW 17.7* 17.2* 17.8* 17.6* 17.9*  --  17.3* 17.3* 17.3*   PLT 62* 47* 46* 47* 40*  --  42* 33* 27*   MPV 9.6 10.3  --   --   --   --  12.3  --   --    NRBC 0 0  --  0 0  --  0 0 0       CHEMISTRY LAST 7 DAYS  Recent Labs   Lab 06/18/25  0843 06/19/25  0301 06/19/25 2026 06/20/25  0230 06/21/25  0302 06/22/25  0315 06/23/25  0612    138 139 140 144 142 141   K 4.0 4.9 4.1 4.2 4.0 3.9 4.4    108 112* 112* 116* 115* 112*   CO2 21* 21* 19* 20* 19* 18* 21*   ANIONGAP 12 9 8 8 9 9 8   BUN 55* 54* 57* 54* 50* 45* 39*   CREATININE 4.6* 4.2* 4.1* 4.0* 3.6* 3.3* 3.1*   GLU 95 92 111* 110 96 98 109   CALCIUM 8.9 8.1* 8.3* 8.5* 8.7 8.9 9.2   MG 1.4* 2.0 2.2 2.2 2.0 1.8 1.5*   ALBUMIN 4.0 3.3*  --  3.0* 3.1* 3.0* 3.4*   PROT  "7.5 6.3  --  6.7 6.8 6.3 6.9   ALKPHOS 45* 35*  --  46* 61 61 65   ALT 19 17  --  19 22 20 20   AST 23 25  --  23 27 23 23   BILITOT 0.6 1.1*  --  0.5 0.5 0.5 0.7       Estimated Creatinine Clearance: 26.8 mL/min (A) (based on SCr of 3.1 mg/dL (H)).    INFLAMMATORY/PROCAL  LAST 7 DAYS  No results for input(s): "PROCAL", "ESR", "CRP" in the last 168 hours.  No results found for: "ESR"  CRP   Date Value Ref Range Status   12/04/2024 0.40 <1.00 mg/dL Final     Comment:     CRP-Normal Application expected values:   <1.0        mg/dL   Normal Range  1.0 - 5.0  mg/dL   Indicates mild inflammation  5.0 - 10.0 mg/dL   Indicates severe inflammation  >10.0        mg/dL   Represents serious processes and   frequently         indicates the presence of a bacterial   infection.          PRIOR  MICROBIOLOGY:    No results found for the last 90 days.      LAST 7 DAYS MICROBIOLOGY   Microbiology Results (last 7 days)       Procedure Component Value Units Date/Time    Blood culture x two cultures. Draw prior to antibiotics. [[id number]]  (Normal) Collected: 06/18/25 0930    Order Status: Completed Specimen: Blood Updated: 06/23/25 1001     CULTURE, BLOOD ([facility]) No Growth After 5 Days    Blood culture x two cultures. Draw prior to antibiotics. [[id number]]  (Normal) Collected: 06/18/25 0932    Order Status: Completed Specimen: Blood Updated: 06/23/25 1001     CULTURE, BLOOD ([facility]) No Growth After 5 Days    Culture, Respiratory with Gram Stain [[id number]] Collected: 06/19/25 0950    Order Status: Completed Specimen: Respiratory Updated: 06/21/25 0701     Respiratory Culture Normal respiratory federico     GRAM STAIN (RESPIRATORY) >10 Epithelial Cells/LPF      Few WBC seen      Rare Gram Positive Rods     Comment: Corrected result: Previously reported as Rare Branching Gram positive rods on 6/19/2025 at 1225 CDT.         Rare Gram positive cocci    Culture, Respiratory with Gram Stain [[id number]] Collected: 06/19/25 0734    Order Status: " Completed Specimen: Respiratory from Sputum, Expectorated Updated: 06/19/25 0844     Respiratory Culture Specimen inadequate - culture not performed     GRAM STAIN (RESPIRATORY) >10epis/lfp and <than many WBC's      Predominance of oropharyngeal federico. Please recollect    MRSA Screen by PCR [[id number]]  (Normal) Collected: 06/18/25 1948    Order Status: Completed Specimen: Nasal Swab Updated: 06/18/25 2115     MRSA PCR SCRN ([facility]) Not Detected    Respiratory Infection Panel (PCR), Nasopharyngeal [[id number]] Collected: 06/18/25 1027    Order Status: Completed Specimen: Nasopharyngeal Swab Updated: 06/18/25 1209     Respiratory Infection Panel Source Nasopharyngeal Swab     Adenovirus Not Detected     Coronavirus 229E, Common Cold Virus Not Detected     Coronavirus HKU1, Common Cold Virus Not Detected     Coronavirus NL63, Common Cold Virus Not Detected     Coronavirus OC43, Common Cold Virus Not Detected     SARS-CoV2 (COVID-19) Qualitative PCR Not Detected     Human Metapneumovirus Not Detected     Human Rhinovirus/Enterovirus Not Detected     Influenza A (subtypes H1, H1-2009,H3) Not Detected     Influenza B Not Detected     Parainfluenza Virus 1 Not Detected     Parainfluenza Virus 2 Not Detected     Parainfluenza Virus 3 Not Detected     Parainfluenza Virus 4 Not Detected     Respiratory Syncytial Virus Not Detected     Bordetella Parapertussis ([id number]) Not Detected     Bordetella pertussis (ptxP) Not Detected     Chlamydia pneumoniae Not Detected     Mycoplasma pneumoniae Not Detected              CURRENT/PREVIOUS VISIT EKG  Results for orders placed or performed during the hospital encounter of 06/18/25   EKG 12-lead    Collection Time: 06/20/25  1:14 PM   Result Value Ref Range    QRS Duration 94 ms    [facility] QTC Calculation 484 ms    Narrative    Test Reason : I49.9,    Vent. Rate :  93 BPM     Atrial Rate :  93 BPM     P-R Int : 170 ms          QRS Dur :  94 ms      QT Int : 390 ms       P-R-T Axes :  56   6   48 degrees    QTcB Int : 484 ms    Normal sinus rhythm  Nonspecific T wave abnormality  Prolonged QT  Abnormal ECG  When compared with ECG of 19-Jun-2025 18:19,  Sinus rhythm has replaced Atrial fibrillation  Vent. rate has decreased by  56 bpm  Confirmed by Fam Benoit ([id number]) on 6/21/2025 2:14:31 PM    Referred By: AAAREFERRAL SELF           Confirmed By: Fam Benoit     Significant Imaging: I have reviewed all relevant and available imaging results/findings within the past 24 hours.    I spent a total of 55 minutes on the day of the visit.This includes face to face time and non-face to face time preparing to see the patient (eg, review of tests), obtaining and/or reviewing separately obtained history, documenting clinical information in the electronic or other health record, independently interpreting results and communicating results to the patient/family/caregiver, or care coordinator.    Vikki Hylton MD  Date of Service: 06/23/2025      This note was created using [employer] voice recognition software that occasionally misinterpreted phrases or words.

## 2025-06-23 NOTE — PROGRESS NOTES
[facility] Medicine  Progress Note    Patient Name: Rick Butler  MRN: [id number]  Patient Class: IP- Inpatient   Admission Date: 6/18/2025  Length of Stay: 5 days  Attending Physician: Abbi Siegel MD  Primary Care Provider: Reynaldo Basilio FNP-C        Subjective     Principal Problem:Severe sepsis        HPI:  65 year old pt getting admitted with Sepsis/Severe pneumonia/Neutropenic fever  Pt was suffering from URTI like infection for past several days  Later he suffered from severe SOB/cough  Today symptoms went worse, came to ER found to be hypoxic and got admitted     Overview/Hospital Course:  65-year-old male with myelodysplastic syndrome admitted with severe sepsis/pneumonia/neutropenic fever. Continue antibiotics and ID followed.  Later patient developed elevated troponin for demand ischemia and developed AFib with RVR and placed on IV amiodarone and transitioned to oral amiodarone.  For symptomatic anemia, s/p 2 units of packed red blood cells .For MDS, Give plts if less than 20 or bleeding.Transfuse prbc if hgb less that 7 and Heme/oncology followed.     Interval History:  Patient reports feeling poorly today.  Platelets 33, discontinue full-dose Lovenox order for now.    Review of Systems   All other systems reviewed and are negative.    Objective:     Vital Signs (Most Recent):  Temp: 98 °F (36.7 °C) (06/23/25 0400)  Pulse: 95 (06/23/25 0700)  Resp: 18 (06/23/25 0700)  BP: (!) 153/81 (06/23/25 0400)  SpO2: (!) 93 % (06/23/25 0700) Vital Signs (24h Range):  Temp:  [97.9 °F (36.6 °C)-98.1 °F (36.7 °C)] 98 °F (36.7 °C)  Pulse:  [] 95  Resp:  [8-39] 18  SpO2:  [91 %-99 %] 93 %  BP: (147-167)/(81-94) 153/81     Weight: 93.7 kg (206 lb 9.1 oz)  Body mass index is 30.51 kg/m².    Intake/Output Summary (Last 24 hours) at 6/23/2025 1110  Last data filed at 6/23/2025 0700  Gross per 24 hour   Intake 360 ml   Output 800 ml   Net -440 ml         Physical Exam  HENT:       "Mouth/Throat:      Mouth: Mucous membranes are moist.   Eyes:      Conjunctiva/sclera: Conjunctivae normal.   Cardiovascular:      Rate and Rhythm: Normal rate.      Heart sounds: Murmur heard.   Pulmonary:      Breath sounds: Wheezing and rales present.   Abdominal:      General: There is no distension.   Musculoskeletal:      Right lower leg: No edema.      Left lower leg: No edema.   Skin:     Coloration: Skin is pale.   Neurological:      Mental Status: He is alert and oriented to person, place, and time.               Significant Labs: All pertinent labs within the past 24 hours have been reviewed.    Significant Imaging: I have reviewed all pertinent imaging results/findings within the past 24 hours.      Assessment & Plan  Severe sepsis  This patient does have evidence of infective focus  My overall impression is sepsis with Acute kidney injury, Acute respiratory failure, and Thrombocytopenia .  Source: Respiratory Infection  Antibiotics given-   Antibiotics (72h ago, onward)      Start     Stop Route Frequency Ordered    06/23/25 1215  piperacillin-tazobactam (ZOSYN) 4.5 g in D5W 100 mL IVPB (MB+)         -- IV Every 8 hours (non-standard times) 06/23/25 1111    06/18/25 2100  doxycycline capsule 100 mg         -- Oral Every 12 hours 06/18/25 1752          Latest lactate reviewed-  No results for input(s): "LACTATE", "POCLAC" in the last 72 hours.    Acute pneumonia  Maintain iv abx as below  De escalate as needed     Antibiotics (From admission, onward)      Start     Stop Route Frequency Ordered    06/23/25 1215  piperacillin-tazobactam (ZOSYN) 4.5 g in D5W 100 mL IVPB (MB+)         -- IV Every 8 hours (non-standard times) 06/23/25 1111    06/18/25 2100  doxycycline capsule 100 mg         -- Oral Every 12 hours 06/18/25 1752            Microbiology Results (last 7 days)       Procedure Component Value Units Date/Time    Blood culture x two cultures. Draw prior to antibiotics. [[id number]]  (Normal) " Collected: 06/18/25 0930    Order Status: Completed Specimen: Blood Updated: 06/23/25 1001     CULTURE, BLOOD ([facility]) No Growth After 5 Days    Blood culture x two cultures. Draw prior to antibiotics. [[id number]]  (Normal) Collected: 06/18/25 0932    Order Status: Completed Specimen: Blood Updated: 06/23/25 1001     CULTURE, BLOOD ([facility]) No Growth After 5 Days    Culture, Respiratory with Gram Stain [[id number]] Collected: 06/19/25 0950    Order Status: Completed Specimen: Respiratory Updated: 06/21/25 0701     Respiratory Culture Normal respiratory federico     GRAM STAIN (RESPIRATORY) >10 Epithelial Cells/LPF      Few WBC seen      Rare Gram Positive Rods     Comment: Corrected result: Previously reported as Rare Branching Gram positive rods on 6/19/2025 at 1225 CDT.         Rare Gram positive cocci    Culture, Respiratory with Gram Stain [[id number]] Collected: 06/19/25 0734    Order Status: Completed Specimen: Respiratory from Sputum, Expectorated Updated: 06/19/25 0844     Respiratory Culture Specimen inadequate - culture not performed     GRAM STAIN (RESPIRATORY) >10epis/lfp and <than many WBC's      Predominance of oropharyngeal federico. Please recollect    MRSA Screen by PCR [[id number]]  (Normal) Collected: 06/18/25 1948    Order Status: Completed Specimen: Nasal Swab Updated: 06/18/25 2115     MRSA PCR SCRN ([facility]) Not Detected    Respiratory Infection Panel (PCR), Nasopharyngeal [[id number]] Collected: 06/18/25 1027    Order Status: Completed Specimen: Nasopharyngeal Swab Updated: 06/18/25 1209     Respiratory Infection Panel Source Nasopharyngeal Swab     Adenovirus Not Detected     Coronavirus 229E, Common Cold Virus Not Detected     Coronavirus HKU1, Common Cold Virus Not Detected     Coronavirus NL63, Common Cold Virus Not Detected     Coronavirus OC43, Common Cold Virus Not Detected     SARS-CoV2 (COVID-19) Qualitative PCR Not Detected     Human Metapneumovirus Not Detected     Human Rhinovirus/Enterovirus  Not Detected     Influenza A (subtypes H1, H1-2009,H3) Not Detected     Influenza B Not Detected     Parainfluenza Virus 1 Not Detected     Parainfluenza Virus 2 Not Detected     Parainfluenza Virus 3 Not Detected     Parainfluenza Virus 4 Not Detected     Respiratory Syncytial Virus Not Detected     Bordetella Parapertussis ([id number]) Not Detected     Bordetella pertussis (ptxP) Not Detected     Chlamydia pneumoniae Not Detected     Mycoplasma pneumoniae Not Detected          Sickle cell trait syndrome  Aware     RARS (refractory anemia with ringed sideroblasts)  Aware   Patient received 1 unit of PRBC on 6/18/25.  Patient agreeable to receive another unit of packed red blood cell today.    Recent Labs     06/21/25  0302 06/22/25  0315 06/23/25  0612   HGB 8.2* 7.8* 8.6*   HCT 25.4* 24.0* 26.6*       MDS (myelodysplastic syndrome)  Aware   Give plts if less than 20 or bleeding.Transfuse prbc if hgb less that 7 and Heme/oncology followed.   Anemia, chronic renal failure, stage 3 (moderate)  Aware   Also has MDS  Neutropenic fever  Maintain Rx as ordered  Isolation precautions    Acute on chronic anemia  pRBC ordered   Give plts if less than 20 or bleeding.Transfuse prbc if hgb less that 7 and Heme/oncology followed.   Thrombocytopenia  Continue to monitor  Heme oncology is following  Holding Lovenox  Elevated troponin  Trended down likely demand  Cardiology is following  Atrial fibrillation with rapid ventricular response   IV amiodarone transitioned to oral on 06/21  Anticoagulation per Cardiology team recommendation, currently holding as platelets under 50  TORRES (acute kidney injury)  TORRES is likely due to multifactorial.Most recent creatinine and eGFR are listed below.  Recent Labs     06/21/25  0302 06/22/25  0315 06/23/25  0612   CREATININE 3.6* 3.3* 3.1*   EGFRNORACEVR 18* 20* 21*      Plan  - TORRES is improving  - Avoid nephrotoxins and renally dose meds for GFR listed above  - Monitor urine output, serial BMP, and  adjust therapy as needed  - nephrology is following  VTE Risk Mitigation (From admission, onward)           Ordered     IP VTE HIGH RISK PATIENT  Once         06/18/25 1145     Place sequential compression device  Until discontinued         06/18/25 1145                    Discharge Planning   ELMO: 6/25/2025     Code Status: Full Code   Medical Readiness for Discharge Date: 6/20/2025  Discharge Plan A: Home with family                  Please place Justification for DME      Abbi Siegel MD  Department of Hospital Medicine   [facility]

## 2025-06-23 NOTE — PROGRESS NOTES
Brief cardiology note  06/23/2025  Patient is in normal sinus rhythm on telemetry  Continue amiodarone taper as ordered  Anticoagulation on hold given low platelets  No medication changes recommended        ABBEY Reyes, CVNP-BC  [facility]  Department of Cardiology  Date of service: 06/23/2025      I have personally interviewed and examined the patient, I have reviewed the Nurse Practitioner's history and physical, assessment, and plan.  I have also reviewed and antiplatelet all the pertinent lab and imaging data. I have personally evaluated the patient at bedside and agree with the findings and made appropriate changes as necessary in recommendations.        Dr. Paco MD  [facility]  Department of Cardiology  Date of service: 06/23/2025

## 2025-06-23 NOTE — ASSESSMENT & PLAN NOTE
TORRES is likely due to multifactorial.Most recent creatinine and eGFR are listed below.  Recent Labs     06/21/25  0302 06/22/25  0315 06/23/25  0612   CREATININE 3.6* 3.3* 3.1*   EGFRNORACEVR 18* 20* 21*      Plan  - TORRES is improving  - Avoid nephrotoxins and renally dose meds for GFR listed above  - Monitor urine output, serial BMP, and adjust therapy as needed  - nephrology is following

## 2025-06-23 NOTE — PT/OT/SLP PROGRESS
Physical Therapy Treatment    Patient Name:  Rick Butler   MRN:  [id number]    Recommendations:     Discharge Recommendations: Low Intensity Therapy  Discharge Equipment Recommendations:  (TBD)  Barriers to discharge: decreased activity tolerance, respiratory deficits    Assessment:     Rick Butler is a 65 y.o. male admitted with a medical diagnosis of Severe sepsis.  He presents with the following impairments/functional limitations: weakness, impaired endurance, impaired functional mobility, gait instability, impaired cardiopulmonary response to activity.    Pt agreeable to visit. Pt performed sit to stand transfer with RW and contact guard assist.    Pt ambulated 45', 20', 50', and 15' with RW and contact guard progressing to stand by assist. Pt required standing rest breaks secondary to dyspnea. Spo2 above 95% throughout ambulation on 6 L O2.    Pt request back to bed as he as been up in chair for a while. Stand by assist for sit to supine transfer.    Rehab Prognosis: Fair; patient would benefit from acute skilled PT services to address these deficits and reach maximum level of function.    Recent Surgery: * No surgery found *      Plan:     During this hospitalization, patient to be seen daily to address the identified rehab impairments via gait training, therapeutic activities, therapeutic exercises and progress toward the following goals:    Plan of Care Expires:  07/20/25    Subjective     Chief Complaint: shortness of breath  Patient/Family Comments/goals: to get better  Pain/Comfort:  Pain Rating 1: 0/10      Objective:     Communicated with nurse prior to session.  Patient found up in chair with oxygen, telemetry upon PT entry to room.     General Precautions: Standard, fall, respiratory  Orthopedic Precautions: N/A  Braces: N/A  Respiratory Status: Nasal cannula, flow 6 L/min     Functional Mobility:  Bed Mobility:     Sit to Supine: stand by assistance  Transfers:     Sit to Stand:  contact guard  assistance with rolling walker  Gait: x 45', 20', 50', and 15' RW CGA > SBA, standing rest breaks due to dyspnea      AM-PAC 6 CLICK MOBILITY          Treatment & Education:  Pt educated on importance of time OOB, importance of intermittent mobility, safe techniques for transfers/ambulation, discharge recommendations/options, and use of call light for assistance and fall prevention.      Patient left HOB elevated with all lines intact, call button in reach, and spouse present..    GOALS:   Multidisciplinary Problems       Physical Therapy Goals          Problem: Physical Therapy    Goal Priority Disciplines Outcome Interventions   Physical Therapy Goal     PT, PT/OT     Description: Goals to be met by: 2025     Patient will increase functional independence with mobility by performin. Supine to sit with Modified [place]  2. Sit to supine with Modified [place]  3. Sit to stand transfer with Modified [place]  4. Gait  x 300  feet with Modified [place] using Rolling Walker.                            Time Tracking:     PT Received On: 25  PT Start Time: 1119     PT Stop Time: 1133  PT Total Time (min): 14 min     Billable Minutes: Gait Training 14    Treatment Type: Treatment  PT/PTA: PTA     Number of PTA visits since last PT visit: 2025

## 2025-06-23 NOTE — ASSESSMENT & PLAN NOTE
Maintain iv abx as below  De escalate as needed     Antibiotics (From admission, onward)      Start     Stop Route Frequency Ordered    06/23/25 1215  piperacillin-tazobactam (ZOSYN) 4.5 g in D5W 100 mL IVPB (MB+)         -- IV Every 8 hours (non-standard times) 06/23/25 1111    06/18/25 2100  doxycycline capsule 100 mg         -- Oral Every 12 hours 06/18/25 1752            Microbiology Results (last 7 days)       Procedure Component Value Units Date/Time    Blood culture x two cultures. Draw prior to antibiotics. [[id number]]  (Normal) Collected: 06/18/25 0930    Order Status: Completed Specimen: Blood Updated: 06/23/25 1001     CULTURE, BLOOD ([facility]) No Growth After 5 Days    Blood culture x two cultures. Draw prior to antibiotics. [[id number]]  (Normal) Collected: 06/18/25 0932    Order Status: Completed Specimen: Blood Updated: 06/23/25 1001     CULTURE, BLOOD ([facility]) No Growth After 5 Days    Culture, Respiratory with Gram Stain [[id number]] Collected: 06/19/25 0950    Order Status: Completed Specimen: Respiratory Updated: 06/21/25 0701     Respiratory Culture Normal respiratory federico     GRAM STAIN (RESPIRATORY) >10 Epithelial Cells/LPF      Few WBC seen      Rare Gram Positive Rods     Comment: Corrected result: Previously reported as Rare Branching Gram positive rods on 6/19/2025 at 1225 CDT.         Rare Gram positive cocci    Culture, Respiratory with Gram Stain [[id number]] Collected: 06/19/25 0734    Order Status: Completed Specimen: Respiratory from Sputum, Expectorated Updated: 06/19/25 0844     Respiratory Culture Specimen inadequate - culture not performed     GRAM STAIN (RESPIRATORY) >10epis/lfp and <than many WBC's      Predominance of oropharyngeal federico. Please recollect    MRSA Screen by PCR [[id number]]  (Normal) Collected: 06/18/25 1948    Order Status: Completed Specimen: Nasal Swab Updated: 06/18/25 2115     MRSA PCR SCRN ([facility]) Not Detected    Respiratory Infection Panel (PCR), Nasopharyngeal  [[id number]] Collected: 06/18/25 1027    Order Status: Completed Specimen: Nasopharyngeal Swab Updated: 06/18/25 1209     Respiratory Infection Panel Source Nasopharyngeal Swab     Adenovirus Not Detected     Coronavirus 229E, Common Cold Virus Not Detected     Coronavirus HKU1, Common Cold Virus Not Detected     Coronavirus NL63, Common Cold Virus Not Detected     Coronavirus OC43, Common Cold Virus Not Detected     SARS-CoV2 (COVID-19) Qualitative PCR Not Detected     Human Metapneumovirus Not Detected     Human Rhinovirus/Enterovirus Not Detected     Influenza A (subtypes H1, H1-2009,H3) Not Detected     Influenza B Not Detected     Parainfluenza Virus 1 Not Detected     Parainfluenza Virus 2 Not Detected     Parainfluenza Virus 3 Not Detected     Parainfluenza Virus 4 Not Detected     Respiratory Syncytial Virus Not Detected     Bordetella Parapertussis ([id number]) Not Detected     Bordetella pertussis (ptxP) Not Detected     Chlamydia pneumoniae Not Detected     Mycoplasma pneumoniae Not Detected

## 2025-06-23 NOTE — ASSESSMENT & PLAN NOTE
"This patient does have evidence of infective focus  My overall impression is sepsis with Acute kidney injury, Acute respiratory failure, and Thrombocytopenia .  Source: Respiratory Infection  Antibiotics given-   Antibiotics (72h ago, onward)      Start     Stop Route Frequency Ordered    06/23/25 1215  piperacillin-tazobactam (ZOSYN) 4.5 g in D5W 100 mL IVPB (MB+)         -- IV Every 8 hours (non-standard times) 06/23/25 1111    06/18/25 2100  doxycycline capsule 100 mg         -- Oral Every 12 hours 06/18/25 1584          Latest lactate reviewed-  No results for input(s): "LACTATE", "POCLAC" in the last 72 hours.    "

## 2025-06-23 NOTE — PLAN OF CARE
06/22/25 2149   Patient Assessment/Suction   Level of Consciousness (AVPU) alert   Respiratory Effort Unlabored   Expansion/Accessory Muscles/Retractions no use of accessory muscles;expansion symmetric   All Lung Fields Breath Sounds rhonchi;coarse;diminished   Rhythm/Pattern, Respiratory pattern regular;unlabored   Cough Frequency frequent   Cough Type congested;productive   Skin Integrity   $ Wound Care Tech Time 15 min   Area Observed Left;Right;Behind ear;Nares   Skin Appearance without discoloration   PRE-TX-O2   Device (Oxygen Therapy) nasal cannula   $ Is the patient on Low Flow Oxygen? Yes   Flow (L/min) (Oxygen Therapy) 3   SpO2 97 %   Pulse Oximetry Type Intermittent   $ Pulse Oximetry - Multiple Charge Pulse Oximetry - Multiple   Pulse 110   Resp 20   Aerosol Therapy   $ Aerosol Therapy Charges PRN treatment not required   Respiratory Treatment Status (SVN) PRN treatment not required   Incentive Spirometer   $ Incentive Spirometer Charges done with encouragement   Administration (IS) instruction provided, follow-up;mouthpiece utilized   Number of Repetitions (IS) 10   Level Incentive Spirometer (mL) 1500   Patient Tolerance (IS) good;no adverse signs/symptoms present   Vibratory PEP Therapy   $ Vibratory PEP Charges Aerobika Therapy   $ Vibratory PEP Tech Time Charges 15 min   Daily Review of Necessity (PEP Therapy) completed   Type (PEP Therapy) vibratory/oscillatory   Device (PEP Therapy) flutter   Route (PEP Therapy) mouthpiece   Breaths per Cycle (PEP Therapy) 15   Cycles (PEP Therapy) 1   Settings (PEP Therapy) PEP 3   Patient Position (PEP Therapy) HOB elevated   Post Treatment Assessment (PEP) breath sounds improved;increased aeration;congestion decreased;work of breathing decreased   Signs of Intolerance (PEP Therapy) none   Education   $ Education Bronchodilator;PEP Therapy;Incentive Spirometry;15 min

## 2025-06-23 NOTE — PROGRESS NOTES
INPATIENT NEPHROLOGY Progress Note   [facility]    Patient Name: Rick Butler  Date: 06/23/2025    Reason for consultation: TORRES    Chief Complaint:   Chief Complaint   Patient presents with    Shortness of Breath     X 1 day.     Fatigue    Cough     Onset lastnite       History of Present Illness:  65-year-old male with history of myelodysplastic syndrome, sickle cell trait, chronic kidney disease stage 3 f/b Dr. Mas, depression, former smoker, ETOH abuse, lumbar degenerative disc disease. Patient presents to the emergency department with complaint of shortness of breath and increasing fatigue over last 24 hours. Patient states had slightly productive cough as well during this time. Patient decided come to the emergency department for further evaluation. On arrival to ER found with temperature of 103°, heart rate of 132, SBP 90s, with room air sat of 91%. Diagnosed with RLL PNA. Consulted for TORRES.    Notable home meds- norvasc, calcitriol, flomax    Echo:    Left Ventricle: The left ventricle is normal in size. Normal wall thickness. Mild global hypokinesis present. There is low normal systolic function with a visually estimated ejection fraction of 50 - 55%. There is normal diastolic function.    Right Ventricle: The right ventricle is normal in size Systolic function is normal.    Left Atrium: The left atrium is moderately dilated    Mitral Valve: There is moderate regurgitation.    Tricuspid Valve: There is mild to moderate regurgitation. There is pulmonary hypertension. The estimated PA systolic pressure is at least 36 mmHg.    Interval History:  6/19- afebrile, SBP 100s, on 4L NC, UOP 1.2L, transfused 1u of blood yest, on levophed and NS IVFs  6/20- afebrile, elevated HR, SBP 120s, on 3-4L NC, coughing, UOP 1.6L, getting blood, off levophed, on amio gtt (went in to AF with RVR overnight), on NS IVFs, echo with TR/pulm HTN  6/21- afebrile, HR improved, BP stable, on 4L NC, UOP 1.8L-  transfused yest, on amio gtt  6/22- VSS, on 3L NC, UOP 2L + voids, renal imaging unrevealing  6/23  1L UOP recorded. VSS, renal function improving.  Productive cough, no fevers.    Plan of Care:    Assessment:  Septic shock due to HCAP in an IC patient  TORRES/CKD IV  Elevated BNP and troponin- EF 50-55%, moderate MR, moderate TR, pulm HTN  AF with RVR  Metabolic acidosis  SHPT  MDS on chemotherapy  BPH    Plan:    - on treatment for HCAP- blood cx and RVP negative- remains off pressors- dose meds for CrCl 20  - TORRES is due to septic shock- SCr is improving- nonoliguric- he has no acute RRT needs- nonoliguric- no nsaids or IV contrast  - trop improving- echo noted- monitor for s/s volume overload  - rate controlled- switched to PO amio, on lovenox   - acidosis is mild- component of dilution- hold off sod bicarb for now to minimize salt load- reassess tomorrow  - continue calcitriol  - H/H and platelets fluctuating- transfusion goals per heme/onc  - continue flomax    Thank you for allowing us to participate in this patient's care. We will continue to follow.    Vital Signs:  Temp Readings from Last 3 Encounters:   06/23/25 98 °F (36.7 °C) (Oral)   06/05/25 100 °F (37.8 °C) (Temporal)   06/05/25 99 °F (37.2 °C)       Pulse Readings from Last 3 Encounters:   06/23/25 95   06/05/25 72   06/05/25 70       BP Readings from Last 3 Encounters:   06/23/25 (!) 153/81   06/05/25 (!) 160/89   06/05/25 (!) 170/90       Weight:  Wt Readings from Last 3 Encounters:   06/18/25 93.7 kg (206 lb 9.1 oz)   06/05/25 96.2 kg (212 lb)   06/05/25 96.6 kg (213 lb)       INS/OUTS:  I/O last 3 completed shifts:  In: -   Out: 2225 [Urine:2225]  No intake/output data recorded.    Medications:  Scheduled Meds:   amiodarone  400 mg Oral TID    Followed by    [START ON 6/26/2025] amiodarone  400 mg Oral BID    Followed by    [START ON 7/1/2025] amiodarone  400 mg Oral Daily    amLODIPine  10 mg Oral Daily    [START ON 6/24/2025] calcitRIOL  0.25 mcg  "Oral Q7 Days    cefUROXime  500 mg Oral Daily    doxycycline  100 mg Oral Q12H    folic acid  2,000 mcg Oral Daily    morphine  2 mg Intravenous Once    mupirocin   Nasal BID    pantoprazole  40 mg Oral Daily    tamsulosin  0.4 mg Oral Daily    traZODone  100 mg Oral QHS     Continuous Infusions:      PRN Meds:.  Current Facility-Administered Medications:     0.9%  NaCl infusion (for blood administration), , Intravenous, Q24H PRN    0.9%  NaCl infusion (for blood administration), , Intravenous, Q24H PRN    0.9%  NaCl infusion (for blood administration), , Intravenous, Q24H PRN    acetaminophen, 650 mg, Oral, Q8H PRN    acetaminophen, 650 mg, Oral, Q4H PRN    aluminum-magnesium hydroxide-simethicone, 30 mL, Oral, QID PRN    benzonatate, 200 mg, Oral, TID PRN    dextromethorphan-guaiFENesin  mg, 1 tablet, Oral, BID PRN    furosemide (LASIX) injection, 20 mg, Intravenous, PRN    HYDROcodone-acetaminophen, 1 tablet, Oral, Q6H PRN    levalbuterol, 1.25 mg, Nebulization, Q6H PRN    magnesium oxide, 800 mg, Oral, PRN    magnesium oxide, 800 mg, Oral, PRN    melatonin, 6 mg, Oral, Nightly PRN    naloxone, 0.02 mg, Intravenous, PRN    ondansetron, 4 mg, Intravenous, Q6H PRN    potassium bicarbonate, 35 mEq, Oral, PRN    potassium bicarbonate, 50 mEq, Oral, PRN    potassium bicarbonate, 60 mEq, Oral, PRN    potassium, sodium phosphates, 2 packet, Oral, PRN    potassium, sodium phosphates, 2 packet, Oral, PRN    potassium, sodium phosphates, 2 packet, Oral, PRN  Medications Ordered Prior to Encounter[1]    Review of Systems:  Neg    Physical Exam:  BP (!) 153/81   Pulse 95   Temp 98 °F (36.7 °C) (Oral)   Resp 18   Ht 5' 9" (1.753 m)   Wt 93.7 kg (206 lb 9.1 oz)   SpO2 (!) 93%   BMI 30.51 kg/m²     General Appearance:    NAD, AAO x 3, cooperative, appears stated age   Head:    Normocephalic, atraumatic   Eyes:    PER, EOMI, and conjunctiva/sclera clear bilaterally        Mouth:   Moist mucus membranes, no thrush or " oral lesions, normal      dentition   Back:     No CVA tenderness   Lungs:     Clear to auscultation bilaterally, no wheeze, crackles, rales      or rhonchi, symmetric air movement, respirations unlabored   Heart:    Regular rate and rhythm, S1 and S2 normal, no murmur, rub   or gallop   Abdomen:     Soft, non-tender, non-distended, bowel sounds active all four   quadrants, no RT or guarding, no masses, no organomegaly   Extremities:   Warm and well perfused, distal pulses intact, no cyanosis or    peripheral edema   MSK:   No joint or muscle swelling, tenderness or deformity   Skin:   Skin color, texture, turgor normal, no rashes or lesions   Neurologic/Psychiatric:   CNII-XII intact, normal strength and sensation       throughout, no asterixis; normal affect, memory, judgement     and insight     Results:  Lab Results   Component Value Date     06/23/2025    K 4.4 06/23/2025     (H) 06/23/2025    CO2 21 (L) 06/23/2025    BUN 39 (H) 06/23/2025    CREATININE 3.1 (H) 06/23/2025    CALCIUM 9.2 06/23/2025    ANIONGAP 8 06/23/2025    ESTGFRAFRICA 45.6 (A) 07/13/2022    EGFRNONAA 39.5 (A) 07/13/2022       Lab Results   Component Value Date    CALCIUM 9.2 06/23/2025    PHOS 4.1 06/19/2025       Recent Labs   Lab 06/23/25  0612   WBC 5.31   RBC 3.11*   HGB 8.6*   HCT 26.6*   PLT 27*   MCV 86   MCH 27.7   MCHC 32.3       Juana Sanchez NP      [facility]  [address] [place] [id number]  [phone] (p)  [phone] (f)                 [1]   No current facility-administered medications on file prior to encounter.     Current Outpatient Medications on File Prior to Encounter   Medication Sig Dispense Refill    amLODIPine (NORVASC) 5 MG tablet Take 1 tablet (5 mg total) by mouth once daily. 90 tablet 3    ascorbic acid, vitamin C, (VITAMIN C) 500 MG tablet Take 500 mg by mouth once daily.      atorvastatin (LIPITOR) 10 MG tablet Take 1 tablet (10 mg total) by mouth every evening. 90  tablet 3    calcitRIOL (ROCALTROL) 0.25 MCG Cap Take 0.25 mcg by mouth every 7 days.      cyproheptadine (PERIACTIN) 4 mg tablet Take 1 tablet (4 mg total) by mouth 3 (three) times daily. 90 tablet 3    droNABinol (MARINOL) 10 MG capsule Take 1 capsule (10 mg total) by mouth 2 (two) times daily before meals. 60 capsule 1    famotidine (PEPCID) 20 MG tablet Take 20 mg by mouth 2 (two) times daily.      fluticasone propionate (FLONASE) 50 mcg/actuation nasal spray 1 SPRAY (50 MCG TOTAL) BY EACH NOSTRIL ROUTE 2 (TWO) TIMES A DAY. 1 SPRAY EVERY MORNING AND AFTERNOON TOWARD THE EAR EACH SIDE AFTER A SINUS RINSE 48 mL 1    folic acid (FOLVITE) 1 MG tablet TAKE 2 TABLETS BY MOUTH EVERY DAY (Patient taking differently: Take 2,000 mcg by mouth once daily.) 180 tablet 0    multivitamin with minerals Cap Take 1 capsule by mouth every morning.      pantoprazole (PROTONIX) 40 MG tablet Take 40 mg by mouth once daily.      tamsulosin (FLOMAX) 0.4 mg Cap TAKE 2 CAPSULES BY MOUTH EVERY DAY (Patient taking differently: Take 2 capsules by mouth once daily.) 180 capsule 1    traZODone (DESYREL) 100 MG tablet TAKE 1 TABLET BY MOUTH NIGHTLY AS NEEDED FOR INSOMNIA. 90 tablet 2    LIDOcaine (LIDODERM) 5 % Place 1 patch onto the skin once daily. Remove & Discard patch within 12 hours or as directed by MD 14 patch 0    sildenafil (VIAGRA) 100 MG tablet Take 1 tablet (100 mg total) by mouth daily as needed for Erectile Dysfunction. 10 tablet 6    sod chlor-bicarb-squeez bottle (NEILMED SINUS RINSE COMPLETE) pkdv 1 application  by sinus irrigation route 2 (two) times a day. Not right before bed 1 each 11    [DISCONTINUED] fluoxetine 20 MG tablet TAKE 1 TABLET BY MOUTH EVERY DAY 30 tablet 5

## 2025-06-23 NOTE — PROGRESS NOTES
Pulmonary/Critical Care  Progress Note      PATIENT NAME: Rick Butler  MRN: [id number]  TODAY'S DATE: 2025  1:55 PM  ADMIT DATE: 2025  AGE: 65 y.o. : 1960    CONSULT REQUESTED BY: Abbi Siegel MD    REASON FOR CONSULT:   Pneumonia    HPI:  Mr. Butler is a 65 year old man with prior dx of MDS/RARS and sickle cell trait, who presents with pneumonia.     He reports his symptoms started suddenly yesterday he was feeling fatigued, febrile, chills, and short of breath.     He reports he is on chemotherapy for his MDS. He reports no productive cough.  Febrile to 103 and elevated HR to 132    2025 - Stable overnight, no new issues reported and feels better.  Still with AF/RVR (cardiology following).  WBC has recovered.  Hgb and platelets still low.      2025 - Stable overnight and is feeling better overall.  He says he has been having issues with decreased appetite as outpt and has been on marinol (insurance not covering) and cyproheptadine (not much change ).  Labs reviewed, renal function better.  R has been good since about 1 PM yesterday    2025 - Stable overnight, feels good and no new complaints. Not moved out of ICU because of bed issues.   On 3 LPM.  Renal function is better.  CXR is about the same.      2025- Seen and examined he just finished working with physical therapy, he is able to walk around, he reports he is still getting short of breath with the exertion.  He reports that he has a persistent cough and a dripping sensation in the back of his throat.  No fever night sweats, he is still expectorating sputum and occasional blood-tinged sputum.    Review of Systems   Constitutional:  Positive for chills and fever. Negative for appetite change and unexpected weight change.   HENT:  Negative for nosebleeds, postnasal drip, trouble swallowing and voice change.    Eyes:  Negative for visual disturbance.   Respiratory:  Positive for shortness of breath. Negative for cough  and wheezing.    Cardiovascular:  Negative for chest pain and leg swelling.   Gastrointestinal:  Negative for diarrhea, nausea and vomiting.   Endocrine: Negative for cold intolerance and heat intolerance.   Genitourinary:  Negative for dysuria, flank pain and frequency.   Musculoskeletal:  Negative for neck pain and neck stiffness.   Allergic/Immunologic: Negative for environmental allergies and immunocompromised state.   Neurological:  Negative for syncope, speech difficulty and weakness.   Hematological:  Negative for adenopathy.   Psychiatric/Behavioral:  Negative for confusion.            ALLERGIES  Review of patient's allergies indicates:  No Known Allergies    INPATIENT SCHEDULED MEDICATIONS   amiodarone  400 mg Oral TID    Followed by    [START ON 6/26/2025] amiodarone  400 mg Oral BID    Followed by    [START ON 7/1/2025] amiodarone  400 mg Oral Daily    amLODIPine  10 mg Oral Daily    [START ON 6/24/2025] calcitRIOL  0.25 mcg Oral Q7 Days    doxycycline  100 mg Oral Q12H    folic acid  2,000 mcg Oral Daily    morphine  2 mg Intravenous Once    pantoprazole  40 mg Oral Daily    piperacillin-tazobactam (Zosyn) IV (PEDS and ADULTS) (extended infusion is not appropriate)  4.5 g Intravenous Q8H    tamsulosin  0.4 mg Oral Daily    traZODone  100 mg Oral QHS           MEDICAL AND SURGICAL HISTORY  Past Medical History:   Diagnosis Date    Abnormal chest CT (new) 08/17/2022    Anemia due to multiple mechanisms 01/13/2019    Anemia, chronic renal failure, stage 2 (mild) 01/13/2019    Anemia, chronic renal failure, stage 3 (moderate) 08/02/2023    Depression     Former smoker 06/29/2017    H/O ETOH abuse 06/29/2017    Lung mass (new) 08/17/2022    Monocytosis 2019    Myelodysplasia (myelodysplastic syndrome)     Myelodysplasia (myelodysplastic syndrome)     Personal history of colonic polyps 12/29/2023    RARS (refractory anemia with ringed sideroblasts) 06/01/2020    Sickle cell trait      Past Surgical History:    Procedure Laterality Date    BONE MARROW BIOPSY N/A 12/20/2019    Procedure: BIOPSY, BONE MARROW;  Surgeon: Satinder Diagnostic Provider;  Location: [facility] OR;  Service: Interventional Radiology;  Laterality: N/A;    COLONOSCOPY N/A 11/05/2021    Procedure: COLONOSCOPY;  Surgeon: Rob Collins MD;  Location: [facility] ENDO;  Service: Endoscopy;  Laterality: N/A;    COLONOSCOPY  12/29/2023    5 YR RECALL    ESOPHAGOGASTRODUODENOSCOPY N/A 11/05/2021    Procedure: EGD (ESOPHAGOGASTRODUODENOSCOPY);  Surgeon: Rob Collins MD;  Location: [facility] ENDO;  Service: Endoscopy;  Laterality: N/A;    INJECTION OF ANESTHETIC AGENT AROUND MEDIAL BRANCH NERVES INNERVATING LUMBAR FACET JOINT Bilateral 05/25/2018    Procedure: BLOCK-NERVE-MEDIAL BRANCH-LUMBAR;  Surgeon: Nura Heredia MD;  Location: [facility];  Service: Pain Management;  Laterality: Bilateral;  L3, 4, 5    KNEE ARTHROSCOPY W/ MENISCECTOMY Right 12/22/2021    Procedure: ARTHROSCOPY, KNEE, WITH MENISCECTOMY;  Surgeon: Sebastian Green MD;  Location: [facility] OR;  Service: Orthopedics;  Laterality: Right;  partial medial meniscectomy    RADIOFREQUENCY ABLATION OF LUMBAR MEDIAL BRANCH NERVE AT SINGLE LEVEL Bilateral 07/20/2018    Procedure: RADIOFREQUENCY ABLATION, NERVE, MEDIAL BRANCH, LUMBAR, 1 LEVEL;  Surgeon: Nura Heredia MD;  Location: [facility];  Service: Pain Management;  Laterality: Bilateral;  L3, 4, 5 - Burned at 80 degrees C.  for 75 seconds x 2 each site    SMALL BOWEL ENTEROSCOPY N/A 10/16/2019    Procedure: ENTEROSCOPY;  Surgeon: Rob Collins MD;  Location: [facility];  Service: Endoscopy;  Laterality: N/A;       ALCOHOL, TOBACCO AND DRUG USE  Tobacco Use History[1]  Social History     Substance and Sexual Activity   Alcohol Use Not Currently    Alcohol/week: 21.0 standard drinks of alcohol    Types: 21 Cans of beer per week    Comment: Sober 5 years     Social History     Substance and Sexual Activity   Drug Use Not Currently    Types: Marijuana       FAMILY HISTORY  Family  History   Problem Relation Name Age of Onset    Arthritis Mother      Depression Mother      Heart disease Mother      Hypertension Mother      Heart attack Father  59       VITAL SIGNS (MOST RECENT)  Temp: 97.7 °F (36.5 °C) (06/23/25 1117)  Pulse: 89 (06/23/25 1117)  Resp: 16 (06/23/25 1117)  BP: (!) 148/83 (06/23/25 1117)  SpO2: 97 % (06/23/25 1305)    INTAKE AND OUTPUT (LAST 24 HOURS):  Intake/Output Summary (Last 24 hours) at 6/23/2025 1314  Last data filed at 6/23/2025 0700  Gross per 24 hour   Intake 360 ml   Output 800 ml   Net -440 ml       WEIGHT  Wt Readings from Last 1 Encounters:   06/18/25 93.7 kg (206 lb 9.1 oz)       Physical Exam  Vitals reviewed.   Constitutional:       General: He is not in acute distress.     Appearance: He is well-developed. He is ill-appearing. He is not diaphoretic.   HENT:      Head: Normocephalic and atraumatic.      Mouth/Throat:      Pharynx: No oropharyngeal exudate or posterior oropharyngeal erythema.   Eyes:      General: No scleral icterus.     Pupils: Pupils are equal, round, and reactive to light.   Neck:      Vascular: No JVD.   Cardiovascular:      Rate and Rhythm: Regular rhythm. Tachycardia present.      Heart sounds: Normal heart sounds. No murmur heard.  Pulmonary:      Effort: Pulmonary effort is normal. No respiratory distress.      Breath sounds: Wheezing and rales present.   Abdominal:      General: Bowel sounds are normal. There is no distension.      Palpations: Abdomen is soft.      Tenderness: There is no abdominal tenderness.   Musculoskeletal:         General: No swelling.      Cervical back: Normal range of motion and neck supple. No rigidity.   Skin:     General: Skin is warm and dry.      Capillary Refill: Capillary refill takes less than 2 seconds.      Coloration: Skin is pale.      Findings: No rash.   Neurological:      General: No focal deficit present.      Mental Status: He is alert and oriented to person, place, and time.      Cranial  "Nerves: No cranial nerve deficit.      Motor: No weakness or abnormal muscle tone.           ACUTE PHASE REACTANT (LAST 24 HOURS)  No results for input(s): "FERRITIN", "CRP", "LDH", "DDIMER" in the last 24 hours.    CBC LAST (LAST 24 HOURS)  Recent Labs   Lab 06/23/25  0612   WBC 5.31   RBC 3.11*   HGB 8.6*   HCT 26.6*   MCV 86   MCH 27.7   MCHC 32.3   RDW 17.3*   PLT 27*   NRBC 0       CHEMISTRY LAST (LAST 24 HOURS)  Recent Labs   Lab 06/23/25  0612      K 4.4   *   CO2 21*   ANIONGAP 8   BUN 39*   CREATININE 3.1*      CALCIUM 9.2   MG 1.5*   ALBUMIN 3.4*   PROT 6.9   ALKPHOS 65   ALT 20   AST 23   BILITOT 0.7       COAGULATION LAST (LAST 24 HOURS)  No results for input(s): "LABPT", "INR", "APTT" in the last 24 hours.    CARDIAC PROFILE (LAST 24 HOURS)  Recent Labs   Lab 06/18/25  0843   *          LAST 7 DAYS MICROBIOLOGY   Microbiology Results (last 7 days)       Procedure Component Value Units Date/Time    Blood culture x two cultures. Draw prior to antibiotics. [[id number]]  (Normal) Collected: 06/18/25 0930    Order Status: Completed Specimen: Blood Updated: 06/23/25 1001     CULTURE, BLOOD ([facility]) No Growth After 5 Days    Blood culture x two cultures. Draw prior to antibiotics. [[id number]]  (Normal) Collected: 06/18/25 0932    Order Status: Completed Specimen: Blood Updated: 06/23/25 1001     CULTURE, BLOOD ([facility]) No Growth After 5 Days    Culture, Respiratory with Gram Stain [[id number]] Collected: 06/19/25 0950    Order Status: Completed Specimen: Respiratory Updated: 06/21/25 0701     Respiratory Culture Normal respiratory federico     GRAM STAIN (RESPIRATORY) >10 Epithelial Cells/LPF      Few WBC seen      Rare Gram Positive Rods     Comment: Corrected result: Previously reported as Rare Branching Gram positive rods on 6/19/2025 at 1225 CDT.         Rare Gram positive cocci    Culture, Respiratory with Gram Stain [[id number]] Collected: 06/19/25 0734    Order Status: Completed " Specimen: Respiratory from Sputum, Expectorated Updated: 06/19/25 0844     Respiratory Culture Specimen inadequate - culture not performed     GRAM STAIN (RESPIRATORY) >10epis/lfp and <than many WBC's      Predominance of oropharyngeal federico. Please recollect    MRSA Screen by PCR [[id number]]  (Normal) Collected: 06/18/25 1948    Order Status: Completed Specimen: Nasal Swab Updated: 06/18/25 2115     MRSA PCR SCRN ([facility]) Not Detected    Respiratory Infection Panel (PCR), Nasopharyngeal [[id number]] Collected: 06/18/25 1027    Order Status: Completed Specimen: Nasopharyngeal Swab Updated: 06/18/25 1209     Respiratory Infection Panel Source Nasopharyngeal Swab     Adenovirus Not Detected     Coronavirus 229E, Common Cold Virus Not Detected     Coronavirus HKU1, Common Cold Virus Not Detected     Coronavirus NL63, Common Cold Virus Not Detected     Coronavirus OC43, Common Cold Virus Not Detected     SARS-CoV2 (COVID-19) Qualitative PCR Not Detected     Human Metapneumovirus Not Detected     Human Rhinovirus/Enterovirus Not Detected     Influenza A (subtypes H1, H1-2009,H3) Not Detected     Influenza B Not Detected     Parainfluenza Virus 1 Not Detected     Parainfluenza Virus 2 Not Detected     Parainfluenza Virus 3 Not Detected     Parainfluenza Virus 4 Not Detected     Respiratory Syncytial Virus Not Detected     Bordetella Parapertussis (HV1891) Not Detected     Bordetella pertussis (ptxP) Not Detected     Chlamydia pneumoniae Not Detected     Mycoplasma pneumoniae Not Detected            MOST RECENT IMAGING  X-Ray Chest AP Portable  Narrative: EXAMINATION:  XR CHEST AP PORTABLE    CLINICAL HISTORY:  Follow up on right lower lobe pneumoniae;    FINDINGS:  Portable chest with comparison chest x-ray 06/19/2025.  Normal cardiomediastinal silhouette.Hazy ill-defined opacity of the right middle lobe is unchanged.  No new airspace opacities.  Pulmonary vasculature is normal. No acute osseous abnormality.  Impression:  "Right middle lobe airspace opacities unchanged.    Electronically signed by: Herber Aaron  Date:    06/22/2025  Time:    06:50      CURRENT VISIT EKG  Results for orders placed or performed during the hospital encounter of 06/18/25   EKG 12-lead   Result Value Ref Range    QRS Duration 88 ms    [facility] QTC Calculation 441 ms    Narrative    Test Reason : R07.9,    Vent. Rate : 127 BPM     Atrial Rate : 127 BPM     P-R Int : 154 ms          QRS Dur :  88 ms      QT Int : 304 ms       P-R-T Axes :  58  54 -11 degrees    QTcB Int : 441 ms    Sinus tachycardia  LVH with repolarization abnormality ( Sokolow-Gomez )  Abnormal ECG  No previous ECGs available    Referred By: AAAREFERRAL SELF           Confirmed By:        ECHOCARDIOGRAM RESULTS  Results for orders placed during the hospital encounter of 10/06/23    Echo    Interpretation Summary    Left Ventricle: The left ventricle is normal in size. Moderately increased wall thickness. Normal wall motion. There is normal systolic function with a visually estimated ejection fraction of 55 - 60%.    Left Atrium: Left atrium is severely dilated.    Right Ventricle: Right ventricle was not well visualized due to poor acoustic window.    Aortic Valve: The aortic valve is a trileaflet valve. There is mild aortic regurgitation.    Mitral Valve: There is no stenosis. There is mild to moderate regurgitation.    Pulmonic Valve: There is mild regurgitation.    IVC/SVC: Normal venous pressure at 3 mmHg.        VENTILATOR INFORMATION              LAST ARTERIAL BLOOD GAS  ABG  No results for input(s): "PH", "PO2", "PCO2", "HCO3", "BE" in the last 168 hours.      ASSESSMENT:   Pneumonia  Sepsis  3.   Acute kidney injury    Suspected pneumonia for source of sepsis  He has been consistently improving and I think the pneumonia is responding to treatment  Today he is complaining of ongoing cough particularly worse with laying down flat.  I am suspecting he has upper airway cough syndrome from " postnasal drip.  PLAN:     - continue abx   - started on Flonase and daily antihistamine  - recommend to check for flu and covid  - will add 40mg daily pred for a few days, but not much wheezing.   - follow up culture results  - increase activity as able  - follow up TORRES and renal function  - replace electrolytes as needed  - increase activity      Cedric Werner MD  [facility] Pulmonary/Critical Care  2025                 [1]   Social History  Tobacco Use   Smoking Status Former    Current packs/day: 0.00    Types: Cigarettes    Quit date: 1996    Years since quittin.4   Smokeless Tobacco Never

## 2025-06-23 NOTE — PLAN OF CARE
Platelets low - Lovenox on hold - SOB remains      06/23/25 1252   Discharge Reassessment   Assessment Type Discharge Planning Reassessment   Did the patient's condition or plan change since previous assessment? Yes   Discharge Plan discussed with: Patient;Spouse/sig other   Communicated ELMO with patient/caregiver Yes   Discharge Plan A Home Health   DME Needed Upon Discharge  none   Transition of Care Barriers None   Why the patient remains in the hospital Requires continued medical care   Post-Acute Status   Post-Acute Authorization Home [facility]   Hospital Resources/Appts/Education Provided Provided patient/caregiver with written discharge plan information   Discharge Delays None known at this time
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
## 2025-06-23 NOTE — PROGRESS NOTES
[facility]  Hematology/Oncology  Progress Note    Patient Name: Rcik Butler  Admission Date: 6/18/2025  Hospital Length of Stay: 4 days  Code Status: Full Code     Subjective:     Interval History:  Sitting up in bedside chair doing well  Patient under the care of DR. Saez for MDS  Admitted with sepsis/shortness of breath fatigue cytopenias  Oncology Treatment Plan:   OP IMETELSTAT (RYTELO) Q4W    Medications:  Continuous Infusions:  Scheduled Meds:   amiodarone  400 mg Oral TID    Followed by    [START ON 6/26/2025] amiodarone  400 mg Oral BID    Followed by    [START ON 7/1/2025] amiodarone  400 mg Oral Daily    amLODIPine  10 mg Oral Daily    [START ON 6/24/2025] calcitRIOL  0.25 mcg Oral Q7 Days    cefUROXime  500 mg Oral Daily    doxycycline  100 mg Oral Q12H    enoxparin  1 mg/kg Subcutaneous Q24H (treatment, non-standard time)    folic acid  2,000 mcg Oral Daily    morphine  2 mg Intravenous Once    mupirocin   Nasal BID    pantoprazole  40 mg Oral Daily    tamsulosin  0.4 mg Oral Daily    traZODone  100 mg Oral QHS     PRN Meds:  Current Facility-Administered Medications:     0.9%  NaCl infusion (for blood administration), , Intravenous, Q24H PRN    0.9%  NaCl infusion (for blood administration), , Intravenous, Q24H PRN    0.9%  NaCl infusion (for blood administration), , Intravenous, Q24H PRN    acetaminophen, 650 mg, Oral, Q8H PRN    acetaminophen, 650 mg, Oral, Q4H PRN    aluminum-magnesium hydroxide-simethicone, 30 mL, Oral, QID PRN    benzonatate, 200 mg, Oral, TID PRN    dextromethorphan-guaiFENesin  mg, 1 tablet, Oral, BID PRN    furosemide (LASIX) injection, 20 mg, Intravenous, PRN    HYDROcodone-acetaminophen, 1 tablet, Oral, Q6H PRN    levalbuterol, 1.25 mg, Nebulization, Q6H PRN    magnesium oxide, 800 mg, Oral, PRN    magnesium oxide, 800 mg, Oral, PRN    melatonin, 6 mg, Oral, Nightly PRN    naloxone, 0.02 mg, Intravenous, PRN    ondansetron, 4 mg, Intravenous, Q6H PRN     potassium bicarbonate, 35 mEq, Oral, PRN    potassium bicarbonate, 50 mEq, Oral, PRN    potassium bicarbonate, 60 mEq, Oral, PRN    potassium, sodium phosphates, 2 packet, Oral, PRN    potassium, sodium phosphates, 2 packet, Oral, PRN    potassium, sodium phosphates, 2 packet, Oral, PRN     Review of Systems  Feeling somewhat weak and frail tired with smallest activity and short-winded but overall improving  +issues with generalized weakness .  + fatigue over above what is normally experienced with day-to-day activities  Denies fever, chills, rigors  Denies issues with ambulation  Denies generalized swelling or new lumps and bumps felt in any part  of body  Denies visual or hearing loss  Denies issues with congestion, sinus issues, cough, sputum production runny nose or itching eyes  Denies chest pain or palpitations, or passing out  Denies abdominal pain, reflux symptoms, nausea vomiting loose stools or constipation  Denies seizure activity or focal weaknesses or symptoms related to TIA, no head aches or blurred vision reported  Denies issues with skin rash or bruising  Denies issues with swelling of feet, tingling or numbness   No issues with sleep,     Objective:     Vital Signs (Most Recent):  Temp: 97.9 °F (36.6 °C) (06/22/25 2005)  Pulse: 91 (06/22/25 2005)  Resp: 18 (06/22/25 2005)  BP: (!) 167/90 (06/22/25 2005)  SpO2: 96 % (06/22/25 2005) Vital Signs (24h Range):  Temp:  [97.9 °F (36.6 °C)-98.4 °F (36.9 °C)] 97.9 °F (36.6 °C)  Pulse:  [] 91  Resp:  [8-50] 18  SpO2:  [91 %-100 %] 96 %  BP: (133-167)/(64-94) 167/90     Weight: 93.7 kg (206 lb 9.1 oz)  Body mass index is 30.51 kg/m².  Body surface area is 2.14 meters squared.      Intake/Output Summary (Last 24 hours) at 6/22/2025 2107  Last data filed at 6/22/2025 1036  Gross per 24 hour   Intake --   Output 1325 ml   Net -1325 ml       Physical Exam  VITAL SIGNS:  as above   GENERAL: appears well-built, well-nourished.  No anxiety, no agitation, and in  no distress.  Patient is awake, alert, oriented and cooperative.  HEENT:  Showed no congestion. Trachea is central no obvious icterus or pallor noted no hoarseness. no obvious JVD   NECK:  Supple.  No JVD. No obvious cervical submental or supraclavicular adenopathy.  RS:the visualized portion of  Chest expands well. chest appears symmetric, no audible wheezes.  No dyspnea recognized  ABDOMEN:  abdomen appears undistended.  EXTREMITIES:  Without edema.  NEUROLOGICAL:  The patient is appropriate, higher functions are normal.  No  obvious neurological deficits.  normal judgement normal thought content  No confusion, no speech impediment. Cranial nerves are intact and show no deficit. No gross motor deficits noted   SKIN MUSCULOSKELETAL: no joint or skeletal deformity, no clubbing of nails.  No visible rash ecchymosis or petechiae   Significant Labs:   Lab Results   Component Value Date    WBC 6.42 06/22/2025    HGB 7.8 (L) 06/22/2025    HCT 24.0 (L) 06/22/2025    MCV 85 06/22/2025    PLT 33 (LL) 06/22/2025      CMP  Sodium   Date Value Ref Range Status   06/22/2025 142 136 - 145 mmol/L Final   06/26/2019 138 134 - 144 mmol/L      Potassium   Date Value Ref Range Status   06/22/2025 3.9 3.5 - 5.1 mmol/L Final     Chloride   Date Value Ref Range Status   06/22/2025 115 (H) 95 - 110 mmol/L Final   06/26/2019 105 98 - 110 mmol/L      CO2   Date Value Ref Range Status   06/22/2025 18 (L) 23 - 29 mmol/L Final     Glucose   Date Value Ref Range Status   06/22/2025 98 70 - 110 mg/dL Final   06/26/2019 114 (H) 70 - 99 mg/dL      BUN   Date Value Ref Range Status   06/22/2025 45 (H) 8 - 23 mg/dL Final     Creatinine   Date Value Ref Range Status   06/22/2025 3.3 (H) 0.5 - 1.4 mg/dL Final   06/26/2019 1.62 (H) 0.60 - 1.40 mg/dL      Calcium   Date Value Ref Range Status   06/22/2025 8.9 8.7 - 10.5 mg/dL Final     Protein Total   Date Value Ref Range Status   06/22/2025 6.3 6.0 - 8.4 gm/dL Final     Albumin   Date Value Ref Range  Status   06/22/2025 3.0 (L) 3.5 - 5.2 g/dL Final   06/26/2019 4.4 3.1 - 4.7 g/dL      Bilirubin Total   Date Value Ref Range Status   06/22/2025 0.5 0.1 - 1.0 mg/dL Final     Comment:     For infants and newborns, interpretation of results should be based   on gestational age, weight and in agreement with clinical   observations.    Premature Infant recommended reference ranges:   0-24 hours:  <8.0 mg/dL   24-48 hours: <12.0 mg/dL   3-5 days:    <15.0 mg/dL   6-29 days:   <15.0 mg/dL     ALP   Date Value Ref Range Status   06/22/2025 61 55 - 135 unit/L Final     AST   Date Value Ref Range Status   06/22/2025 23 10 - 40 unit/L Final     ALT   Date Value Ref Range Status   06/22/2025 20 10 - 44 unit/L Final     Anion Gap   Date Value Ref Range Status   06/22/2025 9 8 - 16 mmol/L Final     eGFR   Date Value Ref Range Status   06/22/2025 20 (L) >60 mL/min/1.73/m2 Final   03/12/2025 21.6 (A) >60 mL/min/1.73 m^2 Final            Assessment/Plan:     Active Diagnoses:    Diagnosis Date Noted POA    PRINCIPAL PROBLEM:  Severe sepsis [A41.9, R65.20] 06/18/2025 Yes    TORRES (acute kidney injury) [N17.9] 06/21/2025 Yes    Atrial fibrillation with rapid ventricular response [I48.91] 06/20/2025 No    Acute pneumonia [J18.9] 06/18/2025 Yes    Neutropenic fever [D70.9, R50.81] 06/18/2025 Yes    Acute on chronic anemia [D64.9] 06/18/2025 Yes    Thrombocytopenia [D69.6] 06/18/2025 Yes    Elevated troponin [R79.89] 06/18/2025 Yes    Anemia, chronic renal failure, stage 3 (moderate) [N18.30, D63.1] 08/02/2023 Yes    MDS (myelodysplastic syndrome) [D46.9] 08/11/2020 Yes    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 male with diagnosis of MDS/RARS and sickle cell trait. He is treated in  conjunction with Dr Marcelino Hilario out of [facility] in [place], whom he recently saw for follow-up visit on 6/4/2025. He has been on chemotherapy with the drug Rytelo for the past couple of months. He requires periodic blood and platelet transfusions.  Patient has been admitted with pneumonia  currently managed by Pulmonary and ID and on antibiotics  Due to significant cytopenias patient has been receiving transfusion  He is also followed by Cardiology/Pulmonary and Nephrology  Patient continues heparin/Lovenox for AFib per Cardiology but this is held when platelets are below 50 K      Neyda Pereira MD  Hematology/Oncology  [facility]

## 2025-06-23 NOTE — PROGRESS NOTES
"[facility]  Hematology/Oncology  Inpatient Progress Note          Patient Name: Rick Butler  MRN: [id number]  Admission Date: 6/18/2025  Hospital Length of Stay: 5 days  Code Status: Full Code   Attending Provider: Abbi Siegel MD  Consulting Provider: Jose Tello MD  Primary Care Physician: Reynaldo Basilio, FNP-C  Principal Problem:Severe sepsis      Subjective:       Patient ID: Rick Butler is a 65 y.o. male.    Chief Complaint: Shortness of Breath (X 1 day. ), Fatigue, and Cough (Onset lastnite)        History Present Illness:     Patient was seen by my associate Dr Landry Pereira over the weekend; he is now out of ICU; awake and alert; feeling better overall; still not much of an appetite; breathing better; still on O2 per NC; continued on IV antibiotics; no CP, HA's or N/V; discussed with floor staff; communicated with primary and ID via epic about my concerns with using Zosyn       Review of Systems:  GEN: general aches and pains, malaise, fatigue, weakness;   HEENT: normal with no HA's, sore throat, stiff neck, changes in vision  CV: normal with no CP, SOB, PND, MORA or orthopnea  PULM: normal with no SOB,  hemoptysis, sputum or pleuritic pain;    GI: normal with no abdominal pain, nausea, vomiting, constipation, diarrhea, melanotic stools, BRBPR, or hematemesis  : normal with no hematuria, dysuria  BREAST: normal with no mass, discharge, pain  SKIN: normal with no rash, erythema, bruising, or swelling      Objective:     Vitals:  Blood pressure (!) 148/83, pulse 89, temperature 97.7 °F (36.5 °C), temperature source Oral, resp. rate 16, height 5' 9" (1.753 m), weight 93.7 kg (206 lb 9.1 oz), SpO2 99%.    Physical Exam:  GEN: no apparent distress, comfortable; AAOx3  HEAD: atraumatic and normocephalic  EYES: no pallor, no icterus, PERRLA  ENT: OMM, no pharyngeal erythema, external ears WNL; no nasal discharge; no thrush  NECK: no masses, thyroid normal, trachea " midline, no LAD/LN's, supple  CV: RRR with no murmur; normal pulse; normal S1 and S2; no pedal edema  CHEST: Normal respiratory effort; CTAB; some mild diffuse coarser breath sounds Rgt>left; O2 per NC;  no wheeze or crackles  ABDOM: nontender and nondistended; soft; normal bowel sounds; no rebound/guarding  MUSC/Skeletal: ROM normal; no crepitus; joints normal; no deformities or arthropathy  EXTREM: no clubbing, cyanosis, inflammation or swelling; IV's bilateral arms  SKIN: no rashes, lesions, ulcers, petechiae or subcutaneous nodules; tattoos   : no jiang  NEURO: grossly intact; motor/sensory WNL; AAOx3; no tremors  PSYCH: normal mood, affect and behavior  LYMPH: normal cervical, supraclavicular, axillary and groin LN's          Lab Review:        Lab Results   Component Value Date    WBC 5.31 06/23/2025    HGB 8.6 (L) 06/23/2025    HCT 26.6 (L) 06/23/2025    MCV 86 06/23/2025    PLT 27 (LL) 06/23/2025     CMP  Sodium   Date Value Ref Range Status   06/23/2025 141 136 - 145 mmol/L Final   06/26/2019 138 134 - 144 mmol/L      Potassium   Date Value Ref Range Status   06/23/2025 4.4 3.5 - 5.1 mmol/L Final     Chloride   Date Value Ref Range Status   06/23/2025 112 (H) 95 - 110 mmol/L Final   06/26/2019 105 98 - 110 mmol/L      CO2   Date Value Ref Range Status   06/23/2025 21 (L) 23 - 29 mmol/L Final     Glucose   Date Value Ref Range Status   06/23/2025 109 70 - 110 mg/dL Final   06/26/2019 114 (H) 70 - 99 mg/dL      BUN   Date Value Ref Range Status   06/23/2025 39 (H) 8 - 23 mg/dL Final     Creatinine   Date Value Ref Range Status   06/23/2025 3.1 (H) 0.5 - 1.4 mg/dL Final   06/26/2019 1.62 (H) 0.60 - 1.40 mg/dL      Calcium   Date Value Ref Range Status   06/23/2025 9.2 8.7 - 10.5 mg/dL Final     Protein Total   Date Value Ref Range Status   06/23/2025 6.9 6.0 - 8.4 gm/dL Final     Albumin   Date Value Ref Range Status   06/23/2025 3.4 (L) 3.5 - 5.2 g/dL Final   06/26/2019 4.4 3.1 - 4.7 g/dL      Bilirubin  Total   Date Value Ref Range Status   06/23/2025 0.7 0.1 - 1.0 mg/dL Final     Comment:     For infants and newborns, interpretation of results should be based   on gestational age, weight and in agreement with clinical   observations.    Premature Infant recommended reference ranges:   0-24 hours:  <8.0 mg/dL   24-48 hours: <12.0 mg/dL   3-5 days:    <15.0 mg/dL   6-29 days:   <15.0 mg/dL     ALP   Date Value Ref Range Status   06/23/2025 65 55 - 135 unit/L Final     AST   Date Value Ref Range Status   06/23/2025 23 10 - 40 unit/L Final     ALT   Date Value Ref Range Status   06/23/2025 20 10 - 44 unit/L Final     Anion Gap   Date Value Ref Range Status   06/23/2025 8 8 - 16 mmol/L Final     eGFR   Date Value Ref Range Status   06/23/2025 21 (L) >60 mL/min/1.73/m2 Final   03/12/2025 21.6 (A) >60 mL/min/1.73 m^2 Final           Radiology Diagnostic Studies:     X-Ray Chest AP Portable [[id number]] Resulted: 06/18/25 0901   Order Status: Completed Updated: 06/18/25 0903   Narrative:     EXAMINATION:  XR CHEST AP PORTABLE    CLINICAL HISTORY:  Generalized weakness;    FINDINGS:  Portable chest at 08:49 is compared to 12/20/2021 shows normal cardiomediastinal silhouette.    There is an alveolar consolidation in the mid and lower lung compatible with pneumonia.  The left lung is clear.  There are no pleural effusions.  Right pulmonary vasculature is normal. No acute osseous abnormality.   Impression:       Alveolar consolidations in the right mid and lower lung compatible with pneumonia         Assessment:     IMPRESSION:    (1) 65 y.o. male known to my oncology service with diagnosis of MDS/RARS and sickle cell trait. He is treated in conjunction with Dr Marcelino Hilario out of [facility] in [place], whom he recently saw for follow-up visit on 6/4/2025. He has been on chemotherapy with the drug Rytelo for the past couple of months. He requires periodic blood and platelet transfusions. He presented to ER with cough, fever and  progressive weakness/fatigue. Troponin was elevated and CXR showing RML/RLL consolidations consistent with a pneumonia.     6/18/2025;  - WBc 1.02, Hgb 7.3 and plats 62,000  -  I discussed with Dr Brown and Dr Moran both in person. I discussed with his ER nurse in person.   - Plans are to transfuse a unit of blood, IV antibiotics; cultures drawn  - cardiology/pulm consults, ID consult and nephrology consult.      6/19/2025:  - patient currently in ICU  - he has been seen by pulmonary/critical care and ID, cardiology and nephrology  - wbc at 2.38, Hgb 6.7 and plats at 47,000  - cardiology and nephrology consulted    6/20/2025:  - patient remains in the ICU  - WBC 5.73, Hgb 7.1 and plats 40,000  - s/p unit blood yesterday and another one today  -  he has been getting up with PT; breathing better; feeling a little better;   -  discussed with his ICU nurse in person;  -  I communicated with Bambi NP with cardiology and ok to start heparin/lovenox for the atrial fib as long as platelet count does not come back down     6/23/2025:  -  Patient was seen by my associate Dr Landry Pereira over the weekend; he is now out of ICU;   -  WBC at 5.31, hgb 8.6 and plats 27,000  -  feeling better overall; still not much of an appetite; breathing better; still on O2 per NC; continued on IV antibiotics;   - discussed with floor staff; communicated with primary and ID via epic about my concerns with using Zosyn   - Dr Werner with pulmonary planning bronch for tomorrow       (2) Acute exacerbation of CKD - followed by Dr Mas as outpatient     (3) Chronic back pains - seen by Dr Heredia in past      (4) Hx/of H pylori gastritis s/p prior colonoscopy with Dr Collins in past     (5) Hx/of alcohol uses in past (sober for several years now); former smoker            1. Pneumonia due to infectious organism, unspecified laterality, unspecified part of lung    2. Hypoxia    3. Myelodysplastic syndrome    4. Severe sepsis    5. Chest pain    6.  Elevated troponin    7. A-fib    8. Cardiac rhythm disorder or disturbance or change           Plan:     PLAN:          1. Monitor labs and transfuse as needed    2. Infection workup, IV antibiotics etc as per ID - communicated with primary and ID via epic about my concerns with using Zosyn   3. Respiratory and critical care as per pulmonary directives - planned bronch tomorrow  4. appreciate cardiology consult and assistance   5. Appreciate nephrology consult for the acute on top of Chronic renal insufficiency   6. IVF's, electrolyte and pain management as per primary team  7. Will follow with you                Jose Tello MD  Hematology/Oncology  [facility]

## 2025-06-23 NOTE — PLAN OF CARE
Problem: Adult Inpatient Plan of Care  Goal: Plan of Care Review  Outcome: Progressing  Goal: Patient-Specific Goal (Individualized)  Outcome: Progressing  Goal: Absence of Hospital-Acquired Illness or Injury  Outcome: Progressing  Goal: Optimal Comfort and Wellbeing  Outcome: Progressing  Goal: Readiness for Transition of Care  Outcome: Progressing     Problem: Sepsis/Septic Shock  Goal: Optimal Coping  Outcome: Progressing  Goal: Absence of Bleeding  Outcome: Progressing  Goal: Blood Glucose Level Within Targeted Range  Outcome: Progressing  Goal: Absence of Infection Signs and Symptoms  Outcome: Progressing  Goal: Optimal Nutrition Intake  Outcome: Progressing     Problem: Acute Kidney Injury/Impairment  Goal: Fluid and Electrolyte Balance  Outcome: Progressing  Goal: Improved Oral Intake  Outcome: Progressing  Goal: Effective Renal Function  Outcome: Progressing     Problem: Pneumonia  Goal: Fluid Balance  Outcome: Progressing  Goal: Resolution of Infection Signs and Symptoms  Outcome: Progressing  Goal: Effective Oxygenation and Ventilation  Outcome: Progressing     Problem: Skin Injury Risk Increased  Goal: Skin Health and Integrity  Outcome: Progressing     Problem: Fall Injury Risk  Goal: Absence of Fall and Fall-Related Injury  Outcome: Progressing

## 2025-06-23 NOTE — SUBJECTIVE & OBJECTIVE
Interval History:  Patient reports feeling poorly today.  Platelets 33, discontinue full-dose Lovenox order for now.    Review of Systems   All other systems reviewed and are negative.    Objective:     Vital Signs (Most Recent):  Temp: 98 °F (36.7 °C) (06/23/25 0400)  Pulse: 95 (06/23/25 0700)  Resp: 18 (06/23/25 0700)  BP: (!) 153/81 (06/23/25 0400)  SpO2: (!) 93 % (06/23/25 0700) Vital Signs (24h Range):  Temp:  [97.9 °F (36.6 °C)-98.1 °F (36.7 °C)] 98 °F (36.7 °C)  Pulse:  [] 95  Resp:  [8-39] 18  SpO2:  [91 %-99 %] 93 %  BP: (147-167)/(81-94) 153/81     Weight: 93.7 kg (206 lb 9.1 oz)  Body mass index is 30.51 kg/m².    Intake/Output Summary (Last 24 hours) at 6/23/2025 1110  Last data filed at 6/23/2025 0700  Gross per 24 hour   Intake 360 ml   Output 800 ml   Net -440 ml         Physical Exam  HENT:      Mouth/Throat:      Mouth: Mucous membranes are moist.   Eyes:      Conjunctiva/sclera: Conjunctivae normal.   Cardiovascular:      Rate and Rhythm: Normal rate.      Heart sounds: Murmur heard.   Pulmonary:      Breath sounds: Wheezing and rales present.   Abdominal:      General: There is no distension.   Musculoskeletal:      Right lower leg: No edema.      Left lower leg: No edema.   Skin:     Coloration: Skin is pale.   Neurological:      Mental Status: He is alert and oriented to person, place, and time.               Significant Labs: All pertinent labs within the past 24 hours have been reviewed.    Significant Imaging: I have reviewed all pertinent imaging results/findings within the past 24 hours.

## 2025-06-23 NOTE — ASSESSMENT & PLAN NOTE
IV amiodarone transitioned to oral on 06/21  Anticoagulation per Cardiology team recommendation, currently holding as platelets under 50

## 2025-06-23 NOTE — ASSESSMENT & PLAN NOTE
Aware   Patient received 1 unit of PRBC on 6/18/25.  Patient agreeable to receive another unit of packed red blood cell today.    Recent Labs     06/21/25  0302 06/22/25  0315 06/23/25  0612   HGB 8.2* 7.8* 8.6*   HCT 25.4* 24.0* 26.6*

## 2025-06-24 LAB
ABO + RH BLD: NORMAL
ABSOLUTE EOSINOPHIL (SMH): 0 K/UL
ABSOLUTE MONOCYTE (SMH): 0.73 K/UL (ref 0.3–1)
ABSOLUTE NEUTROPHIL COUNT (SMH): 3.8 K/UL (ref 1.8–7.7)
ALBUMIN SERPL-MCNC: 3.4 G/DL (ref 3.5–5.2)
ALP SERPL-CCNC: 62 UNIT/L (ref 55–135)
ALT SERPL-CCNC: 22 UNIT/L (ref 10–44)
ANION GAP (SMH): 8 MMOL/L (ref 8–16)
AST SERPL-CCNC: 21 UNIT/L (ref 10–40)
BASOPHILS # BLD AUTO: 0.03 K/UL
BASOPHILS NFR BLD AUTO: 0.6 %
BILIRUB SERPL-MCNC: 0.7 MG/DL (ref 0.1–1)
BLD PROD TYP BPU: NORMAL
BLOOD UNIT EXPIRATION DATE: NORMAL
BLOOD UNIT TYPE CODE: 6200
BUN SERPL-MCNC: 46 MG/DL (ref 8–23)
C-REACTIVE PROTEIN (SMH): 18.1 MG/DL
CALCIUM SERPL-MCNC: 9.2 MG/DL (ref 8.7–10.5)
CHLORIDE SERPL-SCNC: 111 MMOL/L (ref 95–110)
CO2 SERPL-SCNC: 20 MMOL/L (ref 23–29)
CREAT SERPL-MCNC: 3.3 MG/DL (ref 0.5–1.4)
CROSSMATCH INTERPRETATION: NORMAL
DISPENSE STATUS: NORMAL
ERYTHROCYTE [DISTWIDTH] IN BLOOD BY AUTOMATED COUNT: 16.9 % (ref 11.5–14.5)
ERYTHROCYTE [SEDIMENTATION RATE] IN BLOOD: 89 MM/HR (ref 0–10)
GFR SERPLBLD CREATININE-BSD FMLA CKD-EPI: 20 ML/MIN/1.73/M2
GLUCOSE SERPL-MCNC: 144 MG/DL (ref 70–110)
HCT VFR BLD AUTO: 25.3 % (ref 40–54)
HGB BLD-MCNC: 8.2 GM/DL (ref 14–18)
IMM GRANULOCYTES # BLD AUTO: 0.23 K/UL (ref 0–0.04)
IMM GRANULOCYTES NFR BLD AUTO: 4.4 % (ref 0–0.5)
INFLUENZA A MOLECULAR (OHS): NEGATIVE
INFLUENZA B MOLECULAR (OHS): NEGATIVE
LYMPHOCYTES # BLD AUTO: 0.37 K/UL (ref 1–4.8)
MAGNESIUM SERPL-MCNC: 1.7 MG/DL (ref 1.6–2.6)
MCH RBC QN AUTO: 27.9 PG (ref 27–31)
MCHC RBC AUTO-ENTMCNC: 32.4 G/DL (ref 32–36)
MCV RBC AUTO: 86 FL (ref 82–98)
NUCLEATED RBC (/100WBC) (SMH): 0 /100 WBC
PLATELET # BLD AUTO: 25 K/UL (ref 150–450)
PLATELET BLD QL SMEAR: ABNORMAL
PMV BLD AUTO: ABNORMAL FL
POTASSIUM SERPL-SCNC: 4.9 MMOL/L (ref 3.5–5.1)
PROT SERPL-MCNC: 7.1 GM/DL (ref 6–8.4)
RBC # BLD AUTO: 2.94 M/UL (ref 4.6–6.2)
RBCS: NORMAL
RELATIVE EOSINOPHIL (SMH): 0 % (ref 0–8)
RELATIVE LYMPHOCYTE (SMH): 7.1 % (ref 18–48)
RELATIVE MONOCYTE (SMH): 14.1 % (ref 4–15)
RELATIVE NEUTROPHIL (SMH): 73.8 % (ref 38–73)
SARS-COV-2 RDRP RESP QL NAA+PROBE: NEGATIVE
SODIUM SERPL-SCNC: 139 MMOL/L (ref 136–145)
UNIT NUMBER: NORMAL
WBC # BLD AUTO: 5.18 K/UL (ref 3.9–12.7)

## 2025-06-24 PROCEDURE — 99900031 HC PATIENT EDUCATION (STAT)

## 2025-06-24 PROCEDURE — 30233R1 TRANSFUSION OF NONAUTOLOGOUS PLATELETS INTO PERIPHERAL VEIN, PERCUTANEOUS APPROACH: ICD-10-PCS | Performed by: HOSPITALIST

## 2025-06-24 PROCEDURE — 63600175 PHARM REV CODE 636 W HCPCS: Performed by: STUDENT IN AN ORGANIZED HEALTH CARE EDUCATION/TRAINING PROGRAM

## 2025-06-24 PROCEDURE — 25000242 PHARM REV CODE 250 ALT 637 W/ HCPCS: Performed by: INTERNAL MEDICINE

## 2025-06-24 PROCEDURE — G0545 PR VISIT INHERENT TO INPT OR OBS CARE, INFECTIOUS DISEASE: HCPCS | Mod: ,,, | Performed by: STUDENT IN AN ORGANIZED HEALTH CARE EDUCATION/TRAINING PROGRAM

## 2025-06-24 PROCEDURE — 87502 INFLUENZA DNA AMP PROBE: CPT | Performed by: HOSPITALIST

## 2025-06-24 PROCEDURE — 94640 AIRWAY INHALATION TREATMENT: CPT

## 2025-06-24 PROCEDURE — 25000003 PHARM REV CODE 250: Performed by: FAMILY MEDICINE

## 2025-06-24 PROCEDURE — 25000003 PHARM REV CODE 250: Performed by: INTERNAL MEDICINE

## 2025-06-24 PROCEDURE — 36430 TRANSFUSION BLD/BLD COMPNT: CPT

## 2025-06-24 PROCEDURE — 36415 COLL VENOUS BLD VENIPUNCTURE: CPT | Performed by: NURSE PRACTITIONER

## 2025-06-24 PROCEDURE — 99233 SBSQ HOSP IP/OBS HIGH 50: CPT | Mod: ,,, | Performed by: STUDENT IN AN ORGANIZED HEALTH CARE EDUCATION/TRAINING PROGRAM

## 2025-06-24 PROCEDURE — 80053 COMPREHEN METABOLIC PANEL: CPT | Performed by: INTERNAL MEDICINE

## 2025-06-24 PROCEDURE — 94799 UNLISTED PULMONARY SVC/PX: CPT | Mod: XB

## 2025-06-24 PROCEDURE — 85651 RBC SED RATE NONAUTOMATED: CPT | Performed by: NURSE PRACTITIONER

## 2025-06-24 PROCEDURE — 94664 DEMO&/EVAL PT USE INHALER: CPT

## 2025-06-24 PROCEDURE — 11000001 HC ACUTE MED/SURG PRIVATE ROOM

## 2025-06-24 PROCEDURE — 25000003 PHARM REV CODE 250: Performed by: STUDENT IN AN ORGANIZED HEALTH CARE EDUCATION/TRAINING PROGRAM

## 2025-06-24 PROCEDURE — 94761 N-INVAS EAR/PLS OXIMETRY MLT: CPT

## 2025-06-24 PROCEDURE — 25000003 PHARM REV CODE 250: Performed by: HOSPITALIST

## 2025-06-24 PROCEDURE — 83735 ASSAY OF MAGNESIUM: CPT | Performed by: INTERNAL MEDICINE

## 2025-06-24 PROCEDURE — P9073 PLATELETS PHERESIS PATH REDU: HCPCS | Performed by: HOSPITALIST

## 2025-06-24 PROCEDURE — 25000242 PHARM REV CODE 250 ALT 637 W/ HCPCS: Performed by: STUDENT IN AN ORGANIZED HEALTH CARE EDUCATION/TRAINING PROGRAM

## 2025-06-24 PROCEDURE — 36415 COLL VENOUS BLD VENIPUNCTURE: CPT | Performed by: INTERNAL MEDICINE

## 2025-06-24 PROCEDURE — 86140 C-REACTIVE PROTEIN: CPT | Performed by: NURSE PRACTITIONER

## 2025-06-24 PROCEDURE — U0002 COVID-19 LAB TEST NON-CDC: HCPCS | Performed by: HOSPITALIST

## 2025-06-24 PROCEDURE — 27000221 HC OXYGEN, UP TO 24 HOURS

## 2025-06-24 PROCEDURE — 99900035 HC TECH TIME PER 15 MIN (STAT)

## 2025-06-24 PROCEDURE — 99232 SBSQ HOSP IP/OBS MODERATE 35: CPT | Mod: ,,, | Performed by: INTERNAL MEDICINE

## 2025-06-24 PROCEDURE — 92610 EVALUATE SWALLOWING FUNCTION: CPT

## 2025-06-24 PROCEDURE — 85025 COMPLETE CBC W/AUTO DIFF WBC: CPT | Performed by: INTERNAL MEDICINE

## 2025-06-24 RX ORDER — CEFEPIME HYDROCHLORIDE 1 G/1
1 INJECTION, POWDER, FOR SOLUTION INTRAMUSCULAR; INTRAVENOUS
Status: DISCONTINUED | OUTPATIENT
Start: 2025-06-24 | End: 2025-06-28 | Stop reason: HOSPADM

## 2025-06-24 RX ORDER — HYDROCODONE BITARTRATE AND ACETAMINOPHEN 500; 5 MG/1; MG/1
TABLET ORAL
Status: DISCONTINUED | OUTPATIENT
Start: 2025-06-24 | End: 2025-06-28 | Stop reason: HOSPADM

## 2025-06-24 RX ORDER — LEVALBUTEROL INHALATION SOLUTION 1.25 MG/3ML
1.25 SOLUTION RESPIRATORY (INHALATION)
Status: DISCONTINUED | OUTPATIENT
Start: 2025-06-24 | End: 2025-06-28 | Stop reason: HOSPADM

## 2025-06-24 RX ADMIN — AMIODARONE HYDROCHLORIDE 400 MG: 200 TABLET ORAL at 03:06

## 2025-06-24 RX ADMIN — LEVALBUTEROL HYDROCHLORIDE 1.25 MG: 1.25 SOLUTION RESPIRATORY (INHALATION) at 11:06

## 2025-06-24 RX ADMIN — DOXYCYCLINE 100 MG: 100 CAPSULE ORAL at 10:06

## 2025-06-24 RX ADMIN — LEVALBUTEROL HYDROCHLORIDE 1.25 MG: 1.25 SOLUTION RESPIRATORY (INHALATION) at 07:06

## 2025-06-24 RX ADMIN — GUAIFENESIN AND DEXTROMETHORPHAN HYDROBROMIDE 1 TABLET: 600; 30 TABLET, EXTENDED RELEASE ORAL at 09:06

## 2025-06-24 RX ADMIN — FOLIC ACID 2000 MCG: 1 TABLET ORAL at 10:06

## 2025-06-24 RX ADMIN — CETIRIZINE HYDROCHLORIDE 5 MG: 5 TABLET ORAL at 10:06

## 2025-06-24 RX ADMIN — CALCITRIOL CAPSULES 0.25 MCG 0.25 MCG: 0.25 CAPSULE ORAL at 10:06

## 2025-06-24 RX ADMIN — Medication 800 MG: at 03:06

## 2025-06-24 RX ADMIN — Medication 800 MG: at 12:06

## 2025-06-24 RX ADMIN — AMLODIPINE BESYLATE 10 MG: 5 TABLET ORAL at 10:06

## 2025-06-24 RX ADMIN — DOXYCYCLINE 100 MG: 100 CAPSULE ORAL at 09:06

## 2025-06-24 RX ADMIN — Medication 6 MG: at 09:06

## 2025-06-24 RX ADMIN — AMIODARONE HYDROCHLORIDE 400 MG: 200 TABLET ORAL at 09:06

## 2025-06-24 RX ADMIN — TRAZODONE HYDROCHLORIDE 100 MG: 50 TABLET ORAL at 09:06

## 2025-06-24 RX ADMIN — CEFEPIME 1 G: 1 INJECTION, POWDER, FOR SOLUTION INTRAMUSCULAR; INTRAVENOUS at 01:06

## 2025-06-24 RX ADMIN — PIPERACILLIN SODIUM AND TAZOBACTAM SODIUM 4.5 G: 4; .5 INJECTION, POWDER, LYOPHILIZED, FOR SOLUTION INTRAVENOUS at 03:06

## 2025-06-24 RX ADMIN — TAMSULOSIN HYDROCHLORIDE 0.4 MG: 0.4 CAPSULE ORAL at 10:06

## 2025-06-24 RX ADMIN — PREDNISONE 40 MG: 20 TABLET ORAL at 10:06

## 2025-06-24 RX ADMIN — GUAIFENESIN AND DEXTROMETHORPHAN HYDROBROMIDE 1 TABLET: 600; 30 TABLET, EXTENDED RELEASE ORAL at 10:06

## 2025-06-24 RX ADMIN — AMIODARONE HYDROCHLORIDE 400 MG: 200 TABLET ORAL at 10:06

## 2025-06-24 NOTE — ASSESSMENT & PLAN NOTE
"This patient does have evidence of infective focus  My overall impression is sepsis with Acute kidney injury, Acute respiratory failure, and Thrombocytopenia .  Source: Respiratory Infection  Antibiotics given-   Antibiotics (72h ago, onward)      Start     Stop Route Frequency Ordered    06/18/25 2100  doxycycline capsule 100 mg         -- Oral Every 12 hours 06/18/25 0042          Latest lactate reviewed-  No results for input(s): "LACTATE", "POCLAC" in the last 72 hours.    "

## 2025-06-24 NOTE — PROGRESS NOTES
Brief cardiology note  06/24/2025  Patient remains in normal sinus rhythm on telemetry  Continue amiodarone taper as ordered  Anticoagulation on hold given low platelets (25 K today)  Monitor electrolytes daily and replace for goal K+>/= 4, Mag >/= 2  No medication changes recommended  Cardiology will follow up peripherally        ABBEY Reyes, CVNP-BC  [facility]  Department of Cardiology  Date of service: 06/24/2025      I have personally interviewed and examined the patient, I have reviewed the Nurse Practitioner's history and physical, assessment, and plan.  I have also reviewed and antiplatelet all the pertinent lab and imaging data. I have personally evaluated the patient at bedside and agree with the findings and made appropriate changes as necessary in recommendations.        Dr. Paco MD  [facility]  Department of Cardiology  Date of service: 06/24/2025

## 2025-06-24 NOTE — ASSESSMENT & PLAN NOTE
Maintain iv abx as below  De escalate as needed     Antibiotics (From admission, onward)      Start     Stop Route Frequency Ordered    06/18/25 2100  doxycycline capsule 100 mg         -- Oral Every 12 hours 06/18/25 1752            Microbiology Results (last 7 days)       Procedure Component Value Units Date/Time    Influenza A & B by Molecular [[id number]]  (Normal) Collected: 06/24/25 0814    Order Status: Completed Specimen: Nasal Swab Updated: 06/24/25 0956     INFLUENZA A MOLECULAR Negative     INFLUENZA B MOLECULAR  Negative    Blood culture x two cultures. Draw prior to antibiotics. [[id number]]  (Normal) Collected: 06/18/25 0930    Order Status: Completed Specimen: Blood Updated: 06/23/25 1001     CULTURE, BLOOD ([facility]) No Growth After 5 Days    Blood culture x two cultures. Draw prior to antibiotics. [[id number]]  (Normal) Collected: 06/18/25 0932    Order Status: Completed Specimen: Blood Updated: 06/23/25 1001     CULTURE, BLOOD ([facility]) No Growth After 5 Days    Culture, Respiratory with Gram Stain [[id number]] Collected: 06/19/25 0950    Order Status: Completed Specimen: Respiratory Updated: 06/21/25 0701     Respiratory Culture Normal respiratory federico     GRAM STAIN (RESPIRATORY) >10 Epithelial Cells/LPF      Few WBC seen      Rare Gram Positive Rods     Comment: Corrected result: Previously reported as Rare Branching Gram positive rods on 6/19/2025 at 1225 CDT.         Rare Gram positive cocci    Culture, Respiratory with Gram Stain [[id number]] Collected: 06/19/25 0734    Order Status: Completed Specimen: Respiratory from Sputum, Expectorated Updated: 06/19/25 0844     Respiratory Culture Specimen inadequate - culture not performed     GRAM STAIN (RESPIRATORY) >10epis/lfp and <than many WBC's      Predominance of oropharyngeal federico. Please recollect    MRSA Screen by PCR [[id number]]  (Normal) Collected: 06/18/25 1948    Order Status: Completed Specimen: Nasal Swab Updated: 06/18/25 2115     MRSA PCR  SCRN ([facility]) Not Detected    Respiratory Infection Panel (PCR), Nasopharyngeal [[id number]] Collected: 06/18/25 1027    Order Status: Completed Specimen: Nasopharyngeal Swab Updated: 06/18/25 1209     Respiratory Infection Panel Source Nasopharyngeal Swab     Adenovirus Not Detected     Coronavirus 229E, Common Cold Virus Not Detected     Coronavirus HKU1, Common Cold Virus Not Detected     Coronavirus NL63, Common Cold Virus Not Detected     Coronavirus OC43, Common Cold Virus Not Detected     SARS-CoV2 (COVID-19) Qualitative PCR Not Detected     Human Metapneumovirus Not Detected     Human Rhinovirus/Enterovirus Not Detected     Influenza A (subtypes H1, H1-2009,H3) Not Detected     Influenza B Not Detected     Parainfluenza Virus 1 Not Detected     Parainfluenza Virus 2 Not Detected     Parainfluenza Virus 3 Not Detected     Parainfluenza Virus 4 Not Detected     Respiratory Syncytial Virus Not Detected     Bordetella Parapertussis (NT9604) Not Detected     Bordetella pertussis (ptxP) Not Detected     Chlamydia pneumoniae Not Detected     Mycoplasma pneumoniae Not Detected

## 2025-06-24 NOTE — PROGRESS NOTES
"[facility]  Hematology/Oncology  Inpatient Progress Note          Patient Name: Rick Butler  MRN: [id number]  Admission Date: 6/18/2025  Hospital Length of Stay: 6 days  Code Status: Full Code   Attending Provider: Abbi Siegel MD  Consulting Provider: Jose Tello MD  Primary Care Physician: Reynaldo Basilio, LUCINAP-C  Principal Problem:Severe sepsis      Subjective:       Patient ID: Rick Butler is a 65 y.o. male.    Chief Complaint: Shortness of Breath (X 1 day. ), Fatigue, and Cough (Onset lastnite)        History Present Illness:     Patient  sitting up in bed; he reports he is feeling better overall; breathing stable; no CP, HA's or N/V; pulmonary has decided to hold off on the bronch for now; I discussed with Dr Siegel and planning a single donor platelet product today      Review of Systems:  GEN: general aches and pains, malaise, fatigue, weakness;   HEENT: normal with no HA's, sore throat, stiff neck, changes in vision  CV: normal with no CP, SOB, PND, MORA or orthopnea  PULM: normal with no SOB,  hemoptysis, sputum or pleuritic pain;    GI: normal with no abdominal pain, nausea, vomiting, constipation, diarrhea, melanotic stools, BRBPR, or hematemesis  : normal with no hematuria, dysuria  BREAST: normal with no mass, discharge, pain  SKIN: normal with no rash, erythema, bruising, or swelling      Objective:     Vitals:  Blood pressure 128/73, pulse 82, temperature 97.6 °F (36.4 °C), temperature source Oral, resp. rate 16, height 5' 9" (1.753 m), weight 93.7 kg (206 lb 9.1 oz), SpO2 99%.    Physical Exam:  GEN: no apparent distress, comfortable; AAOx3  HEAD: atraumatic and normocephalic  EYES: no pallor, no icterus, PERRLA  ENT: OMM, no pharyngeal erythema, external ears WNL; no nasal discharge; no thrush  NECK: no masses, thyroid normal, trachea midline, no LAD/LN's, supple  CV: RRR with no murmur; normal pulse; normal S1 and S2; no pedal edema  CHEST: Normal " respiratory effort; CTAB; some mild diffuse coarser breath sounds Rgt>left; O2 per NC;  no wheeze or crackles  ABDOM: nontender and nondistended; soft; normal bowel sounds; no rebound/guarding  MUSC/Skeletal: ROM normal; no crepitus; joints normal; no deformities or arthropathy  EXTREM: no clubbing, cyanosis, inflammation or swelling; IV's bilateral arms  SKIN: no rashes, lesions, ulcers, petechiae or subcutaneous nodules; tattoos   : no jiang  NEURO: grossly intact; motor/sensory WNL; AAOx3; no tremors  PSYCH: normal mood, affect and behavior  LYMPH: normal cervical, supraclavicular, axillary and groin LN's          Lab Review:        Lab Results   Component Value Date    WBC 5.18 06/24/2025    HGB 8.2 (L) 06/24/2025    HCT 25.3 (L) 06/24/2025    MCV 86 06/24/2025    PLT 25 (LL) 06/24/2025     CMP  Sodium   Date Value Ref Range Status   06/24/2025 139 136 - 145 mmol/L Final   06/26/2019 138 134 - 144 mmol/L      Potassium   Date Value Ref Range Status   06/24/2025 4.9 3.5 - 5.1 mmol/L Final     Chloride   Date Value Ref Range Status   06/24/2025 111 (H) 95 - 110 mmol/L Final   06/26/2019 105 98 - 110 mmol/L      CO2   Date Value Ref Range Status   06/24/2025 20 (L) 23 - 29 mmol/L Final     Glucose   Date Value Ref Range Status   06/24/2025 144 (H) 70 - 110 mg/dL Final   06/26/2019 114 (H) 70 - 99 mg/dL      BUN   Date Value Ref Range Status   06/24/2025 46 (H) 8 - 23 mg/dL Final     Creatinine   Date Value Ref Range Status   06/24/2025 3.3 (H) 0.5 - 1.4 mg/dL Final   06/26/2019 1.62 (H) 0.60 - 1.40 mg/dL      Calcium   Date Value Ref Range Status   06/24/2025 9.2 8.7 - 10.5 mg/dL Final     Protein Total   Date Value Ref Range Status   06/24/2025 7.1 6.0 - 8.4 gm/dL Final     Albumin   Date Value Ref Range Status   06/24/2025 3.4 (L) 3.5 - 5.2 g/dL Final   06/26/2019 4.4 3.1 - 4.7 g/dL      Bilirubin Total   Date Value Ref Range Status   06/24/2025 0.7 0.1 - 1.0 mg/dL Final     Comment:     For infants and  newborns, interpretation of results should be based   on gestational age, weight and in agreement with clinical   observations.    Premature Infant recommended reference ranges:   0-24 hours:  <8.0 mg/dL   24-48 hours: <12.0 mg/dL   3-5 days:    <15.0 mg/dL   6-29 days:   <15.0 mg/dL     ALP   Date Value Ref Range Status   06/24/2025 62 55 - 135 unit/L Final     AST   Date Value Ref Range Status   06/24/2025 21 10 - 40 unit/L Final     ALT   Date Value Ref Range Status   06/24/2025 22 10 - 44 unit/L Final     Anion Gap   Date Value Ref Range Status   06/24/2025 8 8 - 16 mmol/L Final     eGFR   Date Value Ref Range Status   06/24/2025 20 (L) >60 mL/min/1.73/m2 Final   03/12/2025 21.6 (A) >60 mL/min/1.73 m^2 Final           Radiology Diagnostic Studies:     X-Ray Chest AP Portable [[id number]] Resulted: 06/18/25 0901   Order Status: Completed Updated: 06/18/25 0903   Narrative:     EXAMINATION:  XR CHEST AP PORTABLE    CLINICAL HISTORY:  Generalized weakness;    FINDINGS:  Portable chest at 08:49 is compared to 12/20/2021 shows normal cardiomediastinal silhouette.    There is an alveolar consolidation in the mid and lower lung compatible with pneumonia.  The left lung is clear.  There are no pleural effusions.  Right pulmonary vasculature is normal. No acute osseous abnormality.   Impression:       Alveolar consolidations in the right mid and lower lung compatible with pneumonia         Assessment:     IMPRESSION:    (1) 65 y.o. male known to my oncology service with diagnosis of MDS/RARS and sickle cell trait. He is treated in conjunction with Dr Marcelino Hilario out [facility] in [place], whom he recently saw for follow-up visit on 6/4/2025. He has been on chemotherapy with the drug Rytelo for the past couple of months. He requires periodic blood and platelet transfusions. He presented to ER with cough, fever and progressive weakness/fatigue. Troponin was elevated and CXR showing RML/RLL consolidations consistent with a  pneumonia.     6/18/2025;  - WBc 1.02, Hgb 7.3 and plats 62,000  -  I discussed with Dr Brown and Dr Moran both in person. I discussed with his ER nurse in person.   - Plans are to transfuse a unit of blood, IV antibiotics; cultures drawn  - cardiology/pulm consults, ID consult and nephrology consult.      6/19/2025:  - patient currently in ICU  - he has been seen by pulmonary/critical care and ID, cardiology and nephrology  - wbc at 2.38, Hgb 6.7 and plats at 47,000  - cardiology and nephrology consulted    6/20/2025:  - patient remains in the ICU  - WBC 5.73, Hgb 7.1 and plats 40,000  - s/p unit blood yesterday and another one today  -  he has been getting up with PT; breathing better; feeling a little better;   -  discussed with his ICU nurse in person;  -  I communicated with Bambi NP with cardiology and ok to start heparin/lovenox for the atrial fib as long as platelet count does not come back down     6/23/2025:  -  Patient was seen by my associate Dr Landry Pereira over the weekend; he is now out of ICU;   -  WBC at 5.31, hgb 8.6 and plats 27,000  -  feeling better overall; still not much of an appetite; breathing better; still on O2 per NC; continued on IV antibiotics;   - discussed with floor staff; communicated with primary and ID via epic about my concerns with using Zosyn   - Dr Werner with pulmonary planning bronch for tomorrow    6/24/2025:  - discussed with Dr Siegel  - patient to get one single donor platelet product today  - heparin products on hold  - re-iterated my concerns about using Zosyn  - pulmonary has decided to hold off on bronch for now       (2) Acute exacerbation of CKD - followed by Dr Mas as outpatient     (3) Chronic back pains - seen by Dr Heredia in past      (4) Hx/of H pylori gastritis s/p prior colonoscopy with Dr Collins in past     (5) Hx/of alcohol uses in past (sober for several years now); former smoker            1. Pneumonia due to infectious organism, unspecified  laterality, unspecified part of lung    2. Hypoxia    3. Myelodysplastic syndrome    4. Severe sepsis    5. Chest pain    6. Elevated troponin    7. A-fib    8. Cardiac rhythm disorder or disturbance or change    9. Multifocal pneumonia    10. MDS (myelodysplastic syndrome)    11. Thrombocytopenia    12. Sickle cell trait syndrome    13. Stage 3 chronic kidney disease, unspecified whether stage 3a or 3b CKD           Plan:     PLAN:          1. Monitor labs and transfuse as needed  - to get one single adult donor platelet product today  2. Infection workup, IV antibiotics etc as per ID   3. Respiratory and critical care as per pulmonary directives - pulm has decided to hold off on bronch for now  4. appreciate cardiology consult and assistance   5. Appreciate nephrology consult for the acute on top of Chronic renal insufficiency   6. IVF's, electrolyte and pain management as per primary team  7. Will follow with you                Jose Tello MD  Hematology/Oncology  [facility]

## 2025-06-24 NOTE — ASSESSMENT & PLAN NOTE
TORRES is likely due to multifactorial.Most recent creatinine and eGFR are listed below.  Recent Labs     06/22/25  0315 06/23/25  0612 06/24/25  0503   CREATININE 3.3* 3.1* 3.3*   EGFRNORACEVR 20* 21* 20*      Plan  - TORRES is stable  - Avoid nephrotoxins and renally dose meds for GFR listed above  - Monitor urine output, serial BMP, and adjust therapy as needed  - nephrology is following

## 2025-06-24 NOTE — ASSESSMENT & PLAN NOTE
Aware   Patient received 1 unit of PRBC on 6/18/25.  Patient agreeable to receive another unit of packed red blood cell today.    Recent Labs     06/22/25  0315 06/23/25  0612 06/24/25  0503   HGB 7.8* 8.6* 8.2*   HCT 24.0* 26.6* 25.3*

## 2025-06-24 NOTE — CARE UPDATE
06/23/25 2018   Patient Assessment/Suction   Level of Consciousness (AVPU) alert   Respiratory Effort Normal;Unlabored   Expansion/Accessory Muscles/Retractions no retractions;no use of accessory muscles   All Lung Fields Breath Sounds clear   MAYELIN Breath Sounds diminished   LLL Breath Sounds clear   RUL Breath Sounds clear   RML Breath Sounds diminished   RLL Breath Sounds diminished   Rhythm/Pattern, Respiratory unlabored;pattern regular;no shortness of breath reported   Cough Frequency frequent   Cough Type productive;loose;good   Secretions Amount moderate   Secretions Color clear   Secretions Characteristics thick   Skin Integrity   $ Wound Care Tech Time 15 min   Area Observed Left;Right;Behind ear   Skin Appearance without discoloration   PRE-TX-O2   Device (Oxygen Therapy) high flow nasal cannula w/device   $ Is the patient on Low Flow Oxygen? Yes   Flow (L/min) (Oxygen Therapy) 5   SpO2 96 %   Pulse Oximetry Type Intermittent   $ Pulse Oximetry - Multiple Charge Pulse Oximetry - Multiple   Aerosol Therapy   $ Aerosol Therapy Charges PRN treatment not required   Incentive Spirometer   $ Incentive Spirometer Charges done with encouragement   Incentive Spirometer Predicted Level (mL) 1250   Administration (IS) mouthpiece utilized   Number of Repetitions (IS) 10   Level Incentive Spirometer (mL) 1700   Patient Tolerance (IS) good;no adverse signs/symptoms present   Vibratory PEP Therapy   $ Vibratory PEP Charges Aerobika Therapy   $ Vibratory PEP Tech Time Charges 15 min   Daily Review of Necessity (PEP Therapy) completed   Type (PEP Therapy) vibratory/oscillatory   Device (PEP Therapy) flutter   Route (PEP Therapy) mouthpiece   Breaths per Cycle (PEP Therapy) 10   Cycles (PEP Therapy) 1   Settings (PEP Therapy) PEP 5   Signs of Intolerance (PEP Therapy) none   Education   $ Education Incentive Spirometry;15 min

## 2025-06-24 NOTE — ASSESSMENT & PLAN NOTE
Continue to monitor  Heme oncology is following  Holding Lovenox  Transfused a dose platelets 6/24

## 2025-06-24 NOTE — SUBJECTIVE & OBJECTIVE
Interval History:  Patient seen and examined.  Reports he feels a bit better today.  He is still on 5-6 L nasal cannula.  Hemoglobin 8.2.  Platelets down to 25.  Patient reports intermittent nose bleeding over the past 3 days and states he sees blood clots when he blows his nose.  Discussed with Dr. Tello , recommends transfuse platelets and see if we can stop Zosyn.  Discussed with Dr. Crawford, Zosyn discontinued.      Review of Systems   All other systems reviewed and are negative.    Objective:     Vital Signs (Most Recent):  Temp: 97.6 °F (36.4 °C) (06/24/25 1131)  Pulse: 82 (06/24/25 1138)  Resp: 16 (06/24/25 1138)  BP: 128/73 (06/24/25 1131)  SpO2: 99 % (06/24/25 1138) Vital Signs (24h Range):  Temp:  [97.6 °F (36.4 °C)-98.1 °F (36.7 °C)] 97.6 °F (36.4 °C)  Pulse:  [72-91] 82  Resp:  [16-18] 16  SpO2:  [95 %-100 %] 99 %  BP: (118-151)/(71-85) 128/73     Weight: 93.7 kg (206 lb 9.1 oz)  Body mass index is 30.51 kg/m².    Intake/Output Summary (Last 24 hours) at 6/24/2025 1223  Last data filed at 6/24/2025 0800  Gross per 24 hour   Intake 0 ml   Output 400 ml   Net -400 ml         Physical Exam  HENT:      Mouth/Throat:      Mouth: Mucous membranes are moist.   Eyes:      Conjunctiva/sclera: Conjunctivae normal.   Cardiovascular:      Rate and Rhythm: Normal rate.      Heart sounds: Murmur heard.   Pulmonary:      Breath sounds: Wheezing and rales present.   Abdominal:      General: There is no distension.   Musculoskeletal:      Right lower leg: No edema.      Left lower leg: No edema.   Skin:     Coloration: Skin is pale.   Neurological:      Mental Status: He is alert and oriented to person, place, and time.               Significant Labs: All pertinent labs within the past 24 hours have been reviewed.    Significant Imaging: I have reviewed all pertinent imaging results/findings within the past 24 hours.

## 2025-06-24 NOTE — ASSESSMENT & PLAN NOTE
Aware   Give plts if less than 20 or bleeding.Transfuse prbc if hgb less that 7   Transfusing platelets today given nosebleed, discussed with Heme-Onc

## 2025-06-24 NOTE — PROGRESS NOTES
[facility]   Department of Infectious Disease  Progress Note        PATIENT NAME: Rick Butler  MRN: [id number]  TODAY'S DATE: 06/24/2025  ADMIT DATE: 6/18/2025  LOS: 6 days    CHIEF COMPLAINT: Shortness of Breath (X 1 day. ), Fatigue, and Cough (Onset lastnite)      PRINCIPLE PROBLEM: Severe sepsis    ASSESSMENT and PLAN     Severe sepsis secondary to multifocal pneumonia in the setting of immunosuppression, history of MDS on Rytelo, was increased oxygen requirements, currently 6 L of oxygen  Procalcitonin 61 on admit, repeat 1 today 6.5, trending down  CRP 18, ESR 9, high  RVP 6/18 negative  Respiratory culture 6/19, inadequate specimen, 2nd sample same day still greater than 10 epithelial cells, normal respiratory federico  MRSA nasal swab 6/18 not detected  Blood cultures 6/18 x2 sets no growth     Bi-cytopenia; H&H 8.6/26.6, platelet count 27 likely secondary to MDS   Heme-Onc following    Stage III CKD, not on HD - Estimated Creatinine Clearance: 25.2 mL/min (A) (based on SCr of 3.3 mg/dL (H)).  Nephrology following    RECOMMENDATIONS:    Discussed with Medicine, holding off on bronchoscopy as of now   Stop Zosyn in view of thrombocytopenia  Plan to give him a unit of platelets today   Start cefepime 1 g IV q.12  Continue doxycycline 100 mg p.o. twice a day to cover atypicals  Follow speech pathology indications  Monitor O2 sats    D/w patient, wife at bedside, Dr Siegel/Medicine    Please send Epic secure chat with any questions.      Antibiotics (From admission, onward)      Start     Stop Route Frequency Ordered    06/24/25 1400  ceFEPIme injection 1 g         -- IV Every 12 hours (non-standard times) 06/24/25 1247    06/18/25 2100  doxycycline capsule 100 mg         -- Oral Every 12 hours 06/18/25 1752          Antifungals (From admission, onward)      None           Antivirals (From admission, onward)      None              INTERVAL HISTORY      06/19/2025:  he was seen and evaluated at bedside.   States he is improving.  Still with pleuritic right chest pain.  Heart rate and fever curve improving.  Sputum was not a good sample.  Blood cultures so far negative.      06/20/2025:  Continues to improve.  Blood cultures negative.  Repeat sputum culture was once again not a good sample.  Appetite improving.    06/21/2025:  Seen and evaluated at bedside.  Continues to improve.  No new acute issues.  Blood cultures remain negative.  Was unable to produce a good sputum sample.    06/22/2024:  Chest x-ray today about the same.  No new acute issues.  Continues to improve.  Pending transfer out of ICU to medical floor.  Continues to clinically improve.  Appetite better.    6/23 (Ty):  Interim reviewed, patient seen and examined at bedside, he is sitting in the chair, wife at bedside.  He is complaining of shortness of breath, he is tachypneic on 6 L of O2, saturating 92%.  Hypertensive, T-max 98.3°, afebrile.  Discussed with Medicine, need for Pulmonary follow up for bronchoscopy to obtain better sputum sample and guide antibiotic therapy.  Chest x-ray yesterday with right middle lobe airspace opacities unchanged.  Discussed with patient, he mentions he sometimes chokes while eating or drinking, will have official speech evaluation to rule out ongoing aspiration.  Labs reviewed, white count 5.3, H&H 8.6/26.6, thrombocytopenia 27.  Stable electrolytes, CKD not on HD, creatinine 3.1, creatinine clearance 26.8 mL/min, stable LFTs.  Procalcitonin down to 6.5, trending down.  Micro reviewed, no further data.  Patient also mentions he has not received pneumonia shot, will likely give this outpatient.    6/24:  Interim reviewed, patient seen examined at bedside.  He mentions he is feeling better today, he is sitting in the chair, on facemask 6 L, having epistaxis.  Hemodynamically stable, T-max 98.1°, afebrile.  Adequate urinary output, last bowel movement this morning.  Labs reviewed, white count 5.1, left shift 73.8%, no  bands, H&H 8.2/25.3, platelet count 25, thrombocytopenia.  Patient having epistaxis, plan for platelet transfusion today.  ESR 89, CRP 18.1, high.  Stable electrolytes, creatinine 3.3, normal LFTs.  Micro reviewed, so far no growth.  Discussed with Medicine, no plans for bronchoscopy I have now.  We will stop Zosyn in view of ongoing thrombocytopenia switch to cefepime 1 g IV q.12 for now      ARMANDO Butler is a 65 y.o. male with history of MDS/RA RS and sickle cell trait.  He is on Rytelo for about 2 months he requires.  A blood and platelet transfusions.  Presents to the emergency room 06/18/2025 with fever cough and generalized weakness and fatigue of 1 day duration.  In the ED /59, pulse 132, respiratory rate 25, temperature 103°, oxygen saturation 92%.     Sodium 136, creatinine 4.6, AST 20, ALT 19, troponin 50, .  WBC 1, hematocrit 23, MCV 86, platelet count 62 with 13% bands.  Acute respiratory panel negative x-ray showed right middle and lower lobe infiltrate he received IV fluid and was placed on antibiotics he has been evaluated by oncologist and pulmonologist ID asked to assist with his care.     No recent travel other than a cruise to [place] in April 2025.  No sick contacts.  He is employed and works as a prep shift.  Was actually working up until 06/17/2025.        Antibiotic history:    Vancomycin 6/18/25-  Cefepime: 06/08/2025 x1 dose   Azithromycin: 06/18/2025   Meropenem: 06/18/2025  Zosyn 06/23--24  Cefepime 6/24     Microbiology:    Respiratory panel 06/18/2025: Negative   Blood culture 06/18/2025:  NGTD    Review of Systems  Negative except as stated above in Interval History     OBJECTIVE   Temp:  [97.6 °F (36.4 °C)-98.1 °F (36.7 °C)] 97.6 °F (36.4 °C)  Pulse:  [72-91] 82  Resp:  [16-18] 16  SpO2:  [95 %-100 %] 99 %  BP: (118-151)/(71-85) 128/73  Temp:  [97.6 °F (36.4 °C)-98.1 °F (36.7 °C)]   Temp: 97.6 °F (36.4 °C) (06/24/25 1131)  Pulse: 82 (06/24/25 1138)  Resp: 16  (06/24/25 1138)  BP: 128/73 (06/24/25 1131)  SpO2: 99 % (06/24/25 1138)    Intake/Output Summary (Last 24 hours) at 6/24/2025 1259  Last data filed at 6/24/2025 0800  Gross per 24 hour   Intake 0 ml   Output 400 ml   Net -400 ml       Physical Exam  General: AA male, sitting in the chair, with epistaxis, on O2 by NC 6 L  Eyes: Eyes with no icterus or injection. Vision grossly normal  Ears: Hearing grossly normal.  Nose: Nares patent  Mouth: Moist mucous membranes, dentures. No ulcerations, erythema or exudates.  Neck: Supple, no tenderness to palpation.  Cardiovascular: Regular rate and rhythm, no murmurs, no edema.    Respiratory:  Better air entry bilaterally  Gastrointestinal: Soft with active bowel sounds, no tenderness to palpation, no distention.  Genitourinary: No suprapubic tenderness.  Musculoskeletal: Moves all extremities with good strength.    Skin: Warm and dry, no obvious rashes.    Neuro: Oriented, conversant, follows commands.  Psych: Calm   VAD: PIVs  ISOLATION: None     Wounds:  None    Significant Labs: All pertinent labs within the past 24 hours have been reviewed.    CBC LAST 7 DAYS  Recent Labs   Lab 06/18/25  0843 06/19/25  0301 06/19/25  0951 06/19/25  1720 06/20/25  0230 06/20/25  1835 06/21/25  0302 06/22/25  0315 06/23/25  0612 06/24/25  0503   WBC 1.02* 2.38* 2.85* 4.57 5.73  --  8.67 6.42 5.31 5.18   RBC 2.65* 2.40* 2.44* 2.78* 2.58*  --  3.00* 2.82* 3.11* 2.94*   HGB 7.3* 6.7* 6.8* 7.6* 7.1* 8.8* 8.2* 7.8* 8.6* 8.2*   HCT 22.9* 20.4* 20.7* 23.6* 21.6* 26.9* 25.4* 24.0* 26.6* 25.3*   MCV 86 85 85 85 84  --  85 85 86 86   MCH 27.5 27.9 27.9 27.3 27.5  --  27.3 27.7 27.7 27.9   MCHC 31.9* 32.8 32.9 32.2 32.9  --  32.3 32.5 32.3 32.4   RDW 17.7* 17.2* 17.8* 17.6* 17.9*  --  17.3* 17.3* 17.3* 16.9*   PLT 62* 47* 46* 47* 40*  --  42* 33* 27* 25*   MPV 9.6 10.3  --   --   --   --  12.3  --   --   --    NRBC 0 0  --  0 0  --  0 0 0 0       CHEMISTRY LAST 7 DAYS  Recent Labs   Lab 06/18/25  0843  "06/19/25  0301 06/19/25 2026 06/20/25  0230 06/21/25  0302 06/22/25  0315 06/23/25  0612 06/24/25  0503    138 139 140 144 142 141 139   K 4.0 4.9 4.1 4.2 4.0 3.9 4.4 4.9    108 112* 112* 116* 115* 112* 111*   CO2 21* 21* 19* 20* 19* 18* 21* 20*   ANIONGAP 12 9 8 8 9 9 8 8   BUN 55* 54* 57* 54* 50* 45* 39* 46*   CREATININE 4.6* 4.2* 4.1* 4.0* 3.6* 3.3* 3.1* 3.3*   GLU 95 92 111* 110 96 98 109 144*   CALCIUM 8.9 8.1* 8.3* 8.5* 8.7 8.9 9.2 9.2   MG 1.4* 2.0 2.2 2.2 2.0 1.8 1.5* 1.7   ALBUMIN 4.0 3.3*  --  3.0* 3.1* 3.0* 3.4* 3.4*   PROT 7.5 6.3  --  6.7 6.8 6.3 6.9 7.1   ALKPHOS 45* 35*  --  46* 61 61 65 62   ALT 19 17  --  19 22 20 20 22   AST 23 25  --  23 27 23 23 21   BILITOT 0.6 1.1*  --  0.5 0.5 0.5 0.7 0.7       Estimated Creatinine Clearance: 25.2 mL/min (A) (based on SCr of 3.3 mg/dL (H)).    INFLAMMATORY/PROCAL  LAST 7 DAYS  Recent Labs   Lab 06/24/25  0503   CRP 18.10*     No results found for: "ESR"  CRP   Date Value Ref Range Status   06/24/2025 18.10 (H) <1.00 mg/dL Final     Comment:     CRP-Normal Application expected values:          <1.0        mg/dL   Normal Range          1.0 - 5.0  mg/dL   Indicates mild inflammation          5.0 - 10.0 mg/dL   Indicates severe inflammation        >10.0        mg/dL   Represents serious processes and frequently                                 indicates the presence of a bacterial infection.    12/04/2024 0.40 <1.00 mg/dL Final     Comment:     CRP-Normal Application expected values:   <1.0        mg/dL   Normal Range  1.0 - 5.0  mg/dL   Indicates mild inflammation  5.0 - 10.0 mg/dL   Indicates severe inflammation  >10.0        mg/dL   Represents serious processes and   frequently         indicates the presence of a bacterial   infection.          PRIOR  MICROBIOLOGY:    No results found for the last 90 days.      LAST 7 DAYS MICROBIOLOGY   Microbiology Results (last 7 days)       Procedure Component Value Units Date/Time    Influenza A & B by " Molecular [[id number]]  (Normal) Collected: 06/24/25 0814    Order Status: Completed Specimen: Nasal Swab Updated: 06/24/25 0956     INFLUENZA A MOLECULAR Negative     INFLUENZA B MOLECULAR  Negative    Blood culture x two cultures. Draw prior to antibiotics. [[id number]]  (Normal) Collected: 06/18/25 0930    Order Status: Completed Specimen: Blood Updated: 06/23/25 1001     CULTURE, BLOOD ([facility]) No Growth After 5 Days    Blood culture x two cultures. Draw prior to antibiotics. [[id number]]  (Normal) Collected: 06/18/25 0932    Order Status: Completed Specimen: Blood Updated: 06/23/25 1001     CULTURE, BLOOD ([facility]) No Growth After 5 Days    Culture, Respiratory with Gram Stain [[id number]] Collected: 06/19/25 0950    Order Status: Completed Specimen: Respiratory Updated: 06/21/25 0701     Respiratory Culture Normal respiratory federico     GRAM STAIN (RESPIRATORY) >10 Epithelial Cells/LPF      Few WBC seen      Rare Gram Positive Rods     Comment: Corrected result: Previously reported as Rare Branching Gram positive rods on 6/19/2025 at 1225 CDT.         Rare Gram positive cocci    Culture, Respiratory with Gram Stain [[id number]] Collected: 06/19/25 0734    Order Status: Completed Specimen: Respiratory from Sputum, Expectorated Updated: 06/19/25 0844     Respiratory Culture Specimen inadequate - culture not performed     GRAM STAIN (RESPIRATORY) >10epis/lfp and <than many WBC's      Predominance of oropharyngeal federico. Please recollect    MRSA Screen by PCR [[id number]]  (Normal) Collected: 06/18/25 1948    Order Status: Completed Specimen: Nasal Swab Updated: 06/18/25 2115     MRSA PCR SCRN ([facility]) Not Detected    Respiratory Infection Panel (PCR), Nasopharyngeal [[id number]] Collected: 06/18/25 1027    Order Status: Completed Specimen: Nasopharyngeal Swab Updated: 06/18/25 1209     Respiratory Infection Panel Source Nasopharyngeal Swab     Adenovirus Not Detected     Coronavirus 229E, Common Cold Virus Not Detected      Coronavirus HKU1, Common Cold Virus Not Detected     Coronavirus NL63, Common Cold Virus Not Detected     Coronavirus OC43, Common Cold Virus Not Detected     SARS-CoV2 (COVID-19) Qualitative PCR Not Detected     Human Metapneumovirus Not Detected     Human Rhinovirus/Enterovirus Not Detected     Influenza A (subtypes H1, H1-2009,H3) Not Detected     Influenza B Not Detected     Parainfluenza Virus 1 Not Detected     Parainfluenza Virus 2 Not Detected     Parainfluenza Virus 3 Not Detected     Parainfluenza Virus 4 Not Detected     Respiratory Syncytial Virus Not Detected     Bordetella Parapertussis (ZX0331) Not Detected     Bordetella pertussis (ptxP) Not Detected     Chlamydia pneumoniae Not Detected     Mycoplasma pneumoniae Not Detected              CURRENT/PREVIOUS VISIT EKG  Results for orders placed or performed during the hospital encounter of 06/18/25   EKG 12-lead    Collection Time: 06/20/25  1:14 PM   Result Value Ref Range    QRS Duration 94 ms    OHS QTC Calculation 484 ms    Narrative    Test Reason : I49.9,    Vent. Rate :  93 BPM     Atrial Rate :  93 BPM     P-R Int : 170 ms          QRS Dur :  94 ms      QT Int : 390 ms       P-R-T Axes :  56   6  48 degrees    QTcB Int : 484 ms    Normal sinus rhythm  Nonspecific T wave abnormality  Prolonged QT  Abnormal ECG  When compared with ECG of 19-Jun-2025 18:19,  Sinus rhythm has replaced Atrial fibrillation  Vent. rate has decreased by  56 bpm  Confirmed by Fam Benoit ([id number]) on 6/21/2025 2:14:31 PM    Referred By: AAAREFERRAL SELF           Confirmed By: Fam Benoit     Significant Imaging: I have reviewed all relevant and available imaging results/findings within the past 24 hours.    I spent a total of 55 minutes on the day of the visit.This includes face to face time and non-face to face time preparing to see the patient (eg, review of tests), obtaining and/or reviewing separately obtained history, documenting clinical information in the  electronic or other health record, independently interpreting results and communicating results to the patient/family/caregiver, or care coordinator.    Vikki Hylton MD  Date of Service: 06/24/2025      This note was created using [email or web address] voice recognition software that occasionally misinterpreted phrases or words.

## 2025-06-24 NOTE — PROGRESS NOTES
Critical access hospital Medicine  Progress Note    Patient Name: Rick Butler  MRN: [id number]  Patient Class: IP- Inpatient   Admission Date: 6/18/2025  Length of Stay: 6 days  Attending Physician: Abbi Siegel MD  Primary Care Provider: Reynaldo Basilio FNP-C        Subjective     Principal Problem:Severe sepsis        HPI:  65 year old pt getting admitted with Sepsis/Severe pneumonia/Neutropenic fever  Pt was suffering from URTI like infection for past several days  Later he suffered from severe SOB/cough  Today symptoms went worse, came to ER found to be hypoxic and got admitted     Overview/Hospital Course:  65-year-old male with myelodysplastic syndrome admitted with severe sepsis/pneumonia/neutropenic fever. Continue antibiotics and ID followed.  Later patient developed elevated troponin for demand ischemia and developed AFib with RVR and placed on IV amiodarone and transitioned to oral amiodarone.  For symptomatic anemia, s/p 2 units of packed red blood cells .For MDS, Give plts if less than 20 or bleeding.Transfuse prbc if hgb less that 7 and Heme/oncology followed.     Interval History:  Patient seen and examined.  Reports he feels a bit better today.  He is still on 5-6 L nasal cannula.  Hemoglobin 8.2.  Platelets down to 25.  Patient reports intermittent nose bleeding over the past 3 days and states he sees blood clots when he blows his nose.  Discussed with Dr. Tello , recommends transfuse platelets and see if we can stop Zosyn.  Discussed with Dr. Crawford, Zosyn discontinued.      Review of Systems   All other systems reviewed and are negative.    Objective:     Vital Signs (Most Recent):  Temp: 97.6 °F (36.4 °C) (06/24/25 1131)  Pulse: 82 (06/24/25 1138)  Resp: 16 (06/24/25 1138)  BP: 128/73 (06/24/25 1131)  SpO2: 99 % (06/24/25 1138) Vital Signs (24h Range):  Temp:  [97.6 °F (36.4 °C)-98.1 °F (36.7 °C)] 97.6 °F (36.4 °C)  Pulse:  [72-91] 82  Resp:  [16-18] 16  SpO2:  [95  "%-100 %] 99 %  BP: (118-151)/(71-85) 128/73     Weight: 93.7 kg (206 lb 9.1 oz)  Body mass index is 30.51 kg/m².    Intake/Output Summary (Last 24 hours) at 6/24/2025 1223  Last data filed at 6/24/2025 0800  Gross per 24 hour   Intake 0 ml   Output 400 ml   Net -400 ml         Physical Exam  HENT:      Mouth/Throat:      Mouth: Mucous membranes are moist.   Eyes:      Conjunctiva/sclera: Conjunctivae normal.   Cardiovascular:      Rate and Rhythm: Normal rate.      Heart sounds: Murmur heard.   Pulmonary:      Breath sounds: Wheezing and rales present.   Abdominal:      General: There is no distension.   Musculoskeletal:      Right lower leg: No edema.      Left lower leg: No edema.   Skin:     Coloration: Skin is pale.   Neurological:      Mental Status: He is alert and oriented to person, place, and time.               Significant Labs: All pertinent labs within the past 24 hours have been reviewed.    Significant Imaging: I have reviewed all pertinent imaging results/findings within the past 24 hours.      Assessment & Plan  Severe sepsis  This patient does have evidence of infective focus  My overall impression is sepsis with Acute kidney injury, Acute respiratory failure, and Thrombocytopenia .  Source: Respiratory Infection  Antibiotics given-   Antibiotics (72h ago, onward)      Start     Stop Route Frequency Ordered    06/18/25 2100  doxycycline capsule 100 mg         -- Oral Every 12 hours 06/18/25 1752          Latest lactate reviewed-  No results for input(s): "LACTATE", "POCLAC" in the last 72 hours.    Pneumonia due to infectious organism  Maintain iv abx as below  De escalate as needed     Antibiotics (From admission, onward)      Start     Stop Route Frequency Ordered    06/18/25 2100  doxycycline capsule 100 mg         -- Oral Every 12 hours 06/18/25 1752            Microbiology Results (last 7 days)       Procedure Component Value Units Date/Time    Influenza A & B by Molecular [[id number]]  " (Normal) Collected: 06/24/25 0814    Order Status: Completed Specimen: Nasal Swab Updated: 06/24/25 0956     INFLUENZA A MOLECULAR Negative     INFLUENZA B MOLECULAR  Negative    Blood culture x two cultures. Draw prior to antibiotics. [[id number]]  (Normal) Collected: 06/18/25 0930    Order Status: Completed Specimen: Blood Updated: 06/23/25 1001     CULTURE, BLOOD ([facility]) No Growth After 5 Days    Blood culture x two cultures. Draw prior to antibiotics. [[id number]]  (Normal) Collected: 06/18/25 0932    Order Status: Completed Specimen: Blood Updated: 06/23/25 1001     CULTURE, BLOOD ([facility]) No Growth After 5 Days    Culture, Respiratory with Gram Stain [[id number]] Collected: 06/19/25 0950    Order Status: Completed Specimen: Respiratory Updated: 06/21/25 0701     Respiratory Culture Normal respiratory federico     GRAM STAIN (RESPIRATORY) >10 Epithelial Cells/LPF      Few WBC seen      Rare Gram Positive Rods     Comment: Corrected result: Previously reported as Rare Branching Gram positive rods on 6/19/2025 at 1225 CDT.         Rare Gram positive cocci    Culture, Respiratory with Gram Stain [[id number]] Collected: 06/19/25 0734    Order Status: Completed Specimen: Respiratory from Sputum, Expectorated Updated: 06/19/25 0844     Respiratory Culture Specimen inadequate - culture not performed     GRAM STAIN (RESPIRATORY) >10epis/lfp and <than many WBC's      Predominance of oropharyngeal federico. Please recollect    MRSA Screen by PCR [[id number]]  (Normal) Collected: 06/18/25 1948    Order Status: Completed Specimen: Nasal Swab Updated: 06/18/25 2115     MRSA PCR SCRN ([facility]) Not Detected    Respiratory Infection Panel (PCR), Nasopharyngeal [[id number]] Collected: 06/18/25 1027    Order Status: Completed Specimen: Nasopharyngeal Swab Updated: 06/18/25 1209     Respiratory Infection Panel Source Nasopharyngeal Swab     Adenovirus Not Detected     Coronavirus 229E, Common Cold Virus Not Detected     Coronavirus HKU1, Common  Cold Virus Not Detected     Coronavirus NL63, Common Cold Virus Not Detected     Coronavirus OC43, Common Cold Virus Not Detected     SARS-CoV2 (COVID-19) Qualitative PCR Not Detected     Human Metapneumovirus Not Detected     Human Rhinovirus/Enterovirus Not Detected     Influenza A (subtypes H1, H1-2009,H3) Not Detected     Influenza B Not Detected     Parainfluenza Virus 1 Not Detected     Parainfluenza Virus 2 Not Detected     Parainfluenza Virus 3 Not Detected     Parainfluenza Virus 4 Not Detected     Respiratory Syncytial Virus Not Detected     Bordetella Parapertussis ([id number]) Not Detected     Bordetella pertussis (ptxP) Not Detected     Chlamydia pneumoniae Not Detected     Mycoplasma pneumoniae Not Detected          Sickle cell trait syndrome  Aware     RARS (refractory anemia with ringed sideroblasts)  Aware   Patient received 1 unit of PRBC on 6/18/25.  Patient agreeable to receive another unit of packed red blood cell today.    Recent Labs     06/22/25  0315 06/23/25  0612 06/24/25  0503   HGB 7.8* 8.6* 8.2*   HCT 24.0* 26.6* 25.3*       MDS (myelodysplastic syndrome)  Aware   Give plts if less than 20 or bleeding.Transfuse prbc if hgb less that 7   Transfusing platelets today given nosebleed, discussed with Heme-Onc  Anemia, chronic renal failure, stage 3 (moderate)  Aware   Also has MDS  Neutropenic fever  Maintain Rx as ordered  Isolation precautions    Acute on chronic anemia  Has received a unit PRBCs during this stay  Thrombocytopenia  Continue to monitor  Heme oncology is following  Holding Lovenox  Transfused a dose platelets 6/24  Elevated troponin  Trended down likely demand  Cardiology is following  Atrial fibrillation with rapid ventricular response   IV amiodarone transitioned to oral on 06/21  Anticoagulation per Cardiology team recommendation, currently holding as platelets under 50  TORRES (acute kidney injury)  TORRES is likely due to multifactorial.Most recent creatinine and eGFR are listed  below.  Recent Labs     06/22/25  0315 06/23/25  0612 06/24/25  0503   CREATININE 3.3* 3.1* 3.3*   EGFRNORACEVR 20* 21* 20*      Plan  - TORRES is stable  - Avoid nephrotoxins and renally dose meds for GFR listed above  - Monitor urine output, serial BMP, and adjust therapy as needed  - nephrology is following  Hypoxia      VTE Risk Mitigation (From admission, onward)           Ordered     IP VTE HIGH RISK PATIENT  Once         06/18/25 1145     Place sequential compression device  Until discontinued         06/18/25 1145                    Discharge Planning   ELMO:      Code Status: Full Code   Medical Readiness for Discharge Date: 6/20/2025  Discharge Plan A: Home Health   Discharge Delays: None known at this time              Please place Justification for DME      Abbi Siegel MD  Department of Hospital Medicine   [facility]

## 2025-06-24 NOTE — PT/OT/SLP PROGRESS
Physical Therapy      Patient Name:  Rick Butler   MRN:  [id number]    Patient not seen today secondary to Other (Comment) (Pt receiving platelets.). Will follow-up 6/25/25.
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
## 2025-06-24 NOTE — PROGRESS NOTES
INPATIENT NEPHROLOGY Progress Note   [facility]    Patient Name: Rick Butler  Date: 06/24/2025    Reason for consultation: TORRES    Chief Complaint:   Chief Complaint   Patient presents with    Shortness of Breath     X 1 day.     Fatigue    Cough     Onset lastnite       History of Present Illness:  65-year-old male with history of myelodysplastic syndrome, sickle cell trait, chronic kidney disease stage 3 f/b Dr. Mas, depression, former smoker, ETOH abuse, lumbar degenerative disc disease. Patient presents to the emergency department with complaint of shortness of breath and increasing fatigue over last 24 hours. Patient states had slightly productive cough as well during this time. Patient decided come to the emergency department for further evaluation. On arrival to ER found with temperature of 103°, heart rate of 132, SBP 90s, with room air sat of 91%. Diagnosed with RLL PNA. Consulted for TORRES.    Notable home meds- norvasc, calcitriol, flomax    Echo:    Left Ventricle: The left ventricle is normal in size. Normal wall thickness. Mild global hypokinesis present. There is low normal systolic function with a visually estimated ejection fraction of 50 - 55%. There is normal diastolic function.    Right Ventricle: The right ventricle is normal in size Systolic function is normal.    Left Atrium: The left atrium is moderately dilated    Mitral Valve: There is moderate regurgitation.    Tricuspid Valve: There is mild to moderate regurgitation. There is pulmonary hypertension. The estimated PA systolic pressure is at least 36 mmHg.    Interval History:  6/19- afebrile, SBP 100s, on 4L NC, UOP 1.2L, transfused 1u of blood yest, on levophed and NS IVFs  6/20- afebrile, elevated HR, SBP 120s, on 3-4L NC, coughing, UOP 1.6L, getting blood, off levophed, on amio gtt (went in to AF with RVR overnight), on NS IVFs, echo with TR/pulm HTN  6/21- afebrile, HR improved, BP stable, on 4L NC, UOP 1.8L-  transfused yest, on amio gtt  6/22- VSS, on 3L NC, UOP 2L + voids, renal imaging unrevealing  6/23  1L UOP recorded. VSS, renal function improving.  Productive cough, no fevers.  6/24  650 ml UOP recorded.  VSS, BUN/Scr w/small bump.  Currently in bathroom.    Plan of Care:    Assessment:  Septic shock due to HCAP in an IC patient  TORRES/CKD IV  Elevated BNP and troponin- EF 50-55%, moderate MR, moderate TR, pulm HTN  AF with RVR  Metabolic acidosis  SHPT  MDS on chemotherapy  BPH    Plan:    - on treatment for HCAP- blood cx and RVP negative- remains off pressors- dose meds for CrCl 20  - TORRES is due to septic shock- SCr is improving- nonoliguric- he has no acute RRT needs- nonoliguric- no nsaids or IV contrast  - trop improving- echo noted- monitor for s/s volume overload  - rate controlled- switched to PO amio, on lovenox   - acidosis is mild- component of dilution- hold off sod bicarb for now to minimize salt load- reassess tomorrow  - continue calcitriol  - H/H and platelets fluctuating- transfusion goals per heme/onc  - continue flomax    Thank you for allowing us to participate in this patient's care. We will continue to follow.    Vital Signs:  Temp Readings from Last 3 Encounters:   06/24/25 98.1 °F (36.7 °C) (Oral)   06/05/25 100 °F (37.8 °C) (Temporal)   06/05/25 99 °F (37.2 °C)       Pulse Readings from Last 3 Encounters:   06/24/25 75   06/05/25 72   06/05/25 70       BP Readings from Last 3 Encounters:   06/24/25 127/76   06/05/25 (!) 160/89   06/05/25 (!) 170/90       Weight:  Wt Readings from Last 3 Encounters:   06/18/25 93.7 kg (206 lb 9.1 oz)   06/05/25 96.2 kg (212 lb)   06/05/25 96.6 kg (213 lb)       INS/OUTS:  I/O last 3 completed shifts:  In: 680 [P.O.:680]  Out: 1450 [Urine:1450]  No intake/output data recorded.    Medications:  Scheduled Meds:   amiodarone  400 mg Oral TID    Followed by    [START ON 6/26/2025] amiodarone  400 mg Oral BID    Followed by    [START ON 7/1/2025] amiodarone  400  "mg Oral Daily    amLODIPine  10 mg Oral Daily    calcitRIOL  0.25 mcg Oral Q7 Days    cetirizine  5 mg Oral Daily    doxycycline  100 mg Oral Q12H    fluticasone propionate  2 spray Each Nostril Daily    folic acid  2,000 mcg Oral Daily    morphine  2 mg Intravenous Once    pantoprazole  40 mg Oral Daily    piperacillin-tazobactam (Zosyn) IV (PEDS and ADULTS) (extended infusion is not appropriate)  4.5 g Intravenous Q8H    predniSONE  40 mg Oral Daily    tamsulosin  0.4 mg Oral Daily    traZODone  100 mg Oral QHS     Continuous Infusions:      PRN Meds:.  Current Facility-Administered Medications:     0.9%  NaCl infusion (for blood administration), , Intravenous, Q24H PRN    0.9%  NaCl infusion (for blood administration), , Intravenous, Q24H PRN    0.9%  NaCl infusion (for blood administration), , Intravenous, Q24H PRN    acetaminophen, 650 mg, Oral, Q8H PRN    acetaminophen, 650 mg, Oral, Q4H PRN    aluminum-magnesium hydroxide-simethicone, 30 mL, Oral, QID PRN    benzonatate, 200 mg, Oral, TID PRN    dextromethorphan-guaiFENesin  mg, 1 tablet, Oral, BID PRN    furosemide (LASIX) injection, 20 mg, Intravenous, PRN    HYDROcodone-acetaminophen, 1 tablet, Oral, Q6H PRN    levalbuterol, 1.25 mg, Nebulization, Q6H PRN    magnesium oxide, 800 mg, Oral, PRN    magnesium oxide, 800 mg, Oral, PRN    melatonin, 6 mg, Oral, Nightly PRN    naloxone, 0.02 mg, Intravenous, PRN    ondansetron, 4 mg, Intravenous, Q6H PRN    potassium bicarbonate, 35 mEq, Oral, PRN    potassium bicarbonate, 50 mEq, Oral, PRN    potassium bicarbonate, 60 mEq, Oral, PRN    potassium, sodium phosphates, 2 packet, Oral, PRN    potassium, sodium phosphates, 2 packet, Oral, PRN    potassium, sodium phosphates, 2 packet, Oral, PRN  Medications Ordered Prior to Encounter[1]    Review of Systems:  Neg    Physical Exam:  /76   Pulse 75   Temp 98.1 °F (36.7 °C) (Oral)   Resp 16   Ht 5' 9" (1.753 m)   Wt 93.7 kg (206 lb 9.1 oz)   SpO2 99%   " BMI 30.51 kg/m²     General Appearance:    NAD, AAO x 3, cooperative, appears stated age   Head:    Normocephalic, atraumatic   Eyes:    PER, EOMI, and conjunctiva/sclera clear bilaterally        Mouth:   Moist mucus membranes, no thrush or oral lesions, normal      dentition   Back:     No CVA tenderness   Lungs:     Clear to auscultation bilaterally, no wheeze, crackles, rales      or rhonchi, symmetric air movement, respirations unlabored   Heart:    Regular rate and rhythm, S1 and S2 normal, no murmur, rub   or gallop   Abdomen:     Soft, non-tender, non-distended, bowel sounds active all four   quadrants, no RT or guarding, no masses, no organomegaly   Extremities:   Warm and well perfused, distal pulses intact, no cyanosis or    peripheral edema   MSK:   No joint or muscle swelling, tenderness or deformity   Skin:   Skin color, texture, turgor normal, no rashes or lesions   Neurologic/Psychiatric:   CNII-XII intact, normal strength and sensation       throughout, no asterixis; normal affect, memory, judgement     and insight     Results:  Lab Results   Component Value Date     06/24/2025    K 4.9 06/24/2025     (H) 06/24/2025    CO2 20 (L) 06/24/2025    BUN 46 (H) 06/24/2025    CREATININE 3.3 (H) 06/24/2025    CALCIUM 9.2 06/24/2025    ANIONGAP 8 06/24/2025    ESTGFRAFRICA 45.6 (A) 07/13/2022    EGFRNONAA 39.5 (A) 07/13/2022       Lab Results   Component Value Date    CALCIUM 9.2 06/24/2025    PHOS 4.1 06/19/2025       Recent Labs   Lab 06/24/25  0503   WBC 5.18   RBC 2.94*   HGB 8.2*   HCT 25.3*   PLT 25*   MCV 86   MCH 27.9   MCHC 32.4       Juana Sanchez NP      [facility]  [address] [place] [address]  [place] [address]  [phone] (p)  [phone] (f)                   [1]   No current facility-administered medications on file prior to encounter.     Current Outpatient Medications on File Prior to Encounter   Medication Sig Dispense Refill    amLODIPine (NORVASC) 5 MG tablet  Take 1 tablet (5 mg total) by mouth once daily. 90 tablet 3    ascorbic acid, vitamin C, (VITAMIN C) 500 MG tablet Take 500 mg by mouth once daily.      atorvastatin (LIPITOR) 10 MG tablet Take 1 tablet (10 mg total) by mouth every evening. 90 tablet 3    calcitRIOL (ROCALTROL) 0.25 MCG Cap Take 0.25 mcg by mouth every 7 days.      cyproheptadine (PERIACTIN) 4 mg tablet Take 1 tablet (4 mg total) by mouth 3 (three) times daily. 90 tablet 3    droNABinol (MARINOL) 10 MG capsule Take 1 capsule (10 mg total) by mouth 2 (two) times daily before meals. 60 capsule 1    famotidine (PEPCID) 20 MG tablet Take 20 mg by mouth 2 (two) times daily.      fluticasone propionate (FLONASE) 50 mcg/actuation nasal spray 1 SPRAY (50 MCG TOTAL) BY EACH NOSTRIL ROUTE 2 (TWO) TIMES A DAY. 1 SPRAY EVERY MORNING AND AFTERNOON TOWARD THE EAR EACH SIDE AFTER A SINUS RINSE 48 mL 1    folic acid (FOLVITE) 1 MG tablet TAKE 2 TABLETS BY MOUTH EVERY DAY (Patient taking differently: Take 2,000 mcg by mouth once daily.) 180 tablet 0    multivitamin with minerals Cap Take 1 capsule by mouth every morning.      pantoprazole (PROTONIX) 40 MG tablet Take 40 mg by mouth once daily.      tamsulosin (FLOMAX) 0.4 mg Cap TAKE 2 CAPSULES BY MOUTH EVERY DAY (Patient taking differently: Take 2 capsules by mouth once daily.) 180 capsule 1    traZODone (DESYREL) 100 MG tablet TAKE 1 TABLET BY MOUTH NIGHTLY AS NEEDED FOR INSOMNIA. 90 tablet 2    LIDOcaine (LIDODERM) 5 % Place 1 patch onto the skin once daily. Remove & Discard patch within 12 hours or as directed by MD 14 patch 0    sildenafil (VIAGRA) 100 MG tablet Take 1 tablet (100 mg total) by mouth daily as needed for Erectile Dysfunction. 10 tablet 6    sod chlor-bicarb-squeez bottle (NEILMED SINUS RINSE COMPLETE) pkdv 1 application  by sinus irrigation route 2 (two) times a day. Not right before bed 1 each 11    [DISCONTINUED] fluoxetine 20 MG tablet TAKE 1 TABLET BY MOUTH EVERY DAY 30 tablet 5

## 2025-06-24 NOTE — CARE UPDATE
06/24/25 1138   Patient Assessment/Suction   Level of Consciousness (AVPU) alert   Respiratory Effort Unlabored   Expansion/Accessory Muscles/Retractions no use of accessory muscles   All Lung Fields Breath Sounds Anterior:;Posterior:;Lateral:;coarse;rhonchi   Rhythm/Pattern, Respiratory no shortness of breath reported   Cough Frequency infrequent   Cough Type congested;productive   Secretions Amount moderate   Secretions Color clear;yellow   Secretions Characteristics thick   PRE-TX-O2   Device (Oxygen Therapy) high flow mask   $ Is the patient on Low Flow Oxygen? Yes   Flow (L/min) (Oxygen Therapy) 4   SpO2 99 %   Pulse Oximetry Type Intermittent   $ Pulse Oximetry - Multiple Charge Pulse Oximetry - Multiple   Pulse 82   Resp 16   Aerosol Therapy   $ Aerosol Therapy Charges Aerosol Treatment   Daily Review of Necessity (SVN) completed   Respiratory Treatment Status (SVN) given   Treatment Route (SVN) mask;oxygen   Patient Position HOB elevated   Post Treatment Assessment (SVN) increased aeration   Signs of Intolerance (SVN) none   Breath Sounds Post-Respiratory Treatment   Throughout All Fields Post-Treatment All Fields   Throughout All Fields Post-Treatment aeration increased   Post-treatment Heart Rate (beats/min) 76   Post-treatment Resp Rate (breaths/min) 15   Incentive Spirometer   $ Incentive Spirometer Charges done with encouragement   Incentive Spirometer Predicted Level (mL) 1250   Administration (IS) mouthpiece utilized   Number of Repetitions (IS) 10   Level Incentive Spirometer (mL) 1700   Patient Tolerance (IS) good;no adverse signs/symptoms present   Vibratory PEP Therapy   $ Vibratory PEP Charges Aerobika Therapy   $ Vibratory PEP Tech Time Charges 15 min   Daily Review of Necessity (PEP Therapy) completed   Type (PEP Therapy) vibratory/oscillatory   Device (PEP Therapy) flutter   Route (PEP Therapy) mouthpiece   Breaths per Cycle (PEP Therapy) 10   Cycles (PEP Therapy) 1   Settings (PEP Therapy)  PEP 5   Patient Position (PEP Therapy) sitting in chair   Post Treatment Assessment (PEP) breath sounds improved   Signs of Intolerance (PEP Therapy) none   Equipment Change   $ RT Equipment Oxymask   Education   $ Education Incentive Spirometry;15 min;Bronchodilator;PEP Therapy   Respiratory Evaluation   $ Care Plan Tech Time 15 min

## 2025-06-25 LAB
ABSOLUTE EOSINOPHIL (SMH): 0 K/UL
ABSOLUTE MONOCYTE (SMH): 0.98 K/UL (ref 0.3–1)
ABSOLUTE NEUTROPHIL COUNT (SMH): 3.5 K/UL (ref 1.8–7.7)
ALBUMIN SERPL-MCNC: 3.2 G/DL (ref 3.5–5.2)
ALP SERPL-CCNC: 51 UNIT/L (ref 55–135)
ALT SERPL-CCNC: 25 UNIT/L (ref 10–44)
ANION GAP (SMH): 8 MMOL/L (ref 8–16)
AST SERPL-CCNC: 21 UNIT/L (ref 10–40)
BASOPHILS # BLD AUTO: 0.02 K/UL
BASOPHILS NFR BLD AUTO: 0.4 %
BILIRUB SERPL-MCNC: 0.5 MG/DL (ref 0.1–1)
BUN SERPL-MCNC: 45 MG/DL (ref 8–23)
CALCIUM SERPL-MCNC: 8.6 MG/DL (ref 8.7–10.5)
CHLORIDE SERPL-SCNC: 110 MMOL/L (ref 95–110)
CO2 SERPL-SCNC: 22 MMOL/L (ref 23–29)
CREAT SERPL-MCNC: 3.3 MG/DL (ref 0.5–1.4)
ERYTHROCYTE [DISTWIDTH] IN BLOOD BY AUTOMATED COUNT: 16.9 % (ref 11.5–14.5)
GFR SERPLBLD CREATININE-BSD FMLA CKD-EPI: 20 ML/MIN/1.73/M2
GLUCOSE SERPL-MCNC: 120 MG/DL (ref 70–110)
HCT VFR BLD AUTO: 23.9 % (ref 40–54)
HGB BLD-MCNC: 7.7 GM/DL (ref 14–18)
IMM GRANULOCYTES # BLD AUTO: 0.2 K/UL (ref 0–0.04)
IMM GRANULOCYTES NFR BLD AUTO: 3.7 % (ref 0–0.5)
LABCORP MISCELLANEOUS TEST RESULT: NORMAL
LYMPHOCYTES # BLD AUTO: 0.73 K/UL (ref 1–4.8)
MAGNESIUM SERPL-MCNC: 1.7 MG/DL (ref 1.6–2.6)
MCH RBC QN AUTO: 27.8 PG (ref 27–31)
MCHC RBC AUTO-ENTMCNC: 32.2 G/DL (ref 32–36)
MCV RBC AUTO: 86 FL (ref 82–98)
NUCLEATED RBC (/100WBC) (SMH): 0 /100 WBC
PLATELET # BLD AUTO: 27 K/UL (ref 150–450)
PLATELET BLD QL SMEAR: ABNORMAL
PMV BLD AUTO: ABNORMAL FL
POTASSIUM SERPL-SCNC: 4.2 MMOL/L (ref 3.5–5.1)
PROT SERPL-MCNC: 6.6 GM/DL (ref 6–8.4)
RBC # BLD AUTO: 2.77 M/UL (ref 4.6–6.2)
RELATIVE EOSINOPHIL (SMH): 0 % (ref 0–8)
RELATIVE LYMPHOCYTE (SMH): 13.5 % (ref 18–48)
RELATIVE MONOCYTE (SMH): 18.1 % (ref 4–15)
RELATIVE NEUTROPHIL (SMH): 64.3 % (ref 38–73)
SCHISTOCYTES BLD QL SMEAR: ABNORMAL
SODIUM SERPL-SCNC: 140 MMOL/L (ref 136–145)
WBC # BLD AUTO: 5.4 K/UL (ref 3.9–12.7)

## 2025-06-25 PROCEDURE — 25000003 PHARM REV CODE 250: Performed by: HOSPITALIST

## 2025-06-25 PROCEDURE — 25000003 PHARM REV CODE 250: Performed by: INTERNAL MEDICINE

## 2025-06-25 PROCEDURE — 11000001 HC ACUTE MED/SURG PRIVATE ROOM

## 2025-06-25 PROCEDURE — 25000242 PHARM REV CODE 250 ALT 637 W/ HCPCS: Performed by: STUDENT IN AN ORGANIZED HEALTH CARE EDUCATION/TRAINING PROGRAM

## 2025-06-25 PROCEDURE — 99233 SBSQ HOSP IP/OBS HIGH 50: CPT | Mod: ,,, | Performed by: INTERNAL MEDICINE

## 2025-06-25 PROCEDURE — 99900035 HC TECH TIME PER 15 MIN (STAT)

## 2025-06-25 PROCEDURE — 80053 COMPREHEN METABOLIC PANEL: CPT | Performed by: INTERNAL MEDICINE

## 2025-06-25 PROCEDURE — 97116 GAIT TRAINING THERAPY: CPT | Mod: CQ

## 2025-06-25 PROCEDURE — 63600175 PHARM REV CODE 636 W HCPCS: Performed by: STUDENT IN AN ORGANIZED HEALTH CARE EDUCATION/TRAINING PROGRAM

## 2025-06-25 PROCEDURE — 94640 AIRWAY INHALATION TREATMENT: CPT

## 2025-06-25 PROCEDURE — 94799 UNLISTED PULMONARY SVC/PX: CPT

## 2025-06-25 PROCEDURE — 99232 SBSQ HOSP IP/OBS MODERATE 35: CPT | Mod: ,,, | Performed by: INTERNAL MEDICINE

## 2025-06-25 PROCEDURE — 27000221 HC OXYGEN, UP TO 24 HOURS

## 2025-06-25 PROCEDURE — 36415 COLL VENOUS BLD VENIPUNCTURE: CPT | Performed by: INTERNAL MEDICINE

## 2025-06-25 PROCEDURE — 97530 THERAPEUTIC ACTIVITIES: CPT

## 2025-06-25 PROCEDURE — 25000003 PHARM REV CODE 250: Performed by: FAMILY MEDICINE

## 2025-06-25 PROCEDURE — 83735 ASSAY OF MAGNESIUM: CPT | Performed by: INTERNAL MEDICINE

## 2025-06-25 PROCEDURE — 99900031 HC PATIENT EDUCATION (STAT)

## 2025-06-25 PROCEDURE — 97535 SELF CARE MNGMENT TRAINING: CPT

## 2025-06-25 PROCEDURE — 94664 DEMO&/EVAL PT USE INHALER: CPT

## 2025-06-25 PROCEDURE — 99233 SBSQ HOSP IP/OBS HIGH 50: CPT | Mod: ,,, | Performed by: STUDENT IN AN ORGANIZED HEALTH CARE EDUCATION/TRAINING PROGRAM

## 2025-06-25 PROCEDURE — 94761 N-INVAS EAR/PLS OXIMETRY MLT: CPT

## 2025-06-25 PROCEDURE — 85025 COMPLETE CBC W/AUTO DIFF WBC: CPT | Performed by: INTERNAL MEDICINE

## 2025-06-25 RX ADMIN — DOXYCYCLINE 100 MG: 100 CAPSULE ORAL at 09:06

## 2025-06-25 RX ADMIN — CEFEPIME 1 G: 1 INJECTION, POWDER, FOR SOLUTION INTRAMUSCULAR; INTRAVENOUS at 02:06

## 2025-06-25 RX ADMIN — TRAZODONE HYDROCHLORIDE 100 MG: 50 TABLET ORAL at 09:06

## 2025-06-25 RX ADMIN — LEVALBUTEROL HYDROCHLORIDE 1.25 MG: 1.25 SOLUTION RESPIRATORY (INHALATION) at 07:06

## 2025-06-25 RX ADMIN — AMIODARONE HYDROCHLORIDE 400 MG: 200 TABLET ORAL at 09:06

## 2025-06-25 RX ADMIN — AMIODARONE HYDROCHLORIDE 400 MG: 200 TABLET ORAL at 02:06

## 2025-06-25 RX ADMIN — FLUTICASONE PROPIONATE 100 MCG: 50 SPRAY, METERED NASAL at 09:06

## 2025-06-25 RX ADMIN — PANTOPRAZOLE SODIUM 40 MG: 40 TABLET, DELAYED RELEASE ORAL at 06:06

## 2025-06-25 RX ADMIN — TAMSULOSIN HYDROCHLORIDE 0.4 MG: 0.4 CAPSULE ORAL at 09:06

## 2025-06-25 RX ADMIN — CETIRIZINE HYDROCHLORIDE 5 MG: 5 TABLET ORAL at 09:06

## 2025-06-25 RX ADMIN — PREDNISONE 40 MG: 20 TABLET ORAL at 09:06

## 2025-06-25 RX ADMIN — CEFEPIME 1 G: 1 INJECTION, POWDER, FOR SOLUTION INTRAMUSCULAR; INTRAVENOUS at 01:06

## 2025-06-25 RX ADMIN — AMLODIPINE BESYLATE 10 MG: 5 TABLET ORAL at 09:06

## 2025-06-25 RX ADMIN — GUAIFENESIN AND DEXTROMETHORPHAN HYDROBROMIDE 1 TABLET: 600; 30 TABLET, EXTENDED RELEASE ORAL at 10:06

## 2025-06-25 RX ADMIN — FOLIC ACID 2000 MCG: 1 TABLET ORAL at 09:06

## 2025-06-25 RX ADMIN — LEVALBUTEROL HYDROCHLORIDE 1.25 MG: 1.25 SOLUTION RESPIRATORY (INHALATION) at 01:06

## 2025-06-25 RX ADMIN — Medication 6 MG: at 10:06

## 2025-06-25 NOTE — CARE UPDATE
06/25/25 1430   Patient Assessment/Suction   Level of Consciousness (AVPU) alert   Respiratory Effort Unlabored   PRE-TX-O2   SpO2 95 %   Pulse Oximetry Type Intermittent   Pulse 88   Resp 20     Assisted pt walking with rehab in the [facility]. Room air sats stayed >94%

## 2025-06-25 NOTE — PROGRESS NOTES
INPATIENT NEPHROLOGY Progress Note   [facility]    Patient Name: Rick Butler  Date: 06/25/2025    Reason for consultation: TORRES    Chief Complaint:   Chief Complaint   Patient presents with    Shortness of Breath     X 1 day.     Fatigue    Cough     Onset lastnite       History of Present Illness:  65-year-old male with history of myelodysplastic syndrome, sickle cell trait, chronic kidney disease stage 3 f/b Dr. Mas, depression, former smoker, ETOH abuse, lumbar degenerative disc disease. Patient presents to the emergency department with complaint of shortness of breath and increasing fatigue over last 24 hours. Patient states had slightly productive cough as well during this time. Patient decided come to the emergency department for further evaluation. On arrival to ER found with temperature of 103°, heart rate of 132, SBP 90s, with room air sat of 91%. Diagnosed with RLL PNA. Consulted for TORRES.    Notable home meds- norvasc, calcitriol, flomax    Echo:    Left Ventricle: The left ventricle is normal in size. Normal wall thickness. Mild global hypokinesis present. There is low normal systolic function with a visually estimated ejection fraction of 50 - 55%. There is normal diastolic function.    Right Ventricle: The right ventricle is normal in size Systolic function is normal.    Left Atrium: The left atrium is moderately dilated    Mitral Valve: There is moderate regurgitation.    Tricuspid Valve: There is mild to moderate regurgitation. There is pulmonary hypertension. The estimated PA systolic pressure is at least 36 mmHg.    Interval History:  6/19- afebrile, SBP 100s, on 4L NC, UOP 1.2L, transfused 1u of blood yest, on levophed and NS IVFs  6/20- afebrile, elevated HR, SBP 120s, on 3-4L NC, coughing, UOP 1.6L, getting blood, off levophed, on amio gtt (went in to AF with RVR overnight), on NS IVFs, echo with TR/pulm HTN  6/21- afebrile, HR improved, BP stable, on 4L NC, UOP 1.8L-  transfused yest, on amio gtt  6/22- VSS, on 3L NC, UOP 2L + voids, renal imaging unrevealing  6/23  1L UOP recorded. VSS, renal function improving.  Productive cough, no fevers.  6/24  650 ml UOP recorded.  VSS, BUN/Scr w/small bump.  Currently in bathroom.  6/25  one shift UOP recorded, 500 ml.  VSS, renal function about the same.  No SOB at rest today, still SOB w/exertion, still w/productive cough.  Feeling much better.    Plan of Care:    Assessment:  Septic shock due to HCAP in an IC patient  TORRES/CKD IV  Elevated BNP and troponin- EF 50-55%, moderate MR, moderate TR, pulm HTN  AF with RVR  Metabolic acidosis  SHPT  MDS on chemotherapy  BPH    Plan:    - on treatment for HCAP- blood cx and RVP negative- remains off pressors- dose meds for CrCl 20  - TORRES is due to septic shock- SCr is improving- nonoliguric- he has no acute RRT needs- nonoliguric- no nsaids or IV contrast  - trop improving- echo noted- monitor for s/s volume overload  - rate controlled- switched to PO amio, on lovenox   - acidosis is mild- component of dilution- hold off sod bicarb for now to minimize salt load- reassess tomorrow  - continue calcitriol  - H/H and platelets fluctuating- transfusion goals per heme/onc  - continue flomax    Thank you for allowing us to participate in this patient's care. We will continue to follow.    Vital Signs:  Temp Readings from Last 3 Encounters:   06/25/25 98 °F (36.7 °C) (Oral)   06/05/25 100 °F (37.8 °C) (Temporal)   06/05/25 99 °F (37.2 °C)       Pulse Readings from Last 3 Encounters:   06/25/25 82   06/05/25 72   06/05/25 70       BP Readings from Last 3 Encounters:   06/25/25 (!) 146/75   06/05/25 (!) 160/89   06/05/25 (!) 170/90       Weight:  Wt Readings from Last 3 Encounters:   06/18/25 93.7 kg (206 lb 9.1 oz)   06/05/25 96.2 kg (212 lb)   06/05/25 96.6 kg (213 lb)       INS/OUTS:  I/O last 3 completed shifts:  In: 0   Out: 501 [Urine:501]  No intake/output data recorded.    Medications:  Scheduled  Meds:   amiodarone  400 mg Oral TID    Followed by    [START ON 6/26/2025] amiodarone  400 mg Oral BID    Followed by    [START ON 7/1/2025] amiodarone  400 mg Oral Daily    amLODIPine  10 mg Oral Daily    calcitRIOL  0.25 mcg Oral Q7 Days    ceFEPime IV (PEDS and ADULTS)  1 g Intravenous Q12H    cetirizine  5 mg Oral Daily    doxycycline  100 mg Oral Q12H    fluticasone propionate  2 spray Each Nostril Daily    folic acid  2,000 mcg Oral Daily    levalbuterol  1.25 mg Nebulization Q6H WAKE    morphine  2 mg Intravenous Once    pantoprazole  40 mg Oral Daily    predniSONE  40 mg Oral Daily    tamsulosin  0.4 mg Oral Daily    traZODone  100 mg Oral QHS     Continuous Infusions:      PRN Meds:.  Current Facility-Administered Medications:     0.9%  NaCl infusion (for blood administration), , Intravenous, Q24H PRN    0.9%  NaCl infusion (for blood administration), , Intravenous, Q24H PRN    0.9%  NaCl infusion (for blood administration), , Intravenous, Q24H PRN    0.9%  NaCl infusion (for blood administration), , Intravenous, Q24H PRN    acetaminophen, 650 mg, Oral, Q8H PRN    acetaminophen, 650 mg, Oral, Q4H PRN    aluminum-magnesium hydroxide-simethicone, 30 mL, Oral, QID PRN    benzonatate, 200 mg, Oral, TID PRN    dextromethorphan-guaiFENesin  mg, 1 tablet, Oral, BID PRN    furosemide (LASIX) injection, 20 mg, Intravenous, PRN    HYDROcodone-acetaminophen, 1 tablet, Oral, Q6H PRN    magnesium oxide, 800 mg, Oral, PRN    magnesium oxide, 800 mg, Oral, PRN    melatonin, 6 mg, Oral, Nightly PRN    naloxone, 0.02 mg, Intravenous, PRN    ondansetron, 4 mg, Intravenous, Q6H PRN    potassium bicarbonate, 35 mEq, Oral, PRN    potassium bicarbonate, 50 mEq, Oral, PRN    potassium bicarbonate, 60 mEq, Oral, PRN    potassium, sodium phosphates, 2 packet, Oral, PRN    potassium, sodium phosphates, 2 packet, Oral, PRN    potassium, sodium phosphates, 2 packet, Oral, PRN  Medications Ordered Prior to Encounter[1]    Review  "of Systems:  Neg    Physical Exam:  BP (!) 146/75   Pulse 82   Temp 98 °F (36.7 °C) (Oral)   Resp 16   Ht 5' 9" (1.753 m)   Wt 93.7 kg (206 lb 9.1 oz)   SpO2 98%   BMI 30.51 kg/m²     General Appearance:    NAD, AAO x 3, cooperative, appears stated age   Head:    Normocephalic, atraumatic   Eyes:    PER, EOMI, and conjunctiva/sclera clear bilaterally        Mouth:   Moist mucus membranes, no thrush or oral lesions, normal      dentition   Back:     No CVA tenderness   Lungs:     Clear to auscultation bilaterally, no wheeze, crackles, rales      or rhonchi, symmetric air movement, respirations unlabored   Heart:    Regular rate and rhythm, S1 and S2 normal, no murmur, rub   or gallop   Abdomen:     Soft, non-tender, non-distended, bowel sounds active all four   quadrants, no RT or guarding, no masses, no organomegaly   Extremities:   Warm and well perfused, distal pulses intact, no cyanosis or    peripheral edema   MSK:   No joint or muscle swelling, tenderness or deformity   Skin:   Skin color, texture, turgor normal, no rashes or lesions   Neurologic/Psychiatric:   CNII-XII intact, normal strength and sensation       throughout, no asterixis; normal affect, memory, judgement     and insight     Results:  Lab Results   Component Value Date     06/25/2025    K 4.2 06/25/2025     06/25/2025    CO2 22 (L) 06/25/2025    BUN 45 (H) 06/25/2025    CREATININE 3.3 (H) 06/25/2025    CALCIUM 8.6 (L) 06/25/2025    ANIONGAP 8 06/25/2025    ESTGFRAFRICA 45.6 (A) 07/13/2022    EGFRNONAA 39.5 (A) 07/13/2022       Lab Results   Component Value Date    CALCIUM 8.6 (L) 06/25/2025    PHOS 4.1 06/19/2025       Recent Labs   Lab 06/25/25  0448   WBC 5.40   RBC 2.77*   HGB 7.7*   HCT 23.9*   PLT 27*   MCV 86   MCH 27.8   MCHC 32.2       Juana Sanchez NP      [facility] [place]  [address] [place] [address]  [phone] (p)  [phone] (f)                     [1]   No current " facility-administered medications on file prior to encounter.     Current Outpatient Medications on File Prior to Encounter   Medication Sig Dispense Refill    amLODIPine (NORVASC) 5 MG tablet Take 1 tablet (5 mg total) by mouth once daily. 90 tablet 3    ascorbic acid, vitamin C, (VITAMIN C) 500 MG tablet Take 500 mg by mouth once daily.      atorvastatin (LIPITOR) 10 MG tablet Take 1 tablet (10 mg total) by mouth every evening. 90 tablet 3    calcitRIOL (ROCALTROL) 0.25 MCG Cap Take 0.25 mcg by mouth every 7 days.      cyproheptadine (PERIACTIN) 4 mg tablet Take 1 tablet (4 mg total) by mouth 3 (three) times daily. 90 tablet 3    droNABinol (MARINOL) 10 MG capsule Take 1 capsule (10 mg total) by mouth 2 (two) times daily before meals. 60 capsule 1    famotidine (PEPCID) 20 MG tablet Take 20 mg by mouth 2 (two) times daily.      fluticasone propionate (FLONASE) 50 mcg/actuation nasal spray 1 SPRAY (50 MCG TOTAL) BY EACH NOSTRIL ROUTE 2 (TWO) TIMES A DAY. 1 SPRAY EVERY MORNING AND AFTERNOON TOWARD THE EAR EACH SIDE AFTER A SINUS RINSE 48 mL 1    folic acid (FOLVITE) 1 MG tablet TAKE 2 TABLETS BY MOUTH EVERY DAY (Patient taking differently: Take 2,000 mcg by mouth once daily.) 180 tablet 0    multivitamin with minerals Cap Take 1 capsule by mouth every morning.      pantoprazole (PROTONIX) 40 MG tablet Take 40 mg by mouth once daily.      tamsulosin (FLOMAX) 0.4 mg Cap TAKE 2 CAPSULES BY MOUTH EVERY DAY (Patient taking differently: Take 2 capsules by mouth once daily.) 180 capsule 1    traZODone (DESYREL) 100 MG tablet TAKE 1 TABLET BY MOUTH NIGHTLY AS NEEDED FOR INSOMNIA. 90 tablet 2    LIDOcaine (LIDODERM) 5 % Place 1 patch onto the skin once daily. Remove & Discard patch within 12 hours or as directed by MD Sarah patch 0    sildenafil (VIAGRA) 100 MG tablet Take 1 tablet (100 mg total) by mouth daily as needed for Erectile Dysfunction. 10 tablet 6    sod chlor-bicarb-squeez bottle (NEILMED SINUS RINSE COMPLETE) [facility]  1 application  by sinus irrigation route 2 (two) times a day. Not right before bed 1 each 11    [DISCONTINUED] fluoxetine 20 MG tablet TAKE 1 TABLET BY MOUTH EVERY DAY 30 tablet 5

## 2025-06-25 NOTE — ASSESSMENT & PLAN NOTE
Aware   Patient received 1 unit of PRBC on 6/18/25.  Patient agreeable to receive another unit of packed red blood cell today.    Recent Labs     06/23/25  0612 06/24/25  0503 06/25/25  0448   HGB 8.6* 8.2* 7.7*   HCT 26.6* 25.3* 23.9*

## 2025-06-25 NOTE — ASSESSMENT & PLAN NOTE
Maintain iv abx as below  De escalate as needed     Antibiotics (From admission, onward)      Start     Stop Route Frequency Ordered    06/24/25 1400  ceFEPIme injection 1 g         -- IV Every 12 hours (non-standard times) 06/24/25 1247    06/18/25 2100  doxycycline capsule 100 mg         -- Oral Every 12 hours 06/18/25 1752            Microbiology Results (last 7 days)       Procedure Component Value Units Date/Time    Influenza A & B by Molecular [[id number]]  (Normal) Collected: 06/24/25 0814    Order Status: Completed Specimen: Nasal Swab Updated: 06/24/25 0956     INFLUENZA A MOLECULAR Negative     INFLUENZA B MOLECULAR  Negative    Blood culture x two cultures. Draw prior to antibiotics. [[id number]]  (Normal) Collected: 06/18/25 0930    Order Status: Completed Specimen: Blood Updated: 06/23/25 1001     CULTURE, BLOOD ([facility]) No Growth After 5 Days    Blood culture x two cultures. Draw prior to antibiotics. [[id number]]  (Normal) Collected: 06/18/25 0932    Order Status: Completed Specimen: Blood Updated: 06/23/25 1001     CULTURE, BLOOD ([facility]) No Growth After 5 Days    Culture, Respiratory with Gram Stain [[id number]] Collected: 06/19/25 0950    Order Status: Completed Specimen: Respiratory Updated: 06/21/25 0701     Respiratory Culture Normal respiratory federico     GRAM STAIN (RESPIRATORY) >10 Epithelial Cells/LPF      Few WBC seen      Rare Gram Positive Rods     Comment: Corrected result: Previously reported as Rare Branching Gram positive rods on 6/19/2025 at 1225 CDT.         Rare Gram positive cocci    Culture, Respiratory with Gram Stain [[id number]] Collected: 06/19/25 0734    Order Status: Completed Specimen: Respiratory from Sputum, Expectorated Updated: 06/19/25 0844     Respiratory Culture Specimen inadequate - culture not performed     GRAM STAIN (RESPIRATORY) >10epis/lfp and <than many WBC's      Predominance of oropharyngeal federico. Please recollect    MRSA Screen by PCR [[id number]]  (Normal)  Collected: 06/18/25 1948    Order Status: Completed Specimen: Nasal Swab Updated: 06/18/25 2115     MRSA PCR SCRN ([facility]) Not Detected    Respiratory Infection Panel (PCR), Nasopharyngeal [[id number]] Collected: 06/18/25 1027    Order Status: Completed Specimen: Nasopharyngeal Swab Updated: 06/18/25 1209     Respiratory Infection Panel Source Nasopharyngeal Swab     Adenovirus Not Detected     Coronavirus 229E, Common Cold Virus Not Detected     Coronavirus HKU1, Common Cold Virus Not Detected     Coronavirus NL63, Common Cold Virus Not Detected     Coronavirus OC43, Common Cold Virus Not Detected     SARS-CoV2 (COVID-19) Qualitative PCR Not Detected     Human Metapneumovirus Not Detected     Human Rhinovirus/Enterovirus Not Detected     Influenza A (subtypes H1, H1-2009,H3) Not Detected     Influenza B Not Detected     Parainfluenza Virus 1 Not Detected     Parainfluenza Virus 2 Not Detected     Parainfluenza Virus 3 Not Detected     Parainfluenza Virus 4 Not Detected     Respiratory Syncytial Virus Not Detected     Bordetella Parapertussis (WQ5332) Not Detected     Bordetella pertussis (ptxP) Not Detected     Chlamydia pneumoniae Not Detected     Mycoplasma pneumoniae Not Detected

## 2025-06-25 NOTE — PROGRESS NOTES
[facility] Medicine  Progress Note    Patient Name: Rick Butler  MRN: [id number]  Patient Class: IP- Inpatient   Admission Date: 6/18/2025  Length of Stay: 7 days  Attending Physician: Abbi Siegel MD  Primary Care Provider: Reynaldo Basilio FNP-C        Subjective     Principal Problem:Severe sepsis        HPI:  65 year old pt getting admitted with Sepsis/Severe pneumonia/Neutropenic fever  Pt was suffering from URTI like infection for past several days  Later he suffered from severe SOB/cough  Today symptoms went worse, came to ER found to be hypoxic and got admitted     Overview/Hospital Course:  65-year-old male with myelodysplastic syndrome admitted with severe sepsis/pneumonia/neutropenic fever. Continue antibiotics and ID followed.  Later patient developed elevated troponin for demand ischemia and developed AFib with RVR and placed on IV amiodarone and transitioned to oral amiodarone.  For symptomatic anemia, s/p 2 units of packed red blood cells .For MDS, Give plts if less than 20 or bleeding.Transfuse prbc if hgb less that 7 and Heme/oncology followed.     Interval History:  Patient seen and examined.  Satting 95% on room air prior to my rounds per respiratory.  Received platelets yesterday.  Platelet count is 27 today.  Hemoglobin 7.7.  He is on cefepime and doxy per ID.  Creatinine 3.3 today.    Review of Systems   All other systems reviewed and are negative.    Objective:     Vital Signs (Most Recent):  Temp: 98 °F (36.7 °C) (06/25/25 0809)  Pulse: 86 (06/25/25 1130)  Resp: 16 (06/25/25 0809)  BP: (!) 146/75 (06/25/25 0809)  SpO2: 95 % (06/25/25 1007) Vital Signs (24h Range):  Temp:  [97.4 °F (36.3 °C)-98.2 °F (36.8 °C)] 98 °F (36.7 °C)  Pulse:  [72-86] 86  Resp:  [16-20] 16  SpO2:  [95 %-99 %] 95 %  BP: (124-146)/(68-83) 146/75     Weight: 93.7 kg (206 lb 9.1 oz)  Body mass index is 30.51 kg/m².    Intake/Output Summary (Last 24 hours) at 6/25/2025 1142  Last data  "filed at 6/25/2025 1013  Gross per 24 hour   Intake 360 ml   Output 801 ml   Net -441 ml         Physical Exam  HENT:      Mouth/Throat:      Mouth: Mucous membranes are moist.   Eyes:      Conjunctiva/sclera: Conjunctivae normal.   Cardiovascular:      Rate and Rhythm: Normal rate.      Heart sounds: Murmur heard.   Pulmonary:      Breath sounds: Rales present. No wheezing.   Abdominal:      General: There is no distension.   Musculoskeletal:      Right lower leg: No edema.      Left lower leg: No edema.   Skin:     Coloration: Skin is pale.   Neurological:      Mental Status: He is alert and oriented to person, place, and time.               Significant Labs: All pertinent labs within the past 24 hours have been reviewed.    Significant Imaging: I have reviewed all pertinent imaging results/findings within the past 24 hours.      Assessment & Plan  Severe sepsis  This patient does have evidence of infective focus  My overall impression is sepsis with Acute kidney injury, Acute respiratory failure, and Thrombocytopenia .  Source: Respiratory Infection  Antibiotics given-   Antibiotics (72h ago, onward)      Start     Stop Route Frequency Ordered    06/24/25 1400  ceFEPIme injection 1 g         -- IV Every 12 hours (non-standard times) 06/24/25 1247    06/18/25 2100  doxycycline capsule 100 mg         -- Oral Every 12 hours 06/18/25 1752          Latest lactate reviewed-  No results for input(s): "LACTATE", "POCLAC" in the last 72 hours.    Pneumonia due to infectious organism  Maintain iv abx as below  De escalate as needed     Antibiotics (From admission, onward)      Start     Stop Route Frequency Ordered    06/24/25 1400  ceFEPIme injection 1 g         -- IV Every 12 hours (non-standard times) 06/24/25 1247    06/18/25 2100  doxycycline capsule 100 mg         -- Oral Every 12 hours 06/18/25 1752            Microbiology Results (last 7 days)       Procedure Component Value Units Date/Time    Influenza A & B by " Molecular [[id number]]  (Normal) Collected: 06/24/25 0814    Order Status: Completed Specimen: Nasal Swab Updated: 06/24/25 0956     INFLUENZA A MOLECULAR Negative     INFLUENZA B MOLECULAR  Negative    Blood culture x two cultures. Draw prior to antibiotics. [[id number]]  (Normal) Collected: 06/18/25 0930    Order Status: Completed Specimen: Blood Updated: 06/23/25 1001     CULTURE, BLOOD ([facility]) No Growth After 5 Days    Blood culture x two cultures. Draw prior to antibiotics. [[id number]]  (Normal) Collected: 06/18/25 0932    Order Status: Completed Specimen: Blood Updated: 06/23/25 1001     CULTURE, BLOOD ([facility]) No Growth After 5 Days    Culture, Respiratory with Gram Stain [[id number]] Collected: 06/19/25 0950    Order Status: Completed Specimen: Respiratory Updated: 06/21/25 0701     Respiratory Culture Normal respiratory federico     GRAM STAIN (RESPIRATORY) >10 Epithelial Cells/LPF      Few WBC seen      Rare Gram Positive Rods     Comment: Corrected result: Previously reported as Rare Branching Gram positive rods on 6/19/2025 at 1225 CDT.         Rare Gram positive cocci    Culture, Respiratory with Gram Stain [[id number]] Collected: 06/19/25 0734    Order Status: Completed Specimen: Respiratory from Sputum, Expectorated Updated: 06/19/25 0844     Respiratory Culture Specimen inadequate - culture not performed     GRAM STAIN (RESPIRATORY) >10epis/lfp and <than many WBC's      Predominance of oropharyngeal federico. Please recollect    MRSA Screen by PCR [[id number]]  (Normal) Collected: 06/18/25 1948    Order Status: Completed Specimen: Nasal Swab Updated: 06/18/25 2115     MRSA PCR SCRN ([facility]) Not Detected    Respiratory Infection Panel (PCR), Nasopharyngeal [[id number]] Collected: 06/18/25 1027    Order Status: Completed Specimen: Nasopharyngeal Swab Updated: 06/18/25 1209     Respiratory Infection Panel Source Nasopharyngeal Swab     Adenovirus Not Detected     Coronavirus 229E, Common Cold Virus Not Detected      Coronavirus HKU1, Common Cold Virus Not Detected     Coronavirus NL63, Common Cold Virus Not Detected     Coronavirus OC43, Common Cold Virus Not Detected     SARS-CoV2 (COVID-19) Qualitative PCR Not Detected     Human Metapneumovirus Not Detected     Human Rhinovirus/Enterovirus Not Detected     Influenza A (subtypes H1, H1-2009,H3) Not Detected     Influenza B Not Detected     Parainfluenza Virus 1 Not Detected     Parainfluenza Virus 2 Not Detected     Parainfluenza Virus 3 Not Detected     Parainfluenza Virus 4 Not Detected     Respiratory Syncytial Virus Not Detected     Bordetella Parapertussis ([id number]) Not Detected     Bordetella pertussis (ptxP) Not Detected     Chlamydia pneumoniae Not Detected     Mycoplasma pneumoniae Not Detected          Sickle cell trait syndrome  Aware     RARS (refractory anemia with ringed sideroblasts)  Aware   Patient received 1 unit of PRBC on 6/18/25.  Patient agreeable to receive another unit of packed red blood cell today.    Recent Labs     06/23/25  0612 06/24/25  0503 06/25/25  0448   HGB 8.6* 8.2* 7.7*   HCT 26.6* 25.3* 23.9*       MDS (myelodysplastic syndrome)  Aware   Give plts if less than 20 or bleeding.Transfuse prbc if hgb less that 7   Received platelet transfusion 6/24  Anemia, chronic renal failure, stage 3 (moderate)  Aware   Also has MDS  Neutropenic fever  Maintain Rx as ordered  Isolation precautions    Acute on chronic anemia  Has received 2 unit PRBCs during this stay  Thrombocytopenia  Continue to monitor  Heme oncology is following  Holding Lovenox  Transfused a dose platelets 6/24  Elevated troponin  Trended down likely demand  Cardiology is following  Atrial fibrillation with rapid ventricular response   IV amiodarone transitioned to oral on 06/21  Anticoagulation per Cardiology team recommendation, currently holding as platelets under 50  TORRES (acute kidney injury)  TORRES is likely due to multifactorial.Most recent creatinine and eGFR are listed  below.  Recent Labs     06/23/25  0612 06/24/25  0503 06/25/25  0448   CREATININE 3.1* 3.3* 3.3*   EGFRNORACEVR 21* 20* 20*      Plan  - TORRES is stable  - Avoid nephrotoxins and renally dose meds for GFR listed above  - Monitor urine output, serial BMP, and adjust therapy as needed  - nephrology is following  Hypoxia      VTE Risk Mitigation (From admission, onward)           Ordered     IP VTE HIGH RISK PATIENT  Once         06/18/25 1145     Place sequential compression device  Until discontinued         06/18/25 1145                    Discharge Planning   ELMO:      Code Status: Full Code   Medical Readiness for Discharge Date: 6/20/2025  Discharge Plan A: Home Health   Discharge Delays: None known at this time              Please place Justification for DME      Abbi Siegel MD  Department of Hospital Medicine   [facility]

## 2025-06-25 NOTE — CARE UPDATE
06/25/25 0710   Patient Assessment/Suction   Level of Consciousness (AVPU) alert   Respiratory Effort Normal;Unlabored   All Lung Fields Breath Sounds coarse;rhonchi   Rhythm/Pattern, Respiratory unlabored;pattern regular;depth regular   Cough Frequency frequent   Cough Type good;loose;productive   Skin Integrity   $ Wound Care Tech Time 15 min   Area Observed Right;Left;Cheek;Bridge of nose;Nares;Corner lip;Lower lip;Upper lip   Skin Appearance without discoloration   PRE-TX-O2   Device (Oxygen Therapy) simple face mask   $ Is the patient on Low Flow Oxygen? Yes   Flow (L/min) (Oxygen Therapy) 5   SpO2 98 %   Pulse Oximetry Type Intermittent   $ Pulse Oximetry - Multiple Charge Pulse Oximetry - Multiple   Pulse 85   Resp 20   Aerosol Therapy   $ Aerosol Therapy Charges Aerosol Treatment   Daily Review of Necessity (SVN) completed   Respiratory Treatment Status (SVN) given   Treatment Route (SVN) mask;oxygen   Patient Position HOB elevated   Post Treatment Assessment (SVN) increased aeration;vital signs unchanged;work of breathing decreased   Signs of Intolerance (SVN) none   Incentive Spirometer   $ Incentive Spirometer Charges done with encouragement   Administration (IS) proper technique demonstrated   Number of Repetitions (IS) 10   Level Incentive Spirometer (mL) 2000   Patient Tolerance (IS) no adverse signs/symptoms present   Vibratory PEP Therapy   $ Vibratory PEP Charges Aerobika Therapy   $ Vibratory PEP Tech Time Charges 15 min   Daily Review of Necessity (PEP Therapy) completed   Type (PEP Therapy) vibratory/oscillatory   Device (PEP Therapy) flutter   Route (PEP Therapy) mouthpiece   Breaths per Cycle (PEP Therapy) 10   Cycles (PEP Therapy) 1   Settings (PEP Therapy) PEP 4   Patient Position (PEP Therapy) HOB elevated   Post Treatment Assessment (PEP) breath sounds unchanged;vital signs unchanged   Signs of Intolerance (PEP Therapy) none

## 2025-06-25 NOTE — CARE UPDATE
06/24/25 1933   Patient Assessment/Suction   Level of Consciousness (AVPU) alert   Respiratory Effort Normal;Unlabored   Expansion/Accessory Muscles/Retractions no use of accessory muscles;no retractions   MAYELIN Breath Sounds clear   LLL Breath Sounds diminished   RUL Breath Sounds clear   RML Breath Sounds clear   RLL Breath Sounds diminished   Rhythm/Pattern, Respiratory unlabored;pattern regular;no shortness of breath reported   Cough Frequency frequent   Cough Type good;dry;nonproductive   Skin Integrity   $ Wound Care Tech Time 15 min   Area Observed Left;Right;Behind ear   Skin Appearance without discoloration   PRE-TX-O2   Device (Oxygen Therapy)   (Oxymask)   $ Is the patient on Low Flow Oxygen? Yes   Flow (L/min) (Oxygen Therapy) 5   SpO2 99 %   Pulse Oximetry Type Intermittent   $ Pulse Oximetry - Multiple Charge Pulse Oximetry - Multiple   Aerosol Therapy   $ Aerosol Therapy Charges Aerosol Treatment   Daily Review of Necessity (SVN) completed   Respiratory Treatment Status (SVN) given   Treatment Route (SVN) mask;oxygen   Patient Position HOB elevated   Post Treatment Assessment (SVN) increased aeration   Signs of Intolerance (SVN) none   Incentive Spirometer   $ Incentive Spirometer Charges done with encouragement   Incentive Spirometer Predicted Level (mL) 1250   Administration (IS) mouthpiece utilized   Number of Repetitions (IS) 10   Level Incentive Spirometer (mL) 1600   Patient Tolerance (IS) good   Vibratory PEP Therapy   $ Vibratory PEP Charges Aerobika Therapy   $ Vibratory PEP Tech Time Charges 15 min   Daily Review of Necessity (PEP Therapy) completed   Type (PEP Therapy) vibratory/oscillatory   Device (PEP Therapy) flutter   Route (PEP Therapy) mouthpiece   Breaths per Cycle (PEP Therapy) 10   Cycles (PEP Therapy) 1   Settings (PEP Therapy) PEP 5   Patient Position (PEP Therapy) HOB elevated   Post Treatment Assessment (PEP) breath sounds improved   Signs of Intolerance (PEP Therapy) none    Education   $ Education Bronchodilator;Incentive Spirometry;15 min   Respiratory Evaluation   $ Care Plan Tech Time 15 min

## 2025-06-25 NOTE — PROGRESS NOTES
Pulmonary/Critical Care  Progress Note      PATIENT NAME: Rick Butler  MRN: [id number]  TODAY'S DATE: 2025  1:55 PM  ADMIT DATE: 2025  AGE: 65 y.o. : 1960    CONSULT REQUESTED BY: Abbi Siegel MD    REASON FOR CONSULT:   Pneumonia    HPI:  Mr. uBtler is a 65 year old man with prior dx of MDS/RARS and sickle cell trait, who presents with pneumonia.     He reports his symptoms started suddenly yesterday he was feeling fatigued, febrile, chills, and short of breath.     He reports he is on chemotherapy for his MDS. He reports no productive cough.  Febrile to 103 and elevated HR to 132    2025 - Stable overnight, no new issues reported and feels better.  Still with AF/RVR (cardiology following).  WBC has recovered.  Hgb and platelets still low.      2025 - Stable overnight and is feeling better overall.  He says he has been having issues with decreased appetite as outpt and has been on marinol (insurance not covering) and cyproheptadine (not much change ).  Labs reviewed, renal function better.  R has been good since about 1 PM yesterday    2025 - Stable overnight, feels good and no new complaints. Not moved out of ICU because of bed issues.   On 3 LPM.  Renal function is better.  CXR is about the same.      2025- Seen and examined he just finished working with physical therapy, he is able to walk around, he reports he is still getting short of breath with the exertion.  He reports that he has a persistent cough and a dripping sensation in the back of his throat.  No fever night sweats, he is still expectorating sputum and occasional blood-tinged sputum.    - feeling better. Cough persists, occasional blood tinge sputum. Denies chest pains. Denies hx of lung disease    Review of Systems   Constitutional:  Positive for chills and fever. Negative for appetite change and unexpected weight change.   HENT:  Negative for nosebleeds, postnasal drip, trouble swallowing and voice  change.    Eyes:  Negative for visual disturbance.   Respiratory:  Positive for shortness of breath. Negative for cough and wheezing.    Cardiovascular:  Negative for chest pain and leg swelling.   Gastrointestinal:  Negative for diarrhea, nausea and vomiting.   Endocrine: Negative for cold intolerance and heat intolerance.   Genitourinary:  Negative for dysuria, flank pain and frequency.   Musculoskeletal:  Negative for neck pain and neck stiffness.   Allergic/Immunologic: Negative for environmental allergies and immunocompromised state.   Neurological:  Negative for syncope, speech difficulty and weakness.   Hematological:  Negative for adenopathy.   Psychiatric/Behavioral:  Negative for confusion.            ALLERGIES  Review of patient's allergies indicates:  No Known Allergies    INPATIENT SCHEDULED MEDICATIONS   amiodarone  400 mg Oral TID    Followed by    [START ON 6/26/2025] amiodarone  400 mg Oral BID    Followed by    [START ON 7/1/2025] amiodarone  400 mg Oral Daily    amLODIPine  10 mg Oral Daily    calcitRIOL  0.25 mcg Oral Q7 Days    ceFEPime IV (PEDS and ADULTS)  1 g Intravenous Q12H    cetirizine  5 mg Oral Daily    doxycycline  100 mg Oral Q12H    fluticasone propionate  2 spray Each Nostril Daily    folic acid  2,000 mcg Oral Daily    levalbuterol  1.25 mg Nebulization Q6H WAKE    morphine  2 mg Intravenous Once    pantoprazole  40 mg Oral Daily    predniSONE  40 mg Oral Daily    tamsulosin  0.4 mg Oral Daily    traZODone  100 mg Oral QHS           MEDICAL AND SURGICAL HISTORY  Past Medical History:   Diagnosis Date    Abnormal chest CT (new) 08/17/2022    Anemia due to multiple mechanisms 01/13/2019    Anemia, chronic renal failure, stage 2 (mild) 01/13/2019    Anemia, chronic renal failure, stage 3 (moderate) 08/02/2023    Depression     Former smoker 06/29/2017    H/O ETOH abuse 06/29/2017    Lung mass (new) 08/17/2022    Monocytosis 2019    Myelodysplasia (myelodysplastic syndrome)      Myelodysplasia (myelodysplastic syndrome)     Personal history of colonic polyps 12/29/2023    RARS (refractory anemia with ringed sideroblasts) 06/01/2020    Sickle cell trait      Past Surgical History:   Procedure Laterality Date    BONE MARROW BIOPSY N/A 12/20/2019    Procedure: BIOPSY, BONE MARROW;  Surgeon: Satinder Diagnostic Provider;  Location: [facility] OR;  Service: Interventional Radiology;  Laterality: N/A;    COLONOSCOPY N/A 11/05/2021    Procedure: COLONOSCOPY;  Surgeon: Rob Collins MD;  Location: [facility] ENDO;  Service: Endoscopy;  Laterality: N/A;    COLONOSCOPY  12/29/2023    5 YR RECALL    ESOPHAGOGASTRODUODENOSCOPY N/A 11/05/2021    Procedure: EGD (ESOPHAGOGASTRODUODENOSCOPY);  Surgeon: Rob Collins MD;  Location: [facility] ENDO;  Service: Endoscopy;  Laterality: N/A;    INJECTION OF ANESTHETIC AGENT AROUND MEDIAL BRANCH NERVES INNERVATING LUMBAR FACET JOINT Bilateral 05/25/2018    Procedure: BLOCK-NERVE-MEDIAL BRANCH-LUMBAR;  Surgeon: Nura Heredia MD;  Location: [facility];  Service: Pain Management;  Laterality: Bilateral;  L3, 4, 5    KNEE ARTHROSCOPY W/ MENISCECTOMY Right 12/22/2021    Procedure: ARTHROSCOPY, KNEE, WITH MENISCECTOMY;  Surgeon: Sebastian Green MD;  Location: [facility];  Service: Orthopedics;  Laterality: Right;  partial medial meniscectomy    RADIOFREQUENCY ABLATION OF LUMBAR MEDIAL BRANCH NERVE AT SINGLE LEVEL Bilateral 07/20/2018    Procedure: RADIOFREQUENCY ABLATION, NERVE, MEDIAL BRANCH, LUMBAR, 1 LEVEL;  Surgeon: Nura Heredia MD;  Location: [facility];  Service: Pain Management;  Laterality: Bilateral;  L3, 4, 5 - Burned at 80 degrees C.  for 75 seconds x 2 each site    SMALL BOWEL ENTEROSCOPY N/A 10/16/2019    Procedure: ENTEROSCOPY;  Surgeon: Rob Collins MD;  Location: [facility];  Service: Endoscopy;  Laterality: N/A;       ALCOHOL, TOBACCO AND DRUG USE  Tobacco Use History[1]  Social History     Substance and Sexual Activity   Alcohol Use Not Currently    Alcohol/week: 21.0 standard  drinks of alcohol    Types: 21 Cans of beer per week    Comment: Sober 5 years     Social History     Substance and Sexual Activity   Drug Use Not Currently    Types: Marijuana       FAMILY HISTORY  Family History   Problem Relation Name Age of Onset    Arthritis Mother      Depression Mother      Heart disease Mother      Hypertension Mother      Heart attack Father  59       VITAL SIGNS (MOST RECENT)  Temp: 98 °F (36.7 °C) (06/25/25 0809)  Pulse: 82 (06/25/25 0809)  Resp: 16 (06/25/25 0809)  BP: (!) 146/75 (06/25/25 0809)  SpO2: 98 % (06/25/25 0809)    INTAKE AND OUTPUT (LAST 24 HOURS):  Intake/Output Summary (Last 24 hours) at 6/25/2025 0816  Last data filed at 6/25/2025 0423  Gross per 24 hour   Intake 0 ml   Output 501 ml   Net -501 ml       WEIGHT  Wt Readings from Last 1 Encounters:   06/18/25 93.7 kg (206 lb 9.1 oz)       Physical Exam  Vitals reviewed.   Constitutional:       General: He is not in acute distress.     Appearance: He is well-developed. He is ill-appearing. He is not diaphoretic.   HENT:      Head: Normocephalic and atraumatic.      Mouth/Throat:      Pharynx: No oropharyngeal exudate or posterior oropharyngeal erythema.   Eyes:      General: No scleral icterus.     Pupils: Pupils are equal, round, and reactive to light.   Neck:      Vascular: No JVD.   Cardiovascular:      Rate and Rhythm: Regular rhythm. Tachycardia present.      Heart sounds: Normal heart sounds. No murmur heard.  Pulmonary:      Effort: Pulmonary effort is normal. No respiratory distress.      Breath sounds: Wheezing and rales present.   Abdominal:      General: Bowel sounds are normal. There is no distension.      Palpations: Abdomen is soft.      Tenderness: There is no abdominal tenderness.   Musculoskeletal:         General: No swelling.      Cervical back: Normal range of motion and neck supple. No rigidity.   Skin:     General: Skin is warm and dry.      Capillary Refill: Capillary refill takes less than 2 seconds.  "     Coloration: Skin is pale.      Findings: No rash.   Neurological:      General: No focal deficit present.      Mental Status: He is alert and oriented to person, place, and time.      Cranial Nerves: No cranial nerve deficit.      Motor: No weakness or abnormal muscle tone.           ACUTE PHASE REACTANT (LAST 24 HOURS)  No results for input(s): "FERRITIN", "CRP", "LDH", "DDIMER" in the last 24 hours.    CBC LAST (LAST 24 HOURS)  Recent Labs   Lab 06/25/25  0448   WBC 5.40   RBC 2.77*   HGB 7.7*   HCT 23.9*   MCV 86   MCH 27.8   MCHC 32.2   RDW 16.9*   PLT 27*   NRBC 0       CHEMISTRY LAST (LAST 24 HOURS)  Recent Labs   Lab 06/25/25  0448      K 4.2      CO2 22*   ANIONGAP 8   BUN 45*   CREATININE 3.3*   *   CALCIUM 8.6*   MG 1.7   ALBUMIN 3.2*   PROT 6.6   ALKPHOS 51*   ALT 25   AST 21   BILITOT 0.5       COAGULATION LAST (LAST 24 HOURS)  No results for input(s): "LABPT", "INR", "APTT" in the last 24 hours.    CARDIAC PROFILE (LAST 24 HOURS)  No results for input(s): "BNP", "CPK", "CPKMB", "LDH", "TROPONINI", "TROPONINIHS" in the last 168 hours.      LAST 7 DAYS MICROBIOLOGY   Microbiology Results (last 7 days)       Procedure Component Value Units Date/Time    Influenza A & B by Molecular [[id number]]  (Normal) Collected: 06/24/25 0814    Order Status: Completed Specimen: Nasal Swab Updated: 06/24/25 0956     INFLUENZA A MOLECULAR Negative     INFLUENZA B MOLECULAR  Negative    Blood culture x two cultures. Draw prior to antibiotics. [[id number]]  (Normal) Collected: 06/18/25 0930    Order Status: Completed Specimen: Blood Updated: 06/23/25 1001     CULTURE, BLOOD ([facility]) No Growth After 5 Days    Blood culture x two cultures. Draw prior to antibiotics. [[id number]]  (Normal) Collected: 06/18/25 0932    Order Status: Completed Specimen: Blood Updated: 06/23/25 1001     CULTURE, BLOOD ([facility]) No Growth After 5 Days    Culture, Respiratory with Gram Stain [[id number]] Collected: 06/19/25 0950    " Order Status: Completed Specimen: Respiratory Updated: 06/21/25 0701     Respiratory Culture Normal respiratory federico     GRAM STAIN (RESPIRATORY) >10 Epithelial Cells/LPF      Few WBC seen      Rare Gram Positive Rods     Comment: Corrected result: Previously reported as Rare Branching Gram positive rods on 6/19/2025 at 1225 CDT.         Rare Gram positive cocci    Culture, Respiratory with Gram Stain [[id number]] Collected: 06/19/25 0734    Order Status: Completed Specimen: Respiratory from Sputum, Expectorated Updated: 06/19/25 0844     Respiratory Culture Specimen inadequate - culture not performed     GRAM STAIN (RESPIRATORY) >10epis/lfp and <than many WBC's      Predominance of oropharyngeal federico. Please recollect    MRSA Screen by PCR [[id number]]  (Normal) Collected: 06/18/25 1948    Order Status: Completed Specimen: Nasal Swab Updated: 06/18/25 2115     MRSA PCR SCRN ([facility]) Not Detected    Respiratory Infection Panel (PCR), Nasopharyngeal [[id number]] Collected: 06/18/25 1027    Order Status: Completed Specimen: Nasopharyngeal Swab Updated: 06/18/25 1209     Respiratory Infection Panel Source Nasopharyngeal Swab     Adenovirus Not Detected     Coronavirus 229E, Common Cold Virus Not Detected     Coronavirus HKU1, Common Cold Virus Not Detected     Coronavirus NL63, Common Cold Virus Not Detected     Coronavirus OC43, Common Cold Virus Not Detected     SARS-CoV2 (COVID-19) Qualitative PCR Not Detected     Human Metapneumovirus Not Detected     Human Rhinovirus/Enterovirus Not Detected     Influenza A (subtypes H1, H1-2009,H3) Not Detected     Influenza B Not Detected     Parainfluenza Virus 1 Not Detected     Parainfluenza Virus 2 Not Detected     Parainfluenza Virus 3 Not Detected     Parainfluenza Virus 4 Not Detected     Respiratory Syncytial Virus Not Detected     Bordetella Parapertussis (UZ4958) Not Detected     Bordetella pertussis (ptxP) Not Detected     Chlamydia pneumoniae Not Detected      Mycoplasma pneumoniae Not Detected            MOST RECENT IMAGING  X-Ray Chest AP Portable  Narrative: EXAMINATION:  XR CHEST AP PORTABLE    CLINICAL HISTORY:  Follow up on right lower lobe pneumoniae;    FINDINGS:  Portable chest with comparison chest x-ray 06/19/2025.  Normal cardiomediastinal silhouette.Hazy ill-defined opacity of the right middle lobe is unchanged.  No new airspace opacities.  Pulmonary vasculature is normal. No acute osseous abnormality.  Impression: Right middle lobe airspace opacities unchanged.    Electronically signed by: Herber Aaron  Date:    06/22/2025  Time:    06:50      CURRENT VISIT EKG  Results for orders placed or performed during the hospital encounter of 06/18/25   EKG 12-lead   Result Value Ref Range    QRS Duration 88 ms    [facility] QTC Calculation 441 ms    Narrative    Test Reason : R07.9,    Vent. Rate : 127 BPM     Atrial Rate : 127 BPM     P-R Int : 154 ms          QRS Dur :  88 ms      QT Int : 304 ms       P-R-T Axes :  58  54 -11 degrees    QTcB Int : 441 ms    Sinus tachycardia  LVH with repolarization abnormality ( Sokolow-Gomez )  Abnormal ECG  No previous ECGs available    Referred By: AAAREFERRAL SELF           Confirmed By:        ECHOCARDIOGRAM RESULTS  Results for orders placed during the hospital encounter of 10/06/23    Echo    Interpretation Summary    Left Ventricle: The left ventricle is normal in size. Moderately increased wall thickness. Normal wall motion. There is normal systolic function with a visually estimated ejection fraction of 55 - 60%.    Left Atrium: Left atrium is severely dilated.    Right Ventricle: Right ventricle was not well visualized due to poor acoustic window.    Aortic Valve: The aortic valve is a trileaflet valve. There is mild aortic regurgitation.    Mitral Valve: There is no stenosis. There is mild to moderate regurgitation.    Pulmonic Valve: There is mild regurgitation.    IVC/SVC: Normal venous pressure at 3  "mmHg.        VENTILATOR INFORMATION              LAST ARTERIAL BLOOD GAS  ABG  No results for input(s): "PH", "PO2", "PCO2", "HCO3", "BE" in the last 168 hours.      ASSESSMENT:   Pneumonia  Sepsis  3.   Acute kidney injury  Anemia and thrombocytopenia    Suspected pneumonia for source of sepsis  He has been consistently improving and I think the pneumonia is responding to treatment    PLAN:     - antibiotics per ID  - continue Flonase and daily antihistamine  - recommend to check for flu and covid  - continue prednisone 40mg daily  - follow up culture results- sputum with normal resp federico  - increase activity as able  - watch renal function, strict I/O  - repeat CXR today- looks better  - clinically stable and seems to be improving on current regimen; no plan for bronchoscopy at this time but consider if worsens      Carrie Morris MD  Pulmonary & Critical Care Medicine                 [1]   Social History  Tobacco Use   Smoking Status Former    Current packs/day: 0.00    Types: Cigarettes    Quit date: 1996    Years since quittin.5   Smokeless Tobacco Never     "

## 2025-06-25 NOTE — CARE UPDATE
06/25/25 1007   Patient Assessment/Suction   Level of Consciousness (AVPU) alert   Respiratory Effort Unlabored   PRE-TX-O2   Device (Oxygen Therapy) room air   SpO2 95 %   Pulse Oximetry Type Intermittent   $ Pulse Oximetry - Multiple Charge Pulse Oximetry - Multiple   Pulse 84     Pt sitting quietly in bed

## 2025-06-25 NOTE — PROGRESS NOTES
Brief cardiology note:      Patient remains in normal sinus rhythm on telemetry  Continue amiodarone taper as ordered  Anticoagulation on hold given low platelets (27 K today)  Monitor electrolytes daily and replace for goal K+>/= 4, Mag >/= 2  No medication changes recommended  Cardiology will follow up peripherally        Ang Lacy [facility]  Department of Cardiology  06/25/2025        I have personally interviewed and examined the patient, I have reviewed the Nurse Practitioner's history and physical, assessment, and plan.  I have also reviewed and antiplatelet all the pertinent lab and imaging data. I have personally evaluated the patient at bedside and agree with the findings and made appropriate changes as necessary in recommendations.        Jimena Antonio MD  [facility]  Department of Cardiology  06/25/2025

## 2025-06-25 NOTE — SUBJECTIVE & OBJECTIVE
Interval History:  Patient seen and examined.  Satting 95% on room air prior to my rounds per respiratory.  Received platelets yesterday.  Platelet count is 27 today.  Hemoglobin 7.7.  He is on cefepime and doxy per ID.  Creatinine 3.3 today.    Review of Systems   All other systems reviewed and are negative.    Objective:     Vital Signs (Most Recent):  Temp: 98 °F (36.7 °C) (06/25/25 0809)  Pulse: 86 (06/25/25 1130)  Resp: 16 (06/25/25 0809)  BP: (!) 146/75 (06/25/25 0809)  SpO2: 95 % (06/25/25 1007) Vital Signs (24h Range):  Temp:  [97.4 °F (36.3 °C)-98.2 °F (36.8 °C)] 98 °F (36.7 °C)  Pulse:  [72-86] 86  Resp:  [16-20] 16  SpO2:  [95 %-99 %] 95 %  BP: (124-146)/(68-83) 146/75     Weight: 93.7 kg (206 lb 9.1 oz)  Body mass index is 30.51 kg/m².    Intake/Output Summary (Last 24 hours) at 6/25/2025 1142  Last data filed at 6/25/2025 1013  Gross per 24 hour   Intake 360 ml   Output 801 ml   Net -441 ml         Physical Exam  HENT:      Mouth/Throat:      Mouth: Mucous membranes are moist.   Eyes:      Conjunctiva/sclera: Conjunctivae normal.   Cardiovascular:      Rate and Rhythm: Normal rate.      Heart sounds: Murmur heard.   Pulmonary:      Breath sounds: Rales present. No wheezing.   Abdominal:      General: There is no distension.   Musculoskeletal:      Right lower leg: No edema.      Left lower leg: No edema.   Skin:     Coloration: Skin is pale.   Neurological:      Mental Status: He is alert and oriented to person, place, and time.               Significant Labs: All pertinent labs within the past 24 hours have been reviewed.    Significant Imaging: I have reviewed all pertinent imaging results/findings within the past 24 hours.

## 2025-06-25 NOTE — ASSESSMENT & PLAN NOTE
Aware   Give plts if less than 20 or bleeding.Transfuse prbc if hgb less that 7   Received platelet transfusion 6/24

## 2025-06-25 NOTE — PROGRESS NOTES
"[facility]  Hematology/Oncology  Inpatient Progress Note          Patient Name: Rick Butler  MRN: [id number]  Admission Date: 6/18/2025  Hospital Length of Stay: 7 days  Code Status: Full Code   Attending Provider: Abbi Siegel MD  Consulting Provider: Jose Tello MD  Primary Care Physician: Reynaldo Basilio, LUCINAP-C  Principal Problem:Severe sepsis      Subjective:       Patient ID: Rick Butler is a 65 y.o. male.    Chief Complaint: Shortness of Breath (X 1 day. ), Fatigue, and Cough (Onset lastnite)        History Present Illness:     Patient  sitting up in chair; he reports he is feeling better overall; breathing stable; no CP, HA's or N/V;  no O2 currently      Review of Systems:  GEN: general aches and pains, malaise, fatigue, weakness;   HEENT: normal with no HA's, sore throat, stiff neck, changes in vision  CV: normal with no CP, SOB, PND, MORA or orthopnea  PULM: normal with no SOB,  hemoptysis, sputum or pleuritic pain;    GI: normal with no abdominal pain, nausea, vomiting, constipation, diarrhea, melanotic stools, BRBPR, or hematemesis  : normal with no hematuria, dysuria  BREAST: normal with no mass, discharge, pain  SKIN: normal with no rash, erythema, bruising, or swelling      Objective:     Vitals:  Blood pressure 128/69, pulse 88, temperature 98 °F (36.7 °C), temperature source Oral, resp. rate 20, height 5' 9" (1.753 m), weight 93.7 kg (206 lb 9.1 oz), SpO2 95%.    Physical Exam:  GEN: no apparent distress, comfortable; AAOx3  HEAD: atraumatic and normocephalic  EYES: no pallor, no icterus, PERRLA  ENT: OMM, no pharyngeal erythema, external ears WNL; no nasal discharge; no thrush  NECK: no masses, thyroid normal, trachea midline, no LAD/LN's, supple  CV: RRR with no murmur; normal pulse; normal S1 and S2; no pedal edema  CHEST: Normal respiratory effort; CTAB; improved breath sounds   no wheeze or crackles  ABDOM: nontender and nondistended; soft; normal " bowel sounds; no rebound/guarding  MUSC/Skeletal: ROM normal; no crepitus; joints normal; no deformities or arthropathy  EXTREM: no clubbing, cyanosis, inflammation or swelling; IV's bilateral arms  SKIN: no rashes, lesions, ulcers, petechiae or subcutaneous nodules; tattoos   : no jiang  NEURO: grossly intact; motor/sensory WNL; AAOx3; no tremors  PSYCH: normal mood, affect and behavior  LYMPH: normal cervical, supraclavicular, axillary and groin LN's          Lab Review:        Lab Results   Component Value Date    WBC 5.40 06/25/2025    HGB 7.7 (L) 06/25/2025    HCT 23.9 (L) 06/25/2025    MCV 86 06/25/2025    PLT 27 (LL) 06/25/2025     CMP  Sodium   Date Value Ref Range Status   06/25/2025 140 136 - 145 mmol/L Final   06/26/2019 138 134 - 144 mmol/L      Potassium   Date Value Ref Range Status   06/25/2025 4.2 3.5 - 5.1 mmol/L Final     Chloride   Date Value Ref Range Status   06/25/2025 110 95 - 110 mmol/L Final   06/26/2019 105 98 - 110 mmol/L      CO2   Date Value Ref Range Status   06/25/2025 22 (L) 23 - 29 mmol/L Final     Glucose   Date Value Ref Range Status   06/25/2025 120 (H) 70 - 110 mg/dL Final   06/26/2019 114 (H) 70 - 99 mg/dL      BUN   Date Value Ref Range Status   06/25/2025 45 (H) 8 - 23 mg/dL Final     Creatinine   Date Value Ref Range Status   06/25/2025 3.3 (H) 0.5 - 1.4 mg/dL Final   06/26/2019 1.62 (H) 0.60 - 1.40 mg/dL      Calcium   Date Value Ref Range Status   06/25/2025 8.6 (L) 8.7 - 10.5 mg/dL Final     Protein Total   Date Value Ref Range Status   06/25/2025 6.6 6.0 - 8.4 gm/dL Final     Albumin   Date Value Ref Range Status   06/25/2025 3.2 (L) 3.5 - 5.2 g/dL Final   06/26/2019 4.4 3.1 - 4.7 g/dL      Bilirubin Total   Date Value Ref Range Status   06/25/2025 0.5 0.1 - 1.0 mg/dL Final     Comment:     For infants and newborns, interpretation of results should be based   on gestational age, weight and in agreement with clinical   observations.    Premature Infant recommended  reference ranges:   0-24 hours:  <8.0 mg/dL   24-48 hours: <12.0 mg/dL   3-5 days:    <15.0 mg/dL   6-29 days:   <15.0 mg/dL     ALP   Date Value Ref Range Status   06/25/2025 51 (L) 55 - 135 unit/L Final     AST   Date Value Ref Range Status   06/25/2025 21 10 - 40 unit/L Final     ALT   Date Value Ref Range Status   06/25/2025 25 10 - 44 unit/L Final     Anion Gap   Date Value Ref Range Status   06/25/2025 8 8 - 16 mmol/L Final     eGFR   Date Value Ref Range Status   06/25/2025 20 (L) >60 mL/min/1.73/m2 Final   03/12/2025 21.6 (A) >60 mL/min/1.73 m^2 Final           Radiology Diagnostic Studies:     X-Ray Chest AP Portable [[id number]] Resulted: 06/18/25 0901   Order Status: Completed Updated: 06/18/25 0903   Narrative:     EXAMINATION:  XR CHEST AP PORTABLE    CLINICAL HISTORY:  Generalized weakness;    FINDINGS:  Portable chest at 08:49 is compared to 12/20/2021 shows normal cardiomediastinal silhouette.    There is an alveolar consolidation in the mid and lower lung compatible with pneumonia.  The left lung is clear.  There are no pleural effusions.  Right pulmonary vasculature is normal. No acute osseous abnormality.   Impression:       Alveolar consolidations in the right mid and lower lung compatible with pneumonia         Assessment:     IMPRESSION:    (1) 65 y.o. male known to my oncology service with diagnosis of MDS/RARS and sickle cell trait. He is treated in conjunction with Dr Marcelino Hilaroi out of [facility] in [place], whom he recently saw for follow-up visit on 6/4/2025. He has been on chemotherapy with the drug Rytelo for the past couple of months. He requires periodic blood and platelet transfusions. He presented to ER with cough, fever and progressive weakness/fatigue. Troponin was elevated and CXR showing RML/RLL consolidations consistent with a pneumonia.     6/18/2025;  - WBc 1.02, Hgb 7.3 and plats 62,000  -  I discussed with Dr Brown and Dr Moran both in person. I discussed with his ER nurse in  person.   - Plans are to transfuse a unit of blood, IV antibiotics; cultures drawn  - cardiology/pulm consults, ID consult and nephrology consult.      6/19/2025:  - patient currently in ICU  - he has been seen by pulmonary/critical care and ID, cardiology and nephrology  - wbc at 2.38, Hgb 6.7 and plats at 47,000  - cardiology and nephrology consulted    6/20/2025:  - patient remains in the ICU  - WBC 5.73, Hgb 7.1 and plats 40,000  - s/p unit blood yesterday and another one today  -  he has been getting up with PT; breathing better; feeling a little better;   -  discussed with his ICU nurse in person;  -  I communicated with Bambi NP with cardiology and ok to start heparin/lovenox for the atrial fib as long as platelet count does not come back down     6/23/2025:  -  Patient was seen by my associate Dr Landry Pereira over the weekend; he is now out of ICU;   -  WBC at 5.31, hgb 8.6 and plats 27,000  -  feeling better overall; still not much of an appetite; breathing better; still on O2 per NC; continued on IV antibiotics;   - discussed with floor staff; communicated with primary and ID via epic about my concerns with using Zosyn   - Dr Werner with pulmonary planning bronch for tomorrow    6/24/2025:  - discussed with Dr Siegel  - patient to get one single donor platelet product today  - heparin products on hold  - re-iterated my concerns about using Zosyn  - pulmonary has decided to hold off on bronch for now    6/25/2025:  - wbc 5.4, hgb 7.7, plats 27,000  - he is feeling much better; sitting up in chair; off O2; may be we can give him a unit of blood tomorrow pending the am labs        (2) Acute exacerbation of CKD - followed by Dr Mas as outpatient     (3) Chronic back pains - seen by Dr Heredia in past      (4) Hx/of H pylori gastritis s/p prior colonoscopy with Dr Collins in past     (5) Hx/of alcohol uses in past (sober for several years now); former smoker            1. Pneumonia due to infectious organism,  unspecified laterality, unspecified part of lung    2. Hypoxia    3. Myelodysplastic syndrome    4. Severe sepsis    5. Chest pain    6. Elevated troponin    7. A-fib    8. Cardiac rhythm disorder or disturbance or change    9. Multifocal pneumonia    10. MDS (myelodysplastic syndrome)    11. Thrombocytopenia    12. Sickle cell trait syndrome    13. Stage 3 chronic kidney disease, unspecified whether stage 3a or 3b CKD           Plan:     PLAN:          1. Monitor labs and transfuse as needed  - consider a unit of blood tomorrow pending am labs  2. Infection workup, IV antibiotics etc as per ID   3. Respiratory and critical care as per pulmonary directives - pulm has decided to hold off on bronch for now  4. appreciate cardiology consult and assistance   5. Appreciate nephrology consult for the acute on top of Chronic renal insufficiency   6. IVF's, electrolyte and pain management as per primary team  7. Will follow with you                Jose Tello MD  Hematology/Oncology  [facility]

## 2025-06-25 NOTE — PT/OT/SLP PROGRESS
Physical Therapy Treatment    Patient Name:  Rick Butler   MRN:  [id number]    Recommendations:     Discharge Recommendations: Low Intensity Therapy  Discharge Equipment Recommendations:  (TBD)  Barriers to discharge: medical status    Assessment:     Rick Butler is a 65 y.o. male admitted with a medical diagnosis of Severe sepsis.  He presents with the following impairments/functional limitations: weakness, impaired endurance, impaired self care skills, impaired functional mobility, gait instability, impaired balance, decreased upper extremity function, decreased lower extremity function, impaired cardiopulmonary response to activity Pt found [facility] agreeable to participate with PT. Respiratory therapist removes pt's oxygen and joins gait training to monitor pt's spO2; Sit>stand RW SBA; ambulated 2 x 100ft with standing rest breaks taken as needed; pt's spO2 levels never desat below 94%; No complaints; all lines in tact and needs met.    Rehab Prognosis: Fair; patient would benefit from acute skilled PT services to address these deficits and reach maximum level of function.    Recent Surgery: Procedure(s) (LRB):  Bronchoscopy (N/A)      Plan:     During this hospitalization, patient to be seen daily to address the identified rehab impairments via gait training, therapeutic activities, therapeutic exercises and progress toward the following goals:    Plan of Care Expires:  07/20/25    Subjective     Chief Complaint: none stated  Patient/Family Comments/goals: none stated  Pain/Comfort:  Pain Rating 1: 0/10      Objective:     Communicated with RN prior to session.  Patient found up in chair with telemetry upon PT entry to room.     General Precautions: Standard, fall, respiratory  Orthopedic Precautions: N/A  Braces: N/A  Respiratory Status: Room air     Functional Mobility:  Transfers:     Sit to Stand:  stand by assistance with rolling walker  Gait: 2 x 100ft RW CGA      AM-PAC 6 CLICK MOBILITY          Treatment &  Education:  Pt educated on POC, discharge recommendation, need for assist with mobility, use of call bell to seek assistance as needed and fall prevention     Patient left up in chair with all lines intact, call button in reach, and wife present..    GOALS:   Multidisciplinary Problems       Physical Therapy Goals          Problem: Physical Therapy    Goal Priority Disciplines Outcome Interventions   Physical Therapy Goal     PT, PT/OT     Description: Goals to be met by: 2025     Patient will increase functional independence with mobility by performin. Supine to sit with Modified [place]  2. Sit to supine with Modified [place]  3. Sit to stand transfer with Modified [place]  4. Gait  x 300  feet with Modified [place] using Rolling Walker.                          DME Justifications:      Time Tracking:     PT Received On: 25  PT Start Time: 1413     PT Stop Time: 1432  PT Total Time (min): 19 min     Billable Minutes: Gait Training 19    Treatment Type: Treatment  PT/PTA: PTA     Number of PTA visits since last PT visit: 2025

## 2025-06-25 NOTE — ASSESSMENT & PLAN NOTE
TORRES is likely due to multifactorial.Most recent creatinine and eGFR are listed below.  Recent Labs     06/23/25  0612 06/24/25  0503 06/25/25  0448   CREATININE 3.1* 3.3* 3.3*   EGFRNORACEVR 21* 20* 20*      Plan  - TORRES is stable  - Avoid nephrotoxins and renally dose meds for GFR listed above  - Monitor urine output, serial BMP, and adjust therapy as needed  - nephrology is following

## 2025-06-25 NOTE — ASSESSMENT & PLAN NOTE
"This patient does have evidence of infective focus  My overall impression is sepsis with Acute kidney injury, Acute respiratory failure, and Thrombocytopenia .  Source: Respiratory Infection  Antibiotics given-   Antibiotics (72h ago, onward)      Start     Stop Route Frequency Ordered    06/24/25 1400  ceFEPIme injection 1 g         -- IV Every 12 hours (non-standard times) 06/24/25 1247    06/18/25 2100  doxycycline capsule 100 mg         -- Oral Every 12 hours 06/18/25 1752          Latest lactate reviewed-  No results for input(s): "LACTATE", "POCLAC" in the last 72 hours.    "

## 2025-06-25 NOTE — NURSING
" Latest Reference Range & Units 06/25/25 04:48   Platelet Count 150 - 450 K/uL 27 (LL)   (LL): Data is critically low    Platelet back. Yesterday 1 unit of platelet given along with abx adjustment for concerns attributing to reduction. Prior value 25, this morning 27. No active bleed or spontaneous bleeds.     Will pass findings in report. Alexandraurnmarc kent notified with no new orders at this time.     0614- patient assisted to restroom. Then back to bed. Discussed the look of his stool. His said "formed and black." I said "what?" He said " oh yea, like black,black." I discussed with patient regarding s/sx and this is a concerning description.  Will pass along in report to make day team aware.  Patient reports first time.   "

## 2025-06-25 NOTE — PROGRESS NOTES
[facility]   Department of Infectious Disease  Progress Note        PATIENT NAME: Rick Butler  MRN: [id number]  TODAY'S DATE: 06/25/2025  ADMIT DATE: 6/18/2025  LOS: 7 days    CHIEF COMPLAINT: Shortness of Breath (X 1 day. ), Fatigue, and Cough (Onset lastnite)      PRINCIPLE PROBLEM: Severe sepsis    ASSESSMENT and PLAN     Severe sepsis secondary to multifocal pneumonia in the setting of immunosuppression, history of MDS on Rytelo, was increased oxygen requirements, on room air, needing O2 while ambulating  Procalcitonin 61 on admit, repeat 1 today 6.5, trending down  CRP 18, ESR 9, high  RVP 6/18 negative  Respiratory culture 6/19, inadequate specimen, 2nd sample same day still greater than 10 epithelial cells, normal respiratory federico  MRSA nasal swab 6/18 not detected  Blood cultures 6/18 x2 sets no growth     Bi-cytopenia; H&H 7.7/23.9 platelet count 27, after platelet transfusion 6/24 likely secondary to MDS   Heme-Onc following    Stage III CKD, not on HD - Estimated Creatinine Clearance: 25.2 mL/min (A) (based on SCr of 3.3 mg/dL (H)).  Nephrology following    RECOMMENDATIONS:    Continue cefepime 1 g IV q.12  Continue doxycycline 100 mg p.o. twice a day to cover atypicals  Above while inpatient   Upon discharge, ciprofloxacin 750 mg p.o. daily and doxycycline 100 mg p.o. twice a day for 10 days through July 5th  Follow-up ID clinic in 2-3 weeks, office set up appointment  Follow up Heme-Onc outpatient for MDS    D/w patient, Dr Siegel/Medicine    Infectious Disease will sign off, call us back with any questions    Please send Epic secure chat with any questions.      Antibiotics (From admission, onward)      Start     Stop Route Frequency Ordered    06/24/25 1400  ceFEPIme injection 1 g         -- IV Every 12 hours (non-standard times) 06/24/25 1247    06/18/25 2100  doxycycline capsule 100 mg         -- Oral Every 12 hours 06/18/25 1752          Antifungals (From admission, onward)      None            Antivirals (From admission, onward)      None              INTERVAL HISTORY      06/19/2025:  he was seen and evaluated at bedside.  States he is improving.  Still with pleuritic right chest pain.  Heart rate and fever curve improving.  Sputum was not a good sample.  Blood cultures so far negative.      06/20/2025:  Continues to improve.  Blood cultures negative.  Repeat sputum culture was once again not a good sample.  Appetite improving.    06/21/2025:  Seen and evaluated at bedside.  Continues to improve.  No new acute issues.  Blood cultures remain negative.  Was unable to produce a good sputum sample.    06/22/2024:  Chest x-ray today about the same.  No new acute issues.  Continues to improve.  Pending transfer out of ICU to medical floor.  Continues to clinically improve.  Appetite better.    6/23 (Ty):  Interim reviewed, patient seen and examined at bedside, he is sitting in the chair, wife at bedside.  He is complaining of shortness of breath, he is tachypneic on 6 L of O2, saturating 92%.  Hypertensive, T-max 98.3°, afebrile.  Discussed with Medicine, need for Pulmonary follow up for bronchoscopy to obtain better sputum sample and guide antibiotic therapy.  Chest x-ray yesterday with right middle lobe airspace opacities unchanged.  Discussed with patient, he mentions he sometimes chokes while eating or drinking, will have official speech evaluation to rule out ongoing aspiration.  Labs reviewed, white count 5.3, H&H 8.6/26.6, thrombocytopenia 27.  Stable electrolytes, CKD not on HD, creatinine 3.1, creatinine clearance 26.8 mL/min, stable LFTs.  Procalcitonin down to 6.5, trending down.  Micro reviewed, no further data.  Patient also mentions he has not received pneumonia shot, will likely give this outpatient.    6/24:  Interim reviewed, patient seen examined at bedside.  He mentions he is feeling better today, he is sitting in the chair, on facemask 6 L, having epistaxis.  Hemodynamically  stable, T-max 98.1°, afebrile.  Adequate urinary output, last bowel movement this morning.  Labs reviewed, white count 5.1, left shift 73.8%, no bands, H&H 8.2/25.3, platelet count 25, thrombocytopenia.  Patient having epistaxis, plan for platelet transfusion today.  ESR 89, CRP 18.1, high.  Stable electrolytes, creatinine 3.3, normal LFTs.  Micro reviewed, so far no growth.  Discussed with Medicine, no plans for bronchoscopy I have now.  We will stop Zosyn in view of ongoing thrombocytopenia switch to cefepime 1 g IV q.12 for now.    6/25:  Patient seen examined at bedside, he is lying in bed, he just walked around the hallway and it is currently on room air, oxygen requirements improving.  He mentions and he looks actually better today, epistaxis resolved, he received 1 unit of platelets yesterday, he expresses worries about his numbers, Heme-Onc following.  Hemodynamically stable, afebrile.  Appetite remains poor, adequate urinary output, had a bowel movement earlier this morning.  Labs reviewed, white count 5.4, no left shift, H&H 7.7/23.9, platelet count 27.  Stable electrolytes, creatinine 3.3, creatinine clearance 25.2 mL/min, normal LFTs.  Chest x-ray today with improvement of multifocal pneumonia, recommending follow-up imaging 4-6 weeks.      ARMANDO Butler is a 65 y.o. male with history of MDS/RA RS and sickle cell trait.  He is on Rytelo for about 2 months he requires.  A blood and platelet transfusions.  Presents to the emergency room 06/18/2025 with fever cough and generalized weakness and fatigue of 1 day duration.  In the ED /59, pulse 132, respiratory rate 25, temperature 103°, oxygen saturation 92%.     Sodium 136, creatinine 4.6, AST 20, ALT 19, troponin 50, .  WBC 1, hematocrit 23, MCV 86, platelet count 62 with 13% bands.  Acute respiratory panel negative x-ray showed right middle and lower lobe infiltrate he received IV fluid and was placed on antibiotics he has been  evaluated by oncologist and pulmonologist ID asked to assist with his care.     No recent travel other than a cruise to [place] in April 2025.  No sick contacts.  He is employed and works as a prep shift.  Was actually working up until 06/17/2025.        Antibiotic history:    Vancomycin 6/18/25-  Cefepime: 06/08/2025 x1 dose   Azithromycin: 06/18/2025   Meropenem: 06/18/2025  Zosyn 06/23--24  Cefepime 6/24     Microbiology:    Respiratory panel 06/18/2025: Negative   Blood culture 06/18/2025:  NGTD    Review of Systems  Negative except as stated above in Interval History     OBJECTIVE   Temp:  [97.4 °F (36.3 °C)-98.2 °F (36.8 °C)] 98 °F (36.7 °C)  Pulse:  [72-86] 82  Resp:  [16-20] 16  SpO2:  [90 %-99 %] 90 %  BP: (124-146)/(68-83) 128/69  Temp:  [97.4 °F (36.3 °C)-98.2 °F (36.8 °C)]   Temp: 98 °F (36.7 °C) (06/25/25 1205)  Pulse: 82 (06/25/25 1205)  Resp: 16 (06/25/25 1205)  BP: 128/69 (06/25/25 1205)  SpO2: (!) 90 % (06/25/25 1205)    Intake/Output Summary (Last 24 hours) at 6/25/2025 1251  Last data filed at 6/25/2025 1013  Gross per 24 hour   Intake 360 ml   Output 800 ml   Net -440 ml       Physical Exam  General: AA male, sitting in bed, looking better, on room air  Eyes: Eyes with no icterus or injection. Vision grossly normal  Ears: Hearing grossly normal.  Nose: Nares patent, epistaxis resolved  Mouth: Moist mucous membranes, dentures. No ulcerations, erythema or exudates.  Neck: Supple, no tenderness to palpation.  Cardiovascular: Regular rate and rhythm, no murmurs, no edema.    Respiratory:  Better air entry bilaterally  Gastrointestinal: Soft with active bowel sounds, no tenderness to palpation, no distention.  Genitourinary: No suprapubic tenderness.  Musculoskeletal: Moves all extremities with good strength.    Skin: Warm and dry, no obvious rashes.    Neuro: Oriented, conversant, follows commands.  Psych: Calm   VAD: PIVs  ISOLATION: None     Wounds:  None    Significant Labs: All pertinent labs  "within the past 24 hours have been reviewed.    CBC LAST 7 DAYS  Recent Labs   Lab 06/19/25  0301 06/19/25  0951 06/19/25  1720 06/20/25  0230 06/20/25  1835 06/21/25 0302 06/22/25 0315 06/23/25  0612 06/24/25  0503 06/25/25  0448   WBC 2.38*   < > 4.57 5.73  --  8.67 6.42 5.31 5.18 5.40   RBC 2.40*   < > 2.78* 2.58*  --  3.00* 2.82* 3.11* 2.94* 2.77*   HGB 6.7*   < > 7.6* 7.1* 8.8* 8.2* 7.8* 8.6* 8.2* 7.7*   HCT 20.4*   < > 23.6* 21.6* 26.9* 25.4* 24.0* 26.6* 25.3* 23.9*   MCV 85   < > 85 84  --  85 85 86 86 86   MCH 27.9   < > 27.3 27.5  --  27.3 27.7 27.7 27.9 27.8   MCHC 32.8   < > 32.2 32.9  --  32.3 32.5 32.3 32.4 32.2   RDW 17.2*   < > 17.6* 17.9*  --  17.3* 17.3* 17.3* 16.9* 16.9*   PLT 47*   < > 47* 40*  --  42* 33* 27* 25* 27*   MPV 10.3  --   --   --   --  12.3  --   --   --   --    NRBC 0  --  0 0  --  0 0 0 0 0    < > = values in this interval not displayed.       CHEMISTRY LAST 7 DAYS  Recent Labs   Lab 06/19/25 0301 06/19/25 2026 06/20/25  0230 06/21/25  0302 06/22/25 0315 06/23/25  0612 06/24/25  0503 06/25/25  0448    139 140 144 142 141 139 140   K 4.9 4.1 4.2 4.0 3.9 4.4 4.9 4.2    112* 112* 116* 115* 112* 111* 110   CO2 21* 19* 20* 19* 18* 21* 20* 22*   ANIONGAP 9 8 8 9 9 8 8 8   BUN 54* 57* 54* 50* 45* 39* 46* 45*   CREATININE 4.2* 4.1* 4.0* 3.6* 3.3* 3.1* 3.3* 3.3*   GLU 92 111* 110 96 98 109 144* 120*   CALCIUM 8.1* 8.3* 8.5* 8.7 8.9 9.2 9.2 8.6*   MG 2.0 2.2 2.2 2.0 1.8 1.5* 1.7 1.7   ALBUMIN 3.3*  --  3.0* 3.1* 3.0* 3.4* 3.4* 3.2*   PROT 6.3  --  6.7 6.8 6.3 6.9 7.1 6.6   ALKPHOS 35*  --  46* 61 61 65 62 51*   ALT 17  --  19 22 20 20 22 25   AST 25  --  23 27 23 23 21 21   BILITOT 1.1*  --  0.5 0.5 0.5 0.7 0.7 0.5       Estimated Creatinine Clearance: 25.2 mL/min (A) (based on SCr of 3.3 mg/dL (H)).    INFLAMMATORY/PROCAL  LAST 7 DAYS  Recent Labs   Lab 06/24/25  0503   CRP 18.10*     No results found for: "ESR"  CRP   Date Value Ref Range Status   06/24/2025 18.10 (H) <1.00 " mg/dL Final     Comment:     CRP-Normal Application expected values:          <1.0        mg/dL   Normal Range          1.0 - 5.0  mg/dL   Indicates mild inflammation          5.0 - 10.0 mg/dL   Indicates severe inflammation        >10.0        mg/dL   Represents serious processes and frequently                                 indicates the presence of a bacterial infection.    12/04/2024 0.40 <1.00 mg/dL Final     Comment:     CRP-Normal Application expected values:   <1.0        mg/dL   Normal Range  1.0 - 5.0  mg/dL   Indicates mild inflammation  5.0 - 10.0 mg/dL   Indicates severe inflammation  >10.0        mg/dL   Represents serious processes and   frequently         indicates the presence of a bacterial   infection.          PRIOR  MICROBIOLOGY:    No results found for the last 90 days.      LAST 7 DAYS MICROBIOLOGY   Microbiology Results (last 7 days)       Procedure Component Value Units Date/Time    Influenza A & B by Molecular [[id number]]  (Normal) Collected: 06/24/25 0814    Order Status: Completed Specimen: Nasal Swab Updated: 06/24/25 0956     INFLUENZA A MOLECULAR Negative     INFLUENZA B MOLECULAR  Negative    Blood culture x two cultures. Draw prior to antibiotics. [[id number]]  (Normal) Collected: 06/18/25 0930    Order Status: Completed Specimen: Blood Updated: 06/23/25 1001     CULTURE, BLOOD ([facility]) No Growth After 5 Days    Blood culture x two cultures. Draw prior to antibiotics. [[id number]]  (Normal) Collected: 06/18/25 0932    Order Status: Completed Specimen: Blood Updated: 06/23/25 1001     CULTURE, BLOOD ([facility]) No Growth After 5 Days    Culture, Respiratory with Gram Stain [[id number]] Collected: 06/19/25 0950    Order Status: Completed Specimen: Respiratory Updated: 06/21/25 0701     Respiratory Culture Normal respiratory federico     GRAM STAIN (RESPIRATORY) >10 Epithelial Cells/LPF      Few WBC seen      Rare Gram Positive Rods     Comment: Corrected result: Previously reported as Rare  Branching Gram positive rods on 6/19/2025 at 1225 CDT.         Rare Gram positive cocci    Culture, Respiratory with Gram Stain [[id number]] Collected: 06/19/25 0734    Order Status: Completed Specimen: Respiratory from Sputum, Expectorated Updated: 06/19/25 0844     Respiratory Culture Specimen inadequate - culture not performed     GRAM STAIN (RESPIRATORY) >10epis/lfp and <than many WBC's      Predominance of oropharyngeal federico. Please recollect    MRSA Screen by PCR [[id number]]  (Normal) Collected: 06/18/25 1948    Order Status: Completed Specimen: Nasal Swab Updated: 06/18/25 2115     MRSA PCR SCRN ([facility]) Not Detected              CURRENT/PREVIOUS VISIT EKG  Results for orders placed or performed during the hospital encounter of 06/18/25   EKG 12-lead    Collection Time: 06/20/25  1:14 PM   Result Value Ref Range    QRS Duration 94 ms    [facility] QTC Calculation 484 ms    Narrative    Test Reason : I49.9,    Vent. Rate :  93 BPM     Atrial Rate :  93 BPM     P-R Int : 170 ms          QRS Dur :  94 ms      QT Int : 390 ms       P-R-T Axes :  56   6  48 degrees    QTcB Int : 484 ms    Normal sinus rhythm  Nonspecific T wave abnormality  Prolonged QT  Abnormal ECG  When compared with ECG of 19-Jun-2025 18:19,  Sinus rhythm has replaced Atrial fibrillation  Vent. rate has decreased by  56 bpm  Confirmed by Fam Benoit ([id number]) on 6/21/2025 2:14:31 PM    Referred By: AAAREFERRAL SELF           Confirmed By: Fam Benoit     Significant Imaging: I have reviewed all relevant and available imaging results/findings within the past 24 hours.    I spent a total of 55 minutes on the day of the visit.This includes face to face time and non-face to face time preparing to see the patient (eg, review of tests), obtaining and/or reviewing separately obtained history, documenting clinical information in the electronic or other health record, independently interpreting results and communicating results to the  patient/family/caregiver, or care coordinator.    Vikki Hylton MD  Date of Service: 06/25/2025      This note was created using [employer] voice recognition software that occasionally misinterpreted phrases or words.

## 2025-06-25 NOTE — PT/OT/SLP PROGRESS
Occupational Therapy   Treatment    Name: Rick Butler  MRN: [id number]  Admitting Diagnosis:  Severe sepsis       Recommendations:     Discharge Recommendations: No Therapy Indicated  Discharge Equipment Recommendations:  none  Barriers to discharge:  None    Assessment:     Rick Butler is a 65 y.o. male with a medical diagnosis of Severe sepsis.  Performance deficits affecting function are weakness, impaired endurance, impaired self care skills, impaired functional mobility, gait instability, impaired balance, impaired cardiopulmonary response to activity.     Rehab Prognosis:  Good; patient would benefit from acute skilled OT services to address these deficits and reach maximum level of function.       Plan:     Patient to be seen 3 x/week to address the above listed problems via self-care/home management, therapeutic activities, therapeutic exercises  Plan of Care Expires: 07/21/25  Plan of Care Reviewed with: patient    Subjective     Pain/Comfort:  Pain Rating 1: 0/10  Pain Rating Post-Intervention 1: 0/10    Objective:     Communicated with: nurse prior to session.  Patient found supine with oxygen upon OT entry to room.    General Precautions: Standard, fall, respiratory    Orthopedic Precautions:N/A  Braces: N/A  Respiratory Status: Nasal cannula, flow 4 L/min     Occupational Performance:     Bed Mobility:    Patient completed Supine to Sit with supervision  Patient completed Sit to Supine with supervision     Functional Mobility/Transfers:  Patient completed Sit <> Stand Transfer with stand by assistance  with  rolling walker   Functional Mobility: SBA with RW ~45ft; sp02 97% pre and post walk on 4L; mild SOB per pt     Activities of Daily Living:  Lower Body Dressing: Stand by assistance using RW for balance with standing aspects of task    Treatment & Education:  Discussed energy conservation techniques     Patient left HOB elevated with all lines intact, call button in reach, and bed alarm on    GOALS:    Multidisciplinary Problems       Occupational Therapy Goals          Problem: Occupational Therapy    Goal Priority Disciplines Outcome Interventions   Occupational Therapy Goal     OT, PT/OT     Description: Goals to be met by: 7/21/25     Patient will increase functional independence with ADLs by performing:    UE Dressing with Supervision.  LE Dressing with Supervision.  Grooming while standing at sink with Supervision.  Toileting from toilet with Supervision for hygiene and clothing management.   Toilet transfer to toilet with Supervision.                         Time Tracking:     OT Date of Treatment: 06/25/25  OT Start Time: 0910  OT Stop Time: 0934  OT Total Time (min): 24 min    Billable Minutes:Self Care/Home Management 08  Therapeutic Activity 16    OT/HERNANDEZ: OT          6/25/2025

## 2025-06-26 LAB
ABO + RH BLD: NORMAL
ABSOLUTE EOSINOPHIL (SMH): 0 K/UL
ABSOLUTE MONOCYTE (SMH): 1.05 K/UL (ref 0.3–1)
ABSOLUTE NEUTROPHIL COUNT (SMH): 3.6 K/UL (ref 1.8–7.7)
ALBUMIN SERPL-MCNC: 3.3 G/DL (ref 3.5–5.2)
ALP SERPL-CCNC: 49 UNIT/L (ref 55–135)
ALT SERPL-CCNC: 25 UNIT/L (ref 10–44)
ANION GAP (SMH): 7 MMOL/L (ref 8–16)
AST SERPL-CCNC: 20 UNIT/L (ref 10–40)
BASOPHILS # BLD AUTO: 0.02 K/UL
BASOPHILS NFR BLD AUTO: 0.3 %
BILIRUB SERPL-MCNC: 0.5 MG/DL (ref 0.1–1)
BLD PROD TYP BPU: NORMAL
BLOOD UNIT EXPIRATION DATE: NORMAL
BLOOD UNIT TYPE CODE: 9500
BUN SERPL-MCNC: 47 MG/DL (ref 8–23)
CALCIUM SERPL-MCNC: 8.6 MG/DL (ref 8.7–10.5)
CHLORIDE SERPL-SCNC: 111 MMOL/L (ref 95–110)
CO2 SERPL-SCNC: 22 MMOL/L (ref 23–29)
CREAT SERPL-MCNC: 3.2 MG/DL (ref 0.5–1.4)
CROSSMATCH INTERPRETATION: NORMAL
DISPENSE STATUS: NORMAL
ERYTHROCYTE [DISTWIDTH] IN BLOOD BY AUTOMATED COUNT: 16.1 % (ref 11.5–14.5)
ERYTHROCYTE [DISTWIDTH] IN BLOOD BY AUTOMATED COUNT: 16.8 % (ref 11.5–14.5)
GFR SERPLBLD CREATININE-BSD FMLA CKD-EPI: 21 ML/MIN/1.73/M2
GLUCOSE SERPL-MCNC: 103 MG/DL (ref 70–110)
HCT VFR BLD AUTO: 23 % (ref 40–54)
HCT VFR BLD AUTO: 27.6 % (ref 40–54)
HGB BLD-MCNC: 7.3 GM/DL (ref 14–18)
HGB BLD-MCNC: 9 GM/DL (ref 14–18)
IMM GRANULOCYTES # BLD AUTO: 0.27 K/UL (ref 0–0.04)
IMM GRANULOCYTES NFR BLD AUTO: 4.7 % (ref 0–0.5)
INDIRECT COOMBS: NORMAL
LYMPHOCYTES # BLD AUTO: 0.85 K/UL (ref 1–4.8)
MAGNESIUM SERPL-MCNC: 1.7 MG/DL (ref 1.6–2.6)
MCH RBC QN AUTO: 27.3 PG (ref 27–31)
MCH RBC QN AUTO: 27.9 PG (ref 27–31)
MCHC RBC AUTO-ENTMCNC: 31.7 G/DL (ref 32–36)
MCHC RBC AUTO-ENTMCNC: 32.6 G/DL (ref 32–36)
MCV RBC AUTO: 85 FL (ref 82–98)
MCV RBC AUTO: 86 FL (ref 82–98)
NUCLEATED RBC (/100WBC) (SMH): 0 /100 WBC
PLATELET # BLD AUTO: 27 K/UL (ref 150–450)
PLATELET # BLD AUTO: 31 K/UL (ref 150–450)
PLATELET BLD QL SMEAR: ABNORMAL
PMV BLD AUTO: ABNORMAL FL
PMV BLD AUTO: ABNORMAL FL
POTASSIUM SERPL-SCNC: 4.2 MMOL/L (ref 3.5–5.1)
PROT SERPL-MCNC: 6.7 GM/DL (ref 6–8.4)
RBC # BLD AUTO: 2.67 M/UL (ref 4.6–6.2)
RBC # BLD AUTO: 3.23 M/UL (ref 4.6–6.2)
RELATIVE EOSINOPHIL (SMH): 0 % (ref 0–8)
RELATIVE LYMPHOCYTE (SMH): 14.8 % (ref 18–48)
RELATIVE MONOCYTE (SMH): 18.2 % (ref 4–15)
RELATIVE NEUTROPHIL (SMH): 62 % (ref 38–73)
RH BLD: NORMAL
SODIUM SERPL-SCNC: 140 MMOL/L (ref 136–145)
SPECIMEN OUTDATE: NORMAL
UNIT NUMBER: NORMAL
WBC # BLD AUTO: 5.76 K/UL (ref 3.9–12.7)
WBC # BLD AUTO: 6.24 K/UL (ref 3.9–12.7)

## 2025-06-26 PROCEDURE — 36430 TRANSFUSION BLD/BLD COMPNT: CPT

## 2025-06-26 PROCEDURE — 36415 COLL VENOUS BLD VENIPUNCTURE: CPT | Performed by: HOSPITALIST

## 2025-06-26 PROCEDURE — 25000003 PHARM REV CODE 250: Performed by: INTERNAL MEDICINE

## 2025-06-26 PROCEDURE — 25000003 PHARM REV CODE 250: Performed by: FAMILY MEDICINE

## 2025-06-26 PROCEDURE — 94761 N-INVAS EAR/PLS OXIMETRY MLT: CPT

## 2025-06-26 PROCEDURE — 80053 COMPREHEN METABOLIC PANEL: CPT | Performed by: INTERNAL MEDICINE

## 2025-06-26 PROCEDURE — 63600175 PHARM REV CODE 636 W HCPCS: Performed by: STUDENT IN AN ORGANIZED HEALTH CARE EDUCATION/TRAINING PROGRAM

## 2025-06-26 PROCEDURE — 11000001 HC ACUTE MED/SURG PRIVATE ROOM

## 2025-06-26 PROCEDURE — 25000242 PHARM REV CODE 250 ALT 637 W/ HCPCS: Performed by: STUDENT IN AN ORGANIZED HEALTH CARE EDUCATION/TRAINING PROGRAM

## 2025-06-26 PROCEDURE — 97116 GAIT TRAINING THERAPY: CPT | Mod: CQ

## 2025-06-26 PROCEDURE — P9016 RBC LEUKOCYTES REDUCED: HCPCS | Performed by: HOSPITALIST

## 2025-06-26 PROCEDURE — 86902 BLOOD TYPE ANTIGEN DONOR EA: CPT | Performed by: HOSPITALIST

## 2025-06-26 PROCEDURE — 99232 SBSQ HOSP IP/OBS MODERATE 35: CPT | Mod: ,,, | Performed by: INTERNAL MEDICINE

## 2025-06-26 PROCEDURE — 99900035 HC TECH TIME PER 15 MIN (STAT)

## 2025-06-26 PROCEDURE — 94640 AIRWAY INHALATION TREATMENT: CPT

## 2025-06-26 PROCEDURE — 99232 SBSQ HOSP IP/OBS MODERATE 35: CPT | Mod: ,,, | Performed by: STUDENT IN AN ORGANIZED HEALTH CARE EDUCATION/TRAINING PROGRAM

## 2025-06-26 PROCEDURE — 83735 ASSAY OF MAGNESIUM: CPT | Performed by: INTERNAL MEDICINE

## 2025-06-26 PROCEDURE — 85027 COMPLETE CBC AUTOMATED: CPT | Performed by: HOSPITALIST

## 2025-06-26 PROCEDURE — 85025 COMPLETE CBC W/AUTO DIFF WBC: CPT | Performed by: INTERNAL MEDICINE

## 2025-06-26 PROCEDURE — 94664 DEMO&/EVAL PT USE INHALER: CPT

## 2025-06-26 PROCEDURE — 86900 BLOOD TYPING SEROLOGIC ABO: CPT | Performed by: HOSPITALIST

## 2025-06-26 PROCEDURE — 99900031 HC PATIENT EDUCATION (STAT)

## 2025-06-26 PROCEDURE — 36415 COLL VENOUS BLD VENIPUNCTURE: CPT | Performed by: INTERNAL MEDICINE

## 2025-06-26 PROCEDURE — 86920 COMPATIBILITY TEST SPIN: CPT | Performed by: HOSPITALIST

## 2025-06-26 PROCEDURE — 25000003 PHARM REV CODE 250: Performed by: HOSPITALIST

## 2025-06-26 PROCEDURE — 94799 UNLISTED PULMONARY SVC/PX: CPT | Mod: XB

## 2025-06-26 RX ORDER — HYDROCODONE BITARTRATE AND ACETAMINOPHEN 500; 5 MG/1; MG/1
TABLET ORAL
Status: DISCONTINUED | OUTPATIENT
Start: 2025-06-26 | End: 2025-06-28 | Stop reason: HOSPADM

## 2025-06-26 RX ADMIN — LEVALBUTEROL HYDROCHLORIDE 1.25 MG: 1.25 SOLUTION RESPIRATORY (INHALATION) at 08:06

## 2025-06-26 RX ADMIN — DOXYCYCLINE 100 MG: 100 CAPSULE ORAL at 09:06

## 2025-06-26 RX ADMIN — CEFEPIME 1 G: 1 INJECTION, POWDER, FOR SOLUTION INTRAMUSCULAR; INTRAVENOUS at 02:06

## 2025-06-26 RX ADMIN — Medication 6 MG: at 10:06

## 2025-06-26 RX ADMIN — CETIRIZINE HYDROCHLORIDE 5 MG: 5 TABLET ORAL at 08:06

## 2025-06-26 RX ADMIN — TAMSULOSIN HYDROCHLORIDE 0.4 MG: 0.4 CAPSULE ORAL at 08:06

## 2025-06-26 RX ADMIN — LEVALBUTEROL HYDROCHLORIDE 1.25 MG: 1.25 SOLUTION RESPIRATORY (INHALATION) at 07:06

## 2025-06-26 RX ADMIN — PREDNISONE 40 MG: 20 TABLET ORAL at 08:06

## 2025-06-26 RX ADMIN — FOLIC ACID 2000 MCG: 1 TABLET ORAL at 08:06

## 2025-06-26 RX ADMIN — PANTOPRAZOLE SODIUM 40 MG: 40 TABLET, DELAYED RELEASE ORAL at 05:06

## 2025-06-26 RX ADMIN — AMLODIPINE BESYLATE 10 MG: 5 TABLET ORAL at 08:06

## 2025-06-26 RX ADMIN — AMIODARONE HYDROCHLORIDE 400 MG: 200 TABLET ORAL at 09:06

## 2025-06-26 RX ADMIN — DOXYCYCLINE 100 MG: 100 CAPSULE ORAL at 08:06

## 2025-06-26 RX ADMIN — CEFEPIME 1 G: 1 INJECTION, POWDER, FOR SOLUTION INTRAMUSCULAR; INTRAVENOUS at 03:06

## 2025-06-26 RX ADMIN — FLUTICASONE PROPIONATE 100 MCG: 50 SPRAY, METERED NASAL at 08:06

## 2025-06-26 RX ADMIN — TRAZODONE HYDROCHLORIDE 100 MG: 50 TABLET ORAL at 09:06

## 2025-06-26 RX ADMIN — AMIODARONE HYDROCHLORIDE 400 MG: 200 TABLET ORAL at 08:06

## 2025-06-26 RX ADMIN — LEVALBUTEROL HYDROCHLORIDE 1.25 MG: 1.25 SOLUTION RESPIRATORY (INHALATION) at 01:06

## 2025-06-26 NOTE — PROGRESS NOTES
"[facility]  Hematology/Oncology  Inpatient Progress Note          Patient Name: Rick Butler  MRN: [id number]  Admission Date: 6/18/2025  Hospital Length of Stay: 8 days  Code Status: Full Code   Attending Provider: Abbi Siegel MD  Consulting Provider: Jose Tello MD  Primary Care Physician: Reynaldo Basilio, LUCINAP-C  Principal Problem:Severe sepsis      Subjective:       Patient ID: Rick Butler is a 65 y.o. male.    Chief Complaint: Shortness of Breath (X 1 day. ), Fatigue, and Cough (Onset lastnite)        History Present Illness:     Patient sitting up in bed; he reports he is feeling better overall; no excessive bleeding or bruising; breathing better; ambulating with PT; no CP, SOB, HA's or N/V; discussed with floor nurse and Dr Siegel      Review of Systems:  GEN: general aches and pains, malaise, fatigue, weakness;   HEENT: normal with no HA's, sore throat, stiff neck, changes in vision  CV: normal with no CP, SOB, PND, MORA or orthopnea  PULM: normal with no SOB,  hemoptysis, sputum or pleuritic pain;    GI: normal with no abdominal pain, nausea, vomiting, constipation, diarrhea, melanotic stools, BRBPR, or hematemesis  : normal with no hematuria, dysuria  BREAST: normal with no mass, discharge, pain  SKIN: normal with no rash, erythema, bruising, or swelling      Objective:     Vitals:  Blood pressure (!) 141/74, pulse 86, temperature 98.5 °F (36.9 °C), temperature source Oral, resp. rate 16, height 5' 9" (1.753 m), weight 93.7 kg (206 lb 9.1 oz), SpO2 (!) 91%.    Physical Exam:  GEN: no apparent distress, comfortable; AAOx3  HEAD: atraumatic and normocephalic  EYES: no pallor, no icterus, PERRLA  ENT: OMM, no pharyngeal erythema, external ears WNL; no nasal discharge; no thrush  NECK: no masses, thyroid normal, trachea midline, no LAD/LN's, supple  CV: RRR with no murmur; normal pulse; normal S1 and S2; no pedal edema  CHEST: Normal respiratory effort; CTAB; " improved breath sounds   no wheeze or crackles  ABDOM: nontender and nondistended; soft; normal bowel sounds; no rebound/guarding  MUSC/Skeletal: ROM normal; no crepitus; joints normal; no deformities or arthropathy  EXTREM: no clubbing, cyanosis, inflammation or swelling; IV's bilateral arms  SKIN: no rashes, lesions, ulcers, petechiae or subcutaneous nodules; tattoos   : no jiang  NEURO: grossly intact; motor/sensory WNL; AAOx3; no tremors  PSYCH: normal mood, affect and behavior  LYMPH: normal cervical, supraclavicular, axillary and groin LN's          Lab Review:        Lab Results   Component Value Date    WBC 5.76 06/26/2025    HGB 7.3 (L) 06/26/2025    HCT 23.0 (L) 06/26/2025    MCV 86 06/26/2025    PLT 27 (LL) 06/26/2025     CMP  Sodium   Date Value Ref Range Status   06/26/2025 140 136 - 145 mmol/L Final   06/26/2019 138 134 - 144 mmol/L      Potassium   Date Value Ref Range Status   06/26/2025 4.2 3.5 - 5.1 mmol/L Final     Chloride   Date Value Ref Range Status   06/26/2025 111 (H) 95 - 110 mmol/L Final   06/26/2019 105 98 - 110 mmol/L      CO2   Date Value Ref Range Status   06/26/2025 22 (L) 23 - 29 mmol/L Final     Glucose   Date Value Ref Range Status   06/26/2025 103 70 - 110 mg/dL Final   06/26/2019 114 (H) 70 - 99 mg/dL      BUN   Date Value Ref Range Status   06/26/2025 47 (H) 8 - 23 mg/dL Final     Creatinine   Date Value Ref Range Status   06/26/2025 3.2 (H) 0.5 - 1.4 mg/dL Final   06/26/2019 1.62 (H) 0.60 - 1.40 mg/dL      Calcium   Date Value Ref Range Status   06/26/2025 8.6 (L) 8.7 - 10.5 mg/dL Final     Protein Total   Date Value Ref Range Status   06/26/2025 6.7 6.0 - 8.4 gm/dL Final     Albumin   Date Value Ref Range Status   06/26/2025 3.3 (L) 3.5 - 5.2 g/dL Final   06/26/2019 4.4 3.1 - 4.7 g/dL      Bilirubin Total   Date Value Ref Range Status   06/26/2025 0.5 0.1 - 1.0 mg/dL Final     Comment:     For infants and newborns, interpretation of results should be based   on gestational  age, weight and in agreement with clinical   observations.    Premature Infant recommended reference ranges:   0-24 hours:  <8.0 mg/dL   24-48 hours: <12.0 mg/dL   3-5 days:    <15.0 mg/dL   6-29 days:   <15.0 mg/dL     ALP   Date Value Ref Range Status   06/26/2025 49 (L) 55 - 135 unit/L Final     AST   Date Value Ref Range Status   06/26/2025 20 10 - 40 unit/L Final     ALT   Date Value Ref Range Status   06/26/2025 25 10 - 44 unit/L Final     Anion Gap   Date Value Ref Range Status   06/26/2025 7 (L) 8 - 16 mmol/L Final     eGFR   Date Value Ref Range Status   06/26/2025 21 (L) >60 mL/min/1.73/m2 Final   03/12/2025 21.6 (A) >60 mL/min/1.73 m^2 Final           Radiology Diagnostic Studies:     X-Ray Chest AP Portable [[id number]] Resulted: 06/18/25 0901   Order Status: Completed Updated: 06/18/25 0903   Narrative:     EXAMINATION:  XR CHEST AP PORTABLE    CLINICAL HISTORY:  Generalized weakness;    FINDINGS:  Portable chest at 08:49 is compared to 12/20/2021 shows normal cardiomediastinal silhouette.    There is an alveolar consolidation in the mid and lower lung compatible with pneumonia.  The left lung is clear.  There are no pleural effusions.  Right pulmonary vasculature is normal. No acute osseous abnormality.   Impression:       Alveolar consolidations in the right mid and lower lung compatible with pneumonia         Assessment:     IMPRESSION:    (1) 65 y.o. male known to my oncology service with diagnosis of MDS/RARS and sickle cell trait. He is treated in conjunction with Dr Marcelino Hilario out of [facility] in [place], whom he recently saw for follow-up visit on 6/4/2025. He has been on chemotherapy with the drug Rytelo for the past couple of months. He requires periodic blood and platelet transfusions. He presented to ER with cough, fever and progressive weakness/fatigue. Troponin was elevated and CXR showing RML/RLL consolidations consistent with a pneumonia.     6/18/2025;  - WBc 1.02, Hgb 7.3 and plats  62,000  -  I discussed with Dr Brown and Dr Moran both in person. I discussed with his ER nurse in person.   - Plans are to transfuse a unit of blood, IV antibiotics; cultures drawn  - cardiology/pulm consults, ID consult and nephrology consult.      6/19/2025:  - patient currently in ICU  - he has been seen by pulmonary/critical care and ID, cardiology and nephrology  - wbc at 2.38, Hgb 6.7 and plats at 47,000  - cardiology and nephrology consulted    6/20/2025:  - patient remains in the ICU  - WBC 5.73, Hgb 7.1 and plats 40,000  - s/p unit blood yesterday and another one today  -  he has been getting up with PT; breathing better; feeling a little better;   -  discussed with his ICU nurse in person;  -  I communicated with Bambi NP with cardiology and ok to start heparin/lovenox for the atrial fib as long as platelet count does not come back down     6/23/2025:  -  Patient was seen by my associate Dr Landry Pereira over the weekend; he is now out of ICU;   -  WBC at 5.31, hgb 8.6 and plats 27,000  -  feeling better overall; still not much of an appetite; breathing better; still on O2 per NC; continued on IV antibiotics;   - discussed with floor staff; communicated with primary and ID via epic about my concerns with using Zosyn   - Dr Werner with pulmonary planning bronch for tomorrow    6/24/2025:  - discussed with Dr Siegel  - patient to get one single donor platelet product today  - heparin products on hold  - re-iterated my concerns about using Zosyn  - pulmonary has decided to hold off on bronch for now    6/25/2025:  - wbc 5.4, hgb 7.7, plats 27,000  - he is feeling much better; sitting up in chair; off O2; may be we can give him a unit of blood tomorrow pending the am labs       6/26/2025:  - discussed with Dr Siegel this am  - getting a unit of blood today  - WBC 5.76, Hgb 7.3 and plats 27,000     (2) Acute exacerbation of CKD - followed by Dr Mas as outpatient     (3) Chronic back pains - seen by   Vu in past      (4) Hx/of H pylori gastritis s/p prior colonoscopy with Dr Collins in past     (5) Hx/of alcohol uses in past (sober for several years now); former smoker            1. Pneumonia due to infectious organism, unspecified laterality, unspecified part of lung    2. Hypoxia    3. Myelodysplastic syndrome    4. Severe sepsis    5. Chest pain    6. Elevated troponin    7. A-fib    8. Cardiac rhythm disorder or disturbance or change    9. Multifocal pneumonia    10. MDS (myelodysplastic syndrome)    11. Thrombocytopenia    12. Sickle cell trait syndrome    13. Stage 3 chronic kidney disease, unspecified whether stage 3a or 3b CKD           Plan:     PLAN:          1. Monitor labs and transfuse as needed  - getting unit blood today  2. Infection workup, IV antibiotics etc as per ID   3. Respiratory and critical care as per pulmonary directives - pulm has decided to hold off on bronch for now  4. appreciate cardiology consult and assistance   5. Appreciate nephrology consult for the acute on top of Chronic renal insufficiency   6. IVF's, electrolyte and pain management as per primary team  7. Will follow with you                Jose Tello MD  Hematology/Oncology  Formerly [facility]

## 2025-06-26 NOTE — PT/OT/SLP PROGRESS
Physical Therapy Treatment    Patient Name:  Rick Butler   MRN:  [id number]    Recommendations:     Discharge Recommendations: Low Intensity Therapy  Discharge Equipment Recommendations:  (TBD)  Barriers to discharge: decreased activity tolerance    Assessment:     Rick Butler is a 65 y.o. male admitted with a medical diagnosis of Severe sepsis.  He presents with the following impairments/functional limitations: weakness, impaired endurance, impaired self care skills, impaired functional mobility, gait instability, impaired balance, decreased upper extremity function, decreased lower extremity function, impaired cardiopulmonary response to activity.    Hospitalist and nurse request for therapist to see pt as he would be getting a blood transfusion later.    Pt agreeable to visit, wanting to ambulate. Pt performed bed mobility with stand by assist.    Pt performed sit to stand transfer with contact guard assist. Pt ambulated 100' x 2 with RW and contact guard assist progressing to stand by assist. Pt with one standing rest break due to fatigue. Pt on RA with SPO2 96/97%.    Pt returned to bed at end of session.    Rehab Prognosis: Fair; patient would benefit from acute skilled PT services to address these deficits and reach maximum level of function.    Recent Surgery: Procedure(s) (LRB):  Bronchoscopy (N/A)      Plan:     During this hospitalization, patient to be seen daily to address the identified rehab impairments via gait training, therapeutic activities, therapeutic exercises and progress toward the following goals:    Plan of Care Expires:  07/20/25    Subjective     Chief Complaint: fatigue  Patient/Family Comments/goals: to get better  Pain/Comfort:  Pain Rating 1: 0/10      Objective:     Communicated with nurse prior to session.  Patient found HOB elevated with telemetry, bed alarm upon PT entry to room.     General Precautions: Standard, fall, respiratory  Orthopedic Precautions: N/A  Braces:  N/A  Respiratory Status: Room air     Functional Mobility:  Bed Mobility:     Supine to Sit: stand by assistance  Sit to Supine: stand by assistance  Transfers:     Sit to Stand:  contact guard assistance with rolling walker  Gait: 100' x 2 with RW and CGA > SBA      AM-PAC 6 CLICK MOBILITY          Treatment & Education:  Pt educated on importance of time OOB, importance of intermittent mobility, safe techniques for transfers/ambulation, discharge recommendations/options, and use of call light for assistance and fall prevention.      Patient left HOB elevated with all lines intact, call button in reach, bed alarm on, and spouse present..    GOALS:   Multidisciplinary Problems       Physical Therapy Goals          Problem: Physical Therapy    Goal Priority Disciplines Outcome Interventions   Physical Therapy Goal     PT, PT/OT     Description: Goals to be met by: 2025     Patient will increase functional independence with mobility by performin. Supine to sit with Modified [place]  2. Sit to supine with Modified [place]  3. Sit to stand transfer with Modified [place]  4. Gait  x 300  feet with Modified [place] using Rolling Walker.                            Time Tracking:     PT Received On: 25  PT Start Time: 0953     PT Stop Time: 1002  PT Total Time (min): 9 min     Billable Minutes: Gait Training 9    Treatment Type: Treatment  PT/PTA: PTA     Number of PTA visits since last PT visit: 2     2025

## 2025-06-26 NOTE — ASSESSMENT & PLAN NOTE
Maintain iv abx as below  Upon discharge Cipro 750 mg p.o. daily and doxy 100 mg p.o. b.i.d. for 10 days through July 5th recommended per ID    Antibiotics (From admission, onward)      Start     Stop Route Frequency Ordered    06/24/25 1400  ceFEPIme injection 1 g         -- IV Every 12 hours (non-standard times) 06/24/25 1247    06/18/25 2100  doxycycline capsule 100 mg         -- Oral Every 12 hours 06/18/25 1752            Microbiology Results (last 7 days)       Procedure Component Value Units Date/Time    Influenza A & B by Molecular [[id number]]  (Normal) Collected: 06/24/25 0814    Order Status: Completed Specimen: Nasal Swab Updated: 06/24/25 0956     INFLUENZA A MOLECULAR Negative     INFLUENZA B MOLECULAR  Negative    Blood culture x two cultures. Draw prior to antibiotics. [[id number]]  (Normal) Collected: 06/18/25 0930    Order Status: Completed Specimen: Blood Updated: 06/23/25 1001     CULTURE, BLOOD ([facility]) No Growth After 5 Days    Blood culture x two cultures. Draw prior to antibiotics. [[id number]]  (Normal) Collected: 06/18/25 0932    Order Status: Completed Specimen: Blood Updated: 06/23/25 1001     CULTURE, BLOOD ([facility]) No Growth After 5 Days    Culture, Respiratory with Gram Stain [[id number]] Collected: 06/19/25 0950    Order Status: Completed Specimen: Respiratory Updated: 06/21/25 0701     Respiratory Culture Normal respiratory federico     GRAM STAIN (RESPIRATORY) >10 Epithelial Cells/LPF      Few WBC seen      Rare Gram Positive Rods     Comment: Corrected result: Previously reported as Rare Branching Gram positive rods on 6/19/2025 at 1225 CDT.         Rare Gram positive cocci

## 2025-06-26 NOTE — PLAN OF CARE
Assessment/Plan:        Diagnoses and all orders for this visit:    Hypertension  -     Basic Metabolic Panel  -     JIC LAV    Other orders  -     Discontinue: losartan (COZAAR) 25 MG tablet; TK 1 T PO QD; Refill: 3  -     amLODIPine (NORVASC) 10 MG tablet; Take 1 tablet (10 mg total) by mouth daily.  Dispense: 30 tablet; Refill: 2        To continue to monitor blood pressure.  Continue medications.  Will add amlodipine 10 mg daily.  Discussed concerns with uncontrolled blood pressure.  Encouraged to continue to improve food choices, less of salty foods, greasy foods.  Increase physical activity as tolerated.  To update us by early part of next week for her blood pressure readings and follow-up in 2 weeks.  We will also do labs.  She was agreeable with the plans.  Subjective:    Patient ID: Carmen Germain is a 59 y.o. female.    MARIE Morales is here for follow up regarding her blood pressure.  Currently on losartan 100 mg daily, hydrochlorothiazide 25 mg daily.  Was not able to tolerate the lisinopril due to coughing.  She initially had good blood pressure readings in the 110s-130s over 80s.  Highest reading around 146/97.  She is noted to have elevated blood pressure today.  She did bring her blood pressure machine and was 38 points difference with the diastolic reading.  She work and work tends to be stressful.  She also smoked prior to coming in.  She is trying to exercise more, 3 days a week, 5-8 minutes of cycling.  She had some processed foods, lunch meat.  Trying to improve her food choices.  She is continuing to smoke less than half a pack per day.  Not fully motivated to quit at this time.  Has 5-6 alcoholic beverage per month.  She notes ankle swelling by the end of the day but not consistent.  Denies any chest pains, palpitations, dizziness.  No lightheadedness.  Denies any nausea, vomiting.  No localized numbness or weakness.    Review of Systems  As above otherwise negative.          Objective:     Patient's Hgb 7.3 - plan to Transfuse PRBC 1 unit today. Monitor daily labs.     Plan is to discharge with home health when  patient is medically stable .        06/26/25 1441   Discharge Reassessment   Assessment Type Discharge Planning Reassessment   Did the patient's condition or plan change since previous assessment? No   Discharge Plan discussed with: Patient   Post-Acute Status   Post-Acute Authorization Home Health        Physical Exam  /80  Wt 197 lb 12.8 oz (89.7 kg)  LMP 12/11/1993  Breastfeeding? No  BMI 36.47 kg/m2    Vital signs noted above. AAO ×3.  HEENT no nasal discharge, moist oral mucosa. Neck: Supple neck, nonpalpable cervical lymph nodes.  Lungs: Clear to auscultation bilateral.  Heart: S1-S2 regular rate and rhythm, no murmurs were noted.  Abdomen: Flabby, soft with bowel sounds and non tender.  Extremities: No edema, pulses were full and equal.

## 2025-06-26 NOTE — CARE UPDATE
06/25/25 1958   Patient Assessment/Suction   Level of Consciousness (AVPU) alert   Respiratory Effort Unlabored;Normal   Expansion/Accessory Muscles/Retractions no use of accessory muscles;no retractions   All Lung Fields Breath Sounds coarse;diminished   Rhythm/Pattern, Respiratory unlabored;pattern regular   Cough Frequency frequent   Cough Type congested;productive   PRE-TX-O2   Device (Oxygen Therapy) room air   SpO2 96 %   Pulse Oximetry Type Intermittent   Pulse 82   Resp 18   Aerosol Therapy   $ Aerosol Therapy Charges Aerosol Treatment  (xop)   Daily Review of Necessity (SVN) completed   Respiratory Treatment Status (SVN) given   Treatment Route (SVN) mask;air   Patient Position HOB elevated   Post Treatment Assessment (SVN) increased aeration   Signs of Intolerance (SVN) none   Breath Sounds Post-Respiratory Treatment   Throughout All Fields Post-Treatment All Fields   Throughout All Fields Post-Treatment aeration increased   Post-treatment Heart Rate (beats/min) 82   Post-treatment Resp Rate (breaths/min) 18   Incentive Spirometer   $ Incentive Spirometer Charges done with encouragement;breath hold utilized;proper technique demonstrated   Incentive Spirometer Predicted Level (mL) 1250   Administration (IS) instruction provided, follow-up;mouthpiece utilized;proper technique demonstrated   Number of Repetitions (IS) 10   Level Incentive Spirometer (mL) 2000   Patient Tolerance (IS) no adverse signs/symptoms present   Vibratory PEP Therapy   $ Vibratory PEP Charges Aerobika Therapy   $ Vibratory PEP Tech Time Charges 15 min   Daily Review of Necessity (PEP Therapy) completed   Type (PEP Therapy) vibratory/oscillatory   Device (PEP Therapy) flutter   Route (PEP Therapy) mouthpiece   Breaths per Cycle (PEP Therapy) 10   Cycles (PEP Therapy) 1   Settings (PEP Therapy) PEP 4   Patient Position (PEP Therapy) HOB elevated   Post Treatment Assessment (PEP) increased aeration   Signs of Intolerance (PEP Therapy)  none   Education   $ Education Bronchodilator;15 min

## 2025-06-26 NOTE — PROGRESS NOTES
INPATIENT NEPHROLOGY Progress Note   [facility]    Patient Name: Rick Butler  Date: 06/26/2025    Reason for consultation: TORRES    Chief Complaint:   Chief Complaint   Patient presents with    Shortness of Breath     X 1 day.     Fatigue    Cough     Onset lastnite       History of Present Illness:  65-year-old male with history of myelodysplastic syndrome, sickle cell trait, chronic kidney disease stage 3 f/b Dr. Mas, depression, former smoker, ETOH abuse, lumbar degenerative disc disease. Patient presents to the emergency department with complaint of shortness of breath and increasing fatigue over last 24 hours. Patient states had slightly productive cough as well during this time. Patient decided come to the emergency department for further evaluation. On arrival to ER found with temperature of 103°, heart rate of 132, SBP 90s, with room air sat of 91%. Diagnosed with RLL PNA. Consulted for TORRES.    Notable home meds- norvasc, calcitriol, flomax    Echo:    Left Ventricle: The left ventricle is normal in size. Normal wall thickness. Mild global hypokinesis present. There is low normal systolic function with a visually estimated ejection fraction of 50 - 55%. There is normal diastolic function.    Right Ventricle: The right ventricle is normal in size Systolic function is normal.    Left Atrium: The left atrium is moderately dilated    Mitral Valve: There is moderate regurgitation.    Tricuspid Valve: There is mild to moderate regurgitation. There is pulmonary hypertension. The estimated PA systolic pressure is at least 36 mmHg.    Interval History:  6/19- afebrile, SBP 100s, on 4L NC, UOP 1.2L, transfused 1u of blood yest, on levophed and NS IVFs  6/20- afebrile, elevated HR, SBP 120s, on 3-4L NC, coughing, UOP 1.6L, getting blood, off levophed, on amio gtt (went in to AF with RVR overnight), on NS IVFs, echo with TR/pulm HTN  6/21- afebrile, HR improved, BP stable, on 4L NC, UOP 1.8L-  transfused yest, on amio gtt  6/22- VSS, on 3L NC, UOP 2L + voids, renal imaging unrevealing  6/23  1L UOP recorded. VSS, renal function improving.  Productive cough, no fevers.  6/24  650 ml UOP recorded.  VSS, BUN/Scr w/small bump.  Currently in bathroom.  6/25  one shift UOP recorded, 500 ml.  VSS, renal function about the same.  No SOB at rest today, still SOB w/exertion, still w/productive cough.  Feeling much better.  6/26  1.5L UOP.  VSS.  Not much change in renal function.  Hgb dropping, heme-onc following.  Feeling better, still w/productive cough.  SOB improving.    Plan of Care:    Assessment:  Septic shock due to HCAP in an IC patient  TORRES/CKD IV  Elevated BNP and troponin- EF 50-55%, moderate MR, moderate TR, pulm HTN  AF with RVR  Metabolic acidosis  SHPT  MDS on chemotherapy  BPH    Plan:    - on treatment for HCAP- blood cx and RVP negative- remains off pressors- dose meds for CrCl 20  - TORRES is due to septic shock- SCr is improving- nonoliguric- he has no acute RRT needs- nonoliguric- no nsaids or IV contrast  - trop improving- echo noted- monitor for s/s volume overload  - rate controlled- switched to PO amio, on lovenox   - acidosis is mild- component of dilution- hold off sod bicarb for now to minimize salt load- reassess tomorrow  - continue calcitriol  - H/H and platelets fluctuating- transfusion goals per heme/onc  - continue flomax    Thank you for allowing us to participate in this patient's care. We will continue to follow.    Vital Signs:  Temp Readings from Last 3 Encounters:   06/26/25 98.2 °F (36.8 °C) (Oral)   06/05/25 100 °F (37.8 °C) (Temporal)   06/05/25 99 °F (37.2 °C)       Pulse Readings from Last 3 Encounters:   06/26/25 81   06/05/25 72   06/05/25 70       BP Readings from Last 3 Encounters:   06/26/25 120/70   06/05/25 (!) 160/89   06/05/25 (!) 170/90       Weight:  Wt Readings from Last 3 Encounters:   06/18/25 93.7 kg (206 lb 9.1 oz)   06/05/25 96.2 kg (212 lb)   06/05/25  96.6 kg (213 lb)       INS/OUTS:  I/O last 3 completed shifts:  In: 600 [P.O.:600]  Out: 2000 [Urine:2000]  No intake/output data recorded.    Medications:  Scheduled Meds:   amiodarone  400 mg Oral BID    Followed by    [START ON 7/1/2025] amiodarone  400 mg Oral Daily    amLODIPine  10 mg Oral Daily    calcitRIOL  0.25 mcg Oral Q7 Days    ceFEPime IV (PEDS and ADULTS)  1 g Intravenous Q12H    cetirizine  5 mg Oral Daily    doxycycline  100 mg Oral Q12H    fluticasone propionate  2 spray Each Nostril Daily    folic acid  2,000 mcg Oral Daily    levalbuterol  1.25 mg Nebulization Q6H WAKE    morphine  2 mg Intravenous Once    pantoprazole  40 mg Oral Daily    predniSONE  40 mg Oral Daily    tamsulosin  0.4 mg Oral Daily    traZODone  100 mg Oral QHS     Continuous Infusions:      PRN Meds:.  Current Facility-Administered Medications:     0.9%  NaCl infusion (for blood administration), , Intravenous, Q24H PRN    0.9%  NaCl infusion (for blood administration), , Intravenous, Q24H PRN    0.9%  NaCl infusion (for blood administration), , Intravenous, Q24H PRN    0.9%  NaCl infusion (for blood administration), , Intravenous, Q24H PRN    acetaminophen, 650 mg, Oral, Q8H PRN    acetaminophen, 650 mg, Oral, Q4H PRN    aluminum-magnesium hydroxide-simethicone, 30 mL, Oral, QID PRN    benzonatate, 200 mg, Oral, TID PRN    dextromethorphan-guaiFENesin  mg, 1 tablet, Oral, BID PRN    furosemide (LASIX) injection, 20 mg, Intravenous, PRN    HYDROcodone-acetaminophen, 1 tablet, Oral, Q6H PRN    magnesium oxide, 800 mg, Oral, PRN    magnesium oxide, 800 mg, Oral, PRN    melatonin, 6 mg, Oral, Nightly PRN    naloxone, 0.02 mg, Intravenous, PRN    potassium bicarbonate, 35 mEq, Oral, PRN    potassium bicarbonate, 50 mEq, Oral, PRN    potassium bicarbonate, 60 mEq, Oral, PRN    potassium, sodium phosphates, 2 packet, Oral, PRN    potassium, sodium phosphates, 2 packet, Oral, PRN    potassium, sodium phosphates, 2 packet, Oral,  "PRN  Medications Ordered Prior to Encounter[1]    Review of Systems:  Neg    Physical Exam:  /70   Pulse 81   Temp 98.2 °F (36.8 °C) (Oral)   Resp 18   Ht 5' 9" (1.753 m)   Wt 93.7 kg (206 lb 9.1 oz)   SpO2 98%   BMI 30.51 kg/m²     General Appearance:    NAD, AAO x 3, cooperative, appears stated age   Head:    Normocephalic, atraumatic   Eyes:    PER, EOMI, and conjunctiva/sclera clear bilaterally        Mouth:   Moist mucus membranes, no thrush or oral lesions, normal      dentition   Back:     No CVA tenderness   Lungs:     Clear to auscultation bilaterally, no wheeze, crackles, rales      or rhonchi, symmetric air movement, respirations unlabored   Heart:    Regular rate and rhythm, S1 and S2 normal, no murmur, rub   or gallop   Abdomen:     Soft, non-tender, non-distended, bowel sounds active all four   quadrants, no RT or guarding, no masses, no organomegaly   Extremities:   Warm and well perfused, distal pulses intact, no cyanosis or    peripheral edema   MSK:   No joint or muscle swelling, tenderness or deformity   Skin:   Skin color, texture, turgor normal, no rashes or lesions   Neurologic/Psychiatric:   CNII-XII intact, normal strength and sensation       throughout, no asterixis; normal affect, memory, judgement     and insight     Results:  Lab Results   Component Value Date     06/26/2025    K 4.2 06/26/2025     (H) 06/26/2025    CO2 22 (L) 06/26/2025    BUN 47 (H) 06/26/2025    CREATININE 3.2 (H) 06/26/2025    CALCIUM 8.6 (L) 06/26/2025    ANIONGAP 7 (L) 06/26/2025    ESTGFRAFRICA 45.6 (A) 07/13/2022    EGFRNONAA 39.5 (A) 07/13/2022       Lab Results   Component Value Date    CALCIUM 8.6 (L) 06/26/2025    PHOS 4.1 06/19/2025       Recent Labs   Lab 06/26/25  0615   WBC 5.76   RBC 2.67*   HGB 7.3*   HCT 23.0*   PLT 27*   MCV 86   MCH 27.3   MCHC 31.7*       Juana S Champagne, NP      [facility]  [address] [place] [address]  [phone] " (p)  [phone] (f)                       [1]   No current facility-administered medications on file prior to encounter.     Current Outpatient Medications on File Prior to Encounter   Medication Sig Dispense Refill    amLODIPine (NORVASC) 5 MG tablet Take 1 tablet (5 mg total) by mouth once daily. 90 tablet 3    ascorbic acid, vitamin C, (VITAMIN C) 500 MG tablet Take 500 mg by mouth once daily.      atorvastatin (LIPITOR) 10 MG tablet Take 1 tablet (10 mg total) by mouth every evening. 90 tablet 3    calcitRIOL (ROCALTROL) 0.25 MCG Cap Take 0.25 mcg by mouth every 7 days.      cyproheptadine (PERIACTIN) 4 mg tablet Take 1 tablet (4 mg total) by mouth 3 (three) times daily. 90 tablet 3    droNABinol (MARINOL) 10 MG capsule Take 1 capsule (10 mg total) by mouth 2 (two) times daily before meals. 60 capsule 1    famotidine (PEPCID) 20 MG tablet Take 20 mg by mouth 2 (two) times daily.      fluticasone propionate (FLONASE) 50 mcg/actuation nasal spray 1 SPRAY (50 MCG TOTAL) BY EACH NOSTRIL ROUTE 2 (TWO) TIMES A DAY. 1 SPRAY EVERY MORNING AND AFTERNOON TOWARD THE EAR EACH SIDE AFTER A SINUS RINSE 48 mL 1    folic acid (FOLVITE) 1 MG tablet TAKE 2 TABLETS BY MOUTH EVERY DAY (Patient taking differently: Take 2,000 mcg by mouth once daily.) 180 tablet 0    multivitamin with minerals Cap Take 1 capsule by mouth every morning.      pantoprazole (PROTONIX) 40 MG tablet Take 40 mg by mouth once daily.      tamsulosin (FLOMAX) 0.4 mg Cap TAKE 2 CAPSULES BY MOUTH EVERY DAY (Patient taking differently: Take 2 capsules by mouth once daily.) 180 capsule 1    traZODone (DESYREL) 100 MG tablet TAKE 1 TABLET BY MOUTH NIGHTLY AS NEEDED FOR INSOMNIA. 90 tablet 2    LIDOcaine (LIDODERM) 5 % Place 1 patch onto the skin once daily. Remove & Discard patch within 12 hours or as directed by MD Smith patch 0    sildenafil (VIAGRA) 100 MG tablet Take 1 tablet (100 mg total) by mouth daily as needed for Erectile Dysfunction. 10 tablet 6    sod  chlor-bicarb-squeez bottle (NEILMED SINUS RINSE COMPLETE) pkdv 1 application  by sinus irrigation route 2 (two) times a day. Not right before bed 1 each 11    [DISCONTINUED] fluoxetine 20 MG tablet TAKE 1 TABLET BY MOUTH EVERY DAY 30 tablet 5

## 2025-06-26 NOTE — ASSESSMENT & PLAN NOTE
Aware   Has received 2 units PRBCs already this hospital stay and will receive another today    Recent Labs     06/24/25  0503 06/25/25  0448 06/26/25  0615   HGB 8.2* 7.7* 7.3*   HCT 25.3* 23.9* 23.0*

## 2025-06-26 NOTE — ASSESSMENT & PLAN NOTE
TORRES is likely due to multifactorial.Most recent creatinine and eGFR are listed below.  Recent Labs     06/24/25  0503 06/25/25  0448 06/26/25  0615   CREATININE 3.3* 3.3* 3.2*   EGFRNORACEVR 20* 20* 21*      Plan  - TORRES is stable  - Avoid nephrotoxins and renally dose meds for GFR listed above  - Monitor urine output, serial BMP, and adjust therapy as needed  - nephrology is following

## 2025-06-26 NOTE — SUBJECTIVE & OBJECTIVE
Interval History:  Patient seen and examined.  Remains on room air.  Platelet count remains at 27, hemoglobin 7.3.  Discussed with Dr. Tello, we will transfuse 1 unit PRBCs today per his recommendation.  Patient still with some shortness a breath on ambulation  Review of Systems   Respiratory:  Positive for shortness of breath (with ambulation).    All other systems reviewed and are negative.    Objective:     Vital Signs (Most Recent):  Temp: 98.5 °F (36.9 °C) (06/26/25 0827)  Pulse: 86 (06/26/25 0827)  Resp: 16 (06/26/25 0827)  BP: (!) 141/74 (06/26/25 0827)  SpO2: (!) 91 % (06/26/25 0827) Vital Signs (24h Range):  Temp:  [98 °F (36.7 °C)-98.5 °F (36.9 °C)] 98.5 °F (36.9 °C)  Pulse:  [78-89] 86  Resp:  [16-20] 16  SpO2:  [90 %-98 %] 91 %  BP: (114-141)/(68-76) 141/74     Weight: 93.7 kg (206 lb 9.1 oz)  Body mass index is 30.51 kg/m².    Intake/Output Summary (Last 24 hours) at 6/26/2025 1107  Last data filed at 6/26/2025 0800  Gross per 24 hour   Intake 480 ml   Output 1400 ml   Net -920 ml         Physical Exam  HENT:      Mouth/Throat:      Mouth: Mucous membranes are moist.   Eyes:      Conjunctiva/sclera: Conjunctivae normal.   Cardiovascular:      Rate and Rhythm: Normal rate.      Heart sounds: Murmur heard.   Pulmonary:      Breath sounds: Rales present. No wheezing.   Abdominal:      General: There is no distension.   Musculoskeletal:      Right lower leg: No edema.      Left lower leg: No edema.   Skin:     Coloration: Skin is pale.   Neurological:      Mental Status: He is alert and oriented to person, place, and time.               Significant Labs: All pertinent labs within the past 24 hours have been reviewed.    Significant Imaging: I have reviewed all pertinent imaging results/findings within the past 24 hours.

## 2025-06-26 NOTE — PLAN OF CARE
Problem: Adult Inpatient Plan of Care  Goal: Plan of Care Review  Outcome: Progressing     Problem: Sepsis/Septic Shock  Goal: Optimal Coping  Outcome: Progressing     Problem: Sepsis/Septic Shock  Goal: Absence of Infection Signs and Symptoms  Outcome: Progressing     Problem: Pneumonia  Goal: Fluid Balance  Outcome: Progressing     Problem: Skin Injury Risk Increased  Goal: Skin Health and Integrity  Outcome: Progressing

## 2025-06-26 NOTE — PLAN OF CARE
Problem: Physical Therapy  Goal: Physical Therapy Goal  Description: Goals to be met by: 2025     Patient will increase functional independence with mobility by performin. Supine to sit with Modified Coy  2. Sit to supine with Modified Coy  3. Sit to stand transfer with Modified Coy  4. Gait  x 300  feet with Modified Coy using Rolling Walker.     Outcome: Progressing
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
## 2025-06-26 NOTE — PROGRESS NOTES
[facility] Medicine  Progress Note    Patient Name: Rick Butler  MRN: [id number]  Patient Class: IP- Inpatient   Admission Date: 6/18/2025  Length of Stay: 8 days  Attending Physician: Abbi Siegel MD  Primary Care Provider: Reynaldo Basilio FNP-C        Subjective     Principal Problem:Severe sepsis        HPI:  65 year old pt getting admitted with Sepsis/Severe pneumonia/Neutropenic fever  Pt was suffering from URTI like infection for past several days  Later he suffered from severe SOB/cough  Today symptoms went worse, came to ER found to be hypoxic and got admitted     Overview/Hospital Course:  65-year-old male with myelodysplastic syndrome admitted with severe sepsis/pneumonia/neutropenic fever. Continue antibiotics and ID followed.  Later patient developed elevated troponin for demand ischemia and developed AFib with RVR and placed on IV amiodarone and transitioned to oral amiodarone.  For symptomatic anemia, s/p 2 units of packed red blood cells .For MDS, Give plts if less than 20 or bleeding.Transfuse prbc if hgb less that 7 and Heme/oncology followed.     Interval History:  Patient seen and examined.  Remains on room air.  Platelet count remains at 27, hemoglobin 7.3.  Discussed with Dr. Tello, we will transfuse 1 unit PRBCs today per his recommendation.  Patient still with some shortness a breath on ambulation  Review of Systems   Respiratory:  Positive for shortness of breath (with ambulation).    All other systems reviewed and are negative.    Objective:     Vital Signs (Most Recent):  Temp: 98.5 °F (36.9 °C) (06/26/25 0827)  Pulse: 86 (06/26/25 0827)  Resp: 16 (06/26/25 0827)  BP: (!) 141/74 (06/26/25 0827)  SpO2: (!) 91 % (06/26/25 0827) Vital Signs (24h Range):  Temp:  [98 °F (36.7 °C)-98.5 °F (36.9 °C)] 98.5 °F (36.9 °C)  Pulse:  [78-89] 86  Resp:  [16-20] 16  SpO2:  [90 %-98 %] 91 %  BP: (114-141)/(68-76) 141/74     Weight: 93.7 kg (206 lb 9.1 oz)  Body  "mass index is 30.51 kg/m².    Intake/Output Summary (Last 24 hours) at 6/26/2025 1107  Last data filed at 6/26/2025 0800  Gross per 24 hour   Intake 480 ml   Output 1400 ml   Net -920 ml         Physical Exam  HENT:      Mouth/Throat:      Mouth: Mucous membranes are moist.   Eyes:      Conjunctiva/sclera: Conjunctivae normal.   Cardiovascular:      Rate and Rhythm: Normal rate.      Heart sounds: Murmur heard.   Pulmonary:      Breath sounds: Rales present. No wheezing.   Abdominal:      General: There is no distension.   Musculoskeletal:      Right lower leg: No edema.      Left lower leg: No edema.   Skin:     Coloration: Skin is pale.   Neurological:      Mental Status: He is alert and oriented to person, place, and time.               Significant Labs: All pertinent labs within the past 24 hours have been reviewed.    Significant Imaging: I have reviewed all pertinent imaging results/findings within the past 24 hours.      Assessment & Plan  Severe sepsis  This patient does have evidence of infective focus  My overall impression is sepsis with Acute kidney injury, Acute respiratory failure, and Thrombocytopenia .  Source: Respiratory Infection  Antibiotics given-   Antibiotics (72h ago, onward)      Start     Stop Route Frequency Ordered    06/24/25 1400  ceFEPIme injection 1 g         -- IV Every 12 hours (non-standard times) 06/24/25 1247    06/18/25 2100  doxycycline capsule 100 mg         -- Oral Every 12 hours 06/18/25 1752          Latest lactate reviewed-  No results for input(s): "LACTATE", "POCLAC" in the last 72 hours.    Pneumonia due to infectious organism  Maintain iv abx as below  Upon discharge Cipro 750 mg p.o. daily and doxy 100 mg p.o. b.i.d. for 10 days through July 5th recommended per ID    Antibiotics (From admission, onward)      Start     Stop Route Frequency Ordered    06/24/25 1400  ceFEPIme injection 1 g         -- IV Every 12 hours (non-standard times) 06/24/25 1247    06/18/25 2100  " doxycycline capsule 100 mg         -- Oral Every 12 hours 06/18/25 1752            Microbiology Results (last 7 days)       Procedure Component Value Units Date/Time    Influenza A & B by Molecular [[id number]]  (Normal) Collected: 06/24/25 0814    Order Status: Completed Specimen: Nasal Swab Updated: 06/24/25 0956     INFLUENZA A MOLECULAR Negative     INFLUENZA B MOLECULAR  Negative    Blood culture x two cultures. Draw prior to antibiotics. [[id number]]  (Normal) Collected: 06/18/25 0930    Order Status: Completed Specimen: Blood Updated: 06/23/25 1001     CULTURE, BLOOD ([facility]) No Growth After 5 Days    Blood culture x two cultures. Draw prior to antibiotics. [[id number]]  (Normal) Collected: 06/18/25 0932    Order Status: Completed Specimen: Blood Updated: 06/23/25 1001     CULTURE, BLOOD ([facility]) No Growth After 5 Days    Culture, Respiratory with Gram Stain [[id number]] Collected: 06/19/25 0950    Order Status: Completed Specimen: Respiratory Updated: 06/21/25 0701     Respiratory Culture Normal respiratory federico     GRAM STAIN (RESPIRATORY) >10 Epithelial Cells/LPF      Few WBC seen      Rare Gram Positive Rods     Comment: Corrected result: Previously reported as Rare Branching Gram positive rods on 6/19/2025 at 1225 CDT.         Rare Gram positive cocci          Sickle cell trait syndrome  Aware     RARS (refractory anemia with ringed sideroblasts)  Aware   Has received 2 units PRBCs already this hospital stay and will receive another today    Recent Labs     06/24/25  0503 06/25/25  0448 06/26/25  0615   HGB 8.2* 7.7* 7.3*   HCT 25.3* 23.9* 23.0*       MDS (myelodysplastic syndrome)  Aware   Give plts if less than 20 or bleeding.Transfuse prbc if hgb less that 7   Received platelet transfusion 6/24  Anemia, chronic renal failure, stage 3 (moderate)  Aware   Also has MDS  Neutropenic fever  Maintain Rx as ordered  Isolation precautions    Acute on chronic anemia  Has received 2 unit PRBCs during this  stay  Transfuse another unit PRBCs today  Thrombocytopenia  Continue to monitor  Heme oncology is following  Holding Lovenox  Transfused a dose platelets 6/24  Elevated troponin  Trended down likely demand  Cardiology is following  Atrial fibrillation with rapid ventricular response   IV amiodarone transitioned to oral on 06/21  Anticoagulation per Cardiology team recommendation, currently holding as platelets under 50  TORRES (acute kidney injury)  TORRES is likely due to multifactorial.Most recent creatinine and eGFR are listed below.  Recent Labs     06/24/25  0503 06/25/25  0448 06/26/25  0615   CREATININE 3.3* 3.3* 3.2*   EGFRNORACEVR 20* 20* 21*      Plan  - TORRES is stable  - Avoid nephrotoxins and renally dose meds for GFR listed above  - Monitor urine output, serial BMP, and adjust therapy as needed  - nephrology is following  Hypoxia  Improved    VTE Risk Mitigation (From admission, onward)           Ordered     IP VTE HIGH RISK PATIENT  Once         06/18/25 1145     Place sequential compression device  Until discontinued         06/18/25 1145                    Discharge Planning   ELMO: 6/28/2025     Code Status: Full Code   Medical Readiness for Discharge Date: 6/20/2025  Discharge Plan A: Home Health   Discharge Delays: None known at this time              Please place Justification for DME      Abbi Siegel MD  Department of Hospital Medicine   [facility]

## 2025-06-26 NOTE — CARE UPDATE
06/26/25 0711   Patient Assessment/Suction   Level of Consciousness (AVPU) alert   Respiratory Effort Unlabored   Expansion/Accessory Muscles/Retractions no use of accessory muscles   All Lung Fields Breath Sounds coarse;diminished   Rhythm/Pattern, Respiratory unlabored;pattern regular;depth regular   PRE-TX-O2   Device (Oxygen Therapy) room air   SpO2 98 %   Pulse Oximetry Type Intermittent   $ Pulse Oximetry - Multiple Charge Pulse Oximetry - Multiple   Pulse 81   Resp 18   Positioning   Body Position sitting up in bed   Head of Bed (HOB) Positioning HOB lowered   Aerosol Therapy   $ Aerosol Therapy Charges Aerosol Treatment   Daily Review of Necessity (SVN) completed   Respiratory Treatment Status (SVN) given   Treatment Route (SVN) mask   Patient Position HOB elevated   Post Treatment Assessment (SVN) breath sounds unchanged   Signs of Intolerance (SVN) none   Breath Sounds Post-Respiratory Treatment   Throughout All Fields Post-Treatment All Fields   Throughout All Fields Post-Treatment Anterior:;Posterior:;Lateral:   Post-treatment Heart Rate (beats/min) 90   Post-treatment Resp Rate (breaths/min) 16   Incentive Spirometer   $ Incentive Spirometer Charges done with encouragement   Incentive Spirometer Predicted Level (mL) 1250   Administration (IS) proper technique demonstrated   Number of Repetitions (IS) 10   Level Incentive Spirometer (mL) 2000   Patient Tolerance (IS) good   Vibratory PEP Therapy   $ Vibratory PEP Charges Aerobika Therapy   $ Vibratory PEP Equipment Aerobika Equipment   $ Vibratory PEP Tech Time Charges 15 min   Daily Review of Necessity (PEP Therapy) completed   Type (PEP Therapy) vibratory/oscillatory   Device (PEP Therapy) flutter   Route (PEP Therapy) mouthpiece   Breaths per Cycle (PEP Therapy) 10   Cycles (PEP Therapy) 1   Settings (PEP Therapy) PEP 4   Patient Position (PEP Therapy) HOB elevated   Post Treatment Assessment (PEP) breath sounds unchanged   Signs of Intolerance  (PEP Therapy) none   Respiratory Interventions   Cough And Deep Breathing done with encouragement   Education   $ Education Bronchodilator;15 min

## 2025-06-27 LAB
ALBUMIN SERPL-MCNC: 3.4 G/DL (ref 3.5–5.2)
ALP SERPL-CCNC: 46 UNIT/L (ref 55–135)
ALT SERPL-CCNC: 25 UNIT/L (ref 10–44)
ANION GAP (SMH): 6 MMOL/L (ref 8–16)
ANISOCYTOSIS BLD QL SMEAR: ABNORMAL
AST SERPL-CCNC: 17 UNIT/L (ref 10–40)
BILIRUB SERPL-MCNC: 0.6 MG/DL (ref 0.1–1)
BUN SERPL-MCNC: 45 MG/DL (ref 8–23)
CALCIUM SERPL-MCNC: 9 MG/DL (ref 8.7–10.5)
CHLORIDE SERPL-SCNC: 110 MMOL/L (ref 95–110)
CO2 SERPL-SCNC: 23 MMOL/L (ref 23–29)
CREAT SERPL-MCNC: 3.1 MG/DL (ref 0.5–1.4)
DACRYOCYTES BLD QL SMEAR: ABNORMAL
ERYTHROCYTE [DISTWIDTH] IN BLOOD BY AUTOMATED COUNT: 16.4 % (ref 11.5–14.5)
GFR SERPLBLD CREATININE-BSD FMLA CKD-EPI: 21 ML/MIN/1.73/M2
GLUCOSE SERPL-MCNC: 93 MG/DL (ref 70–110)
HCT VFR BLD AUTO: 24.8 % (ref 40–54)
HGB BLD-MCNC: 8.3 GM/DL (ref 14–18)
HYPOCHROMIA BLD QL SMEAR: ABNORMAL
LYMPHOCYTES NFR BLD MANUAL: 13 % (ref 18–48)
MAGNESIUM SERPL-MCNC: 1.6 MG/DL (ref 1.6–2.6)
MCH RBC QN AUTO: 27.8 PG (ref 27–31)
MCHC RBC AUTO-ENTMCNC: 33.5 G/DL (ref 32–36)
MCV RBC AUTO: 83 FL (ref 82–98)
METAMYELOCYTES NFR BLD MANUAL: 1 %
MONOCYTES NFR BLD MANUAL: 16 % (ref 4–15)
MYELOCYTES NFR BLD MANUAL: 5 %
NEUTROPHILS NFR BLD MANUAL: 64 % (ref 38–73)
NEUTS BAND NFR BLD MANUAL: 1 %
NUCLEATED RBC (/100WBC) (SMH): 0 /100 WBC
OVALOCYTES BLD QL SMEAR: ABNORMAL
PLATELET # BLD AUTO: 27 K/UL (ref 150–450)
PLATELET BLD QL SMEAR: ABNORMAL
PMV BLD AUTO: 10.2 FL (ref 9.2–12.9)
POIKILOCYTOSIS BLD QL SMEAR: SLIGHT
POTASSIUM SERPL-SCNC: 4.3 MMOL/L (ref 3.5–5.1)
PROT SERPL-MCNC: 6.9 GM/DL (ref 6–8.4)
RBC # BLD AUTO: 2.99 M/UL (ref 4.6–6.2)
SCHISTOCYTES BLD QL SMEAR: PRESENT
SODIUM SERPL-SCNC: 139 MMOL/L (ref 136–145)
WBC # BLD AUTO: 5.3 K/UL (ref 3.9–12.7)

## 2025-06-27 PROCEDURE — 25000003 PHARM REV CODE 250: Performed by: HOSPITALIST

## 2025-06-27 PROCEDURE — 63600175 PHARM REV CODE 636 W HCPCS: Performed by: STUDENT IN AN ORGANIZED HEALTH CARE EDUCATION/TRAINING PROGRAM

## 2025-06-27 PROCEDURE — 25000003 PHARM REV CODE 250: Performed by: INTERNAL MEDICINE

## 2025-06-27 PROCEDURE — 94799 UNLISTED PULMONARY SVC/PX: CPT

## 2025-06-27 PROCEDURE — 36415 COLL VENOUS BLD VENIPUNCTURE: CPT | Performed by: INTERNAL MEDICINE

## 2025-06-27 PROCEDURE — 80053 COMPREHEN METABOLIC PANEL: CPT | Performed by: INTERNAL MEDICINE

## 2025-06-27 PROCEDURE — 94761 N-INVAS EAR/PLS OXIMETRY MLT: CPT

## 2025-06-27 PROCEDURE — 25000003 PHARM REV CODE 250: Performed by: FAMILY MEDICINE

## 2025-06-27 PROCEDURE — 99900035 HC TECH TIME PER 15 MIN (STAT)

## 2025-06-27 PROCEDURE — 85025 COMPLETE CBC W/AUTO DIFF WBC: CPT | Performed by: INTERNAL MEDICINE

## 2025-06-27 PROCEDURE — 99232 SBSQ HOSP IP/OBS MODERATE 35: CPT | Mod: ,,, | Performed by: INTERNAL MEDICINE

## 2025-06-27 PROCEDURE — 83735 ASSAY OF MAGNESIUM: CPT | Performed by: INTERNAL MEDICINE

## 2025-06-27 PROCEDURE — 99900031 HC PATIENT EDUCATION (STAT)

## 2025-06-27 PROCEDURE — 94640 AIRWAY INHALATION TREATMENT: CPT

## 2025-06-27 PROCEDURE — 97116 GAIT TRAINING THERAPY: CPT | Mod: CQ

## 2025-06-27 PROCEDURE — 25000242 PHARM REV CODE 250 ALT 637 W/ HCPCS: Performed by: STUDENT IN AN ORGANIZED HEALTH CARE EDUCATION/TRAINING PROGRAM

## 2025-06-27 PROCEDURE — 94664 DEMO&/EVAL PT USE INHALER: CPT

## 2025-06-27 PROCEDURE — 11000001 HC ACUTE MED/SURG PRIVATE ROOM

## 2025-06-27 RX ADMIN — LEVALBUTEROL HYDROCHLORIDE 1.25 MG: 1.25 SOLUTION RESPIRATORY (INHALATION) at 07:06

## 2025-06-27 RX ADMIN — AMLODIPINE BESYLATE 10 MG: 5 TABLET ORAL at 09:06

## 2025-06-27 RX ADMIN — TRAZODONE HYDROCHLORIDE 100 MG: 50 TABLET ORAL at 08:06

## 2025-06-27 RX ADMIN — PANTOPRAZOLE SODIUM 40 MG: 40 TABLET, DELAYED RELEASE ORAL at 05:06

## 2025-06-27 RX ADMIN — AMIODARONE HYDROCHLORIDE 400 MG: 200 TABLET ORAL at 08:06

## 2025-06-27 RX ADMIN — FLUTICASONE PROPIONATE 100 MCG: 50 SPRAY, METERED NASAL at 09:06

## 2025-06-27 RX ADMIN — CETIRIZINE HYDROCHLORIDE 5 MG: 5 TABLET ORAL at 09:06

## 2025-06-27 RX ADMIN — DOXYCYCLINE 100 MG: 100 CAPSULE ORAL at 09:06

## 2025-06-27 RX ADMIN — PREDNISONE 40 MG: 20 TABLET ORAL at 09:06

## 2025-06-27 RX ADMIN — LEVALBUTEROL HYDROCHLORIDE 1.25 MG: 1.25 SOLUTION RESPIRATORY (INHALATION) at 02:06

## 2025-06-27 RX ADMIN — TAMSULOSIN HYDROCHLORIDE 0.4 MG: 0.4 CAPSULE ORAL at 09:06

## 2025-06-27 RX ADMIN — DOXYCYCLINE 100 MG: 100 CAPSULE ORAL at 08:06

## 2025-06-27 RX ADMIN — CEFEPIME 1 G: 1 INJECTION, POWDER, FOR SOLUTION INTRAMUSCULAR; INTRAVENOUS at 02:06

## 2025-06-27 RX ADMIN — Medication 6 MG: at 09:06

## 2025-06-27 RX ADMIN — FOLIC ACID 2000 MCG: 1 TABLET ORAL at 09:06

## 2025-06-27 RX ADMIN — CEFEPIME 1 G: 1 INJECTION, POWDER, FOR SOLUTION INTRAMUSCULAR; INTRAVENOUS at 01:06

## 2025-06-27 RX ADMIN — AMIODARONE HYDROCHLORIDE 400 MG: 200 TABLET ORAL at 09:06

## 2025-06-27 NOTE — PROGRESS NOTES
Brief cardiology note:     Patient remains in normal sinus rhythm on telemetry, few beats of SVT nonsustaining   Continue amiodarone taper as ordered  Anticoagulation on held given low platelets  Monitor electrolytes daily and replace for goal K+>/= 4, Mag >/= 2  No medication changes recommended  Cardiology will follow up peripherally           Ang Lacy [facility]  Department of Cardiology  06/25/2025           I have personally interviewed and examined the patient, I have reviewed the Nurse Practitioner's history and physical, assessment, and plan.  I have also reviewed and antiplatelet all the pertinent lab and imaging data. I have personally evaluated the patient at bedside and agree with the findings and made appropriate changes as necessary in recommendations.           Jimena Antonio MD  [facility] hospital  Department of Cardiology  06/25/2025

## 2025-06-27 NOTE — PROGRESS NOTES
INPATIENT NEPHROLOGY Progress Note   [facility]    Patient Name: Rick Butler  Date: 06/27/2025    Reason for consultation: TORRES    Chief Complaint:   Chief Complaint   Patient presents with    Shortness of Breath     X 1 day.     Fatigue    Cough     Onset lastnite       History of Present Illness:  65-year-old male with history of myelodysplastic syndrome, sickle cell trait, chronic kidney disease stage 3 f/b Dr. Mas, depression, former smoker, ETOH abuse, lumbar degenerative disc disease. Patient presents to the emergency department with complaint of shortness of breath and increasing fatigue over last 24 hours. Patient states had slightly productive cough as well during this time. Patient decided come to the emergency department for further evaluation. On arrival to ER found with temperature of 103°, heart rate of 132, SBP 90s, with room air sat of 91%. Diagnosed with RLL PNA. Consulted for TORRES.    Notable home meds- norvasc, calcitriol, flomax    Echo:    Left Ventricle: The left ventricle is normal in size. Normal wall thickness. Mild global hypokinesis present. There is low normal systolic function with a visually estimated ejection fraction of 50 - 55%. There is normal diastolic function.    Right Ventricle: The right ventricle is normal in size Systolic function is normal.    Left Atrium: The left atrium is moderately dilated    Mitral Valve: There is moderate regurgitation.    Tricuspid Valve: There is mild to moderate regurgitation. There is pulmonary hypertension. The estimated PA systolic pressure is at least 36 mmHg.    Interval History:  6/19- afebrile, SBP 100s, on 4L NC, UOP 1.2L, transfused 1u of blood yest, on levophed and NS IVFs  6/20- afebrile, elevated HR, SBP 120s, on 3-4L NC, coughing, UOP 1.6L, getting blood, off levophed, on amio gtt (went in to AF with RVR overnight), on NS IVFs, echo with TR/pulm HTN  6/21- afebrile, HR improved, BP stable, on 4L NC, UOP 1.8L-  transfused yest, on amio gtt  6/22- VSS, on 3L NC, UOP 2L + voids, renal imaging unrevealing  6/23  1L UOP recorded. VSS, renal function improving.  Productive cough, no fevers.  6/24  650 ml UOP recorded.  VSS, BUN/Scr w/small bump.  Currently in bathroom.  6/25  one shift UOP recorded, 500 ml.  VSS, renal function about the same.  No SOB at rest today, still SOB w/exertion, still w/productive cough.  Feeling much better.  6/26  1.5L UOP.  VSS.  Not much change in renal function.  Hgb dropping, heme-onc following.  Feeling better, still w/productive cough.  SOB improving.  6/27 VSS. OK to dc when ready.    Plan of Care:    Septic shock due to HCAP in an IC patient  TORRES/CKD IV  Elevated BNP and troponin- EF 50-55%, moderate MR, moderate TR, pulm HTN  AF with RVR  Metabolic acidosis  Secondary HPT  MDS on chemotherapy  BPH    Plan:    - on treatment for HCAP- blood cx and RVP negative- remains off pressors- dose meds for CrCl 20  - TORRES is due to septic shock- SCr is improving- nonoliguric- he has no acute RRT needs- nonoliguric- no nsaids or IV contrast  - trop improving- echo noted- monitor for s/s volume overload  - rate controlled- switched to PO amio, on lovenox   - acidosis is mild- component of dilution- hold off sod bicarb for now to minimize salt load- reassess tomorrow  - continue calcitriol  - H/H and platelets fluctuating- transfusion goals per heme/onc  - continue flomax    Thank you for allowing us to participate in this patient's care. We will continue to follow.    Vital Signs:  Temp Readings from Last 3 Encounters:   06/27/25 98.4 °F (36.9 °C) (Oral)   06/05/25 100 °F (37.8 °C) (Temporal)   06/05/25 99 °F (37.2 °C)       Pulse Readings from Last 3 Encounters:   06/27/25 83   06/05/25 72   06/05/25 70       BP Readings from Last 3 Encounters:   06/27/25 136/75   06/05/25 (!) 160/89   06/05/25 (!) 170/90       Weight:  Wt Readings from Last 3 Encounters:   06/18/25 93.7 kg (206 lb 9.1 oz)   06/05/25  96.2 kg (212 lb)   06/05/25 96.6 kg (213 lb)       INS/OUTS:  I/O last 3 completed shifts:  In: 1267.4 [P.O.:560; Blood:707.4]  Out: 2300 [Urine:2300]  No intake/output data recorded.    Medications:  Scheduled Meds:   amiodarone  400 mg Oral BID    Followed by    [START ON 7/1/2025] amiodarone  400 mg Oral Daily    amLODIPine  10 mg Oral Daily    calcitRIOL  0.25 mcg Oral Q7 Days    ceFEPime IV (PEDS and ADULTS)  1 g Intravenous Q12H    cetirizine  5 mg Oral Daily    doxycycline  100 mg Oral Q12H    fluticasone propionate  2 spray Each Nostril Daily    folic acid  2,000 mcg Oral Daily    levalbuterol  1.25 mg Nebulization Q6H WAKE    morphine  2 mg Intravenous Once    pantoprazole  40 mg Oral Daily    tamsulosin  0.4 mg Oral Daily    traZODone  100 mg Oral QHS     Continuous Infusions:      PRN Meds:.  Current Facility-Administered Medications:     0.9%  NaCl infusion (for blood administration), , Intravenous, Q24H PRN    0.9%  NaCl infusion (for blood administration), , Intravenous, Q24H PRN    0.9%  NaCl infusion (for blood administration), , Intravenous, Q24H PRN    0.9%  NaCl infusion (for blood administration), , Intravenous, Q24H PRN    0.9%  NaCl infusion (for blood administration), , Intravenous, Q24H PRN    acetaminophen, 650 mg, Oral, Q8H PRN    acetaminophen, 650 mg, Oral, Q4H PRN    aluminum-magnesium hydroxide-simethicone, 30 mL, Oral, QID PRN    benzonatate, 200 mg, Oral, TID PRN    dextromethorphan-guaiFENesin  mg, 1 tablet, Oral, BID PRN    furosemide (LASIX) injection, 20 mg, Intravenous, PRN    HYDROcodone-acetaminophen, 1 tablet, Oral, Q6H PRN    magnesium oxide, 800 mg, Oral, PRN    magnesium oxide, 800 mg, Oral, PRN    melatonin, 6 mg, Oral, Nightly PRN    naloxone, 0.02 mg, Intravenous, PRN    potassium bicarbonate, 35 mEq, Oral, PRN    potassium bicarbonate, 50 mEq, Oral, PRN    potassium bicarbonate, 60 mEq, Oral, PRN    potassium, sodium phosphates, 2 packet, Oral, PRN    potassium,  "sodium phosphates, 2 packet, Oral, PRN    potassium, sodium phosphates, 2 packet, Oral, PRN  Medications Ordered Prior to Encounter[1]    Review of Systems:  Neg    Physical Exam:  /75   Pulse 83   Temp 98.4 °F (36.9 °C) (Oral)   Resp 16   Ht 5' 9" (1.753 m)   Wt 93.7 kg (206 lb 9.1 oz)   SpO2 97%   BMI 30.51 kg/m²     General Appearance:    NAD, AAO x 3, cooperative, appears stated age   Head:    Normocephalic, atraumatic   Eyes:    PER, EOMI, and conjunctiva/sclera clear bilaterally        Mouth:   Moist mucus membranes, no thrush or oral lesions, normal      dentition   Back:     No CVA tenderness   Lungs:     Clear to auscultation bilaterally, no wheeze, crackles, rales      or rhonchi, symmetric air movement, respirations unlabored   Heart:    Regular rate and rhythm, S1 and S2 normal, no murmur, rub   or gallop   Abdomen:     Soft, non-tender, non-distended, bowel sounds active all four   quadrants, no RT or guarding, no masses, no organomegaly   Extremities:   Warm and well perfused, distal pulses intact, no cyanosis or    peripheral edema   MSK:   No joint or muscle swelling, tenderness or deformity   Skin:   Skin color, texture, turgor normal, no rashes or lesions   Neurologic/Psychiatric:   CNII-XII intact, normal strength and sensation       throughout, no asterixis; normal affect, memory, judgement     and insight     Results:  Recent Labs   Lab 06/25/25  0448 06/26/25  0615 06/27/25  0546    140 139   K 4.2 4.2 4.3    111* 110   CO2 22* 22* 23   BUN 45* 47* 45*   CREATININE 3.3* 3.2* 3.1*   * 103 93       Recent Labs   Lab 06/25/25  0448 06/26/25  0615 06/27/25  0546   CALCIUM 8.6* 8.6* 9.0   ALBUMIN 3.2* 3.3* 3.4*   MG 1.7 1.7 1.6       Recent Labs   Lab 07/10/24  1307 10/23/24  0738 02/03/25  0754   PTH, Intact 202.3 H 256.2 H 120.7 H       No results for input(s): "POCTGLUCOSE" in the last 168 hours.    Recent Labs   Lab 03/22/23  0756 04/03/24  0738 06/21/25  0302 "   Hemoglobin A1C 5.4 5.6  --    Hemoglobin A1c  --   --  5.8       Recent Labs   Lab 06/26/25  0615 06/26/25  1846 06/27/25  0546   WBC 5.76 6.24 5.30   HGB 7.3* 9.0* 8.3*   HCT 23.0* 27.6* 24.8*   PLT 27* 31* 27*   MCV 86 85 83   MCHC 31.7* 32.6 33.5       Recent Labs   Lab 06/25/25  0448 06/26/25  0615 06/27/25  0546   BILITOT 0.5 0.5 0.6   PROT 6.6 6.7 6.9   ALBUMIN 3.2* 3.3* 3.4*   ALKPHOS 51* 49* 46*   ALT 25 25 25   AST 21 20 17       Recent Labs   Lab 03/22/23  0752 05/31/23  0735 10/04/23  0805 11/29/23  0800 07/10/24  0738 10/23/24  0735 02/03/25  0751 04/30/25  0732 06/18/25  1113   Color, UA Yellow  --  Yellow  --   --   --   --   --   --    Appearance, UA Clear  --  Clear  --   --   --   --  Clear Clear   pH, UA 7.0  --  7.0  --   --   --   --   --   --    Spec Grav UA  --   --   --   --   --   --   --  1.010 1.010   Specific Gravity, UA 1.010  --  1.015  --   --   --   --   --   --    Protein, UA 1+ A  --  1+ A  --   --   --   --  Trace A 1+ A   Glucose, UA Negative  --  Negative  --   --   --   --   --   --    Ketones, UA Negative  --  Negative  --   --   --   --   --   --    Urobilinogen, UA Negative  --  Negative  --   --   --   --  Negative Negative   Bilirubin (UA) Negative  --  Negative  --   --   --   --   --   --    Bilirubin, UA  --   --   --   --   --   --   --  Negative Negative   Occult Blood UA Negative  --  Negative  --   --   --   --   --   --    Nitrite, UA Negative  --  Negative  --   --   --   --   --   --    RBC, UA 0 0 1 0 0 0  --   --  <1   WBC, UA 0 0 0 0 0 0 0  --  <1   Bacteria Negative Negative Negative Negative  --   --   --   --   --    Hyaline Casts, UA 0.00 A 0.00 A 1 1  --   --   --   --   --        Recent Labs   Lab 06/18/25  1440   Sample VENOUS       Microbiology Results (last 7 days)       Procedure Component Value Units Date/Time    Influenza A & B by Molecular [[id number]]  (Normal) Collected: 06/24/25 0814    Order Status: Completed Specimen: Nasal Swab Updated:  06/24/25 0956     INFLUENZA A MOLECULAR Negative     INFLUENZA B MOLECULAR  Negative    Blood culture x two cultures. Draw prior to antibiotics. [[id number]]  (Normal) Collected: 06/18/25 0930    Order Status: Completed Specimen: Blood Updated: 06/23/25 1001     CULTURE, BLOOD ([facility]) No Growth After 5 Days    Blood culture x two cultures. Draw prior to antibiotics. [[id number]]  (Normal) Collected: 06/18/25 0932    Order Status: Completed Specimen: Blood Updated: 06/23/25 1001     CULTURE, BLOOD ([facility]) No Growth After 5 Days    Culture, Respiratory with Gram Stain [[id number]] Collected: 06/19/25 0950    Order Status: Completed Specimen: Respiratory Updated: 06/21/25 0701     Respiratory Culture Normal respiratory federico     GRAM STAIN (RESPIRATORY) >10 Epithelial Cells/LPF      Few WBC seen      Rare Gram Positive Rods     Comment: Corrected result: Previously reported as Rare Branching Gram positive rods on 6/19/2025 at 1225 CDT.         Rare Gram positive cocci          Jose Berger MD    [facility] [address] [address]  [phone] (p)  [phone] (f)         [1]   No current facility-administered medications on file prior to encounter.     Current Outpatient Medications on File Prior to Encounter   Medication Sig Dispense Refill    amLODIPine (NORVASC) 5 MG tablet Take 1 tablet (5 mg total) by mouth once daily. 90 tablet 3    ascorbic acid, vitamin C, (VITAMIN C) 500 MG tablet Take 500 mg by mouth once daily.      atorvastatin (LIPITOR) 10 MG tablet Take 1 tablet (10 mg total) by mouth every evening. 90 tablet 3    calcitRIOL (ROCALTROL) 0.25 MCG Cap Take 0.25 mcg by mouth every 7 days.      cyproheptadine (PERIACTIN) 4 mg tablet Take 1 tablet (4 mg total) by mouth 3 (three) times daily. 90 tablet 3    droNABinol (MARINOL) 10 MG capsule Take 1 capsule (10 mg total) by mouth 2 (two) times daily before meals. 60 capsule 1    famotidine (PEPCID) 20 MG tablet Take 20 mg by  mouth 2 (two) times daily.      fluticasone propionate (FLONASE) 50 mcg/actuation nasal spray 1 SPRAY (50 MCG TOTAL) BY EACH NOSTRIL ROUTE 2 (TWO) TIMES A DAY. 1 SPRAY EVERY MORNING AND AFTERNOON TOWARD THE EAR EACH SIDE AFTER A SINUS RINSE 48 mL 1    folic acid (FOLVITE) 1 MG tablet TAKE 2 TABLETS BY MOUTH EVERY DAY (Patient taking differently: Take 2,000 mcg by mouth once daily.) 180 tablet 0    multivitamin with minerals Cap Take 1 capsule by mouth every morning.      pantoprazole (PROTONIX) 40 MG tablet Take 40 mg by mouth once daily.      tamsulosin (FLOMAX) 0.4 mg Cap TAKE 2 CAPSULES BY MOUTH EVERY DAY (Patient taking differently: Take 2 capsules by mouth once daily.) 180 capsule 1    traZODone (DESYREL) 100 MG tablet TAKE 1 TABLET BY MOUTH NIGHTLY AS NEEDED FOR INSOMNIA. 90 tablet 2    LIDOcaine (LIDODERM) 5 % Place 1 patch onto the skin once daily. Remove & Discard patch within 12 hours or as directed by MD 14 patch 0    sildenafil (VIAGRA) 100 MG tablet Take 1 tablet (100 mg total) by mouth daily as needed for Erectile Dysfunction. 10 tablet 6    sod chlor-bicarb-squeez bottle (NEILMED SINUS RINSE COMPLETE) pkdv 1 application  by sinus irrigation route 2 (two) times a day. Not right before bed 1 each 11    [DISCONTINUED] fluoxetine 20 MG tablet TAKE 1 TABLET BY MOUTH EVERY DAY 30 tablet 5

## 2025-06-27 NOTE — PLAN OF CARE
Patient accepted by [facility] services via [employer]. SOC date 07/01/25.

## 2025-06-27 NOTE — PROGRESS NOTES
"[facility]  Hematology/Oncology  Inpatient Progress Note          Patient Name: Rick Butler  MRN: [id number]  Admission Date: 6/18/2025  Hospital Length of Stay: 9 days  Code Status: Full Code   Attending Provider: Buster Enamorado MD  Consulting Provider: Jose Tello MD  Primary Care Physician: Reynaldo Basilio, LUCINAP-C  Principal Problem:Severe sepsis      Subjective:       Patient ID: Rick Butler is a 65 y.o. male.    Chief Complaint: Shortness of Breath (X 1 day. ), Fatigue, and Cough (Onset lastnite)        History Present Illness:     Patient sitting up in chair; he appears to be feeling and doing better overall; working on his pulm/breath pressure exercises; no CP, HA's or N/V; discussed with Dr Enamorado today via Epic      Review of Systems:  GEN: general aches and pains, malaise, fatigue, weakness;   HEENT: normal with no HA's, sore throat, stiff neck, changes in vision  CV: normal with no CP, SOB, PND, MORA or orthopnea  PULM: normal with no SOB,  hemoptysis, sputum or pleuritic pain;    GI: normal with no abdominal pain, nausea, vomiting, constipation, diarrhea, melanotic stools, BRBPR, or hematemesis  : normal with no hematuria, dysuria  BREAST: normal with no mass, discharge, pain  SKIN: normal with no rash, erythema, bruising, or swelling      Objective:     Vitals:  Blood pressure 136/75, pulse 83, temperature 98.4 °F (36.9 °C), temperature source Oral, resp. rate 16, height 5' 9" (1.753 m), weight 93.7 kg (206 lb 9.1 oz), SpO2 97%.    Physical Exam:  GEN: no apparent distress, comfortable; AAOx3  HEAD: atraumatic and normocephalic  EYES: no pallor, no icterus, PERRLA  ENT: OMM, no pharyngeal erythema, external ears WNL; no nasal discharge; no thrush  NECK: no masses, thyroid normal, trachea midline, no LAD/LN's, supple  CV: RRR with no murmur; normal pulse; normal S1 and S2; no pedal edema  CHEST: Normal respiratory effort; CTAB; improved breath sounds   no " wheeze or crackles  ABDOM: nontender and nondistended; soft; normal bowel sounds; no rebound/guarding  MUSC/Skeletal: ROM normal; no crepitus; joints normal; no deformities or arthropathy  EXTREM: no clubbing, cyanosis, inflammation or swelling; IV's bilateral arms  SKIN: no rashes, lesions, ulcers, petechiae or subcutaneous nodules; tattoos   : no jiang  NEURO: grossly intact; motor/sensory WNL; AAOx3; no tremors  PSYCH: normal mood, affect and behavior  LYMPH: normal cervical, supraclavicular, axillary and groin LN's          Lab Review:        Lab Results   Component Value Date    WBC 5.30 06/27/2025    HGB 8.3 (L) 06/27/2025    HCT 24.8 (L) 06/27/2025    MCV 83 06/27/2025    PLT 27 (LL) 06/27/2025     CMP  Sodium   Date Value Ref Range Status   06/27/2025 139 136 - 145 mmol/L Final   06/26/2019 138 134 - 144 mmol/L      Potassium   Date Value Ref Range Status   06/27/2025 4.3 3.5 - 5.1 mmol/L Final     Chloride   Date Value Ref Range Status   06/27/2025 110 95 - 110 mmol/L Final   06/26/2019 105 98 - 110 mmol/L      CO2   Date Value Ref Range Status   06/27/2025 23 23 - 29 mmol/L Final     Glucose   Date Value Ref Range Status   06/27/2025 93 70 - 110 mg/dL Final   06/26/2019 114 (H) 70 - 99 mg/dL      BUN   Date Value Ref Range Status   06/27/2025 45 (H) 8 - 23 mg/dL Final     Creatinine   Date Value Ref Range Status   06/27/2025 3.1 (H) 0.5 - 1.4 mg/dL Final   06/26/2019 1.62 (H) 0.60 - 1.40 mg/dL      Calcium   Date Value Ref Range Status   06/27/2025 9.0 8.7 - 10.5 mg/dL Final     Protein Total   Date Value Ref Range Status   06/27/2025 6.9 6.0 - 8.4 gm/dL Final     Albumin   Date Value Ref Range Status   06/27/2025 3.4 (L) 3.5 - 5.2 g/dL Final   06/26/2019 4.4 3.1 - 4.7 g/dL      Bilirubin Total   Date Value Ref Range Status   06/27/2025 0.6 0.1 - 1.0 mg/dL Final     Comment:     For infants and newborns, interpretation of results should be based   on gestational age, weight and in agreement with clinical    observations.    Premature Infant recommended reference ranges:   0-24 hours:  <8.0 mg/dL   24-48 hours: <12.0 mg/dL   3-5 days:    <15.0 mg/dL   6-29 days:   <15.0 mg/dL     ALP   Date Value Ref Range Status   06/27/2025 46 (L) 55 - 135 unit/L Final     AST   Date Value Ref Range Status   06/27/2025 17 10 - 40 unit/L Final     ALT   Date Value Ref Range Status   06/27/2025 25 10 - 44 unit/L Final     Anion Gap   Date Value Ref Range Status   06/27/2025 6 (L) 8 - 16 mmol/L Final     eGFR   Date Value Ref Range Status   06/27/2025 21 (L) >60 mL/min/1.73/m2 Final   03/12/2025 21.6 (A) >60 mL/min/1.73 m^2 Final           Radiology Diagnostic Studies:     X-Ray Chest AP Portable [[id number]] Resulted: 06/18/25 0901   Order Status: Completed Updated: 06/18/25 0903   Narrative:     EXAMINATION:  XR CHEST AP PORTABLE    CLINICAL HISTORY:  Generalized weakness;    FINDINGS:  Portable chest at 08:49 is compared to 12/20/2021 shows normal cardiomediastinal silhouette.    There is an alveolar consolidation in the mid and lower lung compatible with pneumonia.  The left lung is clear.  There are no pleural effusions.  Right pulmonary vasculature is normal. No acute osseous abnormality.   Impression:       Alveolar consolidations in the right mid and lower lung compatible with pneumonia         Assessment:     IMPRESSION:    (1) 65 y.o. male known to my oncology service with diagnosis of MDS/RARS and sickle cell trait. He is treated in conjunction with Dr Marcelino Hilario out of [facility] in [place], whom he recently saw for follow-up visit on 6/4/2025. He has been on chemotherapy with the drug Rytelo for the past couple of months. He requires periodic blood and platelet transfusions. He presented to ER with cough, fever and progressive weakness/fatigue. Troponin was elevated and CXR showing RML/RLL consolidations consistent with a pneumonia.     6/18/2025;  - WBc 1.02, Hgb 7.3 and plats 62,000  -  I discussed with Dr Brown and   Dean both in person. I discussed with his ER nurse in person.   - Plans are to transfuse a unit of blood, IV antibiotics; cultures drawn  - cardiology/pulm consults, ID consult and nephrology consult.      6/19/2025:  - patient currently in ICU  - he has been seen by pulmonary/critical care and ID, cardiology and nephrology  - wbc at 2.38, Hgb 6.7 and plats at 47,000  - cardiology and nephrology consulted    6/20/2025:  - patient remains in the ICU  - WBC 5.73, Hgb 7.1 and plats 40,000  - s/p unit blood yesterday and another one today  -  he has been getting up with PT; breathing better; feeling a little better;   -  discussed with his ICU nurse in person;  -  I communicated with Bambi NP with cardiology and ok to start heparin/lovenox for the atrial fib as long as platelet count does not come back down     6/23/2025:  -  Patient was seen by my associate Dr Landry Pereira over the weekend; he is now out of ICU;   -  WBC at 5.31, hgb 8.6 and plats 27,000  -  feeling better overall; still not much of an appetite; breathing better; still on O2 per NC; continued on IV antibiotics;   - discussed with floor staff; communicated with primary and ID via epic about my concerns with using Zosyn   - Dr Werner with pulmonary planning bronch for tomorrow    6/24/2025:  - discussed with Dr Siegel  - patient to get one single donor platelet product today  - heparin products on hold  - re-iterated my concerns about using Zosyn  - pulmonary has decided to hold off on bronch for now    6/25/2025:  - wbc 5.4, hgb 7.7, plats 27,000  - he is feeling much better; sitting up in chair; off O2; may be we can give him a unit of blood tomorrow pending the am labs       6/26/2025:  - discussed with Dr Siegel this am  - getting a unit of blood today  - WBC 5.76, Hgb 7.3 and plats 27,000    6/27/2025:  - hgb 8.3 and plats 27,000  - s/p unit of blood yesterday   - communicated with Dr Enamorado     (2) Acute exacerbation of CKD - followed by   Tg as outpatient     (3) Chronic back pains - seen by Dr Heredia in past      (4) Hx/of H pylori gastritis s/p prior colonoscopy with Dr Collins in past     (5) Hx/of alcohol uses in past (sober for several years now); former smoker            1. Pneumonia due to infectious organism, unspecified laterality, unspecified part of lung    2. Hypoxia    3. Myelodysplastic syndrome    4. Severe sepsis    5. Chest pain    6. Elevated troponin    7. A-fib    8. Cardiac rhythm disorder or disturbance or change    9. Multifocal pneumonia    10. MDS (myelodysplastic syndrome)    11. Thrombocytopenia    12. Sickle cell trait syndrome    13. Stage 3 chronic kidney disease, unspecified whether stage 3a or 3b CKD           Plan:     PLAN:          1. Monitor labs and transfuse as needed   2. Infection workup, IV antibiotics etc as per ID   3. Respiratory and critical care as per pulmonary directives - pulm has decided to hold off on bronch for now  4. appreciate cardiology consult and assistance   5. Appreciate nephrology consult for the acute on top of Chronic renal insufficiency   6. IVF's, electrolyte and pain management as per primary team  7. Will follow with you                Jose Tello MD  Hematology/Oncology  [facility]

## 2025-06-27 NOTE — PT/OT/SLP PROGRESS
Physical Therapy Treatment    Patient Name:  Rick Butler   MRN:  [id number]    Recommendations:     Discharge Recommendations: Low Intensity Therapy  Discharge Equipment Recommendations:  (TBD)  Barriers to discharge: decreased activity tolerance    Assessment:     Rick Butler is a 65 y.o. male admitted with a medical diagnosis of Severe sepsis.  He presents with the following impairments/functional limitations: weakness, impaired endurance, impaired self care skills, impaired functional mobility, gait instability, impaired balance, decreased upper extremity function, decreased lower extremity function, impaired cardiopulmonary response to activity.    Pt agreeable to visit. Pt performed bed mobility with stand by assist.    Pt performed sit to stand transfer with stand by assist. Pt ambulated 75' x 4 with RW and stand by assist. Pt with three standing rest break due to fatigue.    Pt returned to bed at end of session.    Rehab Prognosis: Fair; patient would benefit from acute skilled PT services to address these deficits and reach maximum level of function.    Recent Surgery: Procedure(s) (LRB):  Bronchoscopy (N/A)      Plan:     During this hospitalization, patient to be seen 5 x/week to address the identified rehab impairments via gait training, therapeutic activities, therapeutic exercises and progress toward the following goals:    Plan of Care Expires:  07/20/25    Subjective     Chief Complaint: fatigue  Patient/Family Comments/goals: to get better  Pain/Comfort:  Pain Rating 1: 0/10      Objective:     Communicated with nurse prior to session.  Patient found HOB elevated with telemetry upon PT entry to room.     General Precautions: Standard, fall, respiratory  Orthopedic Precautions: N/A  Braces: N/A  Respiratory Status: Room air     Functional Mobility:  Bed Mobility:     Supine to Sit: stand by assistance  Sit to Supine: stand by assistance  Transfers:     Sit to Stand:  stand by assistance with rolling  walker  Gait: 75' x 4 RW and SBA, three standing rest breaks      AM-PAC 6 CLICK MOBILITY          Treatment & Education:  Pt educated on importance of time OOB, importance of intermittent mobility, safe techniques for transfers/ambulation, discharge recommendations/options, and use of call light for assistance and fall prevention.      Patient left HOB elevated with all lines intact, call button in reach, bed alarm on, and spouse present..    GOALS:   Multidisciplinary Problems       Physical Therapy Goals          Problem: Physical Therapy    Goal Priority Disciplines Outcome Interventions   Physical Therapy Goal     PT, PT/OT Progressing    Description: Goals to be met by: 2025     Patient will increase functional independence with mobility by performin. Supine to sit with Modified [place]  2. Sit to supine with Modified [place]  3. Sit to stand transfer with Modified [place]  4. Gait  x 300  feet with Modified [place] using Rolling Walker.                            Time Tracking:     PT Received On: 25  PT Start Time: 1023     PT Stop Time: 1033  PT Total Time (min): 10 min     Billable Minutes: Gait Training 10    Treatment Type: Treatment  PT/PTA: PTA     Number of PTA visits since last PT visit: 3     2025

## 2025-06-27 NOTE — PROGRESS NOTES
"[facility]  Adult Nutrition   Progress Note (Initial Assessment)     Admit Date: 6/18/2025   Total duration of encounter: 9 days     SUMMARY     Recommendations  Recommendation/Intervention: 1. Continue Heart Healthy diet as tolerated. 2. Changed Boost + to Boost Breeze per patient choice. 3.  to continue to meet with pt daily for menu choices.  Nutrition Goal Status: new    Nutrition Goals:  PO intake will meet >75% of estimated needs by RD follow up. and Oral supplement consumption of >75% by RD follow up.    Nutrition Interventions: Fat modified diet, Cholesterol modified diet, Fiber modified diet, Fluid modified diet, and Sodium modified diet      Nutrition Diagnosis   Nutrition PES Problem: No nutrition diagnosis at this time  Nutrition PES Status: New      Dietitian Rounds Brief  RD screen for LOS. Patient with good intake of meals. Pt states he is tired of Boost + Vanilla. Pt agrees to try Boost Breeze.Last BM 6/25/25. Patient appears nourished. RD to follow for intake and status change PRN.      Malnutrition Assessment  No evidence of malnutrition at this time.                   Diet order:   Diet Heart Healthy Standard Tray  Dietary nutrition supplements By Mouth; All Meals; Boost Breeze - Any flavor   Oral Nutrition Supplement: Change Boost + to Boost Breeze.             Evaluation of Received Nutrient/Fluid Intake  Energy Calories Required: meeting needs  Protein Required: meeting needs  Fluid Required: meeting needs  Tolerance: tolerating     % Intake of Estimated Energy Needs: 75 - 100 %  % Meal Intake: 75 - 100 %      Intake/Output Summary (Last 24 hours) at 6/27/2025 1435  Last data filed at 6/27/2025 0800  Gross per 24 hour   Intake 1067.42 ml   Output 1250 ml   Net -182.58 ml        Anthropometrics  Height: 5' 9" (175.3 cm)  Height (inches): 69 in  Height Method: Stated  Weight: 93.7 kg (206 lb 9.1 oz)  Weight (lb): 206.57 lb  Weight Method: Bed Scale  Ideal Body Weight (IBW), " Male: 160 lb  % Ideal Body Weight, Male (lb): 129.11 %  BMI (Calculated): 30.5  BMI Grade: 30 - 34.9- obesity - grade I       Estimated/Assessed Needs  Weight Used For Calorie Calculations: 93.7 kg (206 lb 9.1 oz)  Energy Calorie Requirements (kcal): 8349-6667 kcal/day (20-25 kcal/kg).  Energy Need Method: Kcal/kg  Protein Requirements: 58-87 gm/day (0./-1.2 gm/kg)  Weight Used For Protein Calculations: 73 kg (160 lb 15 oz) (IBW)     Estimated Fluid Requirement Method: RDA Method  RDA Method (mL): 1874       Reason for Assessment  Reason For Assessment: length of stay  Diagnosis: infection/sepsis  General Information Comments: Patient with history of myelodysplastic syndrome, sickle cell trait, chronic CKD 3, depression, former smoker, ETOH abuse, lumbar degenerative disc disease. Patient admitted for sepsis with shortness of breath and increasing fatigue over 24 hours prior to admit.    Final diagnoses:  [J18.9] Pneumonia due to infectious organism, unspecified laterality, unspecified part of lung (Primary)  [R09.02] Hypoxia  [D46.9] Myelodysplastic syndrome  [A41.9, R65.20] Severe sepsis     Past Medical History:   Diagnosis Date    Abnormal chest CT (new) 08/17/2022    Anemia due to multiple mechanisms 01/13/2019    Anemia, chronic renal failure, stage 2 (mild) 01/13/2019    Anemia, chronic renal failure, stage 3 (moderate) 08/02/2023    Depression     Former smoker 06/29/2017    H/O ETOH abuse 06/29/2017    Lung mass (new) 08/17/2022    Monocytosis 2019    Myelodysplasia (myelodysplastic syndrome)     Myelodysplasia (myelodysplastic syndrome)     Personal history of colonic polyps 12/29/2023    RARS (refractory anemia with ringed sideroblasts) 06/01/2020    Sickle cell trait         Nutrition/Diet History  Nutrition Intake History: No reported poor intake or appetite prior to admit. MST 0.  Food Preferences:  obtained  Spiritual, Cultural Beliefs, [employer] Practices, Values that Affect Care: no  Food  Allergies: NKFA  Factors Affecting Nutritional Intake: None identified at this time    Nutrition Risk Screen        MST Score: 0  Have you recently lost weight without trying?: No  Weight loss score: 0  Have you been eating poorly because of a decreased appetite?: No  Appetite score: 0       Weight History:  Wt Readings from Last 10 Encounters:   06/18/25 93.7 kg (206 lb 9.1 oz)   06/05/25 96.2 kg (212 lb)   06/05/25 96.6 kg (213 lb)   06/02/25 95.3 kg (210 lb 3.2 oz)   05/08/25 96.2 kg (212 lb)   05/08/25 96.3 kg (212 lb 4.9 oz)   05/05/25 96.3 kg (212 lb 6.4 oz)   05/01/25 96.2 kg (212 lb)   04/28/25 96 kg (211 lb 10.3 oz)   04/09/25 98.4 kg (217 lb)        Lab/Procedures/Meds:   Pertinent Labs Reviewed: reviewed  Pertinent Medications Reviewed: reviewed  Medications:Pertinent Medications Reviewed  Scheduled Meds:   amiodarone  400 mg Oral BID    Followed by    [START ON 7/1/2025] amiodarone  400 mg Oral Daily    amLODIPine  10 mg Oral Daily    calcitRIOL  0.25 mcg Oral Q7 Days    ceFEPime IV (PEDS and ADULTS)  1 g Intravenous Q12H    cetirizine  5 mg Oral Daily    doxycycline  100 mg Oral Q12H    fluticasone propionate  2 spray Each Nostril Daily    folic acid  2,000 mcg Oral Daily    levalbuterol  1.25 mg Nebulization Q6H WAKE    morphine  2 mg Intravenous Once    pantoprazole  40 mg Oral Daily    tamsulosin  0.4 mg Oral Daily    traZODone  100 mg Oral QHS     Continuous Infusions:  PRN Meds:.  Current Facility-Administered Medications:     0.9%  NaCl infusion (for blood administration), , Intravenous, Q24H PRN    0.9%  NaCl infusion (for blood administration), , Intravenous, Q24H PRN    0.9%  NaCl infusion (for blood administration), , Intravenous, Q24H PRN    0.9%  NaCl infusion (for blood administration), , Intravenous, Q24H PRN    0.9%  NaCl infusion (for blood administration), , Intravenous, Q24H PRN    acetaminophen, 650 mg, Oral, Q8H PRN    acetaminophen, 650 mg, Oral, Q4H PRN    aluminum-magnesium  "hydroxide-simethicone, 30 mL, Oral, QID PRN    benzonatate, 200 mg, Oral, TID PRN    dextromethorphan-guaiFENesin  mg, 1 tablet, Oral, BID PRN    furosemide (LASIX) injection, 20 mg, Intravenous, PRN    HYDROcodone-acetaminophen, 1 tablet, Oral, Q6H PRN    magnesium oxide, 800 mg, Oral, PRN    magnesium oxide, 800 mg, Oral, PRN    melatonin, 6 mg, Oral, Nightly PRN    naloxone, 0.02 mg, Intravenous, PRN    potassium bicarbonate, 35 mEq, Oral, PRN    potassium bicarbonate, 50 mEq, Oral, PRN    potassium bicarbonate, 60 mEq, Oral, PRN    potassium, sodium phosphates, 2 packet, Oral, PRN    potassium, sodium phosphates, 2 packet, Oral, PRN    potassium, sodium phosphates, 2 packet, Oral, PRN    Labs: Pertinent Labs Reviewed  Clinical Chemistry:  Recent Labs   Lab 06/25/25  0448 06/26/25  0615 06/27/25  0546    140 139   K 4.2 4.2 4.3    111* 110   CO2 22* 22* 23   * 103 93   BUN 45* 47* 45*   CREATININE 3.3* 3.2* 3.1*   CALCIUM 8.6* 8.6* 9.0   PROT 6.6 6.7 6.9   ALBUMIN 3.2* 3.3* 3.4*   BILITOT 0.5 0.5 0.6   ALKPHOS 51* 49* 46*   AST 21 20 17   ALT 25 25 25   ANIONGAP 8 7* 6*   MG 1.7 1.7 1.6     CBC:   Recent Labs   Lab 06/27/25  0546   WBC 5.30   RBC 2.99*   HGB 8.3*   HCT 24.8*   PLT 27*   MCV 83   MCH 27.8   MCHC 33.5     Lipid Panel:  No results for input(s): "CHOL", "HDL", "LDLCALC", "TRIG", "CHOLHDL" in the last 168 hours.  Cardiac Profile:  No results for input(s): "BNP", "CPK", "CPKMB", "TROPONINI", "CKTOTAL" in the last 168 hours.  Inflammatory Labs:  Recent Labs   Lab 06/24/25  0503   CRP 18.10*     Diabetes:  Recent Labs   Lab 06/21/25  0302   HGBA1C 5.8     Thyroid & Parathyroid:  Recent Labs   Lab 06/21/25  0302   TSH 3.975       Monitor and Evaluation  Monitor and Evaluation: Food and beverage intake, Diet order     Discharge Planning  Nutrition Discharge Planning: Therapeutic diet (comments), Oral supplement regimen (comments)  Therapeutic diet (comments): Heart Healthy " diet    Nutrition Risk  Level of Risk/Frequency of Follow-up:  (weekly)     Nutrition Follow-Up  RD Follow-up?: Yes

## 2025-06-27 NOTE — NURSING
Patient w/ known hx of MDS. W/ prior days fluctuating between 25-31. TODAY 27 DAY PRIOR 31. Will pass in report for day team to assess and place orders.

## 2025-06-27 NOTE — PLAN OF CARE
[facility] Met with patient's spouse Nelly Butler to review discharge recommendation of Home Health and is agreeable to plan    Patient/family provided list of facilities in-network with patient's payor plan. Providers that are owned, operated, or affiliated with [facility] are included on the list.     Notified that referral sent to below listed facilities from in-network list based on proximity to home/family support:   [facility]  2.  3.  4.  5. (can send more than 5)    Patient/family instructed to identify preference.    Preferred Facility: (if more than 1, listed in order of descending preference)  [facility]  OR  Patient has declined to select a preferred provider and elects placement with the first accepting in network provider that is available to provide services as ordered by the physician       If an additional preferred facility not listed above is identified, additional referral to be sent. If above facilities unable to accept, will send additional referrals to in-network providers.

## 2025-06-27 NOTE — CARE UPDATE
06/27/25 0706   Patient Assessment/Suction   Level of Consciousness (AVPU) alert   Respiratory Effort Unlabored   Expansion/Accessory Muscles/Retractions no use of accessory muscles   All Lung Fields Breath Sounds Anterior:;Lateral:;diminished   Rhythm/Pattern, Respiratory unlabored;pattern regular;depth regular   PRE-TX-O2   Device (Oxygen Therapy) room air   SpO2 98 %   Pulse Oximetry Type Intermittent   $ Pulse Oximetry - Multiple Charge Pulse Oximetry - Multiple   Pulse 78   Resp 18   Positioning   Body Position sitting up in bed   Head of Bed (HOB) Positioning HOB lowered   Aerosol Therapy   $ Aerosol Therapy Charges Aerosol Treatment   Daily Review of Necessity (SVN) completed   Respiratory Treatment Status (SVN) given   Treatment Route (SVN) mask   Patient Position HOB elevated   Post Treatment Assessment (SVN) breath sounds unchanged   Signs of Intolerance (SVN) none   Breath Sounds Post-Respiratory Treatment   Throughout All Fields Post-Treatment All Fields   Throughout All Fields Post-Treatment Anterior:;Posterior:;Lateral:   Post-treatment Heart Rate (beats/min) 90   Post-treatment Resp Rate (breaths/min) 16   Incentive Spirometer   $ Incentive Spirometer Charges done with encouragement   Incentive Spirometer Predicted Level (mL) 1250   Administration (IS) proper technique demonstrated   Number of Repetitions (IS) 10   Level Incentive Spirometer (mL) 2000   Patient Tolerance (IS) good   Vibratory PEP Therapy   $ Vibratory PEP Charges [place] Therapy   $ Vibratory PEP Equipment [place] Equipment   $ Vibratory PEP Tech Time Charges 15 min   Daily Review of Necessity (PEP Therapy) completed   Type (PEP Therapy) vibratory/oscillatory   Device (PEP Therapy) flutter   Route (PEP Therapy) mouthpiece   Breaths per Cycle (PEP Therapy) 10   Cycles (PEP Therapy) 2   Settings (PEP Therapy) PEP 4   Patient Position (PEP Therapy) HOB elevated   Post Treatment Assessment (PEP) breath sounds unchanged   Signs of  Intolerance (PEP Therapy) none   Respiratory Interventions   Cough And Deep Breathing done with encouragement   Education   $ Education Bronchodilator;15 min

## 2025-06-27 NOTE — PLAN OF CARE
Problem: Skin Injury Risk Increased  Goal: Skin Health and Integrity  Outcome: Progressing  Intervention: Promote and Optimize Oral Intake  Flowsheets (Taken 6/27/2025 3881)  Nutrition Interventions: supplemental drinks provided

## 2025-06-28 VITALS
RESPIRATION RATE: 16 BRPM | OXYGEN SATURATION: 98 % | BODY MASS INDEX: 30.59 KG/M2 | HEIGHT: 69 IN | SYSTOLIC BLOOD PRESSURE: 120 MMHG | DIASTOLIC BLOOD PRESSURE: 68 MMHG | WEIGHT: 206.56 LBS | TEMPERATURE: 98 F | HEART RATE: 78 BPM

## 2025-06-28 PROBLEM — R09.02 HYPOXIA: Status: RESOLVED | Noted: 2025-06-23 | Resolved: 2025-06-28

## 2025-06-28 PROBLEM — D70.9 NEUTROPENIC FEVER: Status: RESOLVED | Noted: 2025-06-18 | Resolved: 2025-06-28

## 2025-06-28 PROBLEM — I48.0 PAF (PAROXYSMAL ATRIAL FIBRILLATION): Status: ACTIVE | Noted: 2025-06-28

## 2025-06-28 PROBLEM — R79.89 ELEVATED TROPONIN: Status: RESOLVED | Noted: 2025-06-18 | Resolved: 2025-06-28

## 2025-06-28 PROBLEM — N17.9 AKI (ACUTE KIDNEY INJURY): Status: RESOLVED | Noted: 2025-06-21 | Resolved: 2025-06-28

## 2025-06-28 PROBLEM — I48.91 ATRIAL FIBRILLATION WITH RAPID VENTRICULAR RESPONSE: Status: RESOLVED | Noted: 2025-06-20 | Resolved: 2025-06-28

## 2025-06-28 PROBLEM — R50.81 NEUTROPENIC FEVER: Status: RESOLVED | Noted: 2025-06-18 | Resolved: 2025-06-28

## 2025-06-28 PROBLEM — J18.9 PNEUMONIA DUE TO INFECTIOUS ORGANISM: Status: RESOLVED | Noted: 2025-06-18 | Resolved: 2025-06-28

## 2025-06-28 PROBLEM — A41.9 SEVERE SEPSIS: Status: RESOLVED | Noted: 2025-06-18 | Resolved: 2025-06-28

## 2025-06-28 PROBLEM — R65.20 SEVERE SEPSIS: Status: RESOLVED | Noted: 2025-06-18 | Resolved: 2025-06-28

## 2025-06-28 LAB
ALBUMIN SERPL-MCNC: 3.4 G/DL (ref 3.5–5.2)
ALP SERPL-CCNC: 47 UNIT/L (ref 55–135)
ALT SERPL-CCNC: 25 UNIT/L (ref 10–44)
ANION GAP (SMH): 8 MMOL/L (ref 8–16)
ANISOCYTOSIS BLD QL SMEAR: SLIGHT
AST SERPL-CCNC: 16 UNIT/L (ref 10–40)
BILIRUB SERPL-MCNC: 0.5 MG/DL (ref 0.1–1)
BUN SERPL-MCNC: 49 MG/DL (ref 8–23)
BURR CELLS BLD QL SMEAR: ABNORMAL
CALCIUM SERPL-MCNC: 8.8 MG/DL (ref 8.7–10.5)
CHLORIDE SERPL-SCNC: 110 MMOL/L (ref 95–110)
CO2 SERPL-SCNC: 21 MMOL/L (ref 23–29)
CREAT SERPL-MCNC: 3.2 MG/DL (ref 0.5–1.4)
ERYTHROCYTE [DISTWIDTH] IN BLOOD BY AUTOMATED COUNT: 16.2 % (ref 11.5–14.5)
GFR SERPLBLD CREATININE-BSD FMLA CKD-EPI: 21 ML/MIN/1.73/M2
GLUCOSE SERPL-MCNC: 92 MG/DL (ref 70–110)
HCT VFR BLD AUTO: 25.7 % (ref 40–54)
HGB BLD-MCNC: 8.3 GM/DL (ref 14–18)
HYPOCHROMIA BLD QL SMEAR: ABNORMAL
LYMPHOCYTES NFR BLD MANUAL: 18 % (ref 18–48)
MAGNESIUM SERPL-MCNC: 1.6 MG/DL (ref 1.6–2.6)
MCH RBC QN AUTO: 27.9 PG (ref 27–31)
MCHC RBC AUTO-ENTMCNC: 32.3 G/DL (ref 32–36)
MCV RBC AUTO: 86 FL (ref 82–98)
MONOCYTES NFR BLD MANUAL: 8 % (ref 4–15)
NEUTROPHILS NFR BLD MANUAL: 73 % (ref 38–73)
NEUTS BAND NFR BLD MANUAL: 1 %
NUCLEATED RBC (/100WBC) (SMH): 0 /100 WBC
OVALOCYTES BLD QL SMEAR: ABNORMAL
PLATELET # BLD AUTO: 30 K/UL (ref 150–450)
PLATELET BLD QL SMEAR: ABNORMAL
PMV BLD AUTO: ABNORMAL FL
POIKILOCYTOSIS BLD QL SMEAR: ABNORMAL
POTASSIUM SERPL-SCNC: 4.4 MMOL/L (ref 3.5–5.1)
PROT SERPL-MCNC: 6.9 GM/DL (ref 6–8.4)
RBC # BLD AUTO: 2.98 M/UL (ref 4.6–6.2)
SCHISTOCYTES BLD QL SMEAR: PRESENT
SODIUM SERPL-SCNC: 139 MMOL/L (ref 136–145)
WBC # BLD AUTO: 5.85 K/UL (ref 3.9–12.7)

## 2025-06-28 PROCEDURE — 85025 COMPLETE CBC W/AUTO DIFF WBC: CPT | Performed by: INTERNAL MEDICINE

## 2025-06-28 PROCEDURE — 36415 COLL VENOUS BLD VENIPUNCTURE: CPT | Performed by: INTERNAL MEDICINE

## 2025-06-28 PROCEDURE — 63600175 PHARM REV CODE 636 W HCPCS: Performed by: STUDENT IN AN ORGANIZED HEALTH CARE EDUCATION/TRAINING PROGRAM

## 2025-06-28 PROCEDURE — 80053 COMPREHEN METABOLIC PANEL: CPT | Performed by: INTERNAL MEDICINE

## 2025-06-28 PROCEDURE — 94761 N-INVAS EAR/PLS OXIMETRY MLT: CPT

## 2025-06-28 PROCEDURE — 99900031 HC PATIENT EDUCATION (STAT)

## 2025-06-28 PROCEDURE — 25000003 PHARM REV CODE 250: Performed by: INTERNAL MEDICINE

## 2025-06-28 PROCEDURE — 99900035 HC TECH TIME PER 15 MIN (STAT)

## 2025-06-28 PROCEDURE — 99232 SBSQ HOSP IP/OBS MODERATE 35: CPT | Mod: ,,, | Performed by: INTERNAL MEDICINE

## 2025-06-28 PROCEDURE — 94664 DEMO&/EVAL PT USE INHALER: CPT

## 2025-06-28 PROCEDURE — 25000242 PHARM REV CODE 250 ALT 637 W/ HCPCS: Performed by: STUDENT IN AN ORGANIZED HEALTH CARE EDUCATION/TRAINING PROGRAM

## 2025-06-28 PROCEDURE — 25000003 PHARM REV CODE 250: Performed by: FAMILY MEDICINE

## 2025-06-28 PROCEDURE — 83735 ASSAY OF MAGNESIUM: CPT | Performed by: INTERNAL MEDICINE

## 2025-06-28 PROCEDURE — 94799 UNLISTED PULMONARY SVC/PX: CPT

## 2025-06-28 PROCEDURE — 25000003 PHARM REV CODE 250: Performed by: HOSPITALIST

## 2025-06-28 PROCEDURE — 94640 AIRWAY INHALATION TREATMENT: CPT

## 2025-06-28 RX ORDER — AMIODARONE HYDROCHLORIDE 400 MG/1
TABLET ORAL
Qty: 36 TABLET | Refills: 0 | Status: SHIPPED | OUTPATIENT
Start: 2025-06-28 | End: 2025-07-16 | Stop reason: SDUPTHER

## 2025-06-28 RX ORDER — DOXYCYCLINE 100 MG/1
100 CAPSULE ORAL EVERY 12 HOURS
Qty: 14 CAPSULE | Refills: 0 | Status: SHIPPED | OUTPATIENT
Start: 2025-06-28 | End: 2025-07-05

## 2025-06-28 RX ORDER — CIPROFLOXACIN 750 MG/1
750 TABLET, FILM COATED ORAL DAILY
Qty: 7 TABLET | Refills: 0 | Status: SHIPPED | OUTPATIENT
Start: 2025-06-28 | End: 2025-07-05

## 2025-06-28 RX ORDER — AMLODIPINE BESYLATE 10 MG/1
10 TABLET ORAL DAILY
Qty: 30 TABLET | Refills: 2 | Status: SHIPPED | OUTPATIENT
Start: 2025-06-29

## 2025-06-28 RX ADMIN — PANTOPRAZOLE SODIUM 40 MG: 40 TABLET, DELAYED RELEASE ORAL at 05:06

## 2025-06-28 RX ADMIN — CEFEPIME 1 G: 1 INJECTION, POWDER, FOR SOLUTION INTRAMUSCULAR; INTRAVENOUS at 02:06

## 2025-06-28 RX ADMIN — FLUTICASONE PROPIONATE 100 MCG: 50 SPRAY, METERED NASAL at 08:06

## 2025-06-28 RX ADMIN — AMLODIPINE BESYLATE 10 MG: 5 TABLET ORAL at 08:06

## 2025-06-28 RX ADMIN — AMIODARONE HYDROCHLORIDE 400 MG: 200 TABLET ORAL at 08:06

## 2025-06-28 RX ADMIN — LEVALBUTEROL HYDROCHLORIDE 1.25 MG: 1.25 SOLUTION RESPIRATORY (INHALATION) at 06:06

## 2025-06-28 RX ADMIN — CEFEPIME 1 G: 1 INJECTION, POWDER, FOR SOLUTION INTRAMUSCULAR; INTRAVENOUS at 01:06

## 2025-06-28 RX ADMIN — TAMSULOSIN HYDROCHLORIDE 0.4 MG: 0.4 CAPSULE ORAL at 08:06

## 2025-06-28 RX ADMIN — FOLIC ACID 2000 MCG: 1 TABLET ORAL at 08:06

## 2025-06-28 RX ADMIN — CETIRIZINE HYDROCHLORIDE 5 MG: 5 TABLET ORAL at 08:06

## 2025-06-28 RX ADMIN — DOXYCYCLINE 100 MG: 100 CAPSULE ORAL at 08:06

## 2025-06-28 RX ADMIN — LEVALBUTEROL HYDROCHLORIDE 1.25 MG: 1.25 SOLUTION RESPIRATORY (INHALATION) at 01:06

## 2025-06-28 NOTE — SUBJECTIVE & OBJECTIVE
Interval History:   No longer short of breath.  No chest pain.  No lightheadedness.  No hematuria, melena, bright red blood per rectum      Review of Systems  Objective:     Vital Signs (Most Recent):  Temp: 98.1 °F (36.7 °C) (06/28/25 1606)  Pulse: 78 (06/28/25 1606)  Resp: 16 (06/28/25 1606)  BP: 120/68 (06/28/25 1606)  SpO2: 98 % (06/28/25 1606) Vital Signs (24h Range):  Temp:  [97.8 °F (36.6 °C)-98.4 °F (36.9 °C)] 98.1 °F (36.7 °C)  Pulse:  [75-86] 78  Resp:  [16-19] 16  SpO2:  [92 %-99 %] 98 %  BP: (120-135)/(66-79) 120/68     Weight: 93.7 kg (206 lb 9.1 oz)  Body mass index is 30.51 kg/m².    Intake/Output Summary (Last 24 hours) at 6/28/2025 1706  Last data filed at 6/28/2025 1402  Gross per 24 hour   Intake 750 ml   Output 1200 ml   Net -450 ml         Physical Exam      NAD, A/O 3   RRR   CTAB   Soft nontender nondistended, bowel sounds present   No edema    Significant Labs: All pertinent labs within the past 24 hours have been reviewed.  CBC:   Recent Labs   Lab 06/26/25  1846 06/27/25  0546 06/28/25  0555   WBC 6.24 5.30 5.85   HGB 9.0* 8.3* 8.3*   HCT 27.6* 24.8* 25.7*   PLT 31* 27* 30*     CMP:   Recent Labs   Lab 06/27/25  0546 06/28/25  0555    139   K 4.3 4.4    110   CO2 23 21*   GLU 93 92   BUN 45* 49*   CREATININE 3.1* 3.2*   CALCIUM 9.0 8.8   PROT 6.9 6.9   ALBUMIN 3.4* 3.4*   BILITOT 0.6 0.5   ALKPHOS 46* 47*   AST 17 16   ALT 25 25   ANIONGAP 6* 8     Magnesium:   Recent Labs   Lab 06/27/25  0546 06/28/25  0555   MG 1.6 1.6       Significant Imaging: I have reviewed all pertinent imaging results/findings within the past 24 hours.

## 2025-06-28 NOTE — PLAN OF CARE
06/28/25 1511   Final Note   Assessment Type Final Discharge Note   Anticipated Discharge Disposition Home-Health   What phone number can be called within the next 1-3 days to see how you are doing after discharge? [phone]   Hospital Resources/Appts/Education Provided Post-Acute resouces added to AVS;Appointments scheduled and added to AVS   Post-Acute Status   Discharge Delays None known at this time       ** Patient cleared for DC from CM standpoint**    Patient discharging home with [facility] , discharge orders sent via Epic. Per Dr. Babcock, Patient requesting a rollator. Reached out to Oneil Akhtar for rollator, she stated that patient can't request equipment and PT notes need to reflect recommendations. Ultimately, patient decided to follow up with his PCP for rollator. Patient educated to contact his PCP office on Monday for an appointment as CM unable to schedule appointment due to office closure on weekends. Per patient, he has an appointment with Dr. Tello on Monday. Patients wife will provide transportation home. No further DC needs at this time.

## 2025-06-28 NOTE — PROGRESS NOTES
INPATIENT NEPHROLOGY Progress Note   [facility]    Patient Name: Rick Butler  Date: 06/28/2025    Reason for consultation: TORRES    Chief Complaint:   Chief Complaint   Patient presents with    Shortness of Breath     X 1 day.     Fatigue    Cough     Onset lastnite       History of Present Illness:  65-year-old male with history of myelodysplastic syndrome, sickle cell trait, chronic kidney disease stage 3 f/b Dr. Mas, depression, former smoker, ETOH abuse, lumbar degenerative disc disease. Patient presents to the emergency department with complaint of shortness of breath and increasing fatigue over last 24 hours. Patient states had slightly productive cough as well during this time. Patient decided come to the emergency department for further evaluation. On arrival to ER found with temperature of 103°, heart rate of 132, SBP 90s, with room air sat of 91%. Diagnosed with RLL PNA. Consulted for TORRES.    Notable home meds- norvasc, calcitriol, flomax    Echo:    Left Ventricle: The left ventricle is normal in size. Normal wall thickness. Mild global hypokinesis present. There is low normal systolic function with a visually estimated ejection fraction of 50 - 55%. There is normal diastolic function.    Right Ventricle: The right ventricle is normal in size Systolic function is normal.    Left Atrium: The left atrium is moderately dilated    Mitral Valve: There is moderate regurgitation.    Tricuspid Valve: There is mild to moderate regurgitation. There is pulmonary hypertension. The estimated PA systolic pressure is at least 36 mmHg.    Interval History:  6/19- afebrile, SBP 100s, on 4L NC, UOP 1.2L, transfused 1u of blood yest, on levophed and NS IVFs  6/20- afebrile, elevated HR, SBP 120s, on 3-4L NC, coughing, UOP 1.6L, getting blood, off levophed, on amio gtt (went in to AF with RVR overnight), on NS IVFs, echo with TR/pulm HTN  6/21- afebrile, HR improved, BP stable, on 4L NC, UOP 1.8L-  transfused yest, on amio gtt  6/22- VSS, on 3L NC, UOP 2L + voids, renal imaging unrevealing  6/23  1L UOP recorded. VSS, renal function improving.  Productive cough, no fevers.  6/24  650 ml UOP recorded.  VSS, BUN/Scr w/small bump.  Currently in bathroom.  6/25  one shift UOP recorded, 500 ml.  VSS, renal function about the same.  No SOB at rest today, still SOB w/exertion, still w/productive cough.  Feeling much better.  6/26  1.5L UOP.  VSS.  Not much change in renal function.  Hgb dropping, heme-onc following.  Feeling better, still w/productive cough.  SOB improving.  6/27 VSS. OK to dc when ready.  6/28  VSS, 980 ml UOP recorded.  Lab results reviewed, renal function stable.  SOB w/exertion, cough improving.      Plan of Care:    Septic shock due to HCAP in an IC patient  TORRES/CKD IV  Elevated BNP and troponin- EF 50-55%, moderate MR, moderate TR, pulm HTN  AF with RVR  Metabolic acidosis  Secondary HPT  MDS on chemotherapy  BPH    Plan:    - on treatment for HCAP- blood cx and RVP negative- remains off pressors- dose meds for CrCl 20  - TORRES is due to septic shock- SCr is improving- nonoliguric- he has no acute RRT needs- nonoliguric- no nsaids or IV contrast  - trop improving- echo noted- monitor for s/s volume overload  - rate controlled- switched to PO amio, on lovenox   - acidosis is mild- component of dilution- hold off sod bicarb for now to minimize salt load- reassess tomorrow  - continue calcitriol  - H/H and platelets fluctuating- transfusion goals per heme/onc  - continue flomax    Thank you for allowing us to participate in this patient's care. We will continue to follow.    Vital Signs:  Temp Readings from Last 3 Encounters:   06/28/25 97.8 °F (36.6 °C) (Oral)   06/05/25 100 °F (37.8 °C) (Temporal)   06/05/25 99 °F (37.2 °C)       Pulse Readings from Last 3 Encounters:   06/28/25 80   06/05/25 72   06/05/25 70       BP Readings from Last 3 Encounters:   06/28/25 132/66   06/05/25 (!) 160/89    06/05/25 (!) 170/90       Weight:  Wt Readings from Last 3 Encounters:   06/18/25 93.7 kg (206 lb 9.1 oz)   06/05/25 96.2 kg (212 lb)   06/05/25 96.6 kg (213 lb)       INS/OUTS:  I/O last 3 completed shifts:  In: 600 [P.O.:600]  Out: 1830 [Urine:1830]  I/O this shift:  In: -   Out: 900 [Urine:900]    Medications:  Scheduled Meds:   amiodarone  400 mg Oral BID    Followed by    [START ON 7/1/2025] amiodarone  400 mg Oral Daily    amLODIPine  10 mg Oral Daily    calcitRIOL  0.25 mcg Oral Q7 Days    ceFEPime IV (PEDS and ADULTS)  1 g Intravenous Q12H    cetirizine  5 mg Oral Daily    doxycycline  100 mg Oral Q12H    fluticasone propionate  2 spray Each Nostril Daily    folic acid  2,000 mcg Oral Daily    levalbuterol  1.25 mg Nebulization Q6H WAKE    morphine  2 mg Intravenous Once    pantoprazole  40 mg Oral Daily    tamsulosin  0.4 mg Oral Daily    traZODone  100 mg Oral QHS     Continuous Infusions:      PRN Meds:.  Current Facility-Administered Medications:     0.9%  NaCl infusion (for blood administration), , Intravenous, Q24H PRN    0.9%  NaCl infusion (for blood administration), , Intravenous, Q24H PRN    0.9%  NaCl infusion (for blood administration), , Intravenous, Q24H PRN    0.9%  NaCl infusion (for blood administration), , Intravenous, Q24H PRN    0.9%  NaCl infusion (for blood administration), , Intravenous, Q24H PRN    acetaminophen, 650 mg, Oral, Q8H PRN    acetaminophen, 650 mg, Oral, Q4H PRN    aluminum-magnesium hydroxide-simethicone, 30 mL, Oral, QID PRN    benzonatate, 200 mg, Oral, TID PRN    dextromethorphan-guaiFENesin  mg, 1 tablet, Oral, BID PRN    furosemide (LASIX) injection, 20 mg, Intravenous, PRN    HYDROcodone-acetaminophen, 1 tablet, Oral, Q6H PRN    magnesium oxide, 800 mg, Oral, PRN    magnesium oxide, 800 mg, Oral, PRN    melatonin, 6 mg, Oral, Nightly PRN    naloxone, 0.02 mg, Intravenous, PRN    potassium bicarbonate, 35 mEq, Oral, PRN    potassium bicarbonate, 50 mEq, Oral,  "PRN    potassium bicarbonate, 60 mEq, Oral, PRN    potassium, sodium phosphates, 2 packet, Oral, PRN    potassium, sodium phosphates, 2 packet, Oral, PRN    potassium, sodium phosphates, 2 packet, Oral, PRN  Medications Ordered Prior to Encounter[1]    Review of Systems:  Neg    Physical Exam:  /66   Pulse 80   Temp 97.8 °F (36.6 °C) (Oral)   Resp 18   Ht 5' 9" (1.753 m)   Wt 93.7 kg (206 lb 9.1 oz)   SpO2 (!) 92%   BMI 30.51 kg/m²     General Appearance:    NAD, AAO x 3, cooperative, appears stated age   Head:    Normocephalic, atraumatic   Eyes:    PER, EOMI, and conjunctiva/sclera clear bilaterally        Mouth:   Moist mucus membranes, no thrush or oral lesions, normal      dentition   Back:     No CVA tenderness   Lungs:     Clear to auscultation bilaterally, no wheeze, crackles, rales      or rhonchi, symmetric air movement, respirations unlabored   Heart:    Regular rate and rhythm, S1 and S2 normal, no murmur, rub   or gallop   Abdomen:     Soft, non-tender, non-distended, bowel sounds active all four   quadrants, no RT or guarding, no masses, no organomegaly   Extremities:   Warm and well perfused, distal pulses intact, no cyanosis or    peripheral edema   MSK:   No joint or muscle swelling, tenderness or deformity   Skin:   Skin color, texture, turgor normal, no rashes or lesions   Neurologic/Psychiatric:   CNII-XII intact, normal strength and sensation       throughout, no asterixis; normal affect, memory, judgement     and insight     Results:  Recent Labs   Lab 06/26/25  0615 06/27/25  0546 06/28/25  0555    139 139   K 4.2 4.3 4.4   * 110 110   CO2 22* 23 21*   BUN 47* 45* 49*   CREATININE 3.2* 3.1* 3.2*    93 92       Recent Labs   Lab 06/26/25  0615 06/27/25  0546 06/28/25  0555   CALCIUM 8.6* 9.0 8.8   ALBUMIN 3.3* 3.4* 3.4*   MG 1.7 1.6 1.6       Recent Labs   Lab 07/10/24  1307 10/23/24  0738 02/03/25  0754   PTH, Intact 202.3 H 256.2 H 120.7 H       No results for " "input(s): "POCTGLUCOSE" in the last 168 hours.    Recent Labs   Lab 03/22/23  0756 04/03/24  0738 06/21/25  0302   Hemoglobin A1C 5.4 5.6  --    Hemoglobin A1c  --   --  5.8       Recent Labs   Lab 06/26/25  0615 06/26/25  1846 06/27/25  0546   WBC 5.76 6.24 5.30   HGB 7.3* 9.0* 8.3*   HCT 23.0* 27.6* 24.8*   PLT 27* 31* 27*   MCV 86 85 83   MCHC 31.7* 32.6 33.5       Recent Labs   Lab 06/26/25  0615 06/27/25  0546 06/28/25  0555   BILITOT 0.5 0.6 0.5   PROT 6.7 6.9 6.9   ALBUMIN 3.3* 3.4* 3.4*   ALKPHOS 49* 46* 47*   ALT 25 25 25   AST 20 17 16       Recent Labs   Lab 03/22/23  0752 05/31/23  0735 10/04/23  0805 11/29/23  0800 07/10/24  0738 10/23/24  0735 02/03/25  0751 04/30/25  0732 06/18/25  1113   Color, UA Yellow  --  Yellow  --   --   --   --   --   --    Appearance, UA Clear  --  Clear  --   --   --   --  Clear Clear   pH, UA 7.0  --  7.0  --   --   --   --   --   --    Spec Grav UA  --   --   --   --   --   --   --  1.010 1.010   Specific Gravity, UA 1.010  --  1.015  --   --   --   --   --   --    Protein, UA 1+ A  --  1+ A  --   --   --   --  Trace A 1+ A   Glucose, UA Negative  --  Negative  --   --   --   --   --   --    Ketones, UA Negative  --  Negative  --   --   --   --   --   --    Urobilinogen, UA Negative  --  Negative  --   --   --   --  Negative Negative   Bilirubin (UA) Negative  --  Negative  --   --   --   --   --   --    Bilirubin, UA  --   --   --   --   --   --   --  Negative Negative   Occult Blood UA Negative  --  Negative  --   --   --   --   --   --    Nitrite, UA Negative  --  Negative  --   --   --   --   --   --    RBC, UA 0 0 1 0 0 0  --   --  <1   WBC, UA 0 0 0 0 0 0 0  --  <1   Bacteria Negative Negative Negative Negative  --   --   --   --   --    Hyaline Casts, UA 0.00 A 0.00 A 1 1  --   --   --   --   --        Recent Labs   Lab 06/18/25  1440   Sample VENOUS       Microbiology Results (last 7 days)       Procedure Component Value Units Date/Time    Influenza A & B by " Molecular [[id number]]  (Normal) Collected: 06/24/25 0814    Order Status: Completed Specimen: Nasal Swab Updated: 06/24/25 0956     INFLUENZA A MOLECULAR Negative     INFLUENZA B MOLECULAR  Negative    Blood culture x two cultures. Draw prior to antibiotics. [[id number]]  (Normal) Collected: 06/18/25 0930    Order Status: Completed Specimen: Blood Updated: 06/23/25 1001     CULTURE, BLOOD ([facility]) No Growth After 5 Days    Blood culture x two cultures. Draw prior to antibiotics. [[id number]]  (Normal) Collected: 06/18/25 0932    Order Status: Completed Specimen: Blood Updated: 06/23/25 1001     CULTURE, BLOOD ([facility]) No Growth After 5 Days          Juana Sanchez NP    [facility] [address] [place] [address]  [phone] (p)  [phone] (f)           [1]   No current facility-administered medications on file prior to encounter.     Current Outpatient Medications on File Prior to Encounter   Medication Sig Dispense Refill    amLODIPine (NORVASC) 5 MG tablet Take 1 tablet (5 mg total) by mouth once daily. 90 tablet 3    ascorbic acid, vitamin C, (VITAMIN C) 500 MG tablet Take 500 mg by mouth once daily.      atorvastatin (LIPITOR) 10 MG tablet Take 1 tablet (10 mg total) by mouth every evening. 90 tablet 3    calcitRIOL (ROCALTROL) 0.25 MCG Cap Take 0.25 mcg by mouth every 7 days.      cyproheptadine (PERIACTIN) 4 mg tablet Take 1 tablet (4 mg total) by mouth 3 (three) times daily. 90 tablet 3    droNABinol (MARINOL) 10 MG capsule Take 1 capsule (10 mg total) by mouth 2 (two) times daily before meals. 60 capsule 1    famotidine (PEPCID) 20 MG tablet Take 20 mg by mouth 2 (two) times daily.      fluticasone propionate (FLONASE) 50 mcg/actuation nasal spray 1 SPRAY (50 MCG TOTAL) BY EACH NOSTRIL ROUTE 2 (TWO) TIMES A DAY. 1 SPRAY EVERY MORNING AND AFTERNOON TOWARD THE EAR EACH SIDE AFTER A SINUS RINSE 48 mL 1    folic acid (FOLVITE) 1 MG tablet TAKE 2 TABLETS BY MOUTH EVERY DAY (Patient  taking differently: Take 2,000 mcg by mouth once daily.) 180 tablet 0    multivitamin with minerals Cap Take 1 capsule by mouth every morning.      pantoprazole (PROTONIX) 40 MG tablet Take 40 mg by mouth once daily.      tamsulosin (FLOMAX) 0.4 mg Cap TAKE 2 CAPSULES BY MOUTH EVERY DAY (Patient taking differently: Take 2 capsules by mouth once daily.) 180 capsule 1    traZODone (DESYREL) 100 MG tablet TAKE 1 TABLET BY MOUTH NIGHTLY AS NEEDED FOR INSOMNIA. 90 tablet 2    LIDOcaine (LIDODERM) 5 % Place 1 patch onto the skin once daily. Remove & Discard patch within 12 hours or as directed by MD 14 patch 0    sildenafil (VIAGRA) 100 MG tablet Take 1 tablet (100 mg total) by mouth daily as needed for Erectile Dysfunction. 10 tablet 6    sod chlor-bicarb-squeez bottle (NEILMED SINUS RINSE COMPLETE) pkdv 1 application  by sinus irrigation route 2 (two) times a day. Not right before bed 1 each 11    [DISCONTINUED] fluoxetine 20 MG tablet TAKE 1 TABLET BY MOUTH EVERY DAY 30 tablet 5

## 2025-06-28 NOTE — DISCHARGE INSTRUCTIONS
Our goal at [facility] is to always give you outstanding care and exceptional service. You may receive a survey from [employer] by mail, text or e-mail in the next 24-48 hours asking about the care you received with us. The survey should only take 5-10 minutes to complete and is very important to us.     Your feedback provides us with a way to recognize our staff who work tirelessly to provide the best care! Also, your responses help us learn how to improve when your experience was below our aspiration of excellence. We are always looking for ways to improve your stay. We WILL use your feedback to continue making improvements to help us provide the highest quality care. We keep your personal information and feedback confidential. We appreciate your time completing this survey and can't wait to hear from you!!!    We look forward to your continued care with us! Thanks so much for choosing [facility] for your healthcare needs!

## 2025-06-28 NOTE — ASSESSMENT & PLAN NOTE
Aware   Has received 2 units PRBCs already this hospital stay and will receive another today    Recent Labs     06/26/25  1846 06/27/25  0546 06/28/25  0555   HGB 9.0* 8.3* 8.3*   HCT 27.6* 24.8* 25.7*

## 2025-06-28 NOTE — PROGRESS NOTES
[facility]  Progress Note    Patient Name: Rick Butler  MRN: [id number]  Patient Class: IP- Inpatient   Admission Date: 6/18/2025  Length of Stay: 10 days  Attending Physician: Tiffany Babcock DO  Primary Care Provider: Reynaldo Basilio FNP-C        Subjective     Principal Problem:Severe sepsis        HPI:  65 year old pt getting admitted with Sepsis/Severe pneumonia/Neutropenic fever  Pt was suffering from URTI like infection for past several days  Later he suffered from severe SOB/cough  Today symptoms went worse, came to ER found to be hypoxic and got admitted     Overview/Hospital Course:  65-year-old male with myelodysplastic syndrome admitted with severe sepsis/pneumonia/neutropenic fever. Continue antibiotics and ID followed.  Later patient developed elevated troponin for demand ischemia and developed AFib with RVR and placed on IV amiodarone and transitioned to oral amiodarone.  Anticoagulation is held for low platelets and anemia.  For symptomatic anemia, s/p 3 units of packed red blood cells .For MDS, patient has received 1 platelet transfusion during hospital stay.  Oncologist is closely following.  Respiratory status has improved and patient is on room air.  He is on cefepime and doxy per ID while admitted and upon discharge he will complete Cipro and doxy through July 5th per Dr. Crawford recommendations.  Follow up at Dr. Tello's office on Monday 6/30.    Interval History:   No longer short of breath.  No chest pain.  No lightheadedness.  No hematuria, melena, bright red blood per rectum      Review of Systems  Objective:     Vital Signs (Most Recent):  Temp: 98.1 °F (36.7 °C) (06/28/25 1606)  Pulse: 78 (06/28/25 1606)  Resp: 16 (06/28/25 1606)  BP: 120/68 (06/28/25 1606)  SpO2: 98 % (06/28/25 1606) Vital Signs (24h Range):  Temp:  [97.8 °F (36.6 °C)-98.4 °F (36.9 °C)] 98.1 °F (36.7 °C)  Pulse:  [75-86] 78  Resp:  [16-19] 16  SpO2:  [92 %-99 %] 98 %  BP:  (120-135)/(66-79) 120/68     Weight: 93.7 kg (206 lb 9.1 oz)  Body mass index is 30.51 kg/m².    Intake/Output Summary (Last 24 hours) at 6/28/2025 1706  Last data filed at 6/28/2025 1402  Gross per 24 hour   Intake 750 ml   Output 1200 ml   Net -450 ml         Physical Exam      NAD, A/O 3   RRR   CTAB   Soft nontender nondistended, bowel sounds present   No edema    Significant Labs: All pertinent labs within the past 24 hours have been reviewed.  CBC:   Recent Labs   Lab 06/26/25  1846 06/27/25  0546 06/28/25  0555   WBC 6.24 5.30 5.85   HGB 9.0* 8.3* 8.3*   HCT 27.6* 24.8* 25.7*   PLT 31* 27* 30*     CMP:   Recent Labs   Lab 06/27/25  0546 06/28/25  0555    139   K 4.3 4.4    110   CO2 23 21*   GLU 93 92   BUN 45* 49*   CREATININE 3.1* 3.2*   CALCIUM 9.0 8.8   PROT 6.9 6.9   ALBUMIN 3.4* 3.4*   BILITOT 0.6 0.5   ALKPHOS 46* 47*   AST 17 16   ALT 25 25   ANIONGAP 6* 8     Magnesium:   Recent Labs   Lab 06/27/25  0546 06/28/25  0555   MG 1.6 1.6       Significant Imaging: I have reviewed all pertinent imaging results/findings within the past 24 hours.      Assessment & Plan  Sickle cell trait syndrome  Aware     RARS (refractory anemia with ringed sideroblasts)  Aware   Transfused, last on 6/26  Hematology consulted    Recent Labs     06/26/25 1846 06/27/25  0546 06/28/25  0555   HGB 9.0* 8.3* 8.3*   HCT 27.6* 24.8* 25.7*       MDS (myelodysplastic syndrome)  Aware   Give plts if less than 20 or bleeding.Transfuse prbc if hgb less that 7   Received platelet transfusion 6/24  Anemia, chronic renal failure, stage 3 (moderate)  Aware   Also has MDS  Acute on chronic anemia  Has received  PRBCs during this stay    Thrombocytopenia  Continue to monitor  Heme oncology is following  Holding Lovenox  Transfused a dose platelets 6/24  PAF (paroxysmal atrial fibrillation)  Patient has paroxysmal (<7 days) atrial fibrillation. Patient is currently in sinus rhythm. [id number] Score: 1. The patients heart rate in  the last 24 hours is as follows:  Pulse  Min: 75  Max: 86     Antiarrhythmics  amiodarone tablet 400 mg, 2 times daily, Oral  amiodarone tablet 400 mg, Daily, Oral  amiodarone (PACERONE) tablet, , Oral    Anticoagulants       Plan  - Replete lytes with a goal of K>4, Mg >2  - Patient is not anticoagulated due to low platelets  - Patient's afib is currently controlled  - follow up with Oncology and Cardiology to determine if he can start on anticoagulation in the future.      Pneumonia  -present on admission.  On antibiotics per ID.  Improving.  VTE Risk Mitigation (From admission, onward)           Ordered     IP VTE HIGH RISK PATIENT  Once         06/18/25 1145     Place sequential compression device  Until discontinued         06/18/25 1145                    Discharge Planning   ELMO: 6/28/2025     Code Status: Full Code   Medical Readiness for Discharge Date: 6/20/2025  Discharge Plan A: Home Health   Discharge Delays: None known at this time              Please place Justification for DME      Buster Enamorado MD  Department of Hospital Medicine   [facility]

## 2025-06-28 NOTE — PHYSICIAN QUERY
Due to the conflicting documentation, please further specify the stage of chronic kidney disease (CKD).    Chronic kidney disease (CKD) stage 4 - (eGFR 15-29)

## 2025-06-28 NOTE — PROGRESS NOTES
"[facility]  Hematology/Oncology  Inpatient Progress Note          Patient Name: Rick Butler  MRN: [id number]  Admission Date: 6/18/2025  Hospital Length of Stay: 10 days  Code Status: Full Code   Attending Provider: Tiffany Babcock DO  Consulting Provider: Jose Tello MD  Primary Care Physician: Reynaldo Basilio, LUCINAP-C  Principal Problem:Severe sepsis      Subjective:       Patient ID: Rick Butler is a 65 y.o. male.    Chief Complaint: Shortness of Breath (X 1 day. ), Fatigue, and Cough (Onset lastnite)        History Present Illness:     Patient sitting up in chair; wife at bedside; he is feeling better overall; no CP, SOB, HA's or N/V; working with PT; discussed with Dr Babcock in person with hospitalist service      Review of Systems:  GEN: general aches and pains, malaise, fatigue, weakness;   HEENT: normal with no HA's, sore throat, stiff neck, changes in vision  CV: normal with no CP, SOB, PND, MORA or orthopnea  PULM: normal with no SOB,  hemoptysis, sputum or pleuritic pain;    GI: normal with no abdominal pain, nausea, vomiting, constipation, diarrhea, melanotic stools, BRBPR, or hematemesis  : normal with no hematuria, dysuria  BREAST: normal with no mass, discharge, pain  SKIN: normal with no rash, erythema, bruising, or swelling      Objective:     Vitals:  Blood pressure 131/75, pulse 85, temperature 97.8 °F (36.6 °C), temperature source Oral, resp. rate 18, height 5' 9" (1.753 m), weight 93.7 kg (206 lb 9.1 oz), SpO2 (!) 94%.    Physical Exam:  GEN: no apparent distress, comfortable; AAOx3  HEAD: atraumatic and normocephalic  EYES: no pallor, no icterus, PERRLA  ENT: OMM, no pharyngeal erythema, external ears WNL; no nasal discharge; no thrush  NECK: no masses, thyroid normal, trachea midline, no LAD/LN's, supple  CV: RRR with no murmur; normal pulse; normal S1 and S2; no pedal edema  CHEST: Normal respiratory effort; CTAB; improved breath sounds   no wheeze " or crackles  ABDOM: nontender and nondistended; soft; normal bowel sounds; no rebound/guarding  MUSC/Skeletal: ROM normal; no crepitus; joints normal; no deformities or arthropathy  EXTREM: no clubbing, cyanosis, inflammation or swelling; IV's bilateral arms  SKIN: no rashes, lesions, ulcers, petechiae or subcutaneous nodules; tattoos   : no jiang  NEURO: grossly intact; motor/sensory WNL; AAOx3; no tremors  PSYCH: normal mood, affect and behavior  LYMPH: normal cervical, supraclavicular, axillary and groin LN's          Lab Review:        Lab Results   Component Value Date    WBC 5.85 06/28/2025    HGB 8.3 (L) 06/28/2025    HCT 25.7 (L) 06/28/2025    MCV 86 06/28/2025    PLT 30 (LL) 06/28/2025     CMP  Sodium   Date Value Ref Range Status   06/28/2025 139 136 - 145 mmol/L Final   06/26/2019 138 134 - 144 mmol/L      Potassium   Date Value Ref Range Status   06/28/2025 4.4 3.5 - 5.1 mmol/L Final     Chloride   Date Value Ref Range Status   06/28/2025 110 95 - 110 mmol/L Final   06/26/2019 105 98 - 110 mmol/L      CO2   Date Value Ref Range Status   06/28/2025 21 (L) 23 - 29 mmol/L Final     Glucose   Date Value Ref Range Status   06/28/2025 92 70 - 110 mg/dL Final   06/26/2019 114 (H) 70 - 99 mg/dL      BUN   Date Value Ref Range Status   06/28/2025 49 (H) 8 - 23 mg/dL Final     Creatinine   Date Value Ref Range Status   06/28/2025 3.2 (H) 0.5 - 1.4 mg/dL Final   06/26/2019 1.62 (H) 0.60 - 1.40 mg/dL      Calcium   Date Value Ref Range Status   06/28/2025 8.8 8.7 - 10.5 mg/dL Final     Protein Total   Date Value Ref Range Status   06/28/2025 6.9 6.0 - 8.4 gm/dL Final     Albumin   Date Value Ref Range Status   06/28/2025 3.4 (L) 3.5 - 5.2 g/dL Final   06/26/2019 4.4 3.1 - 4.7 g/dL      Bilirubin Total   Date Value Ref Range Status   06/28/2025 0.5 0.1 - 1.0 mg/dL Final     Comment:     For infants and newborns, interpretation of results should be based   on gestational age, weight and in agreement with clinical    observations.    Premature Infant recommended reference ranges:   0-24 hours:  <8.0 mg/dL   24-48 hours: <12.0 mg/dL   3-5 days:    <15.0 mg/dL   6-29 days:   <15.0 mg/dL     ALP   Date Value Ref Range Status   06/28/2025 47 (L) 55 - 135 unit/L Final     AST   Date Value Ref Range Status   06/28/2025 16 10 - 40 unit/L Final     ALT   Date Value Ref Range Status   06/28/2025 25 10 - 44 unit/L Final     Anion Gap   Date Value Ref Range Status   06/28/2025 8 8 - 16 mmol/L Final     eGFR   Date Value Ref Range Status   06/28/2025 21 (L) >60 mL/min/1.73/m2 Final   03/12/2025 21.6 (A) >60 mL/min/1.73 m^2 Final           Radiology Diagnostic Studies:     X-Ray Chest AP Portable [[id number]] Resulted: 06/18/25 0901   Order Status: Completed Updated: 06/18/25 0903   Narrative:     EXAMINATION:  XR CHEST AP PORTABLE    CLINICAL HISTORY:  Generalized weakness;    FINDINGS:  Portable chest at 08:49 is compared to 12/20/2021 shows normal cardiomediastinal silhouette.    There is an alveolar consolidation in the mid and lower lung compatible with pneumonia.  The left lung is clear.  There are no pleural effusions.  Right pulmonary vasculature is normal. No acute osseous abnormality.   Impression:       Alveolar consolidations in the right mid and lower lung compatible with pneumonia         Assessment:     IMPRESSION:    (1) 65 y.o. male known to my oncology service with diagnosis of MDS/RARS and sickle cell trait. He is treated in conjunction with Dr Marcelino Hilario out [facility] in [place], whom he recently saw for follow-up visit on 6/4/2025. He has been on chemotherapy with the drug Rytelo for the past couple of months. He requires periodic blood and platelet transfusions. He presented to ER with cough, fever and progressive weakness/fatigue. Troponin was elevated and CXR showing RML/RLL consolidations consistent with a pneumonia.     6/18/2025;  - WBc 1.02, Hgb 7.3 and plats 62,000  -  I discussed with Dr Brown and Dr Moran  both in person. I discussed with his ER nurse in person.   - Plans are to transfuse a unit of blood, IV antibiotics; cultures drawn  - cardiology/pulm consults, ID consult and nephrology consult.      6/19/2025:  - patient currently in ICU  - he has been seen by pulmonary/critical care and ID, cardiology and nephrology  - wbc at 2.38, Hgb 6.7 and plats at 47,000  - cardiology and nephrology consulted    6/20/2025:  - patient remains in the ICU  - WBC 5.73, Hgb 7.1 and plats 40,000  - s/p unit blood yesterday and another one today  -  he has been getting up with PT; breathing better; feeling a little better;   -  discussed with his ICU nurse in person;  -  I communicated with Bambi NP with cardiology and ok to start heparin/lovenox for the atrial fib as long as platelet count does not come back down     6/23/2025:  -  Patient was seen by my associate Dr Landry Pereira over the weekend; he is now out of ICU;   -  WBC at 5.31, hgb 8.6 and plats 27,000  -  feeling better overall; still not much of an appetite; breathing better; still on O2 per NC; continued on IV antibiotics;   - discussed with floor staff; communicated with primary and ID via epic about my concerns with using Zosyn   - Dr Werner with pulmonary planning bronch for tomorrow    6/24/2025:  - discussed with Dr Siegel  - patient to get one single donor platelet product today  - heparin products on hold  - re-iterated my concerns about using Zosyn  - pulmonary has decided to hold off on bronch for now    6/25/2025:  - wbc 5.4, hgb 7.7, plats 27,000  - he is feeling much better; sitting up in chair; off O2; may be we can give him a unit of blood tomorrow pending the am labs       6/26/2025:  - discussed with Dr Siegel this am  - getting a unit of blood today  - WBC 5.76, Hgb 7.3 and plats 27,000    6/27/2025:  - hgb 8.3 and plats 27,000  - s/p unit of blood yesterday   - communicated with Dr Enamorado    6/28/2025:  - Patient sitting up in chair; wife at  bedside; he is feeling better overall; no CP, SOB, HA's or N/V; working with PT;   - hgb at 8.3 and plats 30,000  - discussed with Dr Babcock in person with hospitalist service  - remains on IV antibiotics - primary checking with ID about abx plans moving forward        (2) Acute exacerbation of CKD - followed by Dr Mas as outpatient     (3) Chronic back pains - seen by Dr Heredia in past      (4) Hx/of H pylori gastritis s/p prior colonoscopy with Dr Collins in past     (5) Hx/of alcohol uses in past (sober for several years now); former smoker            1. Pneumonia due to infectious organism, unspecified laterality, unspecified part of lung    2. Hypoxia    3. Myelodysplastic syndrome    4. Severe sepsis    5. Chest pain    6. Elevated troponin    7. A-fib    8. Cardiac rhythm disorder or disturbance or change    9. Multifocal pneumonia    10. MDS (myelodysplastic syndrome)    11. Thrombocytopenia    12. Sickle cell trait syndrome    13. Stage 3 chronic kidney disease, unspecified whether stage 3a or 3b CKD           Plan:     PLAN:          1. Monitor labs and transfuse as needed   2. Infection workup, IV antibiotics etc as per ID   3. Respiratory and critical care as per pulmonary directives - pulm has decided to hold off on bronch for now  4. appreciate cardiology consult and assistance   5. Appreciate nephrology consult for the acute on top of Chronic renal insufficiency   6. IVF's, electrolyte and pain management as per primary team  7. Will follow with you                Jose Tello MD  Hematology/Oncology  [facility]

## 2025-06-28 NOTE — RESPIRATORY THERAPY
06/28/25 0658   Patient Assessment/Suction   Level of Consciousness (AVPU) alert   Respiratory Effort Unlabored   All Lung Fields Breath Sounds clear;diminished   PRE-TX-O2   Device (Oxygen Therapy) room air   SpO2 95 %   Pulse Oximetry Type Intermittent   $ Pulse Oximetry - Multiple Charge Pulse Oximetry - Multiple   Pulse 75   Resp 18   Aerosol Therapy   $ Aerosol Therapy Charges Aerosol Treatment   Respiratory Treatment Status (SVN) given   Treatment Route (SVN) mask   Patient Position HOB elevated   Post Treatment Assessment (SVN) increased aeration   Signs of Intolerance (SVN) none   Breath Sounds Post-Respiratory Treatment   Post-treatment Heart Rate (beats/min) 74   Post-treatment Resp Rate (breaths/min) 18   Education   $ Education Bronchodilator;15 min

## 2025-06-28 NOTE — PLAN OF CARE
Problem: Adult Inpatient Plan of Care  Goal: Plan of Care Review  Outcome: Met  Goal: Patient-Specific Goal (Individualized)  Outcome: Met  Goal: Absence of Hospital-Acquired Illness or Injury  Outcome: Met  Goal: Optimal Comfort and Wellbeing  Outcome: Met  Goal: Readiness for Transition of Care  Outcome: Met     Problem: Sepsis/Septic Shock  Goal: Optimal Coping  Outcome: Met  Goal: Absence of Bleeding  Outcome: Met  Goal: Blood Glucose Level Within Targeted Range  Outcome: Met  Goal: Absence of Infection Signs and Symptoms  Outcome: Met  Goal: Optimal Nutrition Intake  Outcome: Met     Problem: Acute Kidney Injury/Impairment  Goal: Fluid and Electrolyte Balance  Outcome: Met  Goal: Improved Oral Intake  Outcome: Met  Goal: Effective Renal Function  Outcome: Met     Problem: Pneumonia  Goal: Fluid Balance  Outcome: Met  Goal: Resolution of Infection Signs and Symptoms  Outcome: Met  Goal: Effective Oxygenation and Ventilation  Outcome: Met     Problem: Skin Injury Risk Increased  Goal: Skin Health and Integrity  Outcome: Met     Problem: Fall Injury Risk  Goal: Absence of Fall and Fall-Related Injury  Outcome: Met     Problem: Physical Therapy  Goal: Physical Therapy Goal  Description: Goals to be met by: 2025     Patient will increase functional independence with mobility by performin. Supine to sit with Modified [place]  2. Sit to supine with Modified [place]  3. Sit to stand transfer with Modified [place]  4. Gait  x 300  feet with Modified [place] using Rolling Walker.     Outcome: Met     Problem: Occupational Therapy  Goal: Occupational Therapy Goal  Description: Goals to be met by: 25     Patient will increase functional independence with ADLs by performing:    UE Dressing with Supervision.  LE Dressing with Supervision.  Grooming while standing at sink with Supervision.  Toileting from toilet with Supervision for hygiene and clothing management.   Toilet transfer to  toilet with Supervision.    Outcome: Met

## 2025-06-28 NOTE — ASSESSMENT & PLAN NOTE
Patient has paroxysmal (<7 days) atrial fibrillation. Patient is currently in sinus rhythm. [id number] Score: 1. The patients heart rate in the last 24 hours is as follows:  Pulse  Min: 75  Max: 87     Antiarrhythmics  amiodarone tablet 400 mg, 2 times daily, Oral  amiodarone tablet 400 mg, Daily, Oral  amiodarone (PACERONE) tablet, , Oral    Anticoagulants       Plan  - Replete lytes with a goal of K>4, Mg >2  - Patient is not anticoagulated due to low platelets  - Patient's afib is currently controlled  - follow up with Oncology and Cardiology to determine if he can start on anticoagulation in the future.

## 2025-06-28 NOTE — PHYSICIAN QUERY
Due to the conflicting clinical picture, please clarify/confirm the consultants diagnosis of Septic Shock.   Sepsis with Septic Shock ruled in. Antibiotics and pressors required.

## 2025-06-28 NOTE — DISCHARGE SUMMARY
"[facility] Medicine  Discharge Summary      Patient Name: Rick Butler  MRN: [id number]  HIEN: [id number]  Patient Class: IP- Inpatient  Admission Date: 6/18/2025  Hospital Length of Stay: 10 days  Discharge Date and Time: 06/28/2025 3:25 PM  Attending Physician: Tiffany Babcock DO   Discharging Provider: Tiffany Babcock DO  Primary Care Provider: Reynaldo Basilio FNP-C    Primary Care Team: Networked reference to record PCT     HPI:   65 year old pt getting admitted with Sepsis/Severe pneumonia/Neutropenic fever  Pt was suffering from URTI like infection for past several days  Later he suffered from severe SOB/cough  Today symptoms went worse, came to ER found to be hypoxic and got admitted     Procedure(s) (LRB):  Bronchoscopy (N/A)      Hospital Course:   65-year-old male with myelodysplastic syndrome admitted with severe sepsis/pneumonia/neutropenic fever. Continue antibiotics and ID followed.  Later patient developed elevated troponin for demand ischemia and developed AFib with RVR and placed on IV amiodarone and transitioned to oral amiodarone.  For symptomatic anemia, s/p 3 units of packed red blood cells .For MDS, patient has received 1 platelet transfusion during hospital stay.  Oncologist is closely following.  Respiratory status has improved and patient is on room air.  He is on cefepime and doxy per ID while admitted and upon discharge he will complete Cipro and doxy through July 5th per Dr. Crawford recommendations.  Follow up at Dr. Tello's office on Monday 6/30.   Patient worked with physical therapy and PT note 6/27 " Pt performed sit to stand transfer with stand by assist. Pt ambulated 75' x 4 with RW and stand by assist. Pt with three standing rest break due to fatigue." Therefore patient will require a rolling walker.    Goals of Care Treatment Preferences:  Code Status: Full Code         Consults:   Consults (From admission, onward)          Status Ordering " Provider     Inpatient consult to Cardiology  Once        Provider:  Wilfredo Scott MD    Completed JOSE ARNDT     Inpatient consult to Pulmonology  Once        Provider:  Moses Gomez MD    Completed JOSE ARNDT     Inpatient consult to Infectious Diseases  Once        Provider:  Aris Cervantes MD    Completed JOSE ARNDT     Inpatient consult to Nephrology  Once        Provider:  Cleopatra Herrera MD    Completed CHANDNI FERRARO     Inpatient consult to Pulmonology  Once        Provider:  Moses Gomez MD    Completed CHANDNI FERRARO     Inpatient consult to Hematology  Once        Provider:  Jose Arndt MD    Acknowledged REAGAN BERRY            Assessment & Plan  Sickle cell trait syndrome  Aware     RARS (refractory anemia with ringed sideroblasts)  Aware   Has received 2 units PRBCs already this hospital stay and will receive another today    Recent Labs     06/26/25  1846 06/27/25  0546 06/28/25  0555   HGB 9.0* 8.3* 8.3*   HCT 27.6* 24.8* 25.7*       MDS (myelodysplastic syndrome)  Aware   Give plts if less than 20 or bleeding.Transfuse prbc if hgb less that 7   Received platelet transfusion 6/24  Anemia, chronic renal failure, stage 3 (moderate)  Aware   Also has MDS  Acute on chronic anemia  Has received 2 unit PRBCs during this stay  Transfuse another unit PRBCs today  Thrombocytopenia  Continue to monitor  Heme oncology is following  Holding Lovenox  Transfused a dose platelets 6/24  PAF (paroxysmal atrial fibrillation)  Patient has paroxysmal (<7 days) atrial fibrillation. Patient is currently in sinus rhythm. [id number] Score: 1. The patients heart rate in the last 24 hours is as follows:  Pulse  Min: 75  Max: 87     Antiarrhythmics  amiodarone tablet 400 mg, 2 times daily, Oral  amiodarone tablet 400 mg, Daily, Oral  amiodarone (PACERONE) tablet, , Oral    Anticoagulants       Plan  - Replete lytes with a goal of K>4, Mg >2  - Patient is not  anticoagulated due to low platelets  - Patient's afib is currently controlled  - follow up with Oncology and Cardiology to determine if he can start on anticoagulation in the future.      Final Active Diagnoses:    Diagnosis Date Noted POA    PAF (paroxysmal atrial fibrillation) [I48.0] 06/28/2025 Yes    Acute on chronic anemia [D64.9] 06/18/2025 Yes    Thrombocytopenia [D69.6] 06/18/2025 Yes    Anemia, chronic renal failure, stage 3 (moderate) [N18.30, D63.1] 08/02/2023 Yes    MDS (myelodysplastic syndrome) [D46.9] 08/11/2020 Yes    RARS (refractory anemia with ringed sideroblasts) [D46.1] 06/01/2020 Yes    Sickle cell trait syndrome [D57.3] 10/09/2017 Yes      Problems Resolved During this Admission:    Diagnosis Date Noted Date Resolved POA    PRINCIPAL PROBLEM:  Severe sepsis [A41.9, R65.20] 06/18/2025 06/28/2025 Yes    Hypoxia [R09.02] 06/23/2025 06/28/2025 Yes    TORRES (acute kidney injury) [N17.9] 06/21/2025 06/28/2025 Yes    Atrial fibrillation with rapid ventricular response [I48.91] 06/20/2025 06/28/2025 No    Pneumonia due to infectious organism [J18.9] 06/18/2025 06/28/2025 Yes    Neutropenic fever [D70.9, R50.81] 06/18/2025 06/28/2025 Yes    Elevated troponin [R79.89] 06/18/2025 06/28/2025 Yes    TORRES (acute kidney injury) [N17.9] 06/18/2025 06/21/2025 Yes       Discharged Condition: good    Disposition: Home or Self Care    Follow Up:   Follow-up Information       Reynaldo Basilio FNP-C Follow up in 1 week(s).    Specialty: Family Medicine  Contact information:  [address] [facility]  [address]  [place] [address]  [phone]               Jose Tello MD. Call on 6/30/2025.    Specialties: Hematology, Hematology and Oncology, Oncology  Contact information:  [address] [facility]  [address]  [place] [address]  [phone]               [place], [facility] Follow up.    Specialty: Home Health Services  Contact information:  [address] [place] [address].  [address]  [place]  "[id number]  [phone]                           Patient Instructions:      Notify your health care provider if you experience any of the following:  temperature >100.4     Notify your health care provider if you experience any of the following:  persistent nausea and vomiting or diarrhea     Notify your health care provider if you experience any of the following:  severe uncontrolled pain     Notify your health care provider if you experience any of the following:  difficulty breathing or increased cough     Notify your health care provider if you experience any of the following:  persistent dizziness, light-headedness, or visual disturbances     Activity as tolerated       Significant Diagnostic Studies: Labs: BMP:   Recent Labs   Lab 06/27/25  0546 06/28/25  0555   GLU 93 92    139   K 4.3 4.4    110   CO2 23 21*   BUN 45* 49*   CREATININE 3.1* 3.2*   CALCIUM 9.0 8.8   MG 1.6 1.6   , CMP   Recent Labs   Lab 06/27/25  0546 06/28/25  0555    139   K 4.3 4.4    110   CO2 23 21*   GLU 93 92   BUN 45* 49*   CREATININE 3.1* 3.2*   CALCIUM 9.0 8.8   PROT 6.9 6.9   ALBUMIN 3.4* 3.4*   BILITOT 0.6 0.5   ALKPHOS 46* 47*   AST 17 16   ALT 25 25   ANIONGAP 6* 8   , CBC   Recent Labs   Lab 06/26/25  1846 06/27/25  0546 06/28/25  0555   WBC 6.24 5.30 5.85   HGB 9.0* 8.3* 8.3*   HCT 27.6* 24.8* 25.7*   PLT 31* 27* 30*   , INR   Lab Results   Component Value Date    INR 1.1 01/27/2025    INR 1.3 12/20/2019   , Lipid Panel   Lab Results   Component Value Date    CHOL 92 (L) 04/09/2025    HDL 48 04/09/2025    LDLCALC 38.2 (L) 04/09/2025    TRIG 29 (L) 04/09/2025    CHOLHDL 52.2 (H) 04/09/2025   , Troponin No results for input(s): "TROPONINI" in the last 168 hours., A1C:   Recent Labs   Lab 06/21/25  0302   HGBA1C 5.8   , and All labs within the past 24 hours have been reviewed  Microbiology: Blood Culture No results found for: "LABBLOO"  Radiology:   Imaging Results              CT Chest Without Contrast " (Final result)  Result time 06/19/25 08:39:30      Final result by Alexandra Cerda MD (06/19/25 08:39:30)                   Impression:      Dense alveolar consolidation involving the majority of the right middle lobe with patchy ground-glass opacities in the inferior right upper lobe and right lower lobe compatible with multifocal pneumonia    7 mm ground-glass opacity left upper lobe with atelectasis in left lung base      Electronically signed by: Alexandra Cerda  Date:    06/19/2025  Time:    08:39               Narrative:      CMS MANDATED QUALITY DATA - CT RADIATION - 436    All CT scans at this facility utilize dose modulation, iterative reconstruction, and/or weight based dosing when appropriate to reduce radiation dose to as low as reasonably achievable.    EXAMINATION:  CT CHEST WITHOUT CONTRAST    CLINICAL HISTORY:  Pneumonia;    TECHNIQUE:  CT thorax without contrast    COMPARISON:  Chest x-ray at 08:49    FINDINGS:  CT THORAX:    Base of the neck is unremarkable    Lungs: There is a dense alveolar consolidation involving the majority of the right middle lobe.  There are patchy ground-glass opacities in the inferior right upper lobe and right lower lobe.  Findings are compatible with multifocal pneumonia.    7 mm ground-glass opacity in the left upper lobe with dependent atelectasis in the left lung base.    The trachea and bronchi are patent.    The heart is normal in size.  There is no pericardial effusion..  There is mild vascular calcification of the coronary arteries there is mild vascular calcification of coronary arteries    Mediastinum: No hilar, mediastinal or axillary adenopathy    Chest wall is normal.    Esophagus is normal    Visualized portion of the upper abdomen is unremarkable    Degenerative changes of the spine                                       X-Ray Chest AP Portable (Final result)  Result time 06/18/25 09:01:39      Final result by Alexandra Cerda MD (06/18/25 09:01:39)                    Impression:      Alveolar consolidations in the right mid and lower lung compatible with pneumonia      Electronically signed by: Alexandra Cerda  Date:    06/18/2025  Time:    09:01               Narrative:    EXAMINATION:  XR CHEST AP PORTABLE    CLINICAL HISTORY:  Generalized weakness;    FINDINGS:  Portable chest at 08:49 is compared to 12/20/2021 shows normal cardiomediastinal silhouette.    There is an alveolar consolidation in the mid and lower lung compatible with pneumonia.  The left lung is clear.  There are no pleural effusions.  Right pulmonary vasculature is normal. No acute osseous abnormality.                                      Cardiac Graphics: Echocardiogram: 2D echo with color flow doppler: No results found. However, due to the size of the patient record, not all encounters were searched. Please check Results Review for a complete set of results. and Transthoracic echo (TTE) complete (Cupid Only):   Results for orders placed or performed during the hospital encounter of 06/18/25   Echo   Result Value Ref Range    Young's Biplane MOD Ejection Fraction 31 %    A2C EF 30 %    A4C EF 33 %    LVOT stroke volume 80.0 cm3    LVIDd 5.3 3.5 - 6.0 cm    LV Systolic Volume 83 mL    LV Systolic Volume Index 39.7 mL/m2    LVIDs 4.3 (A) 2.1 - 4.0 cm    LV ESV A2C 72.90 mL    LV Diastolic Volume 135 mL    LV ESV A4C 58.80 mL    LV Diastolic Volume Index 64.59 mL/m2    LV EDV A2C 102 mL    LV EDV A4C 126.00 mL    Left Ventricular End Systolic Volume by Teichholz Method 83.10 mL    Left Ventricular End Diastolic Volume by Teichholz Method 135.00 mL    IVS 1.1 0.6 - 1.1 cm    LVOT diameter 2.4 cm    LVOT area 4.5 cm2    FS 18.9 (A) 28 - 44 %    Left Ventricle Relative Wall Thickness 0.42 cm    PW 1.1 0.6 - 1.1 cm    LV mass 227.7 g    LV Mass Index 109.0 g/m2    MV Peak E Los 0.73 m/s    TDI LATERAL 0.14 m/s    TDI SEPTAL 0.12 m/s    E/E' ratio 6 m/s    MV Peak A Los 0.81 m/s    TR Max Los 3.0  m/s    E/A ratio 0.90     E wave deceleration time 144 msec    LV SEPTAL E/E' RATIO 6.1 m/s    LV LATERAL E/E' RATIO 5.2 m/s    LVOT peak caty 1.1 m/s    Left Ventricular Outflow Tract Mean Velocity 0.73 cm/s    Left Ventricular Outflow Tract Mean Gradient 3.00 mmHg    RV S' 13.20 cm/s    TAPSE 3.4 cm    LA Vol (MOD) 67 mL    HERVE (MOD) 32 mL/m2    RA area length vol 21.60 mL    RA vol index 10.33 mL/m2    Vn Nyquist MS 0.27 m/s    AV mean gradient 5 mmHg    AV peak gradient 9 mmHg    Ao peak caty 1.5 m/s    Ao VTI 24.8 cm    LVOT peak VTI 17.7 cm    AV valve area 3.2 cm²    AV Velocity Ratio 0.73     AV index (prosthetic) 0.71     LIZZ by Velocity Ratio 3.3 cm²    Radius 0.50 cm    Vn Nyquist 0.27 m/s    Mr max caty 4.85 m/s    MR PISA EROA 0.09 cm2    MV mean gradient 25 mmHg    MV peak gradient 29 mmHg    MV valve area by continuity eq 1.22 cm2    MV VTI 65.6 cm    Triscuspid Valve Regurgitation Peak Gradient 36 mmHg    PV PEAK VELOCITY 1.17 m/s    PV peak gradient 5 mmHg    Pulmonary Valve Mean Velocity 0.81 m/s    Ao root annulus 3.3 cm    IVC diameter 2.20 cm    Mean e' 0.13 m/s    ZLVIDS 0.69     ZLVIDD -1.96     LA area A4C 22.00 cm2    LA area A2C 24.90 cm2    AORTIC VALVE CUSP SEPERATION 2.00 cm    BSA 2.14 m2    Narrative      Left Ventricle: The left ventricle is normal in size. Normal wall   thickness. Mild global hypokinesis present. There is low normal systolic   function with a visually estimated ejection fraction of 50 - 55%. There is   normal diastolic function.    Right Ventricle: The right ventricle is normal in size Systolic   function is normal.    Left Atrium: The left atrium is moderately dilated    Mitral Valve: There is moderate regurgitation.    Tricuspid Valve: There is mild to moderate regurgitation. There is   pulmonary hypertension. The estimated PA systolic pressure is at least 36   mmHg.         Pending Diagnostic Studies:       Procedure Component Value Units Date/Time    Legionella  antigen, urine [[id number]] Collected: 06/18/25 1948    Order Status: Sent Lab Status: In process Updated: 06/18/25 1956    Specimen: Urine, Clean Catch            Medications:  Reconciled Home Medications:      Medication List        START taking these medications      amiodarone 400 MG tablet  Commonly known as: PACERONE  Take 1 tablet (400 mg total) by mouth 2 (two) times daily for 3 days, THEN 1 tablet (400 mg total) once daily.  Start taking on: June 28, 2025     ciprofloxacin HCl 750 MG tablet  Commonly known as: CIPRO  Take 1 tablet (750 mg total) by mouth once daily. for 7 days     doxycycline 100 MG Cap  Commonly known as: VIBRAMYCIN  Take 1 capsule (100 mg total) by mouth every 12 (twelve) hours. for 7 days            CHANGE how you take these medications      amLODIPine 10 MG tablet  Commonly known as: NORVASC  Take 1 tablet (10 mg total) by mouth once daily.  Start taking on: June 29, 2025  What changed:   medication strength  how much to take            CONTINUE taking these medications      ascorbic acid (vitamin C) 500 MG tablet  Commonly known as: VITAMIN C  Take 500 mg by mouth once daily.     atorvastatin 10 MG tablet  Commonly known as: LIPITOR  Take 1 tablet (10 mg total) by mouth every evening.     calcitRIOL 0.25 MCG Cap  Commonly known as: ROCALTROL  Take 0.25 mcg by mouth every 7 days.     cyproheptadine 4 mg tablet  Commonly known as: PERIACTIN  Take 1 tablet (4 mg total) by mouth 3 (three) times daily.     droNABinol 10 MG capsule  Commonly known as: MARINOL  Take 1 capsule (10 mg total) by mouth 2 (two) times daily before meals.     famotidine 20 MG tablet  Commonly known as: PEPCID  Take 20 mg by mouth 2 (two) times daily.     fluticasone propionate 50 mcg/actuation nasal spray  Commonly known as: FLONASE  1 SPRAY (50 MCG TOTAL) BY EACH NOSTRIL ROUTE 2 (TWO) TIMES A DAY. 1 SPRAY EVERY MORNING AND AFTERNOON TOWARD THE EAR EACH SIDE AFTER A SINUS RINSE     folic acid 1 MG tablet  Commonly  known as: FOLVITE  TAKE 2 TABLETS BY MOUTH EVERY DAY     LIDOcaine 5 %  Commonly known as: LIDODERM  Place 1 patch onto the skin once daily. Remove & Discard patch within 12 hours or as directed by MD     multivitamin with minerals Cap  Take 1 capsule by mouth every morning.     NEILMED SINUS RINSE COMPLETE Pkdv  Generic drug: sod chlor-bicarb-squeez bottle  1 application  by sinus irrigation route 2 (two) times a day. Not right before bed     pantoprazole 40 MG tablet  Commonly known as: PROTONIX  Take 40 mg by mouth once daily.     sildenafiL 100 MG tablet  Commonly known as: VIAGRA  Take 1 tablet (100 mg total) by mouth daily as needed for Erectile Dysfunction.     tamsulosin 0.4 mg Cap  Commonly known as: FLOMAX  TAKE 2 CAPSULES BY MOUTH EVERY DAY     traZODone 100 MG tablet  Commonly known as: DESYREL  TAKE 1 TABLET BY MOUTH NIGHTLY AS NEEDED FOR INSOMNIA.              Indwelling Lines/Drains at time of discharge:   Lines/Drains/Airways       None                       Time spent on the discharge of patient: 33 minutes         Tiffany Babcock DO  Department of Hospital Medicine  [facility]

## 2025-06-28 NOTE — ASSESSMENT & PLAN NOTE
Patient has paroxysmal (<7 days) atrial fibrillation. Patient is currently in sinus rhythm. [id number] Score: 1. The patients heart rate in the last 24 hours is as follows:  Pulse  Min: 75  Max: 86     Antiarrhythmics  amiodarone tablet 400 mg, 2 times daily, Oral  amiodarone tablet 400 mg, Daily, Oral  amiodarone (PACERONE) tablet, , Oral    Anticoagulants       Plan  - Replete lytes with a goal of K>4, Mg >2  - Patient is not anticoagulated due to low platelets  - Patient's afib is currently controlled  - follow up with Oncology and Cardiology to determine if he can start on anticoagulation in the future.

## 2025-06-28 NOTE — ASSESSMENT & PLAN NOTE
Aware   Transfused, last on 6/26  Hematology consulted    Recent Labs     06/26/25  1846 06/27/25  0546 06/28/25  0555   HGB 9.0* 8.3* 8.3*   HCT 27.6* 24.8* 25.7*

## 2025-06-29 ENCOUNTER — OUTPATIENT CASE MANAGEMENT (OUTPATIENT)
Dept: HEPATOLOGY | Facility: HOSPITAL | Age: 65
End: 2025-06-29

## 2025-06-29 DIAGNOSIS — J18.9 PNEUMONIA OF LOWER LOBE DUE TO INFECTIOUS ORGANISM, UNSPECIFIED LATERALITY: Primary | ICD-10-CM

## 2025-06-30 ENCOUNTER — DOCUMENTATION ONLY (OUTPATIENT)
Facility: CLINIC | Age: 65
End: 2025-06-30
Payer: MEDICARE

## 2025-06-30 ENCOUNTER — LAB VISIT (OUTPATIENT)
Dept: LAB | Facility: HOSPITAL | Age: 65
End: 2025-06-30
Attending: INTERNAL MEDICINE
Payer: MEDICARE

## 2025-06-30 ENCOUNTER — RESULTS FOLLOW-UP (OUTPATIENT)
Dept: HEMATOLOGY/ONCOLOGY | Facility: HOSPITAL | Age: 65
End: 2025-06-30

## 2025-06-30 ENCOUNTER — TELEPHONE (OUTPATIENT)
Dept: FAMILY MEDICINE | Facility: CLINIC | Age: 65
End: 2025-06-30
Payer: MEDICARE

## 2025-06-30 ENCOUNTER — TELEPHONE (OUTPATIENT)
Facility: CLINIC | Age: 65
End: 2025-06-30
Payer: MEDICARE

## 2025-06-30 DIAGNOSIS — D46.9 MDS (MYELODYSPLASTIC SYNDROME): ICD-10-CM

## 2025-06-30 LAB
ABSOLUTE EOSINOPHIL (SMH): 0.01 K/UL
ABSOLUTE MONOCYTE (SMH): 0.86 K/UL (ref 0.3–1)
ABSOLUTE NEUTROPHIL COUNT (SMH): 4.5 K/UL (ref 1.8–7.7)
ALBUMIN SERPL-MCNC: 3.6 G/DL (ref 3.5–5.2)
ALP SERPL-CCNC: 54 UNIT/L (ref 55–135)
ALT SERPL-CCNC: 33 UNIT/L (ref 10–44)
ANION GAP (SMH): 9 MMOL/L (ref 8–16)
ANISOCYTOSIS BLD QL SMEAR: SLIGHT
AST SERPL-CCNC: 22 UNIT/L (ref 10–40)
BASOPHILS # BLD AUTO: 0.04 K/UL
BASOPHILS NFR BLD AUTO: 0.6 %
BILIRUB SERPL-MCNC: 0.5 MG/DL (ref 0.1–1)
BUN SERPL-MCNC: 45 MG/DL (ref 8–23)
BURR CELLS BLD QL SMEAR: ABNORMAL
CALCIUM SERPL-MCNC: 9.1 MG/DL (ref 8.7–10.5)
CHLORIDE SERPL-SCNC: 110 MMOL/L (ref 95–110)
CO2 SERPL-SCNC: 22 MMOL/L (ref 23–29)
CREAT SERPL-MCNC: 3.4 MG/DL (ref 0.5–1.4)
ERYTHROCYTE [DISTWIDTH] IN BLOOD BY AUTOMATED COUNT: 16 % (ref 11.5–14.5)
GFR SERPLBLD CREATININE-BSD FMLA CKD-EPI: 19 ML/MIN/1.73/M2
GLUCOSE SERPL-MCNC: 98 MG/DL (ref 70–110)
HCT VFR BLD AUTO: 26.9 % (ref 40–54)
HGB BLD-MCNC: 8.8 GM/DL (ref 14–18)
HYPOCHROMIA BLD QL SMEAR: ABNORMAL
IMM GRANULOCYTES # BLD AUTO: 0.31 K/UL (ref 0–0.04)
IMM GRANULOCYTES NFR BLD AUTO: 4.4 % (ref 0–0.5)
LYMPHOCYTES # BLD AUTO: 1.34 K/UL (ref 1–4.8)
MCH RBC QN AUTO: 27.4 PG (ref 27–31)
MCHC RBC AUTO-ENTMCNC: 32.7 G/DL (ref 32–36)
MCV RBC AUTO: 84 FL (ref 82–98)
NUCLEATED RBC (/100WBC) (SMH): 0 /100 WBC
PLATELET # BLD AUTO: 44 K/UL (ref 150–450)
PLATELET BLD QL SMEAR: ABNORMAL
PMV BLD AUTO: 11.4 FL (ref 9.2–12.9)
POIKILOCYTOSIS BLD QL SMEAR: SLIGHT
POTASSIUM SERPL-SCNC: 4.3 MMOL/L (ref 3.5–5.1)
PROT SERPL-MCNC: 7.2 GM/DL (ref 6–8.4)
RBC # BLD AUTO: 3.21 M/UL (ref 4.6–6.2)
RELATIVE EOSINOPHIL (SMH): 0.1 % (ref 0–8)
RELATIVE LYMPHOCYTE (SMH): 19 % (ref 18–48)
RELATIVE MONOCYTE (SMH): 12.2 % (ref 4–15)
RELATIVE NEUTROPHIL (SMH): 63.7 % (ref 38–73)
SCHISTOCYTES BLD QL SMEAR: PRESENT
SODIUM SERPL-SCNC: 141 MMOL/L (ref 136–145)
WBC # BLD AUTO: 7.04 K/UL (ref 3.9–12.7)

## 2025-06-30 PROCEDURE — 82040 ASSAY OF SERUM ALBUMIN: CPT

## 2025-06-30 PROCEDURE — 85025 COMPLETE CBC W/AUTO DIFF WBC: CPT

## 2025-06-30 PROCEDURE — 36415 COLL VENOUS BLD VENIPUNCTURE: CPT

## 2025-06-30 NOTE — TELEPHONE ENCOUNTER
Discharge Information     Discharge Date:   6/28    Primary Discharge Diagnosis:  pneumonia due to inf      Discharge Summary:  Reviewed      Medication & Order Review     Were medication changes made or new medications added?   Yes    If so, has the patient filled the prescriptions?  Yes     Was Home Health ordered? Yes    If so, has Home Health contacted patient and/or initiated services?  Yes    Name of Home Health Agency? Pt do not recall HH Agency but, they have contacted pt.     Durable Medical Equipment ordered?  No     If so, has the DME provider contacted patient and delivered equipment?  N/A    Follow Up               Any problems since discharge? Yes    How is the patient feeling since returning home?  SOB    Have you set up recommended follow up appointments?  Infection and diseases and HFU with PCP    Schedule Hospital Follow-up appointment within 7-14 days (preferably 7).      Notes:  n/a            Roma Shine

## 2025-06-30 NOTE — TELEPHONE ENCOUNTER
----- Message from Nurse Torres sent at 6/30/2025 10:13 AM CDT -----  Call patient - needs post-hospital phone call within 2 business days and hospital follow up visit scheduled within 7-14 days.

## 2025-07-01 ENCOUNTER — TELEPHONE (OUTPATIENT)
Facility: CLINIC | Age: 65
End: 2025-07-01
Payer: MEDICARE

## 2025-07-01 NOTE — TELEPHONE ENCOUNTER
Labs faxed to Dr Chilel's office via [email or web address]. Attempted to call patient with above, no answer. Voicemail left with above.

## 2025-07-01 NOTE — TELEPHONE ENCOUNTER
----- Message from Jose Tello MD sent at 6/30/2025  5:00 PM CDT -----  Make sure nephrology aware of the creatinine please  ----- Message -----  From: Lab, Background User  Sent: 6/30/2025   8:08 AM CDT  To: Jose Tello MD

## 2025-07-02 ENCOUNTER — TELEPHONE (OUTPATIENT)
Facility: CLINIC | Age: 65
End: 2025-07-02

## 2025-07-02 ENCOUNTER — OFFICE VISIT (OUTPATIENT)
Facility: CLINIC | Age: 65
End: 2025-07-02
Payer: MEDICARE

## 2025-07-02 VITALS
TEMPERATURE: 99 F | BODY MASS INDEX: 29.89 KG/M2 | WEIGHT: 201.81 LBS | HEIGHT: 69 IN | SYSTOLIC BLOOD PRESSURE: 128 MMHG | OXYGEN SATURATION: 97 % | HEART RATE: 76 BPM | DIASTOLIC BLOOD PRESSURE: 76 MMHG

## 2025-07-02 DIAGNOSIS — N18.30 ANEMIA, CHRONIC RENAL FAILURE, STAGE 3 (MODERATE): ICD-10-CM

## 2025-07-02 DIAGNOSIS — D46.9 MDS (MYELODYSPLASTIC SYNDROME): Primary | ICD-10-CM

## 2025-07-02 DIAGNOSIS — D64.9 CHRONIC ANEMIA: ICD-10-CM

## 2025-07-02 DIAGNOSIS — D46.1 RARS (REFRACTORY ANEMIA WITH RINGED SIDEROBLASTS): ICD-10-CM

## 2025-07-02 DIAGNOSIS — D64.9 NORMOCHROMIC ANEMIA: ICD-10-CM

## 2025-07-02 DIAGNOSIS — D57.3 SICKLE CELL TRAIT SYNDROME: ICD-10-CM

## 2025-07-02 DIAGNOSIS — D69.6 THROMBOCYTOPENIA: ICD-10-CM

## 2025-07-02 DIAGNOSIS — D64.89 ANEMIA DUE TO MULTIPLE MECHANISMS: ICD-10-CM

## 2025-07-02 DIAGNOSIS — D72.821 MONOCYTOSIS: ICD-10-CM

## 2025-07-02 DIAGNOSIS — D63.1 ANEMIA, CHRONIC RENAL FAILURE, STAGE 3 (MODERATE): ICD-10-CM

## 2025-07-02 DIAGNOSIS — D64.9 ACUTE ON CHRONIC ANEMIA: ICD-10-CM

## 2025-07-02 PROCEDURE — 99999 PR PBB SHADOW E&M-EST. PATIENT-LVL IV: CPT | Mod: PBBFAC,,, | Performed by: INTERNAL MEDICINE

## 2025-07-02 PROCEDURE — 99214 OFFICE O/P EST MOD 30 MIN: CPT | Mod: PBBFAC,PN | Performed by: INTERNAL MEDICINE

## 2025-07-02 NOTE — PROGRESS NOTES
[facility]               PROGRESS NOTE - Follow-up Visit      Subjective:       Patient ID:   NAME: Rick Butler : 1960     65 y.o. male    Referring Doc: Chetna (new PCP)  Other Physicians: Getachew; Alan Mustafa    Chief Complaint:  Sickle cell trait/anemia/RARS  f/u    History of Present Illness:     Patient returns today for a regularly scheduled follow-up visit.  The patient is doing ok overall. He is here with his wife today. He was recently hospitalized for pneumonia at [facility], and discharged on oral antibiotics and home health. He was followed by  pulmonary, ID and nephrology while inpatient. He required a blood transfusion  and platelets x1 while inpatient. He was discharged on .     He last Rytelo treatment was on  and Dr Hilario had instructed us to hold it for a month and then re-evaluate labs.      He reports he is feeling better overall since discharge; no CP, SOB, HA's or N/V;     He is no longer coughing or congested and no SOB, but has some residual MORA. He remains on oral antibiotics.              ROS:   GEN: normal without any fever, night sweats or weight loss; fatigue  HEENT: normal with no HA's, sore throat, stiff neck, changes in vision  CV: normal with no CP, SOB, PND, MORA    PULM: normal with no SOB, cough, hemoptysis, sputum or pleuritic pain  GI: normal with no abdominal pain, nausea, vomiting, constipation, diarrhea, melanotic stools, BRBPR, or hematemesis  : normal with no hematuria, dysuria  BREAST: normal with no mass, discharge, pain  SKIN: normal with no rash, erythema, bruising, or swelling    Allergies:  Review of patient's allergies indicates:  No Known Allergies    Medications:    Current Outpatient Medications:     amiodarone (PACERONE) 400 MG tablet, Take 1 tablet (400 mg total) by mouth 2 (two) times daily for 3 days, THEN 1 tablet (400 mg total) once daily., Disp: 36 tablet, Rfl: 0    amLODIPine (NORVASC) 10 MG tablet, Take 1 tablet (10 mg total) by  mouth once daily., Disp: 30 tablet, Rfl: 2    ascorbic acid, vitamin C, (VITAMIN C) 500 MG tablet, Take 500 mg by mouth once daily., Disp: , Rfl:     atorvastatin (LIPITOR) 10 MG tablet, Take 1 tablet (10 mg total) by mouth every evening., Disp: 90 tablet, Rfl: 3    calcitRIOL (ROCALTROL) 0.25 MCG Cap, Take 0.25 mcg by mouth every 7 days., Disp: , Rfl:     ciprofloxacin HCl (CIPRO) 750 MG tablet, Take 1 tablet (750 mg total) by mouth once daily. for 7 days, Disp: 7 tablet, Rfl: 0    doxycycline (VIBRAMYCIN) 100 MG Cap, Take 1 capsule (100 mg total) by mouth every 12 (twelve) hours. for 7 days, Disp: 14 capsule, Rfl: 0    fluticasone propionate (FLONASE) 50 mcg/actuation nasal spray, 1 SPRAY (50 MCG TOTAL) BY EACH NOSTRIL ROUTE 2 (TWO) TIMES A DAY. 1 SPRAY EVERY MORNING AND AFTERNOON TOWARD THE EAR EACH SIDE AFTER A SINUS RINSE, Disp: 48 mL, Rfl: 1    folic acid (FOLVITE) 1 MG tablet, TAKE 2 TABLETS BY MOUTH EVERY DAY, Disp: 180 tablet, Rfl: 0    multivitamin with minerals Cap, Take 1 capsule by mouth every morning., Disp: , Rfl:     pantoprazole (PROTONIX) 40 MG tablet, Take 40 mg by mouth once daily., Disp: , Rfl:     sildenafil (VIAGRA) 100 MG tablet, Take 1 tablet (100 mg total) by mouth daily as needed for Erectile Dysfunction., Disp: 10 tablet, Rfl: 6    sod chlor-bicarb-squeez bottle (NEILMED SINUS RINSE COMPLETE) pkdv, 1 application  by sinus irrigation route 2 (two) times a day. Not right before bed, Disp: 1 each, Rfl: 11    tamsulosin (FLOMAX) 0.4 mg Cap, TAKE 2 CAPSULES BY MOUTH EVERY DAY, Disp: 180 capsule, Rfl: 1    traZODone (DESYREL) 100 MG tablet, TAKE 1 TABLET BY MOUTH NIGHTLY AS NEEDED FOR INSOMNIA., Disp: 90 tablet, Rfl: 2    cyproheptadine (PERIACTIN) 4 mg tablet, Take 1 tablet (4 mg total) by mouth 3 (three) times daily. (Patient not taking: Reported on 7/2/2025), Disp: 90 tablet, Rfl: 3    droNABinol (MARINOL) 10 MG capsule, Take 1 capsule (10 mg total) by mouth 2 (two) times daily before meals.  "(Patient not taking: Reported on 7/2/2025), Disp: 60 capsule, Rfl: 1    famotidine (PEPCID) 20 MG tablet, Take 20 mg by mouth 2 (two) times daily. (Patient not taking: Reported on 7/2/2025), Disp: , Rfl:     LIDOcaine (LIDODERM) 5 %, Place 1 patch onto the skin once daily. Remove & Discard patch within 12 hours or as directed by MD (Patient not taking: Reported on 7/2/2025), Disp: 14 patch, Rfl: 0    PMHx/PSHx Updates:  See patient's last visit with us in clinic 6/5/2025  See H&P on 7/9/2011      Pathology:    12/20/2019  BONE MARROW, RIGHT ILIAC CREST,    ASPIRATE, CLOT SECTION, AND CORE BIOPSY:    --HYPERCELLULAR MARROW (APPROXIMATELY 75% TO 80%) WITH TRILINEAGE   HEMATOPOIETIC ELEMENTS, ERYTHROID  HYPERPLASIA AND MILD MEGAKARYOCYTIC HYPERPLASIA, AND NON-SPECIFIC   DYSHEMATOPOIETIC CHANGES (SEE     COMMENT).  --[id number] INCREASED STAINABLE IRON WITH INCREASED RING   SIDEROBLASTS (GREATER THAN 15%)     (SEE COMMENT).  --PERIPHERAL BLOOD WITH THROMBOCYTOSIS (574,000/MICROLITER) AND ANEMIA   (HEMOGLOBIN 5.5 GRAM/DECILITER),    WITH SCATTERED DREPANOCYTOID FORMS AND FEW NUCLEATED ERYTHROCYTES      Cytogenetic Results at the bottom of the report.  NORMAL    Objective:     Vitals:  Blood pressure 128/76, pulse 76, temperature 98.7 °F (37.1 °C), temperature source Temporal, height 5' 9" (1.753 m), weight 91.5 kg (201 lb 12.8 oz), SpO2 97%.    Physical Examination:   GEN: no apparent distress, comfortable; AAOx3  HEAD: atraumatic and normocephalic  EYES: no pallor, no icterus, PERRLA  ENT: OMM, no pharyngeal erythema, external ears WNL; no nasal discharge; no thrush  NECK: no masses, thyroid normal, trachea midline, no LAD/LN's, supple  CV: RRR with no murmur; normal pulse; normal S1 and S2; no pedal edema  CHEST: Normal respiratory effort; CTAB; normal breath sounds; no wheeze or crackles  ABDOM: nontender and nondistended; soft; normal bowel sounds; no rebound/guarding  MUSC/Skeletal: ROM normal; no " crepitus; joints normal; no deformities or arthropathy  EXTREM: no clubbing, cyanosis, inflammation or swelling  SKIN: no rashes, lesions, ulcers, petechiae or subcutaneous nodules  : no jiang  NEURO: grossly intact; motor/sensory WNL; AAOx3; no tremors  PSYCH: normal mood, affect and behavior  LYMPH: normal cervical, supraclavicular, axillary and groin LN's            Labs:     Lab Results   Component Value Date    WBC 7.04 06/30/2025    HGB 8.8 (L) 06/30/2025    HCT 26.9 (L) 06/30/2025    MCV 84 06/30/2025    PLT 44 (LL) 06/30/2025           CMP  Sodium   Date Value Ref Range Status   06/30/2025 141 136 - 145 mmol/L Final   06/26/2019 138 134 - 144 mmol/L      Potassium   Date Value Ref Range Status   06/30/2025 4.3 3.5 - 5.1 mmol/L Final     Chloride   Date Value Ref Range Status   06/30/2025 110 95 - 110 mmol/L Final   06/26/2019 105 98 - 110 mmol/L      CO2   Date Value Ref Range Status   06/30/2025 22 (L) 23 - 29 mmol/L Final     Glucose   Date Value Ref Range Status   06/30/2025 98 70 - 110 mg/dL Final   06/26/2019 114 (H) 70 - 99 mg/dL      BUN   Date Value Ref Range Status   06/30/2025 45 (H) 8 - 23 mg/dL Final     Creatinine   Date Value Ref Range Status   06/30/2025 3.4 (H) 0.5 - 1.4 mg/dL Final   06/26/2019 1.62 (H) 0.60 - 1.40 mg/dL      Calcium   Date Value Ref Range Status   06/30/2025 9.1 8.7 - 10.5 mg/dL Final     Protein Total   Date Value Ref Range Status   06/30/2025 7.2 6.0 - 8.4 gm/dL Final     Albumin   Date Value Ref Range Status   06/30/2025 3.6 3.5 - 5.2 g/dL Final   06/26/2019 4.4 3.1 - 4.7 g/dL      Bilirubin Total   Date Value Ref Range Status   06/30/2025 0.5 0.1 - 1.0 mg/dL Final     Comment:     For infants and newborns, interpretation of results should be based   on gestational age, weight and in agreement with clinical   observations.    Premature Infant recommended reference ranges:   0-24 hours:  <8.0 mg/dL   24-48 hours: <12.0 mg/dL   3-5 days:    <15.0 mg/dL   6-29 days:    <15.0 mg/dL     ALP   Date Value Ref Range Status   06/30/2025 54 (L) 55 - 135 unit/L Final     AST   Date Value Ref Range Status   06/30/2025 22 10 - 40 unit/L Final     ALT   Date Value Ref Range Status   06/30/2025 33 10 - 44 unit/L Final     Anion Gap   Date Value Ref Range Status   06/30/2025 9 8 - 16 mmol/L Final     eGFR if    Date Value Ref Range Status   07/13/2022 45.6 (A) >60 mL/min/1.73 m^2 Final     eGFR if non    Date Value Ref Range Status   07/13/2022 39.5 (A) >60 mL/min/1.73 m^2 Final     Comment:     Calculation used to obtain the estimated glomerular filtration  rate (eGFR) is the CKD-EPI equation.          Lab Results   Component Value Date    IRON 159 06/04/2025    TIBC 164 (L) 06/04/2025    FERRITIN 1,953.3 (H) 06/04/2025           I have reviewed all available lab results and radiology reports.    Radiology/Diagnostic Studies:    US 2/8/2023:    IMPRESSION:  1. Dilation of the common bile duct at up to 10 mm, unchanged when compared to 2016.  2. Nonvisualization of the pancreas because of overlying bowel gas.  3. No other significant findings.  spleen is normal in size and appearance      2/15/2018 Xray Survey:   IMPRESSION: No significant abnormality seen. No evidence of osteoblastic or  osteoclastic lesions identified    MRI L-spine 2/12/2018  IMPRESSION:    1. Prominent low signal intensity throughout the bone marrow on T1 and  T2-weighted imaging. Marrow infiltrative process including malignancy such as  metastatic disease or lymphoma/leukemia is a consideration along with multiple  myeloma or malignant process. Benign etiology such as osteopetrosis or  regenerative red marrow are also considerations.    2. Multilevel lumbar degenerative changes, most prominent at L4-L5 and L5-S1 as  described.    Assessment/Plan:   (1) 65 y.o. male with diagnosis of sickle cell anemia with borderline microcytosis  - latest hgb was 6.0 and he had blood transfusion  - today,  "he is not symptomatic at this time  - he probably has a multifactorial anemia process with underlying sickle cell, anemia of chronic disorders and anemia of chronic renal. I can not rule out an underlying GI bleeding process.   - iron panel is adequate (no deficiency)  - total bilirubin is only minimally elevated  - he required transfusion again in Oct 2019 and again in Dec 2019  - he had bone marrow biopsy on 12/20/2019    "dyshematopoietic changes are not entirely specific and are present  mostly within erythroid and megakaryocytic lineages. These findings   could be observed in chronic disease states, autoimmune conditions,  infectious settings, toxic exposures, nutritional deficits, as medication/drug effect, and in myelodysplastic syndrome (MDS), among other conditions"  "Additionally, ring sideroblasts are a non-specific   finding "    Patient was referred to see Dr Mustafa at 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  - he is now on procrit per directives of Dr Mustafa    7/30/2020:   he recently saw Dr Mustafa  And sees him again in Jan 2021  - he is doing well with procrit and is feeling much better  - he has not required any transfusions for some time time    9/24/2020:  - latest hgb at 8.5  - will increase the procrit dose  - f/u with Dr Zavala in Jan 2021 11/19/2020:  - patient doing well and feels "great"  - hgb up to 9.1; continued on the procrit  - f/u with Dr Mustafa in Jan 2021    3/4/2021:  - he saw Dr Mustafa in jan 2021 and sees him again in June/July 2021  - latest hgb at 9.1 and stable and platelets are 474,000; wbc at 10.0    6/10/2021:  - latest hgb at 9.0  - plats 485,000  - on weekly procrit  - seeing Dr Mustafa again tomorrow    10/14/2021:  - hgb at 8.6 but he recently had been taking naproxen and had some BRBPR - which since resolved  - he saw Dr Beyer with GI and he is having colonsocopy on Nov 5th along with EGD  - plats 429,000  - he sees Dr Mustafa again in Nov 19th "   - he sees Dr Mas again in Dec 2021    1/13/2022:  - He has been under the care of Dr Mustafa at [facility] for RARS- MDS. He is now on procrit weekly. He sees Dr Mustafa again in March 2022    - hgb 9.3  - he had scope with Dr Collins in nov 2021 which was good per patient and they plan to repeat in 5 yrs    5/12/2022:  - hgb at 9.0  - plats 485  - wbc 7.25  - he is on procrit weekly  - saw Dr Mustafa this past Fridday and since he is leaving, he will start seeing Dr Hilario instead in 3 months    8/18/2022:  - latest hgb a little lower at 8.8  - he has been on the procrit  - he saw Dr Hilario recently at [facility] and plans to see her again in 3 months    1/26/2023:  - He saw Dr Hilario at [facility] again recently this past Tuesday and they are considering another medication; he plans to see her again in April 2023  - Dr Hilario requested he have an US of spleen - will order here in [place]  - he is also on appetite stimulant  - hgb at 9.4 and plats at 500k    5/11/2023:  - hgb at 9.6 and plats 466,000  - He saw Dr Hilario at [facility] again recently this past Tuesday and they are considering another medication; he plans to see her again in Aug 2023    8/3/2023:  - His insurance will not approve of him getting the Reblozyl   - He sees Dr Hilario again next Tuesday and we will await her directives.     9/14/2023:  - the Rebozyl has been approved - will set up  - latest hgb at 10.1  - seeing Dr Hilario again in Jan 2024 11/16/2023:  - continued on Rebozyl  - WBC and plats WNL  - hgb at 10.3  - cardiac w/u negative per patient    2/15/2024:  - labs are adequate  - hgb at 10.6  - WBC and plat WNL  - continued on Rebozyl  - recent colonoscopy good per patient    4/4/2024:  - continued on Rebozyl  - counts are looking adequate    8/1/2024:  - continued on procrit  - insurance would not approve of the rebozyl and he has not had the last 2-3 injections  - hold rebozyl for now anyway due to the increased creatine and decreased kidney function  - f/u with  "Dr Chilel  - seeing Dr Hilario on 8/13  - continue procrit as directed    10/10/2024  - he sees Dr Mas on 10/31  - he has been continued on rebozyl and is due again next week  - he has been on procrit  - Dr Hilario is now at [facility] in [place] and he last saw her in Aug 2024 - he plans to continue under her care via telemed    5/8/2025:  - He saw Cheryl [facility] [place] on 4/10/2025; he saw Diana NP on 4/28/2025  - He has been on the new drug Rytelo for the past two months per direction of Dr Hilario at [facility]; he saw Dr Hilario on 3/4/2025  - His counts dropped this week and he received two units blood and single donor platelet today; Pratima has spoken to Dr Hilario who is aware and she suspects it is still in the window of the therapy where the counts initially drop before leveling off  - He sees Dr Mas with nephrology again in July/Aug 2025    7/2/2025:  - He was recently hospitalized for pneumonia at [facility], and discharged on oral antibiotics and home health. He was followed by  pulmonary, ID and nephrology while inpatient. He required a blood transfusion  and platelets x1 while inpatient. He was discharged on 6/28.   - He last Rytelo treatment was on 6/2 and Dr Hilario had instructed us to hold it for a month and then re-evaluate labs.   - will reach out to Dr Hilario and see if she wants a telemed with him first and then decide on next course of therapy     (2) Alcohol abuse issues in past - he has been sober for over 3 years now    (3) New findings on radiography with abnormal chest CT and lung mass  - former smoker    (4) chronic back issues - prior Xrays and MRI - suspect the findings on the MRI are possibly due to his sickle cell;   - SPEP was previously negative for M-protein  - PSA was 0.3 in Sept 2017  - recommended neurosurgery evaluation previously  - he saw Dr Nura VUONG and had a nerve "burning" procedure and his pain has improved    (5) CRI - elevated creatinine - he is followed Dr Chilel with nephrology      (6) H pylori gastritis - " s/p recent endoscopy with Dr Collins and antibotics x 10 days    (7) CP   9/14/2023:  - seeing Dr lovett with cardiology and planning stress test in near future        1. MDS (myelodysplastic syndrome)        2. Chronic anemia        3. Anemia, chronic renal failure, stage 3 (moderate)        4. Anemia due to multiple mechanisms        5. Acute on chronic anemia        6. Thrombocytopenia        7. 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 Monocytosis        10. Sickle cell trait syndrome          PLAN;  1. reach out to Dr Hilario and see if she wants a telemed with him first and then decide on next course of therapy; check labs 2x/week and transfuse as needed; continue with remainder of antibiotics  2. F/u with Dr Mas  in July/Aug 2025  2. F/u with Dr Hilario   3. F/u with cardiology as directed  4.  F/u with Jonathan as needed                   RTC  2 weeks with Whit and 4 weeks with myself    Fax note to Dennis Basilio Tveit;  Lewis Hilario; Jonathan; Jr Werner    Total Time spent on patient:    I spent over 25 mins of time with the patient. Reviewed results of the recently ordered labs, tests, reports and studies; made directives with regards to the results. Over half of this time was spent couseling and coordinating care, making treatment and analytical decisions; ordering necessary labs, tests and studies; and discussing treatment options and setting up treatment plan(s) if indicated.            Discussion:       Pathology Discussion:    I reviewed and discussed the pathology report(s) and radiograph reports (if available) in as simple to understand and/or laymen's terms to the best of my ability. I had an indepth conversation with the patient and went over the patient's individual diagnosis based on the information that was currently available. I discussed the TNM staging process with regard to the patient's particular cancer type, and the calculated stage based on the  currently available TNM data and literature. I discussed the available prognostic data with regard to the current staging information and how it relates to the prognosis of their particular neoplastic process.          COVID-19 Discussion:    I had long discussion with patient and any applicable family about the COVID-19 coronavirus epidemic and the recommended precautions with regard to cancer and/or hematology patients. I have re-iterated the CDC recommendations for adequate hand washing, use of hand -like products, and coughing into elbow, etc. In addition, especially for our patients who are on chemotherapy and/or our otherwise immunocompromised patients, I have recommended avoidance of crowds, including movie theaters, restaurants, churches, etc. I have recommended avoidance of any sick or symptomatic family members and/or friends. I have also recommended avoidance of any raw and unwashed food products, and general avoidance of food items that have not been prepared by themselves. The patient has been asked to call us immediately with any symptom developments, issues, questions or other general concerns.     I have explained all of the above in detail and the patient understands all of the current recommendation(s). I have answered all of their questions to the best of my ability and to their complete satisfaction.   The patient is to continue with the current management plan.            Electronically signed by Jose Tello MD                                     Never

## 2025-07-02 NOTE — TELEPHONE ENCOUNTER
Per Pratima's orders to do so, I spoke to patient and made aware that Dr Hilario wants to see him in person for appt, provided him with their office number to call to scheduled. I also made patient aware that per Dr Tello, he will need to see Dr Hilario prior to them restarting any treatment here at , I also informed him that Dr Tello would like him to still have his labs drawn Monday as scheduled. Verbalized understanding.     Patient reports that he is scheduled for f/u with Dr Saez on 7/22.

## 2025-07-03 ENCOUNTER — TELEPHONE (OUTPATIENT)
Facility: CLINIC | Age: 65
End: 2025-07-03
Payer: MEDICARE

## 2025-07-03 ENCOUNTER — PATIENT OUTREACH (OUTPATIENT)
Dept: ADMINISTRATIVE | Facility: HOSPITAL | Age: 65
End: 2025-07-03
Payer: MEDICARE

## 2025-07-03 DIAGNOSIS — N18.4 CHRONIC KIDNEY DISEASE (CKD), STAGE IV (SEVERE): Primary | ICD-10-CM

## 2025-07-03 NOTE — TELEPHONE ENCOUNTER
Patient called to report that he reached out to [facility] concerning gummies to help with his appetite and he was told that they need a recommendation from Dr Tello to get him enrolled in the program. I spoke to Edinson with [facility] ([phone]) made aware of above and informed that Dr Tello is not enrolled in the marijuana program, he states patient will need to call them, let them know that and they will give him resources to get him enrolled with one of their providers so that they can prescribe him what he is in need of. Patient made aware of above, verbalized understanding. He was also made aware that Юл[facility] is working on the [facility] paperwork that was dropped off an we are trying our best to have this completed for them by Monday. Verbalized understanding.

## 2025-07-07 ENCOUNTER — LAB VISIT (OUTPATIENT)
Dept: LAB | Facility: HOSPITAL | Age: 65
End: 2025-07-07
Attending: INTERNAL MEDICINE
Payer: MEDICARE

## 2025-07-07 ENCOUNTER — RESULTS FOLLOW-UP (OUTPATIENT)
Facility: CLINIC | Age: 65
End: 2025-07-07

## 2025-07-07 ENCOUNTER — TELEPHONE (OUTPATIENT)
Facility: CLINIC | Age: 65
End: 2025-07-07
Payer: MEDICARE

## 2025-07-07 DIAGNOSIS — D46.9 MDS (MYELODYSPLASTIC SYNDROME): ICD-10-CM

## 2025-07-07 DIAGNOSIS — D46.9 MDS (MYELODYSPLASTIC SYNDROME): Primary | ICD-10-CM

## 2025-07-07 DIAGNOSIS — N18.4 CHRONIC KIDNEY DISEASE (CKD), STAGE IV (SEVERE): ICD-10-CM

## 2025-07-07 LAB
ABSOLUTE EOSINOPHIL (SMH): 0.1 K/UL
ABSOLUTE MONOCYTE (SMH): 0.49 K/UL (ref 0.3–1)
ABSOLUTE NEUTROPHIL COUNT (SMH): 2.7 K/UL (ref 1.8–7.7)
ALBUMIN SERPL-MCNC: 3.9 G/DL (ref 3.5–5.2)
ALBUMIN/CREAT UR: 175.4 UG/MG
ALP SERPL-CCNC: 53 UNIT/L (ref 55–135)
ALT SERPL-CCNC: 35 UNIT/L (ref 10–44)
ANION GAP (SMH): 9 MMOL/L (ref 8–16)
AST SERPL-CCNC: 29 UNIT/L (ref 10–40)
BASOPHILS # BLD AUTO: 0.03 K/UL
BASOPHILS NFR BLD AUTO: 0.6 %
BILIRUB SERPL-MCNC: 0.5 MG/DL (ref 0.1–1)
BUN SERPL-MCNC: 45 MG/DL (ref 8–23)
CALCIUM SERPL-MCNC: 9.5 MG/DL (ref 8.7–10.5)
CHLORIDE SERPL-SCNC: 107 MMOL/L (ref 95–110)
CO2 SERPL-SCNC: 24 MMOL/L (ref 23–29)
CREAT SERPL-MCNC: 3.9 MG/DL (ref 0.5–1.4)
CREAT UR-MCNC: 78.3 MG/DL (ref 23–375)
ERYTHROCYTE [DISTWIDTH] IN BLOOD BY AUTOMATED COUNT: 15.6 % (ref 11.5–14.5)
GFR SERPLBLD CREATININE-BSD FMLA CKD-EPI: 16 ML/MIN/1.73/M2
GLUCOSE SERPL-MCNC: 98 MG/DL (ref 70–110)
HCT VFR BLD AUTO: 24.7 % (ref 40–54)
HGB BLD-MCNC: 8 GM/DL (ref 14–18)
IMM GRANULOCYTES # BLD AUTO: 0.04 K/UL (ref 0–0.04)
IMM GRANULOCYTES NFR BLD AUTO: 0.8 % (ref 0–0.5)
LYMPHOCYTES # BLD AUTO: 1.52 K/UL (ref 1–4.8)
MCH RBC QN AUTO: 27.2 PG (ref 27–31)
MCHC RBC AUTO-ENTMCNC: 32.4 G/DL (ref 32–36)
MCV RBC AUTO: 84 FL (ref 82–98)
MICROALBUMIN UR-MCNC: 137.3 UG/ML
NUCLEATED RBC (/100WBC) (SMH): 0 /100 WBC
PLATELET # BLD AUTO: 64 K/UL (ref 150–450)
PLATELET BLD QL SMEAR: ABNORMAL
PMV BLD AUTO: 10.1 FL (ref 9.2–12.9)
POTASSIUM SERPL-SCNC: 4.2 MMOL/L (ref 3.5–5.1)
PROT SERPL-MCNC: 7.5 GM/DL (ref 6–8.4)
RBC # BLD AUTO: 2.94 M/UL (ref 4.6–6.2)
RELATIVE EOSINOPHIL (SMH): 2.1 % (ref 0–8)
RELATIVE LYMPHOCYTE (SMH): 31.3 % (ref 18–48)
RELATIVE MONOCYTE (SMH): 10.1 % (ref 4–15)
RELATIVE NEUTROPHIL (SMH): 55.1 % (ref 38–73)
SODIUM SERPL-SCNC: 140 MMOL/L (ref 136–145)
WBC # BLD AUTO: 4.85 K/UL (ref 3.9–12.7)

## 2025-07-07 PROCEDURE — 36415 COLL VENOUS BLD VENIPUNCTURE: CPT

## 2025-07-07 PROCEDURE — 85025 COMPLETE CBC W/AUTO DIFF WBC: CPT

## 2025-07-07 PROCEDURE — 84075 ASSAY ALKALINE PHOSPHATASE: CPT

## 2025-07-07 PROCEDURE — 82570 ASSAY OF URINE CREATININE: CPT

## 2025-07-07 NOTE — TELEPHONE ENCOUNTER
----- Message from Med Assistant Kim sent at 7/7/2025  9:56 AM CDT -----  Regarding: PHARMACY REQUEST   Pt found a pharmacy located closer to home that can provided the above Med Recommendation; pt requesting a call back from the office if it is sent to the above pharmacy.    RX Name and Strength:MEDICAL MARIJUANA  How is the patient currently taking it? (ex. 1XDay):  Is this a 30 day or 90 day RX:  Preferred Pharmacy with phone number:[facility].    [address] [facility] [address] [place] [id number]    [phone]

## 2025-07-07 NOTE — TELEPHONE ENCOUNTER
Patient made aware that [facility] paperwork is complete, he requested I fax and leave copy of paperwork at  for him to pickup. Faxed and copy left at  for pickup.

## 2025-07-08 ENCOUNTER — LAB VISIT (OUTPATIENT)
Dept: LAB | Facility: HOSPITAL | Age: 65
End: 2025-07-08
Attending: NURSE PRACTITIONER
Payer: MEDICARE

## 2025-07-08 ENCOUNTER — OFFICE VISIT (OUTPATIENT)
Dept: FAMILY MEDICINE | Facility: CLINIC | Age: 65
End: 2025-07-08
Payer: MEDICARE

## 2025-07-08 VITALS
TEMPERATURE: 99 F | HEART RATE: 78 BPM | BODY MASS INDEX: 30.18 KG/M2 | WEIGHT: 204.38 LBS | OXYGEN SATURATION: 99 % | SYSTOLIC BLOOD PRESSURE: 110 MMHG | DIASTOLIC BLOOD PRESSURE: 60 MMHG

## 2025-07-08 DIAGNOSIS — N18.30 ANEMIA, CHRONIC RENAL FAILURE, STAGE 3 (MODERATE): ICD-10-CM

## 2025-07-08 DIAGNOSIS — E53.8 FOLIC ACID DEFICIENCY: ICD-10-CM

## 2025-07-08 DIAGNOSIS — Z09 HOSPITAL DISCHARGE FOLLOW-UP: Primary | ICD-10-CM

## 2025-07-08 DIAGNOSIS — D63.1 ANEMIA, CHRONIC RENAL FAILURE, STAGE 3 (MODERATE): ICD-10-CM

## 2025-07-08 DIAGNOSIS — I48.0 PAF (PAROXYSMAL ATRIAL FIBRILLATION): ICD-10-CM

## 2025-07-08 DIAGNOSIS — D46.9 MDS (MYELODYSPLASTIC SYNDROME): ICD-10-CM

## 2025-07-08 DIAGNOSIS — J18.9 PNEUMONIA OF RIGHT LUNG DUE TO INFECTIOUS ORGANISM, UNSPECIFIED PART OF LUNG: ICD-10-CM

## 2025-07-08 DIAGNOSIS — I10 ESSENTIAL HYPERTENSION: ICD-10-CM

## 2025-07-08 DIAGNOSIS — N18.4 CHRONIC KIDNEY DISEASE (CKD), STAGE IV (SEVERE): ICD-10-CM

## 2025-07-08 PROCEDURE — 99213 OFFICE O/P EST LOW 20 MIN: CPT | Mod: PBBFAC,PN | Performed by: NURSE PRACTITIONER

## 2025-07-08 PROCEDURE — 36415 COLL VENOUS BLD VENIPUNCTURE: CPT

## 2025-07-08 PROCEDURE — 82668 ASSAY OF ERYTHROPOIETIN: CPT

## 2025-07-08 PROCEDURE — 99999 PR PBB SHADOW E&M-EST. PATIENT-LVL III: CPT | Mod: PBBFAC,,, | Performed by: NURSE PRACTITIONER

## 2025-07-08 RX ORDER — FOLIC ACID 1 MG/1
2000 TABLET ORAL DAILY
Qty: 180 TABLET | Refills: 1 | Status: SHIPPED | OUTPATIENT
Start: 2025-07-08

## 2025-07-08 NOTE — PROGRESS NOTES
SUBJECTIVE:    Patient ID: Rick Butler is a 65 y.o. male.    Chief Complaint: Hospital Follow Up    History of Present Illness    CHIEF COMPLAINT:  Mr. Butler presents for a hospital follow-up visit after being treated for pneumonia and developing atrial fibrillation during hospitalization.    HPI:  Mr. Butler was recently hospitalized for pneumonia in the right upper and lower lobes, initially presenting with fever and upper respiratory infection symptoms. During the hospital stay, he developed atrial fibrillation and was treated with an amiodarone drip, which has since been transitioned to oral amiodarone. He also had anemia requiring three units of blood transfusion and received one unit of platelets due to his myelodysplastic syndrome (MDS).    His respiratory status improved with treatment, and he was discharged on cefepime and doxycycline antibiotics, which have now been completed. He had some weakness while in the hospital and worked with physical therapy, receiving a rolling walker. He reports returning to his baseline functional status. He no longer requires a walker.    Mr. Butler has follow-up visits scheduled with cardiology and infectious disease on the 16th. He has already been evaluated by Dr. Tello on the 30th and Dr. Saez today. He plans to restart Rytelo, described as chemotherapy, and is awaiting lab results to determine if he can restart this medication.    Mr. Butler denies shortness of breath, chest pain, cough, or any other ongoing symptoms related to his recent hospitalization.    TEST RESULTS:  During his hospital stay, the patient received 3 units of blood transfusion for anemia. He also received 1 unit of platelet transfusion for MDS.    IMAGING:  A chest XR or CT performed during the hospital stay revealed pneumonia in the right upper and lower lobes.    SOCIAL HISTORY:  Marital status:  to Nelly       Review of Systems   Constitutional:  Negative for activity change,  appetite change, chills, diaphoresis, fatigue, fever and unexpected weight change.   HENT:  Negative for congestion, ear pain, sinus pressure, sore throat, trouble swallowing and voice change.    Eyes:  Negative for pain, discharge and visual disturbance.   Respiratory:  Negative for cough, chest tightness, shortness of breath and wheezing.    Cardiovascular:  Negative for chest pain and palpitations.   Gastrointestinal:  Negative for abdominal pain, constipation, diarrhea, nausea and vomiting.   Genitourinary:  Negative for difficulty urinating, flank pain, frequency and urgency.   Musculoskeletal:  Negative for back pain and joint swelling.   Skin:  Negative for color change and rash.   Neurological:  Negative for dizziness, seizures, syncope, weakness, numbness and headaches.   Hematological:  Negative for adenopathy.   Psychiatric/Behavioral:  Negative for dysphoric mood and sleep disturbance. The patient is not nervous/anxious.        Admit Date: 06/18/2025  Discharge Date: 06/28/2025  Discharge Facility: Hospital      Family and/or Caretaker present at visit? Yes  Medication Reconciliation:  Medications changed/added/deleted. Started on amiodarone 400 mg, ciprofloxacin 750 mg, doxycycline 100 mg. Amlodipine changed to 10 mg daily.   New Prescriptions filled after discharge: yes  Discharge summary reviewed:  yes  Diagnostic tests reviewed/disposition: I have reviewed all completed as well as pending diagnostic tests at the time of discharge  Disease/illness education: [facility] Salmon  Follow up appointments scheduled:  yes              Heme-Onc 6/30, Cardiology 7/16, Infectious Disease 7/16  Follow up labs/tests ordered:   no  Home Health ordered on discharge: Patient has home health established at [facility] [employer]  Home Health company name: [facility]  Establishment or re-establishment of referral orders for community resources: No other necessary community resources  DME ordered at discharge:    "Rolling walker  How patient is feeling since discharge from the hospital?  Feeling much better. Back at baseline. No longer requiring a walker. Denies fever, cough, SOB, and chest pain.      Discussion with other health care providers: No discussion with other health care providers necessary  Patient follow up phone call documented on separate encounter.    Admission Date: 6/18/2025  Hospital Length of Stay: 10 days  Discharge Date and Time: 06/28/2025 3:25 PM  Attending Physician: Tiffany Babcock DO   Discharging Provider: Tiffany Babcock DO  Primary Care Provider: Reynaldo Basilio FNP-C     Primary Care Team: Networked reference to record PCT      HPI:   65 year old pt getting admitted with Sepsis/Severe pneumonia/Neutropenic fever  Pt was suffering from URTI like infection for past several days  Later he suffered from severe SOB/cough  Today symptoms went worse, came to ER found to be hypoxic and got admitted      Procedure(s) (LRB):  Bronchoscopy (N/A)       Hospital Course:   65-year-old male with myelodysplastic syndrome admitted with severe sepsis/pneumonia/neutropenic fever. Continue antibiotics and ID followed.  Later patient developed elevated troponin for demand ischemia and developed AFib with RVR and placed on IV amiodarone and transitioned to oral amiodarone.  For symptomatic anemia, s/p 3 units of packed red blood cells .For MDS, patient has received 1 platelet transfusion during hospital stay.  Oncologist is closely following.  Respiratory status has improved and patient is on room air.  He is on cefepime and doxy per ID while admitted and upon discharge he will complete Cipro and doxy through July 5th per Dr. Crawford recommendations.  Follow up at Dr. Tello's office on Monday 6/30.   Patient worked with physical therapy and PT note 6/27 " Pt performed sit to stand transfer with stand by assist. Pt ambulated 75' x 4 with RW and stand by assist. Pt with three standing rest break due " "to fatigue." Therefore patient will require a rolling walker.    Lab Visit on 07/07/2025   Component Date Value Ref Range Status    Sodium 07/07/2025 140  136 - 145 mmol/L Final    Potassium 07/07/2025 4.2  3.5 - 5.1 mmol/L Final    Chloride 07/07/2025 107  95 - 110 mmol/L Final    CO2 07/07/2025 24  23 - 29 mmol/L Final    Glucose 07/07/2025 98  70 - 110 mg/dL Final    BUN 07/07/2025 45 (H)  8 - 23 mg/dL Final    Creatinine 07/07/2025 3.9 (H)  0.5 - 1.4 mg/dL Final    Calcium 07/07/2025 9.5  8.7 - 10.5 mg/dL Final    Protein Total 07/07/2025 7.5  6.0 - 8.4 gm/dL Final    Albumin 07/07/2025 3.9  3.5 - 5.2 g/dL Final    Bilirubin Total 07/07/2025 0.5  0.1 - 1.0 mg/dL Final    ALP 07/07/2025 53 (L)  55 - 135 unit/L Final    AST 07/07/2025 29  10 - 40 unit/L Final    ALT 07/07/2025 35  10 - 44 unit/L Final    Anion Gap 07/07/2025 9  8 - 16 mmol/L Final    eGFR 07/07/2025 16 (L)  >60 mL/min/1.73/m2 Final    Urine Microalbumin 07/07/2025 137.3 (H)  <=19.8 ug/mL Final    Urine Creatinine 07/07/2025 78.3  23.0 - 375.0 mg/dL Final    Microalbumin/Creatinine Ratio Urine 07/07/2025 175.4 (H)  <=30.0 ug/mg Final    WBC 07/07/2025 4.85  3.90 - 12.70 K/uL Final    RBC 07/07/2025 2.94 (L)  4.60 - 6.20 M/uL Final    Hgb 07/07/2025 8.0 (L)  14.0 - 18.0 gm/dL Final    Hct 07/07/2025 24.7 (L)  40.0 - 54.0 % Final    MCV 07/07/2025 84  82 - 98 fL Final    MCH 07/07/2025 27.2  27.0 - 31.0 pg Final    MCHC 07/07/2025 32.4  32.0 - 36.0 g/dL Final    RDW 07/07/2025 15.6 (H)  11.5 - 14.5 % Final    Platelet Count 07/07/2025 64 (L)  150 - 450 K/uL Final    MPV 07/07/2025 10.1  9.2 - 12.9 fL Final    Nucleated RBC 07/07/2025 0  <=0 /100 WBC Final    Neut % 07/07/2025 55.1  38 - 73 % Final    Lymph % 07/07/2025 31.3  18 - 48 % Final    Mono % 07/07/2025 10.1  4 - 15 % Final    Eos % 07/07/2025 2.1  0 - 8 % Final    Basophil % 07/07/2025 0.6  <=1.9 % Final    Imm Grans % 07/07/2025 0.8 (H)  0.0 - 0.5 % Final    Neut # 07/07/2025 2.7  1.8 - 7.7 " K/uL Final    Lymph # 07/07/2025 1.52  1 - 4.8 K/uL Final    Mono # 07/07/2025 0.49  0.3 - 1 K/uL Final    Eos # 07/07/2025 0.10  <=0.5 K/uL Final    Baso # 07/07/2025 0.03  <=0.2 K/uL Final    Imm Grans # 07/07/2025 0.04  0.00 - 0.04 K/uL Final    Platelet Estimate 07/07/2025 Decreased (A)  Appears Normal Final   Lab Visit on 06/30/2025   Component Date Value Ref Range Status    Sodium 06/30/2025 141  136 - 145 mmol/L Final    Potassium 06/30/2025 4.3  3.5 - 5.1 mmol/L Final    Chloride 06/30/2025 110  95 - 110 mmol/L Final    CO2 06/30/2025 22 (L)  23 - 29 mmol/L Final    Glucose 06/30/2025 98  70 - 110 mg/dL Final    BUN 06/30/2025 45 (H)  8 - 23 mg/dL Final    Creatinine 06/30/2025 3.4 (H)  0.5 - 1.4 mg/dL Final    Calcium 06/30/2025 9.1  8.7 - 10.5 mg/dL Final    Protein Total 06/30/2025 7.2  6.0 - 8.4 gm/dL Final    Albumin 06/30/2025 3.6  3.5 - 5.2 g/dL Final    Bilirubin Total 06/30/2025 0.5  0.1 - 1.0 mg/dL Final    ALP 06/30/2025 54 (L)  55 - 135 unit/L Final    AST 06/30/2025 22  10 - 40 unit/L Final    ALT 06/30/2025 33  10 - 44 unit/L Final    Anion Gap 06/30/2025 9  8 - 16 mmol/L Final    eGFR 06/30/2025 19 (L)  >60 mL/min/1.73/m2 Final    WBC 06/30/2025 7.04  3.90 - 12.70 K/uL Final    RBC 06/30/2025 3.21 (L)  4.60 - 6.20 M/uL Final    Hgb 06/30/2025 8.8 (L)  14.0 - 18.0 gm/dL Final    Hct 06/30/2025 26.9 (L)  40.0 - 54.0 % Final    MCV 06/30/2025 84  82 - 98 fL Final    MCH 06/30/2025 27.4  27.0 - 31.0 pg Final    MCHC 06/30/2025 32.7  32.0 - 36.0 g/dL Final    RDW 06/30/2025 16.0 (H)  11.5 - 14.5 % Final    Platelet Count 06/30/2025 44 (LL)  150 - 450 K/uL Final    MPV 06/30/2025 11.4  9.2 - 12.9 fL Final    Nucleated RBC 06/30/2025 0  <=0 /100 WBC Final    Neut % 06/30/2025 63.7  38 - 73 % Final    Lymph % 06/30/2025 19.0  18 - 48 % Final    Mono % 06/30/2025 12.2  4 - 15 % Final    Eos % 06/30/2025 0.1  0 - 8 % Final    Basophil % 06/30/2025 0.6  <=1.9 % Final    Imm Grans % 06/30/2025 4.4 (H)   0.0 - 0.5 % Final    Neut # 06/30/2025 4.5  1.8 - 7.7 K/uL Final    Lymph # 06/30/2025 1.34  1 - 4.8 K/uL Final    Mono # 06/30/2025 0.86  0.3 - 1 K/uL Final    Eos # 06/30/2025 0.01  <=0.5 K/uL Final    Baso # 06/30/2025 0.04  <=0.2 K/uL Final    Imm Grans # 06/30/2025 0.31 (H)  0.00 - 0.04 K/uL Final    Platelet Estimate 06/30/2025 Decreased (A)  Appears Normal Final    Anisocyte 06/30/2025 Slight   Final    Poik 06/30/2025 Slight   Final    Hypochrom 06/30/2025 Occasional   Final    Elder Cells 06/30/2025 Occasional   Final    Schistocyte 06/30/2025 Present   Final   No results displayed because visit has over 200 results.      Infusion on 06/05/2025   Component Date Value Ref Range Status    UNIT NUMBER 06/04/2025 [id number]   Final    UNIT ABO/RH 06/04/2025 A NEG   Final    DISPENSE STATUS 06/04/2025 Transfused   Final    Unit Expiration 06/04/2025 [id number]   Final    Product Code 06/04/2025 [id number]   Final    Unit Blood Type Code 06/04/2025 0600   Final    CROSSMATCH INTERPRETATION 06/04/2025 Not required   Final    UNIT NUMBER 06/04/2025 [id number]   Final    UNIT ABO/RH 06/04/2025 O POS   Final    DISPENSE STATUS 06/04/2025 Transfused   Final    Unit Expiration 06/04/2025 [id number]   Final    Product Code 06/04/2025 [id number]   Final    Unit Blood Type Code 06/04/2025 5100   Final    CROSSMATCH INTERPRETATION 06/04/2025 Compatible   Final    UNIT NUMBER 06/04/2025 [id number]   Final    UNIT ABO/RH 06/04/2025 O POS   Final    DISPENSE STATUS 06/04/2025 Transfused   Final    Unit Expiration 06/04/2025 [id number]   Final    Product Code 06/04/2025 [id number]   Final    Unit Blood Type Code 06/04/2025 5100   Final    CROSSMATCH INTERPRETATION 06/04/2025 Compatible   Final   Lab Visit on 06/04/2025   Component Date Value Ref Range Status    Sodium 06/04/2025 142  136 - 145 mmol/L Final    Potassium 06/04/2025 4.3  3.5 - 5.1 mmol/L Final    Chloride 06/04/2025 111 (H)  95 - 110 mmol/L Final    CO2  06/04/2025 25  23 - 29 mmol/L Final    Glucose 06/04/2025 121 (H)  70 - 110 mg/dL Final    BUN 06/04/2025 48 (H)  8 - 23 mg/dL Final    Creatinine 06/04/2025 3.9 (H)  0.5 - 1.4 mg/dL Final    Calcium 06/04/2025 9.0  8.7 - 10.5 mg/dL Final    Protein Total 06/04/2025 7.0  6.0 - 8.4 gm/dL Final    Albumin 06/04/2025 4.0  3.5 - 5.2 g/dL Final    Bilirubin Total 06/04/2025 0.5  0.1 - 1.0 mg/dL Final    ALP 06/04/2025 55  55 - 135 unit/L Final    AST 06/04/2025 19  10 - 40 unit/L Final    ALT 06/04/2025 18  10 - 44 unit/L Final    Anion Gap 06/04/2025 6 (L)  8 - 16 mmol/L Final    eGFR 06/04/2025 16 (L)  >60 mL/min/1.73/m2 Final    Iron Level 06/04/2025 159  45 - 160 ug/dL Final    Transferrin 06/04/2025 117 (L)  200 - 375 mg/dL Final    Iron Binding Capacity Total 06/04/2025 164 (L)  250 - 450 ug/dL Final    Iron Saturation 06/04/2025 >75 (H)  20 - 50 % Final    Ferritin 06/04/2025 1,953.3 (H)  20.0 - 300.0 ng/mL Final    WBC 06/04/2025 3.95  3.90 - 12.70 K/uL Final    RBC 06/04/2025 2.48 (L)  4.60 - 6.20 M/uL Final    Hgb 06/04/2025 7.3 (L)  14.0 - 18.0 gm/dL Final    Hct 06/04/2025 21.7 (L)  40.0 - 54.0 % Final    MCV 06/04/2025 88  82 - 98 fL Final    MCH 06/04/2025 29.4  27.0 - 31.0 pg Final    MCHC 06/04/2025 33.6  32.0 - 36.0 g/dL Final    RDW 06/04/2025 19.8 (H)  11.5 - 14.5 % Final    Platelet Count 06/04/2025 7 (LL)  150 - 450 K/uL Final    MPV 06/04/2025    Final    Nucleated RBC 06/04/2025 0  <=0 /100 WBC Final    Neut % 06/04/2025 71.4  38 - 73 % Final    Lymph % 06/04/2025 23.0  18 - 48 % Final    Mono % 06/04/2025 3.3 (L)  4 - 15 % Final    Eos % 06/04/2025 1.5  0 - 8 % Final    Basophil % 06/04/2025 0.3  <=1.9 % Final    Imm Grans % 06/04/2025 0.5  0.0 - 0.5 % Final    Neut # 06/04/2025 2.8  1.8 - 7.7 K/uL Final    Lymph # 06/04/2025 0.91 (L)  1 - 4.8 K/uL Final    Mono # 06/04/2025 0.13 (L)  0.3 - 1 K/uL Final    Eos # 06/04/2025 0.06  <=0.5 K/uL Final    Baso # 06/04/2025 0.01  <=0.2 K/uL Final    Imm  Grans # 06/04/2025 0.02  0.00 - 0.04 K/uL Final    Platelet Estimate 06/04/2025 Decreased (A)  Appears Normal Final    Specimen Outdate 06/04/2025 06/07/2025 23:59   Final    Group & Rh 06/04/2025 O POS   Final    Indirect Padmini 06/04/2025 NEG   Final   Lab Visit on 05/28/2025   Component Date Value Ref Range Status    Sodium 05/28/2025 142  136 - 145 mmol/L Final    Potassium 05/28/2025 4.4  3.5 - 5.1 mmol/L Final    Chloride 05/28/2025 110  95 - 110 mmol/L Final    CO2 05/28/2025 24  23 - 29 mmol/L Final    Glucose 05/28/2025 115 (H)  70 - 110 mg/dL Final    BUN 05/28/2025 39 (H)  8 - 23 mg/dL Final    Creatinine 05/28/2025 3.4 (H)  0.5 - 1.4 mg/dL Final    Calcium 05/28/2025 8.6 (L)  8.7 - 10.5 mg/dL Final    Protein Total 05/28/2025 7.2  6.0 - 8.4 gm/dL Final    Albumin 05/28/2025 4.1  3.5 - 5.2 g/dL Final    Bilirubin Total 05/28/2025 0.4  0.1 - 1.0 mg/dL Final    ALP 05/28/2025 62  55 - 135 unit/L Final    AST 05/28/2025 28  10 - 40 unit/L Final    ALT 05/28/2025 27  10 - 44 unit/L Final    Anion Gap 05/28/2025 8  8 - 16 mmol/L Final    eGFR 05/28/2025 19 (L)  >60 mL/min/1.73/m2 Final    Iron Level 05/28/2025 161 (H)  45 - 160 ug/dL Final    Transferrin 05/28/2025 117 (L)  200 - 375 mg/dL Final    Iron Binding Capacity Total 05/28/2025 164 (L)  250 - 450 ug/dL Final    Iron Saturation 05/28/2025 >75 (H)  20 - 50 % Final    Ferritin 05/28/2025 2,095.6 (H)  20.0 - 300.0 ng/mL Final    WBC 05/28/2025 3.52 (L)  3.90 - 12.70 K/uL Final    RBC 05/28/2025 2.92 (L)  4.60 - 6.20 M/uL Final    Hgb 05/28/2025 8.4 (L)  14.0 - 18.0 gm/dL Final    Hct 05/28/2025 25.9 (L)  40.0 - 54.0 % Final    MCV 05/28/2025 89  82 - 98 fL Final    MCH 05/28/2025 28.8  27.0 - 31.0 pg Final    MCHC 05/28/2025 32.4  32.0 - 36.0 g/dL Final    RDW 05/28/2025 20.1 (H)  11.5 - 14.5 % Final    Platelet Count 05/28/2025 62 (L)  150 - 450 K/uL Final    MPV 05/28/2025 8.6 (L)  9.2 - 12.9 fL Final    Nucleated RBC 05/28/2025 0  <=0 /100 WBC Final     Neut % 05/28/2025 49.7  38 - 73 % Final    Lymph % 05/28/2025 34.1  18 - 48 % Final    Mono % 05/28/2025 14.2  4 - 15 % Final    Eos % 05/28/2025 0.3  0 - 8 % Final    Basophil % 05/28/2025 1.1  <=1.9 % Final    Imm Grans % 05/28/2025 0.6 (H)  0.0 - 0.5 % Final    Neut # 05/28/2025 1.8  1.8 - 7.7 K/uL Final    Lymph # 05/28/2025 1.20  1 - 4.8 K/uL Final    Mono # 05/28/2025 0.50  0.3 - 1 K/uL Final    Eos # 05/28/2025 0.01  <=0.5 K/uL Final    Baso # 05/28/2025 0.04  <=0.2 K/uL Final    Imm Grans # 05/28/2025 0.02  0.00 - 0.04 K/uL Final   Lab Visit on 05/21/2025   Component Date Value Ref Range Status    Sodium 05/21/2025 140  136 - 145 mmol/L Final    Potassium 05/21/2025 4.5  3.5 - 5.1 mmol/L Final    Chloride 05/21/2025 108  95 - 110 mmol/L Final    CO2 05/21/2025 24  23 - 29 mmol/L Final    Glucose 05/21/2025 138 (H)  70 - 110 mg/dL Final    BUN 05/21/2025 39 (H)  8 - 23 mg/dL Final    Creatinine 05/21/2025 3.5 (H)  0.5 - 1.4 mg/dL Final    Calcium 05/21/2025 9.0  8.7 - 10.5 mg/dL Final    Protein Total 05/21/2025 7.4  6.0 - 8.4 gm/dL Final    Albumin 05/21/2025 4.2  3.5 - 5.2 g/dL Final    Bilirubin Total 05/21/2025 0.4  0.1 - 1.0 mg/dL Final    ALP 05/21/2025 66  55 - 135 unit/L Final    AST 05/21/2025 24  10 - 40 unit/L Final    ALT 05/21/2025 23  10 - 44 unit/L Final    Anion Gap 05/21/2025 8  8 - 16 mmol/L Final    eGFR 05/21/2025 19 (L)  >60 mL/min/1.73/m2 Final    Iron Level 05/21/2025 165 (H)  45 - 160 ug/dL Final    Transferrin 05/21/2025 124 (L)  200 - 375 mg/dL Final    Iron Binding Capacity Total 05/21/2025 174 (L)  250 - 450 ug/dL Final    Iron Saturation 05/21/2025 >75 (H)  20 - 50 % Final    Ferritin 05/21/2025 2,048.7 (H)  20.0 - 300.0 ng/mL Final    WBC 05/21/2025 2.53 (L)  3.90 - 12.70 K/uL Final    RBC 05/21/2025 3.06 (L)  4.60 - 6.20 M/uL Final    Hgb 05/21/2025 8.8 (L)  14.0 - 18.0 gm/dL Final    Hct 05/21/2025 27.1 (L)  40.0 - 54.0 % Final    MCV 05/21/2025 89  82 - 98 fL Final    MCH  05/21/2025 28.8  27.0 - 31.0 pg Final    MCHC 05/21/2025 32.5  32.0 - 36.0 g/dL Final    RDW 05/21/2025 20.0 (H)  11.5 - 14.5 % Final    Platelet Count 05/21/2025 236  150 - 450 K/uL Final    MPV 05/21/2025 9.3  9.2 - 12.9 fL Final    Nucleated RBC 05/21/2025 0  <=0 /100 WBC Final    Neut % 05/21/2025 37.6 (L)  38 - 73 % Final    Lymph % 05/21/2025 38.3  18 - 48 % Final    Mono % 05/21/2025 22.1 (H)  4 - 15 % Final    Eos % 05/21/2025 0.4  0 - 8 % Final    Basophil % 05/21/2025 0.8  <=1.9 % Final    Imm Grans % 05/21/2025 0.8 (H)  0.0 - 0.5 % Final    Neut # 05/21/2025 1.0 (L)  1.8 - 7.7 K/uL Final    Lymph # 05/21/2025 0.97 (L)  1 - 4.8 K/uL Final    Mono # 05/21/2025 0.56  0.3 - 1 K/uL Final    Eos # 05/21/2025 0.01  <=0.5 K/uL Final    Baso # 05/21/2025 0.02  <=0.2 K/uL Final    Imm Grans # 05/21/2025 0.02  0.00 - 0.04 K/uL Final   Infusion on 05/15/2025   Component Date Value Ref Range Status    UNIT NUMBER 05/14/2025 [id number]   Final    UNIT ABO/RH 05/14/2025 O POS   Final    DISPENSE STATUS 05/14/2025 Transfused   Final    Unit Expiration 05/14/2025 [id number]   Final    Product Code 05/14/2025 [id number]   Final    Unit Blood Type Code 05/14/2025 5100   Final    CROSSMATCH INTERPRETATION 05/14/2025 Compatible   Final    UNIT NUMBER 05/14/2025 [id number]   Final    UNIT ABO/RH 05/14/2025 O POS   Final    DISPENSE STATUS 05/14/2025 Transfused   Final    Unit Expiration 05/14/2025 [id number]   Final    Product Code 05/14/2025 [id number]   Final    Unit Blood Type Code 05/14/2025 5100   Final    CROSSMATCH INTERPRETATION 05/14/2025 Compatible   Final   Lab Visit on 05/14/2025   Component Date Value Ref Range Status    Sodium 05/14/2025 139  136 - 145 mmol/L Final    Potassium 05/14/2025 4.3  3.5 - 5.1 mmol/L Final    Chloride 05/14/2025 108  95 - 110 mmol/L Final    CO2 05/14/2025 22 (L)  23 - 29 mmol/L Final    Glucose 05/14/2025 124 (H)  70 - 110 mg/dL Final    BUN 05/14/2025 41 (H)  8 - 23 mg/dL Final     Creatinine 05/14/2025 3.3 (H)  0.5 - 1.4 mg/dL Final    Calcium 05/14/2025 8.8  8.7 - 10.5 mg/dL Final    Protein Total 05/14/2025 7.1  6.0 - 8.4 gm/dL Final    Albumin 05/14/2025 4.1  3.5 - 5.2 g/dL Final    Bilirubin Total 05/14/2025 0.3  0.1 - 1.0 mg/dL Final    ALP 05/14/2025 62  55 - 135 unit/L Final    AST 05/14/2025 24  10 - 40 unit/L Final    ALT 05/14/2025 28  10 - 44 unit/L Final    Anion Gap 05/14/2025 9  8 - 16 mmol/L Final    eGFR 05/14/2025 20 (L)  >60 mL/min/1.73/m2 Final    Iron Level 05/14/2025 162 (H)  45 - 160 ug/dL Final    Transferrin 05/14/2025 122 (L)  200 - 375 mg/dL Final    Iron Binding Capacity Total 05/14/2025 171 (L)  250 - 450 ug/dL Final    Iron Saturation 05/14/2025 >75 (H)  20 - 50 % Final    Ferritin 05/14/2025 2,600.7 (H)  20.0 - 300.0 ng/mL Final    Specimen Outdate 05/14/2025 05/17/2025 23:59   Final    Group & Rh 05/14/2025 O POS   Final    Indirect Padmini 05/14/2025 NEG   Final    WBC 05/14/2025 1.00 (LL)  3.90 - 12.70 K/uL Final    RBC 05/14/2025 2.36 (L)  4.60 - 6.20 M/uL Final    Hgb 05/14/2025 6.9 (LL)  14.0 - 18.0 gm/dL Final    Hct 05/14/2025 21.2 (L)  40.0 - 54.0 % Final    MCV 05/14/2025 90  82 - 98 fL Final    MCH 05/14/2025 29.2  27.0 - 31.0 pg Final    MCHC 05/14/2025 32.5  32.0 - 36.0 g/dL Final    RDW 05/14/2025 21.5 (H)  11.5 - 14.5 % Final    Platelet Count 05/14/2025 95 (L)  150 - 450 K/uL Final    MPV 05/14/2025 10.5  9.2 - 12.9 fL Final    Nucleated RBC 05/14/2025 0  <=0 /100 WBC Final    Platelet Estimate 05/14/2025 Decreased (A)  Appears Normal Final   Infusion on 05/08/2025   Component Date Value Ref Range Status    UNIT NUMBER 05/07/2025 [id number]   Final    UNIT ABO/RH 05/07/2025 O POS   Final    DISPENSE STATUS 05/07/2025 Transfused   Final    Unit Expiration 05/07/2025 [id number]   Final    Product Code 05/07/2025 [id number]   Final    Unit Blood Type Code 05/07/2025 5100   Final    CROSSMATCH INTERPRETATION 05/07/2025 Not required   Final     RBCS 2025 Product Order    Final   There may be more visits with results that are not included.       Past Medical History:   Diagnosis Date    Abnormal chest CT (new) 2022    Anemia due to multiple mechanisms 2019    Anemia, chronic renal failure, stage 2 (mild) 2019    Anemia, chronic renal failure, stage 3 (moderate) 2023    Depression     Former smoker 2017    H/O ETOH abuse 2017    Lung mass (new) 2022    Monocytosis 2019    Myelodysplasia (myelodysplastic syndrome)     Myelodysplasia (myelodysplastic syndrome)     Personal history of colonic polyps 2023    RARS (refractory anemia with ringed sideroblasts) 2020    Sickle cell trait      Past Surgical History:   Procedure Laterality Date    BONE MARROW BIOPSY N/A 2019    Procedure: BIOPSY, BONE MARROW;  Surgeon: [facility] Diagnostic Provider;  Location: [facility];  Service: Interventional Radiology;  Laterality: N/A;    COLONOSCOPY N/A 2021    Procedure: COLONOSCOPY;  Surgeon: Rob Collins MD;  Location: [facility];  Service: Endoscopy;  Laterality: N/A;    COLONOSCOPY  2023    5 YR RECALL    ESOPHAGOGASTRODUODENOSCOPY N/A 2021    Procedure: EGD (ESOPHAGOGASTRODUODENOSCOPY);  Surgeon: Rob Collins MD;  Location: [facility];  Service: Endoscopy;  Laterality: N/A;    INJECTION OF ANESTHETIC AGENT AROUND MEDIAL BRANCH NERVES INNERVATING LUMBAR FACET JOINT Bilateral 2018    Procedure: BLOCK-NERVE-MEDIAL BRANCH-LUMBAR;  Surgeon: Nura Heredia MD;  Location: [facility];  Service: Pain Management;  Laterality: Bilateral;  L3, 4, 5    KNEE ARTHROSCOPY W/ MENISCECTOMY Right 2021    Procedure: ARTHROSCOPY, KNEE, WITH MENISCECTOMY;  Surgeon: Sebastian Green MD;  Location: [facility];  Service: Orthopedics;  Laterality: Right;  partial medial meniscectomy    RADIOFREQUENCY ABLATION OF LUMBAR MEDIAL BRANCH NERVE AT SINGLE LEVEL Bilateral 2018    Procedure: RADIOFREQUENCY ABLATION,  NERVE, MEDIAL BRANCH, LUMBAR, 1 LEVEL;  Surgeon: Nura Heredia MD;  Location: [facility];  Service: Pain Management;  Laterality: Bilateral;  L3, 4, 5 - Burned at 80 degrees C.  for 75 seconds x 2 each site    SMALL BOWEL ENTEROSCOPY N/A 10/16/2019    Procedure: ENTEROSCOPY;  Surgeon: Rob Collins MD;  Location: [facility] ENDO;  Service: Endoscopy;  Laterality: N/A;     Family History   Problem Relation Name Age of Onset    Arthritis Mother      Depression Mother      Heart disease Mother      Hypertension Mother      Heart attack Father  59       Marital Status:   Alcohol History:  reports that he does not currently use alcohol after a past usage of about 21.0 standard drinks of alcohol per week.  Tobacco History:  reports that he quit smoking about 29 years ago. His smoking use included cigarettes. He has never used smokeless tobacco.  Drug History:  reports that he does not currently use drugs after having used the following drugs: Marijuana.    Review of patient's allergies indicates:  No Known Allergies  Current Medications[1]  Objective:      Vitals:    07/08/25 1258   BP: 110/60   Pulse: 78   Temp: 98.8 °F (37.1 °C)   TempSrc: Oral   SpO2: 99%   Weight: 92.7 kg (204 lb 5.9 oz)     Physical Exam  Vitals and nursing note reviewed.   Constitutional:       General: He is awake. He is not in acute distress.     Appearance: He is well-developed and well-groomed. He is obese. He is not ill-appearing, toxic-appearing or diaphoretic.   HENT:      Head: Normocephalic and atraumatic.      Nose: Nose normal.   Eyes:      General: Lids are normal. Gaze aligned appropriately.      Conjunctiva/sclera: Conjunctivae normal.      Right eye: Right conjunctiva is not injected.      Left eye: Left conjunctiva is not injected.      Pupils: Pupils are equal, round, and reactive to light.   Cardiovascular:      Rate and Rhythm: Normal rate and regular rhythm.      Pulses: Normal pulses.      Heart sounds: Normal heart sounds, S1  normal and S2 normal. No murmur heard.     No friction rub. No gallop.   Pulmonary:      Effort: Pulmonary effort is normal. No respiratory distress.      Breath sounds: Normal breath sounds. No stridor. No decreased breath sounds, wheezing, rhonchi or rales.   Chest:      Chest wall: No tenderness.   Musculoskeletal:      Cervical back: Neck supple.      Right lower leg: No edema.      Left lower leg: No edema.   Lymphadenopathy:      Cervical: No cervical adenopathy.   Skin:     General: Skin is warm and dry.      Capillary Refill: Capillary refill takes less than 2 seconds.      Findings: No erythema or rash.   Neurological:      Mental Status: He is alert and oriented to person, place, and time. Mental status is at baseline.   Psychiatric:         Attention and Perception: Attention normal.         Mood and Affect: Mood normal.         Speech: Speech normal.         Behavior: Behavior normal. Behavior is cooperative.         Thought Content: Thought content normal.         Judgment: Judgment normal.                 Assessment:       Assessment & Plan    PLAN SUMMARY:  - Plans to restart Rytelo after lab results are available  - Continue oral amiodarone 400 mg for atrial fibrillation management  - Prescribed folic acid 1 mg, 2 tablets daily (180 tablets, 90-day supply with refills)  - Continue Lipitor therapy  - Provided [employer] coupon for potential cost reduction on medications  - Cardiology appointment scheduled on the 16th for further evaluation  - Hospital follow-up for recently discharged patient treated for right-sided pneumonia    PNEUMONIA:  - Hospital follow-up for patient recently discharged after treatment for right-sided pneumonia (upper and lower lobes) with cefepime and doxycycline.  - Mr. Butler reports no shortness of breath, chest pain, or cough, indicating complete resolution of respiratory symptoms and successful completion of prescribed antibiotics.  - Follow-up with ID on 7/16.     ATRIAL  FIBRILLATION:  - Mr. Butler developed atrial fibrillation during hospitalization, initially treated with amiodarone drip and now transitioned to oral amiodarone therapy.  - Continuing oral amiodarone 400 mg for management.  - Regular rate and rhythm today.  - Cardiology appointment scheduled on the 16th for further evaluation.    MYELODYSPLASTIC SYNDROME:  - Mr. Butler received 1 unit of platelets during hospitalization.  - Plan to restart Rytelo after lab results are available.  - Managed by Heme-Onc.    ANEMIA:  - Mr. Butler received 3 units of blood during hospitalization for anemia management.  - Currently asymptomatic.   - Managed by Heme- Onc.     HYPERLIPIDEMIA:  - Continuing Lipitor 10 mg, which should be inexpensive with GoodRx.  - Limit red meat, butter, fried foods, cheese, and other foods that have a lot of saturated fat.   - Consume more: lean meats, fish, fruits, vegetables, whole grains, beans, lentils, and nuts.      ESSENTIAL HYPERTENSION:  - Stable, continue amlodipine 10 mg.   - Reduce the amount of salt in your diet; Lose weight; Avoid drinking too much alcohol; Exercise at least 30 minutes per day most days of the week.    - Continue current medications and home BP monitoring.    CHRONIC KIDNEY DISEASE:  - At baseline.  - Managed by Nephrology.     GENERAL MANAGEMENT AND EDUCATION:  - Evaluated medication regimen with focus on cost-effective options.  - Prescribed folic acid 1 mg, 2 tablets daily, with prescription sent to [facility] for 180 tablets (90-day supply) with refills.  - Provided GoodRx coupon for potential cost reduction to $7 for 90-day supply.  - Discussed vitamin B5 supplementation benefits (effects on cholesterol, skin, hair, nails, and energy production) and explained the difference between folic acid (vitamin B9) and pantothenic acid (vitamin B5).  - Educated on water-soluble nature of B vitamins.    MOBILITY STATUS:  - Mr. Butler used a rolling walker in the hospital but has  now returned to baseline mobility status.       Plan:       Hospital discharge follow-up    Pneumonia of right lung due to infectious organism, unspecified part of lung    PAF (paroxysmal atrial fibrillation)    Anemia, chronic renal failure, stage 3 (moderate)    Chronic kidney disease (CKD), stage IV (severe)    MDS (myelodysplastic syndrome)    Essential hypertension    Folic acid deficiency  -     folic acid (FOLVITE) 1 MG tablet; Take 2 tablets (2,000 mcg total) by mouth once daily.  Dispense: 180 tablet; Refill: 1      I spent a total of 44 minutes on the day of the visit.This includes face to face time and non-face to face time preparing to see the patient (eg, review of tests), obtaining and/or reviewing separately obtained history, documenting clinical information in the electronic or other health record, independently interpreting results and communicating results to the patient/family/caregiver, or care coordinator.    Follow up for routine follow-up in October as scheduled.        LATOYA Smith, DEVIKA  This note was generated with the assistance of ambient listening technology. Verbal consent was obtained by the patient and accompanying visitor(s) for the recording of patient appointment to facilitate this note. I attest to having reviewed and edited the generated note for accuracy, though some syntax or spelling errors may persist. Please contact the author of this note for any clarification.               [1]   Current Outpatient Medications:     amiodarone (PACERONE) 400 MG tablet, Take 1 tablet (400 mg total) by mouth 2 (two) times daily for 3 days, THEN 1 tablet (400 mg total) once daily., Disp: 36 tablet, Rfl: 0    amLODIPine (NORVASC) 10 MG tablet, Take 1 tablet (10 mg total) by mouth once daily., Disp: 30 tablet, Rfl: 2    ascorbic acid, vitamin C, (VITAMIN C) 500 MG tablet, Take 500 mg by mouth once daily., Disp: , Rfl:     atorvastatin (LIPITOR) 10 MG tablet, Take 1 tablet (10 mg  total) by mouth every evening., Disp: 90 tablet, Rfl: 3    calcitRIOL (ROCALTROL) 0.25 MCG Cap, Take 0.25 mcg by mouth every 7 days., Disp: , Rfl:     fluticasone propionate (FLONASE) 50 mcg/actuation nasal spray, 1 SPRAY (50 MCG TOTAL) BY EACH NOSTRIL ROUTE 2 (TWO) TIMES A DAY. 1 SPRAY EVERY MORNING AND AFTERNOON TOWARD THE EAR EACH SIDE AFTER A SINUS RINSE, Disp: 48 mL, Rfl: 1    multivitamin with minerals Cap, Take 1 capsule by mouth every morning., Disp: , Rfl:     pantoprazole (PROTONIX) 40 MG tablet, Take 40 mg by mouth once daily., Disp: , Rfl:     sod chlor-bicarb-squeez bottle (NEILMED SINUS RINSE COMPLETE) pkdv, 1 application  by sinus irrigation route 2 (two) times a day. Not right before bed, Disp: 1 each, Rfl: 11    tamsulosin (FLOMAX) 0.4 mg Cap, TAKE 2 CAPSULES BY MOUTH EVERY DAY, Disp: 180 capsule, Rfl: 1    traZODone (DESYREL) 100 MG tablet, TAKE 1 TABLET BY MOUTH NIGHTLY AS NEEDED FOR INSOMNIA., Disp: 90 tablet, Rfl: 2    folic acid (FOLVITE) 1 MG tablet, Take 2 tablets (2,000 mcg total) by mouth once daily., Disp: 180 tablet, Rfl: 1    sildenafil (VIAGRA) 100 MG tablet, Take 1 tablet (100 mg total) by mouth daily as needed for Erectile Dysfunction., Disp: 10 tablet, Rfl: 6

## 2025-07-08 NOTE — TELEPHONE ENCOUNTER
Per Pratima's verbal orders, Dr Kern would like patient to resume rytelo starting this week and patient should have weekly CBC done along with a one time epo lab. Patient made aware that we are attempting to resume rytelo this week and that he should have weekly labs done as well. I informed him that scheduling will call to schedule. Verbalized understanding. Peter made aware of above and to please call patient with appt, also made aware that per Pratima, patient does not need to seen by provider prior to getting rytelo.

## 2025-07-09 ENCOUNTER — TELEPHONE (OUTPATIENT)
Facility: CLINIC | Age: 65
End: 2025-07-09
Payer: MEDICARE

## 2025-07-09 DIAGNOSIS — D46.9 MDS (MYELODYSPLASTIC SYNDROME): Primary | ICD-10-CM

## 2025-07-09 DIAGNOSIS — D64.89 ANEMIA DUE TO MULTIPLE MECHANISMS: ICD-10-CM

## 2025-07-09 NOTE — TELEPHONE ENCOUNTER
----- Message from Callie sent at 7/9/2025  2:50 PM CDT -----  The patient called about his lab results. He said he is concerned about his results. I let him know he will be called back once we have all the results. # [phone]

## 2025-07-09 NOTE — TELEPHONE ENCOUNTER
Spoke to [facility] main lab to inquire about epo level that was drawn yesterday as it looks to still be in process. Per lab this is a send out lab and can take up to 5 days to result. Pratima made aware and per her verbal orders I made infusion and scheduling aware they can resume rytelo but hold off on retacrit at this time. I requested that they call patient to schedule rytelo. Patient made aware of above, verbalized understanding.

## 2025-07-11 ENCOUNTER — INFUSION (OUTPATIENT)
Dept: INFUSION THERAPY | Facility: HOSPITAL | Age: 65
End: 2025-07-11
Attending: INTERNAL MEDICINE
Payer: MEDICARE

## 2025-07-11 ENCOUNTER — EXTERNAL HOME HEALTH (OUTPATIENT)
Dept: HOME HEALTH SERVICES | Facility: HOSPITAL | Age: 65
End: 2025-07-11
Payer: MEDICARE

## 2025-07-11 VITALS
RESPIRATION RATE: 16 BRPM | HEIGHT: 69 IN | WEIGHT: 208.5 LBS | BODY MASS INDEX: 30.88 KG/M2 | OXYGEN SATURATION: 100 % | SYSTOLIC BLOOD PRESSURE: 116 MMHG | TEMPERATURE: 97 F | DIASTOLIC BLOOD PRESSURE: 37 MMHG | HEART RATE: 74 BPM

## 2025-07-11 DIAGNOSIS — D46.1 RARS (REFRACTORY ANEMIA WITH RINGED SIDEROBLASTS): ICD-10-CM

## 2025-07-11 DIAGNOSIS — D46.9 MDS (MYELODYSPLASTIC SYNDROME): Primary | ICD-10-CM

## 2025-07-11 PROCEDURE — 96375 TX/PRO/DX INJ NEW DRUG ADDON: CPT

## 2025-07-11 PROCEDURE — 63600175 PHARM REV CODE 636 W HCPCS: Performed by: INTERNAL MEDICINE

## 2025-07-11 PROCEDURE — 96415 CHEMO IV INFUSION ADDL HR: CPT

## 2025-07-11 PROCEDURE — 25000003 PHARM REV CODE 250: Performed by: INTERNAL MEDICINE

## 2025-07-11 PROCEDURE — 96413 CHEMO IV INFUSION 1 HR: CPT

## 2025-07-11 RX ORDER — SODIUM CHLORIDE 0.9 % (FLUSH) 0.9 %
10 SYRINGE (ML) INJECTION
Status: DISCONTINUED | OUTPATIENT
Start: 2025-07-11 | End: 2025-07-11 | Stop reason: HOSPADM

## 2025-07-11 RX ORDER — DIPHENHYDRAMINE HYDROCHLORIDE 50 MG/ML
50 INJECTION, SOLUTION INTRAMUSCULAR; INTRAVENOUS ONCE AS NEEDED
Status: CANCELLED | OUTPATIENT
Start: 2025-07-11

## 2025-07-11 RX ORDER — CETIRIZINE HYDROCHLORIDE 10 MG/1
10 TABLET ORAL
Status: COMPLETED | OUTPATIENT
Start: 2025-07-11 | End: 2025-07-11

## 2025-07-11 RX ORDER — EPINEPHRINE 0.3 MG/.3ML
0.3 INJECTION SUBCUTANEOUS ONCE AS NEEDED
Status: CANCELLED | OUTPATIENT
Start: 2025-07-11

## 2025-07-11 RX ORDER — HEPARIN 100 UNIT/ML
500 SYRINGE INTRAVENOUS
Status: CANCELLED | OUTPATIENT
Start: 2025-07-11

## 2025-07-11 RX ORDER — FAMOTIDINE 10 MG/ML
20 INJECTION, SOLUTION INTRAVENOUS
Status: COMPLETED | OUTPATIENT
Start: 2025-07-11 | End: 2025-07-11

## 2025-07-11 RX ORDER — SODIUM CHLORIDE 0.9 % (FLUSH) 0.9 %
10 SYRINGE (ML) INJECTION
Status: CANCELLED | OUTPATIENT
Start: 2025-07-11

## 2025-07-11 RX ADMIN — FAMOTIDINE 20 MG: 10 INJECTION INTRAVENOUS at 07:07

## 2025-07-11 RX ADMIN — IMETELSTAT SODIUM: 47 INJECTION, POWDER, LYOPHILIZED, FOR SOLUTION INTRAVENOUS at 08:07

## 2025-07-11 RX ADMIN — HYDROCORTISONE SODIUM SUCCINATE 100 MG: 100 INJECTION, POWDER, FOR SOLUTION INTRAMUSCULAR; INTRAVENOUS at 07:07

## 2025-07-11 RX ADMIN — CETIRIZINE HYDROCHLORIDE 10 MG: 10 TABLET, FILM COATED ORAL at 07:07

## 2025-07-11 NOTE — PLAN OF CARE
Problem: Fatigue  Goal: Improved Activity Tolerance  Outcome: Progressing  Intervention: Promote Improved Energy  Flowsheets (Taken 7/11/2025 4274)  Fatigue Management: frequent rest breaks encouraged  Activity Management: Ambulated -L4  Environmental Support: rest periods encouraged

## 2025-07-12 LAB — EPO SERPL-ACNC: 61.1 MIU/ML (ref 2.6–18.5)

## 2025-07-14 ENCOUNTER — OFFICE VISIT (OUTPATIENT)
Facility: CLINIC | Age: 65
End: 2025-07-14
Payer: MEDICARE

## 2025-07-14 VITALS
WEIGHT: 208 LBS | HEART RATE: 74 BPM | HEIGHT: 69 IN | SYSTOLIC BLOOD PRESSURE: 134 MMHG | RESPIRATION RATE: 16 BRPM | TEMPERATURE: 98 F | DIASTOLIC BLOOD PRESSURE: 73 MMHG | OXYGEN SATURATION: 100 % | BODY MASS INDEX: 30.81 KG/M2

## 2025-07-14 DIAGNOSIS — D46.9 MDS (MYELODYSPLASTIC SYNDROME): Primary | ICD-10-CM

## 2025-07-14 DIAGNOSIS — N18.4 CHRONIC KIDNEY DISEASE (CKD), STAGE IV (SEVERE): ICD-10-CM

## 2025-07-14 PROCEDURE — 99215 OFFICE O/P EST HI 40 MIN: CPT | Mod: S$PBB,,, | Performed by: NURSE PRACTITIONER

## 2025-07-14 PROCEDURE — G2211 COMPLEX E/M VISIT ADD ON: HCPCS | Mod: ,,, | Performed by: NURSE PRACTITIONER

## 2025-07-14 PROCEDURE — 99214 OFFICE O/P EST MOD 30 MIN: CPT | Mod: PBBFAC,PN | Performed by: NURSE PRACTITIONER

## 2025-07-14 PROCEDURE — 99999 PR PBB SHADOW E&M-EST. PATIENT-LVL IV: CPT | Mod: PBBFAC,,, | Performed by: NURSE PRACTITIONER

## 2025-07-14 NOTE — PROGRESS NOTES
[facility] Hematology/Oncology       PROGRESS NOTE - Follow-up Visit      Subjective:       Patient ID:   NAME: Rick Butler : 1960     65 y.o. male    Referring Doc: Chetna (new PCP)  Other Physicians: Getachew; Dennis; Ramsey - now SAFA     Chief Complaint:  MDS     History of Present Illness:     Patient returns today for a regularly scheduled follow-up visit.  The patient is doing ok overall. He is by himself today.       He was recently hospitalized for pneumonia at [facility], and discharged on oral antibiotics and home health. He was followed by  pulmonary, ID and nephrology while inpatient. He required a blood transfusion  and platelets x1 while inpatient. He was discharged on .     He last Rytelo treatment was on  and Dr Hilario had instructed us to hold it for a month and then re-evaluate labs.   He saw her again on 2025 and she recommended to hold retacrit for now and resume rytelo.     He did get cycle 4 of Rytelo on 2025 and is not due again until 2025.      He reports he is feeling better overall since discharge; no CP, SOB, HA's or N/V;     He is no longer coughing or congested and no SOB, but has some residual MORA.           ROS:   GEN: normal without any fever, night sweats or weight loss; fatigue - intermittent   HEENT: normal with no HA's, sore throat, stiff neck, changes in vision  CV: normal with no CP, SOB, PND, MORA    PULM: normal with no SOB, cough, hemoptysis, sputum or pleuritic pain  GI: normal with no abdominal pain, nausea, vomiting, constipation, diarrhea, melanotic stools, BRBPR, or hematemesis  : normal with no hematuria, dysuria  BREAST: normal with no mass, discharge, pain  SKIN: normal with no rash, erythema, bruising, or swelling    Allergies:  Review of patient's allergies indicates:  No Known Allergies    Medications:    Current Outpatient Medications:     amiodarone (PACERONE) 400 MG tablet, Take 1 tablet (400 mg total) by mouth 2 (two) times daily for 3 days,  THEN 1 tablet (400 mg total) once daily., Disp: 36 tablet, Rfl: 0    amLODIPine (NORVASC) 10 MG tablet, Take 1 tablet (10 mg total) by mouth once daily., Disp: 30 tablet, Rfl: 2    ascorbic acid, vitamin C, (VITAMIN C) 500 MG tablet, Take 500 mg by mouth once daily., Disp: , Rfl:     atorvastatin (LIPITOR) 10 MG tablet, Take 1 tablet (10 mg total) by mouth every evening., Disp: 90 tablet, Rfl: 3    calcitRIOL (ROCALTROL) 0.25 MCG Cap, Take 0.25 mcg by mouth every 7 days., Disp: , Rfl:     fluticasone propionate (FLONASE) 50 mcg/actuation nasal spray, 1 SPRAY (50 MCG TOTAL) BY EACH NOSTRIL ROUTE 2 (TWO) TIMES A DAY. 1 SPRAY EVERY MORNING AND AFTERNOON TOWARD THE EAR EACH SIDE AFTER A SINUS RINSE, Disp: 48 mL, Rfl: 1    folic acid (FOLVITE) 1 MG tablet, Take 2 tablets (2,000 mcg total) by mouth once daily., Disp: 180 tablet, Rfl: 1    multivitamin with minerals Cap, Take 1 capsule by mouth every morning., Disp: , Rfl:     pantoprazole (PROTONIX) 40 MG tablet, Take 40 mg by mouth once daily., Disp: , Rfl:     sod chlor-bicarb-squeez bottle (NEILMED SINUS RINSE COMPLETE) pkdv, 1 application  by sinus irrigation route 2 (two) times a day. Not right before bed, Disp: 1 each, Rfl: 11    tamsulosin (FLOMAX) 0.4 mg Cap, TAKE 2 CAPSULES BY MOUTH EVERY DAY, Disp: 180 capsule, Rfl: 1    traZODone (DESYREL) 100 MG tablet, TAKE 1 TABLET BY MOUTH NIGHTLY AS NEEDED FOR INSOMNIA., Disp: 90 tablet, Rfl: 2    sildenafil (VIAGRA) 100 MG tablet, Take 1 tablet (100 mg total) by mouth daily as needed for Erectile Dysfunction., Disp: 10 tablet, Rfl: 6    PMHx/PSHx Updates:  See patient's last visit with Dr. Tello on 7/8/2025  See H&P on 7/9/2011      Pathology:    12/20/2019  BONE MARROW, RIGHT ILIAC CREST,    ASPIRATE, CLOT SECTION, AND CORE BIOPSY:    --HYPERCELLULAR MARROW (APPROXIMATELY 75% TO 80%) WITH TRILINEAGE   HEMATOPOIETIC ELEMENTS, ERYTHROID  HYPERPLASIA AND MILD MEGAKARYOCYTIC HYPERPLASIA, AND NON-SPECIFIC    "DYSHEMATOPOIETIC CHANGES (SEE     COMMENT).  --[id number] INCREASED STAINABLE IRON WITH INCREASED RING   SIDEROBLASTS (GREATER THAN 15%)     (SEE COMMENT).  --PERIPHERAL BLOOD WITH THROMBOCYTOSIS (574,000/MICROLITER) AND ANEMIA   (HEMOGLOBIN 5.5 GRAM/DECILITER),    WITH SCATTERED DREPANOCYTOID FORMS AND FEW NUCLEATED ERYTHROCYTES      Cytogenetic Results at the bottom of the report.  NORMAL    Objective:     Vitals:  Blood pressure 134/73, pulse 74, temperature 98.2 °F (36.8 °C), temperature source Temporal, resp. rate 16, height 5' 9" (1.753 m), weight 94.3 kg (208 lb), SpO2 100%.    Physical Examination:   GEN: no apparent distress, comfortable; AAOx3  HEAD: atraumatic and normocephalic  EYES: no pallor, no icterus, PERRLA  ENT: OMM, no pharyngeal erythema, external ears WNL; no nasal discharge; no thrush  NECK: no masses, thyroid normal, trachea midline, no LAD/LN's, supple  CV: RRR with no murmur; normal pulse; normal S1 and S2; no pedal edema  CHEST: Normal respiratory effort; CTAB; normal breath sounds; no wheeze or crackles  ABDOM: nontender and nondistended; soft; normal bowel sounds; no rebound/guarding  MUSC/Skeletal: ROM normal; no crepitus; joints normal; no deformities or arthropathy  EXTREM: no clubbing, cyanosis, inflammation or swelling  SKIN: no rashes, lesions, ulcers, petechiae or subcutaneous nodules  : no jiang  NEURO: grossly intact; motor/sensory WNL; AAOx3; no tremors  PSYCH: normal mood, affect and behavior  LYMPH: normal cervical, supraclavicular, axillary and groin LN's            Labs:     Lab Results   Component Value Date    WBC 4.85 07/07/2025    HGB 8.0 (L) 07/07/2025    HCT 24.7 (L) 07/07/2025    MCV 84 07/07/2025    PLT 64 (L) 07/07/2025           CMP  Sodium   Date Value Ref Range Status   07/07/2025 140 136 - 145 mmol/L Final   06/26/2019 138 134 - 144 mmol/L      Potassium   Date Value Ref Range Status   07/07/2025 4.2 3.5 - 5.1 mmol/L Final     Chloride   Date " Value Ref Range Status   07/07/2025 107 95 - 110 mmol/L Final   06/26/2019 105 98 - 110 mmol/L      CO2   Date Value Ref Range Status   07/07/2025 24 23 - 29 mmol/L Final     Glucose   Date Value Ref Range Status   07/07/2025 98 70 - 110 mg/dL Final   06/26/2019 114 (H) 70 - 99 mg/dL      BUN   Date Value Ref Range Status   07/07/2025 45 (H) 8 - 23 mg/dL Final     Creatinine   Date Value Ref Range Status   07/07/2025 3.9 (H) 0.5 - 1.4 mg/dL Final   06/26/2019 1.62 (H) 0.60 - 1.40 mg/dL      Calcium   Date Value Ref Range Status   07/07/2025 9.5 8.7 - 10.5 mg/dL Final     Protein Total   Date Value Ref Range Status   07/07/2025 7.5 6.0 - 8.4 gm/dL Final     Albumin   Date Value Ref Range Status   07/07/2025 3.9 3.5 - 5.2 g/dL Final   06/26/2019 4.4 3.1 - 4.7 g/dL      Bilirubin Total   Date Value Ref Range Status   07/07/2025 0.5 0.1 - 1.0 mg/dL Final     Comment:     For infants and newborns, interpretation of results should be based   on gestational age, weight and in agreement with clinical   observations.    Premature Infant recommended reference ranges:   0-24 hours:  <8.0 mg/dL   24-48 hours: <12.0 mg/dL   3-5 days:    <15.0 mg/dL   6-29 days:   <15.0 mg/dL     ALP   Date Value Ref Range Status   07/07/2025 53 (L) 55 - 135 unit/L Final     AST   Date Value Ref Range Status   07/07/2025 29 10 - 40 unit/L Final     ALT   Date Value Ref Range Status   07/07/2025 35 10 - 44 unit/L Final     Anion Gap   Date Value Ref Range Status   07/07/2025 9 8 - 16 mmol/L Final     eGFR if    Date Value Ref Range Status   07/13/2022 45.6 (A) >60 mL/min/1.73 m^2 Final     eGFR if non    Date Value Ref Range Status   07/13/2022 39.5 (A) >60 mL/min/1.73 m^2 Final     Comment:     Calculation used to obtain the estimated glomerular filtration  rate (eGFR) is the CKD-EPI equation.          Lab Results   Component Value Date    IRON 159 06/04/2025    TIBC 164 (L) 06/04/2025    FERRITIN 1,953.3 (H)  "06/04/2025           I have reviewed all available lab results and radiology reports.    Radiology/Diagnostic Studies:    US 2/8/2023:    IMPRESSION:  1. Dilation of the common bile duct at up to 10 mm, unchanged when compared to 2016.  2. Nonvisualization of the pancreas because of overlying bowel gas.  3. No other significant findings.  spleen is normal in size and appearance      2/15/2018 Xray Survey:   IMPRESSION: No significant abnormality seen. No evidence of osteoblastic or  osteoclastic lesions identified    MRI L-spine 2/12/2018  IMPRESSION:    1. Prominent low signal intensity throughout the bone marrow on T1 and  T2-weighted imaging. Marrow infiltrative process including malignancy such as  metastatic disease or lymphoma/leukemia is a consideration along with multiple  myeloma or malignant process. Benign etiology such as osteopetrosis or  regenerative red marrow are also considerations.    2. Multilevel lumbar degenerative changes, most prominent at L4-L5 and L5-S1 as  described.    Assessment History:    (1) 65 y.o. male with diagnosis of sickle cell anemia with borderline microcytosis  - latest hgb was 6.0 and he had blood transfusion  - today, he is not symptomatic at this time  - he probably has a multifactorial anemia process with underlying sickle cell, anemia of chronic disorders and anemia of chronic renal. I can not rule out an underlying GI bleeding process.   - iron panel is adequate (no deficiency)  - total bilirubin is only minimally elevated  - he required transfusion again in Oct 2019 and again in Dec 2019  - he had bone marrow biopsy on 12/20/2019    "dyshematopoietic changes are not entirely specific and are present  mostly within erythroid and megakaryocytic lineages. 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  - he is now on procrit per directives of Dr Mustafa    7/30/2020:   he recently saw Dr Mustafa  And sees him again in Jan 2021  - he is doing well with procrit and is feeling much better  - he has not required any transfusions for some time time    9/24/2020:  - latest hgb at 8.5  - will increase the procrit dose  - f/u with Dr Zavala in Jan 2021 11/19/2020:  - patient doing well and feels "great"  - hgb up to 9.1; continued on the procrit  - f/u with Dr Mustafa in Jan 2021    3/4/2021:  - he saw Dr Mustafa in jan 2021 and sees him again in June/July 2021  - latest hgb at 9.1 and stable and platelets are 474,000; wbc at 10.0    6/10/2021:  - latest hgb at 9.0  - plats 485,000  - on weekly procrit  - seeing Dr Mustafa again tomorrow    10/14/2021:  - hgb at 8.6 but he recently had been taking naproxen and had some BRBPR - which since resolved  - he saw Dr Beyer with GI and he is having colonsocopy on Nov 5th along with EGD  - plats 429,000  - he sees Dr Mustafa again in Nov 19th   - he sees Dr Mas again in Dec 2021    1/13/2022:  - He has been under the care of Dr Mustafa at [facility] for RARS- MDS. He is now on procrit weekly. He sees Dr Mustafa again in March 2022    - hgb 9.3  - he had scope with Dr Collins in nov 2021 which was good per patient and they plan to repeat in 5 yrs    5/12/2022:  - hgb at 9.0  - plats 485  - wbc 7.25  - he is on procrit weekly  - saw Dr Mustafa this past Fridday and since he is leaving, he will start seeing Dr Hilario instead in 3 months    8/18/2022:  - latest hgb a little lower at 8.8  - he has been on the procrit  - he saw Dr Hilario recently at [facility] and plans to see her again in 3 months    1/26/2023:  - He saw Dr Hilario at [facility] again recently this past Tuesday and they are considering another medication; " he plans to see her again in April 2023  - Dr Hilario requested he have an US of spleen - will order here in [place]  - he is also on appetite stimulant  - hgb at 9.4 and plats at 500k    5/11/2023:  - hgb at 9.6 and plats 466,000  - He saw Dr Hilario at [facility] again recently this past Tuesday and they are considering another medication; he plans to see her again in Aug 2023    8/3/2023:  - His insurance will not approve of him getting the Reblozyl   - He sees Dr Hilario again next Tuesday and we will await her directives.     9/14/2023:  - the Rebozyl has been approved - will set up  - latest hgb at 10.1  - seeing Dr Hilario again in Jan 2024 11/16/2023:  - continued on Rebozyl  - WBC and plats WNL  - hgb at 10.3  - cardiac w/u negative per patient    2/15/2024:  - labs are adequate  - hgb at 10.6  - WBC and plat WNL  - continued on Rebozyl  - recent colonoscopy good per patient    4/4/2024:  - continued on Rebozyl  - counts are looking adequate    8/1/2024:  - continued on procrit  - insurance would not approve of the rebozyl and he has not had the last 2-3 injections  - hold rebozyl for now anyway due to the increased creatine and decreased kidney function  - f/u with Dr Chilel  - seeing Dr Hilario on 8/13  - continue procrit as directed    10/10/2024  - he sees Dr Mas on 10/31  - he has been continued on rebozyl and is due again next week  - he has been on procrit  - Dr Hilario is now at [facility] in [place] and he last saw her in Aug 2024 - he plans to continue under her care via telemed    5/8/2025:  - He saw Cheryl  Namrata NP on 4/10/2025; he saw Diana NP on 4/28/2025  - He has been on the new drug Rytelo for the past two months per direction of Dr Hilario at [facility]; he saw Dr Hilario on 3/4/2025  - His counts dropped this week and he received two units blood and single donor platelet today; Pratima has spoken to Dr Hilario who is aware and she suspects it is still in the window of the therapy where the counts initially drop before leveling  "off  - He sees Dr Mas with nephrology again in July/Aug 2025    7/2/2025:  - He was recently hospitalized for pneumonia at [facility], and discharged on oral antibiotics and home health. He was followed by  pulmonary, ID and nephrology while inpatient. He required a blood transfusion  and platelets x1 while inpatient. He was discharged on 6/28.   - He last Rytelo treatment was on 6/2 and Dr Serrano had instructed us to hold it for a month and then re-evaluate labs.   - will reach out to Dr Serrano and see if she wants a telemed with him first and then decide on next course of therapy     7/14/2025:   - doing better since post hospitalization  - received cycle 4 of Rytelo on 7/11/2025, saw Dr. SERRANO on 7/8/2025   - plan is to resume Rytelo, labs weekly transfuse as needed, hold on retacrit for now   - will discuss labs each week with Dr. SERRANO      Co morbidities:     (2) Alcohol abuse issues in past - he has been sober for over 3 years now    (3) New findings on radiography with abnormal chest CT and lung mass  - former smoker    (4) chronic back issues - prior Xrays and MRI - suspect the findings on the MRI are possibly due to his sickle cell;   - SPEP was previously negative for M-protein  - PSA was 0.3 in Sept 2017  - recommended neurosurgery evaluation previously  - he saw Dr Nura VUONG and had a nerve "burning" procedure and his pain has improved    (5) CRI - elevated creatinine - he is followed Dr Chilel with nephrology      (6) H pylori gastritis - s/p recent endoscopy with Dr Collins and antibotics x 10 days    (7) CP   9/14/2023:  - seeing Dr lovett with cardiology and planning stress test in near future        1. MDS (myelodysplastic syndrome)            PLAN:   1. - doing better since post hospitalization  - received cycle 4 of Rytelo on 7/11/2025, saw Dr. SERRANO on 7/8/2025   - plan is to resume Rytelo, labs weekly transfuse as needed, hold on retacrit for now   - will discuss labs each week with Dr. SERRANO  2. Will check " kidney function each week, epo level 61, will hold on retacrit for now per Dr. SERRANO, continue to follow with Dr. Mas               RTC  2 weeks with me and 4 weeks with me       Discussion:       Pathology Discussion:    I reviewed and discussed the pathology report(s) and radiograph reports (if available) in as simple to understand and/or laymen's terms to the best of my ability. I had an indepth conversation with the patient and went over the patient's individual diagnosis based on the information that was currently available. I discussed the TNM staging process with regard to the patient's particular cancer type, and the calculated stage based on the currently available TNM data and literature. I discussed the available prognostic data with regard to the current staging information and how it relates to the prognosis of their particular neoplastic process.          COVID-19 Discussion:    I had long discussion with patient and any applicable family about the COVID-19 coronavirus epidemic and the recommended precautions with regard to cancer and/or hematology patients. I have re-iterated the CDC recommendations for adequate hand washing, use of hand -like products, and coughing into elbow, etc. In addition, especially for our patients who are on chemotherapy and/or our otherwise immunocompromised patients, I have recommended avoidance of crowds, including movie theaters, restaurants, churches, etc. I have recommended avoidance of any sick or symptomatic family members and/or friends. I have also recommended avoidance of any raw and unwashed food products, and general avoidance of food items that have not been prepared by themselves. The patient has been asked to call us immediately with any symptom developments, issues, questions or other general concerns.     I have explained all of the above in detail and the patient understands all of the current recommendation(s). I have answered all of their  questions to the best of my ability and to their complete satisfaction.   The patient is to continue with the current management plan.            Electronically signed by Pratima Melgar NP

## 2025-07-16 ENCOUNTER — OFFICE VISIT (OUTPATIENT)
Dept: INFECTIOUS DISEASES | Facility: CLINIC | Age: 65
End: 2025-07-16
Payer: MEDICARE

## 2025-07-16 ENCOUNTER — TELEPHONE (OUTPATIENT)
Facility: CLINIC | Age: 65
End: 2025-07-16
Payer: MEDICARE

## 2025-07-16 ENCOUNTER — LAB VISIT (OUTPATIENT)
Dept: LAB | Facility: HOSPITAL | Age: 65
End: 2025-07-16
Attending: NURSE PRACTITIONER
Payer: MEDICARE

## 2025-07-16 ENCOUNTER — RESULTS FOLLOW-UP (OUTPATIENT)
Facility: CLINIC | Age: 65
End: 2025-07-16
Payer: MEDICARE

## 2025-07-16 ENCOUNTER — OFFICE VISIT (OUTPATIENT)
Dept: CARDIOLOGY | Facility: CLINIC | Age: 65
End: 2025-07-16
Payer: MEDICARE

## 2025-07-16 ENCOUNTER — DOCUMENT SCAN (OUTPATIENT)
Dept: HOME HEALTH SERVICES | Facility: HOSPITAL | Age: 65
End: 2025-07-16
Payer: MEDICARE

## 2025-07-16 VITALS
WEIGHT: 207.19 LBS | TEMPERATURE: 97 F | HEIGHT: 69 IN | BODY MASS INDEX: 30.69 KG/M2 | SYSTOLIC BLOOD PRESSURE: 116 MMHG | OXYGEN SATURATION: 97 % | DIASTOLIC BLOOD PRESSURE: 74 MMHG

## 2025-07-16 VITALS
OXYGEN SATURATION: 98 % | BODY MASS INDEX: 30.78 KG/M2 | SYSTOLIC BLOOD PRESSURE: 118 MMHG | HEART RATE: 81 BPM | HEIGHT: 69 IN | WEIGHT: 207.81 LBS | DIASTOLIC BLOOD PRESSURE: 78 MMHG

## 2025-07-16 DIAGNOSIS — D46.9 MDS (MYELODYSPLASTIC SYNDROME): ICD-10-CM

## 2025-07-16 DIAGNOSIS — D64.89 ANEMIA DUE TO MULTIPLE MECHANISMS: ICD-10-CM

## 2025-07-16 DIAGNOSIS — D46.9 MDS (MYELODYSPLASTIC SYNDROME): Primary | ICD-10-CM

## 2025-07-16 DIAGNOSIS — N18.4 CHRONIC KIDNEY DISEASE (CKD), STAGE IV (SEVERE): ICD-10-CM

## 2025-07-16 DIAGNOSIS — J18.9 PNEUMONIA OF RIGHT LUNG DUE TO INFECTIOUS ORGANISM, UNSPECIFIED PART OF LUNG: ICD-10-CM

## 2025-07-16 DIAGNOSIS — E78.5 DYSLIPIDEMIA: ICD-10-CM

## 2025-07-16 DIAGNOSIS — D46.1 RARS (REFRACTORY ANEMIA WITH RINGED SIDEROBLASTS): ICD-10-CM

## 2025-07-16 DIAGNOSIS — I10 ESSENTIAL HYPERTENSION: ICD-10-CM

## 2025-07-16 DIAGNOSIS — J18.9 PNEUMONIA OF BOTH LUNGS DUE TO INFECTIOUS ORGANISM, UNSPECIFIED PART OF LUNG: Primary | ICD-10-CM

## 2025-07-16 DIAGNOSIS — D64.9 NORMOCHROMIC ANEMIA: ICD-10-CM

## 2025-07-16 DIAGNOSIS — D69.6 THROMBOCYTOPENIA: ICD-10-CM

## 2025-07-16 DIAGNOSIS — I48.0 PAF (PAROXYSMAL ATRIAL FIBRILLATION): Primary | ICD-10-CM

## 2025-07-16 LAB
ABSOLUTE EOSINOPHIL (SMH): 0.05 K/UL
ABSOLUTE MONOCYTE (SMH): 0.48 K/UL (ref 0.3–1)
ABSOLUTE NEUTROPHIL COUNT (SMH): 2 K/UL (ref 1.8–7.7)
ALBUMIN SERPL-MCNC: 3.7 G/DL (ref 3.5–5.2)
ALP SERPL-CCNC: 49 UNIT/L (ref 55–135)
ALT SERPL-CCNC: 54 UNIT/L (ref 10–44)
ANION GAP (SMH): 7 MMOL/L (ref 8–16)
AST SERPL-CCNC: 44 UNIT/L (ref 10–40)
BASOPHILS # BLD AUTO: 0.01 K/UL
BASOPHILS NFR BLD AUTO: 0.3 %
BILIRUB SERPL-MCNC: 0.4 MG/DL (ref 0.1–1)
BUN SERPL-MCNC: 41 MG/DL (ref 8–23)
CALCIUM SERPL-MCNC: 8.8 MG/DL (ref 8.7–10.5)
CHLORIDE SERPL-SCNC: 109 MMOL/L (ref 95–110)
CO2 SERPL-SCNC: 24 MMOL/L (ref 23–29)
CREAT SERPL-MCNC: 3.7 MG/DL (ref 0.5–1.4)
ERYTHROCYTE [DISTWIDTH] IN BLOOD BY AUTOMATED COUNT: 15.9 % (ref 11.5–14.5)
FERRITIN SERPL-MCNC: 2963.4 NG/ML (ref 20–300)
GFR SERPLBLD CREATININE-BSD FMLA CKD-EPI: 17 ML/MIN/1.73/M2
GLUCOSE SERPL-MCNC: 104 MG/DL (ref 70–110)
HCT VFR BLD AUTO: 20.5 % (ref 40–54)
HGB BLD-MCNC: 6.6 GM/DL (ref 14–18)
IMM GRANULOCYTES # BLD AUTO: 0.01 K/UL (ref 0–0.04)
IMM GRANULOCYTES NFR BLD AUTO: 0.3 % (ref 0–0.5)
INDIRECT COOMBS: NORMAL
IRON SATN MFR SERPL: >75 % (ref 20–50)
IRON SERPL-MCNC: 168 UG/DL (ref 45–160)
LYMPHOCYTES # BLD AUTO: 0.95 K/UL (ref 1–4.8)
MCH RBC QN AUTO: 27 PG (ref 27–31)
MCHC RBC AUTO-ENTMCNC: 32.2 G/DL (ref 32–36)
MCV RBC AUTO: 84 FL (ref 82–98)
NUCLEATED RBC (/100WBC) (SMH): 0 /100 WBC
PLATELET # BLD AUTO: 97 K/UL (ref 150–450)
PMV BLD AUTO: 10.9 FL (ref 9.2–12.9)
POTASSIUM SERPL-SCNC: 4.3 MMOL/L (ref 3.5–5.1)
PROT SERPL-MCNC: 6.9 GM/DL (ref 6–8.4)
RBC # BLD AUTO: 2.44 M/UL (ref 4.6–6.2)
RELATIVE EOSINOPHIL (SMH): 1.4 % (ref 0–8)
RELATIVE LYMPHOCYTE (SMH): 26.9 % (ref 18–48)
RELATIVE MONOCYTE (SMH): 13.6 % (ref 4–15)
RELATIVE NEUTROPHIL (SMH): 57.5 % (ref 38–73)
RH BLD: NORMAL
SODIUM SERPL-SCNC: 140 MMOL/L (ref 136–145)
SPECIMEN OUTDATE: NORMAL
TIBC SERPL-MCNC: 175 UG/DL (ref 250–450)
TRANSFERRIN SERPL-MCNC: 125 MG/DL (ref 200–375)
WBC # BLD AUTO: 3.53 K/UL (ref 3.9–12.7)

## 2025-07-16 PROCEDURE — 85025 COMPLETE CBC W/AUTO DIFF WBC: CPT

## 2025-07-16 PROCEDURE — 99999 PR PBB SHADOW E&M-EST. PATIENT-LVL IV: CPT | Mod: PBBFAC,,,

## 2025-07-16 PROCEDURE — 84466 ASSAY OF TRANSFERRIN: CPT

## 2025-07-16 PROCEDURE — 82728 ASSAY OF FERRITIN: CPT

## 2025-07-16 PROCEDURE — 36415 COLL VENOUS BLD VENIPUNCTURE: CPT

## 2025-07-16 PROCEDURE — 80053 COMPREHEN METABOLIC PANEL: CPT

## 2025-07-16 PROCEDURE — 86850 RBC ANTIBODY SCREEN: CPT | Performed by: INTERNAL MEDICINE

## 2025-07-16 PROCEDURE — 99214 OFFICE O/P EST MOD 30 MIN: CPT | Mod: PBBFAC,PN

## 2025-07-16 RX ORDER — AMIODARONE HYDROCHLORIDE 200 MG/1
200 TABLET ORAL DAILY
Qty: 90 TABLET | Refills: 2 | Status: SHIPPED | OUTPATIENT
Start: 2025-07-16

## 2025-07-16 RX ORDER — ACETAMINOPHEN 325 MG/1
650 TABLET ORAL ONCE
OUTPATIENT
Start: 2025-07-16

## 2025-07-16 RX ORDER — DIPHENHYDRAMINE HYDROCHLORIDE 50 MG/ML
25 INJECTION, SOLUTION INTRAMUSCULAR; INTRAVENOUS ONCE
OUTPATIENT
Start: 2025-07-16

## 2025-07-16 RX ORDER — FUROSEMIDE 10 MG/ML
20 INJECTION INTRAMUSCULAR; INTRAVENOUS
Status: CANCELLED | OUTPATIENT
Start: 2025-07-16

## 2025-07-16 RX ORDER — HYDROCODONE BITARTRATE AND ACETAMINOPHEN 500; 5 MG/1; MG/1
TABLET ORAL ONCE
Status: CANCELLED | OUTPATIENT
Start: 2025-07-16 | End: 2025-07-16

## 2025-07-16 NOTE — PROGRESS NOTES
Subjective:    Patient ID:  Rick Butler is a 65 y.o. male patient here for evaluation Hospital Follow Up (Pneumonia )      History of Present Illness    CHIEF COMPLAINT:  Patient presents today for hospital follow-up after admission for pneumonia and AF    RECENT HOSPITAL COURSE:  He was recently hospitalized with new onset AF and required blood transfusions. This was his first experience with AF.    CURRENT SYMPTOMS:  He reports ongoing recovery from pneumonia, denies dyspnea and moves around well. He denies chest pain, lightheadedness, dizziness, bleeding, or swelling. Home health is following.    MEDICAL HISTORY:  He has myelodysplastic syndrome (MDS) diagnosed in 2019 or 2020, and sickle cell trait. He recently started Rytelo.    MEDICATIONS:  He takes Rytelo (started Monday), amiodarone 400 mg daily, amlodipine 10 mg daily, and atorvastatin 10 mg daily. Previously tried Procrit.    LABS:  Hemoglobin is 6.6 with hematocrit at 20.5, indicating severe anemia. Platelet count is 97, which is relatively stable. Renal function was last measured at 3.9, elevated from his baseline of 3.5.      ROS:  General: -fever, -chills, -fatigue, -weight gain, -weight loss  Cardiovascular: -chest pain, -palpitations, -lower extremity edema  Respiratory: -cough, -shortness of breath, +exertional dyspnea  Gastrointestinal: -abdominal pain, -nausea, -vomiting, -diarrhea, -constipation, -blood in stool  Genitourinary: -dysuria, -hematuria, -frequency  Musculoskeletal: -joint pain, -muscle pain  Skin: -rash, -lesion  Neurological: -headache, -dizziness, -numbness, -tingling  Psychiatric: -anxiety, -depression, -sleep difficulty                    Review of patient's allergies indicates:  No Known Allergies    Past Medical History:   Diagnosis Date    Abnormal chest CT (new) 08/17/2022    Anemia due to multiple mechanisms 01/13/2019    Anemia, chronic renal failure, stage 2 (mild) 01/13/2019    Anemia, chronic renal failure, stage 3  (moderate) 08/02/2023    Depression     Former smoker 06/29/2017    H/O ETOH abuse 06/29/2017    Lung mass (new) 08/17/2022    Monocytosis 2019    Myelodysplasia (myelodysplastic syndrome)     Myelodysplasia (myelodysplastic syndrome)     Personal history of colonic polyps 12/29/2023    RARS (refractory anemia with ringed sideroblasts) 06/01/2020    Sickle cell trait      Past Surgical History:   Procedure Laterality Date    BONE MARROW BIOPSY N/A 12/20/2019    Procedure: BIOPSY, BONE MARROW;  Surgeon: [facility] Provider;  Location: [facility] OR;  Service: Interventional Radiology;  Laterality: N/A;    COLONOSCOPY N/A 11/05/2021    Procedure: COLONOSCOPY;  Surgeon: Rob Colilns MD;  Location: [facility] ENDO;  Service: Endoscopy;  Laterality: N/A;    COLONOSCOPY  12/29/2023    5 YR RECALL    ESOPHAGOGASTRODUODENOSCOPY N/A 11/05/2021    Procedure: EGD (ESOPHAGOGASTRODUODENOSCOPY);  Surgeon: Rob Collins MD;  Location: [facility] ENDO;  Service: Endoscopy;  Laterality: N/A;    INJECTION OF ANESTHETIC AGENT AROUND MEDIAL BRANCH NERVES INNERVATING LUMBAR FACET JOINT Bilateral 05/25/2018    Procedure: BLOCK-NERVE-MEDIAL BRANCH-LUMBAR;  Surgeon: Nura Heredia MD;  Location: [facility];  Service: Pain Management;  Laterality: Bilateral;  L3, 4, 5    KNEE ARTHROSCOPY W/ MENISCECTOMY Right 12/22/2021    Procedure: ARTHROSCOPY, KNEE, WITH MENISCECTOMY;  Surgeon: Sebastian Green MD;  Location: [facility] OR;  Service: Orthopedics;  Laterality: Right;  partial medial meniscectomy    RADIOFREQUENCY ABLATION OF LUMBAR MEDIAL BRANCH NERVE AT SINGLE LEVEL Bilateral 07/20/2018    Procedure: RADIOFREQUENCY ABLATION, NERVE, MEDIAL BRANCH, LUMBAR, 1 LEVEL;  Surgeon: Nura Heredia MD;  Location: [facility] OR;  Service: Pain Management;  Laterality: Bilateral;  L3, 4, 5 - Burned at 80 degrees C.  for 75 seconds x 2 each site    SMALL BOWEL ENTEROSCOPY N/A 10/16/2019    Procedure: ENTEROSCOPY;  Surgeon: Rob Collins MD;  Location: [facility];  Service:  Endoscopy;  Laterality: N/A;     Social History[1]                Objective        Vitals:    07/16/25 0809   BP: 118/78   Pulse: 81       LIPIDS - LAST 2   Lab Results   Component Value Date    CHOL 92 (L) 04/09/2025    CHOL 144 04/03/2024    HDL 48 04/09/2025    HDL 44 04/03/2024    LDLCALC 38.2 (L) 04/09/2025    LDLCALC 89.0 04/03/2024    TRIG 29 (L) 04/09/2025    TRIG 55 04/03/2024    CHOLHDL 52.2 (H) 04/09/2025    CHOLHDL 30.6 04/03/2024       CBC - LAST 2  Lab Results   Component Value Date    WBC 3.53 (L) 07/16/2025    WBC 4.85 07/07/2025    RBC 2.44 (L) 07/16/2025    RBC 2.94 (L) 07/07/2025    HGB 6.6 (LL) 07/16/2025    HGB 8.0 (L) 07/07/2025    HCT 20.5 (LL) 07/16/2025    HCT 24.7 (L) 07/07/2025    MCV 84 07/16/2025    MCV 84 07/07/2025    MCH 27.0 07/16/2025    MCH 27.2 07/07/2025    MCHC 32.2 07/16/2025    MCHC 32.4 07/07/2025    RDW 15.9 (H) 07/16/2025    RDW 15.6 (H) 07/07/2025    PLT 97 (L) 07/16/2025    PLT 64 (L) 07/07/2025    MPV 10.9 07/16/2025    MPV 10.1 07/07/2025    GRAN 3.7 03/12/2025    GRAN 63.0 03/12/2025    LYMPH 1.3 03/12/2025    LYMPH 22.1 03/12/2025    MONO 0.6 03/12/2025    MONO 10.3 03/12/2025    BASO 0.07 03/12/2025    BASO 0.08 02/26/2025    NRBC 0 07/16/2025    NRBC 0 07/07/2025       CHEMISTRY & LIVER FUNCTION - LAST 2  Lab Results   Component Value Date     07/07/2025     06/30/2025    K 4.2 07/07/2025    K 4.3 06/30/2025     07/07/2025     06/30/2025    CO2 24 07/07/2025    CO2 22 (L) 06/30/2025    ANIONGAP 9 07/07/2025    ANIONGAP 9 06/30/2025    BUN 45 (H) 07/07/2025    BUN 45 (H) 06/30/2025    CREATININE 3.9 (H) 07/07/2025    CREATININE 3.4 (H) 06/30/2025    GLU 98 07/07/2025    GLU 98 06/30/2025    CALCIUM 9.5 07/07/2025    CALCIUM 9.1 06/30/2025    MG 1.6 06/28/2025    MG 1.6 06/27/2025    ALBUMIN 3.9 07/07/2025    ALBUMIN 3.6 06/30/2025    PROT 7.5 07/07/2025    PROT 7.2 06/30/2025    ALKPHOS 53 (L) 07/07/2025    ALKPHOS 54 (L) 06/30/2025    ALT 35  07/07/2025    ALT 33 06/30/2025    AST 29 07/07/2025    AST 22 06/30/2025    BILITOT 0.5 07/07/2025    BILITOT 0.5 06/30/2025        CARDIAC PROFILE - LAST 2  Lab Results   Component Value Date     (H) 06/18/2025     06/18/2025     01/03/2025     04/24/2019        COAGULATION - LAST 2  Lab Results   Component Value Date    LABPT 15.9 (H) 12/20/2019    INR 1.1 01/27/2025    INR 1.3 12/20/2019    APTT 32.1 (H) 01/27/2025    APTT 128.4 (H) 12/20/2019       ENDOCRINE & PSA - LAST 2  Lab Results   Component Value Date    HGBA1C 5.8 06/21/2025    HGBA1C 5.6 04/03/2024    TSH 3.975 06/21/2025    TSH 3.167 04/09/2025    PSA 1.72 04/09/2025    PSA 0.41 10/04/2023        ECHOCARDIOGRAM RESULTS  Results for orders placed during the hospital encounter of 06/18/25    Echo    Interpretation Summary    Left Ventricle: The left ventricle is normal in size. Normal wall thickness. Mild global hypokinesis present. There is low normal systolic function with a visually estimated ejection fraction of 50 - 55%. There is normal diastolic function.    Right Ventricle: The right ventricle is normal in size Systolic function is normal.    Left Atrium: The left atrium is moderately dilated    Mitral Valve: There is moderate regurgitation.    Tricuspid Valve: There is mild to moderate regurgitation. There is pulmonary hypertension. The estimated PA systolic pressure is at least 36 mmHg.      CURRENT/PREVIOUS VISIT EKG  Results for orders placed or performed during the hospital encounter of 06/18/25   EKG 12-lead    Collection Time: 06/20/25  1:14 PM   Result Value Ref Range    QRS Duration 94 ms    OHS QTC Calculation 484 ms    Narrative    Test Reason : I49.9,    Vent. Rate :  93 BPM     Atrial Rate :  93 BPM     P-R Int : 170 ms          QRS Dur :  94 ms      QT Int : 390 ms       P-R-T Axes :  56   6  48 degrees    QTcB Int : 484 ms    Normal sinus rhythm  Nonspecific T wave abnormality  Prolonged QT  Abnormal  ECG  When compared with ECG of 19-Jun-2025 18:19,  Sinus rhythm has replaced Atrial fibrillation  Vent. rate has decreased by  56 bpm  Confirmed by Fam Benoit ([id number]) on 6/21/2025 2:14:31 PM    Referred By: AAAREFERRAL SELF           Confirmed By: Fam Benoit     No valid procedures specified.   Results for orders placed during the hospital encounter of 10/06/23    Nuclear Stress - Cardiology Interpreted    Interpretation Summary    Normal myocardial perfusion scan. There is no evidence of myocardial ischemia or infarction.    The gated perfusion images showed an ejection fraction of 47% at rest. The gated perfusion images showed an ejection fraction of 53% post stress. Normal ejection fraction is greater than 53%.    The ECG portion of the study is negative for ischemia.    The patient reported no chest pain during the stress test.    There were no arrhythmias during stress.    No valid procedures specified.    Physical Exam    General: No acute distress. Well-developed. Well-nourished.  HENT: Normocephalic. Atraumatic. Ears: Bilateral TMs clear. Bilateral EACs clear.  Cardiovascular: Regular rate. Regular rhythm. No murmurs. No rubs. No gallops. Normal S1, S2.  Respiratory: Normal respiratory effort. Clear to auscultation bilaterally. No rales. No rhonchi. No wheezing.  Abdomen: Soft. Non-tender. Non-distended. Normoactive bowel sounds.  Musculoskeletal: No  obvious deformity.  Extremities: No lower extremity edema.  Neurological: Alert & oriented x3. No slurred speech. Normal gait.  Psychiatric: Normal mood. Normal affect. Good insight. Good judgment.  Skin: Warm. Dry. No rash.         I HAVE REVIEWED :    The vital signs, nurses notes, and all the pertinent radiology and labs.        Current Outpatient Medications   Medication Instructions    amiodarone (PACERONE) 200 mg, Oral, Daily    amLODIPine (NORVASC) 10 mg, Oral, Daily    ascorbic acid (vitamin C) (VITAMIN C) 500 mg, Daily    atorvastatin (LIPITOR)  10 mg, Oral, Nightly    calcitRIOL (ROCALTROL) 0.25 mcg, Every 7 days    fluticasone propionate (FLONASE) 50 mcg, Each Nostril, 2 times daily, 1 spray EVERY morning and afternoon  toward the ear each side after a sinus rinse    folic acid (FOLVITE) 2,000 mcg, Oral, Daily    multivitamin with minerals Cap 1 capsule, Every morning    pantoprazole (PROTONIX) 40 mg, Daily    sildenafiL (VIAGRA) 100 mg, Oral, Daily PRN    sod chlor-bicarb-squeez bottle (NEILMED SINUS RINSE COMPLETE) pkdv 1 application , sinus irrigation, 2 times daily, Not right before bed    tamsulosin (FLOMAX) 0.8 mg, Oral    traZODone (DESYREL) 100 mg, Oral, Nightly          Assessment & Plan   Assessment & Plan    D46.9 MDS (myelodysplastic syndrome)  I48.0 PAF (paroxysmal atrial fibrillation)  N18.4 Chronic kidney disease (CKD), stage IV (severe)  I10 Essential hypertension  E78.5 Dyslipidemia  J18.9 Pneumonia of right lung due to infectious organism, unspecified part of lung  D64.9 Normochromic anemia    PLAN SUMMARY:  - Potential future referral to electrophysiology for Watchman device evaluation as blood counts stabilizse  - Blood transfusion pending due to low hemoglobin and hematocrit levels  - Continue Amlodipine 10 mg daily  - Continue atorvastatin 10 mg daily  - Amiodarone regimen: 400 mg daily until 7/31, then 200 mg daily from 8/1 for about 6 months  - Refilled amiodarone, sent to [facility] pharmacy  - Patient to follow low-sodium, heart-healthy diet  - Patient to maintain blood pressure at 130/80 or less  - Follow up in 3 months, preferably on a Thursday or Friday    MDS (MYELODYSPLASTIC SYNDROME):  - Managed by Hematology/Oncology. Continued management of myelodysplastic syndrome (MDS) with recently started Rytello.      PAF (PAROXYSMAL ATRIAL FIBRILLATION):  - Patient recently hospitalized for new-onset a-fib (AFib), converted with amiodarone.  - Currently on amiodarone for AFib management, with plans to taper and potentially discontinue  after 1 year: continue 400 mg daily until , then start 200 mg daily on  to continue for about 6 months.  - Potential future referral to electrophysiology for Watchman device evaluation, given inability to tolerate anticoagulants due to low blood counts.  - Monitored for potential AFib recurrence and associated stroke risk.  - Instructed on signs of AFib recurrence (palpitations, racing heart).  - Patient to obtain annual chest XR, thyroid check, liver function tests, and eye exam while on amiodarone.  - Refilled amiodarone, sent to [facility] pharmacy.    CHRONIC KIDNEY DISEASE (CKD), STAGE IV (SEVERE):  - Ordered renal function test (to be done with next routine blood draw).    ESSENTIAL HYPERTENSION:  - Patient to follow low-sodium, heart-healthy diet, maintain blood pressure WNL (130/80 or less).  - Continued Amlodipine 10 mg daily.    DYSLIPIDEMIA:  - Continued atorvastatin 10 mg daily.  - Low sodium heart healthy diet      PNEUMONIA OF RIGHT LUNG DUE TO INFECTIOUS ORGANISM, UNSPECIFIED PART OF LUNG:  - Patient recently hospitalized for pneumonia.  Improving. Denies MORA    NORMOCHROMIC ANEMIA:  - Blood transfusion needed due to low hemoglobin and hematocrit levels.  - Follow up in 3 months      3 month follow up    This note was generated with the assistance of ambient listening technology. Verbal consent was obtained by the patient and accompanying visitor(s) for the recording of patient appointment to facilitate this note. I attest to having reviewed and edited the generated note for accuracy, though some syntax or spelling errors may persist. Please contact the author of this note for any clarification.             [1]   Social History  Tobacco Use    Smoking status: Former     Current packs/day: 0.00     Types: Cigarettes     Quit date: 1996     Years since quittin.5    Smokeless tobacco: Never   Substance Use Topics    Alcohol use: Not Currently     Alcohol/week: 21.0 standard drinks of alcohol      Types: 21 Cans of beer per week     Comment: Sober 5 years    Drug use: Not Currently     Types: Marijuana

## 2025-07-16 NOTE — TELEPHONE ENCOUNTER
Patient is aware of need for 1 unit of blood tomorrow and that scheduling will call him to schedule. Patient is also aware of need to type and screen today and that we are awaiting any further directives that Dr Kern may have. Verbalized understanding.

## 2025-07-16 NOTE — PATIENT INSTRUCTIONS
Schedule the chest xray around the 7 of August for follow-up of pneumonia    Please call the office if you feel like you are having any return of signs or symptoms of pneumonia    We will call you with results and if you need any follow-up    Follow-up with us as needed

## 2025-07-16 NOTE — TELEPHONE ENCOUNTER
----- Message from Pratima Almonte NP sent at 7/16/2025  8:43 AM CDT -----  Needs a type and screen today, give one unit of blood tomorrow, waiting on GALO last directives.   ----- Message -----  From: Lab, Background User  Sent: 7/16/2025   8:37 AM CDT  To: Pratima Melgar NP
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
## 2025-07-17 ENCOUNTER — INFUSION (OUTPATIENT)
Dept: INFUSION THERAPY | Facility: HOSPITAL | Age: 65
End: 2025-07-17
Attending: INTERNAL MEDICINE
Payer: MEDICARE

## 2025-07-17 VITALS
DIASTOLIC BLOOD PRESSURE: 61 MMHG | OXYGEN SATURATION: 98 % | HEART RATE: 69 BPM | RESPIRATION RATE: 16 BRPM | TEMPERATURE: 98 F | SYSTOLIC BLOOD PRESSURE: 117 MMHG

## 2025-07-17 DIAGNOSIS — D64.89 ANEMIA DUE TO MULTIPLE MECHANISMS: Primary | ICD-10-CM

## 2025-07-17 DIAGNOSIS — D46.9 MDS (MYELODYSPLASTIC SYNDROME): ICD-10-CM

## 2025-07-17 PROCEDURE — 96365 THER/PROPH/DIAG IV INF INIT: CPT

## 2025-07-17 PROCEDURE — 63600175 PHARM REV CODE 636 W HCPCS: Performed by: NURSE PRACTITIONER

## 2025-07-17 PROCEDURE — 36430 TRANSFUSION BLD/BLD COMPNT: CPT

## 2025-07-17 PROCEDURE — 86920 COMPATIBILITY TEST SPIN: CPT | Performed by: NURSE PRACTITIONER

## 2025-07-17 PROCEDURE — 25000003 PHARM REV CODE 250: Performed by: NURSE PRACTITIONER

## 2025-07-17 PROCEDURE — P9016 RBC LEUKOCYTES REDUCED: HCPCS | Performed by: NURSE PRACTITIONER

## 2025-07-17 PROCEDURE — 96366 THER/PROPH/DIAG IV INF ADDON: CPT

## 2025-07-17 PROCEDURE — A4216 STERILE WATER/SALINE, 10 ML: HCPCS | Performed by: NURSE PRACTITIONER

## 2025-07-17 RX ORDER — HEPARIN 100 UNIT/ML
500 SYRINGE INTRAVENOUS
Status: CANCELLED | OUTPATIENT
Start: 2025-07-17

## 2025-07-17 RX ORDER — HYDROCODONE BITARTRATE AND ACETAMINOPHEN 500; 5 MG/1; MG/1
TABLET ORAL ONCE
Status: COMPLETED | OUTPATIENT
Start: 2025-07-17 | End: 2025-07-17

## 2025-07-17 RX ORDER — SODIUM CHLORIDE 0.9 % (FLUSH) 0.9 %
10 SYRINGE (ML) INJECTION
Status: CANCELLED | OUTPATIENT
Start: 2025-07-17

## 2025-07-17 RX ORDER — SODIUM CHLORIDE 0.9 % (FLUSH) 0.9 %
10 SYRINGE (ML) INJECTION
Status: DISCONTINUED | OUTPATIENT
Start: 2025-07-17 | End: 2025-07-17

## 2025-07-17 RX ORDER — FUROSEMIDE 10 MG/ML
20 INJECTION INTRAMUSCULAR; INTRAVENOUS
Status: ACTIVE | OUTPATIENT
Start: 2025-07-17

## 2025-07-17 RX ADMIN — DEFEROXAMINE MESYLATE 1000 MG: 95 INJECTION, POWDER, LYOPHILIZED, FOR SOLUTION INTRAMUSCULAR; INTRAVENOUS; SUBCUTANEOUS at 10:07

## 2025-07-17 RX ADMIN — SODIUM CHLORIDE: 9 INJECTION, SOLUTION INTRAVENOUS at 07:07

## 2025-07-17 RX ADMIN — Medication 10 ML: at 02:07

## 2025-07-17 NOTE — PLAN OF CARE
Problem: Adult Inpatient Plan of Care  Goal: Optimal Comfort and Wellbeing  Outcome: Progressing  Intervention: Provide Person-Centered Care  Flowsheets (Taken 7/17/2025 5181)  Trust Relationship/Rapport:   care explained   choices provided   emotional support provided   empathic listening provided   questions answered   questions encouraged   reassurance provided   thoughts/feelings acknowledged

## 2025-07-23 ENCOUNTER — RESULTS FOLLOW-UP (OUTPATIENT)
Facility: CLINIC | Age: 65
End: 2025-07-23
Payer: MEDICARE

## 2025-07-23 ENCOUNTER — LAB VISIT (OUTPATIENT)
Dept: LAB | Facility: HOSPITAL | Age: 65
End: 2025-07-23
Attending: NURSE PRACTITIONER
Payer: MEDICARE

## 2025-07-23 ENCOUNTER — TELEPHONE (OUTPATIENT)
Facility: CLINIC | Age: 65
End: 2025-07-23
Payer: MEDICARE

## 2025-07-23 DIAGNOSIS — D46.9 MDS (MYELODYSPLASTIC SYNDROME): ICD-10-CM

## 2025-07-23 DIAGNOSIS — D64.89 ANEMIA DUE TO MULTIPLE MECHANISMS: ICD-10-CM

## 2025-07-23 DIAGNOSIS — D46.9 MDS (MYELODYSPLASTIC SYNDROME): Primary | ICD-10-CM

## 2025-07-23 LAB
ABSOLUTE EOSINOPHIL (SMH): 0.02 K/UL
ABSOLUTE MONOCYTE (SMH): 0.37 K/UL (ref 0.3–1)
ABSOLUTE NEUTROPHIL COUNT (SMH): 1.5 K/UL (ref 1.8–7.7)
ALBUMIN SERPL-MCNC: 3.9 G/DL (ref 3.5–5.2)
ALP SERPL-CCNC: 51 UNIT/L (ref 55–135)
ALT SERPL-CCNC: 47 UNIT/L (ref 10–44)
ANION GAP (SMH): 7 MMOL/L (ref 8–16)
AST SERPL-CCNC: 38 UNIT/L (ref 10–40)
BASOPHILS # BLD AUTO: 0.02 K/UL
BASOPHILS NFR BLD AUTO: 0.7 %
BILIRUB SERPL-MCNC: 0.5 MG/DL (ref 0.1–1)
BUN SERPL-MCNC: 47 MG/DL (ref 8–23)
CALCIUM SERPL-MCNC: 8.6 MG/DL (ref 8.7–10.5)
CHLORIDE SERPL-SCNC: 111 MMOL/L (ref 95–110)
CO2 SERPL-SCNC: 23 MMOL/L (ref 23–29)
CREAT SERPL-MCNC: 4.4 MG/DL (ref 0.5–1.4)
ERYTHROCYTE [DISTWIDTH] IN BLOOD BY AUTOMATED COUNT: 16.2 % (ref 11.5–14.5)
GFR SERPLBLD CREATININE-BSD FMLA CKD-EPI: 14 ML/MIN/1.73/M2
GLUCOSE SERPL-MCNC: 102 MG/DL (ref 70–110)
HCT VFR BLD AUTO: 21 % (ref 40–54)
HGB BLD-MCNC: 6.8 GM/DL (ref 14–18)
IMM GRANULOCYTES # BLD AUTO: 0.01 K/UL (ref 0–0.04)
IMM GRANULOCYTES NFR BLD AUTO: 0.4 % (ref 0–0.5)
INDIRECT COOMBS: NORMAL
LYMPHOCYTES # BLD AUTO: 0.88 K/UL (ref 1–4.8)
MCH RBC QN AUTO: 27.5 PG (ref 27–31)
MCHC RBC AUTO-ENTMCNC: 32.4 G/DL (ref 32–36)
MCV RBC AUTO: 85 FL (ref 82–98)
NUCLEATED RBC (/100WBC) (SMH): 0 /100 WBC
PLATELET # BLD AUTO: 75 K/UL (ref 150–450)
PMV BLD AUTO: 9.2 FL (ref 9.2–12.9)
POTASSIUM SERPL-SCNC: 4.3 MMOL/L (ref 3.5–5.1)
PROT SERPL-MCNC: 6.9 GM/DL (ref 6–8.4)
RBC # BLD AUTO: 2.47 M/UL (ref 4.6–6.2)
RELATIVE EOSINOPHIL (SMH): 0.7 % (ref 0–8)
RELATIVE LYMPHOCYTE (SMH): 31.9 % (ref 18–48)
RELATIVE MONOCYTE (SMH): 13.4 % (ref 4–15)
RELATIVE NEUTROPHIL (SMH): 52.9 % (ref 38–73)
RH BLD: NORMAL
SODIUM SERPL-SCNC: 141 MMOL/L (ref 136–145)
SPECIMEN OUTDATE: NORMAL
WBC # BLD AUTO: 2.76 K/UL (ref 3.9–12.7)

## 2025-07-23 PROCEDURE — 85025 COMPLETE CBC W/AUTO DIFF WBC: CPT

## 2025-07-23 PROCEDURE — 36415 COLL VENOUS BLD VENIPUNCTURE: CPT

## 2025-07-23 PROCEDURE — 86902 BLOOD TYPE ANTIGEN DONOR EA: CPT | Performed by: NURSE PRACTITIONER

## 2025-07-23 PROCEDURE — 82040 ASSAY OF SERUM ALBUMIN: CPT

## 2025-07-23 PROCEDURE — 85660 RBC SICKLE CELL TEST: CPT | Performed by: NURSE PRACTITIONER

## 2025-07-23 PROCEDURE — 86850 RBC ANTIBODY SCREEN: CPT | Performed by: NURSE PRACTITIONER

## 2025-07-23 RX ORDER — HYDROCODONE BITARTRATE AND ACETAMINOPHEN 500; 5 MG/1; MG/1
TABLET ORAL ONCE
Status: CANCELLED | OUTPATIENT
Start: 2025-07-23 | End: 2025-07-23

## 2025-07-23 RX ORDER — FUROSEMIDE 10 MG/ML
20 INJECTION INTRAMUSCULAR; INTRAVENOUS
Status: CANCELLED | OUTPATIENT
Start: 2025-07-23

## 2025-07-23 RX ORDER — DIPHENHYDRAMINE HYDROCHLORIDE 50 MG/ML
25 INJECTION, SOLUTION INTRAMUSCULAR; INTRAVENOUS ONCE
Status: CANCELLED | OUTPATIENT
Start: 2025-07-23

## 2025-07-23 RX ORDER — FUROSEMIDE 10 MG/ML
20 INJECTION INTRAMUSCULAR; INTRAVENOUS ONCE
Status: CANCELLED | OUTPATIENT
Start: 2025-07-23

## 2025-07-23 RX ORDER — ACETAMINOPHEN 325 MG/1
650 TABLET ORAL ONCE
Status: CANCELLED | OUTPATIENT
Start: 2025-07-23

## 2025-07-23 NOTE — PROGRESS NOTES
Talked to Dr. SERRANO her rec at this time is to hold rytelo.   She wants to restart him on [id number] units of procrit weekly   Told the patient and will change the orders

## 2025-07-23 NOTE — TELEPHONE ENCOUNTER
Reviewed with patient, who verbalized understanding and stated he would call Dr Chilel to get in sooner. Labs faxed to Dr Chilel's office via right fax.

## 2025-07-23 NOTE — TELEPHONE ENCOUNTER
----- Message from Pratima Almonte NP sent at 7/23/2025  1:51 PM CDT -----  We have to get him back in with nephrology..... kidney function is worsening.   ----- Message -----  From: Lab, Background User  Sent: 7/23/2025   8:44 AM CDT  To: Pratima Melgar NP

## 2025-07-24 ENCOUNTER — DOCUMENTATION ONLY (OUTPATIENT)
Facility: CLINIC | Age: 65
End: 2025-07-24
Payer: MEDICARE

## 2025-07-24 ENCOUNTER — INFUSION (OUTPATIENT)
Dept: INFUSION THERAPY | Facility: HOSPITAL | Age: 65
End: 2025-07-24
Attending: INTERNAL MEDICINE
Payer: MEDICARE

## 2025-07-24 VITALS
HEART RATE: 66 BPM | SYSTOLIC BLOOD PRESSURE: 138 MMHG | TEMPERATURE: 98 F | OXYGEN SATURATION: 100 % | BODY MASS INDEX: 31.01 KG/M2 | RESPIRATION RATE: 16 BRPM | DIASTOLIC BLOOD PRESSURE: 66 MMHG | WEIGHT: 210 LBS

## 2025-07-24 DIAGNOSIS — D46.9 MDS (MYELODYSPLASTIC SYNDROME): ICD-10-CM

## 2025-07-24 DIAGNOSIS — D64.89 ANEMIA DUE TO MULTIPLE MECHANISMS: Primary | ICD-10-CM

## 2025-07-24 PROCEDURE — 86920 COMPATIBILITY TEST SPIN: CPT | Performed by: NURSE PRACTITIONER

## 2025-07-24 PROCEDURE — 96366 THER/PROPH/DIAG IV INF ADDON: CPT

## 2025-07-24 PROCEDURE — 96365 THER/PROPH/DIAG IV INF INIT: CPT

## 2025-07-24 PROCEDURE — P9016 RBC LEUKOCYTES REDUCED: HCPCS | Performed by: NURSE PRACTITIONER

## 2025-07-24 PROCEDURE — 36430 TRANSFUSION BLD/BLD COMPNT: CPT

## 2025-07-24 PROCEDURE — 25000003 PHARM REV CODE 250: Performed by: NURSE PRACTITIONER

## 2025-07-24 PROCEDURE — 25000003 PHARM REV CODE 250: Performed by: INTERNAL MEDICINE

## 2025-07-24 PROCEDURE — 63600175 PHARM REV CODE 636 W HCPCS: Performed by: INTERNAL MEDICINE

## 2025-07-24 RX ORDER — FUROSEMIDE 10 MG/ML
20 INJECTION INTRAMUSCULAR; INTRAVENOUS ONCE
Status: ACTIVE | OUTPATIENT
Start: 2025-07-24

## 2025-07-24 RX ORDER — HYDROCODONE BITARTRATE AND ACETAMINOPHEN 500; 5 MG/1; MG/1
TABLET ORAL ONCE
Status: ACTIVE | OUTPATIENT
Start: 2025-07-24

## 2025-07-24 RX ORDER — HEPARIN 100 UNIT/ML
500 SYRINGE INTRAVENOUS
Status: CANCELLED | OUTPATIENT
Start: 2025-07-24

## 2025-07-24 RX ORDER — HEPARIN 100 UNIT/ML
500 SYRINGE INTRAVENOUS
OUTPATIENT
Start: 2025-07-24

## 2025-07-24 RX ORDER — ACETAMINOPHEN 325 MG/1
650 TABLET ORAL ONCE
Status: COMPLETED | OUTPATIENT
Start: 2025-07-24 | End: 2025-07-24

## 2025-07-24 RX ORDER — DIPHENHYDRAMINE HYDROCHLORIDE 50 MG/ML
25 INJECTION, SOLUTION INTRAMUSCULAR; INTRAVENOUS ONCE
Status: ACTIVE | OUTPATIENT
Start: 2025-07-24

## 2025-07-24 RX ORDER — SODIUM CHLORIDE 0.9 % (FLUSH) 0.9 %
10 SYRINGE (ML) INJECTION
Status: DISCONTINUED | OUTPATIENT
Start: 2025-07-24 | End: 2025-07-24 | Stop reason: HOSPADM

## 2025-07-24 RX ORDER — SODIUM CHLORIDE 0.9 % (FLUSH) 0.9 %
10 SYRINGE (ML) INJECTION
Status: CANCELLED | OUTPATIENT
Start: 2025-07-24

## 2025-07-24 RX ORDER — SODIUM CHLORIDE 0.9 % (FLUSH) 0.9 %
10 SYRINGE (ML) INJECTION
OUTPATIENT
Start: 2025-07-24

## 2025-07-24 RX ORDER — HEPARIN 100 UNIT/ML
500 SYRINGE INTRAVENOUS
Status: DISCONTINUED | OUTPATIENT
Start: 2025-07-24 | End: 2025-07-24 | Stop reason: HOSPADM

## 2025-07-24 RX ORDER — FUROSEMIDE 10 MG/ML
20 INJECTION INTRAMUSCULAR; INTRAVENOUS
Status: ACTIVE | OUTPATIENT
Start: 2025-07-24

## 2025-07-24 RX ADMIN — ACETAMINOPHEN 650 MG: 325 TABLET ORAL at 07:07

## 2025-07-24 RX ADMIN — DEFEROXAMINE MESYLATE 1000 MG: 95 INJECTION, POWDER, LYOPHILIZED, FOR SOLUTION INTRAMUSCULAR; INTRAVENOUS; SUBCUTANEOUS at 11:07

## 2025-07-24 NOTE — PLAN OF CARE
Problem: Adult Inpatient Plan of Care  Goal: Plan of Care Review  7/24/2025 0925 by Janette Simms RN  Outcome: Met  7/24/2025 0925 by Janette Simms RN  Outcome: Progressing  Goal: Patient-Specific Goal (Individualized)  7/24/2025 0925 by Janette Simms RN  Outcome: Met  7/24/2025 0925 by Janette Simms RN  Outcome: Progressing  Goal: Absence of Hospital-Acquired Illness or Injury  7/24/2025 0925 by Janette Simms RN  Outcome: Met  7/24/2025 0925 by Janette Simms RN  Outcome: Progressing  Goal: Optimal Comfort and Wellbeing  7/24/2025 0925 by Janette Simms RN  Outcome: Met  7/24/2025 0925 by Janette Simms RN  Outcome: Progressing  Goal: Readiness for Transition of Care  7/24/2025 0925 by Janette Simms RN  Outcome: Met  7/24/2025 0925 by Janette Simms RN  Outcome: Progressing

## 2025-07-25 ENCOUNTER — INFUSION (OUTPATIENT)
Dept: INFUSION THERAPY | Facility: HOSPITAL | Age: 65
End: 2025-07-25
Attending: INTERNAL MEDICINE
Payer: MEDICARE

## 2025-07-25 VITALS
OXYGEN SATURATION: 98 % | RESPIRATION RATE: 18 BRPM | HEART RATE: 75 BPM | HEIGHT: 70 IN | DIASTOLIC BLOOD PRESSURE: 82 MMHG | BODY MASS INDEX: 30.25 KG/M2 | SYSTOLIC BLOOD PRESSURE: 143 MMHG | WEIGHT: 211.31 LBS | TEMPERATURE: 98 F

## 2025-07-25 DIAGNOSIS — D46.9 MDS (MYELODYSPLASTIC SYNDROME): ICD-10-CM

## 2025-07-25 DIAGNOSIS — N18.30 STAGE 3 CHRONIC KIDNEY DISEASE, UNSPECIFIED WHETHER STAGE 3A OR 3B CKD: Primary | ICD-10-CM

## 2025-07-25 PROCEDURE — 96372 THER/PROPH/DIAG INJ SC/IM: CPT

## 2025-07-25 PROCEDURE — 63600175 PHARM REV CODE 636 W HCPCS: Mod: JZ,EC,TB | Performed by: NURSE PRACTITIONER

## 2025-07-25 RX ADMIN — EPOETIN ALFA-EPBX 60000 UNITS: 20000 INJECTION, SOLUTION INTRAVENOUS; SUBCUTANEOUS at 09:07

## 2025-07-25 NOTE — PLAN OF CARE
Problem: Fatigue  Goal: Improved Activity Tolerance  Outcome: Progressing  Intervention: Promote Improved Energy  Flowsheets (Taken 7/25/2025 7313)  Fatigue Management: frequent rest breaks encouraged

## 2025-07-30 ENCOUNTER — DOCUMENTATION ONLY (OUTPATIENT)
Facility: CLINIC | Age: 65
End: 2025-07-30
Payer: MEDICARE

## 2025-07-30 ENCOUNTER — OFFICE VISIT (OUTPATIENT)
Facility: CLINIC | Age: 65
End: 2025-07-30
Payer: MEDICARE

## 2025-07-30 ENCOUNTER — HOSPITAL ENCOUNTER (INPATIENT)
Facility: HOSPITAL | Age: 65
LOS: 5 days | Discharge: HOME | DRG: 683 | End: 2025-08-04
Attending: EMERGENCY MEDICINE | Admitting: STUDENT IN AN ORGANIZED HEALTH CARE EDUCATION/TRAINING PROGRAM
Payer: MEDICARE

## 2025-07-30 ENCOUNTER — LAB VISIT (OUTPATIENT)
Dept: LAB | Facility: HOSPITAL | Age: 65
End: 2025-07-30
Attending: NURSE PRACTITIONER
Payer: MEDICARE

## 2025-07-30 VITALS
TEMPERATURE: 98 F | HEART RATE: 80 BPM | DIASTOLIC BLOOD PRESSURE: 78 MMHG | OXYGEN SATURATION: 97 % | SYSTOLIC BLOOD PRESSURE: 147 MMHG | WEIGHT: 210 LBS | HEIGHT: 70 IN | BODY MASS INDEX: 30.06 KG/M2 | RESPIRATION RATE: 16 BRPM

## 2025-07-30 DIAGNOSIS — N17.9 AKI (ACUTE KIDNEY INJURY): ICD-10-CM

## 2025-07-30 DIAGNOSIS — D69.6 THROMBOCYTOPENIA: ICD-10-CM

## 2025-07-30 DIAGNOSIS — C94.6 MYELODYSPLASTIC DISEASE: ICD-10-CM

## 2025-07-30 DIAGNOSIS — D46.9 MDS (MYELODYSPLASTIC SYNDROME): ICD-10-CM

## 2025-07-30 DIAGNOSIS — D61.818 PANCYTOPENIA: ICD-10-CM

## 2025-07-30 DIAGNOSIS — N18.4 CHRONIC KIDNEY DISEASE (CKD), STAGE IV (SEVERE): Primary | ICD-10-CM

## 2025-07-30 DIAGNOSIS — R07.9 CHEST PAIN: ICD-10-CM

## 2025-07-30 DIAGNOSIS — D64.9 ANEMIA, UNSPECIFIED TYPE: ICD-10-CM

## 2025-07-30 DIAGNOSIS — N18.4 STAGE 4 CHRONIC KIDNEY DISEASE: ICD-10-CM

## 2025-07-30 DIAGNOSIS — D46.9 MDS (MYELODYSPLASTIC SYNDROME): Primary | ICD-10-CM

## 2025-07-30 DIAGNOSIS — N18.30 CHRONIC KIDNEY DISEASE, STAGE III (MODERATE): Primary | ICD-10-CM

## 2025-07-30 DIAGNOSIS — N17.9 ACUTE RENAL FAILURE SUPERIMPOSED ON STAGE 4 CHRONIC KIDNEY DISEASE, UNSPECIFIED ACUTE RENAL FAILURE TYPE: ICD-10-CM

## 2025-07-30 DIAGNOSIS — D64.89 ANEMIA DUE TO MULTIPLE MECHANISMS: ICD-10-CM

## 2025-07-30 DIAGNOSIS — N18.4 CHRONIC KIDNEY DISEASE (CKD), STAGE IV (SEVERE): ICD-10-CM

## 2025-07-30 DIAGNOSIS — N18.32 STAGE 3B CHRONIC KIDNEY DISEASE: ICD-10-CM

## 2025-07-30 DIAGNOSIS — N18.4 ACUTE RENAL FAILURE SUPERIMPOSED ON STAGE 4 CHRONIC KIDNEY DISEASE, UNSPECIFIED ACUTE RENAL FAILURE TYPE: ICD-10-CM

## 2025-07-30 LAB
ABSOLUTE EOSINOPHIL (SMH): 0.02 K/UL
ABSOLUTE EOSINOPHIL (SMH): 0.02 K/UL
ABSOLUTE MONOCYTE (SMH): 0.49 K/UL (ref 0.3–1)
ABSOLUTE MONOCYTE (SMH): 0.54 K/UL (ref 0.3–1)
ABSOLUTE NEUTROPHIL COUNT (SMH): 1.7 K/UL (ref 1.8–7.7)
ABSOLUTE NEUTROPHIL COUNT (SMH): 2.7 K/UL (ref 1.8–7.7)
ALBUMIN SERPL-MCNC: 3.9 G/DL (ref 3.5–5.2)
ALBUMIN SERPL-MCNC: 4.8 G/DL (ref 3.5–5.2)
ALP SERPL-CCNC: 53 UNIT/L (ref 55–135)
ALP SERPL-CCNC: 62 UNIT/L (ref 55–135)
ALT SERPL-CCNC: 42 UNIT/L (ref 10–44)
ALT SERPL-CCNC: 51 UNIT/L (ref 10–44)
ANION GAP (SMH): 6 MMOL/L (ref 8–16)
ANION GAP (SMH): 7 MMOL/L (ref 8–16)
AST SERPL-CCNC: 33 UNIT/L (ref 10–40)
AST SERPL-CCNC: 39 UNIT/L (ref 10–40)
BACTERIA #/AREA URNS AUTO: NORMAL /HPF
BASOPHILS # BLD AUTO: 0.01 K/UL
BASOPHILS # BLD AUTO: 0.02 K/UL
BASOPHILS NFR BLD AUTO: 0.3 %
BASOPHILS NFR BLD AUTO: 0.5 %
BILIRUB SERPL-MCNC: 0.5 MG/DL (ref 0.1–1)
BILIRUB SERPL-MCNC: 0.7 MG/DL (ref 0.1–1)
BILIRUB UR QL STRIP.AUTO: NEGATIVE
BUN SERPL-MCNC: 48 MG/DL (ref 8–23)
BUN SERPL-MCNC: 48 MG/DL (ref 8–23)
CALCIUM SERPL-MCNC: 8.4 MG/DL (ref 8.7–10.5)
CALCIUM SERPL-MCNC: 9.4 MG/DL (ref 8.7–10.5)
CHLORIDE SERPL-SCNC: 111 MMOL/L (ref 95–110)
CHLORIDE SERPL-SCNC: 111 MMOL/L (ref 95–110)
CLARITY UR: CLEAR
CO2 SERPL-SCNC: 20 MMOL/L (ref 23–29)
CO2 SERPL-SCNC: 25 MMOL/L (ref 23–29)
COLOR UR AUTO: YELLOW
CREAT SERPL-MCNC: 5 MG/DL (ref 0.5–1.4)
CREAT SERPL-MCNC: 5.3 MG/DL (ref 0.5–1.4)
ERYTHROCYTE [DISTWIDTH] IN BLOOD BY AUTOMATED COUNT: 17.4 % (ref 11.5–14.5)
ERYTHROCYTE [DISTWIDTH] IN BLOOD BY AUTOMATED COUNT: 17.5 % (ref 11.5–14.5)
GFR SERPLBLD CREATININE-BSD FMLA CKD-EPI: 11 ML/MIN/1.73/M2
GFR SERPLBLD CREATININE-BSD FMLA CKD-EPI: 12 ML/MIN/1.73/M2
GLUCOSE SERPL-MCNC: 106 MG/DL (ref 70–110)
GLUCOSE SERPL-MCNC: 84 MG/DL (ref 70–110)
GLUCOSE UR QL STRIP: NEGATIVE
HCT VFR BLD AUTO: 25.4 % (ref 40–54)
HCT VFR BLD AUTO: 29.5 % (ref 40–54)
HGB BLD-MCNC: 8.3 GM/DL (ref 14–18)
HGB BLD-MCNC: 9.2 GM/DL (ref 14–18)
HGB UR QL STRIP: NEGATIVE
IMM GRANULOCYTES # BLD AUTO: 0.01 K/UL (ref 0–0.04)
IMM GRANULOCYTES # BLD AUTO: 0.02 K/UL (ref 0–0.04)
IMM GRANULOCYTES NFR BLD AUTO: 0.3 % (ref 0–0.5)
IMM GRANULOCYTES NFR BLD AUTO: 0.5 % (ref 0–0.5)
KETONES UR QL STRIP: NEGATIVE
LEUKOCYTE ESTERASE UR QL STRIP: NEGATIVE
LYMPHOCYTES # BLD AUTO: 0.81 K/UL (ref 1–4.8)
LYMPHOCYTES # BLD AUTO: 0.94 K/UL (ref 1–4.8)
MCH RBC QN AUTO: 27.5 PG (ref 27–31)
MCH RBC QN AUTO: 28.2 PG (ref 27–31)
MCHC RBC AUTO-ENTMCNC: 31.2 G/DL (ref 32–36)
MCHC RBC AUTO-ENTMCNC: 32.7 G/DL (ref 32–36)
MCV RBC AUTO: 86 FL (ref 82–98)
MCV RBC AUTO: 88 FL (ref 82–98)
MICROSCOPIC COMMENT: NORMAL
MICROSCOPIC COMMENT: NORMAL
NITRITE UR QL STRIP: NEGATIVE
NUCLEATED RBC (/100WBC) (SMH): 0 /100 WBC
NUCLEATED RBC (/100WBC) (SMH): 1 /100 WBC
PH UR STRIP: 7 [PH]
PHOSPHATE SERPL-MCNC: 4.8 MG/DL (ref 2.7–4.5)
PLATELET # BLD AUTO: 67 K/UL (ref 150–450)
PLATELET # BLD AUTO: 74 K/UL (ref 150–450)
PMV BLD AUTO: 10.3 FL (ref 9.2–12.9)
PMV BLD AUTO: 11.7 FL (ref 9.2–12.9)
POTASSIUM SERPL-SCNC: 4.3 MMOL/L (ref 3.5–5.1)
POTASSIUM SERPL-SCNC: 4.4 MMOL/L (ref 3.5–5.1)
PROT SERPL-MCNC: 7 GM/DL (ref 6–8.4)
PROT SERPL-MCNC: 8.6 GM/DL (ref 6–8.4)
PROT UR QL STRIP: ABNORMAL
RBC # BLD AUTO: 2.94 M/UL (ref 4.6–6.2)
RBC # BLD AUTO: 3.34 M/UL (ref 4.6–6.2)
RBC #/AREA URNS AUTO: <1 /HPF
RBC #/AREA URNS AUTO: <1 /HPF
RELATIVE EOSINOPHIL (SMH): 0.5 % (ref 0–8)
RELATIVE EOSINOPHIL (SMH): 0.7 % (ref 0–8)
RELATIVE LYMPHOCYTE (SMH): 22.1 % (ref 18–48)
RELATIVE LYMPHOCYTE (SMH): 27.1 % (ref 18–48)
RELATIVE MONOCYTE (SMH): 12.7 % (ref 4–15)
RELATIVE MONOCYTE (SMH): 16.4 % (ref 4–15)
RELATIVE NEUTROPHIL (SMH): 55.2 % (ref 38–73)
RELATIVE NEUTROPHIL (SMH): 63.7 % (ref 38–73)
SODIUM SERPL-SCNC: 138 MMOL/L (ref 136–145)
SODIUM SERPL-SCNC: 142 MMOL/L (ref 136–145)
SP GR UR STRIP: 1.01
UROBILINOGEN UR STRIP-ACNC: NEGATIVE EU/DL
WBC # BLD AUTO: 2.99 K/UL (ref 3.9–12.7)
WBC # BLD AUTO: 4.25 K/UL (ref 3.9–12.7)
WBC #/AREA URNS AUTO: 1 /HPF
WBC #/AREA URNS AUTO: <1 /HPF

## 2025-07-30 PROCEDURE — 25000003 PHARM REV CODE 250: Performed by: STUDENT IN AN ORGANIZED HEALTH CARE EDUCATION/TRAINING PROGRAM

## 2025-07-30 PROCEDURE — 36415 COLL VENOUS BLD VENIPUNCTURE: CPT

## 2025-07-30 PROCEDURE — 99285 EMERGENCY DEPT VISIT HI MDM: CPT | Mod: 25

## 2025-07-30 PROCEDURE — 99999 PR PBB SHADOW E&M-EST. PATIENT-LVL IV: CPT | Mod: PBBFAC,,, | Performed by: NURSE PRACTITIONER

## 2025-07-30 PROCEDURE — 81001 URINALYSIS AUTO W/SCOPE: CPT | Performed by: NURSE PRACTITIONER

## 2025-07-30 PROCEDURE — G2211 COMPLEX E/M VISIT ADD ON: HCPCS | Mod: ,,, | Performed by: NURSE PRACTITIONER

## 2025-07-30 PROCEDURE — 96360 HYDRATION IV INFUSION INIT: CPT

## 2025-07-30 PROCEDURE — 99215 OFFICE O/P EST HI 40 MIN: CPT | Mod: S$PBB,,, | Performed by: NURSE PRACTITIONER

## 2025-07-30 PROCEDURE — 84460 ALANINE AMINO (ALT) (SGPT): CPT

## 2025-07-30 PROCEDURE — 85025 COMPLETE CBC W/AUTO DIFF WBC: CPT | Performed by: NURSE PRACTITIONER

## 2025-07-30 PROCEDURE — 99214 OFFICE O/P EST MOD 30 MIN: CPT | Mod: PBBFAC,PN | Performed by: NURSE PRACTITIONER

## 2025-07-30 PROCEDURE — 63600175 PHARM REV CODE 636 W HCPCS: Performed by: STUDENT IN AN ORGANIZED HEALTH CARE EDUCATION/TRAINING PROGRAM

## 2025-07-30 PROCEDURE — 11000001 HC ACUTE MED/SURG PRIVATE ROOM

## 2025-07-30 PROCEDURE — 84100 ASSAY OF PHOSPHORUS: CPT

## 2025-07-30 PROCEDURE — 85025 COMPLETE CBC W/AUTO DIFF WBC: CPT

## 2025-07-30 PROCEDURE — 25000003 PHARM REV CODE 250: Performed by: EMERGENCY MEDICINE

## 2025-07-30 PROCEDURE — 80053 COMPREHEN METABOLIC PANEL: CPT | Performed by: NURSE PRACTITIONER

## 2025-07-30 PROCEDURE — 81001 URINALYSIS AUTO W/SCOPE: CPT

## 2025-07-30 RX ORDER — FOLIC ACID 1 MG/1
2000 TABLET ORAL DAILY
Status: DISCONTINUED | OUTPATIENT
Start: 2025-07-31 | End: 2025-08-04 | Stop reason: HOSPADM

## 2025-07-30 RX ORDER — ONDANSETRON HYDROCHLORIDE 2 MG/ML
4 INJECTION, SOLUTION INTRAVENOUS EVERY 8 HOURS PRN
Status: DISCONTINUED | OUTPATIENT
Start: 2025-07-30 | End: 2025-08-04 | Stop reason: HOSPADM

## 2025-07-30 RX ORDER — SODIUM,POTASSIUM PHOSPHATES 280-250MG
2 POWDER IN PACKET (EA) ORAL
Status: DISCONTINUED | OUTPATIENT
Start: 2025-07-30 | End: 2025-08-04 | Stop reason: HOSPADM

## 2025-07-30 RX ORDER — IBUPROFEN 200 MG
24 TABLET ORAL
Status: DISCONTINUED | OUTPATIENT
Start: 2025-07-30 | End: 2025-08-04 | Stop reason: HOSPADM

## 2025-07-30 RX ORDER — ACETAMINOPHEN 325 MG/1
650 TABLET ORAL EVERY 4 HOURS PRN
Status: DISCONTINUED | OUTPATIENT
Start: 2025-07-30 | End: 2025-08-04 | Stop reason: HOSPADM

## 2025-07-30 RX ORDER — ATORVASTATIN CALCIUM 10 MG/1
10 TABLET, FILM COATED ORAL NIGHTLY
Status: DISCONTINUED | OUTPATIENT
Start: 2025-07-30 | End: 2025-08-04 | Stop reason: HOSPADM

## 2025-07-30 RX ORDER — SODIUM CHLORIDE 9 MG/ML
INJECTION, SOLUTION INTRAVENOUS CONTINUOUS
Status: DISCONTINUED | OUTPATIENT
Start: 2025-07-30 | End: 2025-08-01

## 2025-07-30 RX ORDER — TALC
9 POWDER (GRAM) TOPICAL NIGHTLY PRN
Status: DISCONTINUED | OUTPATIENT
Start: 2025-07-30 | End: 2025-08-04 | Stop reason: HOSPADM

## 2025-07-30 RX ORDER — ENOXAPARIN SODIUM 100 MG/ML
30 INJECTION SUBCUTANEOUS EVERY 24 HOURS
Status: DISCONTINUED | OUTPATIENT
Start: 2025-07-30 | End: 2025-08-03 | Stop reason: ALTCHOICE

## 2025-07-30 RX ORDER — SODIUM CHLORIDE 0.9 % (FLUSH) 0.9 %
10 SYRINGE (ML) INJECTION
Status: DISCONTINUED | OUTPATIENT
Start: 2025-07-30 | End: 2025-08-04 | Stop reason: HOSPADM

## 2025-07-30 RX ORDER — PANTOPRAZOLE SODIUM 40 MG/1
40 TABLET, DELAYED RELEASE ORAL EVERY MORNING
Status: DISCONTINUED | OUTPATIENT
Start: 2025-07-31 | End: 2025-08-04 | Stop reason: HOSPADM

## 2025-07-30 RX ORDER — LANOLIN ALCOHOL/MO/W.PET/CERES
800 CREAM (GRAM) TOPICAL
Status: DISCONTINUED | OUTPATIENT
Start: 2025-07-30 | End: 2025-08-04 | Stop reason: HOSPADM

## 2025-07-30 RX ORDER — NALOXONE HCL 0.4 MG/ML
0.02 VIAL (ML) INJECTION
Status: DISCONTINUED | OUTPATIENT
Start: 2025-07-30 | End: 2025-08-04 | Stop reason: HOSPADM

## 2025-07-30 RX ORDER — AMIODARONE HYDROCHLORIDE 200 MG/1
200 TABLET ORAL DAILY
Status: DISCONTINUED | OUTPATIENT
Start: 2025-07-31 | End: 2025-08-04 | Stop reason: HOSPADM

## 2025-07-30 RX ORDER — IBUPROFEN 200 MG
16 TABLET ORAL
Status: DISCONTINUED | OUTPATIENT
Start: 2025-07-30 | End: 2025-08-04 | Stop reason: HOSPADM

## 2025-07-30 RX ORDER — GLUCAGON 1 MG
1 KIT INJECTION
Status: DISCONTINUED | OUTPATIENT
Start: 2025-07-30 | End: 2025-08-04 | Stop reason: HOSPADM

## 2025-07-30 RX ORDER — AMOXICILLIN 250 MG
1 CAPSULE ORAL 2 TIMES DAILY PRN
Status: DISCONTINUED | OUTPATIENT
Start: 2025-07-30 | End: 2025-08-04 | Stop reason: HOSPADM

## 2025-07-30 RX ORDER — AMLODIPINE BESYLATE 5 MG/1
10 TABLET ORAL DAILY
Status: DISCONTINUED | OUTPATIENT
Start: 2025-07-31 | End: 2025-08-04 | Stop reason: HOSPADM

## 2025-07-30 RX ADMIN — SODIUM CHLORIDE 1000 ML: 9 INJECTION, SOLUTION INTRAVENOUS at 07:07

## 2025-07-30 RX ADMIN — ATORVASTATIN CALCIUM 10 MG: 10 TABLET, FILM COATED ORAL at 09:07

## 2025-07-30 RX ADMIN — SODIUM CHLORIDE: 9 INJECTION, SOLUTION INTRAVENOUS at 10:07

## 2025-07-30 RX ADMIN — ENOXAPARIN SODIUM 30 MG: 30 INJECTION SUBCUTANEOUS at 09:07

## 2025-07-30 RX ADMIN — SODIUM CHLORIDE: 9 INJECTION, SOLUTION INTRAVENOUS at 09:07

## 2025-07-30 NOTE — PROGRESS NOTES
[facility] Hematology/Oncology       PROGRESS NOTE - Follow-up Visit      Subjective:       Patient ID:   NAME: Rick Butler : 1960     65 y.o. male    Referring Doc: Chetna (new PCP)  Other Physicians: Getachew; Dennis; Ramsey - now SAFA     Chief Complaint:  MDS     History of Present Illness:     Patient returns today for a regularly scheduled follow-up visit.  The patient is doing ok overall. He is by himself today.    He was recently hospitalized for pneumonia at [facility], and discharged on oral antibiotics and home health. He was followed by  pulmonary, ID and nephrology while inpatient. He required a blood transfusion  and platelets x1 while inpatient. He was discharged on .     He last Rytelo treatment was on  and Dr Serrano had instructed us to hold it for a month and then re-evaluate labs.   He saw her again on 2025 and she recommended to hold retacrit for now and resume rytelo.     He did get cycle 4 of Rytelo on 2025 and had another drop in blood counts.   He received another two units of PRBC and after reaching out to Dr. SERRANO, she decided to proceed with retacrit weekly for now, and hold on the Rytelo indefinitely.      He reports he is feeling better overall since discharge; no CP, SOB, HA's or N/V;     He is seeing Dr. Mas and of note his GFR has been progressively dropping, Renal US was essential clear in .           ROS:   GEN: normal without any fever, night sweats or weight loss; fatigue - intermittent   HEENT: normal with no HA's, sore throat, stiff neck, changes in vision  CV: normal with no CP, SOB, PND, MORA    PULM: normal with no SOB, cough, hemoptysis, sputum or pleuritic pain  GI: normal with no abdominal pain, nausea, vomiting, constipation, diarrhea, melanotic stools, BRBPR, or hematemesis  : normal with no hematuria, dysuria  BREAST: normal with no mass, discharge, pain  SKIN: normal with no rash, erythema, bruising, or swelling    Allergies:  Review of patient's  allergies indicates:  No Known Allergies    Medications:    Current Outpatient Medications:     amiodarone (PACERONE) 200 MG Tab, Take 1 tablet (200 mg total) by mouth once daily., Disp: 90 tablet, Rfl: 2    amLODIPine (NORVASC) 10 MG tablet, Take 1 tablet (10 mg total) by mouth once daily., Disp: 30 tablet, Rfl: 2    ascorbic acid, vitamin C, (VITAMIN C) 500 MG tablet, Take 500 mg by mouth once daily., Disp: , Rfl:     atorvastatin (LIPITOR) 10 MG tablet, Take 1 tablet (10 mg total) by mouth every evening., Disp: 90 tablet, Rfl: 3    calcitRIOL (ROCALTROL) 0.25 MCG Cap, Take 0.25 mcg by mouth every 7 days., Disp: , Rfl:     fluticasone propionate (FLONASE) 50 mcg/actuation nasal spray, 1 SPRAY (50 MCG TOTAL) BY EACH NOSTRIL ROUTE 2 (TWO) TIMES A DAY. 1 SPRAY EVERY MORNING AND AFTERNOON TOWARD THE EAR EACH SIDE AFTER A SINUS RINSE, Disp: 48 mL, Rfl: 1    folic acid (FOLVITE) 1 MG tablet, Take 2 tablets (2,000 mcg total) by mouth once daily., Disp: 180 tablet, Rfl: 1    multivitamin with minerals Cap, Take 1 capsule by mouth every morning., Disp: , Rfl:     pantoprazole (PROTONIX) 40 MG tablet, Take 40 mg by mouth once daily., Disp: , Rfl:     sod chlor-bicarb-squeez bottle (NEILMED SINUS RINSE COMPLETE) pkdv, 1 application  by sinus irrigation route 2 (two) times a day. Not right before bed, Disp: 1 each, Rfl: 11    tamsulosin (FLOMAX) 0.4 mg Cap, TAKE 2 CAPSULES BY MOUTH EVERY DAY, Disp: 180 capsule, Rfl: 1    traZODone (DESYREL) 100 MG tablet, TAKE 1 TABLET BY MOUTH NIGHTLY AS NEEDED FOR INSOMNIA., Disp: 90 tablet, Rfl: 2    sildenafil (VIAGRA) 100 MG tablet, Take 1 tablet (100 mg total) by mouth daily as needed for Erectile Dysfunction., Disp: 10 tablet, Rfl: 6    Current Facility-Administered Medications:     0.9%  NaCl infusion (for blood administration), , Intravenous, Once, Pratima Almonte MaryVIOLET    diphenhydrAMINE injection 25 mg, 25 mg, Intravenous, Once, Pratima Almonte Mary, NP    furosemide  "injection 20 mg, 20 mg, Intravenous, PRN, Pratima Almonte, NP    furosemide injection 20 mg, 20 mg, Intravenous, Once, Pratima Almonte NP    furosemide injection 20 mg, 20 mg, Intravenous, PRN, Pratima Almonte, NP    PMHx/PSHx Updates:  See patient's last visit with Dr. Tello on 7/8/2025  See H&P on 7/9/2011      Pathology:    12/20/2019  BONE MARROW, RIGHT ILIAC CREST,    ASPIRATE, CLOT SECTION, AND CORE BIOPSY:    --HYPERCELLULAR MARROW (APPROXIMATELY 75% TO 80%) WITH TRILINEAGE   HEMATOPOIETIC ELEMENTS, ERYTHROID  HYPERPLASIA AND MILD MEGAKARYOCYTIC HYPERPLASIA, AND NON-SPECIFIC   DYSHEMATOPOIETIC CHANGES (SEE     COMMENT).  --[id number] INCREASED STAINABLE IRON WITH INCREASED RING   SIDEROBLASTS (GREATER THAN 15%)     (SEE COMMENT).  --PERIPHERAL BLOOD WITH THROMBOCYTOSIS (574,000/MICROLITER) AND ANEMIA   (HEMOGLOBIN 5.5 GRAM/DECILITER),    WITH SCATTERED DREPANOCYTOID FORMS AND FEW NUCLEATED ERYTHROCYTES      Cytogenetic Results at the bottom of the report.  NORMAL    Objective:     Vitals:  Blood pressure (!) 147/78, pulse 80, temperature 98.1 °F (36.7 °C), temperature source Temporal, resp. rate 16, height 5' 10" (1.778 m), weight 95.3 kg (210 lb), SpO2 97%.    Physical Examination:   GEN: no apparent distress, comfortable; AAOx3  HEAD: atraumatic and normocephalic  EYES: no pallor, no icterus, PERRLA  ENT: OMM, no pharyngeal erythema, external ears WNL; no nasal discharge; no thrush  NECK: no masses, thyroid normal, trachea midline, no LAD/LN's, supple  CV: RRR with no murmur; normal pulse; normal S1 and S2; no pedal edema  CHEST: Normal respiratory effort; CTAB; normal breath sounds; no wheeze or crackles  ABDOM: nontender and nondistended; soft; normal bowel sounds; no rebound/guarding  MUSC/Skeletal: ROM normal; no crepitus; joints normal; no deformities or arthropathy  EXTREM: no clubbing, cyanosis, inflammation or swelling  SKIN: no rashes, lesions, ulcers, " petechiae or subcutaneous nodules  : no jiang  NEURO: grossly intact; motor/sensory WNL; AAOx3; no tremors  PSYCH: normal mood, affect and behavior  LYMPH: normal cervical, supraclavicular, axillary and groin LN's            Labs:     Lab Results   Component Value Date    WBC 2.99 (L) 07/30/2025    HGB 8.3 (L) 07/30/2025    HCT 25.4 (L) 07/30/2025    MCV 86 07/30/2025    PLT 67 (L) 07/30/2025           CMP  Sodium   Date Value Ref Range Status   07/30/2025 142 136 - 145 mmol/L Final   06/26/2019 138 134 - 144 mmol/L      Potassium   Date Value Ref Range Status   07/30/2025 4.4 3.5 - 5.1 mmol/L Final     Chloride   Date Value Ref Range Status   07/30/2025 111 (H) 95 - 110 mmol/L Final   06/26/2019 105 98 - 110 mmol/L      CO2   Date Value Ref Range Status   07/30/2025 25 23 - 29 mmol/L Final     Glucose   Date Value Ref Range Status   07/30/2025 106 70 - 110 mg/dL Final   06/26/2019 114 (H) 70 - 99 mg/dL      BUN   Date Value Ref Range Status   07/30/2025 48 (H) 8 - 23 mg/dL Final     Creatinine   Date Value Ref Range Status   07/30/2025 5.3 (H) 0.5 - 1.4 mg/dL Final   06/26/2019 1.62 (H) 0.60 - 1.40 mg/dL      Calcium   Date Value Ref Range Status   07/30/2025 8.4 (L) 8.7 - 10.5 mg/dL Final     Protein Total   Date Value Ref Range Status   07/30/2025 7.0 6.0 - 8.4 gm/dL Final     Albumin   Date Value Ref Range Status   07/30/2025 3.9 3.5 - 5.2 g/dL Final   06/26/2019 4.4 3.1 - 4.7 g/dL      Bilirubin Total   Date Value Ref Range Status   07/30/2025 0.5 0.1 - 1.0 mg/dL Final     Comment:     For infants and newborns, interpretation of results should be based   on gestational age, weight and in agreement with clinical   observations.    Premature Infant recommended reference ranges:   0-24 hours:  <8.0 mg/dL   24-48 hours: <12.0 mg/dL   3-5 days:    <15.0 mg/dL   6-29 days:   <15.0 mg/dL     ALP   Date Value Ref Range Status   07/30/2025 53 (L) 55 - 135 unit/L Final     AST   Date Value Ref Range Status   07/30/2025  33 10 - 40 unit/L Final     ALT   Date Value Ref Range Status   07/30/2025 42 10 - 44 unit/L Final     Anion Gap   Date Value Ref Range Status   07/30/2025 6 (L) 8 - 16 mmol/L Final     eGFR if    Date Value Ref Range Status   07/13/2022 45.6 (A) >60 mL/min/1.73 m^2 Final     eGFR if non    Date Value Ref Range Status   07/13/2022 39.5 (A) >60 mL/min/1.73 m^2 Final     Comment:     Calculation used to obtain the estimated glomerular filtration  rate (eGFR) is the CKD-EPI equation.          Lab Results   Component Value Date    IRON 168 (H) 07/16/2025    TIBC 175 (L) 07/16/2025    FERRITIN 2,963.4 (H) 07/16/2025           I have reviewed all available lab results and radiology reports.    Radiology/Diagnostic Studies:    US 2/8/2023:    IMPRESSION:  1. Dilation of the common bile duct at up to 10 mm, unchanged when compared to 2016.  2. Nonvisualization of the pancreas because of overlying bowel gas.  3. No other significant findings.  spleen is normal in size and appearance      2/15/2018 Xray Survey:   IMPRESSION: No significant abnormality seen. No evidence of osteoblastic or  osteoclastic lesions identified    MRI L-spine 2/12/2018  IMPRESSION:    1. Prominent low signal intensity throughout the bone marrow on T1 and  T2-weighted imaging. Marrow infiltrative process including malignancy such as  metastatic disease or lymphoma/leukemia is a consideration along with multiple  myeloma or malignant process. Benign etiology such as osteopetrosis or  regenerative red marrow are also considerations.    2. Multilevel lumbar degenerative changes, most prominent at L4-L5 and L5-S1 as  described.    Assessment History:    (1) 65 y.o. male with diagnosis of sickle cell anemia with borderline microcytosis  - latest hgb was 6.0 and he had blood transfusion  - today, he is not symptomatic at this time  - he probably has a multifactorial anemia process with underlying sickle cell, anemia of  "chronic disorders and anemia of chronic renal. I can not rule out an underlying GI bleeding process.   - iron panel is adequate (no deficiency)  - total bilirubin is only minimally elevated  - he required transfusion again in Oct 2019 and again in Dec 2019  - he had bone marrow biopsy on 12/20/2019    "dyshematopoietic changes are not entirely specific and are present  mostly within erythroid and megakaryocytic lineages. 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  - he is now on procrit per directives of Dr Mustafa    7/30/2020:   he recently saw Dr Mustafa  And sees him again in Jan 2021  - he is doing well with procrit and is feeling much better  - he has not required any transfusions for some time time    9/24/2020:  - latest hgb at 8.5  - will increase the procrit dose  - f/u with Dr Zavala in Jan 2021 11/19/2020:  - patient doing well and feels "great"  - hgb up to 9.1; continued on the procrit  - f/u with Dr Mustafa in Jan 2021    3/4/2021:  - he saw Dr Mustafa in jan 2021 and sees him again in June/July 2021  - latest hgb at 9.1 and stable and platelets are 474,000; wbc at 10.0    6/10/2021:  - latest hgb at 9.0  - plats 485,000  - on weekly procrit  - seeing Dr Mustafa again tomorrow    10/14/2021:  - hgb at 8.6 but he recently had been taking naproxen and had some BRBPR - which since resolved  - he saw Dr Beyer with GI and he is having colonsocopy on Nov 5th along with EGD  - plats 429,000  - he sees Dr Mustafa again in Nov 19th   - he sees Dr Mas again in Dec 2021    1/13/2022:  - He has been under the care of Dr Mustafa at [facility] for RARS- MDS. He " is now on procrit weekly. He sees Dr Mustafa again in March 2022    - hgb 9.3  - he had scope with Dr Collins in nov 2021 which was good per patient and they plan to repeat in 5 yrs    5/12/2022:  - hgb at 9.0  - plats 485  - wbc 7.25  - he is on procrit weekly  - saw Dr Mustafa this past Fridday and since he is leaving, he will start seeing Dr Hilario instead in 3 months    8/18/2022:  - latest hgb a little lower at 8.8  - he has been on the procrit  - he saw Dr Hilario recently at [facility] and plans to see her again in 3 months    1/26/2023:  - He saw Dr Hilario at [facility] again recently this past Tuesday and they are considering another medication; he plans to see her again in April 2023  - Dr Hilario requested he have an US of spleen - will order here in [place]  - he is also on appetite stimulant  - hgb at 9.4 and plats at 500k    5/11/2023:  - hgb at 9.6 and plats 466,000  - He saw Dr Hilario at [facility] again recently this past Tuesday and they are considering another medication; he plans to see her again in Aug 2023    8/3/2023:  - His insurance will not approve of him getting the Reblozyl   - He sees Dr Hilario again next Tuesday and we will await her directives.     9/14/2023:  - the Rebozyl has been approved - will set up  - latest hgb at 10.1  - seeing Dr Hilario again in Jan 2024 11/16/2023:  - continued on Rebozyl  - WBC and plats WNL  - hgb at 10.3  - cardiac w/u negative per patient    2/15/2024:  - labs are adequate  - hgb at 10.6  - WBC and plat WNL  - continued on Rebozyl  - recent colonoscopy good per patient    4/4/2024:  - continued on Rebozyl  - counts are looking adequate    8/1/2024:  - continued on procrit  - insurance would not approve of the rebozyl and he has not had the last 2-3 injections  - hold rebozyl for now anyway due to the increased creatine and decreased kidney function  - f/u with Dr Chilel  - seeing Dr Hilario on 8/13  - continue procrit as directed    10/10/2024  - he sees Dr Mas on 10/31  - he has  been continued on rebozyl and is due again next week  - he has been on procrit  - Dr Hilario is now at [facility] in [place] and he last saw her in Aug 2024 - he plans to continue under her care via telemed    5/8/2025:  - He saw Cheryl Muñoz NP on 4/10/2025; he saw Frankchandler NP on 4/28/2025  - He has been on the new drug Rytelo for the past two months per direction of Dr Hilario at [facility]; he saw Dr Hilario on 3/4/2025  - His counts dropped this week and he received two units blood and single donor platelet today; Pratima has spoken to Dr Hilario who is aware and she suspects it is still in the window of the therapy where the counts initially drop before leveling off  - He sees Dr Mas with nephrology again in July/Aug 2025    7/2/2025:  - He was recently hospitalized for pneumonia at [facility], and discharged on oral antibiotics and home health. He was followed by  pulmonary, ID and nephrology while inpatient. He required a blood transfusion  and platelets x1 while inpatient. He was discharged on 6/28.   - He last Rytelo treatment was on 6/2 and Dr Hilario had instructed us to hold it for a month and then re-evaluate labs.   - will reach out to Dr Hilario and see if she wants a telemed with him first and then decide on next course of therapy     7/14/2025:   - doing better since post hospitalization  - received cycle 4 of Rytelo on 7/11/2025, saw Dr. HILARIO on 7/8/2025   - plan is to resume Rytelo, labs weekly transfuse as needed, hold on retacrit for now   - will discuss labs each week with Dr. HILARIO    7/30/2025:   -He did get cycle 4 of Rytelo on 7/11/2025 and had another drop in blood counts.   -He received another two units of PRBC and after reaching out to Dr. HILARIO, she decided to proceed with retacrit weekly for now, and hold on the Rytelo indefinitely.   - He is seeing Dr. Mas and of note his GFR has been progressively dropping, Renal US was essential clear in June.   - GFR now 11 previously 14     Co morbidities:     (2) Alcohol abuse issues  "in past - he has been sober for over 3 years now    (3) New findings on radiography with abnormal chest CT and lung mass  - former smoker    (4) chronic back issues - prior Xrays and MRI - suspect the findings on the MRI are possibly due to his sickle cell;   - SPEP was previously negative for M-protein  - PSA was 0.3 in Sept 2017  - recommended neurosurgery evaluation previously  - he saw Dr Nura VUONG and had a nerve "burning" procedure and his pain has improved    (5) CRI - elevated creatinine - he is followed Dr Chilel with nephrology      (6) H pylori gastritis - s/p recent endoscopy with Dr Collins and antibotics x 10 days    (7) CP   9/14/2023:  - seeing Dr lovett with cardiology and planning stress test in near future    Assessment and Plan:     1. MDS (myelodysplastic syndrome)        2. Chronic kidney disease (CKD), stage IV (severe)              PLAN:   He did get cycle 4 of Rytelo on 7/11/2025 and had another drop in blood counts.   -He received another two units of PRBC and after reaching out to Dr. SERRANO, she decided to proceed with retacrit weekly for now, and hold on the Rytelo indefinitely.   2.  He is seeing Dr. Mas and of note his GFR has been progressively dropping, Renal US was essential clear in June.   - GFR now 11 previously 14, recheck cmp next week             RTC  2 weeks with Dr Tello and 4 weeks with me         Discussion:       Pathology Discussion:    I reviewed and discussed the pathology report(s) and radiograph reports (if available) in as simple to understand and/or laymen's terms to the best of my ability. I had an indepth conversation with the patient and went over the patient's individual diagnosis based on the information that was currently available. I discussed the TNM staging process with regard to the patient's particular cancer type, and the calculated stage based on the currently available TNM data and literature. I discussed the available prognostic data with regard to the " current staging information and how it relates to the prognosis of their particular neoplastic process.        Electronically signed by Pratima Melgar NP

## 2025-07-31 PROBLEM — D63.1 ANEMIA IN STAGE 4 CHRONIC KIDNEY DISEASE: Status: ACTIVE | Noted: 2025-06-18

## 2025-07-31 PROBLEM — N18.4 ANEMIA IN STAGE 4 CHRONIC KIDNEY DISEASE: Status: ACTIVE | Noted: 2025-06-18

## 2025-07-31 LAB
ABSOLUTE EOSINOPHIL (SMH): 0.02 K/UL
ABSOLUTE MONOCYTE (SMH): 0.44 K/UL (ref 0.3–1)
ABSOLUTE NEUTROPHIL COUNT (SMH): 1.5 K/UL (ref 1.8–7.7)
ANION GAP (SMH): 5 MMOL/L (ref 8–16)
BASOPHILS # BLD AUTO: 0.02 K/UL
BASOPHILS NFR BLD AUTO: 0.8 %
BUN SERPL-MCNC: 47 MG/DL (ref 8–23)
CALCIUM SERPL-MCNC: 8.5 MG/DL (ref 8.7–10.5)
CHLORIDE SERPL-SCNC: 114 MMOL/L (ref 95–110)
CO2 SERPL-SCNC: 24 MMOL/L (ref 23–29)
CREAT SERPL-MCNC: 5 MG/DL (ref 0.5–1.4)
ERYTHROCYTE [DISTWIDTH] IN BLOOD BY AUTOMATED COUNT: 17.4 % (ref 11.5–14.5)
GFR SERPLBLD CREATININE-BSD FMLA CKD-EPI: 12 ML/MIN/1.73/M2
GLUCOSE SERPL-MCNC: 96 MG/DL (ref 70–110)
HCT VFR BLD AUTO: 25.8 % (ref 40–54)
HGB BLD-MCNC: 8.4 GM/DL (ref 14–18)
IMM GRANULOCYTES # BLD AUTO: 0.01 K/UL (ref 0–0.04)
IMM GRANULOCYTES NFR BLD AUTO: 0.4 % (ref 0–0.5)
LYMPHOCYTES # BLD AUTO: 0.69 K/UL (ref 1–4.8)
MAGNESIUM SERPL-MCNC: 2 MG/DL (ref 1.6–2.6)
MCH RBC QN AUTO: 28.1 PG (ref 27–31)
MCHC RBC AUTO-ENTMCNC: 32.6 G/DL (ref 32–36)
MCV RBC AUTO: 86 FL (ref 82–98)
NUCLEATED RBC (/100WBC) (SMH): 0 /100 WBC
PHOSPHATE SERPL-MCNC: 4.4 MG/DL (ref 2.7–4.5)
PLATELET # BLD AUTO: 58 K/UL (ref 150–450)
PMV BLD AUTO: 11.2 FL (ref 9.2–12.9)
POTASSIUM SERPL-SCNC: 4.5 MMOL/L (ref 3.5–5.1)
RBC # BLD AUTO: 2.99 M/UL (ref 4.6–6.2)
RELATIVE EOSINOPHIL (SMH): 0.8 % (ref 0–8)
RELATIVE LYMPHOCYTE (SMH): 26 % (ref 18–48)
RELATIVE MONOCYTE (SMH): 16.6 % (ref 4–15)
RELATIVE NEUTROPHIL (SMH): 55.4 % (ref 38–73)
SODIUM SERPL-SCNC: 143 MMOL/L (ref 136–145)
WBC # BLD AUTO: 2.65 K/UL (ref 3.9–12.7)

## 2025-07-31 PROCEDURE — 63600175 PHARM REV CODE 636 W HCPCS: Performed by: STUDENT IN AN ORGANIZED HEALTH CARE EDUCATION/TRAINING PROGRAM

## 2025-07-31 PROCEDURE — 36415 COLL VENOUS BLD VENIPUNCTURE: CPT | Performed by: STUDENT IN AN ORGANIZED HEALTH CARE EDUCATION/TRAINING PROGRAM

## 2025-07-31 PROCEDURE — 83735 ASSAY OF MAGNESIUM: CPT | Performed by: STUDENT IN AN ORGANIZED HEALTH CARE EDUCATION/TRAINING PROGRAM

## 2025-07-31 PROCEDURE — 84100 ASSAY OF PHOSPHORUS: CPT | Performed by: STUDENT IN AN ORGANIZED HEALTH CARE EDUCATION/TRAINING PROGRAM

## 2025-07-31 PROCEDURE — 25000003 PHARM REV CODE 250: Performed by: HOSPITALIST

## 2025-07-31 PROCEDURE — 85025 COMPLETE CBC W/AUTO DIFF WBC: CPT | Performed by: STUDENT IN AN ORGANIZED HEALTH CARE EDUCATION/TRAINING PROGRAM

## 2025-07-31 PROCEDURE — 25000003 PHARM REV CODE 250: Performed by: NURSE PRACTITIONER

## 2025-07-31 PROCEDURE — 82435 ASSAY OF BLOOD CHLORIDE: CPT | Performed by: STUDENT IN AN ORGANIZED HEALTH CARE EDUCATION/TRAINING PROGRAM

## 2025-07-31 PROCEDURE — 11000001 HC ACUTE MED/SURG PRIVATE ROOM

## 2025-07-31 PROCEDURE — 25000003 PHARM REV CODE 250: Performed by: STUDENT IN AN ORGANIZED HEALTH CARE EDUCATION/TRAINING PROGRAM

## 2025-07-31 RX ORDER — TAMSULOSIN HYDROCHLORIDE 0.4 MG/1
0.4 CAPSULE ORAL DAILY
Status: DISCONTINUED | OUTPATIENT
Start: 2025-07-31 | End: 2025-08-04 | Stop reason: HOSPADM

## 2025-07-31 RX ORDER — TRAZODONE HYDROCHLORIDE 50 MG/1
100 TABLET ORAL NIGHTLY
Status: DISCONTINUED | OUTPATIENT
Start: 2025-07-31 | End: 2025-08-04 | Stop reason: HOSPADM

## 2025-07-31 RX ORDER — CALCITRIOL 0.25 UG/1
0.25 CAPSULE ORAL DAILY
Status: DISCONTINUED | OUTPATIENT
Start: 2025-07-31 | End: 2025-08-04 | Stop reason: HOSPADM

## 2025-07-31 RX ADMIN — SODIUM CHLORIDE: 9 INJECTION, SOLUTION INTRAVENOUS at 05:07

## 2025-07-31 RX ADMIN — ENOXAPARIN SODIUM 30 MG: 30 INJECTION SUBCUTANEOUS at 05:07

## 2025-07-31 RX ADMIN — FOLIC ACID 2000 MCG: 1 TABLET ORAL at 11:07

## 2025-07-31 RX ADMIN — TRAZODONE HYDROCHLORIDE 100 MG: 50 TABLET ORAL at 10:07

## 2025-07-31 RX ADMIN — AMIODARONE HYDROCHLORIDE 200 MG: 200 TABLET ORAL at 11:07

## 2025-07-31 RX ADMIN — AMLODIPINE BESYLATE 10 MG: 5 TABLET ORAL at 11:07

## 2025-07-31 RX ADMIN — PANTOPRAZOLE SODIUM 40 MG: 40 TABLET, DELAYED RELEASE ORAL at 11:07

## 2025-07-31 RX ADMIN — CALCITRIOL CAPSULES 0.25 MCG 0.25 MCG: 0.25 CAPSULE ORAL at 11:07

## 2025-07-31 RX ADMIN — ATORVASTATIN CALCIUM 10 MG: 10 TABLET, FILM COATED ORAL at 09:07

## 2025-07-31 RX ADMIN — TAMSULOSIN HYDROCHLORIDE 0.4 MG: 0.4 CAPSULE ORAL at 11:07

## 2025-07-31 RX ADMIN — SODIUM CHLORIDE: 9 INJECTION, SOLUTION INTRAVENOUS at 03:07

## 2025-08-01 LAB
ABSOLUTE EOSINOPHIL (SMH): 0.02 K/UL
ABSOLUTE MONOCYTE (SMH): 0.33 K/UL (ref 0.3–1)
ABSOLUTE NEUTROPHIL COUNT (SMH): 1.3 K/UL (ref 1.8–7.7)
ANION GAP (SMH): 4 MMOL/L (ref 8–16)
APTT PPP: 30.3 SECONDS (ref 21–32)
BASOPHILS # BLD AUTO: 0.01 K/UL
BASOPHILS NFR BLD AUTO: 0.4 %
BUN SERPL-MCNC: 41 MG/DL (ref 8–23)
CALCIUM SERPL-MCNC: 8.3 MG/DL (ref 8.7–10.5)
CHLORIDE SERPL-SCNC: 115 MMOL/L (ref 95–110)
CO2 SERPL-SCNC: 23 MMOL/L (ref 23–29)
CREAT SERPL-MCNC: 4.8 MG/DL (ref 0.5–1.4)
ERYTHROCYTE [DISTWIDTH] IN BLOOD BY AUTOMATED COUNT: 17.6 % (ref 11.5–14.5)
GFR SERPLBLD CREATININE-BSD FMLA CKD-EPI: 13 ML/MIN/1.73/M2
GLUCOSE SERPL-MCNC: 97 MG/DL (ref 70–110)
HCT VFR BLD AUTO: 24.5 % (ref 40–54)
HGB BLD-MCNC: 8 GM/DL (ref 14–18)
IMM GRANULOCYTES # BLD AUTO: 0.01 K/UL (ref 0–0.04)
IMM GRANULOCYTES NFR BLD AUTO: 0.4 % (ref 0–0.5)
INR PPP: 1.1 (ref 0.8–1.2)
LYMPHOCYTES # BLD AUTO: 0.68 K/UL (ref 1–4.8)
MAGNESIUM SERPL-MCNC: 1.9 MG/DL (ref 1.6–2.6)
MCH RBC QN AUTO: 28.2 PG (ref 27–31)
MCHC RBC AUTO-ENTMCNC: 32.7 G/DL (ref 32–36)
MCV RBC AUTO: 86 FL (ref 82–98)
NUCLEATED RBC (/100WBC) (SMH): 0 /100 WBC
PHOSPHATE SERPL-MCNC: 4 MG/DL (ref 2.7–4.5)
PLATELET # BLD AUTO: 50 K/UL (ref 150–450)
PMV BLD AUTO: 10.4 FL (ref 9.2–12.9)
POTASSIUM SERPL-SCNC: 4.6 MMOL/L (ref 3.5–5.1)
PROTHROMBIN TIME: 11.7 SECONDS (ref 9–12.5)
RBC # BLD AUTO: 2.84 M/UL (ref 4.6–6.2)
RELATIVE EOSINOPHIL (SMH): 0.8 % (ref 0–8)
RELATIVE LYMPHOCYTE (SMH): 28.5 % (ref 18–48)
RELATIVE MONOCYTE (SMH): 13.8 % (ref 4–15)
RELATIVE NEUTROPHIL (SMH): 56.1 % (ref 38–73)
SODIUM SERPL-SCNC: 142 MMOL/L (ref 136–145)
WBC # BLD AUTO: 2.39 K/UL (ref 3.9–12.7)

## 2025-08-01 PROCEDURE — 85025 COMPLETE CBC W/AUTO DIFF WBC: CPT | Performed by: STUDENT IN AN ORGANIZED HEALTH CARE EDUCATION/TRAINING PROGRAM

## 2025-08-01 PROCEDURE — 85730 THROMBOPLASTIN TIME PARTIAL: CPT | Performed by: INTERNAL MEDICINE

## 2025-08-01 PROCEDURE — 84100 ASSAY OF PHOSPHORUS: CPT | Performed by: STUDENT IN AN ORGANIZED HEALTH CARE EDUCATION/TRAINING PROGRAM

## 2025-08-01 PROCEDURE — 25000003 PHARM REV CODE 250: Performed by: INTERNAL MEDICINE

## 2025-08-01 PROCEDURE — 11000001 HC ACUTE MED/SURG PRIVATE ROOM

## 2025-08-01 PROCEDURE — 85610 PROTHROMBIN TIME: CPT | Performed by: INTERNAL MEDICINE

## 2025-08-01 PROCEDURE — 82310 ASSAY OF CALCIUM: CPT | Performed by: STUDENT IN AN ORGANIZED HEALTH CARE EDUCATION/TRAINING PROGRAM

## 2025-08-01 PROCEDURE — 25000003 PHARM REV CODE 250: Performed by: HOSPITALIST

## 2025-08-01 PROCEDURE — 36415 COLL VENOUS BLD VENIPUNCTURE: CPT | Performed by: INTERNAL MEDICINE

## 2025-08-01 PROCEDURE — 63600175 PHARM REV CODE 636 W HCPCS: Mod: JW,TB | Performed by: HOSPITALIST

## 2025-08-01 PROCEDURE — 83735 ASSAY OF MAGNESIUM: CPT | Performed by: STUDENT IN AN ORGANIZED HEALTH CARE EDUCATION/TRAINING PROGRAM

## 2025-08-01 PROCEDURE — 25000003 PHARM REV CODE 250: Performed by: STUDENT IN AN ORGANIZED HEALTH CARE EDUCATION/TRAINING PROGRAM

## 2025-08-01 PROCEDURE — 25000003 PHARM REV CODE 250: Performed by: NURSE PRACTITIONER

## 2025-08-01 PROCEDURE — 63600175 PHARM REV CODE 636 W HCPCS: Performed by: STUDENT IN AN ORGANIZED HEALTH CARE EDUCATION/TRAINING PROGRAM

## 2025-08-01 RX ORDER — SODIUM CHLORIDE 9 MG/ML
INJECTION, SOLUTION INTRAVENOUS CONTINUOUS
Status: DISCONTINUED | OUTPATIENT
Start: 2025-08-01 | End: 2025-08-03

## 2025-08-01 RX ADMIN — EPOETIN ALFA-EPBX 60000 UNITS: 40000 INJECTION, SOLUTION INTRAVENOUS; SUBCUTANEOUS at 12:08

## 2025-08-01 RX ADMIN — AMIODARONE HYDROCHLORIDE 200 MG: 200 TABLET ORAL at 11:08

## 2025-08-01 RX ADMIN — AMLODIPINE BESYLATE 10 MG: 5 TABLET ORAL at 11:08

## 2025-08-01 RX ADMIN — TRAZODONE HYDROCHLORIDE 100 MG: 50 TABLET ORAL at 09:08

## 2025-08-01 RX ADMIN — SODIUM CHLORIDE: 9 INJECTION, SOLUTION INTRAVENOUS at 01:08

## 2025-08-01 RX ADMIN — ATORVASTATIN CALCIUM 10 MG: 10 TABLET, FILM COATED ORAL at 09:08

## 2025-08-01 RX ADMIN — ENOXAPARIN SODIUM 30 MG: 30 INJECTION SUBCUTANEOUS at 05:08

## 2025-08-01 RX ADMIN — CALCITRIOL CAPSULES 0.25 MCG 0.25 MCG: 0.25 CAPSULE ORAL at 11:08

## 2025-08-01 RX ADMIN — FOLIC ACID 2000 MCG: 1 TABLET ORAL at 11:08

## 2025-08-01 RX ADMIN — TAMSULOSIN HYDROCHLORIDE 0.4 MG: 0.4 CAPSULE ORAL at 11:08

## 2025-08-02 PROBLEM — D61.818 PANCYTOPENIA: Status: ACTIVE | Noted: 2025-08-02

## 2025-08-02 LAB
ABSOLUTE EOSINOPHIL (SMH): 0.03 K/UL
ABSOLUTE MONOCYTE (SMH): 0.38 K/UL (ref 0.3–1)
ABSOLUTE NEUTROPHIL COUNT (SMH): 1.6 K/UL (ref 1.8–7.7)
ANION GAP (SMH): 5 MMOL/L (ref 8–16)
BASOPHILS # BLD AUTO: 0.01 K/UL
BASOPHILS NFR BLD AUTO: 0.4 %
BUN SERPL-MCNC: 41 MG/DL (ref 8–23)
CALCIUM SERPL-MCNC: 8.3 MG/DL (ref 8.7–10.5)
CHLORIDE SERPL-SCNC: 115 MMOL/L (ref 95–110)
CO2 SERPL-SCNC: 22 MMOL/L (ref 23–29)
CREAT SERPL-MCNC: 4.7 MG/DL (ref 0.5–1.4)
ERYTHROCYTE [DISTWIDTH] IN BLOOD BY AUTOMATED COUNT: 17.6 % (ref 11.5–14.5)
GFR SERPLBLD CREATININE-BSD FMLA CKD-EPI: 13 ML/MIN/1.73/M2
GLUCOSE SERPL-MCNC: 98 MG/DL (ref 70–110)
HCT VFR BLD AUTO: 24.1 % (ref 40–54)
HGB BLD-MCNC: 7.8 GM/DL (ref 14–18)
IMM GRANULOCYTES # BLD AUTO: 0.02 K/UL (ref 0–0.04)
IMM GRANULOCYTES NFR BLD AUTO: 0.7 % (ref 0–0.5)
LYMPHOCYTES # BLD AUTO: 0.73 K/UL (ref 1–4.8)
MAGNESIUM SERPL-MCNC: 1.9 MG/DL (ref 1.6–2.6)
MCH RBC QN AUTO: 28.6 PG (ref 27–31)
MCHC RBC AUTO-ENTMCNC: 32.4 G/DL (ref 32–36)
MCV RBC AUTO: 88 FL (ref 82–98)
NUCLEATED RBC (/100WBC) (SMH): 0 /100 WBC
PHOSPHATE SERPL-MCNC: 4.1 MG/DL (ref 2.7–4.5)
PLATELET # BLD AUTO: 45 K/UL (ref 150–450)
PLATELET BLD QL SMEAR: ABNORMAL
PMV BLD AUTO: 10.7 FL (ref 9.2–12.9)
POTASSIUM SERPL-SCNC: 4.6 MMOL/L (ref 3.5–5.1)
RBC # BLD AUTO: 2.73 M/UL (ref 4.6–6.2)
RELATIVE EOSINOPHIL (SMH): 1.1 % (ref 0–8)
RELATIVE LYMPHOCYTE (SMH): 26.7 % (ref 18–48)
RELATIVE MONOCYTE (SMH): 13.9 % (ref 4–15)
RELATIVE NEUTROPHIL (SMH): 57.2 % (ref 38–73)
SODIUM SERPL-SCNC: 142 MMOL/L (ref 136–145)
WBC # BLD AUTO: 2.73 K/UL (ref 3.9–12.7)

## 2025-08-02 PROCEDURE — 25000003 PHARM REV CODE 250: Performed by: STUDENT IN AN ORGANIZED HEALTH CARE EDUCATION/TRAINING PROGRAM

## 2025-08-02 PROCEDURE — 84100 ASSAY OF PHOSPHORUS: CPT | Performed by: STUDENT IN AN ORGANIZED HEALTH CARE EDUCATION/TRAINING PROGRAM

## 2025-08-02 PROCEDURE — 80048 BASIC METABOLIC PNL TOTAL CA: CPT | Performed by: STUDENT IN AN ORGANIZED HEALTH CARE EDUCATION/TRAINING PROGRAM

## 2025-08-02 PROCEDURE — 63600175 PHARM REV CODE 636 W HCPCS: Performed by: STUDENT IN AN ORGANIZED HEALTH CARE EDUCATION/TRAINING PROGRAM

## 2025-08-02 PROCEDURE — 25000003 PHARM REV CODE 250: Performed by: HOSPITALIST

## 2025-08-02 PROCEDURE — 25000003 PHARM REV CODE 250: Performed by: NURSE PRACTITIONER

## 2025-08-02 PROCEDURE — 85025 COMPLETE CBC W/AUTO DIFF WBC: CPT | Performed by: STUDENT IN AN ORGANIZED HEALTH CARE EDUCATION/TRAINING PROGRAM

## 2025-08-02 PROCEDURE — 36415 COLL VENOUS BLD VENIPUNCTURE: CPT | Performed by: STUDENT IN AN ORGANIZED HEALTH CARE EDUCATION/TRAINING PROGRAM

## 2025-08-02 PROCEDURE — 25000003 PHARM REV CODE 250: Performed by: INTERNAL MEDICINE

## 2025-08-02 PROCEDURE — 11000001 HC ACUTE MED/SURG PRIVATE ROOM

## 2025-08-02 PROCEDURE — 83735 ASSAY OF MAGNESIUM: CPT | Performed by: STUDENT IN AN ORGANIZED HEALTH CARE EDUCATION/TRAINING PROGRAM

## 2025-08-02 RX ADMIN — ATORVASTATIN CALCIUM 10 MG: 10 TABLET, FILM COATED ORAL at 09:08

## 2025-08-02 RX ADMIN — FOLIC ACID 2000 MCG: 1 TABLET ORAL at 08:08

## 2025-08-02 RX ADMIN — CALCITRIOL CAPSULES 0.25 MCG 0.25 MCG: 0.25 CAPSULE ORAL at 08:08

## 2025-08-02 RX ADMIN — TRAZODONE HYDROCHLORIDE 100 MG: 50 TABLET ORAL at 09:08

## 2025-08-02 RX ADMIN — PANTOPRAZOLE SODIUM 40 MG: 40 TABLET, DELAYED RELEASE ORAL at 06:08

## 2025-08-02 RX ADMIN — AMLODIPINE BESYLATE 10 MG: 5 TABLET ORAL at 08:08

## 2025-08-02 RX ADMIN — TAMSULOSIN HYDROCHLORIDE 0.4 MG: 0.4 CAPSULE ORAL at 08:08

## 2025-08-02 RX ADMIN — AMIODARONE HYDROCHLORIDE 200 MG: 200 TABLET ORAL at 08:08

## 2025-08-02 RX ADMIN — ENOXAPARIN SODIUM 30 MG: 30 INJECTION SUBCUTANEOUS at 05:08

## 2025-08-02 RX ADMIN — SODIUM CHLORIDE: 9 INJECTION, SOLUTION INTRAVENOUS at 08:08

## 2025-08-03 LAB
ABO + RH BLD: NORMAL
ABO + RH BLD: NORMAL
ABSOLUTE EOSINOPHIL (SMH): 0.03 K/UL
ABSOLUTE MONOCYTE (SMH): 0.33 K/UL (ref 0.3–1)
ABSOLUTE NEUTROPHIL COUNT (SMH): 1.6 K/UL (ref 1.8–7.7)
ALBUMIN SERPL-MCNC: 3.6 G/DL (ref 3.5–5.2)
ALP SERPL-CCNC: 45 UNIT/L (ref 55–135)
ALT SERPL-CCNC: 33 UNIT/L (ref 10–44)
ANION GAP (SMH): 6 MMOL/L (ref 8–16)
AST SERPL-CCNC: 27 UNIT/L (ref 10–40)
BASOPHILS # BLD AUTO: 0.01 K/UL
BASOPHILS NFR BLD AUTO: 0.4 %
BILIRUB SERPL-MCNC: 0.6 MG/DL (ref 0.1–1)
BLD PROD TYP BPU: NORMAL
BLD PROD TYP BPU: NORMAL
BLOOD UNIT EXPIRATION DATE: NORMAL
BLOOD UNIT EXPIRATION DATE: NORMAL
BLOOD UNIT TYPE CODE: 5100
BLOOD UNIT TYPE CODE: 9500
BUN SERPL-MCNC: 39 MG/DL (ref 8–23)
CALCIUM SERPL-MCNC: 8.3 MG/DL (ref 8.7–10.5)
CHLORIDE SERPL-SCNC: 113 MMOL/L (ref 95–110)
CO2 SERPL-SCNC: 22 MMOL/L (ref 23–29)
CREAT SERPL-MCNC: 4.5 MG/DL (ref 0.5–1.4)
CROSSMATCH INTERPRETATION: NORMAL
CROSSMATCH INTERPRETATION: NORMAL
DISPENSE STATUS: NORMAL
DISPENSE STATUS: NORMAL
ERYTHROCYTE [DISTWIDTH] IN BLOOD BY AUTOMATED COUNT: 17.6 % (ref 11.5–14.5)
GFR SERPLBLD CREATININE-BSD FMLA CKD-EPI: 14 ML/MIN/1.73/M2
GLUCOSE SERPL-MCNC: 94 MG/DL (ref 70–110)
HCT VFR BLD AUTO: 23.9 % (ref 40–54)
HGB BLD-MCNC: 7.6 GM/DL (ref 14–18)
IMM GRANULOCYTES # BLD AUTO: 0.02 K/UL (ref 0–0.04)
IMM GRANULOCYTES NFR BLD AUTO: 0.7 % (ref 0–0.5)
INDIRECT COOMBS: NORMAL
LYMPHOCYTES # BLD AUTO: 0.69 K/UL (ref 1–4.8)
MCH RBC QN AUTO: 28.1 PG (ref 27–31)
MCHC RBC AUTO-ENTMCNC: 31.8 G/DL (ref 32–36)
MCV RBC AUTO: 89 FL (ref 82–98)
NUCLEATED RBC (/100WBC) (SMH): 0 /100 WBC
PLATELET # BLD AUTO: 40 K/UL (ref 150–450)
PLATELET BLD QL SMEAR: ABNORMAL
PMV BLD AUTO: 9.3 FL (ref 9.2–12.9)
POTASSIUM SERPL-SCNC: 4.2 MMOL/L (ref 3.5–5.1)
PROT SERPL-MCNC: 6.3 GM/DL (ref 6–8.4)
RBC # BLD AUTO: 2.7 M/UL (ref 4.6–6.2)
RELATIVE EOSINOPHIL (SMH): 1.1 % (ref 0–8)
RELATIVE LYMPHOCYTE (SMH): 25.8 % (ref 18–48)
RELATIVE MONOCYTE (SMH): 12.4 % (ref 4–15)
RELATIVE NEUTROPHIL (SMH): 59.6 % (ref 38–73)
RH BLD: NORMAL
SODIUM SERPL-SCNC: 141 MMOL/L (ref 136–145)
SPECIMEN OUTDATE: NORMAL
UNIT NUMBER: NORMAL
UNIT NUMBER: NORMAL
WBC # BLD AUTO: 2.67 K/UL (ref 3.9–12.7)

## 2025-08-03 PROCEDURE — P9040 RBC LEUKOREDUCED IRRADIATED: HCPCS | Performed by: STUDENT IN AN ORGANIZED HEALTH CARE EDUCATION/TRAINING PROGRAM

## 2025-08-03 PROCEDURE — P9073 PLATELETS PHERESIS PATH REDU: HCPCS | Performed by: STUDENT IN AN ORGANIZED HEALTH CARE EDUCATION/TRAINING PROGRAM

## 2025-08-03 PROCEDURE — 25000003 PHARM REV CODE 250: Performed by: NURSE PRACTITIONER

## 2025-08-03 PROCEDURE — 25000003 PHARM REV CODE 250: Performed by: HOSPITALIST

## 2025-08-03 PROCEDURE — 36430 TRANSFUSION BLD/BLD COMPNT: CPT | Performed by: STUDENT IN AN ORGANIZED HEALTH CARE EDUCATION/TRAINING PROGRAM

## 2025-08-03 PROCEDURE — 86902 BLOOD TYPE ANTIGEN DONOR EA: CPT | Performed by: STUDENT IN AN ORGANIZED HEALTH CARE EDUCATION/TRAINING PROGRAM

## 2025-08-03 PROCEDURE — 25000003 PHARM REV CODE 250: Performed by: STUDENT IN AN ORGANIZED HEALTH CARE EDUCATION/TRAINING PROGRAM

## 2025-08-03 PROCEDURE — 86901 BLOOD TYPING SEROLOGIC RH(D): CPT | Performed by: STUDENT IN AN ORGANIZED HEALTH CARE EDUCATION/TRAINING PROGRAM

## 2025-08-03 PROCEDURE — 25000003 PHARM REV CODE 250: Performed by: INTERNAL MEDICINE

## 2025-08-03 PROCEDURE — 85025 COMPLETE CBC W/AUTO DIFF WBC: CPT | Performed by: STUDENT IN AN ORGANIZED HEALTH CARE EDUCATION/TRAINING PROGRAM

## 2025-08-03 PROCEDURE — 30233N1 TRANSFUSION OF NONAUTOLOGOUS RED BLOOD CELLS INTO PERIPHERAL VEIN, PERCUTANEOUS APPROACH: ICD-10-PCS | Performed by: STUDENT IN AN ORGANIZED HEALTH CARE EDUCATION/TRAINING PROGRAM

## 2025-08-03 PROCEDURE — 36415 COLL VENOUS BLD VENIPUNCTURE: CPT | Performed by: STUDENT IN AN ORGANIZED HEALTH CARE EDUCATION/TRAINING PROGRAM

## 2025-08-03 PROCEDURE — 11000001 HC ACUTE MED/SURG PRIVATE ROOM

## 2025-08-03 PROCEDURE — 80053 COMPREHEN METABOLIC PANEL: CPT | Performed by: STUDENT IN AN ORGANIZED HEALTH CARE EDUCATION/TRAINING PROGRAM

## 2025-08-03 PROCEDURE — 86922 COMPATIBILITY TEST ANTIGLOB: CPT | Performed by: STUDENT IN AN ORGANIZED HEALTH CARE EDUCATION/TRAINING PROGRAM

## 2025-08-03 RX ORDER — HYDROCODONE BITARTRATE AND ACETAMINOPHEN 500; 5 MG/1; MG/1
TABLET ORAL
Status: DISCONTINUED | OUTPATIENT
Start: 2025-08-03 | End: 2025-08-04 | Stop reason: HOSPADM

## 2025-08-03 RX ADMIN — AMLODIPINE BESYLATE 10 MG: 5 TABLET ORAL at 09:08

## 2025-08-03 RX ADMIN — PANTOPRAZOLE SODIUM 40 MG: 40 TABLET, DELAYED RELEASE ORAL at 06:08

## 2025-08-03 RX ADMIN — AMIODARONE HYDROCHLORIDE 200 MG: 200 TABLET ORAL at 09:08

## 2025-08-03 RX ADMIN — ATORVASTATIN CALCIUM 10 MG: 10 TABLET, FILM COATED ORAL at 09:08

## 2025-08-03 RX ADMIN — SODIUM CHLORIDE: 9 INJECTION, SOLUTION INTRAVENOUS at 05:08

## 2025-08-03 RX ADMIN — TAMSULOSIN HYDROCHLORIDE 0.4 MG: 0.4 CAPSULE ORAL at 09:08

## 2025-08-03 RX ADMIN — CALCITRIOL CAPSULES 0.25 MCG 0.25 MCG: 0.25 CAPSULE ORAL at 09:08

## 2025-08-03 RX ADMIN — FOLIC ACID 2000 MCG: 1 TABLET ORAL at 09:08

## 2025-08-03 RX ADMIN — TRAZODONE HYDROCHLORIDE 100 MG: 50 TABLET ORAL at 09:08

## 2025-08-04 ENCOUNTER — TELEPHONE (OUTPATIENT)
Dept: FAMILY MEDICINE | Facility: CLINIC | Age: 65
End: 2025-08-04
Payer: MEDICARE

## 2025-08-04 VITALS
SYSTOLIC BLOOD PRESSURE: 145 MMHG | HEART RATE: 67 BPM | OXYGEN SATURATION: 97 % | DIASTOLIC BLOOD PRESSURE: 84 MMHG | BODY MASS INDEX: 30.17 KG/M2 | WEIGHT: 203.69 LBS | TEMPERATURE: 98 F | RESPIRATION RATE: 14 BRPM | HEIGHT: 69 IN

## 2025-08-04 LAB
ABSOLUTE EOSINOPHIL (SMH): 0.04 K/UL
ABSOLUTE MONOCYTE (SMH): 0.33 K/UL (ref 0.3–1)
ABSOLUTE NEUTROPHIL COUNT (SMH): 1.5 K/UL (ref 1.8–7.7)
ALBUMIN SERPL-MCNC: 3.8 G/DL (ref 3.5–5.2)
ALP SERPL-CCNC: 46 UNIT/L (ref 55–135)
ALT SERPL-CCNC: 38 UNIT/L (ref 10–44)
ANION GAP (SMH): 5 MMOL/L (ref 8–16)
AST SERPL-CCNC: 32 UNIT/L (ref 10–40)
BASOPHILS # BLD AUTO: 0.01 K/UL
BASOPHILS NFR BLD AUTO: 0.4 %
BILIRUB SERPL-MCNC: 1 MG/DL (ref 0.1–1)
BUN SERPL-MCNC: 38 MG/DL (ref 8–23)
CALCIUM SERPL-MCNC: 8.5 MG/DL (ref 8.7–10.5)
CHLORIDE SERPL-SCNC: 113 MMOL/L (ref 95–110)
CO2 SERPL-SCNC: 22 MMOL/L (ref 23–29)
CREAT SERPL-MCNC: 4.7 MG/DL (ref 0.5–1.4)
ERYTHROCYTE [DISTWIDTH] IN BLOOD BY AUTOMATED COUNT: 18.2 % (ref 11.5–14.5)
GFR SERPLBLD CREATININE-BSD FMLA CKD-EPI: 13 ML/MIN/1.73/M2
GLUCOSE SERPL-MCNC: 96 MG/DL (ref 70–110)
HCT VFR BLD AUTO: 26.1 % (ref 40–54)
HCT VFR BLD AUTO: 26.3 % (ref 40–54)
HGB BLD-MCNC: 8.5 GM/DL (ref 14–18)
HGB BLD-MCNC: 8.6 GM/DL (ref 14–18)
IMM GRANULOCYTES # BLD AUTO: 0.01 K/UL (ref 0–0.04)
IMM GRANULOCYTES NFR BLD AUTO: 0.4 % (ref 0–0.5)
LYMPHOCYTES # BLD AUTO: 0.74 K/UL (ref 1–4.8)
MCH RBC QN AUTO: 28.1 PG (ref 27–31)
MCHC RBC AUTO-ENTMCNC: 32.6 G/DL (ref 32–36)
MCV RBC AUTO: 86 FL (ref 82–98)
NUCLEATED RBC (/100WBC) (SMH): 0 /100 WBC
PLATELET # BLD AUTO: 51 K/UL (ref 150–450)
PLATELET BLD QL SMEAR: ABNORMAL
PMV BLD AUTO: 9.3 FL (ref 9.2–12.9)
POTASSIUM SERPL-SCNC: 4.1 MMOL/L (ref 3.5–5.1)
PROT SERPL-MCNC: 6.7 GM/DL (ref 6–8.4)
RBC # BLD AUTO: 3.02 M/UL (ref 4.6–6.2)
RELATIVE EOSINOPHIL (SMH): 1.5 % (ref 0–8)
RELATIVE LYMPHOCYTE (SMH): 28.4 % (ref 18–48)
RELATIVE MONOCYTE (SMH): 12.6 % (ref 4–15)
RELATIVE NEUTROPHIL (SMH): 56.7 % (ref 38–73)
SODIUM SERPL-SCNC: 140 MMOL/L (ref 136–145)
WBC # BLD AUTO: 2.61 K/UL (ref 3.9–12.7)

## 2025-08-04 PROCEDURE — 85014 HEMATOCRIT: CPT | Performed by: STUDENT IN AN ORGANIZED HEALTH CARE EDUCATION/TRAINING PROGRAM

## 2025-08-04 PROCEDURE — 0TB13ZX EXCISION OF LEFT KIDNEY, PERCUTANEOUS APPROACH, DIAGNOSTIC: ICD-10-PCS | Performed by: RADIOLOGY

## 2025-08-04 PROCEDURE — 25000003 PHARM REV CODE 250: Performed by: STUDENT IN AN ORGANIZED HEALTH CARE EDUCATION/TRAINING PROGRAM

## 2025-08-04 PROCEDURE — 25000003 PHARM REV CODE 250: Performed by: NURSE PRACTITIONER

## 2025-08-04 PROCEDURE — 88305 TISSUE EXAM BY PATHOLOGIST: CPT | Mod: TC | Performed by: PATHOLOGY

## 2025-08-04 PROCEDURE — 36415 COLL VENOUS BLD VENIPUNCTURE: CPT | Performed by: STUDENT IN AN ORGANIZED HEALTH CARE EDUCATION/TRAINING PROGRAM

## 2025-08-04 PROCEDURE — 85018 HEMOGLOBIN: CPT | Performed by: STUDENT IN AN ORGANIZED HEALTH CARE EDUCATION/TRAINING PROGRAM

## 2025-08-04 PROCEDURE — 63600175 PHARM REV CODE 636 W HCPCS: Performed by: RADIOLOGY

## 2025-08-04 PROCEDURE — 85025 COMPLETE CBC W/AUTO DIFF WBC: CPT | Performed by: STUDENT IN AN ORGANIZED HEALTH CARE EDUCATION/TRAINING PROGRAM

## 2025-08-04 PROCEDURE — 80053 COMPREHEN METABOLIC PANEL: CPT | Performed by: STUDENT IN AN ORGANIZED HEALTH CARE EDUCATION/TRAINING PROGRAM

## 2025-08-04 RX ORDER — FENTANYL CITRATE 50 UG/ML
INJECTION, SOLUTION INTRAMUSCULAR; INTRAVENOUS
Status: COMPLETED | OUTPATIENT
Start: 2025-08-04 | End: 2025-08-04

## 2025-08-04 RX ORDER — MIDAZOLAM HYDROCHLORIDE 1 MG/ML
INJECTION, SOLUTION INTRAMUSCULAR; INTRAVENOUS
Status: COMPLETED | OUTPATIENT
Start: 2025-08-04 | End: 2025-08-04

## 2025-08-04 RX ORDER — LIDOCAINE HYDROCHLORIDE 10 MG/ML
INJECTION, SOLUTION EPIDURAL; INFILTRATION; INTRACAUDAL; PERINEURAL
Status: COMPLETED | OUTPATIENT
Start: 2025-08-04 | End: 2025-08-04

## 2025-08-04 RX ADMIN — LIDOCAINE HYDROCHLORIDE 7 ML: 10 INJECTION, SOLUTION EPIDURAL; INFILTRATION; INTRACAUDAL; PERINEURAL at 08:08

## 2025-08-04 RX ADMIN — CALCITRIOL CAPSULES 0.25 MCG 0.25 MCG: 0.25 CAPSULE ORAL at 10:08

## 2025-08-04 RX ADMIN — FENTANYL CITRATE 50 MCG: 50 INJECTION INTRAMUSCULAR; INTRAVENOUS at 08:08

## 2025-08-04 RX ADMIN — TAMSULOSIN HYDROCHLORIDE 0.4 MG: 0.4 CAPSULE ORAL at 10:08

## 2025-08-04 RX ADMIN — AMIODARONE HYDROCHLORIDE 200 MG: 200 TABLET ORAL at 10:08

## 2025-08-04 RX ADMIN — MIDAZOLAM 1 MG: 1 INJECTION INTRAMUSCULAR; INTRAVENOUS at 08:08

## 2025-08-04 RX ADMIN — AMLODIPINE BESYLATE 10 MG: 5 TABLET ORAL at 10:08

## 2025-08-04 RX ADMIN — FOLIC ACID 2000 MCG: 1 TABLET ORAL at 10:08

## 2025-08-04 NOTE — TELEPHONE ENCOUNTER
----- Message from  Carissa sent at 8/4/2025  2:51 PM CDT -----  Regarding: hospital follow up  Date: 08/04/2025      Patient: Rick Butler  YOB: 1960    RE: [facility] Hospital Admission 7/30/25    Admit Diagnosis: acute renal failure      Rick Butler is being discharged from the hospital today.     [ ] The patient does not currently have a primary care provider.     [ ] The patient has a diagnosis of congestive heart failure.     [ ] Other:       Transportation Needs:  @[facility]@        A follow-up appointment has been recommended within 3 days of discharge to support post-discharge management and reduce risk of admission.       Sincerely,   Carissa Guerrero

## 2025-08-05 ENCOUNTER — TELEPHONE (OUTPATIENT)
Dept: FAMILY MEDICINE | Facility: CLINIC | Age: 65
End: 2025-08-05
Payer: MEDICARE

## 2025-08-05 NOTE — TELEPHONE ENCOUNTER
----- Message from Nurse Torres sent at 8/5/2025  7:44 AM CDT -----  Call patient - needs post-hospital phone call within 2 business days and hospital follow up visit scheduled within 7-14 days.

## 2025-08-06 ENCOUNTER — LAB VISIT (OUTPATIENT)
Dept: LAB | Facility: HOSPITAL | Age: 65
End: 2025-08-06
Attending: NURSE PRACTITIONER
Payer: MEDICARE

## 2025-08-06 DIAGNOSIS — D64.89 ANEMIA DUE TO MULTIPLE MECHANISMS: ICD-10-CM

## 2025-08-06 DIAGNOSIS — D46.9 MDS (MYELODYSPLASTIC SYNDROME): ICD-10-CM

## 2025-08-06 LAB
ABSOLUTE EOSINOPHIL (SMH): 0.03 K/UL
ABSOLUTE MONOCYTE (SMH): 0.27 K/UL (ref 0.3–1)
ABSOLUTE NEUTROPHIL COUNT (SMH): 1.4 K/UL (ref 1.8–7.7)
ALBUMIN SERPL-MCNC: 4.2 G/DL (ref 3.5–5.2)
ALP SERPL-CCNC: 54 UNIT/L (ref 55–135)
ALT SERPL-CCNC: 44 UNIT/L (ref 10–44)
ANION GAP (SMH): 11 MMOL/L (ref 8–16)
AST SERPL-CCNC: 36 UNIT/L (ref 10–40)
BASOPHILS # BLD AUTO: 0.01 K/UL
BASOPHILS NFR BLD AUTO: 0.4 %
BILIRUB SERPL-MCNC: 0.8 MG/DL (ref 0.1–1)
BUN SERPL-MCNC: 46 MG/DL (ref 8–23)
CALCIUM SERPL-MCNC: 9 MG/DL (ref 8.7–10.5)
CHLORIDE SERPL-SCNC: 109 MMOL/L (ref 95–110)
CO2 SERPL-SCNC: 20 MMOL/L (ref 23–29)
CREAT SERPL-MCNC: 5.2 MG/DL (ref 0.5–1.4)
ERYTHROCYTE [DISTWIDTH] IN BLOOD BY AUTOMATED COUNT: 18.5 % (ref 11.5–14.5)
GFR SERPLBLD CREATININE-BSD FMLA CKD-EPI: 12 ML/MIN/1.73/M2
GLUCOSE SERPL-MCNC: 101 MG/DL (ref 70–110)
HCT VFR BLD AUTO: 26.3 % (ref 40–54)
HGB BLD-MCNC: 8.7 GM/DL (ref 14–18)
IMM GRANULOCYTES # BLD AUTO: 0.01 K/UL (ref 0–0.04)
IMM GRANULOCYTES NFR BLD AUTO: 0.4 % (ref 0–0.5)
LYMPHOCYTES # BLD AUTO: 0.84 K/UL (ref 1–4.8)
MCH RBC QN AUTO: 28.4 PG (ref 27–31)
MCHC RBC AUTO-ENTMCNC: 33.1 G/DL (ref 32–36)
MCV RBC AUTO: 86 FL (ref 82–98)
NUCLEATED RBC (/100WBC) (SMH): 0 /100 WBC
PLATELET # BLD AUTO: 41 K/UL (ref 150–450)
PLATELET BLD QL SMEAR: ABNORMAL
PMV BLD AUTO: 11.7 FL (ref 9.2–12.9)
POTASSIUM SERPL-SCNC: 4.1 MMOL/L (ref 3.5–5.1)
PROT SERPL-MCNC: 7.5 GM/DL (ref 6–8.4)
RBC # BLD AUTO: 3.06 M/UL (ref 4.6–6.2)
RELATIVE EOSINOPHIL (SMH): 1.2 % (ref 0–8)
RELATIVE LYMPHOCYTE (SMH): 33.5 % (ref 18–48)
RELATIVE MONOCYTE (SMH): 10.8 % (ref 4–15)
RELATIVE NEUTROPHIL (SMH): 53.7 % (ref 38–73)
SODIUM SERPL-SCNC: 140 MMOL/L (ref 136–145)
WBC # BLD AUTO: 2.51 K/UL (ref 3.9–12.7)

## 2025-08-06 PROCEDURE — 80053 COMPREHEN METABOLIC PANEL: CPT

## 2025-08-06 PROCEDURE — 36415 COLL VENOUS BLD VENIPUNCTURE: CPT

## 2025-08-06 PROCEDURE — 85025 COMPLETE CBC W/AUTO DIFF WBC: CPT

## 2025-08-08 ENCOUNTER — INFUSION (OUTPATIENT)
Dept: INFUSION THERAPY | Facility: HOSPITAL | Age: 65
End: 2025-08-08
Attending: INTERNAL MEDICINE
Payer: MEDICARE

## 2025-08-08 VITALS
SYSTOLIC BLOOD PRESSURE: 115 MMHG | BODY MASS INDEX: 30.54 KG/M2 | WEIGHT: 206.19 LBS | HEART RATE: 80 BPM | TEMPERATURE: 98 F | OXYGEN SATURATION: 98 % | RESPIRATION RATE: 16 BRPM | HEIGHT: 69 IN | DIASTOLIC BLOOD PRESSURE: 67 MMHG

## 2025-08-08 DIAGNOSIS — D46.9 MDS (MYELODYSPLASTIC SYNDROME): ICD-10-CM

## 2025-08-08 DIAGNOSIS — N18.30 STAGE 3 CHRONIC KIDNEY DISEASE, UNSPECIFIED WHETHER STAGE 3A OR 3B CKD: Primary | ICD-10-CM

## 2025-08-08 PROCEDURE — 63600175 PHARM REV CODE 636 W HCPCS: Mod: EC,TB | Performed by: NURSE PRACTITIONER

## 2025-08-08 PROCEDURE — 96372 THER/PROPH/DIAG INJ SC/IM: CPT

## 2025-08-08 RX ADMIN — EPOETIN ALFA-EPBX 60000 UNITS: 20000 INJECTION, SOLUTION INTRAVENOUS; SUBCUTANEOUS at 01:08

## 2025-08-08 NOTE — PLAN OF CARE
Problem: Fatigue  Goal: Improved Activity Tolerance  Outcome: Progressing  Intervention: Promote Improved Energy  Flowsheets (Taken 8/8/2025 7478)  Fatigue Management: frequent rest breaks encouraged  Activity Management: Ambulated -L4  Environmental Support: rest periods encouraged

## 2025-08-11 ENCOUNTER — HOSPITAL ENCOUNTER (OUTPATIENT)
Dept: RADIOLOGY | Facility: HOSPITAL | Age: 65
Discharge: HOME | End: 2025-08-11
Attending: NURSE PRACTITIONER
Payer: MEDICARE

## 2025-08-11 DIAGNOSIS — J18.9 PNEUMONIA OF BOTH LUNGS DUE TO INFECTIOUS ORGANISM, UNSPECIFIED PART OF LUNG: ICD-10-CM

## 2025-08-11 PROCEDURE — 71046 X-RAY EXAM CHEST 2 VIEWS: CPT | Mod: 26,,, | Performed by: RADIOLOGY

## 2025-08-11 PROCEDURE — 71046 X-RAY EXAM CHEST 2 VIEWS: CPT | Mod: TC,PO

## 2025-08-12 ENCOUNTER — OFFICE VISIT (OUTPATIENT)
Dept: FAMILY MEDICINE | Facility: CLINIC | Age: 65
End: 2025-08-12
Payer: MEDICARE

## 2025-08-12 VITALS
BODY MASS INDEX: 30.54 KG/M2 | WEIGHT: 206.81 LBS | SYSTOLIC BLOOD PRESSURE: 116 MMHG | DIASTOLIC BLOOD PRESSURE: 62 MMHG | HEART RATE: 77 BPM | TEMPERATURE: 98 F | OXYGEN SATURATION: 99 %

## 2025-08-12 DIAGNOSIS — D61.818 PANCYTOPENIA: ICD-10-CM

## 2025-08-12 DIAGNOSIS — N40.1 BPH ASSOCIATED WITH NOCTURIA: ICD-10-CM

## 2025-08-12 DIAGNOSIS — N18.4 CHRONIC KIDNEY DISEASE (CKD), STAGE IV (SEVERE): ICD-10-CM

## 2025-08-12 DIAGNOSIS — N18.4 ANEMIA, CHRONIC RENAL FAILURE, STAGE 4 (SEVERE): ICD-10-CM

## 2025-08-12 DIAGNOSIS — Z09 HOSPITAL DISCHARGE FOLLOW-UP: Primary | ICD-10-CM

## 2025-08-12 DIAGNOSIS — D63.1 ANEMIA, CHRONIC RENAL FAILURE, STAGE 4 (SEVERE): ICD-10-CM

## 2025-08-12 DIAGNOSIS — R35.1 BPH ASSOCIATED WITH NOCTURIA: ICD-10-CM

## 2025-08-12 DIAGNOSIS — J18.9 PNEUMONIA OF RIGHT LUNG DUE TO INFECTIOUS ORGANISM, UNSPECIFIED PART OF LUNG: ICD-10-CM

## 2025-08-12 DIAGNOSIS — D46.9 MDS (MYELODYSPLASTIC SYNDROME): ICD-10-CM

## 2025-08-12 PROCEDURE — 99214 OFFICE O/P EST MOD 30 MIN: CPT | Mod: S$PBB,,, | Performed by: NURSE PRACTITIONER

## 2025-08-12 PROCEDURE — 99214 OFFICE O/P EST MOD 30 MIN: CPT | Mod: PBBFAC,PN | Performed by: NURSE PRACTITIONER

## 2025-08-12 PROCEDURE — 99999 PR PBB SHADOW E&M-EST. PATIENT-LVL IV: CPT | Mod: PBBFAC,,, | Performed by: NURSE PRACTITIONER

## 2025-08-13 ENCOUNTER — HOSPITAL ENCOUNTER (EMERGENCY)
Facility: HOSPITAL | Age: 65
Discharge: HOME | End: 2025-08-13
Attending: EMERGENCY MEDICINE
Payer: MEDICARE

## 2025-08-13 ENCOUNTER — RESULTS FOLLOW-UP (OUTPATIENT)
Dept: HEMATOLOGY/ONCOLOGY | Facility: HOSPITAL | Age: 65
End: 2025-08-13

## 2025-08-13 ENCOUNTER — DOCUMENTATION ONLY (OUTPATIENT)
Facility: CLINIC | Age: 65
End: 2025-08-13
Payer: MEDICARE

## 2025-08-13 VITALS
DIASTOLIC BLOOD PRESSURE: 73 MMHG | TEMPERATURE: 98 F | OXYGEN SATURATION: 98 % | HEART RATE: 68 BPM | RESPIRATION RATE: 18 BRPM | HEIGHT: 69 IN | BODY MASS INDEX: 30.51 KG/M2 | SYSTOLIC BLOOD PRESSURE: 116 MMHG | WEIGHT: 206 LBS

## 2025-08-13 DIAGNOSIS — J18.9 PNEUMONIA OF RIGHT LUNG DUE TO INFECTIOUS ORGANISM, UNSPECIFIED PART OF LUNG: Primary | ICD-10-CM

## 2025-08-13 DIAGNOSIS — R79.89 ELEVATED SERUM CREATININE: Primary | ICD-10-CM

## 2025-08-13 LAB
ANISOCYTOSIS BLD QL SMEAR: SLIGHT
BURR CELLS BLD QL SMEAR: ABNORMAL
DACRYOCYTES BLD QL SMEAR: ABNORMAL
ERYTHROCYTE [DISTWIDTH] IN BLOOD BY AUTOMATED COUNT: 20.8 % (ref 11.5–14.5)
HCT VFR BLD AUTO: 24.7 % (ref 40–54)
HGB BLD-MCNC: 7.9 GM/DL (ref 14–18)
HYPOCHROMIA BLD QL SMEAR: ABNORMAL
LYMPHOCYTES NFR BLD MANUAL: 26 % (ref 18–48)
MCH RBC QN AUTO: 28.8 PG (ref 27–31)
MCHC RBC AUTO-ENTMCNC: 32 G/DL (ref 32–36)
MCV RBC AUTO: 90 FL (ref 82–98)
METAMYELOCYTES NFR BLD MANUAL: 1 %
MONOCYTES NFR BLD MANUAL: 9 % (ref 4–15)
NEUTROPHILS NFR BLD MANUAL: 63 % (ref 38–73)
NEUTS BAND NFR BLD MANUAL: 1 %
NUCLEATED RBC (/100WBC) (SMH): 0 /100 WBC
OVALOCYTES BLD QL SMEAR: ABNORMAL
PLATELET # BLD AUTO: 59 K/UL (ref 150–450)
PLATELET BLD QL SMEAR: ABNORMAL
PMV BLD AUTO: 12.4 FL (ref 9.2–12.9)
POIKILOCYTOSIS BLD QL SMEAR: SLIGHT
RBC # BLD AUTO: 2.74 M/UL (ref 4.6–6.2)
SCHISTOCYTES BLD QL SMEAR: PRESENT
WBC # BLD AUTO: 1.89 K/UL (ref 3.9–12.7)

## 2025-08-13 PROCEDURE — 99283 EMERGENCY DEPT VISIT LOW MDM: CPT

## 2025-08-13 PROCEDURE — 85007 BL SMEAR W/DIFF WBC COUNT: CPT | Performed by: PHYSICIAN ASSISTANT

## 2025-08-14 ENCOUNTER — TELEPHONE (OUTPATIENT)
Dept: RADIOLOGY | Facility: HOSPITAL | Age: 65
End: 2025-08-14

## 2025-08-15 ENCOUNTER — INFUSION (OUTPATIENT)
Dept: INFUSION THERAPY | Facility: HOSPITAL | Age: 65
End: 2025-08-15
Attending: INTERNAL MEDICINE
Payer: MEDICARE

## 2025-08-15 VITALS
OXYGEN SATURATION: 100 % | DIASTOLIC BLOOD PRESSURE: 78 MMHG | HEART RATE: 75 BPM | TEMPERATURE: 98 F | RESPIRATION RATE: 17 BRPM | SYSTOLIC BLOOD PRESSURE: 128 MMHG | BODY MASS INDEX: 30.34 KG/M2 | WEIGHT: 204.88 LBS | HEIGHT: 69 IN

## 2025-08-15 DIAGNOSIS — D46.9 MDS (MYELODYSPLASTIC SYNDROME): ICD-10-CM

## 2025-08-15 DIAGNOSIS — N18.30 STAGE 3 CHRONIC KIDNEY DISEASE, UNSPECIFIED WHETHER STAGE 3A OR 3B CKD: Primary | ICD-10-CM

## 2025-08-15 PROCEDURE — 96372 THER/PROPH/DIAG INJ SC/IM: CPT

## 2025-08-15 PROCEDURE — 63600175 PHARM REV CODE 636 W HCPCS: Mod: JZ,EC,TB | Performed by: NURSE PRACTITIONER

## 2025-08-15 RX ADMIN — EPOETIN ALFA-EPBX 60000 UNITS: 20000 INJECTION, SOLUTION INTRAVENOUS; SUBCUTANEOUS at 07:08

## 2025-08-18 ENCOUNTER — LAB VISIT (OUTPATIENT)
Dept: LAB | Facility: HOSPITAL | Age: 65
End: 2025-08-18
Attending: NURSE PRACTITIONER
Payer: MEDICARE

## 2025-08-18 DIAGNOSIS — D64.89 ANEMIA DUE TO MULTIPLE MECHANISMS: ICD-10-CM

## 2025-08-18 DIAGNOSIS — N18.30 CHRONIC KIDNEY DISEASE, STAGE III (MODERATE): ICD-10-CM

## 2025-08-18 DIAGNOSIS — D64.9 ANEMIA, UNSPECIFIED TYPE: Primary | ICD-10-CM

## 2025-08-18 DIAGNOSIS — N25.81 SECONDARY HYPERPARATHYROIDISM OF RENAL ORIGIN: ICD-10-CM

## 2025-08-18 DIAGNOSIS — D46.9 MDS (MYELODYSPLASTIC SYNDROME): ICD-10-CM

## 2025-08-18 LAB
25(OH)D3+25(OH)D2 SERPL-MCNC: 42 NG/ML (ref 30–96)
ABSOLUTE EOSINOPHIL (SMH): 0.03 K/UL
ABSOLUTE MONOCYTE (SMH): 0.3 K/UL (ref 0.3–1)
ABSOLUTE NEUTROPHIL COUNT (SMH): 1 K/UL (ref 1.8–7.7)
ALBUMIN SERPL-MCNC: 4.3 G/DL (ref 3.5–5.2)
ALP SERPL-CCNC: 53 UNIT/L (ref 55–135)
ALT SERPL-CCNC: 56 UNIT/L (ref 10–44)
ANION GAP (SMH): 7 MMOL/L (ref 8–16)
AST SERPL-CCNC: 35 UNIT/L (ref 10–40)
BASOPHILS # BLD AUTO: 0.01 K/UL
BASOPHILS NFR BLD AUTO: 0.5 %
BILIRUB SERPL-MCNC: 0.6 MG/DL (ref 0.1–1)
BUN SERPL-MCNC: 54 MG/DL (ref 8–23)
CALCIUM SERPL-MCNC: 9 MG/DL (ref 8.7–10.5)
CHLORIDE SERPL-SCNC: 104 MMOL/L (ref 95–110)
CO2 SERPL-SCNC: 24 MMOL/L (ref 23–29)
CREAT SERPL-MCNC: 5.6 MG/DL (ref 0.5–1.4)
CREAT UR-MCNC: 46.2 MG/DL (ref 23–375)
ERYTHROCYTE [DISTWIDTH] IN BLOOD BY AUTOMATED COUNT: 21.5 % (ref 11.5–14.5)
FERRITIN SERPL-MCNC: 2258.6 NG/ML (ref 20–300)
GFR SERPLBLD CREATININE-BSD FMLA CKD-EPI: 11 ML/MIN/1.73/M2
GLUCOSE SERPL-MCNC: 111 MG/DL (ref 70–110)
HCT VFR BLD AUTO: 23.5 % (ref 40–54)
HGB BLD-MCNC: 7.8 GM/DL (ref 14–18)
IMM GRANULOCYTES # BLD AUTO: 0.02 K/UL (ref 0–0.04)
IMM GRANULOCYTES NFR BLD AUTO: 0.9 % (ref 0–0.5)
IRON SATN MFR SERPL: >75 % (ref 20–50)
IRON SERPL-MCNC: 176 UG/DL (ref 45–160)
LYMPHOCYTES # BLD AUTO: 0.79 K/UL (ref 1–4.8)
MCH RBC QN AUTO: 29.4 PG (ref 27–31)
MCHC RBC AUTO-ENTMCNC: 33.2 G/DL (ref 32–36)
MCV RBC AUTO: 89 FL (ref 82–98)
MICROSCOPIC COMMENT: NORMAL
NUCLEATED RBC (/100WBC) (SMH): 0 /100 WBC
PHOSPHATE SERPL-MCNC: 4.3 MG/DL (ref 2.7–4.5)
PLATELET # BLD AUTO: 96 K/UL (ref 150–450)
PLATELET BLD QL SMEAR: ABNORMAL
PMV BLD AUTO: 11.7 FL (ref 9.2–12.9)
POTASSIUM SERPL-SCNC: 4.3 MMOL/L (ref 3.5–5.1)
PROT SERPL-MCNC: 7.3 GM/DL (ref 6–8.4)
PROT UR-MCNC: 28 MG/DL
PTH-INTACT SERPL-MCNC: 141.7 PG/ML (ref 9–77)
RBC # BLD AUTO: 2.65 M/UL (ref 4.6–6.2)
RBC #/AREA URNS AUTO: <1 /HPF
RELATIVE EOSINOPHIL (SMH): 1.4 % (ref 0–8)
RELATIVE LYMPHOCYTE (SMH): 36.2 % (ref 18–48)
RELATIVE MONOCYTE (SMH): 13.8 % (ref 4–15)
RELATIVE NEUTROPHIL (SMH): 47.2 % (ref 38–73)
SODIUM SERPL-SCNC: 135 MMOL/L (ref 136–145)
TIBC SERPL-MCNC: 183 UG/DL (ref 250–450)
TRANSFERRIN SERPL-MCNC: 131 MG/DL (ref 200–375)
WBC # BLD AUTO: 2.18 K/UL (ref 3.9–12.7)
WBC #/AREA URNS AUTO: 1 /HPF

## 2025-08-18 PROCEDURE — 82306 VITAMIN D 25 HYDROXY: CPT

## 2025-08-18 PROCEDURE — 36415 COLL VENOUS BLD VENIPUNCTURE: CPT

## 2025-08-18 PROCEDURE — 81001 URINALYSIS AUTO W/SCOPE: CPT

## 2025-08-18 PROCEDURE — 82728 ASSAY OF FERRITIN: CPT

## 2025-08-18 PROCEDURE — 82040 ASSAY OF SERUM ALBUMIN: CPT

## 2025-08-18 PROCEDURE — 85025 COMPLETE CBC W/AUTO DIFF WBC: CPT

## 2025-08-18 PROCEDURE — 84156 ASSAY OF PROTEIN URINE: CPT

## 2025-08-18 PROCEDURE — 84100 ASSAY OF PHOSPHORUS: CPT

## 2025-08-18 PROCEDURE — 83970 ASSAY OF PARATHORMONE: CPT

## 2025-08-18 PROCEDURE — 84466 ASSAY OF TRANSFERRIN: CPT

## 2025-08-18 PROCEDURE — 82570 ASSAY OF URINE CREATININE: CPT

## 2025-08-20 ENCOUNTER — HOSPITAL ENCOUNTER (OUTPATIENT)
Dept: RADIOLOGY | Facility: HOSPITAL | Age: 65
Discharge: HOME | End: 2025-08-20
Attending: NURSE PRACTITIONER
Payer: MEDICARE

## 2025-08-20 VITALS
OXYGEN SATURATION: 99 % | HEIGHT: 70 IN | RESPIRATION RATE: 16 BRPM | WEIGHT: 206 LBS | TEMPERATURE: 98 F | SYSTOLIC BLOOD PRESSURE: 130 MMHG | HEART RATE: 63 BPM | DIASTOLIC BLOOD PRESSURE: 70 MMHG | BODY MASS INDEX: 29.49 KG/M2

## 2025-08-20 DIAGNOSIS — D46.9 MDS (MYELODYSPLASTIC SYNDROME): ICD-10-CM

## 2025-08-20 LAB
ABSOLUTE EOSINOPHIL (SMH): 0.02 K/UL
ABSOLUTE MONOCYTE (SMH): 0.36 K/UL (ref 0.3–1)
ABSOLUTE NEUTROPHIL COUNT (SMH): 0.9 K/UL (ref 1.8–7.7)
ACANTHOCYTES BLD QL SMEAR: PRESENT
ANISOCYTOSIS BLD QL SMEAR: NORMAL
APTT PPP: 25.9 SECONDS (ref 21–32)
BASOPHILS # BLD AUTO: 0.01 K/UL
BASOPHILS NFR BLD AUTO: 0.5 %
BURR CELLS BLD QL SMEAR: NORMAL
DACRYOCYTES BLD QL SMEAR: NORMAL
ERYTHROCYTE [DISTWIDTH] IN BLOOD BY AUTOMATED COUNT: 23 % (ref 11.5–14.5)
HCT VFR BLD AUTO: 23.8 % (ref 40–54)
HGB BLD-MCNC: 7.5 GM/DL (ref 14–18)
IMM GRANULOCYTES # BLD AUTO: 0.01 K/UL (ref 0–0.04)
IMM GRANULOCYTES NFR BLD AUTO: 0.5 % (ref 0–0.5)
INR PPP: 1 (ref 0.8–1.2)
LYMPHOCYTES # BLD AUTO: 0.71 K/UL (ref 1–4.8)
MCH RBC QN AUTO: 29.2 PG (ref 27–31)
MCHC RBC AUTO-ENTMCNC: 31.5 G/DL (ref 32–36)
MCV RBC AUTO: 93 FL (ref 82–98)
NUCLEATED RBC (/100WBC) (SMH): 1 /100 WBC
OVALOCYTES BLD QL SMEAR: NORMAL
PLATELET # BLD AUTO: 135 K/UL (ref 150–450)
PLATELET BLD QL SMEAR: NORMAL
PMV BLD AUTO: 11.8 FL (ref 9.2–12.9)
POIKILOCYTOSIS BLD QL SMEAR: NORMAL
PROTHROMBIN TIME: 11.3 SECONDS (ref 9–12.5)
RBC # BLD AUTO: 2.57 M/UL (ref 4.6–6.2)
RELATIVE EOSINOPHIL (SMH): 1 % (ref 0–8)
RELATIVE LYMPHOCYTE (SMH): 35.1 % (ref 18–48)
RELATIVE MONOCYTE (SMH): 17.8 % (ref 4–15)
RELATIVE NEUTROPHIL (SMH): 45.1 % (ref 38–73)
SCHISTOCYTES BLD QL SMEAR: NORMAL
SCHISTOCYTES BLD QL SMEAR: PRESENT
WBC # BLD AUTO: 2.02 K/UL (ref 3.9–12.7)

## 2025-08-20 PROCEDURE — 63600175 PHARM REV CODE 636 W HCPCS: Performed by: RADIOLOGY

## 2025-08-20 PROCEDURE — A4215 STERILE NEEDLE: HCPCS

## 2025-08-20 PROCEDURE — 85610 PROTHROMBIN TIME: CPT | Performed by: RADIOLOGY

## 2025-08-20 PROCEDURE — 99152 MOD SED SAME PHYS/QHP 5/>YRS: CPT | Performed by: RADIOLOGY

## 2025-08-20 PROCEDURE — 25000003 PHARM REV CODE 250: Performed by: RADIOLOGY

## 2025-08-20 PROCEDURE — 85025 COMPLETE CBC W/AUTO DIFF WBC: CPT | Performed by: RADIOLOGY

## 2025-08-20 PROCEDURE — 85730 THROMBOPLASTIN TIME PARTIAL: CPT | Performed by: RADIOLOGY

## 2025-08-20 RX ORDER — SODIUM CHLORIDE 9 MG/ML
INJECTION, SOLUTION INTRAVENOUS
Status: COMPLETED | OUTPATIENT
Start: 2025-08-20 | End: 2025-08-20

## 2025-08-20 RX ORDER — HYDROCODONE BITARTRATE AND ACETAMINOPHEN 5; 325 MG/1; MG/1
1 TABLET ORAL EVERY 4 HOURS PRN
Status: DISCONTINUED | OUTPATIENT
Start: 2025-08-20 | End: 2025-08-21 | Stop reason: HOSPADM

## 2025-08-20 RX ORDER — LIDOCAINE HYDROCHLORIDE 10 MG/ML
INJECTION, SOLUTION EPIDURAL; INFILTRATION; INTRACAUDAL; PERINEURAL
Status: COMPLETED | OUTPATIENT
Start: 2025-08-20 | End: 2025-08-20

## 2025-08-20 RX ORDER — MIDAZOLAM HYDROCHLORIDE 1 MG/ML
INJECTION, SOLUTION INTRAMUSCULAR; INTRAVENOUS
Status: COMPLETED | OUTPATIENT
Start: 2025-08-20 | End: 2025-08-20

## 2025-08-20 RX ORDER — FENTANYL CITRATE 50 UG/ML
INJECTION, SOLUTION INTRAMUSCULAR; INTRAVENOUS
Status: COMPLETED | OUTPATIENT
Start: 2025-08-20 | End: 2025-08-20

## 2025-08-20 RX ADMIN — MIDAZOLAM 1 MG: 1 INJECTION INTRAMUSCULAR; INTRAVENOUS at 10:08

## 2025-08-20 RX ADMIN — LIDOCAINE HYDROCHLORIDE 10 ML: 10 INJECTION, SOLUTION EPIDURAL; INFILTRATION; INTRACAUDAL at 10:08

## 2025-08-20 RX ADMIN — MIDAZOLAM 2 MG: 1 INJECTION INTRAMUSCULAR; INTRAVENOUS at 10:08

## 2025-08-20 RX ADMIN — FENTANYL CITRATE 50 MCG: 50 INJECTION INTRAMUSCULAR; INTRAVENOUS at 10:08

## 2025-08-20 RX ADMIN — SODIUM CHLORIDE 250 ML/HR: 9 INJECTION, SOLUTION INTRAVENOUS at 09:08

## 2025-08-20 RX ADMIN — FENTANYL CITRATE 25 MCG: 50 INJECTION INTRAMUSCULAR; INTRAVENOUS at 10:08

## 2025-08-22 ENCOUNTER — OFFICE VISIT (OUTPATIENT)
Facility: CLINIC | Age: 65
End: 2025-08-22
Payer: MEDICARE

## 2025-08-22 ENCOUNTER — INFUSION (OUTPATIENT)
Dept: INFUSION THERAPY | Facility: HOSPITAL | Age: 65
End: 2025-08-22
Attending: INTERNAL MEDICINE
Payer: MEDICARE

## 2025-08-22 VITALS
RESPIRATION RATE: 16 BRPM | BODY MASS INDEX: 29.06 KG/M2 | SYSTOLIC BLOOD PRESSURE: 136 MMHG | WEIGHT: 203 LBS | HEART RATE: 70 BPM | TEMPERATURE: 97 F | DIASTOLIC BLOOD PRESSURE: 75 MMHG | HEIGHT: 70 IN | OXYGEN SATURATION: 98 %

## 2025-08-22 VITALS
DIASTOLIC BLOOD PRESSURE: 70 MMHG | TEMPERATURE: 97 F | HEIGHT: 70 IN | HEART RATE: 78 BPM | SYSTOLIC BLOOD PRESSURE: 121 MMHG | WEIGHT: 203.13 LBS | RESPIRATION RATE: 18 BRPM | BODY MASS INDEX: 29.08 KG/M2

## 2025-08-22 DIAGNOSIS — D69.6 THROMBOCYTOPENIA: ICD-10-CM

## 2025-08-22 DIAGNOSIS — D64.89 ANEMIA DUE TO MULTIPLE MECHANISMS: ICD-10-CM

## 2025-08-22 DIAGNOSIS — N18.30 STAGE 3 CHRONIC KIDNEY DISEASE, UNSPECIFIED WHETHER STAGE 3A OR 3B CKD: Primary | ICD-10-CM

## 2025-08-22 DIAGNOSIS — D75.839 THROMBOCYTOSIS: ICD-10-CM

## 2025-08-22 DIAGNOSIS — N18.4 ANEMIA IN STAGE 4 CHRONIC KIDNEY DISEASE: ICD-10-CM

## 2025-08-22 DIAGNOSIS — D64.9 NORMOCHROMIC ANEMIA: ICD-10-CM

## 2025-08-22 DIAGNOSIS — D46.1 RARS (REFRACTORY ANEMIA WITH RINGED SIDEROBLASTS): ICD-10-CM

## 2025-08-22 DIAGNOSIS — Z87.891 FORMER SMOKER: ICD-10-CM

## 2025-08-22 DIAGNOSIS — D57.3 SICKLE CELL TRAIT SYNDROME: ICD-10-CM

## 2025-08-22 DIAGNOSIS — D61.818 PANCYTOPENIA: ICD-10-CM

## 2025-08-22 DIAGNOSIS — R63.0 LACK OF APPETITE: ICD-10-CM

## 2025-08-22 DIAGNOSIS — D72.821 MONOCYTOSIS: ICD-10-CM

## 2025-08-22 DIAGNOSIS — D63.1 ANEMIA IN STAGE 4 CHRONIC KIDNEY DISEASE: ICD-10-CM

## 2025-08-22 DIAGNOSIS — D46.9 MDS (MYELODYSPLASTIC SYNDROME): Primary | ICD-10-CM

## 2025-08-22 DIAGNOSIS — D46.9 MDS (MYELODYSPLASTIC SYNDROME): ICD-10-CM

## 2025-08-22 PROCEDURE — 96372 THER/PROPH/DIAG INJ SC/IM: CPT

## 2025-08-22 PROCEDURE — 63600175 PHARM REV CODE 636 W HCPCS: Mod: JZ,EC,TB | Performed by: NURSE PRACTITIONER

## 2025-08-22 PROCEDURE — 99999 PR PBB SHADOW E&M-EST. PATIENT-LVL IV: CPT | Mod: PBBFAC,,, | Performed by: INTERNAL MEDICINE

## 2025-08-22 PROCEDURE — 99214 OFFICE O/P EST MOD 30 MIN: CPT | Mod: PBBFAC,PN | Performed by: INTERNAL MEDICINE

## 2025-08-22 RX ORDER — CYPROHEPTADINE HYDROCHLORIDE 4 MG/1
4 TABLET ORAL 2 TIMES DAILY PRN
Qty: 60 TABLET | Refills: 3 | Status: SHIPPED | OUTPATIENT
Start: 2025-08-22

## 2025-08-22 RX ADMIN — EPOETIN ALFA-EPBX 60000 UNITS: 20000 INJECTION, SOLUTION INTRAVENOUS; SUBCUTANEOUS at 07:08

## 2025-08-27 ENCOUNTER — LAB VISIT (OUTPATIENT)
Dept: LAB | Facility: HOSPITAL | Age: 65
End: 2025-08-27
Attending: NURSE PRACTITIONER
Payer: MEDICARE

## 2025-08-27 ENCOUNTER — TELEPHONE (OUTPATIENT)
Facility: CLINIC | Age: 65
End: 2025-08-27
Payer: MEDICARE

## 2025-08-27 DIAGNOSIS — D64.89 ANEMIA DUE TO MULTIPLE MECHANISMS: ICD-10-CM

## 2025-08-27 DIAGNOSIS — D46.9 MDS (MYELODYSPLASTIC SYNDROME): Primary | ICD-10-CM

## 2025-08-27 DIAGNOSIS — N17.9 ACUTE KIDNEY FAILURE, UNSPECIFIED: Primary | ICD-10-CM

## 2025-08-27 DIAGNOSIS — D46.9 MDS (MYELODYSPLASTIC SYNDROME): ICD-10-CM

## 2025-08-27 LAB
ABSOLUTE EOSINOPHIL (SMH): 0.01 K/UL
ABSOLUTE MONOCYTE (SMH): 0.34 K/UL (ref 0.3–1)
ABSOLUTE NEUTROPHIL COUNT (SMH): 1.2 K/UL (ref 1.8–7.7)
ALBUMIN SERPL-MCNC: 4.2 G/DL (ref 3.5–5.2)
ALBUMIN SERPL-MCNC: 4.2 G/DL (ref 3.5–5.2)
ALP SERPL-CCNC: 49 UNIT/L (ref 55–135)
ALT SERPL-CCNC: 39 UNIT/L (ref 10–44)
ANION GAP (SMH): 7 MMOL/L (ref 8–16)
ANION GAP (SMH): 7 MMOL/L (ref 8–16)
AST SERPL-CCNC: 27 UNIT/L (ref 10–40)
BASOPHILS # BLD AUTO: 0.01 K/UL
BASOPHILS NFR BLD AUTO: 0.5 %
BILIRUB SERPL-MCNC: 0.5 MG/DL (ref 0.1–1)
BUN SERPL-MCNC: 59 MG/DL (ref 8–23)
BUN SERPL-MCNC: 60 MG/DL (ref 8–23)
CALCIUM SERPL-MCNC: 8.8 MG/DL (ref 8.7–10.5)
CALCIUM SERPL-MCNC: 8.8 MG/DL (ref 8.7–10.5)
CHLORIDE SERPL-SCNC: 109 MMOL/L (ref 95–110)
CHLORIDE SERPL-SCNC: 109 MMOL/L (ref 95–110)
CO2 SERPL-SCNC: 22 MMOL/L (ref 23–29)
CO2 SERPL-SCNC: 22 MMOL/L (ref 23–29)
CREAT SERPL-MCNC: 7.6 MG/DL (ref 0.5–1.4)
CREAT SERPL-MCNC: 7.7 MG/DL (ref 0.5–1.4)
ERYTHROCYTE [DISTWIDTH] IN BLOOD BY AUTOMATED COUNT: 25.6 % (ref 11.5–14.5)
FERRITIN SERPL-MCNC: 1644.5 NG/ML (ref 20–300)
GFR SERPLBLD CREATININE-BSD FMLA CKD-EPI: 7 ML/MIN/1.73/M2
GFR SERPLBLD CREATININE-BSD FMLA CKD-EPI: 7 ML/MIN/1.73/M2
GLUCOSE SERPL-MCNC: 165 MG/DL (ref 70–110)
GLUCOSE SERPL-MCNC: 166 MG/DL (ref 70–110)
HCT VFR BLD AUTO: 21.7 % (ref 40–54)
HGB BLD-MCNC: 6.9 GM/DL (ref 14–18)
IMM GRANULOCYTES # BLD AUTO: 0.03 K/UL (ref 0–0.04)
IMM GRANULOCYTES NFR BLD AUTO: 1.4 % (ref 0–0.5)
INDIRECT COOMBS: NORMAL
IRON SATN MFR SERPL: >75 % (ref 20–50)
IRON SERPL-MCNC: 154 UG/DL (ref 45–160)
LYMPHOCYTES # BLD AUTO: 0.59 K/UL (ref 1–4.8)
MCH RBC QN AUTO: 29.7 PG (ref 27–31)
MCHC RBC AUTO-ENTMCNC: 31.8 G/DL (ref 32–36)
MCV RBC AUTO: 94 FL (ref 82–98)
NUCLEATED RBC (/100WBC) (SMH): 0 /100 WBC
PHOSPHATE SERPL-MCNC: 4.1 MG/DL (ref 2.7–4.5)
PLATELET # BLD AUTO: 169 K/UL (ref 150–450)
PMV BLD AUTO: 10.4 FL (ref 9.2–12.9)
POTASSIUM SERPL-SCNC: 4.4 MMOL/L (ref 3.5–5.1)
POTASSIUM SERPL-SCNC: 4.5 MMOL/L (ref 3.5–5.1)
PROT SERPL-MCNC: 7 GM/DL (ref 6–8.4)
RBC # BLD AUTO: 2.32 M/UL (ref 4.6–6.2)
RELATIVE EOSINOPHIL (SMH): 0.5 % (ref 0–8)
RELATIVE LYMPHOCYTE (SMH): 27.3 % (ref 18–48)
RELATIVE MONOCYTE (SMH): 15.7 % (ref 4–15)
RELATIVE NEUTROPHIL (SMH): 54.6 % (ref 38–73)
RH BLD: NORMAL
SODIUM SERPL-SCNC: 138 MMOL/L (ref 136–145)
SODIUM SERPL-SCNC: 138 MMOL/L (ref 136–145)
SPECIMEN OUTDATE: NORMAL
TIBC SERPL-MCNC: 182 UG/DL (ref 250–450)
TRANSFERRIN SERPL-MCNC: 130 MG/DL (ref 200–375)
WBC # BLD AUTO: 2.16 K/UL (ref 3.9–12.7)

## 2025-08-27 PROCEDURE — 84155 ASSAY OF PROTEIN SERUM: CPT

## 2025-08-27 PROCEDURE — 85025 COMPLETE CBC W/AUTO DIFF WBC: CPT

## 2025-08-27 PROCEDURE — 86922 COMPATIBILITY TEST ANTIGLOB: CPT | Performed by: INTERNAL MEDICINE

## 2025-08-27 PROCEDURE — 82310 ASSAY OF CALCIUM: CPT

## 2025-08-27 PROCEDURE — 82728 ASSAY OF FERRITIN: CPT

## 2025-08-27 PROCEDURE — 86901 BLOOD TYPING SEROLOGIC RH(D): CPT | Performed by: NURSE PRACTITIONER

## 2025-08-27 PROCEDURE — 85660 RBC SICKLE CELL TEST: CPT | Performed by: INTERNAL MEDICINE

## 2025-08-27 PROCEDURE — 86902 BLOOD TYPE ANTIGEN DONOR EA: CPT | Performed by: INTERNAL MEDICINE

## 2025-08-27 PROCEDURE — 36415 COLL VENOUS BLD VENIPUNCTURE: CPT

## 2025-08-27 PROCEDURE — 84466 ASSAY OF TRANSFERRIN: CPT

## 2025-08-27 RX ORDER — FUROSEMIDE 10 MG/ML
20 INJECTION INTRAMUSCULAR; INTRAVENOUS
Status: CANCELLED | OUTPATIENT
Start: 2025-08-27

## 2025-08-27 RX ORDER — HYDROCODONE BITARTRATE AND ACETAMINOPHEN 500; 5 MG/1; MG/1
TABLET ORAL ONCE
Status: CANCELLED | OUTPATIENT
Start: 2025-08-27 | End: 2025-08-27

## 2025-08-27 RX ORDER — ACETAMINOPHEN 325 MG/1
650 TABLET ORAL ONCE
Status: CANCELLED | OUTPATIENT
Start: 2025-08-27

## 2025-08-27 RX ORDER — DIPHENHYDRAMINE HYDROCHLORIDE 50 MG/ML
25 INJECTION, SOLUTION INTRAMUSCULAR; INTRAVENOUS ONCE
Status: CANCELLED | OUTPATIENT
Start: 2025-08-27

## 2025-08-27 RX ORDER — FUROSEMIDE 10 MG/ML
20 INJECTION INTRAMUSCULAR; INTRAVENOUS ONCE
Status: CANCELLED | OUTPATIENT
Start: 2025-08-27

## 2025-08-28 ENCOUNTER — INFUSION (OUTPATIENT)
Dept: INFUSION THERAPY | Facility: HOSPITAL | Age: 65
End: 2025-08-28
Attending: INTERNAL MEDICINE
Payer: MEDICARE

## 2025-08-28 ENCOUNTER — TELEPHONE (OUTPATIENT)
Facility: CLINIC | Age: 65
End: 2025-08-28
Payer: MEDICARE

## 2025-08-28 VITALS
DIASTOLIC BLOOD PRESSURE: 73 MMHG | WEIGHT: 202.81 LBS | BODY MASS INDEX: 29.1 KG/M2 | OXYGEN SATURATION: 100 % | TEMPERATURE: 98 F | HEART RATE: 62 BPM | SYSTOLIC BLOOD PRESSURE: 129 MMHG

## 2025-08-28 DIAGNOSIS — D64.89 ANEMIA DUE TO MULTIPLE MECHANISMS: Primary | ICD-10-CM

## 2025-08-28 DIAGNOSIS — D46.9 MDS (MYELODYSPLASTIC SYNDROME): ICD-10-CM

## 2025-08-28 LAB
ABO + RH BLD: NORMAL
ABO + RH BLD: NORMAL
BLD PROD TYP BPU: NORMAL
BLD PROD TYP BPU: NORMAL
BLOOD UNIT EXPIRATION DATE: NORMAL
BLOOD UNIT EXPIRATION DATE: NORMAL
BLOOD UNIT TYPE CODE: 5100
BLOOD UNIT TYPE CODE: 9500
CROSSMATCH INTERPRETATION: NORMAL
CROSSMATCH INTERPRETATION: NORMAL
DISPENSE STATUS: NORMAL
DISPENSE STATUS: NORMAL
UNIT NUMBER: NORMAL
UNIT NUMBER: NORMAL

## 2025-08-28 PROCEDURE — 63600175 PHARM REV CODE 636 W HCPCS: Performed by: INTERNAL MEDICINE

## 2025-08-28 PROCEDURE — P9040 RBC LEUKOREDUCED IRRADIATED: HCPCS | Performed by: INTERNAL MEDICINE

## 2025-08-28 PROCEDURE — P9016 RBC LEUKOCYTES REDUCED: HCPCS | Performed by: INTERNAL MEDICINE

## 2025-08-28 PROCEDURE — 25000003 PHARM REV CODE 250: Performed by: INTERNAL MEDICINE

## 2025-08-28 PROCEDURE — 96365 THER/PROPH/DIAG IV INF INIT: CPT

## 2025-08-28 PROCEDURE — 36430 TRANSFUSION BLD/BLD COMPNT: CPT

## 2025-08-28 PROCEDURE — 96366 THER/PROPH/DIAG IV INF ADDON: CPT

## 2025-08-28 RX ORDER — DIPHENHYDRAMINE HYDROCHLORIDE 50 MG/ML
25 INJECTION, SOLUTION INTRAMUSCULAR; INTRAVENOUS ONCE
Status: ACTIVE | OUTPATIENT
Start: 2025-08-28

## 2025-08-28 RX ORDER — FUROSEMIDE 10 MG/ML
20 INJECTION INTRAMUSCULAR; INTRAVENOUS ONCE
Status: ACTIVE | OUTPATIENT
Start: 2025-08-28

## 2025-08-28 RX ORDER — ACETAMINOPHEN 325 MG/1
650 TABLET ORAL ONCE
Status: ACTIVE | OUTPATIENT
Start: 2025-08-28

## 2025-08-28 RX ORDER — SODIUM CHLORIDE 0.9 % (FLUSH) 0.9 %
10 SYRINGE (ML) INJECTION
Status: DISCONTINUED | OUTPATIENT
Start: 2025-08-28 | End: 2025-08-28 | Stop reason: HOSPADM

## 2025-08-28 RX ORDER — HEPARIN 100 UNIT/ML
500 SYRINGE INTRAVENOUS
OUTPATIENT
Start: 2025-08-28

## 2025-08-28 RX ORDER — FUROSEMIDE 10 MG/ML
20 INJECTION INTRAMUSCULAR; INTRAVENOUS
Status: ACTIVE | OUTPATIENT
Start: 2025-08-28

## 2025-08-28 RX ORDER — SODIUM CHLORIDE 0.9 % (FLUSH) 0.9 %
10 SYRINGE (ML) INJECTION
OUTPATIENT
Start: 2025-08-28

## 2025-08-28 RX ORDER — HEPARIN 100 UNIT/ML
500 SYRINGE INTRAVENOUS
Status: DISCONTINUED | OUTPATIENT
Start: 2025-08-28 | End: 2025-08-28 | Stop reason: HOSPADM

## 2025-08-28 RX ORDER — HYDROCODONE BITARTRATE AND ACETAMINOPHEN 500; 5 MG/1; MG/1
TABLET ORAL ONCE
Status: COMPLETED | OUTPATIENT
Start: 2025-08-28 | End: 2025-08-28

## 2025-08-28 RX ADMIN — DEFEROXAMINE MESYLATE 1000 MG: 500 INJECTION, POWDER, LYOPHILIZED, FOR SOLUTION INTRAMUSCULAR; INTRAVENOUS; SUBCUTANEOUS at 11:08

## 2025-08-28 RX ADMIN — SODIUM CHLORIDE: 9 INJECTION, SOLUTION INTRAVENOUS at 09:08

## 2025-08-29 ENCOUNTER — INFUSION (OUTPATIENT)
Dept: INFUSION THERAPY | Facility: HOSPITAL | Age: 65
End: 2025-08-29
Attending: INTERNAL MEDICINE
Payer: MEDICARE

## 2025-08-29 VITALS
RESPIRATION RATE: 18 BRPM | TEMPERATURE: 98 F | BODY MASS INDEX: 30.69 KG/M2 | HEIGHT: 69 IN | OXYGEN SATURATION: 100 % | DIASTOLIC BLOOD PRESSURE: 74 MMHG | WEIGHT: 207.19 LBS | SYSTOLIC BLOOD PRESSURE: 123 MMHG | HEART RATE: 66 BPM

## 2025-08-29 DIAGNOSIS — R63.0 LACK OF APPETITE: ICD-10-CM

## 2025-08-29 DIAGNOSIS — D46.9 MDS (MYELODYSPLASTIC SYNDROME): ICD-10-CM

## 2025-08-29 DIAGNOSIS — D46.9 MDS (MYELODYSPLASTIC SYNDROME): Primary | ICD-10-CM

## 2025-08-29 DIAGNOSIS — N18.30 STAGE 3 CHRONIC KIDNEY DISEASE, UNSPECIFIED WHETHER STAGE 3A OR 3B CKD: Primary | ICD-10-CM

## 2025-08-29 PROCEDURE — 96372 THER/PROPH/DIAG INJ SC/IM: CPT

## 2025-08-29 PROCEDURE — 63600175 PHARM REV CODE 636 W HCPCS: Mod: EC,TB | Performed by: NURSE PRACTITIONER

## 2025-08-29 RX ADMIN — EPOETIN ALFA-EPBX 60000 UNITS: 20000 INJECTION, SOLUTION INTRAVENOUS; SUBCUTANEOUS at 02:08

## 2025-09-01 RX ORDER — CYPROHEPTADINE HYDROCHLORIDE 4 MG/1
4 TABLET ORAL 2 TIMES DAILY PRN
Qty: 60 TABLET | Refills: 3 | Status: SHIPPED | OUTPATIENT
Start: 2025-09-01

## 2025-09-02 ENCOUNTER — TELEPHONE (OUTPATIENT)
Facility: CLINIC | Age: 65
End: 2025-09-02
Payer: MEDICARE

## 2025-09-05 ENCOUNTER — TELEPHONE (OUTPATIENT)
Facility: CLINIC | Age: 65
End: 2025-09-05
Payer: MEDICARE

## 2025-09-05 ENCOUNTER — INFUSION (OUTPATIENT)
Dept: INFUSION THERAPY | Facility: HOSPITAL | Age: 65
End: 2025-09-05
Attending: INTERNAL MEDICINE
Payer: MEDICARE

## 2025-09-05 VITALS
WEIGHT: 203.88 LBS | HEART RATE: 68 BPM | RESPIRATION RATE: 18 BRPM | OXYGEN SATURATION: 99 % | TEMPERATURE: 98 F | DIASTOLIC BLOOD PRESSURE: 67 MMHG | SYSTOLIC BLOOD PRESSURE: 123 MMHG | BODY MASS INDEX: 30.2 KG/M2 | HEIGHT: 69 IN

## 2025-09-05 DIAGNOSIS — D46.9 MDS (MYELODYSPLASTIC SYNDROME): ICD-10-CM

## 2025-09-05 DIAGNOSIS — N18.30 STAGE 3 CHRONIC KIDNEY DISEASE, UNSPECIFIED WHETHER STAGE 3A OR 3B CKD: Primary | ICD-10-CM

## 2025-09-05 PROCEDURE — 63600175 PHARM REV CODE 636 W HCPCS: Mod: JZ,EC,TB | Performed by: NURSE PRACTITIONER

## 2025-09-05 RX ADMIN — EPOETIN ALFA-EPBX 60000 UNITS: 20000 INJECTION, SOLUTION INTRAVENOUS; SUBCUTANEOUS at 02:09

## (undated) DEVICE — GLOVE SURG ULTRA TOUCH 7.5

## (undated) DEVICE — NDL HYPODERMIC BLUNT 18G 1.5IN

## (undated) DEVICE — CANNULA CVD 100MM X 20G

## (undated) DEVICE — SHEET DRAPE MEDIUM

## (undated) DEVICE — SYS LABEL CORRECT MED

## (undated) DEVICE — APPLICATOR CHLORAPREP CLR 10.5

## (undated) DEVICE — SYR 10CC LUER LOCK

## (undated) DEVICE — UNDERGLOVE BIOGEL MICRO BLUE SZ 8.5

## (undated) DEVICE — DRESSING ADAPTIC 3X3 6112

## (undated) DEVICE — NDL SAFETY 25G X 1.5 ECLIPSE

## (undated) DEVICE — NDL SPINAL SPINOCAN 22GX3.5

## (undated) DEVICE — GLOVE BIOGEL PI GOLD SZ  8.5

## (undated) DEVICE — BANDAGE ACE STERILE 6 REB3116

## (undated) DEVICE — PACK ARTHROSCOPY SMHS009-07

## (undated) DEVICE — PAD ELECTROSURGICAL PAT PLATE

## (undated) DEVICE — PAD BOVIE ADULT

## (undated) DEVICE — SOLUTION IRRI NS BOTTLE 1000ML R5200-01

## (undated) DEVICE — BLADE AGRESSIVE PLUS 4.0 375-544-000

## (undated) DEVICE — CUFF TOURNIQUET 34DUAL PRT 5921-034-235

## (undated) DEVICE — SOLUTION NACL 0.9% 3000ML

## (undated) DEVICE — TUBING CYSTO DOUBLE 654301